# Patient Record
Sex: FEMALE | Race: WHITE | NOT HISPANIC OR LATINO | Employment: OTHER | URBAN - METROPOLITAN AREA
[De-identification: names, ages, dates, MRNs, and addresses within clinical notes are randomized per-mention and may not be internally consistent; named-entity substitution may affect disease eponyms.]

---

## 2017-01-16 ENCOUNTER — APPOINTMENT (EMERGENCY)
Dept: CT IMAGING | Facility: HOSPITAL | Age: 52
End: 2017-01-16
Payer: MEDICARE

## 2017-01-16 ENCOUNTER — HOSPITAL ENCOUNTER (EMERGENCY)
Facility: HOSPITAL | Age: 52
Discharge: HOME | End: 2017-01-16
Attending: EMERGENCY MEDICINE | Admitting: EMERGENCY MEDICINE
Payer: MEDICARE

## 2017-01-16 VITALS
WEIGHT: 269.18 LBS | DIASTOLIC BLOOD PRESSURE: 87 MMHG | BODY MASS INDEX: 40.8 KG/M2 | TEMPERATURE: 98.4 F | SYSTOLIC BLOOD PRESSURE: 145 MMHG | RESPIRATION RATE: 16 BRPM | HEART RATE: 104 BPM | HEIGHT: 68 IN | OXYGEN SATURATION: 96 %

## 2017-01-16 DIAGNOSIS — R11.2 NON-INTRACTABLE VOMITING WITH NAUSEA, UNSPECIFIED VOMITING TYPE: Primary | ICD-10-CM

## 2017-01-16 LAB
ALBUMIN SERPL BCP-MCNC: 3.7 G/DL (ref 3.5–5)
ALP SERPL-CCNC: 99 U/L (ref 46–116)
ALT SERPL W P-5'-P-CCNC: 27 U/L (ref 12–78)
ANION GAP SERPL CALCULATED.3IONS-SCNC: 11 MMOL/L (ref 4–13)
AST SERPL W P-5'-P-CCNC: 14 U/L (ref 5–45)
BACTERIA UR QL AUTO: ABNORMAL /HPF
BASOPHILS # BLD AUTO: 0.07 THOUSANDS/ΜL (ref 0–0.1)
BASOPHILS NFR BLD AUTO: 1 % (ref 0–1)
BILIRUB SERPL-MCNC: 1.3 MG/DL (ref 0.2–1)
BILIRUB UR QL STRIP: ABNORMAL
BUN SERPL-MCNC: 14 MG/DL (ref 5–25)
CALCIUM SERPL-MCNC: 9.5 MG/DL (ref 8.3–10.1)
CHLORIDE SERPL-SCNC: 99 MMOL/L (ref 100–108)
CLARITY UR: ABNORMAL
CLARITY, POC: ABNORMAL
CO2 SERPL-SCNC: 26 MMOL/L (ref 21–32)
COLOR UR: YELLOW
COLOR, POC: YELLOW
CREAT SERPL-MCNC: 0.76 MG/DL (ref 0.6–1.3)
EOSINOPHIL # BLD AUTO: 0.16 THOUSAND/ΜL (ref 0–0.61)
EOSINOPHIL NFR BLD AUTO: 2 % (ref 0–6)
ERYTHROCYTE [DISTWIDTH] IN BLOOD BY AUTOMATED COUNT: 12.8 % (ref 11.6–15.1)
EXT BILIRUBIN, UA: NEGATIVE
EXT BLOOD URINE: ABNORMAL
EXT GLUCOSE, UA: 1000
EXT KETONES: ABNORMAL
EXT NITRITE, UA: NEGATIVE
EXT PH, UA: 6.5
EXT PROTEIN, UA: 30
EXT SPECIFIC GRAVITY, UA: 1.02
EXT UROBILINOGEN: NEGATIVE
GFR SERPL CREATININE-BSD FRML MDRD: >60 ML/MIN/1.73SQ M
GLUCOSE SERPL-MCNC: 344 MG/DL (ref 65–140)
GLUCOSE UR STRIP-MCNC: ABNORMAL MG/DL
HCG UR QL: NEGATIVE
HCT VFR BLD AUTO: 49 % (ref 34.8–46.1)
HGB BLD-MCNC: 15.8 G/DL (ref 11.5–15.4)
HGB UR QL STRIP.AUTO: ABNORMAL
KETONES UR STRIP-MCNC: ABNORMAL MG/DL
LACTATE SERPL-SCNC: 1.3 MMOL/L (ref 0.5–2)
LEUKOCYTE ESTERASE UR QL STRIP: ABNORMAL
LIPASE SERPL-CCNC: 94 U/L (ref 73–393)
LYMPHOCYTES # BLD AUTO: 1.6 THOUSANDS/ΜL (ref 0.6–4.47)
LYMPHOCYTES NFR BLD AUTO: 17 % (ref 14–44)
MCH RBC QN AUTO: 26.9 PG (ref 26.8–34.3)
MCHC RBC AUTO-ENTMCNC: 32.2 G/DL (ref 31.4–37.4)
MCV RBC AUTO: 84 FL (ref 82–98)
MONOCYTES # BLD AUTO: 0.81 THOUSAND/ΜL (ref 0.17–1.22)
MONOCYTES NFR BLD AUTO: 9 % (ref 4–12)
NEUTROPHILS # BLD AUTO: 6.81 THOUSANDS/ΜL (ref 1.85–7.62)
NEUTS SEG NFR BLD AUTO: 72 % (ref 43–75)
NITRITE UR QL STRIP: NEGATIVE
NON-SQ EPI CELLS URNS QL MICRO: ABNORMAL /HPF
NRBC BLD AUTO-RTO: 0 /100 WBCS
OTHER STN SPEC: ABNORMAL
PH UR STRIP.AUTO: 6.5 [PH] (ref 4.5–8)
PLATELET # BLD AUTO: 283 THOUSANDS/UL (ref 149–390)
PMV BLD AUTO: 11.8 FL (ref 8.9–12.7)
POTASSIUM SERPL-SCNC: 4.3 MMOL/L (ref 3.5–5.3)
PROT SERPL-MCNC: 8 G/DL (ref 6.4–8.2)
PROT UR STRIP-MCNC: ABNORMAL MG/DL
RBC # BLD AUTO: 5.87 MILLION/UL (ref 3.81–5.12)
RBC #/AREA URNS AUTO: ABNORMAL /HPF
SODIUM SERPL-SCNC: 136 MMOL/L (ref 136–145)
SP GR UR STRIP.AUTO: 1.02 (ref 1–1.03)
UROBILINOGEN UR QL STRIP.AUTO: 0.2 E.U./DL
WBC # BLD AUTO: 9.49 THOUSAND/UL (ref 4.31–10.16)
WBC # BLD EST: ABNORMAL 10*3/UL
WBC #/AREA URNS AUTO: ABNORMAL /HPF

## 2017-01-16 PROCEDURE — 36415 COLL VENOUS BLD VENIPUNCTURE: CPT | Performed by: NURSE PRACTITIONER

## 2017-01-16 PROCEDURE — 80053 COMPREHEN METABOLIC PANEL: CPT | Performed by: NURSE PRACTITIONER

## 2017-01-16 PROCEDURE — 96374 THER/PROPH/DIAG INJ IV PUSH: CPT

## 2017-01-16 PROCEDURE — 81002 URINALYSIS NONAUTO W/O SCOPE: CPT | Performed by: NURSE PRACTITIONER

## 2017-01-16 PROCEDURE — 83605 ASSAY OF LACTIC ACID: CPT | Performed by: NURSE PRACTITIONER

## 2017-01-16 PROCEDURE — 74177 CT ABD & PELVIS W/CONTRAST: CPT

## 2017-01-16 PROCEDURE — 85025 COMPLETE CBC W/AUTO DIFF WBC: CPT | Performed by: NURSE PRACTITIONER

## 2017-01-16 PROCEDURE — 99284 EMERGENCY DEPT VISIT MOD MDM: CPT

## 2017-01-16 PROCEDURE — 96375 TX/PRO/DX INJ NEW DRUG ADDON: CPT

## 2017-01-16 PROCEDURE — 96361 HYDRATE IV INFUSION ADD-ON: CPT

## 2017-01-16 PROCEDURE — 81025 URINE PREGNANCY TEST: CPT | Performed by: NURSE PRACTITIONER

## 2017-01-16 PROCEDURE — 81001 URINALYSIS AUTO W/SCOPE: CPT | Performed by: NURSE PRACTITIONER

## 2017-01-16 PROCEDURE — 83690 ASSAY OF LIPASE: CPT | Performed by: NURSE PRACTITIONER

## 2017-01-16 PROCEDURE — 87086 URINE CULTURE/COLONY COUNT: CPT | Performed by: NURSE PRACTITIONER

## 2017-01-16 RX ORDER — INSULIN GLARGINE 100 [IU]/ML
50 INJECTION, SOLUTION SUBCUTANEOUS
Status: ON HOLD | COMMUNITY
End: 2022-05-16 | Stop reason: SDUPTHER

## 2017-01-16 RX ORDER — PREDNISONE 10 MG/1
10 TABLET ORAL DAILY
COMMUNITY
End: 2017-03-15

## 2017-01-16 RX ORDER — MORPHINE SULFATE 60 MG/1
75 TABLET, FILM COATED, EXTENDED RELEASE ORAL 2 TIMES DAILY
COMMUNITY
End: 2018-12-24 | Stop reason: HOSPADM

## 2017-01-16 RX ORDER — ALBUTEROL SULFATE 1.25 MG/3ML
1 SOLUTION RESPIRATORY (INHALATION) EVERY 6 HOURS PRN
COMMUNITY
End: 2019-11-27 | Stop reason: HOSPADM

## 2017-01-16 RX ORDER — CARISOPRODOL 350 MG/1
350 TABLET ORAL 2 TIMES DAILY
COMMUNITY

## 2017-01-16 RX ORDER — DIPHENHYDRAMINE HYDROCHLORIDE 50 MG/ML
12.5 INJECTION INTRAMUSCULAR; INTRAVENOUS ONCE
Status: COMPLETED | OUTPATIENT
Start: 2017-01-16 | End: 2017-01-16

## 2017-01-16 RX ORDER — ONDANSETRON 4 MG/1
4 TABLET, ORALLY DISINTEGRATING ORAL EVERY 8 HOURS PRN
Qty: 9 TABLET | Refills: 0 | Status: SHIPPED | OUTPATIENT
Start: 2017-01-16 | End: 2017-01-19

## 2017-01-16 RX ORDER — METOCLOPRAMIDE HYDROCHLORIDE 5 MG/ML
5 INJECTION INTRAMUSCULAR; INTRAVENOUS ONCE
Status: COMPLETED | OUTPATIENT
Start: 2017-01-16 | End: 2017-01-16

## 2017-01-16 RX ORDER — ALBUTEROL SULFATE 90 UG/1
2 AEROSOL, METERED RESPIRATORY (INHALATION) EVERY 6 HOURS PRN
COMMUNITY
End: 2019-11-27 | Stop reason: HOSPADM

## 2017-01-16 RX ORDER — OMEPRAZOLE 20 MG/1
20 CAPSULE, DELAYED RELEASE ORAL DAILY
COMMUNITY
End: 2022-05-17

## 2017-01-16 RX ORDER — GLIPIZIDE 5 MG/1
5 TABLET ORAL
COMMUNITY
End: 2017-03-15

## 2017-01-16 RX ORDER — MORPHINE SULFATE 15 MG/1
15 TABLET ORAL 3 TIMES DAILY
Status: ON HOLD | COMMUNITY
End: 2018-02-05

## 2017-01-16 RX ADMIN — IOHEXOL 100 ML: 350 INJECTION, SOLUTION INTRAVENOUS at 13:19

## 2017-01-16 RX ADMIN — DIPHENHYDRAMINE HYDROCHLORIDE 12.5 MG: 50 INJECTION, SOLUTION INTRAMUSCULAR; INTRAVENOUS at 10:45

## 2017-01-16 RX ADMIN — SODIUM CHLORIDE 1000 ML: 0.9 INJECTION, SOLUTION INTRAVENOUS at 10:42

## 2017-01-16 RX ADMIN — SODIUM CHLORIDE 1000 ML: 0.9 INJECTION, SOLUTION INTRAVENOUS at 13:33

## 2017-01-16 RX ADMIN — METOCLOPRAMIDE 5 MG: 5 INJECTION, SOLUTION INTRAMUSCULAR; INTRAVENOUS at 10:46

## 2017-01-18 LAB — BACTERIA UR CULT: NORMAL

## 2017-03-15 ENCOUNTER — HOSPITAL ENCOUNTER (EMERGENCY)
Facility: HOSPITAL | Age: 52
Discharge: HOME | End: 2017-03-15
Attending: EMERGENCY MEDICINE
Payer: MEDICARE

## 2017-03-15 ENCOUNTER — APPOINTMENT (EMERGENCY)
Dept: RADIOLOGY | Facility: HOSPITAL | Age: 52
End: 2017-03-15
Payer: MEDICARE

## 2017-03-15 VITALS
OXYGEN SATURATION: 98 % | BODY MASS INDEX: 43.24 KG/M2 | DIASTOLIC BLOOD PRESSURE: 73 MMHG | WEIGHT: 284.4 LBS | SYSTOLIC BLOOD PRESSURE: 125 MMHG | TEMPERATURE: 98.2 F | HEART RATE: 88 BPM | RESPIRATION RATE: 18 BRPM

## 2017-03-15 DIAGNOSIS — L97.509 DIABETIC ULCER OF TOE (HCC): Primary | ICD-10-CM

## 2017-03-15 DIAGNOSIS — E11.621 DIABETIC ULCER OF TOE (HCC): Primary | ICD-10-CM

## 2017-03-15 LAB
ALBUMIN SERPL BCP-MCNC: 3.6 G/DL (ref 3.5–5)
ALP SERPL-CCNC: 120 U/L (ref 46–116)
ALT SERPL W P-5'-P-CCNC: 21 U/L (ref 12–78)
ANION GAP SERPL CALCULATED.3IONS-SCNC: 9 MMOL/L (ref 4–13)
APTT PPP: 31 SECONDS (ref 24–36)
AST SERPL W P-5'-P-CCNC: 14 U/L (ref 5–45)
BASOPHILS # BLD AUTO: 0.11 THOUSANDS/ΜL (ref 0–0.1)
BASOPHILS NFR BLD AUTO: 1 % (ref 0–1)
BILIRUB SERPL-MCNC: 0.7 MG/DL (ref 0.2–1)
BUN SERPL-MCNC: 8 MG/DL (ref 5–25)
CALCIUM SERPL-MCNC: 9.2 MG/DL (ref 8.3–10.1)
CHLORIDE SERPL-SCNC: 100 MMOL/L (ref 100–108)
CO2 SERPL-SCNC: 29 MMOL/L (ref 21–32)
CREAT SERPL-MCNC: 0.72 MG/DL (ref 0.6–1.3)
EOSINOPHIL # BLD AUTO: 0.57 THOUSAND/ΜL (ref 0–0.61)
EOSINOPHIL NFR BLD AUTO: 6 % (ref 0–6)
ERYTHROCYTE [DISTWIDTH] IN BLOOD BY AUTOMATED COUNT: 12.8 % (ref 11.6–15.1)
GFR SERPL CREATININE-BSD FRML MDRD: >60 ML/MIN/1.73SQ M
GLUCOSE SERPL-MCNC: 306 MG/DL (ref 65–140)
HCT VFR BLD AUTO: 42.3 % (ref 34.8–46.1)
HGB BLD-MCNC: 14 G/DL (ref 11.5–15.4)
INR PPP: 1.04 (ref 0.86–1.16)
LACTATE SERPL-SCNC: 0.9 MMOL/L (ref 0.5–2)
LYMPHOCYTES # BLD AUTO: 2.93 THOUSANDS/ΜL (ref 0.6–4.47)
LYMPHOCYTES NFR BLD AUTO: 29 % (ref 14–44)
MCH RBC QN AUTO: 27.1 PG (ref 26.8–34.3)
MCHC RBC AUTO-ENTMCNC: 33.1 G/DL (ref 31.4–37.4)
MCV RBC AUTO: 82 FL (ref 82–98)
MONOCYTES # BLD AUTO: 0.68 THOUSAND/ΜL (ref 0.17–1.22)
MONOCYTES NFR BLD AUTO: 7 % (ref 4–12)
NEUTROPHILS # BLD AUTO: 5.67 THOUSANDS/ΜL (ref 1.85–7.62)
NEUTS SEG NFR BLD AUTO: 57 % (ref 43–75)
NRBC BLD AUTO-RTO: 0 /100 WBCS
PLATELET # BLD AUTO: 290 THOUSANDS/UL (ref 149–390)
PMV BLD AUTO: 11.7 FL (ref 8.9–12.7)
POTASSIUM SERPL-SCNC: 3.9 MMOL/L (ref 3.5–5.3)
PROT SERPL-MCNC: 8 G/DL (ref 6.4–8.2)
PROTHROMBIN TIME: 13.5 SECONDS (ref 12–14.3)
RBC # BLD AUTO: 5.17 MILLION/UL (ref 3.81–5.12)
SODIUM SERPL-SCNC: 138 MMOL/L (ref 136–145)
WBC # BLD AUTO: 9.98 THOUSAND/UL (ref 4.31–10.16)

## 2017-03-15 PROCEDURE — 85730 THROMBOPLASTIN TIME PARTIAL: CPT | Performed by: PHYSICIAN ASSISTANT

## 2017-03-15 PROCEDURE — 73660 X-RAY EXAM OF TOE(S): CPT

## 2017-03-15 PROCEDURE — 85025 COMPLETE CBC W/AUTO DIFF WBC: CPT | Performed by: PHYSICIAN ASSISTANT

## 2017-03-15 PROCEDURE — 96360 HYDRATION IV INFUSION INIT: CPT

## 2017-03-15 PROCEDURE — 85610 PROTHROMBIN TIME: CPT | Performed by: PHYSICIAN ASSISTANT

## 2017-03-15 PROCEDURE — 99283 EMERGENCY DEPT VISIT LOW MDM: CPT

## 2017-03-15 PROCEDURE — 83605 ASSAY OF LACTIC ACID: CPT | Performed by: PHYSICIAN ASSISTANT

## 2017-03-15 PROCEDURE — 80053 COMPREHEN METABOLIC PANEL: CPT | Performed by: PHYSICIAN ASSISTANT

## 2017-03-15 PROCEDURE — 36415 COLL VENOUS BLD VENIPUNCTURE: CPT | Performed by: PHYSICIAN ASSISTANT

## 2017-03-15 RX ORDER — CLINDAMYCIN HYDROCHLORIDE 300 MG/1
300 CAPSULE ORAL 3 TIMES DAILY
Qty: 30 CAPSULE | Refills: 0 | Status: SHIPPED | OUTPATIENT
Start: 2017-03-15 | End: 2017-03-25

## 2017-03-15 RX ORDER — GLIMEPIRIDE 4 MG/1
4 TABLET ORAL 2 TIMES DAILY
COMMUNITY
End: 2019-09-20 | Stop reason: HOSPADM

## 2017-03-15 RX ORDER — DIAZEPAM 5 MG/1
5 TABLET ORAL
COMMUNITY

## 2017-03-15 RX ORDER — CLINDAMYCIN HYDROCHLORIDE 150 MG/1
300 CAPSULE ORAL ONCE
Status: COMPLETED | OUTPATIENT
Start: 2017-03-15 | End: 2017-03-15

## 2017-03-15 RX ADMIN — SODIUM CHLORIDE 500 ML: 0.9 INJECTION, SOLUTION INTRAVENOUS at 19:56

## 2017-03-15 RX ADMIN — CLINDAMYCIN HYDROCHLORIDE 300 MG: 150 CAPSULE ORAL at 21:30

## 2017-09-11 ENCOUNTER — HOSPITAL ENCOUNTER (EMERGENCY)
Facility: HOSPITAL | Age: 52
Discharge: HOME | End: 2017-09-11
Attending: EMERGENCY MEDICINE | Admitting: EMERGENCY MEDICINE
Payer: MEDICARE

## 2017-09-11 VITALS
BODY MASS INDEX: 40.92 KG/M2 | OXYGEN SATURATION: 95 % | WEIGHT: 270 LBS | SYSTOLIC BLOOD PRESSURE: 131 MMHG | DIASTOLIC BLOOD PRESSURE: 68 MMHG | HEART RATE: 105 BPM | TEMPERATURE: 98.6 F | RESPIRATION RATE: 18 BRPM | HEIGHT: 68 IN

## 2017-09-11 DIAGNOSIS — M77.8 TENDINITIS OF EXTENSOR TENDON OF LEFT HAND: ICD-10-CM

## 2017-09-11 DIAGNOSIS — M77.8 TENDINITIS OF EXTENSOR TENDON OF RIGHT HAND: Primary | ICD-10-CM

## 2017-09-11 PROCEDURE — 99283 EMERGENCY DEPT VISIT LOW MDM: CPT

## 2017-10-03 ENCOUNTER — GENERIC CONVERSION - ENCOUNTER (OUTPATIENT)
Dept: OTHER | Facility: OTHER | Age: 52
End: 2017-10-03

## 2017-10-03 ENCOUNTER — APPOINTMENT (OUTPATIENT)
Dept: RADIOLOGY | Facility: CLINIC | Age: 52
End: 2017-10-03
Payer: MEDICARE

## 2017-10-03 DIAGNOSIS — M25.539 PAIN IN WRIST: ICD-10-CM

## 2017-10-03 PROCEDURE — 73110 X-RAY EXAM OF WRIST: CPT

## 2017-10-10 ENCOUNTER — GENERIC CONVERSION - ENCOUNTER (OUTPATIENT)
Dept: OTHER | Facility: OTHER | Age: 52
End: 2017-10-10

## 2017-11-21 ENCOUNTER — APPOINTMENT (OUTPATIENT)
Dept: RADIOLOGY | Facility: CLINIC | Age: 52
End: 2017-11-21
Payer: MEDICARE

## 2017-11-21 ENCOUNTER — GENERIC CONVERSION - ENCOUNTER (OUTPATIENT)
Dept: OTHER | Facility: OTHER | Age: 52
End: 2017-11-21

## 2017-11-21 DIAGNOSIS — M25.569 PAIN IN KNEE: ICD-10-CM

## 2017-11-21 DIAGNOSIS — Z96.652 PRESENCE OF LEFT ARTIFICIAL KNEE JOINT: ICD-10-CM

## 2017-11-21 PROCEDURE — 73562 X-RAY EXAM OF KNEE 3: CPT

## 2017-12-19 ENCOUNTER — TRANSCRIBE ORDERS (OUTPATIENT)
Dept: ADMINISTRATIVE | Facility: HOSPITAL | Age: 52
End: 2017-12-19

## 2017-12-19 ENCOUNTER — APPOINTMENT (OUTPATIENT)
Dept: LAB | Facility: HOSPITAL | Age: 52
End: 2017-12-19
Payer: MEDICARE

## 2017-12-19 ENCOUNTER — GENERIC CONVERSION - ENCOUNTER (OUTPATIENT)
Dept: OTHER | Facility: OTHER | Age: 52
End: 2017-12-19

## 2017-12-19 DIAGNOSIS — Z96.652 PRESENCE OF LEFT ARTIFICIAL KNEE JOINT: ICD-10-CM

## 2017-12-19 LAB
BASOPHILS # BLD AUTO: 0.09 THOUSANDS/ΜL (ref 0–0.1)
BASOPHILS NFR BLD AUTO: 1 % (ref 0–1)
CRP SERPL QL: 30.5 MG/L
EOSINOPHIL # BLD AUTO: 0.39 THOUSAND/ΜL (ref 0–0.61)
EOSINOPHIL NFR BLD AUTO: 4 % (ref 0–6)
ERYTHROCYTE [DISTWIDTH] IN BLOOD BY AUTOMATED COUNT: 13.7 % (ref 11.6–15.1)
ERYTHROCYTE [SEDIMENTATION RATE] IN BLOOD: 28 MM/HOUR (ref 0–20)
HCT VFR BLD AUTO: 40.7 % (ref 34.8–46.1)
HGB BLD-MCNC: 12.9 G/DL (ref 11.5–15.4)
LYMPHOCYTES # BLD AUTO: 2.82 THOUSANDS/ΜL (ref 0.6–4.47)
LYMPHOCYTES NFR BLD AUTO: 28 % (ref 14–44)
MCH RBC QN AUTO: 26.3 PG (ref 26.8–34.3)
MCHC RBC AUTO-ENTMCNC: 31.7 G/DL (ref 31.4–37.4)
MCV RBC AUTO: 83 FL (ref 82–98)
MONOCYTES # BLD AUTO: 0.73 THOUSAND/ΜL (ref 0.17–1.22)
MONOCYTES NFR BLD AUTO: 7 % (ref 4–12)
NEUTROPHILS # BLD AUTO: 6.14 THOUSANDS/ΜL (ref 1.85–7.62)
NEUTS SEG NFR BLD AUTO: 60 % (ref 43–75)
NRBC BLD AUTO-RTO: 0 /100 WBCS
PLATELET # BLD AUTO: 232 THOUSANDS/UL (ref 149–390)
PMV BLD AUTO: 12.2 FL (ref 8.9–12.7)
RBC # BLD AUTO: 4.91 MILLION/UL (ref 3.81–5.12)
WBC # BLD AUTO: 10.24 THOUSAND/UL (ref 4.31–10.16)

## 2017-12-19 PROCEDURE — 36415 COLL VENOUS BLD VENIPUNCTURE: CPT

## 2017-12-19 PROCEDURE — 85652 RBC SED RATE AUTOMATED: CPT

## 2017-12-19 PROCEDURE — 86140 C-REACTIVE PROTEIN: CPT

## 2017-12-19 PROCEDURE — 85025 COMPLETE CBC W/AUTO DIFF WBC: CPT

## 2017-12-26 ENCOUNTER — HOSPITAL ENCOUNTER (OUTPATIENT)
Dept: NUCLEAR MEDICINE | Facility: HOSPITAL | Age: 52
Discharge: HOME | End: 2017-12-29
Payer: MEDICARE

## 2017-12-26 DIAGNOSIS — Z96.652 PRESENCE OF LEFT ARTIFICIAL KNEE JOINT: ICD-10-CM

## 2017-12-26 PROCEDURE — A9503 TC99M MEDRONATE: HCPCS

## 2017-12-26 PROCEDURE — 78315 BONE IMAGING 3 PHASE: CPT

## 2018-01-22 VITALS
SYSTOLIC BLOOD PRESSURE: 146 MMHG | HEART RATE: 108 BPM | BODY MASS INDEX: 44.41 KG/M2 | HEIGHT: 68 IN | DIASTOLIC BLOOD PRESSURE: 90 MMHG | WEIGHT: 293 LBS

## 2018-01-22 VITALS
WEIGHT: 293 LBS | HEART RATE: 98 BPM | DIASTOLIC BLOOD PRESSURE: 85 MMHG | BODY MASS INDEX: 44.85 KG/M2 | SYSTOLIC BLOOD PRESSURE: 133 MMHG

## 2018-01-22 VITALS
WEIGHT: 293 LBS | DIASTOLIC BLOOD PRESSURE: 77 MMHG | BODY MASS INDEX: 44.41 KG/M2 | SYSTOLIC BLOOD PRESSURE: 120 MMHG | HEART RATE: 97 BPM | HEIGHT: 68 IN

## 2018-01-24 VITALS
SYSTOLIC BLOOD PRESSURE: 129 MMHG | BODY MASS INDEX: 44.41 KG/M2 | DIASTOLIC BLOOD PRESSURE: 83 MMHG | HEART RATE: 103 BPM | WEIGHT: 293 LBS | HEIGHT: 68 IN

## 2018-02-02 ENCOUNTER — HOSPITAL ENCOUNTER (INPATIENT)
Facility: HOSPITAL | Age: 53
LOS: 3 days | Discharge: HOME | DRG: 617 | End: 2018-02-05
Attending: EMERGENCY MEDICINE | Admitting: INTERNAL MEDICINE
Payer: MEDICARE

## 2018-02-02 ENCOUNTER — APPOINTMENT (EMERGENCY)
Dept: RADIOLOGY | Facility: HOSPITAL | Age: 53
DRG: 617 | End: 2018-02-02
Payer: MEDICARE

## 2018-02-02 ENCOUNTER — ANESTHESIA (INPATIENT)
Dept: PERIOP | Facility: HOSPITAL | Age: 53
DRG: 617 | End: 2018-02-02
Payer: MEDICARE

## 2018-02-02 ENCOUNTER — ANESTHESIA EVENT (INPATIENT)
Dept: PERIOP | Facility: HOSPITAL | Age: 53
DRG: 617 | End: 2018-02-02
Payer: MEDICARE

## 2018-02-02 ENCOUNTER — APPOINTMENT (INPATIENT)
Dept: RADIOLOGY | Facility: HOSPITAL | Age: 53
DRG: 617 | End: 2018-02-02
Payer: MEDICARE

## 2018-02-02 DIAGNOSIS — M86.171 ACUTE OSTEOMYELITIS OF TOE, RIGHT (HCC): Primary | ICD-10-CM

## 2018-02-02 DIAGNOSIS — E16.2 HYPOGLYCEMIA: ICD-10-CM

## 2018-02-02 DIAGNOSIS — L97.519 DIABETIC ULCER OF RIGHT GREAT TOE (HCC): ICD-10-CM

## 2018-02-02 DIAGNOSIS — E11.621 DIABETIC ULCER OF RIGHT GREAT TOE (HCC): ICD-10-CM

## 2018-02-02 DIAGNOSIS — R11.0 NAUSEA: ICD-10-CM

## 2018-02-02 PROBLEM — E87.1 HYPONATREMIA: Status: ACTIVE | Noted: 2018-02-02

## 2018-02-02 PROBLEM — K31.84 GASTROPARESIS: Status: ACTIVE | Noted: 2018-02-02

## 2018-02-02 PROBLEM — E11.9 DIABETES MELLITUS (HCC): Status: ACTIVE | Noted: 2018-02-02

## 2018-02-02 PROBLEM — G89.29 CHRONIC PAIN: Status: ACTIVE | Noted: 2018-02-02

## 2018-02-02 PROBLEM — J45.909 ASTHMA: Status: ACTIVE | Noted: 2018-02-02

## 2018-02-02 PROBLEM — E87.6 HYPOKALEMIA: Status: ACTIVE | Noted: 2018-02-02

## 2018-02-02 LAB
ABO GROUP BLD: NORMAL
ALBUMIN SERPL BCP-MCNC: 2.9 G/DL (ref 3.5–5)
ALP SERPL-CCNC: 110 U/L (ref 46–116)
ALT SERPL W P-5'-P-CCNC: 21 U/L (ref 12–78)
ANION GAP SERPL CALCULATED.3IONS-SCNC: 8 MMOL/L (ref 4–13)
APTT PPP: 40 SECONDS (ref 23–35)
AST SERPL W P-5'-P-CCNC: 25 U/L (ref 5–45)
BACTERIA UR QL AUTO: ABNORMAL /HPF
BASOPHILS # BLD AUTO: 0.06 THOUSANDS/ΜL (ref 0–0.1)
BASOPHILS NFR BLD AUTO: 1 % (ref 0–1)
BILIRUB SERPL-MCNC: 1.2 MG/DL (ref 0.2–1)
BILIRUB UR QL STRIP: NEGATIVE
BLD GP AB SCN SERPL QL: NEGATIVE
BUN SERPL-MCNC: 21 MG/DL (ref 5–25)
CALCIUM SERPL-MCNC: 9.3 MG/DL (ref 8.3–10.1)
CHLORIDE SERPL-SCNC: 93 MMOL/L (ref 100–108)
CLARITY UR: CLEAR
CO2 SERPL-SCNC: 33 MMOL/L (ref 21–32)
COLOR UR: YELLOW
CREAT SERPL-MCNC: 1.05 MG/DL (ref 0.6–1.3)
EOSINOPHIL # BLD AUTO: 0.11 THOUSAND/ΜL (ref 0–0.61)
EOSINOPHIL NFR BLD AUTO: 1 % (ref 0–6)
ERYTHROCYTE [DISTWIDTH] IN BLOOD BY AUTOMATED COUNT: 13.7 % (ref 11.6–15.1)
EST. AVERAGE GLUCOSE BLD GHB EST-MCNC: 197 MG/DL
GFR SERPL CREATININE-BSD FRML MDRD: 61 ML/MIN/1.73SQ M
GLUCOSE SERPL-MCNC: 100 MG/DL (ref 65–140)
GLUCOSE SERPL-MCNC: 113 MG/DL (ref 65–140)
GLUCOSE SERPL-MCNC: 51 MG/DL (ref 65–140)
GLUCOSE SERPL-MCNC: 64 MG/DL (ref 65–140)
GLUCOSE SERPL-MCNC: 64 MG/DL (ref 65–140)
GLUCOSE SERPL-MCNC: 67 MG/DL (ref 65–140)
GLUCOSE SERPL-MCNC: 93 MG/DL (ref 65–140)
GLUCOSE UR STRIP-MCNC: NEGATIVE MG/DL
HBA1C MFR BLD: 8.5 % (ref 4.2–6.3)
HCT VFR BLD AUTO: 38.1 % (ref 34.8–46.1)
HGB BLD-MCNC: 11.9 G/DL (ref 11.5–15.4)
HGB UR QL STRIP.AUTO: ABNORMAL
INR PPP: 1.09 (ref 0.86–1.16)
KETONES UR STRIP-MCNC: NEGATIVE MG/DL
LACTATE SERPL-SCNC: 1.3 MMOL/L (ref 0.5–2)
LEUKOCYTE ESTERASE UR QL STRIP: NEGATIVE
LYMPHOCYTES # BLD AUTO: 1.55 THOUSANDS/ΜL (ref 0.6–4.47)
LYMPHOCYTES NFR BLD AUTO: 15 % (ref 14–44)
MAGNESIUM SERPL-MCNC: 1.9 MG/DL (ref 1.6–2.6)
MCH RBC QN AUTO: 25.9 PG (ref 26.8–34.3)
MCHC RBC AUTO-ENTMCNC: 31.2 G/DL (ref 31.4–37.4)
MCV RBC AUTO: 83 FL (ref 82–98)
MONOCYTES # BLD AUTO: 1.02 THOUSAND/ΜL (ref 0.17–1.22)
MONOCYTES NFR BLD AUTO: 10 % (ref 4–12)
NEUTROPHILS # BLD AUTO: 7.36 THOUSANDS/ΜL (ref 1.85–7.62)
NEUTS SEG NFR BLD AUTO: 72 % (ref 43–75)
NITRITE UR QL STRIP: NEGATIVE
NON-SQ EPI CELLS URNS QL MICRO: ABNORMAL /HPF
NRBC BLD AUTO-RTO: 0 /100 WBCS
PH UR STRIP.AUTO: 6 [PH] (ref 4.5–8)
PLATELET # BLD AUTO: 286 THOUSANDS/UL (ref 149–390)
PMV BLD AUTO: 11.3 FL (ref 8.9–12.7)
POTASSIUM SERPL-SCNC: 3.2 MMOL/L (ref 3.5–5.3)
PROT SERPL-MCNC: 8.2 G/DL (ref 6.4–8.2)
PROT UR STRIP-MCNC: NEGATIVE MG/DL
PROTHROMBIN TIME: 14.4 SECONDS (ref 12.1–14.4)
RBC # BLD AUTO: 4.59 MILLION/UL (ref 3.81–5.12)
RBC #/AREA URNS AUTO: ABNORMAL /HPF
RH BLD: POSITIVE
SODIUM SERPL-SCNC: 134 MMOL/L (ref 136–145)
SP GR UR STRIP.AUTO: 1.02 (ref 1–1.03)
SPECIMEN EXPIRATION DATE: NORMAL
UROBILINOGEN UR QL STRIP.AUTO: 0.2 E.U./DL
WBC # BLD AUTO: 10.17 THOUSAND/UL (ref 4.31–10.16)
WBC #/AREA URNS AUTO: ABNORMAL /HPF

## 2018-02-02 PROCEDURE — 93005 ELECTROCARDIOGRAM TRACING: CPT

## 2018-02-02 PROCEDURE — 86901 BLOOD TYPING SEROLOGIC RH(D): CPT | Performed by: EMERGENCY MEDICINE

## 2018-02-02 PROCEDURE — 87205 SMEAR GRAM STAIN: CPT | Performed by: PODIATRIST

## 2018-02-02 PROCEDURE — 82948 REAGENT STRIP/BLOOD GLUCOSE: CPT

## 2018-02-02 PROCEDURE — 85730 THROMBOPLASTIN TIME PARTIAL: CPT | Performed by: EMERGENCY MEDICINE

## 2018-02-02 PROCEDURE — 87176 TISSUE HOMOGENIZATION CULTR: CPT | Performed by: PODIATRIST

## 2018-02-02 PROCEDURE — 86900 BLOOD TYPING SEROLOGIC ABO: CPT | Performed by: EMERGENCY MEDICINE

## 2018-02-02 PROCEDURE — 87070 CULTURE OTHR SPECIMN AEROBIC: CPT | Performed by: PODIATRIST

## 2018-02-02 PROCEDURE — 71046 X-RAY EXAM CHEST 2 VIEWS: CPT

## 2018-02-02 PROCEDURE — 96367 TX/PROPH/DG ADDL SEQ IV INF: CPT

## 2018-02-02 PROCEDURE — 96375 TX/PRO/DX INJ NEW DRUG ADDON: CPT

## 2018-02-02 PROCEDURE — 86850 RBC ANTIBODY SCREEN: CPT | Performed by: EMERGENCY MEDICINE

## 2018-02-02 PROCEDURE — 36415 COLL VENOUS BLD VENIPUNCTURE: CPT | Performed by: EMERGENCY MEDICINE

## 2018-02-02 PROCEDURE — 87040 BLOOD CULTURE FOR BACTERIA: CPT | Performed by: PHYSICIAN ASSISTANT

## 2018-02-02 PROCEDURE — 85025 COMPLETE CBC W/AUTO DIFF WBC: CPT | Performed by: EMERGENCY MEDICINE

## 2018-02-02 PROCEDURE — 87186 SC STD MICRODIL/AGAR DIL: CPT | Performed by: PODIATRIST

## 2018-02-02 PROCEDURE — 83605 ASSAY OF LACTIC ACID: CPT | Performed by: EMERGENCY MEDICINE

## 2018-02-02 PROCEDURE — 88305 TISSUE EXAM BY PATHOLOGIST: CPT | Performed by: PATHOLOGY

## 2018-02-02 PROCEDURE — 93010 ELECTROCARDIOGRAM REPORT: CPT | Performed by: INTERNAL MEDICINE

## 2018-02-02 PROCEDURE — 88311 DECALCIFY TISSUE: CPT | Performed by: PATHOLOGY

## 2018-02-02 PROCEDURE — 99284 EMERGENCY DEPT VISIT MOD MDM: CPT

## 2018-02-02 PROCEDURE — 73660 X-RAY EXAM OF TOE(S): CPT

## 2018-02-02 PROCEDURE — 81001 URINALYSIS AUTO W/SCOPE: CPT | Performed by: EMERGENCY MEDICINE

## 2018-02-02 PROCEDURE — 96365 THER/PROPH/DIAG IV INF INIT: CPT

## 2018-02-02 PROCEDURE — 80053 COMPREHEN METABOLIC PANEL: CPT | Performed by: EMERGENCY MEDICINE

## 2018-02-02 PROCEDURE — 99223 1ST HOSP IP/OBS HIGH 75: CPT | Performed by: INTERNAL MEDICINE

## 2018-02-02 PROCEDURE — 0Y6P0Z0 DETACHMENT AT RIGHT 1ST TOE, COMPLETE, OPEN APPROACH: ICD-10-PCS | Performed by: PODIATRIST

## 2018-02-02 PROCEDURE — 73630 X-RAY EXAM OF FOOT: CPT

## 2018-02-02 PROCEDURE — 96376 TX/PRO/DX INJ SAME DRUG ADON: CPT

## 2018-02-02 PROCEDURE — 85610 PROTHROMBIN TIME: CPT | Performed by: EMERGENCY MEDICINE

## 2018-02-02 PROCEDURE — 83036 HEMOGLOBIN GLYCOSYLATED A1C: CPT | Performed by: PHYSICIAN ASSISTANT

## 2018-02-02 PROCEDURE — 87075 CULTR BACTERIA EXCEPT BLOOD: CPT | Performed by: PODIATRIST

## 2018-02-02 PROCEDURE — 88305 TISSUE EXAM BY PATHOLOGIST: CPT | Performed by: PODIATRIST

## 2018-02-02 PROCEDURE — 83735 ASSAY OF MAGNESIUM: CPT | Performed by: EMERGENCY MEDICINE

## 2018-02-02 PROCEDURE — 87147 CULTURE TYPE IMMUNOLOGIC: CPT | Performed by: PODIATRIST

## 2018-02-02 PROCEDURE — 88311 DECALCIFY TISSUE: CPT | Performed by: PODIATRIST

## 2018-02-02 RX ORDER — METOCLOPRAMIDE HYDROCHLORIDE 5 MG/ML
INJECTION INTRAMUSCULAR; INTRAVENOUS AS NEEDED
Status: DISCONTINUED | OUTPATIENT
Start: 2018-02-02 | End: 2018-02-02 | Stop reason: SURG

## 2018-02-02 RX ORDER — DIAZEPAM 5 MG/1
5 TABLET ORAL
Status: DISCONTINUED | OUTPATIENT
Start: 2018-02-02 | End: 2018-02-05 | Stop reason: HOSPADM

## 2018-02-02 RX ORDER — MIDAZOLAM HYDROCHLORIDE 1 MG/ML
INJECTION INTRAMUSCULAR; INTRAVENOUS AS NEEDED
Status: DISCONTINUED | OUTPATIENT
Start: 2018-02-02 | End: 2018-02-02 | Stop reason: SURG

## 2018-02-02 RX ORDER — POTASSIUM CHLORIDE 20 MEQ/1
40 TABLET, EXTENDED RELEASE ORAL ONCE
Status: COMPLETED | OUTPATIENT
Start: 2018-02-02 | End: 2018-02-02

## 2018-02-02 RX ORDER — DEXTROSE MONOHYDRATE 25 G/50ML
25 INJECTION, SOLUTION INTRAVENOUS ONCE
Status: COMPLETED | OUTPATIENT
Start: 2018-02-02 | End: 2018-02-02

## 2018-02-02 RX ORDER — ALBUTEROL SULFATE 90 UG/1
2 AEROSOL, METERED RESPIRATORY (INHALATION) EVERY 6 HOURS PRN
Status: DISCONTINUED | OUTPATIENT
Start: 2018-02-02 | End: 2018-02-03

## 2018-02-02 RX ORDER — DEXTROSE AND SODIUM CHLORIDE 5; .45 G/100ML; G/100ML
INJECTION, SOLUTION INTRAVENOUS CONTINUOUS PRN
Status: DISCONTINUED | OUTPATIENT
Start: 2018-02-02 | End: 2018-02-02 | Stop reason: SURG

## 2018-02-02 RX ORDER — MORPHINE SULFATE 30 MG/1
60 TABLET, FILM COATED, EXTENDED RELEASE ORAL EVERY 12 HOURS SCHEDULED
Status: DISCONTINUED | OUTPATIENT
Start: 2018-02-02 | End: 2018-02-03

## 2018-02-02 RX ORDER — CARISOPRODOL 350 MG/1
350 TABLET ORAL 2 TIMES DAILY
Status: DISCONTINUED | OUTPATIENT
Start: 2018-02-02 | End: 2018-02-05 | Stop reason: HOSPADM

## 2018-02-02 RX ORDER — MORPHINE SULFATE 15 MG/1
15 TABLET ORAL 3 TIMES DAILY PRN
Status: DISCONTINUED | OUTPATIENT
Start: 2018-02-02 | End: 2018-02-03

## 2018-02-02 RX ORDER — ONDANSETRON 2 MG/ML
4 INJECTION INTRAMUSCULAR; INTRAVENOUS ONCE
Status: COMPLETED | OUTPATIENT
Start: 2018-02-02 | End: 2018-02-02

## 2018-02-02 RX ORDER — DEXTROSE MONOHYDRATE 25 G/50ML
INJECTION, SOLUTION INTRAVENOUS
Status: COMPLETED
Start: 2018-02-02 | End: 2018-02-02

## 2018-02-02 RX ORDER — KETAMINE HYDROCHLORIDE 50 MG/ML
INJECTION, SOLUTION, CONCENTRATE INTRAMUSCULAR; INTRAVENOUS AS NEEDED
Status: DISCONTINUED | OUTPATIENT
Start: 2018-02-02 | End: 2018-02-02 | Stop reason: SURG

## 2018-02-02 RX ORDER — INSULIN GLARGINE 100 [IU]/ML
30 INJECTION, SOLUTION SUBCUTANEOUS
Status: DISCONTINUED | OUTPATIENT
Start: 2018-02-03 | End: 2018-02-05 | Stop reason: HOSPADM

## 2018-02-02 RX ORDER — PROPOFOL 10 MG/ML
INJECTION, EMULSION INTRAVENOUS AS NEEDED
Status: DISCONTINUED | OUTPATIENT
Start: 2018-02-02 | End: 2018-02-02 | Stop reason: SURG

## 2018-02-02 RX ORDER — ONDANSETRON 2 MG/ML
INJECTION INTRAMUSCULAR; INTRAVENOUS AS NEEDED
Status: DISCONTINUED | OUTPATIENT
Start: 2018-02-02 | End: 2018-02-02 | Stop reason: SURG

## 2018-02-02 RX ORDER — FENTANYL CITRATE/PF 50 MCG/ML
50 SYRINGE (ML) INJECTION
Status: COMPLETED | OUTPATIENT
Start: 2018-02-02 | End: 2018-02-02

## 2018-02-02 RX ORDER — LIDOCAINE HYDROCHLORIDE 10 MG/ML
INJECTION, SOLUTION INFILTRATION; PERINEURAL AS NEEDED
Status: DISCONTINUED | OUTPATIENT
Start: 2018-02-02 | End: 2018-02-02 | Stop reason: SURG

## 2018-02-02 RX ORDER — SODIUM CHLORIDE 9 MG/ML
100 INJECTION, SOLUTION INTRAVENOUS CONTINUOUS
Status: DISCONTINUED | OUTPATIENT
Start: 2018-02-02 | End: 2018-02-02

## 2018-02-02 RX ORDER — PROPOFOL 10 MG/ML
INJECTION, EMULSION INTRAVENOUS CONTINUOUS PRN
Status: DISCONTINUED | OUTPATIENT
Start: 2018-02-02 | End: 2018-02-02 | Stop reason: SURG

## 2018-02-02 RX ORDER — ONDANSETRON 2 MG/ML
4 INJECTION INTRAMUSCULAR; INTRAVENOUS EVERY 6 HOURS PRN
Status: DISCONTINUED | OUTPATIENT
Start: 2018-02-02 | End: 2018-02-05 | Stop reason: HOSPADM

## 2018-02-02 RX ORDER — PANTOPRAZOLE SODIUM 40 MG/1
40 TABLET, DELAYED RELEASE ORAL
Status: DISCONTINUED | OUTPATIENT
Start: 2018-02-03 | End: 2018-02-05 | Stop reason: HOSPADM

## 2018-02-02 RX ORDER — SODIUM CHLORIDE 9 MG/ML
100 INJECTION, SOLUTION INTRAVENOUS CONTINUOUS
Status: DISCONTINUED | OUTPATIENT
Start: 2018-02-02 | End: 2018-02-05

## 2018-02-02 RX ADMIN — FENTANYL CITRATE 50 MCG: 50 INJECTION INTRAMUSCULAR; INTRAVENOUS at 17:12

## 2018-02-02 RX ADMIN — CEFEPIME HYDROCHLORIDE 2000 MG: 2 INJECTION, POWDER, FOR SOLUTION INTRAVENOUS at 23:07

## 2018-02-02 RX ADMIN — CEFEPIME HYDROCHLORIDE 2000 MG: 2 INJECTION, POWDER, FOR SOLUTION INTRAVENOUS at 10:17

## 2018-02-02 RX ADMIN — POTASSIUM CHLORIDE 40 MEQ: 1500 TABLET, EXTENDED RELEASE ORAL at 15:37

## 2018-02-02 RX ADMIN — DEXTROSE MONOHYDRATE 25 ML: 25 INJECTION, SOLUTION INTRAVENOUS at 10:59

## 2018-02-02 RX ADMIN — DEXTROSE MONOHYDRATE 25 ML: 500 INJECTION PARENTERAL at 12:06

## 2018-02-02 RX ADMIN — DEXTROSE MONOHYDRATE 25 ML: 25 INJECTION, SOLUTION INTRAVENOUS at 12:06

## 2018-02-02 RX ADMIN — HYDROMORPHONE HYDROCHLORIDE 1 MG: 1 INJECTION, SOLUTION INTRAMUSCULAR; INTRAVENOUS; SUBCUTANEOUS at 10:14

## 2018-02-02 RX ADMIN — ONDANSETRON 4 MG: 2 INJECTION INTRAMUSCULAR; INTRAVENOUS at 10:14

## 2018-02-02 RX ADMIN — MIDAZOLAM 2 MG: 1 INJECTION INTRAMUSCULAR; INTRAVENOUS at 16:05

## 2018-02-02 RX ADMIN — LIDOCAINE HYDROCHLORIDE 50 MG: 10 INJECTION, SOLUTION INFILTRATION; PERINEURAL at 16:11

## 2018-02-02 RX ADMIN — SODIUM CHLORIDE 100 ML/HR: 0.9 INJECTION, SOLUTION INTRAVENOUS at 23:05

## 2018-02-02 RX ADMIN — DEXTROSE AND SODIUM CHLORIDE: 5; .45 INJECTION, SOLUTION INTRAVENOUS at 16:03

## 2018-02-02 RX ADMIN — KETAMINE HYDROCHLORIDE 30 MG: 50 INJECTION INTRAMUSCULAR; INTRAVENOUS at 16:11

## 2018-02-02 RX ADMIN — PROPOFOL 80 MCG/KG/MIN: 10 INJECTION, EMULSION INTRAVENOUS at 16:11

## 2018-02-02 RX ADMIN — POTASSIUM CHLORIDE 40 MEQ: 1500 TABLET, EXTENDED RELEASE ORAL at 10:58

## 2018-02-02 RX ADMIN — FENTANYL CITRATE 50 MCG: 50 INJECTION INTRAMUSCULAR; INTRAVENOUS at 17:17

## 2018-02-02 RX ADMIN — MORPHINE SULFATE 60 MG: 30 TABLET, EXTENDED RELEASE ORAL at 22:53

## 2018-02-02 RX ADMIN — SODIUM CHLORIDE 1000 ML: 0.9 INJECTION, SOLUTION INTRAVENOUS at 10:09

## 2018-02-02 RX ADMIN — SODIUM CHLORIDE 100 ML/HR: 0.9 INJECTION, SOLUTION INTRAVENOUS at 17:04

## 2018-02-02 RX ADMIN — PROPOFOL 80 MG: 10 INJECTION, EMULSION INTRAVENOUS at 16:11

## 2018-02-02 RX ADMIN — VANCOMYCIN HYDROCHLORIDE 2000 MG: 1 INJECTION, POWDER, LYOPHILIZED, FOR SOLUTION INTRAVENOUS at 10:42

## 2018-02-02 RX ADMIN — SODIUM CHLORIDE 100 ML/HR: 0.9 INJECTION, SOLUTION INTRAVENOUS at 23:07

## 2018-02-02 RX ADMIN — ONDANSETRON 4 MG: 2 INJECTION INTRAMUSCULAR; INTRAVENOUS at 16:33

## 2018-02-02 RX ADMIN — METOCLOPRAMIDE HYDROCHLORIDE 10 MG: 5 INJECTION INTRAMUSCULAR; INTRAVENOUS at 16:05

## 2018-02-02 RX ADMIN — SODIUM CHLORIDE 100 ML/HR: 0.9 INJECTION, SOLUTION INTRAVENOUS at 15:36

## 2018-02-02 NOTE — ED NOTES
Pt is aware a urine sample is needed, pt states, "I don't have to go right now but I will let you know "     Ashley Kat, DELIA  02/02/18 1061

## 2018-02-02 NOTE — H&P
I have reviewed Dr Michael Askew history and physical and I agree with all findings and examinations  Pt is cleared to proceed to the OR for amputation of right first toe

## 2018-02-02 NOTE — ANESTHESIA POSTPROCEDURE EVALUATION
Post-Op Assessment Note      CV Status:  Stable    Mental Status:  Alert and awake    Hydration Status:  Euvolemic    PONV Controlled:  Controlled    Airway Patency:  Patent    Post Op Vitals Reviewed: Yes          Staff: CRNA           BP (P) 107/55 (02/02/18 1650)    Temp (P) 98 5 °F (36 9 °C) (02/02/18 1650)    Pulse  92   Resp 11   SpO2 97

## 2018-02-02 NOTE — OP NOTE
OPERATIVE REPORT  PATIENT NAME: Ladarius Frederick    :  1965  MRN: [id number]  Pt Location: [facility] OR ROOM 04    SURGERY DATE: 2018    Surgeon(s) and Role:     * Jonh Goodrich DPM - Primary    Preop Diagnosis:  * No pre-op diagnosis entered *    Post-Op Diagnosis Codes:     * Acute osteomyelitis of toe, right ([address] ) [M86 171]    Procedure(s) (LRB):  AMPUTATION TOE, RIGHT GREAT TOE (Right)    Specimen(s):  ID Type Source Tests Collected by Time Destination   1 : right great toe Tissue Toe, Right TISSUE EXAM Jonh Goodrich DPM 2018 1633    A : right great toe cultures Tissue Toe, Right ANAEROBIC CULTURE AND GRAM STAIN, CULTURE, TISSUE AND GRAM STAIN Jonh Goodrich DPM 2018 1632        Estimated Blood Loss:   Minimal    Drains:       Anesthesia Type:   * No anesthesia type entered *    Operative Indications:  * No pre-op diagnosis entered *  Osteomyelitis right great toe    Operative Findings:  Extensive infection distal phalanx right great toe    Complications:   None    Procedure and Technique:  Patient was identified in the preop holding area  All consents were verified and in chart  Appropriate limb was identified and marked  Patient was brought back to the OR via gurney  A preoperative time-out was completed  After adequate amount of IV sedation was achieved  10 cc of 1% lidocaine plain and 0 5% Marcaine plain was infiltrated the patient is right foot  The foot was then prepped and draped normal sterile fashion and attention then was directed to the distal aspect of the right great toe  Two semielliptical incisions were made at the level of the IPJ joint  There was foul-smelling pus coming from the plantar aspect of the wound  This area was cultured aerobic and anaerobically  The toe was disarticulated from the IPJ joint  A sagittal saw was then used to resect approximately 2/3 of the proximal phalanx of the remaining bone was noted to be bleeding and very healthy looking    The wound was resolved with a rongeur from all necrotic tissue  There was no deep tracking or pus noted distally into the foot  The wound was then thoroughly flushed via pulse lavage with 5000 mL of sterile normal saline  The medial and lateral aspect of the wound was reapproximated using 4 nylon  The central portion of the wound was packed with quarter-inch iodoform packing  The wound was covered Xeroform 4x4s bulky likely an Ace wrap  I was present for the entire procedure  The patient will be admitted to the medicine service I will continue to closely follow the patient      Patient Disposition:  PACU     SIGNATURE: Philip Knight DPM  DATE: February 2, 2018  TIME: 5:08 PM

## 2018-02-02 NOTE — H&P
Consult - Podiatry   Red Oliveira 47 y o  female MRN: [id number]  Unit/Bed#: ED 25 Encounter: [id number]    Assessment/Plan     Assessment:  Wet gangrene right great toe with x-ray changes suggestive of osteo  Plan:  Discussed all possibilities and treatment options with patient given the severity and nature of the infection in the toe I recommended amputation of the toe patient was in agreement and understood consents were signed and in chart patient is currently in the emergency room and will be admitted to the medicine service the plan is to go the OR for amputation later today  Will continue IV antibiotics    History of Present Illness     HPI:  Red Oliveira is a 47 y o  female who presents with infection of her right great toe  This patient is known to my office she was seen Wednesday for worsening ulcer of the right foot at that time cultures were taken x-ray was done which was negative on patient was placed on Cipro p o  b i d  and told to follow up today patient came in this morning early with foul-smelling and necrotic distal aspect of the right great toe patient was instructed to go to the emergency room  And told she would likely have to be admitted for surgical debridement of the toe the patient was feeling nausea nauseated yesterday but feels better today  She is not complaining of any fevers chills at the moment       Consults  Review of Systems   Constitutional: Negative  HENT: Negative  Eyes: Negative  Respiratory: Negative  Cardiovascular: Negative  Gastrointestinal: Negative  Musculoskeletal:  Pain right toe    Skin:  Ulcers were right great toe   Neurological: Negative  Psych: negative         Historical Information   Past Medical History:   Diagnosis Date    Asthma     Diabetes mellitus ([address] )     Gastroparesis      Past Surgical History:   Procedure Laterality Date    CERVICAL LAMINECTOMY      HYSTERECTOMY      JOINT REPLACEMENT Bilateral     knee     Social History History   Alcohol Use No     History   Drug Use No     History   Smoking Status    Never Smoker   Smokeless Tobacco    Never Used     Family History: History reviewed  No pertinent family history  Meds/Allergies     (Not in a hospital admission)  Allergies   Allergen Reactions    Nsaids GI Intolerance       Objective   First Vitals:   Blood Pressure: 144/73 (02/02/18 0930)  Pulse: (!) 111 (02/02/18 0930)  Temperature: 98 8 °F (37 1 °C) (02/02/18 0930)  Temp Source: Oral (02/02/18 0930)  Respirations: 20 (02/02/18 0930)  Height: 5' 8" (172 7 cm) (02/02/18 0930)  Weight - Scale: 135 kg (297 lb) (02/02/18 0930)  SpO2: 96 % (02/02/18 0930)    Current Vitals:   Blood Pressure: 109/61 (02/02/18 1414)  Pulse: 98 (02/02/18 1414)  Temperature: 98 8 °F (37 1 °C) (02/02/18 0930)  Temp Source: Oral (02/02/18 0930)  Respirations: 18 (02/02/18 1414)  Height: 5' 8" (172 7 cm) (02/02/18 0930)  Weight - Scale: 135 kg (297 lb) (02/02/18 0930)  SpO2: 97 % (02/02/18 1414)        /61 (BP Location: Left arm)   Pulse 98   Temp 98 8 °F (37 1 °C) (Oral)   Resp 18   Ht 5' 8" (1 727 m)   Wt 135 kg (297 lb)   SpO2 97%   BMI 45 16 kg/m²      General Appearance:    Alert, cooperative, no distress   Head:    Normocephalic, without obvious abnormality, atraumatic   Eyes:    PERRL, conjunctiva/corneas clear, EOM's intact        Nose:   Moist mucous membranes   Neck:   Supple, symmetrical, trachea midline   Back:     Symmetric   Lungs:     Respirations unlabored   Heart:    Regular rate and rhythm, S1 and S2 normal, no murmur, rub   or gallop   Abdomen:     Soft, non-tender   Extremities:   Right foot there is +1 pedal edema noted there is a foul-smelling ulcer of the right great toe there is gangrenous changes to the distal lateral aspect of the toe   Pulses:   Pedal pulses are palpable bilaterally   Skin:   Ulcer right great toe   Neurologic:   Gross sensation is present  Protective sensation is diminished distal foot  Lab Results:   Admission on 02/02/2018   Component Date Value    Sodium 02/02/2018 134*    Potassium 02/02/2018 3 2*    Chloride 02/02/2018 93*    CO2 02/02/2018 33*    Anion Gap 02/02/2018 8     BUN 02/02/2018 21     Creatinine 02/02/2018 1 05     Glucose 02/02/2018 64*    Calcium 02/02/2018 9 3     AST 02/02/2018 25     ALT 02/02/2018 21     Alkaline Phosphatase 02/02/2018 110     Total Protein 02/02/2018 8 2     Albumin 02/02/2018 2 9*    Total Bilirubin 02/02/2018 1 20*    eGFR 02/02/2018 61     WBC 02/02/2018 10 17*    RBC 02/02/2018 4 59     Hemoglobin 02/02/2018 11 9     Hematocrit 02/02/2018 38 1     MCV 02/02/2018 83     MCH 02/02/2018 25 9*    MCHC 02/02/2018 31 2*    RDW 02/02/2018 13 7     MPV 02/02/2018 11 3     Platelets 56/15/4717 286     nRBC 02/02/2018 0     Neutrophils Relative 02/02/2018 72     Lymphocytes Relative 02/02/2018 15     Monocytes Relative 02/02/2018 10     Eosinophils Relative 02/02/2018 1     Basophils Relative 02/02/2018 1     Neutrophils Absolute 02/02/2018 7 36     Lymphocytes Absolute 02/02/2018 1 55     Monocytes Absolute 02/02/2018 1 02     Eosinophils Absolute 02/02/2018 0 11     Basophils Absolute 02/02/2018 0 06     Protime 02/02/2018 14 4     INR 02/02/2018 1 09     PTT 02/02/2018 40*    ABO Grouping 02/02/2018 A     Rh Factor 02/02/2018 Positive     Antibody Screen 02/02/2018 Negative     Specimen Expiration Date 02/02/2018 [id number]     Magnesium 02/02/2018 1 9     LACTIC ACID 02/02/2018 1 3     POC Glucose 02/02/2018 64*    POC Glucose 02/02/2018 93                    Invalid input(s): LABAEARO            Imaging: I have personally reviewed pertinent films in PACS  EKG, Pathology, and Other Studies: I have personally reviewed pertinent reports        Code Status: No Order  Advance Directive and Living Will:      Power of :    POLST:

## 2018-02-02 NOTE — ANESTHESIA PREPROCEDURE EVALUATION
Review of Systems/Medical History  Patient summary reviewed  Chart reviewed  No history of anesthetic complications     Cardiovascular  Exercise tolerance: good,     Pulmonary  Asthma: well controlled/ stable Last rescue: < 1 week ago Asthma type of rescue: PRN inhaler,        GI/Hepatic    GERD well controlled, No esophageal disease ,             Endo/Other  Diabetes type 2 Insulin,      GYN       Hematology   Musculoskeletal       Neurology   Psychology           Physical Exam    Airway    Mallampati score: III  TM Distance: >3 FB  Neck ROM: full     Dental       Cardiovascular      Pulmonary      Other Findings        Anesthesia Plan  ASA Score- 3 Emergent    Anesthesia Type- IV sedation with anesthesia with ASA Monitors  Additional Monitors:   Airway Plan:         Plan Factors-    Induction- intravenous  Postoperative Plan-     Informed Consent- Anesthetic plan and risks discussed with patient  I personally reviewed this patient with the CRNA  Discussed and agreed on the Anesthesia Plan with the CRNA  Lorne Miller

## 2018-02-02 NOTE — PLAN OF CARE
DISCHARGE PLANNING     Discharge to home or other facility with appropriate resources Progressing        INFECTION - ADULT     Absence or prevention of progression during hospitalization Progressing        Knowledge Deficit     Patient/family/caregiver demonstrates understanding of disease process, treatment plan, medications, and discharge instructions Progressing        PAIN - ADULT     Verbalizes/displays adequate comfort level or baseline comfort level Progressing        SAFETY ADULT     Patient will remain free of falls Progressing     Maintain or return to baseline ADL function Progressing     Maintain or return mobility status to optimal level Progressing        SKIN/TISSUE INTEGRITY - ADULT     Skin integrity remains intact Progressing     Incision(s), wounds(s) or drain site(s) healing without S/S of infection Progressing

## 2018-02-02 NOTE — ASSESSMENT & PLAN NOTE
· Hemoglobin A1c  · Patient with hypoglycemia on admission, likely due to patient stating that she has not eaten anything in the last 3 days  · Patient states that she is on Lantus 50 units at bedtime and Amaryl, hold Amaryl and start Lantus 30 units at bedtime tomorrow due to patient being NPO today for the OR  Sliding scale coverage    · Monitor glucose checks q 6 hours due to being NPO

## 2018-02-02 NOTE — ASSESSMENT & PLAN NOTE
· Patient follows with Dr Ny Jones as an outpatient  · States she was being treated for a right hallux ulceration and was in the office about 2 days ago, noticed an odor and black tissue which prompted patient to come to the ED  · As evidenced by x-ray of the great toe on the right soft tissue emphysema in the great toe with apparent mild erosive change along the tuft of the distal phalanx most suggestive of osteomyelitis  Nondisplaced 3rd proximal phalanx fracture which is probably a subacute injury with evidence of some healing    · Patient not meeting sepsis criteria on admission  · Podiatry consult, patient to go to the OR today for right hallux amputation  · IV antibiotics with cefepime and vancomycin  · IV fluids  · Blood cultures   · Keep patient NPO  · P T /OT consult  · Lactic negative

## 2018-02-02 NOTE — H&P
H&P- Blaire Sherman 1965, 47 y o  female MRN: [id number]    Unit/Bed#: OR [place] Encounter: [id number]    Primary Care Provider: Lilia Padilla   Date and time admitted to hospital: 2/2/2018  9:25 AM       DOS: 2/2/2018      * Acute osteomyelitis of toe, right ([address] )   Assessment & Plan    · Patient follows with Dr Wm Guardado as an outpatient  · States she was being treated for a right hallux ulceration and was in the office about 2 days ago, noticed an odor and black tissue which prompted patient to come to the ED  · As evidenced by x-ray of the great toe on the right soft tissue emphysema in the great toe with apparent mild erosive change along the tuft of the distal phalanx most suggestive of osteomyelitis  Nondisplaced 3rd proximal phalanx fracture which is probably a subacute injury with evidence of some healing  · Patient not meeting sepsis criteria on admission  · Podiatry consult, patient to go to the OR today for right hallux amputation  · IV antibiotics with cefepime and vancomycin  · IV fluids  · Blood cultures   · Keep patient NPO  · P T /OT consult  · Lactic negative        Hypokalemia   Assessment & Plan    · Patient's potassium 3 2  · Will replete with 40 mEq of K-Dur  · Repeat BMP in the a m  Hyponatremia   Assessment & Plan    · 134 on admission  · Likely from volume depletion, will start IV fluids  · Monitor BMP in the a m          Gastroparesis   Assessment & Plan    · Continue Protonix daily  · Anti emetics as needed  · Monitor symptoms        Chronic pain   Assessment & Plan    · Continue patient's home regimen of morphine extended and immediate release  · Monitor symptoms        Diabetes mellitus (HCC)   Assessment & Plan    · Hemoglobin A1c  · Patient with hypoglycemia on admission, likely due to patient stating that she has not eaten anything in the last 3 days  · Patient states that she is on Lantus 50 units at bedtime and Amaryl, hold Amaryl and start Lantus 30 units at bedtime tomorrow due to patient being NPO today for the OR  Sliding scale coverage  · Monitor glucose checks q 6 hours due to being NPO        Diabetic ulcer of right great toe (HCC)   Assessment & Plan    · With evidence of osteomyelitis  · Plan as above          VTE Prophylaxis: Pharmacologic VTE Prophylaxis contraindicated due to Patient to go to the OR today  / sequential compression device   Code Status:  Level 1-full code, discussed with patient at bedside  POLST: There is no POLST form on file for this patient (pre-hospital)  Discussion with family:  Discussed with patient and patient's  at bedside the patient will be admitted and planned for right hallux amputation  And to continue IV antibiotics  Patient and patient's  agreeable and verbalized understanding  Anticipated Length of Stay:  Patient will be admitted on an Inpatient basis with an anticipated length of stay of  > 2 midnights  Justification for Hospital Stay:  Patient scheduled for right hallux amputation, IV antibiotics    Total Time for Visit, including Counseling / Coordination of Care: 30 minutes  Greater than 50% of this total time spent on direct patient counseling and coordination of care  Chief Complaint:   "I had an ulcer on my right foot "    History of Present Illness:    Juan Wilson is a 47 y o  female with significant past medical history of chronic pain, diabetes with history of foot ulcerations,, gastroparesis, asthma who presents with right great toe ulceration  The patient states that she follows with Dr Fernando Mo with Podiatry as an outpatient  States that she has seen him multiple times in the past for lower extremity ulcerations due to diabetes  States that she has never had an amputation before  Reports that she was being treated by Dr Fernando Mo for this right hallux ulceration and over the last day she began to notice an odor, the toe began to turn a black color and she began to have nausea and vomiting    Last episode of vomiting was 4 a m  Today  States that she has not had any appetite and has not been able to keep anything down  Reports that she has not eaten anything in the last 3 days  Reports that she has had this ulceration for some time now  States that for diabetes management she is on Amaryl 4 mg twice a day and her Lantus was increased to 50 units at bedtime  She reports that she has been taking these medications at home  Patient states that she was at the podiatrist today and that is who sent her to the hospital   Patient denies history of MI or stroke or any heart arrhythmias  Review of Systems:    Review of Systems   Constitutional: Negative for chills, diaphoresis and fever  HENT: Negative for congestion, ear discharge, ear pain, sinus pressure, sore throat and tinnitus  Eyes: Negative for pain, redness and visual disturbance  Respiratory: Negative for cough, chest tightness and shortness of breath  Cardiovascular: Positive for leg swelling  Negative for chest pain  Gastrointestinal: Positive for nausea and vomiting  Negative for abdominal pain, constipation and diarrhea  Genitourinary: Negative for difficulty urinating, dysuria and hematuria  Musculoskeletal: Positive for back pain (Chronic)  Negative for myalgias  Skin: Positive for wound (Right hallux)  Negative for rash  Neurological: Negative for dizziness, light-headedness and headaches  Past Medical and Surgical History:     Past Medical History:   Diagnosis Date    Asthma     Chronic pain     Diabetes mellitus [facility] [address] )     Gastroparesis        Past Surgical History:   Procedure Laterality Date    CERVICAL LAMINECTOMY      HYSTERECTOMY      JOINT REPLACEMENT Bilateral     knee       Meds/Allergies:    Prior to Admission medications    Medication Sig Start Date End Date Taking?  Authorizing Provider   albuterol (ACCUNEB) 1 25 MG/3ML nebulizer solution Take 1 ampule by nebulization every 6 (six) hours as needed for wheezing    Historical Provider, MD   albuterol (PROVENTIL HFA,VENTOLIN HFA) 90 mcg/act inhaler Inhale 2 puffs every 6 (six) hours as needed for wheezing    Historical Provider, MD   carisoprodol (SOMA) 350 mg tablet Take 350 mg by mouth 2 (two) times a day    Historical Provider, MD   diazepam (VALIUM) 5 mg tablet Take 5 mg by mouth daily at bedtime as needed for anxiety    Historical Provider, MD   glimepiride (AMARYL) 4 mg tablet Take 4 mg by mouth 2 (two) times a day    Historical Provider, MD   insulin glargine (LANTUS) 100 units/mL subcutaneous injection Inject 50 Units under the skin daily at bedtime      Historical Provider, MD   morphine (MS CONTIN) 60 mg 12 hr tablet Take 75 mg by mouth 2 (two) times a day      Historical Provider, MD   morphine (MSIR) 15 mg tablet Take 15 mg by mouth 3 (three) times a day    Historical Provider, MD   omeprazole (PriLOSEC) 20 mg delayed release capsule Take 20 mg by mouth daily    Historical Provider, MD   ondansetron (ZOFRAN-ODT) 4 mg disintegrating tablet Take 1 tablet by mouth every 8 (eight) hours as needed for nausea or vomiting for up to 3 days 1/16/17 1/19/17  VIVI Rutherford     I have reviewed home medications with patient personally  Allergies:    Allergies   Allergen Reactions    Nsaids GI Intolerance       Social History:     Marital Status: /Civil Union   Occupation:   Patient Pre-hospital Living Situation:  Patient lives at home with  and son  Patient Pre-hospital Level of Mobility:  States that she has a walker and cane at home and uses them on her "bad" days  Patient Pre-hospital Diet Restrictions:  None  Substance Use History:   History   Alcohol Use No     History   Smoking Status    Never Smoker   Smokeless Tobacco    Never Used     History   Drug Use No       Family History:    Patient's mother with history of diabetes as well as grandmother and uncles    Physical Exam:     Vitals:   Blood Pressure: 124/70 (02/02/18 1558)  Pulse: 101 (02/02/18 1558)  Temperature: 98 1 °F (36 7 °C) (02/02/18 1558)  Temp Source: Temporal (02/02/18 1558)  Respirations: 20 (02/02/18 1558)  Height: 5' 8" (172 7 cm) (02/02/18 0930)  Weight - Scale: 135 kg (297 lb) (02/02/18 0930)  SpO2: 94 % (02/02/18 1558)    Physical Exam   Constitutional: No distress  Patient is in no acute distress sitting in her hospital bed accompanied by her   She is missing some teeth  Patient is visibly anxious due to going to the OR today   HENT:   Head: Normocephalic and atraumatic  Cardiovascular: Normal rate, regular rhythm and intact distal pulses  Pulmonary/Chest: Effort normal and breath sounds normal  No respiratory distress  She has no wheezes  She has no rales  Abdominal: Soft  Bowel sounds are normal  She exhibits no distension  There is no tenderness  There is no rebound  Musculoskeletal: She exhibits edema (Edema of the right ankle)  Neurological: She is alert  Skin: Skin is warm and dry  She is not diaphoretic  Patient's right foot dressed and wrapping is clean and intact  Some blood beginning to form on the dressing  Psychiatric: She has a normal mood and affect  Vitals reviewed  Additional Data:     Lab Results: I have personally reviewed pertinent reports  Results from last 7 days  Lab Units 02/02/18  1006   WBC Thousand/uL 10 17*   HEMOGLOBIN g/dL 11 9   HEMATOCRIT % 38 1   PLATELETS Thousands/uL 286   NEUTROS PCT % 72   LYMPHS PCT % 15   MONOS PCT % 10   EOS PCT % 1       Results from last 7 days  Lab Units 02/02/18  1006   SODIUM mmol/L 134*   POTASSIUM mmol/L 3 2*   CHLORIDE mmol/L 93*   CO2 mmol/L 33*   BUN mg/dL 21   CREATININE mg/dL 1 05   CALCIUM mg/dL 9 3   TOTAL PROTEIN g/dL 8 2   BILIRUBIN TOTAL mg/dL 1 20*   ALK PHOS U/L 110   ALT U/L 21   AST U/L 25   GLUCOSE RANDOM mg/dL 64*       Results from last 7 days  Lab Units 02/02/18  1006   INR  1 09       Imaging: I have personally reviewed pertinent reports        XR chest 2 views   Final Result by Trinity Parker MD (02/02 1045)      No active pulmonary disease  Workstation performed: [id number]         XR toe great min 2 views RIGHT   Final Result by Trinity Parker MD (02/02 1043)      Soft tissue emphysema in the great toe with apparent mild erosive change along the tuft of the distal phalanx most suggestive of osteomyelitis  Nondisplaced 3rd proximal phalanx fracture which is probably a subacute injury with evidence of some healing  Workstation performed: [id number]             EKG, Pathology, and Other Studies Reviewed on Admission:   · Chest x-ray and x-ray of the great toe reviewed    Allscripts / Epic Records Reviewed: Yes     ** Please Note: This note has been constructed using a voice recognition system   **

## 2018-02-02 NOTE — ED PROVIDER NOTES
History  Chief Complaint   Patient presents with    Foot Ulcer     Pt stated that her foot doctor told her to come here because the right great toe is infected  Patient is a 48-year-old female with past medical and surgical history of insulin-dependent diabetes, diabetic neuropathy, diabetic gastroparesis, asthma, chronic neck and back pain status post cervical laminectomy, hysterectomy, right knee replacement, presents to the emergency department at the instruction of her podiatrist, Dr Deisi Perez, for worsening and nonhealing diabetic right great toe ulcer  According to patient and according to the podiatrist who called in sending patient over, he plans to likely amputate the right great toe later today or tomorrow and recommended she be started on IV antibiotics  According to patient she has been dealing with a plantar ulcer on the right great toe that started several months ago  She has been treating this with her podiatrist as an outpatient however it has been getting progressively worse and today, the podiatrist noted some black discoloration of the toe and recommended she come to the ED for possible amputation and IV antibiotics  She states she had an x-ray of the right great toe several days ago in the office and it was unremarkable  On review of systems, she states she has had nausea and yesterday she had a few episodes of nonbilious and nonbloody vomiting  No further vomiting today but continues to feel nauseous  She reports having associated pain over the ulcer area  She also reports mild redness over the top of the foot  She denies any known fever, chills, headache, dizziness or near syncope, URI symptoms or cough, chest pain, palpitations, dyspnea, abdominal pain, diarrhea, constipation, blood per rectum, melena, dysuria, frequency, hematuria, new extremity weakness or paresthesia worse than baseline given her history of neuropathy, or other focal neurologic deficits          History provided by:  Patient   used: No        Prior to Admission Medications   Prescriptions Last Dose Informant Patient Reported? Taking? albuterol (ACCUNEB) 1 25 MG/3ML nebulizer solution   Yes No   Sig: Take 1 ampule by nebulization every 6 (six) hours as needed for wheezing   albuterol (PROVENTIL HFA,VENTOLIN HFA) 90 mcg/act inhaler   Yes No   Sig: Inhale 2 puffs every 6 (six) hours as needed for wheezing   carisoprodol (SOMA) 350 mg tablet   Yes No   Sig: Take 350 mg by mouth 2 (two) times a day   diazepam (VALIUM) 5 mg tablet   Yes No   Sig: Take 5 mg by mouth daily at bedtime as needed for anxiety   glimepiride (AMARYL) 4 mg tablet   Yes No   Sig: Take 4 mg by mouth 2 (two) times a day   insulin glargine (LANTUS) 100 units/mL subcutaneous injection   Yes No   Sig: Inject 20 Units under the skin daily at bedtime   morphine (MS CONTIN) 60 mg 12 hr tablet   Yes No   Sig: Take 75 mg by mouth 2 (two) times a day     morphine (MSIR) 15 mg tablet   Yes No   Sig: Take 15 mg by mouth 3 (three) times a day   omeprazole (PriLOSEC) 20 mg delayed release capsule   Yes No   Sig: Take 20 mg by mouth daily   ondansetron (ZOFRAN-ODT) 4 mg disintegrating tablet   No No   Sig: Take 1 tablet by mouth every 8 (eight) hours as needed for nausea or vomiting for up to 3 days      Facility-Administered Medications: None       Past Medical History:   Diagnosis Date    Asthma     Diabetes mellitus ([facility] [address] )     Gastroparesis        Past Surgical History:   Procedure Laterality Date    CERVICAL LAMINECTOMY      HYSTERECTOMY      JOINT REPLACEMENT Bilateral     knee       History reviewed  No pertinent family history  I have reviewed and agree with the history as documented  Social History   Substance Use Topics    Smoking status: Never Smoker    Smokeless tobacco: Never Used    Alcohol use No        Review of Systems   Constitutional: Negative for chills and fever     HENT: Negative for congestion, ear pain, rhinorrhea and sore throat  Eyes: Negative for pain and visual disturbance  Respiratory: Negative for cough, chest tightness, shortness of breath and wheezing  Cardiovascular: Negative for chest pain, palpitations and leg swelling  Gastrointestinal: Positive for nausea and vomiting  Negative for abdominal pain, blood in stool, constipation and diarrhea  Genitourinary: Negative for dysuria, flank pain, frequency and hematuria  Musculoskeletal: Positive for joint swelling  Negative for back pain and neck pain         + Right great toe wound, swelling and pain  Skin: Positive for wound  Negative for color change and rash  Allergic/Immunologic: Negative for immunocompromised state  Neurological: Negative for dizziness, syncope, weakness, light-headedness, numbness and headaches  Hematological: Negative for adenopathy  Psychiatric/Behavioral: Negative for confusion and decreased concentration  All other systems reviewed and are negative  Physical Exam  ED Triage Vitals [02/02/18 0930]   Temperature Pulse Respirations Blood Pressure SpO2   98 8 °F (37 1 °C) (!) 111 20 144/73 96 %      Temp Source Heart Rate Source Patient Position - Orthostatic VS BP Location FiO2 (%)   Oral Monitor Sitting Right arm --      Pain Score       7         Vitals:    02/02/18 0930 02/02/18 1045 02/02/18 1200   BP: 144/73 103/59 102/58   BP Location: Right arm Left arm Left arm   Pulse: (!) 111 100 100   Resp: 20 16 14   Temp: 98 8 °F (37 1 °C)     TempSrc: Oral     SpO2: 96% 95% 90%   Weight: 135 kg (297 lb)     Height: 5' 8" (1 727 m)       Physical Exam   Constitutional: She is oriented to person, place, and time  She appears well-developed and well-nourished  No distress  HENT:   Head: Normocephalic and atraumatic  Mouth/Throat: Oropharynx is clear and moist  No oropharyngeal exudate  Eyes: Conjunctivae and EOM are normal  Pupils are equal, round, and reactive to light  Neck: Normal range of motion  Neck supple  No JVD present  Cardiovascular: Regular rhythm, normal heart sounds and intact distal pulses  Exam reveals no gallop and no friction rub  No murmur heard  Mild tachycardia  Pulmonary/Chest: Effort normal and breath sounds normal  No respiratory distress  She has no wheezes  She has no rales  Abdominal: Soft  Bowel sounds are normal  She exhibits no distension  There is no tenderness  There is no rebound and no guarding  Musculoskeletal: Normal range of motion  She exhibits tenderness  She exhibits no edema  Tenderness around the right great toe  2+ DP and PT pulses  Neurological: She is alert and oriented to person, place, and time  No cranial nerve deficit  No gross motor or sensory deficits  Skin: Skin is warm and dry  No rash noted  She is not diaphoretic  No pallor  Clean based ulceration over the plantar and dorsal aspect of the right great toe  There is black gangrenous area over the medial aspect of right great toe  Entire toe is swollen and erythematous  Mild erythema and warmth over the dorsum of the foot  Psychiatric: She has a normal mood and affect  Her behavior is normal    Nursing note and vitals reviewed        ED Medications  Medications   dextrose 50 % IV solution 25 mL (not administered)   sodium chloride 0 9 % bolus 1,000 mL (1,000 mL Intravenous New Bag 2/2/18 1009)   ondansetron (ZOFRAN) injection 4 mg (4 mg Intravenous Given 2/2/18 1014)   HYDROmorphone (DILAUDID) injection 1 mg (1 mg Intravenous Given 2/2/18 1014)   vancomycin (VANCOCIN) 2,000 mg in sodium chloride 0 9 % 500 mL IVPB (0 mg/kg × 135 kg Intravenous Stopped 2/2/18 1213)   cefepime (MAXIPIME) 2,000 mg in dextrose 5 % 50 mL IVPB (0 mg Intravenous Stopped 2/2/18 1042)   potassium chloride (K-DUR,KLOR-CON) CR tablet 40 mEq (40 mEq Oral Given 2/2/18 1058)   dextrose 50 % IV solution 25 mL (25 mL Intravenous Given 2/2/18 1059)   dextrose 50 % IV solution **AcuDose Override Pull** (25 mL  Given 2/2/18 1206) Diagnostic Studies  Results Reviewed     Procedure Component Value Units Date/Time    Fingerstick Glucose (POCT) [[id number]]  (Abnormal) Collected:  02/02/18 1201    Lab Status:  Final result Updated:  02/02/18 1202     POC Glucose 64 (L) mg/dl     Lactic acid, plasma [[id number]]  (Normal) Collected:  02/02/18 1006    Lab Status:  Final result Specimen:  Blood from Arm, Right Updated:  02/02/18 1044     LACTIC ACID 1 3 mmol/L     Narrative:         Result may be elevated if tourniquet was used during collection  Comprehensive metabolic panel [[id number]]  (Abnormal) Collected:  02/02/18 1006    Lab Status:  Final result Specimen:  Blood from Arm, Right Updated:  02/02/18 1031     Sodium 134 (L) mmol/L      Potassium 3 2 (L) mmol/L      Chloride 93 (L) mmol/L      CO2 33 (H) mmol/L      Anion Gap 8 mmol/L      BUN 21 mg/dL      Creatinine 1 05 mg/dL      Glucose 64 (L) mg/dL      Calcium 9 3 mg/dL      AST 25 U/L      ALT 21 U/L      Alkaline Phosphatase 110 U/L      Total Protein 8 2 g/dL      Albumin 2 9 (L) g/dL      Total Bilirubin 1 20 (H) mg/dL      eGFR 61 ml/min/1 73sq m     Narrative:         National Kidney Disease Education Program recommendations are as follows:  GFR calculation is accurate only with a steady state creatinine  Chronic Kidney disease less than 60 ml/min/1 73 sq  meters  Kidney failure less than 15 ml/min/1 73 sq  meters  Magnesium [[id number]]  (Normal) Collected:  02/02/18 1006    Lab Status:  Final result Specimen:  Blood from Arm, Right Updated:  02/02/18 1031     Magnesium 1 9 mg/dL     Protime-INR [[id number]]  (Normal) Collected:  02/02/18 1006    Lab Status:  Final result Specimen:  Blood from Arm, Right Updated:  02/02/18 1030     Protime 14 4 seconds      INR 1 09    APTT [[id number]]  (Abnormal) Collected:  02/02/18 1006    Lab Status:  Final result Specimen:  Blood from Arm, Right Updated:  02/02/18 1030     PTT 40 (H) seconds     Narrative:          Therapeutic Heparin Range = 60-90 seconds    CBC and differential [[id number]]  (Abnormal) Collected:  02/02/18 1006    Lab Status:  Final result Specimen:  Blood from Arm, Right Updated:  02/02/18 1017     WBC 10 17 (H) Thousand/uL      RBC 4 59 Million/uL      Hemoglobin 11 9 g/dL      Hematocrit 38 1 %      MCV 83 fL      MCH 25 9 (L) pg      MCHC 31 2 (L) g/dL      RDW 13 7 %      MPV 11 3 fL      Platelets 465 Thousands/uL      nRBC 0 /100 WBCs      Neutrophils Relative 72 %      Lymphocytes Relative 15 %      Monocytes Relative 10 %      Eosinophils Relative 1 %      Basophils Relative 1 %      Neutrophils Absolute 7 36 Thousands/µL      Lymphocytes Absolute 1 55 Thousands/µL      Monocytes Absolute 1 02 Thousand/µL      Eosinophils Absolute 0 11 Thousand/µL      Basophils Absolute 0 06 Thousands/µL     UA w Reflex to Microscopic w Reflex to Culture [[id number]]     Lab Status:  No result Specimen:  Urine                  XR chest 2 views   Final Result by Beulah Daniels MD (02/02 1045)      No active pulmonary disease  Workstation performed: [id number]         XR toe great min 2 views RIGHT   Final Result by Beulah Daniels MD (02/02 1043)      Soft tissue emphysema in the great toe with apparent mild erosive change along the tuft of the distal phalanx most suggestive of osteomyelitis  Nondisplaced 3rd proximal phalanx fracture which is probably a subacute injury with evidence of some healing           Workstation performed: [id number]                    Procedures  ECG 12 Lead Documentation  Date/Time: 2/2/2018 12:13 PM  Performed by: Errol Perkins by: Hailey Baig     ECG reviewed by me, the ED Provider: yes    Patient location:  ED  Previous ECG:     Previous ECG:  Unavailable  Rate:     ECG rate:  102    ECG rate assessment: tachycardic    Rhythm:     Rhythm: sinus tachycardia    Ectopy:     Ectopy: none    QRS:     QRS axis:  Normal    QRS intervals:  Normal  Conduction:     Conduction: normal ST segments:     ST segments:  Normal  T waves:     T waves: normal             Phone Contacts  ED Phone Contact    ED Course  ED Course as of Feb 02 1213   Fri Feb 02, 2018   1210 Patient's repeat glucose 64 despite half an amp of D50  Will give another half an amp reassessed  Spoke with patient's podiatrist who will likely take patient to the OR today  Patient admitted to [facility]  MDM  Number of Diagnoses or Management Options  Diagnosis management comments: Right great toe diabetic foot ulcer with evidence of gangrene  Osteomyelitis also on the differential   Will repeat right great toe x-ray, check labs including lactic acid and blood cultures  Will start IV antibiotics and admit to Medicine with Podiatry consult  Will reach out to podiatrist via phone  Will give IV fluid bolus, Zofran, Dilaudid, cefepime and vancomycin         Amount and/or Complexity of Data Reviewed  Clinical lab tests: ordered and reviewed  Tests in the radiology section of CPT®: ordered and reviewed  Tests in the medicine section of CPT®: ordered and reviewed  Discuss the patient with other providers: yes  Independent visualization of images, tracings, or specimens: yes      CritCare Time    Disposition  Final diagnoses:   Acute osteomyelitis of toe, right ([address] )   Diabetic ulcer of right great toe ([address] )   Nausea   Hypoglycemia     Time reflects when diagnosis was documented in both MDM as applicable and the Disposition within this note     Time User Action Codes Description Comment    2/2/2018 12:11 PM Giovani Gutting E Add [M86 171] Acute osteomyelitis of toe, right ([address] )     2/2/2018 12:11 PM Olayinka Au Add [B46 437,  L97 519] Diabetic ulcer of right great toe ([address] )     2/2/2018 12:12 PM Giovani Gutting E Add [R11 0] Nausea     2/2/2018 12:12 PM Giovani Gutting E Add [E16 2] Hypoglycemia       ED Disposition     ED Disposition Condition Comment    Admit  Case was discussed with SLRAYNE and the patient's admission status was agreed to be Admission Status: inpatient status to the service of Dr Adelfo Mathis   Follow-up Information    None       Patient's Medications   Discharge Prescriptions    No medications on file     No discharge procedures on file      ED Provider  Electronically Signed by           Yordy Sandoval DO  02/02/18 8541

## 2018-02-03 LAB
ALBUMIN SERPL BCP-MCNC: 2.3 G/DL (ref 3.5–5)
ALP SERPL-CCNC: 132 U/L (ref 46–116)
ALT SERPL W P-5'-P-CCNC: 20 U/L (ref 12–78)
ANION GAP SERPL CALCULATED.3IONS-SCNC: 7 MMOL/L (ref 4–13)
AST SERPL W P-5'-P-CCNC: 26 U/L (ref 5–45)
BILIRUB SERPL-MCNC: 0.7 MG/DL (ref 0.2–1)
BUN SERPL-MCNC: 10 MG/DL (ref 5–25)
CALCIUM SERPL-MCNC: 8.4 MG/DL (ref 8.3–10.1)
CHLORIDE SERPL-SCNC: 101 MMOL/L (ref 100–108)
CO2 SERPL-SCNC: 30 MMOL/L (ref 21–32)
CREAT SERPL-MCNC: 0.82 MG/DL (ref 0.6–1.3)
ERYTHROCYTE [DISTWIDTH] IN BLOOD BY AUTOMATED COUNT: 13.9 % (ref 11.6–15.1)
GFR SERPL CREATININE-BSD FRML MDRD: 82 ML/MIN/1.73SQ M
GLUCOSE SERPL-MCNC: 124 MG/DL (ref 65–140)
GLUCOSE SERPL-MCNC: 142 MG/DL (ref 65–140)
GLUCOSE SERPL-MCNC: 67 MG/DL (ref 65–140)
GLUCOSE SERPL-MCNC: 73 MG/DL (ref 65–140)
GLUCOSE SERPL-MCNC: 89 MG/DL (ref 65–140)
HCT VFR BLD AUTO: 28 % (ref 34.8–46.1)
HGB BLD-MCNC: 8.5 G/DL (ref 11.5–15.4)
MCH RBC QN AUTO: 26.3 PG (ref 26.8–34.3)
MCHC RBC AUTO-ENTMCNC: 30.4 G/DL (ref 31.4–37.4)
MCV RBC AUTO: 87 FL (ref 82–98)
PLATELET # BLD AUTO: 196 THOUSANDS/UL (ref 149–390)
PMV BLD AUTO: 10.8 FL (ref 8.9–12.7)
POTASSIUM SERPL-SCNC: 3.5 MMOL/L (ref 3.5–5.3)
PROT SERPL-MCNC: 6.7 G/DL (ref 6.4–8.2)
RBC # BLD AUTO: 3.23 MILLION/UL (ref 3.81–5.12)
SODIUM SERPL-SCNC: 138 MMOL/L (ref 136–145)
WBC # BLD AUTO: 6.54 THOUSAND/UL (ref 4.31–10.16)

## 2018-02-03 PROCEDURE — 99233 SBSQ HOSP IP/OBS HIGH 50: CPT | Performed by: NURSE PRACTITIONER

## 2018-02-03 PROCEDURE — 80053 COMPREHEN METABOLIC PANEL: CPT | Performed by: PHYSICIAN ASSISTANT

## 2018-02-03 PROCEDURE — 85027 COMPLETE CBC AUTOMATED: CPT | Performed by: PHYSICIAN ASSISTANT

## 2018-02-03 PROCEDURE — 82948 REAGENT STRIP/BLOOD GLUCOSE: CPT

## 2018-02-03 RX ORDER — MORPHINE SULFATE 15 MG/1
30 TABLET ORAL EVERY 6 HOURS PRN
Status: DISCONTINUED | OUTPATIENT
Start: 2018-02-03 | End: 2018-02-05 | Stop reason: HOSPADM

## 2018-02-03 RX ORDER — ALBUTEROL SULFATE 90 UG/1
2 AEROSOL, METERED RESPIRATORY (INHALATION) EVERY 4 HOURS PRN
Status: DISCONTINUED | OUTPATIENT
Start: 2018-02-03 | End: 2018-02-05 | Stop reason: HOSPADM

## 2018-02-03 RX ORDER — MORPHINE SULFATE 15 MG/1
15 TABLET ORAL EVERY 6 HOURS PRN
Status: DISCONTINUED | OUTPATIENT
Start: 2018-02-03 | End: 2018-02-05 | Stop reason: HOSPADM

## 2018-02-03 RX ADMIN — SODIUM CHLORIDE 100 ML/HR: 0.9 INJECTION, SOLUTION INTRAVENOUS at 17:14

## 2018-02-03 RX ADMIN — MORPHINE SULFATE 75 MG: 30 TABLET, EXTENDED RELEASE ORAL at 21:43

## 2018-02-03 RX ADMIN — ENOXAPARIN SODIUM 40 MG: 40 INJECTION SUBCUTANEOUS at 12:50

## 2018-02-03 RX ADMIN — VANCOMYCIN HYDROCHLORIDE 1750 MG: 1 INJECTION, POWDER, LYOPHILIZED, FOR SOLUTION INTRAVENOUS at 00:26

## 2018-02-03 RX ADMIN — CARISOPRODOL 350 MG: 350 TABLET ORAL at 18:00

## 2018-02-03 RX ADMIN — VANCOMYCIN HYDROCHLORIDE 1750 MG: 1 INJECTION, POWDER, LYOPHILIZED, FOR SOLUTION INTRAVENOUS at 12:47

## 2018-02-03 RX ADMIN — MORPHINE SULFATE 30 MG: 15 TABLET ORAL at 15:51

## 2018-02-03 RX ADMIN — INSULIN GLARGINE 30 UNITS: 100 INJECTION, SOLUTION SUBCUTANEOUS at 22:00

## 2018-02-03 RX ADMIN — MORPHINE SULFATE 15 MG: 15 TABLET ORAL at 06:31

## 2018-02-03 RX ADMIN — MORPHINE SULFATE 60 MG: 30 TABLET, EXTENDED RELEASE ORAL at 08:56

## 2018-02-03 RX ADMIN — CEFEPIME HYDROCHLORIDE 2000 MG: 2 INJECTION, POWDER, FOR SOLUTION INTRAVENOUS at 21:47

## 2018-02-03 RX ADMIN — CEFEPIME HYDROCHLORIDE 2000 MG: 2 INJECTION, POWDER, FOR SOLUTION INTRAVENOUS at 10:04

## 2018-02-03 RX ADMIN — MORPHINE SULFATE 15 MG: 15 TABLET ORAL at 01:59

## 2018-02-03 RX ADMIN — MORPHINE SULFATE 30 MG: 15 TABLET ORAL at 23:36

## 2018-02-03 RX ADMIN — HYDROMORPHONE HYDROCHLORIDE 1 MG: 1 INJECTION, SOLUTION INTRAMUSCULAR; INTRAVENOUS; SUBCUTANEOUS at 17:12

## 2018-02-03 RX ADMIN — MORPHINE SULFATE 15 MG: 15 TABLET ORAL at 12:44

## 2018-02-03 RX ADMIN — CARISOPRODOL 350 MG: 350 TABLET ORAL at 08:55

## 2018-02-03 RX ADMIN — VANCOMYCIN HYDROCHLORIDE 1750 MG: 1 INJECTION, POWDER, LYOPHILIZED, FOR SOLUTION INTRAVENOUS at 22:47

## 2018-02-03 RX ADMIN — PANTOPRAZOLE SODIUM 40 MG: 40 TABLET, DELAYED RELEASE ORAL at 06:33

## 2018-02-03 RX ADMIN — MORPHINE SULFATE 15 MG: 15 TABLET ORAL at 20:00

## 2018-02-03 NOTE — PLAN OF CARE
Problem: PAIN - ADULT  Goal: Verbalizes/displays adequate comfort level or baseline comfort level  Interventions:  - Encourage patient to monitor pain and request assistance  - Assess pain using appropriate pain scale  - Administer analgesics based on type and severity of pain and evaluate response  - Implement non-pharmacological measures as appropriate and evaluate response  - Consider cultural and social influences on pain and pain management  - Notify physician/advanced practitioner if interventions unsuccessful or patient reports new pain   Outcome: Progressing      Problem: INFECTION - ADULT  Goal: Absence or prevention of progression during hospitalization  INTERVENTIONS:  - Assess and monitor for signs and symptoms of infection  - Monitor lab/diagnostic results  - Monitor all insertion sites, i e  indwelling lines, tubes, and drains  - Monitor endotracheal (as able) and nasal secretions for changes in amount and color  - [place] appropriate cooling/warming therapies per order  - Administer medications as ordered  - Instruct and encourage patient and family to use good hand hygiene technique  - Identify and instruct in appropriate isolation precautions for identified infection/condition   Outcome: Progressing      Problem: SAFETY ADULT  Goal: Patient will remain free of falls  INTERVENTIONS:  - Assess patient frequently for physical needs  -  Identify cognitive and physical deficits and behaviors that affect risk of falls    -  [place] fall precautions as indicated by assessment   - Educate patient/family on patient safety including physical limitations  - Instruct patient to call for assistance with activity based on assessment  - Modify environment to reduce risk of injury  - Consider OT/PT consult to assist with strengthening/mobility    Outcome: Progressing    Goal: Maintain or return to baseline ADL function  INTERVENTIONS:  -  Assess patient's ability to carry out ADLs; assess patient's baseline for ADL function and identify physical deficits which impact ability to perform ADLs (bathing, care of mouth/teeth, toileting, grooming, dressing, etc )  - Assess/evaluate cause of self-care deficits   - Assess range of motion  - Assess patient's mobility; develop plan if impaired  - Assess patient's need for assistive devices and provide as appropriate  - Encourage maximum independence but intervene and supervise when necessary  ¯ Involve family in performance of ADLs  ¯ Assess for home care needs following discharge   ¯ Request OT consult to assist with ADL evaluation and planning for discharge  ¯ Provide patient education as appropriate   Outcome: Progressing    Goal: Maintain or return mobility status to optimal level  INTERVENTIONS:  - Assess patient's baseline mobility status (ambulation, transfers, stairs, etc )    - Identify cognitive and physical deficits and behaviors that affect mobility  - Identify mobility aids required to assist with transfers and/or ambulation (gait belt, sit-to-stand, lift, walker, cane, etc )  - [place] fall precautions as indicated by assessment  - Record patient progress and toleration of activity level on Mobility SBAR; progress patient to next Phase/Stage  - Instruct patient to call for assistance with activity based on assessment  - Request Rehabilitation consult to assist with strengthening/weightbearing, etc    Outcome: Progressing      Problem: DISCHARGE PLANNING  Goal: Discharge to home or other facility with appropriate resources  INTERVENTIONS:  - Identify barriers to discharge w/patient and caregiver  - Arrange for needed discharge resources and transportation as appropriate  - Identify discharge learning needs (meds, wound care, etc )  - Arrange for interpretive services to assist at discharge as needed  - Refer to Case Management Department for coordinating discharge planning if the patient needs post-hospital services based on physician/advanced practitioner order or complex needs related to functional status, cognitive ability, or social support system   Outcome: Progressing      Problem: Knowledge Deficit  Goal: Patient/family/caregiver demonstrates understanding of disease process, treatment plan, medications, and discharge instructions  Complete learning assessment and assess knowledge base  Interventions:  - Provide teaching at level of understanding  - Provide teaching via preferred learning methods   Outcome: Progressing      Problem: SKIN/TISSUE INTEGRITY - ADULT  Goal: Skin integrity remains intact  INTERVENTIONS  - Identify patients at risk for skin breakdown  - Assess and monitor skin integrity  - Assess and monitor nutrition and hydration status  - Monitor labs (i e  albumin)  - Assess for incontinence   - Turn and reposition patient  - Assist with mobility/ambulation  - Relieve pressure over bony prominences  - Avoid friction and shearing  - Provide appropriate hygiene as needed including keeping skin clean and dry  - Evaluate need for skin moisturizer/barrier cream  - Collaborate with interdisciplinary team (i e  Nutrition, Rehabilitation, etc )   - Patient/family teaching   Outcome: Progressing    Goal: Incision(s), wounds(s) or drain site(s) healing without S/S of infection  INTERVENTIONS  - Assess and document risk factors for skin impairment   - Assess and document dressing, incision, wound bed, drain sites and surrounding tissue  - Initiate Nutrition services consult and/or wound management as needed   Outcome: Progressing      Problem: Potential for Falls  Goal: Patient will remain free of falls  INTERVENTIONS:  - Assess patient frequently for physical needs  -  Identify cognitive and physical deficits and behaviors that affect risk of falls    -  [place] fall precautions as indicated by assessment   - Educate patient/family on patient safety including physical limitations  - Instruct patient to call for assistance with activity based on assessment  - Modify environment to reduce risk of injury  - Consider OT/PT consult to assist with strengthening/mobility    Outcome: Progressing      Problem: Prexisting or High Potential for Compromised Skin Integrity  Goal: Skin integrity is maintained or improved  INTERVENTIONS:  - Identify patients at risk for skin breakdown  - Assess and monitor skin integrity  - Assess and monitor nutrition and hydration status  - Monitor labs (i e  albumin)  - Assess for incontinence   - Turn and reposition patient  - Assist with mobility/ambulation  - Relieve pressure over bony prominences  - Avoid friction and shearing  - Provide appropriate hygiene as needed including keeping skin clean and dry  - Evaluate need for skin moisturizer/barrier cream  - Collaborate with interdisciplinary team (i e  Nutrition, Rehabilitation, etc )   - Patient/family teaching   Outcome: Progressing

## 2018-02-03 NOTE — ASSESSMENT & PLAN NOTE
· Hemoglobin A1c 8 2  · Lantus decreased this admission continue 30 units q h s  and sliding scale insulin  · Hypoglycemia likely secondary NPO status continue insulin with parameters  · Continue to monitor

## 2018-02-03 NOTE — PROGRESS NOTES
Progress Note - Luiz Meek 1965, 47 y o  female MRN: [id number]    Unit/Bed#: -01 Encounter: [id number]    Primary Care Provider: Wild Stein   Date and time admitted to hospital: 2/2/2018  9:25 AM        * Acute osteomyelitis of toe, right ([address] )   Assessment & Plan    · Patient not meeting sepsis criteria on admission  · Podiatry follow-up, postop day 1 for right hallux amputation  · Continue IV antibiotics with cefepime and vancomycin can convert to oral once culture and sensitivity return  · Continue IV fluids  · Will increase MS Contin Q 12 hours to home dose of 75 mg  · Working with pharmacy on p r n  pain management given patient is on MSIR at home  Will add for now 1 mg of Dilaudid for breakthrough pain acute 3 hours  · Blood cultures pending  · P T /OT consult          Hypokalemia   Assessment & Plan    · Improved after repletion  · Continue to monitor  · Repeat BMP in the a m  Hyponatremia   Assessment & Plan    · Present on admission  · Improved  · Likely from volume depletion, continue IV fluids  · Monitor BMP in the a m  Diabetic ulcer of right great toe ([address] )   Assessment & Plan    · With evidence of osteomyelitis  · Plan as above        Gastroparesis   Assessment & Plan    · Continue Protonix daily  · Anti emetics as needed  · Monitor symptoms        Chronic pain   Assessment & Plan    · Continue patient's home regimen of morphine extended and immediate release  · Monitor symptoms        Diabetes mellitus (HCC)   Assessment & Plan    · Hemoglobin A1c 8 2  · Lantus decreased this admission continue 30 units q h s  and sliding scale insulin  · Hypoglycemia likely secondary NPO status continue insulin with parameters  · Continue to monitor            VTE Pharmacologic Prophylaxis:   Pharmacologic: Enoxaparin (Lovenox)  Mechanical VTE Prophylaxis in Place: Yes    Patient Centered Rounds: I have performed bedside rounds with nursing staff today    Sandi Wang RN    Discussions with Specialists or Other Care Team Provider:  Podiatry    Education and Discussions with Family / Patient:  Patient and  at bedside    Time Spent for Care: 30 minutes  More than 50% of total time spent on counseling and coordination of care as described above  Current Length of Stay: 1 day(s)    Current Patient Status: Inpatient   Certification Statement: The patient will continue to require additional inpatient hospital stay due to Ongoing acute intervention with IV therapy status post right hallux amputation    Discharge Plan:  Not medically cleared likely in next 48 hours    Code Status: Level 1 - Full Code      Subjective:   Patient sitting up in bed using incentive spirometry  Patient denies headache dizziness fevers chills chest pain shortness of breath  Patient states she does have pain and is not currently on her current home regimen  Patient instructed will work with pharmacy on getting her adequate pain control given she is status post amputation  Objective:     Vitals:   Temp (24hrs), Av 3 °F (36 8 °C), Min:97 9 °F (36 6 °C), Max:98 5 °F (36 9 °C)    HR:  [] 94  Resp:  [10-21] 18  BP: (102-124)/(55-70) 115/67  SpO2:  [90 %-97 %] 96 %  Body mass index is 45 16 kg/m²  Input and Output Summary (last 24 hours): Intake/Output Summary (Last 24 hours) at 18 1004  Last data filed at 18 0401   Gross per 24 hour   Intake          2618 33 ml   Output              575 ml   Net          2043 33 ml       Physical Exam:     Physical Exam   Constitutional: She is oriented to person, place, and time  She appears well-developed and well-nourished  HENT:   Head: Normocephalic and atraumatic  Mouth/Throat: Abnormal dentition  Eyes: Conjunctivae and EOM are normal    Neck: Normal range of motion  Cardiovascular: Normal rate, regular rhythm and normal heart sounds  Pulmonary/Chest: Effort normal and breath sounds normal    Abdominal: Soft   Bowel sounds are normal  Musculoskeletal: She exhibits edema and tenderness  Neurological: She is alert and oriented to person, place, and time  Skin: Skin is warm and dry  Dressing intact minimal dried bloody drainage near the amputation site good cap refill   Psychiatric: She has a normal mood and affect  Her behavior is normal          Additional Data:     Labs:      Results from last 7 days  Lab Units 02/03/18  0452 02/02/18  1006   WBC Thousand/uL 6 54 10 17*   HEMOGLOBIN g/dL 8 5* 11 9   HEMATOCRIT % 28 0* 38 1   PLATELETS Thousands/uL 196 286   NEUTROS PCT %  --  72   LYMPHS PCT %  --  15   MONOS PCT %  --  10   EOS PCT %  --  1       Results from last 7 days  Lab Units 02/03/18  0631   SODIUM mmol/L 138   POTASSIUM mmol/L 3 5   CHLORIDE mmol/L 101   CO2 mmol/L 30   BUN mg/dL 10   CREATININE mg/dL 0 82   CALCIUM mg/dL 8 4   TOTAL PROTEIN g/dL 6 7   BILIRUBIN TOTAL mg/dL 0 70   ALK PHOS U/L 132*   ALT U/L 20   AST U/L 26   GLUCOSE RANDOM mg/dL 67       Results from last 7 days  Lab Units 02/02/18  1006   INR  1 09       * I Have Reviewed All Lab Data Listed Above  * Additional Pertinent Lab Tests Reviewed:  [address] Admission Reviewed        Recent Cultures (last 7 days):       Results from last 7 days  Lab Units 02/02/18  1632   GRAM STAIN RESULT  No Polys or Bacteria seen       Last 24 Hours Medication List:     Current Facility-Administered Medications:  albuterol 2 puff Inhalation Q6H PRN Rambo Hung PA-C    carisoprodol 350 mg Oral BID Linda Hunt PA-C    cefepime 2,000 mg Intravenous Q12H Rambo Hung PA-C Last Rate: Stopped (02/03/18 0026)   diazepam 5 mg Oral HS PRN Rambo Hung PA-C    enoxaparin 40 mg Subcutaneous Q24H [address] VIVI Handley    HYDROmorphone 1 mg Intravenous Q3H PRN VIVI Handley    influenza vaccine 0 5 mL Intramuscular Prior to discharge Juan Daniel Abdi MD    insulin glargine 30 Units Subcutaneous HS Linda Hunt PA-C    insulin lispro 2-12 Units Subcutaneous TID AC Linda Hunt PA-C    insulin lispro 2-12 Units Subcutaneous HS Wilhemenia ESTHER Merino    morphine 75 mg Oral Q12H [address] VIVI Nova    morphine 15 mg Oral TID PRN Wilhemenia FltamratESTHER    ondansetron 4 mg Intravenous Q6H PRN Wilhemenia FlESTHER duncan    pantoprazole 40 mg Oral Early Morning Linda Hunt PA-C    pneumococcal 13-valent conjugate vaccine 0 5 mL Intramuscular Prior to discharge Hazel Collazo MD    sodium chloride 100 mL/hr Intravenous Continuous Michaelhemconner Merino PA-C Last Rate: 100 mL/hr (02/02/18 6647)   vancomycin 20 mg/kg (Adjusted) Intravenous Q12H CASTILLO Horton-LAURA Last Rate: Stopped (02/03/18 0401)        Today, Patient Was Seen By: VIVI Nova    ** Please Note: Dictation voice to text software may have been used in the creation of this document   **

## 2018-02-03 NOTE — ASSESSMENT & PLAN NOTE
· Present on admission  · Improved  · Likely from volume depletion, continue IV fluids  · Monitor BMP in the a m

## 2018-02-03 NOTE — MALNUTRITION/BMI
This medical record reflects one or more clinical indicators suggestive of morbid obesity  Please indicate the one diagnosis below which you feel best reflects the clinical picture  BMI Findings:  BMI Classifications: Morbid Obesity 45-49 9 (related to energy intake exceeding energy output over time as evidenced by BMI )     Body mass index is 45 16 kg/m²  See Nutrition note dated 2/3/18 for additional details  Completed nutrition assessment is viewable in the nutrition documentation

## 2018-02-03 NOTE — PROGRESS NOTES
Progress Note - Podiatry   Luis Eubanks 47 y o  female MRN: [id number]  Unit/Bed#: -01 Encounter: [id number]      Assessment:   1  Osteomyelitis right great toe status post amputation right great toe  2  Diabetes with neuropathy    Plan:   Change dressing today  Wound was repacked with quarter-inch iodoform packing  Covered with dry sterile dressing and Ace wrap  Patient can ambulate in postop shoe  Awaiting culture results  Surgical margins appeared could most likely can DC with oral antibiotics based upon cultures  Subjective:  Patient seen in bed today  Complaining of medium pain in the foot  Patient did just took pain medication does feel better  Dressing is intact  Mild bloody drainage  No other foot complaints  Objective:    Vitals: Blood pressure 115/67, pulse 94, temperature 98 2 °F (36 8 °C), temperature source Oral, resp  rate 18, height 5' 8" (1 727 m), weight 135 kg (297 lb), SpO2 96 %  ,Body mass index is 45 16 kg/m²  Physical Exam:  Bilateral foot pedal pulses are palpable there is absence of protective sensation bilaterally right foot amputation site incision site is well coapted  There is mild serous drainage from wound wound size is approximately 0 3 cm x 1 2 cm x 0 3 cm granular no crepitus no surrounding erythema    X-ray three views taken yesterday postop there is amputation through the proximal phalanx at the base remaining bone is intact soft tissue is normal    Lab, Imaging and other studies: I have personally reviewed pertinent reports  Incision 02/02/18 Foot Right (Active)   Incision Description GENESIS 2/3/2018 12:26 AM   Sheila-wound Assessment GENESIS 2/3/2018 12:26 AM   Closure GENESIS 2/3/2018 12:26 AM   Dressing Dry dressing;Gauze; Other (Comment) 2/3/2018 12:26 AM   Wound packed? Yes 2/3/2018 12:26 AM   Packing- # removed 1 2/2/2018  5:20 PM   Packing- # inserted 1 2/2/2018  4:50 PM   Dressing Status Clean;Dry; Intact 2/3/2018 12:26 AM

## 2018-02-03 NOTE — ASSESSMENT & PLAN NOTE
· Patient not meeting sepsis criteria on admission  · Podiatry follow-up, postop day 1 for right hallux amputation  · Continue IV antibiotics with cefepime and vancomycin can convert to oral once culture and sensitivity return  · Continue IV fluids  · Will increase MS Contin Q 12 hours to home dose of 75 mg  · Working with pharmacy on p r n  pain management given patient is on MSIR at home    Will add for now 1 mg of Dilaudid for breakthrough pain acute 3 hours  · Blood cultures pending  · P T /OT consult

## 2018-02-04 LAB
ATRIAL RATE: 102 BPM
BACTERIA SPEC ANAEROBE CULT: NORMAL
GLUCOSE SERPL-MCNC: 106 MG/DL (ref 65–140)
GLUCOSE SERPL-MCNC: 123 MG/DL (ref 65–140)
GLUCOSE SERPL-MCNC: 156 MG/DL (ref 65–140)
GLUCOSE SERPL-MCNC: 179 MG/DL (ref 65–140)
P AXIS: 60 DEGREES
PR INTERVAL: 146 MS
QRS AXIS: 27 DEGREES
QRSD INTERVAL: 80 MS
QT INTERVAL: 352 MS
QTC INTERVAL: 458 MS
T WAVE AXIS: 34 DEGREES
VANCOMYCIN TROUGH SERPL-MCNC: 17.9 UG/ML (ref 10–20)
VENTRICULAR RATE: 102 BPM

## 2018-02-04 PROCEDURE — 82948 REAGENT STRIP/BLOOD GLUCOSE: CPT

## 2018-02-04 PROCEDURE — 99232 SBSQ HOSP IP/OBS MODERATE 35: CPT | Performed by: NURSE PRACTITIONER

## 2018-02-04 PROCEDURE — 80202 ASSAY OF VANCOMYCIN: CPT | Performed by: PHYSICIAN ASSISTANT

## 2018-02-04 RX ADMIN — HYDROMORPHONE HYDROCHLORIDE 1 MG: 1 INJECTION, SOLUTION INTRAMUSCULAR; INTRAVENOUS; SUBCUTANEOUS at 15:59

## 2018-02-04 RX ADMIN — MORPHINE SULFATE 30 MG: 15 TABLET ORAL at 12:47

## 2018-02-04 RX ADMIN — INSULIN LISPRO 2 UNITS: 100 INJECTION, SOLUTION INTRAVENOUS; SUBCUTANEOUS at 17:00

## 2018-02-04 RX ADMIN — SODIUM CHLORIDE 100 ML/HR: 0.9 INJECTION, SOLUTION INTRAVENOUS at 03:51

## 2018-02-04 RX ADMIN — INSULIN LISPRO 2 UNITS: 100 INJECTION, SOLUTION INTRAVENOUS; SUBCUTANEOUS at 12:51

## 2018-02-04 RX ADMIN — MORPHINE SULFATE 75 MG: 30 TABLET, EXTENDED RELEASE ORAL at 08:46

## 2018-02-04 RX ADMIN — CARISOPRODOL 350 MG: 350 TABLET ORAL at 08:47

## 2018-02-04 RX ADMIN — HYDROMORPHONE HYDROCHLORIDE 1 MG: 1 INJECTION, SOLUTION INTRAMUSCULAR; INTRAVENOUS; SUBCUTANEOUS at 23:23

## 2018-02-04 RX ADMIN — VANCOMYCIN HYDROCHLORIDE 1750 MG: 1 INJECTION, POWDER, LYOPHILIZED, FOR SOLUTION INTRAVENOUS at 11:58

## 2018-02-04 RX ADMIN — MORPHINE SULFATE 75 MG: 30 TABLET, EXTENDED RELEASE ORAL at 20:51

## 2018-02-04 RX ADMIN — INSULIN GLARGINE 30 UNITS: 100 INJECTION, SOLUTION SUBCUTANEOUS at 21:00

## 2018-02-04 RX ADMIN — HYDROMORPHONE HYDROCHLORIDE 1 MG: 1 INJECTION, SOLUTION INTRAMUSCULAR; INTRAVENOUS; SUBCUTANEOUS at 03:51

## 2018-02-04 RX ADMIN — CEFEPIME HYDROCHLORIDE 2000 MG: 2 INJECTION, POWDER, FOR SOLUTION INTRAVENOUS at 11:00

## 2018-02-04 RX ADMIN — INSULIN LISPRO 2 UNITS: 100 INJECTION, SOLUTION INTRAVENOUS; SUBCUTANEOUS at 21:00

## 2018-02-04 RX ADMIN — VANCOMYCIN HYDROCHLORIDE 1750 MG: 1 INJECTION, POWDER, LYOPHILIZED, FOR SOLUTION INTRAVENOUS at 23:26

## 2018-02-04 RX ADMIN — PANTOPRAZOLE SODIUM 40 MG: 40 TABLET, DELAYED RELEASE ORAL at 06:29

## 2018-02-04 RX ADMIN — DIAZEPAM 5 MG: 5 TABLET ORAL at 12:46

## 2018-02-04 RX ADMIN — CEFEPIME HYDROCHLORIDE 2000 MG: 2 INJECTION, POWDER, FOR SOLUTION INTRAVENOUS at 21:01

## 2018-02-04 RX ADMIN — HYDROMORPHONE HYDROCHLORIDE 1 MG: 1 INJECTION, SOLUTION INTRAMUSCULAR; INTRAVENOUS; SUBCUTANEOUS at 19:39

## 2018-02-04 RX ADMIN — CARISOPRODOL 350 MG: 350 TABLET ORAL at 17:02

## 2018-02-04 RX ADMIN — ENOXAPARIN SODIUM 40 MG: 40 INJECTION SUBCUTANEOUS at 08:49

## 2018-02-04 NOTE — PROGRESS NOTES
Progress Note - Podiatry   Shayla Muller 47 y o  female MRN: [id number]  Unit/Bed#: -01 Encounter: [id number]      Assessment:   Acute osteomyelitis right foot  Status post partial amputation right great toe    Plan:   Change dressing today apply DSD with Maxorb  Patient can weight bear in postop shoe right foot awaiting final culture results  Patient can follow up in my office on Wednesday after discharge    Subjective:  Patient seen in bed today status post partial amputation right great toe  Complaining of mild pain  Improved from yesterday  No other complaints  Dressing is intact with minimal drainage  Objective:    Vitals: Blood pressure 134/81, pulse 89, temperature 97 8 °F (36 6 °C), temperature source Oral, resp  rate 18, height 5' 8" (1 727 m), weight 135 kg (297 lb), SpO2 92 %  ,Body mass index is 45 16 kg/m²  Physical Exam:  Right foot amputation site is intact  Mild sheila wound erythema  Minimal serous drainage  Small ulcer  Approximately 3 mm x 2 mm x 0 8 mm no purulence no crepitus noted  Lab, Imaging and other studies: I have personally reviewed pertinent reports  Incision 02/02/18 Foot Right (Active)   Incision Description GENESIS 2/3/2018 10:30 PM   Sheila-wound Assessment GENESIS 2/3/2018 10:30 PM   Closure GENESIS 2/3/2018 10:30 PM   Dressing Dry dressing;Gauze; Other (Comment) 2/3/2018 10:30 PM   Wound packed? Yes 2/3/2018 10:30 PM   Packing- # removed 1 2/3/2018 10:30 PM   Packing- # inserted 1 2/3/2018 10:30 PM   Dressing Status Clean;Dry; Intact 2/3/2018 10:30 PM

## 2018-02-04 NOTE — PLAN OF CARE
Problem: DISCHARGE PLANNING - CARE MANAGEMENT  Goal: Discharge to post-acute care or home with appropriate resources  INTERVENTIONS:  - Conduct assessment to determine patient/family and health care team treatment goals, and need for post-acute services based on payer coverage, community resources, and patient preferences, and barriers to discharge  - Address psychosocial, clinical, and financial barriers to discharge as identified in assessment in conjunction with the patient/family and health care team  - Arrange appropriate level of post-acute services according to patient's   needs and preference and payer coverage in collaboration with the physician and health care team  - Communicate with and update the patient/family, physician, and health care team regarding progress on the discharge plan  - Arrange appropriate transportation to post-acute venues  Outcome: Progressing   02/04/18 1319   Discharge Planning   Living Arrangements Spouse/significant other;Children  (duplex with [address] with rail in [employer] [place])   [facility] Spouse/significant other;Children   Type of Current Residence Private residence   [address] No   Discharge Communications   Discharge planning discussed with: patient   Freedom of Choice Yes   Does the patient need discharge transport arranged? No   CM Handoff Comments d/c home with no needs   Contacts   Patient Contacts Nawaf Ledezma   Realtionship to Patient: Family  ()   Contact Method Phone   Phone Number [phone]       CM intern met with patient at bedside, patient alert and oriented  Patient resides in [place]  Patient utilizes either a cane or walker to ambulate, depending on how she feels that day  Patient does require some assistance with ADL's from her family  Patient denies history of rehab  Patient does report history of [address] following a knee replacement in 2013 but cannot recall the agency nor is patient current with services   Patient utilizes [facility] on [address] for most of her prescriptions and [facility] for her Morphine  Patient's PCP is Dr Kassie Aldrich, her Podiatrist is Dr Lesly Jenkins and her pain management specialist is Dr Monique Dumont  She meets with all as recommended  Patient denies history of MH / substance abuse  Patient admitted that she is having a hard time coping with her toe amputation and asked CM intern for any resources or recommendations for OP [address] services  CM intern provided patient with OP [address] provider list and recommended that patient review to find the best fit for her needs  Patient does not have POA/AD but requested and received information  Patient does not work, but she does drive, and her son will provide transportation home at discharge  Patient does not appear to have any other needs at this time  CM Department will continue to assess for needs and will follow through discharge  CM reviewed d/c planning process including the following: identifying help at home, patient preference for d/c planning needs, pharmacy preference, and availability of treatment team to discuss questions or concerns patient and/or family may have regarding understanding medications and recognizing signs and symptoms once discharged  CM also encouraged patient to follow up with all recommended appointments after discharge  Patient advised of importance for patient and family to participate in managing patients medical well being

## 2018-02-04 NOTE — PROGRESS NOTES
Progress Note - Luis Eubanks 1965, 47 y o  female MRN: [id number]    Unit/Bed#: -01 Encounter: [id number]    Primary Care Provider: Frida Knox   Date and time admitted to hospital: 2/2/2018  9:25 AM        * Acute osteomyelitis of toe, right ([facility] [address] )   Assessment & Plan    · Patient not meeting sepsis criteria on admission  · Podiatry follow-up, postop day 2 for right hallux amputation  · Continue IV antibiotics with cefepime and vancomycin can convert to oral once culture and sensitivity return  · Continue IV fluids  · Continue MS Contin Q 12 hours to home dose of 75 mg  · Continue MSIR 15 mg q 6 hours p r n  moderate pain and MS IR 30 mg q 6 hours p r n  severe pain  · Blood cultures pending continue trend preliminary with no growth at 24 hours  · P T /OT consult          Hypokalemia   Assessment & Plan    · Improved after repletion  · Continue to monitor  · Repeat BMP in the a m  Hyponatremia   Assessment & Plan    · Present on admission improved  · Continue to monitor  · BMP in the a m  Diabetic ulcer of right great toe ([address] )   Assessment & Plan    · With evidence of osteomyelitis  · Plan as above        Gastroparesis   Assessment & Plan    · Continue Protonix daily  · Anti emetics as needed  · Monitor symptoms        Chronic pain   Assessment & Plan    · Continue patient's home regimen of morphine extended and immediate release  · Monitor symptoms        Diabetes mellitus (HCC)   Assessment & Plan    · Hemoglobin A1c 8 5  · Lantus decreased this admission continue 30 units q h s  and sliding scale insulin  · Continue reduced dose of Lantus his blood sugars have been appropriate            VTE Pharmacologic Prophylaxis:   Pharmacologic: Enoxaparin (Lovenox)  Mechanical VTE Prophylaxis in Place: Yes    Patient Centered Rounds: I have performed bedside rounds with nursing staff today      Discussions with Specialists or Other Care Team Provider:  Podiatry    Education and Discussions with Family / Patient:  Patient    Time Spent for Care: 20 minutes  More than 50% of total time spent on counseling and coordination of care as described above  Current Length of Stay: 2 day(s)    Current Patient Status: Inpatient   Certification Statement: The patient will continue to require additional inpatient hospital stay due to Status post amputation due to osteomyelitis pending wound cultures for appropriate discharge treatment    Discharge Plan:  Not medically cleared likely in next 24-48 hours    Code Status: Level 1 - Full Code      Subjective:   Patient reports pain is tolerable on current pain regimen  Patient is concerned about going home however given she is on chronic pain and would like to discuss with provider along with her pain management physician on a discharge plan for this patient  Patient denies fevers chills chest pain shortness of breath  Patient understands ongoing need to stay in patient to get appropriate outpatient treatment  Objective:     Vitals:   Temp (24hrs), Av 8 °F (37 1 °C), Min:97 8 °F (36 6 °C), Max:100 °F (37 8 °C)    HR:  [89-99] 89  Resp:  [18] 18  BP: (116-134)/(69-81) 134/81  SpO2:  [92 %-94 %] 92 %  Body mass index is 45 16 kg/m²  Input and Output Summary (last 24 hours): Intake/Output Summary (Last 24 hours) at 18 0920  Last data filed at 18 7149   Gross per 24 hour   Intake              120 ml   Output             3500 ml   Net            -3380 ml       Physical Exam:     Physical Exam   Constitutional: She is oriented to person, place, and time  She appears well-developed and well-nourished  HENT:   Head: Normocephalic and atraumatic  Eyes: Conjunctivae and EOM are normal    Neck: Normal range of motion  Cardiovascular: Normal rate, regular rhythm and normal heart sounds  Pulmonary/Chest: Effort normal and breath sounds normal    Abdominal: Soft  Bowel sounds are normal    Musculoskeletal: Normal range of motion  Neurological: She is alert and oriented to person, place, and time  Skin: Skin is warm and dry  Dressing clean dry and intact to right lower extremity cap refill less than 3 seconds   Psychiatric: She has a normal mood and affect  Her behavior is normal          Additional Data:     Labs:      Results from last 7 days  Lab Units 02/03/18  0452 02/02/18  1006   WBC Thousand/uL 6 54 10 17*   HEMOGLOBIN g/dL 8 5* 11 9   HEMATOCRIT % 28 0* 38 1   PLATELETS Thousands/uL 196 286   NEUTROS PCT %  --  72   LYMPHS PCT %  --  15   MONOS PCT %  --  10   EOS PCT %  --  1       Results from last 7 days  Lab Units 02/03/18  0631   SODIUM mmol/L 138   POTASSIUM mmol/L 3 5   CHLORIDE mmol/L 101   CO2 mmol/L 30   BUN mg/dL 10   CREATININE mg/dL 0 82   CALCIUM mg/dL 8 4   TOTAL PROTEIN g/dL 6 7   BILIRUBIN TOTAL mg/dL 0 70   ALK PHOS U/L 132*   ALT U/L 20   AST U/L 26   GLUCOSE RANDOM mg/dL 67       Results from last 7 days  Lab Units 02/02/18  1006   INR  1 09       * I Have Reviewed All Lab Data Listed Above  * Additional Pertinent Lab Tests Reviewed: All Labs Within Last 24 Hours Reviewed      Recent Cultures (last 7 days):       Results from last 7 days  Lab Units 02/02/18  1825 02/02/18  1632   BLOOD CULTURE  No Growth at 24 hrs    No Growth at 24 hrs   --    GRAM STAIN RESULT   --  No Polys or Bacteria seen       Last 24 Hours Medication List:     Current Facility-Administered Medications:  albuterol 2 puff Inhalation Q4H PRN Ezequiel Nicholson MD    carisoprodol 350 mg Oral BID Bing Phelps PA-C    cefepime 2,000 mg Intravenous Q12H Bing Phelps PA-C Last Rate: 2,000 mg (02/03/18 2147)   diazepam 5 mg Oral HS PRN Bing Phelps PA-C    enoxaparin 40 mg Subcutaneous Q24H [facility] VIVI Harden    HYDROmorphone 1 mg Intravenous Q3H PRN VIVI Harden    influenza vaccine 0 5 mL Intramuscular Prior to discharge Ezequiel Nicholson MD    insulin glargine 30 Units Subcutaneous HS Linda Hunt PA-C    insulin lispro 2-12 Units Subcutaneous TID AC Linda Hunt PA-C    insulin lispro 2-12 Units Subcutaneous HS Kyung Vines PA-C    morphine 75 mg Oral Q12H [address] VIVI Ware    morphine 15 mg Oral Q6H PRN VIVI Ware    morphine 30 mg Oral Q6H PRN VIVI Ware    ondansetron 4 mg Intravenous Q6H PRN Kyung Vines PA-C    pantoprazole 40 mg Oral Early Morning Linda Hunt PA-C    pneumococcal 13-valent conjugate vaccine 0 5 mL Intramuscular Prior to discharge Shmuel Pineda MD    sodium chloride 100 mL/hr Intravenous Continuous Kyung Vines PA-C Last Rate: 100 mL/hr (02/04/18 0351)   vancomycin 20 mg/kg (Adjusted) Intravenous Q12H Kyung Vines PA-C Last Rate: 1,750 mg (02/03/18 0971)        Today, Patient Was Seen By: VIVI Ware    ** Please Note: Dictation voice to text software may have been used in the creation of this document   **

## 2018-02-04 NOTE — ASSESSMENT & PLAN NOTE
· Patient not meeting sepsis criteria on admission  · Podiatry follow-up, postop day 2 for right hallux amputation  · Continue IV antibiotics with cefepime and vancomycin can convert to oral once culture and sensitivity return  · Continue IV fluids  · Continue MS Contin Q 12 hours to home dose of 75 mg  · Continue MSIR 15 mg q 6 hours p r n  moderate pain and MS IR 30 mg q 6 hours p r n  severe pain  · Blood cultures pending continue trend preliminary with no growth at 24 hours  · P T /OT consult

## 2018-02-04 NOTE — PROGRESS NOTES
02/04/18 1319   Discharge Planning   Living Arrangements Spouse/significant other;Children  (duplex with [address] with rail in [facility])   [place] Spouse/significant other;Children   Type of Current Residence Private residence   [address] No   Discharge Communications   Discharge planning discussed with: patient   Freedom of Choice Yes   Does the patient need discharge transport arranged? No   CM Handoff Comments d/c home with no needs   Contacts   Patient Contacts Efrain Kasper   Realtionship to Patient: Family  ()   Contact Method Phone   Phone Number [phone]       CM intern met with patient at bedside, patient alert and oriented  Patient resides in [facility]  Patient utilizes either a cane or walker to ambulate, depending on how she feels that day  Patient does require some assistance with ADL's from her family  Patient denies history of rehab  Patient does report history of [address] following a knee replacement in 2013 but cannot recall the agency nor is patient current with services  Patient utilizes [facility] on [address] [place] for most of her prescriptions and [facility] for her Morphine  Patient's PCP is Dr Nataliya Cope, her Podiatrist is Dr Denise Graham and her pain management specialist is Dr Martha Oliver  She meets with all as recommended  Patient denies history of MH / substance abuse  Patient admitted that she is having a hard time coping with her toe amputation and asked CM intern for any resources or recommendations for [address] services  CM intern provided patient with [address] provider list and recommended that patient review to find the best fit for her needs  Patient does not have POA/AD but requested and received information  Patient does not work, but she does drive, and her son will provide transportation home at discharge  Patient does not appear to have any other needs at this time   CM Department will continue to assess for needs and will follow through discharge  CM reviewed d/c planning process including the following: identifying help at home, patient preference for d/c planning needs, pharmacy preference, and availability of treatment team to discuss questions or concerns patient and/or family may have regarding understanding medications and recognizing signs and symptoms once discharged  CM also encouraged patient to follow up with all recommended appointments after discharge  Patient advised of importance for patient and family to participate in managing patients medical well being

## 2018-02-04 NOTE — CASE MANAGEMENT
Initial Clinical Review    Admission: Date/Time/Statement: 2/2/18 @ 1212     Orders Placed This Encounter   Procedures    Inpatient Admission (expected length of stay for this patient is greater than two midnights)     Standing Status:   Standing     Number of Occurrences:   1     Order Specific Question:   Admitting Physician     Answer:   Wade William, Aries Jensen     Order Specific Question:   Level of Care     Answer:   Med Surg [16]     Order Specific Question:   Estimated length of stay     Answer:   More than 2 Midnights     Order Specific Question:   Certification     Answer:   I certify that inpatient services are medically necessary for this patient for a duration of greater than two midnights  See H&P and MD Progress Notes for additional information about the patient's course of treatment  ED: Date/Time/Mode of Arrival:   ED Arrival Information     Expected Arrival Acuity Means of Arrival Escorted By Service Admission Type    - 2/2/2018 09:22 Urgent Walk-In Self General Medicine Urgent    Arrival Complaint    TOE INJURY(DIABETIC)          Chief Complaint:   Chief Complaint   Patient presents with    Foot Ulcer     Pt stated that her foot doctor told her to come here because the right great toe is infected  History of Illness:    Kati Soto is a 47 y o  female with significant past medical history of chronic pain, diabetes with history of foot ulcerations,, gastroparesis, asthma who presents with right great toe ulceration  The patient states that she follows with Dr Kevin Wilson with Podiatry as an outpatient  States that she has seen him multiple times in the past for lower extremity ulcerations due to diabetes  States that she has never had an amputation before  Reports that she was being treated by Dr Kevin Wilson for this right hallux ulceration and over the last day she began to notice an odor, the toe began to turn a black color and she began to have nausea and vomiting    Last episode of vomiting was 4 a m  Today  States that she has not had any appetite and has not been able to keep anything down  Reports that she has not eaten anything in the last 3 days  Reports that she has had this ulceration for some time now  States that for diabetes management she is on Amaryl 4 mg twice a day and her Lantus was increased to 50 units at bedtime  She reports that she has been taking these medications at home  Patient states that she was at the podiatrist today and that is who sent her to the hospital   Patient denies history of MI or stroke or any heart arrhythmias        ED Vital Signs:   ED Triage Vitals [02/02/18 0930]   Temperature Pulse Respirations Blood Pressure SpO2   98 8 °F (37 1 °C) (!) 111 20 144/73 96 %      Temp Source Heart Rate Source Patient Position - Orthostatic VS BP Location FiO2 (%)   Oral Monitor Sitting Right arm --      Pain Score       7        Wt Readings from Last 1 Encounters:   02/02/18 135 kg (297 lb)       Vital Signs (abnormal):    above    Abnormal Labs/Diagnostic Test Results:    NA  134  K  3 2  Chloride   93  Co2  33  Albumin   2 9  Total  Bili   1 20  WBC   10 17  X ray  R  Great  Toe:   Soft tissue emphysema in the great toe with apparent mild erosive change along the tuft of the distal phalanx most suggestive of osteomyelitis    CXR:  NAD    ED Treatment:   Medication Administration from 02/02/2018 0922 to 02/02/2018 1448       Date/Time Order Dose Route Action Action by Comments     02/02/2018 1414 sodium chloride 0 9 % bolus 1,000 mL 0 mL Intravenous Stopped Suni Sears RN      02/02/2018 1009 sodium chloride 0 9 % bolus 1,000 mL 1,000 mL Intravenous [address] Suni Sears RN      02/02/2018 1014 ondansetron (ZOFRAN) injection 4 mg 4 mg Intravenous Given Suni Sears RN      02/02/2018 1014 HYDROmorphone (DILAUDID) injection 1 mg 1 mg Intravenous Given Suni Sears RN      02/02/2018 1213 vancomycin (VANCOCIN) 2,000 mg in sodium chloride 0 9 % 500 mL IVPB 0 mg/kg Intravenous Stopped Sabine Villatoro RN      02/02/2018 1042 vancomycin (VANCOCIN) 2,000 mg in sodium chloride 0 9 % 500 mL IVPB 2,000 mg Intravenous New Bag Sabine Villatoro RN      02/02/2018 1042 cefepime (MAXIPIME) 2,000 mg in dextrose 5 % 50 mL IVPB 0 mg Intravenous Stopped Sabine Villatoro RN      02/02/2018 1017 cefepime (MAXIPIME) 2,000 mg in dextrose 5 % 50 mL IVPB 2,000 mg Intravenous New ONEOK, RN      02/02/2018 1058 potassium chloride (K-DUR,KLOR-CON) CR tablet 40 mEq 40 mEq Oral Given Sabine Villatoro RN      02/02/2018 1206 dextrose 50 % IV solution 25 mL 25 mL Intravenous Given Sabine Villatoro RN      02/02/2018 1059 dextrose 50 % IV solution 25 mL 25 mL Intravenous Given Sabine Villatoro RN      02/02/2018 1215 dextrose 50 % IV solution 25 mL 0 mL Intravenous Override Pull Sabine Villatoro RN           Past Medical/Surgical History: Active Ambulatory Problems     Diagnosis Date Noted    Asthma 02/02/2018     Resolved Ambulatory Problems     Diagnosis Date Noted    No Resolved Ambulatory Problems     Past Medical History:   Diagnosis Date    Asthma     Chronic pain     Diabetes mellitus (HCC)     Gastroparesis        Admitting Diagnosis: Nausea [R11 0]  Hypoglycemia [E16 2]  Toe injury [S99 929A]  Diabetic ulcer of right great toe ([address] ) [F81 359, L97 519]  Acute osteomyelitis of toe, right ([address] ) [M86 171]    Age/Sex: 47 y o  female    Assessment/Plan:      Acute osteomyelitis of toe, right ([address] )   Assessment & Plan     · Patient follows with Dr Yaquelin Fay as an outpatient  · States she was being treated for a right hallux ulceration and was in the office about 2 days ago, noticed an odor and black tissue which prompted patient to come to the ED  · As evidenced by x-ray of the great toe on the right soft tissue emphysema in the great toe with apparent mild erosive change along the tuft of the distal phalanx most suggestive of osteomyelitis   Nondisplaced 3rd proximal phalanx fracture which is probably a subacute injury with evidence of some healing  · Patient not meeting sepsis criteria on admission  · Podiatry consult, patient to go to the OR today for right hallux amputation  · IV antibiotics with cefepime and vancomycin  · IV fluids  · Blood cultures   · Keep patient NPO  · P T /OT consult  · Lactic negative        Hypokalemia   Assessment & Plan     · Patient's potassium 3 2  · Will replete with 40 mEq of K-Dur  · Repeat BMP in the a m           Hyponatremia   Assessment & Plan     · 134 on admission  · Likely from volume depletion, will start IV fluids  · Monitor BMP in the a m           Gastroparesis   Assessment & Plan     · Continue Protonix daily  · Anti emetics as needed  · Monitor symptoms     Chronic pain   Assessment & Plan     · Continue patient's home regimen of morphine extended and immediate release  · Monitor symptoms          Diabetes mellitus (HCC)   Assessment & Plan     · Hemoglobin A1c  · Patient with hypoglycemia on admission, likely due to patient stating that she has not eaten anything in the last 3 days  · Patient states that she is on Lantus 50 units at bedtime and Amaryl, hold Amaryl and start Lantus 30 units at bedtime tomorrow due to patient being NPO today for the OR  Sliding scale coverage  · Monitor glucose checks q 6 hours due to being NPO          Diabetic ulcer of right great toe [facility])   Assessment & Plan     · With evidence of osteomyelitis  · Plan as above    SURGERY DATE: 2/2/2018     Surgeon(s) and Role:     * iTno Amaral DPM - Primary     Preop Diagnosis:  * No pre-op diagnosis entered *     Post-Op Diagnosis Codes:     * Acute osteomyelitis of toe, right ([facility] ) [M86 171]     Procedure(s) (LRB):  AMPUTATION TOE, RIGHT GREAT TOE (Right)      Anticipated Length of Stay:  Patient will be admitted on an Inpatient basis with an anticipated length of stay of  > 2 midnights     Justification for Hospital Stay:  Patient scheduled for right hallux amputation, IV antibiotics      Admission Orders:   IP    2/2  @   1792  Scheduled Meds:   Current Facility-Administered Medications:  albuterol 2 puff Inhalation Q4H PRN Lionel Rutherford MD    carisoprodol 350 mg Oral BID Linda Hunt PA-C    cefepime 2,000 mg Intravenous Q12H Koki Jolly PA-C Last Rate: 2,000 mg (02/04/18 1100)   diazepam 5 mg Oral HS PRN Koki Jolly PA-C    enoxaparin 40 mg Subcutaneous Q24H [address] Launie Ely, CRNP    HYDROmorphone 1 mg Intravenous Q3H PRN Launie Ely, CRNP    influenza vaccine 0 5 mL Intramuscular Prior to discharge Lionel Rutherford MD    insulin glargine 30 Units Subcutaneous HS Linda Hunt PA-C    insulin lispro 2-12 Units Subcutaneous TID AC Linda Hunt PA-C    insulin lispro 2-12 Units Subcutaneous HS Koki Jolly PA-C    morphine 75 mg Oral Q12H [address] Launie Ely, CRNP    morphine 15 mg Oral Q6H PRN Launie Ely, CRNP    morphine 30 mg Oral Q6H PRN Launie Ely, CRNP    ondansetron 4 mg Intravenous Q6H PRN Koki Jolly PA-C    pantoprazole 40 mg Oral Early Morning Linda Hunt PA-C    pneumococcal 13-valent conjugate vaccine 0 5 mL Intramuscular Prior to discharge Lionel Rutherford MD    sodium chloride 100 mL/hr Intravenous Continuous Koki Jolly PA-C Last Rate: 100 mL/hr (02/04/18 0351)   vancomycin 20 mg/kg (Adjusted) Intravenous Q12H Koki Jolly PA-C Last Rate: 1,750 mg (02/04/18 1158)     Continuous Infusions:   sodium chloride 100 mL/hr Last Rate: 100 mL/hr (02/04/18 0351)     PRN Meds:   albuterol    diazepam    HYDROmorphone    influenza vaccine    morphine    morphine    ondansetron    pneumococcal 13-valent conjugate vaccine      [facility]  PT/OT  Reg diet

## 2018-02-04 NOTE — ASSESSMENT & PLAN NOTE
· Hemoglobin A1c 8 5  · Lantus decreased this admission continue 30 units q h s  and sliding scale insulin  · Continue reduced dose of Lantus his blood sugars have been appropriate

## 2018-02-05 VITALS
HEIGHT: 68 IN | OXYGEN SATURATION: 98 % | WEIGHT: 293 LBS | RESPIRATION RATE: 18 BRPM | HEART RATE: 98 BPM | SYSTOLIC BLOOD PRESSURE: 149 MMHG | TEMPERATURE: 97.8 F | DIASTOLIC BLOOD PRESSURE: 74 MMHG | BODY MASS INDEX: 44.41 KG/M2

## 2018-02-05 PROBLEM — F11.20 OPIOID DEPENDENCE (HCC): Status: ACTIVE | Noted: 2018-02-05

## 2018-02-05 PROBLEM — E87.6 HYPOKALEMIA: Status: RESOLVED | Noted: 2018-02-02 | Resolved: 2018-02-05

## 2018-02-05 LAB
ANION GAP SERPL CALCULATED.3IONS-SCNC: 7 MMOL/L (ref 4–13)
BACTERIA TISS AEROBE CULT: ABNORMAL
BACTERIA TISS AEROBE CULT: ABNORMAL
BUN SERPL-MCNC: 5 MG/DL (ref 5–25)
CALCIUM SERPL-MCNC: 8.4 MG/DL (ref 8.3–10.1)
CHLORIDE SERPL-SCNC: 103 MMOL/L (ref 100–108)
CO2 SERPL-SCNC: 28 MMOL/L (ref 21–32)
CREAT SERPL-MCNC: 0.67 MG/DL (ref 0.6–1.3)
ERYTHROCYTE [DISTWIDTH] IN BLOOD BY AUTOMATED COUNT: 13.9 % (ref 11.6–15.1)
GFR SERPL CREATININE-BSD FRML MDRD: 101 ML/MIN/1.73SQ M
GLUCOSE SERPL-MCNC: 127 MG/DL (ref 65–140)
GLUCOSE SERPL-MCNC: 146 MG/DL (ref 65–140)
GLUCOSE SERPL-MCNC: 161 MG/DL (ref 65–140)
GLUCOSE SERPL-MCNC: 164 MG/DL (ref 65–140)
GRAM STN SPEC: ABNORMAL
HCT VFR BLD AUTO: 30.9 % (ref 34.8–46.1)
HGB BLD-MCNC: 9.8 G/DL (ref 11.5–15.4)
MCH RBC QN AUTO: 26.1 PG (ref 26.8–34.3)
MCHC RBC AUTO-ENTMCNC: 31.7 G/DL (ref 31.4–37.4)
MCV RBC AUTO: 82 FL (ref 82–98)
PLATELET # BLD AUTO: 239 THOUSANDS/UL (ref 149–390)
PMV BLD AUTO: 11.5 FL (ref 8.9–12.7)
POTASSIUM SERPL-SCNC: 3.5 MMOL/L (ref 3.5–5.3)
RBC # BLD AUTO: 3.75 MILLION/UL (ref 3.81–5.12)
SODIUM SERPL-SCNC: 138 MMOL/L (ref 136–145)
WBC # BLD AUTO: 8.72 THOUSAND/UL (ref 4.31–10.16)

## 2018-02-05 PROCEDURE — 99239 HOSP IP/OBS DSCHRG MGMT >30: CPT | Performed by: NURSE PRACTITIONER

## 2018-02-05 PROCEDURE — G0008 ADMIN INFLUENZA VIRUS VAC: HCPCS | Performed by: INTERNAL MEDICINE

## 2018-02-05 PROCEDURE — 85027 COMPLETE CBC AUTOMATED: CPT | Performed by: NURSE PRACTITIONER

## 2018-02-05 PROCEDURE — 90686 IIV4 VACC NO PRSV 0.5 ML IM: CPT | Performed by: INTERNAL MEDICINE

## 2018-02-05 PROCEDURE — 80048 BASIC METABOLIC PNL TOTAL CA: CPT | Performed by: NURSE PRACTITIONER

## 2018-02-05 PROCEDURE — 82948 REAGENT STRIP/BLOOD GLUCOSE: CPT

## 2018-02-05 RX ORDER — MORPHINE SULFATE 15 MG/1
30 TABLET ORAL EVERY 6 HOURS PRN
Qty: 30 TABLET | Refills: 0 | Status: SHIPPED | OUTPATIENT
Start: 2018-02-05 | End: 2018-02-15

## 2018-02-05 RX ORDER — PENICILLIN V POTASSIUM 250 MG/1
500 TABLET ORAL EVERY 6 HOURS SCHEDULED
Status: DISCONTINUED | OUTPATIENT
Start: 2018-02-05 | End: 2018-02-05

## 2018-02-05 RX ORDER — AMOXICILLIN AND CLAVULANATE POTASSIUM 875; 125 MG/1; MG/1
1 TABLET, FILM COATED ORAL EVERY 12 HOURS SCHEDULED
Status: DISCONTINUED | OUTPATIENT
Start: 2018-02-05 | End: 2018-02-05 | Stop reason: HOSPADM

## 2018-02-05 RX ORDER — AMOXICILLIN AND CLAVULANATE POTASSIUM 875; 125 MG/1; MG/1
1 TABLET, FILM COATED ORAL EVERY 12 HOURS SCHEDULED
Qty: 20 TABLET | Refills: 0 | Status: SHIPPED | OUTPATIENT
Start: 2018-02-05 | End: 2018-02-15

## 2018-02-05 RX ORDER — MORPHINE SULFATE 15 MG/1
15 TABLET ORAL ONCE
Status: COMPLETED | OUTPATIENT
Start: 2018-02-05 | End: 2018-02-05

## 2018-02-05 RX ADMIN — INFLUENZA VIRUS VACCINE 0.5 ML: 15; 15; 15; 15 SUSPENSION INTRAMUSCULAR at 18:32

## 2018-02-05 RX ADMIN — PANTOPRAZOLE SODIUM 40 MG: 40 TABLET, DELAYED RELEASE ORAL at 05:55

## 2018-02-05 RX ADMIN — MORPHINE SULFATE 15 MG: 15 TABLET ORAL at 13:31

## 2018-02-05 RX ADMIN — CARISOPRODOL 350 MG: 350 TABLET ORAL at 10:00

## 2018-02-05 RX ADMIN — INSULIN LISPRO 2 UNITS: 100 INJECTION, SOLUTION INTRAVENOUS; SUBCUTANEOUS at 12:00

## 2018-02-05 RX ADMIN — DIAZEPAM 5 MG: 5 TABLET ORAL at 02:47

## 2018-02-05 RX ADMIN — CARISOPRODOL 350 MG: 350 TABLET ORAL at 18:05

## 2018-02-05 RX ADMIN — INSULIN LISPRO 2 UNITS: 100 INJECTION, SOLUTION INTRAVENOUS; SUBCUTANEOUS at 17:00

## 2018-02-05 RX ADMIN — HYDROMORPHONE HYDROCHLORIDE 1 MG: 1 INJECTION, SOLUTION INTRAMUSCULAR; INTRAVENOUS; SUBCUTANEOUS at 02:47

## 2018-02-05 RX ADMIN — ENOXAPARIN SODIUM 40 MG: 40 INJECTION SUBCUTANEOUS at 08:26

## 2018-02-05 RX ADMIN — MORPHINE SULFATE 75 MG: 30 TABLET, EXTENDED RELEASE ORAL at 08:24

## 2018-02-05 RX ADMIN — PENICILLIN V POTASSIUM 500 MG: 250 TABLET, FILM COATED ORAL at 13:00

## 2018-02-05 RX ADMIN — PENICILLIN V POTASSIUM 500 MG: 250 TABLET, FILM COATED ORAL at 17:17

## 2018-02-05 RX ADMIN — SODIUM CHLORIDE 100 ML/HR: 0.9 INJECTION, SOLUTION INTRAVENOUS at 02:49

## 2018-02-05 RX ADMIN — MORPHINE SULFATE 15 MG: 15 TABLET ORAL at 18:31

## 2018-02-05 NOTE — DISCHARGE INSTRUCTIONS
Diabetic Foot Ulcers   WHAT YOU NEED TO KNOW:   A diabetic foot ulcer is an open sore that can develop anywhere on your foot or toes  The ulcers usually develop on the bottom of the foot  You may first notice drainage on your sock  Drainage is fluid that may be yellow, brown, or red  The fluid may also contain pus or blood  DISCHARGE INSTRUCTIONS:   Call 911 if:   · You have a fever with chills  · You begin vomiting  · You feel faint or become confused  Contact your healthcare provider if:   · You get another diabetic foot ulcer  · Your foot becomes red, warm, and swollen  · Your foot ulcer has a bad smell or is draining pus  · You feel pain in a foot that used to have little or no feeling  · You see black or dead tissue in or around your ulcer  · Your ulcer becomes bigger, deeper, or does not heal      · You have questions or concerns about your condition or care  Medicines: You may  need any of the following:  · Antibiotics  help treat or prevent a bacterial infection  · Take your medicine as directed  Contact your healthcare provider if you think your medicine is not helping or if you have side effects  Tell him or her if you are allergic to any medicine  Keep a list of the medicines, vitamins, and herbs you take  Include the amounts, and when and why you take them  Bring the list or the pill bottles to follow-up visits  Carry your medicine list with you in case of an emergency  Care for your wound as directed:  A bandages will be put on your ulcer  Your healthcare provider will give you instructions on changing your bandage  You may need to clean the wound and change the bandage daily  The bandage may contain medicines to help your ulcer heal  You may be asked to put medicine on your foot ulcer before putting on the bandage  The medicine may also prevent growth of tissue that is not healthy  You may need to cover your wound with a plastic bag while you bathe   Ask your healthcare provider for instructions on bathing until your foot heals  Prevent diabetic foot ulcers:  Good foot care may help prevent ulcers, or keep them from getting worse  Ask someone to help you if you are not able to check your feet by yourself  You or another person may need to do any of the following:  · Keep your blood sugar levels under control  Continue the plan for your diabetes that you and your healthcare provider have discussed  Healthy food choices and taking your medicines as directed may help control blood sugars  Contact your healthcare provider if your blood sugar levels are higher than directed  · Wash your feet each day with soap and warm water  Do not use hot water, because this can injure your foot  Dry your feet gently with a towel after you wash them  Dry between and under your toes  Put lotion or moisturizer on your feet  Do not  put lotion or moisturizer between your toes  · Check your feet each day  Look at your whole foot, including the bottom, and between and under your toes  Check for wounds, corns, and calluses  Feel your feet by running your hands along the tops, bottoms, sides, and between your toes  Use a nonbreakable mirror to check your feet if you have trouble seeing the bottoms  Do not try to remove corns or calluses yourself  File or cut your toenails straight across  · Protect your feet  Do not walk barefoot or wear your shoes without socks  Check your shoes for rocks or other objects that can hurt your feet  Wear cotton socks to help keep your feet dry  Wear socks without toe seams, or wear them with the seams inside out  Change your socks each day  Do not wear socks that are dirty or damp  · Wear shoes that fit well  Wear shoes that do not rub against any area of your feet  Your shoes should be ½ to ¾ inch (1 to 2 centimeters) longer than your feet  Your shoes should also have extra space around the widest part of your feet   Walking or athletic shoes with laces or straps that adjust are best  Ask your healthcare provider for help to choose shoes that fit you best  Ask him if you need to wear an insert, orthotic, or bandage on your feet  · Do not smoke  Nicotine can cause damage to your blood vessels and increases your risk for foot ulcers  Do not use e-cigarettes or smokeless tobacco in place of cigarettes or to help you quit  They still contain nicotine  Ask your healthcare provider for information if you currently smoke and need help quitting  · Do not drink alcohol  Alcohol increases your blood sugar levels and make your diabetes more difficult to manage  Ask your healthcare provider for information if you need help to quit drinking alcohol  · Maintain a healthy weight  Ask your healthcare provider how much you should weigh  A healthy weight can help you control your diabetes  Ask him to help you create a weight loss plan if you are overweight  Even a 10 to 15 pound weight loss can help you manage your blood sugar level  Follow up with your healthcare provider or foot specialist as directed: You may need to return often to have your wound checked  Your wound may be measured to see if it is getting smaller  Bring any offloading devices or footwear to your follow-up visits so your healthcare provider or specialist can check them  Write down your questions so you remember to ask them during your visits  © 2017 [address] Information is for End User's use only and may not be sold, redistributed or otherwise used for commercial purposes  All illustrations and images included in CareNotes® are the copyrighted property of [employer]  or [address]  The above information is an  only  It is not intended as medical advice for individual conditions or treatments  Talk to your doctor, nurse or pharmacist before following any medical regimen to see if it is safe and effective for you      Foot Care for People with Diabetes   WHAT YOU NEED TO KNOW:   · Foot care helps protect your feet and prevent foot ulcers or sores  Long-term high blood sugar levels can damage the blood vessels and nerves in your legs and feet  This damage makes it hard to feel pressure, pain, temperature, and touch  You may not be able to feel a cut or sore, or shoes that are too tight  Foot care is needed to prevent serious problems, such as an infection or amputation  · Diabetes may cause your toes to become crooked or curved under  These changes may affect the way you walk and can lead to increased pressure on your foot  The pressure can decrease blood flow to your feet  Lack of blood flow increases your risk for a foot ulcer  Do not ignore small problems, such as dry skin or small wounds  These can become life-threatening over time without proper care  DISCHARGE INSTRUCTIONS:   Contact your healthcare provider if:   · Your feet become numb, weak, or hard to move  · You have pus draining from a sore on your foot  · You have a wound on your foot that gets bigger, deeper, or does not heal      · You see blisters, cuts, scratches, calluses, or sores on your foot  · You have a fever, and your feet become red, warm, and swollen  · Your toenails become thick, curled, or yellow  · You find it hard to check your feet because your vision is poor  · You have questions or concerns about your condition or care  Foot care:   · Check your feet each day  Look at your whole foot, including the bottom, and between and under your toes  Check for wounds, corns, and calluses  Use a mirror to see the bottom of your feet  The skin on your feet may be shiny, tight, or darker than normal  Your feet may also be cold and pale  Feel your feet by running your hands along the tops, bottoms, sides, and between your toes  Redness, swelling, and warmth are signs of blood flow problems that can lead to a foot ulcer   Do not try to remove corns or calluses yourself  · Wash your feet each day with soap and warm water  Do not use hot water, because this can injure your foot  Dry your feet gently with a towel after you wash them  Dry between and under your toes  · Apply lotion or a moisturizer on your dry feet  Ask your healthcare provider what lotions are best to use  Do not put lotion or moisturizer between your toes  · Cut your toenails correctly  File or cut your toenails straight across  Use a soft brush to clean around your toenails  If your toenails are very thick, you may need to have a healthcare provider or specialist cut them  · Protect your feet  Do not walk barefoot or wear your shoes without socks  Check your shoes for rocks or other objects that can hurt your feet  Wear cotton socks to help keep your feet dry  Wear socks without toe seams, or wear them with the seams inside out  Change your socks each day  Do not wear socks that are dirty or damp  · Wear shoes that fit well  Wear shoes that do not rub against any area of your feet  Your shoes should be ½ to ¾ inch (1 to 2 centimeters) longer than your feet  Your shoes should also have extra space around the widest part of your feet  Walking or athletic shoes with laces or straps that adjust are best  Ask your healthcare provider for help to choose shoes that fit you best  Ask him if you need to wear an insert, orthotic, or bandage on your feet  Follow up with your healthcare provider or foot specialist as directed: You will need to have your feet checked at least once a year  You may need a foot exam more often if you have nerve damage, foot deformities, or ulcers  Write down your questions so you remember to ask them during your visits  © 2017 [facility] Information is for End User's use only and may not be sold, redistributed or otherwise used for commercial purposes   All illustrations and images included in CareNotes® are the copyrighted property of [employer]  or Thi[employer] Barnes  The above information is an  only  It is not intended as medical advice for individual conditions or treatments  Talk to your doctor, nurse or pharmacist before following any medical regimen to see if it is safe and effective for you  Osteomyelitis   WHAT YOU NEED TO KNOW:   Osteomyelitis is a severe bone infection  It can develop in any bone, but often involves the long bones, such as your arm and leg bones, or the bones of the spine  Osteomyelitis is caused by different types of germs, such as bacteria or a fungus  DISCHARGE INSTRUCTIONS:   Call 911 for the following:   · You have sudden trouble breathing  Seek care immediately if:   · You have severe pain  · Your bone breaks  Contact your healthcare provider if:   · Your symptoms return  · You have increased swelling, pain, or redness of your infected area  · You have new drainage or an odor from your wound  · You have questions or concerns about your condition or care  Medicines:   · Prescription pain medicine  may be given  Ask how to take this medicine safely  · Antibiotics  help treat or prevent a bacterial infection  · Antifungals  help treat or prevent a fungal infection  · Acetaminophen  decreases pain and fever  It is available without a doctor's order  Ask how much to take and how often to take it  Follow directions  Read the labels of all other medicines you are using to see if they also contain acetaminophen, or ask your doctor or pharmacist  Acetaminophen can cause liver damage if not taken correctly  Do not use more than 4 grams (4,000 milligrams) total of acetaminophen in one day  · Take your medicine as directed  Contact your healthcare provider if you think your medicine is not helping or if you have side effects  Tell him or her if you are allergic to any medicine  Keep a list of the medicines, vitamins, and herbs you take   Include the amounts, and when and why you take them  Bring the list or the pill bottles to follow-up visits  Carry your medicine list with you in case of an emergency  Rest and immobilize: You may need to rest and wear a splint to help your bone heal  A splint will prevent your bone from moving  Keep weight off of your leg by using crutches, a cane, or walker as directed  Ask your healthcare provider for more information about splints and when you can return to your normal activities  Eat a variety of healthy foods:  Healthy foods include fruits, vegetables, whole-grain breads, low-fat dairy products, beans, lean meats, and fish  Healthy foods will help you heal  Ask if you need to be on a special diet  Do not smoke:  Nicotine and other chemicals in cigarettes and cigars can cause lung damage and prevent healing  Ask your healthcare provider for information if you currently smoke and need help to quit  E-cigarettes or smokeless tobacco still contain nicotine  Talk to your healthcare provider before you use these products  If you smoke, it is never too late to quit  Ask your healthcare provider for information if you need help quitting  Control other medical conditions:  Control other medical conditions, such as diabetes, to prevent more bone damage  It is hard to get rid of an infection if your blood sugars are high  Wound care:  Care for your wound as directed  Carefully wash the wound with soap and water  Dry the area and put on new, clean bandages as directed  Change your bandages when they get wet or dirty  Follow up with your healthcare provider as directed: You may need to return for more blood tests or x-rays  Write down your questions so you remember to ask them during your visits  © 2017 [address] Information is for End User's use only and may not be sold, redistributed or otherwise used for commercial purposes   All illustrations and images included in CareNotes® are the copyrighted property of [facility]  or Thiago Barnes  The above information is an  only  It is not intended as medical advice for individual conditions or treatments  Talk to your doctor, nurse or pharmacist before following any medical regimen to see if it is safe and effective for you

## 2018-02-05 NOTE — ASSESSMENT & PLAN NOTE
· Patient not meeting sepsis criteria on admission  · Podiatry follow-up, postop day 3 for right hallux amputation  · Wound culture positive for group beta strep B discontinue Maxipime and vancomycin    Will transition patient to oral penicillin 500 mg q 6 hours would recommend patient to complete 14 total days of therapy  · Continue MS Contin Q 12 hours to home dose of 75 mg  · Continue MSIR 15 mg q 6 hours p r n  moderate pain and MS IR 30 mg q 6 hours p r n  severe pain  · Given patient's chronic pain in seeing a pain specialist call into offices to for addressed discharge pain medication needs  · Once clear pain regiment is decided upon patient can discharge home later today   · Blood cultures pending continue trend preliminary with no growth at 48 hours  · P T /OT consult

## 2018-02-05 NOTE — ASSESSMENT & PLAN NOTE
· With evidence of osteomyelitis  · Dressing is to remain intact  · Per previous discussion with Podiatry patient is to follow up as an outpatient on Wednesday

## 2018-02-05 NOTE — ASSESSMENT & PLAN NOTE
· Opioid dependence in; chronic pain regarding MS Contin 75 mg q 12 hours home regimen continued this admission  · Patient with acute on chronic pain patient was given her home dose of MSIR of 15 mg with an increased frequency of q 6 hours p r n  moderate pain and 30 mg p r n  q 6 hours severe pain  · Call to patient's pain management doctor patient will be discharged home 1 time script in agree it is with Dr Eve Hart for MS IR 30 mg q 6 hours p r n  for pain 10 days

## 2018-02-05 NOTE — DISCHARGE SUMMARY
Discharge- Suly Meant 1965, 47 y o  female MRN: [id number]    Unit/Bed#: -01 Encounter: [id number]    Primary Care Provider: Jai Garcia   Date and time admitted to hospital: 2/2/2018  9:25 AM        * Acute osteomyelitis of toe, right ([facility] [address] )   Assessment & Plan    · Patient not meeting sepsis criteria on admission  · Podiatry follow-up, postop day 3 for right hallux amputation  · Wound culture positive for group beta strep B discontinue Maxipime and vancomycin  Will transition patient to oral penicillin 500 mg q 6 hours would recommend patient to complete 14 total days of therapy  · Continue MS Contin Q 12 hours to home dose of 75 mg  · Continue MSIR 15 mg q 6 hours p r n  moderate pain and MS IR 30 mg q 6 hours p r n  severe pain  · Given patient's chronic pain in seeing a pain specialist call into offices to for addressed discharge pain medication needs  · Once clear pain regiment is decided upon patient can discharge home later today   · Blood cultures pending continue trend preliminary with no growth at 48 hours  · P T /OT consult          Hyponatremia   Assessment & Plan    · Present on admission improved  · Continue to monitor  · BMP in the a m          Diabetic ulcer of right great toe [facility])   Assessment & Plan    · With evidence of osteomyelitis  · Dressing is to remain intact  · Per previous discussion with Podiatry patient is to follow up as an outpatient on Wednesday        Opioid dependence ([facility] [address] )   Assessment & Plan    · Opioid dependence in; chronic pain regarding MS Contin 75 mg q 12 hours home regimen continued this admission  · Patient with acute on chronic pain patient was given her home dose of MSIR of 15 mg with an increased frequency of q 6 hours p r n  moderate pain and 30 mg p r n  q 6 hours severe pain  · Call to patient's pain management doctor patient will be discharged home 1 time script in agree it is with Dr Adamaris Rodriguez for MS IR 30 mg q 6 hours p r n  for pain 10 days        Gastroparesis   Assessment & Plan    · Continue Protonix daily  · Anti emetics as needed  · Monitor symptoms        Chronic pain   Assessment & Plan    · Continue patient's home regimen of morphine extended and immediate release  · Monitor symptoms        Diabetes mellitus (HCC)   Assessment & Plan    · Hemoglobin A1c 8 5  · Lantus decreased this admission continue 30 units q h s  and sliding scale insulin  · Patient can return to home dose as she was had decreased oral intake given she is returning to home environment would continue home dosage upon discharge          Discussion with patient's pain management doctor from [place] Dr Stanley Rene given the patient inability to travel is agreeable to allowing patient a 1 time script of MS IR 30 mg q 6 hours p r n  pain for 10 days  Patient is to follow up within that 10 pain with her pain management specialist   PD MP was reviewed      Consultations During Hospital Stay:  · Podiatry    Procedures Performed:     · X-ray right great toe 2/2:  Soft tissue emphysema in the great toe with apparent mild erosive change along the tuft of the distal phalange most suggestive of osteomyelitis  Nondisplaced 3rd proximal phalanges fracture with subacute with evidence of some healing  · Chest x-ray 2/2:  No active pulmonary disease  · Foot x-ray right three views 2/2:  Amputation of the right 1st toe with otherwise anatomic alignment  Subacute nondisplaced fracture right 3rd proximal phalanges  · Hemoglobin A1c 8 5  · Blood culture x2 48 hours no growth  · Culture, tissue and Gram stain 2/2:  2 colonies Staph aureus 1+ gross of beta hemolytic Streptococcus group B    Significant Findings / Test Results:     · Osteomyelitis right great toe status post amputation  · Acute on chronic pain    Incidental Findings:   · None     Test Results Pending at Discharge (will require follow up):    · Blood cultures for growth times 48 hours     Outpatient Tests Requested:  · Per Podiatry    Complications:  None    Reason for Admission:  Osteomyelitis right great toe need of amputation    Hospital Course:     Rachele Pate is a 47 y o  female patient who originally presented to the hospital on 2/2/2018 due to worsening foot ulcer sent to the ED by her podiatrist where x-rays reviewed patient had osteomyelitis requiring amputation of the right hallux which was performed by Podiatry same day  Patient was put on IV antibiotics sent to the floor to await cultures for proper treatment upon discharge  Podiatry felt the margins of the amputation were clean once sensitivities were receiving patient could be discharged home on oral antibiotics  Patient is a known chronic pain med patient who sees a pain specialist in [place] Dr Grace Savage and would require higher doses of pain medication upon discharge given her amputation  A personal call was placed to this provider and given patient's limited ability to reach his site in [place] given the amputation Priscilla Mariano felt it was appropriate to right a 1 time prescription for the patient for MS IR 30 mg q 6 hours p r n  for pain for 10 days and patient would need to follow up in his office  Podiatry further felt patient was stable for discharge and patient was discharged home  Please see above list of diagnoses and related plan for additional information  Condition at Discharge: stable     Discharge Day Visit / Exam:     Subjective:  Patient reports pain is tolerable patient is very concerned about discharge home given her increased pain status post amputation  Patient has been very helpful in getting in touch with her chronic pain physician and making sure patient has adequate pain regimen and placed upon discharge   Patient has been tearful more so full and having some grieving/sadness with the loss of her toe  Vitals: Blood Pressure: 149/74 (02/05/18 1529)  Pulse: 98 (02/05/18 1529)  Temperature: 97 8 °F (36 6 °C) (02/05/18 1529)  Temp Source: Oral (02/05/18 1529)  Respirations: 18 (02/05/18 1529)  Height: 5' 8" (172 7 cm) (02/02/18 0930)  Weight - Scale: 135 kg (297 lb) (02/02/18 0930)  SpO2: 98 % (02/05/18 1529)  Exam:   Physical Exam   Constitutional: She is oriented to person, place, and time  She appears well-developed and well-nourished  HENT:   Head: Normocephalic and atraumatic  Eyes: Conjunctivae and EOM are normal    Neck: Normal range of motion  Cardiovascular: Normal rate, regular rhythm and normal heart sounds  Pulmonary/Chest: Effort normal and breath sounds normal    Abdominal: Soft  Bowel sounds are normal    Musculoskeletal: Normal range of motion  Neurological: She is alert and oriented to person, place, and time  Skin: Skin is warm and dry  Postoperative shoe intact   Psychiatric: She has a normal mood and affect  Her behavior is normal          Discharge instructions/Information to patient and family:   See after visit summary for information provided to patient and family  Provisions for Follow-Up Care:  See after visit summary for information related to follow-up care and any pertinent home health orders  Disposition:     Home    For Discharges to [facility]:   · Not Applicable to this Patient - Not Applicable to this Patient    Planned Readmission:  None     Discharge Statement:  I spent 60 minutes discharging the patient  This time was spent on the day of discharge  I had direct contact with the patient on the day of discharge  Greater than 50% of the total time was spent examining patient, answering all patient questions, arranging and discussing plan of care with patient as well as directly providing post-discharge instructions  Additional time then spent on discharge activities  Discharge Medications:  See after visit summary for reconciled discharge medications provided to patient and family        ** Please Note: This note has been constructed using a voice recognition system **

## 2018-02-07 LAB
BACTERIA BLD CULT: NORMAL
BACTERIA BLD CULT: NORMAL

## 2018-03-14 ENCOUNTER — OFFICE VISIT (OUTPATIENT)
Dept: OBGYN CLINIC | Facility: CLINIC | Age: 53
End: 2018-03-14
Payer: MEDICARE

## 2018-03-14 VITALS — DIASTOLIC BLOOD PRESSURE: 80 MMHG | HEART RATE: 106 BPM | SYSTOLIC BLOOD PRESSURE: 126 MMHG

## 2018-03-14 DIAGNOSIS — M25.562 PAIN IN BOTH KNEES, UNSPECIFIED CHRONICITY: Primary | ICD-10-CM

## 2018-03-14 DIAGNOSIS — M25.561 PAIN IN BOTH KNEES, UNSPECIFIED CHRONICITY: Primary | ICD-10-CM

## 2018-03-14 DIAGNOSIS — M22.2X2 PATELLOFEMORAL PAIN SYNDROME OF BOTH KNEES: ICD-10-CM

## 2018-03-14 DIAGNOSIS — M65.831 EXTENSOR TENOSYNOVITIS OF RIGHT WRIST: ICD-10-CM

## 2018-03-14 DIAGNOSIS — M65.832 EXTENSOR TENOSYNOVITIS OF LEFT WRIST: ICD-10-CM

## 2018-03-14 DIAGNOSIS — M22.2X1 PATELLOFEMORAL PAIN SYNDROME OF BOTH KNEES: ICD-10-CM

## 2018-03-14 PROCEDURE — 99214 OFFICE O/P EST MOD 30 MIN: CPT | Performed by: ORTHOPAEDIC SURGERY

## 2018-03-14 PROCEDURE — 20550 NJX 1 TENDON SHEATH/LIGAMENT: CPT | Performed by: ORTHOPAEDIC SURGERY

## 2018-03-14 RX ADMIN — LIDOCAINE HYDROCHLORIDE 1 ML: 10 INJECTION, SOLUTION INFILTRATION; PERINEURAL at 18:04

## 2018-03-14 RX ADMIN — METHYLPREDNISOLONE ACETATE 1 ML: 40 INJECTION, SUSPENSION INTRA-ARTICULAR; INTRALESIONAL; INTRAMUSCULAR; SOFT TISSUE at 18:04

## 2018-03-14 NOTE — PROGRESS NOTES
Chief Complaint   Patient presents with    Right Wrist - Follow-up    Left Wrist - Follow-up, Pain    Left Knee - Follow-up         Subjective   Patient follows up regarding her bilateral DeQuervain's of the wrists and her left knee pain  Again, she has a history of left TKA  Patient last seen on 12/19/2017  She received a right wrist first dorsal compartment injection on that date  She also complained of left knee pain at that time and orders were placed for laboratory evaluation and bone scan of the knee  She continues to have some left knee pain  She is also complaining of left wrist DeQuervain's symptoms  No right wrist problems since her last injection on 12/19/2017  Laboratory evaluations reviewed today  12/19 ESR was 28 and CP 30  WBC normal  Hgb 8 5  However I don't believe ESR and CRP values reflect on left knee status as patient had an active ulceration and infection of her right great toe  She had an amputation on 2/2/2018  Bone Scan evaluation revealed some very nonspecific mild increase in uptake on delayed  Imaging in area of femoral component  Again this does not rule out a problem but is not very worrisome  Left Knee x-rays from 11/21/201 also reviewed and reveal tibia and femoral components well fixed, in good alignment and no evidence of loosening or infection  It does appear that no patella resurfacing was performed      ROS:   General: no fever, no chills  HEENT:  No loss of hearing or eyesight problems  Eyes:  No red eyes  Respiratory:  No coughing, shortness of breath or wheezing  Cardiovascular:  No chest pain, no palpitations  GI:  Abdomen soft nontender, denies nausea  Endocrine:  No muscle weakness, no frequent urination, no excessive thirst  Urinary:  No dysuria, no incontinence  Musculoskeletal: see HPI and PE  SKIN:  No skin rash, no dry skin  Neurological:  No headaches, no confusion  Psychiatric:  No suicide thoughts, no anxiety, no depression  Review of all other systems is negative    Past Medical History:   Diagnosis Date    Asthma     Chronic pain     Diabetes mellitus ([facility] [address] )     Gastroparesis        Current Outpatient Prescriptions on File Prior to Visit   Medication Sig Dispense Refill    albuterol (ACCUNEB) 1 25 MG/3ML nebulizer solution Take 1 ampule by nebulization every 6 (six) hours as needed for wheezing      albuterol (PROVENTIL HFA,VENTOLIN HFA) 90 mcg/act inhaler Inhale 2 puffs every 6 (six) hours as needed for wheezing      carisoprodol (SOMA) 350 mg tablet Take 350 mg by mouth 2 (two) times a day      diazepam (VALIUM) 5 mg tablet Take 5 mg by mouth daily at bedtime as needed for anxiety      glimepiride (AMARYL) 4 mg tablet Take 4 mg by mouth 2 (two) times a day      insulin glargine (LANTUS) 100 units/mL subcutaneous injection Inject 50 Units under the skin daily at bedtime        morphine (MS CONTIN) 60 mg 12 hr tablet Take 75 mg by mouth 2 (two) times a day        omeprazole (PriLOSEC) 20 mg delayed release capsule Take 20 mg by mouth daily      ondansetron (ZOFRAN-ODT) 4 mg disintegrating tablet Take 1 tablet by mouth every 8 (eight) hours as needed for nausea or vomiting for up to 3 days 9 tablet 0     No current facility-administered medications on file prior to visit  Allergies   Allergen Reactions    Nsaids GI Intolerance       Social History       General Appearance:  Alert, cooperative, no distress, appears stated age   Head:  Normocephalic, without obvious abnormality, atraumatic   Eyes:  PERRL, conjunctiva/corneas clear, EOM's intact,   Ears:   both ears normal appearance, no hearing deficits      Lungs:   respirations unlabored   Heart:  Regular rate and rhythm   Abdomen:   Soft, non-tender,  no masses   Extremities: Extremities normal, atraumatic, no cyanosis or edema   Pulses: 2+ and symmetric   Skin: Skin color, texture, turgor normal, no rashes or lesions   Neurologic: Normal     Left Knee Exam   Swelling: None  Effusion: No    Tenderness   The patient is experiencing tenderness in the patella, anterior knee  Range of Motion   Normal left knee ROM    Muscle Strength   Normal left knee strength    Tests   Varus:  Negative  Valgus: Negative  Patellar Apprehension: No      Bilateral Wrist exam:    Minimal tenderness right first dorsal compartment and neg Finkelstein's test   Moderate tenderness left first dorsal compartment and positive Finkelstein's test     Anna Marie was seen today for follow-up, follow-up, pain and follow-up  Diagnoses and all orders for this visit:    Pain in both knees, unspecified chronicity  -     Ambulatory referral to Physical Therapy; Future    Patellofemoral pain syndrome of both knees  -     Ambulatory referral to Physical Therapy; Future    Extensor tenosynovitis of right wrist    Extensor tenosynovitis of left wrist  -     Hand/upper extremity injection        Hand/upper extremity injection  Date/Time: 3/14/2018 6:04 PM  Timeout: Immediately prior to procedure a time out was called to verify the correct patient, procedure, equipment, support staff and site/side marked as required   Supporting Documentation  Indications: pain   Procedure Details  Condition:de Quervain's tenosynovitis Site: L extensor compartment 1   Preparation: Patient was prepped and draped in the usual sterile fashion  Needle size: 27 G  Ultrasound guidance: no  Approach: dorsal  Medications administered: 1 mL lidocaine 1 %; 1 mL methylPREDNISolone acetate 40 mg/mL  Patient tolerance: patient tolerated the procedure well with no immediate complications  Dressing:  Sterile dressing applied           DISCUSSION AND SUMMARY:   Patient was requesting left wrist injection as the right wrist injection had helped so much  She was counseled that we need to be careful about administering depo medrol injections in light of her diabetes (last A1C was 8 5)   We did give 0 5 cc depo medrol injection today at patient's request  With regard to letf knee, clinically there is no suggestion of infection  I might repeat the ESR and CRP tests in the future after healing of the toe amputation, but suspicion of problem in the knee is low  No warmth, erythema or effusion  She roman seem to have anterior knee pain and we are going to send her to PT to see if that hepls with her discomfort  It could b patella femoral pain (patella not resurfaced)  She will follow up in 6 weeks

## 2018-04-25 ENCOUNTER — HOSPITAL ENCOUNTER (EMERGENCY)
Facility: HOSPITAL | Age: 53
Discharge: HOME | End: 2018-04-25
Attending: EMERGENCY MEDICINE | Admitting: EMERGENCY MEDICINE
Payer: MEDICARE

## 2018-04-25 VITALS
RESPIRATION RATE: 17 BRPM | DIASTOLIC BLOOD PRESSURE: 67 MMHG | TEMPERATURE: 98.4 F | HEIGHT: 68 IN | OXYGEN SATURATION: 96 % | HEART RATE: 115 BPM | SYSTOLIC BLOOD PRESSURE: 130 MMHG | BODY MASS INDEX: 44.41 KG/M2 | WEIGHT: 293 LBS

## 2018-04-25 DIAGNOSIS — R10.9 ABDOMINAL PAIN: ICD-10-CM

## 2018-04-25 DIAGNOSIS — R11.10 VOMITING: Primary | ICD-10-CM

## 2018-04-25 PROBLEM — M65.4 DE QUERVAIN'S TENOSYNOVITIS, BILATERAL: Status: ACTIVE | Noted: 2017-10-03

## 2018-04-25 PROBLEM — M25.569 KNEE PAIN: Status: ACTIVE | Noted: 2017-11-21

## 2018-04-25 LAB
ALBUMIN SERPL BCP-MCNC: 3.5 G/DL (ref 3.5–5)
ALP SERPL-CCNC: 122 U/L (ref 46–116)
ALT SERPL W P-5'-P-CCNC: 29 U/L (ref 12–78)
ANION GAP SERPL CALCULATED.3IONS-SCNC: 9 MMOL/L (ref 4–13)
AST SERPL W P-5'-P-CCNC: 26 U/L (ref 5–45)
BASOPHILS # BLD AUTO: 0.07 THOUSANDS/ΜL (ref 0–0.1)
BASOPHILS NFR BLD AUTO: 1 % (ref 0–1)
BILIRUB SERPL-MCNC: 1.3 MG/DL (ref 0.2–1)
BUN SERPL-MCNC: 10 MG/DL (ref 5–25)
CALCIUM SERPL-MCNC: 9.3 MG/DL (ref 8.3–10.1)
CHLORIDE SERPL-SCNC: 99 MMOL/L (ref 100–108)
CO2 SERPL-SCNC: 26 MMOL/L (ref 21–32)
CREAT SERPL-MCNC: 0.64 MG/DL (ref 0.6–1.3)
EOSINOPHIL # BLD AUTO: 0.1 THOUSAND/ΜL (ref 0–0.61)
EOSINOPHIL NFR BLD AUTO: 1 % (ref 0–6)
ERYTHROCYTE [DISTWIDTH] IN BLOOD BY AUTOMATED COUNT: 13.9 % (ref 11.6–15.1)
GFR SERPL CREATININE-BSD FRML MDRD: 102 ML/MIN/1.73SQ M
GLUCOSE SERPL-MCNC: 301 MG/DL (ref 65–140)
HCT VFR BLD AUTO: 40.3 % (ref 34.8–46.1)
HGB BLD-MCNC: 13 G/DL (ref 11.5–15.4)
LIPASE SERPL-CCNC: 58 U/L (ref 73–393)
LYMPHOCYTES # BLD AUTO: 0.94 THOUSANDS/ΜL (ref 0.6–4.47)
LYMPHOCYTES NFR BLD AUTO: 9 % (ref 14–44)
MCH RBC QN AUTO: 26.5 PG (ref 26.8–34.3)
MCHC RBC AUTO-ENTMCNC: 32.3 G/DL (ref 31.4–37.4)
MCV RBC AUTO: 82 FL (ref 82–98)
MONOCYTES # BLD AUTO: 0.25 THOUSAND/ΜL (ref 0.17–1.22)
MONOCYTES NFR BLD AUTO: 2 % (ref 4–12)
NEUTROPHILS # BLD AUTO: 9.56 THOUSANDS/ΜL (ref 1.85–7.62)
NEUTS SEG NFR BLD AUTO: 87 % (ref 43–75)
NRBC BLD AUTO-RTO: 0 /100 WBCS
PLATELET # BLD AUTO: 221 THOUSANDS/UL (ref 149–390)
PMV BLD AUTO: 13 FL (ref 8.9–12.7)
POTASSIUM SERPL-SCNC: 4.3 MMOL/L (ref 3.5–5.3)
PROT SERPL-MCNC: 8.2 G/DL (ref 6.4–8.2)
RBC # BLD AUTO: 4.9 MILLION/UL (ref 3.81–5.12)
SODIUM SERPL-SCNC: 134 MMOL/L (ref 136–145)
WBC # BLD AUTO: 10.95 THOUSAND/UL (ref 4.31–10.16)

## 2018-04-25 PROCEDURE — 85025 COMPLETE CBC W/AUTO DIFF WBC: CPT | Performed by: EMERGENCY MEDICINE

## 2018-04-25 PROCEDURE — 99283 EMERGENCY DEPT VISIT LOW MDM: CPT

## 2018-04-25 PROCEDURE — 36415 COLL VENOUS BLD VENIPUNCTURE: CPT | Performed by: EMERGENCY MEDICINE

## 2018-04-25 PROCEDURE — 80053 COMPREHEN METABOLIC PANEL: CPT | Performed by: EMERGENCY MEDICINE

## 2018-04-25 PROCEDURE — 96372 THER/PROPH/DIAG INJ SC/IM: CPT

## 2018-04-25 PROCEDURE — 96361 HYDRATE IV INFUSION ADD-ON: CPT

## 2018-04-25 PROCEDURE — 83690 ASSAY OF LIPASE: CPT | Performed by: EMERGENCY MEDICINE

## 2018-04-25 PROCEDURE — 96374 THER/PROPH/DIAG INJ IV PUSH: CPT

## 2018-04-25 RX ORDER — ONDANSETRON 4 MG/1
4 TABLET, ORALLY DISINTEGRATING ORAL EVERY 6 HOURS PRN
Qty: 20 TABLET | Refills: 0 | Status: SHIPPED | OUTPATIENT
Start: 2018-04-25 | End: 2018-06-01

## 2018-04-25 RX ORDER — PROMETHAZINE HYDROCHLORIDE 25 MG/ML
12.5 INJECTION, SOLUTION INTRAMUSCULAR; INTRAVENOUS ONCE
Status: COMPLETED | OUTPATIENT
Start: 2018-04-25 | End: 2018-04-25

## 2018-04-25 RX ORDER — METHYLPREDNISOLONE ACETATE 40 MG/ML
1 INJECTION, SUSPENSION INTRA-ARTICULAR; INTRALESIONAL; INTRAMUSCULAR; SOFT TISSUE
Status: COMPLETED | OUTPATIENT
Start: 2018-03-14 | End: 2018-03-14

## 2018-04-25 RX ORDER — LIDOCAINE HYDROCHLORIDE 10 MG/ML
1 INJECTION, SOLUTION INFILTRATION; PERINEURAL
Status: COMPLETED | OUTPATIENT
Start: 2018-03-14 | End: 2018-03-14

## 2018-04-25 RX ORDER — ONDANSETRON 2 MG/ML
4 INJECTION INTRAMUSCULAR; INTRAVENOUS ONCE
Status: COMPLETED | OUTPATIENT
Start: 2018-04-25 | End: 2018-04-25

## 2018-04-25 RX ADMIN — SODIUM CHLORIDE 1000 ML: 0.9 INJECTION, SOLUTION INTRAVENOUS at 17:30

## 2018-04-25 RX ADMIN — SODIUM CHLORIDE 1000 ML: 0.9 INJECTION, SOLUTION INTRAVENOUS at 16:20

## 2018-04-25 RX ADMIN — ONDANSETRON 4 MG: 2 INJECTION INTRAMUSCULAR; INTRAVENOUS at 16:20

## 2018-04-25 RX ADMIN — PROMETHAZINE HYDROCHLORIDE 12.5 MG: 25 INJECTION INTRAMUSCULAR; INTRAVENOUS at 17:29

## 2018-04-25 NOTE — ED PROVIDER NOTES
History  Chief Complaint   Patient presents with    Vomiting     Pt c/o vomiting x 2 days  Pt denies abdominal pain or diarrhea  Pt has hx of gastroparesis     Patient presents to the emergency department for multiple episodes of vomiting that began yesterday afternoon  Patient states this is consistent with a flare of her gastro paresis  She denies diarrhea or abdominal pain  Denies fever chills cough  Denies chest pain sob  Denies urinary symptoms or vaginal discharge or bleeding  Denies head injury  Prior to Admission Medications   Prescriptions Last Dose Informant Patient Reported? Taking?    albuterol (ACCUNEB) 1 25 MG/3ML nebulizer solution  Self Yes No   Sig: Take 1 ampule by nebulization every 6 (six) hours as needed for wheezing   albuterol (PROVENTIL HFA,VENTOLIN HFA) 90 mcg/act inhaler  Self Yes No   Sig: Inhale 2 puffs every 6 (six) hours as needed for wheezing   carisoprodol (SOMA) 350 mg tablet  Self Yes No   Sig: Take 350 mg by mouth 2 (two) times a day   diazepam (VALIUM) 5 mg tablet  Self Yes No   Sig: Take 5 mg by mouth daily at bedtime as needed for anxiety   glimepiride (AMARYL) 4 mg tablet  Self Yes No   Sig: Take 4 mg by mouth 2 (two) times a day   insulin glargine (LANTUS) 100 units/mL subcutaneous injection  Self Yes No   Sig: Inject 50 Units under the skin daily at bedtime     morphine (MS CONTIN) 60 mg 12 hr tablet  Self Yes No   Sig: Take 75 mg by mouth 2 (two) times a day     omeprazole (PriLOSEC) 20 mg delayed release capsule  Self Yes No   Sig: Take 20 mg by mouth daily   ondansetron (ZOFRAN-ODT) 4 mg disintegrating tablet   No No   Sig: Take 1 tablet by mouth every 8 (eight) hours as needed for nausea or vomiting for up to 3 days      Facility-Administered Medications: None       Past Medical History:   Diagnosis Date    Asthma     Chronic pain     Diabetes mellitus (HCC)     Gastroparesis        Past Surgical History:   Procedure Laterality Date    CERVICAL LAMINECTOMY      HYSTERECTOMY      JOINT REPLACEMENT Bilateral     knee    TOE AMPUTATION Right 2/2/2018    Procedure: AMPUTATION TOE, RIGHT GREAT TOE;  Surgeon: Codie Zapata DPM;  Location: [facility];  Service: Podiatry       Family History   Problem Relation Age of Onset    Diabetes Mother     Diabetes Maternal Grandmother      I have reviewed and agree with the history as documented  Social History   Substance Use Topics    Smoking status: Never Smoker    Smokeless tobacco: Never Used    Alcohol use No        Review of Systems   Constitutional: Negative  Negative for activity change, appetite change, chills, diaphoresis, fatigue and fever  HENT: Negative  Negative for congestion  Eyes: Negative  Negative for photophobia and visual disturbance  Respiratory: Negative  Negative for cough, chest tightness, shortness of breath, wheezing and stridor  Cardiovascular: Negative  Negative for chest pain, palpitations and leg swelling  Gastrointestinal: Positive for nausea and vomiting  Negative for abdominal pain, blood in stool and diarrhea  Endocrine: Negative  Genitourinary: Negative  Negative for difficulty urinating, dysuria, flank pain, genital sores, hematuria, urgency, vaginal bleeding, vaginal discharge and vaginal pain  Musculoskeletal: Negative  Negative for arthralgias, back pain, neck pain and neck stiffness  Skin: Negative  Negative for rash and wound  Allergic/Immunologic: Negative  Neurological: Negative  Negative for dizziness, tremors, seizures, syncope, facial asymmetry, speech difficulty, weakness, light-headedness, numbness and headaches  Hematological: Negative  Does not bruise/bleed easily  Psychiatric/Behavioral: Negative          Physical Exam  ED Triage Vitals [04/25/18 1537]   Temperature Pulse Respirations Blood Pressure SpO2   98 4 °F (36 9 °C) (!) 116 20 (!) 171/81 95 %      Temp Source Heart Rate Source Patient Position - Orthostatic VS BP Location FiO2 (%)   Oral Monitor Sitting Right arm --      Pain Score       --           Orthostatic Vital Signs  Vitals:    04/25/18 1600 04/25/18 1700 04/25/18 1800 04/25/18 1900   BP: 157/77 143/70 127/64 138/63   Pulse: (!) 114 (!) 119 (!) 118 (!) 118   Patient Position - Orthostatic VS: Lying Sitting Lying Sitting       Physical Exam   Constitutional: She is oriented to person, place, and time  She appears well-developed and well-nourished  Nontoxic appearance without respiratory distress  Patient looks mildly washed out  HENT:   Head: Normocephalic and atraumatic  Right Ear: External ear normal    Left Ear: External ear normal    Mouth/Throat: Oropharynx is clear and moist    Eyes: Conjunctivae and EOM are normal  Pupils are equal, round, and reactive to light  Neck: Normal range of motion  Neck supple  Cardiovascular: Normal rate, regular rhythm, normal heart sounds and intact distal pulses  Pulmonary/Chest: Effort normal and breath sounds normal  No respiratory distress  She has no wheezes  She has no rales  She exhibits no tenderness  Abdominal: Soft  Bowel sounds are normal  She exhibits no distension and no mass  There is no tenderness  There is no rebound and no guarding  No hernia  Musculoskeletal: Normal range of motion  She exhibits no edema, tenderness or deformity  Neurological: She is alert and oriented to person, place, and time  She has normal reflexes  She displays normal reflexes  No cranial nerve deficit or sensory deficit  She exhibits normal muscle tone  Coordination normal    Skin: Skin is warm and dry  No rash noted  No erythema  No pallor  Psychiatric: She has a normal mood and affect  Her behavior is normal  Judgment and thought content normal    Nursing note and vitals reviewed        ED Medications  Medications   ondansetron (ZOFRAN) injection 4 mg (4 mg Intravenous Given 4/25/18 1620)   sodium chloride 0 9 % bolus 1,000 mL (0 mL Intravenous Stopped 4/25/18 1816) sodium chloride 0 9 % bolus 1,000 mL (0 mL Intravenous Stopped 4/25/18 1830)   promethazine (PHENERGAN) injection 12 5 mg (12 5 mg Intramuscular Given 4/25/18 1729)       Diagnostic Studies  Results Reviewed     Procedure Component Value Units Date/Time    Comprehensive metabolic panel [[id number]]  (Abnormal) Collected:  04/25/18 1616    Lab Status:  Final result Specimen:  Blood from Arm, Right Updated:  04/25/18 1640     Sodium 134 (L) mmol/L      Potassium 4 3 mmol/L      Chloride 99 (L) mmol/L      CO2 26 mmol/L      Anion Gap 9 mmol/L      BUN 10 mg/dL      Creatinine 0 64 mg/dL      Glucose 301 (H) mg/dL      Calcium 9 3 mg/dL      AST 26 U/L      ALT 29 U/L      Alkaline Phosphatase 122 (H) U/L      Total Protein 8 2 g/dL      Albumin 3 5 g/dL      Total Bilirubin 1 30 (H) mg/dL      eGFR 102 ml/min/1 73sq m     Narrative:         National Kidney Disease Education Program recommendations are as follows:  GFR calculation is accurate only with a steady state creatinine  Chronic Kidney disease less than 60 ml/min/1 73 sq  meters  Kidney failure less than 15 ml/min/1 73 sq  meters      Lipase [[id number]]  (Abnormal) Collected:  04/25/18 1616    Lab Status:  Final result Specimen:  Blood from Arm, Right Updated:  04/25/18 1640     Lipase 58 (L) u/L     CBC and differential [[id number]]  (Abnormal) Collected:  04/25/18 1616    Lab Status:  Final result Specimen:  Blood from Arm, Right Updated:  04/25/18 1622     WBC 10 95 (H) Thousand/uL      RBC 4 90 Million/uL      Hemoglobin 13 0 g/dL      Hematocrit 40 3 %      MCV 82 fL      MCH 26 5 (L) pg      MCHC 32 3 g/dL      RDW 13 9 %      MPV 13 0 (H) fL      Platelets 185 Thousands/uL      nRBC 0 /100 WBCs      Neutrophils Relative 87 (H) %      Lymphocytes Relative 9 (L) %      Monocytes Relative 2 (L) %      Eosinophils Relative 1 %      Basophils Relative 1 %      Neutrophils Absolute 9 56 (H) Thousands/µL      Lymphocytes Absolute 0 94 Thousands/µL      Monocytes Absolute 0 25 Thousand/µL      Eosinophils Absolute 0 10 Thousand/µL      Basophils Absolute 0 07 Thousands/µL                  No orders to display              Procedures  Procedures       Phone Contacts  ED Phone Contact    ED Course  ED Course as of Apr 25 1934 Wed Apr 25, 2018   1929 Pt stable for d/c  Improved with Fluids/zofran and phenergan  Will d/c and refer to GI  Zofran given  MDM  CritCare Time    Disposition  Final diagnoses:   Abdominal pain   Vomiting     Time reflects when diagnosis was documented in both MDM as applicable and the Disposition within this note     Time User Action Codes Description Comment    4/25/2018  7:33 PM Marvina Fuelling Add [R10 9] Abdominal pain     4/25/2018  7:33 PM Marvina Fuelling Add [R11 10] Vomiting     4/25/2018  7:34 PM Flagler Anon, Lianne [place] Modify [R10 9] Abdominal pain     4/25/2018  7:34 PM Flagler Anon, Lianne Su[place] Modify [R11 10] Vomiting       ED Disposition     ED Disposition Condition Comment    Discharge  [address] discharge to home/self care  Condition at discharge: Stable        Follow-up Information     Follow up With Specialties Details Why Contact Info    Stanley Rouse MD Gastroenterology   [address]  [facility] [address]  [phone]          Patient's Medications   Discharge Prescriptions    ONDANSETRON (ZOFRAN-ODT) 4 MG DISINTEGRATING TABLET    Take 1 tablet (4 mg total) by mouth every 6 (six) hours as needed for nausea or vomiting       Start Date: 4/25/2018 End Date: --       Order Dose: 4 mg       Quantity: 20 tablet    Refills: 0     No discharge procedures on file      ED Provider  Electronically Signed by           James Talley MD  04/25/18 1758

## 2018-04-25 NOTE — ED NOTES
Pt states she feels better and was able to tolerate PO fluids and would like to go home with a nausea prescription     Lauren Urias RN  04/25/18 1927

## 2018-04-25 NOTE — DISCHARGE INSTRUCTIONS
Acute Nausea and Vomiting, Ambulatory Care   GENERAL INFORMATION:   Acute nausea and vomiting  starts suddenly, gets worse quickly, and lasts a short time  Nausea and vomiting may be caused by pregnancy, alcohol, infection, or medicines  Common related symptoms include the following:   · Fever    · Abdominal swelling    · Pain, tenderness, or a lump in the abdomen    · Splashing sounds heard in your stomach when you move  Seek immediate care for the following symptoms:   · Blood in your vomit or bowel movements    · Sudden, severe pain in your chest and upper abdomen after hard vomiting    · Dizziness, dry mouth, and thirst    · Urinating very little or not at all    · Muscle weakness, leg cramps, and trouble breathing    · A heart beat that is faster than normal    · Vomiting for more than 48 hours  Treatment for acute nausea and vomiting  may include medicines to calm your stomach and stop the vomiting  You may need IV fluids if you are dehydrated  Manage your nausea and vomiting:   · Drink liquids as directed to prevent dehydration  Ask how much liquid to drink each day and which liquids are best for you  You may need to drink an oral rehydration solution (ORS)  ORS contains water, salts, and sugar that are needed to replace the lost body fluids  Ask what kind of ORS to use, how much to drink, and where to get it  · Eat smaller meals, more often  Eat small amounts of food every 2 to 3 hours, even if you are not hungry  Food in your stomach may help decrease your nausea  · Avoid stress  Find ways to relax and manage your stress  Headaches due to stress may cause nausea and vomiting  Get more rest and sleep  Follow up with your healthcare provider as directed:  Write down your questions so you remember to ask them during your visits  CARE AGREEMENT:   You have the right to help plan your care  Learn about your health condition and how it may be treated   Discuss treatment options with your caregivers to decide what care you want to receive  You always have the right to refuse treatment  The above information is an  only  It is not intended as medical advice for individual conditions or treatments  Talk to your doctor, nurse or pharmacist before following any medical regimen to see if it is safe and effective for you  © 2014 [address] is for End User's use only and may not be sold, redistributed or otherwise used for commercial purposes  All illustrations and images included in CareNotes® are the copyrighted property of [employer]  or [employer]  Abdominal Pain   AMBULATORY CARE:   Abdominal pain  can be dull, achy, or sharp  You may have pain in one area of your abdomen, or in your entire abdomen  Your pain may be caused by a condition such as constipation, food sensitivity or poisoning, infection, or a blockage  Abdominal pain can also be from a hernia, appendicitis, or an ulcer  Liver, gallbladder, or kidney conditions can also cause abdominal pain  The cause of your abdominal pain may be unknown  Seek care immediately if:   · You have new chest pain or shortness of breath  · You have pulsing pain in your upper abdomen or lower back that suddenly becomes constant  · Your pain is in the right lower abdominal area and worsens with movement  · You have a fever over 100 4°F (38°C) or shaking chills  · You are vomiting and cannot keep food or liquids down  · Your pain does not improve or gets worse over the next 8 to 12 hours  · You see blood in your vomit or bowel movements, or they look black and tarry  · Your skin or the whites of your eyes turn yellow  · You are a woman and have a large amount of vaginal bleeding that is not your monthly period  Contact your healthcare provider if:   · You have pain in your lower back  · You are a man and have pain in your testicles  · You have pain when you urinate  · You have questions or concerns about your condition or care  Treatment for abdominal pain  may include medicine to calm your stomach, prevent vomiting, or decrease pain  Follow up with your healthcare provider as directed:  Write down your questions so you remember to ask them during your visits  © 2017 [address] Information is for End User's use only and may not be sold, redistributed or otherwise used for commercial purposes  All illustrations and images included in CareNotes® are the copyrighted property of [employer]  or [employer] Barnes  The above information is an  only  It is not intended as medical advice for individual conditions or treatments  Talk to your doctor, nurse or pharmacist before following any medical regimen to see if it is safe and effective for you

## 2018-05-22 ENCOUNTER — HOSPITAL ENCOUNTER (INPATIENT)
Facility: HOSPITAL | Age: 53
LOS: 1 days | Discharge: HOME | DRG: 074 | End: 2018-05-24
Attending: EMERGENCY MEDICINE | Admitting: INTERNAL MEDICINE
Payer: MEDICARE

## 2018-05-22 ENCOUNTER — APPOINTMENT (EMERGENCY)
Dept: ULTRASOUND IMAGING | Facility: HOSPITAL | Age: 53
DRG: 074 | End: 2018-05-22
Payer: MEDICARE

## 2018-05-22 DIAGNOSIS — K31.84 GASTROPARESIS: ICD-10-CM

## 2018-05-22 DIAGNOSIS — N39.0 UTI (URINARY TRACT INFECTION): ICD-10-CM

## 2018-05-22 DIAGNOSIS — R11.2 NAUSEA AND VOMITING: Primary | ICD-10-CM

## 2018-05-22 DIAGNOSIS — R73.9 HYPERGLYCEMIA: ICD-10-CM

## 2018-05-22 DIAGNOSIS — N39.0 URINARY TRACT INFECTION: ICD-10-CM

## 2018-05-22 DIAGNOSIS — K80.20 GALLSTONES: ICD-10-CM

## 2018-05-22 PROBLEM — R11.0 NAUSEA: Status: ACTIVE | Noted: 2018-05-22

## 2018-05-22 LAB
ACETONE SERPL-MCNC: NEGATIVE MG/DL
ALBUMIN SERPL BCP-MCNC: 3.7 G/DL (ref 3.5–5)
ALP SERPL-CCNC: 122 U/L (ref 46–116)
ALT SERPL W P-5'-P-CCNC: 22 U/L (ref 12–78)
ANION GAP SERPL CALCULATED.3IONS-SCNC: 10 MMOL/L (ref 4–13)
AST SERPL W P-5'-P-CCNC: 12 U/L (ref 5–45)
BACTERIA UR QL AUTO: ABNORMAL /HPF
BASOPHILS # BLD AUTO: 0.11 THOUSANDS/ΜL (ref 0–0.1)
BASOPHILS NFR BLD AUTO: 1 % (ref 0–1)
BILIRUB SERPL-MCNC: 1.6 MG/DL (ref 0.2–1)
BILIRUB UR QL STRIP: ABNORMAL
BUN SERPL-MCNC: 18 MG/DL (ref 5–25)
CALCIUM SERPL-MCNC: 9.5 MG/DL (ref 8.3–10.1)
CHLORIDE SERPL-SCNC: 96 MMOL/L (ref 100–108)
CLARITY UR: ABNORMAL
CO2 SERPL-SCNC: 27 MMOL/L (ref 21–32)
COLOR UR: YELLOW
CREAT SERPL-MCNC: 0.88 MG/DL (ref 0.6–1.3)
EOSINOPHIL # BLD AUTO: 0.15 THOUSAND/ΜL (ref 0–0.61)
EOSINOPHIL NFR BLD AUTO: 1 % (ref 0–6)
ERYTHROCYTE [DISTWIDTH] IN BLOOD BY AUTOMATED COUNT: 13.2 % (ref 11.6–15.1)
GFR SERPL CREATININE-BSD FRML MDRD: 75 ML/MIN/1.73SQ M
GLUCOSE SERPL-MCNC: 282 MG/DL (ref 65–140)
GLUCOSE SERPL-MCNC: 375 MG/DL (ref 65–140)
GLUCOSE UR STRIP-MCNC: ABNORMAL MG/DL
HCT VFR BLD AUTO: 42.1 % (ref 34.8–46.1)
HGB BLD-MCNC: 13.8 G/DL (ref 11.5–15.4)
HGB UR QL STRIP.AUTO: ABNORMAL
HYALINE CASTS #/AREA URNS LPF: ABNORMAL /LPF
IMM GRANULOCYTES # BLD AUTO: 0.06 THOUSAND/UL (ref 0–0.2)
IMM GRANULOCYTES NFR BLD AUTO: 0 % (ref 0–2)
KETONES UR STRIP-MCNC: ABNORMAL MG/DL
LEUKOCYTE ESTERASE UR QL STRIP: ABNORMAL
LIPASE SERPL-CCNC: 102 U/L (ref 73–393)
LYMPHOCYTES # BLD AUTO: 1.61 THOUSANDS/ΜL (ref 0.6–4.47)
LYMPHOCYTES NFR BLD AUTO: 11 % (ref 14–44)
MCH RBC QN AUTO: 26.8 PG (ref 26.8–34.3)
MCHC RBC AUTO-ENTMCNC: 32.8 G/DL (ref 31.4–37.4)
MCV RBC AUTO: 82 FL (ref 82–98)
MONOCYTES # BLD AUTO: 0.79 THOUSAND/ΜL (ref 0.17–1.22)
MONOCYTES NFR BLD AUTO: 5 % (ref 4–12)
MUCOUS THREADS UR QL AUTO: ABNORMAL
NEUTROPHILS # BLD AUTO: 11.81 THOUSANDS/ΜL (ref 1.85–7.62)
NEUTS SEG NFR BLD AUTO: 82 % (ref 43–75)
NITRITE UR QL STRIP: NEGATIVE
NON-SQ EPI CELLS URNS QL MICRO: ABNORMAL /HPF
NRBC BLD AUTO-RTO: 0 /100 WBCS
PH UR STRIP.AUTO: 5.5 [PH] (ref 4.5–8)
PLATELET # BLD AUTO: 258 THOUSANDS/UL (ref 149–390)
PMV BLD AUTO: 12.9 FL (ref 8.9–12.7)
POTASSIUM SERPL-SCNC: 3.7 MMOL/L (ref 3.5–5.3)
PROT SERPL-MCNC: 8.2 G/DL (ref 6.4–8.2)
PROT UR STRIP-MCNC: ABNORMAL MG/DL
RBC # BLD AUTO: 5.14 MILLION/UL (ref 3.81–5.12)
RBC #/AREA URNS AUTO: ABNORMAL /HPF
SODIUM SERPL-SCNC: 133 MMOL/L (ref 136–145)
SP GR UR STRIP.AUTO: >=1.03 (ref 1–1.03)
TROPONIN I SERPL-MCNC: <0.02 NG/ML
UROBILINOGEN UR QL STRIP.AUTO: 0.2 E.U./DL
WBC # BLD AUTO: 14.53 THOUSAND/UL (ref 4.31–10.16)
WBC #/AREA URNS AUTO: ABNORMAL /HPF

## 2018-05-22 PROCEDURE — 85025 COMPLETE CBC W/AUTO DIFF WBC: CPT | Performed by: EMERGENCY MEDICINE

## 2018-05-22 PROCEDURE — 93005 ELECTROCARDIOGRAM TRACING: CPT

## 2018-05-22 PROCEDURE — 96374 THER/PROPH/DIAG INJ IV PUSH: CPT

## 2018-05-22 PROCEDURE — 76705 ECHO EXAM OF ABDOMEN: CPT

## 2018-05-22 PROCEDURE — 82948 REAGENT STRIP/BLOOD GLUCOSE: CPT

## 2018-05-22 PROCEDURE — 84484 ASSAY OF TROPONIN QUANT: CPT | Performed by: EMERGENCY MEDICINE

## 2018-05-22 PROCEDURE — 83690 ASSAY OF LIPASE: CPT | Performed by: EMERGENCY MEDICINE

## 2018-05-22 PROCEDURE — 99285 EMERGENCY DEPT VISIT HI MDM: CPT

## 2018-05-22 PROCEDURE — 87086 URINE CULTURE/COLONY COUNT: CPT | Performed by: INTERNAL MEDICINE

## 2018-05-22 PROCEDURE — 96372 THER/PROPH/DIAG INJ SC/IM: CPT

## 2018-05-22 PROCEDURE — 80053 COMPREHEN METABOLIC PANEL: CPT | Performed by: EMERGENCY MEDICINE

## 2018-05-22 PROCEDURE — 36415 COLL VENOUS BLD VENIPUNCTURE: CPT | Performed by: EMERGENCY MEDICINE

## 2018-05-22 PROCEDURE — 81001 URINALYSIS AUTO W/SCOPE: CPT | Performed by: EMERGENCY MEDICINE

## 2018-05-22 PROCEDURE — 96361 HYDRATE IV INFUSION ADD-ON: CPT

## 2018-05-22 PROCEDURE — 82009 KETONE BODYS QUAL: CPT | Performed by: EMERGENCY MEDICINE

## 2018-05-22 RX ORDER — INSULIN GLARGINE 100 [IU]/ML
50 INJECTION, SOLUTION SUBCUTANEOUS
Status: DISCONTINUED | OUTPATIENT
Start: 2018-05-22 | End: 2018-05-24 | Stop reason: HOSPADM

## 2018-05-22 RX ORDER — KETOROLAC TROMETHAMINE 30 MG/ML
30 INJECTION, SOLUTION INTRAMUSCULAR; INTRAVENOUS ONCE
Status: COMPLETED | OUTPATIENT
Start: 2018-05-22 | End: 2018-05-22

## 2018-05-22 RX ORDER — ALBUTEROL SULFATE 90 UG/1
2 AEROSOL, METERED RESPIRATORY (INHALATION) EVERY 6 HOURS PRN
Status: DISCONTINUED | OUTPATIENT
Start: 2018-05-22 | End: 2018-05-24 | Stop reason: HOSPADM

## 2018-05-22 RX ORDER — DIAZEPAM 5 MG/1
5 TABLET ORAL
Status: DISCONTINUED | OUTPATIENT
Start: 2018-05-22 | End: 2018-05-24 | Stop reason: HOSPADM

## 2018-05-22 RX ORDER — PROMETHAZINE HYDROCHLORIDE 25 MG/ML
25 INJECTION, SOLUTION INTRAMUSCULAR; INTRAVENOUS EVERY 6 HOURS PRN
Status: DISCONTINUED | OUTPATIENT
Start: 2018-05-22 | End: 2018-05-24 | Stop reason: HOSPADM

## 2018-05-22 RX ORDER — PANTOPRAZOLE SODIUM 20 MG/1
20 TABLET, DELAYED RELEASE ORAL
Status: DISCONTINUED | OUTPATIENT
Start: 2018-05-23 | End: 2018-05-23

## 2018-05-22 RX ORDER — PROMETHAZINE HYDROCHLORIDE 25 MG/ML
25 INJECTION, SOLUTION INTRAMUSCULAR; INTRAVENOUS ONCE
Status: COMPLETED | OUTPATIENT
Start: 2018-05-22 | End: 2018-05-22

## 2018-05-22 RX ORDER — MORPHINE SULFATE 15 MG/1
15 TABLET ORAL EVERY 8 HOURS PRN
Status: DISCONTINUED | OUTPATIENT
Start: 2018-05-22 | End: 2018-05-24 | Stop reason: HOSPADM

## 2018-05-22 RX ORDER — ACETAMINOPHEN 325 MG/1
650 TABLET ORAL EVERY 6 HOURS PRN
Status: DISCONTINUED | OUTPATIENT
Start: 2018-05-22 | End: 2018-05-24 | Stop reason: HOSPADM

## 2018-05-22 RX ORDER — CARISOPRODOL 350 MG/1
350 TABLET ORAL 2 TIMES DAILY
Status: DISCONTINUED | OUTPATIENT
Start: 2018-05-23 | End: 2018-05-24 | Stop reason: HOSPADM

## 2018-05-22 RX ORDER — SODIUM CHLORIDE 9 MG/ML
100 INJECTION, SOLUTION INTRAVENOUS CONTINUOUS
Status: DISCONTINUED | OUTPATIENT
Start: 2018-05-22 | End: 2018-05-24 | Stop reason: HOSPADM

## 2018-05-22 RX ADMIN — CEFTRIAXONE 1000 MG: 1 INJECTION, SOLUTION INTRAVENOUS at 22:05

## 2018-05-22 RX ADMIN — KETOROLAC TROMETHAMINE 30 MG: 30 INJECTION, SOLUTION INTRAMUSCULAR at 19:00

## 2018-05-22 RX ADMIN — PROMETHAZINE HYDROCHLORIDE 25 MG: 25 INJECTION INTRAMUSCULAR; INTRAVENOUS at 19:00

## 2018-05-22 RX ADMIN — SODIUM CHLORIDE 100 ML/HR: 0.9 INJECTION, SOLUTION INTRAVENOUS at 23:23

## 2018-05-22 RX ADMIN — PROMETHAZINE HYDROCHLORIDE 25 MG: 25 INJECTION INTRAMUSCULAR; INTRAVENOUS at 22:36

## 2018-05-22 RX ADMIN — SODIUM CHLORIDE 1000 ML: 0.9 INJECTION, SOLUTION INTRAVENOUS at 18:56

## 2018-05-22 RX ADMIN — INSULIN HUMAN 8 UNITS: 100 INJECTION, SOLUTION PARENTERAL at 21:21

## 2018-05-22 NOTE — ED PROVIDER NOTES
History  Chief Complaint   Patient presents with    Nausea     pt states for the past month she has been having on and off abd pain, was evaluated last wk and told she has gastroparesis and possible gallstones, has appt with GI next wk, N/V has been constant      Patient is a 56-year-old female  She has diabetes  She has been diagnosed with gastroparesis and gallstones  She is opiate dependent for chronic pain  She presents to the emergency room today complaining of nausea and vomiting  She has been seen in emergency rooms for the same in the past   She has had good success with Phenergan  She is on Zofran and Reglan at home  It has not helped  No diarrhea or constipation  No hematemesis, hematochezia or melena  The vomiting is not bilious  She has no urinary or vaginal complaints  No fever or chills  Currently symptoms are moderately severe  No relieving factors  Prior to Admission Medications   Prescriptions Last Dose Informant Patient Reported? Taking?    albuterol (ACCUNEB) 1 25 MG/3ML nebulizer solution  Self Yes No   Sig: Take 1 ampule by nebulization every 6 (six) hours as needed for wheezing   albuterol (PROVENTIL HFA,VENTOLIN HFA) 90 mcg/act inhaler  Self Yes No   Sig: Inhale 2 puffs every 6 (six) hours as needed for wheezing   carisoprodol (SOMA) 350 mg tablet  Self Yes No   Sig: Take 350 mg by mouth 2 (two) times a day   diazepam (VALIUM) 5 mg tablet  Self Yes No   Sig: Take 5 mg by mouth daily at bedtime as needed for anxiety   glimepiride (AMARYL) 4 mg tablet  Self Yes No   Sig: Take 4 mg by mouth 2 (two) times a day   insulin glargine (LANTUS) 100 units/mL subcutaneous injection  Self Yes No   Sig: Inject 50 Units under the skin daily at bedtime     morphine (MS CONTIN) 60 mg 12 hr tablet  Self Yes No   Sig: Take 75 mg by mouth 2 (two) times a day     omeprazole (PriLOSEC) 20 mg delayed release capsule  Self Yes No   Sig: Take 20 mg by mouth daily   ondansetron (ZOFRAN-ODT) 4 mg disintegrating tablet   No No   Sig: Take 1 tablet (4 mg total) by mouth every 6 (six) hours as needed for nausea or vomiting      Facility-Administered Medications: None       Past Medical History:   Diagnosis Date    Asthma     Chronic pain     Diabetes mellitus ([address] )     Gastroparesis        Past Surgical History:   Procedure Laterality Date    CERVICAL LAMINECTOMY      HYSTERECTOMY      JOINT REPLACEMENT Bilateral     knee    TOE AMPUTATION Right 2/2/2018    Procedure: AMPUTATION TOE, RIGHT GREAT TOE;  Surgeon: Jhon Donald DPM;  Location: [facility];  Service: Podiatry       Family History   Problem Relation Age of Onset    Diabetes Mother     Diabetes Maternal Grandmother      I have reviewed and agree with the history as documented  Social History   Substance Use Topics    Smoking status: Never Smoker    Smokeless tobacco: Never Used    Alcohol use No        Review of Systems   Constitutional: Negative for chills and fever  HENT: Negative for rhinorrhea and sore throat  Eyes: Negative for pain, redness and visual disturbance  Respiratory: Negative for cough and shortness of breath  Cardiovascular: Negative for chest pain and leg swelling  Gastrointestinal: Positive for abdominal pain, nausea and vomiting  Negative for diarrhea  Endocrine: Negative for polydipsia and polyuria  Genitourinary: Negative for dysuria, frequency, hematuria, vaginal bleeding and vaginal discharge  Musculoskeletal: Negative for back pain and neck pain  Skin: Negative for rash and wound  Allergic/Immunologic: Negative for immunocompromised state  Neurological: Negative for weakness, numbness and headaches  Hematological: Does not bruise/bleed easily  Psychiatric/Behavioral: Negative for hallucinations and suicidal ideas  All other systems reviewed and are negative  Physical Exam  Physical Exam   Constitutional: She is oriented to person, place, and time   She appears well-developed and well-nourished  No distress  Obese female  HENT:   Head: Normocephalic and atraumatic  Mouth/Throat: Oropharynx is clear and moist    Eyes: Conjunctivae are normal  Right eye exhibits no discharge  Left eye exhibits no discharge  No scleral icterus  Neck: Normal range of motion  Neck supple  Cardiovascular: Regular rhythm, normal heart sounds and intact distal pulses  Exam reveals no gallop and no friction rub  No murmur heard  Tachycardic rate  Pulmonary/Chest: Effort normal and breath sounds normal  No stridor  No respiratory distress  She has no wheezes  She has no rales  Abdominal: Soft  Bowel sounds are normal  She exhibits no distension  There is no tenderness  There is no rebound and no guarding  Musculoskeletal: Normal range of motion  She exhibits no edema, tenderness or deformity  No CVA tenderness  No calf tenderness  Neurological: She is alert and oriented to person, place, and time  She has normal strength  No sensory deficit  GCS eye subscore is 4  GCS verbal subscore is 5  GCS motor subscore is 6  Skin: Skin is warm and dry  No rash noted  She is not diaphoretic  Psychiatric: She has a normal mood and affect  Her behavior is normal    Vitals reviewed        Vital Signs  ED Triage Vitals [05/22/18 1831]   Temperature Pulse Respirations Blood Pressure SpO2   98 2 °F (36 8 °C) (!) 115 18 (!) 195/87 92 %      Temp Source Heart Rate Source Patient Position - Orthostatic VS BP Location FiO2 (%)   Oral Monitor Sitting Right arm --      Pain Score       9           Vitals:    05/23/18 0713 05/23/18 1536 05/23/18 2300 05/24/18 0700   BP: 128/75 144/62 124/68 128/74   Pulse: 104 83 104 85   Patient Position - Orthostatic VS: Lying Lying Sitting Lying       Visual Acuity      ED Medications  Medications   sodium chloride 0 9 % bolus 2,000 mL (1,000 mL Intravenous New Bag 5/22/18 1856)   promethazine (PHENERGAN) injection 25 mg (25 mg Intramuscular Given 5/22/18 1900)   ketorolac (TORADOL) injection 30 mg (30 mg Intravenous Given 5/22/18 1900)   insulin regular (HumuLIN R,NovoLIN R) injection 8 Units (8 Units Subcutaneous Given 5/22/18 2121)   cefTRIAXone (ROCEPHIN) IVPB (premix) 1,000 mg (0 mg Intravenous Stopped 5/22/18 2230)   metoclopramide (REGLAN) injection 10 mg (10 mg Intravenous Given 5/23/18 1237)       Diagnostic Studies  Results Reviewed     Procedure Component Value Units Date/Time    Urine culture [[id number]] Collected:  05/22/18 1856    Lab Status:  Final result Specimen:  Urine from Urine, Clean Catch Updated:  05/25/18 1243     Urine Culture >100,000 cfu/ml     Troponin I [[id number]]  (Normal) Collected:  05/22/18 1856    Lab Status:  Final result Specimen:  Blood from Arm, Right Updated:  05/22/18 1944     Troponin I <0 02 ng/mL     Narrative:         [employer] Chemistry analyzer 99% cutoff is > 0 04 ng/mL in network labs    o cTnI 99% cutoff is useful only when applied to patients in the clinical setting of myocardial ischemia  o cTnI 99% cutoff should be interpreted in the context of clinical history, ECG findings and possibly cardiac imaging to establish correct diagnosis  o cTnI 99% cutoff may be suggestive but clearly not indicative of a coronary event without the clinical setting of myocardial ischemia      Comprehensive metabolic panel [[id number]]  (Abnormal) Collected:  05/22/18 1856    Lab Status:  Final result Specimen:  Blood from Arm, Right Updated:  05/22/18 1923     Sodium 133 (L) mmol/L      Potassium 3 7 mmol/L      Chloride 96 (L) mmol/L      CO2 27 mmol/L      Anion Gap 10 mmol/L      BUN 18 mg/dL      Creatinine 0 88 mg/dL      Glucose 375 (H) mg/dL      Calcium 9 5 mg/dL      AST 12 U/L      ALT 22 U/L      Alkaline Phosphatase 122 (H) U/L      Total Protein 8 2 g/dL      Albumin 3 7 g/dL      Total Bilirubin 1 60 (H) mg/dL      eGFR 75 ml/min/1 73sq m     Narrative:         National Kidney Disease Education Program recommendations are as follows:  GFR calculation is accurate only with a steady state creatinine  Chronic Kidney disease less than 60 ml/min/1 73 sq  meters  Kidney failure less than 15 ml/min/1 73 sq  meters      Lipase [[id number]]  (Normal) Collected:  05/22/18 1856    Lab Status:  Final result Specimen:  Blood from Arm, Right Updated:  05/22/18 1923     Lipase 102 u/L     Urine Microscopic [[id number]]  (Abnormal) Collected:  05/22/18 1856    Lab Status:  Final result Specimen:  Urine from Urine, Clean Catch Updated:  05/22/18 1922     RBC, UA 2-4 (A) /hpf      WBC, UA 4-10 (A) /hpf      Epithelial Cells Innumerable (A) /hpf      Bacteria, UA Innumerable (A) /hpf      Hyaline Casts, UA 2-4 (A) /lpf      MUCOUS THREADS Occasional (A)    Acetone [[id number]]  (Normal) Collected:  05/22/18 1856    Lab Status:  Final result Specimen:  Blood from Arm, Right Updated:  05/22/18 1917     Acetone, Bld Negative    UA w Reflex to Microscopic [[id number]]  (Abnormal) Collected:  05/22/18 1856    Lab Status:  Final result Specimen:  Urine from Urine, Clean Catch Updated:  05/22/18 1907     Color, UA Yellow     Clarity, UA Slightly Cloudy     Specific Gravity, UA >=1 030     pH, UA 5 5     Leukocytes, UA Small (A)     Nitrite, UA Negative     Protein,  (2+) (A) mg/dl      Glucose,  (1/2%) (A) mg/dl      Ketones, UA 15 (1+) (A) mg/dl      Urobilinogen, UA 0 2 E U /dl      Bilirubin, UA Small (A)     Blood, UA Large (A)    CBC and differential [[id number]]  (Abnormal) Collected:  05/22/18 1856    Lab Status:  Final result Specimen:  Blood from Arm, Right Updated:  05/22/18 1905     WBC 14 53 (H) Thousand/uL      RBC 5 14 (H) Million/uL      Hemoglobin 13 8 g/dL      Hematocrit 42 1 %      MCV 82 fL      MCH 26 8 pg      MCHC 32 8 g/dL      RDW 13 2 %      MPV 12 9 (H) fL      Platelets 464 Thousands/uL      nRBC 0 /100 WBCs      Neutrophils Relative 82 (H) %      Immat GRANS % 0 %      Lymphocytes Relative 11 (L) %      Monocytes Relative 5 %      Eosinophils Relative 1 %      Basophils Relative 1 %      Neutrophils Absolute 11 81 (H) Thousands/µL      Immature Grans Absolute 0 06 Thousand/uL      Lymphocytes Absolute 1 61 Thousands/µL      Monocytes Absolute 0 79 Thousand/µL      Eosinophils Absolute 0 15 Thousand/µL      Basophils Absolute 0 11 (H) Thousands/µL                  US gallbladder   Final Result by Hans Renee MD (05/22 2002)         1  Punctate gallstones in the gallbladder fundus  No evidence of acute cholecystitis or biliary obstruction  2   Hepatomegaly  Workstation performed: [id number]                    Procedures  Procedures       Phone Contacts  ED Phone Contact    ED Course                               MDM  Number of Diagnoses or Management Options  Diagnosis management comments: After ED treatment patient continues to be very nauseated  She really isn't keeping down p  o   She is hyperglycemic but not in DKA  IV fluids and insulin ordered  White blood cell count is elevated  It does appear patient has a UTI  This could explain or leukocytosis  We did ultrasound her gallbladder  There was no evidence of cholecystitis  The common bile duct was not dilated  There is no right lower quadrant pain on exam to suggest appendicitis  Consulted with hospitalist for admission         Amount and/or Complexity of Data Reviewed  Clinical lab tests: ordered and reviewed  Tests in the radiology section of CPT®: ordered and reviewed  Discuss the patient with other providers: yes  Independent visualization of images, tracings, or specimens: yes      CritCare Time    Disposition  Final diagnoses:   Nausea and vomiting   Hyperglycemia   Urinary tract infection   Gallstones     Time reflects when diagnosis was documented in both MDM as applicable and the Disposition within this note     Time User Action Codes Description Comment    5/22/2018  9:26 PM oYvani Suarez Add [R11 2] Nausea and vomiting     5/22/2018  9:26 PM Yovani Suarez Add [R73 9] Hyperglycemia     5/22/2018  9:26 PM Darling Adas Add [N39 0] Urinary tract infection     5/22/2018  9:26 PM Darling Adas Add [K80 20] Gallstones     5/24/2018 12:52 PM Tami Cristina Add [K31 84] Gastroparesis     5/24/2018 12:52 PM Tami Cristina Add [N39 0] UTI (urinary tract infection)       ED Disposition     ED Disposition Condition Comment    Admit          Follow-up Information     Follow up With Specialties Details Why Contact Info    Lior Underwood MD General Surgery Schedule an appointment as soon as possible for a visit in 2 day([phone]  [phone]      Deveron Jackson  Call in 1 week(s)  [address]  [phone]            Discharge Medication List as of 5/24/2018  3:46 PM      START taking these medications    Details   ciprofloxacin (CIPRO) 500 mg tablet Take 1 tablet (500 mg total) by mouth 2 (two) times a day for 8 days, Starting Thu 5/24/2018, Until Fri 6/1/2018, Print      metoclopramide (REGLAN) 10 mg tablet Take 1 tablet (10 mg total) by mouth 4 (four) times a day (before meals and at bedtime), Starting u 5/24/2018, Print         CONTINUE these medications which have NOT CHANGED    Details   albuterol (ACCUNEB) 1 25 MG/3ML nebulizer solution Take 1 ampule by nebulization every 6 (six) hours as needed for wheezing, Historical Med      albuterol (PROVENTIL HFA,VENTOLIN HFA) 90 mcg/act inhaler Inhale 2 puffs every 6 (six) hours as needed for wheezing, Historical Med      carisoprodol (SOMA) 350 mg tablet Take 350 mg by mouth 2 (two) times a day, Historical Med      diazepam (VALIUM) 5 mg tablet Take 5 mg by mouth daily at bedtime as needed for anxiety, Historical Med      glimepiride (AMARYL) 4 mg tablet Take 4 mg by mouth 2 (two) times a day, Historical Med      insulin glargine (LANTUS) 100 units/mL subcutaneous injection Inject 50 Units under the skin daily at bedtime  , Historical Med      morphine (MS CONTIN) 60 mg 12 hr tablet Take 75 mg by mouth 2 (two) times a day  , Historical Med      omeprazole (PriLOSEC) 20 mg delayed release capsule Take 20 mg by mouth daily, Historical Med      ondansetron (ZOFRAN-ODT) 4 mg disintegrating tablet Take 1 tablet (4 mg total) by mouth every 6 (six) hours as needed for nausea or vomiting, Starting Wed 4/25/2018, Normal           No discharge procedures on file      ED Provider  Electronically Signed by           Tami Morales MD  05/25/18 7134

## 2018-05-23 LAB
ALBUMIN SERPL BCP-MCNC: 3 G/DL (ref 3.5–5)
ALP SERPL-CCNC: 104 U/L (ref 46–116)
ALT SERPL W P-5'-P-CCNC: 19 U/L (ref 12–78)
ANION GAP SERPL CALCULATED.3IONS-SCNC: 8 MMOL/L (ref 4–13)
AST SERPL W P-5'-P-CCNC: 11 U/L (ref 5–45)
ATRIAL RATE: 101 BPM
ATRIAL RATE: 98 BPM
BASOPHILS # BLD AUTO: 0.09 THOUSANDS/ΜL (ref 0–0.1)
BASOPHILS NFR BLD AUTO: 1 % (ref 0–1)
BILIRUB DIRECT SERPL-MCNC: 0.21 MG/DL (ref 0–0.2)
BILIRUB SERPL-MCNC: 1.3 MG/DL (ref 0.2–1)
BUN SERPL-MCNC: 17 MG/DL (ref 5–25)
CALCIUM SERPL-MCNC: 8.9 MG/DL (ref 8.3–10.1)
CHLORIDE SERPL-SCNC: 102 MMOL/L (ref 100–108)
CO2 SERPL-SCNC: 25 MMOL/L (ref 21–32)
CREAT SERPL-MCNC: 0.74 MG/DL (ref 0.6–1.3)
EOSINOPHIL # BLD AUTO: 0.22 THOUSAND/ΜL (ref 0–0.61)
EOSINOPHIL NFR BLD AUTO: 2 % (ref 0–6)
ERYTHROCYTE [DISTWIDTH] IN BLOOD BY AUTOMATED COUNT: 13 % (ref 11.6–15.1)
EST. AVERAGE GLUCOSE BLD GHB EST-MCNC: 258 MG/DL
GFR SERPL CREATININE-BSD FRML MDRD: 93 ML/MIN/1.73SQ M
GLUCOSE P FAST SERPL-MCNC: 215 MG/DL (ref 65–99)
GLUCOSE SERPL-MCNC: 174 MG/DL (ref 65–140)
GLUCOSE SERPL-MCNC: 176 MG/DL (ref 65–140)
GLUCOSE SERPL-MCNC: 215 MG/DL (ref 65–140)
GLUCOSE SERPL-MCNC: 215 MG/DL (ref 65–140)
GLUCOSE SERPL-MCNC: 96 MG/DL (ref 65–140)
HBA1C MFR BLD: 10.6 % (ref 4.2–6.3)
HCT VFR BLD AUTO: 38.7 % (ref 34.8–46.1)
HGB BLD-MCNC: 12.5 G/DL (ref 11.5–15.4)
IMM GRANULOCYTES # BLD AUTO: 0.03 THOUSAND/UL (ref 0–0.2)
IMM GRANULOCYTES NFR BLD AUTO: 0 % (ref 0–2)
LYMPHOCYTES # BLD AUTO: 2.52 THOUSANDS/ΜL (ref 0.6–4.47)
LYMPHOCYTES NFR BLD AUTO: 24 % (ref 14–44)
MCH RBC QN AUTO: 26.6 PG (ref 26.8–34.3)
MCHC RBC AUTO-ENTMCNC: 32.3 G/DL (ref 31.4–37.4)
MCV RBC AUTO: 82 FL (ref 82–98)
MONOCYTES # BLD AUTO: 0.93 THOUSAND/ΜL (ref 0.17–1.22)
MONOCYTES NFR BLD AUTO: 9 % (ref 4–12)
NEUTROPHILS # BLD AUTO: 6.9 THOUSANDS/ΜL (ref 1.85–7.62)
NEUTS SEG NFR BLD AUTO: 64 % (ref 43–75)
NRBC BLD AUTO-RTO: 0 /100 WBCS
P AXIS: 69 DEGREES
P AXIS: 70 DEGREES
PLATELET # BLD AUTO: 229 THOUSANDS/UL (ref 149–390)
PMV BLD AUTO: 13 FL (ref 8.9–12.7)
POTASSIUM SERPL-SCNC: 3.6 MMOL/L (ref 3.5–5.3)
PR INTERVAL: 136 MS
PR INTERVAL: 154 MS
PROT SERPL-MCNC: 7.1 G/DL (ref 6.4–8.2)
QRS AXIS: 44 DEGREES
QRS AXIS: 50 DEGREES
QRSD INTERVAL: 62 MS
QRSD INTERVAL: 80 MS
QT INTERVAL: 344 MS
QT INTERVAL: 362 MS
QTC INTERVAL: 439 MS
QTC INTERVAL: 469 MS
RBC # BLD AUTO: 4.7 MILLION/UL (ref 3.81–5.12)
SODIUM SERPL-SCNC: 135 MMOL/L (ref 136–145)
T WAVE AXIS: 59 DEGREES
T WAVE AXIS: 69 DEGREES
VENTRICULAR RATE: 101 BPM
VENTRICULAR RATE: 98 BPM
WBC # BLD AUTO: 10.69 THOUSAND/UL (ref 4.31–10.16)

## 2018-05-23 PROCEDURE — 83036 HEMOGLOBIN GLYCOSYLATED A1C: CPT | Performed by: PHYSICIAN ASSISTANT

## 2018-05-23 PROCEDURE — 80076 HEPATIC FUNCTION PANEL: CPT | Performed by: PHYSICIAN ASSISTANT

## 2018-05-23 PROCEDURE — 93010 ELECTROCARDIOGRAM REPORT: CPT | Performed by: INTERNAL MEDICINE

## 2018-05-23 PROCEDURE — 99223 1ST HOSP IP/OBS HIGH 75: CPT | Performed by: INTERNAL MEDICINE

## 2018-05-23 PROCEDURE — 99222 1ST HOSP IP/OBS MODERATE 55: CPT | Performed by: INTERNAL MEDICINE

## 2018-05-23 PROCEDURE — 85025 COMPLETE CBC W/AUTO DIFF WBC: CPT | Performed by: PHYSICIAN ASSISTANT

## 2018-05-23 PROCEDURE — 80048 BASIC METABOLIC PNL TOTAL CA: CPT | Performed by: PHYSICIAN ASSISTANT

## 2018-05-23 PROCEDURE — 82948 REAGENT STRIP/BLOOD GLUCOSE: CPT

## 2018-05-23 PROCEDURE — 99222 1ST HOSP IP/OBS MODERATE 55: CPT | Performed by: PHYSICIAN ASSISTANT

## 2018-05-23 RX ORDER — PANTOPRAZOLE SODIUM 40 MG/1
40 TABLET, DELAYED RELEASE ORAL
Status: DISCONTINUED | OUTPATIENT
Start: 2018-05-24 | End: 2018-05-24 | Stop reason: HOSPADM

## 2018-05-23 RX ORDER — METOCLOPRAMIDE HYDROCHLORIDE 5 MG/ML
10 INJECTION INTRAMUSCULAR; INTRAVENOUS ONCE
Status: COMPLETED | OUTPATIENT
Start: 2018-05-23 | End: 2018-05-23

## 2018-05-23 RX ORDER — METOCLOPRAMIDE HYDROCHLORIDE 5 MG/ML
5 INJECTION INTRAMUSCULAR; INTRAVENOUS EVERY 6 HOURS SCHEDULED
Status: DISCONTINUED | OUTPATIENT
Start: 2018-05-23 | End: 2018-05-24

## 2018-05-23 RX ADMIN — SODIUM CHLORIDE 100 ML/HR: 0.9 INJECTION, SOLUTION INTRAVENOUS at 09:21

## 2018-05-23 RX ADMIN — CARISOPRODOL 350 MG: 350 TABLET ORAL at 18:44

## 2018-05-23 RX ADMIN — CARISOPRODOL 350 MG: 350 TABLET ORAL at 09:21

## 2018-05-23 RX ADMIN — METOCLOPRAMIDE 10 MG: 5 INJECTION, SOLUTION INTRAMUSCULAR; INTRAVENOUS at 12:37

## 2018-05-23 RX ADMIN — INSULIN LISPRO 2 UNITS: 100 INJECTION, SOLUTION INTRAVENOUS; SUBCUTANEOUS at 18:45

## 2018-05-23 RX ADMIN — DIAZEPAM 5 MG: 5 TABLET ORAL at 03:53

## 2018-05-23 RX ADMIN — INSULIN GLARGINE 50 UNITS: 100 INJECTION, SOLUTION SUBCUTANEOUS at 22:17

## 2018-05-23 RX ADMIN — FLUTICASONE PROPIONATE AND SALMETEROL 1 PUFF: 50; 250 POWDER RESPIRATORY (INHALATION) at 08:56

## 2018-05-23 RX ADMIN — MORPHINE SULFATE 15 MG: 15 TABLET ORAL at 12:47

## 2018-05-23 RX ADMIN — PROMETHAZINE HYDROCHLORIDE 25 MG: 25 INJECTION INTRAMUSCULAR; INTRAVENOUS at 09:22

## 2018-05-23 RX ADMIN — MORPHINE SULFATE 75 MG: 30 TABLET, EXTENDED RELEASE ORAL at 08:55

## 2018-05-23 RX ADMIN — INSULIN LISPRO 6 UNITS: 100 INJECTION, SOLUTION INTRAVENOUS; SUBCUTANEOUS at 01:14

## 2018-05-23 RX ADMIN — INSULIN LISPRO 4 UNITS: 100 INJECTION, SOLUTION INTRAVENOUS; SUBCUTANEOUS at 12:38

## 2018-05-23 RX ADMIN — METOCLOPRAMIDE HYDROCHLORIDE 5 MG: 5 INJECTION INTRAMUSCULAR; INTRAVENOUS at 18:42

## 2018-05-23 RX ADMIN — FLUTICASONE PROPIONATE AND SALMETEROL 1 PUFF: 50; 250 POWDER RESPIRATORY (INHALATION) at 22:17

## 2018-05-23 RX ADMIN — MORPHINE SULFATE 75 MG: 30 TABLET, EXTENDED RELEASE ORAL at 18:43

## 2018-05-23 RX ADMIN — METOCLOPRAMIDE HYDROCHLORIDE 5 MG: 5 INJECTION INTRAMUSCULAR; INTRAVENOUS at 13:47

## 2018-05-23 RX ADMIN — PANTOPRAZOLE SODIUM 20 MG: 20 TABLET, DELAYED RELEASE ORAL at 05:37

## 2018-05-23 RX ADMIN — CEFTRIAXONE 1000 MG: 1 INJECTION, SOLUTION INTRAVENOUS at 22:17

## 2018-05-23 RX ADMIN — INSULIN GLARGINE 50 UNITS: 100 INJECTION, SOLUTION SUBCUTANEOUS at 01:14

## 2018-05-23 RX ADMIN — ENOXAPARIN SODIUM 40 MG: 40 INJECTION SUBCUTANEOUS at 08:55

## 2018-05-23 NOTE — PLAN OF CARE
DISCHARGE PLANNING     Discharge to home or other facility with appropriate resources Progressing        GASTROINTESTINAL - ADULT     Minimal or absence of nausea and/or vomiting Progressing     Maintains adequate nutritional intake Progressing        INFECTION - ADULT     Absence or prevention of progression during hospitalization Progressing        Knowledge Deficit     Patient/family/caregiver demonstrates understanding of disease process, treatment plan, medications, and discharge instructions Progressing        PAIN - ADULT     Verbalizes/displays adequate comfort level or baseline comfort level Progressing        SAFETY ADULT     Patient will remain free of falls Progressing     Maintain or return to baseline ADL function Progressing     Maintain or return mobility status to optimal level Progressing

## 2018-05-23 NOTE — SOCIAL WORK
Problem: Goal Outcome Summary  Goal: Goal Outcome Summary  OT 6B Pt non verbal, followed no commands, turned head away from oral care and did take toothbrush into hand but set it down using R hand. L UE extensor tone movement noted only, no intentional movement.  NWB on LLE 2/2 ankle fx. D lift transfers  Rec TCU to maximize IND with ADLs and functional transfers       Spoke with patient in her room and she states that she lives with her family in an aspartment with a full flight of stairs which she uses without difficulty  She is independent with ADL's and ambulation  She does have a walker and a cane which she uses on occasion  She has used [facility] [place] in the past  She purchases her meds at [facility] in [place] and is able to afford all of her medications  There is no history of mental illness nor substance abuse  Pt does drive and  does drive and will transport her home at [place]  CM reviewed discharge planning process including the following: identifying help at home, patient preference for discharge planning needs, pharmacy preference, and availability of treatment team to discuss questions or concerns patient and/or family may have regarding understanding medications and recognizing signs and symptoms once discharged  CM also encouraged patient to follow up with all recommended appointments after discharge  Patient advised of importance for patient and family to participate in managing patients medical well being  CM name and role reviewed and Discharge Checklist provided  Encouraged patient and caregiver to review prior to discharge

## 2018-05-23 NOTE — H&P
H&P- Gaston Felipe 1965, 47 y o  female MRN: [id number]    Unit/Bed#: -01 Encounter: [id number]    Primary Care Provider: Negrita Deluna   Date and time admitted to hospital: 5/22/2018  6:28 PM        Nausea   Assessment & Plan    IV fluids  GI consult  Clear liquid diet  Anti emetics        * UTI (urinary tract infection)   Assessment & Plan    Admit   Rocephin IV  IV fluids        Opioid dependence ([facility] [address] )   Assessment & Plan    Continue home medication regimen        Diabetes mellitus (HCC)   Assessment & Plan    Hemoglobin A1c pending  Insulin sliding scale  Continue Lantus        Asthma   Assessment & Plan    Continue Advair and albuterol          VTE Prophylaxis: Enoxaparin (Lovenox)  / sequential compression device   Code Status:  Full  POLST: There is no POLST form on file for this patient (pre-hospital)  Discussion with family:  There is no family present for evaluation  Anticipated Length of Stay:  Patient will be admitted on an Observation basis with an anticipated length of stay of less than   2 midnights  Justification for Hospital Stay:  IV fluids, IV antibiotics    Total Time for Visit, including Counseling / Coordination of Care: 30 minutes  Greater than 50% of this total time spent on direct patient counseling and coordination of care  Chief Complaint:   Nausea    History of Present Illness:    Gaston Felipe is a 47 y o  female who presents with complaints of nausea and vomiting that has been present since Sunday  Patient denies diarrhea, fever, chills, chest pain, shortness of breath  Patient denies difficulty urinating  Patient has history of gastroparesis  Review of Systems:    Review of Systems   Gastrointestinal: Positive for nausea and vomiting  All other systems reviewed and are negative        Past Medical and Surgical History:     Past Medical History:   Diagnosis Date    Asthma     Chronic pain     Diabetes mellitus ([facility] [address] )     Gastroparesis        Past Surgical History:   Procedure Laterality Date    CERVICAL LAMINECTOMY      HYSTERECTOMY      JOINT REPLACEMENT Bilateral     knee    TOE AMPUTATION Right 2/2/2018    Procedure: AMPUTATION TOE, RIGHT GREAT TOE;  Surgeon: Fiorella Enriquez DPM;  Location: [facility] OR;  Service: Podiatry       Meds/Allergies:    Prior to Admission medications    Medication Sig Start Date End Date Taking? Authorizing Provider   albuterol (ACCUNEB) 1 25 MG/3ML nebulizer solution Take 1 ampule by nebulization every 6 (six) hours as needed for wheezing    Historical Provider, MD   albuterol (PROVENTIL HFA,VENTOLIN HFA) 90 mcg/act inhaler Inhale 2 puffs every 6 (six) hours as needed for wheezing    Historical Provider, MD   carisoprodol (SOMA) 350 mg tablet Take 350 mg by mouth 2 (two) times a day    Historical Provider, MD   diazepam (VALIUM) 5 mg tablet Take 5 mg by mouth daily at bedtime as needed for anxiety    Historical Provider, MD   glimepiride (AMARYL) 4 mg tablet Take 4 mg by mouth 2 (two) times a day    Historical Provider, MD   insulin glargine (LANTUS) 100 units/mL subcutaneous injection Inject 50 Units under the skin daily at bedtime      Historical Provider, MD   morphine (MS CONTIN) 60 mg 12 hr tablet Take 75 mg by mouth 2 (two) times a day      Historical Provider, MD   omeprazole (PriLOSEC) 20 mg delayed release capsule Take 20 mg by mouth daily    Historical Provider, MD   ondansetron (ZOFRAN-ODT) 4 mg disintegrating tablet Take 1 tablet (4 mg total) by mouth every 6 (six) hours as needed for nausea or vomiting 4/25/18   Rosena Hatchet, MD     I have reviewed home medications with patient personally  Allergies:    Allergies   Allergen Reactions    Nsaids GI Intolerance       Social History:     Marital Status: /Civil Union   Occupation:  Unemployed  Patient Pre-hospital Living Situation:  Lives at home  Patient Pre-hospital Level of Mobility:  Ambulatory  Patient Pre-hospital Diet Restrictions:  Diabetic  Substance Use History:   History   Alcohol Use No     History   Smoking Status    Never Smoker   Smokeless Tobacco    Never Used     History   Drug Use No       Family History:    Family History   Problem Relation Age of Onset    Diabetes Mother     Diabetes Maternal Grandmother        Physical Exam:     Vitals:   Blood Pressure: 159/75 (05/22/18 2315)  Pulse: 96 (05/22/18 2315)  Temperature: 98 4 °F (36 9 °C) (05/22/18 2315)  Temp Source: Oral (05/22/18 2315)  Respirations: 18 (05/22/18 2315)  Height: 5' 8" (172 7 cm) (05/22/18 2315)  Weight - Scale: 132 kg (291 lb 0 1 oz) (05/22/18 2315)  SpO2: 94 % (05/22/18 2315)    Physical Exam   Constitutional: She is oriented to person, place, and time  She appears well-developed and well-nourished  HENT:   Head: Normocephalic and atraumatic  Right Ear: External ear normal    Left Ear: External ear normal    Nose: Nose normal    Mouth/Throat: Oropharynx is clear and moist    Eyes: Conjunctivae and EOM are normal  Pupils are equal, round, and reactive to light  Neck: Normal range of motion  Cardiovascular: Normal rate, regular rhythm and normal heart sounds  Pulmonary/Chest: Effort normal and breath sounds normal    Abdominal: Soft  Bowel sounds are normal    Musculoskeletal: Normal range of motion  Neurological: She is alert and oriented to person, place, and time  Skin: Skin is warm and dry  Psychiatric: She has a normal mood and affect  Her behavior is normal  Judgment and thought content normal    Vitals reviewed  Additional Data:     Lab Results: I have personally reviewed pertinent reports          Results from last 7 days  Lab Units 05/22/18  1856   WBC Thousand/uL 14 53*   HEMOGLOBIN g/dL 13 8   HEMATOCRIT % 42 1   PLATELETS Thousands/uL 258   NEUTROS PCT % 82*   LYMPHS PCT % 11*   MONOS PCT % 5   EOS PCT % 1       Results from last 7 days  Lab Units 05/22/18  1856   SODIUM mmol/L 133*   POTASSIUM mmol/L 3 7   CHLORIDE mmol/L 96*   CO2 mmol/L 27   BUN mg/dL 18   CREATININE mg/dL 0 88   CALCIUM mg/dL 9 5   TOTAL PROTEIN g/dL 8 2   BILIRUBIN TOTAL mg/dL 1 60*   ALK PHOS U/L 122*   ALT U/L 22   AST U/L 12   GLUCOSE RANDOM mg/dL 375*           Results from last 7 days  Lab Units 05/22/18  2325   POC GLUCOSE mg/dl 282*           Imaging: I have personally reviewed pertinent reports  US gallbladder   Final Result by Sánchez Gilbert MD (05/22 2002)         1  Punctate gallstones in the gallbladder fundus  No evidence of acute cholecystitis or biliary obstruction  2   Hepatomegaly  Workstation performed: [id number]             EKG, Pathology, and Other Studies Reviewed on Admission:   · EKG:  NSR    Allscripts / Epic Records Reviewed: Yes     ** Please Note: This note has been constructed using a voice recognition system   **

## 2018-05-23 NOTE — CASE MANAGEMENT
Initial Clinical Review    Admission: Date/Time/Statement: OBS    5/22 2130 converted to IP on 5/23 @ 1318    Admitting Physician Pamela Henley    Level of Care Med Surg    Estimated length of stay More than 2 Midnights    Certification I certify that inpatient services are medically necessary for this patient for a duration of greater than two midnights  See H&P and MD Progress Notes for additional information about the patient's course of treatment  ED: Date/Time/Mode of Arrival:   ED Arrival Information     Expected Arrival Acuity Means of Arrival Escorted By Service Admission Type    - 5/22/2018 18:10 Urgent Walk-In Family Member General Medicine Urgent    Arrival Complaint    nausea          Chief Complaint:   Chief Complaint   Patient presents with    Nausea     pt states for the past month she has been having on and off abd pain, was evaluated last wk and told she has gastroparesis and possible gallstones, has appt with GI next wk, N/V has been constant        History of Illness: Bishop Eastman is a 47 y o  female who presents with complaints of nausea and vomiting that has been present since Sunday  Patient denies diarrhea, fever, chills, chest pain, shortness of breath  Patient denies difficulty urinating  Patient has history of gastroparesis    ED Vital Signs:   ED Triage Vitals [05/22/18 1831]   Temperature Pulse Respirations Blood Pressure SpO2   98 2 °F (36 8 °C) (!) 115 18 (!) 195/87 92 %      Temp Source Heart Rate Source Patient Position - Orthostatic VS BP Location FiO2 (%)   Oral Monitor Sitting Right arm --      Pain Score       9        Wt Readings from Last 1 Encounters:   05/22/18 132 kg (291 lb 0 1 oz)       Vital Signs (abnormal): wnl    Abnormal Labs/Diagnostic Test Results: Na   133, cl 96, gluc 375, alk phos  122, total bili 1 60, wbc 14 53  US GB -       Punctate gallstones in the gallbladder fundus   No evidence of acute cholecystitis or biliary obstruction    2   Hepatomegaly          ED Treatment:   Medication Administration from 05/22/2018 1810 to 05/22/2018 2305       Date/Time Order Dose Route Action Action by Comments     05/22/2018 1856 sodium chloride 0 9 % bolus 2,000 mL 1,000 mL Intravenous New Bag Guerda Kay, [facility]      05/22/2018 1900 promethazine (PHENERGAN) injection 25 mg 25 mg Intramuscular Given Guerda Kay RN      05/22/2018 1900 ketorolac (TORADOL) injection 30 mg 30 mg Intravenous Given Guerda Kay RN      05/22/2018 2121 insulin regular (HumuLIN R,NovoLIN R) injection 8 Units 8 Units Subcutaneous Given Roque Siemens, RN      05/22/2018 2230 cefTRIAXone (ROCEPHIN) IVPB (premix) 1,000 mg 0 mg Intravenous Stopped Roque Siemens, RN      05/22/2018 2205 cefTRIAXone (ROCEPHIN) IVPB (premix) 1,000 mg 1,000 mg Intravenous [address] Roque Siemens, RN      05/22/2018 2236 promethazine (PHENERGAN) injection 25 mg 25 mg Intramuscular Given Roque Siemens, RN           Past Medical/Surgical History:    Active Ambulatory Problems     Diagnosis Date Noted    Diabetic ulcer of right great toe ([facility] ) 02/02/2018    Acute osteomyelitis of toe, right ([facility] ) 02/02/2018    Diabetes mellitus ([facility] ) 02/02/2018    Asthma 02/02/2018    Chronic pain 02/02/2018    Gastroparesis 02/02/2018    Hyponatremia 02/02/2018    Opioid dependence ([facility] ) 02/05/2018    De Quervain's tenosynovitis, bilateral 10/03/2017    Knee pain 11/21/2017     Resolved Ambulatory Problems     Diagnosis Date Noted    Hypokalemia 02/02/2018     Past Medical History:   Diagnosis Date    Asthma     Chronic pain     Diabetes mellitus ([facility] )     Gastroparesis        Admitting Diagnosis: Nausea [R11 0]  Urinary tract infection [N39 0]  Nausea and vomiting [R11 2]  Gallstones [K80 20]  Hyperglycemia [R73 9]    Age/Sex: 47 y o  female    Assessment/Plan:   Nausea   Assessment & Plan     IV fluids  GI consult  Clear liquid diet  Anti emetics        * UTI (urinary tract infection) Assessment & Plan     Admit   Rocephin IV  IV fluids        Opioid dependence (HCC)   Assessment & Plan     Continue home medication regimen        Diabetes mellitus ([facility] )   Assessment & Plan     Hemoglobin A1c pending  Insulin sliding scale  Continue Lantus        Asthma   Assessment & Plan     Continue Advair and albuterol         VTE Prophylaxis: Enoxaparin (Lovenox)  / sequential compression device   Code Status:  Full  POLST: There is no POLST form on file for this patient (pre-hospital)  Discussion with family:  There is no family present for evaluation    Anticipated Length of Stay:  Patient will be admitted on an Observation basis with an anticipated length of stay of less than   2 midnights     Justification for Hospital Stay:  IV fluids, IV antibiotics      Admission Orders:  Scheduled Meds:   Current Facility-Administered Medications:  acetaminophen 650 mg Oral Q6H PRN Verlin Carin, PA-C    albuterol 2 puff Inhalation Q6H PRN Verlin Carin, PA-C    carisoprodol 350 mg Oral BID Verlin Carin, PA-C    cefTRIAXone 1,000 mg Intravenous Q24H Verlin Carin, PA-C    diazepam 5 mg Oral HS PRN Verlin Acrin, PA-C    enoxaparin 40 mg Subcutaneous Daily Verlin Carin, PA-C    fluticasone-salmeterol 1 puff Inhalation Q12H [address] Verlin Carin, PA-C    insulin glargine 50 Units Subcutaneous HS Verlin Carin, PA-C    insulin lispro 2-12 Units Subcutaneous TID AC Verlin Carin, PA-C    insulin lispro 2-12 Units Subcutaneous HS Verlin Carin, PA-C    morphine 75 mg Oral BID Verlin Carin, PA-C    morphine 15 mg Oral Q8H PRN Verlin Carin, PA-C    pantoprazole 20 mg Oral Early Morning Verlin Carin, PA-C    promethazine 25 mg Intramuscular Q6H PRN Verlin Carin, PA-C    sodium chloride 100 mL/hr Intravenous Continuous Verlin Carin, PA-C Last Rate: 100 mL/hr (05/22/18 2323)     Continuous Infusions:   sodium chloride 100 mL/hr Last Rate: 100 mL/hr (05/22/18 2323)     PRN Meds: Loretta Mckeon acetaminophen    albuterol    diazepam    morphine    Promethazine  q6h prn x1    Fingerstick ac and hs   SCD  Clear liq diet   5/23  Cbc , bmp, hgb a1c   Na   135, gluc   215, wbc  10 69, gluc  176  GI and acute surgical consult     General surgical consult 5/23  Cholelithiasis - ultrasound showing punctate gallstones without evidence of acute cholecystitis; CT scan done in [place], per patient report also showed gallstones  Nausea and vomiting  Diabetes mellitus type 2  Gastroparesis   Plan  The patient will likely need a cholecystectomy at some point  Will await GI evaluation to proceed  If not done at this stay patient may follow up as an outpatient with Dr Aaron Fonseca      GI consult  5/23  Nausea  - This could be 2/2 gastroparesis as she has poor glucose control evidenced by an A1C of 10 6 currently, previously 8 5 in February  - [facility] on admission shows gallstones in the fundus without cholecystitis or biliary obstruction  - UA borderline with urine culture pending; on CTX for presumed UTI  - LFTs are relatively normal except for a mildly elevated Tbili which is mostly indirect  - Agree with surgical consultation though I suspect her symptoms are related more so to gastroparesis v UTI  - Agree with starting reglan, can use zofran PRN as well  - Supportive care  - Small meals low in fat   History of Gastroparesis  - Not previously on treatment  - Dx 5 years ago on GES at [address] as above  - Glucose control  - Small meals low in fat

## 2018-05-23 NOTE — PLAN OF CARE
Problem: DISCHARGE PLANNING  Goal: Discharge to home or other facility with appropriate resources  INTERVENTIONS:  - Identify barriers to discharge w/patient and caregiver  - Arrange for needed discharge resources and transportation as appropriate  - Identify discharge learning needs (meds, wound care, etc )  - Arrange for interpretive services to assist at discharge as needed  - Refer to Case Management Department for coordinating discharge planning if the patient needs post-hospital services based on physician/advanced practitioner order or complex needs related to functional status, cognitive ability, or social support system   Outcome: Progressing  Spoke with patient in her room and she states that she lives with her family in an aspart[place] with a full flight of stairs which she uses without difficulty  She is independent with ADL's and ambulation  She does have a walker and a cane which she uses on occasion  She has used [facility] in the past  She purchases her meds at [facility] in [place] and is able to afford all of her medications  There is no history of mental illness nor substance abuse   Pt does drive and  does drive and will transport her home at [place]

## 2018-05-23 NOTE — CONSULTS
Consultation - General Surgery  Bishop Eastman 47 y o  female MRN: [id number]  Unit/Bed#: -01 Encounter: [id number]                                                  Inpatient consult to Acute Care Surgery  Consult performed by: Yenifer Dunlap  Consult ordered by: Pamela Henley        Assessment/Plan   Cholelithiasis - ultrasound showing punctate gallstones without evidence of acute cholecystitis; CT scan done in [place], per patient report also showed gallstones  Nausea and vomiting  Diabetes mellitus type 2  Gastroparesis    Plan  The patient will likely need a cholecystectomy at some point  Will await GI evaluation to proceed  If not done at this stay patient may follow up as an outpatient with Dr Nellie Ricci     ______________________________________________________________________    CHIEF COMPLAINT:  I was diagnosed with gastroparesis 5 years ago and had nausea and vomiting with abdominal pain that brought me to the emergency department    HPI: Bishop Eastman is a 47y o  year old female with PMHx of gastroparesis, diabetes mellitus type 2, chronic pain, and asthma  The patient presented to the emergency department with abdominal pain, nausea and vomiting  A right upper quadrant ultrasound showed punctate gallstones  Patient states that the pain was more periumbilical   She also had similar symptoms about a week ago when was evaluated at a hospital in [place]  A CT scan was done there which indicated she did have gallstones and she had made an appointment to follow up with GI this Friday  Her symptoms have improved since admission but she did field nauseous this morning  Patient states she was diagnosed with gastroparesis about 5 years ago  She is on medications for pain including Valium and morphine  She denies any fever or chills  Surgical history includes cervical spine surgery, lumbar spine surgery (1 with an   Anterior approach), knee arthroplasty, hysterectomy, and toe amputation  Review of SystemsNegative except as noted in HPI    Meds/Allergies   Allergies   Allergen Reactions    Nsaids GI Intolerance      PTA meds:   Prior to Admission Medications   Prescriptions Last Dose Informant Patient Reported? Taking?    albuterol (ACCUNEB) 1 25 MG/3ML nebulizer solution  Self Yes No   Sig: Take 1 ampule by nebulization every 6 (six) hours as needed for wheezing   albuterol (PROVENTIL HFA,VENTOLIN HFA) 90 mcg/act inhaler  Self Yes No   Sig: Inhale 2 puffs every 6 (six) hours as needed for wheezing   carisoprodol (SOMA) 350 mg tablet  Self Yes No   Sig: Take 350 mg by mouth 2 (two) times a day   diazepam (VALIUM) 5 mg tablet  Self Yes No   Sig: Take 5 mg by mouth daily at bedtime as needed for anxiety   glimepiride (AMARYL) 4 mg tablet  Self Yes No   Sig: Take 4 mg by mouth 2 (two) times a day   insulin glargine (LANTUS) 100 units/mL subcutaneous injection  Self Yes No   Sig: Inject 50 Units under the skin daily at bedtime     morphine (MS CONTIN) 60 mg 12 hr tablet  Self Yes No   Sig: Take 75 mg by mouth 2 (two) times a day     omeprazole (PriLOSEC) 20 mg delayed release capsule  Self Yes No   Sig: Take 20 mg by mouth daily   ondansetron (ZOFRAN-ODT) 4 mg disintegrating tablet   No No   Sig: Take 1 tablet (4 mg total) by mouth every 6 (six) hours as needed for nausea or vomiting      Facility-Administered Medications: None          Historical Information   Past Medical History:   Diagnosis Date    Asthma     Chronic pain     Diabetes mellitus (HCC)     Gastroparesis      Past Surgical History:   Procedure Laterality Date    CERVICAL LAMINECTOMY      HYSTERECTOMY      JOINT REPLACEMENT Bilateral     knee    TOE AMPUTATION Right 2/2/2018    Procedure: AMPUTATION TOE, RIGHT GREAT TOE;  Surgeon: Katelyn Palm DPM;  Location: [facility];  Service: Podiatry     Social History   History   Alcohol Use No     History   Drug Use No     History   Smoking Status    Never Smoker   Smokeless Tobacco    Never Used       Family History:   Family History   Problem Relation Age of Onset    Diabetes Mother     Diabetes Maternal Grandmother          Objective   Lab Results:   Lab Results   Component Value Date    WBC 10 69 (H) 05/23/2018    HGB 12 5 05/23/2018    HCT 38 7 05/23/2018     05/23/2018     Lab Results   Component Value Date     (L) 05/23/2018    K 3 6 05/23/2018     05/23/2018    CO2 25 05/23/2018    BUN 17 05/23/2018    CREATININE 0 74 05/23/2018    GLUCOSE 215 (H) 05/23/2018     Lab Results   Component Value Date    AST 11 05/23/2018    ALT 19 05/23/2018    ALKPHOS 104 05/23/2018    ALB 3 0 (L) 05/23/2018     Lab Results   Component Value Date    INR 1 09 02/02/2018     Lab Results   Component Value Date    COLORU Yellow 05/22/2018    CLARITYU Slightly Cloudy 05/22/2018    SPECGRAV >=1 030 05/22/2018    PHUR 5 5 05/22/2018    GLUCOSEU 500 (1/2%) (A) 05/22/2018    KETONESU 15 (1+) (A) 05/22/2018    BLOODU Large (A) 05/22/2018    NITRITE Negative 05/22/2018    LEUKOCYTESUR Small (A) 05/22/2018    BILIRUBINUR Small (A) 05/22/2018    UROBILINOGEN 0 2 05/22/2018    RBCUA 2-4 (A) 05/22/2018    WBCUA 4-10 (A) 05/22/2018    BACTERIA Innumerable (A) 05/22/2018     Intake/Output Summary (Last 24 hours) at 05/23/18 1407  Last data filed at 05/22/18 2230   Gross per 24 hour   Intake               50 ml   Output                0 ml   Net               50 ml     Invasive Devices     Peripheral Intravenous Line            Peripheral IV 05/22/18 Left Arm less than 1 day                Physical Exam  Vitals: /75 (BP Location: Right arm)   Pulse 104   Temp 97 7 °F (36 5 °C) (Oral)   Resp 18   Ht 5' 8" (1 727 m)   Wt 132 kg (291 lb 0 1 oz)   SpO2 95%   BMI 44 25 kg/m²   GEN: A & O x 3, cooperative   HEENT: PERRLA EOMI, sclera anicterus, oral mucosa with dry membranes   NECK: supple   LUNGS: clear throughout  COR: RRR no murmur  ABD:  Obese, Normoactive bowel sounds throughout, soft, nontender, no guarding, no masses palpated   EXTREM: FROM no joint deformities  No tenderness   SKIN:  Warm, dry, no rash  NEURO: CN II -XII intact, no tremor, affect appropriate    Imaging Studies:   Us Gallbladder    Result Date: 5/22/2018  Impression: 1  Punctate gallstones in the gallbladder fundus  No evidence of acute cholecystitis or biliary obstruction  2   Hepatomegaly   Workstation performed: [id number]         Jose A Greene PA-C  5/23/2018

## 2018-05-24 VITALS
OXYGEN SATURATION: 93 % | SYSTOLIC BLOOD PRESSURE: 128 MMHG | RESPIRATION RATE: 18 BRPM | BODY MASS INDEX: 44.1 KG/M2 | DIASTOLIC BLOOD PRESSURE: 74 MMHG | HEIGHT: 68 IN | WEIGHT: 291.01 LBS | TEMPERATURE: 97.6 F | HEART RATE: 85 BPM

## 2018-05-24 PROBLEM — K80.20 GALL STONES: Status: ACTIVE | Noted: 2018-05-24

## 2018-05-24 PROBLEM — R11.2 INTRACTABLE NAUSEA AND VOMITING: Status: ACTIVE | Noted: 2018-05-22

## 2018-05-24 LAB
GLUCOSE SERPL-MCNC: 135 MG/DL (ref 65–140)
GLUCOSE SERPL-MCNC: 146 MG/DL (ref 65–140)
GLUCOSE SERPL-MCNC: 163 MG/DL (ref 65–140)

## 2018-05-24 PROCEDURE — 82948 REAGENT STRIP/BLOOD GLUCOSE: CPT

## 2018-05-24 PROCEDURE — 99239 HOSP IP/OBS DSCHRG MGMT >30: CPT | Performed by: INTERNAL MEDICINE

## 2018-05-24 RX ORDER — METOCLOPRAMIDE 10 MG/1
10 TABLET ORAL
Status: DISCONTINUED | OUTPATIENT
Start: 2018-05-24 | End: 2018-05-24 | Stop reason: HOSPADM

## 2018-05-24 RX ORDER — CIPROFLOXACIN 500 MG/1
500 TABLET, FILM COATED ORAL 2 TIMES DAILY
Qty: 16 TABLET | Refills: 0 | Status: SHIPPED | OUTPATIENT
Start: 2018-05-24 | End: 2018-06-01

## 2018-05-24 RX ORDER — METOCLOPRAMIDE 10 MG/1
10 TABLET ORAL
Qty: 120 TABLET | Refills: 2 | Status: SHIPPED | OUTPATIENT
Start: 2018-05-24 | End: 2018-09-18 | Stop reason: SDUPTHER

## 2018-05-24 RX ORDER — CIPROFLOXACIN 500 MG/1
500 TABLET, FILM COATED ORAL 2 TIMES DAILY
Status: DISCONTINUED | OUTPATIENT
Start: 2018-05-24 | End: 2018-05-24 | Stop reason: HOSPADM

## 2018-05-24 RX ADMIN — CIPROFLOXACIN HYDROCHLORIDE 500 MG: 500 TABLET, FILM COATED ORAL at 11:09

## 2018-05-24 RX ADMIN — CARISOPRODOL 350 MG: 350 TABLET ORAL at 08:53

## 2018-05-24 RX ADMIN — SODIUM CHLORIDE 100 ML/HR: 0.9 INJECTION, SOLUTION INTRAVENOUS at 05:53

## 2018-05-24 RX ADMIN — ENOXAPARIN SODIUM 40 MG: 40 INJECTION SUBCUTANEOUS at 08:53

## 2018-05-24 RX ADMIN — INSULIN LISPRO 2 UNITS: 100 INJECTION, SOLUTION INTRAVENOUS; SUBCUTANEOUS at 11:58

## 2018-05-24 RX ADMIN — METOCLOPRAMIDE HYDROCHLORIDE 10 MG: 10 TABLET ORAL at 11:09

## 2018-05-24 RX ADMIN — MORPHINE SULFATE 75 MG: 30 TABLET, EXTENDED RELEASE ORAL at 08:54

## 2018-05-24 RX ADMIN — METOCLOPRAMIDE HYDROCHLORIDE 5 MG: 5 INJECTION INTRAMUSCULAR; INTRAVENOUS at 05:52

## 2018-05-24 RX ADMIN — METOCLOPRAMIDE HYDROCHLORIDE 5 MG: 5 INJECTION INTRAMUSCULAR; INTRAVENOUS at 00:00

## 2018-05-24 RX ADMIN — DIAZEPAM 5 MG: 5 TABLET ORAL at 00:00

## 2018-05-24 RX ADMIN — MORPHINE SULFATE 15 MG: 15 TABLET ORAL at 15:42

## 2018-05-24 RX ADMIN — METOCLOPRAMIDE HYDROCHLORIDE 10 MG: 10 TABLET ORAL at 15:42

## 2018-05-24 RX ADMIN — MORPHINE SULFATE 15 MG: 15 TABLET ORAL at 00:00

## 2018-05-24 RX ADMIN — PANTOPRAZOLE SODIUM 40 MG: 40 TABLET, DELAYED RELEASE ORAL at 05:52

## 2018-05-24 RX ADMIN — FLUTICASONE PROPIONATE AND SALMETEROL 1 PUFF: 50; 250 POWDER RESPIRATORY (INHALATION) at 08:53

## 2018-05-24 NOTE — DISCHARGE SUMMARY
Discharge Summary - [facility] Internal Medicine    Patient Information: Rachel Luevano 47 y o  female MRN: [id number]  Unit/Bed#: -01 Encounter: [id number]    Discharging Physician / Practitioner: Dmitry Conklin MD  PCP: Kate Gilbert  Admission Date: 5/22/2018  Discharge Date: 05/24/18    Reason for Admission:  Nausea    Discharge Diagnoses:     Principal Problem:    Intractable nausea and vomiting  Active Problems:    Diabetes mellitus ([address] )    Asthma    Chronic pain    Gastroparesis    Opioid dependence ([address] )    UTI (urinary tract infection)    Gall stones  Resolved Problems:    * No resolved hospital problems  *    Present on Admission:   Diabetes mellitus ([address] )   Opioid dependence ([address] )   Intractable nausea and vomiting   Asthma   UTI (urinary tract infection)   Gall stones   Gastroparesis   Chronic pain    Consultations During Hospital Stay:  · GI  · Surgery    Procedures Performed:     · Ultrasound gallbladder showing gallstones    Significant Findings:     · As above    Incidental Findings:   · None     Test Results Pending at Discharge (will require follow up): · None     Outpatient Tests Requested:  · None    Complications:  None    Hospital Course:     Rachel Luevano is a 47 y o  female patient who originally presented to the hospital on 5/22/2018 due to intractable nausea and vomiting  She also had some abdominal pain around her umbilicus  She was recently admitted to another hospital in [place]  She is on lots of narcotics for chronic pain  She also has history of gastroparesis and her major issue is that her diabetes mellitus is uncontrolled as she is not compliant with her diet or diabetic care  She claims that she has insulin pen at home, however, she does not have the needles  She was hydrated clinically and received IV fluids  Also seen by GI and surgical services  She would need her gallbladder out sometime in the future    She would be on Reglan scheduled and was advised to follow up with her gastroenterologist in addition to surgery and primary care physician closely  Again and again I have stressed the need for controlling her sugars  Condition at Discharge: good     Discharge Day Visit / Exam:     Subjective:  Feels good today  Eating and tolerating current diet well  No more nausea or vomiting  No abdominal pain  No fever or chills  Vitals: Blood Pressure: 128/74 (05/24/18 0700)  Pulse: 85 (05/24/18 0700)  Temperature: 97 6 °F (36 4 °C) (05/24/18 0700)  Temp Source: Oral (05/24/18 0700)  Respirations: 18 (05/24/18 0700)  Height: 5' 8" (172 7 cm) (05/22/18 2315)  Weight - Scale: 132 kg (291 lb 0 1 oz) (05/22/18 2315)  SpO2: 93 % (05/24/18 0700)  Exam:        Vital signs are reviewed as above  Obese female who is sitting in the  Does not appear to be in any distress  Abdomen is soft  It is nontender  Bowel sounds are audible  Awake and alert  Oriented x3  Oropharynx much better hydrated  Decreased breath sounds intensity because of body habitus  Discharge instructions/Information to patient and family:   See after visit summary for information provided to patient and family  Provisions for Follow-Up Care:  See after visit summary for information related to follow-up care and any pertinent home health orders  Disposition:     Home    For Discharges to [facility] SNF:   · Not Applicable to this Patient - Not Applicable to this Patient    Planned Readmission:  None     Discharge Statement:  I spent 32+ minutes discharging the patient  This time was spent on the day of discharge  I had direct contact with the patient on the day of discharge  Greater than 50% of the total time was spent examining patient, answering all patient questions, arranging and discussing plan of care with patient as well as directly providing post-discharge instructions  Additional time then spent on discharge activities      Discharge Medications:  See after visit summary for reconciled discharge medications provided to patient and family  ** Please Note: Dragon 360 Dictation voice to text software may have been used in the creation of this document   **

## 2018-05-24 NOTE — PHYSICIAN ADVISOR
Current patient class: Inpatient  The patient is currently on Hospital Day: 2      The patient was admitted to the hospital at [id number] on 5/23/18 for the following diagnosis:  Nausea [R11 0]  Urinary tract infection [N39 0]  Nausea and vomiting [R11 2]  Gallstones [K80 20]  Hyperglycemia [R73 9]       There is documentation in the medical record of an expected length of stay of at least 2 midnights  The patient is therefore expected to satisfy the 2 midnight benchmark and given the 2 midnight presumption is appropriate for INPATIENT ADMISSION  Given this expectation of a satisfying stay, CMS instructs us that the patient is most often appropriate for inpatient admission under part A provided medical necessity is documented in the chart  After review of the relevant documentation, labs, vital signs and test results, the patient is appropriate for INPATIENT ADMISSION  Admission to the hospital as an inpatient is a complex decision making process which requires the practitioner to consider the patients presenting complaint, history and physical examination and all relevant testing  With this in mind, in this case, the patient was deemed appropriate for INPATIENT ADMISSION  After review of the documentation and testing available at the time of the admission I concur with this clinical determination of medical necessity  Rationale is as follows: The patient is a 47 yrs old Female who presented to the ED at 5/22/2018  6:28 PM with a chief complaint of Nausea (pt states for the past month she has been having on and off abd pain, was evaluated last wk and told she has gastroparesis and possible gallstones, has appt with GI next wk, N/V has been constant )     Patient continues to remain hospitalized with acute nausea, history of diabetes, gastroparesis, and possible cholelithiasis  The patient is concurrently evaluated by General surgery and GI      Given the need for further hospitalization, and along with the documentation of medical necessity present in the chart, the patient is appropriate for inpatient admission  The patient is expected to satisfy the 2 midnight benchmark, and will require further acute medical care  The patient does have comorbid conditions which increases the risk for significant adverse outcome  Given this the patient is appropriate for inpatient admission        The patients vitals on arrival were ED Triage Vitals [05/22/18 1831]   Temperature Pulse Respirations Blood Pressure SpO2   98 2 °F (36 8 °C) (!) 115 18 (!) 195/87 92 %      Temp Source Heart Rate Source Patient Position - Orthostatic VS BP Location FiO2 (%)   Oral Monitor Sitting Right arm --      Pain Score       9           Past Medical History:   Diagnosis Date    Asthma     Chronic pain     Diabetes mellitus ([address] )     Gastroparesis      Past Surgical History:   Procedure Laterality Date    CERVICAL LAMINECTOMY      HYSTERECTOMY      JOINT REPLACEMENT Bilateral     knee    TOE AMPUTATION Right 2/2/2018    Procedure: AMPUTATION TOE, RIGHT GREAT TOE;  Surgeon: Sharmila Joel DPM;  Location: [facility];  Service: Podiatry           Consults have been placed to:   IP CONSULT TO ACUTE CARE SURGERY  IP CONSULT TO GASTROENTEROLOGY    Vitals:    05/22/18 2309 05/22/18 2315 05/23/18 0713 05/23/18 1536   BP:  159/75 128/75 144/62   BP Location:  Right arm Right arm Right arm   Pulse:  96 104 83   Resp:  18 18 18   Temp:  98 4 °F (36 9 °C) 97 7 °F (36 5 °C) 99 3 °F (37 4 °C)   TempSrc:  Oral Oral Oral   SpO2: 94% 94% 95% 95%   Weight:  132 kg (291 lb 0 1 oz)     Height:  5' 8" (1 727 m)         Most recent labs:    Recent Labs      05/22/18   1856  05/23/18   0525   WBC  14 53*  10 69*   HGB  13 8  12 5   HCT  42 1  38 7   PLT  258  229   K  3 7  3 6   NA  133*  135*   CALCIUM  9 5  8 9   BUN  18  17   CREATININE  0 88  0 74   LIPASE  102   --    TROPONINI  <0 02   --    AST  12  11   ALT  22  19   ALKPHOS  122*  104   BILITOT 1 60*  1 30*       Scheduled Meds:  Current Facility-Administered Medications:  acetaminophen 650 mg Oral Q6H PRN Elpidio Anders PA-C    albuterol 2 puff Inhalation Q6H PRN Elpidio Andres PA-C    carisoprodol 350 mg Oral BID Elpidio Andres PA-C    cefTRIAXone 1,000 mg Intravenous Q24H Elpidio Andres PA-C    diazepam 5 mg Oral HS PRN Elpidio Andres PA-C    enoxaparin 40 mg Subcutaneous Daily Elpidio Andres PA-C    fluticasone-salmeterol 1 puff Inhalation Q12H [address] Elpidio Andres PA-C    insulin glargine 50 Units Subcutaneous HS Elpidio Andres PA-C    insulin lispro 2-12 Units Subcutaneous TID AC Elpidio Andres PA-C    insulin lispro 2-12 Units Subcutaneous HS Elpidio Andres PA-C    metoclopramide 5 mg Intravenous Q6H Molly Richardson MD    morphine 75 mg Oral BID Elpidio Andres PA-C    morphine 15 mg Oral Q8H PRN Elpidio Andres PA-C    [START ON 5/24/2018] pantoprazole 40 mg Oral Early Morning Gay Méndez III, MD    promethazine 25 mg Intramuscular Q6H PRN Elpidio Andres PA-C    sodium chloride 100 mL/hr Intravenous Continuous Elpidio Andres PA-C Last Rate: 100 mL/hr (05/23/18 0921)     Continuous Infusions:  sodium chloride 100 mL/hr Last Rate: 100 mL/hr (05/23/18 0921)     PRN Meds:   acetaminophen    albuterol    diazepam    morphine    promethazine    Surgical procedures (if appropriate):

## 2018-05-24 NOTE — PLAN OF CARE
DISCHARGE PLANNING     Discharge to home or other facility with appropriate resources Progressing        GASTROINTESTINAL - ADULT     Minimal or absence of nausea and/or vomiting Progressing     Maintains adequate nutritional intake Progressing        INFECTION - ADULT     Absence or prevention of progression during hospitalization Progressing        Knowledge Deficit     Patient/family/caregiver demonstrates understanding of disease process, treatment plan, medications, and discharge instructions Progressing        Nutrition/Hydration-ADULT     Nutrient/Hydration intake appropriate for improving, restoring or maintaining nutritional needs Progressing        PAIN - ADULT     Verbalizes/displays adequate comfort level or baseline comfort level Progressing        SAFETY ADULT     Patient will remain free of falls Progressing     Maintain or return to baseline ADL function Progressing     Maintain or return mobility status to optimal level Progressing

## 2018-05-24 NOTE — DISCHARGE INSTRUCTIONS
Call the surgery office for an appointment in 2 weeks for follow up with gall stone without acute gall bladder disease

## 2018-05-25 LAB — BACTERIA UR CULT: NORMAL

## 2018-06-01 ENCOUNTER — TELEPHONE (OUTPATIENT)
Dept: OBGYN CLINIC | Facility: HOSPITAL | Age: 53
End: 2018-06-01

## 2018-06-01 ENCOUNTER — OFFICE VISIT (OUTPATIENT)
Dept: OBGYN CLINIC | Facility: CLINIC | Age: 53
End: 2018-06-01
Payer: MEDICARE

## 2018-06-01 VITALS
SYSTOLIC BLOOD PRESSURE: 133 MMHG | HEIGHT: 68 IN | HEART RATE: 111 BPM | DIASTOLIC BLOOD PRESSURE: 81 MMHG | BODY MASS INDEX: 44.41 KG/M2 | WEIGHT: 293 LBS

## 2018-06-01 DIAGNOSIS — M79.641 BILATERAL HAND PAIN: ICD-10-CM

## 2018-06-01 DIAGNOSIS — M79.642 BILATERAL HAND PAIN: ICD-10-CM

## 2018-06-01 DIAGNOSIS — M65.832 EXTENSOR TENOSYNOVITIS OF LEFT WRIST: ICD-10-CM

## 2018-06-01 DIAGNOSIS — M65.831 EXTENSOR TENOSYNOVITIS OF RIGHT WRIST: Primary | ICD-10-CM

## 2018-06-01 PROCEDURE — 20550 NJX 1 TENDON SHEATH/LIGAMENT: CPT | Performed by: PHYSICIAN ASSISTANT

## 2018-06-01 PROCEDURE — 99213 OFFICE O/P EST LOW 20 MIN: CPT | Performed by: PHYSICIAN ASSISTANT

## 2018-06-01 RX ORDER — BUPIVACAINE HYDROCHLORIDE 2.5 MG/ML
1 INJECTION, SOLUTION INFILTRATION; PERINEURAL
Status: COMPLETED | OUTPATIENT
Start: 2018-06-01 | End: 2018-06-01

## 2018-06-01 RX ORDER — METHYLPREDNISOLONE ACETATE 40 MG/ML
1 INJECTION, SUSPENSION INTRA-ARTICULAR; INTRALESIONAL; INTRAMUSCULAR; SOFT TISSUE
Status: COMPLETED | OUTPATIENT
Start: 2018-06-01 | End: 2018-06-01

## 2018-06-01 RX ORDER — LIDOCAINE HYDROCHLORIDE 10 MG/ML
1 INJECTION, SOLUTION INFILTRATION; PERINEURAL
Status: COMPLETED | OUTPATIENT
Start: 2018-06-01 | End: 2018-06-01

## 2018-06-01 RX ADMIN — BUPIVACAINE HYDROCHLORIDE 1 ML: 2.5 INJECTION, SOLUTION INFILTRATION; PERINEURAL at 10:37

## 2018-06-01 RX ADMIN — METHYLPREDNISOLONE ACETATE 1 ML: 40 INJECTION, SUSPENSION INTRA-ARTICULAR; INTRALESIONAL; INTRAMUSCULAR; SOFT TISSUE at 10:37

## 2018-06-01 RX ADMIN — LIDOCAINE HYDROCHLORIDE 1 ML: 10 INJECTION, SOLUTION INFILTRATION; PERINEURAL at 10:37

## 2018-06-01 NOTE — TELEPHONE ENCOUNTER
Caller: patient  Call back number: [phone]  Patient's doctor: Dr Elida Medrano    Patient called stating she is having a lot of discomfort in her hand  It is worse than it was before  She is asking if this is from the injections and what to do for this   Please advise

## 2018-06-01 NOTE — TELEPHONE ENCOUNTER
Called patient today, was having some pain after the injection  Told her this is a normal side effect at times  She will use ice and injection site for 20 min of time  She will use a wrist brace  She is unable to take NSAIDs so she will take Tylenol as needed for pain  If pain is severe over the weekend she will go to the ER otherwise she will call back next week to let me know vasquez      [facility]

## 2018-06-01 NOTE — PROGRESS NOTES
Chief Complaint   Patient presents with    Left Hand - Follow-up    Right Hand - Follow-up         Subjective   Patient here for follow-up bilateral wrist de Quervain tenosynovitis  Patient had right wrist injection back in December 2017 and her left wrist was injected March 2018  Left wrist has been doing better since the injection  Right wrist pain started to return about a month ago  She now has severe right wrist pain on the radial aspect with any kind of activity  Patient asking for repeat right wrist injection  Patient unable to discuss any kind of surgical intervention at this time patient having personal issues that she wants to resolve before she can think about any surgery        ROS:   General: no fever, no chills  Respiratory:  No coughing, shortness of breath or wheezing  Cardiovascular:  No chest pain, no palpitations  Musculoskeletal: see HPI and PE  SKIN:  No skin rash, no dry skin  Neurological:  Positive for headaches and numbness  Psychiatric:  No suicide thoughts, positive for anxiety, no depression  Review of all other systems is negative    Past Medical History:   Diagnosis Date    Asthma     Chronic pain     Diabetes mellitus ([facility] )     Gastroparesis        Current Outpatient Prescriptions on File Prior to Visit   Medication Sig Dispense Refill    albuterol (ACCUNEB) 1 25 MG/3ML nebulizer solution Take 1 ampule by nebulization every 6 (six) hours as needed for wheezing      albuterol (PROVENTIL HFA,VENTOLIN HFA) 90 mcg/act inhaler Inhale 2 puffs every 6 (six) hours as needed for wheezing      carisoprodol (SOMA) 350 mg tablet Take 350 mg by mouth 2 (two) times a day      ciprofloxacin (CIPRO) 500 mg tablet Take 1 tablet (500 mg total) by mouth 2 (two) times a day for 8 days 16 tablet 0    diazepam (VALIUM) 5 mg tablet Take 5 mg by mouth daily at bedtime as needed for anxiety      glimepiride (AMARYL) 4 mg tablet Take 4 mg by mouth 2 (two) times a day      insulin glargine (LANTUS) 100 units/mL subcutaneous injection Inject 50 Units under the skin daily at bedtime        metoclopramide (REGLAN) 10 mg tablet Take 1 tablet (10 mg total) by mouth 4 (four) times a day (before meals and at bedtime) 120 tablet 2    morphine (MS CONTIN) 60 mg 12 hr tablet Take 75 mg by mouth 2 (two) times a day        omeprazole (PriLOSEC) 20 mg delayed release capsule Take 20 mg by mouth daily      [DISCONTINUED] ondansetron (ZOFRAN-ODT) 4 mg disintegrating tablet Take 1 tablet (4 mg total) by mouth every 6 (six) hours as needed for nausea or vomiting 20 tablet 0     No current facility-administered medications on file prior to visit  Allergies   Allergen Reactions    Nsaids GI Intolerance       Social History       Physical Exam:    Vitals:    06/01/18 0936   BP: 133/81   Pulse: (!) 111   Weight: (!) 137 kg (302 lb 3 2 oz)   Height: 5' 8" (1 727 m)       General Appearance:  Alert, cooperative, no distress, appears stated age   Lungs:   respirations unlabored   Heart:  Normal heart rate noted   Abdomen:   Soft, non-tender,  no masses   Extremities: Extremities normal, atraumatic, no cyanosis or edema   Pulses: 2+ and symmetric   Skin: Skin color, texture, turgor normal, no rashes or lesions   Neurologic: Normal         Ortho Exam  Right wrist examination shows skin intact no erythema noted  Range of motion is full  There is tenderness noted palpation over 1st dorsal compartment  Positive Finkelstein's  Sensation is intact to light touch  2+ radial pulse    Left wrist shows skin intact with no erythema  Patient has full range of motion noted  No tenderness noted palpation over the radial or ulnar aspects of the wrist  Negative Finkelstein's  Sensation intact light touch    ASSESSMENT:    Anna Marie was seen today for follow-up and follow-up      Diagnoses and all orders for this visit:    Extensor tenosynovitis of right wrist    Extensor tenosynovitis of left wrist    Bilateral hand pain          PLAN:  Patient with bilateral de Quervain tenosynovitis with right being worse than the left today  Patient received cortisone injection into the right wrist 1st dorsal compartment with no complications noted  Patient may use ice and injection site for 20 min at a time  Patient will continue using her thumb spica brace as needed  She will follow up with us in six weeks or sooner if needed      Hand/upper extremity injection  Date/Time: 6/1/2018 10:37 AM  Consent given by: patient  Site marked: site marked  Timeout: Immediately prior to procedure a time out was called to verify the correct patient, procedure, equipment, support staff and site/side marked as required   Supporting Documentation  Indications: pain and tendon swelling   Procedure Details  Condition:de Quervain's tenosynovitis Site: R extensor compartment 1   Preparation: Patient was prepped and draped in the usual sterile fashion  Needle gauge: 21   Ultrasound guidance: no  Approach: radial  Medications administered: 1 mL bupivacaine 0 25 %; 1 mL lidocaine 1 %; 1 mL methylPREDNISolone acetate 40 mg/mL  Patient tolerance: patient tolerated the procedure well with no immediate complications  Dressing:  Sterile dressing applied

## 2018-06-15 ENCOUNTER — OFFICE VISIT (OUTPATIENT)
Dept: GASTROENTEROLOGY | Facility: CLINIC | Age: 53
End: 2018-06-15
Payer: MEDICARE

## 2018-06-15 VITALS
BODY MASS INDEX: 43.04 KG/M2 | HEIGHT: 68 IN | SYSTOLIC BLOOD PRESSURE: 130 MMHG | HEART RATE: 100 BPM | DIASTOLIC BLOOD PRESSURE: 70 MMHG | WEIGHT: 284 LBS

## 2018-06-15 DIAGNOSIS — K31.84 GASTROPARESIS: Primary | ICD-10-CM

## 2018-06-15 DIAGNOSIS — Z79.4 DIABETES MELLITUS DUE TO UNDERLYING CONDITION, UNCONTROLLED, WITH HYPEROSMOLARITY WITHOUT COMA, WITH LONG-TERM CURRENT USE OF INSULIN (HCC): ICD-10-CM

## 2018-06-15 DIAGNOSIS — K80.20 GALL STONES: ICD-10-CM

## 2018-06-15 DIAGNOSIS — E08.00 DIABETES MELLITUS DUE TO UNDERLYING CONDITION, UNCONTROLLED, WITH HYPEROSMOLARITY WITHOUT COMA, WITH LONG-TERM CURRENT USE OF INSULIN (HCC): ICD-10-CM

## 2018-06-15 DIAGNOSIS — R11.2 INTRACTABLE VOMITING WITH NAUSEA, UNSPECIFIED VOMITING TYPE: ICD-10-CM

## 2018-06-15 PROBLEM — IMO0002 UNCONTROLLED DIABETES MELLITUS: Status: ACTIVE | Noted: 2018-06-15

## 2018-06-15 PROCEDURE — 99214 OFFICE O/P EST MOD 30 MIN: CPT | Performed by: INTERNAL MEDICINE

## 2018-06-15 RX ORDER — ONDANSETRON 4 MG/1
4 TABLET, FILM COATED ORAL EVERY 8 HOURS PRN
Qty: 20 TABLET | Refills: 0 | Status: SHIPPED | OUTPATIENT
Start: 2018-06-15 | End: 2018-08-07 | Stop reason: SDUPTHER

## 2018-06-15 RX ORDER — PREDNISONE 10 MG/1
TABLET ORAL DAILY
Status: ON HOLD | COMMUNITY
End: 2018-12-21 | Stop reason: ALTCHOICE

## 2018-06-15 RX ORDER — FLUTICASONE PROPIONATE 50 MCG
1 SPRAY, SUSPENSION (ML) NASAL DAILY
COMMUNITY

## 2018-06-15 RX ORDER — ERYTHROMYCIN 250 MG/1
250 TABLET, DELAYED RELEASE ORAL 4 TIMES DAILY
Qty: 120 TABLET | Refills: 0 | Status: SHIPPED | OUTPATIENT
Start: 2018-06-15 | End: 2018-07-15

## 2018-06-15 RX ORDER — MONTELUKAST SODIUM 10 MG/1
10 TABLET ORAL
COMMUNITY
End: 2022-05-17

## 2018-06-15 RX ORDER — DICYCLOMINE HYDROCHLORIDE 10 MG/1
10 CAPSULE ORAL
Qty: 120 CAPSULE | Refills: 0 | Status: SHIPPED | OUTPATIENT
Start: 2018-06-15 | End: 2018-07-12 | Stop reason: SDUPTHER

## 2018-06-15 NOTE — PROGRESS NOTES
Assessment/Plan:    Gastroparesis education given and discussed  Medications added- erythromycin as prescribed  Zofran for nausea and dicyclomine for lower abdominal pain    Will need initial screening colonoscopy and will discuss and schedule at next visit  Endocrinology referral given  Blood sugar control is key             Problem List Items Addressed This Visit     Gastroparesis - Primary    Relevant Medications    erythromycin base (NELDA-TAB) 250 mg EC tablet    dicyclomine (BENTYL) 10 mg capsule    ondansetron (ZOFRAN) 4 mg tablet    Other Relevant Orders    Ambulatory referral to Endocrinology    Intractable nausea and vomiting    Relevant Medications    erythromycin base (NELDA-TAB) 250 mg EC tablet    dicyclomine (BENTYL) 10 mg capsule    ondansetron (ZOFRAN) 4 mg tablet    Other Relevant Orders    Ambulatory referral to Endocrinology    Gall stones    Relevant Medications    erythromycin base (NELDA-TAB) 250 mg EC tablet    dicyclomine (BENTYL) 10 mg capsule    ondansetron (ZOFRAN) 4 mg tablet    Other Relevant Orders    Ambulatory referral to Endocrinology    Uncontrolled diabetes mellitus ([address] )            Subjective:      Patient ID: Sarah King is a 46 y o  female  40-year-old female with recent hospitalization for gastroparesis  Past medical history includes uncontrolled diabetes and A1c in the hospital was  She also has a history of asthma and is currently on prednisone 10 mg a day along with levofloxacin prescribed by her pulmonologist  She has not seen her PCP in 6 months and she has no endocrinologist  She has morbid obesity  Since discharge from the hospital her blood sugars are running in the high twos to low 300 range, she is having nausea, vomiting, epigastric pain, and mild weight loss of 4 pounds  She was also found to have gallstones in the hospital and is following with a surgeon for evaluation    She has never had a colonoscopy and although we discussed that today she would prefer if we waited a months and scheduled it because her  is getting out of the hospital today and she is unsure if she would be able to keep the appointment for now  Overall today we discussed gastroparesis and treatment and I added Erytabs to her Reglan schedule and gave her Zofran  I also gave her a referral to an endocrinologist to help with blood sugar control  We discussed how gastroparesis and diabetes is related and she understands although she has had this discussion with healthcare providers multiple times  She will follow up in a month        The following portions of the patient's history were reviewed and updated as appropriate:   She  has a past medical history of Asthma; Chronic pain; Diabetes mellitus ([facility] ); and Gastroparesis  She   Patient Active Problem List    Diagnosis Date Noted    Uncontrolled diabetes mellitus ([facility] ) 06/15/2018    Gall stones 05/24/2018    Intractable nausea and vomiting 05/22/2018    UTI (urinary tract infection) 05/22/2018    Opioid dependence ([facility] ) 02/05/2018    Diabetic ulcer of right great toe ([facility] ) 02/02/2018    Acute osteomyelitis of toe, right ([facility] ) 02/02/2018    Diabetes mellitus ([facility] ) 02/02/2018    Asthma 02/02/2018    Chronic pain 02/02/2018    Gastroparesis 02/02/2018    Hyponatremia 02/02/2018    Knee pain 11/21/2017    De Quervain's tenosynovitis, bilateral 10/03/2017     She  has a past surgical history that includes Cervical laminectomy; Hysterectomy; Joint replacement (Bilateral); and Toe amputation (Right, 2/2/2018)  Her family history includes Diabetes in her maternal grandmother and mother  She  reports that she has never smoked  She has never used smokeless tobacco  She reports that she does not drink alcohol or use drugs    Current Outpatient Prescriptions   Medication Sig Dispense Refill    albuterol (ACCUNEB) 1 25 MG/3ML nebulizer solution Take 1 ampule by nebulization every 6 (six) hours as needed for wheezing      albuterol (PROVENTIL HFA,VENTOLIN HFA) 90 mcg/act inhaler Inhale 2 puffs every 6 (six) hours as needed for wheezing      carisoprodol (SOMA) 350 mg tablet Take 350 mg by mouth 2 (two) times a day      diazepam (VALIUM) 5 mg tablet Take 5 mg by mouth daily at bedtime as needed for anxiety      fluticasone (FLONASE) 50 mcg/act nasal spray 1 spray into each nostril daily      glimepiride (AMARYL) 4 mg tablet Take 4 mg by mouth 2 (two) times a day      insulin glargine (LANTUS) 100 units/mL subcutaneous injection Inject 50 Units under the skin daily at bedtime        metoclopramide (REGLAN) 10 mg tablet Take 1 tablet (10 mg total) by mouth 4 (four) times a day (before meals and at bedtime) 120 tablet 2    montelukast (SINGULAIR) 10 mg tablet Take 10 mg by mouth daily at bedtime      morphine (MS CONTIN) 60 mg 12 hr tablet Take 75 mg by mouth 2 (two) times a day        omeprazole (PriLOSEC) 20 mg delayed release capsule Take 20 mg by mouth daily      predniSONE 10 mg tablet Take by mouth daily      dicyclomine (BENTYL) 10 mg capsule Take 1 capsule (10 mg total) by mouth 4 (four) times a day (before meals and at bedtime) for 30 days 120 capsule 0    erythromycin base (NELDA-TAB) 250 mg EC tablet Take 1 tablet (250 mg total) by mouth 4 (four) times a day for 30 days 120 tablet 0    ondansetron (ZOFRAN) 4 mg tablet Take 1 tablet (4 mg total) by mouth every 8 (eight) hours as needed for nausea or vomiting 20 tablet 0     No current facility-administered medications for this visit        Current Outpatient Prescriptions on File Prior to Visit   Medication Sig    albuterol (ACCUNEB) 1 25 MG/3ML nebulizer solution Take 1 ampule by nebulization every 6 (six) hours as needed for wheezing    albuterol (PROVENTIL HFA,VENTOLIN HFA) 90 mcg/act inhaler Inhale 2 puffs every 6 (six) hours as needed for wheezing    carisoprodol (SOMA) 350 mg tablet Take 350 mg by mouth 2 (two) times a day    diazepam (VALIUM) 5 mg tablet Take 5 mg by mouth daily at bedtime as needed for anxiety    glimepiride (AMARYL) 4 mg tablet Take 4 mg by mouth 2 (two) times a day    insulin glargine (LANTUS) 100 units/mL subcutaneous injection Inject 50 Units under the skin daily at bedtime      metoclopramide (REGLAN) 10 mg tablet Take 1 tablet (10 mg total) by mouth 4 (four) times a day (before meals and at bedtime)    morphine (MS CONTIN) 60 mg 12 hr tablet Take 75 mg by mouth 2 (two) times a day      omeprazole (PriLOSEC) 20 mg delayed release capsule Take 20 mg by mouth daily     No current facility-administered medications on file prior to visit  She is allergic to nsaids       Review of Systems   Constitutional: Negative for fatigue  HENT: Negative  Respiratory: Negative  Cardiovascular: Negative  Gastrointestinal: Positive for abdominal pain, nausea and vomiting  Musculoskeletal: Positive for arthralgias and back pain  Skin: Negative  Psychiatric/Behavioral: Negative  Objective:      /70   Pulse 100   Ht 5' 8" (1 727 m)   Wt 129 kg (284 lb)   BMI 43 18 kg/m²          Physical Exam   Constitutional: She is oriented to person, place, and time  She appears well-developed and well-nourished  Morbid obesity   Eyes: No scleral icterus  Cardiovascular: Normal rate and regular rhythm  Pulmonary/Chest: Effort normal  She has wheezes  Abdominal: Soft  Bowel sounds are normal  There is tenderness (generalized and epigastric)  Lymphadenopathy:     She has no cervical adenopathy  Neurological: She is alert and oriented to person, place, and time  Skin: Skin is warm and dry  Psychiatric: She has a normal mood and affect

## 2018-06-15 NOTE — PATIENT INSTRUCTIONS
Gastroparesis education given and discussed  Medications added- erythromycin as prescribed  Zofran for nausea and dicyclomine for lower abdominal pain    Will need initial screening colonoscopy and will discuss and schedule at next visit  Endocrinology referral given  Blood sugar control is key

## 2018-06-27 ENCOUNTER — TELEPHONE (OUTPATIENT)
Dept: GASTROENTEROLOGY | Facility: CLINIC | Age: 53
End: 2018-06-27

## 2018-06-27 NOTE — TELEPHONE ENCOUNTER
[facility] called to let us know they dont have this medication for the ptn and havent been able to receive it on order: erythromycin base (NELDA-TAB) 250 mg EC tablet  [facility] a [facility]   They wanted to let us know the ptn is more then likely not taking her medication

## 2018-06-27 NOTE — TELEPHONE ENCOUNTER
Floydene Alu, please advise    Is there something different you would like to prescribe since pharmacy does not carry it?

## 2018-06-28 ENCOUNTER — TELEPHONE (OUTPATIENT)
Dept: INTERNAL MEDICINE CLINIC | Facility: CLINIC | Age: 53
End: 2018-06-28

## 2018-06-28 NOTE — TELEPHONE ENCOUNTER
LMOM advising pt to cb to let us know if we can send this to a different pharmacy since [facility] does not carry it

## 2018-06-29 ENCOUNTER — TELEPHONE (OUTPATIENT)
Dept: INTERNAL MEDICINE CLINIC | Facility: CLINIC | Age: 53
End: 2018-06-29

## 2018-07-12 DIAGNOSIS — K80.20 GALL STONES: ICD-10-CM

## 2018-07-12 DIAGNOSIS — R11.2 INTRACTABLE VOMITING WITH NAUSEA, UNSPECIFIED VOMITING TYPE: ICD-10-CM

## 2018-07-12 DIAGNOSIS — K31.84 GASTROPARESIS: ICD-10-CM

## 2018-07-12 RX ORDER — DICYCLOMINE HYDROCHLORIDE 10 MG/1
CAPSULE ORAL
Qty: 120 CAPSULE | Refills: 0 | Status: SHIPPED | OUTPATIENT
Start: 2018-07-12 | End: 2018-08-07 | Stop reason: SDUPTHER

## 2018-08-07 DIAGNOSIS — R11.2 INTRACTABLE VOMITING WITH NAUSEA, UNSPECIFIED VOMITING TYPE: ICD-10-CM

## 2018-08-07 DIAGNOSIS — K31.84 GASTROPARESIS: ICD-10-CM

## 2018-08-07 DIAGNOSIS — K80.20 GALL STONES: ICD-10-CM

## 2018-08-07 RX ORDER — ONDANSETRON 4 MG/1
TABLET, FILM COATED ORAL
Qty: 20 TABLET | Refills: 0 | Status: SHIPPED | OUTPATIENT
Start: 2018-08-07 | End: 2018-09-18 | Stop reason: SDUPTHER

## 2018-08-07 RX ORDER — DICYCLOMINE HYDROCHLORIDE 10 MG/1
CAPSULE ORAL
Qty: 120 CAPSULE | Refills: 0 | Status: SHIPPED | OUTPATIENT
Start: 2018-08-07 | End: 2018-09-05 | Stop reason: SDUPTHER

## 2018-09-05 DIAGNOSIS — K80.20 GALL STONES: ICD-10-CM

## 2018-09-05 DIAGNOSIS — K31.84 GASTROPARESIS: ICD-10-CM

## 2018-09-05 DIAGNOSIS — R11.2 INTRACTABLE VOMITING WITH NAUSEA, UNSPECIFIED VOMITING TYPE: ICD-10-CM

## 2018-09-06 RX ORDER — DICYCLOMINE HYDROCHLORIDE 10 MG/1
CAPSULE ORAL
Qty: 120 CAPSULE | Refills: 0 | Status: SHIPPED | OUTPATIENT
Start: 2018-09-06 | End: 2018-10-01 | Stop reason: SDUPTHER

## 2018-09-18 ENCOUNTER — OFFICE VISIT (OUTPATIENT)
Dept: GASTROENTEROLOGY | Facility: CLINIC | Age: 53
End: 2018-09-18
Payer: MEDICARE

## 2018-09-18 VITALS
WEIGHT: 277 LBS | DIASTOLIC BLOOD PRESSURE: 70 MMHG | RESPIRATION RATE: 16 BRPM | SYSTOLIC BLOOD PRESSURE: 142 MMHG | HEIGHT: 68 IN | BODY MASS INDEX: 41.98 KG/M2 | HEART RATE: 106 BPM

## 2018-09-18 DIAGNOSIS — K31.84 GASTROPARESIS: Primary | ICD-10-CM

## 2018-09-18 DIAGNOSIS — R11.2 INTRACTABLE VOMITING WITH NAUSEA, UNSPECIFIED VOMITING TYPE: ICD-10-CM

## 2018-09-18 DIAGNOSIS — K31.84 GASTROPARESIS: ICD-10-CM

## 2018-09-18 DIAGNOSIS — E13.649: ICD-10-CM

## 2018-09-18 DIAGNOSIS — K80.20 GALL STONES: ICD-10-CM

## 2018-09-18 PROCEDURE — 99214 OFFICE O/P EST MOD 30 MIN: CPT | Performed by: NURSE PRACTITIONER

## 2018-09-18 RX ORDER — METOCLOPRAMIDE 10 MG/1
TABLET ORAL
Qty: 120 TABLET | Refills: 2 | Status: SHIPPED | OUTPATIENT
Start: 2018-09-18 | End: 2018-12-31 | Stop reason: SDUPTHER

## 2018-09-18 RX ORDER — ONDANSETRON 4 MG/1
4 TABLET, FILM COATED ORAL EVERY 8 HOURS PRN
Qty: 30 TABLET | Refills: 2 | Status: SHIPPED | OUTPATIENT
Start: 2018-09-18 | End: 2018-12-29 | Stop reason: SDUPTHER

## 2018-09-18 RX ORDER — METOCLOPRAMIDE 10 MG/1
10 TABLET ORAL
Qty: 120 TABLET | Refills: 3 | Status: SHIPPED | OUTPATIENT
Start: 2018-09-18 | End: 2018-09-18 | Stop reason: SDUPTHER

## 2018-09-18 NOTE — PROGRESS NOTES
Assessment/Plan:    Uncontrolled diabetes mellitus, gastroparesis, nausea  Emergency room follow-up for symptoms related to gastroparesis: 1  Medications renewed  2  Six-month follow-up  3  Strongly encouraged to get control of her diabetes  Patient is due for screening colonoscopy and refuses     Diagnoses and all orders for this visit:    Gastroparesis  -     ondansetron (ZOFRAN) 4 mg tablet; Take 1 tablet (4 mg total) by mouth every 8 (eight) hours as needed for nausea or vomiting  -     metoclopramide (REGLAN) 10 mg tablet; Take 1 tablet (10 mg total) by mouth 4 (four) times a day (before meals and at bedtime)    Other specified diabetes mellitus with hypoglycemia and without coma, unspecified whether long term insulin use (HCC)    Intractable vomiting with nausea, unspecified vomiting type  -     ondansetron (ZOFRAN) 4 mg tablet; Take 1 tablet (4 mg total) by mouth every 8 (eight) hours as needed for nausea or vomiting    Gall stones  -     ondansetron (ZOFRAN) 4 mg tablet; Take 1 tablet (4 mg total) by mouth every 8 (eight) hours as needed for nausea or vomiting    @ASSESSMENTEN  D@     Subjective:      Patient ID: William Bryant is a 46 y o  female  78-year-old morbidly obese female with uncontrolled diabetes mellitus is here for refill of medications for gastroparesis  Recently was seen in the emergency room after she ran out of her Reglan due to nausea and vomiting  Patient states that she is able to eat comfortably if she takes her Reglan about 20 minutes before meals and is requesting a refill  She has had no unintentional weight loss fever fatigue  She was prescribed Reglan by the emergency room and is tolerating her diet without any nausea, vomiting, heartburn, epigastric pain, unintentional weight loss  She has no lower GI concerns  I strongly encouraged the patient today to obtain a colonoscopy me and she adamantly of refused    Her  is in the hospital and she would like to wait until he is better  Her last hemoglobin A1c in August was 10 4%  She states she overall feels well with the medication regimen she is on        The following portions of the patient's history were reviewed and updated as appropriate: She  has a past medical history of Asthma; Chronic pain; Diabetes mellitus ([place] [id number] ); and Gastroparesis  She   Patient Active Problem List    Diagnosis Date Noted    Uncontrolled diabetes mellitus ([place] [address] ) 06/15/2018    Gall stones 05/24/2018    Intractable nausea and vomiting 05/22/2018    UTI (urinary tract infection) 05/22/2018    Opioid dependence ([place] [id number] ) 02/05/2018    Diabetic ulcer of right great toe ([place] [address] ) 02/02/2018    Acute osteomyelitis of toe, right ([place] [id number] ) 02/02/2018    Diabetes mellitus ([place] [address] ) 02/02/2018    Asthma 02/02/2018    Chronic pain 02/02/2018    Gastroparesis 02/02/2018    Hyponatremia 02/02/2018    Knee pain 11/21/2017    De Quervain's tenosynovitis, bilateral 10/03/2017     She  has a past surgical history that includes Cervical laminectomy; Hysterectomy; Joint replacement (Bilateral); and Toe amputation (Right, 2/2/2018)  Her family history includes Diabetes in her maternal grandmother and mother  She  reports that she has never smoked  She has never used smokeless tobacco  She reports that she does not drink alcohol or use drugs    Current Outpatient Prescriptions   Medication Sig Dispense Refill    albuterol (ACCUNEB) 1 25 MG/3ML nebulizer solution Take 1 ampule by nebulization every 6 (six) hours as needed for wheezing      albuterol (PROVENTIL HFA,VENTOLIN HFA) 90 mcg/act inhaler Inhale 2 puffs every 6 (six) hours as needed for wheezing      carisoprodol (SOMA) 350 mg tablet Take 350 mg by mouth 2 (two) times a day      diazepam (VALIUM) 5 mg tablet Take 5 mg by mouth daily at bedtime as needed for anxiety      dicyclomine (BENTYL) 10 mg capsule TAKE 1 CAPSULE BY MOUTH FOUR TIMES A DAY BEFORE MEALS AND AT BEDTIME AS DIRECTED 120 capsule 0    fluticasone (FLONASE) 50 mcg/act nasal spray 1 spray into each nostril daily      glimepiride (AMARYL) 4 mg tablet Take 4 mg by mouth 2 (two) times a day      insulin glargine (LANTUS) 100 units/mL subcutaneous injection Inject 50 Units under the skin daily at bedtime        metoclopramide (REGLAN) 10 mg tablet Take 1 tablet (10 mg total) by mouth 4 (four) times a day (before meals and at bedtime) 120 tablet 2    montelukast (SINGULAIR) 10 mg tablet Take 10 mg by mouth daily at bedtime      morphine (MS CONTIN) 60 mg 12 hr tablet Take 75 mg by mouth 2 (two) times a day        omeprazole (PriLOSEC) 20 mg delayed release capsule Take 20 mg by mouth daily      ondansetron (ZOFRAN) 4 mg tablet TAKE 1 TABLET EVERY 8 HOURS AS NEEDED FOR NAUSEA AND/OR VOMITING 20 tablet 0    predniSONE 10 mg tablet Take by mouth daily       No current facility-administered medications for this visit        Current Outpatient Prescriptions on File Prior to Visit   Medication Sig    albuterol (ACCUNEB) 1 25 MG/3ML nebulizer solution Take 1 ampule by nebulization every 6 (six) hours as needed for wheezing    albuterol (PROVENTIL HFA,VENTOLIN HFA) 90 mcg/act inhaler Inhale 2 puffs every 6 (six) hours as needed for wheezing    carisoprodol (SOMA) 350 mg tablet Take 350 mg by mouth 2 (two) times a day    diazepam (VALIUM) 5 mg tablet Take 5 mg by mouth daily at bedtime as needed for anxiety    dicyclomine (BENTYL) 10 mg capsule TAKE 1 CAPSULE BY MOUTH FOUR TIMES A DAY BEFORE MEALS AND AT BEDTIME AS DIRECTED    fluticasone (FLONASE) 50 mcg/act nasal spray 1 spray into each nostril daily    glimepiride (AMARYL) 4 mg tablet Take 4 mg by mouth 2 (two) times a day    insulin glargine (LANTUS) 100 units/mL subcutaneous injection Inject 50 Units under the skin daily at bedtime      metoclopramide (REGLAN) 10 mg tablet Take 1 tablet (10 mg total) by mouth 4 (four) times a day (before meals and at bedtime)    montelukast (SINGULAIR) 10 mg tablet Take 10 mg by mouth daily at bedtime    morphine (MS CONTIN) 60 mg 12 hr tablet Take 75 mg by mouth 2 (two) times a day      omeprazole (PriLOSEC) 20 mg delayed release capsule Take 20 mg by mouth daily    ondansetron (ZOFRAN) 4 mg tablet TAKE 1 TABLET EVERY 8 HOURS AS NEEDED FOR NAUSEA AND/OR VOMITING    predniSONE 10 mg tablet Take by mouth daily     No current facility-administered medications on file prior to visit  She is allergic to nsaids       Review of Systems   Constitutional: Negative  HENT: Negative  Eyes: Negative  Respiratory: Negative  Cardiovascular: Negative  Gastrointestinal: Negative  Endocrine: Negative  Musculoskeletal: Negative  Objective:      /70   Pulse (!) 106   Resp 16   Ht 5' 8" (1 727 m)   Wt 126 kg (277 lb)   BMI 42 12 kg/m²          Physical Exam   Constitutional: She is oriented to person, place, and time  She appears well-developed and well-nourished  Morbid obesity   Eyes: No scleral icterus  Cardiovascular: Normal rate and regular rhythm  Pulmonary/Chest: Effort normal and breath sounds normal    Abdominal: Soft  Bowel sounds are normal    Lymphadenopathy:     She has no cervical adenopathy  Neurological: She is alert and oriented to person, place, and time  Skin: Skin is warm and dry  Psychiatric: She has a normal mood and affect

## 2018-10-01 DIAGNOSIS — R11.2 INTRACTABLE VOMITING WITH NAUSEA, UNSPECIFIED VOMITING TYPE: ICD-10-CM

## 2018-10-01 DIAGNOSIS — K80.20 GALL STONES: ICD-10-CM

## 2018-10-01 DIAGNOSIS — K31.84 GASTROPARESIS: ICD-10-CM

## 2018-10-01 RX ORDER — DICYCLOMINE HYDROCHLORIDE 10 MG/1
10 CAPSULE ORAL
Qty: 120 CAPSULE | Refills: 3 | Status: SHIPPED | OUTPATIENT
Start: 2018-10-01 | End: 2019-01-28 | Stop reason: SDUPTHER

## 2018-10-02 ENCOUNTER — OFFICE VISIT (OUTPATIENT)
Dept: OBGYN CLINIC | Facility: CLINIC | Age: 53
End: 2018-10-02
Payer: MEDICARE

## 2018-10-02 VITALS
SYSTOLIC BLOOD PRESSURE: 144 MMHG | HEART RATE: 102 BPM | WEIGHT: 293 LBS | HEIGHT: 68 IN | DIASTOLIC BLOOD PRESSURE: 82 MMHG | BODY MASS INDEX: 44.41 KG/M2

## 2018-10-02 DIAGNOSIS — M65.832 EXTENSOR TENOSYNOVITIS OF LEFT WRIST: ICD-10-CM

## 2018-10-02 DIAGNOSIS — M65.4 DE QUERVAIN'S TENOSYNOVITIS, BILATERAL: ICD-10-CM

## 2018-10-02 DIAGNOSIS — M65.831 EXTENSOR TENOSYNOVITIS OF RIGHT WRIST: Primary | ICD-10-CM

## 2018-10-02 PROCEDURE — 20600 DRAIN/INJ JOINT/BURSA W/O US: CPT | Performed by: PHYSICIAN ASSISTANT

## 2018-10-02 PROCEDURE — 99213 OFFICE O/P EST LOW 20 MIN: CPT | Performed by: ORTHOPAEDIC SURGERY

## 2018-10-02 RX ORDER — TRIAMCINOLONE ACETONIDE 40 MG/ML
40 INJECTION, SUSPENSION INTRA-ARTICULAR; INTRAMUSCULAR
Status: COMPLETED | OUTPATIENT
Start: 2018-10-02 | End: 2018-10-02

## 2018-10-02 RX ORDER — BUPIVACAINE HYDROCHLORIDE 2.5 MG/ML
1 INJECTION, SOLUTION INFILTRATION; PERINEURAL
Status: COMPLETED | OUTPATIENT
Start: 2018-10-02 | End: 2018-10-02

## 2018-10-02 RX ORDER — LIDOCAINE HYDROCHLORIDE 10 MG/ML
1 INJECTION, SOLUTION INFILTRATION; PERINEURAL
Status: COMPLETED | OUTPATIENT
Start: 2018-10-02 | End: 2018-10-02

## 2018-10-02 RX ADMIN — TRIAMCINOLONE ACETONIDE 40 MG: 40 INJECTION, SUSPENSION INTRA-ARTICULAR; INTRAMUSCULAR at 11:04

## 2018-10-02 RX ADMIN — LIDOCAINE HYDROCHLORIDE 1 ML: 10 INJECTION, SOLUTION INFILTRATION; PERINEURAL at 11:04

## 2018-10-02 RX ADMIN — BUPIVACAINE HYDROCHLORIDE 1 ML: 2.5 INJECTION, SOLUTION INFILTRATION; PERINEURAL at 11:04

## 2018-10-02 NOTE — PROGRESS NOTES
Chief Complaint   Patient presents with    Left Hand - Follow-up         Subjective    patient here to follow-up left wrist   Patient does have a history of bilateral de Quervain tenosynovitis which she has responded well to cortisone injections in the past   She had her right wrist injected not too long ago with improvement  Today she states left wrist splinting bothering her  She is unable to do surgery at this time to to health issues regarding her  at home  She would like to repeat left wrist injection today      ROS:   General: no fever, no chills  Respiratory:  No coughing, shortness of breath or wheezing  Cardiovascular:  No chest pain, no palpitations  Musculoskeletal: see HPI and PE  Neurological:  Positive for headaches and numbness  Psychiatric:  No suicide thoughts, positive for anxiety, no depression  Review of all other systems is negative    Past Medical History:   Diagnosis Date    Asthma     Chronic pain     Diabetes mellitus ([facility] [address] )     Gastroparesis        Current Outpatient Prescriptions on File Prior to Visit   Medication Sig Dispense Refill    albuterol (ACCUNEB) 1 25 MG/3ML nebulizer solution Take 1 ampule by nebulization every 6 (six) hours as needed for wheezing      albuterol (PROVENTIL HFA,VENTOLIN HFA) 90 mcg/act inhaler Inhale 2 puffs every 6 (six) hours as needed for wheezing      carisoprodol (SOMA) 350 mg tablet Take 350 mg by mouth 2 (two) times a day      diazepam (VALIUM) 5 mg tablet Take 5 mg by mouth daily at bedtime as needed for anxiety      dicyclomine (BENTYL) 10 mg capsule Take 1 capsule (10 mg total) by mouth 4 (four) times a day (before meals and at bedtime) 120 capsule 3    fluticasone (FLONASE) 50 mcg/act nasal spray 1 spray into each nostril daily      glimepiride (AMARYL) 4 mg tablet Take 4 mg by mouth 2 (two) times a day      insulin glargine (LANTUS) 100 units/mL subcutaneous injection Inject 50 Units under the skin daily at bedtime        metoclopramide (REGLAN) 10 mg tablet TAKE 1 TABLET BY MOUTH 4 TIMES A DAY (BEFORE MEALS AND AT BEDTIME) 120 tablet 2    montelukast (SINGULAIR) 10 mg tablet Take 10 mg by mouth daily at bedtime      morphine (MS CONTIN) 60 mg 12 hr tablet Take 75 mg by mouth 2 (two) times a day        omeprazole (PriLOSEC) 20 mg delayed release capsule Take 20 mg by mouth daily      ondansetron (ZOFRAN) 4 mg tablet Take 1 tablet (4 mg total) by mouth every 8 (eight) hours as needed for nausea or vomiting 30 tablet 2    predniSONE 10 mg tablet Take by mouth daily       No current facility-administered medications on file prior to visit  Allergies   Allergen Reactions    Nsaids GI Intolerance       Social History       Physical Exam:    Vitals:    10/02/18 1012   BP: 144/82   Pulse: 102   Weight: 134 kg (294 lb 12 8 oz)   Height: 5' 8" (1 727 m)       General Appearance:  Alert, cooperative, no distress, appears stated age   Lungs:   respirations unlabored   Heart:  Normal heart rate noted   Abdomen:   Soft, non-tender,  no masses   Extremities: Extremities normal, atraumatic, no cyanosis or edema   Pulses: 2+ and symmetric   Skin: Skin color, texture, turgor normal, no rashes or lesions   Neurologic: Normal         Ortho Exam   examination left wrist shows skin intact with no erythema noted  No visible deformities noted  Patient has full pain-free active range of motion of the wrist and hand  Positive Finkelstein's noted  Sensation intact light touch in median, ulnar and radial nerve distributions  2+ radial pulse    ASSESSMENT:    Anna Marie was seen today for follow-up  Diagnoses and all orders for this visit:    Extensor tenosynovitis of right wrist    De Quervain's tenosynovitis, bilateral    Extensor tenosynovitis of left wrist          PLAN:  Patient with history of bilateral de Quervain tenosynovitis with the left wrist being worse today   Patient received injection into the 1st dorsal compartment of the left wrist with no complications noted  Will use ice and injection site for 20 minutes at a time  No strenuous activity for the next 24-48 hours    Will follow up with us as needed      Hand/upper extremity injection  Date/Time: 10/2/2018 11:04 AM  Consent given by: patient  Site marked: site marked  Timeout: Immediately prior to procedure a time out was called to verify the correct patient, procedure, equipment, support staff and site/side marked as required   Supporting Documentation  Indications: pain and tendon swelling   Procedure Details  Condition:de Quervain's tenosynovitis Site: L extensor compartment 1   Preparation: Patient was prepped and draped in the usual sterile fashion  Needle gauge: 21   Ultrasound guidance: no  Approach: radial  Medications administered: 1 mL bupivacaine 0 25 %; 1 mL lidocaine 1 %; 40 mg triamcinolone acetonide 40 mg/mL  Patient tolerance: patient tolerated the procedure well with no immediate complications  Dressing:  Sterile dressing applied

## 2018-10-19 ENCOUNTER — APPOINTMENT (EMERGENCY)
Dept: RADIOLOGY | Facility: HOSPITAL | Age: 53
End: 2018-10-19
Payer: MEDICARE

## 2018-10-19 ENCOUNTER — HOSPITAL ENCOUNTER (EMERGENCY)
Facility: HOSPITAL | Age: 53
Discharge: HOME | End: 2018-10-19
Attending: EMERGENCY MEDICINE | Admitting: EMERGENCY MEDICINE
Payer: MEDICARE

## 2018-10-19 VITALS
SYSTOLIC BLOOD PRESSURE: 117 MMHG | BODY MASS INDEX: 41.07 KG/M2 | DIASTOLIC BLOOD PRESSURE: 83 MMHG | RESPIRATION RATE: 18 BRPM | OXYGEN SATURATION: 96 % | TEMPERATURE: 99.7 F | HEART RATE: 100 BPM | WEIGHT: 271 LBS | HEIGHT: 68 IN

## 2018-10-19 DIAGNOSIS — M25.569 KNEE PAIN: Primary | ICD-10-CM

## 2018-10-19 PROCEDURE — 99283 EMERGENCY DEPT VISIT LOW MDM: CPT

## 2018-10-19 PROCEDURE — 73564 X-RAY EXAM KNEE 4 OR MORE: CPT

## 2018-10-19 PROCEDURE — 73502 X-RAY EXAM HIP UNI 2-3 VIEWS: CPT

## 2018-10-19 NOTE — ED NOTES
Placed knee immobilizer, gave patient walker and demonstrated use   Walker adj to patient height and return demonstration provided     Princess Rodrigez RN  10/19/18 9706

## 2018-10-19 NOTE — ED PROVIDER NOTES
History  Chief Complaint   Patient presents with    Knee Pain     Patient stated that her right knee "gave out" and went down  Hx of chronic pain  Takes muscle relaxers and morphine     58-year-old female patient with chronic pain presents emergency department for evaluation of knee and hip injury sustained in a fall on the steps today  The patient states she did not fall down the steps but she was losing her balance and sat herself backwards  The patient states that she hurt her hip at that point in time  The patient states she tried to get up was unable to do so  Patient is very obviously under the influence of her pain medications currently  The patient did not hit her head, she states she did not lose consciousness, the patient has no other noted injuries  She is tender above the hip and knee and will have x-rays of both done  History provided by:  Patient   used: No    Fall   Mechanism of injury: fall    Injury location:  Pelvis  Pelvic injury location:  R hip  Incident location:  Around machinery  Arrived directly from scene: yes    Fall:     Fall occurred:  Down stairs    Point of impact:  Head    Entrapped after fall: no    Protective equipment: none    Associated symptoms: no blindness, no chest pain, no headaches and no loss of consciousness        Prior to Admission Medications   Prescriptions Last Dose Informant Patient Reported? Taking?    albuterol (ACCUNEB) 1 25 MG/3ML nebulizer solution  Self Yes No   Sig: Take 1 ampule by nebulization every 6 (six) hours as needed for wheezing   albuterol (PROVENTIL HFA,VENTOLIN HFA) 90 mcg/act inhaler  Self Yes No   Sig: Inhale 2 puffs every 6 (six) hours as needed for wheezing   carisoprodol (SOMA) 350 mg tablet  Self Yes No   Sig: Take 350 mg by mouth 2 (two) times a day   diazepam (VALIUM) 5 mg tablet  Self Yes No   Sig: Take 5 mg by mouth daily at bedtime as needed for anxiety   dicyclomine (BENTYL) 10 mg capsule  Self No No Sig: Take 1 capsule (10 mg total) by mouth 4 (four) times a day (before meals and at bedtime)   fluticasone (FLONASE) 50 mcg/act nasal spray  Self Yes No   Si spray into each nostril daily   glimepiride (AMARYL) 4 mg tablet  Self Yes No   Sig: Take 4 mg by mouth 2 (two) times a day   insulin glargine (LANTUS) 100 units/mL subcutaneous injection  Self Yes No   Sig: Inject 50 Units under the skin daily at bedtime     metoclopramide (REGLAN) 10 mg tablet  Self No No   Sig: TAKE 1 TABLET BY MOUTH 4 TIMES A DAY (BEFORE MEALS AND AT BEDTIME)   montelukast (SINGULAIR) 10 mg tablet  Self Yes No   Sig: Take 10 mg by mouth daily at bedtime   morphine (MS CONTIN) 60 mg 12 hr tablet  Self Yes No   Sig: Take 75 mg by mouth 2 (two) times a day     omeprazole (PriLOSEC) 20 mg delayed release capsule  Self Yes No   Sig: Take 20 mg by mouth daily   ondansetron (ZOFRAN) 4 mg tablet  Self No No   Sig: Take 1 tablet (4 mg total) by mouth every 8 (eight) hours as needed for nausea or vomiting   predniSONE 10 mg tablet  Self Yes No   Sig: Take by mouth daily      Facility-Administered Medications: None       Past Medical History:   Diagnosis Date    Asthma     Chronic pain     Diabetes mellitus (HCC)     Gastroparesis        Past Surgical History:   Procedure Laterality Date    CERVICAL LAMINECTOMY      HYSTERECTOMY      JOINT REPLACEMENT Bilateral     knee    TOE AMPUTATION Right 2018    Procedure: AMPUTATION TOE, RIGHT GREAT TOE;  Surgeon: Jessica Liz DPM;  Location: [facility];  Service: Podiatry       Family History   Problem Relation Age of Onset    Diabetes Mother     Diabetes Maternal Grandmother      I have reviewed and agree with the history as documented  Social History   Substance Use Topics    Smoking status: Never Smoker    Smokeless tobacco: Never Used    Alcohol use No        Review of Systems   Eyes: Negative for blindness  Cardiovascular: Negative for chest pain     Neurological: Negative for loss of consciousness and headaches  All other systems reviewed and are negative  Physical Exam  Physical Exam   Constitutional: She is oriented to person, place, and time  She appears well-developed and well-nourished  HENT:   Head: Normocephalic and atraumatic  Right Ear: External ear normal    Left Ear: External ear normal    Eyes: Conjunctivae and EOM are normal    Neck: No JVD present  No tracheal deviation present  No thyromegaly present  Cardiovascular: Normal rate  Pulmonary/Chest: Effort normal and breath sounds normal  No stridor  Abdominal: Soft  She exhibits no distension and no mass  There is no tenderness  There is no guarding  No hernia  Musculoskeletal: Normal range of motion  She exhibits no edema, tenderness or deformity  Legs:  Lymphadenopathy:     She has no cervical adenopathy  Neurological: She is alert and oriented to person, place, and time  Skin: Skin is warm  No rash noted  No erythema  No pallor  Psychiatric: She has a normal mood and affect  Her behavior is normal    Nursing note and vitals reviewed  Vital Signs  ED Triage Vitals   Temperature Pulse Respirations Blood Pressure SpO2   10/19/18 1747 10/19/18 1745 10/19/18 1745 10/19/18 1745 10/19/18 1745   99 7 °F (37 6 °C) (!) 130 22 117/83 (!) 89 %      Temp Source Heart Rate Source Patient Position - Orthostatic VS BP Location FiO2 (%)   10/19/18 1747 10/19/18 1745 10/19/18 1745 10/19/18 1745 --   Oral Monitor Lying Right arm       Pain Score       10/19/18 1745       6           Vitals:    10/19/18 1745 10/19/18 2032   BP: 117/83    Pulse: (!) 130 100   Patient Position - Orthostatic VS: Lying        Visual Acuity      ED Medications  Medications - No data to display    Diagnostic Studies  Results Reviewed     None                 XR knee 4+ vw right injury   Final Result by Quentin Cooper MD (10/19 1954)      No acute osseous abnormality              Workstation performed: [id number]         XR hip/pelv 2-3 vws right if performed   Final Result by Marianela Bond MD (10/19 1953)      No acute osseous abnormality  Workstation performed: [id number]                    Procedures  Procedures       Phone Contacts  ED Phone Contact    ED Course                               MDM  Number of Diagnoses or Management Options  Knee pain: new and requires workup     Amount and/or Complexity of Data Reviewed  Tests in the radiology section of CPT®: reviewed and ordered  Decide to obtain previous medical records or to obtain history from someone other than the patient: yes  Review and summarize past medical records: yes    Patient Progress  Patient progress: stable    CritCare Time    Disposition  Final diagnoses:   Knee pain     Time reflects when diagnosis was documented in both MDM as applicable and the Disposition within this note     Time User Action Codes Description Comment    10/19/2018  7:30 PM Tyracynthia Chirinos Add [M26 989] Knee pain       ED Disposition     ED Disposition Condition Comment    Discharge  [address] discharge to home/self care      Condition at discharge: Stable        Follow-up Information     Follow up With Specialties Details Why [address] [place] [id number]  Fabi Dumont MD Orthopedic Surgery   [facility]  [address] [place] [address]            Discharge Medication List as of 10/19/2018  7:41 PM      CONTINUE these medications which have NOT CHANGED    Details   albuterol (ACCUNEB) 1 25 MG/3ML nebulizer solution Take 1 ampule by nebulization every 6 (six) hours as needed for wheezing, Historical Med      albuterol (PROVENTIL HFA,VENTOLIN HFA) 90 mcg/act inhaler Inhale 2 puffs every 6 (six) hours as needed for wheezing, Historical Med      carisoprodol (SOMA) 350 mg tablet Take 350 mg by mouth 2 (two) times a day, Historical Med      diazepam (VALIUM) 5 mg tablet Take 5 mg by mouth daily at bedtime as needed for anxiety, Historical Med      dicyclomine (BENTYL) 10 mg capsule Take 1 capsule (10 mg total) by mouth 4 (four) times a day (before meals and at bedtime), Starting Mon 10/1/2018, Normal      fluticasone (FLONASE) 50 mcg/act nasal spray 1 spray into each nostril daily, Historical Med      glimepiride (AMARYL) 4 mg tablet Take 4 mg by mouth 2 (two) times a day, Historical Med      insulin glargine (LANTUS) 100 units/mL subcutaneous injection Inject 50 Units under the skin daily at bedtime  , Historical Med      metoclopramide (REGLAN) 10 mg tablet TAKE 1 TABLET BY MOUTH 4 TIMES A DAY (BEFORE MEALS AND AT BEDTIME), Normal      montelukast (SINGULAIR) 10 mg tablet Take 10 mg by mouth daily at bedtime, Historical Med      morphine (MS CONTIN) 60 mg 12 hr tablet Take 75 mg by mouth 2 (two) times a day  , Historical Med      omeprazole (PriLOSEC) 20 mg delayed release capsule Take 20 mg by mouth daily, Historical Med      ondansetron (ZOFRAN) 4 mg tablet Take 1 tablet (4 mg total) by mouth every 8 (eight) hours as needed for nausea or vomiting, Starting Tue 9/18/2018, Normal      predniSONE 10 mg tablet Take by mouth daily, Historical Med           No discharge procedures on file      ED Provider  Electronically Signed by           Josefa Higgins DO  10/20/18 0398

## 2018-11-06 ENCOUNTER — OFFICE VISIT (OUTPATIENT)
Dept: OBGYN CLINIC | Facility: CLINIC | Age: 53
End: 2018-11-06
Payer: MEDICARE

## 2018-11-06 VITALS
HEART RATE: 120 BPM | WEIGHT: 286.8 LBS | RESPIRATION RATE: 20 BRPM | BODY MASS INDEX: 43.47 KG/M2 | SYSTOLIC BLOOD PRESSURE: 153 MMHG | HEIGHT: 68 IN | DIASTOLIC BLOOD PRESSURE: 79 MMHG

## 2018-11-06 DIAGNOSIS — M54.16 LUMBAR RADICULOPATHY: ICD-10-CM

## 2018-11-06 DIAGNOSIS — M17.11 PRIMARY OSTEOARTHRITIS OF RIGHT KNEE: Primary | ICD-10-CM

## 2018-11-06 PROCEDURE — 99213 OFFICE O/P EST LOW 20 MIN: CPT | Performed by: ORTHOPAEDIC SURGERY

## 2018-11-06 NOTE — PROGRESS NOTES
HPI:  Patient is a 46y o  year old LHD female who presents with chief complaint of a 4 day episode of pain that began on October 19th 2018  On that date her right knee gave out and she sat down on some steps  After that she had trouble walking  She described pain in the lateral aspect of the hip and in the right knee  She was seen in the emergency room on that date  I reviewed x-rays from that emergency room visit today  Her hip x-ray show minimal degenerative change and her knee x-rays show severe osteoarthritis  After discharge from the emergency room patient's symptoms improved over the next 4 days  She is now able to ambulate well  She does still have some right knee pain  Patient does report a history of back problems and has had lumbar spine surgery        ROS:   General: No fever, no chills, no weight loss, no weight gain  HEENT:  No loss of hearing, no nose bleeds, no sore throat  Eyes:  No eye pain, no red eyes, no visual disturbance  Respiratory:  No cough, no shortness of breath, no wheezing  Cardiovascular:  No chest pain, no palpitations, no edema  GI: No abdominal pain, no nausea, no vomiting  Endocrine: No frequent urination, no excessive thirst  Urinary:  No dysuria, no hematuria, no incontinence  Musculoskeletal: see HPI and PE  Skin:  No rash, no wounds  Neurological:  No dizziness, no headache, no numbness  Psychiatric:  No difficulty concentrating, no depression, no suicide thoughts, no anxiety  Review of all other systems is negative    PMH:  Past Medical History:   Diagnosis Date    Asthma     Chronic pain     Diabetes mellitus (HCC)     Gastroparesis        PSH:  Past Surgical History:   Procedure Laterality Date    CERVICAL LAMINECTOMY      HYSTERECTOMY      JOINT REPLACEMENT Bilateral     knee    TOE AMPUTATION Right 2/2/2018    Procedure: AMPUTATION TOE, RIGHT GREAT TOE;  Surgeon: Krishna Beasley DPM;  Location: [facility] OR;  Service: Podiatry       Medications:  Current Outpatient Prescriptions   Medication Sig Dispense Refill    albuterol (ACCUNEB) 1 25 MG/3ML nebulizer solution Take 1 ampule by nebulization every 6 (six) hours as needed for wheezing      albuterol (PROVENTIL HFA,VENTOLIN HFA) 90 mcg/act inhaler Inhale 2 puffs every 6 (six) hours as needed for wheezing      carisoprodol (SOMA) 350 mg tablet Take 350 mg by mouth 2 (two) times a day      diazepam (VALIUM) 5 mg tablet Take 5 mg by mouth daily at bedtime as needed for anxiety      dicyclomine (BENTYL) 10 mg capsule Take 1 capsule (10 mg total) by mouth 4 (four) times a day (before meals and at bedtime) 120 capsule 3    fluticasone (FLONASE) 50 mcg/act nasal spray 1 spray into each nostril daily      glimepiride (AMARYL) 4 mg tablet Take 4 mg by mouth 2 (two) times a day      insulin glargine (LANTUS) 100 units/mL subcutaneous injection Inject 50 Units under the skin daily at bedtime        metoclopramide (REGLAN) 10 mg tablet TAKE 1 TABLET BY MOUTH 4 TIMES A DAY (BEFORE MEALS AND AT BEDTIME) 120 tablet 2    montelukast (SINGULAIR) 10 mg tablet Take 10 mg by mouth daily at bedtime      morphine (MS CONTIN) 60 mg 12 hr tablet Take 75 mg by mouth 2 (two) times a day        omeprazole (PriLOSEC) 20 mg delayed release capsule Take 20 mg by mouth daily      ondansetron (ZOFRAN) 4 mg tablet Take 1 tablet (4 mg total) by mouth every 8 (eight) hours as needed for nausea or vomiting 30 tablet 2    predniSONE 10 mg tablet Take by mouth daily       No current facility-administered medications for this visit  Allergies: Allergies   Allergen Reactions    Nsaids GI Intolerance       Family History:  Family History   Problem Relation Age of Onset    Diabetes Mother     Diabetes Maternal Grandmother        Social History:  Social History     Occupational History    Not on file       Social History Main Topics    Smoking status: Never Smoker    Smokeless tobacco: Never Used    Alcohol use Yes      Comment: rarely    Drug use: No    Sexual activity: Not on file       Physical Exam:  General :  Alert, cooperative, no distress, appears stated age  Blood pressure 153/79, pulse (!) 120, resp  rate 20, height 5' 8" (1 727 m), weight 130 kg (286 lb 12 8 oz)  Head:  Normocephalic, without obvious abnormality, atraumatic   Eyes:  Conjunctiva/corneas clear, EOM's intact,   Ears: Both ears normal appearance, no hearing deficits  Nose: Nares normal, septum midline, no drainage    Neck: Supple,  trachea midline, no adenopathy, no tenderness, no mass   Back:   Symmetric, no curvature, ROM normal, no tenderness   Lungs:   Respirations unlabored   Chest Wall:  No tenderness or deformity   Extremities: Extremities normal, atraumatic, no cyanosis or edema      Pulses: 2+ and symmetric   Skin: Skin color, texture, turgor normal, no rashes or lesions      Neurologic: Bilateral decrease in sensation stocking distribution           Right Knee Exam   Swelling: Mild  Effusion: Yes    Tenderness   The patient is experiencing tenderness in the medial joint line, patella  Range of Motion   Extension: 0  Flexion:     110    Tests   McMurrays:  Medial - Negative        Lachman:  Anterior - Negative      Drawer:       Anterior - Negative    Posterior - Negative  Varus:  Negative  Valgus: Negative  Patellar Apprehension: No    Right Hip Exam     Range of Motion   Normal right hip ROM    Muscle Strength   Normal right hip strength    Tests   Iam: Negative  Mike:  Negative        Lumbar Spine Exam    No vertebral point tenderness  Decreased ROM    Imaging Studies: The following imaging studies were reviewed in office today  My findings are noted  AP pelvis and right hip 10/19/2018: No fracture  No degenerative change  Right knee x-ray 10/19/2018:  Severe tricompartmental osteoarthritis    Assessment  Encounter Diagnoses   Name Primary?  Primary osteoarthritis of right knee Yes    Lumbar radiculopathy          Plan:   We are going to start patient on some physical therapy for her knee and back  We will see her back in 4 weeks  Now and consider Depo-Medrol injection into the right knee at that  time

## 2018-11-08 ENCOUNTER — HOSPITAL ENCOUNTER (INPATIENT)
Facility: HOSPITAL | Age: 53
LOS: 5 days | Discharge: HOME | DRG: 418 | End: 2018-11-14
Attending: EMERGENCY MEDICINE | Admitting: INTERNAL MEDICINE
Payer: MEDICARE

## 2018-11-08 ENCOUNTER — APPOINTMENT (EMERGENCY)
Dept: CT IMAGING | Facility: HOSPITAL | Age: 53
DRG: 418 | End: 2018-11-08
Payer: MEDICARE

## 2018-11-08 DIAGNOSIS — R11.2 INTRACTABLE NAUSEA AND VOMITING: Primary | ICD-10-CM

## 2018-11-08 DIAGNOSIS — E66.9 OBESE: ICD-10-CM

## 2018-11-08 DIAGNOSIS — K80.20 GALL STONES: ICD-10-CM

## 2018-11-08 LAB
ALBUMIN SERPL BCP-MCNC: 3.6 G/DL (ref 3.5–5)
ALP SERPL-CCNC: 121 U/L (ref 46–116)
ALT SERPL W P-5'-P-CCNC: 21 U/L (ref 12–78)
AMORPH PHOS CRY URNS QL MICRO: ABNORMAL /HPF
ANION GAP SERPL CALCULATED.3IONS-SCNC: 9 MMOL/L (ref 4–13)
AST SERPL W P-5'-P-CCNC: 19 U/L (ref 5–45)
BACTERIA UR QL AUTO: ABNORMAL /HPF
BASOPHILS # BLD AUTO: 0.08 THOUSANDS/ΜL (ref 0–0.1)
BASOPHILS NFR BLD AUTO: 1 % (ref 0–1)
BILIRUB SERPL-MCNC: 1.8 MG/DL (ref 0.2–1)
BILIRUB UR QL STRIP: NEGATIVE
BUN SERPL-MCNC: 13 MG/DL (ref 5–25)
CALCIUM SERPL-MCNC: 9.7 MG/DL (ref 8.3–10.1)
CHLORIDE SERPL-SCNC: 97 MMOL/L (ref 100–108)
CLARITY UR: CLEAR
CO2 SERPL-SCNC: 30 MMOL/L (ref 21–32)
COLOR UR: YELLOW
CREAT SERPL-MCNC: 0.88 MG/DL (ref 0.6–1.3)
EOSINOPHIL # BLD AUTO: 0.08 THOUSAND/ΜL (ref 0–0.61)
EOSINOPHIL NFR BLD AUTO: 1 % (ref 0–6)
ERYTHROCYTE [DISTWIDTH] IN BLOOD BY AUTOMATED COUNT: 13.3 % (ref 11.6–15.1)
EST. AVERAGE GLUCOSE BLD GHB EST-MCNC: 237 MG/DL
GFR SERPL CREATININE-BSD FRML MDRD: 75 ML/MIN/1.73SQ M
GLUCOSE SERPL-MCNC: 237 MG/DL (ref 65–140)
GLUCOSE SERPL-MCNC: 294 MG/DL (ref 65–140)
GLUCOSE UR STRIP-MCNC: ABNORMAL MG/DL
HBA1C MFR BLD: 9.9 % (ref 4.2–6.3)
HCT VFR BLD AUTO: 43.2 % (ref 34.8–46.1)
HGB BLD-MCNC: 13.9 G/DL (ref 11.5–15.4)
HGB UR QL STRIP.AUTO: NEGATIVE
IMM GRANULOCYTES # BLD AUTO: 0.04 THOUSAND/UL (ref 0–0.2)
IMM GRANULOCYTES NFR BLD AUTO: 0 % (ref 0–2)
KETONES UR STRIP-MCNC: ABNORMAL MG/DL
LEUKOCYTE ESTERASE UR QL STRIP: NEGATIVE
LYMPHOCYTES # BLD AUTO: 2.35 THOUSANDS/ΜL (ref 0.6–4.47)
LYMPHOCYTES NFR BLD AUTO: 19 % (ref 14–44)
MAGNESIUM SERPL-MCNC: 1.5 MG/DL (ref 1.6–2.6)
MCH RBC QN AUTO: 26.9 PG (ref 26.8–34.3)
MCHC RBC AUTO-ENTMCNC: 32.2 G/DL (ref 31.4–37.4)
MCV RBC AUTO: 84 FL (ref 82–98)
MONOCYTES # BLD AUTO: 1.13 THOUSAND/ΜL (ref 0.17–1.22)
MONOCYTES NFR BLD AUTO: 9 % (ref 4–12)
MUCOUS THREADS UR QL AUTO: ABNORMAL
NEUTROPHILS # BLD AUTO: 8.86 THOUSANDS/ΜL (ref 1.85–7.62)
NEUTS SEG NFR BLD AUTO: 70 % (ref 43–75)
NITRITE UR QL STRIP: NEGATIVE
NON-SQ EPI CELLS URNS QL MICRO: ABNORMAL /HPF
NRBC BLD AUTO-RTO: 0 /100 WBCS
PH UR STRIP.AUTO: 7.5 [PH] (ref 4.5–8)
PLATELET # BLD AUTO: 298 THOUSANDS/UL (ref 149–390)
PLATELET # BLD AUTO: 344 THOUSANDS/UL (ref 149–390)
PMV BLD AUTO: 12.3 FL (ref 8.9–12.7)
PMV BLD AUTO: 12.5 FL (ref 8.9–12.7)
POTASSIUM SERPL-SCNC: 3.4 MMOL/L (ref 3.5–5.3)
PROT SERPL-MCNC: 8.4 G/DL (ref 6.4–8.2)
PROT UR STRIP-MCNC: ABNORMAL MG/DL
RBC # BLD AUTO: 5.17 MILLION/UL (ref 3.81–5.12)
RBC #/AREA URNS AUTO: ABNORMAL /HPF
SODIUM SERPL-SCNC: 136 MMOL/L (ref 136–145)
SP GR UR STRIP.AUTO: 1.02 (ref 1–1.03)
TSH SERPL DL<=0.05 MIU/L-ACNC: 0.59 UIU/ML (ref 0.36–3.74)
UROBILINOGEN UR QL STRIP.AUTO: 0.2 E.U./DL
WBC # BLD AUTO: 12.54 THOUSAND/UL (ref 4.31–10.16)
WBC #/AREA URNS AUTO: ABNORMAL /HPF

## 2018-11-08 PROCEDURE — 82948 REAGENT STRIP/BLOOD GLUCOSE: CPT

## 2018-11-08 PROCEDURE — 80053 COMPREHEN METABOLIC PANEL: CPT | Performed by: EMERGENCY MEDICINE

## 2018-11-08 PROCEDURE — 36415 COLL VENOUS BLD VENIPUNCTURE: CPT | Performed by: EMERGENCY MEDICINE

## 2018-11-08 PROCEDURE — 85049 AUTOMATED PLATELET COUNT: CPT | Performed by: INTERNAL MEDICINE

## 2018-11-08 PROCEDURE — 83036 HEMOGLOBIN GLYCOSYLATED A1C: CPT | Performed by: INTERNAL MEDICINE

## 2018-11-08 PROCEDURE — 84443 ASSAY THYROID STIM HORMONE: CPT | Performed by: INTERNAL MEDICINE

## 2018-11-08 PROCEDURE — 96374 THER/PROPH/DIAG INJ IV PUSH: CPT

## 2018-11-08 PROCEDURE — 74177 CT ABD & PELVIS W/CONTRAST: CPT

## 2018-11-08 PROCEDURE — C9113 INJ PANTOPRAZOLE SODIUM, VIA: HCPCS | Performed by: INTERNAL MEDICINE

## 2018-11-08 PROCEDURE — 90682 RIV4 VACC RECOMBINANT DNA IM: CPT | Performed by: INTERNAL MEDICINE

## 2018-11-08 PROCEDURE — 83735 ASSAY OF MAGNESIUM: CPT | Performed by: INTERNAL MEDICINE

## 2018-11-08 PROCEDURE — 96361 HYDRATE IV INFUSION ADD-ON: CPT

## 2018-11-08 PROCEDURE — 85025 COMPLETE CBC W/AUTO DIFF WBC: CPT | Performed by: EMERGENCY MEDICINE

## 2018-11-08 PROCEDURE — 81001 URINALYSIS AUTO W/SCOPE: CPT | Performed by: EMERGENCY MEDICINE

## 2018-11-08 PROCEDURE — 96375 TX/PRO/DX INJ NEW DRUG ADDON: CPT

## 2018-11-08 PROCEDURE — 99285 EMERGENCY DEPT VISIT HI MDM: CPT

## 2018-11-08 RX ORDER — PANTOPRAZOLE SODIUM 40 MG/1
40 INJECTION, POWDER, FOR SOLUTION INTRAVENOUS EVERY 12 HOURS SCHEDULED
Status: DISCONTINUED | OUTPATIENT
Start: 2018-11-08 | End: 2018-11-14 | Stop reason: HOSPADM

## 2018-11-08 RX ORDER — ALBUTEROL SULFATE 2.5 MG/3ML
1.25 SOLUTION RESPIRATORY (INHALATION) EVERY 6 HOURS PRN
Status: DISCONTINUED | OUTPATIENT
Start: 2018-11-08 | End: 2018-11-08

## 2018-11-08 RX ORDER — DIPHENHYDRAMINE HYDROCHLORIDE 50 MG/ML
25 INJECTION INTRAMUSCULAR; INTRAVENOUS EVERY 6 HOURS PRN
Status: DISCONTINUED | OUTPATIENT
Start: 2018-11-08 | End: 2018-11-14 | Stop reason: HOSPADM

## 2018-11-08 RX ORDER — INSULIN GLARGINE 100 [IU]/ML
50 INJECTION, SOLUTION SUBCUTANEOUS
Status: DISCONTINUED | OUTPATIENT
Start: 2018-11-08 | End: 2018-11-14 | Stop reason: HOSPADM

## 2018-11-08 RX ORDER — FLUTICASONE PROPIONATE 50 MCG
1 SPRAY, SUSPENSION (ML) NASAL DAILY
Status: DISCONTINUED | OUTPATIENT
Start: 2018-11-09 | End: 2018-11-14 | Stop reason: HOSPADM

## 2018-11-08 RX ORDER — ALBUTEROL SULFATE 2.5 MG/3ML
2.5 SOLUTION RESPIRATORY (INHALATION) EVERY 6 HOURS PRN
Status: DISCONTINUED | OUTPATIENT
Start: 2018-11-08 | End: 2018-11-14 | Stop reason: HOSPADM

## 2018-11-08 RX ORDER — PROCHLORPERAZINE 25 MG
25 SUPPOSITORY, RECTAL RECTAL ONCE
Status: COMPLETED | OUTPATIENT
Start: 2018-11-08 | End: 2018-11-08

## 2018-11-08 RX ORDER — ONDANSETRON 2 MG/ML
4 INJECTION INTRAMUSCULAR; INTRAVENOUS EVERY 4 HOURS
Status: DISCONTINUED | OUTPATIENT
Start: 2018-11-08 | End: 2018-11-09

## 2018-11-08 RX ORDER — ACETAMINOPHEN 325 MG/1
650 TABLET ORAL EVERY 6 HOURS PRN
Status: DISCONTINUED | OUTPATIENT
Start: 2018-11-08 | End: 2018-11-14 | Stop reason: HOSPADM

## 2018-11-08 RX ORDER — METOCLOPRAMIDE HYDROCHLORIDE 5 MG/ML
10 INJECTION INTRAMUSCULAR; INTRAVENOUS EVERY 6 HOURS SCHEDULED
Status: DISCONTINUED | OUTPATIENT
Start: 2018-11-08 | End: 2018-11-09

## 2018-11-08 RX ORDER — DIPHENHYDRAMINE HYDROCHLORIDE 50 MG/ML
50 INJECTION INTRAMUSCULAR; INTRAVENOUS ONCE
Status: COMPLETED | OUTPATIENT
Start: 2018-11-08 | End: 2018-11-08

## 2018-11-08 RX ORDER — HEPARIN SODIUM 5000 [USP'U]/ML
5000 INJECTION, SOLUTION INTRAVENOUS; SUBCUTANEOUS EVERY 8 HOURS SCHEDULED
Status: DISCONTINUED | OUTPATIENT
Start: 2018-11-08 | End: 2018-11-14 | Stop reason: HOSPADM

## 2018-11-08 RX ORDER — METOCLOPRAMIDE HYDROCHLORIDE 5 MG/ML
10 INJECTION INTRAMUSCULAR; INTRAVENOUS ONCE
Status: COMPLETED | OUTPATIENT
Start: 2018-11-08 | End: 2018-11-08

## 2018-11-08 RX ORDER — SODIUM CHLORIDE 9 MG/ML
75 INJECTION, SOLUTION INTRAVENOUS CONTINUOUS
Status: DISCONTINUED | OUTPATIENT
Start: 2018-11-08 | End: 2018-11-11

## 2018-11-08 RX ORDER — MONTELUKAST SODIUM 10 MG/1
10 TABLET ORAL
Status: DISCONTINUED | OUTPATIENT
Start: 2018-11-08 | End: 2018-11-14 | Stop reason: HOSPADM

## 2018-11-08 RX ADMIN — INSULIN LISPRO 2 UNITS: 100 INJECTION, SOLUTION INTRAVENOUS; SUBCUTANEOUS at 22:05

## 2018-11-08 RX ADMIN — ONDANSETRON HYDROCHLORIDE 4 MG: 2 SOLUTION INTRAMUSCULAR; INTRAVENOUS at 20:49

## 2018-11-08 RX ADMIN — PANTOPRAZOLE SODIUM 40 MG: 40 INJECTION, POWDER, FOR SOLUTION INTRAVENOUS at 20:49

## 2018-11-08 RX ADMIN — ONDANSETRON HYDROCHLORIDE 4 MG: 2 SOLUTION INTRAMUSCULAR; INTRAVENOUS at 23:47

## 2018-11-08 RX ADMIN — INSULIN GLARGINE 50 UNITS: 100 INJECTION, SOLUTION SUBCUTANEOUS at 21:32

## 2018-11-08 RX ADMIN — DIPHENHYDRAMINE HYDROCHLORIDE 25 MG: 50 INJECTION, SOLUTION INTRAMUSCULAR; INTRAVENOUS at 18:39

## 2018-11-08 RX ADMIN — SODIUM CHLORIDE 1000 ML: 0.9 INJECTION, SOLUTION INTRAVENOUS at 12:41

## 2018-11-08 RX ADMIN — METOCLOPRAMIDE 10 MG: 5 INJECTION, SOLUTION INTRAMUSCULAR; INTRAVENOUS at 23:47

## 2018-11-08 RX ADMIN — IOHEXOL 100 ML: 350 INJECTION, SOLUTION INTRAVENOUS at 16:27

## 2018-11-08 RX ADMIN — PROCHLORPERAZINE 25 MG: 25 SUPPOSITORY RECTAL at 16:49

## 2018-11-08 RX ADMIN — DIPHENHYDRAMINE HYDROCHLORIDE 50 MG: 50 INJECTION, SOLUTION INTRAMUSCULAR; INTRAVENOUS at 12:40

## 2018-11-08 RX ADMIN — METOCLOPRAMIDE 10 MG: 5 INJECTION, SOLUTION INTRAMUSCULAR; INTRAVENOUS at 18:38

## 2018-11-08 RX ADMIN — MORPHINE SULFATE 2 MG: 2 INJECTION, SOLUTION INTRAMUSCULAR; INTRAVENOUS at 21:32

## 2018-11-08 RX ADMIN — MORPHINE SULFATE 2 MG: 2 INJECTION, SOLUTION INTRAMUSCULAR; INTRAVENOUS at 18:38

## 2018-11-08 RX ADMIN — INFLUENZA A VIRUS A/MICHIGAN/45/2015 (H1N1) RECOMBINANT HEMAGGLUTININ ANTIGEN, INFLUENZA A VIRUS A/SINGAPORE/INFIMH-16-0019/2016 (H3N2) RECOMBINANT HEMAGGLUTININ ANTIGEN, INFLUENZA B VIRUS B/MARYLAND/15/2016 RECOMBINANT HEMAGGLUTININ ANTIGEN, AND INFLUENZA B VIRUS B/PHUKET/3073/2013 RECOMBINANT HEMAGGLUTININ ANTIGEN 0.5 ML: 45; 45; 45; 45 INJECTION INTRAMUSCULAR at 22:05

## 2018-11-08 RX ADMIN — MONTELUKAST SODIUM 10 MG: 10 TABLET, FILM COATED ORAL at 21:32

## 2018-11-08 RX ADMIN — METOCLOPRAMIDE 10 MG: 5 INJECTION, SOLUTION INTRAMUSCULAR; INTRAVENOUS at 12:40

## 2018-11-08 RX ADMIN — SODIUM CHLORIDE 75 ML/HR: 0.9 INJECTION, SOLUTION INTRAVENOUS at 20:49

## 2018-11-08 NOTE — ASSESSMENT & PLAN NOTE
Resume long-acting and short-acting oral medications as soon as patient is able to tolerate    Patient follows with pain management as an outpatient

## 2018-11-08 NOTE — ASSESSMENT & PLAN NOTE
Patient reports starting with nausea vomiting 3 days ago  She states her last meal was on Tuesday  She has no inciting factor  She reports no fever chills or diarrhea  She has chronic pain is on copious amounts of morphine but states that she has not run out  Patient reports relief with Benadryl and Zofran as well as Reglan  · Continue with as needed Zofran, Reglan and Benadryl    · Consult GI  · Provide pain medication to prevent withdrawal

## 2018-11-08 NOTE — ED PROVIDER NOTES
History  Chief Complaint   Patient presents with    Vomiting     Pt presents to ER via EMS from home with c/o's nausea & vomitting for 2 days  Healthy appearing obese female presents in no distress  50-year-old female patient presents emergency department for evaluation of intractable nausea and vomiting at home  The patient has a history of gastroparesis, states this is pressure consistent with her previous episodes of the same  The patient stated she took her Zofran at home, try Reglan as well, had no resolution of symptoms and came in for evaluation  Currently patient is lying in bed, no apparent distress, stating that she is still nauseated  Patient had an IV established, labs drawn, be given antiemetics b i d     The patient does not respond to IV antiemetics patient will attempt Compazine suppository  History provided by:  Patient   used: No    Vomiting   Severity:  Mild  Timing:  Constant  Progression:  Unchanged  Chronicity:  Recurrent  Recent urination:  Decreased  Relieved by:  Nothing  Worsened by:  Nothing  Ineffective treatments:  Antiemetics, liquids and ice chips  Associated symptoms: no abdominal pain, no diarrhea, no fever and no myalgias    Risk factors: no alcohol use and no sick contacts        Prior to Admission Medications   Prescriptions Last Dose Informant Patient Reported? Taking?    albuterol (ACCUNEB) 1 25 MG/3ML nebulizer solution  Self Yes No   Sig: Take 1 ampule by nebulization every 6 (six) hours as needed for wheezing   albuterol (PROVENTIL HFA,VENTOLIN HFA) 90 mcg/act inhaler  Self Yes No   Sig: Inhale 2 puffs every 6 (six) hours as needed for wheezing   carisoprodol (SOMA) 350 mg tablet  Self Yes No   Sig: Take 350 mg by mouth 2 (two) times a day   diazepam (VALIUM) 5 mg tablet  Self Yes No   Sig: Take 5 mg by mouth daily at bedtime as needed for anxiety   dicyclomine (BENTYL) 10 mg capsule  Self No No   Sig: Take 1 capsule (10 mg total) by mouth 4 (four) times a day (before meals and at bedtime)   fluticasone (FLONASE) 50 mcg/act nasal spray  Self Yes No   Si spray into each nostril daily   glimepiride (AMARYL) 4 mg tablet  Self Yes No   Sig: Take 4 mg by mouth 2 (two) times a day   insulin glargine (LANTUS) 100 units/mL subcutaneous injection  Self Yes No   Sig: Inject 50 Units under the skin daily at bedtime     metoclopramide (REGLAN) 10 mg tablet  Self No No   Sig: TAKE 1 TABLET BY MOUTH 4 TIMES A DAY (BEFORE MEALS AND AT BEDTIME)   montelukast (SINGULAIR) 10 mg tablet  Self Yes No   Sig: Take 10 mg by mouth daily at bedtime   morphine (MS CONTIN) 60 mg 12 hr tablet  Self Yes No   Sig: Take 75 mg by mouth 2 (two) times a day     omeprazole (PriLOSEC) 20 mg delayed release capsule  Self Yes No   Sig: Take 20 mg by mouth daily   ondansetron (ZOFRAN) 4 mg tablet  Self No No   Sig: Take 1 tablet (4 mg total) by mouth every 8 (eight) hours as needed for nausea or vomiting   predniSONE 10 mg tablet  Self Yes No   Sig: Take by mouth daily      Facility-Administered Medications: None       Past Medical History:   Diagnosis Date    Asthma     Chronic pain     Diabetes mellitus (HCC)     Gastroparesis        Past Surgical History:   Procedure Laterality Date    CERVICAL LAMINECTOMY      HYSTERECTOMY      JOINT REPLACEMENT Bilateral     knee    TOE AMPUTATION Right 2018    Procedure: AMPUTATION TOE, RIGHT GREAT TOE;  Surgeon: Georgiana Favre, DPM;  Location: [facility];  Service: Podiatry       Family History   Problem Relation Age of Onset    Diabetes Mother     Diabetes Maternal Grandmother      I have reviewed and agree with the history as documented  Social History   Substance Use Topics    Smoking status: Never Smoker    Smokeless tobacco: Never Used    Alcohol use Yes      Comment: rarely        Review of Systems   Constitutional: Negative for fever  Gastrointestinal: Positive for vomiting  Negative for abdominal pain and diarrhea  Musculoskeletal: Negative for myalgias  All other systems reviewed and are negative  Physical Exam  Physical Exam   Constitutional: She is oriented to person, place, and time  She appears well-developed and well-nourished  HENT:   Head: Normocephalic and atraumatic  Right Ear: External ear normal    Left Ear: External ear normal    Eyes: Conjunctivae and EOM are normal    Neck: No JVD present  No tracheal deviation present  No thyromegaly present  Cardiovascular: Normal rate  Pulmonary/Chest: Effort normal and breath sounds normal  No stridor  Abdominal: Soft  She exhibits no distension and no mass  There is no tenderness  There is no guarding  No hernia  Musculoskeletal: Normal range of motion  She exhibits no edema, tenderness or deformity  Lymphadenopathy:     She has no cervical adenopathy  Neurological: She is alert and oriented to person, place, and time  Skin: Skin is warm  No rash noted  No erythema  No pallor  Psychiatric: She has a normal mood and affect  Her behavior is normal    Nursing note and vitals reviewed        Vital Signs  ED Triage Vitals   Temperature Pulse Respirations Blood Pressure SpO2   11/08/18 1224 11/08/18 1224 11/08/18 1224 11/08/18 1224 11/08/18 1224   98 5 °F (36 9 °C) 91 16 (!) 176/83 97 %      Temp Source Heart Rate Source Patient Position - Orthostatic VS BP Location FiO2 (%)   11/08/18 1224 11/08/18 1224 11/08/18 1224 11/08/18 1224 --   Oral Monitor Lying Right arm       Pain Score       11/08/18 2100       6           Vitals:    11/08/18 1900 11/08/18 2300 11/09/18 0700 11/09/18 1517   BP: 162/84 100/59 (!) 174/89 (!) 179/104   Pulse: 96 98 96 (!) 110   Patient Position - Orthostatic VS: Lying Lying Sitting Sitting       Visual Acuity      ED Medications  Medications   diphenhydrAMINE (BENADRYL) injection 25 mg (25 mg Intravenous Given 11/9/18 0109)   morphine injection 2 mg (2 mg Intravenous Given 11/9/18 1641)   fluticasone (FLONASE) 50 mcg/act nasal spray 1 spray (1 spray Nasal Given 11/9/18 0833)   insulin glargine (LANTUS) subcutaneous injection 50 Units 0 5 mL (50 Units Subcutaneous Given 11/8/18 2132)   montelukast (SINGULAIR) tablet 10 mg (10 mg Oral Given 11/8/18 2132)   pantoprazole (PROTONIX) injection 40 mg (40 mg Intravenous Given 11/9/18 0832)   heparin (porcine) subcutaneous injection 5,000 Units (5,000 Units Subcutaneous Not Given 11/9/18 1640)   acetaminophen (TYLENOL) tablet 650 mg (not administered)   sodium chloride 0 9 % infusion (75 mL/hr Intravenous New Bag 11/9/18 0843)   insulin lispro (HumaLOG) 100 units/mL subcutaneous injection 1-5 Units (2 Units Subcutaneous Given 11/9/18 1651)   insulin lispro (HumaLOG) 100 units/mL subcutaneous injection 1-5 Units (2 Units Subcutaneous Given 11/8/18 2205)   morphine injection 1 mg (not administered)   albuterol inhalation solution 2 5 mg (not administered)   magnesium sulfate 2 g/50 mL IVPB (premix) 2 g (2 g Intravenous New Bag 11/9/18 1304)   ondansetron (ZOFRAN) injection 4 mg (4 mg Intravenous Given 11/9/18 1640)   metoclopramide (REGLAN) tablet 10 mg (10 mg Oral Given 11/9/18 1641)   metoclopramide (REGLAN) injection 10 mg (10 mg Intravenous Given 11/8/18 1240)   diphenhydrAMINE (BENADRYL) injection 50 mg (50 mg Intravenous Given 11/8/18 1240)   sodium chloride 0 9 % bolus 1,000 mL (0 mL Intravenous Stopped 11/8/18 1612)   iohexol (OMNIPAQUE) 350 MG/ML injection (MULTI-DOSE) 100 mL (100 mL Intravenous Given 11/8/18 1627)   prochlorperazine (COMPAZINE) rectal suppository 25 mg (25 mg Rectal Given 11/8/18 1649)   influenza vaccine, recombinant, quadrivalent (FLUBLOK) IM injection 0 5 mL (0 5 mL Intramuscular Given 11/8/18 2205)       Diagnostic Studies  Results Reviewed     Procedure Component Value Units Date/Time    Magnesium [[id number]]  (Abnormal) Collected:  11/08/18 1240    Lab Status:  Final result Specimen:  Blood from Arm, Left Updated:  11/08/18 2010     Magnesium 1 5 (L) mg/dL Urine Microscopic [[id number]]  (Abnormal) Collected:  11/08/18 1616    Lab Status:  Final result Specimen:  Urine from Urine, Clean Catch Updated:  11/08/18 1632     RBC, UA None Seen /hpf      WBC, UA None Seen /hpf      Epithelial Cells Occasional /hpf      Bacteria, UA Moderate (A) /hpf      AMORPH PHOSPATES Occasional /hpf      MUCUS THREADS Occasional (A)    UA w Reflex to Microscopic w Reflex to Culture [[id number]]  (Abnormal) Collected:  11/08/18 1616    Lab Status:  Final result Specimen:  Urine from Urine, Clean Catch Updated:  11/08/18 1623     Color, UA Yellow     Clarity, UA Clear     Specific [place], UA 1 020     pH, UA 7 5     Leukocytes, UA Negative     Nitrite, UA Negative     Protein, UA Trace (A) mg/dl      Glucose,  (1/4%) (A) mg/dl      Ketones, UA Trace (A) mg/dl      Urobilinogen, UA 0 2 E U /dl      Bilirubin, UA Negative     Blood, UA Negative    Comprehensive metabolic panel [[id number]]  (Abnormal) Collected:  11/08/18 1240    Lab Status:  Final result Specimen:  Blood from Arm, Left Updated:  11/08/18 1322     Sodium 136 mmol/L      Potassium 3 4 (L) mmol/L      Chloride 97 (L) mmol/L      CO2 30 mmol/L      ANION GAP 9 mmol/L      BUN 13 mg/dL      Creatinine 0 88 mg/dL      Glucose 294 (H) mg/dL      Calcium 9 7 mg/dL      AST 19 U/L      ALT 21 U/L      Alkaline Phosphatase 121 (H) U/L      Total Protein 8 4 (H) g/dL      Albumin 3 6 g/dL      Total Bilirubin 1 80 (H) mg/dL      eGFR 75 ml/min/1 73sq m     Narrative:         National Kidney Disease Education Program recommendations are as follows:  GFR calculation is accurate only with a steady state creatinine  Chronic Kidney disease less than 60 ml/min/1 73 sq  meters  Kidney failure less than 15 ml/min/1 73 sq  meters      CBC and differential [[id number]]  (Abnormal) Collected:  11/08/18 1240    Lab Status:  Final result Specimen:  Blood from Arm, Left Updated:  11/08/18 1253     WBC 12 54 (H) Thousand/uL      RBC 5 17 (H) Million/uL      Hemoglobin 13 9 g/dL      Hematocrit 43 2 %      MCV 84 fL      MCH 26 9 pg      MCHC 32 2 g/dL      RDW 13 3 %      MPV 12 5 fL      Platelets 428 Thousands/uL      nRBC 0 /100 WBCs      Neutrophils Relative 70 %      Immat GRANS % 0 %      Lymphocytes Relative 19 %      Monocytes Relative 9 %      Eosinophils Relative 1 %      Basophils Relative 1 %      Neutrophils Absolute 8 86 (H) Thousands/µL      Immature Grans Absolute 0 04 Thousand/uL      Lymphocytes Absolute 2 35 Thousands/µL      Monocytes Absolute 1 13 Thousand/µL      Eosinophils Absolute 0 08 Thousand/µL      Basophils Absolute 0 08 Thousands/µL                  CT abdomen pelvis with contrast   Final Result by Jeri Pedraza MD (11/08 1648)   1  Stable exam   No acute intra-abdominal pathology                    Workstation performed: [id number]                    Procedures  Procedures       Phone Contacts  ED Phone Contact    ED Course                               MDM  Number of Diagnoses or Management Options  Intractable nausea and vomiting: new and requires workup     Amount and/or Complexity of Data Reviewed  Clinical lab tests: ordered and reviewed  Tests in the radiology section of CPT®: ordered and reviewed  Decide to obtain previous medical records or to obtain history from someone other than the patient: yes  Review and summarize past medical records: yes    Patient Progress  Patient progress: stable    CritCare Time    Disposition  Final diagnoses:   Intractable nausea and vomiting     Time reflects when diagnosis was documented in both MDM as applicable and the Disposition within this note     Time User Action Codes Description Comment    11/8/2018  5:01 PM Ivania Kim Add [R11 2] Intractable nausea and vomiting     11/8/2018  6:44 PM Amaris Course Add [E66 9] Obese     11/9/2018  3:27 PM Antonio Ness Add [K80 20] Gall stones       ED Disposition     ED Disposition Condition Comment    Admit  Case was discussed with Dr Serjio Adam and the patient's admission status was agreed to be Admission Status: observation status to the service of Dr Serjio Adam   Follow-up Information    None         Current Discharge Medication List      CONTINUE these medications which have NOT CHANGED    Details   albuterol (ACCUNEB) 1 25 MG/3ML nebulizer solution Take 1 ampule by nebulization every 6 (six) hours as needed for wheezing      albuterol (PROVENTIL HFA,VENTOLIN HFA) 90 mcg/act inhaler Inhale 2 puffs every 6 (six) hours as needed for wheezing      carisoprodol (SOMA) 350 mg tablet Take 350 mg by mouth 2 (two) times a day      diazepam (VALIUM) 5 mg tablet Take 5 mg by mouth daily at bedtime as needed for anxiety      dicyclomine (BENTYL) 10 mg capsule Take 1 capsule (10 mg total) by mouth 4 (four) times a day (before meals and at bedtime)  Qty: 120 capsule, Refills: 3    Associated Diagnoses: Gastroparesis; Intractable vomiting with nausea, unspecified vomiting type; Gall stones      fluticasone (FLONASE) 50 mcg/act nasal spray 1 spray into each nostril daily      glimepiride (AMARYL) 4 mg tablet Take 4 mg by mouth 2 (two) times a day      insulin glargine (LANTUS) 100 units/mL subcutaneous injection Inject 50 Units under the skin daily at bedtime        metoclopramide (REGLAN) 10 mg tablet TAKE 1 TABLET BY MOUTH 4 TIMES A DAY (BEFORE MEALS AND AT BEDTIME)  Qty: 120 tablet, Refills: 2    Associated Diagnoses: Gastroparesis      montelukast (SINGULAIR) 10 mg tablet Take 10 mg by mouth daily at bedtime      morphine (MS CONTIN) 60 mg 12 hr tablet Take 75 mg by mouth 2 (two) times a day        omeprazole (PriLOSEC) 20 mg delayed release capsule Take 20 mg by mouth daily      ondansetron (ZOFRAN) 4 mg tablet Take 1 tablet (4 mg total) by mouth every 8 (eight) hours as needed for nausea or vomiting  Qty: 30 tablet, Refills: 2    Associated Diagnoses: Gastroparesis;  Intractable vomiting with nausea, unspecified vomiting type; Gall stones predniSONE 10 mg tablet Take by mouth daily           No discharge procedures on file      ED Provider  Electronically Signed by           Josefa Higgins DO  11/09/18 5250

## 2018-11-08 NOTE — ASSESSMENT & PLAN NOTE
Lab Results   Component Value Date    HGBA1C 10 6 (H) 05/23/2018       No results for input(s): POCGLU in the last 72 hours  Blood Sugar Average: Last 72 hrs:   type 2 diabetes uncontrolled last hemoglobin A1c elevated at 10 6  Continue with Lantus and sliding scale depending on how high her blood sugars are  Patient will be NPO but likely will tolerate low-dose Lantus with sliding scale

## 2018-11-08 NOTE — ASSESSMENT & PLAN NOTE
Until patient able to resume MS Contin utilize IV morphine q 3 hours p r n  May need to scheduled present withdrawal but will evaluate how she does with the p r n   Meds

## 2018-11-08 NOTE — ASSESSMENT & PLAN NOTE
Patient is on Reglan and Zofran at home  Resume ID dosing  · Consult GI  · Schedule Reglan IV  · As needed Zofran  · I do not see an infectious process involved in that her CT scan is negative, her UA is negative    No pulmonary symptoms for or diarrhea

## 2018-11-08 NOTE — H&P
H&P- Luiz Meek 1965, 46 y o  female MRN: [id number]    Unit/Bed#: ED 21 Encounter: [id number]    Primary Care Provider: Wild Stein   Date and time admitted to hospital: 11/8/2018 12:21 PM        * Intractable nausea and vomiting   Assessment & Plan    Patient reports starting with nausea vomiting 3 days ago  She states her last meal was on Tuesday  She has no inciting factor  She reports no fever chills or diarrhea  She has chronic pain is on copious amounts of morphine but states that she has not run out  Patient reports relief with Benadryl and Zofran as well as Reglan  · Continue with as needed Zofran, Reglan and Benadryl  · Consult GI  · Provide pain medication to prevent withdrawal       Gastroparesis   Assessment & Plan    Patient is on Reglan and Zofran at home  Resume ID dosing  · Consult GI  · Schedule Reglan IV  · As needed Zofran  · I do not see an infectious process involved in that her CT scan is negative, her UA is negative  No pulmonary symptoms for or diarrhea     Diabetes mellitus ([facility] )   Assessment & Plan    Lab Results   Component Value Date    HGBA1C 10 6 (H) 05/23/2018       No results for input(s): POCGLU in the last 72 hours  Blood Sugar Average: Last 72 hrs:   type 2 diabetes uncontrolled last hemoglobin A1c elevated at 10 6  Continue with Lantus and sliding scale depending on how high her blood sugars are  Patient will be NPO but likely will tolerate low-dose Lantus with sliding scale  Opioid dependence ([facility] [address] )   Assessment & Plan    Until patient able to resume MS Contin utilize IV morphine q 3 hours p r n  May need to scheduled present withdrawal but will evaluate how she does with the p r n  Meds     Chronic pain   Assessment & Plan    Resume long-acting and short-acting oral medications as soon as patient is able to tolerate    Patient follows with pain management as an outpatient           VTE Prophylaxis: Heparin  / sequential compression device   Code Status: Full  POLST: POLST form is not discussed and not completed at this time  Discussion with family: patient    Anticipated Length of Stay:  Patient will be admitted on an Observation basis with an anticipated length of stay of  Less than2 midnights  Justification for Hospital Stay: symptom stabilization    Total Time for Visit, including Counseling / Coordination of Care: 1 hour  Greater than 50% of this total time spent on direct patient counseling and coordination of care  Chief Complaint:   Intractable nausea vomiting    History of Present Illness:    Yanely Arellano is a 46 y o  female who presents with 3 days of intractable nausea vomiting  Patient has known history of gastroparesis  She reports no fever chills or diarrhea  She states that she states her last meal was Tuesday which was 4 days prior to this admission  She reports no exacerbating or relieving factors  Patient is seen by Dr Rambo Le  Review of Systems:    Review of Systems   Constitutional: Negative for appetite change, chills, diaphoresis, fatigue, fever and unexpected weight change  HENT: Negative for dental problem, ear discharge, ear pain, facial swelling, hearing loss, mouth sores, nosebleeds and rhinorrhea  Eyes: Negative for pain, discharge, redness and visual disturbance  Respiratory: Negative for cough, chest tightness, shortness of breath and wheezing  Cardiovascular: Negative for chest pain, palpitations and leg swelling  Gastrointestinal: Positive for abdominal pain, nausea and rectal pain  Negative for blood in stool, constipation, diarrhea and vomiting  Endocrine: Negative for cold intolerance, heat intolerance, polydipsia, polyphagia and polyuria  Genitourinary: Negative for dysuria, frequency, hematuria and urgency  Musculoskeletal: Negative for arthralgias and back pain  Skin: Negative for rash and wound     Neurological: Negative for dizziness, weakness, light-headedness, numbness and headaches  Hematological: Negative for adenopathy  Does not bruise/bleed easily  Psychiatric/Behavioral: Negative for confusion and dysphoric mood  Past Medical and Surgical History:     Past Medical History:   Diagnosis Date    Asthma     Chronic pain     Diabetes mellitus ([address] )     Gastroparesis        Past Surgical History:   Procedure Laterality Date    CERVICAL LAMINECTOMY      HYSTERECTOMY      JOINT REPLACEMENT Bilateral     knee    TOE AMPUTATION Right 2/2/2018    Procedure: AMPUTATION TOE, RIGHT GREAT TOE;  Surgeon: Genaro Del Rosario DPM;  Location: [facility];  Service: Podiatry       Meds/Allergies:    Prior to Admission medications    Medication Sig Start Date End Date Taking?  Authorizing Provider   albuterol (ACCUNEB) 1 25 MG/3ML nebulizer solution Take 1 ampule by nebulization every 6 (six) hours as needed for wheezing    Historical Provider, MD   albuterol (PROVENTIL HFA,VENTOLIN HFA) 90 mcg/act inhaler Inhale 2 puffs every 6 (six) hours as needed for wheezing    Historical Provider, MD   carisoprodol (SOMA) 350 mg tablet Take 350 mg by mouth 2 (two) times a day    Historical Provider, MD   diazepam (VALIUM) 5 mg tablet Take 5 mg by mouth daily at bedtime as needed for anxiety    Historical Provider, MD   dicyclomine (BENTYL) 10 mg capsule Take 1 capsule (10 mg total) by mouth 4 (four) times a day (before meals and at bedtime) 10/1/18   Kassandra Keller MD   fluticasone (FLONASE) 50 mcg/act nasal spray 1 spray into each nostril daily    Historical Provider, MD   glimepiride (AMARYL) 4 mg tablet Take 4 mg by mouth 2 (two) times a day    Historical Provider, MD   insulin glargine (LANTUS) 100 units/mL subcutaneous injection Inject 50 Units under the skin daily at bedtime      Historical Provider, MD   metoclopramide (REGLAN) 10 mg tablet TAKE 1 TABLET BY MOUTH 4 TIMES A DAY (BEFORE MEALS AND AT BEDTIME) 9/18/18   VIVI Cho   montelukast (SINGULAIR) 10 mg tablet Take 10 mg by mouth daily at bedtime    Historical Provider, MD   morphine (MS CONTIN) 60 mg 12 hr tablet Take 75 mg by mouth 2 (two) times a day      Historical Provider, MD   omeprazole (PriLOSEC) 20 mg delayed release capsule Take 20 mg by mouth daily    Historical Provider, MD   ondansetron (ZOFRAN) 4 mg tablet Take 1 tablet (4 mg total) by mouth every 8 (eight) hours as needed for nausea or vomiting 9/18/18   VIVI Stovall   predniSONE 10 mg tablet Take by mouth daily    Historical Provider, MD     I have reviewed home medications with patient personally  Allergies:    Allergies   Allergen Reactions    Nsaids GI Intolerance       Social History:     Marital Status: :  [facility]  Patient Pre-hospital Living Situation:  Home with spouse and son  Patient Pre-hospital Level of Mobility:  No restriction  Patient Pre-hospital Diet Restrictions:  No restriction  Substance Use History:   History   Alcohol Use    Yes     Comment: rarely     History   Smoking Status    Never Smoker   Smokeless Tobacco    Never Used     History   Drug Use No       Family History:    Family History   Problem Relation Age of Onset    Diabetes Mother     Diabetes Maternal Grandmother        Physical Exam:     Vitals:   Blood Pressure: 158/83 (11/08/18 1500)  Pulse: 98 (11/08/18 1500)  Temperature: 98 5 °F (36 9 °C) (11/08/18 1224)  Temp Source: Oral (11/08/18 1224)  Respirations: 20 (11/08/18 1500)  Weight - Scale: 127 kg (280 lb 13 9 oz) (11/08/18 1224)  SpO2: 94 % (11/08/18 1500)    Physical Exam    Generally morbidly obese female mild distress secondary to intractable nausea vomiting and retching otherwise normocephalic atraumatic pupils equal round and reactive to light extraocular muscles intact mucous membranes moist neck is supple there is no JVD no lymph nodes no carotid bruits chest is decreased but clear to auscultation no rhonchi rales or wheezes  Cardiovascular regular rate rhythm positive S1 and S2 no S3-S4 murmur or gallop  Abdomen obese soft nontender nondistended with positive bowel sounds no hepatosplenomegaly no guarding or rebound  Neurologically patient awake alert oriented cranial nerves 2-12 appear to be intact    Additional Data:     Lab Results: I have personally reviewed pertinent reports  Results from last 7 days  Lab Units 11/08/18  1240   WBC Thousand/uL 12 54*   HEMOGLOBIN g/dL 13 9   HEMATOCRIT % 43 2   PLATELETS Thousands/uL 344   NEUTROS ABS Thousands/µL 8 86*   NEUTROS PCT % 70   LYMPHS PCT % 19   MONOS PCT % 9   EOS PCT % 1       Results from last 7 days  Lab Units 11/08/18  1240   POTASSIUM mmol/L 3 4*   CHLORIDE mmol/L 97*   CO2 mmol/L 30   BUN mg/dL 13   CREATININE mg/dL 0 88   ANION GAP mmol/L 9   CALCIUM mg/dL 9 7   ALBUMIN g/dL 3 6   TOTAL BILIRUBIN mg/dL 1 80*   ALK PHOS U/L 121*   ALT U/L 21   AST U/L 19                       Imaging: I have personally reviewed pertinent reports  CT abdomen pelvis with contrast   Final Result by Yolanda Cary MD (11/08 1648)   1  Stable exam   No acute intra-abdominal pathology  Workstation performed: [id number]             EKG, Pathology, and Other Studies Reviewed on Admission:   · EKG:  None    Allscripts / Epic Records Reviewed: Yes     ** Please Note: This note has been constructed using a voice recognition system   **

## 2018-11-09 ENCOUNTER — ANESTHESIA EVENT (INPATIENT)
Dept: PERIOP | Facility: HOSPITAL | Age: 53
DRG: 418 | End: 2018-11-09
Payer: MEDICARE

## 2018-11-09 PROBLEM — E83.42 HYPOMAGNESEMIA: Status: ACTIVE | Noted: 2018-11-09

## 2018-11-09 LAB
ALBUMIN SERPL BCP-MCNC: 3.5 G/DL (ref 3.5–5)
ALP SERPL-CCNC: 120 U/L (ref 46–116)
ALT SERPL W P-5'-P-CCNC: 22 U/L (ref 12–78)
ANION GAP SERPL CALCULATED.3IONS-SCNC: 9 MMOL/L (ref 4–13)
AST SERPL W P-5'-P-CCNC: 11 U/L (ref 5–45)
BASOPHILS # BLD AUTO: 0.08 THOUSANDS/ΜL (ref 0–0.1)
BASOPHILS NFR BLD AUTO: 1 % (ref 0–1)
BILIRUB DIRECT SERPL-MCNC: 0.31 MG/DL (ref 0–0.2)
BILIRUB SERPL-MCNC: 2 MG/DL (ref 0.2–1)
BUN SERPL-MCNC: 11 MG/DL (ref 5–25)
CALCIUM SERPL-MCNC: 9.2 MG/DL (ref 8.3–10.1)
CHLORIDE SERPL-SCNC: 98 MMOL/L (ref 100–108)
CO2 SERPL-SCNC: 27 MMOL/L (ref 21–32)
CREAT SERPL-MCNC: 0.79 MG/DL (ref 0.6–1.3)
EOSINOPHIL # BLD AUTO: 0.08 THOUSAND/ΜL (ref 0–0.61)
EOSINOPHIL NFR BLD AUTO: 1 % (ref 0–6)
ERYTHROCYTE [DISTWIDTH] IN BLOOD BY AUTOMATED COUNT: 13.3 % (ref 11.6–15.1)
GFR SERPL CREATININE-BSD FRML MDRD: 85 ML/MIN/1.73SQ M
GLUCOSE P FAST SERPL-MCNC: 218 MG/DL (ref 65–99)
GLUCOSE SERPL-MCNC: 192 MG/DL (ref 65–140)
GLUCOSE SERPL-MCNC: 206 MG/DL (ref 65–140)
GLUCOSE SERPL-MCNC: 208 MG/DL (ref 65–140)
GLUCOSE SERPL-MCNC: 216 MG/DL (ref 65–140)
GLUCOSE SERPL-MCNC: 218 MG/DL (ref 65–140)
GLUCOSE SERPL-MCNC: 246 MG/DL (ref 65–140)
HCT VFR BLD AUTO: 40.1 % (ref 34.8–46.1)
HGB BLD-MCNC: 12.9 G/DL (ref 11.5–15.4)
IMM GRANULOCYTES # BLD AUTO: 0.05 THOUSAND/UL (ref 0–0.2)
IMM GRANULOCYTES NFR BLD AUTO: 0 % (ref 0–2)
LYMPHOCYTES # BLD AUTO: 2.11 THOUSANDS/ΜL (ref 0.6–4.47)
LYMPHOCYTES NFR BLD AUTO: 18 % (ref 14–44)
MAGNESIUM SERPL-MCNC: 1.3 MG/DL (ref 1.6–2.6)
MCH RBC QN AUTO: 26.7 PG (ref 26.8–34.3)
MCHC RBC AUTO-ENTMCNC: 32.2 G/DL (ref 31.4–37.4)
MCV RBC AUTO: 83 FL (ref 82–98)
MONOCYTES # BLD AUTO: 0.91 THOUSAND/ΜL (ref 0.17–1.22)
MONOCYTES NFR BLD AUTO: 8 % (ref 4–12)
NEUTROPHILS # BLD AUTO: 8.4 THOUSANDS/ΜL (ref 1.85–7.62)
NEUTS SEG NFR BLD AUTO: 72 % (ref 43–75)
NRBC BLD AUTO-RTO: 0 /100 WBCS
PLATELET # BLD AUTO: 349 THOUSANDS/UL (ref 149–390)
PMV BLD AUTO: 12.3 FL (ref 8.9–12.7)
POTASSIUM SERPL-SCNC: 3.5 MMOL/L (ref 3.5–5.3)
PROT SERPL-MCNC: 8.3 G/DL (ref 6.4–8.2)
RBC # BLD AUTO: 4.83 MILLION/UL (ref 3.81–5.12)
SODIUM SERPL-SCNC: 134 MMOL/L (ref 136–145)
WBC # BLD AUTO: 11.63 THOUSAND/UL (ref 4.31–10.16)

## 2018-11-09 PROCEDURE — 99233 SBSQ HOSP IP/OBS HIGH 50: CPT | Performed by: INTERNAL MEDICINE

## 2018-11-09 PROCEDURE — 99223 1ST HOSP IP/OBS HIGH 75: CPT | Performed by: SURGERY

## 2018-11-09 PROCEDURE — 85025 COMPLETE CBC W/AUTO DIFF WBC: CPT | Performed by: INTERNAL MEDICINE

## 2018-11-09 PROCEDURE — 80076 HEPATIC FUNCTION PANEL: CPT | Performed by: PHYSICIAN ASSISTANT

## 2018-11-09 PROCEDURE — 83735 ASSAY OF MAGNESIUM: CPT | Performed by: INTERNAL MEDICINE

## 2018-11-09 PROCEDURE — 80048 BASIC METABOLIC PNL TOTAL CA: CPT | Performed by: INTERNAL MEDICINE

## 2018-11-09 PROCEDURE — 99220 PR INITIAL OBSERVATION CARE/DAY 70 MINUTES: CPT | Performed by: INTERNAL MEDICINE

## 2018-11-09 PROCEDURE — 99222 1ST HOSP IP/OBS MODERATE 55: CPT | Performed by: INTERNAL MEDICINE

## 2018-11-09 PROCEDURE — C9113 INJ PANTOPRAZOLE SODIUM, VIA: HCPCS | Performed by: INTERNAL MEDICINE

## 2018-11-09 PROCEDURE — 82948 REAGENT STRIP/BLOOD GLUCOSE: CPT

## 2018-11-09 RX ORDER — METOCLOPRAMIDE 10 MG/1
10 TABLET ORAL
Status: DISCONTINUED | OUTPATIENT
Start: 2018-11-09 | End: 2018-11-12

## 2018-11-09 RX ORDER — MAGNESIUM SULFATE HEPTAHYDRATE 40 MG/ML
2 INJECTION, SOLUTION INTRAVENOUS DAILY PRN
Status: DISCONTINUED | OUTPATIENT
Start: 2018-11-09 | End: 2018-11-14 | Stop reason: HOSPADM

## 2018-11-09 RX ORDER — ONDANSETRON 2 MG/ML
4 INJECTION INTRAMUSCULAR; INTRAVENOUS EVERY 6 HOURS PRN
Status: DISCONTINUED | OUTPATIENT
Start: 2018-11-09 | End: 2018-11-14 | Stop reason: HOSPADM

## 2018-11-09 RX ADMIN — INSULIN LISPRO 2 UNITS: 100 INJECTION, SOLUTION INTRAVENOUS; SUBCUTANEOUS at 16:51

## 2018-11-09 RX ADMIN — PANTOPRAZOLE SODIUM 40 MG: 40 INJECTION, POWDER, FOR SOLUTION INTRAVENOUS at 22:20

## 2018-11-09 RX ADMIN — MORPHINE SULFATE 2 MG: 2 INJECTION, SOLUTION INTRAMUSCULAR; INTRAVENOUS at 12:57

## 2018-11-09 RX ADMIN — MORPHINE SULFATE 2 MG: 2 INJECTION, SOLUTION INTRAMUSCULAR; INTRAVENOUS at 16:41

## 2018-11-09 RX ADMIN — INSULIN LISPRO 1 UNITS: 100 INJECTION, SOLUTION INTRAVENOUS; SUBCUTANEOUS at 22:21

## 2018-11-09 RX ADMIN — METOCLOPRAMIDE 10 MG: 5 INJECTION, SOLUTION INTRAMUSCULAR; INTRAVENOUS at 05:50

## 2018-11-09 RX ADMIN — METOCLOPRAMIDE HYDROCHLORIDE 10 MG: 10 TABLET ORAL at 16:41

## 2018-11-09 RX ADMIN — ONDANSETRON HYDROCHLORIDE 4 MG: 2 SOLUTION INTRAMUSCULAR; INTRAVENOUS at 16:40

## 2018-11-09 RX ADMIN — INSULIN LISPRO 2 UNITS: 100 INJECTION, SOLUTION INTRAVENOUS; SUBCUTANEOUS at 12:59

## 2018-11-09 RX ADMIN — INSULIN LISPRO 2 UNITS: 100 INJECTION, SOLUTION INTRAVENOUS; SUBCUTANEOUS at 08:39

## 2018-11-09 RX ADMIN — FLUTICASONE PROPIONATE 1 SPRAY: 50 SPRAY, METERED NASAL at 08:33

## 2018-11-09 RX ADMIN — ONDANSETRON HYDROCHLORIDE 4 MG: 2 SOLUTION INTRAMUSCULAR; INTRAVENOUS at 03:25

## 2018-11-09 RX ADMIN — PANTOPRAZOLE SODIUM 40 MG: 40 INJECTION, POWDER, FOR SOLUTION INTRAVENOUS at 08:32

## 2018-11-09 RX ADMIN — ONDANSETRON HYDROCHLORIDE 4 MG: 2 SOLUTION INTRAMUSCULAR; INTRAVENOUS at 22:30

## 2018-11-09 RX ADMIN — MAGNESIUM SULFATE HEPTAHYDRATE 2 G: 40 INJECTION, SOLUTION INTRAVENOUS at 13:04

## 2018-11-09 RX ADMIN — MORPHINE SULFATE 2 MG: 2 INJECTION, SOLUTION INTRAMUSCULAR; INTRAVENOUS at 20:45

## 2018-11-09 RX ADMIN — MORPHINE SULFATE 2 MG: 2 INJECTION, SOLUTION INTRAMUSCULAR; INTRAVENOUS at 05:50

## 2018-11-09 RX ADMIN — ONDANSETRON HYDROCHLORIDE 4 MG: 2 SOLUTION INTRAMUSCULAR; INTRAVENOUS at 08:32

## 2018-11-09 RX ADMIN — DIPHENHYDRAMINE HYDROCHLORIDE 25 MG: 50 INJECTION, SOLUTION INTRAMUSCULAR; INTRAVENOUS at 01:09

## 2018-11-09 RX ADMIN — HEPARIN SODIUM 5000 UNITS: 5000 INJECTION, SOLUTION INTRAVENOUS; SUBCUTANEOUS at 22:20

## 2018-11-09 RX ADMIN — MONTELUKAST SODIUM 10 MG: 10 TABLET, FILM COATED ORAL at 22:39

## 2018-11-09 RX ADMIN — INSULIN GLARGINE 50 UNITS: 100 INJECTION, SOLUTION SUBCUTANEOUS at 22:20

## 2018-11-09 RX ADMIN — METOCLOPRAMIDE HYDROCHLORIDE 10 MG: 10 TABLET ORAL at 12:56

## 2018-11-09 RX ADMIN — MORPHINE SULFATE 2 MG: 2 INJECTION, SOLUTION INTRAMUSCULAR; INTRAVENOUS at 08:36

## 2018-11-09 RX ADMIN — MORPHINE SULFATE 2 MG: 2 INJECTION, SOLUTION INTRAMUSCULAR; INTRAVENOUS at 01:02

## 2018-11-09 RX ADMIN — SODIUM CHLORIDE 75 ML/HR: 0.9 INJECTION, SOLUTION INTRAVENOUS at 08:43

## 2018-11-09 NOTE — CONSULTS
Consultation -  [facility]  Palmer Coleman 46 y o  female MRN: [id number]  Unit/Bed#: -01 Encounter: [id number]        Consults    Reason for Consult / Principal Problem: Epigastric Pain, N/V    HPI: Ms Myrtle Madrid is a 45 yo F with a PMH of poorly controlled DM2, gastroparesis on reglan, known cholelithiasis, asthma, chronic pain on MS contin, GERD, presenting with epigastric pain radiating to the RUQ associated with nausea and vomiting  She denies any associated diarrhea, constipation, fevers, or sick contacts  She has been taking her reglan as prescribed, TID AC, with supplemental zofran which she requires once every 1-2 weeks at most  She is eating small portions instead of large meals  She has been modifying her diet to improve her DM2 and her A1C has improved from 10 6 to 9 9  She denies any improvement in her symptoms since admission  She is tolerating some ice chips and tolerated 1 oral medication  She had an episode similar to this in May and was seen by the surgical team with plan for outpatient lap cholecystectomy but this has not been done yet      REVIEW OF SYSTEMS: Negative except for as stated above    Historical Information   Past Medical History:   Diagnosis Date    Asthma     Chronic pain     Diabetes mellitus ([address] )     Gastroparesis      Past Surgical History:   Procedure Laterality Date    CERVICAL LAMINECTOMY      HYSTERECTOMY      JOINT REPLACEMENT Bilateral     knee    TOE AMPUTATION Right 2/2/2018    Procedure: AMPUTATION TOE, RIGHT GREAT TOE;  Surgeon: Pat Way DPM;  Location: [facility];  Service: Podiatry     Social History   History   Alcohol Use    Yes     Comment: rarely     History   Drug Use No     History   Smoking Status    Never Smoker   Smokeless Tobacco    Never Used     Family History   Problem Relation Age of Onset    Diabetes Mother     Diabetes Maternal Grandmother        Meds/Allergies     Prescriptions Prior to Admission Medication    albuterol (ACCUNEB) 1 25 MG/3ML nebulizer solution    albuterol (PROVENTIL HFA,VENTOLIN HFA) 90 mcg/act inhaler    carisoprodol (SOMA) 350 mg tablet    diazepam (VALIUM) 5 mg tablet    dicyclomine (BENTYL) 10 mg capsule    fluticasone (FLONASE) 50 mcg/act nasal spray    glimepiride (AMARYL) 4 mg tablet    insulin glargine (LANTUS) 100 units/mL subcutaneous injection    metoclopramide (REGLAN) 10 mg tablet    montelukast (SINGULAIR) 10 mg tablet    morphine (MS CONTIN) 60 mg 12 hr tablet    omeprazole (PriLOSEC) 20 mg delayed release capsule    ondansetron (ZOFRAN) 4 mg tablet    predniSONE 10 mg tablet     Current Facility-Administered Medications   Medication Dose Route Frequency    acetaminophen (TYLENOL) tablet 650 mg  650 mg Oral Q6H PRN    albuterol inhalation solution 2 5 mg  2 5 mg Nebulization Q6H PRN    diphenhydrAMINE (BENADRYL) injection 25 mg  25 mg Intravenous Q6H PRN    fluticasone (FLONASE) 50 mcg/act nasal spray 1 spray  1 spray Nasal Daily    heparin (porcine) subcutaneous injection 5,000 Units  5,000 Units Subcutaneous Q8H [address]    insulin glargine (LANTUS) subcutaneous injection 50 Units 0 5 mL  50 Units Subcutaneous HS    insulin lispro (HumaLOG) 100 units/mL subcutaneous injection 1-5 Units  1-5 Units Subcutaneous TID AC    insulin lispro (HumaLOG) 100 units/mL subcutaneous injection 1-5 Units  1-5 Units Subcutaneous HS    magnesium sulfate 2 g/50 mL IVPB (premix) 2 g  2 g Intravenous Daily PRN    metoclopramide (REGLAN) injection 10 mg  10 mg Intravenous Q6H [address]    metoclopramide (REGLAN) tablet 10 mg  10 mg Oral TID AC    montelukast (SINGULAIR) tablet 10 mg  10 mg Oral HS    morphine injection 1 mg  1 mg Intravenous Q3H PRN    morphine injection 2 mg  2 mg Intravenous Q3H PRN    ondansetron (ZOFRAN) injection 4 mg  4 mg Intravenous Q6H PRN    pantoprazole (PROTONIX) injection 40 mg  40 mg Intravenous Q12H [address]    sodium chloride 0 9 % infusion  75 mL/hr Intravenous Continuous       Allergies   Allergen Reactions    Nsaids GI Intolerance           Objective     Blood pressure (!) 174/89, pulse 96, temperature 98 8 °F (37 1 °C), temperature source Oral, resp  rate 18, height 5' 8" (1 727 m), weight 128 kg (281 lb 12 oz), SpO2 94 %  Intake/Output Summary (Last 24 hours) at 11/09/18 1114  Last data filed at 11/09/18 0839   Gross per 24 hour   Intake            887 5 ml   Output              800 ml   Net             87 5 ml         PHYSICAL EXAM:      General Appearance:   Alert, oriented x3, no acute distress, appears chronically ill and older than stated age   [de-identified]:   Normocephalic, atraumatic, anicteric      Neck:  Supple, symmetrical, trachea midline   Lungs:   Clear to auscultation bilaterally, no respiratory distress   Heart[de-identified]   RRR, no murmur   Abdomen:   Obese, non-distended, soft, BS active, (+) TTP in the epigastrum   Rectal:   Deferred    Extremities:  No cyanosis or LE edema    Pulses:  2+ and symmetric all extremities    Skin:  No jaundice or pallor     Lab Results:     Results from last 7 days  Lab Units 11/09/18  0517   WBC Thousand/uL 11 63*   HEMOGLOBIN g/dL 12 9   HEMATOCRIT % 40 1   PLATELETS Thousands/uL 349   NEUTROS PCT % 72   LYMPHS PCT % 18   MONOS PCT % 8   EOS PCT % 1       Results from last 7 days  Lab Units 11/09/18  0517 11/08/18  1240   POTASSIUM mmol/L 3 5 3 4*   CHLORIDE mmol/L 98* 97*   CO2 mmol/L 27 30   BUN mg/dL 11 13   CREATININE mg/dL 0 79 0 88   CALCIUM mg/dL 9 2 9 7   ALK PHOS U/L  --  121*   ALT U/L  --  21   AST U/L  --  19               Imaging Studies: I have personally reviewed pertinent imaging studies  Ct Abdomen Pelvis With Contrast  Result Date: 11/8/2018  Impression: 1  Stable exam   No acute intra-abdominal pathology         ASSESSMENT and PLAN:      Epigastric Abdominal Pain  Nausea/Vomiting  Elevated Bilirubin  - CT A/P on admission shows layering sludge and small stones without pericholecystic inflammatory changes; no other acute intra-abdominal etiology  - Her symptoms may be 2/2 known gastroparesis but she reports compliance with medications, diet, and has improvement of her A1C  - Suspect her acute symptoms may be 2/2 cholelithiasis and sludge especially in the setting of elevated Tbili  - Check direct bilirubin  - Trial clear liquids; if tolerating can advance to low fat diet and patient is aware she should have small frequent meals instead of large portions  - Consider surgical consultation especially if symptoms are persistent while inpatient  - Reglan 10 mg PO TID AC, zofran PRN      Patient will be seen and examined by John Reeves  All zhang medical decisions were made by Dr John Gan  Thank you for allowing us to participate in the care of this patient  We will follow with you closely

## 2018-11-09 NOTE — PROGRESS NOTES
[facility] Progress Note  Patient: Rachele Pate 46 y o  female   MRN: [id number]  PCP: Lizzette Reyes  Unit/Bed#: -01 Encounter: [id number]  Date Of Visit: 11/09/18          * Intractable nausea and vomiting   Assessment & Plan    Likely multifactorial including gastroparesis  Also on significant amount of narcotics at home  Doubt that she is having any withdrawal symptoms at this moment  In addition, she has gallstones  I am also not sure if that is again becoming symptomatic  Patient is supposed to have her gallbladder taken out  Would wait for GI input  Continue with current treatment including fluids and antiemetics  Diabetes mellitus [facility])   Assessment & Plan    Lab Results   Component Value Date    HGBA1C 9 9 (H) 11/08/2018       Recent Labs      11/08/18   2113  11/09/18   0003  11/09/18   0636  11/09/18   1124   POCGLU  237*  206*  208*  192*       Blood Sugar Average: Last 72 hrs:  (P) 210 75 hemoglobin A1c slightly better than before, however, still uncontrolled  Needs to work on her diet and exercise to lose some weight and also take her medications on regular basis  Hypomagnesemia   Assessment & Plan    Recheck and replete and fu as needed     Opioid dependence ([address] )   Assessment & Plan    Continues opiate dependence  Continue with IV pain medication     Gastroparesis   Assessment & Plan    To continue with current treatment  Chronic pain   Assessment & Plan    Chronic continuous opiate use/dependence  Continue with current treatment to avoid withdrawal symptoms         Present on Admission:   Opioid dependence ([address] )   Intractable nausea and vomiting   Gastroparesis   Diabetes mellitus (HCC)   Chronic pain   Hypomagnesemia            VTE Pharmacologic Prophylaxis:   Pharmacologic: Heparin  Mechanical VTE Prophylaxis in Place: Yes    Patient Centered Rounds: I have performed bedside rounds with nursing staff today      Discussions with Specialists or Other Care Team Provider:  Yes    Education and Discussions with Family / Patient:  Yes    Time Spent for Care: 35+ minutes  More than 50% of total time spent on counseling and coordination of care as described above  Current Length of Stay: 0 day(s)    Current Patient Status: Inpatient   Certification Statement: The patient will continue to require additional inpatient hospital stay due to IV fluids/antiemetics    Discharge Plan:  Home when stable    Code Status: Level 1 - Full Code      Subjective:   Feels little better  She has history of gastroparesis  Also has chronic pain  Complaining of some pain just under her umbilicus and also when palpating her right upper quadrant-off and on, however, not all the time  No diarrhea  No fever or chills  No hematemesis, melena, or hematochezia  Objective:     Vitals:   Temp (24hrs), Av 5 °F (36 9 °C), Min:98 4 °F (36 9 °C), Max:98 8 °F (37 1 °C)    Temp:  [98 4 °F (36 9 °C)-98 8 °F (37 1 °C)] 98 8 °F (37 1 °C)  HR:  [96-98] 96  Resp:  [17-20] 18  BP: (100-174)/(59-89) 174/89  SpO2:  [92 %-96 %] 94 %  Body mass index is 42 84 kg/m²  Input and Output Summary (last 24 hours): Intake/Output Summary (Last 24 hours) at 18 1427  Last data filed at 18 1418   Gross per 24 hour   Intake           1127 5 ml   Output              800 ml   Net            327 5 ml           Physical Exam:     Vital signs are reviewed as above  Constitutional:  Patient sitting in bed  Not in any respiratory distress  Eyes: EOM grossly intact  Conjunctivae slightly pale  Patient has anicteric sclera  HENT: Oropharynx are dry  Did not notice any significant lesions on the tongue  Head normocephalic  Neck: Neck is obese and supple  I was not able to visualize any JVD at 90°  There is no significant lymphadenopathy  I also did not notice any significant thyromegaly  Cardiac: I did not hear any rubs or gallop  Patient appears to be in sinus rhythm    Respiratory: Patient not in significant respiratory distress  Air entry in general is fair  GI: Abdomen is obese and generally soft  She has audible bowel sounds  She has some soreness just below her umbilicus  Off and on, she would have some reproducible tenderness right upper quadrant  Neurologic:  Patient is awake and alert  Neurological examination is grossly intact  No obvious focal neurological deficit noticed  Skin: Skin is warm and dry  Psychiatric:  Depressed  Musculoskeletal   Has chronic pain issues        Additional Data:     Labs:      Results from last 7 days  Lab Units 11/09/18  0517   WBC Thousand/uL 11 63*   HEMOGLOBIN g/dL 12 9   HEMATOCRIT % 40 1   PLATELETS Thousands/uL 349   NEUTROS PCT % 72   LYMPHS PCT % 18   MONOS PCT % 8   EOS PCT % 1       Results from last 7 days  Lab Units 11/09/18  1033 11/09/18  0517   POTASSIUM mmol/L  --  3 5   CHLORIDE mmol/L  --  98*   CO2 mmol/L  --  27   BUN mg/dL  --  11   CREATININE mg/dL  --  0 79   CALCIUM mg/dL  --  9 2   ALK PHOS U/L 120*  --    ALT U/L 22  --    AST U/L 11  --            * I Have Reviewed All Lab Data Listed Above  * Additional Pertinent Lab Tests Reviewed:  All Labs Within Last 24 Hours Reviewed      Recent Cultures (last 7 days):           Last 24 Hours Medication List:     Current Facility-Administered Medications:  acetaminophen 650 mg Oral Q6H PRN Brenton Rios MD    albuterol 2 5 mg Nebulization Q6H PRN Corey Browne    diphenhydrAMINE 25 mg Intravenous Q6H PRN Brenton Rios MD    fluticasone 1 spray Nasal Daily Brenton Rios MD    heparin (porcine) 5,000 Units Subcutaneous Q8H [address] Brenton Rios MD    insulin glargine 50 Units Subcutaneous HS Brenton Rios MD    insulin lispro 1-5 Units Subcutaneous TID AC Brenton Rios MD    insulin lispro 1-5 Units Subcutaneous HS Brenton Rios MD    magnesium sulfate 2 g Intravenous Daily PRN Lisa Hightower MD    metoclopramide 10 mg Oral TID AC Rajeev Michael PA-C montelukast 10 mg Oral HS Tiffanie Alfonso MD    morphine injection 1 mg Intravenous Q3H PRN Tiffanie Alfonso MD    morphine injection 2 mg Intravenous Q3H PRN Tiffanie Alfonso MD    ondansetron 4 mg Intravenous Q6H PRN Scot Correa PA-C    pantoprazole 40 mg Intravenous Q12H [address] Tiffanie Alfonso MD    sodium chloride 75 mL/hr Intravenous Continuous Tiffanie Alfonso MD Last Rate: 75 mL/hr (11/09/18 0843)        Today, Patient Was Seen By: Lora Claros MD    ** Please Note: Dragon 360 Dictation voice to text software may have been used in the creation of this document   **

## 2018-11-09 NOTE — MALNUTRITION/BMI
This medical record reflects one or more clinical indicators suggestive of  morbid obesity  BMI Findings:  BMI Classifications: Morbid Obesity 40-44 9     Body mass index is 42 84 kg/m²  See Nutrition note dated 11/09/2018 for additional details  Completed nutrition assessment is viewable in the nutrition documentation

## 2018-11-09 NOTE — PROGRESS NOTES
Patient's POC blood sugar 206 at this time  Order for q6h blood sugars, however sliding scale coverage is for 3x/day before meals  Spoke with Dr Chalo Hebert re: same, no new orders, she does not want to change anything as patient is NPO and had lantus

## 2018-11-09 NOTE — NUTRITION
11/09/18 1600   Assessment   Timepoint Initial  (consult: obesity)   Labs   List Completed Labs (, 208, 192, A1c 9 9 (May- 10 6), Mg 1 3; meds reviewe)   Feeding Route   PO Independent   Adequacy of Intake   Nutrition Modality PO  (clear liquids)   Intake Meals 25-50%   Estimated Calorie Intake 0-25%   Estimated Protein Intake  0-25%   Estimated Fluid Intake 25-50%   Estimated calorie intake compared to estimated need pt intake continues to be suboptimal on clear liquid diet, witnessed lunch tray in bedroom which was ~25% completed  pt c/o nausea at present time; requested medication from RN   Nutrition Prognosis   Potential Good   Nutrition Concerns Other (Comment)  (decreased intake with nausea)   Comorbid Concerns Other (Comment)  (gallstones- to have gallbladder removed, obesity, DM, gastroparesis, chronic pain)   Nutrition Precautions None   Nutrition Considerations Motivation  (pt states she has had DM diet education in the past; provided pt with DM handouts for at home use)   PES Statement   Problem Clinical   Weight (3) Overweight/obesity NC-3 3   Overweight/ obesity specific to Obese, Class II (4)   Related to Energy intake > Energy output over time   As evidenced by: BMI   Additional PES needed? Yes   PES Statement 2   Problem Intake   Related to Nausea;GI distress/pain   As evidenced by Intake < estimated needs; Other (Comment)  (clear liquid diet order)   Oral or Nutritional Support Intake (2) Inadequate oral intake NI-2 1   Patient Nutrition Goals   Goal meet PO needs;tolerate PO diet   Goal Status initiated   Timeframe to complete goal by next f/u   Recommendations/Interventions   Summary Pt is currently on a clear liquid diet, tolerating fairly well with nausea  She has been following a DM diet at home with some success as she has had a 1% drop in her A1c since May 2018  Provided pt with handouts on DM diet for at home use  As medically able, advance diet to CCD 2, low fat   Will continue to monitor   Malnutrition/BMI Present Yes   BMI Classifications Morbid Obesity 40-44 9   Interventions Diet: to Advance   Nutrition Recommendations Initiate diet   Nutrition Complexity Risk   Nutrition complexity level Moderate risk   Follow up date 11/14/18

## 2018-11-09 NOTE — UTILIZATION REVIEW
Initial Clinical Review    Admission: Date/Time/Statement: OBS  11/8  1701 converted to IP on 11/9 @1023 for continued treatment on N/V     Admitting Physician Carly Lopes    Level of Care Med Surg    Estimated length of stay More than 2 Midnights    Certification I certify that inpatient services are medically necessary for this patient for a duration of greater than two midnights  See H&P and MD Progress Notes for additional information about the patient's course of treatment  ED: Date/Time/Mode of Arrival:   ED Arrival Information     Expected Arrival Acuity Means of Arrival Escorted By Service Admission Type    - 11/8/2018 12:20 Urgent Ambulance [facility] General Medicine Urgent    Arrival Complaint    Vomiting          Chief Complaint:   Chief Complaint   Patient presents with    Vomiting     Pt presents to ER via EMS from home with c/o's nausea & vomitting for 2 days  Healthy appearing obese female presents in no distress  History of Illness: 47 yo female w/ intractable N/V for 3 days   Has not eaten for 4 days       ED Vital Signs:   ED Triage Vitals   Temperature Pulse Respirations Blood Pressure SpO2   11/08/18 1224 11/08/18 1224 11/08/18 1224 11/08/18 1224 11/08/18 1224   98 5 °F (36 9 °C) 91 16 (!) 176/83 97 %      Temp Source Heart Rate Source Patient Position - Orthostatic VS BP Location FiO2 (%)   11/08/18 1224 11/08/18 1224 11/08/18 1224 11/08/18 1224 --   Oral Monitor Lying Right arm       Pain Score       11/08/18 2100       6        Wt Readings from Last 1 Encounters:   11/08/18 128 kg (281 lb 12 oz)       Vital Signs (abnormal): wnl     Abnormal Labs/Diagnostic Test Results:K 3 4, cl  97, gluc 294, alkphos 121, total prot  8 4, total bili  1 80, wbc 12 54  CT abd - wnl     ED Treatment:   Medication Administration from 11/08/2018 1220 to 11/08/2018 1919       Date/Time Order Dose Route Action Action by Comments     11/08/2018 1240 metoclopramide (REGLAN) injection 10 mg 10 mg Intravenous Given Chelle Spicer RN      11/08/2018 1240 diphenhydrAMINE (BENADRYL) injection 50 mg 50 mg Intravenous Given Chelle Spicer RN      11/08/2018 1612 sodium chloride 0 9 % bolus 1,000 mL 0 mL Intravenous Stopped Huan Field RN      11/08/2018 1241 sodium chloride 0 9 % bolus 1,000 mL 1,000 mL Intravenous [address] Chelle Spicer RN      11/08/2018 1627 iohexol (OMNIPAQUE) 350 MG/ML injection (MULTI-DOSE) 100 mL 100 mL Intravenous Given Jerica Hawk      11/08/2018 1649 prochlorperazine (COMPAZINE) rectal suppository 25 mg 25 mg Rectal Given Huan Field RN      11/08/2018 1838 metoclopramide (REGLAN) injection 10 mg 10 mg Intravenous Given Huan Field RN      11/08/2018 1839 diphenhydrAMINE (BENADRYL) injection 25 mg 25 mg Intravenous Given Huan Field RN      11/08/2018 1838 morphine injection 2 mg 2 mg Intravenous Given Huan Field RN           Past Medical/Surgical History:    Active Ambulatory Problems     Diagnosis Date Noted    Diabetic ulcer of right great toe ([facility] ) 02/02/2018    Acute osteomyelitis of toe, right ([facility] ) 02/02/2018    Diabetes mellitus ([facility] ) 02/02/2018    Asthma 02/02/2018    Chronic pain 02/02/2018    Gastroparesis 02/02/2018    Hyponatremia 02/02/2018    Hypokalemia 02/02/2018    Opioid dependence ([facility] ) 02/05/2018    De Quervain's tenosynovitis, bilateral 10/03/2017    Knee pain 11/21/2017    Intractable nausea and vomiting 05/22/2018    UTI (urinary tract infection) 05/22/2018    Gall stones 05/24/2018    Uncontrolled diabetes mellitus ([facility] ) 06/15/2018     Past Medical History:   Diagnosis Date    Asthma     Chronic pain     Diabetes mellitus ([facility] )     Gastroparesis        Admitting Diagnosis: Vomiting [R11 10]  Obese [E66 9]  Intractable nausea and vomiting [R11 2]    Age/Sex: 46 y o  female    Assessment/Plan: 47 yo female admitted for intractable N/V for 3 days will cont Zofran , reglan and benadryl prn , GI consult  And pain meds to prevent withdraw  Gastroparesis - GI consult , reglan and zofran IV   Opoid dependence - will give IV morphine until able to resume MS contin   Chronic pain - f/u OP pain mngt        Anticipated Length of Stay:  Patient will be admitted on an Observation basis with an anticipated length of stay of  Less than2 midnights     Justification for Hospital Stay: symptom stabilization    Admission Orders:  Scheduled Meds:   Current Facility-Administered Medications:  acetaminophen 650 mg Oral Q6H PRN     albuterol 2 5 mg Nebulization Q6H PRN     diphenhydrAMINE 25 mg Intravenous Q6H PRN x2    fluticasone 1 spray Nasal Daily     heparin (porcine) 5,000 Units Subcutaneous Q8H [address]     insulin glargine 50 Units Subcutaneous HS     insulin lispro 1-5 Units Subcutaneous TID AC     insulin lispro 1-5 Units Subcutaneous HS     metoclopramide 10 mg Intravenous Q6H [address]     montelukast 10 mg Oral HS     morphine injection 1 mg Intravenous Q3H PRN     morphine injection 2 mg Intravenous Q3H PRN x4    ondansetron 4 mg Intravenous Q4H x3    pantoprazole 40 mg Intravenous Q12H [address]     sodium chloride 75 mL/hr Intravenous Continuous  Last Rate: 75 mL/hr (11/08/18 2049)     Fingerstick q6hr   NPO   SCD  GI consult   Up w/ assist  11/9 cbc , bmp   Wbc  11 63, na   134, cl  98, gluc 218    GI consult  11/9  Epigastric Abdominal Pain  Nausea/Vomiting  Elevated Bilirubin  - CT A/P on admission shows layering sludge and small stones without pericholecystic inflammatory changes; no other acute intra-abdominal etiology  - Her symptoms may be 2/2 known gastroparesis but she reports compliance with medications, diet, and has improvement of her A1C  - Suspect her acute symptoms may be 2/2 cholelithiasis and sludge especially in the setting of elevated Tbili  - Check direct bilirubin  - Trial clear liquids; if tolerating can advance to low fat diet and patient is aware she should have small frequent meals instead of large portions  - Consider surgical consultation especially if symptoms are persistent while inpatient  - Reglan 10 mg PO TID AC, zofran PRN

## 2018-11-09 NOTE — ASSESSMENT & PLAN NOTE
Lab Results   Component Value Date    HGBA1C 9 9 (H) 11/08/2018       Recent Labs      11/10/18   1214  11/10/18   1558  11/10/18   2120  11/11/18   0606   POCGLU  183*  217*  240*  165*       Blood Sugar Average: Last 72 hrs:  (P) 207     Accu-Cheks slightly better this morning    Continue with current regimen

## 2018-11-09 NOTE — ASSESSMENT & PLAN NOTE
It appears that she may be going into withdrawals from her narcotics  Resumed her  scheduled and p r n  Oxy IR

## 2018-11-09 NOTE — ASSESSMENT & PLAN NOTE
Improving  Likely multifactorial including symptomatic gallstones and gastroparesis    Also on lots of narcotics at home  CBC

## 2018-11-09 NOTE — PLAN OF CARE
DISCHARGE PLANNING     Discharge to home or other facility with appropriate resources Progressing        GASTROINTESTINAL - ADULT     Minimal or absence of nausea and/or vomiting Progressing     Maintains or returns to baseline bowel function Progressing     Maintains adequate nutritional intake Progressing        Knowledge Deficit     Patient/family/caregiver demonstrates understanding of disease process, treatment plan, medications, and discharge instructions Progressing        PAIN - ADULT     Verbalizes/displays adequate comfort level or baseline comfort level Progressing        Potential for Falls     Patient will remain free of falls Progressing        SAFETY ADULT     Patient will remain free of falls Progressing

## 2018-11-09 NOTE — CONSULTS
Consult - General Surgery   Janice Meza 46 y o  female MRN: [id number]  Unit/Bed#: -01 Encounter: [id number]    Assessment/Plan     Assessment:  1  Janice Meza is a 46 y o  female with known history gastroparesis and uncontrolled diabetes mellitus presenting with intractable nausea/vomiting and abdominal pain  CT of abdomen/pelvis showed layering gallbladder sludge/small stones without pericholecystic inflammatory change      Plan:  - Plan for OR tomorrow laparoscopic cholecystectomy with IOC  Will make NPO after midnight and continue IV fluids while NPO for hydration   - Pain control PRN  - Continue with GI recommendations   - IV ancef on call to the OR  - PPI  - SLIM primary     History of Present Illness     HPI:  Janice Meza is a 46 y o  female who presents with intractable nausea/vomiting and abdominal pain  She states the nausea started approximately three days ago that was not alleviated by her regular Reglan  She also noted associated abdominal pain located on her right side  She was unable to tolerate any PO intake which prompted her to go to the hospital  CT of her abdomen/pelvis showed no acute intra-abdominal pathology  Small gallstones and sludge were noted which was unchanged from prior examination  She has had multiple similar episodes in the past, and was admitted to [facility] back in May 2018 with a similar episode  At the time, she was evaluated by general surgery and was recommended to follow up as an outpatient to schedule a laparoscopic cholecystectomy  Unfortunately, the patient was lost to follow up and was never seen  Since prior admission at [facility], she was hospitalized at [facility] for similar complaints  Due to reocurrance of episodes and poor outpatient follow up, general surgery was consulted to assess for possibility of laparoscopic cholecystectomy during patient's current inpatient admission   She has a medical history of gastroparesis for which she follows Dr Silver July and uncontrolled diabetes mellitus  She does have a surgical history of a laparoscopic hysterectomy many years ago  No other pertinent surgical history  Currently she states that her nausea and abdominal pain have been unchanged since admission  She was started on a clear liquid diet but was unable to tolerate more than a few bites of jello due to nausea  Her abdominal pain is more focal on her right side that is unchanged by position  She denies any fevers, chills, chest pain, shortness of breath, difficulty with urination, changes to bowel habits, or weakness  She has no other complaints  Review of Systems   Constitutional: Positive for appetite change  Negative for activity change, fatigue, fever and unexpected weight change  HENT: Negative for congestion, tinnitus and voice change  Eyes: Negative for photophobia, discharge and visual disturbance  Respiratory: Negative for apnea, chest tightness, shortness of breath, wheezing and stridor  Cardiovascular: Negative for chest pain and leg swelling  Gastrointestinal: Positive for abdominal pain, nausea and vomiting  Negative for abdominal distention, constipation, diarrhea and rectal pain  Endocrine: Negative for cold intolerance, polydipsia, polyphagia and polyuria  Genitourinary: Negative for decreased urine volume, difficulty urinating, dysuria, flank pain, hematuria, pelvic pain and urgency  Musculoskeletal: Positive for back pain  Negative for neck pain and neck stiffness  Skin: Negative for color change, pallor, rash and wound  Neurological: Negative for dizziness, tremors, syncope and weakness         Historical Information   Past Medical History:   Diagnosis Date    Asthma     Chronic pain     Diabetes mellitus ([facility] [address] )     Gastroparesis      Past Surgical History:   Procedure Laterality Date    CERVICAL LAMINECTOMY      HYSTERECTOMY      JOINT REPLACEMENT Bilateral     knee    TOE AMPUTATION Right 2/2/2018 Procedure: AMPUTATION TOE, RIGHT GREAT TOE;  Surgeon: Mitchell Espinal DPM;  Location: [facility];  Service: Podiatry     Social History   History   Alcohol Use    Yes     Comment: rarely     History   Drug Use No     History   Smoking Status    Never Smoker   Smokeless Tobacco    Never Used     Family History:   Family History   Problem Relation Age of Onset    Diabetes Mother     Diabetes Maternal Grandmother        Meds/Allergies   PTA meds:   Prior to Admission Medications   Prescriptions Last Dose Informant Patient Reported? Taking?    albuterol (ACCUNEB) 1 25 MG/3ML nebulizer solution  Self Yes No   Sig: Take 1 ampule by nebulization every 6 (six) hours as needed for wheezing   albuterol (PROVENTIL HFA,VENTOLIN HFA) 90 mcg/act inhaler  Self Yes No   Sig: Inhale 2 puffs every 6 (six) hours as needed for wheezing   carisoprodol (SOMA) 350 mg tablet  Self Yes No   Sig: Take 350 mg by mouth 2 (two) times a day   diazepam (VALIUM) 5 mg tablet  Self Yes No   Sig: Take 5 mg by mouth daily at bedtime as needed for anxiety   dicyclomine (BENTYL) 10 mg capsule  Self No No   Sig: Take 1 capsule (10 mg total) by mouth 4 (four) times a day (before meals and at bedtime)   fluticasone (FLONASE) 50 mcg/act nasal spray  Self Yes No   Si spray into each nostril daily   glimepiride (AMARYL) 4 mg tablet  Self Yes No   Sig: Take 4 mg by mouth 2 (two) times a day   insulin glargine (LANTUS) 100 units/mL subcutaneous injection  Self Yes No   Sig: Inject 50 Units under the skin daily at bedtime     metoclopramide (REGLAN) 10 mg tablet  Self No No   Sig: TAKE 1 TABLET BY MOUTH 4 TIMES A DAY (BEFORE MEALS AND AT BEDTIME)   montelukast (SINGULAIR) 10 mg tablet  Self Yes No   Sig: Take 10 mg by mouth daily at bedtime   morphine (MS CONTIN) 60 mg 12 hr tablet  Self Yes No   Sig: Take 75 mg by mouth 2 (two) times a day     omeprazole (PriLOSEC) 20 mg delayed release capsule  Self Yes No   Sig: Take 20 mg by mouth daily ondansetron (ZOFRAN) 4 mg tablet  Self No No   Sig: Take 1 tablet (4 mg total) by mouth every 8 (eight) hours as needed for nausea or vomiting   predniSONE 10 mg tablet  Self Yes No   Sig: Take by mouth daily      Facility-Administered Medications: None     Allergies   Allergen Reactions    Nsaids GI Intolerance       Objective   First Vitals:   Blood Pressure: (!) 176/83 (11/08/18 1224)  Pulse: 91 (11/08/18 1224)  Temperature: 98 5 °F (36 9 °C) (11/08/18 1224)  Temp Source: Oral (11/08/18 1224)  Respirations: 16 (11/08/18 1224)  Height: 5' 8" (172 7 cm) (11/08/18 1900)  Weight - Scale: 127 kg (280 lb 13 9 oz) (11/08/18 1224)  SpO2: 97 % (11/08/18 1224)    Current Vitals:   Blood Pressure: (!) 179/104 (11/09/18 1517)  Pulse: (!) 110 (11/09/18 1517)  Temperature: 98 6 °F (37 °C) (11/09/18 1517)  Temp Source: Oral (11/09/18 1517)  Respirations: 18 (11/09/18 1517)  Height: 5' 8" (172 7 cm) (11/08/18 1900)  Weight - Scale: 128 kg (281 lb 12 oz) (11/08/18 1900)  SpO2: 96 % (11/09/18 1517)      Intake/Output Summary (Last 24 hours) at 11/09/18 1541  Last data filed at 11/09/18 1418   Gross per 24 hour   Intake           1127 5 ml   Output              800 ml   Net            327 5 ml       Invasive Devices     Peripheral Intravenous Line            Peripheral IV 11/08/18 Left Antecubital 1 day                Physical Exam   Constitutional: She is oriented to person, place, and time  She appears well-developed and well-nourished  No distress  HENT:   Head: Normocephalic and atraumatic  Right Ear: External ear normal    Left Ear: External ear normal    Mouth/Throat: Oropharynx is clear and moist  No oropharyngeal exudate  Eyes: Pupils are equal, round, and reactive to light  Conjunctivae and EOM are normal    Neck: Normal range of motion  Neck supple  No tracheal deviation present  No thyromegaly present  Cardiovascular: Normal rate, regular rhythm, normal heart sounds and intact distal pulses    Exam reveals no gallop and no friction rub  No murmur heard  Pulmonary/Chest: Effort normal and breath sounds normal  No respiratory distress  She has no wheezes  She has no rales  She exhibits no tenderness  Abdominal: Soft  Bowel sounds are normal  She exhibits no distension  There is tenderness  There is no rebound and no guarding  Soft, obese, non-distended, active bowel sounds, focally tender to palpation in the right lumbar region, no tenderness to palpation in the RUQ, negative Mary's sign, no rebound, no guarding, no diffuse peritoneal signs   Musculoskeletal: Normal range of motion  She exhibits no edema, tenderness or deformity  Neurological: She is oriented to person, place, and time  She has normal reflexes  No cranial nerve deficit  Coordination normal    Skin: Skin is warm and dry  No rash noted  She is not diaphoretic  No erythema  No pallor  Lab Results: I have personally reviewed pertinent lab results      Recent Results (from the past 36 hour(s))   CBC and differential    Collection Time: 11/08/18 12:40 PM   Result Value Ref Range    WBC 12 54 (H) 4 31 - 10 16 Thousand/uL    RBC 5 17 (H) 3 81 - 5 12 Million/uL    Hemoglobin 13 9 11 5 - 15 4 g/dL    Hematocrit 43 2 34 8 - 46 1 %    MCV 84 82 - 98 fL    MCH 26 9 26 8 - 34 3 pg    MCHC 32 2 31 4 - 37 4 g/dL    RDW 13 3 11 6 - 15 1 %    MPV 12 5 8 9 - 12 7 fL    Platelets 263 500 - 672 Thousands/uL    nRBC 0 /100 WBCs    Neutrophils Relative 70 43 - 75 %    Immat GRANS % 0 0 - 2 %    Lymphocytes Relative 19 14 - 44 %    Monocytes Relative 9 4 - 12 %    Eosinophils Relative 1 0 - 6 %    Basophils Relative 1 0 - 1 %    Neutrophils Absolute 8 86 (H) 1 85 - 7 62 Thousands/µL    Immature Grans Absolute 0 04 0 00 - 0 20 Thousand/uL    Lymphocytes Absolute 2 35 0 60 - 4 47 Thousands/µL    Monocytes Absolute 1 13 0 17 - 1 22 Thousand/µL    Eosinophils Absolute 0 08 0 00 - 0 61 Thousand/µL    Basophils Absolute 0 08 0 00 - 0 10 Thousands/µL   Comprehensive metabolic panel    Collection Time: 11/08/18 12:40 PM   Result Value Ref Range    Sodium 136 136 - 145 mmol/L    Potassium 3 4 (L) 3 5 - 5 3 mmol/L    Chloride 97 (L) 100 - 108 mmol/L    CO2 30 21 - 32 mmol/L    ANION GAP 9 4 - 13 mmol/L    BUN 13 5 - 25 mg/dL    Creatinine 0 88 0 60 - 1 30 mg/dL    Glucose 294 (H) 65 - 140 mg/dL    Calcium 9 7 8 3 - 10 1 mg/dL    AST 19 5 - 45 U/L    ALT 21 12 - 78 U/L    Alkaline Phosphatase 121 (H) 46 - 116 U/L    Total Protein 8 4 (H) 6 4 - 8 2 g/dL    Albumin 3 6 3 5 - 5 0 g/dL    Total Bilirubin 1 80 (H) 0 20 - 1 00 mg/dL    eGFR 75 ml/min/1 73sq m   Magnesium    Collection Time: 11/08/18 12:40 PM   Result Value Ref Range    Magnesium 1 5 (L) 1 6 - 2 6 mg/dL   UA w Reflex to Microscopic w Reflex to Culture    Collection Time: 11/08/18  4:16 PM   Result Value Ref Range    Color, UA Yellow     Clarity, UA Clear     Specific Gravity, UA 1 020 1 003 - 1 030    pH, UA 7 5 4 5 - 8 0    Leukocytes, UA Negative Negative    Nitrite, UA Negative Negative    Protein, UA Trace (A) Negative mg/dl    Glucose,  (1/4%) (A) Negative mg/dl    Ketones, UA Trace (A) Negative mg/dl    Urobilinogen, UA 0 2 0 2, 1 0 E U /dl E U /dl    Bilirubin, UA Negative Negative    Blood, UA Negative Negative   Urine Microscopic    Collection Time: 11/08/18  4:16 PM   Result Value Ref Range    RBC, UA None Seen None Seen, 0-5 /hpf    WBC, UA None Seen None Seen, 0-5, 5-55, 5-65 /hpf    Epithelial Cells Occasional None Seen, Occasional /hpf    Bacteria, UA Moderate (A) None Seen, Occasional /hpf    AMORPH PHOSPATES Occasional /hpf    MUCUS THREADS Occasional (A) None Seen   TSH, 3rd generation    Collection Time: 11/08/18  8:53 PM   Result Value Ref Range    TSH 3RD GENERATON 0 595 0 358 - 3 740 uIU/mL   Platelet count    Collection Time: 11/08/18  8:53 PM   Result Value Ref Range    Platelets 964 212 - 905 Thousands/uL    MPV 12 3 8 9 - 12 7 fL   Hemoglobin A1c w/EAG Estimation (Orders if not completed within the last 90 days)    Collection Time: 11/08/18  8:53 PM   Result Value Ref Range    Hemoglobin A1C 9 9 (H) 4 2 - 6 3 %     mg/dl   Fingerstick Glucose (POCT)    Collection Time: 11/08/18  9:13 PM   Result Value Ref Range    POC Glucose 237 (H) 65 - 140 mg/dl   Fingerstick Glucose (POCT)    Collection Time: 11/09/18 12:03 AM   Result Value Ref Range    POC Glucose 206 (H) 65 - 140 mg/dl   Basic metabolic panel    Collection Time: 11/09/18  5:17 AM   Result Value Ref Range    Sodium 134 (L) 136 - 145 mmol/L    Potassium 3 5 3 5 - 5 3 mmol/L    Chloride 98 (L) 100 - 108 mmol/L    CO2 27 21 - 32 mmol/L    ANION GAP 9 4 - 13 mmol/L    BUN 11 5 - 25 mg/dL    Creatinine 0 79 0 60 - 1 30 mg/dL    Glucose 218 (H) 65 - 140 mg/dL    Glucose, Fasting 218 (H) 65 - 99 mg/dL    Calcium 9 2 8 3 - 10 1 mg/dL    eGFR 85 ml/min/1 73sq m   CBC and differential    Collection Time: 11/09/18  5:17 AM   Result Value Ref Range    WBC 11 63 (H) 4 31 - 10 16 Thousand/uL    RBC 4 83 3 81 - 5 12 Million/uL    Hemoglobin 12 9 11 5 - 15 4 g/dL    Hematocrit 40 1 34 8 - 46 1 %    MCV 83 82 - 98 fL    MCH 26 7 (L) 26 8 - 34 3 pg    MCHC 32 2 31 4 - 37 4 g/dL    RDW 13 3 11 6 - 15 1 %    MPV 12 3 8 9 - 12 7 fL    Platelets 183 185 - 352 Thousands/uL    nRBC 0 /100 WBCs    Neutrophils Relative 72 43 - 75 %    Immat GRANS % 0 0 - 2 %    Lymphocytes Relative 18 14 - 44 %    Monocytes Relative 8 4 - 12 %    Eosinophils Relative 1 0 - 6 %    Basophils Relative 1 0 - 1 %    Neutrophils Absolute 8 40 (H) 1 85 - 7 62 Thousands/µL    Immature Grans Absolute 0 05 0 00 - 0 20 Thousand/uL    Lymphocytes Absolute 2 11 0 60 - 4 47 Thousands/µL    Monocytes Absolute 0 91 0 17 - 1 22 Thousand/µL    Eosinophils Absolute 0 08 0 00 - 0 61 Thousand/µL    Basophils Absolute 0 08 0 00 - 0 10 Thousands/µL   Fingerstick Glucose (POCT)    Collection Time: 11/09/18  6:36 AM   Result Value Ref Range    POC Glucose 208 (H) 65 - 140 mg/dl   Magnesium    Collection Time: 11/09/18 10:33 AM   Result Value Ref Range    Magnesium 1 3 (L) 1 6 - 2 6 mg/dL   Hepatic function panel    Collection Time: 11/09/18 10:33 AM   Result Value Ref Range    Total Bilirubin 2 00 (H) 0 20 - 1 00 mg/dL    Bilirubin, Direct 0 31 (H) 0 00 - 0 20 mg/dL    Alkaline Phosphatase 120 (H) 46 - 116 U/L    AST 11 5 - 45 U/L    ALT 22 12 - 78 U/L    Total Protein 8 3 (H) 6 4 - 8 2 g/dL    Albumin 3 5 3 5 - 5 0 g/dL   Fingerstick Glucose (POCT)    Collection Time: 11/09/18 11:24 AM   Result Value Ref Range    POC Glucose 192 (H) 65 - 140 mg/dl     Imaging: I have personally reviewed pertinent reports  Ct Abdomen Pelvis With Contrast    Result Date: 11/8/2018  Impression: 1  Stable exam   No acute intra-abdominal pathology  Workstation performed: [id number]     EKG, Pathology, and Other Studies: I have personally reviewed pertinent reports

## 2018-11-10 ENCOUNTER — ANESTHESIA (INPATIENT)
Dept: PERIOP | Facility: HOSPITAL | Age: 53
DRG: 418 | End: 2018-11-10
Payer: MEDICARE

## 2018-11-10 ENCOUNTER — APPOINTMENT (INPATIENT)
Dept: RADIOLOGY | Facility: HOSPITAL | Age: 53
DRG: 418 | End: 2018-11-10
Payer: MEDICARE

## 2018-11-10 PROBLEM — I10 ESSENTIAL HYPERTENSION: Status: ACTIVE | Noted: 2018-11-10

## 2018-11-10 LAB
ALBUMIN SERPL BCP-MCNC: 3.3 G/DL (ref 3.5–5)
ALP SERPL-CCNC: 108 U/L (ref 46–116)
ALT SERPL W P-5'-P-CCNC: 16 U/L (ref 12–78)
ANION GAP SERPL CALCULATED.3IONS-SCNC: 9 MMOL/L (ref 4–13)
AST SERPL W P-5'-P-CCNC: 9 U/L (ref 5–45)
BILIRUB SERPL-MCNC: 2.4 MG/DL (ref 0.2–1)
BUN SERPL-MCNC: 10 MG/DL (ref 5–25)
CALCIUM SERPL-MCNC: 9.4 MG/DL (ref 8.3–10.1)
CHLORIDE SERPL-SCNC: 99 MMOL/L (ref 100–108)
CO2 SERPL-SCNC: 28 MMOL/L (ref 21–32)
CREAT SERPL-MCNC: 0.74 MG/DL (ref 0.6–1.3)
ERYTHROCYTE [DISTWIDTH] IN BLOOD BY AUTOMATED COUNT: 13.2 % (ref 11.6–15.1)
GFR SERPL CREATININE-BSD FRML MDRD: 92 ML/MIN/1.73SQ M
GLUCOSE SERPL-MCNC: 165 MG/DL (ref 65–140)
GLUCOSE SERPL-MCNC: 167 MG/DL (ref 65–140)
GLUCOSE SERPL-MCNC: 183 MG/DL (ref 65–140)
GLUCOSE SERPL-MCNC: 217 MG/DL (ref 65–140)
GLUCOSE SERPL-MCNC: 240 MG/DL (ref 65–140)
HCT VFR BLD AUTO: 44.8 % (ref 34.8–46.1)
HGB BLD-MCNC: 14.7 G/DL (ref 11.5–15.4)
MAGNESIUM SERPL-MCNC: 1.8 MG/DL (ref 1.6–2.6)
MCH RBC QN AUTO: 26.7 PG (ref 26.8–34.3)
MCHC RBC AUTO-ENTMCNC: 32.8 G/DL (ref 31.4–37.4)
MCV RBC AUTO: 82 FL (ref 82–98)
PLATELET # BLD AUTO: 343 THOUSANDS/UL (ref 149–390)
PMV BLD AUTO: 11.9 FL (ref 8.9–12.7)
POTASSIUM SERPL-SCNC: 3.3 MMOL/L (ref 3.5–5.3)
PROT SERPL-MCNC: 7.8 G/DL (ref 6.4–8.2)
RBC # BLD AUTO: 5.5 MILLION/UL (ref 3.81–5.12)
SODIUM SERPL-SCNC: 136 MMOL/L (ref 136–145)
WBC # BLD AUTO: 11.98 THOUSAND/UL (ref 4.31–10.16)

## 2018-11-10 PROCEDURE — 83735 ASSAY OF MAGNESIUM: CPT | Performed by: INTERNAL MEDICINE

## 2018-11-10 PROCEDURE — 88304 TISSUE EXAM BY PATHOLOGIST: CPT | Performed by: PATHOLOGY

## 2018-11-10 PROCEDURE — 74300 X-RAY BILE DUCTS/PANCREAS: CPT

## 2018-11-10 PROCEDURE — 0FT44ZZ RESECTION OF GALLBLADDER, PERCUTANEOUS ENDOSCOPIC APPROACH: ICD-10-PCS | Performed by: SURGERY

## 2018-11-10 PROCEDURE — C9113 INJ PANTOPRAZOLE SODIUM, VIA: HCPCS | Performed by: INTERNAL MEDICINE

## 2018-11-10 PROCEDURE — 47563 LAPARO CHOLECYSTECTOMY/GRAPH: CPT | Performed by: SURGERY

## 2018-11-10 PROCEDURE — 80053 COMPREHEN METABOLIC PANEL: CPT | Performed by: INTERNAL MEDICINE

## 2018-11-10 PROCEDURE — 85027 COMPLETE CBC AUTOMATED: CPT | Performed by: INTERNAL MEDICINE

## 2018-11-10 PROCEDURE — 99233 SBSQ HOSP IP/OBS HIGH 50: CPT | Performed by: INTERNAL MEDICINE

## 2018-11-10 PROCEDURE — C1729 CATH, DRAINAGE: HCPCS | Performed by: SURGERY

## 2018-11-10 PROCEDURE — 47563 LAPARO CHOLECYSTECTOMY/GRAPH: CPT | Performed by: PHYSICIAN ASSISTANT

## 2018-11-10 PROCEDURE — 82948 REAGENT STRIP/BLOOD GLUCOSE: CPT

## 2018-11-10 RX ORDER — SODIUM CHLORIDE, SODIUM LACTATE, POTASSIUM CHLORIDE, CALCIUM CHLORIDE 600; 310; 30; 20 MG/100ML; MG/100ML; MG/100ML; MG/100ML
50 INJECTION, SOLUTION INTRAVENOUS CONTINUOUS
Status: DISCONTINUED | OUTPATIENT
Start: 2018-11-10 | End: 2018-11-10

## 2018-11-10 RX ORDER — FENTANYL CITRATE/PF 50 MCG/ML
50 SYRINGE (ML) INJECTION
Status: DISCONTINUED | OUTPATIENT
Start: 2018-11-10 | End: 2018-11-10 | Stop reason: HOSPADM

## 2018-11-10 RX ORDER — METOCLOPRAMIDE HYDROCHLORIDE 5 MG/ML
10 INJECTION INTRAMUSCULAR; INTRAVENOUS ONCE AS NEEDED
Status: DISCONTINUED | OUTPATIENT
Start: 2018-11-10 | End: 2018-11-10 | Stop reason: HOSPADM

## 2018-11-10 RX ORDER — GLYCOPYRROLATE 0.2 MG/ML
INJECTION INTRAMUSCULAR; INTRAVENOUS AS NEEDED
Status: DISCONTINUED | OUTPATIENT
Start: 2018-11-10 | End: 2018-11-10 | Stop reason: SURG

## 2018-11-10 RX ORDER — ACETAMINOPHEN 325 MG/1
650 TABLET ORAL EVERY 6 HOURS PRN
Status: DISCONTINUED | OUTPATIENT
Start: 2018-11-10 | End: 2018-11-14 | Stop reason: HOSPADM

## 2018-11-10 RX ORDER — MIDAZOLAM HYDROCHLORIDE 1 MG/ML
INJECTION INTRAMUSCULAR; INTRAVENOUS AS NEEDED
Status: DISCONTINUED | OUTPATIENT
Start: 2018-11-10 | End: 2018-11-10 | Stop reason: SURG

## 2018-11-10 RX ORDER — FENTANYL CITRATE 50 UG/ML
INJECTION, SOLUTION INTRAMUSCULAR; INTRAVENOUS AS NEEDED
Status: DISCONTINUED | OUTPATIENT
Start: 2018-11-10 | End: 2018-11-10 | Stop reason: SURG

## 2018-11-10 RX ORDER — CEFAZOLIN SODIUM 1 G/3ML
INJECTION, POWDER, FOR SOLUTION INTRAMUSCULAR; INTRAVENOUS AS NEEDED
Status: DISCONTINUED | OUTPATIENT
Start: 2018-11-10 | End: 2018-11-10 | Stop reason: SURG

## 2018-11-10 RX ORDER — HYDROMORPHONE HCL/PF 1 MG/ML
0.4 SYRINGE (ML) INJECTION
Status: DISCONTINUED | OUTPATIENT
Start: 2018-11-10 | End: 2018-11-10 | Stop reason: HOSPADM

## 2018-11-10 RX ORDER — BUPIVACAINE HYDROCHLORIDE 2.5 MG/ML
INJECTION, SOLUTION EPIDURAL; INFILTRATION; INTRACAUDAL AS NEEDED
Status: DISCONTINUED | OUTPATIENT
Start: 2018-11-10 | End: 2018-11-10 | Stop reason: HOSPADM

## 2018-11-10 RX ORDER — ONDANSETRON 2 MG/ML
INJECTION INTRAMUSCULAR; INTRAVENOUS AS NEEDED
Status: DISCONTINUED | OUTPATIENT
Start: 2018-11-10 | End: 2018-11-10 | Stop reason: SURG

## 2018-11-10 RX ORDER — HYDROMORPHONE HCL/PF 1 MG/ML
SYRINGE (ML) INJECTION AS NEEDED
Status: DISCONTINUED | OUTPATIENT
Start: 2018-11-10 | End: 2018-11-10 | Stop reason: SURG

## 2018-11-10 RX ORDER — ALBUTEROL SULFATE 2.5 MG/3ML
2.5 SOLUTION RESPIRATORY (INHALATION) ONCE AS NEEDED
Status: DISCONTINUED | OUTPATIENT
Start: 2018-11-10 | End: 2018-11-10 | Stop reason: HOSPADM

## 2018-11-10 RX ORDER — PROMETHAZINE HYDROCHLORIDE 25 MG/ML
12.5 INJECTION, SOLUTION INTRAMUSCULAR; INTRAVENOUS ONCE AS NEEDED
Status: DISCONTINUED | OUTPATIENT
Start: 2018-11-10 | End: 2018-11-10 | Stop reason: HOSPADM

## 2018-11-10 RX ORDER — OXYCODONE HYDROCHLORIDE AND ACETAMINOPHEN 5; 325 MG/1; MG/1
2 TABLET ORAL EVERY 4 HOURS PRN
Status: DISCONTINUED | OUTPATIENT
Start: 2018-11-10 | End: 2018-11-12

## 2018-11-10 RX ORDER — NYSTATIN 100000 [USP'U]/G
POWDER TOPICAL 2 TIMES DAILY
Status: DISCONTINUED | OUTPATIENT
Start: 2018-11-10 | End: 2018-11-14 | Stop reason: HOSPADM

## 2018-11-10 RX ORDER — ONDANSETRON 2 MG/ML
4 INJECTION INTRAMUSCULAR; INTRAVENOUS ONCE AS NEEDED
Status: DISCONTINUED | OUTPATIENT
Start: 2018-11-10 | End: 2018-11-10 | Stop reason: HOSPADM

## 2018-11-10 RX ORDER — HYDRALAZINE HYDROCHLORIDE 20 MG/ML
10 INJECTION INTRAMUSCULAR; INTRAVENOUS EVERY 4 HOURS PRN
Status: DISCONTINUED | OUTPATIENT
Start: 2018-11-10 | End: 2018-11-14 | Stop reason: HOSPADM

## 2018-11-10 RX ORDER — ROCURONIUM BROMIDE 10 MG/ML
INJECTION, SOLUTION INTRAVENOUS AS NEEDED
Status: DISCONTINUED | OUTPATIENT
Start: 2018-11-10 | End: 2018-11-10 | Stop reason: SURG

## 2018-11-10 RX ORDER — LIDOCAINE HYDROCHLORIDE 10 MG/ML
INJECTION, SOLUTION INFILTRATION; PERINEURAL AS NEEDED
Status: DISCONTINUED | OUTPATIENT
Start: 2018-11-10 | End: 2018-11-10 | Stop reason: SURG

## 2018-11-10 RX ORDER — OXYCODONE HYDROCHLORIDE AND ACETAMINOPHEN 5; 325 MG/1; MG/1
1 TABLET ORAL EVERY 4 HOURS PRN
Status: DISCONTINUED | OUTPATIENT
Start: 2018-11-10 | End: 2018-11-12

## 2018-11-10 RX ORDER — PROPOFOL 10 MG/ML
INJECTION, EMULSION INTRAVENOUS AS NEEDED
Status: DISCONTINUED | OUTPATIENT
Start: 2018-11-10 | End: 2018-11-10 | Stop reason: SURG

## 2018-11-10 RX ORDER — SUCCINYLCHOLINE CHLORIDE 20 MG/ML
INJECTION INTRAMUSCULAR; INTRAVENOUS AS NEEDED
Status: DISCONTINUED | OUTPATIENT
Start: 2018-11-10 | End: 2018-11-10 | Stop reason: SURG

## 2018-11-10 RX ORDER — ONDANSETRON 2 MG/ML
4 INJECTION INTRAMUSCULAR; INTRAVENOUS ONCE
Status: COMPLETED | OUTPATIENT
Start: 2018-11-10 | End: 2018-11-10

## 2018-11-10 RX ORDER — INSULIN GLARGINE 100 [IU]/ML
15 INJECTION, SOLUTION SUBCUTANEOUS
Status: DISCONTINUED | OUTPATIENT
Start: 2018-11-10 | End: 2018-11-11 | Stop reason: DRUGHIGH

## 2018-11-10 RX ADMIN — ONDANSETRON HYDROCHLORIDE 4 MG: 2 SOLUTION INTRAMUSCULAR; INTRAVENOUS at 05:57

## 2018-11-10 RX ADMIN — HYDROMORPHONE HYDROCHLORIDE 1 MG: 1 INJECTION, SOLUTION INTRAMUSCULAR; INTRAVENOUS; SUBCUTANEOUS at 11:04

## 2018-11-10 RX ADMIN — MORPHINE SULFATE 1 MG: 2 INJECTION, SOLUTION INTRAMUSCULAR; INTRAVENOUS at 18:58

## 2018-11-10 RX ADMIN — FENTANYL CITRATE 100 MCG: 50 INJECTION, SOLUTION INTRAMUSCULAR; INTRAVENOUS at 10:45

## 2018-11-10 RX ADMIN — PANTOPRAZOLE SODIUM 40 MG: 40 INJECTION, POWDER, FOR SOLUTION INTRAVENOUS at 08:24

## 2018-11-10 RX ADMIN — MIDAZOLAM HYDROCHLORIDE 2 MG: 1 INJECTION, SOLUTION INTRAMUSCULAR; INTRAVENOUS at 10:36

## 2018-11-10 RX ADMIN — HYDROMORPHONE HYDROCHLORIDE 0.4 MG: 1 INJECTION, SOLUTION INTRAMUSCULAR; INTRAVENOUS; SUBCUTANEOUS at 12:41

## 2018-11-10 RX ADMIN — NEOSTIGMINE METHYLSULFATE 3 MG: 1 INJECTION, SOLUTION INTRAMUSCULAR; INTRAVENOUS; SUBCUTANEOUS at 11:59

## 2018-11-10 RX ADMIN — ONDANSETRON 4 MG: 2 INJECTION INTRAMUSCULAR; INTRAVENOUS at 07:28

## 2018-11-10 RX ADMIN — PROPOFOL 150 MG: 10 INJECTION, EMULSION INTRAVENOUS at 10:45

## 2018-11-10 RX ADMIN — HEPARIN SODIUM 5000 UNITS: 5000 INJECTION, SOLUTION INTRAVENOUS; SUBCUTANEOUS at 14:29

## 2018-11-10 RX ADMIN — HEPARIN SODIUM 5000 UNITS: 5000 INJECTION, SOLUTION INTRAVENOUS; SUBCUTANEOUS at 23:18

## 2018-11-10 RX ADMIN — PANTOPRAZOLE SODIUM 40 MG: 40 INJECTION, POWDER, FOR SOLUTION INTRAVENOUS at 23:19

## 2018-11-10 RX ADMIN — LIDOCAINE HYDROCHLORIDE 50 MG: 10 INJECTION, SOLUTION INFILTRATION; PERINEURAL at 10:45

## 2018-11-10 RX ADMIN — MORPHINE SULFATE 1 MG: 2 INJECTION, SOLUTION INTRAMUSCULAR; INTRAVENOUS at 13:42

## 2018-11-10 RX ADMIN — HYDROMORPHONE HYDROCHLORIDE 1 MG: 1 INJECTION, SOLUTION INTRAMUSCULAR; INTRAVENOUS; SUBCUTANEOUS at 11:23

## 2018-11-10 RX ADMIN — MORPHINE SULFATE 2 MG: 2 INJECTION, SOLUTION INTRAMUSCULAR; INTRAVENOUS at 05:57

## 2018-11-10 RX ADMIN — NYSTATIN: 100000 POWDER TOPICAL at 14:36

## 2018-11-10 RX ADMIN — SODIUM CHLORIDE: 0.9 INJECTION, SOLUTION INTRAVENOUS at 11:06

## 2018-11-10 RX ADMIN — HYDROMORPHONE HYDROCHLORIDE 0.4 MG: 1 INJECTION, SOLUTION INTRAMUSCULAR; INTRAVENOUS; SUBCUTANEOUS at 12:51

## 2018-11-10 RX ADMIN — ROCURONIUM BROMIDE 40 MG: 10 INJECTION INTRAVENOUS at 10:50

## 2018-11-10 RX ADMIN — FLUTICASONE PROPIONATE 1 SPRAY: 50 SPRAY, METERED NASAL at 08:20

## 2018-11-10 RX ADMIN — CEFAZOLIN 3000 MG: 1 INJECTION, POWDER, FOR SOLUTION INTRAVENOUS at 10:39

## 2018-11-10 RX ADMIN — OXYCODONE HYDROCHLORIDE AND ACETAMINOPHEN 2 TABLET: 5; 325 TABLET ORAL at 23:33

## 2018-11-10 RX ADMIN — GLYCOPYRROLATE 0.4 MG: 0.2 INJECTION, SOLUTION INTRAMUSCULAR; INTRAVENOUS at 11:59

## 2018-11-10 RX ADMIN — ONDANSETRON HYDROCHLORIDE 4 MG: 2 SOLUTION INTRAMUSCULAR; INTRAVENOUS at 23:49

## 2018-11-10 RX ADMIN — NYSTATIN: 100000 POWDER TOPICAL at 23:20

## 2018-11-10 RX ADMIN — FENTANYL CITRATE 50 MCG: 50 INJECTION INTRAMUSCULAR; INTRAVENOUS at 12:21

## 2018-11-10 RX ADMIN — ONDANSETRON 4 MG: 2 INJECTION INTRAMUSCULAR; INTRAVENOUS at 10:50

## 2018-11-10 RX ADMIN — SUCCINYLCHOLINE CHLORIDE 100 MG: 20 INJECTION, SOLUTION INTRAMUSCULAR; INTRAVENOUS at 10:45

## 2018-11-10 RX ADMIN — HEPARIN SODIUM 5000 UNITS: 5000 INJECTION, SOLUTION INTRAVENOUS; SUBCUTANEOUS at 05:57

## 2018-11-10 RX ADMIN — INSULIN LISPRO 1 UNITS: 100 INJECTION, SOLUTION INTRAVENOUS; SUBCUTANEOUS at 17:39

## 2018-11-10 RX ADMIN — MONTELUKAST SODIUM 10 MG: 10 TABLET, FILM COATED ORAL at 23:19

## 2018-11-10 RX ADMIN — METOCLOPRAMIDE HYDROCHLORIDE 10 MG: 10 TABLET ORAL at 17:37

## 2018-11-10 RX ADMIN — OXYCODONE HYDROCHLORIDE AND ACETAMINOPHEN 2 TABLET: 5; 325 TABLET ORAL at 17:37

## 2018-11-10 RX ADMIN — HYDRALAZINE HYDROCHLORIDE 10 MG: 20 INJECTION INTRAMUSCULAR; INTRAVENOUS at 14:29

## 2018-11-10 RX ADMIN — INSULIN GLARGINE 50 UNITS: 100 INJECTION, SOLUTION SUBCUTANEOUS at 23:18

## 2018-11-10 RX ADMIN — FENTANYL CITRATE 50 MCG: 50 INJECTION, SOLUTION INTRAMUSCULAR; INTRAVENOUS at 12:09

## 2018-11-10 RX ADMIN — INSULIN LISPRO 1 UNITS: 100 INJECTION, SOLUTION INTRAVENOUS; SUBCUTANEOUS at 08:20

## 2018-11-10 RX ADMIN — FENTANYL CITRATE 50 MCG: 50 INJECTION INTRAMUSCULAR; INTRAVENOUS at 12:32

## 2018-11-10 RX ADMIN — INSULIN LISPRO 2 UNITS: 100 INJECTION, SOLUTION INTRAVENOUS; SUBCUTANEOUS at 23:20

## 2018-11-10 RX ADMIN — FENTANYL CITRATE 50 MCG: 50 INJECTION, SOLUTION INTRAMUSCULAR; INTRAVENOUS at 11:58

## 2018-11-10 RX ADMIN — METOCLOPRAMIDE HYDROCHLORIDE 10 MG: 10 TABLET ORAL at 06:01

## 2018-11-10 NOTE — PHYSICIAN ADVISOR
Current patient class: Inpatient  The patient is currently on Hospital Day: 2 at [address]      The patient was admitted to the hospital at [phone] on 11/9/18 for the following diagnosis:  Vomiting [R11 10]  Obese [E66 9]  Intractable nausea and vomiting [R11 2]       There is documentation in the medical record of an expected length of stay of at least 2 midnights  The patient is therefore expected to satisfy the 2 midnight benchmark and given the 2 midnight presumption is appropriate for INPATIENT ADMISSION  Given this expectation of a satisfying stay, CMS instructs us that the patient is most often appropriate for inpatient admission under part A provided medical necessity is documented in the chart  After review of the relevant documentation, labs, vital signs and test results, the patient is appropriate for INPATIENT ADMISSION  Admission to the hospital as an inpatient is a complex decision making process which requires the practitioner to consider the patients presenting complaint, history and physical examination and all relevant testing  With this in mind, in this case, the patient was deemed appropriate for INPATIENT ADMISSION  After review of the documentation and testing available at the time of the admission I concur with this clinical determination of medical necessity  Rationale is as follows: The patient is a 46 yrs old Female who presented to the ED at 11/8/2018 12:21 PM with a chief complaint of Vomiting (Pt presents to ER via EMS from home with c/o's nausea & vomitting for 2 days  Healthy appearing obese female presents in no distress )     Given the need for further hospitalization, and along with the documentation of medical necessity present in the chart, the patient is appropriate for inpatient admission  The patient is expected to satisfy the 2 midnight benchmark, and will require further acute medical care   The patient does have comorbid conditions which increases the risk for significant adverse outcome  Given this the patient is appropriate for inpatient admission        The patients vitals on arrival were ED Triage Vitals   Temperature Pulse Respirations Blood Pressure SpO2   11/08/18 1224 11/08/18 1224 11/08/18 1224 11/08/18 1224 11/08/18 1224   98 5 °F (36 9 °C) 91 16 (!) 176/83 97 %      Temp Source Heart Rate Source Patient Position - Orthostatic VS BP Location FiO2 (%)   11/08/18 1224 11/08/18 1224 11/08/18 1224 11/08/18 1224 --   Oral Monitor Lying Right arm       Pain Score       11/08/18 2100       6           Past Medical History:   Diagnosis Date    Asthma     Chronic pain     Diabetes mellitus ([address] )     Gastroparesis      Past Surgical History:   Procedure Laterality Date    CERVICAL LAMINECTOMY      HYSTERECTOMY      JOINT REPLACEMENT Bilateral     knee    TOE AMPUTATION Right 2/2/2018    Procedure: AMPUTATION TOE, RIGHT GREAT TOE;  Surgeon: Reyes Willingham DPM;  Location: [facility];  Service: Podiatry           Consults have been placed to:   IP CONSULT TO NUTRITION SERVICES  IP CONSULT TO GASTROENTEROLOGY  IP CONSULT TO ACUTE CARE SURGERY    Vitals:    11/08/18 2300 11/09/18 0700 11/09/18 1517 11/09/18 1600   BP: 100/59 (!) 174/89 (!) 179/104    BP Location: Right arm Right arm Right arm    Pulse: 98 96 (!) 110    Resp: 17 18 18    Temp: 98 4 °F (36 9 °C) 98 8 °F (37 1 °C) 98 6 °F (37 °C)    TempSrc: Oral Oral Oral    SpO2: 92% 94% 96%    Weight:    128 kg (281 lb 12 oz)   Height:    5' 8" (1 727 m)       Most recent labs:    Recent Labs      11/09/18   0517  11/09/18   1033   WBC  11 63*   --    HGB  12 9   --    HCT  40 1   --    PLT  349   --    K  3 5   --    CALCIUM  9 2   --    BUN  11   --    CREATININE  0 79   --    AST   --   11   ALT   --   22   ALKPHOS   --   120*       Scheduled Meds:  Current Facility-Administered Medications:  acetaminophen 650 mg Oral Q6H PRN Jackelyn Espinoza MD    albuterol 2 5 mg Nebulization Q6H PRN Corey Iglesias    diphenhydrAMINE 25 mg Intravenous Q6H PRN Salty Read MD    fluticasone 1 spray Nasal Daily Salty Read MD    heparin (porcine) 5,000 Units Subcutaneous Q8H [facility] Salty Read MD    insulin glargine 50 Units Subcutaneous HS Salty Read MD    insulin lispro 1-5 Units Subcutaneous TID AC Salty Read MD    insulin lispro 1-5 Units Subcutaneous HS Salty Read MD    magnesium sulfate 2 g Intravenous Daily PRN Kristy Monet MD    metoclopramide 10 mg Oral TID AC Zayda Troy PA-C    montelukast 10 mg Oral HS Salty Read MD    morphine injection 1 mg Intravenous Q3H PRN Salty Read MD    morphine injection 2 mg Intravenous Q3H PRN Salty Read MD    ondansetron 4 mg Intravenous Q6H PRN Fidel Cole PA-C    pantoprazole 40 mg Intravenous Q12H [facility] Salty Read MD    sodium chloride 75 mL/hr Intravenous Continuous Salty Read MD Last Rate: 75 mL/hr (11/09/18 0843)     Continuous Infusions:  sodium chloride 75 mL/hr Last Rate: 75 mL/hr (11/09/18 0843)     PRN Meds:   acetaminophen    albuterol    diphenhydrAMINE    magnesium sulfate    morphine injection    morphine injection    ondansetron    Surgical procedures (if appropriate):  Procedure(s):  CHOLECYSTECTOMY LAPAROSCOPIC w/ ioc

## 2018-11-10 NOTE — OP NOTE
OPERATIVE REPORT  PATIENT NAME: Heri Liz    :  1965  MRN: [id number]  Pt Location: [facility] OR ROOM 03    SURGERY DATE: 11/10/2018    Surgeon(s) and Role:     * Stephanie Orantes MD - Primary     * Yonas Daniels PA-C - Assisting    Preop Diagnosis:  Intractable nausea and vomiting [R11 2]  Gall stones [K80 20]    Post-Op Diagnosis Codes:     * Intractable nausea and vomiting [R11 2]     * Gall stones [K80 20]    Procedure(s) (LRB):  CHOLECYSTECTOMY LAPAROSCOPIC w/ ioc (N/A)    Specimen(s):  ID Type Source Tests Collected by Time Destination   1 : gallbladder Tissue Gallbladder TISSUE EXAM Stephanie Orantes MD 11/10/2018 1102        Estimated Blood Loss:   Minimal    Drains:       Anesthesia Type:   General    Operative Indications:  Intractable nausea and vomiting [R11 2]  Gall stones [K80 20]      Operative Findings: The gallbladder was markedly distended, very large, with small gallstones without acute inflammatory process  Liver surface appeared normal   Intraoperative cholangiograms with the help of the C-arm failed to reveal any proximal or distal filling defects and the contrast went freely into the small bowel  The rest of the abdominal cavity failed to reveal any inflammatory or neoplastic process  Complications:   None    Procedure and Technique:    The patient was identified and she was placed in the operating table in a supine position  After adequate anesthesia induction and satisfactory endotracheal intubation the abdomen was prepped and draped under sterile usual fashion with ChloraPrep  The abdominal wall was elevated with towel clips, Veress needle was introduced through the umbilicus, but the abdomen was not insufflated adequately and the pressures were high  At this point proceeded to make an incision on the right upper quadrant for a 5 mm trocar introduced under direct vision  The abdomen was insufflated with CO2    After obtaining adequate pneumoperitoneum the scope was advanced in exploration was performed with above findings  5 mm trocar was placed in the supraumbilical area and another 5 mm trocar on the right upper quadrant and 12 mm trocar in the epigastric area  The fundus of the gallbladder was grasped and pulled towards the right shoulder  The infundibulum of the gallbladder was grasped and pulled towards the right side  The cystic duct was identified, dissected and  stapled distally  An incision was made over the cystic duct with the scissors  Cholangiogram catheter was inserted through a separate stab wound and inserted into the cystic duct and secured with the Endo Clip  Intraoperative cholangiogram was performed with the help of the C-arm with the above findings  The cholangiogram catheter was removed and the cystic duct was stapled proximally ×2 and then divided with scissors  The cystic artery was also identified, dissected, stapled proximally and distally and then divided with scissors  The gallbladder was removed from the gallbladder fossa using electrocautery and the hook  The gallbladder was retrieved through the epigastric port site with the help of the Endo Catch  The abdominal cavity was copiously irrigated with saline solution  The gallbladder fossa was dry  The cystic duct and cystic artery were inspected with no evidence of bile leak or bleeding respectively  The ports were removed under direct vision without evidence of bleeding from the abdominal wall  The epigastric port site fascia was closed with 0 Vicryl in an interrupted figure-of-eight fashion  The subcutaneous tissue was infiltrated with 0 25% Marcaine and the skin was closed with a 4-0 Vicryl in an interrupted significant fashion  Sterile dressings were applied  At the end of the case instrument, needles, sponges counts were correct  The patient tolerated the procedure well and then he was transferred to recovery room in a stable conditions       I was present for the entire procedure, A qualified resident physician was not available and A physician assistant was required during the procedure for retraction tissue handling,dissection and suturing    Patient Disposition:  PACU , hemodynamically stable and extubated and stable    SIGNATURE: Valeri Tanner MD  DATE: November 10, 2018  TIME: 11:50 AM

## 2018-11-10 NOTE — ASSESSMENT & PLAN NOTE
Blood pressure on the higher side and she was also tachycardic  Partly be because of her withdrawing from her narcotics    Plan as above continue with antihypertensive medication-p r n   IV antihypertensive medication as well

## 2018-11-10 NOTE — DISCHARGE INSTRUCTIONS
Call the Surgical office to make appointment for 2 weeks   Meds:  If you do not need strong pain medicine you may take Tylenol  Do not take acetaminophen (Tylenol) WITH  pain meds that contain acetaminophen  Take one or the other  Do not exceed more than 4000 mg of acetaminophen in 24 hours  or 3000 mg if you have liver disease  No driving until seen in the office  No driving while taking pain meds  No heavy lifting or strenuous exercise until you are cleared in the surgery office   You may shower and get incisions wet,rinse, and pat dry  If you have steri-strips you may take them off in 5 days   If you have adhesive wound closure, do not rub or pick off  Call the office if you have increased pain not relieved with pain medicine  Call the office if you have a fever,redness, the wound opens up, you have pus draining from your incision  Colace 100 mg daily to avoid constipation

## 2018-11-10 NOTE — PROGRESS NOTES
[facility] Progress Note  Patient: Nolan Reynoso 46 y o  female   MRN: [id number]  PCP: Adela Mary  Unit/Bed#: -01 Encounter: [id number]  Date Of Visit: 11/10/18          * Intractable nausea and vomiting   Assessment & Plan    Patient with gallstones/sludge in addition to having underlying gastroparesis  GI evaluation appreciated  Patient going for cholecystectomy this morning  Diabetes mellitus [facility])   Assessment & Plan    Lab Results   Component Value Date    HGBA1C 9 9 (H) 11/08/2018       Recent Labs      11/08/18   2113  11/09/18   0003  11/09/18   0636  11/09/18   1124   POCGLU  237*  206*  208*  192*       Blood Sugar Average: Last 72 hrs:  (P) 210 75     Added morning long-acting insulin in addition to evening long-acting insulin as well as continuing the sliding scale  She needs to work on her diet and exercise to lose some weight  Essential hypertension   Assessment & Plan    Blood pressure on the higher side  Would give IV p r n  Antihypertensive medications Sheila/postoperatively as needed     Hypomagnesemia   Assessment & Plan    Recheck and replete and fu as needed     Gall stones   Assessment & Plan    For cholecystectomy today as above     Opioid dependence ([address] )   Assessment & Plan    Continues opiate dependence  Continue with IV pain medication     Gastroparesis   Assessment & Plan    To continue with current treatment  Chronic pain   Assessment & Plan    Chronic continuous opiate use/dependence  Continue with current treatment to avoid withdrawal symptoms    Currently does not appear to be withdrawing         Present on Admission:   Opioid dependence ([address] )   Intractable nausea and vomiting   Gastroparesis   Diabetes mellitus ([address] )   Chronic pain   Hypomagnesemia   Gall stones   Essential hypertension            VTE Pharmacologic Prophylaxis:   Pharmacologic: Heparin  Mechanical VTE Prophylaxis in Place: Yes    Patient Centered Rounds: I have performed bedside rounds with nursing staff today  Discussions with Specialists or Other Care Team Provider:  Yes    Education and Discussions with Family / Patient:  Yes    Time Spent for Care: 35+ minutes  More than 50% of total time spent on counseling and coordination of care as described above  Current Length of Stay: 1 day(s)    Current Patient Status: Inpatient   Certification Statement: The patient will continue to require additional inpatient hospital stay due to IV fluids/surgery    Discharge Plan:  Home when stable    Code Status: Level 1 - Full Code      Subjective:   Still feels the same  Not much abdominal pain at this moment  Awaiting to go for surgery  No fever or chills  Objective:     Vitals:   Temp (24hrs), Av 4 °F (36 9 °C), Min:97 4 °F (36 3 °C), Max:99 °F (37 2 °C)    Temp:  [97 4 °F (36 3 °C)-99 °F (37 2 °C)] 99 °F (37 2 °C)  HR:  [] 100  Resp:  [14-21] 18  BP: (154-196)/() 181/92  SpO2:  [93 %-98 %] 96 %  Body mass index is 42 84 kg/m²  Input and Output Summary (last 24 hours): Intake/Output Summary (Last 24 hours) at 11/10/18 1503  Last data filed at 11/10/18 1209   Gross per 24 hour   Intake             2790 ml   Output             1620 ml   Net             1170 ml           Physical Exam:     Vital signs reviewed as above  Obese female who is sitting at the edge of the bed  Abdomen is obese  Bowel sounds are audible  Grossly nontender today at least at this moment  S1 and S2 audible  Chest clear to auscultation  Lips dry  Depressed  No cyanosis or clubbing  Awake and alert    Oriented x3    Additional Data:     Labs:      Results from last 7 days  Lab Units 11/10/18  0552 18  0517   WBC Thousand/uL 11 98* 11 63*   HEMOGLOBIN g/dL 14 7 12 9   HEMATOCRIT % 44 8 40 1   PLATELETS Thousands/uL 343 349   NEUTROS PCT %  --  72   LYMPHS PCT %  --  18   MONOS PCT %  --  8   EOS PCT %  --  1       Results from last 7 days  Lab Units 11/10/18  0552 POTASSIUM mmol/L 3 3*   CHLORIDE mmol/L 99*   CO2 mmol/L 28   BUN mg/dL 10   CREATININE mg/dL 0 74   CALCIUM mg/dL 9 4   ALK PHOS U/L 108   ALT U/L 16   AST U/L 9           * I Have Reviewed All Lab Data Listed Above  * Additional Pertinent Lab Tests Reviewed: All Labs Within Last 24 Hours Reviewed      Recent Cultures (last 7 days):           Last 24 Hours Medication List:     Current Facility-Administered Medications:  acetaminophen 650 mg Oral Q6H PRN Devorah Mario MD    acetaminophen 650 mg Oral Q6H PRN Jonny Brandon PA-C    albuterol 2 5 mg Nebulization Q6H PRN Corey Browne    diphenhydrAMINE 25 mg Intravenous Q6H PRN Devorah Mario MD    fluticasone 1 spray Nasal Daily Devorah Mario MD    heparin (porcine) 5,000 Units Subcutaneous Q8H [address] Devorah Mario MD    hydrALAZINE 10 mg Intravenous Q4H PRN Rekha Bowen MD    insulin glargine 15 Units Subcutaneous HS Rekha Bowen MD    insulin glargine 50 Units Subcutaneous HS Devorah Mario MD    insulin lispro 1-5 Units Subcutaneous TID AC Devorah Mario MD    insulin lispro 1-5 Units Subcutaneous HS Devorah Mario MD    magnesium sulfate 2 g Intravenous Daily PRN Rekha Bowen MD    metoclopramide 10 mg Oral TID AC Zayda Troy PA-C    montelukast 10 mg Oral HS Devorah Mario MD    morphine injection 1 mg Intravenous Q3H PRN Jonny Brandon PA-C    nystatin  Topical BID Rekha Bowen MD    ondansetron 4 mg Intravenous Q6H PRN Areli Russell PA-C    oxyCODONE-acetaminophen 1 tablet Oral Q4H PRN Jonny Brandon PA-C    oxyCODONE-acetaminophen 2 tablet Oral Q4H PRN Jonny Brandon PA-C    pantoprazole 40 mg Intravenous Q12H [address] Devorah Mario MD    sodium chloride 75 mL/hr Intravenous Continuous Devorah Mario MD Last Rate: 75 mL/hr (11/10/18 0155)        Today, Patient Was Seen By: Rekha Bowen MD    ** Please Note: Dragon 360 Dictation voice to text software may have been used in the creation of this document   **

## 2018-11-10 NOTE — ANESTHESIA POSTPROCEDURE EVALUATION
Post-Op Assessment Note      CV Status:  Stable    Mental Status:  Alert and awake    Hydration Status:  Stable    PONV Controlled:  None    Airway Patency:  Patent and adequate    Post Op Vitals Reviewed: Yes          Staff: Anesthesiologist, HENRRY           BP  175/94   Temp   98 2   Pulse 102   Resp   20   SpO2   98%

## 2018-11-10 NOTE — ANESTHESIA PREPROCEDURE EVALUATION
Review of Systems/Medical History  Patient summary reviewed  Chart reviewed  No history of anesthetic complications     Cardiovascular  EKG reviewed,    Pulmonary  Asthma ,        GI/Hepatic      Comment: Gastroparesis          Endo/Other  Diabetes poorly controlled type 2 ,      GYN       Hematology   Musculoskeletal       Neurology   Psychology     Chronic opioid dependence Chronic pain,                 Anesthesia Plan  ASA Score- 3     Anesthesia Type- general with ASA Monitors  Additional Monitors:   Airway Plan:         Plan Factors-  Patient did not smoke on day of surgery  Induction- intravenous  Postoperative Plan- Plan for postoperative opioid use  Planned trial extubation    Informed Consent- Anesthetic plan and risks discussed with patient  I personally reviewed this patient with the CRNA  Discussed and agreed on the Anesthesia Plan with the CRNA             Lab Results   Component Value Date    WBC 11 63 (H) 11/09/2018    HGB 12 9 11/09/2018    HCT 40 1 11/09/2018    MCV 83 11/09/2018     11/09/2018     Lab Results   Component Value Date    CALCIUM 9 2 11/09/2018    K 3 5 11/09/2018    CO2 27 11/09/2018    CL 98 (L) 11/09/2018    BUN 11 11/09/2018    CREATININE 0 79 11/09/2018     Lab Results   Component Value Date    INR 1 09 02/02/2018    INR 1 04 03/15/2017    PROTIME 14 4 02/02/2018    PROTIME 13 5 03/15/2017     Lab Results   Component Value Date    PTT 40 (H) 02/02/2018     Type and Screen:  A        I, Dr Kenia Buck, the attending physician, have personally seen and evaluated the patient prior to anesthetic care  I have reviewed the pre-anesthetic record, and other medical records if appropriate to the anesthetic care  If a CRNA is involved in the case, I have reviewed the CRNA assessment, if present, and agree  The patient is in a suitable condition to proceed with my formulated anesthetic plan

## 2018-11-11 LAB
ALBUMIN SERPL BCP-MCNC: 3 G/DL (ref 3.5–5)
ALP SERPL-CCNC: 99 U/L (ref 46–116)
ALT SERPL W P-5'-P-CCNC: 20 U/L (ref 12–78)
ANION GAP SERPL CALCULATED.3IONS-SCNC: 9 MMOL/L (ref 4–13)
AST SERPL W P-5'-P-CCNC: 16 U/L (ref 5–45)
BILIRUB SERPL-MCNC: 2.1 MG/DL (ref 0.2–1)
BUN SERPL-MCNC: 9 MG/DL (ref 5–25)
CALCIUM SERPL-MCNC: 8.7 MG/DL (ref 8.3–10.1)
CHLORIDE SERPL-SCNC: 99 MMOL/L (ref 100–108)
CO2 SERPL-SCNC: 26 MMOL/L (ref 21–32)
CREAT SERPL-MCNC: 0.64 MG/DL (ref 0.6–1.3)
ERYTHROCYTE [DISTWIDTH] IN BLOOD BY AUTOMATED COUNT: 13.3 % (ref 11.6–15.1)
GFR SERPL CREATININE-BSD FRML MDRD: 101 ML/MIN/1.73SQ M
GLUCOSE SERPL-MCNC: 165 MG/DL (ref 65–140)
GLUCOSE SERPL-MCNC: 166 MG/DL (ref 65–140)
GLUCOSE SERPL-MCNC: 179 MG/DL (ref 65–140)
GLUCOSE SERPL-MCNC: 185 MG/DL (ref 65–140)
GLUCOSE SERPL-MCNC: 213 MG/DL (ref 65–140)
HCT VFR BLD AUTO: 41.1 % (ref 34.8–46.1)
HGB BLD-MCNC: 13.5 G/DL (ref 11.5–15.4)
MCH RBC QN AUTO: 26.7 PG (ref 26.8–34.3)
MCHC RBC AUTO-ENTMCNC: 32.8 G/DL (ref 31.4–37.4)
MCV RBC AUTO: 81 FL (ref 82–98)
PLATELET # BLD AUTO: 319 THOUSANDS/UL (ref 149–390)
PMV BLD AUTO: 12 FL (ref 8.9–12.7)
POTASSIUM SERPL-SCNC: 3.2 MMOL/L (ref 3.5–5.3)
PROT SERPL-MCNC: 7 G/DL (ref 6.4–8.2)
RBC # BLD AUTO: 5.05 MILLION/UL (ref 3.81–5.12)
SODIUM SERPL-SCNC: 134 MMOL/L (ref 136–145)
WBC # BLD AUTO: 13.83 THOUSAND/UL (ref 4.31–10.16)

## 2018-11-11 PROCEDURE — 99024 POSTOP FOLLOW-UP VISIT: CPT | Performed by: SURGERY

## 2018-11-11 PROCEDURE — 85027 COMPLETE CBC AUTOMATED: CPT | Performed by: INTERNAL MEDICINE

## 2018-11-11 PROCEDURE — 99233 SBSQ HOSP IP/OBS HIGH 50: CPT | Performed by: INTERNAL MEDICINE

## 2018-11-11 PROCEDURE — 82948 REAGENT STRIP/BLOOD GLUCOSE: CPT

## 2018-11-11 PROCEDURE — C9113 INJ PANTOPRAZOLE SODIUM, VIA: HCPCS | Performed by: INTERNAL MEDICINE

## 2018-11-11 PROCEDURE — 80053 COMPREHEN METABOLIC PANEL: CPT | Performed by: INTERNAL MEDICINE

## 2018-11-11 RX ORDER — INSULIN GLARGINE 100 [IU]/ML
15 INJECTION, SOLUTION SUBCUTANEOUS EVERY MORNING
Status: DISCONTINUED | OUTPATIENT
Start: 2018-11-11 | End: 2018-11-14 | Stop reason: HOSPADM

## 2018-11-11 RX ORDER — GUAIFENESIN 600 MG
600 TABLET, EXTENDED RELEASE 12 HR ORAL EVERY 12 HOURS SCHEDULED
Status: DISCONTINUED | OUTPATIENT
Start: 2018-11-11 | End: 2018-11-14 | Stop reason: HOSPADM

## 2018-11-11 RX ORDER — POTASSIUM CHLORIDE AND SODIUM CHLORIDE 900; 300 MG/100ML; MG/100ML
75 INJECTION, SOLUTION INTRAVENOUS CONTINUOUS
Status: DISCONTINUED | OUTPATIENT
Start: 2018-11-11 | End: 2018-11-14 | Stop reason: HOSPADM

## 2018-11-11 RX ADMIN — MORPHINE SULFATE 1 MG: 2 INJECTION, SOLUTION INTRAMUSCULAR; INTRAVENOUS at 08:13

## 2018-11-11 RX ADMIN — ALBUTEROL SULFATE 2.5 MG: 2.5 SOLUTION RESPIRATORY (INHALATION) at 18:43

## 2018-11-11 RX ADMIN — HEPARIN SODIUM 5000 UNITS: 5000 INJECTION, SOLUTION INTRAVENOUS; SUBCUTANEOUS at 14:06

## 2018-11-11 RX ADMIN — HEPARIN SODIUM 5000 UNITS: 5000 INJECTION, SOLUTION INTRAVENOUS; SUBCUTANEOUS at 22:09

## 2018-11-11 RX ADMIN — PANTOPRAZOLE SODIUM 40 MG: 40 INJECTION, POWDER, FOR SOLUTION INTRAVENOUS at 22:07

## 2018-11-11 RX ADMIN — METOCLOPRAMIDE HYDROCHLORIDE 10 MG: 10 TABLET ORAL at 18:43

## 2018-11-11 RX ADMIN — SODIUM CHLORIDE 75 ML/HR: 0.9 INJECTION, SOLUTION INTRAVENOUS at 06:37

## 2018-11-11 RX ADMIN — MORPHINE SULFATE 1 MG: 2 INJECTION, SOLUTION INTRAMUSCULAR; INTRAVENOUS at 20:35

## 2018-11-11 RX ADMIN — FLUTICASONE PROPIONATE 1 SPRAY: 50 SPRAY, METERED NASAL at 08:13

## 2018-11-11 RX ADMIN — INSULIN LISPRO 1 UNITS: 100 INJECTION, SOLUTION INTRAVENOUS; SUBCUTANEOUS at 18:44

## 2018-11-11 RX ADMIN — METOCLOPRAMIDE HYDROCHLORIDE 10 MG: 10 TABLET ORAL at 14:01

## 2018-11-11 RX ADMIN — METOCLOPRAMIDE HYDROCHLORIDE 10 MG: 10 TABLET ORAL at 06:28

## 2018-11-11 RX ADMIN — INSULIN LISPRO 1 UNITS: 100 INJECTION, SOLUTION INTRAVENOUS; SUBCUTANEOUS at 22:12

## 2018-11-11 RX ADMIN — ONDANSETRON HYDROCHLORIDE 4 MG: 2 SOLUTION INTRAMUSCULAR; INTRAVENOUS at 18:43

## 2018-11-11 RX ADMIN — MONTELUKAST SODIUM 10 MG: 10 TABLET, FILM COATED ORAL at 22:09

## 2018-11-11 RX ADMIN — ONDANSETRON HYDROCHLORIDE 4 MG: 2 SOLUTION INTRAMUSCULAR; INTRAVENOUS at 04:52

## 2018-11-11 RX ADMIN — NYSTATIN: 100000 POWDER TOPICAL at 08:14

## 2018-11-11 RX ADMIN — INSULIN GLARGINE 15 UNITS: 100 INJECTION, SOLUTION SUBCUTANEOUS at 08:29

## 2018-11-11 RX ADMIN — HEPARIN SODIUM 5000 UNITS: 5000 INJECTION, SOLUTION INTRAVENOUS; SUBCUTANEOUS at 06:28

## 2018-11-11 RX ADMIN — OXYCODONE HYDROCHLORIDE AND ACETAMINOPHEN 2 TABLET: 5; 325 TABLET ORAL at 14:01

## 2018-11-11 RX ADMIN — OXYCODONE HYDROCHLORIDE AND ACETAMINOPHEN 1 TABLET: 5; 325 TABLET ORAL at 06:31

## 2018-11-11 RX ADMIN — INSULIN GLARGINE 50 UNITS: 100 INJECTION, SOLUTION SUBCUTANEOUS at 22:11

## 2018-11-11 RX ADMIN — NYSTATIN: 100000 POWDER TOPICAL at 18:44

## 2018-11-11 RX ADMIN — INSULIN LISPRO 1 UNITS: 100 INJECTION, SOLUTION INTRAVENOUS; SUBCUTANEOUS at 08:14

## 2018-11-11 RX ADMIN — OXYCODONE HYDROCHLORIDE AND ACETAMINOPHEN 2 TABLET: 5; 325 TABLET ORAL at 23:14

## 2018-11-11 RX ADMIN — PANTOPRAZOLE SODIUM 40 MG: 40 INJECTION, POWDER, FOR SOLUTION INTRAVENOUS at 08:14

## 2018-11-11 RX ADMIN — INSULIN LISPRO 1 UNITS: 100 INJECTION, SOLUTION INTRAVENOUS; SUBCUTANEOUS at 14:07

## 2018-11-11 RX ADMIN — POTASSIUM CHLORIDE AND SODIUM CHLORIDE 75 ML/HR: 900; 300 INJECTION, SOLUTION INTRAVENOUS at 14:02

## 2018-11-11 RX ADMIN — GUAIFENESIN 600 MG: 600 TABLET, EXTENDED RELEASE ORAL at 22:10

## 2018-11-11 RX ADMIN — SODIUM CHLORIDE 75 ML/HR: 0.9 INJECTION, SOLUTION INTRAVENOUS at 06:35

## 2018-11-11 NOTE — PROGRESS NOTES
Progress Note - General Surgery   Heri Liz 46 y o  female MRN: [id number]  Unit/Bed#: -01 Encounter: [id number]    Assessment:  Patient is status post laparoscopic cholecystectomy for symptomatic gallstones, stable    Plan:  Stable from the surgical standpoint, home as per Medicine team    Subjective/Objective   Chief Complaint:  Feeling nauseous but improved since surgery    Subjective:  Denies any chest pain or shortness of breath    Objective:     Blood pressure 156/79, pulse (!) 106, temperature 98 5 °F (36 9 °C), temperature source Oral, resp  rate 18, height 5' 8" (1 727 m), weight 128 kg (281 lb 12 oz), SpO2 95 %  ,Body mass index is 42 84 kg/m²  Intake/Output Summary (Last 24 hours) at 11/11/18 1015  Last data filed at 11/11/18 0933   Gross per 24 hour   Intake          4206 25 ml   Output             1150 ml   Net          3056 25 ml       Invasive Devices     Peripheral Intravenous Line            Peripheral IV 11/08/18 Left Antecubital 2 days                Physical Exam:  Abdomen is obese, soft, nondistended mildly tender over the incisions  Dressings are dry and intact      Lab, Imaging and other studies:  CBC:   Lab Results   Component Value Date    WBC 13 83 (H) 11/11/2018    HGB 13 5 11/11/2018    HCT 41 1 11/11/2018    MCV 81 (L) 11/11/2018     11/11/2018    MCH 26 7 (L) 11/11/2018    MCHC 32 8 11/11/2018    RDW 13 3 11/11/2018    MPV 12 0 11/11/2018   , CMP:   Lab Results   Component Value Date    SODIUM 134 (L) 11/11/2018    K 3 2 (L) 11/11/2018    CL 99 (L) 11/11/2018    CO2 26 11/11/2018    BUN 9 11/11/2018    CREATININE 0 64 11/11/2018    CALCIUM 8 7 11/11/2018    AST 16 11/11/2018    ALT 20 11/11/2018    ALKPHOS 99 11/11/2018    EGFR 101 11/11/2018     VTE Pharmacologic Prophylaxis: Enoxaparin (Lovenox)  VTE Mechanical Prophylaxis: sequential compression device

## 2018-11-11 NOTE — PROGRESS NOTES
[facility] Progress Note  Patient: Trista Anderson 46 y o  female   MRN: [id number]  PCP: Izaiah Mccray  Unit/Bed#: -01 Encounter: [id number]  Date Of Visit: 11/11/18          * Intractable nausea and vomiting   Assessment & Plan    Status post cholecystectomy  Postop day 1  Diet is being advanced as per surgery  Also has gastroparesis  Continue with current treatment       Diabetes mellitus [facility])   Assessment & Plan    Lab Results   Component Value Date    HGBA1C 9 9 (H) 11/08/2018       Recent Labs      11/10/18   1214  11/10/18   1558  11/10/18   2120  11/11/18   0606   POCGLU  183*  217*  240*  165*       Blood Sugar Average: Last 72 hrs:  (P) 207     Accu-Cheks slightly better this morning  Continue with current regimen     Essential hypertension   Assessment & Plan    Blood pressure has been labile at times  Continue with current treatment including p r n  Intravenous antihypertensive medication     Hypomagnesemia   Assessment & Plan    Replete as needed     Gall stones   Assessment & Plan    Status post cholecystectomy today as above     Opioid dependence ([address] )   Assessment & Plan    Continues opiate dependence  Continue with IV pain medication     Hypokalemia   Assessment & Plan    Add potassium in IV fluids  Gastroparesis   Assessment & Plan    To continue with current treatment  Chronic pain   Assessment & Plan    Continue with current treatment  She would go home on her home regimen         Present on Admission:   Opioid dependence ([address] )   Intractable nausea and vomiting   Gastroparesis   Diabetes mellitus (HCC)   Chronic pain   Hypomagnesemia   Gall stones   Essential hypertension   Hypokalemia            VTE Pharmacologic Prophylaxis:   Pharmacologic: Heparin  Mechanical VTE Prophylaxis in Place: Yes    Patient Centered Rounds: I have performed bedside rounds with nursing staff today      Discussions with Specialists or Other Care Team Provider: Yes    Education and Discussions with Family / Patient:  Yes        Current Length of Stay: 2 day(s)    Current Patient Status: Inpatient   Certification Statement: The patient will continue to require additional inpatient hospital stay due to [facility]    Discharge Plan:  Home when stable    Code Status: Level 1 - Full Code      Subjective:   Feels better  She is burping, however, not passing any flatus  Has no significant abdominal pain other than postop pain  No fever or chills  Not really nauseous at this point  Trying to have/eat her breakfast  Objective:     Vitals:   Temp (24hrs), Av 4 °F (36 9 °C), Min:97 4 °F (36 3 °C), Max:99 °F (37 2 °C)    Temp:  [97 4 °F (36 3 °C)-99 °F (37 2 °C)] 98 5 °F (36 9 °C)  HR:  [] 106  Resp:  [14-21] 18  BP: (139-195)/() 156/79  SpO2:  [93 %-98 %] 95 %  Body mass index is 42 84 kg/m²  Input and Output Summary (last 24 hours): Intake/Output Summary (Last 24 hours) at 18 1008  Last data filed at 18 0933   Gross per 24 hour   Intake          4206 25 ml   Output             1150 ml   Net          3056 25 ml           Physical Exam:     Vital signs are reviewed as above  Constitutional:  Morbidly obese female who is up in the chair  Eyes: EOM grossly intact  Conjunctivae are pale  Patient has anicteric sclera  HENT: Oropharynx are crowded and lips are dry  I Did not notice any significant lesions on the tongue  Head normocephalic  Neck: Neck is obese and supple  I was not able to visualize any JVD at 90°  There is no significant lymphadenopathy  I also did not notice any significant thyromegaly  Cardiac: I did not hear any rubs or gallop  Patient appears to be in sinus rhythm  Respiratory: Patient not in significant respiratory distress  Air entry in general is fair with decreased breath sounds intensity likely because of body habitus  GI: Abdomen is obese and soft  She is status post surgery    I did not hear any significant  bowel sounds  Neurologic:  Patient is awake and alert  Neurological examination is grossly intact  No obvious focal neurological deficit noticed  Skin: Skin is warm and dry  Psychiatric:  Depressed  Musculoskeletal   Has chronic back pain  Moving her arms without any significant issues        Additional Data:     Labs:      Results from last 7 days  Lab Units 11/11/18 0458  11/09/18  0517   WBC Thousand/uL 13 83*  < > 11 63*   HEMOGLOBIN g/dL 13 5  < > 12 9   HEMATOCRIT % 41 1  < > 40 1   PLATELETS Thousands/uL 319  < > 349   NEUTROS PCT %  --   --  72   LYMPHS PCT %  --   --  18   MONOS PCT %  --   --  8   EOS PCT %  --   --  1   < > = values in this interval not displayed  Results from last 7 days  Lab Units 11/11/18  0458   POTASSIUM mmol/L 3 2*   CHLORIDE mmol/L 99*   CO2 mmol/L 26   BUN mg/dL 9   CREATININE mg/dL 0 64   CALCIUM mg/dL 8 7   ALK PHOS U/L 99   ALT U/L 20   AST U/L 16           * I Have Reviewed All Lab Data Listed Above  * Additional Pertinent Lab Tests Reviewed:  All Labs Within Last 24 Hours Reviewed          Last 24 Hours Medication List:     Current Facility-Administered Medications:  acetaminophen 650 mg Oral Q6H PRN Palmer Martines MD    acetaminophen 650 mg Oral Q6H PRN Alvin Angeles PA-C    albuterol 2 5 mg Nebulization Q6H PRN Corey Browne    diphenhydrAMINE 25 mg Intravenous Q6H PRN Palmer Martines MD    fluticasone 1 spray Nasal Daily Palmer Martines MD    heparin (porcine) 5,000 Units Subcutaneous [facility] Palmer Martines MD    hydrALAZINE 10 mg Intravenous Q4H PRN Sandra Bryan MD    insulin glargine 15 Units Subcutaneous QAM Sandra Bryan MD    insulin glargine 50 Units Subcutaneous HS Palmer Martines MD    insulin lispro 1-5 Units Subcutaneous TID Delia Abbott MD    insulin lispro 1-5 Units Subcutaneous HS Palmer Martines MD    magnesium sulfate 2 g Intravenous Daily PRN Sandra Bryan MD    metoclopramide 10 mg Oral TID MAYO Tirado PA-C montelukast 10 mg Oral HS Karen Peralta MD    morphine injection 1 mg Intravenous Q3H PRN Micheline Barrientos PA-C    nystatin  Topical BID Inés Hills MD    ondansetron 4 mg Intravenous Q6H PRN Shukri Tabares PA-C    oxyCODONE-acetaminophen 1 tablet Oral Q4H PRN Micheline Barrientos PA-C    oxyCODONE-acetaminophen 2 tablet Oral Q4H PRN Micheline Barrientos PA-C    pantoprazole 40 mg Intravenous Q12H [address] Karen Peralta MD    sodium chloride 75 mL/hr Intravenous Continuous Karen Peralta MD Last Rate: 75 mL/hr (11/11/18 0102)        Today, Patient Was Seen By: Inés Hills MD    ** Please Note: Dragon 360 Dictation voice to text software may have been used in the creation of this document   **

## 2018-11-12 PROBLEM — E66.01 MORBID OBESITY (HCC): Status: ACTIVE | Noted: 2018-11-12

## 2018-11-12 LAB
ALBUMIN SERPL BCP-MCNC: 2.8 G/DL (ref 3.5–5)
ALP SERPL-CCNC: 94 U/L (ref 46–116)
ALT SERPL W P-5'-P-CCNC: 17 U/L (ref 12–78)
ANION GAP SERPL CALCULATED.3IONS-SCNC: 8 MMOL/L (ref 4–13)
AST SERPL W P-5'-P-CCNC: 12 U/L (ref 5–45)
BILIRUB SERPL-MCNC: 1.6 MG/DL (ref 0.2–1)
BUN SERPL-MCNC: 7 MG/DL (ref 5–25)
CALCIUM SERPL-MCNC: 8.8 MG/DL (ref 8.3–10.1)
CHLORIDE SERPL-SCNC: 103 MMOL/L (ref 100–108)
CO2 SERPL-SCNC: 25 MMOL/L (ref 21–32)
CREAT SERPL-MCNC: 0.61 MG/DL (ref 0.6–1.3)
ERYTHROCYTE [DISTWIDTH] IN BLOOD BY AUTOMATED COUNT: 13.4 % (ref 11.6–15.1)
GFR SERPL CREATININE-BSD FRML MDRD: 103 ML/MIN/1.73SQ M
GLUCOSE SERPL-MCNC: 109 MG/DL (ref 65–140)
GLUCOSE SERPL-MCNC: 120 MG/DL (ref 65–140)
GLUCOSE SERPL-MCNC: 124 MG/DL (ref 65–140)
GLUCOSE SERPL-MCNC: 135 MG/DL (ref 65–140)
GLUCOSE SERPL-MCNC: 138 MG/DL (ref 65–140)
GLUCOSE SERPL-MCNC: 170 MG/DL (ref 65–140)
HCT VFR BLD AUTO: 40.9 % (ref 34.8–46.1)
HGB BLD-MCNC: 13.4 G/DL (ref 11.5–15.4)
MCH RBC QN AUTO: 26.9 PG (ref 26.8–34.3)
MCHC RBC AUTO-ENTMCNC: 32.8 G/DL (ref 31.4–37.4)
MCV RBC AUTO: 82 FL (ref 82–98)
PLATELET # BLD AUTO: 277 THOUSANDS/UL (ref 149–390)
PMV BLD AUTO: 11.8 FL (ref 8.9–12.7)
POTASSIUM SERPL-SCNC: 3.7 MMOL/L (ref 3.5–5.3)
PROT SERPL-MCNC: 6.8 G/DL (ref 6.4–8.2)
RBC # BLD AUTO: 4.99 MILLION/UL (ref 3.81–5.12)
SODIUM SERPL-SCNC: 136 MMOL/L (ref 136–145)
WBC # BLD AUTO: 12.01 THOUSAND/UL (ref 4.31–10.16)

## 2018-11-12 PROCEDURE — 80053 COMPREHEN METABOLIC PANEL: CPT | Performed by: INTERNAL MEDICINE

## 2018-11-12 PROCEDURE — 85027 COMPLETE CBC AUTOMATED: CPT | Performed by: INTERNAL MEDICINE

## 2018-11-12 PROCEDURE — 99233 SBSQ HOSP IP/OBS HIGH 50: CPT | Performed by: INTERNAL MEDICINE

## 2018-11-12 PROCEDURE — C9113 INJ PANTOPRAZOLE SODIUM, VIA: HCPCS | Performed by: INTERNAL MEDICINE

## 2018-11-12 PROCEDURE — 82948 REAGENT STRIP/BLOOD GLUCOSE: CPT

## 2018-11-12 RX ORDER — METOCLOPRAMIDE 10 MG/1
10 TABLET ORAL
Status: DISCONTINUED | OUTPATIENT
Start: 2018-11-12 | End: 2018-11-14 | Stop reason: HOSPADM

## 2018-11-12 RX ORDER — DIAZEPAM 5 MG/1
5 TABLET ORAL
Status: DISCONTINUED | OUTPATIENT
Start: 2018-11-12 | End: 2018-11-14 | Stop reason: HOSPADM

## 2018-11-12 RX ORDER — DICYCLOMINE HYDROCHLORIDE 10 MG/1
10 CAPSULE ORAL
Status: DISCONTINUED | OUTPATIENT
Start: 2018-11-12 | End: 2018-11-14 | Stop reason: HOSPADM

## 2018-11-12 RX ORDER — MORPHINE SULFATE 15 MG/1
15 TABLET ORAL 3 TIMES DAILY PRN
Status: DISCONTINUED | OUTPATIENT
Start: 2018-11-12 | End: 2018-11-14 | Stop reason: HOSPADM

## 2018-11-12 RX ORDER — MORPHINE SULFATE 15 MG/1
15 TABLET ORAL 3 TIMES DAILY PRN
Status: ON HOLD | COMMUNITY
End: 2021-08-15

## 2018-11-12 RX ORDER — CARISOPRODOL 350 MG/1
350 TABLET ORAL 2 TIMES DAILY
Status: DISCONTINUED | OUTPATIENT
Start: 2018-11-12 | End: 2018-11-14 | Stop reason: HOSPADM

## 2018-11-12 RX ADMIN — HEPARIN SODIUM 5000 UNITS: 5000 INJECTION, SOLUTION INTRAVENOUS; SUBCUTANEOUS at 21:47

## 2018-11-12 RX ADMIN — ONDANSETRON HYDROCHLORIDE 4 MG: 2 SOLUTION INTRAMUSCULAR; INTRAVENOUS at 08:26

## 2018-11-12 RX ADMIN — METOCLOPRAMIDE HYDROCHLORIDE 10 MG: 10 TABLET ORAL at 21:48

## 2018-11-12 RX ADMIN — DIPHENHYDRAMINE HYDROCHLORIDE 25 MG: 50 INJECTION, SOLUTION INTRAMUSCULAR; INTRAVENOUS at 01:03

## 2018-11-12 RX ADMIN — ONDANSETRON HYDROCHLORIDE 4 MG: 2 SOLUTION INTRAMUSCULAR; INTRAVENOUS at 00:42

## 2018-11-12 RX ADMIN — NYSTATIN: 100000 POWDER TOPICAL at 08:26

## 2018-11-12 RX ADMIN — DICYCLOMINE HYDROCHLORIDE 10 MG: 10 CAPSULE ORAL at 11:26

## 2018-11-12 RX ADMIN — PANTOPRAZOLE SODIUM 40 MG: 40 INJECTION, POWDER, FOR SOLUTION INTRAVENOUS at 08:26

## 2018-11-12 RX ADMIN — DICYCLOMINE HYDROCHLORIDE 10 MG: 10 CAPSULE ORAL at 17:40

## 2018-11-12 RX ADMIN — OXYCODONE HYDROCHLORIDE AND ACETAMINOPHEN 2 TABLET: 5; 325 TABLET ORAL at 13:44

## 2018-11-12 RX ADMIN — GUAIFENESIN 600 MG: 600 TABLET, EXTENDED RELEASE ORAL at 21:48

## 2018-11-12 RX ADMIN — GUAIFENESIN 600 MG: 600 TABLET, EXTENDED RELEASE ORAL at 08:25

## 2018-11-12 RX ADMIN — CARISOPRODOL 350 MG: 350 TABLET ORAL at 11:26

## 2018-11-12 RX ADMIN — FLUTICASONE PROPIONATE 1 SPRAY: 50 SPRAY, METERED NASAL at 08:25

## 2018-11-12 RX ADMIN — PANTOPRAZOLE SODIUM 40 MG: 40 INJECTION, POWDER, FOR SOLUTION INTRAVENOUS at 21:47

## 2018-11-12 RX ADMIN — INSULIN LISPRO 1 UNITS: 100 INJECTION, SOLUTION INTRAVENOUS; SUBCUTANEOUS at 13:08

## 2018-11-12 RX ADMIN — MORPHINE SULFATE 15 MG: 15 TABLET ORAL at 17:40

## 2018-11-12 RX ADMIN — MONTELUKAST SODIUM 10 MG: 10 TABLET, FILM COATED ORAL at 21:48

## 2018-11-12 RX ADMIN — HEPARIN SODIUM 5000 UNITS: 5000 INJECTION, SOLUTION INTRAVENOUS; SUBCUTANEOUS at 06:56

## 2018-11-12 RX ADMIN — MORPHINE SULFATE 75 MG: 30 TABLET, FILM COATED, EXTENDED RELEASE ORAL at 21:47

## 2018-11-12 RX ADMIN — METOCLOPRAMIDE HYDROCHLORIDE 10 MG: 10 TABLET ORAL at 11:26

## 2018-11-12 RX ADMIN — POTASSIUM CHLORIDE AND SODIUM CHLORIDE 75 ML/HR: 900; 300 INJECTION, SOLUTION INTRAVENOUS at 19:26

## 2018-11-12 RX ADMIN — MORPHINE SULFATE 75 MG: 30 TABLET, FILM COATED, EXTENDED RELEASE ORAL at 11:26

## 2018-11-12 RX ADMIN — HEPARIN SODIUM 5000 UNITS: 5000 INJECTION, SOLUTION INTRAVENOUS; SUBCUTANEOUS at 13:44

## 2018-11-12 RX ADMIN — DICYCLOMINE HYDROCHLORIDE 10 MG: 10 CAPSULE ORAL at 21:47

## 2018-11-12 RX ADMIN — OXYCODONE HYDROCHLORIDE AND ACETAMINOPHEN 2 TABLET: 5; 325 TABLET ORAL at 08:39

## 2018-11-12 RX ADMIN — METOCLOPRAMIDE HYDROCHLORIDE 10 MG: 10 TABLET ORAL at 17:40

## 2018-11-12 RX ADMIN — POTASSIUM CHLORIDE AND SODIUM CHLORIDE 75 ML/HR: 900; 300 INJECTION, SOLUTION INTRAVENOUS at 04:04

## 2018-11-12 RX ADMIN — METOCLOPRAMIDE HYDROCHLORIDE 10 MG: 10 TABLET ORAL at 06:57

## 2018-11-12 RX ADMIN — CARISOPRODOL 350 MG: 350 TABLET ORAL at 17:40

## 2018-11-12 NOTE — SOCIAL WORK
LOS: 3 CM met with pt at bedside  Pt lives in [facility] with her  and son  Pt lives in [facility] that has 3 saeed with railings  Pt uses walker and completes ADL's independently  Pt denies hx of rehab and [address]  Pt uses [facility] for her prescriptions  Pt reports she sees pain management in [place] and fills medication there too  Pt denies hx of MH and SA  Pt does not have POA/AD and declined information  Pt does not work and drives  CM reviewed discharge planning process including the following: identifying help at home, patient preference for discharge planning needs, pharmacy preference, and availability of treatment team to discuss questions or concerns patient and/or family may have regarding understanding medications and recognizing signs and symptoms once discharged  CM also encouraged patient to follow up with all recommended appointments after discharge  Patient advised of importance for patient and family to participate in managing patients medical well being  CM name and role reviewed  Discharge Checklist reviewed and CM will continue to monitor for progress toward discharge goals in nursing and provider rounds  Pt has no needs at this time  CM provided warm hand off to Kirill, Pancho2 Eunice Bullock

## 2018-11-12 NOTE — PLAN OF CARE
DISCHARGE PLANNING     Discharge to home or other facility with appropriate resources Progressing        GASTROINTESTINAL - ADULT     Minimal or absence of nausea and/or vomiting Progressing     Maintains or returns to baseline bowel function Progressing     Maintains adequate nutritional intake Progressing        Knowledge Deficit     Patient/family/caregiver demonstrates understanding of disease process, treatment plan, medications, and discharge instructions Progressing        Nutrition/Hydration-ADULT     Nutrient/Hydration intake appropriate for improving, restoring or maintaining nutritional needs Progressing        PAIN - ADULT     Verbalizes/displays adequate comfort level or baseline comfort level Progressing        Potential for Falls     Patient will remain free of falls Progressing        Prexisting or High Potential for Compromised Skin Integrity     Skin integrity is maintained or improved Progressing        SAFETY ADULT     Patient will remain free of falls Progressing

## 2018-11-12 NOTE — PROGRESS NOTES
[facility] Progress Note  Patient: Steve Marcelino 46 y o  female   MRN: [id number]  PCP: Delia Peck  Unit/Bed#: -01 Encounter: [id number]  Date Of Visit: 11/12/18          Gall stones   Assessment & Plan    Symptomatic gallstones  Patient status post cholecystectomy  Making slow progress     * Intractable nausea and vomiting   Assessment & Plan    Likely multifactorial including symptomatic gallstones and gastroparesis  Also on lots of narcotics at home     Diabetes mellitus [facility])   Assessment & Plan    Lab Results   Component Value Date    HGBA1C 9 9 (H) 11/08/2018       Recent Labs      11/10/18   1214  11/10/18   1558  11/10/18   2120  11/11/18   0606   POCGLU  183*  217*  240*  165*       Blood Sugar Average: Last 72 hrs:  (P) 207     Accu-Cheks slightly better this morning  Continue with current regimen     Morbid obesity ([address] )   Assessment & Plan    She really needs to work on her diet and exercise  Major comorbid issue     Essential hypertension   Assessment & Plan    Blood pressure on the higher side and she was also tachycardic  Partly be because of her withdrawing from her narcotics  Plan as above continue with antihypertensive medication-p r n   IV antihypertensive medication as well     Hypomagnesemia   Assessment & Plan    Replete as needed     Opioid dependence ([address] )   Assessment & Plan    Resume home medications as above     Hypokalemia   Assessment & Plan    Continue to monitor  Gastroparesis   Assessment & Plan    Continue with Reglan  Chronic pain   Assessment & Plan    It appears that she may be going into withdrawals from her narcotics  Resumed her  scheduled and p r n  Oxy IR           Present on Admission:   Opioid dependence ([address] )   Intractable nausea and vomiting   Gastroparesis   Diabetes mellitus ([address] )   Chronic pain   Hypomagnesemia   Gall stones   Essential hypertension   Hypokalemia   Morbid obesity (HCC)            VTE Pharmacologic Prophylaxis:   Pharmacologic: Heparin  Mechanical VTE Prophylaxis in Place: Yes    Patient Centered Rounds: I have performed bedside rounds with nursing staff today  Discussions with Specialists or Other Care Team Provider:  Yes    Education and Discussions with Family / Patient:  Yes    Time Spent for Care: 35+ minutes  More than 50% of total time spent on counseling and coordination of care as described above  Current Length of Stay: 3 day(s)    Current Patient Status: Inpatient   Certification Statement: The patient will continue to require additional inpatient hospital stay due to [facility]    Discharge Plan:  Home when stable    Code Status: Level 1 - Full Code      Subjective: Had very rough night last night  Passing flatus this morning  Still nauseous  Complaining of more pain  Feels anxious and also feels like she is going through narcotics withdrawal   No chest pain  No fever or chills  Did not eat much in her breakfast    Objective:     Vitals:   Temp (24hrs), Av 5 °F (36 9 °C), Min:98 3 °F (36 8 °C), Max:98 8 °F (37 1 °C)    Temp:  [98 3 °F (36 8 °C)-98 8 °F (37 1 °C)] 98 3 °F (36 8 °C)  HR:  [] 98  Resp:  [16-19] 19  BP: (132-176)/() 132/72  SpO2:  [95 %-96 %] 96 %  Body mass index is 42 84 kg/m²  Input and Output Summary (last 24 hours): Intake/Output Summary (Last 24 hours) at 18 1620  Last data filed at 18 1436   Gross per 24 hour   Intake             1640 ml   Output             1000 ml   Net              640 ml           Physical Exam:     Vital signs reviewed as above  Patient up in the chair  Not in any respiratory distress  S1 and S2 audible  Decreased breath sounds with poor air entry in general on auscultation  Abdomen is obese  She has few audible bowel sounds  Complaining of some soreness in epigastric area  Awake and alert    Oriented x3  Lips still dry  Depressed  Skin is warm and dry  EOM grossly intact      Additional Data: Labs:      Results from last 7 days  Lab Units 11/12/18  0645  11/09/18  0517   WBC Thousand/uL 12 01*  < > 11 63*   HEMOGLOBIN g/dL 13 4  < > 12 9   HEMATOCRIT % 40 9  < > 40 1   PLATELETS Thousands/uL 277  < > 349   NEUTROS PCT %  --   --  72   LYMPHS PCT %  --   --  18   MONOS PCT %  --   --  8   EOS PCT %  --   --  1   < > = values in this interval not displayed  Results from last 7 days  Lab Units 11/12/18  0645   POTASSIUM mmol/L 3 7   CHLORIDE mmol/L 103   CO2 mmol/L 25   BUN mg/dL 7   CREATININE mg/dL 0 61   CALCIUM mg/dL 8 8   ALK PHOS U/L 94   ALT U/L 17   AST U/L 12           * I Have Reviewed All Lab Data Listed Above  * Additional Pertinent Lab Tests Reviewed:  All Labs Within Last 24 Hours Reviewed    Recent Cultures (last 7 days):           Last 24 Hours Medication List:     Current Facility-Administered Medications:  acetaminophen 650 mg Oral Q6H PRN Jackelyn Espinoza MD    acetaminophen 650 mg Oral Q6H PRN Geo Ontiveros PA-C    albuterol 2 5 mg Nebulization Q6H PRN Amy Browne    carisoprodol 350 mg Oral BID Krzysztof Du MD    diazepam 5 mg Oral HS PRN Krzysztof Du MD    dicyclomine 10 mg Oral 4x Daily (AC & HS) Krzysztof Du MD    diphenhydrAMINE 25 mg Intravenous Q6H PRN Jakcelyn Espinoza MD    fluticasone 1 spray Nasal Daily Jackelyn Espinoza MD    guaiFENesin 600 mg Oral Q12H Elodia Spurling, MD    heparin (porcine) 5,000 Units Subcutaneous [facility] Jackelyn Espinoza MD    hydrALAZINE 10 mg Intravenous Q4H PRN Krzysztof Du MD    insulin glargine 15 Units Subcutaneous QAM Krzysztof Du MD    insulin glargine 50 Units Subcutaneous HS Jackelyn Espinoza MD    insulin lispro 1-5 Units Subcutaneous TID AC Jackelyn Espinoza MD    insulin lispro 1-5 Units Subcutaneous HS Jackelyn Espinoza MD    magnesium sulfate 2 g Intravenous Daily PRN Krzysztof uD MD    metoclopramide 10 mg Oral 4x Daily (AC & HS) Krzysztof Du MD    montelukast 10 mg Oral HS Jackelyn Espinoza MD    morphine 75 mg Oral BID Evgeny De Santiago MD    morphine 15 mg Oral TID PRN Evgeny De Santiago MD    morphine injection 1 mg Intravenous Q3H PRN Jeny Estrada PA-C    nystatin  Topical BID Evgeny De Santiago MD    ondansetron 4 mg Intravenous Q6H PRN Kathey Saint, PA-C    pantoprazole 40 mg Intravenous Q12H [address] Rebecca Nuñez MD    sodium chloride 0 9 % with KCl 40 mEq/L 75 mL/hr Intravenous Continuous Evgeny De Santiago MD Last Rate: 75 mL/hr (11/12/18 0404)        Today, Patient Was Seen By: Evgeny De Santiago MD    ** Please Note: Dragon 360 Dictation voice to text software may have been used in the creation of this document   **

## 2018-11-12 NOTE — PLAN OF CARE
Problem: DISCHARGE PLANNING - CARE MANAGEMENT  Goal: Discharge to post-acute care or home with appropriate resources  INTERVENTIONS:  - Conduct assessment to determine patient/family and health care team treatment goals, and need for post-acute services based on payer coverage, community resources, and patient preferences, and barriers to discharge  - Address psychosocial, clinical, and financial barriers to discharge as identified in assessment in conjunction with the patient/family and health care team  - Arrange appropriate level of post-acute services according to patients   needs and preference and payer coverage in collaboration with the physician and health care team  - Communicate with and update the patient/family, physician, and health care team regarding progress on the discharge plan  - Arrange appropriate transportation to post-acute venues  Outcome: Progressing  LOS: 3 CM met with pt at bedside  Pt lives in [facility] with her  and son  Pt lives in [facility] that has 3 saeed with railings  Pt uses walker and completes ADL's independently  Pt denies hx of rehab and [address]  Pt uses [facility] for her prescriptions  Pt reports she sees pain management in [place] and fills medication there too  Pt denies hx of MH and SA  Pt does not have POA/AD and declined information  Pt does not work and drives  CM reviewed discharge planning process including the following: identifying help at home, patient preference for discharge planning needs, pharmacy preference, and availability of treatment team to discuss questions or concerns patient and/or family may have regarding understanding medications and recognizing signs and symptoms once discharged  CM also encouraged patient to follow up with all recommended appointments after discharge  Patient advised of importance for patient and family to participate in managing patients medical well being  CM name and role reviewed   Discharge Checklist reviewed and CM will continue to monitor for progress toward discharge goals in nursing and provider rounds  Pt has no needs at this time  CM provided warm hand off to Loren Fritz, [facility]

## 2018-11-13 LAB
BASOPHILS # BLD AUTO: 0.07 THOUSANDS/ΜL (ref 0–0.1)
BASOPHILS NFR BLD AUTO: 1 % (ref 0–1)
EOSINOPHIL # BLD AUTO: 0.18 THOUSAND/ΜL (ref 0–0.61)
EOSINOPHIL NFR BLD AUTO: 2 % (ref 0–6)
ERYTHROCYTE [DISTWIDTH] IN BLOOD BY AUTOMATED COUNT: 13.5 % (ref 11.6–15.1)
GLUCOSE SERPL-MCNC: 113 MG/DL (ref 65–140)
GLUCOSE SERPL-MCNC: 152 MG/DL (ref 65–140)
GLUCOSE SERPL-MCNC: 158 MG/DL (ref 65–140)
GLUCOSE SERPL-MCNC: 185 MG/DL (ref 65–140)
GLUCOSE SERPL-MCNC: 92 MG/DL (ref 65–140)
HCT VFR BLD AUTO: 38.7 % (ref 34.8–46.1)
HGB BLD-MCNC: 12.4 G/DL (ref 11.5–15.4)
IMM GRANULOCYTES # BLD AUTO: 0.04 THOUSAND/UL (ref 0–0.2)
IMM GRANULOCYTES NFR BLD AUTO: 0 % (ref 0–2)
LYMPHOCYTES # BLD AUTO: 2.09 THOUSANDS/ΜL (ref 0.6–4.47)
LYMPHOCYTES NFR BLD AUTO: 20 % (ref 14–44)
MCH RBC QN AUTO: 26.8 PG (ref 26.8–34.3)
MCHC RBC AUTO-ENTMCNC: 32 G/DL (ref 31.4–37.4)
MCV RBC AUTO: 84 FL (ref 82–98)
MONOCYTES # BLD AUTO: 1.23 THOUSAND/ΜL (ref 0.17–1.22)
MONOCYTES NFR BLD AUTO: 12 % (ref 4–12)
NEUTROPHILS # BLD AUTO: 6.63 THOUSANDS/ΜL (ref 1.85–7.62)
NEUTS SEG NFR BLD AUTO: 65 % (ref 43–75)
NRBC BLD AUTO-RTO: 0 /100 WBCS
PLATELET # BLD AUTO: 230 THOUSANDS/UL (ref 149–390)
PMV BLD AUTO: 12.5 FL (ref 8.9–12.7)
RBC # BLD AUTO: 4.62 MILLION/UL (ref 3.81–5.12)
WBC # BLD AUTO: 10.24 THOUSAND/UL (ref 4.31–10.16)

## 2018-11-13 PROCEDURE — 82948 REAGENT STRIP/BLOOD GLUCOSE: CPT

## 2018-11-13 PROCEDURE — 85025 COMPLETE CBC W/AUTO DIFF WBC: CPT | Performed by: FAMILY MEDICINE

## 2018-11-13 PROCEDURE — 99232 SBSQ HOSP IP/OBS MODERATE 35: CPT | Performed by: FAMILY MEDICINE

## 2018-11-13 PROCEDURE — C9113 INJ PANTOPRAZOLE SODIUM, VIA: HCPCS | Performed by: INTERNAL MEDICINE

## 2018-11-13 RX ADMIN — ONDANSETRON HYDROCHLORIDE 4 MG: 2 SOLUTION INTRAMUSCULAR; INTRAVENOUS at 20:57

## 2018-11-13 RX ADMIN — DICYCLOMINE HYDROCHLORIDE 10 MG: 10 CAPSULE ORAL at 22:18

## 2018-11-13 RX ADMIN — HEPARIN SODIUM 5000 UNITS: 5000 INJECTION, SOLUTION INTRAVENOUS; SUBCUTANEOUS at 21:00

## 2018-11-13 RX ADMIN — GUAIFENESIN 600 MG: 600 TABLET, EXTENDED RELEASE ORAL at 09:34

## 2018-11-13 RX ADMIN — POTASSIUM CHLORIDE AND SODIUM CHLORIDE 75 ML/HR: 900; 300 INJECTION, SOLUTION INTRAVENOUS at 07:46

## 2018-11-13 RX ADMIN — NYSTATIN: 100000 POWDER TOPICAL at 17:43

## 2018-11-13 RX ADMIN — METOCLOPRAMIDE HYDROCHLORIDE 10 MG: 10 TABLET ORAL at 13:01

## 2018-11-13 RX ADMIN — NYSTATIN 1 APPLICATION: 100000 POWDER TOPICAL at 09:36

## 2018-11-13 RX ADMIN — DICYCLOMINE HYDROCHLORIDE 10 MG: 10 CAPSULE ORAL at 17:07

## 2018-11-13 RX ADMIN — GUAIFENESIN 600 MG: 600 TABLET, EXTENDED RELEASE ORAL at 20:56

## 2018-11-13 RX ADMIN — FLUTICASONE PROPIONATE 1 SPRAY: 50 SPRAY, METERED NASAL at 09:35

## 2018-11-13 RX ADMIN — HEPARIN SODIUM 5000 UNITS: 5000 INJECTION, SOLUTION INTRAVENOUS; SUBCUTANEOUS at 14:08

## 2018-11-13 RX ADMIN — MONTELUKAST SODIUM 10 MG: 10 TABLET, FILM COATED ORAL at 21:00

## 2018-11-13 RX ADMIN — MORPHINE SULFATE 15 MG: 15 TABLET ORAL at 04:00

## 2018-11-13 RX ADMIN — CARISOPRODOL 350 MG: 350 TABLET ORAL at 09:35

## 2018-11-13 RX ADMIN — METOCLOPRAMIDE HYDROCHLORIDE 10 MG: 10 TABLET ORAL at 21:00

## 2018-11-13 RX ADMIN — PANTOPRAZOLE SODIUM 40 MG: 40 INJECTION, POWDER, FOR SOLUTION INTRAVENOUS at 20:56

## 2018-11-13 RX ADMIN — PANTOPRAZOLE SODIUM 40 MG: 40 INJECTION, POWDER, FOR SOLUTION INTRAVENOUS at 09:34

## 2018-11-13 RX ADMIN — INSULIN GLARGINE 50 UNITS: 100 INJECTION, SOLUTION SUBCUTANEOUS at 22:20

## 2018-11-13 RX ADMIN — MORPHINE SULFATE 75 MG: 30 TABLET, FILM COATED, EXTENDED RELEASE ORAL at 09:34

## 2018-11-13 RX ADMIN — INSULIN LISPRO 1 UNITS: 100 INJECTION, SOLUTION INTRAVENOUS; SUBCUTANEOUS at 13:05

## 2018-11-13 RX ADMIN — HEPARIN SODIUM 5000 UNITS: 5000 INJECTION, SOLUTION INTRAVENOUS; SUBCUTANEOUS at 06:00

## 2018-11-13 RX ADMIN — DICYCLOMINE HYDROCHLORIDE 10 MG: 10 CAPSULE ORAL at 07:50

## 2018-11-13 RX ADMIN — CARISOPRODOL 350 MG: 350 TABLET ORAL at 17:07

## 2018-11-13 RX ADMIN — METOCLOPRAMIDE HYDROCHLORIDE 10 MG: 10 TABLET ORAL at 07:50

## 2018-11-13 RX ADMIN — METOCLOPRAMIDE HYDROCHLORIDE 10 MG: 10 TABLET ORAL at 17:08

## 2018-11-13 RX ADMIN — DICYCLOMINE HYDROCHLORIDE 10 MG: 10 CAPSULE ORAL at 13:01

## 2018-11-13 RX ADMIN — MORPHINE SULFATE 75 MG: 30 TABLET, FILM COATED, EXTENDED RELEASE ORAL at 20:56

## 2018-11-13 NOTE — PROGRESS NOTES
Progress Note - Shyannal Fore 1965, 46 y o  female MRN: [id number]    Unit/Bed#: -01 Encounter: [id number]    Primary Care Provider: Rios Vail   Date and time admitted to hospital: 11/8/2018 12:21 PM        * Intractable nausea and vomiting   Assessment & Plan    Improving  Likely multifactorial including symptomatic gallstones and gastroparesis  Also on lots of narcotics at home  CBC     Morbid obesity [facility])   Assessment & Plan    She really needs to work on her diet and exercise  Major comorbid issue     Essential hypertension   Assessment & Plan    Blood pressure on the higher side and she was also tachycardic  Partly be because of her withdrawing from her narcotics  Plan as above continue with antihypertensive medication-p r n   IV antihypertensive medication as well     Hypomagnesemia   Assessment & Plan    Replete as needed     Gall stones   Assessment & Plan    Symptomatic gallstones  Patient status post cholecystectomy  Making slow progress     Opioid dependence ([address] )   Assessment & Plan    Resume home medications as above     Hypokalemia   Assessment & Plan    Continue to monitor  Gastroparesis   Assessment & Plan    Continue with Reglan  Chronic pain   Assessment & Plan    It appears that she may be going into withdrawals from her narcotics  Resumed her  scheduled and p r n  Oxy IR  Diabetes mellitus [facility])   Assessment & Plan    Lab Results   Component Value Date    HGBA1C 9 9 (H) 11/08/2018       Recent Labs      11/10/18   1214  11/10/18   1558  11/10/18   2120  11/11/18   0606   POCGLU  183*  217*  240*  165*       Blood Sugar Average: Last 72 hrs:  (P) 207     Accu-Cheks slightly better this morning  Continue with current regimen       VTE Pharmacologic Prophylaxis:   Pharmacologic: Heparin  Mechanical VTE Prophylaxis in Place: Yes    Patient Centered Rounds: I have performed bedside rounds with nursing staff today      Discussions with Specialists or Other Care Team Provider:     Education and Discussions with Family / Patient:  Patient    Time Spent for Care: 20 minutes  More than 50% of total time spent on counseling and coordination of care as described above  Current Length of Stay: 4 day(s)    Current Patient Status: Inpatient   Certification Statement: The patient will continue to require additional inpatient hospital stay due to Acute above condition    Discharge Plan:  Tomorrow    Code Status: Level 1 - Full Code      Subjective:    Patient stated she is feeling better today  Denied worsening of nausea  I explained the patient if she keeps progressing she may be discharged tomorrow  Objective:     Vitals:   Temp (24hrs), Av 4 °F (36 9 °C), Min:98 3 °F (36 8 °C), Max:98 5 °F (36 9 °C)    Temp:  [98 3 °F (36 8 °C)-98 5 °F (36 9 °C)] 98 5 °F (36 9 °C)  HR:  [93-98] 93  Resp:  [18-20] 20  BP: (117-132)/(55-72) 117/55  SpO2:  [95 %-96 %] 95 %  Body mass index is 42 84 kg/m²  Input and Output Summary (last 24 hours): Intake/Output Summary (Last 24 hours) at 18 1250  Last data filed at 18 0942   Gross per 24 hour   Intake             1691 ml   Output             1300 ml   Net              391 ml       Physical Exam:     Physical Exam   Constitutional: She is oriented to person, place, and time  No distress  Cardiovascular: Normal rate, normal heart sounds and intact distal pulses  Exam reveals no gallop  No murmur heard  Pulmonary/Chest: No respiratory distress  She has no wheezes  She has no rales  She exhibits no tenderness  Abdominal: She exhibits no distension and no mass  There is no tenderness  There is no rebound and no guarding  OBESE   Musculoskeletal: She exhibits no edema or tenderness  Neurological: She is alert and oriented to person, place, and time  She has normal reflexes  No cranial nerve deficit  Coordination abnormal    Skin: She is not diaphoretic         Additional Data:     Labs:      Results from last 7 days  Lab Units 11/13/18  0955   WBC Thousand/uL 10 24*   HEMOGLOBIN g/dL 12 4   HEMATOCRIT % 38 7   PLATELETS Thousands/uL 230   NEUTROS PCT % 65   LYMPHS PCT % 20   MONOS PCT % 12   EOS PCT % 2       Results from last 7 days  Lab Units 11/12/18  0645   POTASSIUM mmol/L 3 7   CHLORIDE mmol/L 103   CO2 mmol/L 25   BUN mg/dL 7   CREATININE mg/dL 0 61   CALCIUM mg/dL 8 8   ALK PHOS U/L 94   ALT U/L 17   AST U/L 12           * I Have Reviewed All Lab Data Listed Above  * Additional Pertinent Lab Tests Reviewed:  All Labs Within Last 24 Hours Reviewed    Imaging:    Imaging Reports Reviewed Today Include:   Imaging Personally Reviewed by Myself Includes:      Recent Cultures (last 7 days):           Last 24 Hours Medication List:     Current Facility-Administered Medications:  acetaminophen 650 mg Oral Q6H PRN Dominik Isabel MD    acetaminophen 650 mg Oral Q6H PRN Treva Patches, PA-LAURA    albuterol 2 5 mg Nebulization Q6H PRN Corey Browne    carisoprodol 350 mg Oral BID Dean Hinojosa MD    diazepam 5 mg Oral HS PRN Dean Hinojosa MD    dicyclomine 10 mg Oral 4x Daily (AC & HS) Dean Hinojosa MD    diphenhydrAMINE 25 mg Intravenous Q6H PRN Dominik Isabel MD    fluticasone 1 spray Nasal Daily Dominik Isabel MD    guaiFENesin 600 mg Oral Q12H Jude Victoria MD    heparin (porcine) 5,000 Units Subcutaneous [facility] Dominik Isabel MD    hydrALAZINE 10 mg Intravenous Q4H PRN Dean Hinojosa MD    insulin glargine 15 Units Subcutaneous QAM Dean Hinojosa MD    insulin glargine 50 Units Subcutaneous HS Dominik Isabel MD    insulin lispro 1-5 Units Subcutaneous TID AC Dominik Isabel MD    insulin lispro 1-5 Units Subcutaneous HS Dominik Isabel MD    magnesium sulfate 2 g Intravenous Daily PRN Dean Hinojosa MD    metoclopramide 10 mg Oral 4x Daily (AC & HS) Dean Hinojosa MD    montelukast 10 mg Oral HS Dominik Isabel MD    morphine 75 mg Oral BID Dean Hinojosa MD    morphine 15 mg Oral TID PRN Christy Hudson MD    morphine injection 1 mg Intravenous Q3H PRN Karen Martinez PA-C    nystatin  Topical BID Christy Hudson MD    ondansetron 4 mg Intravenous Q6H PRN Maria Isabel Diego PA-C    pantoprazole 40 mg Intravenous Q12H [facility] Sara Uriostegui MD    sodium chloride 0 9 % with KCl 40 mEq/L 75 mL/hr Intravenous Continuous Christy Hudson MD Last Rate: 75 mL/hr (11/13/18 0746)        Today, Patient Was Seen By: Ted Shah MD    ** Please Note: Dragon 360 Dictation voice to text software may have been used in the creation of this document   **

## 2018-11-13 NOTE — UTILIZATION REVIEW
Continued Stay Review    Date: 11/13    Vital Signs: /55 (BP Location: Right arm)   Pulse 93   Temp 98 5 °F (36 9 °C) (Oral)   Resp 20   Ht 5' 8" (1 727 m)   Wt 128 kg (281 lb 12 oz)   SpO2 95%   BMI 42 84 kg/m²     Medications:   Scheduled Meds:   Current Facility-Administered Medications:  acetaminophen 650 mg Oral Q6H PRN     acetaminophen 650 mg Oral Q6H PRN     albuterol 2 5 mg Nebulization Q6H PRN     carisoprodol 350 mg Oral BID     diazepam 5 mg Oral HS PRN     dicyclomine 10 mg Oral 4x Daily (AC & HS)     diphenhydrAMINE 25 mg Intravenous Q6H PRN     fluticasone 1 spray Nasal Daily     guaiFENesin 600 mg Oral Q12H BOLIVAR     heparin (porcine) 5,000 Units Subcutaneous Q8H [facility]     hydrALAZINE 10 mg Intravenous Q4H PRN     insulin glargine 15 Units Subcutaneous QAM     insulin glargine 50 Units Subcutaneous HS     insulin lispro 1-5 Units Subcutaneous TID AC     insulin lispro 1-5 Units Subcutaneous HS     magnesium sulfate 2 g Intravenous Daily PRN     metoclopramide 10 mg Oral 4x Daily (AC & HS)     montelukast 10 mg Oral HS     morphine 75 mg Oral BID     morphine 15 mg Oral TID PRN     morphine injection 1 mg Intravenous Q3H PRN     nystatin  Topical BID     ondansetron 4 mg Intravenous Q6H PRN     pantoprazole 40 mg Intravenous Q12H [facility]     sodium chloride 0 9 % with KCl 40 mEq/L 75 mL/hr Intravenous Continuous  Last Rate: 75 mL/hr (11/13/18 0746)       Abnormal Labs/Diagnostic Results: wbc 10 24, gluc 158    Age/Sex: 46 y o  female     Assessment/Plan:   Gall stones   Assessment & Plan     Symptomatic gallstones  Patient status post cholecystectomy  Making slow progress      * Intractable nausea and vomiting   Assessment & Plan     Likely multifactorial including symptomatic gallstones and gastroparesis    Also on lots of narcotics at home      Diabetes mellitus [facility])   Assessment & Plan             Lab Results   Component Value Date     HGBA1C 9 9 (H) 11/08/2018                Recent Labs 11/10/18   1214  11/10/18   1558  11/10/18   2120  11/11/18   0606   POCGLU  183*  217*  240*  165*         Blood Sugar Average: Last 72 hrs:  (P) 207      Accu-Cheks slightly better this morning  Continue with current regimen      Morbid obesity ([facility] )   Assessment & Plan     She really needs to work on her diet and exercise  Major comorbid issue      Essential hypertension   Assessment & Plan     Blood pressure on the higher side and she was also tachycardic  Partly be because of her withdrawing from her narcotics  Plan as above continue with antihypertensive medication-p r n   IV antihypertensive medication as well      Hypomagnesemia   Assessment & Plan     Replete as needed      Opioid dependence ([facility] )   Assessment & Plan     Resume home medications as above      Hypokalemia   Assessment & Plan     Continue to monitor       Gastroparesis   Assessment & Plan     Continue with Reglan       Chronic pain   Assessment & Plan     It appears that she may be going into withdrawals from her narcotics  Resumed her  scheduled and p r n  Oxy IR              Discharge Plan: [place]

## 2018-11-14 VITALS
HEIGHT: 68 IN | DIASTOLIC BLOOD PRESSURE: 55 MMHG | TEMPERATURE: 98.5 F | WEIGHT: 281.75 LBS | OXYGEN SATURATION: 94 % | SYSTOLIC BLOOD PRESSURE: 102 MMHG | BODY MASS INDEX: 42.7 KG/M2 | RESPIRATION RATE: 18 BRPM | HEART RATE: 93 BPM

## 2018-11-14 LAB
GLUCOSE SERPL-MCNC: 121 MG/DL (ref 65–140)
GLUCOSE SERPL-MCNC: 123 MG/DL (ref 65–140)
GLUCOSE SERPL-MCNC: 95 MG/DL (ref 65–140)

## 2018-11-14 PROCEDURE — C9113 INJ PANTOPRAZOLE SODIUM, VIA: HCPCS | Performed by: INTERNAL MEDICINE

## 2018-11-14 PROCEDURE — 82948 REAGENT STRIP/BLOOD GLUCOSE: CPT

## 2018-11-14 PROCEDURE — 99238 HOSP IP/OBS DSCHRG MGMT 30/<: CPT | Performed by: FAMILY MEDICINE

## 2018-11-14 RX ORDER — POLYETHYLENE GLYCOL 3350 17 G/17G
17 POWDER, FOR SOLUTION ORAL DAILY
Qty: 14 EACH | Refills: 0 | Status: ON HOLD | OUTPATIENT
Start: 2018-11-14 | End: 2018-12-21 | Stop reason: ALTCHOICE

## 2018-11-14 RX ORDER — NYSTATIN 100000 [USP'U]/G
POWDER TOPICAL 2 TIMES DAILY
Qty: 15 G | Refills: 0 | Status: SHIPPED | OUTPATIENT
Start: 2018-11-14 | End: 2019-07-21

## 2018-11-14 RX ORDER — ACETAMINOPHEN 325 MG/1
650 TABLET ORAL EVERY 6 HOURS PRN
Qty: 30 TABLET | Refills: 0 | Status: SHIPPED | OUTPATIENT
Start: 2018-11-14 | End: 2019-07-21

## 2018-11-14 RX ADMIN — FLUTICASONE PROPIONATE 1 SPRAY: 50 SPRAY, METERED NASAL at 09:17

## 2018-11-14 RX ADMIN — MORPHINE SULFATE 15 MG: 15 TABLET ORAL at 12:54

## 2018-11-14 RX ADMIN — DICYCLOMINE HYDROCHLORIDE 10 MG: 10 CAPSULE ORAL at 06:04

## 2018-11-14 RX ADMIN — MORPHINE SULFATE 75 MG: 30 TABLET, FILM COATED, EXTENDED RELEASE ORAL at 09:12

## 2018-11-14 RX ADMIN — HEPARIN SODIUM 5000 UNITS: 5000 INJECTION, SOLUTION INTRAVENOUS; SUBCUTANEOUS at 14:54

## 2018-11-14 RX ADMIN — MORPHINE SULFATE 15 MG: 15 TABLET ORAL at 03:17

## 2018-11-14 RX ADMIN — NYSTATIN: 100000 POWDER TOPICAL at 09:16

## 2018-11-14 RX ADMIN — GUAIFENESIN 600 MG: 600 TABLET, EXTENDED RELEASE ORAL at 09:12

## 2018-11-14 RX ADMIN — METOCLOPRAMIDE HYDROCHLORIDE 10 MG: 10 TABLET ORAL at 06:04

## 2018-11-14 RX ADMIN — METOCLOPRAMIDE HYDROCHLORIDE 10 MG: 10 TABLET ORAL at 12:54

## 2018-11-14 RX ADMIN — INSULIN GLARGINE 15 UNITS: 100 INJECTION, SOLUTION SUBCUTANEOUS at 09:26

## 2018-11-14 RX ADMIN — CARISOPRODOL 350 MG: 350 TABLET ORAL at 09:12

## 2018-11-14 RX ADMIN — DICYCLOMINE HYDROCHLORIDE 10 MG: 10 CAPSULE ORAL at 12:54

## 2018-11-14 RX ADMIN — PANTOPRAZOLE SODIUM 40 MG: 40 INJECTION, POWDER, FOR SOLUTION INTRAVENOUS at 09:13

## 2018-11-14 RX ADMIN — HEPARIN SODIUM 5000 UNITS: 5000 INJECTION, SOLUTION INTRAVENOUS; SUBCUTANEOUS at 06:04

## 2018-11-14 NOTE — DISCHARGE SUMMARY
Discharge Summary - [facility] Internal Medicine    Patient Information: Kati Soto 46 y o  female MRN: [id number]  Unit/Bed#: -01 Encounter: [id number]    Discharging Physician / Practitioner: Ted Shah MD  PCP: Kaaren Kanner  Admission Date: 11/8/2018  Discharge Date: 11/14/18    Disposition:     Home    Discharge Diagnoses:     Principal Problem:    Intractable nausea and vomiting  Active Problems:    Diabetes mellitus ([facility] )    Chronic pain    Gastroparesis    Hypokalemia    Opioid dependence ([facility] [address] )    Gall stones    Hypomagnesemia    Essential hypertension    Morbid obesity ([facility] )  Resolved Problems:    * No resolved hospital problems  *      Consultations During Hospital Stay:  · General surgery    Procedures Performed:     · Laparoscopic cholecystectomy    Significant Findings / Test Results:     ·     Incidental Findings:   ·     Test Results Pending at Discharge (will require follow up):   ·      Outpatient Tests Requested:  ·     Complications:      Hospital Course:     Kati Soto is a 46 y o  female patient who originally presented to the hospital on 11/8/2018 due to intractable nausea and vomiting  Patient has significant past medical history gastroparesis  Type 2 diabetes  Obesity,  Chronic pain opiate dependent  During the hospital patient was found to have significant symptomatic gallstones reason why she was evaluated by General surgery, status post laparoscopic cholecystectomy  During the hospital patient was evaluated by Gastroenterology Department due abdominal pain and nausea or vomiting, patient required IV medication      Condition at Discharge: stable     Discharge Day Visit / Exam:     Subjective:  I am better, no nausea or vomiting   Vitals: Blood Pressure: 102/55 (11/14/18 0700)  Pulse: 93 (11/14/18 0700)  Temperature: 98 5 °F (36 9 °C) (11/14/18 0700)  Temp Source: Oral (11/14/18 0700)  Respirations: 18 (11/14/18 0700)  Height: 5' 8" (172 7 cm) (11/09/18 1600)  Weight - Scale: 128 kg (281 lb 12 oz) (11/09/18 1600)  SpO2: 94 % (11/14/18 0700)  Exam:   Physical Exam   Constitutional: She is oriented to person, place, and time  No distress  Cardiovascular: Normal rate, regular rhythm, normal heart sounds and intact distal pulses  Exam reveals no gallop  No murmur heard  Pulmonary/Chest: No respiratory distress  She has no wheezes  She has no rales  She exhibits no tenderness  Abdominal: She exhibits no distension and no mass  There is no tenderness  There is no rebound and no guarding  obese   Musculoskeletal: She exhibits no edema, tenderness or deformity  Neurological: She is alert and oriented to person, place, and time  She has normal reflexes  She displays normal reflexes  No cranial nerve deficit  Coordination normal    Skin: Skin is warm  She is not diaphoretic  Psychiatric: She has a normal mood and affect  Discussion with Family:    Discharge instructions/Information to patient and family:   See after visit summary for information provided to patient and family  Provisions for Follow-Up Care:  See after visit summary for information related to follow-up care and any pertinent home health orders  Planned Readmission:      Discharge Statement:  I spent 30 minutes discharging the patient  This time was spent on the day of discharge  I had direct contact with the patient on the day of discharge  Greater than 50% of the total time was spent examining patient, answering all patient questions, arranging and discussing plan of care with patient as well as directly providing post-discharge instructions  Additional time then spent on discharge activities  Discharge Medications:  See after visit summary for reconciled discharge medications provided to patient and family        ** Please Note: This note has been constructed using a voice recognition system **

## 2018-11-14 NOTE — PLAN OF CARE
Problem: Potential for Falls  Goal: Patient will remain free of falls  INTERVENTIONS:  - Assess patient frequently for physical needs  -  Identify cognitive and physical deficits and behaviors that affect risk of falls  -  [place] fall precautions as indicated by assessment   - Educate patient/family on patient safety including physical limitations  - Instruct patient to call for assistance with activity based on assessment  - Modify environment to reduce risk of injury  - Consider OT/PT consult to assist with strengthening/mobility    Outcome: Progressing      Problem: PAIN - ADULT  Goal: Verbalizes/displays adequate comfort level or baseline comfort level  Interventions:  - Encourage patient to monitor pain and request assistance  - Assess pain using appropriate pain scale  - Administer analgesics based on type and severity of pain and evaluate response  - Implement non-pharmacological measures as appropriate and evaluate response  - Consider cultural and social influences on pain and pain management  - Notify physician/advanced practitioner if interventions unsuccessful or patient reports new pain   Outcome: Progressing      Problem: SAFETY ADULT  Goal: Patient will remain free of falls  INTERVENTIONS:  - Assess patient frequently for physical needs  -  Identify cognitive and physical deficits and behaviors that affect risk of falls    -  [place] fall precautions as indicated by assessment   - Educate patient/family on patient safety including physical limitations  - Instruct patient to call for assistance with activity based on assessment  - Modify environment to reduce risk of injury  - Consider OT/PT consult to assist with strengthening/mobility    Outcome: Progressing      Problem: DISCHARGE PLANNING  Goal: Discharge to home or other facility with appropriate resources  INTERVENTIONS:  - Identify barriers to discharge w/patient and caregiver  - Arrange for needed discharge resources and transportation as appropriate  - Identify discharge learning needs (meds, wound care, etc )  - Arrange for interpretive services to assist at discharge as needed  - Refer to Case Management Department for coordinating discharge planning if the patient needs post-hospital services based on physician/advanced practitioner order or complex needs related to functional status, cognitive ability, or social support system   Outcome: Progressing      Problem: Knowledge Deficit  Goal: Patient/family/caregiver demonstrates understanding of disease process, treatment plan, medications, and discharge instructions  Complete learning assessment and assess knowledge base    Interventions:  - Provide teaching at level of understanding  - Provide teaching via preferred learning methods   Outcome: Progressing      Problem: GASTROINTESTINAL - ADULT  Goal: Minimal or absence of nausea and/or vomiting  INTERVENTIONS:  - Administer IV fluids as ordered to ensure adequate hydration  - Maintain NPO status until nausea and vomiting are resolved  - Nasogastric tube as ordered  - Administer ordered antiemetic medications as needed  - Provide nonpharmacologic comfort measures as appropriate  - Advance diet as tolerated, if ordered  - Nutrition services referral to assist patient with adequate nutrition and appropriate food choices   Outcome: Progressing    Goal: Maintains or returns to baseline bowel function  INTERVENTIONS:  - Assess bowel function  - Encourage oral fluids to ensure adequate hydration  - Administer IV fluids as ordered to ensure adequate hydration  - Administer ordered medications as needed  - Encourage mobilization and activity  - Nutrition services referral to assist patient with appropriate food choices   Outcome: Progressing    Goal: Maintains adequate nutritional intake  INTERVENTIONS:  - Monitor percentage of each meal consumed  - Identify factors contributing to decreased intake, treat as appropriate  - Assist with meals as needed  - Monitor I&O, WT and lab values  - Obtain nutrition services referral as needed   Outcome: Progressing      Problem: Nutrition/Hydration-ADULT  Goal: Nutrient/Hydration intake appropriate for improving, restoring or maintaining nutritional needs  Monitor and assess patient's nutrition/hydration status for malnutrition (ex- brittle hair, bruises, dry skin, pale skin and conjunctiva, muscle wasting, smooth red tongue, and disorientation)  Collaborate with interdisciplinary team and initiate plan and interventions as ordered  Monitor patient's weight and dietary intake as ordered or per policy  Utilize nutrition screening tool and intervene per policy  Determine patient's food preferences and provide high-protein, high-caloric foods as appropriate       INTERVENTIONS:  - Monitor oral intake, urinary output, labs, and treatment plans  - Assess nutrition and hydration status and recommend course of action  - Evaluate amount of meals eaten  - Assist patient with eating if necessary   - Allow adequate time for meals  - Recommend/ encourage appropriate diets, oral nutritional supplements, and vitamin/mineral supplements  - Order, calculate, and assess calorie counts as needed  - Recommend, monitor, and adjust tube feedings and TPN/PPN based on assessed needs  - Assess need for intravenous fluids  - Provide specific nutrition/hydration education as appropriate  - Include patient/family/caregiver in decisions related to nutrition   Outcome: Progressing      Problem: Prexisting or High Potential for Compromised Skin Integrity  Goal: Skin integrity is maintained or improved  INTERVENTIONS:  - Identify patients at risk for skin breakdown  - Assess and monitor skin integrity  - Assess and monitor nutrition and hydration status  - Monitor labs (i e  albumin)  - Assess for incontinence   - Turn and reposition patient  - Assist with mobility/ambulation  - Relieve pressure over bony prominences  - Avoid friction and shearing  - Provide appropriate hygiene as needed including keeping skin clean and dry  - Evaluate need for skin moisturizer/barrier cream  - Collaborate with interdisciplinary team (i e  Nutrition, Rehabilitation, etc )   - Patient/family teaching   Outcome: Progressing      Problem: DISCHARGE PLANNING - CARE MANAGEMENT  Goal: Discharge to post-acute care or home with appropriate resources  INTERVENTIONS:  - Conduct assessment to determine patient/family and health care team treatment goals, and need for post-acute services based on payer coverage, community resources, and patient preferences, and barriers to discharge  - Address psychosocial, clinical, and financial barriers to discharge as identified in assessment in conjunction with the patient/family and health care team  - Arrange appropriate level of post-acute services according to patients   needs and preference and payer coverage in collaboration with the physician and health care team  - Communicate with and update the patient/family, physician, and health care team regarding progress on the discharge plan  - Arrange appropriate transportation to post-acute venues   Outcome: Progressing

## 2018-11-27 ENCOUNTER — OFFICE VISIT (OUTPATIENT)
Dept: SURGERY | Facility: CLINIC | Age: 53
End: 2018-11-27

## 2018-11-27 VITALS
RESPIRATION RATE: 16 BRPM | SYSTOLIC BLOOD PRESSURE: 140 MMHG | WEIGHT: 293 LBS | BODY MASS INDEX: 44.41 KG/M2 | DIASTOLIC BLOOD PRESSURE: 82 MMHG | HEIGHT: 68 IN | TEMPERATURE: 99.1 F | HEART RATE: 103 BPM

## 2018-11-27 DIAGNOSIS — Z48.89 POSTOPERATIVE VISIT: Primary | ICD-10-CM

## 2018-11-27 PROCEDURE — 99024 POSTOP FOLLOW-UP VISIT: CPT | Performed by: SURGERY

## 2018-11-27 NOTE — PROGRESS NOTES
Post-Op Follow Up- General Surgery   Yanely Arellano 46 y o  female MRN: [id number]  Unit/Bed#:  Encounter: [id number]    Assessment/Plan     Assessment:  Status post laparoscopic cholecystectomy for acute cholecystitis, improved  Plan:  Patient is discharged from my care and I will be glad to see her if any problem arises in the future  History of Present Illness     HPI:  Yanely Arellano is a 46 y o  female who presents my office for 1st postop follow-up after laparoscopic cholecystectomy for acute cholecystitis  She offers no complaints at this time  The patient stated doing well, tolerating diet having regular bowel movement  She denies having any fever, chills, nausea, vomiting, diarrhea, constipation, change in the color urine or abdominal pain  Pathology report and cholangiogram report discussed with the patient        Historical Information   Past Medical History:   Diagnosis Date    Asthma     Chronic pain     Diabetes mellitus ([facility] [address] )     Gastroparesis      Past Surgical History:   Procedure Laterality Date    CERVICAL LAMINECTOMY      CHOLECYSTECTOMY LAPAROSCOPIC N/A 11/10/2018    Procedure: CHOLECYSTECTOMY LAPAROSCOPIC w/ ioc;  Surgeon: Erma Merino MD;  Location: [facility];  Service: General    HYSTERECTOMY      JOINT REPLACEMENT Bilateral     knee    TOE AMPUTATION Right 2/2/2018    Procedure: AMPUTATION TOE, RIGHT GREAT TOE;  Surgeon: Genaro Del Rosario DPM;  Location: [facility];  Service: Podiatry     Social History   History   Alcohol Use    Yes     Comment: rarely     History   Drug Use No     History   Smoking Status    Never Smoker   Smokeless Tobacco    Never Used     Family History: non-contributory    Meds/Allergies   all medications and allergies reviewed     Current Outpatient Prescriptions:     acetaminophen (TYLENOL) 325 mg tablet, Take 2 tablets (650 mg total) by mouth every 6 (six) hours as needed for fever, Disp: 30 tablet, Rfl: 0    albuterol (ACCUNEB) 1 25 MG/3ML nebulizer solution, Take 1 ampule by nebulization every 6 (six) hours as needed for wheezing, Disp: , Rfl:     albuterol (PROVENTIL HFA,VENTOLIN HFA) 90 mcg/act inhaler, Inhale 2 puffs every 6 (six) hours as needed for wheezing, Disp: , Rfl:     carisoprodol (SOMA) 350 mg tablet, Take 350 mg by mouth 2 (two) times a day, Disp: , Rfl:     diazepam (VALIUM) 5 mg tablet, Take 5 mg by mouth daily at bedtime as needed for anxiety, Disp: , Rfl:     dicyclomine (BENTYL) 10 mg capsule, Take 1 capsule (10 mg total) by mouth 4 (four) times a day (before meals and at bedtime), Disp: 120 capsule, Rfl: 3    fluticasone (FLONASE) 50 mcg/act nasal spray, 1 spray into each nostril daily, Disp: , Rfl:     glimepiride (AMARYL) 4 mg tablet, Take 4 mg by mouth 2 (two) times a day, Disp: , Rfl:     insulin glargine (LANTUS) 100 units/mL subcutaneous injection, Inject 50 Units under the skin daily at bedtime  , Disp: , Rfl:     metoclopramide (REGLAN) 10 mg tablet, TAKE 1 TABLET BY MOUTH 4 TIMES A DAY (BEFORE MEALS AND AT BEDTIME), Disp: 120 tablet, Rfl: 2    montelukast (SINGULAIR) 10 mg tablet, Take 10 mg by mouth daily at bedtime, Disp: , Rfl:     morphine (MS CONTIN) 60 mg 12 hr tablet, Take 75 mg by mouth 2 (two) times a day  , Disp: , Rfl:     morphine (MSIR) 15 mg tablet, Take 15 mg by mouth 3 (three) times a day as needed for moderate pain or severe pain  , Disp: , Rfl:     nystatin (MYCOSTATIN) powder, Apply topically 2 (two) times a day, Disp: 15 g, Rfl: 0    omeprazole (PriLOSEC) 20 mg delayed release capsule, Take 20 mg by mouth daily, Disp: , Rfl:     ondansetron (ZOFRAN) 4 mg tablet, Take 1 tablet (4 mg total) by mouth every 8 (eight) hours as needed for nausea or vomiting, Disp: 30 tablet, Rfl: 2    polyethylene glycol (MIRALAX) 17 g packet, Take 17 g by mouth daily, Disp: 14 each, Rfl: 0    predniSONE 10 mg tablet, Take by mouth daily, Disp: , Rfl:   Allergies   Allergen Reactions    Nsaids GI Intolerance Objective     Current Vitals:   Blood Pressure: 140/82 (11/27/18 1322)  Pulse: 103 (11/27/18 1322)  Temperature: 99 1 °F (37 3 °C) (11/27/18 1322)  Temp Source: Temporal (11/27/18 1322)  Respirations: 16 (11/27/18 1322)  Height: 5' 8" (172 7 cm) (11/27/18 1322)  Weight - Scale: 136 kg (300 lb) (11/27/18 1322)      Invasive Devices          No matching active lines, drains, or airways          Physical Exam:  The abdomen is soft, nondistended and nontender  Incisions are healing well without evidence of infection

## 2018-12-20 ENCOUNTER — APPOINTMENT (EMERGENCY)
Dept: CT IMAGING | Facility: HOSPITAL | Age: 53
DRG: 074 | End: 2018-12-20
Payer: MEDICARE

## 2018-12-20 ENCOUNTER — HOSPITAL ENCOUNTER (INPATIENT)
Facility: HOSPITAL | Age: 53
LOS: 3 days | Discharge: HOME | DRG: 074 | End: 2018-12-24
Attending: EMERGENCY MEDICINE | Admitting: INTERNAL MEDICINE
Payer: MEDICARE

## 2018-12-20 DIAGNOSIS — R11.2 INTRACTABLE NAUSEA AND VOMITING: Primary | ICD-10-CM

## 2018-12-20 DIAGNOSIS — G89.29 OTHER CHRONIC PAIN: ICD-10-CM

## 2018-12-20 DIAGNOSIS — E86.0 DEHYDRATION: ICD-10-CM

## 2018-12-20 DIAGNOSIS — K31.84 GASTROPARESIS: ICD-10-CM

## 2018-12-20 DIAGNOSIS — I10 ESSENTIAL HYPERTENSION: ICD-10-CM

## 2018-12-20 LAB
ALBUMIN SERPL BCP-MCNC: 3.6 G/DL (ref 3.5–5)
ALP SERPL-CCNC: 141 U/L (ref 46–116)
ALT SERPL W P-5'-P-CCNC: 35 U/L (ref 12–78)
ANION GAP SERPL CALCULATED.3IONS-SCNC: 11 MMOL/L (ref 4–13)
AST SERPL W P-5'-P-CCNC: 23 U/L (ref 5–45)
BASOPHILS # BLD AUTO: 0.06 THOUSANDS/ΜL (ref 0–0.1)
BASOPHILS NFR BLD AUTO: 1 % (ref 0–1)
BILIRUB DIRECT SERPL-MCNC: 0.22 MG/DL (ref 0–0.2)
BILIRUB SERPL-MCNC: 1.4 MG/DL (ref 0.2–1)
BUN SERPL-MCNC: 12 MG/DL (ref 5–25)
CALCIUM SERPL-MCNC: 9.3 MG/DL (ref 8.3–10.1)
CHLORIDE SERPL-SCNC: 96 MMOL/L (ref 100–108)
CO2 SERPL-SCNC: 26 MMOL/L (ref 21–32)
CREAT SERPL-MCNC: 0.79 MG/DL (ref 0.6–1.3)
EOSINOPHIL # BLD AUTO: 0.06 THOUSAND/ΜL (ref 0–0.61)
EOSINOPHIL NFR BLD AUTO: 1 % (ref 0–6)
ERYTHROCYTE [DISTWIDTH] IN BLOOD BY AUTOMATED COUNT: 13.3 % (ref 11.6–15.1)
GFR SERPL CREATININE-BSD FRML MDRD: 85 ML/MIN/1.73SQ M
GLUCOSE SERPL-MCNC: 313 MG/DL (ref 65–140)
HCT VFR BLD AUTO: 41.7 % (ref 34.8–46.1)
HGB BLD-MCNC: 13.6 G/DL (ref 11.5–15.4)
IMM GRANULOCYTES # BLD AUTO: 0.05 THOUSAND/UL (ref 0–0.2)
IMM GRANULOCYTES NFR BLD AUTO: 0 % (ref 0–2)
LACTATE SERPL-SCNC: 1 MMOL/L (ref 0.5–2)
LIPASE SERPL-CCNC: 58 U/L (ref 73–393)
LYMPHOCYTES # BLD AUTO: 1.24 THOUSANDS/ΜL (ref 0.6–4.47)
LYMPHOCYTES NFR BLD AUTO: 11 % (ref 14–44)
MCH RBC QN AUTO: 26.4 PG (ref 26.8–34.3)
MCHC RBC AUTO-ENTMCNC: 32.6 G/DL (ref 31.4–37.4)
MCV RBC AUTO: 81 FL (ref 82–98)
MONOCYTES # BLD AUTO: 0.5 THOUSAND/ΜL (ref 0.17–1.22)
MONOCYTES NFR BLD AUTO: 4 % (ref 4–12)
NEUTROPHILS # BLD AUTO: 9.76 THOUSANDS/ΜL (ref 1.85–7.62)
NEUTS SEG NFR BLD AUTO: 83 % (ref 43–75)
NRBC BLD AUTO-RTO: 0 /100 WBCS
PLATELET # BLD AUTO: 254 THOUSANDS/UL (ref 149–390)
PMV BLD AUTO: 12.8 FL (ref 8.9–12.7)
POTASSIUM SERPL-SCNC: 4.1 MMOL/L (ref 3.5–5.3)
PROT SERPL-MCNC: 8.2 G/DL (ref 6.4–8.2)
RBC # BLD AUTO: 5.15 MILLION/UL (ref 3.81–5.12)
SODIUM SERPL-SCNC: 133 MMOL/L (ref 136–145)
WBC # BLD AUTO: 11.67 THOUSAND/UL (ref 4.31–10.16)

## 2018-12-20 PROCEDURE — 83690 ASSAY OF LIPASE: CPT | Performed by: PHYSICIAN ASSISTANT

## 2018-12-20 PROCEDURE — 80076 HEPATIC FUNCTION PANEL: CPT | Performed by: PHYSICIAN ASSISTANT

## 2018-12-20 PROCEDURE — 80048 BASIC METABOLIC PNL TOTAL CA: CPT | Performed by: PHYSICIAN ASSISTANT

## 2018-12-20 PROCEDURE — 85025 COMPLETE CBC W/AUTO DIFF WBC: CPT | Performed by: PHYSICIAN ASSISTANT

## 2018-12-20 PROCEDURE — 96374 THER/PROPH/DIAG INJ IV PUSH: CPT

## 2018-12-20 PROCEDURE — 36415 COLL VENOUS BLD VENIPUNCTURE: CPT | Performed by: PHYSICIAN ASSISTANT

## 2018-12-20 PROCEDURE — 96375 TX/PRO/DX INJ NEW DRUG ADDON: CPT

## 2018-12-20 PROCEDURE — 96372 THER/PROPH/DIAG INJ SC/IM: CPT

## 2018-12-20 PROCEDURE — 99285 EMERGENCY DEPT VISIT HI MDM: CPT

## 2018-12-20 PROCEDURE — 83605 ASSAY OF LACTIC ACID: CPT | Performed by: PHYSICIAN ASSISTANT

## 2018-12-20 PROCEDURE — 96361 HYDRATE IV INFUSION ADD-ON: CPT

## 2018-12-20 PROCEDURE — 74177 CT ABD & PELVIS W/CONTRAST: CPT

## 2018-12-20 RX ORDER — METOCLOPRAMIDE HYDROCHLORIDE 5 MG/ML
10 INJECTION INTRAMUSCULAR; INTRAVENOUS ONCE
Status: COMPLETED | OUTPATIENT
Start: 2018-12-20 | End: 2018-12-20

## 2018-12-20 RX ORDER — ONDANSETRON 2 MG/ML
4 INJECTION INTRAMUSCULAR; INTRAVENOUS ONCE
Status: COMPLETED | OUTPATIENT
Start: 2018-12-20 | End: 2018-12-20

## 2018-12-20 RX ORDER — HALOPERIDOL 5 MG/ML
5 INJECTION INTRAMUSCULAR ONCE
Status: COMPLETED | OUTPATIENT
Start: 2018-12-20 | End: 2018-12-20

## 2018-12-20 RX ADMIN — HALOPERIDOL LACTATE 5 MG: 5 INJECTION, SOLUTION INTRAMUSCULAR at 23:12

## 2018-12-20 RX ADMIN — METOCLOPRAMIDE 10 MG: 5 INJECTION, SOLUTION INTRAMUSCULAR; INTRAVENOUS at 19:51

## 2018-12-20 RX ADMIN — ONDANSETRON 4 MG: 2 INJECTION INTRAMUSCULAR; INTRAVENOUS at 22:22

## 2018-12-20 RX ADMIN — SODIUM CHLORIDE 1000 ML: 0.9 INJECTION, SOLUTION INTRAVENOUS at 19:51

## 2018-12-20 RX ADMIN — IOHEXOL 100 ML: 350 INJECTION, SOLUTION INTRAVENOUS at 21:11

## 2018-12-21 ENCOUNTER — APPOINTMENT (INPATIENT)
Dept: ULTRASOUND IMAGING | Facility: HOSPITAL | Age: 53
DRG: 074 | End: 2018-12-21
Payer: MEDICARE

## 2018-12-21 PROBLEM — R11.2 NAUSEA AND VOMITING: Status: ACTIVE | Noted: 2018-02-02

## 2018-12-21 PROBLEM — R00.0 TACHYCARDIA: Status: ACTIVE | Noted: 2018-12-21

## 2018-12-21 PROBLEM — D72.829 LEUKOCYTOSIS: Status: ACTIVE | Noted: 2018-12-21

## 2018-12-21 LAB
ALBUMIN SERPL BCP-MCNC: 3.7 G/DL (ref 3.5–5)
ALP SERPL-CCNC: 140 U/L (ref 46–116)
ALT SERPL W P-5'-P-CCNC: 32 U/L (ref 12–78)
ANION GAP SERPL CALCULATED.3IONS-SCNC: 13 MMOL/L (ref 4–13)
AST SERPL W P-5'-P-CCNC: 19 U/L (ref 5–45)
ATRIAL RATE: 111 BPM
BACTERIA UR QL AUTO: ABNORMAL /HPF
BASOPHILS # BLD AUTO: 0.08 THOUSANDS/ΜL (ref 0–0.1)
BASOPHILS NFR BLD AUTO: 1 % (ref 0–1)
BILIRUB DIRECT SERPL-MCNC: 0.26 MG/DL (ref 0–0.2)
BILIRUB SERPL-MCNC: 1.8 MG/DL (ref 0.2–1)
BILIRUB UR QL STRIP: NEGATIVE
BUN SERPL-MCNC: 9 MG/DL (ref 5–25)
CALCIUM SERPL-MCNC: 9.2 MG/DL (ref 8.3–10.1)
CHLORIDE SERPL-SCNC: 97 MMOL/L (ref 100–108)
CLARITY UR: ABNORMAL
CO2 SERPL-SCNC: 23 MMOL/L (ref 21–32)
COLOR UR: YELLOW
CREAT SERPL-MCNC: 0.66 MG/DL (ref 0.6–1.3)
EOSINOPHIL # BLD AUTO: 0.03 THOUSAND/ΜL (ref 0–0.61)
EOSINOPHIL NFR BLD AUTO: 0 % (ref 0–6)
ERYTHROCYTE [DISTWIDTH] IN BLOOD BY AUTOMATED COUNT: 13.2 % (ref 11.6–15.1)
EST. AVERAGE GLUCOSE BLD GHB EST-MCNC: 232 MG/DL
GFR SERPL CREATININE-BSD FRML MDRD: 100 ML/MIN/1.73SQ M
GLUCOSE SERPL-MCNC: 241 MG/DL (ref 65–140)
GLUCOSE SERPL-MCNC: 246 MG/DL (ref 65–140)
GLUCOSE SERPL-MCNC: 272 MG/DL (ref 65–140)
GLUCOSE SERPL-MCNC: 276 MG/DL (ref 65–140)
GLUCOSE SERPL-MCNC: 276 MG/DL (ref 65–140)
GLUCOSE UR STRIP-MCNC: ABNORMAL MG/DL
HBA1C MFR BLD: 9.7 % (ref 4.2–6.3)
HCT VFR BLD AUTO: 42.1 % (ref 34.8–46.1)
HGB BLD-MCNC: 13.9 G/DL (ref 11.5–15.4)
HGB UR QL STRIP.AUTO: ABNORMAL
IMM GRANULOCYTES # BLD AUTO: 0.06 THOUSAND/UL (ref 0–0.2)
IMM GRANULOCYTES NFR BLD AUTO: 0 % (ref 0–2)
INR PPP: 1.06 (ref 0.86–1.17)
KETONES UR STRIP-MCNC: ABNORMAL MG/DL
LEUKOCYTE ESTERASE UR QL STRIP: NEGATIVE
LYMPHOCYTES # BLD AUTO: 2.15 THOUSANDS/ΜL (ref 0.6–4.47)
LYMPHOCYTES NFR BLD AUTO: 15 % (ref 14–44)
MAGNESIUM SERPL-MCNC: 1.6 MG/DL (ref 1.6–2.6)
MCH RBC QN AUTO: 26.7 PG (ref 26.8–34.3)
MCHC RBC AUTO-ENTMCNC: 33 G/DL (ref 31.4–37.4)
MCV RBC AUTO: 81 FL (ref 82–98)
MONOCYTES # BLD AUTO: 0.68 THOUSAND/ΜL (ref 0.17–1.22)
MONOCYTES NFR BLD AUTO: 5 % (ref 4–12)
NEUTROPHILS # BLD AUTO: 11.03 THOUSANDS/ΜL (ref 1.85–7.62)
NEUTS SEG NFR BLD AUTO: 79 % (ref 43–75)
NITRITE UR QL STRIP: NEGATIVE
NON-SQ EPI CELLS URNS QL MICRO: ABNORMAL /HPF
NRBC BLD AUTO-RTO: 0 /100 WBCS
P AXIS: 59 DEGREES
PH UR STRIP.AUTO: 6 [PH] (ref 4.5–8)
PHOSPHATE SERPL-MCNC: 2.8 MG/DL (ref 2.7–4.5)
PLATELET # BLD AUTO: 251 THOUSANDS/UL (ref 149–390)
PMV BLD AUTO: 12.6 FL (ref 8.9–12.7)
POTASSIUM SERPL-SCNC: 3.9 MMOL/L (ref 3.5–5.3)
PR INTERVAL: 140 MS
PROT SERPL-MCNC: 8.3 G/DL (ref 6.4–8.2)
PROT UR STRIP-MCNC: >=300 MG/DL
PROTHROMBIN TIME: 13.7 SECONDS (ref 11.8–14.2)
QRS AXIS: -13 DEGREES
QRSD INTERVAL: 78 MS
QT INTERVAL: 358 MS
QTC INTERVAL: 486 MS
RBC # BLD AUTO: 5.2 MILLION/UL (ref 3.81–5.12)
RBC #/AREA URNS AUTO: ABNORMAL /HPF
SODIUM SERPL-SCNC: 133 MMOL/L (ref 136–145)
SP GR UR STRIP.AUTO: 1.02 (ref 1–1.03)
T WAVE AXIS: 29 DEGREES
TSH SERPL DL<=0.05 MIU/L-ACNC: 0.72 UIU/ML (ref 0.36–3.74)
UROBILINOGEN UR QL STRIP.AUTO: 0.2 E.U./DL
VENTRICULAR RATE: 111 BPM
WBC # BLD AUTO: 14.03 THOUSAND/UL (ref 4.31–10.16)
WBC #/AREA URNS AUTO: ABNORMAL /HPF

## 2018-12-21 PROCEDURE — 83735 ASSAY OF MAGNESIUM: CPT | Performed by: INTERNAL MEDICINE

## 2018-12-21 PROCEDURE — C9113 INJ PANTOPRAZOLE SODIUM, VIA: HCPCS | Performed by: INTERNAL MEDICINE

## 2018-12-21 PROCEDURE — 93005 ELECTROCARDIOGRAM TRACING: CPT

## 2018-12-21 PROCEDURE — 85610 PROTHROMBIN TIME: CPT | Performed by: INTERNAL MEDICINE

## 2018-12-21 PROCEDURE — 83036 HEMOGLOBIN GLYCOSYLATED A1C: CPT | Performed by: INTERNAL MEDICINE

## 2018-12-21 PROCEDURE — 36415 COLL VENOUS BLD VENIPUNCTURE: CPT | Performed by: INTERNAL MEDICINE

## 2018-12-21 PROCEDURE — 80053 COMPREHEN METABOLIC PANEL: CPT | Performed by: INTERNAL MEDICINE

## 2018-12-21 PROCEDURE — 94760 N-INVAS EAR/PLS OXIMETRY 1: CPT

## 2018-12-21 PROCEDURE — 93010 ELECTROCARDIOGRAM REPORT: CPT | Performed by: INTERNAL MEDICINE

## 2018-12-21 PROCEDURE — 87040 BLOOD CULTURE FOR BACTERIA: CPT | Performed by: PHYSICIAN ASSISTANT

## 2018-12-21 PROCEDURE — 81001 URINALYSIS AUTO W/SCOPE: CPT | Performed by: PHYSICIAN ASSISTANT

## 2018-12-21 PROCEDURE — 76705 ECHO EXAM OF ABDOMEN: CPT

## 2018-12-21 PROCEDURE — 84100 ASSAY OF PHOSPHORUS: CPT | Performed by: INTERNAL MEDICINE

## 2018-12-21 PROCEDURE — 84443 ASSAY THYROID STIM HORMONE: CPT | Performed by: INTERNAL MEDICINE

## 2018-12-21 PROCEDURE — 94664 DEMO&/EVAL PT USE INHALER: CPT

## 2018-12-21 PROCEDURE — 99222 1ST HOSP IP/OBS MODERATE 55: CPT | Performed by: INTERNAL MEDICINE

## 2018-12-21 PROCEDURE — 99223 1ST HOSP IP/OBS HIGH 75: CPT | Performed by: INTERNAL MEDICINE

## 2018-12-21 PROCEDURE — 82948 REAGENT STRIP/BLOOD GLUCOSE: CPT

## 2018-12-21 PROCEDURE — 85025 COMPLETE CBC W/AUTO DIFF WBC: CPT | Performed by: INTERNAL MEDICINE

## 2018-12-21 PROCEDURE — 82248 BILIRUBIN DIRECT: CPT | Performed by: PHYSICIAN ASSISTANT

## 2018-12-21 RX ORDER — MONTELUKAST SODIUM 10 MG/1
10 TABLET ORAL
Status: DISCONTINUED | OUTPATIENT
Start: 2018-12-21 | End: 2018-12-24 | Stop reason: HOSPADM

## 2018-12-21 RX ORDER — MORPHINE SULFATE 15 MG/1
15 TABLET ORAL 3 TIMES DAILY PRN
Status: DISCONTINUED | OUTPATIENT
Start: 2018-12-21 | End: 2018-12-24 | Stop reason: HOSPADM

## 2018-12-21 RX ORDER — LISINOPRIL 5 MG/1
5 TABLET ORAL DAILY
Status: DISCONTINUED | OUTPATIENT
Start: 2018-12-21 | End: 2018-12-22

## 2018-12-21 RX ORDER — METOPROLOL TARTRATE 5 MG/5ML
5 INJECTION INTRAVENOUS EVERY 6 HOURS PRN
Status: DISCONTINUED | OUTPATIENT
Start: 2018-12-21 | End: 2018-12-24 | Stop reason: HOSPADM

## 2018-12-21 RX ORDER — METOPROLOL TARTRATE 5 MG/5ML
5 INJECTION INTRAVENOUS EVERY 6 HOURS PRN
Status: DISCONTINUED | OUTPATIENT
Start: 2018-12-21 | End: 2018-12-21

## 2018-12-21 RX ORDER — SODIUM CHLORIDE 9 MG/ML
75 INJECTION, SOLUTION INTRAVENOUS CONTINUOUS
Status: DISCONTINUED | OUTPATIENT
Start: 2018-12-21 | End: 2018-12-24 | Stop reason: HOSPADM

## 2018-12-21 RX ORDER — DICYCLOMINE HYDROCHLORIDE 10 MG/1
10 CAPSULE ORAL
Status: DISCONTINUED | OUTPATIENT
Start: 2018-12-21 | End: 2018-12-21

## 2018-12-21 RX ORDER — INSULIN GLARGINE 100 [IU]/ML
30 INJECTION, SOLUTION SUBCUTANEOUS
Status: DISCONTINUED | OUTPATIENT
Start: 2018-12-21 | End: 2018-12-22

## 2018-12-21 RX ORDER — METOCLOPRAMIDE HYDROCHLORIDE 5 MG/ML
10 INJECTION INTRAMUSCULAR; INTRAVENOUS EVERY 6 HOURS SCHEDULED
Status: DISCONTINUED | OUTPATIENT
Start: 2018-12-21 | End: 2018-12-24 | Stop reason: HOSPADM

## 2018-12-21 RX ORDER — PANTOPRAZOLE SODIUM 40 MG/1
40 INJECTION, POWDER, FOR SOLUTION INTRAVENOUS
Status: DISCONTINUED | OUTPATIENT
Start: 2018-12-21 | End: 2018-12-24 | Stop reason: HOSPADM

## 2018-12-21 RX ORDER — METOCLOPRAMIDE 10 MG/1
10 TABLET ORAL
Status: DISCONTINUED | OUTPATIENT
Start: 2018-12-21 | End: 2018-12-21

## 2018-12-21 RX ORDER — MORPHINE SULFATE 30 MG/1
30 TABLET, FILM COATED, EXTENDED RELEASE ORAL EVERY 12 HOURS SCHEDULED
Status: DISCONTINUED | OUTPATIENT
Start: 2018-12-21 | End: 2018-12-22

## 2018-12-21 RX ADMIN — PANTOPRAZOLE SODIUM 40 MG: 40 INJECTION, POWDER, FOR SOLUTION INTRAVENOUS at 08:27

## 2018-12-21 RX ADMIN — MORPHINE SULFATE 15 MG: 15 TABLET ORAL at 20:26

## 2018-12-21 RX ADMIN — METOCLOPRAMIDE 10 MG: 5 INJECTION, SOLUTION INTRAMUSCULAR; INTRAVENOUS at 11:58

## 2018-12-21 RX ADMIN — DICYCLOMINE HYDROCHLORIDE 10 MG: 10 CAPSULE ORAL at 11:58

## 2018-12-21 RX ADMIN — INSULIN GLARGINE 30 UNITS: 100 INJECTION, SOLUTION SUBCUTANEOUS at 21:29

## 2018-12-21 RX ADMIN — INSULIN LISPRO 4 UNITS: 100 INJECTION, SOLUTION INTRAVENOUS; SUBCUTANEOUS at 21:31

## 2018-12-21 RX ADMIN — MORPHINE SULFATE 30 MG: 30 TABLET, FILM COATED, EXTENDED RELEASE ORAL at 08:27

## 2018-12-21 RX ADMIN — ONDANSETRON 8 MG: 2 INJECTION INTRAMUSCULAR; INTRAVENOUS at 07:56

## 2018-12-21 RX ADMIN — MORPHINE SULFATE 30 MG: 30 TABLET, FILM COATED, EXTENDED RELEASE ORAL at 21:29

## 2018-12-21 RX ADMIN — INSULIN LISPRO 2 UNITS: 100 INJECTION, SOLUTION INTRAVENOUS; SUBCUTANEOUS at 07:56

## 2018-12-21 RX ADMIN — MONTELUKAST SODIUM 10 MG: 10 TABLET, FILM COATED ORAL at 21:29

## 2018-12-21 RX ADMIN — METOCLOPRAMIDE 10 MG: 5 INJECTION, SOLUTION INTRAMUSCULAR; INTRAVENOUS at 05:10

## 2018-12-21 RX ADMIN — METOCLOPRAMIDE 10 MG: 5 INJECTION, SOLUTION INTRAMUSCULAR; INTRAVENOUS at 16:32

## 2018-12-21 RX ADMIN — SODIUM CHLORIDE 125 ML/HR: 0.9 INJECTION, SOLUTION INTRAVENOUS at 16:40

## 2018-12-21 RX ADMIN — ENOXAPARIN SODIUM 40 MG: 40 INJECTION SUBCUTANEOUS at 08:28

## 2018-12-21 RX ADMIN — LISINOPRIL 5 MG: 5 TABLET ORAL at 16:32

## 2018-12-21 RX ADMIN — INSULIN LISPRO 2 UNITS: 100 INJECTION, SOLUTION INTRAVENOUS; SUBCUTANEOUS at 12:04

## 2018-12-21 RX ADMIN — SODIUM CHLORIDE 125 ML/HR: 0.9 INJECTION, SOLUTION INTRAVENOUS at 05:08

## 2018-12-21 RX ADMIN — INSULIN LISPRO 3 UNITS: 100 INJECTION, SOLUTION INTRAVENOUS; SUBCUTANEOUS at 16:33

## 2018-12-21 RX ADMIN — ONDANSETRON 8 MG: 2 INJECTION INTRAMUSCULAR; INTRAVENOUS at 20:25

## 2018-12-21 RX ADMIN — DICYCLOMINE HYDROCHLORIDE 10 MG: 10 CAPSULE ORAL at 08:27

## 2018-12-21 NOTE — ASSESSMENT & PLAN NOTE
· Improving, likely secondary to gastoparesis  · GI following, appreciate additional recommendations  · Advanced to clear liquids today per GI, monitor  · Last episode of vomiting was yesterday

## 2018-12-21 NOTE — ASSESSMENT & PLAN NOTE
Lab Results   Component Value Date    HGBA1C 9 9 (H) 11/08/2018       No results for input(s): POCGLU in the last 72 hours  Blood Sugar Average: Last 72 hrs:   start Lantus at a reduced dose of 30 units, patient is currently NPO, hold mealtime insulin other than sliding scale

## 2018-12-21 NOTE — CONSULTS
[facility]ultation -  [facility]  Dl Boyd 46 y o  female MRN: [id number]  Unit/Bed#: -01 Encounter: [id number]        Inpatient consult to gastroenterology  Consult performed by: Dipti Omer ordered by: George Pineda          Reason for Consult / Principal Problem: Nausea, Vomiting, Hx gastroparesis    HPI: Ms Ernie Sheets is a 47 yo F with a PMH of IDDM, gastroparesis diagnosed 5 years ago, chronic pain dependent on opioid, GERD, presenting due to nausea, vomiting, and mild periumbilical abdominal pain over the past 2-3 days  She denies a fever, associated diarrhea, constipation, or any medication changes recently  She reports compliance with small, low fat meals and has been taking her reglan QID  She denies any recent sick contacts  She denies any episodes of vomiting today but has been nauseous  She is s/p lap cholecystectomy in November with negative IOC      REVIEW OF SYSTEMS: Negative except for as stated above    Historical Information   Past Medical History:   Diagnosis Date    Asthma     Chronic pain     Diabetes mellitus ([address] )     Gastroparesis      Past Surgical History:   Procedure Laterality Date    CERVICAL LAMINECTOMY      CHOLECYSTECTOMY      CHOLECYSTECTOMY LAPAROSCOPIC N/A 11/10/2018    Procedure: CHOLECYSTECTOMY LAPAROSCOPIC w/ ioc;  Surgeon: Randy Clark MD;  Location: [facility];  Service: General    HYSTERECTOMY      JOINT REPLACEMENT Bilateral     knee    TOE AMPUTATION Right 2/2/2018    Procedure: AMPUTATION TOE, RIGHT GREAT TOE;  Surgeon: Emily Waldrop DPM;  Location[facility];  Service: Podiatry     Social History   History   Alcohol Use    Yes     Comment: rarely     History   Drug Use No     History   Smoking Status    Never Smoker   Smokeless Tobacco    Never Used     Family History   Problem Relation Age of Onset    Diabetes Mother     Diabetes Maternal Grandmother        Meds/Allergies     Prescriptions Prior to Admission Medication    acetaminophen (TYLENOL) 325 mg tablet    albuterol (ACCUNEB) 1 25 MG/3ML nebulizer solution    albuterol (PROVENTIL HFA,VENTOLIN HFA) 90 mcg/act inhaler    carisoprodol (SOMA) 350 mg tablet    diazepam (VALIUM) 5 mg tablet    dicyclomine (BENTYL) 10 mg capsule    fluticasone (FLONASE) 50 mcg/act nasal spray    glimepiride (AMARYL) 4 mg tablet    insulin glargine (LANTUS) 100 units/mL subcutaneous injection    metoclopramide (REGLAN) 10 mg tablet    montelukast (SINGULAIR) 10 mg tablet    morphine (MS CONTIN) 60 mg 12 hr tablet    morphine (MSIR) 15 mg tablet    nystatin (MYCOSTATIN) powder    omeprazole (PriLOSEC) 20 mg delayed release capsule    ondansetron (ZOFRAN) 4 mg tablet     Current Facility-Administered Medications   Medication Dose Route Frequency    dicyclomine (BENTYL) capsule 10 mg  10 mg Oral 4x Daily (AC & HS)    enoxaparin (LOVENOX) subcutaneous injection 40 mg  40 mg Subcutaneous Daily    insulin glargine (LANTUS) subcutaneous injection 30 Units 0 3 mL  30 Units Subcutaneous HS    insulin lispro (HumaLOG) 100 units/mL subcutaneous injection 1-5 Units  1-5 Units Subcutaneous TID AC    insulin lispro (HumaLOG) 100 units/mL subcutaneous injection 1-5 Units  1-5 Units Subcutaneous HS    metoclopramide (REGLAN) injection 10 mg  10 mg Intravenous Q6H [address]    metoprolol (LOPRESSOR) injection 5 mg  5 mg Intravenous Q6H PRN    montelukast (SINGULAIR) tablet 10 mg  10 mg Oral HS    morphine (MS CONTIN) ER tablet 30 mg  30 mg Oral Q12H BOLIVAR    morphine (MSIR) IR tablet 15 mg  15 mg Oral TID PRN    ondansetron (ZOFRAN) 8 mg in sodium chloride 0 9 % 50 mL IVPB  8 mg Intravenous Q4H PRN    pantoprazole (PROTONIX) injection 40 mg  40 mg Intravenous Q24H BOLIVAR    sodium chloride 0 9 % infusion  125 mL/hr Intravenous Continuous       Allergies   Allergen Reactions    Nsaids GI Intolerance           Objective     Blood pressure (!) 178/96, pulse (!) 120, temperature 98 4 °F (36 9 °C), temperature source Oral, resp  rate 18, height 5' 8" (1 727 m), weight 132 kg (291 lb 0 1 oz), SpO2 95 %, not currently breastfeeding  Intake/Output Summary (Last 24 hours) at 12/21/18 1402  Last data filed at 12/21/18 0844   Gross per 24 hour   Intake                0 ml   Output              200 ml   Net             -200 ml         PHYSICAL EXAM:      General Appearance:   Alert, oriented x3, (+) flat affect, no acute distress    HEENT:   Normocephalic, atraumatic, anicteric      Neck:  Supple, symmetrical, trachea midline   Lungs:   Clear to auscultation bilaterally, no respiratory distress   Heart[de-identified]   RRR, no murmur   Abdomen:   Non-distended, soft, BS active, (+) mild TTP in the RLQ without any guarding   Rectal:   Deferred    Extremities:  No cyanosis, clubbing or edema    Pulses:  2+ and symmetric all extremities    Skin:  No jaundice or pallor     Lab Results:     Results from last 7 days  Lab Units 12/21/18  0508   WBC Thousand/uL 14 03*   HEMOGLOBIN g/dL 13 9   HEMATOCRIT % 42 1   PLATELETS Thousands/uL 251   NEUTROS PCT % 79*   LYMPHS PCT % 15   MONOS PCT % 5   EOS PCT % 0       Results from last 7 days  Lab Units 12/21/18  0508   POTASSIUM mmol/L 3 9   CHLORIDE mmol/L 97*   CO2 mmol/L 23   BUN mg/dL 9   CREATININE mg/dL 0 66   CALCIUM mg/dL 9 2   ALK PHOS U/L 140*   ALT U/L 32   AST U/L 19       Results from last 7 days  Lab Units 12/21/18  0508   INR  1 06       Results from last 7 days  Lab Units 12/20/18  1951   LIPASE u/L 58*       Imaging Studies: I have personally reviewed pertinent imaging studies  Us Right Upper Quadrant  Result Date: 12/21/2018  Impression: Normal study status post cholecystectomy  Ct Abdomen Pelvis With Contrast  Result Date: 12/20/2018  Impression: No acute inflammatory process identified within the abdomen or pelvis         ASSESSMENT and PLAN:      Nausea/Vomiting  - She does have a history of gastroparesis diagnosed 5 years ago but given her increased leukocytosis and persistent sinus tachycardia, concerned for another etiology than just gastroparesis alone  - Continue reglan 10 ACHS for known gastoparesis  - Trial clear liquid diet; advance as tolerated to low fat diet, small portions  - RUQ US neg for biliary dilatation or choledocholithiasis, s/p cholecystectomy in November  - Serial abdominal exams    Sinus Tachycardia  - Unclear etiology  - Get blood cultures  - UA without evidence of UTI  - CT A/P without any acute infectious/inflammatory etiology  - Monitor closely for fever, hold off on antibiotics for now    Leukocytosis  - She has a mild chronic leukocytosis since April 2018  - Will check ESR/CRP as these were elevated 1 year ago      Patient will be seen and examined by Dr Valerie Gray  All zhang medical decisions were made by Dr Valerie Gray  Thank you for allowing us to participate in the care of this patient  We will follow with you closely

## 2018-12-21 NOTE — ASSESSMENT & PLAN NOTE
Likely hypovolemic hyponatremia, patient with very poor p o  Intake for approximately 2 days prior to presentation 12/20/2018  Patient received 2 L of IV fluid in the emergency room, despite medical therapy in the ER patient was on able to tolerate p o  Intake and was later directed to [place] [id number] [facility] for admission

## 2018-12-21 NOTE — ASSESSMENT & PLAN NOTE
· Appears chronic per review of records  · UA negative, CT abdomen/pelvis without acute findings, pt denies any respiratory symptoms  · Blood cultures taken  · Hold off on any antibiotic treatment for now pending additional results  · Pt does not have any fevers currently  · Low threshold to start antibiotic treatment however   · Monitor CBC daily

## 2018-12-21 NOTE — UTILIZATION REVIEW
Initial Clinical Review    Admission: Date/Time/Statement: 12/21/18 @ 0112     Orders Placed This Encounter   Procedures    Inpatient Admission (expected length of stay for this patient is greater than two midnights)     Standing Status:   Standing     Number of Occurrences:   1     Order Specific Question:   Admitting Physician     Answer:   Emeka Murillo     Order Specific Question:   Level of Care     Answer:   Med Surg [16]     Order Specific Question:   Estimated length of stay     Answer:   More than 2 Midnights     Order Specific Question:   Certification     Answer:   I certify that inpatient services are medically necessary for this patient for a duration of greater than two midnights  See H&P and MD Progress Notes for additional information about the patient's course of treatment  ED: Date/Time/Mode of Arrival:   ED Arrival Information     Expected Arrival Acuity Means of Arrival Escorted By Service Admission Type    - 12/20/2018 18:29 Urgent Walk-In Family Member General Medicine Urgent    Arrival Complaint    nausea           Chief Complaint:   Chief Complaint   Patient presents with    Nausea     Patient presents with nausea and vomiting for the last few days  Denies any fevers or diarrhea  States she has mild abdominal pain as well   Abdominal Pain       History of Illness: 47 yo female to ED w/ c/o several days worsening N/V , mild abd pain   C/o worsening GI sx , choley performed last month        PE : dry mm , inc rate , flat affect , pallor     ED Vital Signs:   ED Triage Vitals [12/20/18 1913]   Temperature Pulse Respirations Blood Pressure SpO2   98 2 °F (36 8 °C) (!) 123 22 150/77 94 %      Temp Source Heart Rate Source Patient Position - Orthostatic VS BP Location FiO2 (%)   Oral Monitor Sitting Right arm --      Pain Score       8        Wt Readings from Last 1 Encounters:   12/21/18 132 kg (291 lb 0 1 oz)       Vital Signs (abnormal):   12/21/18 0636  --   129  18  --  95 %  None (Room air)  --   12/21/18 0100  --   120  18  170/87  97 %  None (Room air)  --   12/21/18 0015  --   120  18   184/104  95 %  --  --   12/20/18 2345  --   116  --  --  93 %  --  --   12/20/18 2315  --   117  18   176/79  94 %  --           Abnormal Labs/Diagnostic Test Results: na   133, cl  97, gluc   272, alk phos 140, total prot  8 3, total bili  1 80, wbc 14 03  hgba   9 7, gluc  276    ED Treatment:   Medication Administration from 12/20/2018 1829 to 12/21/2018 9335       Date/Time Order Dose Route Action Action by Comments     12/20/2018 1951 metoclopramide (REGLAN) injection 10 mg 10 mg Intravenous Given Alfredo Clark RN      12/20/2018 2151 sodium chloride 0 9 % bolus 2,000 mL 0 mL Intravenous Stopped Alfredo Clark RN      12/20/2018 1951 sodium chloride 0 9 % bolus 2,000 mL 1,000 mL Intravenous [address] Alfredo Clark RN      12/20/2018 2111 iohexol (OMNIPAQUE) 350 MG/ML injection (MULTI-DOSE) 100 mL 100 mL Intravenous Given Milton Gum      12/20/2018 2222 ondansetron (ZOFRAN) injection 4 mg 4 mg Intravenous Given Alfredo Clark RN      12/20/2018 2312 haloperidol lactate (HALDOL) injection 5 mg 5 mg Intramuscular Given Alfredo Clark RN      12/21/2018 0508 sodium chloride 0 9 % infusion 125 mL/hr Intravenous [facility] [address] [place] [address]      12/21/2018 0510 metoclopramide (REGLAN) injection 10 mg 10 mg Intravenous Given Alfredo Clark RN           Past Medical/Surgical History:    Active Ambulatory Problems     Diagnosis Date Noted    Diabetic ulcer of right great toe ([facility] ) 02/02/2018    Acute osteomyelitis of toe, right ([facility] ) 02/02/2018    Diabetes mellitus ([place] [id number] ) 02/02/2018    Asthma 02/02/2018    Chronic pain 02/02/2018    Gastroparesis 02/02/2018    Hyponatremia 02/02/2018    Hypokalemia 02/02/2018    Opioid dependence ([facility] ) 02/05/2018    De Quervain's tenosynovitis, bilateral 10/03/2017    Knee pain 11/21/2017    Intractable nausea and vomiting 05/22/2018    UTI (urinary tract infection) 05/22/2018    Gall stones 05/24/2018    Uncontrolled diabetes mellitus ([place] [address] ) 06/15/2018    Hypomagnesemia 11/09/2018    Essential hypertension 11/10/2018    Morbid obesity ([place] [address] ) 11/12/2018       Past Medical History:   Diagnosis Date    Asthma     Chronic pain     Diabetes mellitus ([place] [address] )     Gastroparesis        Admitting Diagnosis: Gastroparesis [K31 84]  Dehydration [E86 0]  Nausea [R11 0]  Abdominal pain [R10 9]  Intractable nausea and vomiting [R11 2]    Age/Sex: 46 y o  female    Assessment/Plan:        Gastroparesis   Assessment & Plan     Likely current cause of nausea vomiting, start Reglan 10 mg IV q 6 hours    Protonix 40 mg daily IV    Consult GI    Patient had recent cholecystectomy, had done well postop for several weeks      Patient's narcotic dosing needs to be addressed, she has been on long-standing opiates, this is exacerbating her gastroparesis       Intractable nausea and vomiting   Assessment & Plan     Likely due to gastroparesis as noted above      Supportive care, NPO today, possible gradual advancement of diet later      Diabetes mellitus ([place] [address] )   Assessment & Plan             Lab Results   Component Value Date     HGBA1C 9 9 (H) 11/08/2018    No results for input(s): POCGLU in the last 72 hours      Blood Sugar Average: Last 72 hrs:   start Lantus at a reduced dose of 30 units, patient is currently NPO, hold mealtime insulin other than sliding scale     Opioid dependence (HCC)   Assessment & Plan     Chronic pain on chronic opiate therapy    Decreased long-acting morphine to 60 mg twice daily, recommend outpatient follow-up with pain management if possible  Hyponatremia   Assessment & Plan     Likely hypovolemic hyponatremia, patient with very poor p o  Intake for approximately 2 days prior to presentation 12/20/2018      Patient received 2 L of IV fluid in the emergency room, despite medical therapy in the ER patient was on able to tolerate p o  Intake and was later directed to [place] [address] [facility] for admission       Morbid obesity ([facility] )   Assessment & Plan     BMI 44 15    Essential hypertension   Assessment & Plan     Blood pressure is elevated today, patient is not on any outpatient blood pressure medications, furthermore at time of last discharge patient's blood pressure was normal and again she was not on any medications at discharge      VTE Prophylaxis: Enoxaparin (Lovenox)  / sequential compression device   Code Status:  Full code  POLST: There is no POLST form on file for this patient (pre-hospital)   Anticipated Length of Stay:  Patient will be admitted on an Inpatient basis with an anticipated length of stay of  greater than 2 midnights  Justification for Hospital Stay:  Severe volume depletion, on able to maintain adequate oral intake despite prolonged ER stay and multiple medications        Admission Orders:  Scheduled Meds:   Current Facility-Administered Medications:  dicyclomine 10 mg Oral 4x Daily (AC & HS)     enoxaparin 40 mg Subcutaneous Daily     insulin glargine 30 Units Subcutaneous HS     insulin lispro 1-5 Units Subcutaneous TID AC     insulin lispro 1-5 Units Subcutaneous HS     metoclopramide 10 mg Intravenous Q6H [address]     montelukast 10 mg Oral HS     morphine 30 mg Oral Q12H [address]     morphine 15 mg Oral TID PRN     ondansetron 8 mg Intravenous Q4H PRN  Last Rate: 8 mg (12/21/18 0756)   pantoprazole 40 mg Intravenous Q24H [address]     sodium chloride 125 mL/hr Intravenous Continuous  Last Rate: 125 mL/hr (12/21/18 0508)     fingerstick q6hr  NPO   SCD  I& O   Up and OOB as caitlyn   12/21 cmp, cbc , mg ,phos , hgb a1c   hgba 1c   9 7  Wbc  14 03, na  133, cl   97, gluc  272

## 2018-12-21 NOTE — H&P
H&P- Heri Liz 1965, 46 y o  female MRN: [id number]    Unit/Bed#: ED 12 Encounter: [id number]    Primary Care Provider: Melany Mcleod   Date and time admitted to hospital: 12/20/2018  6:36 PM        Gastroparesis   Assessment & Plan    Likely current cause of nausea vomiting, start Reglan 10 mg IV q 6 hours  Protonix 40 mg daily IV  Consult GI  Patient had recent cholecystectomy, had done well postop for several weeks  Patient's narcotic dosing needs to be addressed, she has been on long-standing opiates, this is exacerbating her gastroparesis  Intractable nausea and vomiting   Assessment & Plan    Likely due to gastroparesis as noted above  Supportive care, NPO today, possible gradual advancement of diet later     Diabetes mellitus [facility])   Assessment & Plan    Lab Results   Component Value Date    HGBA1C 9 9 (H) 11/08/2018       No results for input(s): POCGLU in the last 72 hours  Blood Sugar Average: Last 72 hrs:   start Lantus at a reduced dose of 30 units, patient is currently NPO, hold mealtime insulin other than sliding scale  Opioid dependence (HCC)   Assessment & Plan    Chronic pain on chronic opiate therapy  Decreased long-acting morphine to 60 mg twice daily, recommend outpatient follow-up with pain management if possible  Hyponatremia   Assessment & Plan    Likely hypovolemic hyponatremia, patient with very poor p o  Intake for approximately 2 days prior to presentation 12/20/2018  Patient received 2 L of IV fluid in the emergency room, despite medical therapy in the ER patient was on able to tolerate p o  Intake and was later directed to [place] [address] [facility] for admission       Morbid obesity ([facility] [address] )   Assessment & Plan    BMI 44 15     Essential hypertension   Assessment & Plan    Blood pressure is elevated today, patient is not on any outpatient blood pressure medications, furthermore at time of last discharge patient's blood pressure was normal and again she was not on any medications at discharge  VTE Prophylaxis: Enoxaparin (Lovenox)  / sequential compression device   Code Status:  Full code  POLST: There is no POLST form on file for this patient (pre-hospital)    Anticipated Length of Stay:  Patient will be admitted on an Inpatient basis with an anticipated length of stay of  greater than 2 midnights  Justification for Hospital Stay:  Severe volume depletion, on able to maintain adequate oral intake despite prolonged ER stay and multiple medications  Chief Complaint:   Intractable nausea and vomiting    History of Present Illness:    Matthew Chambers is a 46 y o  female who presents with several days of worsening nausea and vomiting  Patient has previous history of the same, also reported some mild abdominal pain to the emergency room staff, she specifically denies any abdominal pain to me at time of my exam     Patient with flat affect, still states that she feels nauseous, had upper respiratory tract infection several days ago was seen by Pulmonary Medicine, since that time has developed worsening GI symptoms, patient had cholecystectomy performed last month  Patient was hospitalized in November for similar presentation as this 1  Patient seems to be a poor historian, most of my history is obtained by chart review  Review of Systems:    Review of Systems   All other systems reviewed and are negative        Past Medical and Surgical History:     Past Medical History:   Diagnosis Date    Asthma     Chronic pain     Diabetes mellitus ([address] )     Gastroparesis        Past Surgical History:   Procedure Laterality Date    CERVICAL LAMINECTOMY      CHOLECYSTECTOMY      CHOLECYSTECTOMY LAPAROSCOPIC N/A 11/10/2018    Procedure: CHOLECYSTECTOMY LAPAROSCOPIC w/ ioc;  Surgeon: Maximiliano Amador MD;  Location: [facility];  Service: General    HYSTERECTOMY      JOINT REPLACEMENT Bilateral     knee    TOE AMPUTATION Right 2/2/2018 Procedure: AMPUTATION TOE, RIGHT GREAT TOE;  Surgeon: Shaye Matt DPM;  Location: [facility];  Service: Podiatry       Meds/Allergies:    Prior to Admission medications    Medication Sig Start Date End Date Taking?  Authorizing Provider   acetaminophen (TYLENOL) 325 mg tablet Take 2 tablets (650 mg total) by mouth every 6 (six) hours as needed for fever 11/14/18   Ashlee Rolon MD   albuterol (ACCUNEB) 1 25 MG/3ML nebulizer solution Take 1 ampule by nebulization every 6 (six) hours as needed for wheezing    Historical Provider, MD   albuterol (PROVENTIL HFA,VENTOLIN HFA) 90 mcg/act inhaler Inhale 2 puffs every 6 (six) hours as needed for wheezing    Historical Provider, MD   carisoprodol (SOMA) 350 mg tablet Take 350 mg by mouth 2 (two) times a day    Historical Provider, MD   diazepam (VALIUM) 5 mg tablet Take 5 mg by mouth daily at bedtime as needed for anxiety    Historical Provider, MD   dicyclomine (BENTYL) 10 mg capsule Take 1 capsule (10 mg total) by mouth 4 (four) times a day (before meals and at bedtime) 10/1/18   Malina Andrade MD   fluticasone (FLONASE) 50 mcg/act nasal spray 1 spray into each nostril daily    Historical Provider, MD   glimepiride (AMARYL) 4 mg tablet Take 4 mg by mouth 2 (two) times a day    Historical Provider, MD   insulin glargine (LANTUS) 100 units/mL subcutaneous injection Inject 50 Units under the skin daily at bedtime      Historical Provider, MD   metoclopramide (REGLAN) 10 mg tablet TAKE 1 TABLET BY MOUTH 4 TIMES A DAY (BEFORE MEALS AND AT BEDTIME) 9/18/18   VIVI Cho   montelukast (SINGULAIR) 10 mg tablet Take 10 mg by mouth daily at bedtime    Historical Provider, MD   morphine (MS CONTIN) 60 mg 12 hr tablet Take 75 mg by mouth 2 (two) times a day      Historical Provider, MD   morphine (MSIR) 15 mg tablet Take 15 mg by mouth 3 (three) times a day as needed for moderate pain or severe pain      Historical Provider, MD   nystatin (MYCOSTATIN) powder Apply topically 2 (two) times a day 11/14/18   Ted Shah MD   omeprazole (PriLOSEC) 20 mg delayed release capsule Take 20 mg by mouth daily    Historical Provider, MD   ondansetron (ZOFRAN) 4 mg tablet Take 1 tablet (4 mg total) by mouth every 8 (eight) hours as needed for nausea or vomiting 9/18/18   VIVI Meek   polyethylene glycol (MIRALAX) 17 g packet Take 17 g by mouth daily 11/14/18   Ted Shah MD   predniSONE 10 mg tablet Take by mouth daily    Historical Provider, MD     I have reviewed home medications with patient personally  Allergies: Allergies   Allergen Reactions    Nsaids GI Intolerance       Social History:     Marital Status: /Civil Union   Substance Use History:   History   Alcohol Use    Yes     Comment: rarely     History   Smoking Status    Never Smoker   Smokeless Tobacco    Never Used     History   Drug Use No       Family History:    non-contributory    Physical Exam:     Vitals:   Blood Pressure: 170/87 (12/21/18 0100)  Pulse: (!) 120 (12/21/18 0100)  Temperature: 98 2 °F (36 8 °C) (12/20/18 1913)  Temp Source: Oral (12/20/18 1913)  Respirations: 18 (12/21/18 0100)  Height: 5' 8" (172 7 cm) (12/20/18 1913)  Weight - Scale: 132 kg (290 lb 5 5 oz) (12/20/18 1913)  SpO2: 97 % (12/21/18 0100)    Physical Exam   Constitutional: She is oriented to person, place, and time  Chronically ill-appearing female, she appears older than stated age   HENT:   Head: Normocephalic and atraumatic  Dry mucous membranes   Cardiovascular: Normal heart sounds and intact distal pulses  Increased rate, no murmurs rubs or gallops   Pulmonary/Chest: Effort normal and breath sounds normal  No respiratory distress  She has no wheezes  Abdominal: Soft  Bowel sounds are normal  She exhibits no distension  There is no tenderness  Musculoskeletal: Normal range of motion  She exhibits no edema, tenderness or deformity     Neurological: She is alert and oriented to person, place, and time  No cranial nerve deficit  Coordination normal    Skin: Skin is warm and dry  No rash noted  No erythema  There is pallor  Psychiatric:   Flat affect   Nursing note and vitals reviewed  Additional Data:     Lab Results: I have personally reviewed pertinent reports  Results from last 7 days  Lab Units 12/20/18 1951   WBC Thousand/uL 11 67*   HEMOGLOBIN g/dL 13 6   HEMATOCRIT % 41 7   PLATELETS Thousands/uL 254   NEUTROS PCT % 83*   LYMPHS PCT % 11*   MONOS PCT % 4   EOS PCT % 1       Results from last 7 days  Lab Units 12/20/18 1951   POTASSIUM mmol/L 4 1   CHLORIDE mmol/L 96*   CO2 mmol/L 26   BUN mg/dL 12   CREATININE mg/dL 0 79   CALCIUM mg/dL 9 3   ALK PHOS U/L 141*   ALT U/L 35   AST U/L 23           Imaging: I have personally reviewed pertinent reports  Ct Abdomen Pelvis With Contrast    Result Date: 12/20/2018  Narrative: CT ABDOMEN AND PELVIS WITH IV CONTRAST INDICATION:   Right lower quadrant abdominal pain, nausea and vomiting  COMPARISON:  CT of the abdomen and pelvis on November 8, 2018  TECHNIQUE:  CT examination of the abdomen and pelvis was performed  Axial, sagittal, and coronal 2D reformatted images were created from the source data and submitted for interpretation  Radiation dose length product (DLP) for this visit:  1501 mGy-cm   This examination, like all CT scans performed in the [place], was performed utilizing techniques to minimize radiation dose exposure, including the use of iterative reconstruction and automated exposure control  IV Contrast:  100 mL of iohexol (OMNIPAQUE)  was administered intravenously without immediate adverse reaction  Enteric Contrast:  Enteric contrast was not administered  FINDINGS: ABDOMEN LOWER CHEST:  Mitral annular calcification  LIVER/BILIARY TREE:  Unremarkable  GALLBLADDER:  Gallbladder is surgically absent  SPLEEN:  Unremarkable  PANCREAS:  Unremarkable  ADRENAL GLANDS:  Unremarkable  KIDNEYS/URETERS:  Unremarkable  No hydronephrosis  STOMACH AND BOWEL:  No bowel obstruction  APPENDIX:  A normal appendix was visualized  ABDOMINOPELVIC CAVITY:  No ascites or free intraperitoneal air  No lymphadenopathy  VESSELS:  Unremarkable for patient's age  PELVIS REPRODUCTIVE ORGANS:  Patient is status post hysterectomy  URINARY BLADDER:  Collapsed and poorly evaluated  ABDOMINAL WALL/INGUINAL REGIONS:  Unremarkable  OSSEOUS STRUCTURES:  No acute fracture or destructive osseous lesion  Left anterolateral plate and screw fusion of L4-L5  Impression: No acute inflammatory process identified within the abdomen or pelvis  Workstation performed: [id number]         [employer] / [employer] Records Reviewed: Yes     ** Please Note: This note has been constructed using a voice recognition system   **

## 2018-12-21 NOTE — ASSESSMENT & PLAN NOTE
· Chronic pain on chronic opiate therapy  · Pt should follow up with pain management as an outpatient  · PDMP evaluated, pt receives medications from neurologist in [place], pt is on morphine ER 60 mg/15 mg (75 mg total) BID and morphine IR 15 mg TID PRN  · Would continue ER morphine 30 mg BID and PRN morphine IR 15 mg TID as patient's pain appears to be well controlled currently, monitor for any signs of withdrawal  If pain worsens can titrate up

## 2018-12-21 NOTE — ASSESSMENT & PLAN NOTE
· Likely hypovolemic hyponatremia, patient with very poor p o   Intake for approximately 2 days prior to presentation   · Could be component of chronic hyponatremia as well per review of records  · Continue IVF hydration and monitor BMP closely  · Sodium 133 today

## 2018-12-21 NOTE — ED PROVIDER NOTES
History  Chief Complaint   Patient presents with    Nausea     Patient presents with nausea and vomiting for the last few days  Denies any fevers or diarrhea  States she has mild abdominal pain as well   Abdominal Pain     This is a 78-year-old female patient presents to the ER for evaluation nausea and vomiting  Started about 2 days ago  Pain is right lower quadrant  Acute rates epigastric region  Seems to worsen with food  No lightheadedness or dizziness  She feels dehydrated  She was unable to eat today, only had a few sips of water  Normal urination normal bowel movements  Vomitus is described as stomach contents only  Nonbloody non bilious  She was admitted in November for similar symptoms and it was thought to be due to gallbladder and so she cholecystectomy  She also has gastroparesis and states it feels similar to previous acute flares  No chest pain or shortness of breath  No fevers or chills  No new or unusual foods  No recent antibiotics          History provided by:  Patient   used: No    Abdominal Pain   Pain location:  RLQ  Pain quality: aching    Pain radiates to:  Does not radiate  Pain severity:  Severe  Onset quality:  Gradual  Duration:  2 days  Timing:  Intermittent  Progression:  Waxing and waning  Chronicity:  New  Context: previous surgery    Context: not alcohol use, not awakening from sleep, not diet changes, not eating, not laxative use, not medication withdrawal, not recent illness, not recent sexual activity, not recent travel, not retching, not sick contacts, not suspicious food intake and not trauma    Relieved by:  Nothing  Worsened by:  Nothing  Ineffective treatments:  None tried  Associated symptoms: nausea and vomiting    Associated symptoms: no anorexia, no belching, no chest pain, no chills, no constipation, no cough, no diarrhea, no dysuria, no fatigue, no fever, no flatus, no hematemesis, no hematochezia, no hematuria, no melena, no shortness of breath, no sore throat, no vaginal bleeding and no vaginal discharge    Risk factors: no alcohol abuse, no aspirin use, not elderly, has not had multiple surgeries, no NSAID use, not obese, not pregnant and no recent hospitalization        Prior to Admission Medications   Prescriptions Last Dose Informant Patient Reported?  Taking?   acetaminophen (TYLENOL) 325 mg tablet   No No   Sig: Take 2 tablets (650 mg total) by mouth every 6 (six) hours as needed for fever   albuterol (ACCUNEB) 1 25 MG/3ML nebulizer solution  Self Yes No   Sig: Take 1 ampule by nebulization every 6 (six) hours as needed for wheezing   albuterol (PROVENTIL HFA,VENTOLIN HFA) 90 mcg/act inhaler  Self Yes No   Sig: Inhale 2 puffs every 6 (six) hours as needed for wheezing   carisoprodol (SOMA) 350 mg tablet  Self Yes No   Sig: Take 350 mg by mouth 2 (two) times a day   diazepam (VALIUM) 5 mg tablet  Self Yes No   Sig: Take 5 mg by mouth daily at bedtime as needed for anxiety   dicyclomine (BENTYL) 10 mg capsule  Self No No   Sig: Take 1 capsule (10 mg total) by mouth 4 (four) times a day (before meals and at bedtime)   fluticasone (FLONASE) 50 mcg/act nasal spray  Self Yes No   Si spray into each nostril daily   glimepiride (AMARYL) 4 mg tablet  Self Yes No   Sig: Take 4 mg by mouth 2 (two) times a day   insulin glargine (LANTUS) 100 units/mL subcutaneous injection  Self Yes No   Sig: Inject 50 Units under the skin daily at bedtime     metoclopramide (REGLAN) 10 mg tablet  Self No No   Sig: TAKE 1 TABLET BY MOUTH 4 TIMES A DAY (BEFORE MEALS AND AT BEDTIME)   montelukast (SINGULAIR) 10 mg tablet  Self Yes No   Sig: Take 10 mg by mouth daily at bedtime   morphine (MS CONTIN) 60 mg 12 hr tablet  Self Yes No   Sig: Take 75 mg by mouth 2 (two) times a day     morphine (MSIR) 15 mg tablet  Outside Facility (Specify) Yes No   Sig: Take 15 mg by mouth 3 (three) times a day as needed for moderate pain or severe pain     nystatin (MYCOSTATIN) powder   No No   Sig: Apply topically 2 (two) times a day   omeprazole (PriLOSEC) 20 mg delayed release capsule  Self Yes No   Sig: Take 20 mg by mouth daily   ondansetron (ZOFRAN) 4 mg tablet  Self No No   Sig: Take 1 tablet (4 mg total) by mouth every 8 (eight) hours as needed for nausea or vomiting      Facility-Administered Medications: None       Past Medical History:   Diagnosis Date    Asthma     Chronic pain     Diabetes mellitus ([facility] [address] )     Gastroparesis        Past Surgical History:   Procedure Laterality Date    CERVICAL LAMINECTOMY      CHOLECYSTECTOMY      CHOLECYSTECTOMY LAPAROSCOPIC N/A 11/10/2018    Procedure: CHOLECYSTECTOMY LAPAROSCOPIC w/ ioc;  Surgeon: Katlyn Costa MD;  Location: [facility];  Service: General    HYSTERECTOMY      JOINT REPLACEMENT Bilateral     knee    TOE AMPUTATION Right 2/2/2018    Procedure: AMPUTATION TOE, RIGHT GREAT TOE;  Surgeon: Reba Thomas DPM;  Location: [facility];  Service: Podiatry       Family History   Problem Relation Age of Onset    Diabetes Mother     Diabetes Maternal Grandmother      I have reviewed and agree with the history as documented  Social History   Substance Use Topics    Smoking status: Never Smoker    Smokeless tobacco: Never Used    Alcohol use Yes      Comment: rarely        Review of Systems   Constitutional: Negative for activity change, appetite change, chills, diaphoresis, fatigue, fever and unexpected weight change  HENT: Negative for congestion, rhinorrhea, sinus pressure, sore throat and trouble swallowing  Eyes: Negative for photophobia and visual disturbance  Respiratory: Negative for apnea, cough, choking, chest tightness, shortness of breath, wheezing and stridor  Cardiovascular: Negative for chest pain, palpitations and leg swelling  Gastrointestinal: Positive for abdominal pain, nausea and vomiting   Negative for abdominal distention, anorexia, blood in stool, constipation, diarrhea, flatus, hematemesis, hematochezia and melena  Genitourinary: Negative for decreased urine volume, difficulty urinating, dysuria, enuresis, flank pain, frequency, hematuria, urgency, vaginal bleeding and vaginal discharge  Musculoskeletal: Negative for arthralgias, myalgias, neck pain and neck stiffness  Skin: Negative for color change, pallor, rash and wound  Allergic/Immunologic: Negative  Neurological: Negative for dizziness, tremors, syncope, weakness, light-headedness, numbness and headaches  Hematological: Negative  Psychiatric/Behavioral: Negative  All other systems reviewed and are negative  Physical Exam  Physical Exam   Constitutional: She is oriented to person, place, and time  She appears well-developed and well-nourished  Non-toxic appearance  She does not have a sickly appearance  She does not appear ill  No distress  HENT:   Head: Normocephalic and atraumatic  Dry mucus membranes   Eyes: Pupils are equal, round, and reactive to light  EOM and lids are normal    Neck: Normal range of motion  Neck supple  Cardiovascular: Normal rate, regular rhythm, S1 normal, S2 normal, normal heart sounds, intact distal pulses and normal pulses  Exam reveals no gallop, no distant heart sounds, no friction rub and no decreased pulses  No murmur heard  Pulses:       Radial pulses are 2+ on the right side, and 2+ on the left side  Pulmonary/Chest: Effort normal and breath sounds normal  No accessory muscle usage  No apnea, no tachypnea and no bradypnea  No respiratory distress  She has no decreased breath sounds  She has no wheezes  She has no rhonchi  She has no rales  Abdominal: Soft  Normal appearance  She exhibits no distension  There is tenderness in the right lower quadrant  There is no rigidity, no rebound and no guarding  Musculoskeletal: Normal range of motion  She exhibits no edema, tenderness or deformity     Neurological: She is alert and oriented to person, place, and time  No cranial nerve deficit  GCS eye subscore is 4  GCS verbal subscore is 5  GCS motor subscore is 6  GCS 15  AAOx3  Ambulating in department without difficulty  CN II-XII grossly intact  No focal neuro deficits  Skin: Skin is warm, dry and intact  No rash noted  She is not diaphoretic  No erythema  No pallor  Psychiatric: Her speech is normal    Nursing note and vitals reviewed        Vital Signs  ED Triage Vitals [12/20/18 1913]   Temperature Pulse Respirations Blood Pressure SpO2   98 2 °F (36 8 °C) (!) 123 22 150/77 94 %      Temp Source Heart Rate Source Patient Position - Orthostatic VS BP Location FiO2 (%)   Oral Monitor Sitting Right arm --      Pain Score       8           Vitals:    12/23/18 0705 12/23/18 1536 12/23/18 2300 12/24/18 0713   BP: 163/92 160/95 122/63 105/57   Pulse: (!) 111 (!) 107 92 96   Patient Position - Orthostatic VS: Lying Lying  Lying       Visual Acuity      ED Medications  Medications   montelukast (SINGULAIR) tablet 10 mg (10 mg Oral Given 12/23/18 2105)   morphine (MSIR) IR tablet 15 mg (15 mg Oral Given 12/21/18 2026)   sodium chloride 0 9 % infusion (75 mL/hr Intravenous Rate/Dose Change 12/23/18 1713)   enoxaparin (LOVENOX) subcutaneous injection 40 mg (40 mg Subcutaneous Given 12/24/18 0925)   metoclopramide (REGLAN) injection 10 mg (10 mg Intravenous Given 12/24/18 1215)   pantoprazole (PROTONIX) injection 40 mg (40 mg Intravenous Given 12/24/18 0926)   ondansetron (ZOFRAN) 8 mg in sodium chloride 0 9 % 50 mL IVPB (8 mg Intravenous New Bag 12/23/18 0427)   metoprolol (LOPRESSOR) injection 5 mg (not administered)   insulin lispro (HumaLOG) 100 units/mL subcutaneous injection 1-6 Units (1 Units Subcutaneous Given 12/23/18 2108)   insulin lispro (HumaLOG) 100 units/mL subcutaneous injection 1-6 Units (2 Units Subcutaneous Given 12/24/18 1220)   insulin glargine (LANTUS) subcutaneous injection 50 Units 0 5 mL (50 Units Subcutaneous Given 12/23/18 2104)   lisinopril (ZESTRIL) tablet 10 mg (0 mg Oral Hold 12/24/18 0925)   morphine (MS CONTIN) ER tablet 60 mg (60 mg Oral Given 12/24/18 0924)   melatonin tablet 9 mg (9 mg Oral Given 12/23/18 2105)   ondansetron (ZOFRAN-ODT) dispersible tablet 4 mg (4 mg Oral Given 12/24/18 1215)   polyethylene glycol (MIRALAX) packet 17 g (17 g Oral Given 12/24/18 0925)   potassium chloride (K-DUR,KLOR-CON) CR tablet 20 mEq (not administered)   metoclopramide (REGLAN) injection 10 mg (10 mg Intravenous Given 12/20/18 1951)   sodium chloride 0 9 % bolus 2,000 mL (0 mL Intravenous Stopped 12/20/18 2151)   iohexol (OMNIPAQUE) 350 MG/ML injection (MULTI-DOSE) 100 mL (100 mL Intravenous Given 12/20/18 2111)   ondansetron (ZOFRAN) injection 4 mg (4 mg Intravenous Given 12/20/18 2222)   haloperidol lactate (HALDOL) injection 5 mg (5 mg Intramuscular Given 12/20/18 2312)   potassium chloride 20 mEq IVPB (premix) (20 mEq Intravenous New Bag 12/23/18 1313)       Diagnostic Studies  Results Reviewed     Procedure Component Value Units Date/Time    Bilirubin, direct [[id number]]  (Abnormal) Collected:  12/21/18 0508    Lab Status:  Final result Specimen:  Blood from Arm, Right Updated:  12/21/18 1444     Bilirubin, Direct 0 26 (H) mg/dL     Hemoglobin A1c w/EAG Estimation (Orders if not completed within the last 90 days) [[id number]]  (Abnormal) Collected:  12/21/18 0508    Lab Status:  Final result Specimen:  Blood from Arm, Right Updated:  12/21/18 1017     Hemoglobin A1C 9 7 (H) %       mg/dl     TSH, 3rd generation [[id number]]  (Normal) Collected:  12/21/18 0508    Lab Status:  Final result Specimen:  Blood from Arm, Right Updated:  12/21/18 0543     TSH 3RD GENERATON 0 717 uIU/mL     Narrative:         Patients undergoing fluorescein dye angiography may retain small amounts of fluorescein in the body for 48-72 hours post procedure  Samples containing fluorescein can produce falsely depressed TSH values   If the patient had this procedure,a specimen should be resubmitted post fluorescein clearance  The recommended reference ranges for TSH during pregnancy are as follows:  First trimester 0 1 to 2 5 uIU/mL  Second trimester  0 2 to 3 0 uIU/mL  Third trimester 0 3 to 3 0 uIU/m      Comprehensive metabolic panel [[id number]]  (Abnormal) Collected:  12/21/18 0508    Lab Status:  Final result Specimen:  Blood from Arm, Right Updated:  12/21/18 0543     Sodium 133 (L) mmol/L      Potassium 3 9 mmol/L      Chloride 97 (L) mmol/L      CO2 23 mmol/L      ANION GAP 13 mmol/L      BUN 9 mg/dL      Creatinine 0 66 mg/dL      Glucose 272 (H) mg/dL      Calcium 9 2 mg/dL      AST 19 U/L      ALT 32 U/L      Alkaline Phosphatase 140 (H) U/L      Total Protein 8 3 (H) g/dL      Albumin 3 7 g/dL      Total Bilirubin 1 80 (H) mg/dL      eGFR 100 ml/min/1 73sq m     Narrative:         National Kidney Disease Education Program recommendations are as follows:  GFR calculation is accurate only with a steady state creatinine  Chronic Kidney disease less than 60 ml/min/1 73 sq  meters  Kidney failure less than 15 ml/min/1 73 sq  meters      Magnesium [[id number]]  (Normal) Collected:  12/21/18 0508    Lab Status:  Final result Specimen:  Blood from Arm, Right Updated:  12/21/18 0542     Magnesium 1 6 mg/dL     Phosphorus [[id number]]  (Normal) Collected:  12/21/18 0508    Lab Status:  Final result Specimen:  Blood from Arm, Right Updated:  12/21/18 0542     Phosphorus 2 8 mg/dL     Protime-INR [[id number]]  (Normal) Collected:  12/21/18 0508    Lab Status:  Final result Specimen:  Blood from Arm, Right Updated:  12/21/18 0524     Protime 13 7 seconds      INR 1 06    CBC and differential [[id number]]  (Abnormal) Collected:  12/21/18 0508    Lab Status:  Final result Specimen:  Blood from Arm, Right Updated:  12/21/18 0520     WBC 14 03 (H) Thousand/uL      RBC 5 20 (H) Million/uL      Hemoglobin 13 9 g/dL      Hematocrit 42 1 %      MCV 81 (L) fL      MCH 26 7 (L) pg      MCHC 33 0 g/dL      RDW 13 2 %      MPV 12 6 fL      Platelets 800 Thousands/uL      nRBC 0 /100 WBCs      Neutrophils Relative 79 (H) %      Immat GRANS % 0 %      Lymphocytes Relative 15 %      Monocytes Relative 5 %      Eosinophils Relative 0 %      Basophils Relative 1 %      Neutrophils Absolute 11 03 (H) Thousands/µL      Immature Grans Absolute 0 06 Thousand/uL      Lymphocytes Absolute 2 15 Thousands/µL      Monocytes Absolute 0 68 Thousand/µL      Eosinophils Absolute 0 03 Thousand/µL      Basophils Absolute 0 08 Thousands/µL     Urine Microscopic [[id number]]  (Abnormal) Collected:  12/21/18 0005    Lab Status:  Final result Specimen:  Urine Updated:  12/21/18 0010     RBC, UA 4-10 (A) /hpf      WBC, UA None Seen /hpf      Epithelial Cells Occasional /hpf      Bacteria, UA Occasional /hpf     UA w Reflex to Microscopic w Reflex to Culture [[id number]]  (Abnormal) Collected:  12/21/18 0005    Lab Status:  Final result Specimen:  Urine Updated:  12/21/18 0009     Color, UA Yellow     Clarity, UA Slightly Cloudy     Specific [place], UA 1 020     pH, UA 6 0     Leukocytes, UA Negative     Nitrite, UA Negative     Protein, UA >=300 (A) mg/dl      Glucose,  (1/2%) (A) mg/dl      Ketones, UA >=80 (3+) (A) mg/dl      Urobilinogen, UA 0 2 E U /dl      Bilirubin, UA Negative     Blood, UA Moderate (A)    Lactic acid, plasma [[id number]]  (Normal) Collected:  12/20/18 1951    Lab Status:  Final result Specimen:  Blood from Arm, Right Updated:  12/20/18 2019     LACTIC ACID 1 0 mmol/L     Narrative:         Result may be elevated if tourniquet was used during collection      Basic metabolic panel [[id number]]  (Abnormal) Collected:  12/20/18 1951    Lab Status:  Final result Specimen:  Blood from Arm, Right Updated:  12/20/18 2016     Sodium 133 (L) mmol/L      Potassium 4 1 mmol/L      Chloride 96 (L) mmol/L      CO2 26 mmol/L      ANION GAP 11 mmol/L      BUN 12 mg/dL      Creatinine 0 79 mg/dL      Glucose 313 (H) mg/dL Calcium 9 3 mg/dL      eGFR 85 ml/min/1 73sq m     Narrative:         National Kidney Disease Education Program recommendations are as follows:  GFR calculation is accurate only with a steady state creatinine  Chronic Kidney disease less than 60 ml/min/1 73 sq  meters  Kidney failure less than 15 ml/min/1 73 sq  meters  Hepatic function panel [[id number]]  (Abnormal) Collected:  12/20/18 1951    Lab Status:  Final result Specimen:  Blood from Arm, Right Updated:  12/20/18 2016     Total Bilirubin 1 40 (H) mg/dL      Bilirubin, Direct 0 22 (H) mg/dL      Alkaline Phosphatase 141 (H) U/L      AST 23 U/L      ALT 35 U/L      Total Protein 8 2 g/dL      Albumin 3 6 g/dL     Lipase [[id number]]  (Abnormal) Collected:  12/20/18 1951    Lab Status:  Final result Specimen:  Blood from Arm, Right Updated:  12/20/18 2016     Lipase 58 (L) u/L     CBC and differential [[id number]]  (Abnormal) Collected:  12/20/18 1951    Lab Status:  Final result Specimen:  Blood from Arm, Right Updated:  12/20/18 2001     WBC 11 67 (H) Thousand/uL      RBC 5 15 (H) Million/uL      Hemoglobin 13 6 g/dL      Hematocrit 41 7 %      MCV 81 (L) fL      MCH 26 4 (L) pg      MCHC 32 6 g/dL      RDW 13 3 %      MPV 12 8 (H) fL      Platelets 727 Thousands/uL      nRBC 0 /100 WBCs      Neutrophils Relative 83 (H) %      Immat GRANS % 0 %      Lymphocytes Relative 11 (L) %      Monocytes Relative 4 %      Eosinophils Relative 1 %      Basophils Relative 1 %      Neutrophils Absolute 9 76 (H) Thousands/µL      Immature Grans Absolute 0 05 Thousand/uL      Lymphocytes Absolute 1 24 Thousands/µL      Monocytes Absolute 0 50 Thousand/µL      Eosinophils Absolute 0 06 Thousand/µL      Basophils Absolute 0 06 Thousands/µL                  US right upper quadrant   Final Result by Caty Bueno MD (12/21 1347)      Normal study status post cholecystectomy                  Workstation performed: [id number]         CT abdomen pelvis with contrast   Final Result by Corona Washington MD (12/20 2131)      No acute inflammatory process identified within the abdomen or pelvis  Workstation performed: [id number]                    Procedures  Procedures       Phone Contacts  ED Phone Contact    ED Course  ED Course as of Dec 24 1352   Thu Dec 20, 2018   2157 No improvement of nausea, will add zofran which she states has worked for her  No vomiting since arrival, only nausea    2303 Continues to feel nauseous, no vomiting      2352 Pt feeling better  Nausea improved, still tachycardic however she is feeling less nauseous  She however has kept her arm bent and therefore not gotten any IV fluids  Will hydrate and reassess  Fri Dec 21, 2018   0036 Pt deciding if she would like to be admitted  MDM  Number of Diagnoses or Management Options  Dehydration: new and requires workup  Intractable nausea and vomiting: new and requires workup  Diagnosis management comments: DDx including but not limited to: appendicitis, gastroenteritis, gastritis, PUD, GERD, gastroparesis, hepatitis, pancreatitis, colitis, enteritis, diverticulitis, food poisoning, mesenteric adenitis, mesenteric ischemia, IBD, IBS, ileus, bowel obstruction, volvulus, internal hernia, splenic etiology, constipation, pelvic pathology, renal colic, pyelonephritis, UTI; doubt cardiac etiology  Amount and/or Complexity of Data Reviewed  Clinical lab tests: ordered and reviewed  Tests in the radiology section of CPT®: ordered and reviewed  Independent visualization of images, tracings, or specimens: yes    Risk of Complications, Morbidity, and/or Mortality  Presenting problems: moderate  Management options: low  General comments: 45 y/o female with n/v  Intractable  Multiple meds given and she seemed to have good relief of symptoms with haldol, however became symptomatic again after only a short period of time  Her labs are all consistent with dehydration   She has been unable to tolerate PO  Unclear etiology of symptoms  Could be due to gastroparesis  Could be viral syndrome  CT without acute pathology  Discussed with patient and she agrees for admission at this time  Discussed with internal medicine who agrees with plan for admit  Patient Progress  Patient progress: stable    CritCare Time    Disposition  Final diagnoses:   Intractable nausea and vomiting   Dehydration     Time reflects when diagnosis was documented in both MDM as applicable and the Disposition within this note     Time User Action Codes Description Comment    12/21/2018  1:11 AM [place] Heredia L Add [R11 2] Intractable nausea and vomiting     12/21/2018  1:11 AM [place] Heredia L Add [E86 0] Dehydration     12/21/2018  4:22 AM [place] Lovelace Add [K31 84] Gastroparesis     12/21/2018  4:22 AM [place] Lovelace Modify [K31 84] Gastroparesis       ED Disposition     ED Disposition Condition Comment    Admit  Case was discussed with Dr Sienna Velásquez and the patient's admission status was agreed to be Admission Status: inpatient status to the service of Dr Sienna Velásquez          Follow-up Information     Follow up With Specialties Details Why Contact Info    Mario Alberto Hinton  Schedule an appointment as soon as possible for a visit in 1 week(s) Follow up with [facility] [address] [address]      Sepideh Mariano MD Gastroenterology Schedule an appointment as soon as possible for a visit As needed [address]  [facility] [address] [facility]  [phone]            Current Discharge Medication List      CONTINUE these medications which have NOT CHANGED    Details   acetaminophen (TYLENOL) 325 mg tablet Take 2 tablets (650 mg total) by mouth every 6 (six) hours as needed for fever  Qty: 30 tablet, Refills: 0    Associated Diagnoses: Intractable nausea and vomiting      albuterol (ACCUNEB) 1 25 MG/3ML nebulizer solution Take 1 ampule by nebulization every 6 (six) hours as needed for wheezing      albuterol (PROVENTIL HFA,VENTOLIN HFA) 90 mcg/act inhaler Inhale 2 puffs every 6 (six) hours as needed for wheezing      carisoprodol (SOMA) 350 mg tablet Take 350 mg by mouth 2 (two) times a day      diazepam (VALIUM) 5 mg tablet Take 5 mg by mouth daily at bedtime as needed for anxiety      dicyclomine (BENTYL) 10 mg capsule Take 1 capsule (10 mg total) by mouth 4 (four) times a day (before meals and at bedtime)  Qty: 120 capsule, Refills: 3    Associated Diagnoses: Gastroparesis; Intractable vomiting with nausea, unspecified vomiting type; Gall stones      fluticasone (FLONASE) 50 mcg/act nasal spray 1 spray into each nostril daily      glimepiride (AMARYL) 4 mg tablet Take 4 mg by mouth 2 (two) times a day      insulin glargine (LANTUS) 100 units/mL subcutaneous injection Inject 50 Units under the skin daily at bedtime        metoclopramide (REGLAN) 10 mg tablet TAKE 1 TABLET BY MOUTH 4 TIMES A DAY (BEFORE MEALS AND AT BEDTIME)  Qty: 120 tablet, Refills: 2    Associated Diagnoses: Gastroparesis      montelukast (SINGULAIR) 10 mg tablet Take 10 mg by mouth daily at bedtime      morphine (MS CONTIN) 60 mg 12 hr tablet Take 75 mg by mouth 2 (two) times a day        morphine (MSIR) 15 mg tablet Take 15 mg by mouth 3 (three) times a day as needed for moderate pain or severe pain        nystatin (MYCOSTATIN) powder Apply topically 2 (two) times a day  Qty: 15 g, Refills: 0    Associated Diagnoses: Intractable nausea and vomiting      omeprazole (PriLOSEC) 20 mg delayed release capsule Take 20 mg by mouth daily      ondansetron (ZOFRAN) 4 mg tablet Take 1 tablet (4 mg total) by mouth every 8 (eight) hours as needed for nausea or vomiting  Qty: 30 tablet, Refills: 2    Associated Diagnoses: Gastroparesis; Intractable vomiting with nausea, unspecified vomiting type; Gall stones           No discharge procedures on file      ED Provider  Electronically Signed by           Tasia Jovel PA-C  12/24/18 2328

## 2018-12-21 NOTE — ASSESSMENT & PLAN NOTE
Lab Results   Component Value Date    HGBA1C 9 7 (H) 12/21/2018       Recent Labs      12/21/18   0755  12/21/18   1203   POCGLU  276*  246*       Blood Sugar Average: Last 72 hrs:  (P) 261   · Uncontrolled at baseline  · Continue Lantus at 30 units for now due to clear liquid diet (takes 50 units at home)  · Continue SSI coverage with meals  · QID glucose checks

## 2018-12-21 NOTE — PROGRESS NOTES
POST ADMISSION CHECK     Mendy Gupta 1965, 46 y o  female MRN: [id number]    Unit/Bed#: -01 Encounter: [id number]    Primary Care Provider: Elvis Ag   Date and time admitted to hospital: 12/20/2018  6:36 PM       DOS: 12/21/2018      * Gastroparesis   Assessment & Plan    · Likely cause of N/V  · Continue Reglan 10 mg Q6H, Protonix 40 mg daily  · Pt on chronic opioid medications, could be exacerbating symptoms   · GI following,  · RUQ US ordered - Normal post-cholecystectomy imaging  · Blood cultures obtained, concerned for possible underlying infection due to patient's tachycardia and leukocytosis  · Consider treatment with empiric antibiotics if no improvement with IVF hydration, discuss with GI     Leukocytosis   Assessment & Plan    · Appears chronic per review of records  · UA negative, CT abdomen/pelvis without acute findings, pt denies any respiratory symptoms  · Blood cultures taken  · Hold off on any antibiotic treatment for now pending additional results  · Pt does not have any fevers currently  · Low threshold to start antibiotic treatment however   · Monitor CBC daily     Tachycardia   Assessment & Plan    · POA, HR in the 120s on recent view of vital signs  · Recheck vitals  · Continue IVF hydration  · Pt asymptomatic   · Telemetry monitoring   · R/o infectious etiology as above     Morbid obesity ([facility] [address] )   Assessment & Plan    · BMI 44 15  · Encourage lifestyle modification and weight loss     Essential hypertension   Assessment & Plan    · BP continues to be elevated, denies any previous history of hypertension  · Would place on low dose lisinopril   · PRN lopressor for SBP >170   · Continue to monitor closely     Intractable nausea and vomiting   Assessment & Plan    · Improving, likely secondary to gastoparesis  · GI following, appreciate additional recommendations  · Advanced to clear liquids today per GI, monitor  · Last episode of vomiting was yesterday      Opioid dependence ([address] ) Assessment & Plan    · Chronic pain on chronic opiate therapy  · Pt should follow up with pain management as an outpatient  · PDMP evaluated, pt receives medications from neurologist in [place], pt is on morphine ER 60 mg/15 mg (75 mg total) BID and morphine IR 15 mg TID PRN  · Would continue ER morphine 30 mg BID and PRN morphine IR 15 mg TID as patient's pain appears to be well controlled currently, monitor for any signs of withdrawal  If pain worsens can titrate up     Hyponatremia   Assessment & Plan    · Likely hypovolemic hyponatremia, patient with very poor p o  Intake for approximately 2 days prior to presentation   · Could be component of chronic hyponatremia as well and pseudohyponatremia related to hyperglycemia   · Continue IVF hydration and monitor BMP closely  · Sodium 133 today     Diabetes mellitus ([facility] [address] )   Assessment & Plan    Lab Results   Component Value Date    HGBA1C 9 7 (H) 12/21/2018       Recent Labs      12/21/18   0755  12/21/18   1203   POCGLU  276*  246*       Blood Sugar Average: Last 72 hrs:  (P) 261   · Uncontrolled at baseline  · Continue Lantus at 30 units for now due to clear liquid diet (takes 50 units at home)  · Continue SSI coverage with meals  · QID glucose checks       Subjective:  Pt reports that she does not currently have abdominal pain  Reports that her last episode of vomiting was yesterday  Currently denies lightheadedness, dizziness, chest pain, shortness of breath, vomiting, diarrhea, constipation or urinary difficulties  Does report some nausea  Objective:  Pt is in no acute distress lying in her hospital bed resting comfortably  Appears chronically ill  Flat affect, monotoned voice  HEENT: Head is normocephalic, atraumatic  Conjunctiva WNL, no scleral icterus  Lungs: CTA bilaterally, no wheezes, rales or rhonchi  Cardio: Tachycardic, regular rhythm  No murmurs heard  Abdomen: Benign, normoactive bowel sounds in all 4 quadrants   No distension or tenderness  Extremities: Distal pulses intact bilaterally  Skin: warm and dry, no erythema  Psych: Flat mood and affect

## 2018-12-21 NOTE — ASSESSMENT & PLAN NOTE
· Likely cause of N/V  · Continue Reglan 10 mg Q6H, Protonix 40 mg daily  · Pt on chronic opioid medications, could be exacerbating symptoms   · GI following,  · RUQ US ordered - Normal post-cholecystectomy imaging  · Blood cultures obtained, concerned for possible underlying infection due to patient's tachycardia and leukocytosis  · Consider treatment with empiric antibiotics if no improvement with IVF hydration, discuss with GI

## 2018-12-21 NOTE — ASSESSMENT & PLAN NOTE
Blood pressure is elevated today, patient is not on any outpatient blood pressure medications, furthermore at time of last discharge patient's blood pressure was normal and again she was not on any medications at discharge

## 2018-12-21 NOTE — RESPIRATORY THERAPY NOTE
RT Protocol Note  Charley Brar 46 y o  female MRN: [id number]  Unit/Bed#: ED 12 Encounter: [id number]    Assessment    Active Problems:    Diabetes mellitus ([facility] [address] )    Gastroparesis    Hyponatremia    Opioid dependence (HCC)    Intractable nausea and vomiting    Essential hypertension    Morbid obesity (HCC)      Home Pulmonary Medications:  Albuterol inhaler       Past Medical History:   Diagnosis Date    Asthma     Chronic pain     Diabetes mellitus ([facility] [address] )     Gastroparesis      Social History     Social History    Marital status: /Civil Union     Spouse name: N/A    Number of children: N/A    Years of education: N/A     Social History Main Topics    Smoking status: Never Smoker    Smokeless tobacco: Never Used    Alcohol use Yes      Comment: rarely    Drug use: No    Sexual activity: Not Currently     Other Topics Concern    None     Social History Narrative    None       Subjective         Objective    Physical Exam:   Assessment Type: Assess only  General Appearance: Alert, Awake  Respiratory Pattern: Normal, Symmetrical, Spontaneous  Chest Assessment: Chest expansion symmetrical, Trachea midline  Bilateral Breath Sounds: Diminished, Clear  Cough: None  O2 Device: Room Air    Vitals:  Blood pressure 170/87, pulse (!) 129, temperature 98 2 °F (36 8 °C), temperature source Oral, resp  rate 18, height 5' 8" (1 727 m), weight 132 kg (290 lb 5 5 oz), SpO2 95 %, not currently breastfeeding  Imaging and other studies: I have personally reviewed pertinent reports  O2 Device: Room Air     Plan    Respiratory Plan: No distress/Pulmonary history        Resp Comments: Saw pt for resp protocol Eval  Came in for nausea with HX of asthma  Yakes as needed albuterol at home  No complaints about breathing

## 2018-12-21 NOTE — ASSESSMENT & PLAN NOTE
Likely current cause of nausea vomiting, start Reglan 10 mg IV q 6 hours  Protonix 40 mg daily IV  Consult GI  Patient had recent cholecystectomy, had done well postop for several weeks  Patient's narcotic dosing needs to be addressed, she has been on long-standing opiates, this is exacerbating her gastroparesis

## 2018-12-21 NOTE — ASSESSMENT & PLAN NOTE
· BP continues to be elevated, denies any previous history of hypertension  · Would place on low dose lisinopril   · PRN lopressor for SBP >170   · Continue to monitor closely

## 2018-12-21 NOTE — ASSESSMENT & PLAN NOTE
Chronic pain on chronic opiate therapy  Decreased long-acting morphine to 60 mg twice daily, recommend outpatient follow-up with pain management if possible

## 2018-12-21 NOTE — ASSESSMENT & PLAN NOTE
Likely due to gastroparesis as noted above      Supportive care, NPO today, possible gradual advancement of diet later

## 2018-12-22 PROBLEM — E80.6 HYPERBILIRUBINEMIA: Status: ACTIVE | Noted: 2018-12-22

## 2018-12-22 PROBLEM — E87.1 HYPONATREMIA: Status: RESOLVED | Noted: 2018-02-02 | Resolved: 2018-12-22

## 2018-12-22 LAB
ALBUMIN SERPL BCP-MCNC: 3.3 G/DL (ref 3.5–5)
ALP SERPL-CCNC: 125 U/L (ref 46–116)
ALT SERPL W P-5'-P-CCNC: 24 U/L (ref 12–78)
ANION GAP SERPL CALCULATED.3IONS-SCNC: 11 MMOL/L (ref 4–13)
AST SERPL W P-5'-P-CCNC: 16 U/L (ref 5–45)
BASOPHILS # BLD AUTO: 0.07 THOUSANDS/ΜL (ref 0–0.1)
BASOPHILS NFR BLD AUTO: 1 % (ref 0–1)
BILIRUB SERPL-MCNC: 2.4 MG/DL (ref 0.2–1)
BUN SERPL-MCNC: 10 MG/DL (ref 5–25)
CALCIUM SERPL-MCNC: 9.1 MG/DL (ref 8.3–10.1)
CHLORIDE SERPL-SCNC: 98 MMOL/L (ref 100–108)
CO2 SERPL-SCNC: 26 MMOL/L (ref 21–32)
CREAT SERPL-MCNC: 0.7 MG/DL (ref 0.6–1.3)
CRP SERPL QL: 5.2 MG/L
EOSINOPHIL # BLD AUTO: 0.13 THOUSAND/ΜL (ref 0–0.61)
EOSINOPHIL NFR BLD AUTO: 1 % (ref 0–6)
ERYTHROCYTE [DISTWIDTH] IN BLOOD BY AUTOMATED COUNT: 13.2 % (ref 11.6–15.1)
ERYTHROCYTE [SEDIMENTATION RATE] IN BLOOD: 16 MM/HOUR (ref 0–20)
GFR SERPL CREATININE-BSD FRML MDRD: 99 ML/MIN/1.73SQ M
GLUCOSE SERPL-MCNC: 235 MG/DL (ref 65–140)
GLUCOSE SERPL-MCNC: 245 MG/DL (ref 65–140)
GLUCOSE SERPL-MCNC: 246 MG/DL (ref 65–140)
GLUCOSE SERPL-MCNC: 271 MG/DL (ref 65–140)
GLUCOSE SERPL-MCNC: 380 MG/DL (ref 65–140)
HCT VFR BLD AUTO: 41.9 % (ref 34.8–46.1)
HGB BLD-MCNC: 13.9 G/DL (ref 11.5–15.4)
IMM GRANULOCYTES # BLD AUTO: 0.06 THOUSAND/UL (ref 0–0.2)
IMM GRANULOCYTES NFR BLD AUTO: 1 % (ref 0–2)
LYMPHOCYTES # BLD AUTO: 1.91 THOUSANDS/ΜL (ref 0.6–4.47)
LYMPHOCYTES NFR BLD AUTO: 16 % (ref 14–44)
MCH RBC QN AUTO: 26.7 PG (ref 26.8–34.3)
MCHC RBC AUTO-ENTMCNC: 33.2 G/DL (ref 31.4–37.4)
MCV RBC AUTO: 80 FL (ref 82–98)
MONOCYTES # BLD AUTO: 0.99 THOUSAND/ΜL (ref 0.17–1.22)
MONOCYTES NFR BLD AUTO: 8 % (ref 4–12)
NEUTROPHILS # BLD AUTO: 8.97 THOUSANDS/ΜL (ref 1.85–7.62)
NEUTS SEG NFR BLD AUTO: 73 % (ref 43–75)
NRBC BLD AUTO-RTO: 0 /100 WBCS
PLATELET # BLD AUTO: 244 THOUSANDS/UL (ref 149–390)
PMV BLD AUTO: 12.8 FL (ref 8.9–12.7)
POTASSIUM SERPL-SCNC: 3.5 MMOL/L (ref 3.5–5.3)
PROT SERPL-MCNC: 7.4 G/DL (ref 6.4–8.2)
RBC # BLD AUTO: 5.21 MILLION/UL (ref 3.81–5.12)
SODIUM SERPL-SCNC: 135 MMOL/L (ref 136–145)
WBC # BLD AUTO: 12.13 THOUSAND/UL (ref 4.31–10.16)

## 2018-12-22 PROCEDURE — 86140 C-REACTIVE PROTEIN: CPT | Performed by: PHYSICIAN ASSISTANT

## 2018-12-22 PROCEDURE — 85025 COMPLETE CBC W/AUTO DIFF WBC: CPT | Performed by: PHYSICIAN ASSISTANT

## 2018-12-22 PROCEDURE — 82948 REAGENT STRIP/BLOOD GLUCOSE: CPT

## 2018-12-22 PROCEDURE — 99232 SBSQ HOSP IP/OBS MODERATE 35: CPT | Performed by: PHYSICIAN ASSISTANT

## 2018-12-22 PROCEDURE — 80053 COMPREHEN METABOLIC PANEL: CPT | Performed by: PHYSICIAN ASSISTANT

## 2018-12-22 PROCEDURE — C9113 INJ PANTOPRAZOLE SODIUM, VIA: HCPCS | Performed by: INTERNAL MEDICINE

## 2018-12-22 PROCEDURE — 99233 SBSQ HOSP IP/OBS HIGH 50: CPT | Performed by: INTERNAL MEDICINE

## 2018-12-22 PROCEDURE — 85652 RBC SED RATE AUTOMATED: CPT | Performed by: PHYSICIAN ASSISTANT

## 2018-12-22 PROCEDURE — 84145 PROCALCITONIN (PCT): CPT | Performed by: PHYSICIAN ASSISTANT

## 2018-12-22 RX ORDER — LANOLIN ALCOHOL/MO/W.PET/CERES
9 CREAM (GRAM) TOPICAL
Status: DISCONTINUED | OUTPATIENT
Start: 2018-12-22 | End: 2018-12-24 | Stop reason: HOSPADM

## 2018-12-22 RX ORDER — LISINOPRIL 10 MG/1
10 TABLET ORAL DAILY
Status: DISCONTINUED | OUTPATIENT
Start: 2018-12-23 | End: 2018-12-24 | Stop reason: HOSPADM

## 2018-12-22 RX ORDER — MORPHINE SULFATE 30 MG/1
60 TABLET, FILM COATED, EXTENDED RELEASE ORAL EVERY 12 HOURS SCHEDULED
Status: DISCONTINUED | OUTPATIENT
Start: 2018-12-22 | End: 2018-12-24 | Stop reason: HOSPADM

## 2018-12-22 RX ORDER — INSULIN GLARGINE 100 [IU]/ML
50 INJECTION, SOLUTION SUBCUTANEOUS
Status: DISCONTINUED | OUTPATIENT
Start: 2018-12-22 | End: 2018-12-24 | Stop reason: HOSPADM

## 2018-12-22 RX ADMIN — LISINOPRIL 5 MG: 5 TABLET ORAL at 08:10

## 2018-12-22 RX ADMIN — ENOXAPARIN SODIUM 40 MG: 40 INJECTION SUBCUTANEOUS at 08:10

## 2018-12-22 RX ADMIN — METOCLOPRAMIDE 10 MG: 5 INJECTION, SOLUTION INTRAMUSCULAR; INTRAVENOUS at 17:09

## 2018-12-22 RX ADMIN — INSULIN GLARGINE 50 UNITS: 100 INJECTION, SOLUTION SUBCUTANEOUS at 22:10

## 2018-12-22 RX ADMIN — METOCLOPRAMIDE 10 MG: 5 INJECTION, SOLUTION INTRAMUSCULAR; INTRAVENOUS at 11:57

## 2018-12-22 RX ADMIN — MELATONIN 9 MG: 3 TAB ORAL at 22:09

## 2018-12-22 RX ADMIN — PANTOPRAZOLE SODIUM 40 MG: 40 INJECTION, POWDER, FOR SOLUTION INTRAVENOUS at 08:10

## 2018-12-22 RX ADMIN — SODIUM CHLORIDE 125 ML/HR: 0.9 INJECTION, SOLUTION INTRAVENOUS at 02:48

## 2018-12-22 RX ADMIN — INSULIN LISPRO 4 UNITS: 100 INJECTION, SOLUTION INTRAVENOUS; SUBCUTANEOUS at 22:11

## 2018-12-22 RX ADMIN — MORPHINE SULFATE 30 MG: 30 TABLET, FILM COATED, EXTENDED RELEASE ORAL at 08:10

## 2018-12-22 RX ADMIN — METOCLOPRAMIDE 10 MG: 5 INJECTION, SOLUTION INTRAMUSCULAR; INTRAVENOUS at 05:45

## 2018-12-22 RX ADMIN — ONDANSETRON 8 MG: 2 INJECTION INTRAMUSCULAR; INTRAVENOUS at 19:57

## 2018-12-22 RX ADMIN — SODIUM CHLORIDE 125 ML/HR: 0.9 INJECTION, SOLUTION INTRAVENOUS at 10:49

## 2018-12-22 RX ADMIN — MONTELUKAST SODIUM 10 MG: 10 TABLET, FILM COATED ORAL at 22:10

## 2018-12-22 RX ADMIN — MORPHINE SULFATE 60 MG: 30 TABLET, FILM COATED, EXTENDED RELEASE ORAL at 22:09

## 2018-12-22 RX ADMIN — INSULIN LISPRO 3 UNITS: 100 INJECTION, SOLUTION INTRAVENOUS; SUBCUTANEOUS at 17:09

## 2018-12-22 RX ADMIN — METOCLOPRAMIDE 10 MG: 5 INJECTION, SOLUTION INTRAMUSCULAR; INTRAVENOUS at 23:21

## 2018-12-22 RX ADMIN — SODIUM CHLORIDE 125 ML/HR: 0.9 INJECTION, SOLUTION INTRAVENOUS at 19:58

## 2018-12-22 RX ADMIN — INSULIN LISPRO 3 UNITS: 100 INJECTION, SOLUTION INTRAVENOUS; SUBCUTANEOUS at 08:09

## 2018-12-22 RX ADMIN — METOCLOPRAMIDE 10 MG: 5 INJECTION, SOLUTION INTRAMUSCULAR; INTRAVENOUS at 00:00

## 2018-12-22 RX ADMIN — ONDANSETRON 8 MG: 2 INJECTION INTRAMUSCULAR; INTRAVENOUS at 04:07

## 2018-12-22 RX ADMIN — INSULIN LISPRO 6 UNITS: 100 INJECTION, SOLUTION INTRAVENOUS; SUBCUTANEOUS at 11:57

## 2018-12-22 RX ADMIN — ONDANSETRON 8 MG: 2 INJECTION INTRAMUSCULAR; INTRAVENOUS at 08:09

## 2018-12-22 NOTE — ASSESSMENT & PLAN NOTE
· Chronic pain on chronic opiate therapy  · Pt should follow up with pain management as an outpatient  · PDMP evaluated, pt receives medications from neurologist in [place], pt is on morphine ER 60 mg/15 mg (75 mg total) BID and morphine IR 15 mg TID PRN  · Pt was placed on decreased levels of morphine ER due to gastroparesis, would increase to 60 mg BID as this could be contributing to patient's tachycardia  Continue to monitor

## 2018-12-22 NOTE — ASSESSMENT & PLAN NOTE
· Improving, likely secondary to gastoparesis vs  Cyclical vomiting syndrome  · GI following, appreciate additional recommendations  · Currently on clear liquid diet, advance as tolerated  · No additional episodes of vomiting, nausea continues to improve  · Awaiting blood culture results

## 2018-12-22 NOTE — ASSESSMENT & PLAN NOTE
· T  Bili increased to 2 4 today  · Abdominal exam benign  · RUQ US normal  · Pt is s/p cholecystectomy a few weeks ago  · Continue to trend  · GI following

## 2018-12-22 NOTE — ASSESSMENT & PLAN NOTE
· POA, HR mildly improved to 100s-110s today, continue telemetry monitoring  · Continue IVF hydration  · Pt asymptomatic   · R/o infectious etiology as above, blood cultures pending  · Place patient back on home dose of morphine

## 2018-12-22 NOTE — PROGRESS NOTES
Progress Note - Wilmer Ritter 1965, 46 y o  female MRN: [id number]    Unit/Bed#: -01 Encounter: [id number]    Primary Care Provider: Gino Johnson   Date and time admitted to hospital: 12/20/2018  6:36 PM       DOS: 12/22/2018      * Gastroparesis   Assessment & Plan    · Likely cause of N/V vs  Cyclical vomiting syndrome  · Continue Reglan 10 mg Q6H, Protonix 40 mg daily  · Pt on chronic opioid medications, could be exacerbating symptoms   · GI following,  · RUQ US - Normal post-cholecystectomy imaging  · Blood cultures obtained, concerned for possible underlying infection due to patient's tachycardia and leukocytosis  · Awaiting blood culture results  · Continue to hold off on abx treatment for now as patient has remained afebrile  · Continue clear liquid diet, advance as tolerated per GI     Hyperbilirubinemia   Assessment & Plan    · T   Bili increased to 2 4 today  · Abdominal exam benign  · RUQ US normal  · Pt is s/p cholecystectomy a few weeks ago  · Continue to trend  · GI following     Leukocytosis   Assessment & Plan    · Appears chronic per review of records, etiology unknown, improvement noted today  · UA negative, CT abdomen/pelvis without acute findings, pt denies any respiratory symptoms  · Blood culture results pending  · Hold off on any antibiotic treatment for now pending additional results  · Pt does not have any fevers currently  · Monitor CBC daily  · Obtain procalcitonin     Tachycardia   Assessment & Plan    · POA, HR mildly improved to 100s-110s today, continue telemetry monitoring  · Continue IVF hydration  · Pt asymptomatic   · R/o infectious etiology as above, blood cultures pending     Morbid obesity ([facility] [address] )   Assessment & Plan    · BMI 44 15  · Encourage lifestyle modification and weight loss     Essential hypertension   Assessment & Plan    · BP continues to be elevated, denies any previous history of hypertension  · Increase Lisinopril to 10 mg daily   · PRN lopressor for SBP >170   · Continue to monitor closely     Intractable nausea and vomiting   Assessment & Plan    · Improving, likely secondary to gastoparesis vs  Cyclical vomiting syndrome  · GI following, appreciate additional recommendations  · Currently on clear liquid diet, advance as tolerated  · No additional episodes of vomiting, nausea continues to improve  · Awaiting blood culture results     Opioid dependence (HCC)   Assessment & Plan    · Chronic pain on chronic opiate therapy  · Pt should follow up with pain management as an outpatient  · PDMP evaluated, pt receives medications from neurologist in [place], pt is on morphine ER 60 mg/15 mg (75 mg total) BID and morphine IR 15 mg TID PRN  · Pt was placed on decreased levels of morphine ER due to gastroparesis, would increase to 60 mg BID as this could be contributing to patient's tachycardia  Continue to monitor       Diabetes mellitus [facility])   Assessment & Plan    Lab Results   Component Value Date    HGBA1C 9 7 (H) 12/21/2018       Recent Labs      12/21/18   1541  12/21/18   2053  12/22/18   0630  12/22/18   1043   POCGLU  241*  276*  245*  380*       Blood Sugar Average: Last 72 hrs:  (P) 868 [id number]   · Uncontrolled at baseline  · Increase Lantus to 50 units (home dose) QHS   · Continue SSI coverage with meals  · QID glucose checks     Hyponatremia-resolved as of 12/22/2018   Assessment & Plan    · Improved, correction for hyperglycemia puts sodium within normal range  · Continue IVF hydration        VTE Pharmacologic Prophylaxis:   Pharmacologic: Enoxaparin (Lovenox)  Mechanical VTE Prophylaxis in Place: No    Patient Centered Rounds: I have evaluated patient without nursing staff present due to speaking to nurse outside patient's room    Discussions with Specialists or Other Care Team Provider: Discussed with GI, RN, CM and reviewed previous notes    Education and Discussions with Family / Patient: Discussed with patient at bedside, politely declined call to family members    Time Spent for Care: 30 minutes  More than 50% of total time spent on counseling and coordination of care as described above  Current Length of Stay: 1 day(s)    Current Patient Status: Inpatient   Certification Statement: The patient will continue to require additional inpatient hospital stay due to monitoring tachycardia/leukocytosis, awaiting blood cultures, advancement of diet    Discharge Plan: Pending GI clearance, not medically stable    Code Status: Level 1 - Full Code      Subjective:   Pt reports that she is still having some nausea but it is much improved  Denies any additional episodes of vomiting  Denies any lightheadedness, dizziness, chest pain, shortness of breath, abdominal pain  Reports last bowel movement was a few days ago  Denies any urinary difficulties  Objective:     Vitals:   Temp (24hrs), Av 9 °F (36 6 °C), Min:97 4 °F (36 3 °C), Max:98 3 °F (36 8 °C)    Temp:  [97 4 °F (36 3 °C)-98 3 °F (36 8 °C)] 98 3 °F (36 8 °C)  HR:  [106-113] 113  Resp:  [18-20] 20  BP: (162-170)/(95-97) 170/97  SpO2:  [94 %-95 %] 95 %  Body mass index is 44 25 kg/m²  Input and Output Summary (last 24 hours): Intake/Output Summary (Last 24 hours) at 18 1510  Last data filed at 18 1047   Gross per 24 hour   Intake             2950 ml   Output             3050 ml   Net             -100 ml       Physical Exam:     Physical Exam   Constitutional: No distress  Pt is an obese female in no acute distress lying in her hospital bed  Appears chronically ill, flat affect  HENT:   Head: Normocephalic and atraumatic  Eyes: Conjunctivae are normal  No scleral icterus  Cardiovascular: Regular rhythm and intact distal pulses  Tachycardia   Pulmonary/Chest: Effort normal and breath sounds normal  No respiratory distress  She has no wheezes  She has no rales  Abdominal: Soft  Bowel sounds are normal  She exhibits no distension  There is no tenderness  There is no rebound  Musculoskeletal: She exhibits no edema  Neurological: She is alert  Skin: Skin is warm and dry  She is not diaphoretic  No erythema  Vitals reviewed  Additional Data:     Labs:      Results from last 7 days  Lab Units 12/22/18  0440   WBC Thousand/uL 12 13*   HEMOGLOBIN g/dL 13 9   HEMATOCRIT % 41 9   PLATELETS Thousands/uL 244   NEUTROS PCT % 73   LYMPHS PCT % 16   MONOS PCT % 8   EOS PCT % 1       Results from last 7 days  Lab Units 12/22/18  0440   POTASSIUM mmol/L 3 5   CHLORIDE mmol/L 98*   CO2 mmol/L 26   BUN mg/dL 10   CREATININE mg/dL 0 70   CALCIUM mg/dL 9 1   ALK PHOS U/L 125*   ALT U/L 24   AST U/L 16       Results from last 7 days  Lab Units 12/21/18  0508   INR  1 06       * I Have Reviewed All Lab Data Listed Above  * Additional Pertinent Lab Tests Reviewed:  All Labs Within Last 24 Hours Reviewed    Imaging:    Imaging Reports Reviewed Today Include: CT abdomen/pelvis, RUQ US  Imaging Personally Reviewed by Myself Includes:  None    Recent Cultures (last 7 days):           Last 24 Hours Medication List:     Current Facility-Administered Medications:  enoxaparin 40 mg Subcutaneous Daily Jayant Bradshaw DO    insulin glargine 50 Units Subcutaneous HS Linda Hunt PA-C    insulin lispro 1-6 Units Subcutaneous HS Linda Hunt PA-C    insulin lispro 1-6 Units Subcutaneous TID AC Linda Hunt PA-C    [START ON 12/23/2018] lisinopril 10 mg Oral Daily Linda Hunt PA-C    metoclopramide 10 mg Intravenous Q6H [facility] Marden Ac, DO    metoprolol 5 mg Intravenous Q6H PRN Gae MeterESTHER    montelukast 10 mg Oral HS Marden Ac, DO    morphine 60 mg Oral Q12H [facility] Gae MeterESTHER    morphine 15 mg Oral TID PRN Marden Ac, DO    ondansetron 8 mg Intravenous Q4H PRN Marden Ac, DO Last Rate: 8 mg (12/22/18 0809)   pantoprazole 40 mg Intravenous Q24H [facility] Marden Ac, DO    sodium chloride 125 mL/hr Intravenous Continuous Marden Ac, DO Last Rate: 125 mL/hr (12/22/18 1945)        Today, Patient Was Seen By: Bluford Hashimoto, PA-C    ** Please Note: Dictation voice to text software may have been used in the creation of this document   **

## 2018-12-22 NOTE — ASSESSMENT & PLAN NOTE
· Likely cause of N/V vs  Cyclical vomiting syndrome  · Continue Reglan 10 mg Q6H, Protonix 40 mg daily  · Pt on chronic opioid medications, could be exacerbating symptoms   · GI following,  · RUQ US - Normal post-cholecystectomy imaging  · Blood cultures obtained, concerned for possible underlying infection due to patient's tachycardia and leukocytosis  · Awaiting blood culture results  · Continue to hold off on abx treatment for now as patient has remained afebrile  · Continue clear liquid diet, advance as tolerated per GI Repair Performed By Another Provider Text (Leave Blank If You Do Not Want): After the tissue was excised the defect was repaired by another provider.

## 2018-12-22 NOTE — ASSESSMENT & PLAN NOTE
Lab Results   Component Value Date    HGBA1C 9 7 (H) 12/21/2018       Recent Labs      12/21/18   1541  12/21/18   2053  12/22/18   0630  12/22/18   1043   POCGLU  241*  276*  245*  380*       Blood Sugar Average: Last 72 hrs:  (P) 832 [id number]   · Uncontrolled at baseline  · Increase Lantus to 50 units (home dose) QHS   · Continue SSI coverage with meals  · QID glucose checks

## 2018-12-22 NOTE — ASSESSMENT & PLAN NOTE
· BP continues to be elevated, denies any previous history of hypertension  · Increase Lisinopril to 10 mg daily   · PRN lopressor for SBP >170   · Continue to monitor closely

## 2018-12-22 NOTE — ASSESSMENT & PLAN NOTE
· Appears chronic per review of records, etiology unknown, improvement noted today  · UA negative, CT abdomen/pelvis without acute findings, pt denies any respiratory symptoms  · Blood culture results pending  · Hold off on any antibiotic treatment for now pending additional results  · Pt does not have any fevers currently  · Monitor CBC daily  · Obtain procalcitonin

## 2018-12-22 NOTE — H&P
[facility]  Progress Note - Radha Meonn 46 y o  female MRN: [id number]    Unit/Bed#: -01 Encounter: [id number]    Assessment/Plan:  Patient is a 78-year-old female with past medical history of insulin-dependent diabetes, history of gastroparesis, opiate dependence, history of GERD presented with nausea/vomiting/periumbilical pain  Symptoms have been ongoing for 2-3 days but nausea continues to improve  Despite getting good fluid resuscitation sinus tachycardia has been persistent    Today she continues to feel well overall states nausea has resolved       Nausea/vomiting she says the symptoms are consistent with her previous flare of gastroparesis  Continue Reglan and supportive care for now  She will need close monitoring  I would also repeat gastric emptying study as an outpatient as her symptoms are more consistent with cyclic vomiting syndrome        Awaiting blood cultures for evaluation of sinus tachycardia and leukocytosis    Subjective:   She is comfortably sleeping  Denies any acute distress  Sinus tachycardia has been persistent  Objective:     Vitals: Blood pressure 170/97, pulse (!) 113, temperature 98 3 °F (36 8 °C), temperature source Oral, resp  rate 20, height 5' 8" (1 727 m), weight 132 kg (291 lb 0 1 oz), SpO2 95 %, not currently breastfeeding  ,Body mass index is 44 25 kg/m²        Intake/Output Summary (Last 24 hours) at 12/22/18 1413  Last data filed at 12/22/18 1047   Gross per 24 hour   Intake             2950 ml   Output             3050 ml   Net             -100 ml       Physical Exam:    GEN: wn/wd, NAD  HEENT: MMM, no cervical or supraclavicular LAD, anciteric  CV: RRR, no m/r/g  CHEST: CTA b/l, no w/r/r  ABD: +BS, soft, NT/ND, no hepatosplenomegaly  EXT: no c/c/e  SKIN: no rashes  NEURO: aaox3      Invasive Devices     Peripheral Intravenous Line            Peripheral IV 12/21/18 Right Forearm less than 1 day                        Lab, Imaging and other studies:     Admission on 12/20/2018   Component Date Value    WBC 12/20/2018 11 67*    RBC 12/20/2018 5 15*    Hemoglobin 12/20/2018 13 6     Hematocrit 12/20/2018 41 7     MCV 12/20/2018 81*    MCH 12/20/2018 26 4*    MCHC 12/20/2018 32 6     RDW 12/20/2018 13 3     MPV 12/20/2018 12 8*    Platelets 98/74/2414 254     nRBC 12/20/2018 0     Neutrophils Relative 12/20/2018 83*    Immat GRANS % 12/20/2018 0     Lymphocytes Relative 12/20/2018 11*    Monocytes Relative 12/20/2018 4     Eosinophils Relative 12/20/2018 1     Basophils Relative 12/20/2018 1     Neutrophils Absolute 12/20/2018 9 76*    Immature Grans Absolute 12/20/2018 0 05     Lymphocytes Absolute 12/20/2018 1 24     Monocytes Absolute 12/20/2018 0 50     Eosinophils Absolute 12/20/2018 0 06     Basophils Absolute 12/20/2018 0 06     Sodium 12/20/2018 133*    Potassium 12/20/2018 4 1     Chloride 12/20/2018 96*    CO2 12/20/2018 26     ANION GAP 12/20/2018 11     BUN 12/20/2018 12     Creatinine 12/20/2018 0 79     Glucose 12/20/2018 313*    Calcium 12/20/2018 9 3     eGFR 12/20/2018 85     Total Bilirubin 12/20/2018 1 40*    Bilirubin, Direct 12/20/2018 0 22*    Alkaline Phosphatase 12/20/2018 141*    AST 12/20/2018 23     ALT 12/20/2018 35     Total Protein 12/20/2018 8 2     Albumin 12/20/2018 3 6     Lipase 12/20/2018 58*    LACTIC ACID 12/20/2018 1 0     Color, UA 12/21/2018 Yellow     Clarity, UA 12/21/2018 Slightly Cloudy     Specific Gravity, UA 12/21/2018 1 020     pH, UA 12/21/2018 6 0     Leukocytes, UA 12/21/2018 Negative     Nitrite, UA 12/21/2018 Negative     Protein, UA 12/21/2018 >=300*    Glucose, UA 12/21/2018 500 (1/2%)*    Ketones, UA 12/21/2018 >=80 (3+)*    Urobilinogen, UA 12/21/2018 0 2     Bilirubin, UA 12/21/2018 Negative     Blood, UA 12/21/2018 Moderate*    RBC, UA 12/21/2018 4-10*    WBC, UA 12/21/2018 None Seen     Epithelial Cells 12/21/2018 Occasional     Bacteria, UA 12/21/2018 Occasional     TSH 3RD GENERATON 12/21/2018 0 717     Sodium 12/21/2018 133*    Potassium 12/21/2018 3 9     Chloride 12/21/2018 97*    CO2 12/21/2018 23     ANION GAP 12/21/2018 13     BUN 12/21/2018 9     Creatinine 12/21/2018 0 66     Glucose 12/21/2018 272*    Calcium 12/21/2018 9 2     AST 12/21/2018 19     ALT 12/21/2018 32     Alkaline Phosphatase 12/21/2018 140*    Total Protein 12/21/2018 8 3*    Albumin 12/21/2018 3 7     Total Bilirubin 12/21/2018 1 80*    eGFR 12/21/2018 100     Magnesium 12/21/2018 1 6     Phosphorus 12/21/2018 2 8     WBC 12/21/2018 14 03*    RBC 12/21/2018 5 20*    Hemoglobin 12/21/2018 13 9     Hematocrit 12/21/2018 42 1     MCV 12/21/2018 81*    MCH 12/21/2018 26 7*    MCHC 12/21/2018 33 0     RDW 12/21/2018 13 2     MPV 12/21/2018 12 6     Platelets 98/58/0741 251     nRBC 12/21/2018 0     Neutrophils Relative 12/21/2018 79*    Immat GRANS % 12/21/2018 0     Lymphocytes Relative 12/21/2018 15     Monocytes Relative 12/21/2018 5     Eosinophils Relative 12/21/2018 0     Basophils Relative 12/21/2018 1     Neutrophils Absolute 12/21/2018 11 03*    Immature Grans Absolute 12/21/2018 0 06     Lymphocytes Absolute 12/21/2018 2 15     Monocytes Absolute 12/21/2018 0 68     Eosinophils Absolute 12/21/2018 0 03     Basophils Absolute 12/21/2018 0 08     Protime 12/21/2018 13 7     INR 12/21/2018 1 06     Hemoglobin A1C 12/21/2018 9 7*    EAG 12/21/2018 232     POC Glucose 12/21/2018 276*    Bilirubin, Direct 12/21/2018 0 26*    POC Glucose 12/21/2018 246*    POC Glucose 12/21/2018 241*    Ventricular Rate 12/21/2018 111     Atrial Rate 12/21/2018 111     NM Interval 12/21/2018 140     QRSD Interval 12/21/2018 78     QT Interval 12/21/2018 358     QTC Interval 12/21/2018 486     P Axis 12/21/2018 59     QRS Axis 12/21/2018 -13     T Wave Axis 12/21/2018 29     POC Glucose 12/21/2018 276*    Sed Rate 12/22/2018 16  CRP 12/22/2018 5 2*    Sodium 12/22/2018 135*    Potassium 12/22/2018 3 5     Chloride 12/22/2018 98*    CO2 12/22/2018 26     ANION GAP 12/22/2018 11     BUN 12/22/2018 10     Creatinine 12/22/2018 0 70     Glucose 12/22/2018 235*    Calcium 12/22/2018 9 1     AST 12/22/2018 16     ALT 12/22/2018 24     Alkaline Phosphatase 12/22/2018 125*    Total Protein 12/22/2018 7 4     Albumin 12/22/2018 3 3*    Total Bilirubin 12/22/2018 2 40*    eGFR 12/22/2018 99     WBC 12/22/2018 12 13*    RBC 12/22/2018 5 21*    Hemoglobin 12/22/2018 13 9     Hematocrit 12/22/2018 41 9     MCV 12/22/2018 80*    MCH 12/22/2018 26 7*    MCHC 12/22/2018 33 2     RDW 12/22/2018 13 2     MPV 12/22/2018 12 8*    Platelets 36/56/5218 244     nRBC 12/22/2018 0     Neutrophils Relative 12/22/2018 73     Immat GRANS % 12/22/2018 1     Lymphocytes Relative 12/22/2018 16     Monocytes Relative 12/22/2018 8     Eosinophils Relative 12/22/2018 1     Basophils Relative 12/22/2018 1     Neutrophils Absolute 12/22/2018 8 97*    Immature Grans Absolute 12/22/2018 0 06     Lymphocytes Absolute 12/22/2018 1 91     Monocytes Absolute 12/22/2018 0 99     Eosinophils Absolute 12/22/2018 0 13     Basophils Absolute 12/22/2018 0 07     POC Glucose 12/22/2018 245*    POC Glucose 12/22/2018 380*         I have personally reviewed pertinent reports        Current Facility-Administered Medications   Medication Dose Route Frequency    enoxaparin (LOVENOX) subcutaneous injection 40 mg  40 mg Subcutaneous Daily    insulin glargine (LANTUS) subcutaneous injection 30 Units 0 3 mL  30 Units Subcutaneous HS    insulin lispro (HumaLOG) 100 units/mL subcutaneous injection 1-6 Units  1-6 Units Subcutaneous HS    insulin lispro (HumaLOG) 100 units/mL subcutaneous injection 1-6 Units  1-6 Units Subcutaneous TID AC    lisinopril (ZESTRIL) tablet 5 mg  5 mg Oral Daily    metoclopramide (REGLAN) injection 10 mg  10 mg Intravenous Q6H [facility]    metoprolol (LOPRESSOR) injection 5 mg  5 mg Intravenous Q6H PRN    montelukast (SINGULAIR) tablet 10 mg  10 mg Oral HS    morphine (MS CONTIN) ER tablet 30 mg  30 mg Oral Q12H [facility]    morphine (MSIR) IR tablet 15 mg  15 mg Oral TID PRN    ondansetron (ZOFRAN) 8 mg in sodium chloride 0 9 % 50 mL IVPB  8 mg Intravenous Q4H PRN    pantoprazole (PROTONIX) injection 40 mg  40 mg Intravenous Q24H BOLIVAR    sodium chloride 0 9 % infusion  125 mL/hr Intravenous Continuous

## 2018-12-22 NOTE — PHYSICIAN ADVISOR
Current patient class: Inpatient  The patient is currently on Hospital Day: 3 at [address]      The patient was admitted to the hospital at [id number] [place] [address] Drive on 12/21/18 for the following diagnosis:  Gastroparesis [K31 84]  Dehydration [E86 0]  Nausea [R11 0]  Abdominal pain [R10 9]  Intractable nausea and vomiting [R11 2]       There is documentation in the medical record of an expected length of stay of at least 2 midnights  The patient is therefore expected to satisfy the 2 midnight benchmark and given the 2 midnight presumption is appropriate for INPATIENT ADMISSION  Given this expectation of a satisfying stay, CMS instructs us that the patient is most often appropriate for inpatient admission under part A provided medical necessity is documented in the chart  After review of the relevant documentation, labs, vital signs and test results, the patient is appropriate for INPATIENT ADMISSION  Admission to the hospital as an inpatient is a complex decision making process which requires the practitioner to consider the patients presenting complaint, history and physical examination and all relevant testing  With this in mind, in this case, the patient was deemed appropriate for INPATIENT ADMISSION  After review of the documentation and testing available at the time of the admission I concur with this clinical determination of medical necessity  Rationale is as follows: The patient is a 46 yrs old Female who presented to the ED at 12/20/2018  6:36 PM with a chief complaint of Nausea (Patient presents with nausea and vomiting for the last few days  Denies any fevers or diarrhea  States she has mild abdominal pain as well  ) and Abdominal Pain     Given the need for further hospitalization, and along with the documentation of medical necessity present in the chart, the patient is appropriate for inpatient admission    The patient is expected to satisfy the 2 midnight benchmark, and will require further acute medical care  The patient does have comorbid conditions which increases the risk for significant adverse outcome  Given this the patient is appropriate for inpatient admission        The patients vitals on arrival were ED Triage Vitals [12/20/18 1913]   Temperature Pulse Respirations Blood Pressure SpO2   98 2 °F (36 8 °C) (!) 123 22 150/77 94 %      Temp Source Heart Rate Source Patient Position - Orthostatic VS BP Location FiO2 (%)   Oral Monitor Sitting Right arm --      Pain Score       8           Past Medical History:   Diagnosis Date    Asthma     Chronic pain     Diabetes mellitus ([address] )     Gastroparesis      Past Surgical History:   Procedure Laterality Date    CERVICAL LAMINECTOMY      CHOLECYSTECTOMY      CHOLECYSTECTOMY LAPAROSCOPIC N/A 11/10/2018    Procedure: CHOLECYSTECTOMY LAPAROSCOPIC w/ ioc;  Surgeon: Carolyn Muñoz MD;  Location: [facility];  Service: General    HYSTERECTOMY      JOINT REPLACEMENT Bilateral     knee    TOE AMPUTATION Right 2/2/2018    Procedure: AMPUTATION TOE, RIGHT GREAT TOE;  Surgeon: Elidia Martin DPM;  Location: [facility];  Service: Podiatry           Consults have been placed to:   IP CONSULT TO GASTROENTEROLOGY    Vitals:    12/21/18 0636 12/21/18 0736 12/21/18 1500 12/21/18 2313   BP:  (!) 178/96 155/83 162/95   BP Location:  Left arm Left arm Left arm   Pulse: (!) 129 (!) 120 (!) 120 (!) 106   Resp: 18 18 18 18   Temp:  98 4 °F (36 9 °C) 97 8 °F (36 6 °C) (!) 97 4 °F (36 3 °C)   TempSrc:  Oral Oral Oral   SpO2: 95% 95% 90% 94%   Weight:  132 kg (291 lb 0 1 oz)     Height:  5' 8" (1 727 m)         Most recent labs:    Recent Labs      12/20/18 1951 12/21/18   0508   WBC  11 67*  14 03*   HGB  13 6  13 9   HCT  41 7  42 1   PLT  254  251   K  4 1  3 9   CALCIUM  9 3  9 2   BUN  12  9   CREATININE  0 79  0 66   LIPASE  58*   --    INR   --   1 06   AST  23  19   ALT  35  32   ALKPHOS  141*  140*       Scheduled Meds:  Current Facility-Administered Medications:  enoxaparin 40 mg Subcutaneous Daily Piedad Gary,     insulin glargine 30 Units Subcutaneous HS Piedad Gary, DO    insulin lispro 1-6 Units Subcutaneous HS Linda Hunt PA-C    insulin lispro 1-6 Units Subcutaneous TID AC Linda Hunt PA-C    lisinopril 5 mg Oral Daily Linda Hunt PA-C    metoclopramide 10 mg Intravenous Q6H [address] Piedad Gary, DO    metoprolol 5 mg Intravenous Q6H PRN Slim Muñiz PA-C    montelukast 10 mg Oral HS Piedad Gary, DO    morphine 30 mg Oral Q12H [address] Piedad Gary, DO    morphine 15 mg Oral TID PRN Piedad Gary DO    ondansetron 8 mg Intravenous Q4H PRN Piedad Gary, DO Last Rate: 8 mg (12/21/18 2025)   pantoprazole 40 mg Intravenous Q24H [address] Piedad Gary, DO    sodium chloride 125 mL/hr Intravenous Continuous Piedad Gary, DO Last Rate: 125 mL/hr (12/21/18 1640)     Continuous Infusions:  sodium chloride 125 mL/hr Last Rate: 125 mL/hr (12/21/18 1640)     PRN Meds: metoprolol    morphine    ondansetron    Surgical procedures (if appropriate):

## 2018-12-23 LAB
ALBUMIN SERPL BCP-MCNC: 3.4 G/DL (ref 3.5–5)
ALP SERPL-CCNC: 132 U/L (ref 46–116)
ALT SERPL W P-5'-P-CCNC: 29 U/L (ref 12–78)
ANION GAP SERPL CALCULATED.3IONS-SCNC: 8 MMOL/L (ref 4–13)
AST SERPL W P-5'-P-CCNC: 20 U/L (ref 5–45)
BILIRUB SERPL-MCNC: 2.7 MG/DL (ref 0.2–1)
BUN SERPL-MCNC: 10 MG/DL (ref 5–25)
CALCIUM SERPL-MCNC: 9.4 MG/DL (ref 8.3–10.1)
CHLORIDE SERPL-SCNC: 98 MMOL/L (ref 100–108)
CO2 SERPL-SCNC: 29 MMOL/L (ref 21–32)
CREAT SERPL-MCNC: 0.78 MG/DL (ref 0.6–1.3)
ERYTHROCYTE [DISTWIDTH] IN BLOOD BY AUTOMATED COUNT: 13.3 % (ref 11.6–15.1)
GFR SERPL CREATININE-BSD FRML MDRD: 86 ML/MIN/1.73SQ M
GLUCOSE SERPL-MCNC: 148 MG/DL (ref 65–140)
GLUCOSE SERPL-MCNC: 167 MG/DL (ref 65–140)
GLUCOSE SERPL-MCNC: 186 MG/DL (ref 65–140)
GLUCOSE SERPL-MCNC: 191 MG/DL (ref 65–140)
GLUCOSE SERPL-MCNC: 192 MG/DL (ref 65–140)
GLUCOSE SERPL-MCNC: 253 MG/DL (ref 65–140)
GLUCOSE SERPL-MCNC: >500 MG/DL (ref 65–140)
HCT VFR BLD AUTO: 44.2 % (ref 34.8–46.1)
HGB BLD-MCNC: 14.4 G/DL (ref 11.5–15.4)
MCH RBC QN AUTO: 26.5 PG (ref 26.8–34.3)
MCHC RBC AUTO-ENTMCNC: 32.6 G/DL (ref 31.4–37.4)
MCV RBC AUTO: 81 FL (ref 82–98)
PLATELET # BLD AUTO: 274 THOUSANDS/UL (ref 149–390)
PMV BLD AUTO: 12.4 FL (ref 8.9–12.7)
POTASSIUM SERPL-SCNC: 3.4 MMOL/L (ref 3.5–5.3)
PROCALCITONIN SERPL-MCNC: 0.07 NG/ML
PROT SERPL-MCNC: 7.6 G/DL (ref 6.4–8.2)
RBC # BLD AUTO: 5.44 MILLION/UL (ref 3.81–5.12)
SODIUM SERPL-SCNC: 135 MMOL/L (ref 136–145)
WBC # BLD AUTO: 13.61 THOUSAND/UL (ref 4.31–10.16)

## 2018-12-23 PROCEDURE — 99232 SBSQ HOSP IP/OBS MODERATE 35: CPT | Performed by: INTERNAL MEDICINE

## 2018-12-23 PROCEDURE — 80053 COMPREHEN METABOLIC PANEL: CPT | Performed by: PHYSICIAN ASSISTANT

## 2018-12-23 PROCEDURE — 85027 COMPLETE CBC AUTOMATED: CPT | Performed by: PHYSICIAN ASSISTANT

## 2018-12-23 PROCEDURE — 99232 SBSQ HOSP IP/OBS MODERATE 35: CPT | Performed by: PHYSICIAN ASSISTANT

## 2018-12-23 PROCEDURE — 82948 REAGENT STRIP/BLOOD GLUCOSE: CPT

## 2018-12-23 PROCEDURE — C9113 INJ PANTOPRAZOLE SODIUM, VIA: HCPCS | Performed by: INTERNAL MEDICINE

## 2018-12-23 RX ORDER — POTASSIUM CHLORIDE 14.9 MG/ML
20 INJECTION INTRAVENOUS ONCE
Status: COMPLETED | OUTPATIENT
Start: 2018-12-23 | End: 2018-12-23

## 2018-12-23 RX ORDER — POTASSIUM CHLORIDE 20 MEQ/1
40 TABLET, EXTENDED RELEASE ORAL ONCE
Status: DISCONTINUED | OUTPATIENT
Start: 2018-12-23 | End: 2018-12-23

## 2018-12-23 RX ORDER — ONDANSETRON 4 MG/1
4 TABLET, ORALLY DISINTEGRATING ORAL EVERY 6 HOURS SCHEDULED
Status: DISCONTINUED | OUTPATIENT
Start: 2018-12-23 | End: 2018-12-24 | Stop reason: HOSPADM

## 2018-12-23 RX ORDER — POLYETHYLENE GLYCOL 3350 17 G/17G
17 POWDER, FOR SOLUTION ORAL 2 TIMES DAILY
Status: DISCONTINUED | OUTPATIENT
Start: 2018-12-23 | End: 2018-12-24 | Stop reason: HOSPADM

## 2018-12-23 RX ADMIN — MORPHINE SULFATE 60 MG: 30 TABLET, FILM COATED, EXTENDED RELEASE ORAL at 21:05

## 2018-12-23 RX ADMIN — METOCLOPRAMIDE 10 MG: 5 INJECTION, SOLUTION INTRAMUSCULAR; INTRAVENOUS at 23:41

## 2018-12-23 RX ADMIN — ONDANSETRON 4 MG: 4 TABLET, ORALLY DISINTEGRATING ORAL at 13:13

## 2018-12-23 RX ADMIN — INSULIN LISPRO 2 UNITS: 100 INJECTION, SOLUTION INTRAVENOUS; SUBCUTANEOUS at 11:15

## 2018-12-23 RX ADMIN — POTASSIUM CHLORIDE 20 MEQ: 200 INJECTION, SOLUTION INTRAVENOUS at 13:13

## 2018-12-23 RX ADMIN — METOCLOPRAMIDE 10 MG: 5 INJECTION, SOLUTION INTRAMUSCULAR; INTRAVENOUS at 11:13

## 2018-12-23 RX ADMIN — MONTELUKAST SODIUM 10 MG: 10 TABLET, FILM COATED ORAL at 21:05

## 2018-12-23 RX ADMIN — INSULIN LISPRO 1 UNITS: 100 INJECTION, SOLUTION INTRAVENOUS; SUBCUTANEOUS at 21:08

## 2018-12-23 RX ADMIN — INSULIN GLARGINE 50 UNITS: 100 INJECTION, SOLUTION SUBCUTANEOUS at 21:04

## 2018-12-23 RX ADMIN — SODIUM CHLORIDE 125 ML/HR: 0.9 INJECTION, SOLUTION INTRAVENOUS at 13:13

## 2018-12-23 RX ADMIN — LISINOPRIL 10 MG: 10 TABLET ORAL at 08:27

## 2018-12-23 RX ADMIN — ONDANSETRON 4 MG: 4 TABLET, ORALLY DISINTEGRATING ORAL at 23:41

## 2018-12-23 RX ADMIN — POLYETHYLENE GLYCOL 3350 17 G: 17 POWDER, FOR SOLUTION ORAL at 13:13

## 2018-12-23 RX ADMIN — METOCLOPRAMIDE 10 MG: 5 INJECTION, SOLUTION INTRAMUSCULAR; INTRAVENOUS at 05:01

## 2018-12-23 RX ADMIN — MELATONIN 9 MG: 3 TAB ORAL at 21:05

## 2018-12-23 RX ADMIN — PANTOPRAZOLE SODIUM 40 MG: 40 INJECTION, POWDER, FOR SOLUTION INTRAVENOUS at 08:27

## 2018-12-23 RX ADMIN — METOCLOPRAMIDE 10 MG: 5 INJECTION, SOLUTION INTRAMUSCULAR; INTRAVENOUS at 17:08

## 2018-12-23 RX ADMIN — ENOXAPARIN SODIUM 40 MG: 40 INJECTION SUBCUTANEOUS at 08:27

## 2018-12-23 RX ADMIN — ONDANSETRON 8 MG: 2 INJECTION INTRAMUSCULAR; INTRAVENOUS at 04:27

## 2018-12-23 RX ADMIN — INSULIN LISPRO 1 UNITS: 100 INJECTION, SOLUTION INTRAVENOUS; SUBCUTANEOUS at 17:08

## 2018-12-23 RX ADMIN — INSULIN LISPRO 3 UNITS: 100 INJECTION, SOLUTION INTRAVENOUS; SUBCUTANEOUS at 08:27

## 2018-12-23 RX ADMIN — POLYETHYLENE GLYCOL 3350 17 G: 17 POWDER, FOR SOLUTION ORAL at 17:08

## 2018-12-23 RX ADMIN — MORPHINE SULFATE 60 MG: 30 TABLET, FILM COATED, EXTENDED RELEASE ORAL at 08:27

## 2018-12-23 NOTE — ASSESSMENT & PLAN NOTE
· Chronic pain on chronic opiate therapy  · Pt should follow up with pain management as an outpatient  · PDMP evaluated, pt receives medications from neurologist in [place], pt is on morphine ER 60 mg/15 mg (75 mg total) BID and morphine IR 15 mg TID PRN, she is on this due to chronic neck, back and knee pain per patient  · Pt was placed on decreased levels of morphine ER due to gastroparesis, would increase to 60 mg BID as this could be contributing to patient's tachycardia  Continue to monitor   Would advise patient to continue on this dose as outpatient follow up with pain management as this is contributing to her gastroparesis symptoms, she should be weaned as an outpatient as tolerated

## 2018-12-23 NOTE — ASSESSMENT & PLAN NOTE
· BP continues to be elevated, denies any previous history of hypertension  · Lisinopril increased to 10 mg, continue  · Mild improvement noted over the last 24 hours  · PRN lopressor for SBP >170   · Continue to monitor closely

## 2018-12-23 NOTE — ASSESSMENT & PLAN NOTE
· POA, however HR continues to improve, 90s-low 100s on telemetry today  · Upon review of previous records, pt has baseline mild tachycardia in the low 100s  · Continue IVF hydration, decrease rate, pt does not appear dehydrated on exam  · Pt asymptomatic   · Infectious etiology has been effectively ruled out at this point  · EKG on admission with sinus tachycardia  · TSH WNL here, would recommend repeat thyroid testing as outpatient  · Pt should follow up with her PCP regarding this if sustained

## 2018-12-23 NOTE — PROGRESS NOTES
[facility]  Progress Note - Trista Anderson 46 y o  female MRN: [id number]    Unit/Bed#: -01 Encounter: [id number]    Assessment/Plan:  Patient is a 18-year-old female with past medical history of insulin-dependent diabetes, history of gastroparesis, opiate dependence, history of GERD presented with nausea/vomiting/periumbilical pain  she has history of gastroparesis states this is a typical flare for her  Of concern is persistent tachycardia of unclear etiology  She was also identified to have mild indirect hyperbilirubinemia     Nausea/vomiting she says the symptoms are consistent with her previous flare of gastroparesis  Unfortunately last night she had a setback with more nausea this may be related to constipation  she has had extensive evaluation including ultrasound, CT scan of abdomen pelvis without any clear etiology  She is status post cholecystectomy  Nausea is likely secondary to gastroparesis seems to be more improved today  Discuss staying on metoclopramide and Zofran standing  If symptoms persist she will likely need endoscopic evaluation  Slowly advance diet as tolerated      Sinus tachycardia- etiology is unclear  She does not appear to be dehydrated  Agree with ruling out infectious cause for etiology especially due to leukocytosis  Mild elevation T bili of 2 7 is likely secondary to indirect hyperbilirubinemia with Gilbert's as likely source for this  Will check chronic hepatitis panel and autoimmune markers  To complete workup     Constipation- likely secondary to opioid use  Would start her MiraLax b i d  Subjective:   She had mild nausea yesterday this may be secondary to constipation and worsening of her gastroparesis  Last bowel was on admission  She continues to have tachycardia of unclear etiology  Objective:     Vitals: Blood pressure 163/92, pulse (!) 111, temperature 98 2 °F (36 8 °C), temperature source Oral, resp   rate 18, height 5' 8" (1 727 m), weight 132 kg (291 lb 0 1 oz), SpO2 96 %, not currently breastfeeding  ,Body mass index is 44 25 kg/m²        Intake/Output Summary (Last 24 hours) at 12/23/18 1245  Last data filed at 12/23/18 1017   Gross per 24 hour   Intake             1000 ml   Output             3100 ml   Net            -2100 ml       Physical Exam:    GEN: wn/wd, NAD  HEENT: MMM, no cervical or supraclavicular LAD, anciteric  CV: RRR, no m/r/g  CHEST: CTA b/l, no w/r/r  ABD: +BS, soft, NT/ND, no hepatosplenomegaly  EXT: no c/c/e  SKIN: no rashes  NEURO: aaox3      Invasive Devices     Peripheral Intravenous Line            Peripheral IV 12/21/18 Right Forearm 1 day                        Lab, Imaging and other studies:     Admission on 12/20/2018   Component Date Value    WBC 12/20/2018 11 67*    RBC 12/20/2018 5 15*    Hemoglobin 12/20/2018 13 6     Hematocrit 12/20/2018 41 7     MCV 12/20/2018 81*    MCH 12/20/2018 26 4*    MCHC 12/20/2018 32 6     RDW 12/20/2018 13 3     MPV 12/20/2018 12 8*    Platelets 43/10/6160 254     nRBC 12/20/2018 0     Neutrophils Relative 12/20/2018 83*    Immat GRANS % 12/20/2018 0     Lymphocytes Relative 12/20/2018 11*    Monocytes Relative 12/20/2018 4     Eosinophils Relative 12/20/2018 1     Basophils Relative 12/20/2018 1     Neutrophils Absolute 12/20/2018 9 76*    Immature Grans Absolute 12/20/2018 0 05     Lymphocytes Absolute 12/20/2018 1 24     Monocytes Absolute 12/20/2018 0 50     Eosinophils Absolute 12/20/2018 0 06     Basophils Absolute 12/20/2018 0 06     Sodium 12/20/2018 133*    Potassium 12/20/2018 4 1     Chloride 12/20/2018 96*    CO2 12/20/2018 26     ANION GAP 12/20/2018 11     BUN 12/20/2018 12     Creatinine 12/20/2018 0 79     Glucose 12/20/2018 313*    Calcium 12/20/2018 9 3     eGFR 12/20/2018 85     Total Bilirubin 12/20/2018 1 40*    Bilirubin, Direct 12/20/2018 0 22*    Alkaline Phosphatase 12/20/2018 141*    AST 12/20/2018 23  ALT 12/20/2018 35     Total Protein 12/20/2018 8 2     Albumin 12/20/2018 3 6     Lipase 12/20/2018 58*    LACTIC ACID 12/20/2018 1 0     Color, UA 12/21/2018 Yellow     Clarity, UA 12/21/2018 Slightly Cloudy     Specific Gravity, UA 12/21/2018 1 020     pH, UA 12/21/2018 6 0     Leukocytes, UA 12/21/2018 Negative     Nitrite, UA 12/21/2018 Negative     Protein, UA 12/21/2018 >=300*    Glucose, UA 12/21/2018 500 (1/2%)*    Ketones, UA 12/21/2018 >=80 (3+)*    Urobilinogen, UA 12/21/2018 0 2     Bilirubin, UA 12/21/2018 Negative     Blood, UA 12/21/2018 Moderate*    RBC, UA 12/21/2018 4-10*    WBC, UA 12/21/2018 None Seen     Epithelial Cells 12/21/2018 Occasional     Bacteria, UA 12/21/2018 Occasional     TSH 3RD GENERATON 12/21/2018 0 717     Sodium 12/21/2018 133*    Potassium 12/21/2018 3 9     Chloride 12/21/2018 97*    CO2 12/21/2018 23     ANION GAP 12/21/2018 13     BUN 12/21/2018 9     Creatinine 12/21/2018 0 66     Glucose 12/21/2018 272*    Calcium 12/21/2018 9 2     AST 12/21/2018 19     ALT 12/21/2018 32     Alkaline Phosphatase 12/21/2018 140*    Total Protein 12/21/2018 8 3*    Albumin 12/21/2018 3 7     Total Bilirubin 12/21/2018 1 80*    eGFR 12/21/2018 100     Magnesium 12/21/2018 1 6     Phosphorus 12/21/2018 2 8     WBC 12/21/2018 14 03*    RBC 12/21/2018 5 20*    Hemoglobin 12/21/2018 13 9     Hematocrit 12/21/2018 42 1     MCV 12/21/2018 81*    MCH 12/21/2018 26 7*    MCHC 12/21/2018 33 0     RDW 12/21/2018 13 2     MPV 12/21/2018 12 6     Platelets 12/34/3187 251     nRBC 12/21/2018 0     Neutrophils Relative 12/21/2018 79*    Immat GRANS % 12/21/2018 0     Lymphocytes Relative 12/21/2018 15     Monocytes Relative 12/21/2018 5     Eosinophils Relative 12/21/2018 0     Basophils Relative 12/21/2018 1     Neutrophils Absolute 12/21/2018 11 03*    Immature Grans Absolute 12/21/2018 0 06     Lymphocytes Absolute 12/21/2018 2 15     Monocytes Absolute 12/21/2018 0 68     Eosinophils Absolute 12/21/2018 0 03     Basophils Absolute 12/21/2018 0 08     Protime 12/21/2018 13 7     INR 12/21/2018 1 06     Hemoglobin A1C 12/21/2018 9 7*    EAG 12/21/2018 232     POC Glucose 12/21/2018 276*    Bilirubin, Direct 12/21/2018 0 26*    POC Glucose 12/21/2018 246*    Blood Culture 12/21/2018 No Growth at 24 hrs   Blood Culture 12/21/2018 No Growth at 24 hrs       POC Glucose 12/21/2018 241*    Ventricular Rate 12/21/2018 111     Atrial Rate 12/21/2018 111     MA Interval 12/21/2018 140     QRSD Interval 12/21/2018 78     QT Interval 12/21/2018 358     QTC Interval 12/21/2018 486     P Axis 12/21/2018 59     QRS Axis 12/21/2018 -13     T Wave Axis 12/21/2018 29     POC Glucose 12/21/2018 276*    Sed Rate 12/22/2018 16     CRP 12/22/2018 5 2*    Sodium 12/22/2018 135*    Potassium 12/22/2018 3 5     Chloride 12/22/2018 98*    CO2 12/22/2018 26     ANION GAP 12/22/2018 11     BUN 12/22/2018 10     Creatinine 12/22/2018 0 70     Glucose 12/22/2018 235*    Calcium 12/22/2018 9 1     AST 12/22/2018 16     ALT 12/22/2018 24     Alkaline Phosphatase 12/22/2018 125*    Total Protein 12/22/2018 7 4     Albumin 12/22/2018 3 3*    Total Bilirubin 12/22/2018 2 40*    eGFR 12/22/2018 99     WBC 12/22/2018 12 13*    RBC 12/22/2018 5 21*    Hemoglobin 12/22/2018 13 9     Hematocrit 12/22/2018 41 9     MCV 12/22/2018 80*    MCH 12/22/2018 26 7*    MCHC 12/22/2018 33 2     RDW 12/22/2018 13 2     MPV 12/22/2018 12 8*    Platelets 01/29/2575 244     nRBC 12/22/2018 0     Neutrophils Relative 12/22/2018 73     Immat GRANS % 12/22/2018 1     Lymphocytes Relative 12/22/2018 16     Monocytes Relative 12/22/2018 8     Eosinophils Relative 12/22/2018 1     Basophils Relative 12/22/2018 1     Neutrophils Absolute 12/22/2018 8 97*    Immature Grans Absolute 12/22/2018 0 06     Lymphocytes Absolute 12/22/2018 1 91     Monocytes Absolute 12/22/2018 0 99     Eosinophils Absolute 12/22/2018 0 13     Basophils Absolute 12/22/2018 0 07     POC Glucose 12/22/2018 245*    POC Glucose 12/22/2018 380*    Procalcitonin 12/22/2018 0 07     POC Glucose 12/22/2018 246*    POC Glucose 12/22/2018 271*    Sodium 12/23/2018 135*    Potassium 12/23/2018 3 4*    Chloride 12/23/2018 98*    CO2 12/23/2018 29     ANION GAP 12/23/2018 8     BUN 12/23/2018 10     Creatinine 12/23/2018 0 78     Glucose 12/23/2018 191*    Calcium 12/23/2018 9 4     AST 12/23/2018 20     ALT 12/23/2018 29     Alkaline Phosphatase 12/23/2018 132*    Total Protein 12/23/2018 7 6     Albumin 12/23/2018 3 4*    Total Bilirubin 12/23/2018 2 70*    eGFR 12/23/2018 86     WBC 12/23/2018 13 61*    RBC 12/23/2018 5 44*    Hemoglobin 12/23/2018 14 4     Hematocrit 12/23/2018 44 2     MCV 12/23/2018 81*    MCH 12/23/2018 26 5*    MCHC 12/23/2018 32 6     RDW 12/23/2018 13 3     Platelets 33/61/2987 274     MPV 12/23/2018 12 4     POC Glucose 12/23/2018 253*    POC Glucose 12/23/2018 192*         I have personally reviewed pertinent reports        Current Facility-Administered Medications   Medication Dose Route Frequency    enoxaparin (LOVENOX) subcutaneous injection 40 mg  40 mg Subcutaneous Daily    insulin glargine (LANTUS) subcutaneous injection 50 Units 0 5 mL  50 Units Subcutaneous HS    insulin lispro (HumaLOG) 100 units/mL subcutaneous injection 1-6 Units  1-6 Units Subcutaneous HS    insulin lispro (HumaLOG) 100 units/mL subcutaneous injection 1-6 Units  1-6 Units Subcutaneous TID AC    lisinopril (ZESTRIL) tablet 10 mg  10 mg Oral Daily    melatonin tablet 9 mg  9 mg Oral HS    metoclopramide (REGLAN) injection 10 mg  10 mg Intravenous Q6H [facility] & longterm    metoprolol (LOPRESSOR) injection 5 mg  5 mg Intravenous Q6H PRN    montelukast (SINGULAIR) tablet 10 mg  10 mg Oral HS    morphine (MS CONTIN) ER tablet 60 mg  60 mg Oral Q12H [facility] & longterm    morphine (MSIR) IR tablet 15 mg  15 mg Oral TID PRN    ondansetron (ZOFRAN) 8 mg in sodium chloride 0 9 % 50 mL IVPB  8 mg Intravenous Q4H PRN    pantoprazole (PROTONIX) injection 40 mg  40 mg Intravenous Q24H BOLIVAR    potassium chloride 20 mEq IVPB (premix)  20 mEq Intravenous Once    sodium chloride 0 9 % infusion  125 mL/hr Intravenous Continuous

## 2018-12-23 NOTE — ASSESSMENT & PLAN NOTE
· Likely secondary to gastoparesis vs  Cyclical vomiting syndrome, pt reports she is unable to tolerate solid foods, she reports increased nausea today  · GI following, appreciate additional recommendations  · Currently on clear liquid diet, advance as tolerated to soft foods for dinner tonight, however patient is hesitant to this  · No additional episodes of vomiting  · Blood cultures negative x 24 hours

## 2018-12-23 NOTE — ASSESSMENT & PLAN NOTE
· Appears chronic per review of records, etiology unknown  · UA negative, CT abdomen/pelvis without acute findings, pt denies any respiratory symptoms  · Blood cultures negative x 24 hours  · Infectious etiology has been ruled out  · Pt does not have any fevers currently  · Procalcitonin is negative

## 2018-12-23 NOTE — ASSESSMENT & PLAN NOTE
· T  Bili increased to 2 7 today  · Abdominal exam benign  · RUQ US normal  · Pt is s/p cholecystectomy a few weeks ago  · GI following,  · Believe this could be related to underlying Gilbert's syndrome  · No acute intervention needed at this time

## 2018-12-23 NOTE — ASSESSMENT & PLAN NOTE
· Likely cause of N/V vs  Cyclical vomiting syndrome  · Continue Reglan 10 mg Q6H, Protonix 40 mg daily  · Pt on chronic opioid medications, likely exacerbating symptoms, discussed this with patient at bedside regarding pain management  She needs to follow up with her pain management physician in [place] regarding this, pt agreeable    · GI following,  · RUQ US - Normal post-cholecystectomy imaging  · Blood cultures obtained negative x 24 hours  · Tachycardia has been improving, could be patient's baseline, review of outpatient records reveals baseline HR in the low 100s  · Pt was placed on standing zofran with BID miralax per GI due to constipation (secondary to narcotic use), monitor for bowel movement   · Continue clear liquid diet, advance as tolerated per GI

## 2018-12-23 NOTE — PROGRESS NOTES
Progress Note - Wilmer Ritter 1965, 46 y o  female MRN: [id number]    Unit/Bed#: -01 Encounter: [id number]    Primary Care Provider: Gino Johnson   Date and time admitted to hospital: 12/20/2018  6:36 PM      DOS: 12/23/2018      * Gastroparesis   Assessment & Plan    · Likely cause of N/V vs  Cyclical vomiting syndrome  · Continue Reglan 10 mg Q6H, Protonix 40 mg daily  · Pt on chronic opioid medications, likely exacerbating symptoms, discussed this with patient at bedside regarding pain management  She needs to follow up with her pain management physician in [place] regarding this, pt agreeable  · GI following,  · RUQ US - Normal post-cholecystectomy imaging  · Blood cultures obtained negative x 24 hours  · Tachycardia has been improving, could be patient's baseline, review of outpatient records reveals baseline HR in the low 100s  · Pt was placed on standing zofran with BID miralax per GI due to constipation (secondary to narcotic use), monitor for bowel movement   · Continue clear liquid diet, advance as tolerated per GI     Tachycardia   Assessment & Plan    · POA, however HR continues to improve, 90s-low 100s on telemetry today  · Upon review of previous records, pt has baseline mild tachycardia in the low 100s  · Continue IVF hydration, decrease rate, pt does not appear dehydrated on exam  · Pt asymptomatic   · Infectious etiology has been effectively ruled out at this point  · EKG on admission with sinus tachycardia  · TSH WNL here, would recommend repeat thyroid testing as outpatient  · Pt should follow up with her PCP regarding this if sustained     Hyperbilirubinemia   Assessment & Plan    · T   Bili increased to 2 7 today  · Abdominal exam benign  · RUQ US normal  · Pt is s/p cholecystectomy a few weeks ago  · GI following,  · Believe this could be related to underlying Gilbert's syndrome  · No acute intervention needed at this time     Leukocytosis   Assessment & Plan    · Appears chronic per review of records, etiology unknown  · UA negative, CT abdomen/pelvis without acute findings, pt denies any respiratory symptoms  · Blood cultures negative x 24 hours  · Infectious etiology has been ruled out  · Pt does not have any fevers currently  · Procalcitonin is negative     Morbid obesity (HCC)   Assessment & Plan    · BMI 44 15  · Encourage lifestyle modification and weight loss     Essential hypertension   Assessment & Plan    · BP continues to be elevated, denies any previous history of hypertension  · Lisinopril increased to 10 mg, continue  · Mild improvement noted over the last 24 hours  · PRN lopressor for SBP >170   · Continue to monitor closely     Intractable nausea and vomiting   Assessment & Plan    · Likely secondary to gastoparesis vs  Cyclical vomiting syndrome, pt reports she is unable to tolerate solid foods, she reports increased nausea today  · GI following, appreciate additional recommendations  · Currently on clear liquid diet, advance as tolerated to soft foods for dinner tonight, however patient is hesitant to this  · No additional episodes of vomiting  · Blood cultures negative x 24 hours     Opioid dependence ([address] )   Assessment & Plan    · Chronic pain on chronic opiate therapy  · Pt should follow up with pain management as an outpatient  · PDMP evaluated, pt receives medications from neurologist in [place], pt is on morphine ER 60 mg/15 mg (75 mg total) BID and morphine IR 15 mg TID PRN, she is on this due to chronic neck, back and knee pain per patient  · Pt was placed on decreased levels of morphine ER due to gastroparesis, would increase to 60 mg BID as this could be contributing to patient's tachycardia  Continue to monitor   Would advise patient to continue on this dose as outpatient follow up with pain management as this is contributing to her gastroparesis symptoms, she should be weaned as an outpatient as tolerated     Diabetes mellitus [facility])   Assessment & Plan    Lab Results Component Value Date    HGBA1C 9 7 (H) 2018       Recent Labs      18   1608  18   2125  18   0630  18   1105   POCGLU  246*  271*  253*  192*       Blood Sugar Average: Last 72 hrs:  (P) 262 6   · Uncontrolled at baseline  · Continue home dose of lantus 50 units QHS   · Continue SSI coverage with meals  · QID glucose checks       VTE Pharmacologic Prophylaxis:   Pharmacologic: Enoxaparin (Lovenox)  Mechanical VTE Prophylaxis in Place: No    Patient Centered Rounds: I have evaluated patient without nursing staff present due to Speaking to nurse outside patient's room    Discussions with Specialists or Other Care Team Provider: Discussed with GI, RN and reviewed previous notes    Education and Discussions with Family / Patient: Discussed with patient at bedside, when asked to update friends or family members patient politely declined  Time Spent for Care: 30 minutes  More than 50% of total time spent on counseling and coordination of care as described above  Current Length of Stay: 2 day(s)    Current Patient Status: Inpatient   Certification Statement: The patient will continue to require additional inpatient hospital stay due to toleration of diet, improvement of nausea and constipation    Discharge Plan: Pending GI clearance, not medically stable, continues to report worsening nausea, not tolerating solid foods  Code Status: Level 1 - Full Code      Subjective:   Pt reports that she tried to eat solid food last night and had extreme nausea  Denies any vomiting  Currently denies any lightheadedness, dizziness, chest pain, shortness of breath, abdominal pain  Denies any recent bowel movement  No urinary symptoms  Concerned that she cannot eat solid food as the smell is making her nauseous  Explained to patient that GI would like her to trial soft foods tonight, she is hesitant        Objective:     Vitals:   Temp (24hrs), Av 1 °F (36 7 °C), Min:97 8 °F (36 6 °C), Max:98 3 °F (36 8 °C)    Temp:  [97 8 °F (36 6 °C)-98 3 °F (36 8 °C)] 98 3 °F (36 8 °C)  HR:  [] 107  Resp:  [18-20] 18  BP: (137-163)/(84-95) 160/95  SpO2:  [94 %-96 %] 96 %  Body mass index is 44 25 kg/m²  Input and Output Summary (last 24 hours): Intake/Output Summary (Last 24 hours) at 12/23/18 1654  Last data filed at 12/23/18 1325   Gross per 24 hour   Intake             2000 ml   Output             3150 ml   Net            -1150 ml       Physical Exam:     Physical Exam   Constitutional: No distress  Pt is in no acute distress lying in her hospital bed resting comfortably  Very flat affect  HENT:   Head: Normocephalic and atraumatic  Eyes: Conjunctivae are normal  No scleral icterus  Cardiovascular: Regular rhythm and intact distal pulses  Mild tachycardia, improved   Pulmonary/Chest: Effort normal and breath sounds normal  No respiratory distress  She has no wheezes  She has no rales  Abdominal: Soft  Bowel sounds are normal  She exhibits no distension  There is no tenderness  There is no rebound  Musculoskeletal: She exhibits no edema  Neurological: She is alert  Skin: Skin is warm and dry  She is not diaphoretic  No erythema  Psychiatric:   Flat affect, does not show much emotion   Vitals reviewed  Additional Data:     Labs:      Results from last 7 days  Lab Units 12/23/18  0431 12/22/18  0440   WBC Thousand/uL 13 61* 12 13*   HEMOGLOBIN g/dL 14 4 13 9   HEMATOCRIT % 44 2 41 9   PLATELETS Thousands/uL 274 244   NEUTROS PCT %  --  73   LYMPHS PCT %  --  16   MONOS PCT %  --  8   EOS PCT %  --  1       Results from last 7 days  Lab Units 12/23/18  0431   POTASSIUM mmol/L 3 4*   CHLORIDE mmol/L 98*   CO2 mmol/L 29   BUN mg/dL 10   CREATININE mg/dL 0 78   CALCIUM mg/dL 9 4   ALK PHOS U/L 132*   ALT U/L 29   AST U/L 20       Results from last 7 days  Lab Units 12/21/18  0508   INR  1 06       * I Have Reviewed All Lab Data Listed Above    * Additional Pertinent Lab Tests Reviewed: All Labs Within Last 24 Hours Reviewed    Imaging:    Imaging Reports Reviewed Today Include: None  Imaging Personally Reviewed by Myself Includes:  None    Recent Cultures (last 7 days):       Results from last 7 days  Lab Units 12/21/18  1439 12/21/18  1437   BLOOD CULTURE  No Growth at 24 hrs  No Growth at 24 hrs  Last 24 Hours Medication List:     Current Facility-Administered Medications:  enoxaparin 40 mg Subcutaneous Daily Jayant Bradshaw DO    insulin glargine 50 Units Subcutaneous HS Linda Hunt PA-C    insulin lispro 1-6 Units Subcutaneous HS Linda Hunt PA-C    insulin lispro 1-6 Units Subcutaneous TID AC Linda Hunt PA-C    lisinopril 10 mg Oral Daily Linda Hunt PA-C    melatonin 9 mg Oral HS Linda Hunt PA-C    metoclopramide 10 mg Intravenous Q6H [address] Jonatan Stephens,     metoprolol 5 mg Intravenous Q6H PRN Jonelle Westbrook PA-C    montelukast 10 mg Oral HS Jonatan Stephens,     morphine 60 mg Oral Q12H [address] Jonelle Westbrook PA-C    morphine 15 mg Oral TID PRN Jonatan Stephens DO    ondansetron 8 mg Intravenous Q4H PRN Jonatan Stephens, DO Last Rate: 8 mg (12/23/18 0427)   ondansetron 4 mg Oral Q6H [address] Augie Vazquez MD    pantoprazole 40 mg Intravenous Q24H [address] Jonatan Stephens DO    polyethylene glycol 17 g Oral BID Augie Vazquez MD    sodium chloride 125 mL/hr Intravenous Continuous Jonatan Stephens, DO Last Rate: 125 mL/hr (12/23/18 1313)        Today, Patient Was Seen By: Jonelle Westbrook PA-C    ** Please Note: Dictation voice to text software may have been used in the creation of this document   **

## 2018-12-23 NOTE — ASSESSMENT & PLAN NOTE
Lab Results   Component Value Date    HGBA1C 9 7 (H) 12/21/2018       Recent Labs      12/22/18   1608  12/22/18   2125  12/23/18   0630  12/23/18   1105   POCGLU  246*  271*  253*  192*       Blood Sugar Average: Last 72 hrs:  (P) 262 6   · Uncontrolled at baseline  · Continue home dose of lantus 50 units QHS   · Continue SSI coverage with meals  · QID glucose checks

## 2018-12-24 VITALS
BODY MASS INDEX: 44.1 KG/M2 | WEIGHT: 291.01 LBS | SYSTOLIC BLOOD PRESSURE: 105 MMHG | OXYGEN SATURATION: 92 % | RESPIRATION RATE: 18 BRPM | HEIGHT: 68 IN | DIASTOLIC BLOOD PRESSURE: 57 MMHG | HEART RATE: 96 BPM | TEMPERATURE: 97.8 F

## 2018-12-24 LAB
ALBUMIN SERPL BCP-MCNC: 2.8 G/DL (ref 3.5–5)
ALP SERPL-CCNC: 118 U/L (ref 46–116)
ALT SERPL W P-5'-P-CCNC: 23 U/L (ref 12–78)
ANION GAP SERPL CALCULATED.3IONS-SCNC: 9 MMOL/L (ref 4–13)
AST SERPL W P-5'-P-CCNC: 19 U/L (ref 5–45)
BILIRUB SERPL-MCNC: 2.5 MG/DL (ref 0.2–1)
BUN SERPL-MCNC: 11 MG/DL (ref 5–25)
CALCIUM SERPL-MCNC: 8.8 MG/DL (ref 8.3–10.1)
CHLORIDE SERPL-SCNC: 103 MMOL/L (ref 100–108)
CO2 SERPL-SCNC: 26 MMOL/L (ref 21–32)
CREAT SERPL-MCNC: 0.75 MG/DL (ref 0.6–1.3)
ERYTHROCYTE [DISTWIDTH] IN BLOOD BY AUTOMATED COUNT: 14.2 % (ref 11.6–15.1)
GFR SERPL CREATININE-BSD FRML MDRD: 91 ML/MIN/1.73SQ M
GLUCOSE SERPL-MCNC: 100 MG/DL (ref 65–140)
GLUCOSE SERPL-MCNC: 102 MG/DL (ref 65–140)
GLUCOSE SERPL-MCNC: 191 MG/DL (ref 65–140)
HCT VFR BLD AUTO: 38.4 % (ref 34.8–46.1)
HGB BLD-MCNC: 12.8 G/DL (ref 11.5–15.4)
MCH RBC QN AUTO: 27 PG (ref 26.8–34.3)
MCHC RBC AUTO-ENTMCNC: 33.3 G/DL (ref 31.4–37.4)
MCV RBC AUTO: 81 FL (ref 82–98)
PLATELET # BLD AUTO: 250 THOUSANDS/UL (ref 149–390)
PMV BLD AUTO: 13.2 FL (ref 8.9–12.7)
POTASSIUM SERPL-SCNC: 3.3 MMOL/L (ref 3.5–5.3)
PROT SERPL-MCNC: 6.2 G/DL (ref 6.4–8.2)
RBC # BLD AUTO: 4.74 MILLION/UL (ref 3.81–5.12)
SODIUM SERPL-SCNC: 138 MMOL/L (ref 136–145)
WBC # BLD AUTO: 11.1 THOUSAND/UL (ref 4.31–10.16)

## 2018-12-24 PROCEDURE — 99239 HOSP IP/OBS DSCHRG MGMT >30: CPT | Performed by: PHYSICIAN ASSISTANT

## 2018-12-24 PROCEDURE — C9113 INJ PANTOPRAZOLE SODIUM, VIA: HCPCS | Performed by: INTERNAL MEDICINE

## 2018-12-24 PROCEDURE — 82948 REAGENT STRIP/BLOOD GLUCOSE: CPT

## 2018-12-24 PROCEDURE — 85027 COMPLETE CBC AUTOMATED: CPT | Performed by: PHYSICIAN ASSISTANT

## 2018-12-24 PROCEDURE — 80053 COMPREHEN METABOLIC PANEL: CPT | Performed by: PHYSICIAN ASSISTANT

## 2018-12-24 RX ORDER — LISINOPRIL 10 MG/1
10 TABLET ORAL DAILY
Qty: 30 TABLET | Refills: 0 | Status: SHIPPED | OUTPATIENT
Start: 2018-12-25 | End: 2020-06-29 | Stop reason: HOSPADM

## 2018-12-24 RX ORDER — MORPHINE SULFATE 60 MG/1
60 TABLET, FILM COATED, EXTENDED RELEASE ORAL EVERY 12 HOURS SCHEDULED
Refills: 0
Start: 2018-12-24 | End: 2019-01-03

## 2018-12-24 RX ORDER — POTASSIUM CHLORIDE 20 MEQ/1
20 TABLET, EXTENDED RELEASE ORAL ONCE
Status: COMPLETED | OUTPATIENT
Start: 2018-12-24 | End: 2018-12-24

## 2018-12-24 RX ADMIN — ONDANSETRON 4 MG: 4 TABLET, ORALLY DISINTEGRATING ORAL at 05:19

## 2018-12-24 RX ADMIN — METOCLOPRAMIDE 10 MG: 5 INJECTION, SOLUTION INTRAMUSCULAR; INTRAVENOUS at 05:19

## 2018-12-24 RX ADMIN — POLYETHYLENE GLYCOL 3350 17 G: 17 POWDER, FOR SOLUTION ORAL at 09:25

## 2018-12-24 RX ADMIN — METOCLOPRAMIDE 10 MG: 5 INJECTION, SOLUTION INTRAMUSCULAR; INTRAVENOUS at 12:15

## 2018-12-24 RX ADMIN — PANTOPRAZOLE SODIUM 40 MG: 40 INJECTION, POWDER, FOR SOLUTION INTRAVENOUS at 09:26

## 2018-12-24 RX ADMIN — ONDANSETRON 4 MG: 4 TABLET, ORALLY DISINTEGRATING ORAL at 12:15

## 2018-12-24 RX ADMIN — INSULIN LISPRO 2 UNITS: 100 INJECTION, SOLUTION INTRAVENOUS; SUBCUTANEOUS at 12:20

## 2018-12-24 RX ADMIN — ENOXAPARIN SODIUM 40 MG: 40 INJECTION SUBCUTANEOUS at 09:25

## 2018-12-24 RX ADMIN — POTASSIUM CHLORIDE 20 MEQ: 1500 TABLET, EXTENDED RELEASE ORAL at 14:13

## 2018-12-24 RX ADMIN — MORPHINE SULFATE 60 MG: 30 TABLET, FILM COATED, EXTENDED RELEASE ORAL at 09:24

## 2018-12-24 NOTE — ASSESSMENT & PLAN NOTE
Lab Results   Component Value Date    HGBA1C 9 7 (H) 12/21/2018       Recent Labs      12/23/18   2103  12/23/18   2343  12/24/18   0618  12/24/18   1054   POCGLU  167*  148*  100  191*       Blood Sugar Average: Last 72 hrs:  (P) 213 625   · Uncontrolled at baseline  · Continue home dose of lantus 50 units QHS   · Continue SSI coverage with meals

## 2018-12-24 NOTE — UTILIZATION REVIEW
Continued Stay Review    Date: 12/24    Vital Signs: /57 (BP Location: Right arm)   Pulse 96   Temp 97 8 °F (36 6 °C) (Oral)   Resp 18   Ht 5' 8" (1 727 m)   Wt 132 kg (291 lb 0 1 oz)   SpO2 92%   BMI 44 25 kg/m²     Medications:   Scheduled Meds:   Current Facility-Administered Medications:  enoxaparin 40 mg Subcutaneous Daily     insulin glargine 50 Units Subcutaneous HS     insulin lispro 1-6 Units Subcutaneous HS     insulin lispro 1-6 Units Subcutaneous TID AC     lisinopril 10 mg Oral Daily     melatonin 9 mg Oral HS     metoclopramide 10 mg Intravenous Q6H [address]     metoprolol 5 mg Intravenous Q6H PRN     montelukast 10 mg Oral HS     morphine 60 mg Oral Q12H BOLIVAR     morphine 15 mg Oral TID PRN     ondansetron 8 mg Intravenous Q4H PRN  Last Rate: 8 mg (12/23/18 0427)   ondansetron 4 mg Oral Q6H BOLIVAR     pantoprazole 40 mg Intravenous Q24H [address]     polyethylene glycol 17 g Oral BID     sodium chloride 75 mL/hr Intravenous Continuous  Last Rate: 75 mL/hr (12/23/18 1713)       Abnormal Labs/Diagnostic Results:  K   3 3  Alkaline Phosphatase 46 - 116 U/L 118   H    Total Protein 6 4 - 8 2 g/dL 6 2   L    Albumin 3 5 - 5 0 g/dL 2 8   L    Total Bilirubin 0 20 - 1 00 mg/dL 2 50   H    Wbc  11 10  Gluc   191      Age/Sex: 46 y o  female     Assessment/Plan:  Note from  12/23  * Gastroparesis   Assessment & Plan     · Likely cause of N/V vs  Cyclical vomiting syndrome  · Continue Reglan 10 mg Q6H, Protonix 40 mg daily  · Pt on chronic opioid medications, likely exacerbating symptoms, discussed this with patient at bedside regarding pain management  She needs to follow up with her pain management physician in [place] regarding this, pt agreeable  · GI following,  ?  RUQ US - Normal post-cholecystectomy imaging  § Blood cultures obtained negative x 24 hours  § Tachycardia has been improving, could be patient's baseline, review of outpatient records reveals baseline HR in the low 100s  § Pt was placed on standing zofran with BID miralax per GI due to constipation (secondary to narcotic use), monitor for bowel movement   ? Continue clear liquid diet, advance as tolerated per GI      Tachycardia   Assessment & Plan     · POA, however HR continues to improve, 90s-low 100s on telemetry today  · Upon review of previous records, pt has baseline mild tachycardia in the low 100s  · Continue IVF hydration, decrease rate, pt does not appear dehydrated on exam  · Pt asymptomatic   · Infectious etiology has been effectively ruled out at this point  · EKG on admission with sinus tachycardia  · TSH WNL here, would recommend repeat thyroid testing as outpatient  · Pt should follow up with her PCP regarding this if sustained      Hyperbilirubinemia   Assessment & Plan     · T  Bili increased to 2 7 today  · Abdominal exam benign  · RUQ US normal  · Pt is s/p cholecystectomy a few weeks ago  · GI following,  ? Believe this could be related to underlying Gilbert's syndrome  ?  No acute intervention needed at this time      Leukocytosis   Assessment & Plan     · Appears chronic per review of records, etiology unknown  · UA negative, CT abdomen/pelvis without acute findings, pt denies any respiratory symptoms  · Blood cultures negative x 24 hours  · Infectious etiology has been ruled out  · Pt does not have any fevers currently  · Procalcitonin is negative      Morbid obesity (HCC)   Assessment & Plan     · BMI 44 15  · Encourage lifestyle modification and weight loss      Essential hypertension   Assessment & Plan     · BP continues to be elevated, denies any previous history of hypertension  · Lisinopril increased to 10 mg, continue  · Mild improvement noted over the last 24 hours  · PRN lopressor for SBP >170   · Continue to monitor closely      Intractable nausea and vomiting   Assessment & Plan     · Likely secondary to gastoparesis vs  Cyclical vomiting syndrome, pt reports she is unable to tolerate solid foods, she reports increased nausea today  · GI following, appreciate additional recommendations  ? Currently on clear liquid diet, advance as tolerated to soft foods for dinner tonight, however patient is hesitant to this  ? No additional episodes of vomiting  ? Blood cultures negative x 24 hours      Opioid dependence ([facility] [address] )   Assessment & Plan     · Chronic pain on chronic opiate therapy  · Pt should follow up with pain management as an outpatient  · PDMP evaluated, pt receives medications from neurologist in [place], pt is on morphine ER 60 mg/15 mg (75 mg total) BID and morphine IR 15 mg TID PRN, she is on this due to chronic neck, back and knee pain per patient  · Pt was placed on decreased levels of morphine ER due to gastroparesis, would increase to 60 mg BID as this could be contributing to patient's tachycardia  Continue to monitor   Would advise patient to continue on this dose as outpatient follow up with pain management as this is contributing to her gastroparesis symptoms, she should be weaned as an outpatient as tolerated      Diabetes mellitus [facility])   Assessment & Plan             Lab Results   Component Value Date     HGBA1C 9 7 (H) 12/21/2018                Recent Labs      12/22/18   1608  12/22/18   2125  12/23/18   0630  12/23/18   1105   POCGLU  246*  271*  253*  192*         Blood Sugar Average: Last 72 hrs:  (P) 262 6   · Uncontrolled at baseline  · Continue home dose of lantus 50 units QHS   · Continue SSI coverage with meals  · QID glucose checks         VTE Pharmacologic Prophylaxis:   Pharmacologic: Enoxaparin (Lovenox)  Mechanical VTE Prophylaxis in Place: No     Patient Centered Rounds: I have evaluated patient without nursing staff present due to Speaking to nurse outside patient's room     Discussions with Specialists or Other Care Team Provider: Discussed with GI, RN and reviewed previous notes     Education and Discussions with Family / Patient: Discussed with patient at bedside, when asked to update friends or family members patient politely declined       Time Spent for Care: 30 minutes    More than 50% of total time spent on counseling and coordination of care as described above      Current Length of Stay: 2 day(s)     Current Patient Status: Inpatient   Certification Statement: The patient will continue to require additional inpatient hospital stay due to toleration of diet, improvement of nausea and constipation      Discharge Plan:   TBd

## 2018-12-24 NOTE — DISCHARGE SUMMARY
Discharge- Ladarius Few 1965, 46 y o  female MRN: [id number]    Unit/Bed#: -01 Encounter: [id number]    Primary Care Provider: Ramya Cohen   Date and time admitted to hospital: 12/20/2018  6:36 PM       DOS: 12/24/2018      * Gastroparesis   Assessment & Plan    · Likely cause of N/V vs  Cyclical vomiting syndrome  · Continue Reglan 10 mg Q6H, Protonix 40 mg daily  · Pt on chronic opioid medications, likely exacerbating symptoms, discussed this with patient at bedside regarding pain management  She needs to follow up with her pain management physician in [place] regarding this, pt agreeable  · GI following,  · RUQ US - Normal post-cholecystectomy imaging  · Blood cultures obtained negative x 48 hours  · Tachycardia now resolved  · Pt was placed on standing zofran with BID miralax per GI due to constipation, bowel movement noted overnight with improvement of symptoms  · Discussed with GI, would continue scheduled reglan, PRN zofran and miralax 1-2x a day  · Continue clear liquid diet, advance as tolerated     Tachycardia   Assessment & Plan    · POA, resolved  · Upon review of previous records, pt has baseline mild tachycardia in the low 100s  · Pt asymptomatic   · Infectious etiology has been effectively ruled out at this point  · EKG on admission with sinus tachycardia  · TSH WNL here, would recommend repeat thyroid testing as outpatient  · Could have been secondary to sudden decrease in scheduled morphine doses, plan as above     Hyperbilirubinemia   Assessment & Plan    · T   Bili stable at 2 5 today  · Abdominal exam benign  · RUQ US normal  · Pt is s/p cholecystectomy a few weeks ago  · GI following,  · Believe this could be related to underlying Gilbert's syndrome  · No acute intervention needed at this time     Leukocytosis   Assessment & Plan    · Appears chronic per review of records, etiology unknown  · UA negative, CT abdomen/pelvis without acute findings, pt denies any respiratory symptoms  · Blood cultures negative x 48 hours  · Infectious etiology has been ruled out  · Pt does not have any fevers currently  · Procalcitonin is negative  · Improved today, follow up with PCP/hematology as needed     Morbid obesity ([address] )   Assessment & Plan    · BMI 44 15  · Encourage lifestyle modification and weight loss     Essential hypertension   Assessment & Plan    · BP with much improvement today  · Continue Lisinopril 10 mg daily  · Follow up with PCP regarding additional hypertensive management     Intractable nausea and vomiting   Assessment & Plan    · Likely secondary to gastoparesis vs  Cyclical vomiting syndrome, nausea improved today  · GI following,  · Currently on clear liquid diet, advance as tolerated, pt reports she will stick to liquids/soft foods as able at home and advance as she continues to feel better  · No additional episodes of vomiting  · Blood cultures negative x 48 hours     Opioid dependence ([address] )   Assessment & Plan    · Chronic pain on chronic opiate therapy  · Pt should follow up with pain management as an outpatient  · PDMP evaluated, pt receives medications from neurologist in [place], pt is on morphine ER 60 mg/15 mg (75 mg total) BID and morphine IR 15 mg TID PRN, she is on this due to chronic neck, back and knee pain per patient  · Pt was placed on decreased levels of morphine ER due to gastroparesis, increased to 60 mg BID  Continue to monitor  Would advise patient to continue on this dose as outpatient follow up with pain management as this is contributing to her gastroparesis symptoms, she should be weaned as an outpatient as tolerated  Pt agreeable to this plan       Hypokalemia   Assessment & Plan    · Mild, 3 3  · Replete with PO KDUR  · Likely secondary to decreased PO intake  · Pt advised to follow up with BMP as outpatient to monitor     Diabetes mellitus [facility])   Assessment & Plan    Lab Results   Component Value Date    HGBA1C 9 7 (H) 12/21/2018       Recent Labs 12/23/18   2103  12/23/18   2343  12/24/18   0618  12/24/18   1054   POCGLU  167*  148*  100  191*       Blood Sugar Average: Last 72 hrs:  (P) 213 625   · Uncontrolled at baseline  · Continue home dose of lantus 50 units QHS   · Continue SSI coverage with meals       Discharging Physician / Practitioner: Drake Wolff PA-C  PCP: Kristi Maya  Admission Date:   Admission Orders     Ordered        12/21/18 0112  Inpatient Admission (expected length of stay for this patient is greater than two midnights)  Once             Discharge Date: 12/24/18    Resolved Problems  Date Reviewed: 12/24/2018          Resolved    Hyponatremia 12/22/2018     Resolved by  Drake oWlff PA-C          Consultations During Hospital Stay:  · GI    Procedures Performed:     · CT abdomen pelvis 12/20-no acute inflammatory process identified within the abdomen or pelvis  · Ultrasound right upper quadrant 12/21-normal study status post cholecystectomy    Significant Findings / Test Results:     · Lactic negative  · Hemoglobin A1c 9 7%  · TSH WNL  · CRP 5 2  · WBC 11 1 on discharge  · Blood cultures negative x 48 hours    Incidental Findings:   · None     Test Results Pending at Discharge (will require follow up): · Final blood culture results     Outpatient Tests Requested:  · Patient to follow up with PCP and obtain CBC and BMP to monitor electrolytes and blood counts    Complications:  None    Reason for Admission:  Nausea and vomiting    Hospital Course:     Blaine Parmar is a 46 y o  female patient with significant past medical history of type 2 diabetes mellitus, essential hypertension, gastroparesis, morbid obesity, chronic pain on chronic opioid medication who originally presented to the hospital on 12/20/2018 due to worsening nausea and vomiting  Patient is a history of gastroparesis and reporting worsening nausea and vomiting better similar to previous episodes  Patient was placed on IV scheduled Reglan and Protonix  Patient was seen by Gastroenterology in maintain on clear liquid diet  Patient was noted to have tachycardia and leukocytosis of unknown etiology  Infectious etiology was effectively ruled out  It was noted that patient's leukocytosis appears to be chronic per review of records  Tachycardia improved once patient's morphine ER was increased almost to home dose  Patient continued to have significant nausea throughout hospitalization and constipation  She was increased on MiraLax b i d  And had a bowel movement on day of discharge with improvement of symptoms  Patient is to follow up with GI closely as an outpatient as well as pain management  Patient was discharged in stable condition  For additional information please refer to medical records  Medication changes include: Addition of lisinopril 10 mg daily for blood pressure    Please see above list of diagnoses and related plan for additional information  Condition at Discharge: stable     Discharge Day Visit / Exam:     Subjective:  Patient reports she feels much improved today  States she had a bowel movement last night that was normal for her  Reports that her nausea is much better today  Reports that she will continue doing the clear liquids for some time and advance as tolerated  She currently denies any lightheadedness, dizziness, chest pain, shortness of breath, abdominal pain, urinary difficulties  Vitals: Blood Pressure: 105/57 (12/24/18 0713)  Pulse: 96 (12/24/18 0713)  Temperature: 97 8 °F (36 6 °C) (12/24/18 0713)  Temp Source: Oral (12/24/18 0713)  Respirations: 18 (12/24/18 0713)  Height: 5' 8" (172 7 cm) (12/21/18 0736)  Weight - Scale: 132 kg (291 lb 0 1 oz) (12/21/18 0736)  SpO2: 92 % (12/24/18 0713)  Exam:   Physical Exam   Constitutional: No distress  Patient is in no acute distress sitting in her hospital chair resting comfortably  She is accompanied by her  and son    Continues to have flat affect, however in much better spirits today  More animated  HENT:   Head: Normocephalic and atraumatic  Eyes: Conjunctivae are normal  No scleral icterus  Cardiovascular: Normal rate, regular rhythm and intact distal pulses  Pulmonary/Chest: Effort normal and breath sounds normal  No respiratory distress  She has no wheezes  She has no rales  Abdominal: Soft  Bowel sounds are normal  She exhibits no distension  There is no tenderness  There is no rebound  Musculoskeletal: She exhibits no edema  Neurological: She is alert  Skin: Skin is warm and dry  She is not diaphoretic  No erythema  Psychiatric:   Continues to have flat affect, mood improved today   Vitals reviewed  Discussion with Family:  Discussed extensively with patient's family and patient at bedside  Advised patient to follow up with PCP in the next week and obtain CBC and BMP to monitor electrolytes and blood counts  Also advised patient to follow up with GI  She is to continue her scheduled Reglan, p r n  Zofran and MiraLax once to twice a day  Patient also advised to continue lisinopril 10 mg daily for blood pressure management  She is advised to check blood pressures regularly and she is having Will pressure she can stop medication follow up with her PCP  Patient advised to follow up with her pain management specialist   She is to continue her morphine ER at 60 mg b i d  And monitor pain  Patient family are agreement to plan and verbalized understanding  Discharge instructions/Information to patient and family:   See after visit summary for information provided to patient and family  Provisions for Follow-Up Care:  See after visit summary for information related to follow-up care and any pertinent home health orders        Disposition:     Home    For Discharges to [facility] SNF:   · Not Applicable to this Patient - Not Applicable to this Patient    Planned Readmission: None     Discharge Statement:  I spent 60 minutes discharging the patient  This time was spent on the day of discharge  I had direct contact with the patient on the day of discharge  Greater than 50% of the total time was spent examining patient, answering all patient questions, arranging and discussing plan of care with patient as well as directly providing post-discharge instructions  Additional time then spent on discharge activities  Discharge Medications:  See after visit summary for reconciled discharge medications provided to patient and family        ** Please Note: This note has been constructed using a voice recognition system **

## 2018-12-24 NOTE — ASSESSMENT & PLAN NOTE
· Appears chronic per review of records, etiology unknown  · UA negative, CT abdomen/pelvis without acute findings, pt denies any respiratory symptoms  · Blood cultures negative x 48 hours  · Infectious etiology has been ruled out  · Pt does not have any fevers currently  · Procalcitonin is negative  · Improved today, follow up with PCP/hematology as needed

## 2018-12-24 NOTE — ASSESSMENT & PLAN NOTE
· BP with much improvement today  · Continue Lisinopril 10 mg daily  · Follow up with PCP regarding additional hypertensive management

## 2018-12-24 NOTE — ASSESSMENT & PLAN NOTE
· Likely cause of N/V vs  Cyclical vomiting syndrome  · Continue Reglan 10 mg Q6H, Protonix 40 mg daily  · Pt on chronic opioid medications, likely exacerbating symptoms, discussed this with patient at bedside regarding pain management  She needs to follow up with her pain management physician in [place] regarding this, pt agreeable    · GI following,  · RUQ US - Normal post-cholecystectomy imaging  · Blood cultures obtained negative x 48 hours  · Tachycardia now resolved  · Pt was placed on standing zofran with BID miralax per GI due to constipation, bowel movement noted overnight with improvement of symptoms  · Discussed with GI, would continue scheduled reglan, PRN zofran and miralax 1-2x a day  · Continue clear liquid diet, advance as tolerated

## 2018-12-24 NOTE — ASSESSMENT & PLAN NOTE
BMI 44 15  · Encourage lifestyle modification and weight loss

## 2018-12-24 NOTE — PLAN OF CARE
Problem: DISCHARGE PLANNING - CARE MANAGEMENT  Goal: Discharge to post-acute care or home with appropriate resources  INTERVENTIONS:  - Conduct assessment to determine patient/family and health care team treatment goals, and need for post-acute services based on payer coverage, community resources, and patient preferences, and barriers to discharge  - Address psychosocial, clinical, and financial barriers to discharge as identified in assessment in conjunction with the patient/family and health care team  - Arrange appropriate level of post-acute services according to patients   needs and preference and payer coverage in collaboration with the physician and health care team  - Communicate with and update the patient/family, physician, and health care team regarding progress on the discharge plan  - Arrange appropriate transportation to post-acute venues  Outcome: Progressing  LOS: 3 LACE-80 CM met with pt and rn at bedside  Pt live in [facility] with her family  Pt lives in apartment that has flight of steps to enter  Pt uses walker and cane  Pt completes ADL's independently  Pt has no hx of rehab and [place] [facility] in the past  Pt uses [facility]  Pt denies hx of MH and SA  Pt does not have POA/AD and declined information  Pt drives  CM reviewed discharge planning process including the following: identifying help at home, patient preference for discharge planning needs, pharmacy preference, and availability of treatment team to discuss questions or concerns patient and/or family may have regarding understanding medications and recognizing signs and symptoms once discharged  CM also encouraged patient to follow up with all recommended appointments after discharge  Patient advised of importance for patient and family to participate in managing patients medical well being  CM name and role reviewed   Discharge Checklist reviewed and CM will continue to monitor for progress toward discharge goals in nursing and provider rounds  CM discussed HRR and offered [facility]  Pt declined both  Pt reports she will call PCP to schedule  Pt has no other needs at this time

## 2018-12-24 NOTE — ASSESSMENT & PLAN NOTE
· POA, resolved  · Upon review of previous records, pt has baseline mild tachycardia in the low 100s  · Pt asymptomatic   · Infectious etiology has been effectively ruled out at this point  · EKG on admission with sinus tachycardia  · TSH WNL here, would recommend repeat thyroid testing as outpatient  · Could have been secondary to sudden decrease in scheduled morphine doses, plan as above

## 2018-12-24 NOTE — ASSESSMENT & PLAN NOTE
· Chronic pain on chronic opiate therapy  · Pt should follow up with pain management as an outpatient  · PDMP evaluated, pt receives medications from neurologist in [place], pt is on morphine ER 60 mg/15 mg (75 mg total) BID and morphine IR 15 mg TID PRN, she is on this due to chronic neck, back and knee pain per patient  · Pt was placed on decreased levels of morphine ER due to gastroparesis, increased to 60 mg BID  Continue to monitor  Would advise patient to continue on this dose as outpatient follow up with pain management as this is contributing to her gastroparesis symptoms, she should be weaned as an outpatient as tolerated  Pt agreeable to this plan

## 2018-12-24 NOTE — ASSESSMENT & PLAN NOTE
· T  Bili stable at 2 5 today  · Abdominal exam benign  · RUQ US normal  · Pt is s/p cholecystectomy a few weeks ago  · GI following,  · Believe this could be related to underlying Gilbert's syndrome  · No acute intervention needed at this time

## 2018-12-24 NOTE — SOCIAL WORK
LOS: 3 LACE-80 CM met with pt and rn at bedside  Pt live in [facility] with her family  Pt lives in apartment that has flight of steps to enter  Pt uses walker and cane  Pt completes ADL's independently  Pt has no hx of rehab and [facility] in the past  Pt uses [facility]  Pt denies hx of MH and SA  Pt does not have POA/AD and declined information  Pt drives  CM reviewed discharge planning process including the following: identifying help at home, patient preference for discharge planning needs, pharmacy preference, and availability of treatment team to discuss questions or concerns patient and/or family may have regarding understanding medications and recognizing signs and symptoms once discharged  CM also encouraged patient to follow up with all recommended appointments after discharge  Patient advised of importance for patient and family to participate in managing patients medical well being  CM name and role reviewed  Discharge Checklist reviewed and CM will continue to monitor for progress toward discharge goals in nursing and provider rounds  CM discussed HRR and offered [facility]  Pt declined both  Pt reports she will call PCP to schedule  Pt has no other needs at this time

## 2018-12-24 NOTE — DISCHARGE INSTRUCTIONS
Please follow up with her primary care provider within 1 week and obtain CBC and BMP to monitor electrolytes and blood counts  Please follow up with GI  Continue as needed Zofran, scheduled Reglan and MiraLax once to twice a day as needed  Please monitor blood pressures at home and continue lisinopril 10 mg daily  Please follow up with your pain management specialist in [place] regarding morphine doses, would continue morphine ER 60 mg twice a day for now  If you have any worsening nausea, vomiting, abdominal pain please come back to the hospital for further evaluation  Gastroparesis   WHAT YOU NEED TO KNOW:   What is gastroparesis? Gastroparesis is a condition that causes food to move more slowly than normal from the stomach to the intestines  Gastroparesis is not caused by blockage  Often, the cause may not be known  It may be caused by damage to a nerve that controls muscles used to move food to your small intestines  What puts me at risk for gastroparesis? · Diabetes for 10 years or more    · Hypothyroidism     · Gastric or antireflux surgery    · GERD    · Past viral infection     · Medicines such as narcotics and some medicines used to treat diabetes    · Conditions that affect your nervous system, such as Parkinson disease    · Conditions that affect your connective tissue, such as scleroderma  What are the signs and symptoms of gastroparesis? · Nausea and vomiting    · Feeling full sooner than normal or after eating less than usual    · Abdominal bloating and pain    · Weight loss or poor nutrition     · Frequent changes in blood sugar  How is gastroparesis diagnosed? You may need any of the following tests:  · Blood tests  will show your blood counts and electrolyte (mineral) levels  · Gastric emptying scintigraphy (GES)  measures how quickly food moves through your stomach and into your intestine  A material is put into scrambled eggs and the eggs are eaten   Pictures of the material are taken as it travels through the stomach and into the small intestine  · A SmartPill test  may be done to measure changes in pH and pressure in the gastrointestinal (GI) tract  You will need to swallow a capsule that transmits radio waves and records the measurements  · A breath test  is done by eating eggs with carbon in them  Breath samples are taken over a period of hours  The amount of carbon is measured in exhaled air  This shows how fast the stomach is emptying  · An endoscopy  may be done to find problems in the esophagus or stomach  A small, thin tube with a camera and a light will be inserted into your esophagus and stomach  · An x-ray or CT scan  may show problems in your stomach  You may be given contrast liquid to help your stomach show up better in the pictures  Tell the healthcare provider if you have ever had an allergic reaction to contrast liquid  How is gastroparesis treated? · Medicines  may be given to control your nausea and vomiting  You may receive medicines that help food move through your stomach at a more normal rate  · Nutrition support  may be given as liquid supplements  You may need to see a dietitian for help with a nutrition plan  · Gastric electrical stimulation (GES)  sends electrical pulses to the nerves in your stomach to help food move through to your intestines  It may also help control nausea and vomiting  GES is done when symptoms cannot be controlled with other treatments  · Surgery  may be needed if other treatments do not work  You may need surgery to place a feeding tube through your skin and into your small intestine  What else can I do to manage gastroparesis? Your healthcare provider may suggest any of the following:  · Change your eating habits  Take small bites of food to make it easier for your body to digest  You may need to eat several small meals low in fiber and fat throughout the day   Ask your dietitian for help with planning meals      · Do not eat raw fruits, vegetables, or whole grains  These can cause you to have undigested food in your stomach  The undigested food can form a blockage that can become life-threatening  · Drink liquids as directed  Liquids will prevent dehydration caused by vomiting  Slowly drink small amounts of liquids at a time  Ask your healthcare provider how much liquid to drink each day, and which liquids are best for you  You may also need to drink an oral rehydration solution (ORS)  An ORS has the right amounts of sugar, salt, and minerals in water to replace body fluids  · Do not lie down for 2 hours after your meals  Walking and sitting after meals help with digestion  · Control your blood sugar levels if you have diabetes  High blood sugar levels may make your symptoms worse  Ask your healthcare provider how to control your blood sugar levels  You or someone else should call 911 for any of the following:   · Your heart is beating faster and you are breathing faster than usual     · You cannot be woken up  When should I seek immediate care? · You are confused or have trouble thinking clearly  · You are dizzy or very drowsy  When should I contact my healthcare provider? · You are urinating less than usual      · Your symptoms return or become worse  · The color of your urine is dark yellow  · You have questions or concerns about your condition or care  CARE AGREEMENT:   You have the right to help plan your care  Learn about your health condition and how it may be treated  Discuss treatment options with your caregivers to decide what care you want to receive  You always have the right to refuse treatment  The above information is an  only  It is not intended as medical advice for individual conditions or treatments  Talk to your doctor, nurse or pharmacist before following any medical regimen to see if it is safe and effective for you    © 2017 [employer] [address] [place] [address] is for End User's use only and may not be sold, redistributed or otherwise used for commercial purposes  All illustrations and images included in CareNotes® are the copyrighted property of A D A M , [employer]  or Thiago Barnes

## 2018-12-24 NOTE — ASSESSMENT & PLAN NOTE
· Likely secondary to gastoparesis vs  Cyclical vomiting syndrome, nausea improved today  · GI following,  · Currently on clear liquid diet, advance as tolerated, pt reports she will stick to liquids/soft foods as able at home and advance as she continues to feel better  · No additional episodes of vomiting  · Blood cultures negative x 48 hours

## 2018-12-24 NOTE — ASSESSMENT & PLAN NOTE
· Mild, 3 3  · Replete with PO KDUR  · Likely secondary to decreased PO intake  · Pt advised to follow up with BMP as outpatient to monitor

## 2018-12-26 ENCOUNTER — TELEPHONE (OUTPATIENT)
Dept: GASTROENTEROLOGY | Facility: CLINIC | Age: 53
End: 2018-12-26

## 2018-12-26 LAB
BACTERIA BLD CULT: NORMAL
BACTERIA BLD CULT: NORMAL

## 2018-12-26 NOTE — TELEPHONE ENCOUNTER
----- Message from Miguel Roberts PA-C sent at 12/24/2018  2:34 PM EST -----  Please set this patient up for an OV in the next 3-4 weeks as hospital follow up  Can be with anyone available  She will be discharged from [facility] today

## 2018-12-26 NOTE — TELEPHONE ENCOUNTER
Can you please make an apt for patient for an office visit in the next 3-4 weeks as a hospital follow up with anyone she's being discharged later today

## 2018-12-29 DIAGNOSIS — R11.2 INTRACTABLE VOMITING WITH NAUSEA, UNSPECIFIED VOMITING TYPE: ICD-10-CM

## 2018-12-29 DIAGNOSIS — K31.84 GASTROPARESIS: ICD-10-CM

## 2018-12-29 DIAGNOSIS — K80.20 GALL STONES: ICD-10-CM

## 2018-12-31 DIAGNOSIS — K31.84 GASTROPARESIS: ICD-10-CM

## 2019-01-02 RX ORDER — ONDANSETRON 4 MG/1
TABLET, FILM COATED ORAL
Qty: 30 TABLET | Refills: 2 | Status: SHIPPED | OUTPATIENT
Start: 2019-01-02 | End: 2019-04-25 | Stop reason: SDUPTHER

## 2019-01-02 RX ORDER — METOCLOPRAMIDE 10 MG/1
TABLET ORAL
Qty: 120 TABLET | Refills: 2 | Status: SHIPPED | OUTPATIENT
Start: 2019-01-02 | End: 2019-03-19 | Stop reason: SDUPTHER

## 2019-01-17 ENCOUNTER — OFFICE VISIT (OUTPATIENT)
Dept: GASTROENTEROLOGY | Facility: CLINIC | Age: 54
End: 2019-01-17
Payer: MEDICARE

## 2019-01-17 VITALS
SYSTOLIC BLOOD PRESSURE: 138 MMHG | HEART RATE: 128 BPM | BODY MASS INDEX: 43.8 KG/M2 | DIASTOLIC BLOOD PRESSURE: 78 MMHG | HEIGHT: 68 IN | WEIGHT: 289 LBS | RESPIRATION RATE: 20 BRPM

## 2019-01-17 DIAGNOSIS — R11.2 NAUSEA AND VOMITING, INTRACTABILITY OF VOMITING NOT SPECIFIED, UNSPECIFIED VOMITING TYPE: ICD-10-CM

## 2019-01-17 DIAGNOSIS — Z12.11 ENCOUNTER FOR SCREENING COLONOSCOPY: ICD-10-CM

## 2019-01-17 DIAGNOSIS — E08.65 DIABETES MELLITUS DUE TO UNDERLYING CONDITION, UNCONTROLLED, WITH HYPERGLYCEMIA (HCC): ICD-10-CM

## 2019-01-17 DIAGNOSIS — K31.84 GASTROPARESIS: Primary | ICD-10-CM

## 2019-01-17 PROCEDURE — 99214 OFFICE O/P EST MOD 30 MIN: CPT | Performed by: NURSE PRACTITIONER

## 2019-01-17 RX ORDER — PREDNISONE 10 MG/1
TABLET ORAL DAILY
COMMUNITY
End: 2019-07-21

## 2019-01-17 RX ORDER — FAMOTIDINE 40 MG/1
40 TABLET, FILM COATED ORAL 2 TIMES DAILY PRN
Qty: 90 TABLET | Refills: 0 | Status: SHIPPED | OUTPATIENT
Start: 2019-01-17 | End: 2019-02-26 | Stop reason: SDUPTHER

## 2019-01-17 NOTE — PATIENT INSTRUCTIONS
SMOOTHIES  1 CUP OF ACTIVIA YOGURT  HANDFUL OF BERRIES  1 STEVIA AND   HANDFUL OF GREENS  ADD 1/3 CUP OF WATER AND BLEND- IF TOO THIN ADD ICE AND BLEND AND IF TOO THICK ADD A SMALL AMOUNT OF WATER AND BLEND  TEMPLE CONTACT INFORMATION  [id number] ([phone]) between the hours of 8:00 a m  and 6:00 p m , Mondays - Fridays   ASK FOR THE GASTROPARESIS CLINIC  PLEASE OBTAIN GASTRIC EMPTYING STUDY

## 2019-01-17 NOTE — PROGRESS NOTES
Assessment/Plan:    Hospital F/U  PMH DM2- uncontrolled, HTN, gastroparesis, morbid obesity, chronic pain requiring chronic opoid therapy, morbid obesity  In hospital: CT of abdomen and pelvis- nothing acute  No infectious process identified  US RUQ normal post cholecystectomy    Diagnosis from hospital- exacerbation of Gastroparesis- questionable CVS  Does not smoke marijuana  Continue reglan, Pantoprazole and zofran- helping  Gastroparesis diet discussed and smoothies discussed  Probiotics in Activia twice daily  If patient develops nausea she should go on a clear liquid diet for a day - check sugars    A1c 9 7%- seeing endocrinology next month  Hyperbilirubinemia- chronic and asymptomatic  Morbid obesity- has not lost weight        Plan:  Reglan  Protonix  Zofran  Repeat GES  Consultative visit with [facility] gastroparesis [facility]  Initial visit with endocrinology   InitialScreening colonoscopy   EGD              Diagnoses and all orders for this visit:    Gastroparesis  -     famotidine (PEPCID) 40 MG tablet; Take 1 tablet (40 mg total) by mouth 2 (two) times a day as needed for heartburn  -     NM gastric emptying; Future    Nausea and vomiting, intractability of vomiting not specified, unspecified vomiting type  -     NM gastric emptying; Future    Diabetes mellitus due to underlying condition, uncontrolled, with hyperglycemia ([facility] [address] )    Encounter for screening colonoscopy    Other orders  -     predniSONE 10 mg tablet; Take by mouth daily            Subjective:      Patient ID: Red Oliveira is a 46 y o  female  65-year-old female with multiple comorbidities is here for hospital follow-up  Diagnosed with gastroparesis probably about seven years ago and since then has  Had diabetes, chronic back pain requiring opiate therapy hypertension and other chronicity    She reports she developed a lot of nausea on December 24th but does not remember what she eat and was in the hospital she had a mildly elevated white count, mildly elevated bilirubin, nausea vomiting and tachycardia so she was admitted  The tachycardia seem to be related to the dose itching of the morphine and so they put her back: What her pain management doctor has a for morphine  Clear liquids and resumed her Reglan and pantoprazole she was discharged with the recommendation to follow-up with Gastroenterology and perhaps have a repeat gastroparesis study  She seems to have flares of nausea and vomiting and in-between does well on her medications  She believes it is related to something that she eats or the quality  She has not had an EGD or colonoscopy in well so we will obtain an EGD   With Botox and colonoscopy  I did give her a referral to [facility] gastroparesis clinic as well as reviewed diet for gastroparesis including nutritional shakes  She is encouraged to go on clear liquids if she develops nausea  She has an appointment with Endocrinology because her A1 sees have been uncontrolled for quite a long time and this can contribute to gastroparesis  Patient also needs her initial screening colonoscopy  The following portions of the patient's history were reviewed and updated as appropriate: She  has a past medical history of Asthma; Chronic pain; Diabetes mellitus ([facility] [address] ); and Gastroparesis    She   Patient Active Problem List    Diagnosis Date Noted    Nausea and vomiting 01/17/2019    Encounter for screening colonoscopy 01/17/2019    Hyperbilirubinemia 12/22/2018    Tachycardia 12/21/2018    Leukocytosis 12/21/2018    Morbid obesity ([address] ) 11/12/2018    Essential hypertension 11/10/2018    Hypomagnesemia 11/09/2018    Uncontrolled diabetes mellitus ([facility] [address] ) 06/15/2018    Gall stones 05/24/2018    Intractable nausea and vomiting 05/22/2018    UTI (urinary tract infection) 05/22/2018    Opioid dependence ([facility] [address] ) 02/05/2018    Diabetic ulcer of right great toe ([facility] [address] ) 02/02/2018    Acute osteomyelitis of toe, right ([facility] [address] ) 02/02/2018    Diabetes mellitus ([facility] [address] ) 02/02/2018    Asthma 02/02/2018    Chronic pain 02/02/2018    Gastroparesis 02/02/2018    Hypokalemia 02/02/2018    Knee pain 11/21/2017    De Quervain's tenosynovitis, bilateral 10/03/2017     She  has a past surgical history that includes Cervical laminectomy; Hysterectomy; Joint replacement (Bilateral); Toe amputation (Right, 2/2/2018); CHOLECYSTECTOMY LAPAROSCOPIC (N/A, 11/10/2018); and Cholecystectomy  Her family history includes Diabetes in her maternal grandmother and mother  She  reports that she has never smoked  She has never used smokeless tobacco  She reports that she drinks alcohol  She reports that she does not use drugs    Current Outpatient Prescriptions   Medication Sig Dispense Refill    acetaminophen (TYLENOL) 325 mg tablet Take 2 tablets (650 mg total) by mouth every 6 (six) hours as needed for fever 30 tablet 0    albuterol (ACCUNEB) 1 25 MG/3ML nebulizer solution Take 1 ampule by nebulization every 6 (six) hours as needed for wheezing      albuterol (PROVENTIL HFA,VENTOLIN HFA) 90 mcg/act inhaler Inhale 2 puffs every 6 (six) hours as needed for wheezing      carisoprodol (SOMA) 350 mg tablet Take 350 mg by mouth 2 (two) times a day      diazepam (VALIUM) 5 mg tablet Take 5 mg by mouth daily at bedtime as needed for anxiety      dicyclomine (BENTYL) 10 mg capsule Take 1 capsule (10 mg total) by mouth 4 (four) times a day (before meals and at bedtime) 120 capsule 3    fluticasone (FLONASE) 50 mcg/act nasal spray 1 spray into each nostril daily      glimepiride (AMARYL) 4 mg tablet Take 4 mg by mouth 2 (two) times a day      insulin glargine (LANTUS) 100 units/mL subcutaneous injection Inject 50 Units under the skin daily at bedtime        lisinopril (ZESTRIL) 10 mg tablet Take 1 tablet (10 mg total) by mouth daily 30 tablet 0    metoclopramide (REGLAN) 10 mg tablet TAKE 1 TABLET BY MOUTH 4 TIMES A DAY (BEFORE MEALS AND AT BEDTIME) 120 tablet 2    montelukast (SINGULAIR) 10 mg tablet Take 10 mg by mouth daily at bedtime      morphine (MSIR) 15 mg tablet Take 15 mg by mouth 3 (three) times a day as needed for moderate pain or severe pain        nystatin (MYCOSTATIN) powder Apply topically 2 (two) times a day 15 g 0    omeprazole (PriLOSEC) 20 mg delayed release capsule Take 20 mg by mouth daily      ondansetron (ZOFRAN) 4 mg tablet TAKE 1 TABLET BY MOUTH EVERY 8 HOURS AS NEEDED FOR NAUSEA AND VOMITING 30 tablet 2    predniSONE 10 mg tablet Take by mouth daily      famotidine (PEPCID) 40 MG tablet Take 1 tablet (40 mg total) by mouth 2 (two) times a day as needed for heartburn 90 tablet 0     No current facility-administered medications for this visit        Current Outpatient Prescriptions on File Prior to Visit   Medication Sig    acetaminophen (TYLENOL) 325 mg tablet Take 2 tablets (650 mg total) by mouth every 6 (six) hours as needed for fever    albuterol (ACCUNEB) 1 25 MG/3ML nebulizer solution Take 1 ampule by nebulization every 6 (six) hours as needed for wheezing    albuterol (PROVENTIL HFA,VENTOLIN HFA) 90 mcg/act inhaler Inhale 2 puffs every 6 (six) hours as needed for wheezing    carisoprodol (SOMA) 350 mg tablet Take 350 mg by mouth 2 (two) times a day    diazepam (VALIUM) 5 mg tablet Take 5 mg by mouth daily at bedtime as needed for anxiety    dicyclomine (BENTYL) 10 mg capsule Take 1 capsule (10 mg total) by mouth 4 (four) times a day (before meals and at bedtime)    fluticasone (FLONASE) 50 mcg/act nasal spray 1 spray into each nostril daily    glimepiride (AMARYL) 4 mg tablet Take 4 mg by mouth 2 (two) times a day    insulin glargine (LANTUS) 100 units/mL subcutaneous injection Inject 50 Units under the skin daily at bedtime      lisinopril (ZESTRIL) 10 mg tablet Take 1 tablet (10 mg total) by mouth daily    metoclopramide (REGLAN) 10 mg tablet TAKE 1 TABLET BY MOUTH 4 TIMES A DAY (BEFORE MEALS AND AT BEDTIME)    montelukast (SINGULAIR) 10 mg tablet Take 10 mg by mouth daily at bedtime    morphine (MSIR) 15 mg tablet Take 15 mg by mouth 3 (three) times a day as needed for moderate pain or severe pain      nystatin (MYCOSTATIN) powder Apply topically 2 (two) times a day    omeprazole (PriLOSEC) 20 mg delayed release capsule Take 20 mg by mouth daily    ondansetron (ZOFRAN) 4 mg tablet TAKE 1 TABLET BY MOUTH EVERY 8 HOURS AS NEEDED FOR NAUSEA AND VOMITING     No current facility-administered medications on file prior to visit  She is allergic to nsaids       Review of Systems   Constitutional: Negative for activity change, appetite change, chills, diaphoresis, fatigue and unexpected weight change  HENT: Negative for mouth sores, sore throat, trouble swallowing and voice change  Eyes: Negative for pain  Respiratory: Negative  Cardiovascular: Negative  Gastrointestinal: Negative  Endocrine: Negative  Musculoskeletal: Negative for arthralgias  Skin: Negative  Hematological: Negative for adenopathy  Does not bruise/bleed easily  Psychiatric/Behavioral: Negative  Objective:      /78   Pulse (!) 128   Resp 20   Ht 5' 8" (1 727 m)   Wt 131 kg (289 lb)   BMI 43 94 kg/m²          Physical Exam   Constitutional: She is oriented to person, place, and time  She appears well-developed and well-nourished  Morbid obesity   Eyes: No scleral icterus  Cardiovascular: Normal rate and regular rhythm  Pulmonary/Chest: Effort normal and breath sounds normal    Abdominal: Soft  Bowel sounds are normal    Lymphadenopathy:     She has no cervical adenopathy  Neurological: She is alert and oriented to person, place, and time  Skin: Skin is warm and dry  Psychiatric: She has a normal mood and affect

## 2019-01-28 DIAGNOSIS — K31.84 GASTROPARESIS: ICD-10-CM

## 2019-01-28 DIAGNOSIS — K80.20 GALL STONES: ICD-10-CM

## 2019-01-28 DIAGNOSIS — R11.2 INTRACTABLE VOMITING WITH NAUSEA, UNSPECIFIED VOMITING TYPE: ICD-10-CM

## 2019-01-28 RX ORDER — DICYCLOMINE HYDROCHLORIDE 10 MG/1
CAPSULE ORAL
Qty: 120 CAPSULE | Refills: 3 | Status: SHIPPED | OUTPATIENT
Start: 2019-01-28 | End: 2019-05-21 | Stop reason: SDUPTHER

## 2019-02-26 DIAGNOSIS — K31.84 GASTROPARESIS: ICD-10-CM

## 2019-02-26 RX ORDER — FAMOTIDINE 40 MG/1
TABLET, FILM COATED ORAL
Qty: 90 TABLET | Refills: 0 | Status: SHIPPED | OUTPATIENT
Start: 2019-02-26 | End: 2019-09-20 | Stop reason: HOSPADM

## 2019-03-19 DIAGNOSIS — K31.84 GASTROPARESIS: ICD-10-CM

## 2019-03-20 RX ORDER — METOCLOPRAMIDE 10 MG/1
TABLET ORAL
Qty: 120 TABLET | Refills: 1 | Status: SHIPPED | OUTPATIENT
Start: 2019-03-20 | End: 2019-05-22 | Stop reason: SDUPTHER

## 2019-04-04 ENCOUNTER — APPOINTMENT (OUTPATIENT)
Dept: LAB | Facility: HOSPITAL | Age: 54
End: 2019-04-04
Payer: MEDICARE

## 2019-04-04 ENCOUNTER — TRANSCRIBE ORDERS (OUTPATIENT)
Dept: ADMINISTRATIVE | Facility: HOSPITAL | Age: 54
End: 2019-04-04

## 2019-04-04 DIAGNOSIS — Z79.891 ENCOUNTER FOR LONG-TERM METHADONE USE: ICD-10-CM

## 2019-04-04 DIAGNOSIS — F11.90 OPIATE USE: Primary | ICD-10-CM

## 2019-04-04 DIAGNOSIS — F11.90 OPIATE USE: ICD-10-CM

## 2019-04-04 PROCEDURE — 36415 COLL VENOUS BLD VENIPUNCTURE: CPT

## 2019-04-04 PROCEDURE — 80361 OPIATES 1 OR MORE: CPT

## 2019-04-19 LAB — MISCELLANEOUS LAB TEST RESULT: NORMAL

## 2019-04-25 DIAGNOSIS — R11.2 INTRACTABLE VOMITING WITH NAUSEA, UNSPECIFIED VOMITING TYPE: ICD-10-CM

## 2019-04-25 DIAGNOSIS — K80.20 GALL STONES: ICD-10-CM

## 2019-04-25 DIAGNOSIS — K31.84 GASTROPARESIS: ICD-10-CM

## 2019-04-30 RX ORDER — ONDANSETRON 4 MG/1
TABLET, FILM COATED ORAL
Qty: 30 TABLET | Refills: 2 | Status: SHIPPED | OUTPATIENT
Start: 2019-04-30 | End: 2019-12-28 | Stop reason: ALTCHOICE

## 2019-05-21 DIAGNOSIS — R11.2 INTRACTABLE VOMITING WITH NAUSEA, UNSPECIFIED VOMITING TYPE: ICD-10-CM

## 2019-05-21 DIAGNOSIS — K31.84 GASTROPARESIS: ICD-10-CM

## 2019-05-21 DIAGNOSIS — K80.20 GALL STONES: ICD-10-CM

## 2019-05-21 RX ORDER — DICYCLOMINE HYDROCHLORIDE 10 MG/1
CAPSULE ORAL
Qty: 120 CAPSULE | Refills: 3 | Status: SHIPPED | OUTPATIENT
Start: 2019-05-21 | End: 2019-09-15 | Stop reason: SDUPTHER

## 2019-05-22 DIAGNOSIS — K31.84 GASTROPARESIS: ICD-10-CM

## 2019-05-24 RX ORDER — METOCLOPRAMIDE 10 MG/1
TABLET ORAL
Qty: 120 TABLET | Refills: 1 | Status: SHIPPED | OUTPATIENT
Start: 2019-05-24 | End: 2019-08-28 | Stop reason: SDUPTHER

## 2019-06-05 ENCOUNTER — APPOINTMENT (OUTPATIENT)
Dept: RADIOLOGY | Facility: CLINIC | Age: 54
End: 2019-06-05
Payer: MEDICARE

## 2019-06-05 ENCOUNTER — OFFICE VISIT (OUTPATIENT)
Dept: OBGYN CLINIC | Facility: CLINIC | Age: 54
End: 2019-06-05
Payer: MEDICARE

## 2019-06-05 VITALS
DIASTOLIC BLOOD PRESSURE: 77 MMHG | RESPIRATION RATE: 20 BRPM | HEIGHT: 68 IN | SYSTOLIC BLOOD PRESSURE: 124 MMHG | HEART RATE: 94 BPM | WEIGHT: 285.8 LBS | BODY MASS INDEX: 43.32 KG/M2

## 2019-06-05 DIAGNOSIS — M25.532 LEFT WRIST PAIN: ICD-10-CM

## 2019-06-05 DIAGNOSIS — M65.4 DE QUERVAIN'S TENOSYNOVITIS, BILATERAL: ICD-10-CM

## 2019-06-05 DIAGNOSIS — M65.832 EXTENSOR TENOSYNOVITIS OF LEFT WRIST: ICD-10-CM

## 2019-06-05 DIAGNOSIS — M65.832 EXTENSOR TENOSYNOVITIS OF LEFT WRIST: Primary | ICD-10-CM

## 2019-06-05 PROCEDURE — 20550 NJX 1 TENDON SHEATH/LIGAMENT: CPT | Performed by: PHYSICIAN ASSISTANT

## 2019-06-05 PROCEDURE — 99213 OFFICE O/P EST LOW 20 MIN: CPT | Performed by: PHYSICIAN ASSISTANT

## 2019-06-05 PROCEDURE — 73110 X-RAY EXAM OF WRIST: CPT

## 2019-06-05 RX ORDER — BUPIVACAINE HYDROCHLORIDE 2.5 MG/ML
1 INJECTION, SOLUTION INFILTRATION; PERINEURAL
Status: COMPLETED | OUTPATIENT
Start: 2019-06-05 | End: 2019-06-05

## 2019-06-05 RX ORDER — METHYLPREDNISOLONE ACETATE 40 MG/ML
1 INJECTION, SUSPENSION INTRA-ARTICULAR; INTRALESIONAL; INTRAMUSCULAR; SOFT TISSUE
Status: COMPLETED | OUTPATIENT
Start: 2019-06-05 | End: 2019-06-05

## 2019-06-05 RX ORDER — LIDOCAINE HYDROCHLORIDE 10 MG/ML
1 INJECTION, SOLUTION INFILTRATION; PERINEURAL
Status: COMPLETED | OUTPATIENT
Start: 2019-06-05 | End: 2019-06-05

## 2019-06-05 RX ADMIN — LIDOCAINE HYDROCHLORIDE 1 ML: 10 INJECTION, SOLUTION INFILTRATION; PERINEURAL at 17:46

## 2019-06-05 RX ADMIN — METHYLPREDNISOLONE ACETATE 1 ML: 40 INJECTION, SUSPENSION INTRA-ARTICULAR; INTRALESIONAL; INTRAMUSCULAR; SOFT TISSUE at 17:46

## 2019-06-05 RX ADMIN — BUPIVACAINE HYDROCHLORIDE 1 ML: 2.5 INJECTION, SOLUTION INFILTRATION; PERINEURAL at 17:46

## 2019-06-27 ENCOUNTER — TELEPHONE (OUTPATIENT)
Dept: ENDOCRINOLOGY | Facility: CLINIC | Age: 54
End: 2019-06-27

## 2019-07-21 ENCOUNTER — APPOINTMENT (INPATIENT)
Dept: RADIOLOGY | Facility: HOSPITAL | Age: 54
DRG: 073 | End: 2019-07-21
Payer: MEDICARE

## 2019-07-21 ENCOUNTER — HOSPITAL ENCOUNTER (INPATIENT)
Facility: HOSPITAL | Age: 54
LOS: 2 days | Discharge: HOME | DRG: 073 | End: 2019-07-23
Attending: EMERGENCY MEDICINE | Admitting: STUDENT IN AN ORGANIZED HEALTH CARE EDUCATION/TRAINING PROGRAM
Payer: MEDICARE

## 2019-07-21 ENCOUNTER — APPOINTMENT (INPATIENT)
Dept: CT IMAGING | Facility: HOSPITAL | Age: 54
DRG: 073 | End: 2019-07-21
Payer: MEDICARE

## 2019-07-21 DIAGNOSIS — E86.0 DEHYDRATION: ICD-10-CM

## 2019-07-21 DIAGNOSIS — K31.84 GASTROPARESIS: ICD-10-CM

## 2019-07-21 DIAGNOSIS — R11.2 NAUSEA & VOMITING: Primary | ICD-10-CM

## 2019-07-21 DIAGNOSIS — B37.9 CANDIDIASIS: ICD-10-CM

## 2019-07-21 DIAGNOSIS — E11.40 TYPE 2 DIABETES MELLITUS WITH DIABETIC NEUROPATHY, WITH LONG-TERM CURRENT USE OF INSULIN (HCC): ICD-10-CM

## 2019-07-21 DIAGNOSIS — Z79.4 TYPE 2 DIABETES MELLITUS WITH DIABETIC NEUROPATHY, WITH LONG-TERM CURRENT USE OF INSULIN (HCC): ICD-10-CM

## 2019-07-21 DIAGNOSIS — R73.9 HYPERGLYCEMIA: ICD-10-CM

## 2019-07-21 PROBLEM — E66.811 OBESITY (BMI 30.0-34.9): Status: ACTIVE | Noted: 2019-07-21

## 2019-07-21 PROBLEM — N89.8 VAGINAL DISCHARGE: Status: ACTIVE | Noted: 2019-07-21

## 2019-07-21 PROBLEM — E66.9 OBESITY (BMI 30.0-34.9): Status: ACTIVE | Noted: 2019-07-21

## 2019-07-21 PROBLEM — G93.40 ENCEPHALOPATHY: Status: ACTIVE | Noted: 2019-07-21

## 2019-07-21 LAB
ALBUMIN SERPL BCP-MCNC: 3.4 G/DL (ref 3.5–5)
ALP SERPL-CCNC: 144 U/L (ref 46–116)
ALT SERPL W P-5'-P-CCNC: 25 U/L (ref 12–78)
AMMONIA PLAS-SCNC: <10 UMOL/L (ref 11–35)
AMPHETAMINES SERPL QL SCN: NEGATIVE
ANION GAP SERPL CALCULATED.3IONS-SCNC: 13 MMOL/L (ref 4–13)
ANION GAP SERPL CALCULATED.3IONS-SCNC: 15 MMOL/L (ref 4–13)
AST SERPL W P-5'-P-CCNC: 18 U/L (ref 5–45)
BACTERIA UR QL AUTO: ABNORMAL /HPF
BARBITURATES UR QL: POSITIVE
BASE EX.OXY STD BLDV CALC-SCNC: 69.4 % (ref 60–80)
BASE EXCESS BLDV CALC-SCNC: -4.4 MMOL/L
BASOPHILS # BLD AUTO: 0.05 THOUSANDS/ΜL (ref 0–0.1)
BASOPHILS NFR BLD AUTO: 0 % (ref 0–1)
BENZODIAZ UR QL: POSITIVE
BILIRUB SERPL-MCNC: 2.2 MG/DL (ref 0.2–1)
BILIRUB UR QL STRIP: NEGATIVE
BUN SERPL-MCNC: 11 MG/DL (ref 5–25)
BUN SERPL-MCNC: 9 MG/DL (ref 5–25)
CALCIUM SERPL-MCNC: 9.2 MG/DL (ref 8.3–10.1)
CALCIUM SERPL-MCNC: 9.3 MG/DL (ref 8.3–10.1)
CHLORIDE SERPL-SCNC: 96 MMOL/L (ref 100–108)
CHLORIDE SERPL-SCNC: 97 MMOL/L (ref 100–108)
CLARITY UR: CLEAR
CO2 SERPL-SCNC: 22 MMOL/L (ref 21–32)
CO2 SERPL-SCNC: 24 MMOL/L (ref 21–32)
COCAINE UR QL: NEGATIVE
COLOR UR: YELLOW
CREAT SERPL-MCNC: 0.77 MG/DL (ref 0.6–1.3)
CREAT SERPL-MCNC: 0.78 MG/DL (ref 0.6–1.3)
EOSINOPHIL # BLD AUTO: 0.08 THOUSAND/ΜL (ref 0–0.61)
EOSINOPHIL NFR BLD AUTO: 1 % (ref 0–6)
ERYTHROCYTE [DISTWIDTH] IN BLOOD BY AUTOMATED COUNT: 13.2 % (ref 11.6–15.1)
GFR SERPL CREATININE-BSD FRML MDRD: 86 ML/MIN/1.73SQ M
GFR SERPL CREATININE-BSD FRML MDRD: 87 ML/MIN/1.73SQ M
GLUCOSE SERPL-MCNC: 252 MG/DL (ref 65–140)
GLUCOSE SERPL-MCNC: 267 MG/DL (ref 65–140)
GLUCOSE SERPL-MCNC: 308 MG/DL (ref 65–140)
GLUCOSE SERPL-MCNC: 312 MG/DL (ref 65–140)
GLUCOSE SERPL-MCNC: 355 MG/DL (ref 65–140)
GLUCOSE UR STRIP-MCNC: ABNORMAL MG/DL
HCO3 BLDV-SCNC: 19.6 MMOL/L (ref 24–30)
HCT VFR BLD AUTO: 41.5 % (ref 34.8–46.1)
HGB BLD-MCNC: 13.3 G/DL (ref 11.5–15.4)
HGB UR QL STRIP.AUTO: ABNORMAL
IMM GRANULOCYTES # BLD AUTO: 0.07 THOUSAND/UL (ref 0–0.2)
IMM GRANULOCYTES NFR BLD AUTO: 1 % (ref 0–2)
KETONES UR STRIP-MCNC: ABNORMAL MG/DL
LACTATE SERPL-SCNC: 1.2 MMOL/L (ref 0.5–2)
LEUKOCYTE ESTERASE UR QL STRIP: ABNORMAL
LIPASE SERPL-CCNC: 33 U/L (ref 73–393)
LYMPHOCYTES # BLD AUTO: 1.1 THOUSANDS/ΜL (ref 0.6–4.47)
LYMPHOCYTES NFR BLD AUTO: 8 % (ref 14–44)
MCH RBC QN AUTO: 27 PG (ref 26.8–34.3)
MCHC RBC AUTO-ENTMCNC: 32 G/DL (ref 31.4–37.4)
MCV RBC AUTO: 84 FL (ref 82–98)
METHADONE UR QL: POSITIVE
MONOCYTES # BLD AUTO: 0.87 THOUSAND/ΜL (ref 0.17–1.22)
MONOCYTES NFR BLD AUTO: 6 % (ref 4–12)
NEUTROPHILS # BLD AUTO: 11.88 THOUSANDS/ΜL (ref 1.85–7.62)
NEUTS SEG NFR BLD AUTO: 84 % (ref 43–75)
NITRITE UR QL STRIP: NEGATIVE
NON-SQ EPI CELLS URNS QL MICRO: ABNORMAL /HPF
NRBC BLD AUTO-RTO: 0 /100 WBCS
O2 CT BLDV-SCNC: 13.6 ML/DL
OPIATES UR QL SCN: POSITIVE
PCO2 BLDV: 32.9 MM HG (ref 42–50)
PCP UR QL: NEGATIVE
PH BLDV: 7.39 [PH] (ref 7.3–7.4)
PH UR STRIP.AUTO: 5.5 [PH]
PLATELET # BLD AUTO: 225 THOUSANDS/UL (ref 149–390)
PMV BLD AUTO: 13 FL (ref 8.9–12.7)
PO2 BLDV: 38.5 MM HG (ref 35–45)
POTASSIUM SERPL-SCNC: 3.8 MMOL/L (ref 3.5–5.3)
POTASSIUM SERPL-SCNC: 4 MMOL/L (ref 3.5–5.3)
PROT SERPL-MCNC: 7.4 G/DL (ref 6.4–8.2)
PROT UR STRIP-MCNC: ABNORMAL MG/DL
RBC # BLD AUTO: 4.92 MILLION/UL (ref 3.81–5.12)
RBC #/AREA URNS AUTO: ABNORMAL /HPF
SODIUM SERPL-SCNC: 133 MMOL/L (ref 136–145)
SODIUM SERPL-SCNC: 134 MMOL/L (ref 136–145)
SP GR UR STRIP.AUTO: >=1.03 (ref 1–1.03)
THC UR QL: NEGATIVE
UROBILINOGEN UR QL STRIP.AUTO: 0.2 E.U./DL
WBC # BLD AUTO: 14.05 THOUSAND/UL (ref 4.31–10.16)
WBC #/AREA URNS AUTO: ABNORMAL /HPF

## 2019-07-21 PROCEDURE — 96361 HYDRATE IV INFUSION ADD-ON: CPT

## 2019-07-21 PROCEDURE — 96376 TX/PRO/DX INJ SAME DRUG ADON: CPT

## 2019-07-21 PROCEDURE — 36415 COLL VENOUS BLD VENIPUNCTURE: CPT | Performed by: EMERGENCY MEDICINE

## 2019-07-21 PROCEDURE — 83690 ASSAY OF LIPASE: CPT | Performed by: EMERGENCY MEDICINE

## 2019-07-21 PROCEDURE — 96374 THER/PROPH/DIAG INJ IV PUSH: CPT

## 2019-07-21 PROCEDURE — 81001 URINALYSIS AUTO W/SCOPE: CPT | Performed by: INTERNAL MEDICINE

## 2019-07-21 PROCEDURE — 99284 EMERGENCY DEPT VISIT MOD MDM: CPT

## 2019-07-21 PROCEDURE — 82805 BLOOD GASES W/O2 SATURATION: CPT | Performed by: INTERNAL MEDICINE

## 2019-07-21 PROCEDURE — 85025 COMPLETE CBC W/AUTO DIFF WBC: CPT | Performed by: EMERGENCY MEDICINE

## 2019-07-21 PROCEDURE — 82948 REAGENT STRIP/BLOOD GLUCOSE: CPT

## 2019-07-21 PROCEDURE — 71045 X-RAY EXAM CHEST 1 VIEW: CPT

## 2019-07-21 PROCEDURE — 83036 HEMOGLOBIN GLYCOSYLATED A1C: CPT | Performed by: INTERNAL MEDICINE

## 2019-07-21 PROCEDURE — C9113 INJ PANTOPRAZOLE SODIUM, VIA: HCPCS | Performed by: INTERNAL MEDICINE

## 2019-07-21 PROCEDURE — 80053 COMPREHEN METABOLIC PANEL: CPT | Performed by: EMERGENCY MEDICINE

## 2019-07-21 PROCEDURE — 96375 TX/PRO/DX INJ NEW DRUG ADDON: CPT

## 2019-07-21 PROCEDURE — 99223 1ST HOSP IP/OBS HIGH 75: CPT | Performed by: INTERNAL MEDICINE

## 2019-07-21 PROCEDURE — 80048 BASIC METABOLIC PNL TOTAL CA: CPT | Performed by: INTERNAL MEDICINE

## 2019-07-21 PROCEDURE — 83605 ASSAY OF LACTIC ACID: CPT | Performed by: INTERNAL MEDICINE

## 2019-07-21 PROCEDURE — 99285 EMERGENCY DEPT VISIT HI MDM: CPT | Performed by: EMERGENCY MEDICINE

## 2019-07-21 PROCEDURE — 74176 CT ABD & PELVIS W/O CONTRAST: CPT

## 2019-07-21 PROCEDURE — 80307 DRUG TEST PRSMV CHEM ANLYZR: CPT | Performed by: INTERNAL MEDICINE

## 2019-07-21 PROCEDURE — 82140 ASSAY OF AMMONIA: CPT | Performed by: INTERNAL MEDICINE

## 2019-07-21 RX ORDER — METOCLOPRAMIDE 10 MG/1
10 TABLET ORAL EVERY 6 HOURS PRN
Qty: 30 TABLET | Refills: 0 | Status: SHIPPED | OUTPATIENT
Start: 2019-07-21 | End: 2019-09-20 | Stop reason: HOSPADM

## 2019-07-21 RX ORDER — METOCLOPRAMIDE HYDROCHLORIDE 5 MG/ML
10 INJECTION INTRAMUSCULAR; INTRAVENOUS EVERY 6 HOURS PRN
Status: DISCONTINUED | OUTPATIENT
Start: 2019-07-21 | End: 2019-07-21

## 2019-07-21 RX ORDER — ONDANSETRON 2 MG/ML
4 INJECTION INTRAMUSCULAR; INTRAVENOUS ONCE
Status: COMPLETED | OUTPATIENT
Start: 2019-07-21 | End: 2019-07-21

## 2019-07-21 RX ORDER — ONDANSETRON 2 MG/ML
4 INJECTION INTRAMUSCULAR; INTRAVENOUS EVERY 4 HOURS
Status: DISCONTINUED | OUTPATIENT
Start: 2019-07-21 | End: 2019-07-22

## 2019-07-21 RX ORDER — GABAPENTIN 600 MG/1
TABLET ORAL
Refills: 3 | COMMUNITY
Start: 2019-06-21 | End: 2019-09-20 | Stop reason: HOSPADM

## 2019-07-21 RX ORDER — PANTOPRAZOLE SODIUM 40 MG/1
40 INJECTION, POWDER, FOR SOLUTION INTRAVENOUS EVERY 12 HOURS SCHEDULED
Status: DISCONTINUED | OUTPATIENT
Start: 2019-07-21 | End: 2019-07-22

## 2019-07-21 RX ORDER — INSULIN GLARGINE 100 [IU]/ML
50 INJECTION, SOLUTION SUBCUTANEOUS
Status: DISCONTINUED | OUTPATIENT
Start: 2019-07-21 | End: 2019-07-23 | Stop reason: HOSPADM

## 2019-07-21 RX ORDER — GABAPENTIN 300 MG/1
300 CAPSULE ORAL 2 TIMES DAILY
Status: DISCONTINUED | OUTPATIENT
Start: 2019-07-21 | End: 2019-07-23 | Stop reason: HOSPADM

## 2019-07-21 RX ORDER — METOCLOPRAMIDE HYDROCHLORIDE 5 MG/ML
10 INJECTION INTRAMUSCULAR; INTRAVENOUS ONCE
Status: COMPLETED | OUTPATIENT
Start: 2019-07-21 | End: 2019-07-21

## 2019-07-21 RX ORDER — FLUTICASONE PROPIONATE 50 MCG
1 SPRAY, SUSPENSION (ML) NASAL DAILY
Status: DISCONTINUED | OUTPATIENT
Start: 2019-07-21 | End: 2019-07-23 | Stop reason: HOSPADM

## 2019-07-21 RX ORDER — METOCLOPRAMIDE HYDROCHLORIDE 5 MG/ML
5 INJECTION INTRAMUSCULAR; INTRAVENOUS EVERY 6 HOURS PRN
Status: DISCONTINUED | OUTPATIENT
Start: 2019-07-21 | End: 2019-07-21

## 2019-07-21 RX ORDER — SODIUM CHLORIDE 9 MG/ML
75 INJECTION, SOLUTION INTRAVENOUS CONTINUOUS
Status: DISCONTINUED | OUTPATIENT
Start: 2019-07-21 | End: 2019-07-23

## 2019-07-21 RX ORDER — GABAPENTIN 300 MG/1
600 CAPSULE ORAL
Status: DISCONTINUED | OUTPATIENT
Start: 2019-07-21 | End: 2019-07-23 | Stop reason: HOSPADM

## 2019-07-21 RX ORDER — MONTELUKAST SODIUM 10 MG/1
10 TABLET ORAL
Status: DISCONTINUED | OUTPATIENT
Start: 2019-07-21 | End: 2019-07-23 | Stop reason: HOSPADM

## 2019-07-21 RX ORDER — DICYCLOMINE HYDROCHLORIDE 10 MG/1
10 CAPSULE ORAL 3 TIMES DAILY PRN
Status: DISCONTINUED | OUTPATIENT
Start: 2019-07-21 | End: 2019-07-23 | Stop reason: HOSPADM

## 2019-07-21 RX ORDER — METOCLOPRAMIDE HYDROCHLORIDE 5 MG/ML
5 INJECTION INTRAMUSCULAR; INTRAVENOUS EVERY 6 HOURS SCHEDULED
Status: DISCONTINUED | OUTPATIENT
Start: 2019-07-21 | End: 2019-07-23

## 2019-07-21 RX ORDER — ALBUTEROL SULFATE 2.5 MG/3ML
1.25 SOLUTION RESPIRATORY (INHALATION) EVERY 6 HOURS PRN
Status: DISCONTINUED | OUTPATIENT
Start: 2019-07-21 | End: 2019-07-23 | Stop reason: HOSPADM

## 2019-07-21 RX ORDER — DIAZEPAM 5 MG/1
5 TABLET ORAL
Status: DISCONTINUED | OUTPATIENT
Start: 2019-07-21 | End: 2019-07-23 | Stop reason: HOSPADM

## 2019-07-21 RX ORDER — PROMETHAZINE HYDROCHLORIDE 25 MG/ML
12.5 INJECTION, SOLUTION INTRAMUSCULAR; INTRAVENOUS EVERY 4 HOURS PRN
Status: DISCONTINUED | OUTPATIENT
Start: 2019-07-21 | End: 2019-07-21

## 2019-07-21 RX ADMIN — DIAZEPAM 5 MG: 5 TABLET ORAL at 23:14

## 2019-07-21 RX ADMIN — SODIUM CHLORIDE 125 ML/HR: 0.9 INJECTION, SOLUTION INTRAVENOUS at 12:46

## 2019-07-21 RX ADMIN — ONDANSETRON 4 MG: 2 INJECTION INTRAMUSCULAR; INTRAVENOUS at 03:48

## 2019-07-21 RX ADMIN — PANTOPRAZOLE SODIUM 40 MG: 40 INJECTION, POWDER, FOR SOLUTION INTRAVENOUS at 13:15

## 2019-07-21 RX ADMIN — ONDANSETRON 4 MG: 2 INJECTION INTRAMUSCULAR; INTRAVENOUS at 15:48

## 2019-07-21 RX ADMIN — METOCLOPRAMIDE 10 MG: 5 INJECTION, SOLUTION INTRAMUSCULAR; INTRAVENOUS at 03:57

## 2019-07-21 RX ADMIN — ONDANSETRON 4 MG: 2 INJECTION INTRAMUSCULAR; INTRAVENOUS at 13:15

## 2019-07-21 RX ADMIN — ONDANSETRON 4 MG: 2 INJECTION INTRAMUSCULAR; INTRAVENOUS at 20:37

## 2019-07-21 RX ADMIN — METOCLOPRAMIDE HYDROCHLORIDE 5 MG: 5 INJECTION INTRAMUSCULAR; INTRAVENOUS at 18:39

## 2019-07-21 RX ADMIN — INSULIN LISPRO 2 UNITS: 100 INJECTION, SOLUTION INTRAVENOUS; SUBCUTANEOUS at 23:00

## 2019-07-21 RX ADMIN — SODIUM CHLORIDE 1000 ML: 0.9 INJECTION, SOLUTION INTRAVENOUS at 07:23

## 2019-07-21 RX ADMIN — PANTOPRAZOLE SODIUM 40 MG: 40 INJECTION, POWDER, FOR SOLUTION INTRAVENOUS at 20:37

## 2019-07-21 RX ADMIN — SODIUM CHLORIDE 1000 ML: 0.9 INJECTION, SOLUTION INTRAVENOUS at 03:57

## 2019-07-21 RX ADMIN — INSULIN HUMAN 10 UNITS: 100 INJECTION, SOLUTION PARENTERAL at 07:27

## 2019-07-21 RX ADMIN — GABAPENTIN 600 MG: 300 CAPSULE ORAL at 23:14

## 2019-07-21 RX ADMIN — SODIUM CHLORIDE 1000 ML: 0.9 INJECTION, SOLUTION INTRAVENOUS at 03:47

## 2019-07-21 RX ADMIN — MONTELUKAST SODIUM 10 MG: 10 TABLET, FILM COATED ORAL at 23:13

## 2019-07-21 RX ADMIN — INSULIN GLARGINE 50 UNITS: 100 INJECTION, SOLUTION SUBCUTANEOUS at 23:21

## 2019-07-21 RX ADMIN — ONDANSETRON 4 MG: 2 INJECTION INTRAMUSCULAR; INTRAVENOUS at 23:22

## 2019-07-21 RX ADMIN — GABAPENTIN 300 MG: 300 CAPSULE ORAL at 13:15

## 2019-07-21 RX ADMIN — ONDANSETRON 4 MG: 2 INJECTION INTRAMUSCULAR; INTRAVENOUS at 07:22

## 2019-07-21 RX ADMIN — METOCLOPRAMIDE HYDROCHLORIDE 5 MG: 5 INJECTION INTRAMUSCULAR; INTRAVENOUS at 14:45

## 2019-07-21 RX ADMIN — GABAPENTIN 300 MG: 300 CAPSULE ORAL at 18:39

## 2019-07-21 RX ADMIN — METOCLOPRAMIDE HYDROCHLORIDE 5 MG: 5 INJECTION INTRAMUSCULAR; INTRAVENOUS at 23:21

## 2019-07-21 NOTE — ED NOTES
1  Chief complaint-  N/V, Gastroparesis  2  Orientation status- A/O x4  Last blood sugar at 0932- 312, WBC 14 05,   3  Abnormal labs, tests, vitals- elevated 's  Last   4  Important drips- pt was given 3 L NS bolus, 10 units insulin given at 0727  5  Time of last narcotics- N/A  6  IV lines, drains, tubes- 20 L AC  7  Isolation status- N/A  8  Skin check- unremarkable  9  Ambulation status- ambulates to self  Uses cane as needed  10   ED RN phone number #[phone]       Kashif Shelton RN  07/21/19 [id number]

## 2019-07-21 NOTE — ED PROVIDER NOTES
History  Chief Complaint   Patient presents with    Vomiting     pt c/o vomitting for the past 3 days, denies diarrhea and abdominal pain     54 yo female with known gastroparesis who presents with 3 days of vomiting  Denies diarrhea or abd pain, no urinary symptoms or fever  History provided by:  Patient   used: No    Vomiting   Severity:  Moderate  Duration:  3 days  Timing:  Constant  Quality:  Bilious material and stomach contents  Progression:  Unchanged  Chronicity:  Recurrent  Recent urination:  Normal  Relieved by:  Nothing  Worsened by:  Food smell, ice chips and liquids  Ineffective treatments:  None tried  Associated symptoms: no abdominal pain, no chills, no diarrhea, no fever, no headaches and no sore throat    Risk factors: diabetes and prior abdominal surgery (alessandra)        Prior to Admission Medications   Prescriptions Last Dose Informant Patient Reported? Taking?    LYRICA 150 MG capsule   Yes No   Sig: Take 150 mg by mouth 2 (two) times a day   WIXELA INHUB 500-50 MCG/DOSE inhaler   Yes Yes   Sig: Take 1 puff by mouth 2 (two) times a day   albuterol (ACCUNEB) 1 25 MG/3ML nebulizer solution  Self Yes Yes   Sig: Take 1 ampule by nebulization every 6 (six) hours as needed for wheezing   albuterol (PROVENTIL HFA,VENTOLIN HFA) 90 mcg/act inhaler  Self Yes Yes   Sig: Inhale 2 puffs every 6 (six) hours as needed for wheezing   carisoprodol (SOMA) 350 mg tablet  Self Yes No   Sig: Take 350 mg by mouth 2 (two) times a day   diazepam (VALIUM) 5 mg tablet  Self Yes Yes   Sig: Take 5 mg by mouth daily at bedtime as needed for anxiety   dicyclomine (BENTYL) 10 mg capsule  Self No Yes   Sig: TAKE 1 CAPSULE BY MOUTH 4 TIMES A DAY (BEFORE MEALS AND AT BEDTIME)   famotidine (PEPCID) 40 MG tablet  Self No Yes   Sig: TAKE 1 TABLET BY MOUTH 2 TIMES A DAY AS NEEDED FOR HEARTBURN   fluticasone (FLONASE) 50 mcg/act nasal spray  Self Yes Yes   Si spray into each nostril daily   gabapentin (NEURONTIN) 600 MG tablet   Yes No   Sig: TAKE 1/2 TABLET IN MORNING, TAKE 1/2 TAB IN THE EVENING AND 2 TABS AT BEDTIME FOR 30 DAYS   glimepiride (AMARYL) 4 mg tablet  Self Yes Yes   Sig: Take 4 mg by mouth 2 (two) times a day   insulin glargine (LANTUS) 100 units/mL subcutaneous injection  Self Yes Yes   Sig: Inject 50 Units under the skin daily at bedtime     lisinopril (ZESTRIL) 10 mg tablet  Self No Yes   Sig: Take 1 tablet (10 mg total) by mouth daily   metoclopramide (REGLAN) 10 mg tablet  Self No Yes   Sig: TAKE 1 TABLET BY MOUTH QID:(BEFORE MEALS AND AT BEDTIME)   metoclopramide (REGLAN) 10 mg tablet   No No   Sig: Take 1 tablet (10 mg total) by mouth every 6 (six) hours as needed (nausea, vomiting)   montelukast (SINGULAIR) 10 mg tablet  Self Yes Yes   Sig: Take 10 mg by mouth daily at bedtime   morphine (MSIR) 15 mg tablet  Self Yes Yes   Sig: Take 15 mg by mouth 3 (three) times a day as needed for moderate pain or severe pain     omeprazole (PriLOSEC) 20 mg delayed release capsule  Self Yes Yes   Sig: Take 20 mg by mouth daily   ondansetron (ZOFRAN) 4 mg tablet  Self No Yes   Sig: TAKE 1 TABLET BY MOUTH EVERY 8 HOURS AS NEEDED FOR NAUSEA AND VOMITING      Facility-Administered Medications: None       Past Medical History:   Diagnosis Date    Asthma     Chronic pain     Diabetes mellitus (HCC)     Gastroparesis        Past Surgical History:   Procedure Laterality Date    CERVICAL LAMINECTOMY      CHOLECYSTECTOMY      CHOLECYSTECTOMY LAPAROSCOPIC N/A 11/10/2018    Procedure: CHOLECYSTECTOMY LAPAROSCOPIC w/ ioc;  Surgeon: Nelli Martínez MD;  Location: [facility];  Service: General    HYSTERECTOMY      JOINT REPLACEMENT Bilateral     knee    TOE AMPUTATION Right 2/2/2018    Procedure: AMPUTATION TOE, RIGHT GREAT TOE;  Surgeon: Marianne Beasley DPM;  Location: [facility];  Service: Podiatry       Family History   Problem Relation Age of Onset    Diabetes Mother     Diabetes Maternal Grandmother  No Known Problems Father      I have reviewed and agree with the history as documented  Social History     Tobacco Use    Smoking status: Never Smoker    Smokeless tobacco: Never Used   Substance Use Topics    Alcohol use: Yes     Frequency: 2-4 times a month     Drinks per session: 1 or 2     Comment: rarely    Drug use: No        Review of Systems   Constitutional: Positive for fatigue  Negative for appetite change, chills and fever  HENT: Negative for sore throat  Eyes: Negative for visual disturbance  Respiratory: Negative for shortness of breath  Cardiovascular: Negative for chest pain  Gastrointestinal: Positive for nausea and vomiting  Negative for abdominal pain and diarrhea  Genitourinary: Negative for dysuria, frequency, vaginal bleeding and vaginal discharge  Musculoskeletal: Negative for back pain, neck pain and neck stiffness  Skin: Negative for pallor and rash  Allergic/Immunologic: Negative for immunocompromised state  Neurological: Negative for light-headedness and headaches  Psychiatric/Behavioral: Negative for confusion  All other systems reviewed and are negative  Physical Exam  Physical Exam   Constitutional: She is oriented to person, place, and time  She appears well-developed and well-nourished  No distress  HENT:   Head: Normocephalic and atraumatic  Right Ear: External ear normal    Left Ear: External ear normal    MM tachy   Eyes: Pupils are equal, round, and reactive to light  EOM are normal    Neck: Normal range of motion  Neck supple  Cardiovascular: Regular rhythm  Tachycardia present  No murmur heard  Pulmonary/Chest: Effort normal and breath sounds normal  No respiratory distress  Abdominal: Soft  Bowel sounds are normal    Musculoskeletal: Normal range of motion  Neurological: She is alert and oriented to person, place, and time  Skin: Skin is warm  No rash noted  No pallor  Psychiatric: She has a normal mood and affect   Her behavior is normal    Nursing note and vitals reviewed        Vital Signs  ED Triage Vitals [07/21/19 0339]   Temperature Pulse Respirations Blood Pressure SpO2   98 6 °F (37 °C) (!) 119 19 130/65 91 %      Temp Source Heart Rate Source Patient Position - Orthostatic VS BP Location FiO2 (%)   Oral Monitor Sitting Right arm --      Pain Score       No Pain           Vitals:    07/21/19 0616 07/21/19 0915 07/21/19 1000 07/21/19 1129   BP: 131/64  132/68 (!) 174/108   Pulse: (!) 120 (!) 120 (!) 122 (!) 116   Patient Position - Orthostatic VS: Sitting  Sitting          Visual Acuity      ED Medications  Medications   sodium chloride 0 9 % infusion (125 mL/hr Intravenous New Bag 7/21/19 1246)   albuterol inhalation solution 1 25 mg (has no administration in time range)   diazepam (VALIUM) tablet 5 mg (has no administration in time range)   dicyclomine (BENTYL) capsule 10 mg (has no administration in time range)   fluticasone (FLONASE) 50 mcg/act nasal spray 1 spray (1 spray Nasal Not Given 7/21/19 1441)   gabapentin (NEURONTIN) capsule 300 mg (300 mg Oral Given 7/21/19 1839)   gabapentin (NEURONTIN) capsule 600 mg (has no administration in time range)   insulin glargine (LANTUS) subcutaneous injection 50 Units 0 5 mL (has no administration in time range)   montelukast (SINGULAIR) tablet 10 mg (has no administration in time range)   pantoprazole (PROTONIX) injection 40 mg (40 mg Intravenous Given 7/21/19 2037)   morphine injection 1 mg (has no administration in time range)   ondansetron (ZOFRAN) injection 4 mg (4 mg Intravenous Given 7/21/19 2037)   insulin lispro (HumaLOG) 100 units/mL subcutaneous injection 1-5 Units (1 Units Subcutaneous Not Given 7/21/19 1758)   insulin lispro (HumaLOG) 100 units/mL subcutaneous injection 1-5 Units (has no administration in time range)   metoclopramide (REGLAN) injection 5 mg (5 mg Intravenous Given 7/21/19 1839)   sodium chloride 0 9 % bolus 1,000 mL (0 mL Intravenous Stopped 7/21/19 0720)   ondansetron (ZOFRAN) injection 4 mg (4 mg Intravenous Given 7/21/19 0348)   sodium chloride 0 9 % bolus 1,000 mL (0 mL Intravenous Stopped 7/21/19 0720)   metoclopramide (REGLAN) injection 10 mg (10 mg Intravenous Given 7/21/19 0357)   sodium chloride 0 9 % bolus 1,000 mL (1,000 mL Intravenous New Bag 7/21/19 0723)   ondansetron (ZOFRAN) injection 4 mg (4 mg Intravenous Given 7/21/19 0722)   insulin regular (HumuLIN R,NovoLIN R) injection 10 Units (10 Units Subcutaneous Given 7/21/19 0727)       Diagnostic Studies  Results Reviewed     Procedure Component Value Units Date/Time    UA w Reflex to Microscopic [[id number]]  (Abnormal) Collected:  07/21/19 1549    Lab Status:  Final result Specimen:  Urine, Clean Catch Updated:  07/21/19 1557     Color, UA Yellow     Clarity, UA Clear     Specific Gravity, UA >=1 030     pH, UA 5 5     Leukocytes, UA Elevated glucose may cause decreased leukocyte values   See urine microscopic for [facility] result/     Nitrite, UA Negative     Protein,  (2+) mg/dl      Glucose, UA >=1000 (1%) mg/dl      Ketones, UA >=80 (3+) mg/dl      Urobilinogen, UA 0 2 E U /dl      Bilirubin, UA Negative     Blood, UA Small    Rapid drug screen, urine [[id number]] Collected:  07/21/19 1549    Lab Status:  No result Specimen:  Urine, Clean Catch     Fingerstick Glucose (POCT) [[id number]]  (Abnormal) Collected:  07/21/19 0718    Lab Status:  Final result Updated:  07/21/19 0932     POC Glucose 312 mg/dl     Comprehensive metabolic panel [[id number]]  (Abnormal) Collected:  07/21/19 0346    Lab Status:  Final result Specimen:  Blood from Arm, Left Updated:  07/21/19 0576     Sodium 133 mmol/L      Potassium 4 0 mmol/L      Chloride 96 mmol/L      CO2 24 mmol/L      ANION GAP 13 mmol/L      BUN 11 mg/dL      Creatinine 0 78 mg/dL      Glucose 355 mg/dL      Calcium 9 3 mg/dL      AST 18 U/L      ALT 25 U/L      Alkaline Phosphatase 144 U/L      Total Protein 7 4 g/dL      Albumin 3 4 g/dL      Total Bilirubin 2 20 mg/dL      eGFR 86 ml/min/1 73sq m     Narrative:       National Kid[employer] guidelines for Chronic Kidney Disease (CKD):     Stage 1 with normal or high GFR (GFR > 90 mL/min/1 73 square meters)    Stage 2 Mild CKD (GFR = 60-89 mL/min/1 73 square meters)    Stage 3A Moderate CKD (GFR = 45-59 mL/min/1 73 square meters)    Stage 3B Moderate CKD (GFR = 30-44 mL/min/1 73 square meters)    Stage 4 Severe CKD (GFR = 15-29 mL/min/1 73 square meters)    Stage 5 End Stage CKD (GFR <15 mL/min/1 73 square meters)  Note: GFR calculation is accurate only with a steady state creatinine    Lipase [[id number]]  (Abnormal) Collected:  07/21/19 0346    Lab Status:  Final result Specimen:  Blood from Arm, Left Updated:  07/21/19 0409     Lipase 33 u/L     CBC and differential [[id number]]  (Abnormal) Collected:  07/21/19 0346    Lab Status:  Final result Specimen:  Blood from Arm, Left Updated:  07/21/19 0355     WBC 14 05 Thousand/uL      RBC 4 92 Million/uL      Hemoglobin 13 3 g/dL      Hematocrit 41 5 %      MCV 84 fL      MCH 27 0 pg      MCHC 32 0 g/dL      RDW 13 2 %      MPV 13 0 fL      Platelets 229 Thousands/uL      nRBC 0 /100 WBCs      Neutrophils Relative 84 %      Immat GRANS % 1 %      Lymphocytes Relative 8 %      Monocytes Relative 6 %      Eosinophils Relative 1 %      Basophils Relative 0 %      Neutrophils Absolute 11 88 Thousands/µL      Immature Grans Absolute 0 07 Thousand/uL      Lymphocytes Absolute 1 10 Thousands/µL      Monocytes Absolute 0 87 Thousand/µL      Eosinophils Absolute 0 08 Thousand/µL      Basophils Absolute 0 05 Thousands/µL                  XR chest portable   Final Result by Nikko Johnson MD (07/21 2299)      Hypoventilatory changes without acute cardiopulmonary findings        Workstation performed: [id number]         CT abdomen pelvis wo contrast    (Results Pending)              Procedures  Procedures       ED Course  ED Course as of Jul 21 2203   Randee Cockayne Jul 21, 2019   0335 Pt seen and examined  54 yo female with known gastroparesis who presents with 3 days of vomiting  Denies diarrhea or abd pain, no urinary symptoms or fever  Will give IVF, zofran and reglan and check labs, urine        0601 Pt feeling much better, no longer nauseas, IVF still infusing   on CMP - will recheck after IVF  AG 13, CO2 24       0629 IVF infusing nicely,   Pt wishes to go home after IVf x 2L finish  States HR always elevated  0717 , pt again dry heaving - IV zofran ordered with 3rd L IVF  Will check BS and admit  MDM    Disposition  Final diagnoses:   Nausea & vomiting   Dehydration   Gastroparesis   Hyperglycemia     Time reflects when diagnosis was documented in both MDM as applicable and the Disposition within this note     Time User Action Codes Description Comment    7/21/2019  6:30 AM Braxton Aber Add [R11 2] Nausea & vomiting     7/21/2019  6:30 AM Wes GUTIERREZ Add [E86 0] Dehydration     7/21/2019  6:31 AM Wes GUTIERREZ Add [K31 84] Gastroparesis     7/21/2019  6:32 AM Braxton Evener Add [R73 9] Hyperglycemia       ED Disposition     ED Disposition Condition Date/Time Comment    Admit Stable Sun Jul 21, 2019  7:24 AM Case was discussed with [employer] PA SLIM and the patient's admission status was agreed to be Admission Status: inpatient status to the service of Dr Tarik Augustine           Follow-up Information    None         Current Discharge Medication List      CONTINUE these medications which have NOT CHANGED    Details   albuterol (ACCUNEB) 1 25 MG/3ML nebulizer solution Take 1 ampule by nebulization every 6 (six) hours as needed for wheezing      albuterol (PROVENTIL HFA,VENTOLIN HFA) 90 mcg/act inhaler Inhale 2 puffs every 6 (six) hours as needed for wheezing      diazepam (VALIUM) 5 mg tablet Take 5 mg by mouth daily at bedtime as needed for anxiety      dicyclomine (BENTYL) 10 mg capsule TAKE 1 CAPSULE BY MOUTH 4 TIMES A DAY (BEFORE MEALS AND AT BEDTIME)  Qty: 120 capsule, Refills: 3    Associated Diagnoses: Gastroparesis; Intractable vomiting with nausea, unspecified vomiting type; Gall stones      famotidine (PEPCID) 40 MG tablet TAKE 1 TABLET BY MOUTH 2 TIMES A DAY AS NEEDED FOR HEARTBURN  Qty: 90 tablet, Refills: 0    Comments: DX Code Needed    Associated Diagnoses: Gastroparesis      fluticasone (FLONASE) 50 mcg/act nasal spray 1 spray into each nostril daily      glimepiride (AMARYL) 4 mg tablet Take 4 mg by mouth 2 (two) times a day      insulin glargine (LANTUS) 100 units/mL subcutaneous injection Inject 50 Units under the skin daily at bedtime        lisinopril (ZESTRIL) 10 mg tablet Take 1 tablet (10 mg total) by mouth daily  Qty: 30 tablet, Refills: 0    Associated Diagnoses: Essential hypertension      !! metoclopramide (REGLAN) 10 mg tablet TAKE 1 TABLET BY MOUTH QID:(BEFORE MEALS AND AT BEDTIME)  Qty: 120 tablet, Refills: 1    Associated Diagnoses: Gastroparesis      montelukast (SINGULAIR) 10 mg tablet Take 10 mg by mouth daily at bedtime      morphine (MSIR) 15 mg tablet Take 15 mg by mouth 3 (three) times a day as needed for moderate pain or severe pain        omeprazole (PriLOSEC) 20 mg delayed release capsule Take 20 mg by mouth daily      ondansetron (ZOFRAN) 4 mg tablet TAKE 1 TABLET BY MOUTH EVERY 8 HOURS AS NEEDED FOR NAUSEA AND VOMITING  Qty: 30 tablet, Refills: 2    Associated Diagnoses: Gastroparesis; Intractable vomiting with nausea, unspecified vomiting type; Gall stones      WIXELA INHUB 500-50 MCG/DOSE inhaler Take 1 puff by mouth 2 (two) times a day    Comments: Substitution to a formulary equivalent within the same pharmaceutical class is authorized        carisoprodol (SOMA) 350 mg tablet Take 350 mg by mouth 2 (two) times a day      gabapentin (NEURONTIN) 600 MG tablet TAKE 1/2 TABLET IN MORNING, TAKE 1/2 TAB IN THE EVENING AND 2 TABS AT BEDTIME FOR 30 DAYS  Refills: 3      LYRICA 150 MG capsule Take 150 mg by mouth 2 (two) times a day  Refills: 5      !! metoclopramide (REGLAN) 10 mg tablet Take 1 tablet (10 mg total) by mouth every 6 (six) hours as needed (nausea, vomiting)  Qty: 30 tablet, Refills: 0    Associated Diagnoses: Nausea & vomiting; Gastroparesis       ! ! - Potential duplicate medications found  Please discuss with provider  No discharge procedures on file      ED Provider  Electronically Signed by           Siobhan Gonzalez DO  07/21/19 3531

## 2019-07-21 NOTE — H&P
H&P- Sherry Bhatti 1965, 53 y o  female MRN: [id number]    Unit/Bed#: -01 Encounter: [id number]    Primary Care Provider: Oscar Godwin   Date and time admitted to hospital: 7/21/2019  3:33 AM        * Intractable nausea and vomiting  Assessment & Plan  Patient with a known history of gastroparesis  Currently patient is not interacting very well  Her son states that when she becomes ill that she shots down and will not talk  · Schedule Zofran  · Schedule Reglan  · CT abdomen and pelvis  ·       Encephalopathy  Assessment & Plan  Unclear etiology  Patient seems to regressed when she gets ill according to her son  · Check VBG  · Check ammonia  · Check lactic acid  ·     Gastroparesis  Assessment & Plan  Known history of  · GI consult in a m  · Continue Bentyl she can keep it down  ·  Reglan IV p r n  · Schedule Zofran      Type 2 diabetes mellitus with diabetic neuropathy, with long-term current use of insulin [facility])  Assessment & Plan  Lab Results   Component Value Date    HGBA1C 9 7 (H) 12/21/2018       Recent Labs     07/21/19  0718   POCGLU 312*       Blood Sugar Average: Last 72 hrs:  (P) 312     Uncontrolled  Schedule sliding scale insulin and continue Lantus    Opioid dependence ([address] )  Assessment & Plan  Present on admission  Leave morphine IV p r n  Monitor for withdrawal hold MS Contin for now    Chronic pain  Assessment & Plan  Patient unable to keep oral medications down for now  Hold long-acting agents use morphine p r n  Vaginal discharge  Assessment & Plan  Patient with significant perineal odor suspecting vaginal discharge although patient denies this  She is not a reliable source of information at this time  Noted creamy colored discharge on the sheets    · I have asked the staff to beta patient and examined perineal and rectal area will update diagnosis as we have more information  · Obtain UA    Morbid obesity ([address] )  Assessment & Plan  Will counseled during this hospital course regarding lifestyle modifications        VTE Prophylaxis: Heparin  / sequential compression device   Code Status:  Full code  POLST: POLST form is not discussed and not completed at this time  Discussion with family:  Son    Anticipated Length of Stay:  Patient will be admitted on an Inpatient basis with an anticipated length of stay of  greater than comes could greater than 2 midnights  Justification for Hospital Stay:  Altered mental status nausea vomiting    Total Time for Visit, including Counseling / Coordination of Care: 1 hour  Greater than 50% of this total time spent on direct patient counseling and coordination of care  Chief Complaint:   Intractable nausea vomiting    History of Present Illness:    Jose Monteiro is a 53 y o  female who presents with known history of gastroparesis now with 24 hours of intractable nausea vomiting  Patient is encephalopathic in the emergency room not answering questions rocking back and forth retching  Patient denies fever no dysuria no diarrhea  Son was a source for my history as she was unable to communicate  Review of Systems:    Review of Systems   Constitutional: Negative for appetite change, chills, diaphoresis, fatigue, fever and unexpected weight change  HENT: Negative for dental problem, ear discharge, ear pain, facial swelling, hearing loss, mouth sores, nosebleeds and rhinorrhea  Eyes: Negative for pain, discharge, redness and visual disturbance  Respiratory: Negative for cough, chest tightness, shortness of breath and wheezing  Cardiovascular: Negative for chest pain, palpitations and leg swelling  Gastrointestinal: Positive for abdominal pain, nausea and vomiting  Negative for abdominal distention, blood in stool, constipation and diarrhea  Endocrine: Negative for cold intolerance, heat intolerance, polydipsia, polyphagia and polyuria  Genitourinary: Negative for dysuria, frequency, hematuria and urgency     Musculoskeletal: Negative for arthralgias and back pain  Skin: Negative for rash and wound  Neurological: Negative for dizziness, weakness, light-headedness, numbness and headaches  Hematological: Negative for adenopathy  Does not bruise/bleed easily  Psychiatric/Behavioral: Negative for confusion and dysphoric mood  Past Medical and Surgical History:     Past Medical History:   Diagnosis Date    Asthma     Chronic pain     Diabetes mellitus ([facility] [address] )     Gastroparesis        Past Surgical History:   Procedure Laterality Date    CERVICAL LAMINECTOMY      CHOLECYSTECTOMY      CHOLECYSTECTOMY LAPAROSCOPIC N/A 11/10/2018    Procedure: CHOLECYSTECTOMY LAPAROSCOPIC w/ ioc;  Surgeon: Lior Underwood MD;  Location: [facility];  Service: General    HYSTERECTOMY      JOINT REPLACEMENT Bilateral     knee    TOE AMPUTATION Right 2/2/2018    Procedure: AMPUTATION TOE, RIGHT GREAT TOE;  Surgeon: Jhon Donald DPM;  Location: [facility];  Service: Podiatry       Meds/Allergies:    Prior to Admission medications    Medication Sig Start Date End Date Taking?  Authorizing Provider   albuterol (ACCUNEB) 1 25 MG/3ML nebulizer solution Take 1 ampule by nebulization every 6 (six) hours as needed for wheezing   Yes Historical Provider, MD   albuterol (PROVENTIL HFA,VENTOLIN HFA) 90 mcg/act inhaler Inhale 2 puffs every 6 (six) hours as needed for wheezing   Yes Historical Provider, MD   diazepam (VALIUM) 5 mg tablet Take 5 mg by mouth daily at bedtime as needed for anxiety   Yes Historical Provider, MD   dicyclomine (BENTYL) 10 mg capsule TAKE 1 CAPSULE BY MOUTH 4 TIMES A DAY (BEFORE MEALS AND AT BEDTIME) 5/21/19  Yes Zoe Riggs MD   famotidine (PEPCID) 40 MG tablet TAKE 1 TABLET BY MOUTH 2 TIMES A DAY AS NEEDED FOR HEARTBURN 2/26/19  Yes VIVI Cho   fluticasone (FLONASE) 50 mcg/act nasal spray 1 spray into each nostril daily   Yes Historical Provider, MD   glimepiride (AMARYL) 4 mg tablet Take 4 mg by mouth 2 (two) times a day   Yes Historical Provider, MD   insulin glargine (LANTUS) 100 units/mL subcutaneous injection Inject 50 Units under the skin daily at bedtime     Yes Historical Provider, MD   lisinopril (ZESTRIL) 10 mg tablet Take 1 tablet (10 mg total) by mouth daily 12/25/18  Yes Riky Dye PA-C   metoclopramide (REGLAN) 10 mg tablet TAKE 1 TABLET BY MOUTH QID:(BEFORE MEALS AND AT BEDTIME) 5/24/19  Yes Cristy Kothari PA-C   montelukast (SINGULAIR) 10 mg tablet Take 10 mg by mouth daily at bedtime   Yes Historical Provider, MD   morphine (MSIR) 15 mg tablet Take 15 mg by mouth 3 (three) times a day as needed for moderate pain or severe pain     Yes Historical Provider, MD   omeprazole (PriLOSEC) 20 mg delayed release capsule Take 20 mg by mouth daily   Yes Historical Provider, MD   ondansetron (ZOFRAN) 4 mg tablet TAKE 1 TABLET BY MOUTH EVERY 8 HOURS AS NEEDED FOR NAUSEA AND VOMITING 4/30/19  Yes VIVI Ritchie INHUB 500-50 MCG/DOSE inhaler Take 1 puff by mouth 2 (two) times a day 2/28/19  Yes Historical Provider, MD   predniSONE 10 mg tablet Take by mouth daily  7/21/19 Yes Historical Provider, MD   carisoprodol (SOMA) 350 mg tablet Take 350 mg by mouth 2 (two) times a day    Historical Provider, MD   gabapentin (NEURONTIN) 600 MG tablet TAKE 1/2 TABLET IN MORNING, TAKE 1/2 TAB IN THE EVENING AND 2 TABS AT BEDTIME FOR 30 DAYS 6/21/19   Historical Provider, MD   LYRICA 150 MG capsule Take 150 mg by mouth 2 (two) times a day 6/12/19   Historical Provider, MD   metoclopramide (REGLAN) 10 mg tablet Take 1 tablet (10 mg total) by mouth every 6 (six) hours as needed (nausea, vomiting) 7/21/19   Jonatan Meyers DO   acetaminophen (TYLENOL) 325 mg tablet Take 2 tablets (650 mg total) by mouth every 6 (six) hours as needed for fever 11/14/18 7/21/19  Alana Coleman MD   nystatin (MYCOSTATIN) powder Apply topically 2 (two) times a day 11/14/18 7/21/19  Alana Coleman MD     I have reviewed home medications with a medical source (PCP, Pharmacy, other)  Allergies: Allergies   Allergen Reactions    Nsaids GI Intolerance       Social History:     Marital Status:   Patient Pre-hospital Living Situation:  Lives at home with family  Patient Pre-hospital Level of Mobility:  No restriction  Patient Pre-hospital Diet Restrictions:  No restriction  Substance Use History:   Social History     Substance and Sexual Activity   Alcohol Use Yes    Frequency: 2-4 times a month    Drinks per session: 1 or 2    Comment: rarely     Social History     Tobacco Use   Smoking Status Never Smoker   Smokeless Tobacco Never Used     Social History     Substance and Sexual Activity   Drug Use No       Family History:    Family History   Problem Relation Age of Onset    Diabetes Mother     Diabetes Maternal Grandmother     No Known Problems Father        Physical Exam:     Vitals:   Blood Pressure: (!) 174/108 (07/21/19 1129)  Pulse: (!) 116 (07/21/19 1129)  Temperature: 98 6 °F (37 °C) (07/21/19 1129)  Temp Source: Oral (07/21/19 0339)  Respirations: 19 (07/21/19 1129)  Height: 5' 8" (172 7 cm) (07/21/19 0339)  Weight - Scale: 129 kg (283 lb 8 2 oz) (07/21/19 0339)  SpO2: 98 % (07/21/19 1129)    Physical Exam   Constitutional: She is oriented to person, place, and time  She appears well-developed  No distress  Morbidly obese   HENT:   Head: Normocephalic and atraumatic  Right Ear: External ear normal    Left Ear: External ear normal    Nose: Nose normal    Mouth/Throat: Oropharynx is clear and moist  No oropharyngeal exudate  Eyes: Pupils are equal, round, and reactive to light  Conjunctivae and EOM are normal  Right eye exhibits no discharge  Left eye exhibits no discharge  No scleral icterus  Neck: Normal range of motion  Neck supple  No JVD present  No thyromegaly present  Cardiovascular: Normal rate, regular rhythm, normal heart sounds and intact distal pulses   Exam reveals no gallop and no friction rub  No murmur heard  Pulmonary/Chest: Effort normal and breath sounds normal  No respiratory distress  She has no wheezes  She has no rales  Abdominal: Soft  Bowel sounds are normal  She exhibits no distension  There is no tenderness  There is no rebound and no guarding  Foul odor from perineal area, reported rash by nursing   Musculoskeletal: Normal range of motion  She exhibits no edema or deformity  Lymphadenopathy:     She has no cervical adenopathy  Neurological: She is alert and oriented to person, place, and time  She has normal reflexes  She displays normal reflexes  No cranial nerve deficit  She exhibits normal muscle tone  Skin: Skin is warm and dry  No rash noted  She is not diaphoretic  No erythema  Psychiatric: She has a normal mood and affect  Vitals reviewed  Additional Data:     Lab Results: I have personally reviewed pertinent reports  Results from last 7 days   Lab Units 07/21/19  0346   WBC Thousand/uL 14 05*   HEMOGLOBIN g/dL 13 3   HEMATOCRIT % 41 5   PLATELETS Thousands/uL 225   NEUTROS PCT % 84*   LYMPHS PCT % 8*   MONOS PCT % 6   EOS PCT % 1     Results from last 7 days   Lab Units 07/21/19  1211 07/21/19  0346   SODIUM mmol/L 134* 133*   POTASSIUM mmol/L 3 8 4 0   CHLORIDE mmol/L 97* 96*   CO2 mmol/L 22 24   BUN mg/dL 9 11   CREATININE mg/dL 0 77 0 78   ANION GAP mmol/L 15* 13   CALCIUM mg/dL 9 2 9 3   ALBUMIN g/dL  --  3 4*   TOTAL BILIRUBIN mg/dL  --  2 20*   ALK PHOS U/L  --  144*   ALT U/L  --  25   AST U/L  --  18   GLUCOSE RANDOM mg/dL 308* 355*         Results from last 7 days   Lab Units 07/21/19  0718   POC GLUCOSE mg/dl 312*         Results from last 7 days   Lab Units 07/21/19  1211   LACTIC ACID mmol/L 1 2       Imaging: I have personally reviewed pertinent reports        XR chest portable    (Results Pending)   CT abdomen pelvis wo contrast    (Results Pending)       EKG, Pathology, and Other Studies Reviewed on Admission:   · EKG: None recorded    Allscripts / Epic Records Reviewed: Yes     ** Please Note: This note has been constructed using a voice recognition system  **      Addendum:  Patient seen and examined after arriving to the floor  Several hours after hydration and control of nausea patient is more awake alert and communicating  She did communicate to me that she desires to be a full code    At this time she is resting comfortably

## 2019-07-21 NOTE — ASSESSMENT & PLAN NOTE
Patient with significant perineal odor suspecting vaginal discharge although patient denies this  She is not a reliable source of information at this time  Noted creamy colored discharge on the sheets    · I have asked the staff to beta patient and examined perineal and rectal area will update diagnosis as we have more information  · Obtain UA

## 2019-07-21 NOTE — ASSESSMENT & PLAN NOTE
Known history of  · GI consult in a m  · Continue Bentyl she can keep it down  ·  Reglan IV p r n    · Schedule Zofran

## 2019-07-21 NOTE — ASSESSMENT & PLAN NOTE
Lab Results   Component Value Date    HGBA1C 9 7 (H) 12/21/2018       Recent Labs     07/21/19  0718   POCGLU 312*       Blood Sugar Average: Last 72 hrs:  (P) 312     Uncontrolled    Schedule sliding scale insulin and continue Lantus

## 2019-07-21 NOTE — ASSESSMENT & PLAN NOTE
Patient with a known history of gastroparesis  Currently patient is not interacting very well  Her son states that when she becomes ill that she shots down and will not talk    · Schedule Zofran  · Schedule Reglan  · CT abdomen and pelvis  ·

## 2019-07-21 NOTE — ASSESSMENT & PLAN NOTE
Unclear etiology  Patient seems to regressed when she gets ill according to her son    · Check VBG  · Check ammonia  · Check lactic acid  ·

## 2019-07-22 VITALS
RESPIRATION RATE: 20 BRPM | WEIGHT: 283.51 LBS | DIASTOLIC BLOOD PRESSURE: 80 MMHG | OXYGEN SATURATION: 94 % | TEMPERATURE: 98.2 F | SYSTOLIC BLOOD PRESSURE: 138 MMHG | BODY MASS INDEX: 42.97 KG/M2 | HEIGHT: 68 IN | HEART RATE: 100 BPM

## 2019-07-22 PROBLEM — G93.40 ENCEPHALOPATHY: Status: RESOLVED | Noted: 2019-07-21 | Resolved: 2019-07-22

## 2019-07-22 LAB
EST. AVERAGE GLUCOSE BLD GHB EST-MCNC: 312 MG/DL
GLUCOSE SERPL-MCNC: 228 MG/DL (ref 65–140)
GLUCOSE SERPL-MCNC: 242 MG/DL (ref 65–140)
GLUCOSE SERPL-MCNC: 244 MG/DL (ref 65–140)
GLUCOSE SERPL-MCNC: 267 MG/DL (ref 65–140)
HBA1C MFR BLD: 12.5 % (ref 4.2–6.3)

## 2019-07-22 PROCEDURE — 82948 REAGENT STRIP/BLOOD GLUCOSE: CPT

## 2019-07-22 PROCEDURE — 99222 1ST HOSP IP/OBS MODERATE 55: CPT | Performed by: INTERNAL MEDICINE

## 2019-07-22 PROCEDURE — C9113 INJ PANTOPRAZOLE SODIUM, VIA: HCPCS | Performed by: INTERNAL MEDICINE

## 2019-07-22 PROCEDURE — 99233 SBSQ HOSP IP/OBS HIGH 50: CPT | Performed by: INTERNAL MEDICINE

## 2019-07-22 RX ORDER — LISINOPRIL 10 MG/1
10 TABLET ORAL DAILY
Status: DISCONTINUED | OUTPATIENT
Start: 2019-07-22 | End: 2019-07-23 | Stop reason: HOSPADM

## 2019-07-22 RX ORDER — PANTOPRAZOLE SODIUM 40 MG/1
40 TABLET, DELAYED RELEASE ORAL
Status: DISCONTINUED | OUTPATIENT
Start: 2019-07-22 | End: 2019-07-23 | Stop reason: HOSPADM

## 2019-07-22 RX ORDER — NYSTATIN 100000 [USP'U]/G
POWDER TOPICAL 3 TIMES DAILY
Status: DISCONTINUED | OUTPATIENT
Start: 2019-07-22 | End: 2019-07-23 | Stop reason: HOSPADM

## 2019-07-22 RX ORDER — ONDANSETRON 4 MG/1
4 TABLET, ORALLY DISINTEGRATING ORAL EVERY 6 HOURS PRN
Status: DISCONTINUED | OUTPATIENT
Start: 2019-07-22 | End: 2019-07-23 | Stop reason: HOSPADM

## 2019-07-22 RX ORDER — INSULIN GLARGINE 100 [IU]/ML
20 INJECTION, SOLUTION SUBCUTANEOUS EVERY MORNING
Status: DISCONTINUED | OUTPATIENT
Start: 2019-07-23 | End: 2019-07-23 | Stop reason: HOSPADM

## 2019-07-22 RX ORDER — CARISOPRODOL 350 MG/1
350 TABLET ORAL 2 TIMES DAILY
Status: DISCONTINUED | OUTPATIENT
Start: 2019-07-22 | End: 2019-07-23 | Stop reason: HOSPADM

## 2019-07-22 RX ORDER — MORPHINE SULFATE 15 MG/1
15 TABLET ORAL 3 TIMES DAILY PRN
Status: DISCONTINUED | OUTPATIENT
Start: 2019-07-22 | End: 2019-07-23 | Stop reason: HOSPADM

## 2019-07-22 RX ORDER — HEPARIN SODIUM 5000 [USP'U]/ML
5000 INJECTION, SOLUTION INTRAVENOUS; SUBCUTANEOUS EVERY 8 HOURS SCHEDULED
Status: DISCONTINUED | OUTPATIENT
Start: 2019-07-22 | End: 2019-07-23 | Stop reason: HOSPADM

## 2019-07-22 RX ORDER — PREGABALIN 75 MG/1
150 CAPSULE ORAL 2 TIMES DAILY
Status: DISCONTINUED | OUTPATIENT
Start: 2019-07-22 | End: 2019-07-23 | Stop reason: HOSPADM

## 2019-07-22 RX ORDER — PANTOPRAZOLE SODIUM 40 MG/1
40 TABLET, DELAYED RELEASE ORAL
Status: DISCONTINUED | OUTPATIENT
Start: 2019-07-23 | End: 2019-07-22

## 2019-07-22 RX ADMIN — CARISOPRODOL 350 MG: 350 TABLET ORAL at 17:45

## 2019-07-22 RX ADMIN — METOCLOPRAMIDE HYDROCHLORIDE 5 MG: 5 INJECTION INTRAMUSCULAR; INTRAVENOUS at 17:42

## 2019-07-22 RX ADMIN — PANTOPRAZOLE SODIUM 40 MG: 40 INJECTION, POWDER, FOR SOLUTION INTRAVENOUS at 09:24

## 2019-07-22 RX ADMIN — INSULIN LISPRO 2 UNITS: 100 INJECTION, SOLUTION INTRAVENOUS; SUBCUTANEOUS at 12:32

## 2019-07-22 RX ADMIN — METOCLOPRAMIDE HYDROCHLORIDE 5 MG: 5 INJECTION INTRAMUSCULAR; INTRAVENOUS at 05:00

## 2019-07-22 RX ADMIN — NYSTATIN: 100000 POWDER TOPICAL at 12:32

## 2019-07-22 RX ADMIN — INSULIN LISPRO 2 UNITS: 100 INJECTION, SOLUTION INTRAVENOUS; SUBCUTANEOUS at 21:51

## 2019-07-22 RX ADMIN — INSULIN GLARGINE 50 UNITS: 100 INJECTION, SOLUTION SUBCUTANEOUS at 21:51

## 2019-07-22 RX ADMIN — HEPARIN SODIUM 5000 UNITS: 5000 INJECTION INTRAVENOUS; SUBCUTANEOUS at 15:14

## 2019-07-22 RX ADMIN — LISINOPRIL 10 MG: 10 TABLET ORAL at 16:05

## 2019-07-22 RX ADMIN — SODIUM CHLORIDE 125 ML/HR: 0.9 INJECTION, SOLUTION INTRAVENOUS at 05:02

## 2019-07-22 RX ADMIN — ONDANSETRON 4 MG: 2 INJECTION INTRAMUSCULAR; INTRAVENOUS at 03:37

## 2019-07-22 RX ADMIN — MORPHINE SULFATE 15 MG: 15 TABLET ORAL at 16:05

## 2019-07-22 RX ADMIN — GABAPENTIN 600 MG: 300 CAPSULE ORAL at 21:51

## 2019-07-22 RX ADMIN — FLUTICASONE PROPIONATE 1 SPRAY: 50 SPRAY, METERED NASAL at 09:25

## 2019-07-22 RX ADMIN — NYSTATIN: 100000 POWDER TOPICAL at 16:05

## 2019-07-22 RX ADMIN — GABAPENTIN 300 MG: 300 CAPSULE ORAL at 17:42

## 2019-07-22 RX ADMIN — PANTOPRAZOLE SODIUM 40 MG: 40 TABLET, DELAYED RELEASE ORAL at 16:05

## 2019-07-22 RX ADMIN — MORPHINE SULFATE 1 MG: 2 INJECTION, SOLUTION INTRAMUSCULAR; INTRAVENOUS at 10:49

## 2019-07-22 RX ADMIN — INSULIN LISPRO 2 UNITS: 100 INJECTION, SOLUTION INTRAVENOUS; SUBCUTANEOUS at 09:25

## 2019-07-22 RX ADMIN — GABAPENTIN 300 MG: 300 CAPSULE ORAL at 09:25

## 2019-07-22 RX ADMIN — PREGABALIN 150 MG: 75 CAPSULE ORAL at 17:42

## 2019-07-22 RX ADMIN — MONTELUKAST SODIUM 10 MG: 10 TABLET, FILM COATED ORAL at 21:51

## 2019-07-22 RX ADMIN — ONDANSETRON 4 MG: 2 INJECTION INTRAMUSCULAR; INTRAVENOUS at 09:24

## 2019-07-22 RX ADMIN — METOCLOPRAMIDE HYDROCHLORIDE 5 MG: 5 INJECTION INTRAMUSCULAR; INTRAVENOUS at 12:32

## 2019-07-22 RX ADMIN — NYSTATIN: 100000 POWDER TOPICAL at 21:51

## 2019-07-22 RX ADMIN — INSULIN LISPRO 2 UNITS: 100 INJECTION, SOLUTION INTRAVENOUS; SUBCUTANEOUS at 17:46

## 2019-07-22 RX ADMIN — HEPARIN SODIUM 5000 UNITS: 5000 INJECTION INTRAVENOUS; SUBCUTANEOUS at 21:51

## 2019-07-22 NOTE — ASSESSMENT & PLAN NOTE
Most likely secondary to uncontrolled diabetes mellitus    Continue with current treatment and plan as above

## 2019-07-22 NOTE — ASSESSMENT & PLAN NOTE
Lab Results   Component Value Date    HGBA1C 12 5 (H) 07/21/2019       Recent Labs     07/21/19  1755 07/21/19  2315 07/22/19  0618 07/22/19  1220   POCGLU 267* 252* 242* 228*       Blood Sugar Average: Last 72 hrs:  (P) 260 2   Very uncontrolled  She is not really compliant with her diet or sugars  Accu-Cheks still on the higher side  Adjust medications as needed  Continue home medications    Patient does have an endocrinology follow-up appointment on the 30th

## 2019-07-22 NOTE — CONSULTS
Consultation - [address] [facility]  Bishop Eastman 53 y o  female MRN: [id number]  Unit/Bed#: -01 Encounter: [id number]        Consults    Reason for Consult / Principal Problem: Nausea, Vomiting, Gastroparesis    HPI: Ms Zoie Cerrato is a 54 yo F with a PMH of uncontrolled IDDM, gastroparesis, chronic pain on chronic opioids, chronic constipation, presenting with acute onset of nausea/vomiting for the past 3 days  She is on standing reglan QID and zofran PRN in addition he reports she has been compliant with this  She denies any abdominal pain that precipitated the nausea or the vomiting and just started out of nowhere  She denies any recent sick contacts  She denies any associated fevers or diarrhea  She has chronic constipation which included is been well controlled recently was taking Metamucil and she is having a bowel movement every day  There is no melena or hematochezia  She reports she had an endoscopy several years ago after she had a piece of meat stuck in the esophagus and this had to be pushed down  She reports no further episodes of this and she denies any heartburn or peptic symptoms  She has never had a colonoscopy before  She reports she is feeling much better today and she is going to try full liquid diet  She does not take NSAIDs  She is going to see an endocrinologist at the end of this month due to her diabetes and her A1c is now 12 5 compared to 9 7 in December      REVIEW OF SYSTEMS: Negative except for as stated above      Historical Information   Past Medical History:   Diagnosis Date    Asthma     Chronic pain     Diabetes mellitus ([address] )     Gastroparesis      Past Surgical History:   Procedure Laterality Date    CERVICAL LAMINECTOMY      CHOLECYSTECTOMY      CHOLECYSTECTOMY LAPAROSCOPIC N/A 11/10/2018    Procedure: CHOLECYSTECTOMY LAPAROSCOPIC w/ ioc;  Surgeon: Yanet Gann MD;  [facility] OR;  [facility] [address]  [facility] B[facility]     knee    TOE AMPUTATION Right 2/2/2018    Procedure: AMPUTATION TOE, RIGHT GREAT TOE;  Surgeon: Alyson Beasley DPM;  Location: [facility];  Service: Podiatry     Social History   Social History     Substance and Sexual Activity   Alcohol Use Yes    Frequency: 2-4 times a month    Drinks per session: 1 or 2    Comment: rarely     Social History     Substance and Sexual Activity   Drug Use No     Social History     Tobacco Use   Smoking Status Never Smoker   Smokeless Tobacco Never Used     Family History   Problem Relation Age of Onset    Diabetes Mother     Diabetes Maternal Grandmother     No Known Problems Father        Meds/Allergies     Medications Prior to Admission   Medication    albuterol (ACCUNEB) 1 25 MG/3ML nebulizer solution    albuterol (PROVENTIL HFA,VENTOLIN HFA) 90 mcg/act inhaler    diazepam (VALIUM) 5 mg tablet    dicyclomine (BENTYL) 10 mg capsule    famotidine (PEPCID) 40 MG tablet    fluticasone (FLONASE) 50 mcg/act nasal spray    glimepiride (AMARYL) 4 mg tablet    insulin glargine (LANTUS) 100 units/mL subcutaneous injection    lisinopril (ZESTRIL) 10 mg tablet    metoclopramide (REGLAN) 10 mg tablet    montelukast (SINGULAIR) 10 mg tablet    morphine (MSIR) 15 mg tablet    omeprazole (PriLOSEC) 20 mg delayed release capsule    ondansetron (ZOFRAN) 4 mg tablet    WIXELA INHUB 500-50 MCG/DOSE inhaler    carisoprodol (SOMA) 350 mg tablet    gabapentin (NEURONTIN) 600 MG tablet    LYRICA 150 MG capsule    metoclopramide (REGLAN) 10 mg tablet     Current Facility-Administered Medications   Medication Dose Route Frequency    albuterol inhalation solution 1 25 mg  1 25 mg Nebulization Q6H PRN    diazepam (VALIUM) tablet 5 mg  5 mg Oral HS PRN    dicyclomine (BENTYL) capsule 10 mg  10 mg Oral TID PRN    fluticasone (FLONASE) 50 mcg/act nasal spray 1 spray  1 spray Nasal Daily    gabapentin (NEURONTIN) capsule 300 mg  300 mg Oral BID    gabapentin (NEURONTIN) capsule 600 mg  600 mg Oral HS    insulin glargine (LANTUS) subcutaneous injection 50 Units 0 5 mL  50 Units Subcutaneous HS    insulin lispro (HumaLOG) 100 units/mL subcutaneous injection 1-5 Units  1-5 Units Subcutaneous TID AC    insulin lispro (HumaLOG) 100 units/mL subcutaneous injection 1-5 Units  1-5 Units Subcutaneous HS    metoclopramide (REGLAN) injection 5 mg  5 mg Intravenous Q6H BOLIVAR    montelukast (SINGULAIR) tablet 10 mg  10 mg Oral HS    morphine injection 1 mg  1 mg Intravenous Q4H PRN    nystatin (MYCOSTATIN) powder   Topical TID    ondansetron (ZOFRAN) injection 4 mg  4 mg Intravenous Q4H    pantoprazole (PROTONIX) injection 40 mg  40 mg Intravenous Q12H BOLIVAR    sodium chloride 0 9 % infusion  75 mL/hr Intravenous Continuous       Allergies   Allergen Reactions    Nsaids GI Intolerance           Objective     Blood pressure 159/92, pulse (!) 120, temperature 98 6 °F (37 °C), resp  rate 19, height 5' 8" (1 727 m), weight 129 kg (283 lb 8 2 oz), SpO2 95 %, not currently breastfeeding        Intake/Output Summary (Last 24 hours) at 7/22/2019 1052  Last data filed at 7/22/2019 1000  Gross per 24 hour   Intake 300 ml   Output 1200 ml   Net -900 ml         PHYSICAL EXAM:      General Appearance:   Alert, oriented x3, no acute distress    HEENT:   Normocephalic, atraumatic, anicteric      Neck:  Supple, symmetrical, trachea midline   Lungs:   Clear to auscultation bilaterally; no rales, rhonchi or wheezing; respirations unlabored    Heart[de-identified]   RRR, no murmur   Abdomen:   Obese, non-distended, soft, BS active, NTTP    Rectal:   Deferred    Extremities:  No cyanosis or LE edema    Pulses:  2+ and symmetric all extremities    Skin:  Skin color, texture, turgor normal, no rashes or lesions      Lab Results:   Results from last 7 days   Lab Units 07/21/19  0346   WBC Thousand/uL 14 05*   HEMOGLOBIN g/dL 13 3   HEMATOCRIT % 41 5   PLATELETS Thousands/uL 225   NEUTROS PCT % 84* LYMPHS PCT % 8*   MONOS PCT % 6   EOS PCT % 1     Results from last 7 days   Lab Units 07/21/19  1211 07/21/19  0346   POTASSIUM mmol/L 3 8 4 0   CHLORIDE mmol/L 97* 96*   CO2 mmol/L 22 24   BUN mg/dL 9 11   CREATININE mg/dL 0 77 0 78   CALCIUM mg/dL 9 2 9 3   ALK PHOS U/L  --  144*   ALT U/L  --  25   AST U/L  --  18         Results from last 7 days   Lab Units 07/21/19  0346   LIPASE u/L 33*       Imaging Studies: I have personally reviewed pertinent imaging studies  Ct Abdomen Pelvis Wo Contrast  Result Date: 7/22/2019  Impression: No acute abnormality in the abdomen or pelvis  Xr Chest Portable  Result Date: 7/21/2019  Impression: Hypoventilatory changes without acute cardiopulmonary findings  ASSESSMENT and PLAN:      Nausea, Vomiting  Gastroparesis  Uncontrolled IDDM  - Acute onset of nausea/vomiting 3 days ago without any change in medications, sick contacts, and she reports compliance with reglan/zofran regimen  - CT A/P on admission without acute abnormality  - Symptoms much improved this AM, agree with full liquid diet and advance as tolerated  - Suspect her symptoms are 2/2 gastroparesis and worsening A1C now 12 5 compared to 9 7 in December, keep appt with endocrinology as outpatient at the end of July for tighter glucose control  - Recommend EGD to rule out esophagitis v gastritis v PUD since she has not had a EGD in several years and had a food impaction previously, will plan to do this as outpatient paired with a colonoscopy for screening unless unable to tolerate PO  - Continue supportive care, reglan 5 mg q6h and change to PRN zofran  - Continue protonix 40 mg daily, continue this on discharge      Colon Cancer Screening  Chronic Constipation  - Hx mild chronic constipation well controlled with metamucil  - No prior colonoscopy  - Will plan for outpatient colonoscopy paired with EGD as above for screening      The patient will be seen and examined by Dr Dominik Stewart

## 2019-07-22 NOTE — PHYSICIAN ADVISOR
Current patient class: Inpatient  The patient is currently on Hospital Day: 2 at [address]      The patient was admitted to the hospital at [facility] on 7/21/19 for the following diagnosis:  Gastroparesis [K31 84]  Dehydration [E86 0]  Vomiting [R11 10]  Nausea & vomiting [R11 2]  Hyperglycemia [R73 9]       There is documentation in the medical record of an expected length of stay of at least 2 midnights  The patient is therefore expected to satisfy the 2 midnight benchmark and given the 2 midnight presumption is appropriate for INPATIENT ADMISSION  Given this expectation of a satisfying stay, CMS instructs us that the patient is most often appropriate for inpatient admission under part A provided medical necessity is documented in the chart  After review of the relevant documentation, labs, vital signs and test results, the patient is appropriate for INPATIENT ADMISSION  Admission to the hospital as an inpatient is a complex decision making process which requires the practitioner to consider the patients presenting complaint, history and physical examination and all relevant testing  With this in mind, in this case, the patient was deemed appropriate for INPATIENT ADMISSION  After review of the documentation and testing available at the time of the admission I concur with this clinical determination of medical necessity  Rationale is as follows: The patient is a 53 yrs old Female who presented to the ED at 7/21/2019  3:33 AM with a chief complaint of Vomiting (pt c/o vomitting for the past 3 days, denies diarrhea and abdominal pain)     Patient admitted with a report of intractable N/V  She also appeared ot be encephalopathic and was not offering reliable information  She has a history of gastroparesis and DM  She did have an elevated WBC  Imaging studies have not revealed any acute pathology    The patient is on a regimen for her N/V and continues to be unable to keep oral meds down  With the noted ongoing need for IV medications, a two night admission class to the hospital would be considered appropriate for her          The patients vitals on arrival were ED Triage Vitals [07/21/19 0339]   Temperature Pulse Respirations Blood Pressure SpO2   98 6 °F (37 °C) (!) 119 19 130/65 91 %      Temp Source Heart Rate Source Patient Position - Orthostatic VS BP Location FiO2 (%)   Oral Monitor Sitting Right arm --      Pain Score       No Pain           Past Medical History:   Diagnosis Date    Asthma     Chronic pain     Diabetes mellitus ([address] )     Gastroparesis      Past Surgical History:   Procedure Laterality Date    CERVICAL LAMINECTOMY      CHOLECYSTECTOMY      CHOLECYSTECTOMY LAPAROSCOPIC N/A 11/10/2018    Procedure: CHOLECYSTECTOMY LAPAROSCOPIC w/ ioc;  Surgeon: Malina Fitzpatrick MD;  Location: [facility];  Service: General    HYSTERECTOMY      JOINT REPLACEMENT Bilateral     knee    TOE AMPUTATION Right 2/2/2018    Procedure: AMPUTATION TOE, RIGHT GREAT TOE;  Surgeon: Mabel Hernandez DPM;  Location: [facility];  Service: Podiatry           Consults have been placed to:   IP CONSULT TO GASTROENTEROLOGY    Vitals:    07/21/19 1000 07/21/19 1129 07/21/19 2341 07/22/19 1338   BP: 132/68 (!) 174/108 159/92    BP Location: Right arm      Pulse: (!) 122 (!) 116 (!) 120    Resp: 17 19     Temp:  98 6 °F (37 °C)     TempSrc:       SpO2: 95% 98% 95%    Weight:    129 kg (283 lb 8 2 oz)   Height:    5' 8" (1 727 m)       Most recent labs:    Recent Labs     07/21/19  0346 07/21/19  1211   WBC 14 05*  --    HGB 13 3  --    HCT 41 5  --      --    K 4 0 3 8   CALCIUM 9 3 9 2   BUN 11 9   CREATININE 0 78 0 77   LIPASE 33*  --    AST 18  --    ALT 25  --    ALKPHOS 144*  --        Scheduled Meds:  Current Facility-Administered Medications:  albuterol 1 25 mg Nebulization Q6H PRN Doretha Finnegan MD    diazepam 5 mg Oral HS PRN Doretha Finnegan MD    dicyclomine 10 mg Oral TID PRN Tess Tovar MD    fluticasone 1 spray Nasal Daily Tess Tovar MD    gabapentin 300 mg Oral BID Tess Tovar MD    gabapentin 600 mg Oral HS Tess Tovar MD    heparin (porcine) 5,000 Units Subcutaneous Q8H [address] Tess Tovar MD    insulin glargine 50 Units Subcutaneous HS Tess Tovar MD    insulin lispro 1-5 Units Subcutaneous TID AC Tess Tovar MD    insulin lispro 1-5 Units Subcutaneous HS Tess Tovar MD    metoclopramide 5 mg Intravenous Q6H [address] Tess Tovar MD    montelukast 10 mg Oral HS Tess Tovar MD    morphine injection 1 mg Intravenous Q4H PRN Tess Tovar MD    nystatin  Topical TID Carmina Hampton MD    ondansetron 4 mg Oral Q6H PRN Nancy Cornejo PA-C    [START ON 7/23/2019] pantoprazole 40 mg Oral Early Morning Zayda Person PA-C    sodium chloride 75 mL/hr Intravenous Continuous Carmina Hampton MD Last Rate: 75 mL/hr (07/22/19 1232)     Continuous Infusions:  sodium chloride 75 mL/hr Last Rate: 75 mL/hr (07/22/19 1232)     PRN Meds:   albuterol    diazepam    dicyclomine    morphine injection    ondansetron    Surgical procedures (if appropriate):

## 2019-07-22 NOTE — UTILIZATION REVIEW
Initial Clinical Review    Admission: Date/Time/Statement: 7/21/19 @ 0725     Orders Placed This Encounter   Procedures    Inpatient Admission     Standing Status:   Standing     Number of Occurrences:   1     Order Specific Question:   Admitting Physician     Answer:   Logna Woodard [[id number]]     Order Specific Question:   Level of Care     Answer:   Med Surg [[id number]]     Order Specific Question:   Estimated length of stay     Answer:   More than 2 Midnights     Order Specific Question:   Certification     Answer:   I certify that inpatient services are medically necessary for this patient for a duration of greater than two midnights  See H&P and MD Progress Notes for additional information about the patient's course of treatment  ED Arrival Information     Expected Arrival Acuity Means of Arrival Escorted By Service Admission Type    - 7/21/2019 03:31 Urgent Wheelchair Family Member Hospitalist Urgent    Arrival Complaint    -        Chief Complaint   Patient presents with    Vomiting     pt c/o vomitting for the past 3 days, denies diarrhea and abdominal pain     Assessment/Plan:  53 y o  female who presents with known history of gastroparesis now with 24 hours of intractable nausea vomiting  Patient is encephalopathic in the emergency room not answering questions rocking back and forth retching  Patient denies fever no dysuria no diarrhea  Son was a source for my history as she was unable to communicate  * Intractable nausea and vomiting  Assessment & Plan  Patient with a known history of gastroparesis  Currently patient is not interacting very well  Her son states that when she becomes ill that she shots down and will not talk  · Schedule Zofran  · Schedule Reglan  · CT abdomen and pelvis   Encephalopathy  Assessment & Plan  Unclear etiology  Patient seems to regressed when she gets ill according to her son    · Check VBG  · Check ammonia  · Check lactic acid   Gastroparesis  Assessment & Plan  Known history of  · GI consult in a m  · Continue Bentyl she can keep it down  ·  Reglan IV p r n  · Schedule Zofran   Type 2 diabetes mellitus with diabetic neuropathy, with long-term current use of insulin [facility])  Assessment & Plan        Lab Results   Component Value Date     HGBA1C 9 7 (H) 12/21/2018          Recent Labs     07/21/19  0718   POCGLU 312*    Blood Sugar Average: Last 72 hrs:  (P) 312      Uncontrolled  Schedule sliding scale insulin and continue Lantus   Opioid dependence [facility])  Assessment & Plan  Present on admission  Leave morphine IV p r n  Monitor for withdrawal hold MS Contin for now   Chronic pain  Assessment & Plan  Patient unable to keep oral medications down for now  Hold long-acting agents use morphine p r n    Vaginal discharge  Assessment & Plan  Patient with significant perineal odor suspecting vaginal discharge although patient denies this  She is not a reliable source of information at this time  Noted creamy colored discharge on the sheets  · I have asked the staff to beta patient and examined perineal and rectal area will update diagnosis as we have more information  · Obtain UA   Morbid obesity ([facility] [address] )  Assessment & Plan  Will counseled during this hospital course regarding lifestyle modifications   VTE Prophylaxis: Heparin  / sequential compression device   Code Status:  Full code  POLST: POLST form is not discussed and not completed at this time  Discussion with family:  Son   Anticipated Length of Stay:  Patient will be admitted on an Inpatient basis with an anticipated length of stay of  greater than comes could greater than 2 midnights     Justification for Hospital Stay:  Altered mental status nausea vomiting     7/22 GI consult   Uncontrolled IDDM  - Acute onset of nausea/vomiting 3 days ago without any change in medications, sick contacts, and she reports compliance with reglan/zofran regimen  - CT A/P on admission without acute abnormality  - Symptoms much improved this AM, agree with full liquid diet and advance as tolerated  - Suspect her symptoms are 2/2 gastroparesis and worsening A1C now 12 5 compared to 9 7 in December, keep appt with endocrinology as outpatient at the end of July for tighter glucose control  - Recommend EGD to rule out esophagitis v gastritis v PUD since she has not had a EGD in several years and had a food impaction previously, will plan to do this as outpatient paired with a colonoscopy for screening unless unable to tolerate PO  - Continue supportive care, reglan 5 mg q6h and change to PRN zofran  - Continue protonix 40 mg daily, continue this on discharge   Colon Cancer Screening  Chronic Constipation  - Hx mild chronic constipation well controlled with metamucil  - No prior colonoscopy  - Will plan for outpatient colonoscopy paired with EGD as above for screening       ED Triage Vitals [07/21/19 0339]   Temperature Pulse Respirations Blood Pressure SpO2   98 6 °F (37 °C) (!) 119 19 130/65 91 %      Temp Source Heart Rate Source Patient Position - Orthostatic VS BP Location FiO2 (%)   Oral Monitor Sitting Right arm --      Pain Score       No Pain        Wt Readings from Last 1 Encounters:   07/21/19 129 kg (283 lb 8 2 oz)     Additional Vital Signs:   07/21/19 2341  --  120Abnormal   --  159/92  95 %  --  --   07/21/19 1129  98 6 °F (37 °C)  116Abnormal   19  174/108Abnormal   98 %  --  --   07/21/19 1000  --  122Abnormal   17  132/68  95 %  None (Room air)  Sitting   07/21/19 0915  --  120Abnormal   --  --  95 %  --  --   07/21/19 0616  --  120Abnormal   18  131/64  92 %  None (Room air)  Sitting       Pertinent Labs/Diagnostic Test Results:   Results from last 7 days   Lab Units 07/21/19  0346   WBC Thousand/uL 14 05*   HEMOGLOBIN g/dL 13 3   HEMATOCRIT % 41 5   PLATELETS Thousands/uL 225   NEUTROS ABS Thousands/µL 11 88*     Results from last 7 days   Lab Units 07/21/19  1211 07/21/19  0346   SODIUM mmol/L 134* 133*   POTASSIUM mmol/L 3 8 4 0 CHLORIDE mmol/L 97* 96*   CO2 mmol/L 22 24   ANION GAP mmol/L 15* 13   BUN mg/dL 9 11   CREATININE mg/dL 0 77 0 78   EGFR ml/min/1 73sq m 87 86   CALCIUM mg/dL 9 2 9 3     Results from last 7 days   Lab Units 07/21/19  1211 07/21/19  0346   AST U/L  --  18   ALT U/L  --  25   ALK PHOS U/L  --  144*   TOTAL PROTEIN g/dL  --  7 4   ALBUMIN g/dL  --  3 4*   TOTAL BILIRUBIN mg/dL  --  2 20*   AMMONIA umol/L <10*  --      Results from last 7 days   Lab Units 07/22/19  0618 07/21/19  2315 07/21/19  1755 07/21/19  0718   POC GLUCOSE mg/dl 242* 252* 267* 312*     Results from last 7 days   Lab Units 07/21/19  1211 07/21/19  0346   GLUCOSE RANDOM mg/dL 308* 355*     Results from last 7 days   Lab Units 07/21/19  1338   HEMOGLOBIN A1C % 12 5*   EAG mg/dl 312     Results from last 7 days   Lab Units 07/21/19  1648   PH GAUTAM  7 392   PCO2 GAUTAM mm Hg 32 9*   PO2 GAUTAM mm Hg 38 5   HCO3 GAUTAM mmol/L 19 6*   BASE EXC GAUTAM mmol/L -4 4   O2 CONTENT GAUTAM ml/dL 13 6   O2 HGB, VENOUS % 69 4     Results from last 7 days   Lab Units 07/21/19  1211   LACTIC ACID mmol/L 1 2     Results from last 7 days   Lab Units 07/21/19  0346   LIPASE u/L 33*       Results from last 7 days   Lab Units 07/21/19  1549   CLARITY UA  Clear   COLOR UA  Yellow   SPEC GRAV UA  >=1 030   PH UA  5 5   GLUCOSE UA mg/dl >=1000 (1%)*   KETONES UA mg/dl >=80 (3+)*   BLOOD UA  Small*   PROTEIN UA mg/dl 100 (2+)*   NITRITE UA  Negative   BILIRUBIN UA  Negative   UROBILINOGEN UA E U /dl 0 2   LEUKOCYTES UA  Elevated glucose may cause decreased leukocyte values   See urine microscopic for [facility] result/*   WBC UA /hpf None Seen   RBC UA /hpf 1-2*   BACTERIA UA /hpf None Seen   EPITHELIAL CELLS WET PREP /hpf Occasional     Results from last 7 days   Lab Units 07/21/19  1549   AMPH/METH  Negative   BARBITURATE UR  Positive*   BENZODIAZEPINE UR  Positive*   COCAINE UR  Negative   METHADONE URINE  Positive*   OPIATE UR  Positive*   PCP UR  Negative   THC UR  Negative     ED Treatment:   Medication Administration from 07/21/2019 0331 to 07/21/2019 1103       Date/Time Order Dose Route Action     07/21/2019 0347 sodium chloride 0 9 % bolus 1,000 mL 1,000 mL Intravenous New Bag     07/21/2019 0348 ondansetron (ZOFRAN) injection 4 mg 4 mg Intravenous Given     07/21/2019 0357 sodium chloride 0 9 % bolus 1,000 mL 1,000 mL Intravenous New Bag     07/21/2019 0357 metoclopramide (REGLAN) injection 10 mg 10 mg Intravenous Given     07/21/2019 0723 sodium chloride 0 9 % bolus 1,000 mL 1,000 mL Intravenous New Bag     07/21/2019 0722 ondansetron (ZOFRAN) injection 4 mg 4 mg Intravenous Given     07/21/2019 0727 insulin regular (HumuLIN R,NovoLIN R) injection 10 Units 10 Units Subcutaneous Given        Past Medical History:   Diagnosis Date    Asthma     Chronic pain     Diabetes mellitus (HCC)     Gastroparesis      Present on Admission:   Intractable nausea and vomiting   Gastroparesis   Opioid dependence (HCC)   Chronic pain   Morbid obesity (HCC)      Admitting Diagnosis: Gastroparesis [K31 84]  Dehydration [E86 0]  Vomiting [R11 10]  Nausea & vomiting [R11 2]  Hyperglycemia [R73 9]  Age/Sex: 53 y o  female  Admission Orders:    Current Facility-Administered Medications:  albuterol 1 25 mg Nebulization Q6H PRN     diazepam 5 mg Oral HS PRN     dicyclomine 10 mg Oral TID PRN     fluticasone 1 spray Nasal Daily     gabapentin 300 mg Oral BID     gabapentin 600 mg Oral HS     heparin (porcine) 5,000 Units Subcutaneous Q8H [address]     insulin glargine 50 Units Subcutaneous HS     insulin lispro 1-5 Units Subcutaneous TID AC     insulin lispro 1-5 Units Subcutaneous HS     metoclopramide 5 mg Intravenous Q6H BOLIVAR     montelukast 10 mg Oral HS     morphine injection 1 mg Intravenous Q4H PRN x1    nystatin  Topical TID     ondansetron 4 mg Oral Q6H PRN     [START ON 7/23/2019] pantoprazole 40 mg Oral Early Morning     sodium chloride 75 mL/hr Intravenous Continuous       Cons carb diet Fingerstick ac and hs   Act as caitlyn   IP CONSULT TO [address] [place] [address] Utilization Review Department  Phone: [phone]; Fax [phone]  [email or web address]  ATTENTION: Please call with any questions or concerns to [phone]  and carefully listen to the prompts so that you are directed to the right person  Send all requests for admission clinical reviews, approved or denied determinations and any other requests to fax [phone]   All voicemails are confidential

## 2019-07-22 NOTE — PROGRESS NOTES
[facility] Progress Note  Patient: Vivien Narvaez 53 y o  female   MRN: [id number]  PCP: Zahida Werner  Unit/Bed#: -01 Encounter: [id number]  Date Of Visit: 07/22/19          * Intractable nausea and vomiting secondary to gastroparesis likely secondary to diabetes mellitus  Assessment & Plan  · Advanced diet slowly  Discussed with GI  Type 2 diabetes mellitus with diabetic neuropathy, with long-term current use of insulin [facility])  Assessment & Plan  Lab Results   Component Value Date    HGBA1C 12 5 (H) 07/21/2019       Recent Labs     07/21/19  1755 07/21/19  2315 07/22/19  0618 07/22/19  1220   POCGLU 267* 252* 242* 228*       Blood Sugar Average: Last 72 hrs:  (P) 260 2   Very uncontrolled  She is not really compliant with her diet or sugars  Accu-Cheks still on the higher side  Adjust medications as needed    Tachycardia  Assessment & Plan  Heart rate on the higher side  She is not really symptomatic  Continue to monitor    Morbid obesity [facility])  Assessment & Plan  Major comorbid issue  Opioid dependence ([address] )  Assessment & Plan  Chronic and likely also contributing to her symptoms    Gastroparesis  Assessment & Plan  Most likely secondary to uncontrolled diabetes mellitus  Continue with current treatment and plan as above        Present on Admission:   Intractable nausea and vomiting secondary to gastroparesis likely secondary to diabetes mellitus   Gastroparesis   Opioid dependence (HCC)   Chronic pain   (Resolved) Encephalopathy   Morbid obesity (HCC)   Tachycardia            VTE Pharmacologic Prophylaxis:   Pharmacologic: Heparin  Mechanical VTE Prophylaxis in Place: Yes    Patient Centered Rounds: I have performed bedside rounds with nursing staff today  Discussions with Specialists or Other Care Team Provider:  Yes    Education and Discussions with Family / Patient:  Yes    Time Spent for Care: 35+ minutes    More than 50% of total time spent on counseling and coordination of care as described above  Current Length of Stay: 1 day(s)    Current Patient Status: Inpatient   Certification Statement: The patient will continue to require additional inpatient hospital stay due to IV fluids/intractable nausea and vomiting    Discharge Plan:  Hopefully home soon    Code Status: Level 1 - Full Code      Subjective:   She feels better today  No more nausea or vomiting  No abdominal pain  She does flatus  She does not feel that she is confused  She denies any other new symptoms  She has chronic pain issues  Objective:     Vitals:     HR:  [120] 120  BP: (159)/(92) 159/92  SpO2:  [95 %] 95 %  Body mass index is 43 11 kg/m²  Input and Output Summary (last 24 hours): Intake/Output Summary (Last 24 hours) at 7/22/2019 1511  Last data filed at 7/22/2019 1000  Gross per 24 hour   Intake 300 ml   Output 1200 ml   Net -900 ml           Physical Exam:       Morbidly obese female who is holding her fluid pump and is walking in the room  She not in any respiratory distress  Oropharynx are crowded and hydrated  EOM grossly intact  Vital signs reviewed as above  Awake and alert  Oriented x3  She not confused or disoriented whatsoever  Abdomen is obese  Grossly nontender  Bowel sounds are audible  No cyanosis or clubbing  Mood and affect currently stable        Additional Data:     Labs:    Results from last 7 days   Lab Units 07/21/19  0346   WBC Thousand/uL 14 05*   HEMOGLOBIN g/dL 13 3   HEMATOCRIT % 41 5   PLATELETS Thousands/uL 225   NEUTROS PCT % 84*   LYMPHS PCT % 8*   MONOS PCT % 6   EOS PCT % 1     Results from last 7 days   Lab Units 07/21/19  1211 07/21/19  0346   POTASSIUM mmol/L 3 8 4 0   CHLORIDE mmol/L 97* 96*   CO2 mmol/L 22 24   BUN mg/dL 9 11   CREATININE mg/dL 0 77 0 78   CALCIUM mg/dL 9 2 9 3   ALK PHOS U/L  --  144*   ALT U/L  --  25   AST U/L  --  18           * I Have Reviewed All Lab Data Listed Above    * Additional Pertinent Lab Tests Reviewed: All Labs Within Last 24 Hours Reviewed            Last 24 Hours Medication List:     Current Facility-Administered Medications:  albuterol 1 25 mg Nebulization Q6H PRN Kevin Fink MD    diazepam 5 mg Oral HS PRN Kevin Fink MD    dicyclomine 10 mg Oral TID PRN Kevin Fink MD    fluticasone 1 spray Nasal Daily Kevin Fink MD    gabapentin 300 mg Oral BID Kevin Fink MD    gabapentin 600 mg Oral HS Kevin Fink MD    heparin (porcine) 5,000 Units Subcutaneous Q8H [address] Kevin Fink MD    insulin glargine 50 Units Subcutaneous HS Kevin Fink MD    insulin lispro 1-5 Units Subcutaneous TID AC Kevin Fink MD    insulin lispro 1-5 Units Subcutaneous HS Kevin Fink MD    metoclopramide 5 mg Intravenous Q6H [address] Kevin Fink MD    montelukast 10 mg Oral HS Kevin Fink MD    morphine injection 1 mg Intravenous Q4H PRN Kevin Fink MD    nystatin  Topical TID Chapis Borrero MD    ondansetron 4 mg Oral Q6H PRN Tasia Cummings PA-C    [START ON 7/23/2019] pantoprazole 40 mg Oral Early Morning Zayda Person PA-C    sodium chloride 75 mL/hr Intravenous Continuous Chapis Borrero MD Last Rate: 75 mL/hr (07/22/19 1232)        Today, Patient Was Seen By: Chapis Borrero MD    ** Please Note: Dragon 360 Dictation voice to text software may have been used in the creation of this document   **

## 2019-07-23 PROBLEM — R11.2 INTRACTABLE NAUSEA AND VOMITING: Status: RESOLVED | Noted: 2018-05-22 | Resolved: 2019-07-23

## 2019-07-23 PROBLEM — R00.0 TACHYCARDIA: Status: RESOLVED | Noted: 2018-12-21 | Resolved: 2019-07-23

## 2019-07-23 LAB
ALBUMIN SERPL BCP-MCNC: 2.4 G/DL (ref 3.5–5)
ALP SERPL-CCNC: 100 U/L (ref 46–116)
ALT SERPL W P-5'-P-CCNC: 16 U/L (ref 12–78)
ANION GAP SERPL CALCULATED.3IONS-SCNC: 6 MMOL/L (ref 4–13)
AST SERPL W P-5'-P-CCNC: 11 U/L (ref 5–45)
BILIRUB SERPL-MCNC: 1.2 MG/DL (ref 0.2–1)
BUN SERPL-MCNC: 15 MG/DL (ref 5–25)
CALCIUM SERPL-MCNC: 8.5 MG/DL (ref 8.3–10.1)
CHLORIDE SERPL-SCNC: 102 MMOL/L (ref 100–108)
CO2 SERPL-SCNC: 24 MMOL/L (ref 21–32)
CREAT SERPL-MCNC: 0.7 MG/DL (ref 0.6–1.3)
ERYTHROCYTE [DISTWIDTH] IN BLOOD BY AUTOMATED COUNT: 13.7 % (ref 11.6–15.1)
GFR SERPL CREATININE-BSD FRML MDRD: 98 ML/MIN/1.73SQ M
GLUCOSE SERPL-MCNC: 219 MG/DL (ref 65–140)
GLUCOSE SERPL-MCNC: 227 MG/DL (ref 65–140)
GLUCOSE SERPL-MCNC: 292 MG/DL (ref 65–140)
HCT VFR BLD AUTO: 36.4 % (ref 34.8–46.1)
HGB BLD-MCNC: 11.5 G/DL (ref 11.5–15.4)
MCH RBC QN AUTO: 26.9 PG (ref 26.8–34.3)
MCHC RBC AUTO-ENTMCNC: 31.6 G/DL (ref 31.4–37.4)
MCV RBC AUTO: 85 FL (ref 82–98)
PLATELET # BLD AUTO: 215 THOUSANDS/UL (ref 149–390)
PMV BLD AUTO: 12.2 FL (ref 8.9–12.7)
POTASSIUM SERPL-SCNC: 3.5 MMOL/L (ref 3.5–5.3)
PROT SERPL-MCNC: 5.8 G/DL (ref 6.4–8.2)
RBC # BLD AUTO: 4.28 MILLION/UL (ref 3.81–5.12)
SODIUM SERPL-SCNC: 132 MMOL/L (ref 136–145)
WBC # BLD AUTO: 7.8 THOUSAND/UL (ref 4.31–10.16)

## 2019-07-23 PROCEDURE — 80053 COMPREHEN METABOLIC PANEL: CPT | Performed by: INTERNAL MEDICINE

## 2019-07-23 PROCEDURE — 85027 COMPLETE CBC AUTOMATED: CPT | Performed by: PHYSICIAN ASSISTANT

## 2019-07-23 PROCEDURE — 99238 HOSP IP/OBS DSCHRG MGMT 30/<: CPT | Performed by: FAMILY MEDICINE

## 2019-07-23 PROCEDURE — 99232 SBSQ HOSP IP/OBS MODERATE 35: CPT | Performed by: PHYSICIAN ASSISTANT

## 2019-07-23 PROCEDURE — 82948 REAGENT STRIP/BLOOD GLUCOSE: CPT

## 2019-07-23 RX ORDER — METOCLOPRAMIDE 10 MG/1
5 TABLET ORAL
Status: DISCONTINUED | OUTPATIENT
Start: 2019-07-23 | End: 2019-07-23 | Stop reason: HOSPADM

## 2019-07-23 RX ORDER — NYSTATIN 100000 [USP'U]/G
POWDER TOPICAL 2 TIMES DAILY
Qty: 45 G | Refills: 0 | Status: SHIPPED | OUTPATIENT
Start: 2019-07-23

## 2019-07-23 RX ADMIN — LISINOPRIL 10 MG: 10 TABLET ORAL at 10:36

## 2019-07-23 RX ADMIN — MORPHINE SULFATE 15 MG: 15 TABLET ORAL at 00:03

## 2019-07-23 RX ADMIN — METOCLOPRAMIDE HYDROCHLORIDE 5 MG: 5 INJECTION INTRAMUSCULAR; INTRAVENOUS at 06:39

## 2019-07-23 RX ADMIN — DIAZEPAM 5 MG: 5 TABLET ORAL at 00:04

## 2019-07-23 RX ADMIN — METOCLOPRAMIDE HYDROCHLORIDE 5 MG: 5 INJECTION INTRAMUSCULAR; INTRAVENOUS at 00:04

## 2019-07-23 RX ADMIN — PREGABALIN 150 MG: 75 CAPSULE ORAL at 10:36

## 2019-07-23 RX ADMIN — INSULIN GLARGINE 20 UNITS: 100 INJECTION, SOLUTION SUBCUTANEOUS at 10:35

## 2019-07-23 RX ADMIN — FLUTICASONE PROPIONATE 1 SPRAY: 50 SPRAY, METERED NASAL at 10:38

## 2019-07-23 RX ADMIN — HEPARIN SODIUM 5000 UNITS: 5000 INJECTION INTRAVENOUS; SUBCUTANEOUS at 06:39

## 2019-07-23 RX ADMIN — INSULIN LISPRO 1 UNITS: 100 INJECTION, SOLUTION INTRAVENOUS; SUBCUTANEOUS at 10:42

## 2019-07-23 RX ADMIN — MORPHINE SULFATE 15 MG: 15 TABLET ORAL at 06:41

## 2019-07-23 RX ADMIN — CARISOPRODOL 350 MG: 350 TABLET ORAL at 10:41

## 2019-07-23 RX ADMIN — PANTOPRAZOLE SODIUM 40 MG: 40 TABLET, DELAYED RELEASE ORAL at 06:37

## 2019-07-23 RX ADMIN — INSULIN LISPRO 2 UNITS: 100 INJECTION, SOLUTION INTRAVENOUS; SUBCUTANEOUS at 12:28

## 2019-07-23 RX ADMIN — GABAPENTIN 300 MG: 300 CAPSULE ORAL at 10:37

## 2019-07-23 RX ADMIN — NYSTATIN: 100000 POWDER TOPICAL at 10:38

## 2019-07-23 RX ADMIN — METOCLOPRAMIDE HYDROCHLORIDE 5 MG: 10 TABLET ORAL at 12:27

## 2019-07-23 NOTE — DISCHARGE SUMMARY
Discharge- Roya Rodriguez 1965, 53 y o  female MRN: [id number]    Unit/Bed#: -01 Encounter: [id number]    Primary Care Provider: Gracy Younger   Date and time admitted to hospital: 7/21/2019  3:33 AM        Tachycardia  Assessment & Plan  Heart rate on the higher side  She is not really symptomatic  Continue to monitor    Morbid obesity [facility])  Assessment & Plan  Major comorbid issue  Encourage lifestyle modification    Opioid dependence ([address] )  Assessment & Plan  Chronic and likely also contributing to her symptoms    Gastroparesis  Assessment & Plan  Most likely secondary to uncontrolled diabetes mellitus  Continue with current treatment and plan as above      Chronic pain  Assessment & Plan  Continue home medications upon discharge    Type 2 diabetes mellitus with diabetic neuropathy, with long-term current use of insulin [facility])  Assessment & Plan  Lab Results   Component Value Date    HGBA1C 12 5 (H) 07/21/2019       Recent Labs     07/21/19  1755 07/21/19  2315 07/22/19  0618 07/22/19  1220   POCGLU 267* 252* 242* 228*       Blood Sugar Average: Last 72 hrs:  (P) 260 2   Very uncontrolled  She is not really compliant with her diet or sugars  Accu-Cheks still on the higher side  Adjust medications as needed  Continue home medications  Patient does have an endocrinology follow-up appointment on the 30th    * Intractable nausea and vomiting secondary to gastroparesis likely secondary to diabetes mellitus  Assessment & Plan  · Advanced diet slowly  Discussed with GI  Patient doing okay    Okay to be discharged        Discharging Physician / Practitioner: Matheus Alvarado MD  PCP: Gracy Younger  Admission Date:   Admission Orders (From admission, onward)    Ordered        07/21/19 0724  Inpatient Admission  Once             Discharge Date: 07/23/19    Resolved Problems  Date Reviewed: 7/23/2019          Resolved    Encephalopathy 7/22/2019     Resolved by  Donald Aase, MD          Consultations During Hospital Stay:  · Gastroenterology    Procedures Performed:   · No acute abnormality in the abdomen or pelvis    Significant Findings / Test Results:   · None    Incidental Findings:   · None    Test Results Pending at Discharge (will require follow up): · None     Outpatient Tests Requested:  · None    Complications:  None    Reason for Admission:  Intractable nausea and vomiting    Hospital Course:     Nickolas Rebollar is a 53 y o  female patient who originally presented to the hospital on 7/21/2019 due to intractable nausea vomiting    History of presenting Freida Morales is a 53 y o  female who presents with known history of gastroparesis now with 24 hours of intractable nausea vomiting  Patient is encephalopathic in the emergency room not answering questions rocking back and forth retching  Patient denies fever no dysuria no diarrhea  Son was a source for my history as she was unable to communicate    Hospital course: The patient was admitted for the above reasons  The patient has a history of gastroparesis  She does have uncontrolled diabetes as well as continuous opiate dependence both contributing to the gastroparesis  She is tolerating a diet today and doing okay  The patient's was started on a low-dose ACE-inhibitor as well  The patient does have a follow-up appointment with Endocrinology in 1 week  Patient is medically stable to be discharged at this time  Patient was seen by Gastroenterology and from their standpoint nothing else to offer at this time  Please see above list of diagnoses and related plan for additional information  Condition at Discharge: fair     Discharge Day Visit / Exam:     Subjective:  Patient seen examined    Vitals: Blood Pressure: 138/80 (07/22/19 1500)  Pulse: 100 (07/22/19 1500)  Temperature: 98 2 °F (36 8 °C) (07/22/19 1500)  Temp Source: Oral (07/22/19 1500)  Respirations: 20 (07/22/19 1500)  Height: 5' 8" (172 7 cm) (07/22/19 1338)  Weight - Scale: 129 kg (283 lb 8 2 oz) (07/22/19 1338)  SpO2: 94 % (07/22/19 1500)  Exam:   Physical Exam  (   General Appearance:    Alert, cooperative, no distress, appears stated age                               Lungs:     Clear to auscultation bilaterally, respirations unlabored       Heart:    Regular rate and rhythm, S1 and S2 normal, no murmur, rub    or gallop   Abdomen:     Soft, non-tender, bowel sounds active all four quadrants,     no masses, no organomegaly           Extremities:   Extremities normal, atraumatic, no cyanosis or edema     Discussion with Family:  Discussed with patient    Discharge instructions/Information to patient and family:   See after visit summary for information provided to patient and family  Provisions for Follow-Up Care:  See after visit summary for information related to follow-up care and any pertinent home health orders  Disposition:     Home    For Discharges to [facility]F:   · Not Applicable to this Patient - Not Applicable to this Patient    Planned Readmission:  Not anticipated     Discharge Statement:  I spent 28 minutes discharging the patient  This time was spent on the day of discharge  I had direct contact with the patient on the day of discharge  Greater than 50% of the total time was spent examining patient, answering all patient questions, arranging and discussing plan of care with patient as well as directly providing post-discharge instructions  Additional time then spent on discharge activities  Discharge Medications:  See after visit summary for reconciled discharge medications provided to patient and family        ** Please Note: This note has been constructed using a voice recognition system **

## 2019-07-23 NOTE — PROGRESS NOTES
GI Progress Note - Mague Ospina 53 y o  female MRN: [id number]    Unit/Bed#: -[place] Encounter: [id number]    Subjective: She is feeling well today  She had 1 episode of loose stool yesterday but no further episodes  Tolerated a regular diet without nausea, dry heaves, or emesis  She denies abdominal pain  Objective:     Vitals: Blood pressure 138/80, pulse 100, temperature 98 2 °F (36 8 °C), temperature source Oral, resp  rate 20, height 5' 8" (1 727 m), weight 129 kg (283 lb 8 2 oz), SpO2 94 %, not currently breastfeeding  ,Body mass index is 43 11 kg/m²        Intake/Output Summary (Last 24 hours) at 7/23/2019 0852  Last data filed at 7/23/2019 0838  Gross per 24 hour   Intake 4547 08 ml   Output 1100 ml   Net 3447 08 ml       Physical Exam:     General Appearance: Alert, appears stated age and cooperative  Lungs: Clear to auscultation bilaterally, no rales or rhonchi, no labored breathing/accessory muscle use  Heart: Regular rate and rhythm, S1, S2 normal, no murmur, click, rub or gallop  Abdomen: Soft, non-tender, non-distended; bowel sounds normal; no masses or no organomegaly  Extremities: No cyanosis, edema    Invasive Devices     Peripheral Intravenous Line            Peripheral IV 07/21/19 Left Antecubital 2 days    Peripheral IV 07/21/19 Left;Ventral (anterior) Forearm 1 day                Lab Results:  Results from last 7 days   Lab Units 07/23/19  0553 07/21/19  0346   WBC Thousand/uL 7 80 14 05*   HEMOGLOBIN g/dL 11 5 13 3   HEMATOCRIT % 36 4 41 5   PLATELETS Thousands/uL 215 225   NEUTROS PCT %  --  84*   LYMPHS PCT %  --  8*   MONOS PCT %  --  6   EOS PCT %  --  1     Results from last 7 days   Lab Units 07/23/19  0553   POTASSIUM mmol/L 3 5   CHLORIDE mmol/L 102   CO2 mmol/L 24   BUN mg/dL 15   CREATININE mg/dL 0 70   CALCIUM mg/dL 8 5   ALK PHOS U/L 100   ALT U/L 16   AST U/L 11         Results from last 7 days   Lab Units 07/21/19  0346   LIPASE u/L 33*       Imaging Studies: I have personally reviewed pertinent imaging studies  Ct Abdomen Pelvis Wo Contrast  Result Date: 7/22/2019  Impression: No acute abnormality in the abdomen or pelvis  Workstation performed: [id number]     Xr Chest Portable  Result Date: 7/21/2019  Impression: Hypoventilatory changes without acute cardiopulmonary findings   Workstation performed: [id number]       Assessment and Plan:       Nausea, Vomiting  Gastroparesis  Uncontrolled IDDM  - Acute onset of nausea/vomiting 3 days prior to admission   - CT A/P on admission without acute abnormality  - Symptoms have resolved, has tolerated PO well  - Suspect symptoms are 2/2 worsening gastroparesis given A1C increase from 9 7 to 12 5 but will plan for outpatient EGD to rule out any other etiology given she did have a food impaction several years ago requiring EGD  - Continue reglan 5 mg PO ACHS  - Will schedule EGD as outpatient  - Continue protonix 40 mg daily  - The patient is stable for discharge from GI standpoint       Colon Cancer Screening  Chronic Constipation  - Hx mild chronic constipation well controlled with metamucil  - No prior colonoscopy  - Will plan for outpatient colonoscopy paired with EGD as above for screening      The patient is stable for discharge from GI standpoint

## 2019-07-23 NOTE — SOCIAL WORK
Pt requested to meet with CM  CM met with pt at bedside  Pt alert  Pt stated that she broke her glucometer and is in need of a new one  CM informed her that CM will discuss with MD to see of a script can be provided  MD notified  LIS provided her with [facility]'s card  LIS informed her that they have glucometers free  CM informed her that they may require a purchase of test strips

## 2019-07-26 ENCOUNTER — TELEPHONE (OUTPATIENT)
Dept: GASTROENTEROLOGY | Facility: CLINIC | Age: 54
End: 2019-07-26

## 2019-07-26 NOTE — TELEPHONE ENCOUNTER
----- Message from Tania Doyle PA-C sent at 7/23/2019  8:55 AM EDT -----  This patient needs a EGD/colonoscopy scheduled with Dr Alo Bullard in the next several weeks  The EGD is for nausea and vomiting  The colonoscopy is for colon cancer screening  She will be discharged from the hospital today  She can have the regular miralax/gatorade prep

## 2019-08-28 ENCOUNTER — TELEPHONE (OUTPATIENT)
Dept: GASTROENTEROLOGY | Facility: CLINIC | Age: 54
End: 2019-08-28

## 2019-08-28 DIAGNOSIS — K31.84 GASTROPARESIS: ICD-10-CM

## 2019-08-28 RX ORDER — METOCLOPRAMIDE 10 MG/1
TABLET ORAL
Qty: 120 TABLET | Refills: 1 | Status: SHIPPED | OUTPATIENT
Start: 2019-08-28 | End: 2019-09-03 | Stop reason: SDUPTHER

## 2019-08-28 NOTE — TELEPHONE ENCOUNTER
Patient called refill - metoclopramide 10 mg tablet 1 tablet QID, before meals and bedtime  Please call [facility] [phone]   Any questions please call patient at [phone] ty

## 2019-09-03 DIAGNOSIS — K31.84 GASTROPARESIS: ICD-10-CM

## 2019-09-03 RX ORDER — METOCLOPRAMIDE 10 MG/1
TABLET ORAL
Qty: 360 TABLET | Refills: 3 | Status: ON HOLD | OUTPATIENT
Start: 2019-09-03 | End: 2020-10-19 | Stop reason: SDUPTHER

## 2019-09-03 NOTE — TELEPHONE ENCOUNTER
[facility] refill metoclopramide 10 mg, take 1 tablet by mouth 4 times a day (before meals and at berdtime)  rx sent to provider

## 2019-09-15 DIAGNOSIS — R11.2 INTRACTABLE VOMITING WITH NAUSEA, UNSPECIFIED VOMITING TYPE: ICD-10-CM

## 2019-09-15 DIAGNOSIS — K31.84 GASTROPARESIS: ICD-10-CM

## 2019-09-15 DIAGNOSIS — K80.20 GALL STONES: ICD-10-CM

## 2019-09-16 ENCOUNTER — HOSPITAL ENCOUNTER (INPATIENT)
Facility: HOSPITAL | Age: 54
LOS: 4 days | Discharge: HOME | DRG: 074 | End: 2019-09-20
Attending: EMERGENCY MEDICINE | Admitting: INTERNAL MEDICINE
Payer: MEDICARE

## 2019-09-16 DIAGNOSIS — K31.84 DIABETIC GASTROPARESIS (HCC): Primary | ICD-10-CM

## 2019-09-16 DIAGNOSIS — R11.2 INTRACTABLE VOMITING WITH NAUSEA, UNSPECIFIED VOMITING TYPE: ICD-10-CM

## 2019-09-16 DIAGNOSIS — K80.20 GALL STONES: ICD-10-CM

## 2019-09-16 DIAGNOSIS — K31.84 GASTROPARESIS: ICD-10-CM

## 2019-09-16 DIAGNOSIS — R11.10 INTRACTABLE VOMITING: ICD-10-CM

## 2019-09-16 DIAGNOSIS — Z79.4 TYPE 2 DIABETES MELLITUS WITH DIABETIC NEUROPATHY, WITH LONG-TERM CURRENT USE OF INSULIN (HCC): ICD-10-CM

## 2019-09-16 DIAGNOSIS — L97.519 DIABETIC ULCER OF RIGHT GREAT TOE (HCC): ICD-10-CM

## 2019-09-16 DIAGNOSIS — E11.40 TYPE 2 DIABETES MELLITUS WITH DIABETIC NEUROPATHY, WITH LONG-TERM CURRENT USE OF INSULIN (HCC): ICD-10-CM

## 2019-09-16 DIAGNOSIS — E11.43 DIABETIC GASTROPARESIS (HCC): Primary | ICD-10-CM

## 2019-09-16 DIAGNOSIS — E11.621 DIABETIC ULCER OF RIGHT GREAT TOE (HCC): ICD-10-CM

## 2019-09-16 LAB
ALBUMIN SERPL BCP-MCNC: 3.6 G/DL (ref 3.5–5)
ALP SERPL-CCNC: 143 U/L (ref 46–116)
ALT SERPL W P-5'-P-CCNC: 25 U/L (ref 12–78)
ANION GAP SERPL CALCULATED.3IONS-SCNC: 14 MMOL/L (ref 4–13)
AST SERPL W P-5'-P-CCNC: 31 U/L (ref 5–45)
BASOPHILS # BLD AUTO: 0.1 THOUSANDS/ΜL (ref 0–0.1)
BASOPHILS NFR BLD AUTO: 1 % (ref 0–1)
BILIRUB SERPL-MCNC: 1.89 MG/DL (ref 0.2–1)
BUN SERPL-MCNC: 9 MG/DL (ref 5–25)
CALCIUM SERPL-MCNC: 9.8 MG/DL (ref 8.3–10.1)
CHLORIDE SERPL-SCNC: 97 MMOL/L (ref 100–108)
CO2 SERPL-SCNC: 22 MMOL/L (ref 21–32)
CREAT SERPL-MCNC: 0.75 MG/DL (ref 0.6–1.3)
EOSINOPHIL # BLD AUTO: 0.08 THOUSAND/ΜL (ref 0–0.61)
EOSINOPHIL NFR BLD AUTO: 1 % (ref 0–6)
ERYTHROCYTE [DISTWIDTH] IN BLOOD BY AUTOMATED COUNT: 13 % (ref 11.6–15.1)
GFR SERPL CREATININE-BSD FRML MDRD: 90 ML/MIN/1.73SQ M
GLUCOSE SERPL-MCNC: 359 MG/DL (ref 65–140)
HCT VFR BLD AUTO: 44.6 % (ref 34.8–46.1)
HGB BLD-MCNC: 14.3 G/DL (ref 11.5–15.4)
IMM GRANULOCYTES # BLD AUTO: 0.02 THOUSAND/UL (ref 0–0.2)
IMM GRANULOCYTES NFR BLD AUTO: 0 % (ref 0–2)
LIPASE SERPL-CCNC: 80 U/L (ref 73–393)
LYMPHOCYTES # BLD AUTO: 1.08 THOUSANDS/ΜL (ref 0.6–4.47)
LYMPHOCYTES NFR BLD AUTO: 10 % (ref 14–44)
MCH RBC QN AUTO: 26.8 PG (ref 26.8–34.3)
MCHC RBC AUTO-ENTMCNC: 32.1 G/DL (ref 31.4–37.4)
MCV RBC AUTO: 84 FL (ref 82–98)
MONOCYTES # BLD AUTO: 0.5 THOUSAND/ΜL (ref 0.17–1.22)
MONOCYTES NFR BLD AUTO: 5 % (ref 4–12)
NEUTROPHILS # BLD AUTO: 9.29 THOUSANDS/ΜL (ref 1.85–7.62)
NEUTS SEG NFR BLD AUTO: 83 % (ref 43–75)
NRBC BLD AUTO-RTO: 0 /100 WBCS
PLATELET # BLD AUTO: 268 THOUSANDS/UL (ref 149–390)
PMV BLD AUTO: 12.9 FL (ref 8.9–12.7)
POTASSIUM SERPL-SCNC: 4.9 MMOL/L (ref 3.5–5.3)
PROT SERPL-MCNC: 8.3 G/DL (ref 6.4–8.2)
RBC # BLD AUTO: 5.33 MILLION/UL (ref 3.81–5.12)
SODIUM SERPL-SCNC: 133 MMOL/L (ref 136–145)
WBC # BLD AUTO: 11.07 THOUSAND/UL (ref 4.31–10.16)

## 2019-09-16 PROCEDURE — 85025 COMPLETE CBC W/AUTO DIFF WBC: CPT | Performed by: EMERGENCY MEDICINE

## 2019-09-16 PROCEDURE — 96375 TX/PRO/DX INJ NEW DRUG ADDON: CPT

## 2019-09-16 PROCEDURE — 96376 TX/PRO/DX INJ SAME DRUG ADON: CPT

## 2019-09-16 PROCEDURE — 36415 COLL VENOUS BLD VENIPUNCTURE: CPT

## 2019-09-16 PROCEDURE — 96361 HYDRATE IV INFUSION ADD-ON: CPT

## 2019-09-16 PROCEDURE — 99284 EMERGENCY DEPT VISIT MOD MDM: CPT

## 2019-09-16 PROCEDURE — 99223 1ST HOSP IP/OBS HIGH 75: CPT | Performed by: PHYSICIAN ASSISTANT

## 2019-09-16 PROCEDURE — 96374 THER/PROPH/DIAG INJ IV PUSH: CPT

## 2019-09-16 PROCEDURE — 80053 COMPREHEN METABOLIC PANEL: CPT | Performed by: EMERGENCY MEDICINE

## 2019-09-16 PROCEDURE — 83690 ASSAY OF LIPASE: CPT | Performed by: EMERGENCY MEDICINE

## 2019-09-16 PROCEDURE — 99285 EMERGENCY DEPT VISIT HI MDM: CPT | Performed by: EMERGENCY MEDICINE

## 2019-09-16 RX ORDER — FAMOTIDINE 20 MG/1
20 TABLET, FILM COATED ORAL 2 TIMES DAILY
Status: DISCONTINUED | OUTPATIENT
Start: 2019-09-17 | End: 2019-09-20 | Stop reason: HOSPADM

## 2019-09-16 RX ORDER — METOPROLOL TARTRATE 5 MG/5ML
5 INJECTION INTRAVENOUS EVERY 6 HOURS PRN
Status: DISCONTINUED | OUTPATIENT
Start: 2019-09-16 | End: 2019-09-18

## 2019-09-16 RX ORDER — MAGNESIUM HYDROXIDE/ALUMINUM HYDROXICE/SIMETHICONE 120; 1200; 1200 MG/30ML; MG/30ML; MG/30ML
30 SUSPENSION ORAL EVERY 6 HOURS PRN
Status: DISCONTINUED | OUTPATIENT
Start: 2019-09-16 | End: 2019-09-20 | Stop reason: HOSPADM

## 2019-09-16 RX ORDER — METOCLOPRAMIDE HYDROCHLORIDE 5 MG/ML
10 INJECTION INTRAMUSCULAR; INTRAVENOUS EVERY 6 HOURS SCHEDULED
Status: DISCONTINUED | OUTPATIENT
Start: 2019-09-17 | End: 2019-09-19

## 2019-09-16 RX ORDER — DIAZEPAM 5 MG/1
5 TABLET ORAL
Status: DISCONTINUED | OUTPATIENT
Start: 2019-09-16 | End: 2019-09-20 | Stop reason: HOSPADM

## 2019-09-16 RX ORDER — ACETAMINOPHEN 325 MG/1
650 TABLET ORAL EVERY 6 HOURS PRN
Status: DISCONTINUED | OUTPATIENT
Start: 2019-09-16 | End: 2019-09-20 | Stop reason: HOSPADM

## 2019-09-16 RX ORDER — FLUTICASONE FUROATE AND VILANTEROL 200; 25 UG/1; UG/1
1 POWDER RESPIRATORY (INHALATION)
Status: DISCONTINUED | OUTPATIENT
Start: 2019-09-17 | End: 2019-09-20 | Stop reason: HOSPADM

## 2019-09-16 RX ORDER — ONDANSETRON 2 MG/ML
4 INJECTION INTRAMUSCULAR; INTRAVENOUS EVERY 6 HOURS
Status: DISCONTINUED | OUTPATIENT
Start: 2019-09-17 | End: 2019-09-19

## 2019-09-16 RX ORDER — PANTOPRAZOLE SODIUM 40 MG/1
40 INJECTION, POWDER, FOR SOLUTION INTRAVENOUS
Status: DISCONTINUED | OUTPATIENT
Start: 2019-09-17 | End: 2019-09-19

## 2019-09-16 RX ORDER — POLYETHYLENE GLYCOL 3350 17 G/17G
17 POWDER, FOR SOLUTION ORAL DAILY PRN
Status: DISCONTINUED | OUTPATIENT
Start: 2019-09-16 | End: 2019-09-20 | Stop reason: HOSPADM

## 2019-09-16 RX ORDER — METOCLOPRAMIDE HYDROCHLORIDE 5 MG/ML
10 INJECTION INTRAMUSCULAR; INTRAVENOUS ONCE
Status: COMPLETED | OUTPATIENT
Start: 2019-09-16 | End: 2019-09-16

## 2019-09-16 RX ORDER — PREGABALIN 75 MG/1
150 CAPSULE ORAL 2 TIMES DAILY
Status: DISCONTINUED | OUTPATIENT
Start: 2019-09-17 | End: 2019-09-20 | Stop reason: HOSPADM

## 2019-09-16 RX ORDER — GABAPENTIN 300 MG/1
600 CAPSULE ORAL
Status: DISCONTINUED | OUTPATIENT
Start: 2019-09-16 | End: 2019-09-20 | Stop reason: HOSPADM

## 2019-09-16 RX ORDER — DICYCLOMINE HYDROCHLORIDE 10 MG/1
CAPSULE ORAL
Qty: 360 CAPSULE | Refills: 1 | Status: ON HOLD | OUTPATIENT
Start: 2019-09-16 | End: 2019-09-20 | Stop reason: SDUPTHER

## 2019-09-16 RX ORDER — FLUTICASONE PROPIONATE 50 MCG
1 SPRAY, SUSPENSION (ML) NASAL DAILY
Status: DISCONTINUED | OUTPATIENT
Start: 2019-09-17 | End: 2019-09-20 | Stop reason: HOSPADM

## 2019-09-16 RX ORDER — DICYCLOMINE HYDROCHLORIDE 10 MG/1
10 CAPSULE ORAL
Status: DISCONTINUED | OUTPATIENT
Start: 2019-09-16 | End: 2019-09-19

## 2019-09-16 RX ORDER — SODIUM CHLORIDE 9 MG/ML
250 INJECTION, SOLUTION INTRAVENOUS CONTINUOUS
Status: DISCONTINUED | OUTPATIENT
Start: 2019-09-16 | End: 2019-09-16

## 2019-09-16 RX ORDER — MORPHINE SULFATE 4 MG/ML
4 INJECTION, SOLUTION INTRAMUSCULAR; INTRAVENOUS ONCE
Status: COMPLETED | OUTPATIENT
Start: 2019-09-16 | End: 2019-09-16

## 2019-09-16 RX ORDER — GABAPENTIN 300 MG/1
300 CAPSULE ORAL 2 TIMES DAILY
Status: DISCONTINUED | OUTPATIENT
Start: 2019-09-17 | End: 2019-09-20 | Stop reason: HOSPADM

## 2019-09-16 RX ORDER — ALBUTEROL SULFATE 2.5 MG/3ML
2.5 SOLUTION RESPIRATORY (INHALATION) EVERY 6 HOURS PRN
Status: DISCONTINUED | OUTPATIENT
Start: 2019-09-16 | End: 2019-09-20 | Stop reason: HOSPADM

## 2019-09-16 RX ORDER — INSULIN GLARGINE 100 [IU]/ML
50 INJECTION, SOLUTION SUBCUTANEOUS
Status: DISCONTINUED | OUTPATIENT
Start: 2019-09-16 | End: 2019-09-20 | Stop reason: HOSPADM

## 2019-09-16 RX ORDER — ONDANSETRON 2 MG/ML
4 INJECTION INTRAMUSCULAR; INTRAVENOUS ONCE
Status: COMPLETED | OUTPATIENT
Start: 2019-09-16 | End: 2019-09-16

## 2019-09-16 RX ORDER — SODIUM CHLORIDE 9 MG/ML
150 INJECTION, SOLUTION INTRAVENOUS CONTINUOUS
Status: DISCONTINUED | OUTPATIENT
Start: 2019-09-16 | End: 2019-09-17

## 2019-09-16 RX ORDER — MORPHINE SULFATE 15 MG/1
15 TABLET ORAL 3 TIMES DAILY PRN
Status: DISCONTINUED | OUTPATIENT
Start: 2019-09-16 | End: 2019-09-17

## 2019-09-16 RX ORDER — CARISOPRODOL 350 MG/1
350 TABLET ORAL 2 TIMES DAILY
Status: DISCONTINUED | OUTPATIENT
Start: 2019-09-17 | End: 2019-09-20 | Stop reason: HOSPADM

## 2019-09-16 RX ORDER — ALBUTEROL SULFATE 90 UG/1
2 AEROSOL, METERED RESPIRATORY (INHALATION) EVERY 6 HOURS PRN
Status: DISCONTINUED | OUTPATIENT
Start: 2019-09-16 | End: 2019-09-20 | Stop reason: HOSPADM

## 2019-09-16 RX ORDER — MONTELUKAST SODIUM 10 MG/1
10 TABLET ORAL
Status: DISCONTINUED | OUTPATIENT
Start: 2019-09-16 | End: 2019-09-20 | Stop reason: HOSPADM

## 2019-09-16 RX ORDER — LISINOPRIL 10 MG/1
10 TABLET ORAL DAILY
Status: DISCONTINUED | OUTPATIENT
Start: 2019-09-17 | End: 2019-09-18

## 2019-09-16 RX ORDER — NYSTATIN 100000 [USP'U]/G
POWDER TOPICAL 2 TIMES DAILY
Status: DISCONTINUED | OUTPATIENT
Start: 2019-09-17 | End: 2019-09-20 | Stop reason: HOSPADM

## 2019-09-16 RX ADMIN — MORPHINE SULFATE 4 MG: 4 INJECTION INTRAVENOUS at 21:03

## 2019-09-16 RX ADMIN — INSULIN HUMAN 10 UNITS: 100 INJECTION, SOLUTION PARENTERAL at 21:53

## 2019-09-16 RX ADMIN — SODIUM CHLORIDE 250 ML/HR: 0.9 INJECTION, SOLUTION INTRAVENOUS at 22:52

## 2019-09-16 RX ADMIN — SODIUM CHLORIDE 150 ML/HR: 0.9 INJECTION, SOLUTION INTRAVENOUS at 23:58

## 2019-09-16 RX ADMIN — METOCLOPRAMIDE 10 MG: 5 INJECTION, SOLUTION INTRAMUSCULAR; INTRAVENOUS at 20:55

## 2019-09-16 RX ADMIN — SODIUM CHLORIDE 1000 ML: 0.9 INJECTION, SOLUTION INTRAVENOUS at 20:55

## 2019-09-16 RX ADMIN — ONDANSETRON 4 MG: 2 INJECTION INTRAMUSCULAR; INTRAVENOUS at 22:24

## 2019-09-16 RX ADMIN — ONDANSETRON 4 MG: 2 INJECTION INTRAMUSCULAR; INTRAVENOUS at 20:30

## 2019-09-17 PROBLEM — F11.20 UNCOMPLICATED OPIOID DEPENDENCE (HCC): Status: ACTIVE | Noted: 2018-02-02

## 2019-09-17 PROBLEM — E11.43 DIABETIC GASTROPARESIS (HCC): Status: ACTIVE | Noted: 2018-05-22

## 2019-09-17 PROBLEM — K31.84 DIABETIC GASTROPARESIS (HCC): Status: ACTIVE | Noted: 2018-05-22

## 2019-09-17 LAB
ANION GAP SERPL CALCULATED.3IONS-SCNC: 12 MMOL/L (ref 4–13)
ATRIAL RATE: 126 BPM
BUN SERPL-MCNC: 10 MG/DL (ref 5–25)
CALCIUM SERPL-MCNC: 9 MG/DL (ref 8.3–10.1)
CHLORIDE SERPL-SCNC: 98 MMOL/L (ref 100–108)
CO2 SERPL-SCNC: 23 MMOL/L (ref 21–32)
CREAT SERPL-MCNC: 0.71 MG/DL (ref 0.6–1.3)
ERYTHROCYTE [DISTWIDTH] IN BLOOD BY AUTOMATED COUNT: 13.1 % (ref 11.6–15.1)
GFR SERPL CREATININE-BSD FRML MDRD: 96 ML/MIN/1.73SQ M
GLUCOSE SERPL-MCNC: 181 MG/DL (ref 65–140)
GLUCOSE SERPL-MCNC: 251 MG/DL (ref 65–140)
GLUCOSE SERPL-MCNC: 288 MG/DL (ref 65–140)
GLUCOSE SERPL-MCNC: 304 MG/DL (ref 65–140)
GLUCOSE SERPL-MCNC: 326 MG/DL (ref 65–140)
GLUCOSE SERPL-MCNC: 339 MG/DL (ref 65–140)
GLUCOSE SERPL-MCNC: 347 MG/DL (ref 65–140)
HCT VFR BLD AUTO: 41.1 % (ref 34.8–46.1)
HGB BLD-MCNC: 13.3 G/DL (ref 11.5–15.4)
MAGNESIUM SERPL-MCNC: 1.4 MG/DL (ref 1.6–2.6)
MCH RBC QN AUTO: 27.1 PG (ref 26.8–34.3)
MCHC RBC AUTO-ENTMCNC: 32.4 G/DL (ref 31.4–37.4)
MCV RBC AUTO: 84 FL (ref 82–98)
P AXIS: 72 DEGREES
PLATELET # BLD AUTO: 258 THOUSANDS/UL (ref 149–390)
PMV BLD AUTO: 13.1 FL (ref 8.9–12.7)
POTASSIUM SERPL-SCNC: 3.9 MMOL/L (ref 3.5–5.3)
PR INTERVAL: 136 MS
QRS AXIS: 22 DEGREES
QRSD INTERVAL: 82 MS
QT INTERVAL: 326 MS
QTC INTERVAL: 472 MS
RBC # BLD AUTO: 4.9 MILLION/UL (ref 3.81–5.12)
SODIUM SERPL-SCNC: 133 MMOL/L (ref 136–145)
T WAVE AXIS: 55 DEGREES
VENTRICULAR RATE: 126 BPM
WBC # BLD AUTO: 11.72 THOUSAND/UL (ref 4.31–10.16)

## 2019-09-17 PROCEDURE — 82948 REAGENT STRIP/BLOOD GLUCOSE: CPT

## 2019-09-17 PROCEDURE — 85027 COMPLETE CBC AUTOMATED: CPT | Performed by: PHYSICIAN ASSISTANT

## 2019-09-17 PROCEDURE — 93010 ELECTROCARDIOGRAM REPORT: CPT | Performed by: INTERNAL MEDICINE

## 2019-09-17 PROCEDURE — 99232 SBSQ HOSP IP/OBS MODERATE 35: CPT | Performed by: INTERNAL MEDICINE

## 2019-09-17 PROCEDURE — 93005 ELECTROCARDIOGRAM TRACING: CPT

## 2019-09-17 PROCEDURE — 80048 BASIC METABOLIC PNL TOTAL CA: CPT | Performed by: PHYSICIAN ASSISTANT

## 2019-09-17 PROCEDURE — C9113 INJ PANTOPRAZOLE SODIUM, VIA: HCPCS | Performed by: PHYSICIAN ASSISTANT

## 2019-09-17 PROCEDURE — 83735 ASSAY OF MAGNESIUM: CPT | Performed by: PHYSICIAN ASSISTANT

## 2019-09-17 RX ORDER — MORPHINE SULFATE 15 MG/1
15 TABLET ORAL 3 TIMES DAILY PRN
Status: DISCONTINUED | OUTPATIENT
Start: 2019-09-17 | End: 2019-09-20 | Stop reason: HOSPADM

## 2019-09-17 RX ADMIN — ONDANSETRON 4 MG: 2 INJECTION INTRAMUSCULAR; INTRAVENOUS at 21:33

## 2019-09-17 RX ADMIN — ENOXAPARIN SODIUM 40 MG: 40 INJECTION SUBCUTANEOUS at 08:21

## 2019-09-17 RX ADMIN — DICYCLOMINE HYDROCHLORIDE 10 MG: 10 CAPSULE ORAL at 00:41

## 2019-09-17 RX ADMIN — METOPROLOL TARTRATE 5 MG: 1 INJECTION, SOLUTION INTRAVENOUS at 07:40

## 2019-09-17 RX ADMIN — MONTELUKAST SODIUM 10 MG: 10 TABLET, COATED ORAL at 00:41

## 2019-09-17 RX ADMIN — FAMOTIDINE 20 MG: 20 TABLET ORAL at 08:21

## 2019-09-17 RX ADMIN — MORPHINE SULFATE 15 MG: 15 TABLET ORAL at 17:24

## 2019-09-17 RX ADMIN — ONDANSETRON 4 MG: 2 INJECTION INTRAMUSCULAR; INTRAVENOUS at 14:57

## 2019-09-17 RX ADMIN — PREGABALIN 150 MG: 75 CAPSULE ORAL at 08:20

## 2019-09-17 RX ADMIN — METOCLOPRAMIDE 10 MG: 5 INJECTION, SOLUTION INTRAMUSCULAR; INTRAVENOUS at 11:42

## 2019-09-17 RX ADMIN — ONDANSETRON 4 MG: 2 INJECTION INTRAMUSCULAR; INTRAVENOUS at 03:54

## 2019-09-17 RX ADMIN — GABAPENTIN 600 MG: 300 CAPSULE ORAL at 00:41

## 2019-09-17 RX ADMIN — MONTELUKAST SODIUM 10 MG: 10 TABLET, COATED ORAL at 21:30

## 2019-09-17 RX ADMIN — DICYCLOMINE HYDROCHLORIDE 10 MG: 10 CAPSULE ORAL at 17:24

## 2019-09-17 RX ADMIN — FLUTICASONE FUROATE AND VILANTEROL TRIFENATATE 1 PUFF: 200; 25 POWDER RESPIRATORY (INHALATION) at 08:20

## 2019-09-17 RX ADMIN — DICYCLOMINE HYDROCHLORIDE 10 MG: 10 CAPSULE ORAL at 21:36

## 2019-09-17 RX ADMIN — METOCLOPRAMIDE 10 MG: 5 INJECTION, SOLUTION INTRAMUSCULAR; INTRAVENOUS at 00:38

## 2019-09-17 RX ADMIN — INSULIN GLARGINE 50 UNITS: 100 INJECTION, SOLUTION SUBCUTANEOUS at 21:41

## 2019-09-17 RX ADMIN — INSULIN LISPRO 6 UNITS: 100 INJECTION, SOLUTION INTRAVENOUS; SUBCUTANEOUS at 08:21

## 2019-09-17 RX ADMIN — METOPROLOL TARTRATE 5 MG: 1 INJECTION, SOLUTION INTRAVENOUS at 00:48

## 2019-09-17 RX ADMIN — METOCLOPRAMIDE 10 MG: 5 INJECTION, SOLUTION INTRAMUSCULAR; INTRAVENOUS at 06:22

## 2019-09-17 RX ADMIN — CARISOPRODOL 350 MG: 350 TABLET ORAL at 08:20

## 2019-09-17 RX ADMIN — METOCLOPRAMIDE 10 MG: 5 INJECTION, SOLUTION INTRAMUSCULAR; INTRAVENOUS at 17:25

## 2019-09-17 RX ADMIN — NYSTATIN: 100000 POWDER TOPICAL at 17:25

## 2019-09-17 RX ADMIN — INSULIN LISPRO 6 UNITS: 100 INJECTION, SOLUTION INTRAVENOUS; SUBCUTANEOUS at 11:42

## 2019-09-17 RX ADMIN — PREGABALIN 150 MG: 75 CAPSULE ORAL at 17:24

## 2019-09-17 RX ADMIN — GABAPENTIN 600 MG: 300 CAPSULE ORAL at 21:30

## 2019-09-17 RX ADMIN — PANTOPRAZOLE SODIUM 40 MG: 40 INJECTION, POWDER, FOR SOLUTION INTRAVENOUS at 08:21

## 2019-09-17 RX ADMIN — INSULIN LISPRO 8 UNITS: 100 INJECTION, SOLUTION INTRAVENOUS; SUBCUTANEOUS at 03:08

## 2019-09-17 RX ADMIN — CARISOPRODOL 350 MG: 350 TABLET ORAL at 17:24

## 2019-09-17 RX ADMIN — MORPHINE SULFATE 75 MG: 30 TABLET, EXTENDED RELEASE ORAL at 21:29

## 2019-09-17 RX ADMIN — ONDANSETRON 4 MG: 2 INJECTION INTRAMUSCULAR; INTRAVENOUS at 08:21

## 2019-09-17 RX ADMIN — INSULIN LISPRO 2 UNITS: 100 INJECTION, SOLUTION INTRAVENOUS; SUBCUTANEOUS at 17:31

## 2019-09-17 RX ADMIN — NYSTATIN: 100000 POWDER TOPICAL at 08:21

## 2019-09-17 RX ADMIN — SODIUM CHLORIDE 150 ML/HR: 0.9 INJECTION, SOLUTION INTRAVENOUS at 11:45

## 2019-09-17 RX ADMIN — SODIUM CHLORIDE 150 ML/HR: 0.9 INJECTION, SOLUTION INTRAVENOUS at 05:22

## 2019-09-17 RX ADMIN — INSULIN GLARGINE 50 UNITS: 100 INJECTION, SOLUTION SUBCUTANEOUS at 00:39

## 2019-09-17 RX ADMIN — FAMOTIDINE 20 MG: 20 TABLET ORAL at 17:24

## 2019-09-17 RX ADMIN — GABAPENTIN 300 MG: 300 CAPSULE ORAL at 08:21

## 2019-09-17 RX ADMIN — FLUTICASONE PROPIONATE 1 SPRAY: 50 SPRAY, METERED NASAL at 08:20

## 2019-09-17 RX ADMIN — DICYCLOMINE HYDROCHLORIDE 10 MG: 10 CAPSULE ORAL at 06:25

## 2019-09-17 RX ADMIN — DICYCLOMINE HYDROCHLORIDE 10 MG: 10 CAPSULE ORAL at 11:42

## 2019-09-17 RX ADMIN — GABAPENTIN 300 MG: 300 CAPSULE ORAL at 17:24

## 2019-09-17 RX ADMIN — LISINOPRIL 10 MG: 10 TABLET ORAL at 08:20

## 2019-09-17 NOTE — H&P
[address] Internal Medicine  H&P- Mague Ospina 1965, 53 y o  female MRN: [id number]    [facility] [address] [id number] Encounter: [id number]    Primary Care Provider: Michel Ames MD   Date and time admitted to hospital: 9/16/2019  8:13 PM        * Intractable nausea and vomiting secondary to gastroparesis likely secondary to diabetes mellitus  Assessment & Plan  Patient presents with 1 day of intractable nausea and vomiting secondary to gastroparesis  Does report multiple hospitalizations for diabetic gastroparesis  Will keep NPO for tonight with IV fluid hydration, advance as tolerated  Scheduled Reglan and Zofran    Essential hypertension  Assessment & Plan  Slightly elevated at time of admission, likely secondary to pain  Continue home lisinopril and continue to monitor  P r n  Metoprolol for SBP greater than 180    Chronic pain  Assessment & Plan  History of chronic pain  Continue p o  Morphine with p r n  IV morphine for breakthrough    Asthma  Assessment & Plan  Does not appear to be in exacerbation at this time, no wheezing or shortness of breath  Continue singular, Breo and p r n  Albuterol    Type 2 diabetes mellitus with diabetic neuropathy, with long-term current use of insulin (HCC)  Assessment & Plan  Lab Results   Component Value Date    HGBA1C 12 5 (H) 07/21/2019       No results for input(s): POCGLU in the last 72 hours  Blood Sugar Average: Last 72 hrs:   glucose elevated to 359 at time of presentation  Will continue home 50 units Lantus hs with sliding scale insulin coverage and Accu-Cheks      Morbid obesity ([address] )  Assessment & Plan  Would benefit from diet and lifestyle modifications      VTE Prophylaxis: Enoxaparin (Lovenox)  / sequential compression device   Code Status:  Full code  POLST: POLST form is not discussed and not completed at this time    Discussion with family:  None    Anticipated Length of Stay:  Patient will be admitted on an Inpatient basis with an anticipated length of stay of  more than 2 midnights  Justification for Hospital Stay:  Intractable nausea and vomiting    Total Time for Visit, including Counseling / Coordination of Care: 30 minutes  Greater than 50% of this total time spent on direct patient counseling and coordination of care  Chief Complaint:   Vomiting    History of Present Illness:    Sarah King is a 53 y o  female with past medical history of diabetes, obesity, hypertension, chronic pain who presents with vomiting  Patient reports 1 day of intractable nausea with vomiting  Patient reports history of diabetic gastroparesis for which she has been hospitalized multiple times  Last hospitalization last month in [place] while visiting family  Patient reports she has been unable to take anything orally today  Patient reports mild associated upper abdominal pain  Patient denies any chest pain or shortness of breath  Review of Systems:    Review of Systems   Constitutional: Negative for chills and fever  HENT: Negative for hearing loss  Eyes: Negative for visual disturbance  Respiratory: Negative for cough and shortness of breath  Cardiovascular: Negative for chest pain and palpitations  Gastrointestinal: Positive for abdominal pain, nausea and vomiting  Endocrine: Negative  Genitourinary: Negative for difficulty urinating  Musculoskeletal: Negative for neck pain  Skin: Negative for rash  Allergic/Immunologic: Negative  Neurological: Negative for dizziness and syncope  Hematological: Negative  Psychiatric/Behavioral: Negative for behavioral problems         Past Medical and Surgical History:     Past Medical History:   Diagnosis Date    Asthma     Chronic pain     Diabetes mellitus ([address] )     Gastroparesis        Past Surgical History:   Procedure Laterality Date    CERVICAL LAMINECTOMY      CHOLECYSTECTOMY      CHOLECYSTECTOMY LAPAROSCOPIC N/A 11/10/2018    Procedure: CHOLECYSTECTOMY LAPAROSCOPIC w/ ioc; Surgeon: Wyline Schilder, MD;  Location: [facility];  Service: General    HYSTERECTOMY      JOINT REPLACEMENT Bilateral     knee    TOE AMPUTATION Right 2/2/2018    Procedure: AMPUTATION TOE, RIGHT GREAT TOE;  Surgeon: Stevo Bryan DPM;  Location: [facility];  Service: Podiatry       Meds/Allergies:    Prior to Admission medications    Medication Sig Start Date End Date Taking?  Authorizing Provider   albuterol (ACCUNEB) 1 25 MG/3ML nebulizer solution Take 1 ampule by nebulization every 6 (six) hours as needed for wheezing    Historical Provider, MD   albuterol (PROVENTIL HFA,VENTOLIN HFA) 90 mcg/act inhaler Inhale 2 puffs every 6 (six) hours as needed for wheezing    Historical Provider, MD   carisoprodol (SOMA) 350 mg tablet Take 350 mg by mouth 2 (two) times a day    Historical Provider, MD   diazepam (VALIUM) 5 mg tablet Take 5 mg by mouth daily at bedtime as needed for anxiety    Historical Provider, MD   dicyclomine (BENTYL) 10 mg capsule TAKE 1 CAPSULE BY MOUTH 4 TIMES A DAY (BEFORE MEALS AND AT BEDTIME) 9/16/19   Arron Heaton MD   famotidine (PEPCID) 40 MG tablet TAKE 1 TABLET BY MOUTH 2 TIMES A DAY AS NEEDED FOR HEARTBURN 2/26/19   VIVI Cho   fluticasone (FLONASE) 50 mcg/act nasal spray 1 spray into each nostril daily    Historical Provider, MD   gabapentin (NEURONTIN) 600 MG tablet TAKE 1/2 TABLET IN MORNING, TAKE 1/2 TAB IN THE EVENING AND 2 TABS AT BEDTIME FOR 30 DAYS 6/21/19   Historical Provider, MD   glimepiride (AMARYL) 4 mg tablet Take 4 mg by mouth 2 (two) times a day    Historical Provider, MD   insulin glargine (LANTUS) 100 units/mL subcutaneous injection Inject 50 Units under the skin daily at bedtime      Historical Provider, MD   lisinopril (ZESTRIL) 10 mg tablet Take 1 tablet (10 mg total) by mouth daily 12/25/18   Sean Nath PA-C   LYRICA 150 MG capsule Take 150 mg by mouth 2 (two) times a day 6/12/19   Historical Provider, MD   metoclopramide (REGLAN) 10 mg tablet Take 1 tablet (10 mg total) by mouth every 6 (six) hours as needed (nausea, vomiting) 7/21/19   Bobby Salter DO   metoclopramide (REGLAN) 10 mg tablet TAKE 1 TABLET PO QID BEFORE MEALS AND HS 9/3/19   Brianna Talavera PA-C   montelukast (SINGULAIR) 10 mg tablet Take 10 mg by mouth daily at bedtime    Historical Provider, MD   morphine (MSIR) 15 mg tablet Take 15 mg by mouth 3 (three) times a day as needed for moderate pain or severe pain      Historical Provider, MD   nystatin (MYCOSTATIN) powder Apply topically 2 (two) times a day 7/23/19   Sukh Delaney MD   omeprazole (PriLOSEC) 20 mg delayed release capsule Take 20 mg by mouth daily    Historical Provider, MD   ondansetron (ZOFRAN) 4 mg tablet TAKE 1 TABLET BY MOUTH EVERY 8 HOURS AS NEEDED FOR NAUSEA AND VOMITING 4/30/19   VIVI ValenzuelaELA INHUB 500-50 MCG/DOSE inhaler Take 1 puff by mouth 2 (two) times a day 2/28/19   Historical Provider, MD     I have reviewed home medications with patient personally  Allergies:    Allergies   Allergen Reactions    Nsaids GI Intolerance       Social History:     Marital Status: /Civil Union   Occupation:  Unknown  Patient Pre-hospital Living Situation:  Home with family  Patient Pre-hospital Level of Mobility:  Ambulatory  Patient Pre-hospital Diet Restrictions:  Diabetic  Substance Use History:   Social History     Substance and Sexual Activity   Alcohol Use Yes    Frequency: 2-4 times a month    Drinks per session: 1 or 2    Comment: rarely     Social History     Tobacco Use   Smoking Status Never Smoker   Smokeless Tobacco Never Used     Social History     Substance and Sexual Activity   Drug Use No       Family History:    Family History   Problem Relation Age of Onset    Diabetes Mother     Diabetes Maternal Grandmother     No Known Problems Father        Physical Exam:     Vitals:   Blood Pressure: (!) 183/86 (09/16/19 2258)  Pulse: 98 (09/16/19 2323)  Temperature: 99 2 °F (37 3 °C) (09/16/19 2005)  Temp Source: Oral (09/16/19 2005)  Respirations: 18 (09/16/19 2258)  SpO2: 94 % (09/16/19 2258)    Physical Exam   Constitutional: She is oriented to person, place, and time  She appears well-nourished  HENT:   Head: Normocephalic and atraumatic  Mouth/Throat: Oropharynx is clear and moist    Eyes: Conjunctivae and EOM are normal  No scleral icterus  Neck: Normal range of motion  Cardiovascular: Normal rate, regular rhythm and normal heart sounds  No murmur heard  Pulmonary/Chest: Effort normal and breath sounds normal  No respiratory distress  She has no wheezes  She has no rales  Abdominal: Soft  Bowel sounds are normal  She exhibits no distension  There is tenderness (Mild epigastric)  There is no guarding  Musculoskeletal: Normal range of motion  She exhibits no edema  Neurological: She is alert and oriented to person, place, and time  Skin: Skin is warm and dry  Psychiatric: She has a normal mood and affect  Her behavior is normal  Thought content normal    Vitals reviewed  Additional Data:     Lab Results: I have personally reviewed pertinent reports  Results from last 7 days   Lab Units 09/16/19 2030   WBC Thousand/uL 11 07*   HEMOGLOBIN g/dL 14 3   HEMATOCRIT % 44 6   PLATELETS Thousands/uL 268   NEUTROS PCT % 83*   LYMPHS PCT % 10*   MONOS PCT % 5   EOS PCT % 1     Results from last 7 days   Lab Units 09/16/19 2030   SODIUM mmol/L 133*   POTASSIUM mmol/L 4 9   CHLORIDE mmol/L 97*   CO2 mmol/L 22   BUN mg/dL 9   CREATININE mg/dL 0 75   ANION GAP mmol/L 14*   CALCIUM mg/dL 9 8   ALBUMIN g/dL 3 6   TOTAL BILIRUBIN mg/dL 1 89*   ALK PHOS U/L 143*   ALT U/L 25   AST U/L 31   GLUCOSE RANDOM mg/dL 359*                       Imaging: I have personally reviewed pertinent reports        No orders to display       EKG, Pathology, and Other Studies Reviewed on Admission:   · EKG:  Ordered to monitor for prolonged QT    [facility] Reviewed: Yes     ** Please Note: This note has been constructed using a voice recognition system   **

## 2019-09-17 NOTE — ASSESSMENT & PLAN NOTE
Slightly elevated at time of admission, likely secondary to pain  Continue home lisinopril and continue to monitor  P r n   Metoprolol for SBP greater than 180

## 2019-09-17 NOTE — PHYSICIAN ADVISOR
Current patient class: Inpatient  The patient is currently on Hospital Day: 2 at [facility]     The patient is  documented to require at least a 2nd midnight in the hospital  As such the patient is  expected to satisfy the 2 midnight benchmark and is therefore eligible for inpatient admission  After review of the relevant documentation, labs, vital signs and test results, the patient is appropriate for INPATIENT ADMISSION  Admission to the hospital as an inpatient is a complex decision making process which requires the practitioner to consider the patients presenting complaint, history and physical examination and all relevant testing  With this in mind, in this case, the patient was deemed appropriate for INPATIENT ADMISSION  After review of the documentation and testing available at the time of the admission I concur with this clinical determination of medical necessity  Rationale is as follows: The patient is a 53 yrs Female who presented to the ED at 9/16/2019  8:13 PM with a chief complaint of Vomiting (pt reports flare up of gastroparsis, reports vomiting for four  days and generalized abdominal pain)  Patient admitted for intractable nausea , vomiting sec to gastroparesis sec to Diabetes mellitus -she was made NPPO and started on IV fluids -  Labs showed leukocytosis, hyponatremia and hyperglycemia - on 9/17 diet advanced to clear liquid   She is appropriate for inpatient admission and diet is being advanced       The patients vitals on arrival were ED Triage Vitals   Temperature Pulse Respirations Blood Pressure SpO2   09/16/19 2005 09/16/19 2005 09/16/19 2005 09/16/19 2005 09/16/19 2005   99 2 °F (37 3 °C) (!) 122 20 (!) 210/105 100 %      Temp Source Heart Rate Source Patient Position - Orthostatic VS BP Location FiO2 (%)   09/16/19 2005 09/16/19 2005 09/16/19 2030 09/16/19 2030 --   Oral Monitor Lying Right arm       Pain Score       09/16/19 2005       9 Past Medical History:   Diagnosis Date    Asthma     Chronic pain     Diabetes mellitus ([facility] [address] )     Gastroparesis      Past Surgical History:   Procedure Laterality Date    CERVICAL LAMINECTOMY      CHOLECYSTECTOMY      CHOLECYSTECTOMY LAPAROSCOPIC N/A 11/10/2018    Procedure: CHOLECYSTECTOMY LAPAROSCOPIC w/ ioc;  Surgeon: Sergio Farnsworth MD;  Location: [facility];  Service: General    HYSTERECTOMY      JOINT REPLACEMENT Bilateral     knee    TOE AMPUTATION Right 2/2/2018    Procedure: AMPUTATION TOE, RIGHT GREAT TOE;  Surgeon: Nenita Jordan DPM;  Location: [facility];  Service: Podiatry       The patient was admitted to the hospital at 2308 on 9/16/19 for the following diagnosis:  Vomiting [R11 10]  Diabetic gastroparesis ([facility] [address] ) [E11 43, K31 84]  Intractable vomiting [R11 10]    Consults have been placed to:   IP CONSULT TO PODIATRY    Vitals:    09/17/19 0812 09/17/19 1204 09/17/19 1527 09/17/19 1731   BP: (!) 164/102  96/58 150/79   BP Location:       Pulse: (!) 114  97 95   Resp:   20    Temp:   98 6 °F (37 °C)    TempSrc:       SpO2: 96%  92% 96%   Height:  5' 8" (1 727 m)         Most recent labs:    Recent Labs     09/16/19  2030 09/17/19  0503   WBC 11 07* 11 72*   HGB 14 3 13 3   HCT 44 6 41 1    258   K 4 9 3 9   CALCIUM 9 8 9 0   BUN 9 10   CREATININE 0 75 0 71   LIPASE 80  --    AST 31  --    ALT 25  --    ALKPHOS 143*  --        Scheduled Meds:  Current Facility-Administered Medications:  acetaminophen 650 mg Oral Q6H PRN Kiara Smudde, PA-C   albuterol 2 puff Inhalation Q6H PRN Kiara Smudde, PA-C   albuterol 2 5 mg Nebulization Q6H PRN Kiara Smudde, PA-C   aluminum-magnesium hydroxide-simethicone 30 mL Oral Q6H PRN Kiara Smudde, PA-C   carisoprodol 350 mg Oral BID Kiara Smudde, PA-C   diazepam 5 mg Oral HS PRN Kiara Smudde, PA-C   dicyclomine 10 mg Oral 4x Daily (AC & HS) Kiara Smudde, PA-C   enoxaparin 40 mg Subcutaneous Daily Kiara Payan PA-C   famotidine 20 mg Oral BID Kiara Smudde, PA-C   fluticasone 1 spray Nasal Daily Kiara Smudde, PA-C   fluticasone-vilanterol 1 puff Inhalation Daily Kiara Smudde, PA-C   gabapentin 300 mg Oral BID Kiara Smudde, PA-C   gabapentin 600 mg Oral HS Kiara Smudde, PA-C   insulin glargine 50 Units Subcutaneous HS Kiara Smudde, PA-C   insulin lispro 2-12 Units Subcutaneous Q6H [address] Kiara Smudde, PA-C   lisinopril 10 mg Oral Daily Kiara Smudde, PA-C   metoclopramide 10 mg Intravenous Q6H [address] Kiara Smudde, PA-C   metoprolol 5 mg Intravenous Q6H PRN Kiara Smudde, PA-C   montelukast 10 mg Oral HS Kiara Smudde, PA-C   morphine 75 mg Oral Q12H [address] Lillie Baldwin MD   morphine 15 mg Oral TID PRN Lillie Baldwin MD   nystatin  Topical BID Vitaly Musty Smudde, PA-C   ondansetron 4 mg Intravenous Q6H Kiara Smudde, PA-C   pantoprazole 40 mg Intravenous Q24H [address] Kiara Smudde, PA-C   polyethylene glycol 17 g Oral Daily PRN Kiara Smudde, PA-C   pregabalin 150 mg Oral BID Kiara Smudde, PA-C     Continuous Infusions:   PRN Meds:   acetaminophen    albuterol    albuterol    aluminum-magnesium hydroxide-simethicone    diazepam    metoprolol    morphine    polyethylene glycol    Surgical procedures (if appropriate):

## 2019-09-17 NOTE — PLAN OF CARE
Problem: Potential for Falls  Goal: Patient will remain free of falls  Description  INTERVENTIONS:  - Assess patient frequently for physical needs  -  Identify cognitive and physical deficits and behaviors that affect risk of falls  -  [place] fall precautions as indicated by assessment   - Educate patient/family on patient safety including physical limitations  - Instruct patient to call for assistance with activity based on assessment  - Modify environment to reduce risk of injury  - Consider OT/PT consult to assist with strengthening/mobility  Outcome: Progressing     Problem: Nutrition/Hydration-ADULT  Goal: Nutrient/Hydration intake appropriate for improving, restoring or maintaining nutritional needs  Description  Monitor and assess patient's nutrition/hydration status for malnutrition  Collaborate with interdisciplinary team and initiate plan and interventions as ordered  Monitor patient's weight and dietary intake as ordered or per policy  Utilize nutrition screening tool and intervene as necessary  Determine patient's food preferences and provide high-protein, high-caloric foods as appropriate       INTERVENTIONS:  - Monitor oral intake, urinary output, labs, and treatment plans  - Assess nutrition and hydration status and recommend course of action  - Evaluate amount of meals eaten  - Assist patient with eating if necessary   - Allow adequate time for meals  - Recommend/ encourage appropriate diets, oral nutritional supplements, and vitamin/mineral supplements  - Order, calculate, and assess calorie counts as needed  - Recommend, monitor, and adjust tube feedings and TPN/PPN based on assessed needs  - Assess need for intravenous fluids  - Provide specific nutrition/hydration education as appropriate  - Include patient/family/caregiver in decisions related to nutrition  Outcome: Progressing     Problem: RESPIRATORY - ADULT  Goal: Achieves optimal ventilation and oxygenation  Description  INTERVENTIONS:  - Assess for changes in respiratory status  - Assess for changes in mentation and behavior  - Position to facilitate oxygenation and minimize respiratory effort  - Oxygen administered by appropriate delivery if ordered  - Initiate smoking cessation education as indicated  - Encourage broncho-pulmonary hygiene including cough, deep breathe, Incentive Spirometry  - Assess the need for suctioning and aspirate as needed  - Assess and instruct to report SOB or any respiratory difficulty  - Respiratory Therapy support as indicated  Outcome: Progressing     Problem: GASTROINTESTINAL - ADULT  Goal: Minimal or absence of nausea and/or vomiting  Description  INTERVENTIONS:  - Administer IV fluids if ordered to ensure adequate hydration  - Maintain NPO status until nausea and vomiting are resolved  - Nasogastric tube if ordered  - Administer ordered antiemetic medications as needed  - Provide nonpharmacologic comfort measures as appropriate  - Advance diet as tolerated, if ordered  - Consider nutrition services referral to assist patient with adequate nutrition and appropriate food choices  Outcome: Progressing     Problem: METABOLIC, FLUID AND ELECTROLYTES - ADULT  Goal: Electrolytes maintained within normal limits  Description  INTERVENTIONS:  - Monitor labs and assess patient for signs and symptoms of electrolyte imbalances  - Administer electrolyte replacement as ordered  - Monitor response to electrolyte replacements, including repeat lab results as appropriate  - Instruct patient on fluid and nutrition as appropriate  Outcome: Progressing  Goal: Fluid balance maintained  Description  INTERVENTIONS:  - Monitor labs   - Monitor I/O and WT  - Instruct patient on fluid and nutrition as appropriate  - Assess for signs & symptoms of volume excess or deficit  Outcome: Progressing  Goal: Glucose maintained within target range  Description  INTERVENTIONS:  - Monitor Blood Glucose as ordered  - Assess for signs and symptoms of hyperglycemia and hypoglycemia  - Administer ordered medications to maintain glucose within target range  - Assess nutritional intake and initiate nutrition service referral as needed  Outcome: Progressing     Problem: SKIN/TISSUE INTEGRITY - ADULT  Goal: Skin integrity remains intact  Description  INTERVENTIONS  - Identify patients at risk for skin breakdown  - Assess and monitor skin integrity  - Assess and monitor nutrition and hydration status  - Monitor labs (i e  albumin)  - Assess for incontinence   - Turn and reposition patient  - Assist with mobility/ambulation  - Relieve pressure over bony prominences  - Avoid friction and shearing  - Provide appropriate hygiene as needed including keeping skin clean and dry  - Evaluate need for skin moisturizer/barrier cream  - Collaborate with interdisciplinary team (i e  Nutrition, Rehabilitation, etc )   - Patient/family teaching  Outcome: Progressing  Goal: Incision(s), wounds(s) or drain site(s) healing without S/S of infection  Description  INTERVENTIONS  - Assess and document risk factors for skin impairment   - Assess and document dressing, incision, wound bed, drain sites and surrounding tissue  - Consider nutrition services referral as needed  - Oral mucous membranes remain intact  - Provide patient/ family education  Outcome: Progressing  Goal: Oral mucous membranes remain intact  Description  INTERVENTIONS  - Assess oral mucosa and hygiene practices  - Implement preventative oral hygiene regimen  - Implement oral medicated treatments as ordered  - Initiate Nutrition services referral as needed  Outcome: Progressing     Problem: MUSCULOSKELETAL - ADULT  Goal: Maintain or return mobility to safest level of function  Description  INTERVENTIONS:  - Assess patient's ability to carry out ADLs; assess patient's baseline for ADL function and identify physical deficits which impact ability to perform ADLs (bathing, care of mouth/teeth, toileting, grooming, dressing, etc )  - Assess/evaluate cause of self-care deficits   - Assess range of motion  - Assess patient's mobility  - Assess patient's need for assistive devices and provide as appropriate  - Encourage maximum independence but intervene and supervise when necessary  - Involve family in performance of ADLs  - Assess for home care needs following discharge   - Consider OT consult to assist with ADL evaluation and planning for discharge  - Provide patient education as appropriate  Outcome: Progressing  Goal: Maintain proper alignment of affected body part  Description  INTERVENTIONS:  - Support, maintain and protect limb and body alignment  - Provide patient/ family with appropriate education  Outcome: Progressing     Problem: PAIN - ADULT  Goal: Verbalizes/displays adequate comfort level or baseline comfort level  Description  Interventions:  - Encourage patient to monitor pain and request assistance  - Assess pain using appropriate pain scale  - Administer analgesics based on type and severity of pain and evaluate response  - Implement non-pharmacological measures as appropriate and evaluate response  - Consider cultural and social influences on pain and pain management  - Notify physician/advanced practitioner if interventions unsuccessful or patient reports new pain  Outcome: Progressing     Problem: SAFETY ADULT  Goal: Maintain or return to baseline ADL function  Description  INTERVENTIONS:  -  Assess patient's ability to carry out ADLs; assess patient's baseline for ADL function and identify physical deficits which impact ability to perform ADLs (bathing, care of mouth/teeth, toileting, grooming, dressing, etc )  - Assess/evaluate cause of self-care deficits   - Assess range of motion  - Assess patient's mobility; develop plan if impaired  - Assess patient's need for assistive devices and provide as appropriate  - Encourage maximum independence but intervene and supervise when necessary  - Involve family in performance of ADLs  - Assess for home care needs following discharge   - Consider OT consult to assist with ADL evaluation and planning for discharge  - Provide patient education as appropriate  Outcome: Progressing  Goal: Maintain or return mobility status to optimal level  Description  INTERVENTIONS:  - Assess patient's baseline mobility status (ambulation, transfers, stairs, etc )    - Identify cognitive and physical deficits and behaviors that affect mobility  - Identify mobility aids required to assist with transfers and/or ambulation (gait belt, sit-to-stand, lift, walker, cane, etc )  - [place] fall precautions as indicated by assessment  - Record patient progress and toleration of activity level on Mobility SBAR; progress patient to next Phase/Stage  - Instruct patient to call for assistance with activity based on assessment  - Consider rehabilitation consult to assist with strengthening/weightbearing, etc   Outcome: Progressing     Problem: DISCHARGE PLANNING  Goal: Discharge to home or other facility with appropriate resources  Description  INTERVENTIONS:  - Identify barriers to discharge w/patient and caregiver  - Arrange for needed discharge resources and transportation as appropriate  - Identify discharge learning needs (meds, wound care, etc )  - Arrange for interpretive services to assist at discharge as needed  - Refer to Case Management Department for coordinating discharge planning if the patient needs post-hospital services based on physician/advanced practitioner order or complex needs related to functional status, cognitive ability, or social support system  Outcome: Progressing     Problem: Knowledge Deficit  Goal: Patient/family/caregiver demonstrates understanding of disease process, treatment plan, medications, and discharge instructions  Description  Complete learning assessment and assess knowledge base    Interventions:  - Provide teaching at level of understanding  - Provide teaching via preferred learning methods  Outcome: Progressing

## 2019-09-17 NOTE — ASSESSMENT & PLAN NOTE
Does not appear to be in exacerbation at this time, no wheezing or shortness of breath  Continue singular, Breo and p r n   Albuterol

## 2019-09-17 NOTE — DISCHARGE INSTRUCTIONS
Podiatry instructions:  1  Weightbearing as tolerated bilateral, toe offloading shoe R foot  2  Please apply Dermagran, dry sterile dressing every other day to right foot wound  3  Please do daily foot inspections  4   Please call and make an appointment for within 1 week of discharge with your podiatrist Dr Wilton Maradiaga

## 2019-09-17 NOTE — SOCIAL WORK
CM obtained all of the following info about the pt  HOME: Lives in an apartment set-up with two floors; [address]  Patient claims difficulty with steps on some days  LIVES W/: , Shayne Houston, and son  :  Angelica Alex, [phone]  INDEPENDENT: Yes  DME: Patient uses a cane & RW some days, but not all days  VNA: Patient has had [facility] in the past    I/P REHAB: Never  HHC: No  MENTAL HEALTH ISSUES: No  D&A ISSUES: No  PCP: Dr Ludmila Torres: [facility]: [address] [place] [address]: [address]: Dtr, [email or web address], and   TRANSPORTATION AT D/C: One of patients children will transport at discharge  CM reviewed d/c planning process including the following: identifying help at home, patient preference for d/c planning needs, [facility] Meds to Bed program, availability of treatment team to discuss questions or concerns patient and/or family may have regarding understanding medications and recognizing signs and symptoms once discharged  CM also encouraged patient to follow up with all recommended appointments after discharge  Patient advised of importance for patient and family to participate in managing patients medical well being  Patient/caregiver received discharge checklist  Content reviewed  Patient/caregiver encouraged to participate in discharge plan of care prior to discharge home      GREGORY Raza  09/17/2019  [id number]

## 2019-09-17 NOTE — ASSESSMENT & PLAN NOTE
· With intractable nausea and vomiting on admission, improved today  · Advanced diet to clears  · Continue blood sugar control and Reglan  · Discussed ongoing opioid use and nausea and vomiting  I told her that if her nausea and vomiting recurs while we start her opioid, then this should be tapered off

## 2019-09-17 NOTE — ASSESSMENT & PLAN NOTE
Lab Results   Component Value Date    HGBA1C 12 5 (H) 07/21/2019       Recent Labs     09/17/19  0255 09/17/19  0712 09/17/19  1104 09/17/19  1726   POCGLU 339* 288* 251* 181*       Blood Sugar Average: Last 72 hrs:  (P) 281 2 glucose elevated to 359 at time of presentation    Uncontrolled with A1c of 12 5%  Continue Lantus 50 units at bedtime    Continue sliding scale for now  When tolerating solid food, start Humalog at meal time

## 2019-09-17 NOTE — PROGRESS NOTES
Progress Note - Yeni Naik 1965, 53 y o  female MRN: [id number]    Unit/Bed#: [address] 220-01 Encounter: [id number]    Primary Care Provider: Cristy Kothari MD   Date and time admitted to hospital: 9/16/2019  8:13 PM        * Diabetic gastroparesis ([facility] [address] )  Assessment & Plan  · With intractable nausea and vomiting on admission, improved today  · Advanced diet to clears  · Continue blood sugar control and Reglan  · Discussed ongoing opioid use and nausea and vomiting  I told her that if her nausea and vomiting recurs while we start her opioid, then this should be tapered off  Type 2 diabetes mellitus with diabetic neuropathy, with long-term current use of insulin [facility])  Assessment & Plan  Lab Results   Component Value Date    HGBA1C 12 5 (H) 07/21/2019       Recent Labs     09/17/19  0255 09/17/19  0712 09/17/19  1104 09/17/19  1726   POCGLU 339* 288* 251* 181*       Blood Sugar Average: Last 72 hrs:  (P) 281 2 glucose elevated to 359 at time of presentation    Uncontrolled with A1c of 12 5%  Continue Lantus 50 units at bedtime  Continue sliding scale for now  When tolerating solid food, start Humalog at meal time    Uncomplicated opioid dependence ([facility] [address] )  Assessment & Plan  PDMP website reviewed  She last filled her prescription for Oxy IR 15 mg on 09/13 and long-acting morphine 60 and 15 mg on 09/13  Discussed that we will restart this but if her symptoms recur than we should consider tapering this off    Essential hypertension  Assessment & Plan  Continue lisinopril with hold parameters    Asthma  Assessment & Plan  Without exacerbation  Continue bronchodilators and p r n  Albuterol      VTE Pharmacologic Prophylaxis:   Pharmacologic: Enoxaparin (Lovenox)  Mechanical VTE Prophylaxis in Place: No    Patient Centered Rounds: I have performed bedside rounds with nursing staff today  Discussions with Specialists or Other Care Team Provider:  Podiatry    Time Spent for Care: 20 minutes    More than 50% of total time spent on counseling and coordination of care as described above  Current Length of Stay: 1 day(s)    Current Patient Status: Inpatient   Certification Statement: The patient will continue to require additional inpatient hospital stay due to Diabetic gastroparesis    Discharge Plan:  In 24-48 hours    Code Status: Level 1 - Full Code      Subjective:   Improved nausea and vomiting  Patient is requesting her narcotics restarted  She has been on this medication chronically  She reports not having nausea and vomiting when using these medications  Objective:     Vitals:   Temp (24hrs), Av 2 °F (37 3 °C), Min:98 6 °F (37 °C), Max:100 1 °F (37 8 °C)    Temp:  [98 6 °F (37 °C)-100 1 °F (37 8 °C)] 98 6 °F (37 °C)  HR:  [] 95  Resp:  [18-20] 20  BP: ()/() 150/79  SpO2:  [92 %-100 %] 96 %  Body mass index is 43 11 kg/m²  Input and Output Summary (last 24 hours): Intake/Output Summary (Last 24 hours) at 2019 1802  Last data filed at 2019 0522  Gross per 24 hour   Intake 1810 ml   Output    Net 1810 ml       Physical Exam:     Physical Exam   Constitutional: She is oriented to person, place, and time  She appears well-developed  No distress  Obese   HENT:   Head: Normocephalic and atraumatic  Eyes: No scleral icterus  Neck: No JVD present  Cardiovascular: Regular rhythm  Exam reveals no gallop and no friction rub  No murmur heard  Pulmonary/Chest: Breath sounds normal  No stridor  No respiratory distress  She has no wheezes  Abdominal: Soft  She exhibits no distension  There is no tenderness  There is no guarding  Musculoskeletal: She exhibits deformity  Neurological: She is alert and oriented to person, place, and time  Skin: Skin is warm and dry  She is not diaphoretic  Psychiatric: She has a normal mood and affect   Her behavior is normal      Additional Data:     Labs:    Results from last 7 days   Lab Units 19  0501 19  2815 WBC Thousand/uL 11 72* 11 07*   HEMOGLOBIN g/dL 13 3 14 3   HEMATOCRIT % 41 1 44 6   PLATELETS Thousands/uL 258 268   NEUTROS PCT %  --  83*   LYMPHS PCT %  --  10*   MONOS PCT %  --  5   EOS PCT %  --  1     Results from last 7 days   Lab Units 09/17/19  0503 09/16/19  2030   SODIUM mmol/L 133* 133*   POTASSIUM mmol/L 3 9 4 9   CHLORIDE mmol/L 98* 97*   CO2 mmol/L 23 22   BUN mg/dL 10 9   CREATININE mg/dL 0 71 0 75   ANION GAP mmol/L 12 14*   CALCIUM mg/dL 9 0 9 8   ALBUMIN g/dL  --  3 6   TOTAL BILIRUBIN mg/dL  --  1 89*   ALK PHOS U/L  --  143*   ALT U/L  --  25   AST U/L  --  31   GLUCOSE RANDOM mg/dL 304* 359*         Results from last 7 days   Lab Units 09/17/19  1726 09/17/19  1104 09/17/19  0712 09/17/19  0255 09/17/19  0019   POC GLUCOSE mg/dl 181* 251* 288* 339* 347*                   * I Have Reviewed All Lab Data Listed Above  * Additional Pertinent Lab Tests Reviewed:  All Labs Within Last 24 Hours Reviewed    Imaging:    Imaging Reports Reviewed Today Include:  None    Recent Cultures (last 7 days):           Last 24 Hours Medication List:     Current Facility-Administered Medications:  acetaminophen 650 mg Oral Q6H PRN Kiara Smudde, PA-C   albuterol 2 puff Inhalation Q6H PRN Kiara Smudde, PA-C   albuterol 2 5 mg Nebulization Q6H PRN Kiara Smudde, PA-C   aluminum-magnesium hydroxide-simethicone 30 mL Oral Q6H PRN Kiara Smudde, PA-C   carisoprodol 350 mg Oral BID Kiara Smudde, PA-C   diazepam 5 mg Oral HS PRN Kiara Smudde, PA-C   dicyclomine 10 mg Oral 4x Daily (AC & HS) Kiara Smudde, PA-C   enoxaparin 40 mg Subcutaneous Daily Kiara Smudde, PA-C   famotidine 20 mg Oral BID Kiara Smudde, PA-C   fluticasone 1 spray Nasal Daily Kiara Smudde, PA-C   fluticasone-vilanterol 1 puff Inhalation Daily Kiara Smudde, ESTHER   gabapentin 300 mg Oral BID Kiara Smudde, ESTHER   gabapentin 600 mg Oral HS Kiara Smudde, ESTHER   insulin glargine 50 Units Subcutaneous HS Kiara Smudde, ESTHER   insulin lispro 2-12 Units Subcutaneous Q6H [facility] Kiara Schuyler, ESTHER   l[facility]opril 10 mg [facility] Daily [facility]   metoclopramide 10 mg Intravenous Q6H [facility], ESTHER   metoprolol 5 mg Intravenous Q6H PRN [facility], ESTHER   montelukast 10 mg Oral HS [facility], EST[facility]   morphine 75 mg Oral Q12H [facility] Bishop James MD   morphine 15 mg Oral TID PRN Bishop James MD   nystatin  Topical BID [facility]   ondansetron 4 mg Intravenous Q6H [facility], ESTHER   pantoprazole 40 mg Intravenous Q24H [facility], ESTHER   polyethylene glycol 17 g Oral Daily PRN [facility], ESTHER   pregabalin 150 mg Oral BID Leonardo Miller PA-C        Today, Patient Was Seen By: Bishop James MD    ** Please Note: Dictation voice to text software may have been used in the creation of this document   **

## 2019-09-17 NOTE — ED NOTES
Patient unable to stand from wheelchair for weight during triage     Antoine Krause RN  09/16/19 2021

## 2019-09-17 NOTE — CONSULTS
Consult - Podiatry   Duran Mata 53 y o  female MRN: [id number]  Unit/Bed#: [address] 220-01 Encounter: [id number]    Assessment/Plan     1  Right foot diabetic foot ulcer secondary to pressure Herrera 1  2  Dry stable eschar right distal 2nd toe  3  History right hallux amputation  4  Dm type 2 with neuropathy    -Darco toe offloading shoe for right foot  -weightbearing as tolerated  -wound is stable without acute signs infection  -apply Dermagran, dry sterile dressing, will continue local wound care while patient is in-house  -will discuss plan attending  -patient stable for discharge from podiatric standpoint and can follow up with her podiatrist Dr Wil Chandler upon discharge  -advised her of the importance of daily foot inspection and glycemic control  History of Present Illness     HPI:  Duran Mata is a 53 y o  female who presents with right plantar foot wound  She says it started about 2 weeks ago when she was walking in socks in the hospital and formed a blister there  She had her right 1st toe amputated about a year ago by her podiatrist Dr Wil Chandler  She says she saw him about a week ago and he performs local wound care for her  She has been walking in sneakers normally  Consults  Review of Systems   Constitutional: Negative  HENT: Negative  Eyes: Negative  Respiratory: Negative  Cardiovascular: Negative  Gastrointestinal: Negative  Musculoskeletal:  Negative   Skin:  Right foot wound   Neurological: Negative  Psych: negative         Historical Information   Past Medical History:   Diagnosis Date    Asthma     Chronic pain     Diabetes mellitus ([address] )     Gastroparesis      Past Surgical History:   Procedure Laterality Date    CERVICAL LAMINECTOMY      CHOLECYSTECTOMY      CHOLECYSTECTOMY LAPAROSCOPIC N/A 11/10/2018    Procedure: CHOLECYSTECTOMY LAPAROSCOPIC w/ ioc;  Surgeon: Sergio Farnsworth MD;  Location: [facility];  Service: General    HYSTERECTOMY      JOINT REPLACEMENT Bilateral     knee    TOE AMPUTATION Right 2/2/2018    Procedure: AMPUTATION TOE, RIGHT GREAT TOE;  Surgeon: Fer Sellers DPM;  Location: [facility];  Service: Podiatry     Social History   Social History     Substance and Sexual Activity   Alcohol Use Yes    Frequency: 2-4 times a month    Drinks per session: 1 or 2    Comment: rarely     Social History     Substance and Sexual Activity   Drug Use No     Social History     Tobacco Use   Smoking Status Never Smoker   Smokeless Tobacco Never Used     Family History:   Family History   Problem Relation Age of Onset    Diabetes Mother     Diabetes Maternal Grandmother     No Known Problems Father        Meds/Allergies   Medications Prior to Admission   Medication    albuterol (PROVENTIL HFA,VENTOLIN HFA) 90 mcg/act inhaler    albuterol (ACCUNEB) 1 25 MG/3ML nebulizer solution    carisoprodol (SOMA) 350 mg tablet    diazepam (VALIUM) 5 mg tablet    dicyclomine (BENTYL) 10 mg capsule    famotidine (PEPCID) 40 MG tablet    fluticasone (FLONASE) 50 mcg/act nasal spray    gabapentin (NEURONTIN) 600 MG tablet    glimepiride (AMARYL) 4 mg tablet    insulin glargine (LANTUS) 100 units/mL subcutaneous injection    lisinopril (ZESTRIL) 10 mg tablet    LYRICA 150 MG capsule    metoclopramide (REGLAN) 10 mg tablet    metoclopramide (REGLAN) 10 mg tablet    montelukast (SINGULAIR) 10 mg tablet    morphine (MSIR) 15 mg tablet    nystatin (MYCOSTATIN) powder    omeprazole (PriLOSEC) 20 mg delayed release capsule    ondansetron (ZOFRAN) 4 mg tablet    WIXELA INHUB 500-50 MCG/DOSE inhaler     Allergies   Allergen Reactions    Nsaids GI Intolerance       Objective   First Vitals:   Blood Pressure: (!) 210/105 (09/16/19 2005)  Pulse: (!) 122 (09/16/19 2005)  Temperature: 99 2 °F (37 3 °C) (09/16/19 2005)  Temp Source: Oral (09/16/19 2005)  Respirations: 20 (09/16/19 2005)  SpO2: 100 % (09/16/19 2005)    Current Vitals:   Blood Pressure: (!) 164/102 (09/17/19 2238)  Pulse: (!) 114 (09/17/19 0812)  Temperature: 98 9 °F (37 2 °C) (09/17/19 0714)  Temp Source: Oral (09/16/19 2005)  Respirations: 18 (09/16/19 2348)  SpO2: 96 % (09/17/19 0812)        BP (!) 164/102   Pulse (!) 114   Temp 98 9 °F (37 2 °C)   Resp 18   SpO2 96%      General Appearance:    Alert, cooperative, no distress   Head:    Normocephalic, without obvious abnormality, atraumatic   Eyes:    PERRL, conjunctiva/corneas clear, EOM's intact        Nose:   Moist mucous membranes   Neck:   Supple, symmetrical, trachea midline   Back:     Symmetric   Lungs:     Respirations unlabored   Heart:    Regular rate and rhythm, S1 and S2 normal, no murmur, rub   or gallop   Abdomen:     Soft, non-tender   Extremities:   No calf tenderness to palpation bilateral   Amputation right hallux  Hammertoe deformity right 2nd toe  Pulses:   Bilateral DP pulses palpable bilateral PT pulses dopplerable biphasic  Capillary refill time less than 3 seconds  Skin:   Stable wound to the plantar right 1st MPJ, measures approximately 1 x 1 x 0 1 cm  Wound base is 100% granular with mildly hyperkeratotic rim  This does not probe to bone, no nir pus, no active drainage, no malodor  Stable eschar to the distal right 2nd toe however no probe to bone and no acute signs of infection  Neurologic:   Gross sensation is intact  Protective sensation is absent                   Lab Results:   Admission on 09/16/2019   Component Date Value    WBC 09/16/2019 11 07*    RBC 09/16/2019 5 33*    Hemoglobin 09/16/2019 14 3     Hematocrit 09/16/2019 44 6     MCV 09/16/2019 84     MCH 09/16/2019 26 8     MCHC 09/16/2019 32 1     RDW 09/16/2019 13 0     MPV 09/16/2019 12 9*    Platelets 95/03/4890 268     nRBC 09/16/2019 0     Neutrophils Relative 09/16/2019 83*    Immat GRANS % 09/16/2019 0     Lymphocytes Relative 09/16/2019 10*    Monocytes Relative 09/16/2019 5     Eosinophils Relative 09/16/2019 1     Basophils Relative 09/16/2019 1     Neutrophils Absolute 09/16/2019 9 29*    Immature Grans Absolute 09/16/2019 0 02     Lymphocytes Absolute 09/16/2019 1 08     Monocytes Absolute 09/16/2019 0 50     Eosinophils Absolute 09/16/2019 0 08     Basophils Absolute 09/16/2019 0 10     Sodium 09/16/2019 133*    Potassium 09/16/2019 4 9     Chloride 09/16/2019 97*    CO2 09/16/2019 22     ANION GAP 09/16/2019 14*    BUN 09/16/2019 9     Creatinine 09/16/2019 0 75     Glucose 09/16/2019 359*    Calcium 09/16/2019 9 8     AST 09/16/2019 31     ALT 09/16/2019 25     Alkaline Phosphatase 09/16/2019 143*    Total Protein 09/16/2019 8 3*    Albumin 09/16/2019 3 6     Total Bilirubin 09/16/2019 1 89*    eGFR 09/16/2019 90     Lipase 09/16/2019 80     POC Glucose 09/17/2019 347*    POC Glucose 09/17/2019 339*    Sodium 09/17/2019 133*    Potassium 09/17/2019 3 9     Chloride 09/17/2019 98*    CO2 09/17/2019 23     ANION GAP 09/17/2019 12     BUN 09/17/2019 10     Creatinine 09/17/2019 0 71     Glucose 09/17/2019 304*    Calcium 09/17/2019 9 0     eGFR 09/17/2019 96     Magnesium 09/17/2019 1 4*    WBC 09/17/2019 11 72*    RBC 09/17/2019 4 90     Hemoglobin 09/17/2019 13 3     Hematocrit 09/17/2019 41 1     MCV 09/17/2019 84     MCH 09/17/2019 27 1     MCHC 09/17/2019 32 4     RDW 09/17/2019 13 1     Platelets 81/44/7289 258     MPV 09/17/2019 13 1*    POC Glucose 09/17/2019 288*    Ventricular Rate 09/17/2019 126     Atrial Rate 09/17/2019 126     IN Interval 09/17/2019 136     QRSD Interval 09/17/2019 82     QT Interval 09/17/2019 326     QTC Interval 09/17/2019 472     P Axis 09/17/2019 72     QRS Axis 09/17/2019 22     T Wave Axis 09/17/2019 55                    Invalid input(s): LABAEARO            Imaging: I have personally reviewed pertinent films in PACS  EKG, Pathology, and Other Studies: I have personally reviewed pertinent reports        Code Status: Level 1 - Full Code  Advance Directive and Living Will:      Power of :    POLST:

## 2019-09-17 NOTE — ASSESSMENT & PLAN NOTE
Lab Results   Component Value Date    HGBA1C 12 5 (H) 07/21/2019       No results for input(s): POCGLU in the last 72 hours      Blood Sugar Average: Last 72 hrs:   glucose elevated to 359 at time of presentation  Will continue home 50 units Lantus hs with sliding scale insulin coverage and Accu-Cheks

## 2019-09-17 NOTE — ED PROVIDER NOTES
History  Chief Complaint   Patient presents with    Vomiting     pt reports flare up of gastroparsis, reports vomiting for four  days and generalized abdominal pain     41-year-old female with a history of diabetes, gastroparesis presents with which she describes as a flare of her gastroparesis  She started feeling ill 2 days ago but then today started vomiting bile like material   She has been unable to keep anything down  She has not checked her blood sugars today  She also complains of right-sided abdominal pain which is common with her gastroparesis  She has had no fevers  No diarrhea or constipation  She states she was last admitted 2 weeks ago for gastroparesis when she was visiting family members in [place]  She normally takes Zofran  No medication changes  History provided by:  Patient   used: No    Vomiting   Severity:  Unable to specify  Duration:  1 day  Timing:  Intermittent  Quality:  Bilious material  Progression:  Unchanged  Chronicity:  Recurrent  Relieved by:  Nothing  Worsened by:  Nothing  Ineffective treatments:  Antiemetics  Associated symptoms: abdominal pain    Associated symptoms: no arthralgias, no chills, no cough, no diarrhea, no fever, no headaches, no myalgias, no sore throat and no URI    Abdominal pain:     Location:  RUQ, RLQ and epigastric    Quality: cramping      Severity:  Unable to specify    Onset quality:  Gradual    Duration:  1 day    Timing:  Constant    Progression:  Unchanged    Chronicity:  Recurrent  Risk factors: diabetes    Risk factors: no alcohol use        Prior to Admission Medications   Prescriptions Last Dose Informant Patient Reported? Taking?    LYRICA 150 MG capsule   Yes No   Sig: Take 150 mg by mouth 2 (two) times a day   WIXELA INHUB 500-50 MCG/DOSE inhaler   Yes No   Sig: Take 1 puff by mouth 2 (two) times a day   albuterol (ACCUNEB) 1 25 MG/3ML nebulizer solution  Self Yes No   Sig: Take 1 ampule by nebulization every 6 (six) hours as needed for wheezing   albuterol (PROVENTIL HFA,VENTOLIN HFA) 90 mcg/act inhaler 2019 at Unknown time Self Yes Yes   Sig: Inhale 2 puffs every 6 (six) hours as needed for wheezing   carisoprodol (SOMA) 350 mg tablet  Self Yes No   Sig: Take 350 mg by mouth 2 (two) times a day   diazepam (VALIUM) 5 mg tablet  Self Yes No   Sig: Take 5 mg by mouth daily at bedtime as needed for anxiety   dicyclomine (BENTYL) 10 mg capsule   No No   Sig: TAKE 1 CAPSULE BY MOUTH 4 TIMES A DAY (BEFORE MEALS AND AT BEDTIME)   famotidine (PEPCID) 40 MG tablet  Self No No   Sig: TAKE 1 TABLET BY MOUTH 2 TIMES A DAY AS NEEDED FOR HEARTBURN   fluticasone (FLONASE) 50 mcg/act nasal spray  Self Yes No   Si spray into each nostril daily   gabapentin (NEURONTIN) 600 MG tablet   Yes No   Sig: TAKE 1/2 TABLET IN MORNING, TAKE 1/2 TAB IN THE EVENING AND 2 TABS AT BEDTIME FOR 30 DAYS   glimepiride (AMARYL) 4 mg tablet  Self Yes No   Sig: Take 4 mg by mouth 2 (two) times a day   insulin glargine (LANTUS) 100 units/mL subcutaneous injection  Self Yes No   Sig: Inject 50 Units under the skin daily at bedtime     lisinopril (ZESTRIL) 10 mg tablet  Self No No   Sig: Take 1 tablet (10 mg total) by mouth daily   metoclopramide (REGLAN) 10 mg tablet   No No   Sig: Take 1 tablet (10 mg total) by mouth every 6 (six) hours as needed (nausea, vomiting)   metoclopramide (REGLAN) 10 mg tablet   No No   Sig: TAKE 1 TABLET PO QID BEFORE MEALS AND HS   montelukast (SINGULAIR) 10 mg tablet  Self Yes No   Sig: Take 10 mg by mouth daily at bedtime   morphine (MSIR) 15 mg tablet  Self Yes No   Sig: Take 15 mg by mouth 3 (three) times a day as needed for moderate pain or severe pain     nystatin (MYCOSTATIN) powder   No No   Sig: Apply topically 2 (two) times a day   omeprazole (PriLOSEC) 20 mg delayed release capsule  Self Yes No   Sig: Take 20 mg by mouth daily   ondansetron (ZOFRAN) 4 mg tablet  Self No No   Sig: TAKE 1 TABLET BY MOUTH EVERY 8 HOURS AS NEEDED FOR NAUSEA AND VOMITING      Facility-Administered Medications: None       Past Medical History:   Diagnosis Date    Asthma     Chronic pain     Diabetes mellitus (HCC)     Gastroparesis        Past Surgical History:   Procedure Laterality Date    CERVICAL LAMINECTOMY      CHOLECYSTECTOMY      CHOLECYSTECTOMY LAPAROSCOPIC N/A 11/10/2018    Procedure: CHOLECYSTECTOMY LAPAROSCOPIC w/ ioc;  Surgeon: Anushka Clark MD;  Location: [facility];  Service: General    HYSTERECTOMY      JOINT REPLACEMENT Bilateral     knee    TOE AMPUTATION Right 2/2/2018    Procedure: AMPUTATION TOE, RIGHT GREAT TOE;  Surgeon: Fer Sellers DPM;  Location: [facility];  Service: Podiatry       Family History   Problem Relation Age of Onset    Diabetes Mother     Diabetes Maternal Grandmother     No Known Problems Father      I have reviewed and agree with the history as documented  Social History     Tobacco Use    Smoking status: Never Smoker    Smokeless tobacco: Never Used   Substance Use Topics    Alcohol use: Yes     Frequency: 2-4 times a month     Drinks per session: 1 or 2     Comment: rarely    Drug use: No        Review of Systems   Constitutional: Negative  Negative for chills, diaphoresis, fatigue and fever  HENT: Negative  Negative for congestion, rhinorrhea and sore throat  Eyes: Negative  Negative for discharge, redness and itching  Respiratory: Negative  Negative for apnea, cough, chest tightness, shortness of breath and wheezing  Cardiovascular: Negative for chest pain, palpitations and leg swelling  Gastrointestinal: Positive for abdominal pain, nausea and vomiting  Negative for abdominal distention, blood in stool, constipation and diarrhea  Endocrine: Negative  Genitourinary: Negative  Negative for flank pain, frequency and urgency  Musculoskeletal: Negative  Negative for arthralgias, back pain and myalgias  Skin: Negative  Allergic/Immunologic: Negative  Neurological: Negative  Negative for dizziness, syncope, weakness, light-headedness, numbness and headaches  Hematological: Negative  All other systems reviewed and are negative  Physical Exam  Physical Exam   Constitutional: She is oriented to person, place, and time  She appears well-developed and well-nourished  Non-toxic appearance  She does not have a sickly appearance  She does not appear ill  No distress  HENT:   Head: Normocephalic and atraumatic  Right Ear: External ear normal    Left Ear: External ear normal    Mouth/Throat: Oropharynx is clear and moist    Eyes: Pupils are equal, round, and reactive to light  Conjunctivae are normal  Right eye exhibits no discharge  Left eye exhibits no discharge  No scleral icterus  Cardiovascular: Regular rhythm and normal heart sounds  Tachycardia present  Exam reveals no gallop and no friction rub  No murmur heard  Pulmonary/Chest: Effort normal and breath sounds normal  No stridor  No respiratory distress  She has no wheezes  She has no rales  She exhibits no tenderness  Abdominal: Soft  Bowel sounds are normal  She exhibits no distension and no mass  There is tenderness in the right upper quadrant and epigastric area  There is no rebound and no guarding  No hernia  Obese   Musculoskeletal: Normal range of motion  She exhibits no edema  Neurological: She is alert and oriented to person, place, and time  She has normal reflexes  She exhibits normal muscle tone  Skin: Skin is warm and dry  No rash noted  She is not diaphoretic  No erythema  No pallor  Psychiatric: She has a normal mood and affect  Nursing note and vitals reviewed        Vital Signs  ED Triage Vitals   Temperature Pulse Respirations Blood Pressure SpO2   09/16/19 2005 09/16/19 2005 09/16/19 2005 09/16/19 2005 09/16/19 2005   99 2 °F (37 3 °C) (!) 122 20 (!) 210/105 100 %      Temp Source Heart Rate Source Patient Position - Orthostatic VS BP Location FiO2 (%)   09/16/19 2005 09/16/19 2005 09/16/19 2030 09/16/19 2030 --   Oral Monitor Lying Right arm       Pain Score       09/16/19 2005       9           Vitals:    09/18/19 2036 09/18/19 2339 09/19/19 0645 09/19/19 0717   BP: (!) 87/52 (!) 89/55 100/69 96/68   Pulse: 95 85 90 89   Patient Position - Orthostatic VS:             Visual Acuity      ED Medications  Medications   albuterol inhalation solution 2 5 mg (has no administration in time range)   albuterol (PROVENTIL HFA,VENTOLIN HFA) inhaler 2 puff (has no administration in time range)   carisoprodol (SOMA) tablet 350 mg (350 mg Oral Given 9/18/19 1709)   diazepam (VALIUM) tablet 5 mg (5 mg Oral Given 9/18/19 0156)   dicyclomine (BENTYL) capsule 10 mg (10 mg Oral Given 9/19/19 0643)   famotidine (PEPCID) tablet 20 mg (20 mg Oral Given 9/18/19 1709)   fluticasone (FLONASE) 50 mcg/act nasal spray 1 spray (1 spray Nasal Given 9/19/19 0727)   gabapentin (NEURONTIN) capsule 300 mg (300 mg Oral Given 9/18/19 1709)   gabapentin (NEURONTIN) capsule 600 mg (600 mg Oral Given 9/18/19 2205)   insulin glargine (LANTUS) subcutaneous injection 50 Units 0 5 mL (50 Units Subcutaneous Given 9/18/19 2205)   pregabalin (LYRICA) capsule 150 mg (150 mg Oral Given 9/18/19 1709)   fluticasone-vilanterol (BREO ELLIPTA) 200-25 MCG/INH inhaler 1 puff (1 puff Inhalation Given 9/19/19 0726)   pantoprazole (PROTONIX) injection 40 mg (40 mg Intravenous Given 9/18/19 0823)   nystatin (MYCOSTATIN) powder ( Topical Given 9/18/19 1710)   montelukast (SINGULAIR) tablet 10 mg (10 mg Oral Given 9/18/19 2206)   metoclopramide (REGLAN) injection 10 mg (10 mg Intravenous Not Given 9/19/19 0610)   ondansetron (ZOFRAN) injection 4 mg (4 mg Intravenous Not Given 9/19/19 0314)   polyethylene glycol (MIRALAX) packet 17 g (has no administration in time range)   aluminum-magnesium hydroxide-simethicone (MYLANTA) 200-200-20 mg/5 mL oral suspension 30 mL (has no administration in time range)   enoxaparin (LOVENOX) subcutaneous injection 40 mg (40 mg Subcutaneous Given 9/18/19 0823)   acetaminophen (TYLENOL) tablet 650 mg (has no administration in time range)   morphine (MSIR) IR tablet 15 mg (15 mg Oral Given 9/19/19 0728)   morphine (MS CONTIN) ER tablet 75 mg (0 mg Oral Hold 9/18/19 2100)   insulin lispro (HumaLOG) 100 units/mL subcutaneous injection 2-12 Units (4 Units Subcutaneous Given 9/19/19 0728)   insulin lispro (HumaLOG) 100 units/mL subcutaneous injection 10 Units (10 Units Subcutaneous Given 9/19/19 0728)   ondansetron (ZOFRAN) injection 4 mg (4 mg Intravenous Given 9/16/19 2030)   sodium chloride 0 9 % bolus 1,000 mL (0 mL Intravenous Stopped 9/16/19 2234)   metoclopramide (REGLAN) injection 10 mg (10 mg Intravenous Given 9/16/19 2055)   morphine (PF) 4 mg/mL injection 4 mg (4 mg Intravenous Given 9/16/19 2103)   insulin regular (HumuLIN R,NovoLIN R) injection 10 Units (10 Units Intravenous Given 9/16/19 2153)   ondansetron (ZOFRAN) injection 4 mg (4 mg Intravenous Given 9/16/19 2224)   sodium chloride 0 9 % bolus 1,000 mL (1,000 mL Intravenous New Bag 9/18/19 1453)       Diagnostic Studies  Results Reviewed     Procedure Component Value Units Date/Time    Comprehensive metabolic panel [[id number]]  (Abnormal) Collected:  09/16/19 2030    Lab Status:  Final result Specimen:  Blood from Arm, Left Updated:  09/16/19 2106     Sodium 133 mmol/L      Potassium 4 9 mmol/L      Chloride 97 mmol/L      CO2 22 mmol/L      ANION GAP 14 mmol/L      BUN 9 mg/dL      Creatinine 0 75 mg/dL      Glucose 359 mg/dL      Calcium 9 8 mg/dL      AST 31 U/L      ALT 25 U/L      Alkaline Phosphatase 143 U/L      Total Protein 8 3 g/dL      Albumin 3 6 g/dL      Total Bilirubin 1 89 mg/dL      eGFR 90 ml/min/1 73sq m     Narrative:       Claudio guidelines for Chronic Kidney Disease (CKD):     Stage 1 with normal or high GFR (GFR > 90 mL/min/1 73 square meters)    Stage 2 Mild CKD (GFR = 60-89 mL/min/1 73 square meters)    Stage 3A Moderate CKD (GFR = 45-59 mL/min/1 73 square meters)    Stage 3B Moderate CKD (GFR = 30-44 mL/min/1 73 square meters)    Stage 4 Severe CKD (GFR = 15-29 mL/min/1 73 square meters)    Stage 5 End Stage CKD (GFR <15 mL/min/1 73 square meters)  Note: GFR calculation is accurate only with a steady state creatinine    Lipase [[id number]]  (Normal) Collected:  09/16/19 2030    Lab Status:  Final result Specimen:  Blood from Arm, Left Updated:  09/16/19 2106     Lipase 80 u/L     CBC and differential [[id number]]  (Abnormal) Collected:  09/16/19 2030    Lab Status:  Final result Specimen:  Blood from Arm, Left Updated:  09/16/19 2042     WBC 11 07 Thousand/uL      RBC 5 33 Million/uL      Hemoglobin 14 3 g/dL      Hematocrit 44 6 %      MCV 84 fL      MCH 26 8 pg      MCHC 32 1 g/dL      RDW 13 0 %      MPV 12 9 fL      Platelets 314 Thousands/uL      nRBC 0 /100 WBCs      Neutrophils Relative 83 %      Immat GRANS % 0 %      Lymphocytes Relative 10 %      Monocytes Relative 5 %      Eosinophils Relative 1 %      Basophils Relative 1 %      Neutrophils Absolute 9 29 Thousands/µL      Immature Grans Absolute 0 02 Thousand/uL      Lymphocytes Absolute 1 08 Thousands/µL      Monocytes Absolute 0 50 Thousand/µL      Eosinophils Absolute 0 08 Thousand/µL      Basophils Absolute 0 10 Thousands/µL                  No orders to display              Procedures  Procedures       ED Course  ED Course as of Sep 19 0808   Mon Sep 16, 2019   2209 Patient still dry heaving after Reglan      2303   Patient failed p o  Challenge  Will admit                                  MDM  Number of Diagnoses or Management Options  Diagnosis management comments: 16-year-old female presents with nausea and vomiting throughout the day and epigastric to right upper quadrant abdominal pain  She states this is typical of her gastroparesis flare    She was last admitted 2 weeks ago and then [facility]   She has been admitted through [facility] for this issue in July  On exam she is in no acute distress but occasionally will dry heave  She does have tenderness to the epigastric and right upper quadrant without peritoneal signs  Will check basic labs, give IV fluids, Reglan, morphine  Will reassess  Amount and/or Complexity of Data Reviewed  Clinical lab tests: ordered and reviewed  Review and summarize past medical records: yes  Independent visualization of images, tracings, or specimens: yes        Disposition  Final diagnoses:   Diabetic gastroparesis ([address] )   Intractable vomiting     Time reflects when diagnosis was documented in both MDM as applicable and the Disposition within this note     Time User Action Codes Description Comment    9/16/2019 11:07 PM Renee Lara [I73 [id number],  [id number]] Diabetic gastroparesis ([facility] [address] )     9/16/2019 11:08 PM Renee Lara [R11 [id number]] Intractable vomiting     9/17/2019 12:45 AM Yocasta Melendez Add [Z09 [id number],  L97 [id number]] Diabetic ulcer of right great toe [facility])       ED Disposition     ED Disposition Condition Date/Time Comment    Admit Good Mon Sep 16, 2019 11:07 PM Case was discussed with nancy and the patient's admission status was agreed to be Admission Status: inpatient status to the service of Dr Kevin Martini           Follow-up Information    None         Current Discharge Medication List      CONTINUE these medications which have NOT CHANGED    Details   albuterol (PROVENTIL HFA,VENTOLIN HFA) 90 mcg/act inhaler Inhale 2 puffs every 6 (six) hours as needed for wheezing      albuterol (ACCUNEB) 1 25 MG/3ML nebulizer solution Take 1 ampule by nebulization every 6 (six) hours as needed for wheezing      carisoprodol (SOMA) 350 mg tablet Take 350 mg by mouth 2 (two) times a day      diazepam (VALIUM) 5 mg tablet Take 5 mg by mouth daily at bedtime as needed for anxiety      dicyclomine (BENTYL) 10 mg capsule TAKE 1 CAPSULE BY MOUTH 4 TIMES A DAY (BEFORE MEALS AND AT BEDTIME)  Qty: 360 capsule, Refills: 1    Associated Diagnoses: Gastroparesis; Intractable vomiting with nausea, unspecified vomiting type; Gall stones      famotidine (PEPCID) 40 MG tablet TAKE 1 TABLET BY MOUTH 2 TIMES A DAY AS NEEDED FOR HEARTBURN  Qty: 90 tablet, Refills: 0    Comments: DX Code Needed      Associated Diagnoses: Gastroparesis      fluticasone (FLONASE) 50 mcg/act nasal spray 1 spray into each nostril daily      gabapentin (NEURONTIN) 600 MG tablet TAKE 1/2 TABLET IN MORNING, TAKE 1/2 TAB IN THE EVENING AND 2 TABS AT BEDTIME FOR 30 DAYS  Refills: 3      glimepiride (AMARYL) 4 mg tablet Take 4 mg by mouth 2 (two) times a day      insulin glargine (LANTUS) 100 units/mL subcutaneous injection Inject 50 Units under the skin daily at bedtime        lisinopril (ZESTRIL) 10 mg tablet Take 1 tablet (10 mg total) by mouth daily  Qty: 30 tablet, Refills: 0    Associated Diagnoses: Essential hypertension      LYRICA 150 MG capsule Take 150 mg by mouth 2 (two) times a day  Refills: 5      !! metoclopramide (REGLAN) 10 mg tablet Take 1 tablet (10 mg total) by mouth every 6 (six) hours as needed (nausea, vomiting)  Qty: 30 tablet, Refills: 0    Associated Diagnoses: Nausea & vomiting; Gastroparesis      !! metoclopramide (REGLAN) 10 mg tablet TAKE 1 TABLET PO QID BEFORE MEALS AND HS  Qty: 360 tablet, Refills: 3    Associated Diagnoses: Gastroparesis      montelukast (SINGULAIR) 10 mg tablet Take 10 mg by mouth daily at bedtime      morphine (MSIR) 15 mg tablet Take 15 mg by mouth 3 (three) times a day as needed for moderate pain or severe pain        nystatin (MYCOSTATIN) powder Apply topically 2 (two) times a day  Qty: 45 g, Refills: 0    Associated Diagnoses: Candidiasis      omeprazole (PriLOSEC) 20 mg delayed release capsule Take 20 mg by mouth daily      ondansetron (ZOFRAN) 4 mg tablet TAKE 1 TABLET BY MOUTH EVERY 8 HOURS AS NEEDED FOR NAUSEA AND VOMITING  Qty: 30 tablet, Refills: 2    Associated Diagnoses: Gastroparesis; Intractable vomiting with nausea, unspecified vomiting type; Gall stones      WIXELA INHUB 500-50 MCG/DOSE inhaler Take 1 puff by mouth 2 (two) times a day    Comments: Substitution to a formulary equivalent within the same pharmaceutical class is authorized  !! - Potential duplicate medications found  Please discuss with provider  No discharge procedures on file      ED Provider  Electronically Signed by           Bee Porter DO  09/19/19 0415

## 2019-09-17 NOTE — ED NOTES
Patient ambulated to the bathroom independently and with a steady gait        Maria Isabel Stanton RN  09/16/19 9572

## 2019-09-17 NOTE — ASSESSMENT & PLAN NOTE
Patient presents with 1 day of intractable nausea and vomiting secondary to gastroparesis  Does report multiple hospitalizations for diabetic gastroparesis  Will keep NPO for tonight with IV fluid hydration, advance as tolerated  Scheduled Reglan and Zofran

## 2019-09-17 NOTE — ASSESSMENT & PLAN NOTE
PDMP website reviewed  She last filled her prescription for Oxy IR 15 mg on 09/13 and long-acting morphine 60 and 15 mg on 09/13  Discussed that we will restart this but if her symptoms recur than we should consider tapering this off

## 2019-09-17 NOTE — PLAN OF CARE
Problem: Potential for Falls  Goal: Patient will remain free of falls  Description  INTERVENTIONS:  - Assess patient frequently for physical needs  -  Identify cognitive and physical deficits and behaviors that affect risk of falls  -  [place] fall precautions as indicated by assessment   - Educate patient/family on patient safety including physical limitations  - Instruct patient to call for assistance with activity based on assessment  - Modify environment to reduce risk of injury  - Consider OT/PT consult to assist with strengthening/mobility  Outcome: Progressing     Problem: Nutrition/Hydration-ADULT  Goal: Nutrient/Hydration intake appropriate for improving, restoring or maintaining nutritional needs  Description  Monitor and assess patient's nutrition/hydration status for malnutrition  Collaborate with interdisciplinary team and initiate plan and interventions as ordered  Monitor patient's weight and dietary intake as ordered or per policy  Utilize nutrition screening tool and intervene as necessary  Determine patient's food preferences and provide high-protein, high-caloric foods as appropriate       INTERVENTIONS:  - Monitor oral intake, urinary output, labs, and treatment plans  - Assess nutrition and hydration status and recommend course of action  - Evaluate amount of meals eaten  - Assist patient with eating if necessary   - Allow adequate time for meals  - Recommend/ encourage appropriate diets, oral nutritional supplements, and vitamin/mineral supplements  - Order, calculate, and assess calorie counts as needed  - Recommend, monitor, and adjust tube feedings and TPN/PPN based on assessed needs  - Assess need for intravenous fluids  - Provide specific nutrition/hydration education as appropriate  - Include patient/family/caregiver in decisions related to nutrition  Outcome: Progressing     Problem: RESPIRATORY - ADULT  Goal: Achieves optimal ventilation and oxygenation  Description  INTERVENTIONS:  - Assess for changes in respiratory status  - Assess for changes in mentation and behavior  - Position to facilitate oxygenation and minimize respiratory effort  - Oxygen administered by appropriate delivery if ordered  - Initiate smoking cessation education as indicated  - Encourage broncho-pulmonary hygiene including cough, deep breathe, Incentive Spirometry  - Assess the need for suctioning and aspirate as needed  - Assess and instruct to report SOB or any respiratory difficulty  - Respiratory Therapy support as indicated  Outcome: Progressing     Problem: GASTROINTESTINAL - ADULT  Goal: Minimal or absence of nausea and/or vomiting  Description  INTERVENTIONS:  - Administer IV fluids if ordered to ensure adequate hydration  - Maintain NPO status until nausea and vomiting are resolved  - Nasogastric tube if ordered  - Administer ordered antiemetic medications as needed  - Provide nonpharmacologic comfort measures as appropriate  - Advance diet as tolerated, if ordered  - Consider nutrition services referral to assist patient with adequate nutrition and appropriate food choices  Outcome: Progressing     Problem: METABOLIC, FLUID AND ELECTROLYTES - ADULT  Goal: Electrolytes maintained within normal limits  Description  INTERVENTIONS:  - Monitor labs and assess patient for signs and symptoms of electrolyte imbalances  - Administer electrolyte replacement as ordered  - Monitor response to electrolyte replacements, including repeat lab results as appropriate  - Instruct patient on fluid and nutrition as appropriate  Outcome: Progressing  Goal: Fluid balance maintained  Description  INTERVENTIONS:  - Monitor labs   - Monitor I/O and WT  - Instruct patient on fluid and nutrition as appropriate  - Assess for signs & symptoms of volume excess or deficit  Outcome: Progressing  Goal: Glucose maintained within target range  Description  INTERVENTIONS:  - Monitor Blood Glucose as ordered  - Assess for signs and symptoms of hyperglycemia and hypoglycemia  - Administer ordered medications to maintain glucose within target range  - Assess nutritional intake and initiate nutrition service referral as needed  Outcome: Progressing     Problem: SKIN/TISSUE INTEGRITY - ADULT  Goal: Skin integrity remains intact  Description  INTERVENTIONS  - Identify patients at risk for skin breakdown  - Assess and monitor skin integrity  - Assess and monitor nutrition and hydration status  - Monitor labs (i e  albumin)  - Assess for incontinence   - Turn and reposition patient  - Assist with mobility/ambulation  - Relieve pressure over bony prominences  - Avoid friction and shearing  - Provide appropriate hygiene as needed including keeping skin clean and dry  - Evaluate need for skin moisturizer/barrier cream  - Collaborate with interdisciplinary team (i e  Nutrition, Rehabilitation, etc )   - Patient/family teaching  Outcome: Progressing  Goal: Incision(s), wounds(s) or drain site(s) healing without S/S of infection  Description  INTERVENTIONS  - Assess and document risk factors for skin impairment   - Assess and document dressing, incision, wound bed, drain sites and surrounding tissue  - Consider nutrition services referral as needed  - Oral mucous membranes remain intact  - Provide patient/ family education  Outcome: Progressing  Goal: Oral mucous membranes remain intact  Description  INTERVENTIONS  - Assess oral mucosa and hygiene practices  - Implement preventative oral hygiene regimen  - Implement oral medicated treatments as ordered  - Initiate Nutrition services referral as needed  Outcome: Progressing     Problem: MUSCULOSKELETAL - ADULT  Goal: Maintain or return mobility to safest level of function  Description  INTERVENTIONS:  - Assess patient's ability to carry out ADLs; assess patient's baseline for ADL function and identify physical deficits which impact ability to perform ADLs (bathing, care of mouth/teeth, toileting, grooming, dressing, etc )  - Assess/evaluate cause of self-care deficits   - Assess range of motion  - Assess patient's mobility  - Assess patient's need for assistive devices and provide as appropriate  - Encourage maximum independence but intervene and supervise when necessary  - Involve family in performance of ADLs  - Assess for home care needs following discharge   - Consider OT consult to assist with ADL evaluation and planning for discharge  - Provide patient education as appropriate  Outcome: Progressing  Goal: Maintain proper alignment of affected body part  Description  INTERVENTIONS:  - Support, maintain and protect limb and body alignment  - Provide patient/ family with appropriate education  Outcome: Progressing     Problem: PAIN - ADULT  Goal: Verbalizes/displays adequate comfort level or baseline comfort level  Description  Interventions:  - Encourage patient to monitor pain and request assistance  - Assess pain using appropriate pain scale  - Administer analgesics based on type and severity of pain and evaluate response  - Implement non-pharmacological measures as appropriate and evaluate response  - Consider cultural and social influences on pain and pain management  - Notify physician/advanced practitioner if interventions unsuccessful or patient reports new pain  Outcome: Progressing     Problem: SAFETY ADULT  Goal: Maintain or return to baseline ADL function  Description  INTERVENTIONS:  -  Assess patient's ability to carry out ADLs; assess patient's baseline for ADL function and identify physical deficits which impact ability to perform ADLs (bathing, care of mouth/teeth, toileting, grooming, dressing, etc )  - Assess/evaluate cause of self-care deficits   - Assess range of motion  - Assess patient's mobility; develop plan if impaired  - Assess patient's need for assistive devices and provide as appropriate  - Encourage maximum independence but intervene and supervise when necessary  - Involve family in performance of ADLs  - Assess for home care needs following discharge   - Consider OT consult to assist with ADL evaluation and planning for discharge  - Provide patient education as appropriate  Outcome: Progressing  Goal: Maintain or return mobility status to optimal level  Description  INTERVENTIONS:  - Assess patient's baseline mobility status (ambulation, transfers, stairs, etc )    - Identify cognitive and physical deficits and behaviors that affect mobility  - Identify mobility aids required to assist with transfers and/or ambulation (gait belt, sit-to-stand, lift, walker, cane, etc )  - [place] fall precautions as indicated by assessment  - Record patient progress and toleration of activity level on Mobility SBAR; progress patient to next Phase/Stage  - Instruct patient to call for assistance with activity based on assessment  - Consider rehabilitation consult to assist with strengthening/weightbearing, etc   Outcome: Progressing     Problem: DISCHARGE PLANNING  Goal: Discharge to home or other facility with appropriate resources  Description  INTERVENTIONS:  - Identify barriers to discharge w/patient and caregiver  - Arrange for needed discharge resources and transportation as appropriate  - Identify discharge learning needs (meds, wound care, etc )  - Arrange for interpretive services to assist at discharge as needed  - Refer to Case Management Department for coordinating discharge planning if the patient needs post-hospital services based on physician/advanced practitioner order or complex needs related to functional status, cognitive ability, or social support system  Outcome: Progressing     Problem: Knowledge Deficit  Goal: Patient/family/caregiver demonstrates understanding of disease process, treatment plan, medications, and discharge instructions  Description  Complete learning assessment and assess knowledge base    Interventions:  - Provide teaching at level of understanding  - Provide teaching via preferred learning methods  Outcome: Progressing

## 2019-09-18 PROBLEM — I95.9 HYPOTENSION (ARTERIAL): Status: ACTIVE | Noted: 2019-09-18

## 2019-09-18 LAB
ANION GAP SERPL CALCULATED.3IONS-SCNC: 9 MMOL/L (ref 4–13)
BUN SERPL-MCNC: 17 MG/DL (ref 5–25)
CALCIUM SERPL-MCNC: 8.3 MG/DL (ref 8.3–10.1)
CHLORIDE SERPL-SCNC: 102 MMOL/L (ref 100–108)
CO2 SERPL-SCNC: 25 MMOL/L (ref 21–32)
CORTIS SERPL-MCNC: 4.3 UG/DL
CREAT SERPL-MCNC: 1.24 MG/DL (ref 0.6–1.3)
GFR SERPL CREATININE-BSD FRML MDRD: 49 ML/MIN/1.73SQ M
GLUCOSE SERPL-MCNC: 131 MG/DL (ref 65–140)
GLUCOSE SERPL-MCNC: 143 MG/DL (ref 65–140)
GLUCOSE SERPL-MCNC: 144 MG/DL (ref 65–140)
GLUCOSE SERPL-MCNC: 219 MG/DL (ref 65–140)
GLUCOSE SERPL-MCNC: 242 MG/DL (ref 65–140)
GLUCOSE SERPL-MCNC: 93 MG/DL (ref 65–140)
POTASSIUM SERPL-SCNC: 3.5 MMOL/L (ref 3.5–5.3)
SODIUM SERPL-SCNC: 136 MMOL/L (ref 136–145)
TSH SERPL DL<=0.05 MIU/L-ACNC: 1.86 UIU/ML (ref 0.36–3.74)

## 2019-09-18 PROCEDURE — 82533 TOTAL CORTISOL: CPT | Performed by: INTERNAL MEDICINE

## 2019-09-18 PROCEDURE — 82948 REAGENT STRIP/BLOOD GLUCOSE: CPT

## 2019-09-18 PROCEDURE — C9113 INJ PANTOPRAZOLE SODIUM, VIA: HCPCS | Performed by: PHYSICIAN ASSISTANT

## 2019-09-18 PROCEDURE — 99232 SBSQ HOSP IP/OBS MODERATE 35: CPT | Performed by: INTERNAL MEDICINE

## 2019-09-18 PROCEDURE — 80048 BASIC METABOLIC PNL TOTAL CA: CPT | Performed by: INTERNAL MEDICINE

## 2019-09-18 PROCEDURE — 84443 ASSAY THYROID STIM HORMONE: CPT | Performed by: INTERNAL MEDICINE

## 2019-09-18 RX ADMIN — INSULIN LISPRO 10 UNITS: 100 INJECTION, SOLUTION INTRAVENOUS; SUBCUTANEOUS at 17:12

## 2019-09-18 RX ADMIN — DIAZEPAM 5 MG: 5 TABLET ORAL at 01:56

## 2019-09-18 RX ADMIN — GABAPENTIN 300 MG: 300 CAPSULE ORAL at 17:09

## 2019-09-18 RX ADMIN — PREGABALIN 150 MG: 75 CAPSULE ORAL at 17:09

## 2019-09-18 RX ADMIN — CARISOPRODOL 350 MG: 350 TABLET ORAL at 17:09

## 2019-09-18 RX ADMIN — DICYCLOMINE HYDROCHLORIDE 10 MG: 10 CAPSULE ORAL at 11:42

## 2019-09-18 RX ADMIN — FAMOTIDINE 20 MG: 20 TABLET ORAL at 17:09

## 2019-09-18 RX ADMIN — GABAPENTIN 300 MG: 300 CAPSULE ORAL at 08:23

## 2019-09-18 RX ADMIN — MORPHINE SULFATE 15 MG: 15 TABLET ORAL at 17:35

## 2019-09-18 RX ADMIN — PREGABALIN 150 MG: 75 CAPSULE ORAL at 08:23

## 2019-09-18 RX ADMIN — DICYCLOMINE HYDROCHLORIDE 10 MG: 10 CAPSULE ORAL at 06:04

## 2019-09-18 RX ADMIN — FAMOTIDINE 20 MG: 20 TABLET ORAL at 08:23

## 2019-09-18 RX ADMIN — CARISOPRODOL 350 MG: 350 TABLET ORAL at 08:23

## 2019-09-18 RX ADMIN — ENOXAPARIN SODIUM 40 MG: 40 INJECTION SUBCUTANEOUS at 08:23

## 2019-09-18 RX ADMIN — DICYCLOMINE HYDROCHLORIDE 10 MG: 10 CAPSULE ORAL at 17:09

## 2019-09-18 RX ADMIN — PANTOPRAZOLE SODIUM 40 MG: 40 INJECTION, POWDER, FOR SOLUTION INTRAVENOUS at 08:23

## 2019-09-18 RX ADMIN — INSULIN GLARGINE 50 UNITS: 100 INJECTION, SOLUTION SUBCUTANEOUS at 22:05

## 2019-09-18 RX ADMIN — NYSTATIN: 100000 POWDER TOPICAL at 17:10

## 2019-09-18 RX ADMIN — METOCLOPRAMIDE 10 MG: 5 INJECTION, SOLUTION INTRAMUSCULAR; INTRAVENOUS at 01:53

## 2019-09-18 RX ADMIN — MONTELUKAST SODIUM 10 MG: 10 TABLET, COATED ORAL at 22:06

## 2019-09-18 RX ADMIN — SODIUM CHLORIDE 1000 ML: 0.9 INJECTION, SOLUTION INTRAVENOUS at 14:53

## 2019-09-18 RX ADMIN — GABAPENTIN 600 MG: 300 CAPSULE ORAL at 22:05

## 2019-09-18 RX ADMIN — FLUTICASONE FUROATE AND VILANTEROL TRIFENATATE 1 PUFF: 200; 25 POWDER RESPIRATORY (INHALATION) at 08:19

## 2019-09-18 RX ADMIN — NYSTATIN 1 APPLICATION: 100000 POWDER TOPICAL at 08:18

## 2019-09-18 RX ADMIN — DICYCLOMINE HYDROCHLORIDE 10 MG: 10 CAPSULE ORAL at 22:05

## 2019-09-18 RX ADMIN — FLUTICASONE PROPIONATE 1 SPRAY: 50 SPRAY, METERED NASAL at 08:19

## 2019-09-18 NOTE — PROGRESS NOTES
Pt BP fell to 65/46 automatic , confirmed manual  Pt is non-symptomatic  Sent text to LILIANA Brito called and ask that I continue to monitor pt as she had morphine, reglan, valium

## 2019-09-18 NOTE — ASSESSMENT & PLAN NOTE
· Improved   Tolerated clears  · Advanced diet to diabetic level 2  · Continue blood sugar control and Reglan

## 2019-09-18 NOTE — PLAN OF CARE
Problem: Potential for Falls  Goal: Patient will remain free of falls  Description  INTERVENTIONS:  - Assess patient frequently for physical needs  -  Identify cognitive and physical deficits and behaviors that affect risk of falls  -  [place] fall precautions as indicated by assessment   - Educate patient/family on patient safety including physical limitations  - Instruct patient to call for assistance with activity based on assessment  - Modify environment to reduce risk of injury  - Consider OT/PT consult to assist with strengthening/mobility  Outcome: Progressing     Problem: Nutrition/Hydration-ADULT  Goal: Nutrient/Hydration intake appropriate for improving, restoring or maintaining nutritional needs  Description  Monitor and assess patient's nutrition/hydration status for malnutrition  Collaborate with interdisciplinary team and initiate plan and interventions as ordered  Monitor patient's weight and dietary intake as ordered or per policy  Utilize nutrition screening tool and intervene as necessary  Determine patient's food preferences and provide high-protein, high-caloric foods as appropriate       INTERVENTIONS:  - Monitor oral intake, urinary output, labs, and treatment plans  - Assess nutrition and hydration status and recommend course of action  - Evaluate amount of meals eaten  - Assist patient with eating if necessary   - Allow adequate time for meals  - Recommend/ encourage appropriate diets, oral nutritional supplements, and vitamin/mineral supplements  - Order, calculate, and assess calorie counts as needed  - Recommend, monitor, and adjust tube feedings and TPN/PPN based on assessed needs  - Assess need for intravenous fluids  - Provide specific nutrition/hydration education as appropriate  - Include patient/family/caregiver in decisions related to nutrition  Outcome: Progressing     Problem: RESPIRATORY - ADULT  Goal: Achieves optimal ventilation and oxygenation  Description  INTERVENTIONS:  - Assess for changes in respiratory status  - Assess for changes in mentation and behavior  - Position to facilitate oxygenation and minimize respiratory effort  - Oxygen administered by appropriate delivery if ordered  - Initiate smoking cessation education as indicated  - Encourage broncho-pulmonary hygiene including cough, deep breathe, Incentive Spirometry  - Assess the need for suctioning and aspirate as needed  - Assess and instruct to report SOB or any respiratory difficulty  - Respiratory Therapy support as indicated  Outcome: Progressing     Problem: GASTROINTESTINAL - ADULT  Goal: Minimal or absence of nausea and/or vomiting  Description  INTERVENTIONS:  - Administer IV fluids if ordered to ensure adequate hydration  - Maintain NPO status until nausea and vomiting are resolved  - Nasogastric tube if ordered  - Administer ordered antiemetic medications as needed  - Provide nonpharmacologic comfort measures as appropriate  - Advance diet as tolerated, if ordered  - Consider nutrition services referral to assist patient with adequate nutrition and appropriate food choices  Outcome: Progressing     Problem: METABOLIC, FLUID AND ELECTROLYTES - ADULT  Goal: Electrolytes maintained within normal limits  Description  INTERVENTIONS:  - Monitor labs and assess patient for signs and symptoms of electrolyte imbalances  - Administer electrolyte replacement as ordered  - Monitor response to electrolyte replacements, including repeat lab results as appropriate  - Instruct patient on fluid and nutrition as appropriate  Outcome: Progressing  Goal: Fluid balance maintained  Description  INTERVENTIONS:  - Monitor labs   - Monitor I/O and WT  - Instruct patient on fluid and nutrition as appropriate  - Assess for signs & symptoms of volume excess or deficit  Outcome: Progressing  Goal: Glucose maintained within target range  Description  INTERVENTIONS:  - Monitor Blood Glucose as ordered  - Assess for signs and symptoms of hyperglycemia and hypoglycemia  - Administer ordered medications to maintain glucose within target range  - Assess nutritional intake and initiate nutrition service referral as needed  Outcome: Progressing     Problem: SKIN/TISSUE INTEGRITY - ADULT  Goal: Skin integrity remains intact  Description  INTERVENTIONS  - Identify patients at risk for skin breakdown  - Assess and monitor skin integrity  - Assess and monitor nutrition and hydration status  - Monitor labs (i e  albumin)  - Assess for incontinence   - Turn and reposition patient  - Assist with mobility/ambulation  - Relieve pressure over bony prominences  - Avoid friction and shearing  - Provide appropriate hygiene as needed including keeping skin clean and dry  - Evaluate need for skin moisturizer/barrier cream  - Collaborate with interdisciplinary team (i e  Nutrition, Rehabilitation, etc )   - Patient/family teaching  Outcome: Progressing  Goal: Incision(s), wounds(s) or drain site(s) healing without S/S of infection  Description  INTERVENTIONS  - Assess and document risk factors for skin impairment   - Assess and document dressing, incision, wound bed, drain sites and surrounding tissue  - Consider nutrition services referral as needed  - Oral mucous membranes remain intact  - Provide patient/ family education  Outcome: Progressing  Goal: Oral mucous membranes remain intact  Description  INTERVENTIONS  - Assess oral mucosa and hygiene practices  - Implement preventative oral hygiene regimen  - Implement oral medicated treatments as ordered  - Initiate Nutrition services referral as needed  Outcome: Progressing     Problem: MUSCULOSKELETAL - ADULT  Goal: Maintain or return mobility to safest level of function  Description  INTERVENTIONS:  - Assess patient's ability to carry out ADLs; assess patient's baseline for ADL function and identify physical deficits which impact ability to perform ADLs (bathing, care of mouth/teeth, toileting, grooming, dressing, etc )  - Assess/evaluate cause of self-care deficits   - Assess range of motion  - Assess patient's mobility  - Assess patient's need for assistive devices and provide as appropriate  - Encourage maximum independence but intervene and supervise when necessary  - Involve family in performance of ADLs  - Assess for home care needs following discharge   - Consider OT consult to assist with ADL evaluation and planning for discharge  - Provide patient education as appropriate  Outcome: Progressing  Goal: Maintain proper alignment of affected body part  Description  INTERVENTIONS:  - Support, maintain and protect limb and body alignment  - Provide patient/ family with appropriate education  Outcome: Progressing     Problem: PAIN - ADULT  Goal: Verbalizes/displays adequate comfort level or baseline comfort level  Description  Interventions:  - Encourage patient to monitor pain and request assistance  - Assess pain using appropriate pain scale  - Administer analgesics based on type and severity of pain and evaluate response  - Implement non-pharmacological measures as appropriate and evaluate response  - Consider cultural and social influences on pain and pain management  - Notify physician/advanced practitioner if interventions unsuccessful or patient reports new pain  Outcome: Progressing     Problem: SAFETY ADULT  Goal: Maintain or return to baseline ADL function  Description  INTERVENTIONS:  -  Assess patient's ability to carry out ADLs; assess patient's baseline for ADL function and identify physical deficits which impact ability to perform ADLs (bathing, care of mouth/teeth, toileting, grooming, dressing, etc )  - Assess/evaluate cause of self-care deficits   - Assess range of motion  - Assess patient's mobility; develop plan if impaired  - Assess patient's need for assistive devices and provide as appropriate  - Encourage maximum independence but intervene and supervise when necessary  - Involve family in performance of ADLs  - Assess for home care needs following discharge   - Consider OT consult to assist with ADL evaluation and planning for discharge  - Provide patient education as appropriate  Outcome: Progressing  Goal: Maintain or return mobility status to optimal level  Description  INTERVENTIONS:  - Assess patient's baseline mobility status (ambulation, transfers, stairs, etc )    - Identify cognitive and physical deficits and behaviors that affect mobility  - Identify mobility aids required to assist with transfers and/or ambulation (gait belt, sit-to-stand, lift, walker, cane, etc )  - [place] fall precautions as indicated by assessment  - Record patient progress and toleration of activity level on Mobility SBAR; progress patient to next Phase/Stage  - Instruct patient to call for assistance with activity based on assessment  - Consider rehabilitation consult to assist with strengthening/weightbearing, etc   Outcome: Progressing     Problem: DISCHARGE PLANNING  Goal: Discharge to home or other facility with appropriate resources  Description  INTERVENTIONS:  - Identify barriers to discharge w/patient and caregiver  - Arrange for needed discharge resources and transportation as appropriate  - Identify discharge learning needs (meds, wound care, etc )  - Arrange for interpretive services to assist at discharge as needed  - Refer to Case Management Department for coordinating discharge planning if the patient needs post-hospital services based on physician/advanced practitioner order or complex needs related to functional status, cognitive ability, or social support system  Outcome: Progressing     Problem: Knowledge Deficit  Goal: Patient/family/caregiver demonstrates understanding of disease process, treatment plan, medications, and discharge instructions  Description  Complete learning assessment and assess knowledge base    Interventions:  - Provide teaching at level of understanding  - Provide teaching via preferred learning methods  Outcome: Progressing

## 2019-09-18 NOTE — ASSESSMENT & PLAN NOTE
PDMP website reviewed  She last filled her prescription for Oxy IR 15 mg on 09/13 and long-acting morphine 60 and 15 mg on 09/13  Hold narcotics due to hypotension

## 2019-09-18 NOTE — PROGRESS NOTES
Progress Note - Vaibhav Diaz 1965, 53 y o  female MRN: [id number]    Unit/Bed#: [address] 220-01 Encounter: [id number]    Primary Care Provider: Shelbi Torres MD   Date and time admitted to hospital: 9/16/2019  8:13 PM        * Diabetic gastroparesis ([facility] [address] )  Assessment & Plan  · Improved  Tolerated clears  · Advanced diet to diabetic level 2  · Continue blood sugar control and Reglan    Hypotension (arterial)  Assessment & Plan  · Suspect medication induced from narcotics, soma  · Check TSh and random cortisol  · Bolus NSS 1000 ml x 1    Uncomplicated opioid dependence ([facility] [address] )  Assessment & Plan  PDMP website reviewed  She last filled her prescription for Oxy IR 15 mg on 09/13 and long-acting morphine 60 and 15 mg on 09/13  Hold narcotics due to hypotension    Type 2 diabetes mellitus with diabetic neuropathy, with long-term current use of insulin [facility])  Assessment & Plan  Lab Results   Component Value Date    HGBA1C 12 5 (H) 07/21/2019       Recent Labs     09/17/19  2125 09/18/19  0257 09/18/19  0756 09/18/19  1134   POCGLU 326* 143* 131 242*       Blood Sugar Average: Last 72 hrs:  (P) 249 [id number] glucose elevated to 359 at time of presentation    Uncontrolled with A1c of 12 5%  Continue Lantus 50 units at bedtime  Continue sliding scale  Start Humalog 10 units TID AC    Essential hypertension  Assessment & Plan  Hold lisinopril due to hypotension    Asthma  Assessment & Plan  Without exacerbation  Continue bronchodilators and p r n  Albuterol      VTE Pharmacologic Prophylaxis:   Pharmacologic: Enoxaparin (Lovenox)  Mechanical VTE Prophylaxis in Place: No    Patient Centered Rounds: I have performed bedside rounds with nursing staff today  Time Spent for Care: 20 minutes  More than 50% of total time spent on counseling and coordination of care as described above      Current Length of Stay: 2 day(s)    Current Patient Status: Inpatient   Certification Statement: The patient will continue to require additional inpatient hospital stay due to hypotension    Discharge Plan: to be determined    Code Status: Level 1 - Full Code      Subjective:   Noted to have low BP on routine measurements  She admits feeling sleepy due to lack of sleep last night  She denies lightheadedness  She was able to walk along the hallways    Objective:     Vitals:   Temp (24hrs), Av 9 °F (34 9 °C), Min:85 3 °F (29 6 °C), Max:98 6 °F (37 °C)    Temp:  [85 3 °F (29 6 °C)-98 6 °F (37 °C)] 97 2 °F (36 2 °C)  HR:  [84-97] 97  Resp:  [16-20] 16  BP: ()/(46-79) 64/54  SpO2:  [92 %-96 %] 93 %  Body mass index is 43 11 kg/m²  Input and Output Summary (last 24 hours):     No intake or output data in the 24 hours ending 19 2967    Physical Exam:     Physical Exam   Constitutional: She is oriented to person, place, and time  No distress  Obese   HENT:   Head: Normocephalic and atraumatic  Eyes: No scleral icterus  Neck: No JVD present  Cardiovascular: Regular rhythm  Exam reveals no gallop and no friction rub  No murmur heard  Pulmonary/Chest: Effort normal  No stridor  No respiratory distress  She has no wheezes  Abdominal: Soft  She exhibits no distension  There is no tenderness  Musculoskeletal: She exhibits no edema  Neurological: She is alert and oriented to person, place, and time  Skin: Skin is warm and dry  She is not diaphoretic  Psychiatric: She has a normal mood and affect   Her behavior is normal        Additional Data:     Labs:    Results from last 7 days   Lab Units 19  0503 19   WBC Thousand/uL 11 72* 11 07*   HEMOGLOBIN g/dL 13 3 14 3   HEMATOCRIT % 41 1 44 6   PLATELETS Thousands/uL 258 268   NEUTROS PCT %  --  83*   LYMPHS PCT %  --  10*   MONOS PCT %  --  5   EOS PCT %  --  1     Results from last 7 days   Lab Units 19  0514  19   SODIUM mmol/L 136   < > 133*   POTASSIUM mmol/L 3 5   < > 4 9   CHLORIDE mmol/L 102   < > 97*   CO2 mmol/L 25   < > 22 BUN mg/dL 17   < > 9   CREATININE mg/dL 1 24   < > 0 75   ANION GAP mmol/L 9   < > 14*   CALCIUM mg/dL 8 3   < > 9 8   ALBUMIN g/dL  --   --  3 6   TOTAL BILIRUBIN mg/dL  --   --  1 89*   ALK PHOS U/L  --   --  143*   ALT U/L  --   --  25   AST U/L  --   --  31   GLUCOSE RANDOM mg/dL 144*   < > 359*    < > = values in this interval not displayed  Results from last 7 days   Lab Units 09/18/19  1134 09/18/19  0756 09/18/19  0257 09/17/19  2125 09/17/19  1726 09/17/19  1104 09/17/19  0712 09/17/19  0255 09/17/19  0019   POC GLUCOSE mg/dl 242* 131 143* 326* 181* 251* 288* 339* 347*                   * I Have Reviewed All Lab Data Listed Above  * Additional Pertinent Lab Tests Reviewed:  All Labs Within Last 24 Hours Reviewed    Imaging:    Imaging Reports Reviewed Today Include:  None      Recent Cultures (last 7 days):           Last 24 Hours Medication List:     Current Facility-Administered Medications:  acetaminophen 650 mg Oral Q6H PRN Kiara Smudde, PA-C    albuterol 2 puff Inhalation Q6H PRN Kiara Smudde, PA-C    albuterol 2 5 mg Nebulization Q6H PRN Kiara Smudde, PA-C    aluminum-magnesium hydroxide-simethicone 30 mL Oral Q6H PRN Kiara Smudde, PA-C    carisoprodol 350 mg Oral BID Kiara Smudde, PA-C    diazepam 5 mg Oral HS PRN Kiara Smudde, PA-C    dicyclomine 10 mg Oral 4x Daily (AC & HS) Kiara Smudde, PA-C    enoxaparin 40 mg Subcutaneous Daily Kiara Smudde, PA-C    famotidine 20 mg Oral BID Kiara Smudde, PA-C    fluticasone 1 spray Nasal Daily Kiara Smudde, PA-C    fluticasone-vilanterol 1 puff Inhalation Daily Kiara Smudde, PA-C    gabapentin 300 mg Oral BID Kiara Smudde, PA-C    gabapentin 600 mg Oral HS Kiara Smudde, PA-C    insulin glargine 50 Units Subcutaneous HS Kiara Payan PA-C    insulin lispro 2-12 Units Subcutaneous 4x Daily (AC & HS) Kiara Payan PA-C    metoclopramide 10 mg Intravenous Q6H [address] Kiara Payan PA-C    montelukast 10 mg Oral HS Ross Payne PA-C morphine 75 mg Oral Q12H [address] Jairo Estrada MD    morphine 15 mg Oral TID PRN Jairo Estrada MD    nystatin  Topical BID Brett Payan PA-C    ondansetron 4 mg Intravenous Q6H Kiara Payan PA-C    pantoprazole 40 mg Intravenous Q24H [address] Kiara Payan PA-C    polyethylene glycol 17 g Oral Daily PRN Zoran Leyva PA-C    pregabalin 150 mg Oral BID Kiara Payan PA-C    sodium chloride 1,000 mL Intravenous Once Jairo Estrada MD Last Rate: 1,000 mL (09/18/19 7463)        Today, Patient Was Seen By: Jairo Estrada MD    ** Please Note: Dictation voice to text software may have been used in the creation of this document   **

## 2019-09-18 NOTE — ASSESSMENT & PLAN NOTE
Lab Results   Component Value Date    HGBA1C 12 5 (H) 07/21/2019       Recent Labs     09/17/19  2125 09/18/19  0257 09/18/19  0756 09/18/19  1134   POCGLU 326* 143* 131 242*       Blood Sugar Average: Last 72 hrs:  (P) 249 [id number] glucose elevated to 359 at time of presentation    Uncontrolled with A1c of 12 5%  Continue Lantus 50 units at bedtime    Continue sliding scale  Start Humalog 10 units TID AC

## 2019-09-18 NOTE — PLAN OF CARE
Problem: Potential for Falls  Goal: Patient will remain free of falls  Description  INTERVENTIONS:  - Assess patient frequently for physical needs  -  Identify cognitive and physical deficits and behaviors that affect risk of falls  -  [place] fall precautions as indicated by assessment   - Educate patient/family on patient safety including physical limitations  - Instruct patient to call for assistance with activity based on assessment  - Modify environment to reduce risk of injury  - Consider OT/PT consult to assist with strengthening/mobility  Outcome: Progressing     Problem: Nutrition/Hydration-ADULT  Goal: Nutrient/Hydration intake appropriate for improving, restoring or maintaining nutritional needs  Description  Monitor and assess patient's nutrition/hydration status for malnutrition  Collaborate with interdisciplinary team and initiate plan and interventions as ordered  Monitor patient's weight and dietary intake as ordered or per policy  Utilize nutrition screening tool and intervene as necessary  Determine patient's food preferences and provide high-protein, high-caloric foods as appropriate       INTERVENTIONS:  - Monitor oral intake, urinary output, labs, and treatment plans  - Assess nutrition and hydration status and recommend course of action  - Evaluate amount of meals eaten  - Assist patient with eating if necessary   - Allow adequate time for meals  - Recommend/ encourage appropriate diets, oral nutritional supplements, and vitamin/mineral supplements  - Order, calculate, and assess calorie counts as needed  - Recommend, monitor, and adjust tube feedings and TPN/PPN based on assessed needs  - Assess need for intravenous fluids  - Provide specific nutrition/hydration education as appropriate  - Include patient/family/caregiver in decisions related to nutrition  Outcome: Progressing     Problem: RESPIRATORY - ADULT  Goal: Achieves optimal ventilation and oxygenation  Description  INTERVENTIONS:  - Assess for changes in respiratory status  - Assess for changes in mentation and behavior  - Position to facilitate oxygenation and minimize respiratory effort  - Oxygen administered by appropriate delivery if ordered  - Initiate smoking cessation education as indicated  - Encourage broncho-pulmonary hygiene including cough, deep breathe, Incentive Spirometry  - Assess the need for suctioning and aspirate as needed  - Assess and instruct to report SOB or any respiratory difficulty  - Respiratory Therapy support as indicated  Outcome: Progressing     Problem: GASTROINTESTINAL - ADULT  Goal: Minimal or absence of nausea and/or vomiting  Description  INTERVENTIONS:  - Administer IV fluids if ordered to ensure adequate hydration  - Maintain NPO status until nausea and vomiting are resolved  - Nasogastric tube if ordered  - Administer ordered antiemetic medications as needed  - Provide nonpharmacologic comfort measures as appropriate  - Advance diet as tolerated, if ordered  - Consider nutrition services referral to assist patient with adequate nutrition and appropriate food choices  Outcome: Progressing     Problem: METABOLIC, FLUID AND ELECTROLYTES - ADULT  Goal: Electrolytes maintained within normal limits  Description  INTERVENTIONS:  - Monitor labs and assess patient for signs and symptoms of electrolyte imbalances  - Administer electrolyte replacement as ordered  - Monitor response to electrolyte replacements, including repeat lab results as appropriate  - Instruct patient on fluid and nutrition as appropriate  Outcome: Progressing  Goal: Fluid balance maintained  Description  INTERVENTIONS:  - Monitor labs   - Monitor I/O and WT  - Instruct patient on fluid and nutrition as appropriate  - Assess for signs & symptoms of volume excess or deficit  Outcome: Progressing  Goal: Glucose maintained within target range  Description  INTERVENTIONS:  - Monitor Blood Glucose as ordered  - Assess for signs and symptoms of hyperglycemia and hypoglycemia  - Administer ordered medications to maintain glucose within target range  - Assess nutritional intake and initiate nutrition service referral as needed  Outcome: Progressing     Problem: SKIN/TISSUE INTEGRITY - ADULT  Goal: Skin integrity remains intact  Description  INTERVENTIONS  - Identify patients at risk for skin breakdown  - Assess and monitor skin integrity  - Assess and monitor nutrition and hydration status  - Monitor labs (i e  albumin)  - Assess for incontinence   - Turn and reposition patient  - Assist with mobility/ambulation  - Relieve pressure over bony prominences  - Avoid friction and shearing  - Provide appropriate hygiene as needed including keeping skin clean and dry  - Evaluate need for skin moisturizer/barrier cream  - Collaborate with interdisciplinary team (i e  Nutrition, Rehabilitation, etc )   - Patient/family teaching  Outcome: Progressing  Goal: Incision(s), wounds(s) or drain site(s) healing without S/S of infection  Description  INTERVENTIONS  - Assess and document risk factors for skin impairment   - Assess and document dressing, incision, wound bed, drain sites and surrounding tissue  - Consider nutrition services referral as needed  - Oral mucous membranes remain intact  - Provide patient/ family education  Outcome: Progressing  Goal: Oral mucous membranes remain intact  Description  INTERVENTIONS  - Assess oral mucosa and hygiene practices  - Implement preventative oral hygiene regimen  - Implement oral medicated treatments as ordered  - Initiate Nutrition services referral as needed  Outcome: Progressing     Problem: MUSCULOSKELETAL - ADULT  Goal: Maintain or return mobility to safest level of function  Description  INTERVENTIONS:  - Assess patient's ability to carry out ADLs; assess patient's baseline for ADL function and identify physical deficits which impact ability to perform ADLs (bathing, care of mouth/teeth, toileting, grooming, dressing, etc )  - Assess/evaluate cause of self-care deficits   - Assess range of motion  - Assess patient's mobility  - Assess patient's need for assistive devices and provide as appropriate  - Encourage maximum independence but intervene and supervise when necessary  - Involve family in performance of ADLs  - Assess for home care needs following discharge   - Consider OT consult to assist with ADL evaluation and planning for discharge  - Provide patient education as appropriate  Outcome: Progressing  Goal: Maintain proper alignment of affected body part  Description  INTERVENTIONS:  - Support, maintain and protect limb and body alignment  - Provide patient/ family with appropriate education  Outcome: Progressing     Problem: PAIN - ADULT  Goal: Verbalizes/displays adequate comfort level or baseline comfort level  Description  Interventions:  - Encourage patient to monitor pain and request assistance  - Assess pain using appropriate pain scale  - Administer analgesics based on type and severity of pain and evaluate response  - Implement non-pharmacological measures as appropriate and evaluate response  - Consider cultural and social influences on pain and pain management  - Notify physician/advanced practitioner if interventions unsuccessful or patient reports new pain  Outcome: Progressing     Problem: SAFETY ADULT  Goal: Maintain or return to baseline ADL function  Description  INTERVENTIONS:  -  Assess patient's ability to carry out ADLs; assess patient's baseline for ADL function and identify physical deficits which impact ability to perform ADLs (bathing, care of mouth/teeth, toileting, grooming, dressing, etc )  - Assess/evaluate cause of self-care deficits   - Assess range of motion  - Assess patient's mobility; develop plan if impaired  - Assess patient's need for assistive devices and provide as appropriate  - Encourage maximum independence but intervene and supervise when necessary  - Involve family in performance of ADLs  - Assess for home care needs following discharge   - Consider OT consult to assist with ADL evaluation and planning for discharge  - Provide patient education as appropriate  Outcome: Progressing  Goal: Maintain or return mobility status to optimal level  Description  INTERVENTIONS:  - Assess patient's baseline mobility status (ambulation, transfers, stairs, etc )    - Identify cognitive and physical deficits and behaviors that affect mobility  - Identify mobility aids required to assist with transfers and/or ambulation (gait belt, sit-to-stand, lift, walker, cane, etc )  - [place] fall precautions as indicated by assessment  - Record patient progress and toleration of activity level on Mobility SBAR; progress patient to next Phase/Stage  - Instruct patient to call for assistance with activity based on assessment  - Consider rehabilitation consult to assist with strengthening/weightbearing, etc   Outcome: Progressing     Problem: DISCHARGE PLANNING  Goal: Discharge to home or other facility with appropriate resources  Description  INTERVENTIONS:  - Identify barriers to discharge w/patient and caregiver  - Arrange for needed discharge resources and transportation as appropriate  - Identify discharge learning needs (meds, wound care, etc )  - Arrange for interpretive services to assist at discharge as needed  - Refer to Case Management Department for coordinating discharge planning if the patient needs post-hospital services based on physician/advanced practitioner order or complex needs related to functional status, cognitive ability, or social support system  Outcome: Progressing     Problem: Knowledge Deficit  Goal: Patient/family/caregiver demonstrates understanding of disease process, treatment plan, medications, and discharge instructions  Description  Complete learning assessment and assess knowledge base    Interventions:  - Provide teaching at level of understanding  - Provide teaching via preferred learning methods  Outcome: Progressing

## 2019-09-18 NOTE — ASSESSMENT & PLAN NOTE
· Suspect medication induced from narcotics, soma    · Check TSh and random cortisol  · Bolus NSS 1000 ml x 1

## 2019-09-19 PROBLEM — N17.9 AKI (ACUTE KIDNEY INJURY) (HCC): Status: ACTIVE | Noted: 2019-09-19

## 2019-09-19 LAB
ANION GAP SERPL CALCULATED.3IONS-SCNC: 7 MMOL/L (ref 4–13)
BUN SERPL-MCNC: 25 MG/DL (ref 5–25)
CALCIUM SERPL-MCNC: 8.3 MG/DL (ref 8.3–10.1)
CHLORIDE SERPL-SCNC: 99 MMOL/L (ref 100–108)
CO2 SERPL-SCNC: 25 MMOL/L (ref 21–32)
CREAT SERPL-MCNC: 1.03 MG/DL (ref 0.6–1.3)
GFR SERPL CREATININE-BSD FRML MDRD: 61 ML/MIN/1.73SQ M
GLUCOSE SERPL-MCNC: 116 MG/DL (ref 65–140)
GLUCOSE SERPL-MCNC: 119 MG/DL (ref 65–140)
GLUCOSE SERPL-MCNC: 218 MG/DL (ref 65–140)
GLUCOSE SERPL-MCNC: 252 MG/DL (ref 65–140)
GLUCOSE SERPL-MCNC: 264 MG/DL (ref 65–140)
POTASSIUM SERPL-SCNC: 4.2 MMOL/L (ref 3.5–5.3)
SODIUM SERPL-SCNC: 131 MMOL/L (ref 136–145)

## 2019-09-19 PROCEDURE — 80048 BASIC METABOLIC PNL TOTAL CA: CPT | Performed by: INTERNAL MEDICINE

## 2019-09-19 PROCEDURE — 99232 SBSQ HOSP IP/OBS MODERATE 35: CPT | Performed by: INTERNAL MEDICINE

## 2019-09-19 PROCEDURE — 82948 REAGENT STRIP/BLOOD GLUCOSE: CPT

## 2019-09-19 PROCEDURE — C9113 INJ PANTOPRAZOLE SODIUM, VIA: HCPCS | Performed by: PHYSICIAN ASSISTANT

## 2019-09-19 RX ORDER — PANTOPRAZOLE SODIUM 40 MG/1
40 TABLET, DELAYED RELEASE ORAL
Status: DISCONTINUED | OUTPATIENT
Start: 2019-09-20 | End: 2019-09-20 | Stop reason: HOSPADM

## 2019-09-19 RX ORDER — METOCLOPRAMIDE 10 MG/1
10 TABLET ORAL
Status: DISCONTINUED | OUTPATIENT
Start: 2019-09-19 | End: 2019-09-20 | Stop reason: HOSPADM

## 2019-09-19 RX ORDER — SODIUM CHLORIDE 9 MG/ML
100 INJECTION, SOLUTION INTRAVENOUS CONTINUOUS
Status: DISCONTINUED | OUTPATIENT
Start: 2019-09-19 | End: 2019-09-20 | Stop reason: HOSPADM

## 2019-09-19 RX ORDER — DICYCLOMINE HYDROCHLORIDE 10 MG/1
10 CAPSULE ORAL 3 TIMES DAILY PRN
Status: DISCONTINUED | OUTPATIENT
Start: 2019-09-19 | End: 2019-09-20 | Stop reason: HOSPADM

## 2019-09-19 RX ADMIN — CARISOPRODOL 350 MG: 350 TABLET ORAL at 17:06

## 2019-09-19 RX ADMIN — FLUTICASONE PROPIONATE 1 SPRAY: 50 SPRAY, METERED NASAL at 07:27

## 2019-09-19 RX ADMIN — METOCLOPRAMIDE HYDROCHLORIDE 10 MG: 10 TABLET ORAL at 21:10

## 2019-09-19 RX ADMIN — ENOXAPARIN SODIUM 40 MG: 40 INJECTION SUBCUTANEOUS at 08:17

## 2019-09-19 RX ADMIN — CARISOPRODOL 350 MG: 350 TABLET ORAL at 08:17

## 2019-09-19 RX ADMIN — PANTOPRAZOLE SODIUM 40 MG: 40 INJECTION, POWDER, FOR SOLUTION INTRAVENOUS at 08:18

## 2019-09-19 RX ADMIN — MONTELUKAST SODIUM 10 MG: 10 TABLET, COATED ORAL at 21:10

## 2019-09-19 RX ADMIN — INSULIN LISPRO 10 UNITS: 100 INJECTION, SOLUTION INTRAVENOUS; SUBCUTANEOUS at 12:28

## 2019-09-19 RX ADMIN — PREGABALIN 150 MG: 75 CAPSULE ORAL at 08:17

## 2019-09-19 RX ADMIN — GABAPENTIN 300 MG: 300 CAPSULE ORAL at 17:06

## 2019-09-19 RX ADMIN — DICYCLOMINE HYDROCHLORIDE 10 MG: 10 CAPSULE ORAL at 06:43

## 2019-09-19 RX ADMIN — PREGABALIN 150 MG: 75 CAPSULE ORAL at 17:06

## 2019-09-19 RX ADMIN — GABAPENTIN 300 MG: 300 CAPSULE ORAL at 08:18

## 2019-09-19 RX ADMIN — NYSTATIN 1 APPLICATION: 100000 POWDER TOPICAL at 08:18

## 2019-09-19 RX ADMIN — INSULIN LISPRO 10 UNITS: 100 INJECTION, SOLUTION INTRAVENOUS; SUBCUTANEOUS at 07:28

## 2019-09-19 RX ADMIN — MORPHINE SULFATE 15 MG: 15 TABLET ORAL at 15:49

## 2019-09-19 RX ADMIN — DICYCLOMINE HYDROCHLORIDE 10 MG: 10 CAPSULE ORAL at 11:17

## 2019-09-19 RX ADMIN — MORPHINE SULFATE 15 MG: 15 TABLET ORAL at 07:28

## 2019-09-19 RX ADMIN — FLUTICASONE FUROATE AND VILANTEROL TRIFENATATE 1 PUFF: 200; 25 POWDER RESPIRATORY (INHALATION) at 07:26

## 2019-09-19 RX ADMIN — DICYCLOMINE HYDROCHLORIDE 10 MG: 10 CAPSULE ORAL at 15:48

## 2019-09-19 RX ADMIN — GABAPENTIN 600 MG: 300 CAPSULE ORAL at 21:10

## 2019-09-19 RX ADMIN — FAMOTIDINE 20 MG: 20 TABLET ORAL at 08:17

## 2019-09-19 RX ADMIN — SODIUM CHLORIDE 100 ML/HR: 0.9 INJECTION, SOLUTION INTRAVENOUS at 16:25

## 2019-09-19 RX ADMIN — INSULIN GLARGINE 50 UNITS: 100 INJECTION, SOLUTION SUBCUTANEOUS at 21:09

## 2019-09-19 RX ADMIN — FAMOTIDINE 20 MG: 20 TABLET ORAL at 17:06

## 2019-09-19 RX ADMIN — NYSTATIN 1 APPLICATION: 100000 POWDER TOPICAL at 17:06

## 2019-09-19 RX ADMIN — METOCLOPRAMIDE HYDROCHLORIDE 10 MG: 10 TABLET ORAL at 17:06

## 2019-09-19 NOTE — PROGRESS NOTES
Progress Note - Vanna Sanchez 1965, 53 y o  female MRN: [id number]    Unit/Bed#: [address] [id number] Encounter: [id number]    Primary Care Provider: Dorian Valderrama MD   Date and time admitted to hospital: 9/16/2019  8:13 PM        * Diabetic gastroparesis ([facility] )  Assessment & Plan  · Improved  Tolerated clears  · Advanced diet to diabetic level 2  · Continue blood sugar control and Reglan    ALEJANDRO (acute kidney injury) ([facility] )  Assessment & Plan  · Secondary to hypotension,  gastroparesis with poor oral intake, ACE-inhibitor use  · Creatinine improved but still 0 3 above baseline  · Start normal saline 100 mL  · Recheck labs in a m  · Avoid hypotension  · Hold lisinopril    Hypotension (arterial)  Assessment & Plan  · Suspect medication induced from narcotics, soma  · TSH normal  · Random cortisol within normal  · Blood pressure still borderline low  · Keep off long-acting morphine    Uncomplicated opioid dependence ([facility] )  Assessment & Plan  PDMP website reviewed  She last filled her prescription for Oxy IR 15 mg on 09/13 and long-acting morphine 60 and 15 mg on 09/13  Long-acting morphine due to hypotension  Monitor while she resume Oxy IR p r n  Type 2 diabetes mellitus with diabetic neuropathy, with long-term current use of insulin [facility])  Assessment & Plan  Lab Results   Component Value Date    HGBA1C 12 5 (H) 07/21/2019       Recent Labs     09/18/19  1654 09/18/19  2102 09/19/19  0718 09/19/19  1046   POCGLU 219* 93 252* 264*       Blood Sugar Average: Last 72 hrs:  (P) 236 [id number] glucose elevated to 359 at time of presentation    Uncontrolled with A1c of 12 5%  Continue Lantus 50 units at bedtime  Continue sliding scale  Increased Humalog to 12 units TID AC    Essential hypertension  Assessment & Plan  Hold lisinopril due to hypotension    Asthma  Assessment & Plan  Without exacerbation  Continue bronchodilators and p r n   Albuterol    Morbid obesity- will benefit from weight loss and lifestyle modifications      VTE Pharmacologic Prophylaxis:   Pharmacologic: Heparin  Mechanical VTE Prophylaxis in Place: No    Patient Centered Rounds: I have performed bedside rounds with nursing staff today  Time Spent for Care: 20 minutes  More than 50% of total time spent on counseling and coordination of care as described above  Current Length of Stay: 3 day(s)    Current Patient Status: Inpatient   Certification Statement: The patient will continue to require additional inpatient hospital stay due to A KI    Discharge Plan:  In 24 hours    Code Status: Level 1 - Full Code      Subjective: Tolerating p o  No nausea or vomiting  Feels much better today  No dizziness or lightheadedness    Objective:     Vitals:   Temp (24hrs), Av 8 °F (36 6 °C), Min:97 4 °F (36 3 °C), Max:98 4 °F (36 9 °C)    Temp:  [97 4 °F (36 3 °C)-98 4 °F (36 9 °C)] 98 4 °F (36 9 °C)  HR:  [85-96] 96  Resp:  [16-20] 20  BP: ()/(52-69) 101/67  SpO2:  [93 %-97 %] 93 %  Body mass index is 43 11 kg/m²  Input and Output Summary (last 24 hours): Intake/Output Summary (Last 24 hours) at 2019 1629  Last data filed at 2019 1625  Gross per 24 hour   Intake 1220 ml   Output    Net 1220 ml       Physical Exam:     Physical Exam   Constitutional: She is oriented to person, place, and time  She appears well-developed  No distress  Morbidly obese   HENT:   Head: Normocephalic and atraumatic  Eyes: No scleral icterus  Neck: No JVD present  Cardiovascular: Regular rhythm  Pulmonary/Chest: Effort normal  No stridor  No respiratory distress  She has no wheezes  Abdominal: Soft  She exhibits no distension  There is no tenderness  There is no guarding  Musculoskeletal: She exhibits no edema or deformity  Neurological: She is alert and oriented to person, place, and time  Skin: Skin is warm and dry  She is not diaphoretic  Psychiatric: She has a normal mood and affect   Her behavior is normal        Additional Data:     Labs:    Results from last 7 days   Lab Units 09/17/19  0503 09/16/19  2030   WBC Thousand/uL 11 72* 11 07*   HEMOGLOBIN g/dL 13 3 14 3   HEMATOCRIT % 41 1 44 6   PLATELETS Thousands/uL 258 268   NEUTROS PCT %  --  83*   LYMPHS PCT %  --  10*   MONOS PCT %  --  5   EOS PCT %  --  1     Results from last 7 days   Lab Units 09/19/19  1543  09/16/19  2030   SODIUM mmol/L 131*   < > 133*   POTASSIUM mmol/L 4 2   < > 4 9   CHLORIDE mmol/L 99*   < > 97*   CO2 mmol/L 25   < > 22   BUN mg/dL 25   < > 9   CREATININE mg/dL 1 03   < > 0 75   ANION GAP mmol/L 7   < > 14*   CALCIUM mg/dL 8 3   < > 9 8   ALBUMIN g/dL  --   --  3 6   TOTAL BILIRUBIN mg/dL  --   --  1 89*   ALK PHOS U/L  --   --  143*   ALT U/L  --   --  25   AST U/L  --   --  31   GLUCOSE RANDOM mg/dL 116   < > 359*    < > = values in this interval not displayed  Results from last 7 days   Lab Units 09/19/19  1046 09/19/19  0718 09/18/19  2102 09/18/19  1654 09/18/19  1134 09/18/19  0756 09/18/19  0257 09/17/19  2125 09/17/19  1726 09/17/19  1104 09/17/19  0712 09/17/19  0255   POC GLUCOSE mg/dl 264* 252* 93 219* 242* 131 143* 326* 181* 251* 288* 339*                   * I Have Reviewed All Lab Data Listed Above  * Additional Pertinent Lab Tests Reviewed:  All Labs Within Last 24 Hours Reviewed    Imaging:    Imaging Reports Reviewed Today Include:  None    Recent Cultures (last 7 days):           Last 24 Hours Medication List:     Current Facility-Administered Medications:  acetaminophen 650 mg Oral Q6H PRN Kiara Smudde, PA-C    albuterol 2 puff Inhalation Q6H PRN Kiara Smudde, PA-C    albuterol 2 5 mg Nebulization Q6H PRN Kiara Smudde, PA-C    aluminum-magnesium hydroxide-simethicone 30 mL Oral Q6H PRN Kiara Smudde, PA-C    carisoprodol 350 mg Oral BID Kiara Payan, PA-C    diazepam 5 mg Oral HS PRN Kiara Payan, PA-C    dicyclomine 10 mg Oral 4x Daily (AC & HS) Kiara Payan, PA-C    enoxaparin 40 mg Subcutaneous Daily Kiara Puga, PA-C    famotidine 20 mg Oral BID Kiara Smudde, PA-C    fluticasone 1 spray Nasal Daily Kiara Smudde, PA-C    fluticasone-vilanterol 1 puff Inhalation Daily Kiara Smudde, PA-C    gabapentin 300 mg Oral BID Kiara Smudde, PA-C    gabapentin 600 mg Oral HS Kiara Smudde, PA-C    insulin glargine 50 Units Subcutaneous HS Kiara Smudde, PA-C    insulin lispro 12 Units Subcutaneous TID With Meals Luis Limon MD    insulin lispro 2-12 Units Subcutaneous 4x Daily (AC & HS) Kiara Smudde, PA-C    metoclopramide 10 mg Intravenous Q6H [facility] Kiara Smudde, PA-C    montelukast 10 mg Oral HS Kiara Smudde, PA-C    morphine 75 mg Oral Q12H [facility] Luis Limon MD    morphine 15 mg Oral TID PRN Luis Limon MD    nystatin  Topical BID Beaulah Lower Smudde, PA-C    ondansetron 4 mg Intravenous Q6H Kiara Smudde, PA-C    [START ON 9/20/2019] pantoprazole 40 mg Oral Early Morning Kiara Smudde, PA-C    polyethylene glycol 17 g Oral Daily PRN Kiara Smudde, PA-C    pregabalin 150 mg Oral BID Kiara Smudde, PA-C    sodium chloride 100 mL/hr Intravenous Continuous Luis Limon MD Last Rate: 100 mL/hr (09/19/19 4579)        Today, Patient Was Seen By: Luis Limon MD    ** Please Note: Dictation voice to text software may have been used in the creation of this document   **

## 2019-09-19 NOTE — ASSESSMENT & PLAN NOTE
Lab Results   Component Value Date    HGBA1C 12 5 (H) 07/21/2019       Recent Labs     09/18/19  1654 09/18/19  2102 09/19/19  0718 09/19/19  1046   POCGLU 219* 93 252* 264*       Blood Sugar Average: Last 72 hrs:  (P) 236 [id number] glucose elevated to 359 at time of presentation    Uncontrolled with A1c of 12 5%  Continue Lantus 50 units at bedtime    Continue sliding scale  Increased Humalog to 12 units TID [facility]

## 2019-09-19 NOTE — ASSESSMENT & PLAN NOTE
· Suspect medication induced from narcotics, soma    · TSH normal  · Random cortisol within normal  · Blood pressure still borderline low  · Keep off long-acting morphine

## 2019-09-19 NOTE — ASSESSMENT & PLAN NOTE
· Secondary to hypotension,  gastroparesis with poor oral intake, ACE-inhibitor use  · Creatinine improved but still 0 3 above baseline  · Start normal saline 100 mL  · Recheck labs in a m    · Avoid hypotension  · Hold lisinopril

## 2019-09-19 NOTE — ASSESSMENT & PLAN NOTE
PDMP website reviewed  She last filled her prescription for Oxy IR 15 mg on 09/13 and long-acting morphine 60 and 15 mg on 09/13  Long-acting morphine due to hypotension  Monitor while she resume Oxy IR p r n

## 2019-09-20 VITALS
RESPIRATION RATE: 18 BRPM | BODY MASS INDEX: 43.11 KG/M2 | HEART RATE: 104 BPM | OXYGEN SATURATION: 93 % | TEMPERATURE: 99.7 F | HEIGHT: 68 IN | SYSTOLIC BLOOD PRESSURE: 150 MMHG | DIASTOLIC BLOOD PRESSURE: 87 MMHG

## 2019-09-20 PROBLEM — I95.9 HYPOTENSION (ARTERIAL): Status: RESOLVED | Noted: 2019-09-18 | Resolved: 2019-09-20

## 2019-09-20 PROBLEM — N17.9 AKI (ACUTE KIDNEY INJURY) (HCC): Status: RESOLVED | Noted: 2019-09-19 | Resolved: 2019-09-20

## 2019-09-20 LAB
ANION GAP SERPL CALCULATED.3IONS-SCNC: 7 MMOL/L (ref 4–13)
BUN SERPL-MCNC: 16 MG/DL (ref 5–25)
CALCIUM SERPL-MCNC: 8.4 MG/DL (ref 8.3–10.1)
CHLORIDE SERPL-SCNC: 104 MMOL/L (ref 100–108)
CO2 SERPL-SCNC: 28 MMOL/L (ref 21–32)
CREAT SERPL-MCNC: 0.78 MG/DL (ref 0.6–1.3)
GFR SERPL CREATININE-BSD FRML MDRD: 86 ML/MIN/1.73SQ M
GLUCOSE SERPL-MCNC: 135 MG/DL (ref 65–140)
GLUCOSE SERPL-MCNC: 161 MG/DL (ref 65–140)
GLUCOSE SERPL-MCNC: 167 MG/DL (ref 65–140)
GLUCOSE SERPL-MCNC: 173 MG/DL (ref 65–140)
POTASSIUM SERPL-SCNC: 3.9 MMOL/L (ref 3.5–5.3)
SODIUM SERPL-SCNC: 139 MMOL/L (ref 136–145)

## 2019-09-20 PROCEDURE — 80048 BASIC METABOLIC PNL TOTAL CA: CPT | Performed by: INTERNAL MEDICINE

## 2019-09-20 PROCEDURE — 99239 HOSP IP/OBS DSCHRG MGMT >30: CPT | Performed by: INTERNAL MEDICINE

## 2019-09-20 PROCEDURE — 0HBMXZZ EXCISION OF RIGHT FOOT SKIN, EXTERNAL APPROACH: ICD-10-PCS | Performed by: PODIATRIST

## 2019-09-20 PROCEDURE — 82948 REAGENT STRIP/BLOOD GLUCOSE: CPT

## 2019-09-20 RX ORDER — DICYCLOMINE HYDROCHLORIDE 10 MG/1
10 CAPSULE ORAL 4 TIMES DAILY PRN
Qty: 360 CAPSULE | Refills: 0 | Status: SHIPPED | OUTPATIENT
Start: 2019-09-20 | End: 2020-02-09

## 2019-09-20 RX ADMIN — MORPHINE SULFATE 15 MG: 15 TABLET ORAL at 12:33

## 2019-09-20 RX ADMIN — NYSTATIN: 100000 POWDER TOPICAL at 08:33

## 2019-09-20 RX ADMIN — ENOXAPARIN SODIUM 40 MG: 40 INJECTION SUBCUTANEOUS at 08:19

## 2019-09-20 RX ADMIN — METOCLOPRAMIDE HYDROCHLORIDE 10 MG: 10 TABLET ORAL at 08:22

## 2019-09-20 RX ADMIN — PREGABALIN 150 MG: 75 CAPSULE ORAL at 08:19

## 2019-09-20 RX ADMIN — PREGABALIN 150 MG: 75 CAPSULE ORAL at 17:28

## 2019-09-20 RX ADMIN — FAMOTIDINE 20 MG: 20 TABLET ORAL at 17:28

## 2019-09-20 RX ADMIN — SODIUM CHLORIDE 100 ML/HR: 0.9 INJECTION, SOLUTION INTRAVENOUS at 05:22

## 2019-09-20 RX ADMIN — CARISOPRODOL 350 MG: 350 TABLET ORAL at 17:28

## 2019-09-20 RX ADMIN — GABAPENTIN 300 MG: 300 CAPSULE ORAL at 08:20

## 2019-09-20 RX ADMIN — METOCLOPRAMIDE HYDROCHLORIDE 10 MG: 10 TABLET ORAL at 17:28

## 2019-09-20 RX ADMIN — GABAPENTIN 300 MG: 300 CAPSULE ORAL at 17:28

## 2019-09-20 RX ADMIN — METOCLOPRAMIDE HYDROCHLORIDE 10 MG: 10 TABLET ORAL at 11:19

## 2019-09-20 RX ADMIN — FAMOTIDINE 20 MG: 20 TABLET ORAL at 08:20

## 2019-09-20 RX ADMIN — PANTOPRAZOLE SODIUM 40 MG: 40 TABLET, DELAYED RELEASE ORAL at 08:23

## 2019-09-20 RX ADMIN — FLUTICASONE PROPIONATE 1 SPRAY: 50 SPRAY, METERED NASAL at 08:20

## 2019-09-20 RX ADMIN — MORPHINE SULFATE 15 MG: 15 TABLET ORAL at 05:22

## 2019-09-20 RX ADMIN — CARISOPRODOL 350 MG: 350 TABLET ORAL at 08:19

## 2019-09-20 RX ADMIN — FLUTICASONE FUROATE AND VILANTEROL TRIFENATATE 1 PUFF: 200; 25 POWDER RESPIRATORY (INHALATION) at 08:20

## 2019-09-20 NOTE — ASSESSMENT & PLAN NOTE
· Secondary to hypotension,  gastroparesis with poor oral intake, ACE-inhibitor use  · Status post IV fluid hydration  · Creatinine back to normal/baseline

## 2019-09-20 NOTE — ASSESSMENT & PLAN NOTE
Lab Results   Component Value Date    HGBA1C 12 5 (H) 07/21/2019       Recent Labs     09/19/19  1621 09/19/19  2043 09/20/19  0742 09/20/19  1123   POCGLU 119 218* 161* 135       Blood Sugar Average: Last 72 hrs:  (P) 218 [id number] glucose elevated to 359 at time of presentation    Uncontrolled with A1c of 12 5%  Continue Lantus 50 units at bedtime  Continue NovoLog 12 units t i d   With meals  Needs tighter blood sugar control due to gastroparesis, neuropathy, wound healing

## 2019-09-20 NOTE — ASSESSMENT & PLAN NOTE
· Resolved abdominal pain nausea and vomiting  · Advanced diet to diabetic level 2  · Continue blood sugar control and Reglan before meals and at bedtime

## 2019-09-20 NOTE — PROGRESS NOTES
Progress Note - Podiatry  David Almendarez 53 y o  female MRN: [id number]  Unit/Bed#: [place] [address] -[id number] Encounter: [id number]    Assessment:  1  Right diabetic foot ulcer ([facility] 1)  2  Dry eschar right distal 2nd digit  3  DM type 2 with neuropathy  4  History right hallux amputation    Plan:  · Right foot wound does not appear acutely infected  No surgical intervention necessary  Will continue with local wound care  Patient is stable for discharge from Podiatry standpoint  Patient will follow-up with Dr Ajith Lopez as an outpatient for follow-up  · Right wound was excisionally debrided with a 15 blade down to subcutaneous tissue to remove biofilm, hyperkeratotic skin  Underlying wound bed appeared granular  Wound measured 1cm x 1cm x 0 1cm  <2 sqcm was debrided  · WBAT in [address] [place] [address]  · Rest of care per primary service    Subjective/Objective   Chief Complaint:   Chief Complaint   Patient presents with    Vomiting     pt reports flare up of gastroparsis, reports vomiting for four  days and generalized abdominal pain       Subjective: 53 y o  y/o female was seen and evaluated at bedside in no acute distress, nontoxic in appearance  Patient denies any recent nausea, vomiting, fever, chills, shortness of breath, chest pains  Blood pressure 153/89, pulse 100, temperature 98 1 °F (36 7 °C), resp  rate 18, height 5' 8" (1 727 m), SpO2 94 %, not currently breastfeeding  ,Body mass index is 43 11 kg/m²  Invasive Devices     Peripheral Intravenous Line            Peripheral IV 09/16/19 Left Antecubital 3 days                Physical Exam:   General: Alert, cooperative and no distress  Lungs: Non labored breathing  Abdomen: Soft, non-tender  Extremity:     NVS at baseline B/l  MSK function at baseline B/l  No calf tenderness noted B/l     RLE:  Dressing was c/d/i with no strike through to the outer dressing noted  Sub met 1 wound noted  Wound measures approximately 1 0 x 1 0 x 0 1 cm   Wound bed is granular in appearance without any acute signs of infection  Periwound hyperkeratotic skin noted  No ascending cellulitis, no active drainage  Lab, Imaging and other studies:   I have personally reviewed pertinent lab results  , CBC: No results found for: WBC, HGB, HCT, MCV, PLT, ADJUSTEDWBC, MCH, MCHC, RDW, MPV, NRBC, CMP:   Lab Results   Component Value Date    SODIUM 139 09/20/2019    K 3 9 09/20/2019     09/20/2019    CO2 28 09/20/2019    BUN 16 09/20/2019    CREATININE 0 78 09/20/2019    CALCIUM 8 4 09/20/2019    EGFR 86 09/20/2019       Imaging: I have personally reviewed pertinent films in PACS  EKG, Pathology, and Other Studies: I have personally reviewed pertinent reports  Portions of the record may have been created with voice recognition software  Occasional wrong word or "sound a like" substitutions may have occurred due to the inherent limitations of voice recognition software  Read the chart carefully and recognize, using context, where substitutions have occurred

## 2019-09-20 NOTE — NURSING NOTE
Pt resting in bed, no complaints at this and denies pain  Call bell and personal items with in reach  Agree with previous shift assessment  Will continue to monitor

## 2019-09-20 NOTE — PLAN OF CARE
Problem: Potential for Falls  Goal: Patient will remain free of falls  Description  INTERVENTIONS:  - Assess patient frequently for physical needs  -  Identify cognitive and physical deficits and behaviors that affect risk of falls  -  [place] fall precautions as indicated by assessment   - Educate patient/family on patient safety including physical limitations  - Instruct patient to call for assistance with activity based on assessment  - Modify environment to reduce risk of injury  - Consider OT/PT consult to assist with strengthening/mobility  Outcome: Adequate for Discharge     Problem: Nutrition/Hydration-ADULT  Goal: Nutrient/Hydration intake appropriate for improving, restoring or maintaining nutritional needs  Description  Monitor and assess patient's nutrition/hydration status for malnutrition  Collaborate with interdisciplinary team and initiate plan and interventions as ordered  Monitor patient's weight and dietary intake as ordered or per policy  Utilize nutrition screening tool and intervene as necessary  Determine patient's food preferences and provide high-protein, high-caloric foods as appropriate       INTERVENTIONS:  - Monitor oral intake, urinary output, labs, and treatment plans  - Assess nutrition and hydration status and recommend course of action  - Evaluate amount of meals eaten  - Assist patient with eating if necessary   - Allow adequate time for meals  - Recommend/ encourage appropriate diets, oral nutritional supplements, and vitamin/mineral supplements  - Order, calculate, and assess calorie counts as needed  - Recommend, monitor, and adjust tube feedings and TPN/PPN based on assessed needs  - Assess need for intravenous fluids  - Provide specific nutrition/hydration education as appropriate  - Include patient/family/caregiver in decisions related to nutrition  Outcome: Adequate for Discharge     Problem: RESPIRATORY - ADULT  Goal: Achieves optimal ventilation and oxygenation  Description  INTERVENTIONS:  - Assess for changes in respiratory status  - Assess for changes in mentation and behavior  - Position to facilitate oxygenation and minimize respiratory effort  - Oxygen administered by appropriate delivery if ordered  - Initiate smoking cessation education as indicated  - Encourage broncho-pulmonary hygiene including cough, deep breathe, Incentive Spirometry  - Assess the need for suctioning and aspirate as needed  - Assess and instruct to report SOB or any respiratory difficulty  - Respiratory Therapy support as indicated  Outcome: Adequate for Discharge     Problem: GASTROINTESTINAL - ADULT  Goal: Minimal or absence of nausea and/or vomiting  Description  INTERVENTIONS:  - Administer IV fluids if ordered to ensure adequate hydration  - Maintain NPO status until nausea and vomiting are resolved  - Nasogastric tube if ordered  - Administer ordered antiemetic medications as needed  - Provide nonpharmacologic comfort measures as appropriate  - Advance diet as tolerated, if ordered  - Consider nutrition services referral to assist patient with adequate nutrition and appropriate food choices  Outcome: Adequate for Discharge     Problem: METABOLIC, FLUID AND ELECTROLYTES - ADULT  Goal: Electrolytes maintained within normal limits  Description  INTERVENTIONS:  - Monitor labs and assess patient for signs and symptoms of electrolyte imbalances  - Administer electrolyte replacement as ordered  - Monitor response to electrolyte replacements, including repeat lab results as appropriate  - Instruct patient on fluid and nutrition as appropriate  Outcome: Adequate for Discharge  Goal: Fluid balance maintained  Description  INTERVENTIONS:  - Monitor labs   - Monitor I/O and WT  - Instruct patient on fluid and nutrition as appropriate  - Assess for signs & symptoms of volume excess or deficit  Outcome: Adequate for Discharge  Goal: Glucose maintained within target range  Description  INTERVENTIONS:  - Monitor Blood Glucose as ordered  - Assess for signs and symptoms of hyperglycemia and hypoglycemia  - Administer ordered medications to maintain glucose within target range  - Assess nutritional intake and initiate nutrition service referral as needed  Outcome: Adequate for Discharge     Problem: SKIN/TISSUE INTEGRITY - ADULT  Goal: Skin integrity remains intact  Description  INTERVENTIONS  - Identify patients at risk for skin breakdown  - Assess and monitor skin integrity  - Assess and monitor nutrition and hydration status  - Monitor labs (i e  albumin)  - Assess for incontinence   - Turn and reposition patient  - Assist with mobility/ambulation  - Relieve pressure over bony prominences  - Avoid friction and shearing  - Provide appropriate hygiene as needed including keeping skin clean and dry  - Evaluate need for skin moisturizer/barrier cream  - Collaborate with interdisciplinary team (i e  Nutrition, Rehabilitation, etc )   - Patient/family teaching  Outcome: Adequate for Discharge  Goal: Incision(s), wounds(s) or drain site(s) healing without S/S of infection  Description  INTERVENTIONS  - Assess and document risk factors for skin impairment   - Assess and document dressing, incision, wound bed, drain sites and surrounding tissue  - Consider nutrition services referral as needed  - Oral mucous membranes remain intact  - Provide patient/ family education  Outcome: Adequate for Discharge  Goal: Oral mucous membranes remain intact  Description  INTERVENTIONS  - Assess oral mucosa and hygiene practices  - Implement preventative oral hygiene regimen  - Implement oral medicated treatments as ordered  - Initiate Nutrition services referral as needed  Outcome: Adequate for Discharge     Problem: MUSCULOSKELETAL - ADULT  Goal: Maintain or return mobility to safest level of function  Description  INTERVENTIONS:  - Assess patient's ability to carry out ADLs; assess patient's baseline for ADL function and identify physical deficits which impact ability to perform ADLs (bathing, care of mouth/teeth, toileting, grooming, dressing, etc )  - Assess/evaluate cause of self-care deficits   - Assess range of motion  - Assess patient's mobility  - Assess patient's need for assistive devices and provide as appropriate  - Encourage maximum independence but intervene and supervise when necessary  - Involve family in performance of ADLs  - Assess for home care needs following discharge   - Consider OT consult to assist with ADL evaluation and planning for discharge  - Provide patient education as appropriate  Outcome: Adequate for Discharge  Goal: Maintain proper alignment of affected body part  Description  INTERVENTIONS:  - Support, maintain and protect limb and body alignment  - Provide patient/ family with appropriate education  Outcome: Adequate for Discharge     Problem: PAIN - ADULT  Goal: Verbalizes/displays adequate comfort level or baseline comfort level  Description  Interventions:  - Encourage patient to monitor pain and request assistance  - Assess pain using appropriate pain scale  - Administer analgesics based on type and severity of pain and evaluate response  - Implement non-pharmacological measures as appropriate and evaluate response  - Consider cultural and social influences on pain and pain management  - Notify physician/advanced practitioner if interventions unsuccessful or patient reports new pain  Outcome: Adequate for Discharge     Problem: SAFETY ADULT  Goal: Maintain or return to baseline ADL function  Description  INTERVENTIONS:  -  Assess patient's ability to carry out ADLs; assess patient's baseline for ADL function and identify physical deficits which impact ability to perform ADLs (bathing, care of mouth/teeth, toileting, grooming, dressing, etc )  - Assess/evaluate cause of self-care deficits   - Assess range of motion  - Assess patient's mobility; develop plan if impaired  - Assess patient's need for assistive devices and provide as appropriate  - Encourage maximum independence but intervene and supervise when necessary  - Involve family in performance of ADLs  - Assess for home care needs following discharge   - Consider OT consult to assist with ADL evaluation and planning for discharge  - Provide patient education as appropriate  Outcome: Adequate for Discharge  Goal: Maintain or return mobility status to optimal level  Description  INTERVENTIONS:  - Assess patient's baseline mobility status (ambulation, transfers, stairs, etc )    - Identify cognitive and physical deficits and behaviors that affect mobility  - Identify mobility aids required to assist with transfers and/or ambulation (gait belt, sit-to-stand, lift, walker, cane, etc )  - [place] fall precautions as indicated by assessment  - Record patient progress and toleration of activity level on Mobility SBAR; progress patient to next Phase/Stage  - Instruct patient to call for assistance with activity based on assessment  - Consider rehabilitation consult to assist with strengthening/weightbearing, etc   Outcome: Adequate for Discharge     Problem: DISCHARGE PLANNING  Goal: Discharge to home or other facility with appropriate resources  Description  INTERVENTIONS:  - Identify barriers to discharge w/patient and caregiver  - Arrange for needed discharge resources and transportation as appropriate  - Identify discharge learning needs (meds, wound care, etc )  - Arrange for interpretive services to assist at discharge as needed  - Refer to Case Management Department for coordinating discharge planning if the patient needs post-hospital services based on physician/advanced practitioner order or complex needs related to functional status, cognitive ability, or social support system  Outcome: Adequate for Discharge     Problem: Knowledge Deficit  Goal: Patient/family/caregiver demonstrates understanding of disease process, treatment plan, medications, and discharge instructions  Description  Complete learning assessment and assess knowledge base    Interventions:  - Provide teaching at level of understanding  - Provide teaching via preferred learning methods  Outcome: Adequate for Discharge

## 2019-09-20 NOTE — PLAN OF CARE
Problem: Potential for Falls  Goal: Patient will remain free of falls  Description  INTERVENTIONS:  - Assess patient frequently for physical needs  -  Identify cognitive and physical deficits and behaviors that affect risk of falls  -  [place] fall precautions as indicated by assessment   - Educate patient/family on patient safety including physical limitations  - Instruct patient to call for assistance with activity based on assessment  - Modify environment to reduce risk of injury  - Consider OT/PT consult to assist with strengthening/mobility  Outcome: Progressing     Problem: Nutrition/Hydration-ADULT  Goal: Nutrient/Hydration intake appropriate for improving, restoring or maintaining nutritional needs  Description  Monitor and assess patient's nutrition/hydration status for malnutrition  Collaborate with interdisciplinary team and initiate plan and interventions as ordered  Monitor patient's weight and dietary intake as ordered or per policy  Utilize nutrition screening tool and intervene as necessary  Determine patient's food preferences and provide high-protein, high-caloric foods as appropriate       INTERVENTIONS:  - Monitor oral intake, urinary output, labs, and treatment plans  - Assess nutrition and hydration status and recommend course of action  - Evaluate amount of meals eaten  - Assist patient with eating if necessary   - Allow adequate time for meals  - Recommend/ encourage appropriate diets, oral nutritional supplements, and vitamin/mineral supplements  - Order, calculate, and assess calorie counts as needed  - Recommend, monitor, and adjust tube feedings and TPN/PPN based on assessed needs  - Assess need for intravenous fluids  - Provide specific nutrition/hydration education as appropriate  - Include patient/family/caregiver in decisions related to nutrition  Outcome: Progressing     Problem: RESPIRATORY - ADULT  Goal: Achieves optimal ventilation and oxygenation  Description  INTERVENTIONS:  - Assess for changes in respiratory status  - Assess for changes in mentation and behavior  - Position to facilitate oxygenation and minimize respiratory effort  - Oxygen administered by appropriate delivery if ordered  - Initiate smoking cessation education as indicated  - Encourage broncho-pulmonary hygiene including cough, deep breathe, Incentive Spirometry  - Assess the need for suctioning and aspirate as needed  - Assess and instruct to report SOB or any respiratory difficulty  - Respiratory Therapy support as indicated  Outcome: Progressing     Problem: GASTROINTESTINAL - ADULT  Goal: Minimal or absence of nausea and/or vomiting  Description  INTERVENTIONS:  - Administer IV fluids if ordered to ensure adequate hydration  - Maintain NPO status until nausea and vomiting are resolved  - Nasogastric tube if ordered  - Administer ordered antiemetic medications as needed  - Provide nonpharmacologic comfort measures as appropriate  - Advance diet as tolerated, if ordered  - Consider nutrition services referral to assist patient with adequate nutrition and appropriate food choices  Outcome: Progressing     Problem: METABOLIC, FLUID AND ELECTROLYTES - ADULT  Goal: Electrolytes maintained within normal limits  Description  INTERVENTIONS:  - Monitor labs and assess patient for signs and symptoms of electrolyte imbalances  - Administer electrolyte replacement as ordered  - Monitor response to electrolyte replacements, including repeat lab results as appropriate  - Instruct patient on fluid and nutrition as appropriate  Outcome: Progressing  Goal: Fluid balance maintained  Description  INTERVENTIONS:  - Monitor labs   - Monitor I/O and WT  - Instruct patient on fluid and nutrition as appropriate  - Assess for signs & symptoms of volume excess or deficit  Outcome: Progressing  Goal: Glucose maintained within target range  Description  INTERVENTIONS:  - Monitor Blood Glucose as ordered  - Assess for signs and symptoms of hyperglycemia and hypoglycemia  - Administer ordered medications to maintain glucose within target range  - Assess nutritional intake and initiate nutrition service referral as needed  Outcome: Progressing     Problem: SKIN/TISSUE INTEGRITY - ADULT  Goal: Skin integrity remains intact  Description  INTERVENTIONS  - Identify patients at risk for skin breakdown  - Assess and monitor skin integrity  - Assess and monitor nutrition and hydration status  - Monitor labs (i e  albumin)  - Assess for incontinence   - Turn and reposition patient  - Assist with mobility/ambulation  - Relieve pressure over bony prominences  - Avoid friction and shearing  - Provide appropriate hygiene as needed including keeping skin clean and dry  - Evaluate need for skin moisturizer/barrier cream  - Collaborate with interdisciplinary team (i e  Nutrition, Rehabilitation, etc )   - Patient/family teaching  Outcome: Progressing  Goal: Incision(s), wounds(s) or drain site(s) healing without S/S of infection  Description  INTERVENTIONS  - Assess and document risk factors for skin impairment   - Assess and document dressing, incision, wound bed, drain sites and surrounding tissue  - Consider nutrition services referral as needed  - Oral mucous membranes remain intact  - Provide patient/ family education  Outcome: Progressing  Goal: Oral mucous membranes remain intact  Description  INTERVENTIONS  - Assess oral mucosa and hygiene practices  - Implement preventative oral hygiene regimen  - Implement oral medicated treatments as ordered  - Initiate Nutrition services referral as needed  Outcome: Progressing     Problem: MUSCULOSKELETAL - ADULT  Goal: Maintain or return mobility to safest level of function  Description  INTERVENTIONS:  - Assess patient's ability to carry out ADLs; assess patient's baseline for ADL function and identify physical deficits which impact ability to perform ADLs (bathing, care of mouth/teeth, toileting, grooming, dressing, etc )  - Assess/evaluate cause of self-care deficits   - Assess range of motion  - Assess patient's mobility  - Assess patient's need for assistive devices and provide as appropriate  - Encourage maximum independence but intervene and supervise when necessary  - Involve family in performance of ADLs  - Assess for home care needs following discharge   - Consider OT consult to assist with ADL evaluation and planning for discharge  - Provide patient education as appropriate  Outcome: Progressing  Goal: Maintain proper alignment of affected body part  Description  INTERVENTIONS:  - Support, maintain and protect limb and body alignment  - Provide patient/ family with appropriate education  Outcome: Progressing     Problem: PAIN - ADULT  Goal: Verbalizes/displays adequate comfort level or baseline comfort level  Description  Interventions:  - Encourage patient to monitor pain and request assistance  - Assess pain using appropriate pain scale  - Administer analgesics based on type and severity of pain and evaluate response  - Implement non-pharmacological measures as appropriate and evaluate response  - Consider cultural and social influences on pain and pain management  - Notify physician/advanced practitioner if interventions unsuccessful or patient reports new pain  Outcome: Progressing     Problem: SAFETY ADULT  Goal: Maintain or return to baseline ADL function  Description  INTERVENTIONS:  -  Assess patient's ability to carry out ADLs; assess patient's baseline for ADL function and identify physical deficits which impact ability to perform ADLs (bathing, care of mouth/teeth, toileting, grooming, dressing, etc )  - Assess/evaluate cause of self-care deficits   - Assess range of motion  - Assess patient's mobility; develop plan if impaired  - Assess patient's need for assistive devices and provide as appropriate  - Encourage maximum independence but intervene and supervise when necessary  - Involve family in performance of ADLs  - Assess for home care needs following discharge   - Consider OT consult to assist with ADL evaluation and planning for discharge  - Provide patient education as appropriate  Outcome: Progressing  Goal: Maintain or return mobility status to optimal level  Description  INTERVENTIONS:  - Assess patient's baseline mobility status (ambulation, transfers, stairs, etc )    - Identify cognitive and physical deficits and behaviors that affect mobility  - Identify mobility aids required to assist with transfers and/or ambulation (gait belt, sit-to-stand, lift, walker, cane, etc )  - [place] fall precautions as indicated by assessment  - Record patient progress and toleration of activity level on Mobility SBAR; progress patient to next Phase/Stage  - Instruct patient to call for assistance with activity based on assessment  - Consider rehabilitation consult to assist with strengthening/weightbearing, etc   Outcome: Progressing     Problem: DISCHARGE PLANNING  Goal: Discharge to home or other facility with appropriate resources  Description  INTERVENTIONS:  - Identify barriers to discharge w/patient and caregiver  - Arrange for needed discharge resources and transportation as appropriate  - Identify discharge learning needs (meds, wound care, etc )  - Arrange for interpretive services to assist at discharge as needed  - Refer to Case Management Department for coordinating discharge planning if the patient needs post-hospital services based on physician/advanced practitioner order or complex needs related to functional status, cognitive ability, or social support system  Outcome: Progressing     Problem: Knowledge Deficit  Goal: Patient/family/caregiver demonstrates understanding of disease process, treatment plan, medications, and discharge instructions  Description  Complete learning assessment and assess knowledge base    Interventions:  - Provide teaching at level of understanding  - Provide teaching via preferred learning methods  Outcome: Progressing

## 2019-09-20 NOTE — ASSESSMENT & PLAN NOTE
· Suspect medication induced from narcotics, soma    · TSH normal  · Random cortisol within normal  · Hypotension resolved  · Keep off long-acting morphine for now

## 2019-09-20 NOTE — PLAN OF CARE
Problem: Potential for Falls  Goal: Patient will remain free of falls  Description  INTERVENTIONS:  - Assess patient frequently for physical needs  -  Identify cognitive and physical deficits and behaviors that affect risk of falls  -  [place] fall precautions as indicated by assessment   - Educate patient/family on patient safety including physical limitations  - Instruct patient to call for assistance with activity based on assessment  - Modify environment to reduce risk of injury  - Consider OT/PT consult to assist with strengthening/mobility  Outcome: Progressing     Problem: Nutrition/Hydration-ADULT  Goal: Nutrient/Hydration intake appropriate for improving, restoring or maintaining nutritional needs  Description  Monitor and assess patient's nutrition/hydration status for malnutrition  Collaborate with interdisciplinary team and initiate plan and interventions as ordered  Monitor patient's weight and dietary intake as ordered or per policy  Utilize nutrition screening tool and intervene as necessary  Determine patient's food preferences and provide high-protein, high-caloric foods as appropriate       INTERVENTIONS:  - Monitor oral intake, urinary output, labs, and treatment plans  - Assess nutrition and hydration status and recommend course of action  - Evaluate amount of meals eaten  - Assist patient with eating if necessary   - Allow adequate time for meals  - Recommend/ encourage appropriate diets, oral nutritional supplements, and vitamin/mineral supplements  - Order, calculate, and assess calorie counts as needed  - Recommend, monitor, and adjust tube feedings and TPN/PPN based on assessed needs  - Assess need for intravenous fluids  - Provide specific nutrition/hydration education as appropriate  - Include patient/family/caregiver in decisions related to nutrition  Outcome: Progressing     Problem: RESPIRATORY - ADULT  Goal: Achieves optimal ventilation and oxygenation  Description  INTERVENTIONS:  - Assess for changes in respiratory status  - Assess for changes in mentation and behavior  - Position to facilitate oxygenation and minimize respiratory effort  - Oxygen administered by appropriate delivery if ordered  - Initiate smoking cessation education as indicated  - Encourage broncho-pulmonary hygiene including cough, deep breathe, Incentive Spirometry  - Assess the need for suctioning and aspirate as needed  - Assess and instruct to report SOB or any respiratory difficulty  - Respiratory Therapy support as indicated  Outcome: Progressing     Problem: GASTROINTESTINAL - ADULT  Goal: Minimal or absence of nausea and/or vomiting  Description  INTERVENTIONS:  - Administer IV fluids if ordered to ensure adequate hydration  - Maintain NPO status until nausea and vomiting are resolved  - Nasogastric tube if ordered  - Administer ordered antiemetic medications as needed  - Provide nonpharmacologic comfort measures as appropriate  - Advance diet as tolerated, if ordered  - Consider nutrition services referral to assist patient with adequate nutrition and appropriate food choices  Outcome: Progressing     Problem: METABOLIC, FLUID AND ELECTROLYTES - ADULT  Goal: Electrolytes maintained within normal limits  Description  INTERVENTIONS:  - Monitor labs and assess patient for signs and symptoms of electrolyte imbalances  - Administer electrolyte replacement as ordered  - Monitor response to electrolyte replacements, including repeat lab results as appropriate  - Instruct patient on fluid and nutrition as appropriate  Outcome: Progressing  Goal: Fluid balance maintained  Description  INTERVENTIONS:  - Monitor labs   - Monitor I/O and WT  - Instruct patient on fluid and nutrition as appropriate  - Assess for signs & symptoms of volume excess or deficit  Outcome: Progressing  Goal: Glucose maintained within target range  Description  INTERVENTIONS:  - Monitor Blood Glucose as ordered  - Assess for signs and symptoms of hyperglycemia and hypoglycemia  - Administer ordered medications to maintain glucose within target range  - Assess nutritional intake and initiate nutrition service referral as needed  Outcome: Progressing     Problem: SKIN/TISSUE INTEGRITY - ADULT  Goal: Skin integrity remains intact  Description  INTERVENTIONS  - Identify patients at risk for skin breakdown  - Assess and monitor skin integrity  - Assess and monitor nutrition and hydration status  - Monitor labs (i e  albumin)  - Assess for incontinence   - Turn and reposition patient  - Assist with mobility/ambulation  - Relieve pressure over bony prominences  - Avoid friction and shearing  - Provide appropriate hygiene as needed including keeping skin clean and dry  - Evaluate need for skin moisturizer/barrier cream  - Collaborate with interdisciplinary team (i e  Nutrition, Rehabilitation, etc )   - Patient/family teaching  Outcome: Progressing  Goal: Incision(s), wounds(s) or drain site(s) healing without S/S of infection  Description  INTERVENTIONS  - Assess and document risk factors for skin impairment   - Assess and document dressing, incision, wound bed, drain sites and surrounding tissue  - Consider nutrition services referral as needed  - Oral mucous membranes remain intact  - Provide patient/ family education  Outcome: Progressing  Goal: Oral mucous membranes remain intact  Description  INTERVENTIONS  - Assess oral mucosa and hygiene practices  - Implement preventative oral hygiene regimen  - Implement oral medicated treatments as ordered  - Initiate Nutrition services referral as needed  Outcome: Progressing     Problem: MUSCULOSKELETAL - ADULT  Goal: Maintain or return mobility to safest level of function  Description  INTERVENTIONS:  - Assess patient's ability to carry out ADLs; assess patient's baseline for ADL function and identify physical deficits which impact ability to perform ADLs (bathing, care of mouth/teeth, toileting, grooming, dressing, etc )  - Assess/evaluate cause of self-care deficits   - Assess range of motion  - Assess patient's mobility  - Assess patient's need for assistive devices and provide as appropriate  - Encourage maximum independence but intervene and supervise when necessary  - Involve family in performance of ADLs  - Assess for home care needs following discharge   - Consider OT consult to assist with ADL evaluation and planning for discharge  - Provide patient education as appropriate  Outcome: Progressing  Goal: Maintain proper alignment of affected body part  Description  INTERVENTIONS:  - Support, maintain and protect limb and body alignment  - Provide patient/ family with appropriate education  Outcome: Progressing     Problem: PAIN - ADULT  Goal: Verbalizes/displays adequate comfort level or baseline comfort level  Description  Interventions:  - Encourage patient to monitor pain and request assistance  - Assess pain using appropriate pain scale  - Administer analgesics based on type and severity of pain and evaluate response  - Implement non-pharmacological measures as appropriate and evaluate response  - Consider cultural and social influences on pain and pain management  - Notify physician/advanced practitioner if interventions unsuccessful or patient reports new pain  Outcome: Progressing     Problem: SAFETY ADULT  Goal: Maintain or return to baseline ADL function  Description  INTERVENTIONS:  -  Assess patient's ability to carry out ADLs; assess patient's baseline for ADL function and identify physical deficits which impact ability to perform ADLs (bathing, care of mouth/teeth, toileting, grooming, dressing, etc )  - Assess/evaluate cause of self-care deficits   - Assess range of motion  - Assess patient's mobility; develop plan if impaired  - Assess patient's need for assistive devices and provide as appropriate  - Encourage maximum independence but intervene and supervise when necessary  - Involve family in performance of ADLs  - Assess for home care needs following discharge   - Consider OT consult to assist with ADL evaluation and planning for discharge  - Provide patient education as appropriate  Outcome: Progressing  Goal: Maintain or return mobility status to optimal level  Description  INTERVENTIONS:  - Assess patient's baseline mobility status (ambulation, transfers, stairs, etc )    - Identify cognitive and physical deficits and behaviors that affect mobility  - Identify mobility aids required to assist with transfers and/or ambulation (gait belt, sit-to-stand, lift, walker, cane, etc )  - [place] fall precautions as indicated by assessment  - Record patient progress and toleration of activity level on Mobility SBAR; progress patient to next Phase/Stage  - Instruct patient to call for assistance with activity based on assessment  - Consider rehabilitation consult to assist with strengthening/weightbearing, etc   Outcome: Progressing     Problem: DISCHARGE PLANNING  Goal: Discharge to home or other facility with appropriate resources  Description  INTERVENTIONS:  - Identify barriers to discharge w/patient and caregiver  - Arrange for needed discharge resources and transportation as appropriate  - Identify discharge learning needs (meds, wound care, etc )  - Arrange for interpretive services to assist at discharge as needed  - Refer to Case Management Department for coordinating discharge planning if the patient needs post-hospital services based on physician/advanced practitioner order or complex needs related to functional status, cognitive ability, or social support system  Outcome: Progressing     Problem: Knowledge Deficit  Goal: Patient/family/caregiver demonstrates understanding of disease process, treatment plan, medications, and discharge instructions  Description  Complete learning assessment and assess knowledge base    Interventions:  - Provide teaching at level of understanding  - Provide teaching via preferred learning methods  Outcome: Progressing

## 2019-09-20 NOTE — ASSESSMENT & PLAN NOTE
PDMP website reviewed  She last filled her prescription for Oxy IR 15 mg on 09/13 and long-acting morphine 60 and 15 mg on 09/13  Continue to hold long-acting morphine due to arterial hypotension

## 2019-11-22 ENCOUNTER — APPOINTMENT (EMERGENCY)
Dept: CT IMAGING | Facility: HOSPITAL | Age: 54
DRG: 871 | End: 2019-11-22
Payer: MEDICARE

## 2019-11-22 ENCOUNTER — HOSPITAL ENCOUNTER (INPATIENT)
Facility: HOSPITAL | Age: 54
LOS: 5 days | Discharge: HOME | DRG: 871 | End: 2019-11-27
Attending: EMERGENCY MEDICINE | Admitting: ANESTHESIOLOGY
Payer: MEDICARE

## 2019-11-22 ENCOUNTER — APPOINTMENT (EMERGENCY)
Dept: RADIOLOGY | Facility: HOSPITAL | Age: 54
DRG: 871 | End: 2019-11-22
Payer: MEDICARE

## 2019-11-22 DIAGNOSIS — E11.65 HYPERGLYCEMIA DUE TO TYPE 2 DIABETES MELLITUS (HCC): ICD-10-CM

## 2019-11-22 DIAGNOSIS — E11.621 DIABETIC ULCER OF RIGHT GREAT TOE (HCC): ICD-10-CM

## 2019-11-22 DIAGNOSIS — J45.20 MILD INTERMITTENT ASTHMA WITHOUT COMPLICATION: ICD-10-CM

## 2019-11-22 DIAGNOSIS — A41.9 SEVERE SEPSIS (HCC): ICD-10-CM

## 2019-11-22 DIAGNOSIS — R65.20 SEVERE SEPSIS (HCC): ICD-10-CM

## 2019-11-22 DIAGNOSIS — J96.01 ACUTE RESPIRATORY FAILURE WITH HYPOXIA (HCC): ICD-10-CM

## 2019-11-22 DIAGNOSIS — J18.9 MULTIFOCAL PNEUMONIA: Primary | ICD-10-CM

## 2019-11-22 DIAGNOSIS — N17.9 ACUTE RENAL FAILURE (ARF) (HCC): ICD-10-CM

## 2019-11-22 DIAGNOSIS — E87.5 HYPERKALEMIA: ICD-10-CM

## 2019-11-22 DIAGNOSIS — E04.1 THYROID NODULE: ICD-10-CM

## 2019-11-22 DIAGNOSIS — L97.519 DIABETIC ULCER OF RIGHT GREAT TOE (HCC): ICD-10-CM

## 2019-11-22 LAB
ALBUMIN SERPL BCP-MCNC: 3.1 G/DL (ref 3.5–5)
ALP SERPL-CCNC: 124 U/L (ref 46–116)
ALT SERPL W P-5'-P-CCNC: 23 U/L (ref 12–78)
ANION GAP SERPL CALCULATED.3IONS-SCNC: 10 MMOL/L (ref 4–13)
APTT PPP: 34 SECONDS (ref 23–37)
AST SERPL W P-5'-P-CCNC: 30 U/L (ref 5–45)
ATRIAL RATE: 135 BPM
BASE EX.OXY STD BLDV CALC-SCNC: 95.1 % (ref 60–80)
BASE EXCESS BLDV CALC-SCNC: -1.5 MMOL/L
BASOPHILS # BLD AUTO: 0.07 THOUSANDS/ΜL (ref 0–0.1)
BASOPHILS NFR BLD AUTO: 1 % (ref 0–1)
BILIRUB DIRECT SERPL-MCNC: 0.2 MG/DL (ref 0–0.2)
BILIRUB SERPL-MCNC: 1 MG/DL (ref 0.2–1)
BUN SERPL-MCNC: 26 MG/DL (ref 5–25)
CALCIUM SERPL-MCNC: 8.9 MG/DL (ref 8.3–10.1)
CHLORIDE SERPL-SCNC: 96 MMOL/L (ref 100–108)
CO2 SERPL-SCNC: 27 MMOL/L (ref 21–32)
CREAT SERPL-MCNC: 2.05 MG/DL (ref 0.6–1.3)
EOSINOPHIL # BLD AUTO: 0.27 THOUSAND/ΜL (ref 0–0.61)
EOSINOPHIL NFR BLD AUTO: 3 % (ref 0–6)
ERYTHROCYTE [DISTWIDTH] IN BLOOD BY AUTOMATED COUNT: 13.6 % (ref 11.6–15.1)
FLUAV RNA NPH QL NAA+PROBE: NORMAL
FLUBV RNA NPH QL NAA+PROBE: NORMAL
GFR SERPL CREATININE-BSD FRML MDRD: 27 ML/MIN/1.73SQ M
GLUCOSE SERPL-MCNC: 233 MG/DL (ref 65–140)
GLUCOSE SERPL-MCNC: 311 MG/DL (ref 65–140)
GLUCOSE SERPL-MCNC: 320 MG/DL (ref 65–140)
GLUCOSE SERPL-MCNC: 467 MG/DL (ref 65–140)
GLUCOSE SERPL-MCNC: 473 MG/DL (ref 65–140)
GLUCOSE SERPL-MCNC: >500 MG/DL (ref 65–140)
HCO3 BLDV-SCNC: 23.6 MMOL/L (ref 24–30)
HCT VFR BLD AUTO: 38.2 % (ref 34.8–46.1)
HGB BLD-MCNC: 11.8 G/DL (ref 11.5–15.4)
IMM GRANULOCYTES # BLD AUTO: 0.05 THOUSAND/UL (ref 0–0.2)
IMM GRANULOCYTES NFR BLD AUTO: 1 % (ref 0–2)
INR PPP: 1.16 (ref 0.84–1.19)
LACTATE SERPL-SCNC: 1.5 MMOL/L (ref 0.5–2)
LYMPHOCYTES # BLD AUTO: 1.26 THOUSANDS/ΜL (ref 0.6–4.47)
LYMPHOCYTES NFR BLD AUTO: 12 % (ref 14–44)
MAGNESIUM SERPL-MCNC: 1.6 MG/DL (ref 1.6–2.6)
MCH RBC QN AUTO: 25.8 PG (ref 26.8–34.3)
MCHC RBC AUTO-ENTMCNC: 30.9 G/DL (ref 31.4–37.4)
MCV RBC AUTO: 83 FL (ref 82–98)
MONOCYTES # BLD AUTO: 0.98 THOUSAND/ΜL (ref 0.17–1.22)
MONOCYTES NFR BLD AUTO: 9 % (ref 4–12)
NEUTROPHILS # BLD AUTO: 8.03 THOUSANDS/ΜL (ref 1.85–7.62)
NEUTS SEG NFR BLD AUTO: 74 % (ref 43–75)
NRBC BLD AUTO-RTO: 0 /100 WBCS
NT-PROBNP SERPL-MCNC: 856 PG/ML
O2 CT BLDV-SCNC: 16.8 ML/DL
P AXIS: 57 DEGREES
PCO2 BLDV: 41.2 MM HG (ref 42–50)
PH BLDV: 7.38 [PH] (ref 7.3–7.4)
PLATELET # BLD AUTO: 226 THOUSANDS/UL (ref 149–390)
PLATELET # BLD AUTO: 247 THOUSANDS/UL (ref 149–390)
PMV BLD AUTO: 12.8 FL (ref 8.9–12.7)
PMV BLD AUTO: 12.8 FL (ref 8.9–12.7)
PO2 BLDV: 134.7 MM HG (ref 35–45)
POTASSIUM SERPL-SCNC: 5.7 MMOL/L (ref 3.5–5.3)
PR INTERVAL: 136 MS
PROCALCITONIN SERPL-MCNC: 0.22 NG/ML
PROT SERPL-MCNC: 7.2 G/DL (ref 6.4–8.2)
PROTHROMBIN TIME: 14.8 SECONDS (ref 11.6–14.5)
QRS AXIS: -8 DEGREES
QRSD INTERVAL: 78 MS
QT INTERVAL: 292 MS
QTC INTERVAL: 438 MS
RBC # BLD AUTO: 4.58 MILLION/UL (ref 3.81–5.12)
RSV RNA NPH QL NAA+PROBE: NORMAL
SODIUM SERPL-SCNC: 133 MMOL/L (ref 136–145)
T WAVE AXIS: 39 DEGREES
TROPONIN I SERPL-MCNC: <0.02 NG/ML
VENTRICULAR RATE: 135 BPM
WBC # BLD AUTO: 10.66 THOUSAND/UL (ref 4.31–10.16)

## 2019-11-22 PROCEDURE — 96365 THER/PROPH/DIAG IV INF INIT: CPT

## 2019-11-22 PROCEDURE — 84484 ASSAY OF TROPONIN QUANT: CPT | Performed by: NURSE PRACTITIONER

## 2019-11-22 PROCEDURE — 99285 EMERGENCY DEPT VISIT HI MDM: CPT

## 2019-11-22 PROCEDURE — 85610 PROTHROMBIN TIME: CPT | Performed by: EMERGENCY MEDICINE

## 2019-11-22 PROCEDURE — 83605 ASSAY OF LACTIC ACID: CPT | Performed by: EMERGENCY MEDICINE

## 2019-11-22 PROCEDURE — 82948 REAGENT STRIP/BLOOD GLUCOSE: CPT

## 2019-11-22 PROCEDURE — 85049 AUTOMATED PLATELET COUNT: CPT | Performed by: NURSE PRACTITIONER

## 2019-11-22 PROCEDURE — 96367 TX/PROPH/DG ADDL SEQ IV INF: CPT

## 2019-11-22 PROCEDURE — 99285 EMERGENCY DEPT VISIT HI MDM: CPT | Performed by: EMERGENCY MEDICINE

## 2019-11-22 PROCEDURE — 83735 ASSAY OF MAGNESIUM: CPT | Performed by: EMERGENCY MEDICINE

## 2019-11-22 PROCEDURE — 96375 TX/PRO/DX INJ NEW DRUG ADDON: CPT

## 2019-11-22 PROCEDURE — 93010 ELECTROCARDIOGRAM REPORT: CPT | Performed by: INTERNAL MEDICINE

## 2019-11-22 PROCEDURE — 83880 ASSAY OF NATRIURETIC PEPTIDE: CPT | Performed by: EMERGENCY MEDICINE

## 2019-11-22 PROCEDURE — 87040 BLOOD CULTURE FOR BACTERIA: CPT | Performed by: EMERGENCY MEDICINE

## 2019-11-22 PROCEDURE — 85730 THROMBOPLASTIN TIME PARTIAL: CPT | Performed by: EMERGENCY MEDICINE

## 2019-11-22 PROCEDURE — 71045 X-RAY EXAM CHEST 1 VIEW: CPT

## 2019-11-22 PROCEDURE — 82805 BLOOD GASES W/O2 SATURATION: CPT | Performed by: EMERGENCY MEDICINE

## 2019-11-22 PROCEDURE — 93005 ELECTROCARDIOGRAM TRACING: CPT

## 2019-11-22 PROCEDURE — 36415 COLL VENOUS BLD VENIPUNCTURE: CPT | Performed by: EMERGENCY MEDICINE

## 2019-11-22 PROCEDURE — 87631 RESP VIRUS 3-5 TARGETS: CPT | Performed by: EMERGENCY MEDICINE

## 2019-11-22 PROCEDURE — 71250 CT THORAX DX C-: CPT

## 2019-11-22 PROCEDURE — 84145 PROCALCITONIN (PCT): CPT | Performed by: EMERGENCY MEDICINE

## 2019-11-22 PROCEDURE — 99223 1ST HOSP IP/OBS HIGH 75: CPT | Performed by: ANESTHESIOLOGY

## 2019-11-22 PROCEDURE — 94640 AIRWAY INHALATION TREATMENT: CPT

## 2019-11-22 PROCEDURE — 83036 HEMOGLOBIN GLYCOSYLATED A1C: CPT | Performed by: NURSE PRACTITIONER

## 2019-11-22 PROCEDURE — 80048 BASIC METABOLIC PNL TOTAL CA: CPT | Performed by: EMERGENCY MEDICINE

## 2019-11-22 PROCEDURE — 85025 COMPLETE CBC W/AUTO DIFF WBC: CPT | Performed by: EMERGENCY MEDICINE

## 2019-11-22 PROCEDURE — 96366 THER/PROPH/DIAG IV INF ADDON: CPT

## 2019-11-22 PROCEDURE — 96361 HYDRATE IV INFUSION ADD-ON: CPT

## 2019-11-22 PROCEDURE — 80076 HEPATIC FUNCTION PANEL: CPT | Performed by: EMERGENCY MEDICINE

## 2019-11-22 PROCEDURE — 87081 CULTURE SCREEN ONLY: CPT | Performed by: NURSE PRACTITIONER

## 2019-11-22 PROCEDURE — 84484 ASSAY OF TROPONIN QUANT: CPT | Performed by: EMERGENCY MEDICINE

## 2019-11-22 RX ORDER — NYSTATIN 100000 [USP'U]/G
POWDER TOPICAL 2 TIMES DAILY
Status: DISCONTINUED | OUTPATIENT
Start: 2019-11-22 | End: 2019-11-27 | Stop reason: HOSPADM

## 2019-11-22 RX ORDER — PANTOPRAZOLE SODIUM 40 MG/1
40 TABLET, DELAYED RELEASE ORAL
Status: DISCONTINUED | OUTPATIENT
Start: 2019-11-23 | End: 2019-11-27 | Stop reason: HOSPADM

## 2019-11-22 RX ORDER — METHYLPREDNISOLONE SODIUM SUCCINATE 125 MG/2ML
80 INJECTION, POWDER, LYOPHILIZED, FOR SOLUTION INTRAMUSCULAR; INTRAVENOUS ONCE
Status: COMPLETED | OUTPATIENT
Start: 2019-11-22 | End: 2019-11-22

## 2019-11-22 RX ORDER — AZITHROMYCIN 250 MG/1
500 TABLET, FILM COATED ORAL EVERY 24 HOURS
Status: DISCONTINUED | OUTPATIENT
Start: 2019-11-23 | End: 2019-11-27 | Stop reason: HOSPADM

## 2019-11-22 RX ORDER — VANCOMYCIN HYDROCHLORIDE 1 G/200ML
10 INJECTION, SOLUTION INTRAVENOUS EVERY 12 HOURS
Status: DISCONTINUED | OUTPATIENT
Start: 2019-11-23 | End: 2019-11-23

## 2019-11-22 RX ORDER — CHLORHEXIDINE GLUCONATE 0.12 MG/ML
15 RINSE ORAL EVERY 12 HOURS SCHEDULED
Status: DISCONTINUED | OUTPATIENT
Start: 2019-11-22 | End: 2019-11-24

## 2019-11-22 RX ORDER — SODIUM CHLORIDE, SODIUM GLUCONATE, SODIUM ACETATE, POTASSIUM CHLORIDE, MAGNESIUM CHLORIDE, SODIUM PHOSPHATE, DIBASIC, AND POTASSIUM PHOSPHATE .53; .5; .37; .037; .03; .012; .00082 G/100ML; G/100ML; G/100ML; G/100ML; G/100ML; G/100ML; G/100ML
125 INJECTION, SOLUTION INTRAVENOUS CONTINUOUS
Status: DISCONTINUED | OUTPATIENT
Start: 2019-11-22 | End: 2019-11-23

## 2019-11-22 RX ORDER — HEPARIN SODIUM 5000 [USP'U]/ML
5000 INJECTION, SOLUTION INTRAVENOUS; SUBCUTANEOUS EVERY 8 HOURS SCHEDULED
Status: DISCONTINUED | OUTPATIENT
Start: 2019-11-22 | End: 2019-11-27 | Stop reason: HOSPADM

## 2019-11-22 RX ORDER — ACETAMINOPHEN 325 MG/1
650 TABLET ORAL ONCE
Status: COMPLETED | OUTPATIENT
Start: 2019-11-22 | End: 2019-11-22

## 2019-11-22 RX ADMIN — INSULIN LISPRO 2 UNITS: 100 INJECTION, SOLUTION INTRAVENOUS; SUBCUTANEOUS at 19:02

## 2019-11-22 RX ADMIN — SODIUM CHLORIDE 1000 ML: 0.9 INJECTION, SOLUTION INTRAVENOUS at 13:45

## 2019-11-22 RX ADMIN — CHLORHEXIDINE GLUCONATE 0.12% ORAL RINSE 15 ML: 1.2 LIQUID ORAL at 21:13

## 2019-11-22 RX ADMIN — ACETAMINOPHEN 650 MG: 325 TABLET, FILM COATED ORAL at 13:36

## 2019-11-22 RX ADMIN — METHYLPREDNISOLONE SODIUM SUCCINATE 80 MG: 125 INJECTION, POWDER, FOR SOLUTION INTRAMUSCULAR; INTRAVENOUS at 13:36

## 2019-11-22 RX ADMIN — VANCOMYCIN HYDROCHLORIDE 2000 MG: 1 INJECTION, POWDER, LYOPHILIZED, FOR SOLUTION INTRAVENOUS at 14:37

## 2019-11-22 RX ADMIN — HEPARIN SODIUM 5000 UNITS: 5000 INJECTION INTRAVENOUS; SUBCUTANEOUS at 21:13

## 2019-11-22 RX ADMIN — CEFEPIME HYDROCHLORIDE 2000 MG: 2 INJECTION, POWDER, FOR SOLUTION INTRAVENOUS at 13:37

## 2019-11-22 RX ADMIN — HEPARIN SODIUM 5000 UNITS: 5000 INJECTION INTRAVENOUS; SUBCUTANEOUS at 16:39

## 2019-11-22 RX ADMIN — IPRATROPIUM BROMIDE 0.5 MG: 0.5 SOLUTION RESPIRATORY (INHALATION) at 13:36

## 2019-11-22 RX ADMIN — ALBUTEROL SULFATE 5 MG: 2.5 SOLUTION RESPIRATORY (INHALATION) at 13:36

## 2019-11-22 RX ADMIN — SODIUM CHLORIDE 15 UNITS/HR: 9 INJECTION, SOLUTION INTRAVENOUS at 21:48

## 2019-11-22 RX ADMIN — INSULIN HUMAN 10 UNITS: 100 INJECTION, SOLUTION PARENTERAL at 14:42

## 2019-11-22 RX ADMIN — SODIUM CHLORIDE 1000 ML: 0.9 INJECTION, SOLUTION INTRAVENOUS at 15:19

## 2019-11-22 RX ADMIN — AZITHROMYCIN MONOHYDRATE 500 MG: 500 INJECTION, POWDER, LYOPHILIZED, FOR SOLUTION INTRAVENOUS at 13:37

## 2019-11-22 RX ADMIN — SODIUM CHLORIDE, SODIUM GLUCONATE, SODIUM ACETATE, POTASSIUM CHLORIDE AND MAGNESIUM CHLORIDE 125 ML/HR: 526; 502; 368; 37; 30 INJECTION, SOLUTION INTRAVENOUS at 16:38

## 2019-11-22 RX ADMIN — INSULIN LISPRO 5 UNITS: 100 INJECTION, SOLUTION INTRAVENOUS; SUBCUTANEOUS at 21:11

## 2019-11-22 NOTE — ED NOTES
Report given to ICU, per ICU room is not ready yet  They will call down when room is ready        Lela Phillips RN  11/22/19 4326

## 2019-11-22 NOTE — SEPSIS NOTE
Sepsis Note   Marina Yepez 53 y o  female MRN: [id number]  Unit/Bed#: TR 30 Encounter: [id number]      qSOFA     [address] Name 11/22/19 1309                Altered mental status GCS < 15  --        Respiratory Rate > / =44  1        Systolic BP < / =216  0        Q Sofa Score  1            Initial Sepsis Screening     Row Name 11/22/19 1405                Is the patient's history suggestive of a new or worsening infection? (!) Yes (Proceed)  -MA        Suspected source of infection  pneumonia  -MA        Are two or more of the following signs & symptoms of infection both present and new to the patient? (!) Yes (Proceed)  -MA        Indicate SIRS criteria  Hyperthemia > 38 3C (100 9F); Tachycardia > 90 bpm;Tachypnea > 20 resp per min  -MA        If the answer is yes to both questions, suspicion of sepsis is present  --        If severe sepsis is present AND tissue hypoperfusion perists in the hour after fluid resuscitation or lactate > 4, the patient meets criteria for SEPTIC SHOCK  --        Are any of the following organ dysfunction criteria present within 6 hours of suspected infection and SIRS criteria that are NOT considered to be chronic conditions? (!) Yes  -MA        Organ dysfunction  Creatinine > 2 0 mg/dL; Creatinine > 0 5 mg/dl ABOVE BASELINE; Acute respiratory failure (new need for invasive or non-invasive mechanical ventilation)  -MA        Date of presentation of severe sepsis  11/22/19  -MA        Time of presentation of severe sepsis  1405  -MA        Tissue hypoperfusion persists in the hour after crystalloid fluid administration, evidenced, by either:  --        Was hypotension present within one hour of the conclusion of crystalloid fluid administration?   No  -MA        Date of presentation of septic shock  --        Time of presentation of septic shock  --          User Key  (r) = Recorded By, (t) = Taken By, (c) = Cosigned By    [address] Name Provider Type    TIAN Stoll DO Physician

## 2019-11-22 NOTE — SEPSIS NOTE
Sepsis Note   Wing Haley 53 y o  female MRN: [id number]  Unit/Bed#: ED 28 Encounter: [id number]      qSOFA     Row Name 11/22/19 1545 11/22/19 1445 11/22/19 1411 11/22/19 1309       Altered mental status GCS < 15  --  --  --  --     Respiratory Rate > / =22  0  0  0  1     Systolic BP < / =145  1  1  0  0     Q Sofa Score  1  1  0  1         Initial Sepsis Screening     Row Name 11/22/19 1405                Is the patient's history suggestive of a new or worsening infection? (!) Yes (Proceed)  -MA        Suspected source of infection  pneumonia  -MA        Are two or more of the following signs & symptoms of infection both present and new to the patient? (!) Yes (Proceed)  -MA        Indicate SIRS criteria  Hyperthemia > 38 3C (100 9F); Tachycardia > 90 bpm;Tachypnea > 20 resp per min  -MA        If the answer is yes to both questions, suspicion of sepsis is present  --        If severe sepsis is present AND tissue hypoperfusion perists in the hour after fluid resuscitation or lactate > 4, the patient meets criteria for SEPTIC SHOCK  --        Are any of the following organ dysfunction criteria present within 6 hours of suspected infection and SIRS criteria that are NOT considered to be chronic conditions? (!) Yes  -MA        Organ dysfunction  Creatinine > 2 0 mg/dL; Creatinine > 0 5 mg/dl ABOVE BASELINE; Acute respiratory failure (new need for invasive or non-invasive mechanical ventilation)  -MA        Date of presentation of severe sepsis  11/22/19  -MA        Time of presentation of severe sepsis  1405  -MA        Tissue hypoperfusion persists in the hour after crystalloid fluid administration, evidenced, by either:  --        Was hypotension present within one hour of the conclusion of crystalloid fluid administration?   No  -MA        Date of presentation of septic shock  --        Time of presentation of septic shock  --          User Key  (r) = Recorded By, (t) = Taken By, (c) = Cosigned By    [address] Name Provider Type    TIAN Shaffer DO Physician               Default Flowsheet Data (last 720 hours)      Sepsis Reassess     Row Name 11/22/19 3416                   Repeat Volume Status and Tissue Perfusion Assessment Performed    Repeat Volume Status and Tissue Perfusion Assessment Performed  Yes  -MA           Volume Status and Tissue Perfusion Post Fluid Resuscitation * Must Document All *    Vital Signs Reviewed (HR, RR, BP, T)  Yes  -MA        Shock Index Reviewed  Yes  -MA        Arterial Oxygen Saturation Reviewed (POx, SaO2 or SpO2)  --        Cardio  (!) Normal S1/S2; Tachycardia  -MA        Pulmonary  Normal effort  -MA        Capillary Refill  Brisk  -MA        Peripheral Pulses  Radial;Dorsalis Pedis  -MA        Peripheral Pulse  +2  -MA        Dorsalis Pedis  +2  -MA        Skin  Warm  -MA        Urine output assessed  None  -MA           *OR*   Intensive Monitoring- Must Document One of the Following Four *:    Vital Signs Reviewed  --        * Central Venous Pressure (CVP or RAP)  --        * Central Venous Oxygen (SVO2, ScvO2 or Oxygen saturation via central catheter)  --        * Bedside Cardiovascular US in IVC diameter and % collapse  --        * Passive Leg Raise OR Crystalloid Challenge  --          User Key  (r) = Recorded By, (t) = Taken By, (c) = Cosigned By    Initials Name Provider Type    TIAN Shaffer DO Physician

## 2019-11-22 NOTE — ED PROVIDER NOTES
History  Chief Complaint   Patient presents with    Hyperglycemia - Symptomatic     Patient states her sugar was over 390 when she checked it home and is now feeling "shakey"  Blood glucose in triage= 311  Patient is a 48-year-old female with past medical and surgical history significant for asthma, insulin-dependent type 2 diabetes, gastroparesis, GERD, chronic pain, cholecystectomy, hysterectomy, cervical laminectomy, left knee replacement and right great toe amputation, presents to the emergency department for hyperglycemia and shakiness  Patient noted in triage to have significant abnormal vital signs with fever of 102 7° F, tachycardia with heart rate in the 140s, hypoxia with O2 sat on room air at 78%  Patient taken back immediately and evaluated patient  She does report that for the past 2 weeks she has had cough that sounds wet but she is unable to bring any sputum up  She has progressively felt short of breath over the past couple of days both at rest and with exertion, worsened today  Today she was having shaking that she could not explain so she took her glucose and it was in the 300s which is why she came to the ED  She was unaware of any fevers at home  On review of systems, she does report recent dysuria for the past 4 days and her doctor started her on Diflucan for presumed yeast infection  She denies any headache, dizziness or near syncope, change in vision or hearing, neck or back pain worse than baseline, chest pain, palpitations, hemoptysis, abdominal pain, nausea, vomiting, diarrhea, constipation, blood per rectum or melena, change in urinary frequency, hematuria, flank pain, skin rash or color change, extremity swelling worse than baseline, extremity pain, extremity weakness or paresthesia or other focal neurologic deficits  She reports having had pneumonia multiple times in the past   Denies home oxygen use    Patient's last hospitalization was about a month ago per the son       History provided by:  Patient and relative   used: No        Prior to Admission Medications   Prescriptions Last Dose Informant Patient Reported? Taking?    Insulin Pen Needle 29G X 5MM MISC   No No   Sig: by Does not apply route 4 (four) times a day   LYRICA 150 MG capsule   Yes No   Sig: Take 150 mg by mouth 2 (two) times a day   WIXELA INHUB 500-50 MCG/DOSE inhaler   Yes No   Sig: Take 1 puff by mouth 2 (two) times a day   albuterol (ACCUNEB) 1 25 MG/3ML nebulizer solution  Self Yes No   Sig: Take 1 ampule by nebulization every 6 (six) hours as needed for wheezing   albuterol (PROVENTIL HFA,VENTOLIN HFA) 90 mcg/act inhaler  Self Yes No   Sig: Inhale 2 puffs every 6 (six) hours as needed for wheezing   carisoprodol (SOMA) 350 mg tablet  Self Yes No   Sig: Take 350 mg by mouth 2 (two) times a day   diazepam (VALIUM) 5 mg tablet  Self Yes No   Sig: Take 5 mg by mouth daily at bedtime as needed for anxiety   dicyclomine (BENTYL) 10 mg capsule   No No   Sig: Take 1 capsule (10 mg total) by mouth 4 (four) times a day as needed (As needed for abdominal cramping)   fluticasone (FLONASE) 50 mcg/act nasal spray  Self Yes No   Si spray into each nostril daily   insulin aspart (NOVOLOG FLEXPEN) 100 Units/mL injection pen   No No   Sig: Inject 12 Units under the skin 3 (three) times a day with meals   insulin glargine (LANTUS) 100 units/mL subcutaneous injection  Self Yes No   Sig: Inject 50 Units under the skin daily at bedtime     lisinopril (ZESTRIL) 10 mg tablet  Self No No   Sig: Take 1 tablet (10 mg total) by mouth daily   metoclopramide (REGLAN) 10 mg tablet   No No   Sig: TAKE 1 TABLET PO QID BEFORE MEALS AND HS   montelukast (SINGULAIR) 10 mg tablet  Self Yes No   Sig: Take 10 mg by mouth daily at bedtime   morphine (MSIR) 15 mg tablet  Self Yes No   Sig: Take 15 mg by mouth 3 (three) times a day as needed for moderate pain or severe pain     nystatin (MYCOSTATIN) powder   No No Sig: Apply topically 2 (two) times a day   omeprazole (PriLOSEC) 20 mg delayed release capsule  Self Yes No   Sig: Take 20 mg by mouth daily   ondansetron (ZOFRAN) 4 mg tablet  Self No No   Sig: TAKE 1 TABLET BY MOUTH EVERY 8 HOURS AS NEEDED FOR NAUSEA AND VOMITING      Facility-Administered Medications: None       Past Medical History:   Diagnosis Date    Asthma     Chronic pain     Diabetes mellitus (HCC)     Gastroparesis        Past Surgical History:   Procedure Laterality Date    CERVICAL LAMINECTOMY      CHOLECYSTECTOMY      CHOLECYSTECTOMY LAPAROSCOPIC N/A 11/10/2018    Procedure: CHOLECYSTECTOMY LAPAROSCOPIC w/ ioc;  Surgeon: Melvin Bernal MD;  Location: [facility];  Service: General    HYSTERECTOMY      JOINT REPLACEMENT Bilateral     knee    TOE AMPUTATION Right 2/2/2018    Procedure: AMPUTATION TOE, RIGHT GREAT TOE;  Surgeon: Chele Odell DPM;  Location: [facility];  Service: Podiatry       Family History   Problem Relation Age of Onset    Diabetes Mother     Diabetes Maternal Grandmother     No Known Problems Father      I have reviewed and agree with the history as documented  Social History     Tobacco Use    Smoking status: Never Smoker    Smokeless tobacco: Never Used   Substance Use Topics    Alcohol use: Yes     Frequency: 2-4 times a month     Drinks per session: 1 or 2     Comment: rarely    Drug use: No        Review of Systems   Constitutional: Positive for chills  Negative for appetite change and fever  HENT: Negative for congestion, ear pain, hearing loss, rhinorrhea, sore throat and tinnitus  Eyes: Negative for pain and visual disturbance  Respiratory: Positive for cough and shortness of breath  Negative for chest tightness and wheezing  Cardiovascular: Negative for chest pain, palpitations and leg swelling  Gastrointestinal: Negative for abdominal distention, abdominal pain, blood in stool, constipation, diarrhea, nausea and vomiting     Genitourinary: Positive for dysuria  Negative for flank pain, frequency and hematuria  Musculoskeletal: Negative for back pain and neck pain  Skin: Negative for color change, pallor and rash  Allergic/Immunologic: Negative for immunocompromised state  Neurological: Positive for tremors  Negative for dizziness, syncope, weakness, light-headedness, numbness and headaches  Hematological: Negative for adenopathy  Psychiatric/Behavioral: Negative for confusion and decreased concentration  All other systems reviewed and are negative  Physical Exam  Physical Exam   Constitutional: She is oriented to person, place, and time  She appears well-developed and well-nourished  No distress  HENT:   Head: Normocephalic and atraumatic  Mouth/Throat: No oropharyngeal exudate  Dry mucous membranes  Dry cracked lips  Eyes: Pupils are equal, round, and reactive to light  Conjunctivae and EOM are normal    Neck: Normal range of motion  Neck supple  No JVD present  Cardiovascular: Regular rhythm, normal heart sounds and intact distal pulses  Exam reveals no gallop and no friction rub  No murmur heard  Tachycardia with heart rate 130-140 bpm range  Pulmonary/Chest: Effort normal and breath sounds normal  No respiratory distress  She has no wheezes  She has no rales  She exhibits no tenderness  Patient hypoxic on room air with O2 sat 78%  She was placed on a rebreather and O2 sat 96%  Patient mildly tachypneic  She has coarse and decreased breath sounds throughout  Abdominal: Soft  Bowel sounds are normal  She exhibits no distension  There is no tenderness  There is no rebound and no guarding  Musculoskeletal: Normal range of motion  She exhibits edema  She exhibits no tenderness  2+ pitting edema bilateral lower legs  Lymphadenopathy:     She has no cervical adenopathy  Neurological: She is alert and oriented to person, place, and time  No cranial nerve deficit  No gross motor or sensory deficits    4/5 strength throughout  Skin: Skin is warm and dry  No rash noted  She is not diaphoretic  No erythema  No pallor  Psychiatric: She has a normal mood and affect  Her behavior is normal    Nursing note and vitals reviewed        Vital Signs  ED Triage Vitals   Temperature Pulse Respirations Blood Pressure SpO2   11/22/19 1309 11/22/19 1309 11/22/19 1309 11/22/19 1309 11/22/19 1309   (!) 102 7 °F (39 3 °C) (!) 140 (!) 23 142/64 (!) 78 %      Temp Source Heart Rate Source Patient Position - Orthostatic VS BP Location FiO2 (%)   11/22/19 1309 11/22/19 1309 11/22/19 1309 11/22/19 1309 --   Oral Monitor Sitting Left arm       Pain Score       11/22/19 1336       No Pain         Vitals:    11/22/19 1411 11/22/19 1433 11/22/19 1445 11/22/19 1545   BP: 126/88  94/53 97/72   BP Location: Right arm  Right arm    Pulse: (!) 128  (!) 115 (!) 115   Resp: 20  20 20   Temp:   (!) 100 7 °F (38 2 °C)    TempSrc:   Oral    SpO2: 91%  92% 95%   Weight:  (!) 140 kg (308 lb 10 3 oz)         Visual Acuity      ED Medications  Medications   vancomycin (VANCOCIN) 2,000 mg in sodium chloride 0 9 % 500 mL IVPB (2,000 mg Intravenous New Bag 11/22/19 1437)   sodium chloride 0 9 % bolus 1,000 mL (1,000 mL Intravenous New Bag 11/22/19 1519)   nystatin (MYCOSTATIN) powder (has no administration in time range)   pantoprazole (PROTONIX) EC tablet 40 mg (has no administration in time range)   chlorhexidine (PERIDEX) 0 12 % oral rinse 15 mL (has no administration in time range)   multi-electrolyte (PLASMALYTE-A/ISOLYTE-S PH 7 4) IV solution (has no administration in time range)   heparin (porcine) subcutaneous injection 5,000 Units (has no administration in time range)   insulin lispro (HumaLOG) 100 units/mL subcutaneous injection 1-5 Units (has no administration in time range)   insulin lispro (HumaLOG) 100 units/mL subcutaneous injection 1-5 Units (has no administration in time range)   azithromycin (ZITHROMAX) tablet 500 mg (has no administration in time range)   cefepime (MAXIPIME) 1,000 mg in dextrose 5 % 50 mL IVPB (has no administration in time range)   vancomycin (VANCOCIN) 1,500 mg in sodium chloride 0 9 % 250 mL IVPB (has no administration in time range)   sodium chloride 0 9 % bolus 1,000 mL (0 mL Intravenous Stopped 11/22/19 1518)   cefepime (MAXIPIME) 2,000 mg in dextrose 5 % 50 mL IVPB (0 mg Intravenous Stopped 11/22/19 1432)   azithromycin (ZITHROMAX) 500 mg in sodium chloride 0 9% 250mL IVPB 500 mg (0 mg Intravenous Stopped 11/22/19 1518)   acetaminophen (TYLENOL) tablet 650 mg (650 mg Oral Given 11/22/19 1336)   ipratropium (ATROVENT) 0 02 % inhalation solution 0 5 mg (0 5 mg Nebulization Given 11/22/19 1336)   albuterol inhalation solution 5 mg (5 mg Nebulization Given 11/22/19 1336)   methylPREDNISolone sodium succinate (Solu-MEDROL) injection 80 mg (80 mg Intravenous Given 11/22/19 1336)   insulin regular (HumuLIN R,NovoLIN R) injection 10 Units (10 Units Intravenous Given 11/22/19 1442)       Diagnostic Studies  Results Reviewed     Procedure Component Value Units Date/Time    Troponin I [[id number]]     Lab Status:  No result Specimen:  Blood     Troponin I [[id number]]     Lab Status:  No result Specimen:  Blood     Platelet count [[id number]]     Lab Status:  No result Specimen:  Blood     Hemoglobin A1c w/EAG Estimation (Orders if not completed within the last 90 days) [[id number]]     Lab Status:  No result Specimen:  Blood     Sputum culture and Gram stain [[id number]]     Lab Status:  No result Specimen:  Induced Sputum     MRSA culture [[id number]]     Lab Status:  No result Specimen:  Nares from Nose     Strep Pneumoniae, Urine [[id number]]     Lab Status:  No result Specimen:  Urine, Other     Legionella antigen, urine [[id number]]     Lab Status:  No result Specimen:  Urine, Other     Influenza A/B and RSV PCR [[id number]]  (Normal) Collected:  11/22/19 1346    Lab Status:  Final result Specimen:  Nasopharyngeal Swab Updated:  11/22/19 1454     INFLUENZA A PCR None Detected     INFLUENZA B PCR None Detected     RSV PCR None Detected    Hepatic function panel [[id number]]  (Abnormal) Collected:  11/22/19 1327    Lab Status:  Final result Specimen:  Blood from Arm, Left Updated:  11/22/19 1408     Total Bilirubin 1 00 mg/dL      Bilirubin, Direct 0 20 mg/dL      Alkaline Phosphatase 124 U/L      AST 30 U/L      ALT 23 U/L      Total Protein 7 2 g/dL      Albumin 3 1 g/dL     Magnesium [[id number]]  (Normal) Collected:  11/22/19 1327    Lab Status:  Final result Specimen:  Blood from Arm, Left Updated:  11/22/19 1408     Magnesium 1 6 mg/dL     NT-BNP PRO [[id number]]  (Abnormal) Collected:  11/22/19 1327    Lab Status:  Final result Specimen:  Blood from Arm, Left Updated:  11/22/19 1408     NT-proBNP 856 pg/mL     Lactic acid, plasma [[id number]]  (Normal) Collected:  11/22/19 1327    Lab Status:  Final result Specimen:  Blood from Arm, Left Updated:  11/22/19 1403     LACTIC ACID 1 5 mmol/L     Narrative:       Result may be elevated if tourniquet was used during collection      Troponin I [[id number]]  (Normal) Collected:  11/22/19 1327    Lab Status:  Final result Specimen:  Blood from Arm, Left Updated:  11/22/19 1402     Troponin I <0 02 ng/mL     Basic metabolic panel [[id number]]  (Abnormal) Collected:  11/22/19 1327    Lab Status:  Final result Specimen:  Blood from Arm, Left Updated:  11/22/19 1401     Sodium 133 mmol/L      Potassium 5 7 mmol/L      Chloride 96 mmol/L      CO2 27 mmol/L      ANION GAP 10 mmol/L      BUN 26 mg/dL      Creatinine 2 05 mg/dL      Glucose 320 mg/dL      Calcium 8 9 mg/dL      eGFR 27 ml/min/1 73sq m     Narrative:       Claudio guidelines for Chronic Kidney Disease (CKD):     Stage 1 with normal or high GFR (GFR > 90 mL/min/1 73 square meters)    Stage 2 Mild CKD (GFR = 60-89 mL/min/1 73 square meters)    Stage 3A Moderate CKD (GFR = 45-59 mL/min/1 73 square meters)    Stage 3B Moderate CKD (GFR = 30-44 mL/min/1 73 square meters)    Stage 4 Severe CKD (GFR = 15-29 mL/min/1 73 square meters)    Stage 5 End Stage CKD (GFR <15 mL/min/1 73 square meters)  Note: GFR calculation is accurate only with a steady state creatinine    Protime-INR [[id number]]  (Abnormal) Collected:  11/22/19 1327    Lab Status:  Final result Specimen:  Blood from Arm, Left Updated:  11/22/19 1400     Protime 14 8 seconds      INR 1 16    APTT [[id number]]  (Normal) Collected:  11/22/19 1327    Lab Status:  Final result Specimen:  Blood from Arm, Left Updated:  11/22/19 1400     PTT 34 seconds     CBC and differential [[id number]]  (Abnormal) Collected:  11/22/19 1327    Lab Status:  Final result Specimen:  Blood from Arm, Left Updated:  11/22/19 1341     WBC 10 66 Thousand/uL      RBC 4 58 Million/uL      Hemoglobin 11 8 g/dL      Hematocrit 38 2 %      MCV 83 fL      MCH 25 8 pg      MCHC 30 9 g/dL      RDW 13 6 %      MPV 12 8 fL      Platelets 590 Thousands/uL      nRBC 0 /100 WBCs      Neutrophils Relative 74 %      Immat GRANS % 1 %      Lymphocytes Relative 12 %      Monocytes Relative 9 %      Eosinophils Relative 3 %      Basophils Relative 1 %      Neutrophils Absolute 8 03 Thousands/µL      Immature Grans Absolute 0 05 Thousand/uL      Lymphocytes Absolute 1 26 Thousands/µL      Monocytes Absolute 0 98 Thousand/µL      Eosinophils Absolute 0 27 Thousand/µL      Basophils Absolute 0 07 Thousands/µL     Blood gas, venous [[id number]]  (Abnormal) Collected:  11/22/19 1327    Lab Status:  Final result Specimen:  Blood from Arm, Left Updated:  11/22/19 1340     pH, [place] 7 376     pCO2, [place] 41 2 mm Hg      pO2, [place] 134 7 mm Hg      HCO3, [place] 23 6 mmol/L      Base Excess, [place] -1 5 mmol/L      O2 Content, [place] 16 8 ml/dL      O2 HGB, VENOUS 95 1 %     Procalcitonin [[id number]] Collected:  11/22/19 1327    Lab Status:   In process Specimen:  Blood from Arm, Left Updated:  11/22/19 1337    Blood culture #2 [[id number]] Collected:  11/22/19 1327    Lab Status: In process Specimen:  Blood from Arm, Left Updated:  11/22/19 1337    Blood culture #1 [[id number]] Collected:  11/22/19 1329    Lab Status: In process Specimen:  Blood from Arm, Left Updated:  11/22/19 1337    UA w Reflex to Microscopic w Reflex to Culture [[id number]]     Lab Status:  No result Specimen:  Urine     Fingerstick Glucose (POCT) [[id number]]  (Abnormal) Collected:  11/22/19 1308    Lab Status:  Final result Updated:  11/22/19 1311     POC Glucose 311 mg/dl                  CT chest without contrast   Final Result by Hannah Reyna MD (11/22 1513)      Lobular groundglass areas are seen in the upper lobes bilaterally, these  are indeterminate, May be on an inflammatory or infectious basis could represent atypical infection, eosinophilic pneumonia is also in consideration  in appropriate clinical    setting      2 cm right thyroid nodule, meet the size threshold criteria for further evaluation with ultrasound      The study was marked in EPIC for significant notification  A follow-up notification has been created in the [employer] performed: [id number]         XR chest 1 view portable   ED Interpretation by Henry Buenrostro DO (11/22 1346)   Increased interstitial markings and cardiomegaly  Questionable infiltrate right middle to lower lobe  Final Result by Supriya Marinelli MD (11/22 1401)      Patchy scattered peripheral infiltrates suspicious for multilobar pneumonia  The study was marked in [facility] for immediate notification              Workstation performed: [id number]                    Procedures  ECG 12 Lead Documentation Only  Date/Time: 11/22/2019 1:31 PM  Performed by: Henry Buenrostro DO  Authorized by: Henry Buenrostro DO     ECG reviewed by me, the ED Provider: yes    Patient location:  ED  Previous ECG:     Previous ECG:  Compared to current    Comparison ECG info:  9-17-19    Similarity:  No change  Rate:     ECG rate:  135    ECG rate assessment: tachycardic    Rhythm:     Rhythm: sinus tachycardia    Ectopy:     Ectopy: none    QRS:     QRS axis:  Normal    QRS intervals:  Normal  Conduction:     Conduction: normal    ST segments:     ST segments:  Normal  T waves:     T waves: normal             ED Course  ED Course as of Nov 22 1605 Fri Nov 22, 2019   1403 Creatinine(!): 2 05   1403 Patient in acute renal failure  eGFR: 27   1403 Patient getting albuterol which will temporarily lower potassium  Will also give insulin given hyperglycemia  Potassium(!): 5 7   1404 No DKA  Patient not acidotic and has normal AG  Anion Gap: 10   1404 LACTIC ACID: 1 5   1444 Spoke with critical care attending, Dr Eugene Ghosh, who will evaluate patient to see if she is suitable for step-down level 2 versus med surge tele  1546 Critical care PA at bedside now  Initial Sepsis Screening     Row Name 11/22/19 1405                Is the patient's history suggestive of a new or worsening infection? (!) Yes (Proceed)  -MA        Suspected source of infection  pneumonia  -MA        Are two or more of the following signs & symptoms of infection both present and new to the patient? (!) Yes (Proceed)  -MA        Indicate SIRS criteria  Hyperthemia > 38 3C (100 9F); Tachycardia > 90 bpm;Tachypnea > 20 resp per min  -MA        If the answer is yes to both questions, suspicion of sepsis is present  --        If severe sepsis is present AND tissue hypoperfusion perists in the hour after fluid resuscitation or lactate > 4, the patient meets criteria for SEPTIC SHOCK  --        Are any of the following organ dysfunction criteria present within 6 hours of suspected infection and SIRS criteria that are NOT considered to be chronic conditions? (!) Yes  -MA        Organ dysfunction  Creatinine > 2 0 mg/dL; Creatinine > 0 5 mg/dl ABOVE BASELINE; Acute respiratory failure (new need for invasive or non-invasive mechanical ventilation) -MA        Date of presentation of severe sepsis  11/22/19  -MA        Time of presentation of severe sepsis  1405  -MA        Tissue hypoperfusion persists in the hour after crystalloid fluid administration, evidenced, by either:  --        Was hypotension present within one hour of the conclusion of crystalloid fluid administration? No  -MA        Date of presentation of septic shock  --        Time of presentation of septic shock  --          User Key  (r) = Recorded By, (t) = Taken By, (c) = Cosigned By    [address] Name Provider Type    MA Tempie AppleDO Physician           Default Flowsheet Data (last 720 hours)      Sepsis Reassess     Row Name 11/22/19 1606                   Repeat Volume Status and Tissue Perfusion Assessment Performed    Repeat Volume Status and Tissue Perfusion Assessment Performed  Yes  -MA           Volume Status and Tissue Perfusion Post Fluid Resuscitation * Must Document All *    Vital Signs Reviewed (HR, RR, BP, T)  Yes  -MA        Shock Index Reviewed  Yes  -MA        Arterial Oxygen Saturation Reviewed (POx, SaO2 or SpO2)  --        Cardio  (!) Normal S1/S2; Tachycardia  -MA        Pulmonary  Normal effort  -MA        Capillary Refill  Brisk  -MA        Peripheral Pulses  Radial;Dorsalis Pedis  -MA        Peripheral Pulse  +2  -MA        Dorsalis Pedis  +2  -MA        Skin  Warm  -MA        Urine output assessed  None  -MA           *OR*   Intensive Monitoring- Must Document One of the Following Four *:    Vital Signs Reviewed  --        * Central Venous Pressure (CVP or RAP)  --        * Central Venous Oxygen (SVO2, ScvO2 or Oxygen saturation via central catheter)  --        * Bedside Cardiovascular US in IVC diameter and % collapse  --        * Passive Leg Raise OR Crystalloid Challenge  --          User Key  (r) = Recorded By, (t) = Taken By, (c) = Cosigned By    Initials Name Provider Type    TIAN HuangLED Opticsthanh AppleDO Physician                MDM  Number of Diagnoses or Management Options  Diagnosis management comments: 19-year-old female presents to the ED for hyperglycemia and shakiness  She is found to be febrile, tachycardic, hypoxic and tachypneic  She has had productive cough for the past 2 weeks and likely has pneumonia  Will do full cardiac and septic workup  Will start 1 L IV fluid bolus, provide Tylenol for fever and broad-spectrum antibiotics for presumed pneumonia  Will give breathing treatment and transition patient from non-rebreather to simple face mask or nasal cannula to keep O2 sat above 93%         Amount and/or Complexity of Data Reviewed  Clinical lab tests: ordered and reviewed  Tests in the radiology section of CPT®: ordered and reviewed  Tests in the medicine section of CPT®: ordered and reviewed  Independent visualization of images, tracings, or specimens: yes        Disposition  Final diagnoses:   Multifocal pneumonia   Severe sepsis ([facility] )   Hyperglycemia due to type 2 diabetes mellitus ([facility] )   Acute renal failure (ARF) ([facility])   Hyperkalemia   Acute respiratory failure with hypoxia ([facility] )   Thyroid nodule - incidental     Time reflects when diagnosis was documented in both MDM as applicable and the Disposition within this note     Time User Action Codes Description Comment    11/22/2019  2:58 PM Mayo Ores E Add [J18 9] Multifocal pneumonia     11/22/2019  2:59 PM Larry Chatman E Add [A41 9,  R65 20] Severe sepsis ([facility] )     11/22/2019  2:59 PM Mayo Ores E Add [E11 65] Hyperglycemia due to type 2 diabetes mellitus ([facility] )     11/22/2019  2:59 PM Mayo Ores E Add [N17 9] Acute renal failure (ARF) ([facility] )     11/22/2019  2:59 PM Mayo Ores E Add [E87 5] Hyperkalemia     11/22/2019  3:00 PM Mayo Ores E Add [J96 01] Acute respiratory failure with hypoxia ([facility] )     11/22/2019  3:16 PM Mayo Ores E Add [E04 1] Thyroid nodule     11/22/2019  3:16 PM Mayo Ores E Modify [E04 1] Thyroid nodule incidental      ED Disposition     ED Disposition Condition Date/Time Comment    Admit Stable Fri Nov 22, 2019  4:03 PM Case was discussed with critical care AP and the patient's admission status was agreed to be Admission Status: inpatient status to the service of Dr Valencia Barnhart   Follow-up Information    None         Patient's Medications   Discharge Prescriptions    No medications on file     No discharge procedures on file      ED Provider  Electronically Signed by           Glenny Bower DO  11/22/19 9751

## 2019-11-22 NOTE — PROGRESS NOTES
Vancomycin Assessment    Anna Marie Gonzalez is a 53 y o  female who is currently receiving vancomycin 1500 mg q12h for Pneumonia     Relevant clinical data and objective history reviewed:  Creatinine   Date Value Ref Range Status   11/22/2019 2 05 (H) 0 60 - 1 30 mg/dL Final     Comment:     Standardized to IDMS reference method   09/20/2019 0 78 0 60 - 1 30 mg/dL Final     Comment:     Standardized to IDMS reference method   09/19/2019 1 03 0 60 - 1 30 mg/dL Final     Comment:     Standardized to IDMS reference method     BP 97/72   Pulse (!) 115   Temp (!) 100 7 °F (38 2 °C) (Oral)   Resp 20   Wt (!) 140 kg (308 lb 10 3 oz)   SpO2 95%   BMI 46 93 kg/m²   No intake/output data recorded  Lab Results   Component Value Date/Time    BUN 26 (H) 11/22/2019 01:27 PM    WBC 10 66 (H) 11/22/2019 01:27 PM    HGB 11 8 11/22/2019 01:27 PM    HCT 38 2 11/22/2019 01:27 PM    MCV 83 11/22/2019 01:27 PM     11/22/2019 01:27 PM     Temp Readings from Last 3 Encounters:   11/22/19 (!) 100 7 °F (38 2 °C) (Oral)   09/20/19 99 7 °F (37 6 °C)   07/22/19 98 2 °F (36 8 °C) (Oral)     Vancomycin Days of Therapy: 1    Assessment/Plan  The patient is currently on vancomycin utilizing scheduled dosing based on adjusted body weight (due to obesity)  Baseline risks associated with therapy include: pre-existing renal impairment  The patient is currently receiving 1500 mg q12h and after clinical evaluation will be changed to 1000 mg q12h  Pharmacy will also follow closely for s/sx of nephrotoxicity, infusion reactions and appropriateness of therapy  BMP and CBC will be ordered per protocol  Plan for trough as patient approaches steady state, prior to the 5th  dose at approximately 11/  Due to infection severity, will target a trough of 15-20 (appropriate for most indications)   Pharmacy will continue to follow the patients culture results and clinical progress daily      Adonay Briscoe, Pharmacist

## 2019-11-23 PROBLEM — J18.9 PNEUMONIA: Status: ACTIVE | Noted: 2019-11-23

## 2019-11-23 PROBLEM — A41.9 SEPSIS (HCC): Status: ACTIVE | Noted: 2019-11-23

## 2019-11-23 PROBLEM — R06.89 ACUTE RESPIRATORY INSUFFICIENCY: Status: ACTIVE | Noted: 2019-11-23

## 2019-11-23 PROBLEM — E04.1 THYROID NODULE: Status: ACTIVE | Noted: 2019-11-23

## 2019-11-23 LAB
ALBUMIN SERPL BCP-MCNC: 2.7 G/DL (ref 3.5–5)
ALP SERPL-CCNC: 107 U/L (ref 46–116)
ALT SERPL W P-5'-P-CCNC: 27 U/L (ref 12–78)
ANION GAP SERPL CALCULATED.3IONS-SCNC: 9 MMOL/L (ref 4–13)
AST SERPL W P-5'-P-CCNC: 29 U/L (ref 5–45)
BACTERIA UR QL AUTO: ABNORMAL /HPF
BASOPHILS # BLD AUTO: 0.03 THOUSANDS/ΜL (ref 0–0.1)
BASOPHILS NFR BLD AUTO: 0 % (ref 0–1)
BILIRUB SERPL-MCNC: 1 MG/DL (ref 0.2–1)
BILIRUB UR QL STRIP: NEGATIVE
BUN SERPL-MCNC: 31 MG/DL (ref 5–25)
CA-I BLD-SCNC: 1.17 MMOL/L (ref 1.12–1.32)
CALCIUM SERPL-MCNC: 8.3 MG/DL (ref 8.3–10.1)
CHLORIDE SERPL-SCNC: 100 MMOL/L (ref 100–108)
CLARITY UR: ABNORMAL
CO2 SERPL-SCNC: 26 MMOL/L (ref 21–32)
COLOR UR: YELLOW
CREAT SERPL-MCNC: 1.03 MG/DL (ref 0.6–1.3)
EOSINOPHIL # BLD AUTO: 0.01 THOUSAND/ΜL (ref 0–0.61)
EOSINOPHIL NFR BLD AUTO: 0 % (ref 0–6)
ERYTHROCYTE [DISTWIDTH] IN BLOOD BY AUTOMATED COUNT: 13.4 % (ref 11.6–15.1)
EST. AVERAGE GLUCOSE BLD GHB EST-MCNC: 240 MG/DL
EST. AVERAGE GLUCOSE BLD GHB EST-MCNC: 243 MG/DL
GFR SERPL CREATININE-BSD FRML MDRD: 61 ML/MIN/1.73SQ M
GLUCOSE SERPL-MCNC: 111 MG/DL (ref 65–140)
GLUCOSE SERPL-MCNC: 140 MG/DL (ref 65–140)
GLUCOSE SERPL-MCNC: 144 MG/DL (ref 65–140)
GLUCOSE SERPL-MCNC: 169 MG/DL (ref 65–140)
GLUCOSE SERPL-MCNC: 186 MG/DL (ref 65–140)
GLUCOSE SERPL-MCNC: 189 MG/DL (ref 65–140)
GLUCOSE SERPL-MCNC: 207 MG/DL (ref 65–140)
GLUCOSE SERPL-MCNC: 256 MG/DL (ref 65–140)
GLUCOSE SERPL-MCNC: 261 MG/DL (ref 65–140)
GLUCOSE SERPL-MCNC: 306 MG/DL (ref 65–140)
GLUCOSE SERPL-MCNC: 368 MG/DL (ref 65–140)
GLUCOSE UR STRIP-MCNC: ABNORMAL MG/DL
HBA1C MFR BLD: 10 % (ref 4.2–6.3)
HBA1C MFR BLD: 10.1 % (ref 4.2–6.3)
HCT VFR BLD AUTO: 35 % (ref 34.8–46.1)
HGB BLD-MCNC: 10.9 G/DL (ref 11.5–15.4)
HGB UR QL STRIP.AUTO: ABNORMAL
IMM GRANULOCYTES # BLD AUTO: 0.06 THOUSAND/UL (ref 0–0.2)
IMM GRANULOCYTES NFR BLD AUTO: 1 % (ref 0–2)
KETONES UR STRIP-MCNC: NEGATIVE MG/DL
L PNEUMO1 AG UR QL IA.RAPID: NEGATIVE
LEUKOCYTE ESTERASE UR QL STRIP: ABNORMAL
LYMPHOCYTES # BLD AUTO: 1.17 THOUSANDS/ΜL (ref 0.6–4.47)
LYMPHOCYTES NFR BLD AUTO: 13 % (ref 14–44)
MAGNESIUM SERPL-MCNC: 2 MG/DL (ref 1.6–2.6)
MCH RBC QN AUTO: 26 PG (ref 26.8–34.3)
MCHC RBC AUTO-ENTMCNC: 31.1 G/DL (ref 31.4–37.4)
MCV RBC AUTO: 83 FL (ref 82–98)
MONOCYTES # BLD AUTO: 0.59 THOUSAND/ΜL (ref 0.17–1.22)
MONOCYTES NFR BLD AUTO: 7 % (ref 4–12)
NEUTROPHILS # BLD AUTO: 7.27 THOUSANDS/ΜL (ref 1.85–7.62)
NEUTS SEG NFR BLD AUTO: 79 % (ref 43–75)
NITRITE UR QL STRIP: NEGATIVE
NON-SQ EPI CELLS URNS QL MICRO: ABNORMAL /HPF
NRBC BLD AUTO-RTO: 0 /100 WBCS
OTHER STN SPEC: ABNORMAL
PH UR STRIP.AUTO: 5.5 [PH]
PHOSPHATE SERPL-MCNC: 3.7 MG/DL (ref 2.7–4.5)
PLATELET # BLD AUTO: 206 THOUSANDS/UL (ref 149–390)
PMV BLD AUTO: 12.4 FL (ref 8.9–12.7)
POTASSIUM SERPL-SCNC: 4.9 MMOL/L (ref 3.5–5.3)
PROT SERPL-MCNC: 6.7 G/DL (ref 6.4–8.2)
PROT UR STRIP-MCNC: ABNORMAL MG/DL
RBC # BLD AUTO: 4.2 MILLION/UL (ref 3.81–5.12)
RBC #/AREA URNS AUTO: ABNORMAL /HPF
S PNEUM AG UR QL: NEGATIVE
SODIUM SERPL-SCNC: 135 MMOL/L (ref 136–145)
SP GR UR STRIP.AUTO: 1.01 (ref 1–1.03)
UROBILINOGEN UR QL STRIP.AUTO: 0.2 E.U./DL
WBC # BLD AUTO: 9.13 THOUSAND/UL (ref 4.31–10.16)
WBC #/AREA URNS AUTO: ABNORMAL /HPF

## 2019-11-23 PROCEDURE — G8978 MOBILITY CURRENT STATUS: HCPCS

## 2019-11-23 PROCEDURE — 85025 COMPLETE CBC W/AUTO DIFF WBC: CPT | Performed by: NURSE PRACTITIONER

## 2019-11-23 PROCEDURE — G8979 MOBILITY GOAL STATUS: HCPCS

## 2019-11-23 PROCEDURE — 94760 N-INVAS EAR/PLS OXIMETRY 1: CPT

## 2019-11-23 PROCEDURE — 97163 PT EVAL HIGH COMPLEX 45 MIN: CPT

## 2019-11-23 PROCEDURE — 82948 REAGENT STRIP/BLOOD GLUCOSE: CPT

## 2019-11-23 PROCEDURE — 94640 AIRWAY INHALATION TREATMENT: CPT

## 2019-11-23 PROCEDURE — 80053 COMPREHEN METABOLIC PANEL: CPT | Performed by: NURSE PRACTITIONER

## 2019-11-23 PROCEDURE — 87449 NOS EACH ORGANISM AG IA: CPT | Performed by: NURSE PRACTITIONER

## 2019-11-23 PROCEDURE — 99233 SBSQ HOSP IP/OBS HIGH 50: CPT | Performed by: ANESTHESIOLOGY

## 2019-11-23 PROCEDURE — 83735 ASSAY OF MAGNESIUM: CPT | Performed by: NURSE PRACTITIONER

## 2019-11-23 PROCEDURE — 84100 ASSAY OF PHOSPHORUS: CPT | Performed by: NURSE PRACTITIONER

## 2019-11-23 PROCEDURE — 82330 ASSAY OF CALCIUM: CPT | Performed by: NURSE PRACTITIONER

## 2019-11-23 PROCEDURE — 94664 DEMO&/EVAL PT USE INHALER: CPT

## 2019-11-23 PROCEDURE — 81001 URINALYSIS AUTO W/SCOPE: CPT | Performed by: NURSE PRACTITIONER

## 2019-11-23 PROCEDURE — 83036 HEMOGLOBIN GLYCOSYLATED A1C: CPT | Performed by: NURSE PRACTITIONER

## 2019-11-23 PROCEDURE — 87086 URINE CULTURE/COLONY COUNT: CPT | Performed by: NURSE PRACTITIONER

## 2019-11-23 RX ORDER — MONTELUKAST SODIUM 10 MG/1
10 TABLET ORAL
Status: DISCONTINUED | OUTPATIENT
Start: 2019-11-23 | End: 2019-11-27 | Stop reason: HOSPADM

## 2019-11-23 RX ORDER — MORPHINE SULFATE 15 MG/1
15 TABLET ORAL 3 TIMES DAILY PRN
Status: DISCONTINUED | OUTPATIENT
Start: 2019-11-23 | End: 2019-11-27 | Stop reason: HOSPADM

## 2019-11-23 RX ORDER — INSULIN GLARGINE 100 [IU]/ML
20 INJECTION, SOLUTION SUBCUTANEOUS ONCE
Status: COMPLETED | OUTPATIENT
Start: 2019-11-23 | End: 2019-11-23

## 2019-11-23 RX ORDER — LEVALBUTEROL 1.25 MG/.5ML
1.25 SOLUTION, CONCENTRATE RESPIRATORY (INHALATION)
Status: DISCONTINUED | OUTPATIENT
Start: 2019-11-23 | End: 2019-11-23

## 2019-11-23 RX ORDER — FUROSEMIDE 20 MG/1
40 TABLET ORAL AS NEEDED
COMMUNITY
Start: 2019-08-16 | End: 2022-05-17

## 2019-11-23 RX ORDER — PEN NEEDLE, DIABETIC 32GX 5/32"
NEEDLE, DISPOSABLE MISCELLANEOUS
Refills: 0 | COMMUNITY
Start: 2019-08-23 | End: 2022-05-17

## 2019-11-23 RX ORDER — MORPHINE SULFATE 60 MG/1
60 TABLET, FILM COATED, EXTENDED RELEASE ORAL 2 TIMES DAILY
Refills: 0 | COMMUNITY
Start: 2019-09-13 | End: 2022-05-17

## 2019-11-23 RX ORDER — CARISOPRODOL 350 MG/1
350 TABLET ORAL 2 TIMES DAILY
Status: DISCONTINUED | OUTPATIENT
Start: 2019-11-23 | End: 2019-11-27 | Stop reason: HOSPADM

## 2019-11-23 RX ORDER — ALBUTEROL SULFATE 90 UG/1
2 AEROSOL, METERED RESPIRATORY (INHALATION) EVERY 4 HOURS PRN
Status: ON HOLD | COMMUNITY
Start: 2019-10-14 | End: 2022-05-16 | Stop reason: SDUPTHER

## 2019-11-23 RX ORDER — INSULIN GLARGINE 100 [IU]/ML
50 INJECTION, SOLUTION SUBCUTANEOUS
Status: DISCONTINUED | OUTPATIENT
Start: 2019-11-24 | End: 2019-11-24

## 2019-11-23 RX ORDER — INSULIN GLARGINE 100 [IU]/ML
20 INJECTION, SOLUTION SUBCUTANEOUS
Status: COMPLETED | OUTPATIENT
Start: 2019-11-23 | End: 2019-11-23

## 2019-11-23 RX ORDER — PREGABALIN 75 MG/1
150 CAPSULE ORAL 2 TIMES DAILY
Status: DISCONTINUED | OUTPATIENT
Start: 2019-11-23 | End: 2019-11-27 | Stop reason: HOSPADM

## 2019-11-23 RX ORDER — METOCLOPRAMIDE 10 MG/1
5 TABLET ORAL
Status: DISCONTINUED | OUTPATIENT
Start: 2019-11-23 | End: 2019-11-27 | Stop reason: HOSPADM

## 2019-11-23 RX ORDER — LEVALBUTEROL 1.25 MG/.5ML
1.25 SOLUTION, CONCENTRATE RESPIRATORY (INHALATION) EVERY 6 HOURS
Status: DISCONTINUED | OUTPATIENT
Start: 2019-11-23 | End: 2019-11-23

## 2019-11-23 RX ORDER — FLUTICASONE PROPIONATE 50 MCG
1 SPRAY, SUSPENSION (ML) NASAL DAILY
Status: DISCONTINUED | OUTPATIENT
Start: 2019-11-23 | End: 2019-11-27 | Stop reason: HOSPADM

## 2019-11-23 RX ORDER — LEVALBUTEROL 1.25 MG/.5ML
1.25 SOLUTION, CONCENTRATE RESPIRATORY (INHALATION)
Status: DISCONTINUED | OUTPATIENT
Start: 2019-11-24 | End: 2019-11-24

## 2019-11-23 RX ORDER — MORPHINE SULFATE 15 MG/1
15 TABLET, FILM COATED, EXTENDED RELEASE ORAL 2 TIMES DAILY
Refills: 0 | COMMUNITY
Start: 2019-09-13 | End: 2020-06-29 | Stop reason: HOSPADM

## 2019-11-23 RX ORDER — INSULIN GLARGINE 100 [IU]/ML
20 INJECTION, SOLUTION SUBCUTANEOUS
Status: DISCONTINUED | OUTPATIENT
Start: 2019-11-23 | End: 2019-11-23

## 2019-11-23 RX ORDER — PRAVASTATIN SODIUM 10 MG
10 TABLET ORAL
COMMUNITY
Start: 2019-08-16

## 2019-11-23 RX ORDER — LISINOPRIL 10 MG/1
10 TABLET ORAL DAILY
Status: DISCONTINUED | OUTPATIENT
Start: 2019-11-23 | End: 2019-11-27 | Stop reason: HOSPADM

## 2019-11-23 RX ORDER — DICYCLOMINE HYDROCHLORIDE 10 MG/1
10 CAPSULE ORAL 4 TIMES DAILY PRN
Status: DISCONTINUED | OUTPATIENT
Start: 2019-11-23 | End: 2019-11-27 | Stop reason: HOSPADM

## 2019-11-23 RX ADMIN — MONTELUKAST SODIUM 10 MG: 10 TABLET, FILM COATED ORAL at 21:47

## 2019-11-23 RX ADMIN — MORPHINE SULFATE 15 MG: 15 TABLET ORAL at 21:48

## 2019-11-23 RX ADMIN — INSULIN LISPRO 2 UNITS: 100 INJECTION, SOLUTION INTRAVENOUS; SUBCUTANEOUS at 18:16

## 2019-11-23 RX ADMIN — Medication 2000 MG: at 00:55

## 2019-11-23 RX ADMIN — HEPARIN SODIUM 5000 UNITS: 5000 INJECTION INTRAVENOUS; SUBCUTANEOUS at 05:37

## 2019-11-23 RX ADMIN — NYSTATIN 1 APPLICATION: 100000 POWDER TOPICAL at 08:14

## 2019-11-23 RX ADMIN — LEVALBUTEROL HYDROCHLORIDE 1.25 MG: 1.25 SOLUTION, CONCENTRATE RESPIRATORY (INHALATION) at 19:47

## 2019-11-23 RX ADMIN — DICYCLOMINE HYDROCHLORIDE 10 MG: 10 CAPSULE ORAL at 18:16

## 2019-11-23 RX ADMIN — CARISOPRODOL 350 MG: 350 TABLET ORAL at 18:15

## 2019-11-23 RX ADMIN — MORPHINE SULFATE 15 MG: 15 TABLET ORAL at 15:50

## 2019-11-23 RX ADMIN — IPRATROPIUM BROMIDE 0.5 MG: 0.5 SOLUTION RESPIRATORY (INHALATION) at 19:47

## 2019-11-23 RX ADMIN — PREGABALIN 150 MG: 75 CAPSULE ORAL at 18:15

## 2019-11-23 RX ADMIN — HEPARIN SODIUM 5000 UNITS: 5000 INJECTION INTRAVENOUS; SUBCUTANEOUS at 14:48

## 2019-11-23 RX ADMIN — IPRATROPIUM BROMIDE 0.5 MG: 0.5 SOLUTION RESPIRATORY (INHALATION) at 13:34

## 2019-11-23 RX ADMIN — NYSTATIN 1 APPLICATION: 100000 POWDER TOPICAL at 18:15

## 2019-11-23 RX ADMIN — SODIUM CHLORIDE 11 UNITS/HR: 9 INJECTION, SOLUTION INTRAVENOUS at 03:09

## 2019-11-23 RX ADMIN — INSULIN GLARGINE 20 UNITS: 100 INJECTION, SOLUTION SUBCUTANEOUS at 14:47

## 2019-11-23 RX ADMIN — LEVALBUTEROL HYDROCHLORIDE 1.25 MG: 1.25 SOLUTION, CONCENTRATE RESPIRATORY (INHALATION) at 13:34

## 2019-11-23 RX ADMIN — HEPARIN SODIUM 5000 UNITS: 5000 INJECTION INTRAVENOUS; SUBCUTANEOUS at 21:48

## 2019-11-23 RX ADMIN — FLUTICASONE PROPIONATE 1 SPRAY: 50 SPRAY, METERED NASAL at 18:15

## 2019-11-23 RX ADMIN — VANCOMYCIN HYDROCHLORIDE 1750 MG: 1 INJECTION, POWDER, LYOPHILIZED, FOR SOLUTION INTRAVENOUS at 14:51

## 2019-11-23 RX ADMIN — PANTOPRAZOLE SODIUM 40 MG: 40 TABLET, DELAYED RELEASE ORAL at 05:37

## 2019-11-23 RX ADMIN — METOCLOPRAMIDE HYDROCHLORIDE 5 MG: 10 TABLET ORAL at 15:49

## 2019-11-23 RX ADMIN — VANCOMYCIN HYDROCHLORIDE 1000 MG: 1 INJECTION, SOLUTION INTRAVENOUS at 02:08

## 2019-11-23 RX ADMIN — INSULIN LISPRO 2 UNITS: 100 INJECTION, SOLUTION INTRAVENOUS; SUBCUTANEOUS at 21:48

## 2019-11-23 RX ADMIN — INSULIN GLARGINE 20 UNITS: 100 INJECTION, SOLUTION SUBCUTANEOUS at 22:03

## 2019-11-23 RX ADMIN — CHLORHEXIDINE GLUCONATE 0.12% ORAL RINSE 15 ML: 1.2 LIQUID ORAL at 08:13

## 2019-11-23 RX ADMIN — SODIUM CHLORIDE, SODIUM GLUCONATE, SODIUM ACETATE, POTASSIUM CHLORIDE AND MAGNESIUM CHLORIDE 125 ML/HR: 526; 502; 368; 37; 30 INJECTION, SOLUTION INTRAVENOUS at 10:42

## 2019-11-23 RX ADMIN — LEVALBUTEROL HYDROCHLORIDE 1.25 MG: 1.25 SOLUTION, CONCENTRATE RESPIRATORY (INHALATION) at 08:26

## 2019-11-23 RX ADMIN — CHLORHEXIDINE GLUCONATE 0.12% ORAL RINSE 15 ML: 1.2 LIQUID ORAL at 21:47

## 2019-11-23 RX ADMIN — Medication 2000 MG: at 15:41

## 2019-11-23 RX ADMIN — SODIUM CHLORIDE, SODIUM GLUCONATE, SODIUM ACETATE, POTASSIUM CHLORIDE AND MAGNESIUM CHLORIDE 125 ML/HR: 526; 502; 368; 37; 30 INJECTION, SOLUTION INTRAVENOUS at 02:24

## 2019-11-23 RX ADMIN — LISINOPRIL 10 MG: 10 TABLET ORAL at 15:49

## 2019-11-23 RX ADMIN — IPRATROPIUM BROMIDE 0.5 MG: 0.5 SOLUTION RESPIRATORY (INHALATION) at 08:26

## 2019-11-23 RX ADMIN — AZITHROMYCIN 500 MG: 250 TABLET, FILM COATED ORAL at 14:47

## 2019-11-23 NOTE — ASSESSMENT & PLAN NOTE
Lab Results   Component Value Date    HGBA1C 10 0 (H) 11/22/2019       Recent Labs     11/23/19  0215 11/23/19  0402 11/23/19  0540 11/23/19  0756   POCGLU 306* 261* 189* 111       Blood Sugar Average: Last 72 hrs:  (P) 362 [id number]

## 2019-11-23 NOTE — ASSESSMENT & PLAN NOTE
Likely secondary to pneumonia  CT chest showing Lobular groundglass areas are seen in the upper lobes bilaterally, may be inflammatory or infectious and also could represent atypical infection  Patient has had a hospitalization within the last month  Will start on rea care acquired coverage with azithromycin, cefepime and vancomycin  Check blood cultures  Check urine antigens for strep and legionella  Check sputum culture  Check influenza

## 2019-11-23 NOTE — PROGRESS NOTES
Vancomycin Assessment    Anna Marie Castañeda is a 53 y o  female who is currently receiving vancomycin 1000mg q12h for Pneumonia     Relevant clinical data and objective history reviewed:  Creatinine   Date Value Ref Range Status   11/23/2019 1 03 0 60 - 1 30 mg/dL Final     Comment:     Standardized to IDMS reference method   11/22/2019 2 05 (H) 0 60 - 1 30 mg/dL Final     Comment:     Standardized to IDMS reference method   09/20/2019 0 78 0 60 - 1 30 mg/dL Final     Comment:     Standardized to IDMS reference method     /73 (BP Location: Left arm)   Pulse 76   Temp 97 6 °F (36 4 °C) (Oral)   Resp (!) 26   Ht 5' 8" (1 727 m)   Wt (!) 138 kg (304 lb 14 3 oz)   SpO2 96%   BMI 46 36 kg/m²   I/O last 3 completed shifts: In: 5060 [P O :240; I V :1770; IV Piggyback:3050]  Out: -   Lab Results   Component Value Date/Time    BUN 31 (H) 11/23/2019 05:25 AM    WBC 9 13 11/23/2019 05:25 AM    HGB 10 9 (L) 11/23/2019 05:25 AM    HCT 35 0 11/23/2019 05:25 AM    MCV 83 11/23/2019 05:25 AM     11/23/2019 05:25 AM     Temp Readings from Last 3 Encounters:   11/23/19 97 6 °F (36 4 °C) (Oral)   09/20/19 99 7 °F (37 6 °C)   07/22/19 98 2 °F (36 8 °C) (Oral)     Vancomycin Days of Therapy: 2    Assessment/Plan  The patient is currently on vancomycin utilizing scheduled dosing  Baseline risks associated with therapy include: pre-existing renal impairment  The patient is receiving 1000mg q12h and due to improved renal function and based on a goal of 15-20 (appropriate for most indications) ; therefore, after clinical evaluation will be changed to 1750mg q12h   Pharmacy will continue to follow closely for s/sx of nephrotoxicity, infusion reactions and appropriateness of therapy  BMP and CBC will be ordered per protocol  Plan for trough as patient approaches steady state, prior to the 5th  dose at approximately 1330 on 11/25   Pharmacy will continue to follow the patients culture results and clinical progress daily      Natalia Jalloh, Pharmacist

## 2019-11-23 NOTE — ASSESSMENT & PLAN NOTE
Likely pre-renal in nature in the setting of sepsis and pneumonia  Crystalloid resuscitation   Monitor Scrt

## 2019-11-23 NOTE — PHYSICAL THERAPY NOTE
PT Evaluation  10:32-10:51 (19 min)    Past Medical History:   Diagnosis Date    Asthma     Chronic pain     Diabetes mellitus ([facility] [address] )     Gastroparesis           11/23/19 1032   Note Type   Note type Eval only   Pain Assessment   Pain Assessment 0-10   Pain Score 6   Pain Type Chronic pain   Pain Location Neck;Back   Pain Orientation Posterior; Upper;Mid   Pain Descriptors Aching   Pain Frequency Constant/continuous   Pain Onset Ongoing   Home Living   Type of Home Apartment   Home Layout Bed/bath upstairs; Two level;Stairs to enter with rails  (duplex; 3 MATTHEW; 1 flight to bed/bath)   Bathroom Shower/Tub Tub/shower unit   Bathroom Toilet Standard   Bathroom Equipment   (none)   Bathroom Accessibility Accessible  (not via walker)   Home Equipment Walker;Cane  (hurrycane)   Prior Function   Level of [place] Independent with ADLs and functional mobility   Lives With [employer]  (spouse does not work)   Receives Help From Family   ADL Assistance Independent   IADLs Needs assistance  ( helps)   Falls in the last 6 months 0   Vocational On disability   Comments (+) drives   Restrictions/Precautions   Weight Bearing Precautions Per Order No   Other Precautions Chair Alarm; Bed Alarm;Multiple lines;Telemetry;O2;Fall Risk;Pain   General   Additional Pertinent History Pt is a 53 y o  female presenting to [facility] with c/o progressive worsening of SOB and a persisting cough > 2 weeks  Imaging suspicious for multilobar PNA  PT consulted for mobility + d/c planning   Orders up c assist  Pt agreeable to PT eval   Family/Caregiver Present No   Cognition   Orientation Level Oriented X4   RUE Assessment   RUE Assessment WFL   LUE Assessment   LUE Assessment WFL   RLE Assessment   RLE Assessment WFL  (gross MMT 4-/5)   LLE Assessment   LLE Assessment WFL  (gross MMT 4-/5)   Light Touch   RLE Light Touch Impaired   RLE Light Touch Comments 2* to diabetic peripheral neuropathy   LLE Light Touch Impaired   LLE Light Touch Comments 2* to diabetic peripheral neuropathy   Bed Mobility   Supine to Sit Unable to assess  (pt seated in bedside chair upon arrival)   Transfers   Sit to Stand 5  Supervision   Additional items Assist x 1; Armrests; Increased time required   Stand to Sit 5  Supervision   Additional items Assist x 1; Armrests; Increased time required   Toilet transfer 5  Supervision   Additional items Assist x 1;Standard toilet; Other; Increased time required  (grab bars)   Ambulation/Elevation   Gait pattern Antalgic; Forward Flexion; Wide SUMANTH; Decreased foot clearance; Short stride; Excessively slow   Gait Assistance 5  Supervision  (CGAx1)   Additional items Assist x 1;Verbal cues   Assistive Device Rolling walker   Distance 25'x2 + 10'    Balance   Static Sitting Good   Dynamic Sitting Fair +   Static Standing Fair   Dynamic Standing Fair -   Ambulatory Poor +   Endurance Deficit   Endurance Deficit Yes   Endurance Deficit Description 3L NC O2 throughout session  Vitals stable throughout  Activity Tolerance   Activity Tolerance Patient limited by fatigue;Patient limited by pain   Nurse Made Aware DELIA Clancy verbalized pt appropriate for PT eval  Made aware of session outcomes  Pt left seated on toilet at end of session with instruction to call for nurse with bell prior to getting up  Assessment   Prognosis Good   Problem List Decreased strength;Decreased endurance; Impaired balance;Decreased mobility; Impaired sensation;Obesity;Pain   Assessment Pt is a 53 y o  female presenting to [facility] with c/o progressive SOB and persisting cough > 2 weeks  Imaging suspicious for multilobar PNA  Pt has PMH significant for asthma, chronic pain, T2DM, gastroparesis, and presents with a diabetic ulcer on her R great toe  Pt resides in a duplex apartment with her spouse and son  Pt reports 3 MATTHEW with rails and has a flight of stairs to reach bed/bathroom   Pt reports PLOF independent with ADLs and requires some assistance with IADLs regarding laundry as she has a difficult time navigating stairs 2* weakness and SOB  Pt uses a cane at baseline for mobility and reports that she also has a RW at home, however primarily uses the cane  She reports feeling embarrased to use the RW out in the community given her age  Pt agreeable to PT eval  PT examination findings revealed impaired gross B/L LE light touch sensation that is worse in the feet 2* to reported diabetic peripheral neuropathy  Gross MMT 4-/5 B/L  Pt able to perform transfers sit<>stand (S) and ambulate with RW with CGAx1 for 25'x2 + 10' into the bathroom  Pt (S) with toilet transfer and used grab bars for UE support  Pt demonstrates limited mobility and activity tolerance 2* to weakness and fatigue during session  Pt on 3L NC O2 throughout session  Barthel Index 65/100 and Modified [place] 4  PT eval of high complexity due to significant PMH mentioned above, continued monitoring through telemetry and IV as well as required O2 due to fatigue  Pt is a good candidate for skilled PT while here to maximize functional mobility and improve overall quality of life  Upon d/c, recommend HHPT for mobility and safety in the home and to facilitate return to PLOF  Barriers to Discharge Inaccessible home environment   Goals   Patient Goals "to go back home"   STG Expiration Date 12/03/19   Short Term Goal #1 1  increase strength by 1/2 grade to improve functional mobility and transfers  2  perform all bed mobility and transfer tasks mod (I)/(I) with no or least restrictive device to decrease caregiver support  3  ambulate >50' mod (I) with least restrictive device to promote household distances  4  navigate a flight of stairs mod (I) with least restrictive device to safely enter and exit the home  PT Treatment Day 0   Plan   Treatment/Interventions Functional transfer training;LE strengthening/ROM; Elevations; Therapeutic exercise; Endurance training;Patient/family training;Equipment eval/education; Bed mobility;Gait training;Spoke to nursing   PT Frequency 5x/wk   Recommendation   Recommendation Home PT   PT - OK to Discharge Yes  (when medically stable)   Modified Warrick Scale   Modified Warrick Scale 4   Barthel Index   Feeding 10   Bathing 5   Grooming Score 5   Dressing Score 5   Bladder Score 10   Bowels Score 10   Toilet Use Score 10   Transfers (Bed/Chair) Score 10   Mobility (Level Surface) Score 0   Stairs Score 0   Barthel Index Score 65       Ellyn Mcclure,SPT

## 2019-11-23 NOTE — ASSESSMENT & PLAN NOTE
Lab Results   Component Value Date    HGBA1C 10 0 (H) 11/22/2019       Recent Labs     11/23/19  0007 11/23/19  0215 11/23/19  0402 11/23/19  0540   POCGLU 368* 306* 261* 189*       Blood Sugar Average: Last 72 hrs:  (P) 326

## 2019-11-23 NOTE — ASSESSMENT & PLAN NOTE
Lab Results   Component Value Date    HGBA1C 10 0 (H) 11/22/2019       Recent Labs     11/23/19  0007 11/23/19  0215 11/23/19  0402 11/23/19  0540   POCGLU 368* 306* 261* 189*       Blood Sugar Average: Last 72 hrs:  (P) 326     Will start on SSI coverage  Check HGBA1c

## 2019-11-23 NOTE — PROGRESS NOTES
Progress Note - Bina Fitting 1965, 53 y o  female MRN: [id number]    Unit/Bed#:  Encounter: [id number]    Primary Care Provider: Lupillo Barcenas MD   Date and time admitted to hospital: 2019  1:11 PM    No new Assessment & Plan notes have been filed under this hospital service since the last note was generated  Service: Critical Care/ICU    ----------------------------------------------------------------------------------------  HPI/24hr events: no acute events overnight    Disposition: Transfer to Med Surg with Telemetry   Code Status: Level 1 - Full Code  ---------------------------------------------------------------------------------------  SUBJECTIVE  "im not shaking like I was last night"      Review of Systems  Review of systems was reviewed and negative unless stated above in HPI/24-hour events   ---------------------------------------------------------------------------------------  OBJECTIVE    Physical Exam   Constitutional: She appears well-developed and well-nourished  She appears ill  HENT:   Head: Normocephalic and atraumatic  Eyes: Pupils are equal, round, and reactive to light  Conjunctivae, EOM and lids are normal    Neck: Trachea normal and full passive range of motion without pain  Cardiovascular: Regular rhythm, S1 normal and S2 normal    Pulmonary/Chest: Effort normal and breath sounds normal    Abdominal: Soft  Normal appearance and normal aorta  Musculoskeletal: Normal range of motion  Right lower leg: She exhibits edema  Left lower leg: She exhibits edema  Neurological: She is alert  GCS eye subscore is 4  GCS verbal subscore is 5  GCS motor subscore is 6     Skin:            Vitals   Vitals:    19 0800 19 0826 19 1107 19 1335   BP:   122/61    BP Location:   Left arm    Pulse: 91  81    Resp: 22  16    Temp:   97 9 °F (36 6 °C)    TempSrc:   Oral    SpO2: 96% 95% 96% 97%   Weight:       Height:         Temp (24hrs), Av 4 °F (36 9 °C), Min:97 5 °F (36 4 °C), Max:100 7 °F (38 2 °C)  Current: Temperature: 97 9 °F (36 6 °C)        Invasive/non-invasive ventilation settings   Respiratory    Lab Data (Last 4 hours)    None         O2/Vent Data (Last 4 hours)    None              Height and Weights   Height: 5' 8" (172 7 cm)  IBW: 63 9 kg  Body mass index is 46 36 kg/m²  Weight (last 2 days)     Date/Time   Weight    11/22/19 2000   (!) 138 (304 9)    11/22/19 1433   (!) 140 (308 64)            Intake and Output  I/O       11/21 0701 - 11/22 0700 11/22 0701 - 11/23 0700 11/23 0701 - 11/24 0700    P  O   240     I V  (mL/kg)  1770 (12 8) 32 (0 2)    IV Piggyback  3050     Total Intake(mL/kg)  5060 (36 7) 32 (0 2)    Urine (mL/kg/hr)   400 (1 5)    Total Output   400    Net  +5060 -368               Nutrition       Diet Orders   (From admission, onward)             Start     Ordered    11/22/19 2336  Diet Cardiovascular; Cardiac; Consistent Carbohydrate Diet Level 2 (5 carb servings/75 grams CHO/meal)  Diet effective now     Question Answer Comment   Diet Type Cardiovascular    Cardiac Cardiac    Other Restriction(s): Consistent Carbohydrate Diet Level 2 (5 carb servings/75 grams CHO/meal)    RD to adjust diet per protocol?  Yes        11/22/19 2336              Laboratory and Diagnostics:  Results from last 7 days   Lab Units 11/23/19  0525 11/22/19  1745 11/22/19  1327   WBC Thousand/uL 9 13  --  10 66*   HEMOGLOBIN g/dL 10 9*  --  11 8   HEMATOCRIT % 35 0  --  38 2   PLATELETS Thousands/uL 206 226 247   NEUTROS PCT % 79*  --  74   MONOS PCT % 7  --  9     Results from last 7 days   Lab Units 11/23/19  0525 11/22/19  1327   SODIUM mmol/L 135* 133*   POTASSIUM mmol/L 4 9 5 7*   CHLORIDE mmol/L 100 96*   CO2 mmol/L 26 27   ANION GAP mmol/L 9 10   BUN mg/dL 31* 26*   CREATININE mg/dL 1 03 2 05*   CALCIUM mg/dL 8 3 8 9   GLUCOSE RANDOM mg/dL 207* 320*   ALT U/L 27 23   AST U/L 29 30   ALK PHOS U/L 107 124*   ALBUMIN g/dL 2 7* 3 1*   TOTAL BILIRUBIN mg/dL 1 00 1 00     Results from last 7 days   Lab Units 11/23/19  0525 11/22/19  1327   MAGNESIUM mg/dL 2 0 1 6   PHOSPHORUS mg/dL 3 7  --       Results from last 7 days   Lab Units 11/22/19  1327   INR  1 16   PTT seconds 34      Results from last 7 days   Lab Units 11/22/19  2218 11/22/19  1921 11/22/19  1741 11/22/19  1327   TROPONIN I ng/mL <0 02 <0 02 <0 02 <0 02     Results from last 7 days   Lab Units 11/22/19  1327   LACTIC ACID mmol/L 1 5     ABG:    VBG:  Results from last 7 days   Lab Units 11/22/19  1327   PH GAUTAM  7 376   PCO2 GAUTAM mm Hg 41 2*   PO2 GAUTAM mm Hg 134 7*   HCO3 GAUTAM mmol/L 23 6*   BASE EXC GAUTAM mmol/L -1 5     Results from last 7 days   Lab Units 11/22/19  1327   PROCALCITONIN ng/ml 0 22       Micro  Results from last 7 days   Lab Units 11/22/19  1329 11/22/19  1327   BLOOD CULTURE  Received in Microbiology Lab  Culture in Progress  Received in Microbiology Lab  Culture in Progress  EKG: NSR   Imaging:   CT chest without contrast   Final Result      Lobular groundglass areas are seen in the upper lobes bilaterally, these  are indeterminate, May be on an inflammatory or infectious basis could represent atypical infection, eosinophilic pneumonia is also in consideration  in appropriate clinical    setting      2 cm right thyroid nodule, meet the size threshold criteria for further evaluation with ultrasound      The study was marked in EPIC for significant notification  A follow-up notification has been created in the [employer] performed: [id number]         XR chest 1 view portable   ED Interpretation   Increased interstitial markings and cardiomegaly  Questionable infiltrate right middle to lower lobe  Final Result      Patchy scattered peripheral infiltrates suspicious for multilobar pneumonia  The study was marked in [facility] for immediate notification              Workstation performed: [id number]           Active Medications  Scheduled Meds:    Current Facility-Administered Medications:  azithromycin 500 mg Oral Q24H Pawel Reamer, CRNP    cefepime 2,000 mg Intravenous Q12H Pawel Reamer, CRNP Last Rate: 2,000 mg (11/23/19 0055)   chlorhexidine 15 mL Swish & Spit Q12H [address] Pawel Reamer, CRNP    heparin (porcine) 5,000 Units Subcutaneous [facility] Pawel Reamer, CRNP    insulin regular (HumuLIN R,NovoLIN R) infusion 0 3-21 Units/hr Intravenous Titrated Magda Jackie, CRNP Last Rate: 6 Units/hr (11/23/19 1237)   ipratropium 0 5 mg Nebulization Q6H Alvaro Lischner, DO    levalbuterol 1 25 mg Nebulization Q6H Alvaro Lischner, DO    multi-electrolyte 125 mL/hr Intravenous Continuous Pawel Reamer, CRNP Last Rate: 125 mL/hr (11/23/19 1042)   nystatin  Topical BID Pawel Reamer, CRNP    pantoprazole 40 mg Oral Early Morning Pawel Reamer, CRNP    vancomycin 20 mg/kg (Adjusted) Intravenous Q12H Pawel Reamer, CRNP      Continuous Infusions:    insulin regular (HumuLIN R,NovoLIN R) infusion 0 3-21 Units/hr Last Rate: 6 Units/hr (11/23/19 1237)   multi-electrolyte 125 mL/hr Last Rate: 125 mL/hr (11/23/19 1042)     PRN Meds:        ---------------------------------------------------------------------------------------  Advance Directive and Living Will:      Power of :    POLST:    ---------------------------------------------------------------------------------------  Counseling / Coordination of Care  Total time spent today 34 minutes  Greater than 50% of total time was spent with the patient and / or family counseling and / or coordination of care  VIVI Velasco      Portions of the record may have been created with voice recognition software  Occasional wrong word or "sound a like" substitutions may have occurred due to the inherent limitations of voice recognition software    Read the chart carefully and recognize, using context, where substitutions have occurred

## 2019-11-23 NOTE — ASSESSMENT & PLAN NOTE
Lab Results   Component Value Date    HGBA1C 10 0 (H) 11/22/2019       Recent Labs     11/23/19  0007 11/23/19  0215 11/23/19  0402 11/23/19  0540   POCGLU 368* 306* 261* 189*       Blood Sugar Average: Last 72 hrs:  (P) 326     Does not appear to be infectious  Continue local wound care  Wound care consult   Follow up with wound established wound care specialist as outpatient

## 2019-11-23 NOTE — PLAN OF CARE
Problem: PAIN - ADULT  Goal: Verbalizes/displays adequate comfort level or baseline comfort level  Description  Interventions:  - Encourage patient to monitor pain and request assistance  - Assess pain using appropriate pain scale  - Administer analgesics based on type and severity of pain and evaluate response  - Implement non-pharmacological measures as appropriate and evaluate response  - Consider cultural and social influences on pain and pain management  - Notify physician/advanced practitioner if interventions unsuccessful or patient reports new pain  Outcome: Progressing     Problem: INFECTION - ADULT  Goal: Absence or prevention of progression during hospitalization  Description  INTERVENTIONS:  - Assess and monitor for signs and symptoms of infection  - Monitor lab/diagnostic results  - Monitor all insertion sites, i e  indwelling lines, tubes, and drains  - Monitor endotracheal if appropriate and nasal secretions for changes in amount and color  - [place] appropriate cooling/warming therapies per order  - Administer medications as ordered  - Instruct and encourage patient and family to use good hand hygiene technique  - Identify and instruct in appropriate isolation precautions for identified infection/condition  Outcome: Progressing  Goal: Absence of fever/infection during neutropenic period  Description  INTERVENTIONS:  - Monitor WBC    Outcome: Progressing     Problem: SAFETY ADULT  Goal: Patient will remain free of falls  Description  INTERVENTIONS:  - Assess patient frequently for physical needs  -  Identify cognitive and physical deficits and behaviors that affect risk of falls    -  [place] fall precautions as indicated by assessment   - Educate patient/family on patient safety including physical limitations  - Instruct patient to call for assistance with activity based on assessment  - Modify environment to reduce risk of injury  - Consider OT/PT consult to assist with strengthening/mobility  Outcome: Progressing  Goal: Maintain or return to baseline ADL function  Description  INTERVENTIONS:  -  Assess patient's ability to carry out ADLs; assess patient's baseline for ADL function and identify physical deficits which impact ability to perform ADLs (bathing, care of mouth/teeth, toileting, grooming, dressing, etc )  - Assess/evaluate cause of self-care deficits   - Assess range of motion  - Assess patient's mobility; develop plan if impaired  - Assess patient's need for assistive devices and provide as appropriate  - Encourage maximum independence but intervene and supervise when necessary  - Involve family in performance of ADLs  - Assess for home care needs following discharge   - Consider OT consult to assist with ADL evaluation and planning for discharge  - Provide patient education as appropriate  Outcome: Progressing  Goal: Maintain or return mobility status to optimal level  Description  INTERVENTIONS:  - Assess patient's baseline mobility status (ambulation, transfers, stairs, etc )    - Identify cognitive and physical deficits and behaviors that affect mobility  - Identify mobility aids required to assist with transfers and/or ambulation (gait belt, sit-to-stand, lift, walker, cane, etc )  - [place] fall precautions as indicated by assessment  - Record patient progress and toleration of activity level on Mobility SBAR; progress patient to next Phase/Stage  - Instruct patient to call for assistance with activity based on assessment  - Consider rehabilitation consult to assist with strengthening/weightbearing, etc   Outcome: Progressing     Problem: DISCHARGE PLANNING  Goal: Discharge to home or other facility with appropriate resources  Description  INTERVENTIONS:  - Identify barriers to discharge w/patient and caregiver  - Arrange for needed discharge resources and transportation as appropriate  - Identify discharge learning needs (meds, wound care, etc )  - Arrange for interpretive services to assist at discharge as needed  - Refer to Case Management Department for coordinating discharge planning if the patient needs post-hospital services based on physician/advanced practitioner order or complex needs related to functional status, cognitive ability, or social support system  Outcome: Progressing     Problem: Knowledge Deficit  Goal: Patient/family/caregiver demonstrates understanding of disease process, treatment plan, medications, and discharge instructions  Description  Complete learning assessment and assess knowledge base    Interventions:  - Provide teaching at level of understanding  - Provide teaching via preferred learning methods  Outcome: Progressing     Problem: CARDIOVASCULAR - ADULT  Goal: Maintains optimal cardiac output and hemodynamic stability  Description  INTERVENTIONS:  - Monitor I/O, vital signs and rhythm  - Monitor for S/S and trends of decreased cardiac output  - Administer and titrate ordered vasoactive medications to optimize hemodynamic stability  - Assess quality of pulses, skin color and temperature  - Assess for signs of decreased coronary artery perfusion  - Instruct patient to report change in severity of symptoms  Outcome: Progressing  Goal: Absence of cardiac dysrhythmias or at baseline rhythm  Description  INTERVENTIONS:  - Continuous cardiac monitoring, vital signs, obtain 12 lead EKG if ordered  - Administer antiarrhythmic and heart rate control medications as ordered  - Monitor electrolytes and administer replacement therapy as ordered  Outcome: Progressing     Problem: RESPIRATORY - ADULT  Goal: Achieves optimal ventilation and oxygenation  Description  INTERVENTIONS:  - Assess for changes in respiratory status  - Assess for changes in mentation and behavior  - Position to facilitate oxygenation and minimize respiratory effort  - Oxygen administered by appropriate delivery if ordered  - Initiate smoking cessation education as indicated  - Encourage broncho-pulmonary hygiene including cough, deep breathe, Incentive Spirometry  - Assess the need for suctioning and aspirate as needed  - Assess and instruct to report SOB or any respiratory difficulty  - Respiratory Therapy support as indicated  Outcome: Progressing     Problem: METABOLIC, FLUID AND ELECTROLYTES - ADULT  Goal: Electrolytes maintained within normal limits  Description  INTERVENTIONS:  - Monitor labs and assess patient for signs and symptoms of electrolyte imbalances  - Administer electrolyte replacement as ordered  - Monitor response to electrolyte replacements, including repeat lab results as appropriate  - Instruct patient on fluid and nutrition as appropriate  Outcome: Progressing  Goal: Fluid balance maintained  Description  INTERVENTIONS:  - Monitor labs   - Monitor I/O and WT  - Instruct patient on fluid and nutrition as appropriate  - Assess for signs & symptoms of volume excess or deficit  Outcome: Progressing  Goal: Glucose maintained within target range  Description  INTERVENTIONS:  - Monitor Blood Glucose as ordered  - Assess for signs and symptoms of hyperglycemia and hypoglycemia  - Administer ordered medications to maintain glucose within target range  - Assess nutritional intake and initiate nutrition service referral as needed  Outcome: Progressing     Problem: SKIN/TISSUE INTEGRITY - ADULT  Goal: Skin integrity remains intact  Description  INTERVENTIONS  - Identify patients at risk for skin breakdown  - Assess and monitor skin integrity  - Assess and monitor nutrition and hydration status  - Monitor labs (i e  albumin)  - Assess for incontinence   - Turn and reposition patient  - Assist with mobility/ambulation  - Relieve pressure over bony prominences  - Avoid friction and shearing  - Provide appropriate hygiene as needed including keeping skin clean and dry  - Evaluate need for skin moisturizer/barrier cream  - Collaborate with interdisciplinary team (i e  Nutrition, Rehabilitation, etc )   - Patient/family teaching  Outcome: Progressing  Goal: Incision(s), wounds(s) or drain site(s) healing without S/S of infection  Description  INTERVENTIONS  - Assess and document risk factors for skin impairment   - Assess and document dressing, incision, wound bed, drain sites and surrounding tissue  - Consider nutrition services referral as needed  - Oral mucous membranes remain intact  - Provide patient/ family education  Outcome: Progressing  Goal: Oral mucous membranes remain intact  Description  INTERVENTIONS  - Assess oral mucosa and hygiene practices  - Implement preventative oral hygiene regimen  - Implement oral medicated treatments as ordered  - Initiate Nutrition services referral as needed  Outcome: Progressing     Problem: HEMATOLOGIC - ADULT  Goal: Maintains hematologic stability  Description  INTERVENTIONS  - Assess for signs and symptoms of bleeding or hemorrhage  - Monitor labs  - Administer supportive blood products/factors as ordered and appropriate  Outcome: Progressing     Problem: MUSCULOSKELETAL - ADULT  Goal: Maintain or return mobility to safest level of function  Description  INTERVENTIONS:  - Assess patient's ability to carry out ADLs; assess patient's baseline for ADL function and identify physical deficits which impact ability to perform ADLs (bathing, care of mouth/teeth, toileting, grooming, dressing, etc )  - Assess/evaluate cause of self-care deficits   - Assess range of motion  - Assess patient's mobility  - Assess patient's need for assistive devices and provide as appropriate  - Encourage maximum independence but intervene and supervise when necessary  - Involve family in performance of ADLs  - Assess for home care needs following discharge   - Consider OT consult to assist with ADL evaluation and planning for discharge  - Provide patient education as appropriate  Outcome: Progressing  Goal: Maintain proper alignment of affected body part  Description  INTERVENTIONS:  - Support, maintain and protect limb and body alignment  - Provide patient/ family with appropriate education  Outcome: Progressing

## 2019-11-23 NOTE — PLAN OF CARE
Problem: PHYSICAL THERAPY ADULT  Goal: Performs mobility at highest level of function for planned discharge setting  See evaluation for individualized goals  Description  Treatment/Interventions: Functional transfer training, LE strengthening/ROM, Elevations, Therapeutic exercise, Endurance training, Patient/family training, Equipment eval/education, Bed mobility, Gait training, Spoke to nursing       See flowsheet documentation for full assessment, interventions and recommendations  Note:   Prognosis: Good  Problem List: Decreased strength, Decreased endurance, Impaired balance, Decreased mobility, Impaired sensation, Obesity, Pain  Assessment: Pt is a 53 y o  female presenting to [facility] with c/o progressive SOB and persisting cough > 2 weeks  Imaging suspicious for multilobar PNA  Pt has PMH significant for asthma, chronic pain, T2DM, gastroparesis, and presents with a diabetic ulcer on her R great toe  Pt resides in a duplex apartment with her spouse and son  Pt reports 3 MATTHEW with rails and has a flight of stairs to reach bed/bathroom  Pt reports PLOF independent with ADLs and requires some assistance with IADLs regarding laundry as she has a difficult time navigating stairs 2* weakness and SOB  Pt uses a cane at baseline for mobility and reports that she also has a RW at home, however primarily uses the cane  She reports feeling embarrased to use the RW out in the community given her age  Pt agreeable to PT eval  PT examination findings revealed impaired gross B/L LE light touch sensation that is worse in the feet 2* to reported diabetic peripheral neuropathy  Gross MMT 4-/5 B/L  Pt able to perform transfers sit<>stand (S) and ambulate with RW with CGAx1 for 25'x2 + 10' into the bathroom  Pt (S) with toilet transfer and used grab bars for UE support  Pt demonstrates limited mobility and activity tolerance 2* to weakness and fatigue during session  Pt on 3L NC O2 throughout session   Barthel Index 65/100 and Modified [place] 4  PT eval of high complexity due to significant PMH mentioned above, continued monitoring through telemetry and IV as well as required O2 due to fatigue  Pt is a good candidate for skilled PT while here to maximize functional mobility and improve overall quality of life  Upon d/c, recommend HHPT for mobility and safety in the home and to facilitate return to PLOF  Barriers to Discharge: Inaccessible home environment     Recommendation: Home PT     PT - OK to Discharge: Yes(when medically stable)    See flowsheet documentation for full assessment

## 2019-11-23 NOTE — ASSESSMENT & PLAN NOTE
Likely pre-renal in nature in the setting of sepsis and pneumonia  Crystalloid resuscitation   Monitor Scrt, it has improved

## 2019-11-23 NOTE — ASSESSMENT & PLAN NOTE
Lab Results   Component Value Date    HGBA1C 10 0 (H) 11/22/2019       Recent Labs     11/23/19  0215 11/23/19  0402 11/23/19  0540 11/23/19  0756   POCGLU 306* 261* 189* 111       Blood Sugar Average: Last 72 hrs:  (P) 302 [id number]     Initiated on insulin gtt overnight for hyperglycemia   Check HGBA1c

## 2019-11-23 NOTE — ASSESSMENT & PLAN NOTE
Lab Results   Component Value Date    HGBA1C 10 0 (H) 11/22/2019       Recent Labs     11/23/19  0215 11/23/19  0402 11/23/19  0540 11/23/19  0756   POCGLU 306* 261* 189* 111       Blood Sugar Average: Last 72 hrs:  (P) 302 [id number]     Does not appear to be infectious  Continue local wound care  Wound care consult   Follow up with wound established wound care specialist as outpatient

## 2019-11-23 NOTE — H&P
H&P- Umair Shoulder 1965, 53 y o  female MRN: [id number]    Unit/Bed#:  Encounter: [id number]    Primary Care Provider: Aubrey Ricks MD   Date and time admitted to hospital: 11/22/2019  1:11 PM        Acute respiratory insufficiency  Assessment & Plan  Monitor respiratory status   Pulmonary toileting  Antibiotic regimen as above   Early mobilization    Sepsis [facility])  Assessment & Plan  Likely secondary to pneumonia  CT chest showing Lobular groundglass areas are seen in the upper lobes bilaterally, may be inflammatory or infectious and also could represent atypical infection  Patient has had a hospitalization within the last month  Will start on rea care acquired coverage with azithromycin, cefepime and vancomycin  Check blood cultures  Check urine antigens for strep and legionella  Check sputum culture  Check influenza    ALEJANDRO (acute kidney injury) ([facility] [address] )  Assessment & Plan  Likely pre-renal in nature in the setting of sepsis and pneumonia  Crystalloid resuscitation   Monitor Scrt     Morbid obesity ([facility] [address] )  Assessment & Plan  Cardiac diet     Essential hypertension  Assessment & Plan  Continue home dose medications    Uncontrolled diabetes mellitus [facility])  Assessment & Plan  Lab Results   Component Value Date    HGBA1C 10 0 (H) 11/22/2019       Recent Labs     11/23/19  0007 11/23/19  0215 11/23/19  0402 11/23/19  0540   POCGLU 368* 306* 261* 189*       Blood Sugar Average: Last 72 hrs:  (P) 326    Uncomplicated opioid dependence ([facility] [address] )  Assessment & Plan  Continue home dose regimen when respiratory status improves    Asthma  Assessment & Plan  Without exacerbation   ATC nebulizers  O2 as needed to keep O2 sats greater than 92%       Type 2 diabetes mellitus with diabetic neuropathy, with long-term current use of insulin [facility])  Assessment & Plan  Lab Results   Component Value Date    HGBA1C 10 0 (H) 11/22/2019       Recent Labs     11/23/19  0007 11/23/19  0215 11/23/19  0402 11/23/19  0540   POCGLU 368* 306* 261* 189*       Blood Sugar Average: Last 72 hrs:  (P) 326     Will start on SSI coverage  Check HGBA1c    Diabetic ulcer of right great toe [facility])  Assessment & Plan  Lab Results   Component Value Date    HGBA1C 10 0 (H) 11/22/2019       Recent Labs     11/23/19  0007 11/23/19  0215 11/23/19  0402 11/23/19  0540   POCGLU 368* 306* 261* 189*       Blood Sugar Average: Last 72 hrs:  (P) 326     Does not appear to be infectious  Continue local wound care  Wound care consult   Follow up with wound established wound care specialist as outpatient    * Pneumonia  Assessment & Plan  Plan as above      -------------------------------------------------------------------------------------------------------------  Chief Complaint: shortness of breath     History of Present Illness   Anna Marie Posey is a 53 y o  female who presents with a past medical history of asthma, chronic back and neck pain with opioid dependence, DM, diabetic foot ulcer  She presents to the emergency department with hyperglycemia  She states that she has had a cough for approximately 2 weeks without a productive cough  She has been progressively short of breath and it acutely worsened today  She denies any recent contacts and denies fever at home  She did have dysuria at home and received a prescription for diflucan  She was noted to be febrile with temperature for 102 7 she is referred to the step down unit for further management and care  History obtained from chart review and the patient   -------------------------------------------------------------------------------------------------------------  Dispo: Continue Critical Care     Code Status: Level 1 - Full Code  --------------------------------------------------------------------------------------------------------------  Review of Systems   Constitutional: Positive for fever  Respiratory: Positive for cough and shortness of breath      Cardiovascular: Positive for leg swelling  Gastrointestinal: Negative  Genitourinary: Positive for dysuria  Musculoskeletal: Negative  Allergic/Immunologic: Negative  Neurological: Negative  Hematological: Negative  Psychiatric/Behavioral: Negative  A 12-point, complete review of systems was reviewed and negative except as stated above     Physical Exam   Constitutional: She appears well-developed  She appears ill  HENT:   Head: Normocephalic and atraumatic  Eyes: Pupils are equal, round, and reactive to light  Conjunctivae, EOM and lids are normal    Neck: Trachea normal and full passive range of motion without pain  Cardiovascular: Regular rhythm  Tachycardia present  Pulmonary/Chest: Effort normal and breath sounds normal    Abdominal: Soft  Normal appearance, normal aorta and bowel sounds are normal    Musculoskeletal: Normal range of motion  Neurological: She is alert     Skin:          --------------------------------------------------------------------------------------------------------------  Historical Information   Past Medical History:   Diagnosis Date    Asthma     Chronic pain     Diabetes mellitus ([address] )     Gastroparesis      Past Surgical History:   Procedure Laterality Date    CERVICAL LAMINECTOMY      CHOLECYSTECTOMY      CHOLECYSTECTOMY LAPAROSCOPIC N/A 11/10/2018    Procedure: CHOLECYSTECTOMY LAPAROSCOPIC w/ ioc;  Surgeon: Melvin Bernal MD;  Location: [facility];  Service: General    HYSTERECTOMY      JOINT REPLACEMENT Bilateral     knee    TOE AMPUTATION Right 2/2/2018    Procedure: AMPUTATION TOE, RIGHT GREAT TOE;  Surgeon: Chele Odell DPM;  Location: [facility];  Service: Podiatry     Social History   Social History     Substance and Sexual Activity   Alcohol Use Yes    Frequency: 2-4 times a month    Drinks per session: 1 or 2    Comment: rarely     Social History     Substance and Sexual Activity   Drug Use No     Social History     Tobacco Use   Smoking Status Never Smoker   Smokeless Tobacco Never Used     Family History:   Family History   Problem Relation Age of Onset    Diabetes Mother     Diabetes Maternal Grandmother     No Known Problems Father      I have reviewed this patient's family history and commented on sigificant items within the HPI    Vitals:   Vitals:    11/23/19 0542 11/23/19 0600 11/23/19 0647 11/23/19 0800   BP:  114/77 122/73    BP Location:   Left arm    Pulse:  78 76 91   Resp:  (!) 9 (!) 26 22   Temp: 97 5 °F (36 4 °C)  97 6 °F (36 4 °C)    TempSrc: Oral  Oral    SpO2:  95% 96% 96%   Weight:       Height:         Temp  Min: 97 5 °F (36 4 °C)  Max: 102 7 °F (39 3 °C)  IBW: 63 9 kg  Height: 5' 8" (172 7 cm)  Body mass index is 46 36 kg/m²  Medications:  Current Facility-Administered Medications   Medication Dose Route Frequency    azithromycin (ZITHROMAX) tablet 500 mg  500 mg Oral Q24H    cefepime (MAXIPIME) 2,000 mg in dextrose 5 % 50 mL IVPB  2,000 mg Intravenous Q12H    chlorhexidine (PERIDEX) 0 12 % oral rinse 15 mL  15 mL Swish & Spit Q12H [facility]    heparin (porcine) subcutaneous injection 5,000 Units  5,000 Units Subcutaneous Q8H [facility]    insulin regular (HumuLIN R,NovoLIN R) 1 Units/mL in sodium chloride 0 9 % 100 mL infusion  0 3-21 Units/hr Intravenous Titrated    ipratropium (ATROVENT) 0 02 % inhalation solution 0 5 mg  0 5 mg Nebulization Q6H    levalbuterol (XOPENEX) inhalation solution 1 25 mg  1 25 mg Nebulization Q6H    multi-electrolyte (PLASMALYTE-A/ISOLYTE-S PH 7 4) IV solution  125 mL/hr Intravenous Continuous    nystatin (MYCOSTATIN) powder   Topical BID    pantoprazole (PROTONIX) EC tablet 40 mg  40 mg Oral Early Morning    vancomycin (VANCOCIN) 1,750 mg in sodium chloride 0 9 % 500 mL IVPB  20 mg/kg (Adjusted) Intravenous Q12H     Home medications:  Prior to Admission Medications   Prescriptions Last Dose Informant Patient Reported? Taking?    Insulin Pen Needle 29G X 5MM MISC   No No   Sig: by Does not apply route 4 (four) times a day   LYRICA 150 MG capsule   Yes No   Sig: Take 150 mg by mouth 2 (two) times a day   WIXELA INHUB 500-50 MCG/DOSE inhaler   Yes No   Sig: Take 1 puff by mouth 2 (two) times a day   albuterol (ACCUNEB) 1 25 MG/3ML nebulizer solution  Self Yes No   Sig: Take 1 ampule by nebulization every 6 (six) hours as needed for wheezing   albuterol (PROVENTIL HFA,VENTOLIN HFA) 90 mcg/act inhaler  Self Yes No   Sig: Inhale 2 puffs every 6 (six) hours as needed for wheezing   carisoprodol (SOMA) 350 mg tablet  Self Yes No   Sig: Take 350 mg by mouth 2 (two) times a day   diazepam (VALIUM) 5 mg tablet  Self Yes No   Sig: Take 5 mg by mouth daily at bedtime as needed for anxiety   dicyclomine (BENTYL) 10 mg capsule   No No   Sig: Take 1 capsule (10 mg total) by mouth 4 (four) times a day as needed (As needed for abdominal cramping)   fluticasone (FLONASE) 50 mcg/act nasal spray  Self Yes No   Si spray into each nostril daily   insulin aspart (NOVOLOG FLEXPEN) 100 Units/mL injection pen   No No   Sig: Inject 12 Units under the skin 3 (three) times a day with meals   insulin glargine (LANTUS) 100 units/mL subcutaneous injection  Self Yes No   Sig: Inject 50 Units under the skin daily at bedtime     lisinopril (ZESTRIL) 10 mg tablet  Self No No   Sig: Take 1 tablet (10 mg total) by mouth daily   metoclopramide (REGLAN) 10 mg tablet   No No   Sig: TAKE 1 TABLET PO QID BEFORE MEALS AND HS   montelukast (SINGULAIR) 10 mg tablet  Self Yes No   Sig: Take 10 mg by mouth daily at bedtime   morphine (MSIR) 15 mg tablet  Self Yes No   Sig: Take 15 mg by mouth 3 (three) times a day as needed for moderate pain or severe pain     nystatin (MYCOSTATIN) powder   No No   Sig: Apply topically 2 (two) times a day   omeprazole (PriLOSEC) 20 mg delayed release capsule  Self Yes No   Sig: Take 20 mg by mouth daily   ondansetron (ZOFRAN) 4 mg tablet  Self No No   Sig: TAKE 1 TABLET BY MOUTH EVERY 8 HOURS AS NEEDED FOR NAUSEA AND VOMITING      Facility-Administered Medications: None     Allergies: Allergies   Allergen Reactions    Nsaids GI Intolerance         Laboratory and Diagnostics:  Results from last 7 days   Lab Units 11/23/19  0525 11/22/19  1745 11/22/19  1327   WBC Thousand/uL 9 13  --  10 66*   HEMOGLOBIN g/dL 10 9*  --  11 8   HEMATOCRIT % 35 0  --  38 2   PLATELETS Thousands/uL 206 226 247   NEUTROS PCT % 79*  --  74   MONOS PCT % 7  --  9     Results from last 7 days   Lab Units 11/23/19  0525 11/22/19  1327   SODIUM mmol/L 135* 133*   POTASSIUM mmol/L 4 9 5 7*   CHLORIDE mmol/L 100 96*   CO2 mmol/L 26 27   ANION GAP mmol/L 9 10   BUN mg/dL 31* 26*   CREATININE mg/dL 1 03 2 05*   CALCIUM mg/dL 8 3 8 9   GLUCOSE RANDOM mg/dL 207* 320*   ALT U/L 27 23   AST U/L 29 30   ALK PHOS U/L 107 124*   ALBUMIN g/dL 2 7* 3 1*   TOTAL BILIRUBIN mg/dL 1 00 1 00     Results from last 7 days   Lab Units 11/23/19  0525 11/22/19  1327   MAGNESIUM mg/dL 2 0 1 6   PHOSPHORUS mg/dL 3 7  --       Results from last 7 days   Lab Units 11/22/19  1327   INR  1 16   PTT seconds 34      Results from last 7 days   Lab Units 11/22/19  2218 11/22/19  1921 11/22/19  1741 11/22/19  1327   TROPONIN I ng/mL <0 02 <0 02 <0 02 <0 02     Results from last 7 days   Lab Units 11/22/19  1327   LACTIC ACID mmol/L 1 5     ABG:    VBG:  Results from last 7 days   Lab Units 11/22/19  1327   PH GAUTAM  7 376   PCO2 GAUTAM mm Hg 41 2*   PO2 GAUTAM mm Hg 134 7*   HCO3 GAUTAM mmol/L 23 6*   BASE EXC GAUTAM mmol/L -1 5     Results from last 7 days   Lab Units 11/22/19  1327   PROCALCITONIN ng/ml 0 22       Micro:  Results from last 7 days   Lab Units 11/22/19  1329 11/22/19  1327   BLOOD CULTURE  Received in Microbiology Lab  Culture in Progress  Received in Microbiology Lab  Culture in Progress       EKG: ST   Imaging:   CT chest without contrast   Final Result      Lobular groundglass areas are seen in the upper lobes bilaterally, these  are indeterminate, May be on an inflammatory or infectious basis could represent atypical infection, eosinophilic pneumonia is also in consideration  in appropriate clinical    setting      2 cm right thyroid nodule, meet the size threshold criteria for further evaluation with ultrasound      The study was marked in EPIC for significant notification  A follow-up notification has been created in the [email or web address] performed: [id number]         XR chest 1 view portable   ED Interpretation   Increased interstitial markings and cardiomegaly  Questionable infiltrate right middle to lower lobe  Final Result      Patchy scattered peripheral infiltrates suspicious for multilobar pneumonia  The study was marked in [facility] for immediate notification  Workstation performed: [id number]             ------------------------------------------------------------------------------------------------------------  Advance Directive and Living Will:      Power of :    POLST:    ------------------------------------------------------------------------------------------------------------  Anticipated Length of Stay is > 2 midnights    Counseling / Coordination of Care  Total Critical Care time spent 34 minutes excluding procedures, teaching and family updates  VIVI Cisneros        Portions of the record may have been created with voice recognition software  Occasional wrong word or "sound a like" substitutions may have occurred due to the inherent limitations of voice recognition software    Read the chart carefully and recognize, using context, where substitutions have occurred

## 2019-11-24 LAB
ALBUMIN SERPL BCP-MCNC: 2.3 G/DL (ref 3.5–5)
ALP SERPL-CCNC: 85 U/L (ref 46–116)
ALT SERPL W P-5'-P-CCNC: 24 U/L (ref 12–78)
ANION GAP SERPL CALCULATED.3IONS-SCNC: 3 MMOL/L (ref 4–13)
AST SERPL W P-5'-P-CCNC: 22 U/L (ref 5–45)
BASOPHILS # BLD AUTO: 0.06 THOUSANDS/ΜL (ref 0–0.1)
BASOPHILS NFR BLD AUTO: 1 % (ref 0–1)
BILIRUB SERPL-MCNC: 0.6 MG/DL (ref 0.2–1)
BUN SERPL-MCNC: 23 MG/DL (ref 5–25)
CALCIUM SERPL-MCNC: 8.2 MG/DL (ref 8.3–10.1)
CHLORIDE SERPL-SCNC: 102 MMOL/L (ref 100–108)
CO2 SERPL-SCNC: 30 MMOL/L (ref 21–32)
CREAT SERPL-MCNC: 0.76 MG/DL (ref 0.6–1.3)
EOSINOPHIL # BLD AUTO: 0.16 THOUSAND/ΜL (ref 0–0.61)
EOSINOPHIL NFR BLD AUTO: 2 % (ref 0–6)
ERYTHROCYTE [DISTWIDTH] IN BLOOD BY AUTOMATED COUNT: 13.5 % (ref 11.6–15.1)
GFR SERPL CREATININE-BSD FRML MDRD: 89 ML/MIN/1.73SQ M
GLUCOSE SERPL-MCNC: 212 MG/DL (ref 65–140)
GLUCOSE SERPL-MCNC: 226 MG/DL (ref 65–140)
GLUCOSE SERPL-MCNC: 241 MG/DL (ref 65–140)
GLUCOSE SERPL-MCNC: 262 MG/DL (ref 65–140)
GLUCOSE SERPL-MCNC: 282 MG/DL (ref 65–140)
HCT VFR BLD AUTO: 32.9 % (ref 34.8–46.1)
HGB BLD-MCNC: 10.2 G/DL (ref 11.5–15.4)
IMM GRANULOCYTES # BLD AUTO: 0.04 THOUSAND/UL (ref 0–0.2)
IMM GRANULOCYTES NFR BLD AUTO: 0 % (ref 0–2)
LYMPHOCYTES # BLD AUTO: 2.64 THOUSANDS/ΜL (ref 0.6–4.47)
LYMPHOCYTES NFR BLD AUTO: 25 % (ref 14–44)
MAGNESIUM SERPL-MCNC: 1.8 MG/DL (ref 1.6–2.6)
MCH RBC QN AUTO: 26 PG (ref 26.8–34.3)
MCHC RBC AUTO-ENTMCNC: 31 G/DL (ref 31.4–37.4)
MCV RBC AUTO: 84 FL (ref 82–98)
MONOCYTES # BLD AUTO: 0.74 THOUSAND/ΜL (ref 0.17–1.22)
MONOCYTES NFR BLD AUTO: 7 % (ref 4–12)
MRSA NOSE QL CULT: NORMAL
NEUTROPHILS # BLD AUTO: 6.98 THOUSANDS/ΜL (ref 1.85–7.62)
NEUTS SEG NFR BLD AUTO: 65 % (ref 43–75)
NRBC BLD AUTO-RTO: 0 /100 WBCS
PHOSPHATE SERPL-MCNC: 2.7 MG/DL (ref 2.7–4.5)
PLATELET # BLD AUTO: 197 THOUSANDS/UL (ref 149–390)
PMV BLD AUTO: 12.6 FL (ref 8.9–12.7)
POTASSIUM SERPL-SCNC: 4.8 MMOL/L (ref 3.5–5.3)
PROT SERPL-MCNC: 5.8 G/DL (ref 6.4–8.2)
RBC # BLD AUTO: 3.93 MILLION/UL (ref 3.81–5.12)
SODIUM SERPL-SCNC: 135 MMOL/L (ref 136–145)
WBC # BLD AUTO: 10.62 THOUSAND/UL (ref 4.31–10.16)

## 2019-11-24 PROCEDURE — 82948 REAGENT STRIP/BLOOD GLUCOSE: CPT

## 2019-11-24 PROCEDURE — 84100 ASSAY OF PHOSPHORUS: CPT | Performed by: NURSE PRACTITIONER

## 2019-11-24 PROCEDURE — 85025 COMPLETE CBC W/AUTO DIFF WBC: CPT | Performed by: NURSE PRACTITIONER

## 2019-11-24 PROCEDURE — 83735 ASSAY OF MAGNESIUM: CPT | Performed by: NURSE PRACTITIONER

## 2019-11-24 PROCEDURE — 80053 COMPREHEN METABOLIC PANEL: CPT | Performed by: NURSE PRACTITIONER

## 2019-11-24 PROCEDURE — 99232 SBSQ HOSP IP/OBS MODERATE 35: CPT | Performed by: FAMILY MEDICINE

## 2019-11-24 RX ORDER — ALBUTEROL SULFATE 2.5 MG/3ML
2.5 SOLUTION RESPIRATORY (INHALATION) EVERY 6 HOURS PRN
Status: DISCONTINUED | OUTPATIENT
Start: 2019-11-24 | End: 2019-11-27 | Stop reason: HOSPADM

## 2019-11-24 RX ORDER — MORPHINE SULFATE 30 MG/1
60 TABLET, FILM COATED, EXTENDED RELEASE ORAL 2 TIMES DAILY
Status: DISCONTINUED | OUTPATIENT
Start: 2019-11-24 | End: 2019-11-24

## 2019-11-24 RX ORDER — FUROSEMIDE 20 MG/1
20 TABLET ORAL DAILY
Status: DISCONTINUED | OUTPATIENT
Start: 2019-11-25 | End: 2019-11-27 | Stop reason: HOSPADM

## 2019-11-24 RX ORDER — FUROSEMIDE 10 MG/ML
20 SOLUTION ORAL DAILY
Status: DISCONTINUED | OUTPATIENT
Start: 2019-11-24 | End: 2019-11-24

## 2019-11-24 RX ORDER — INSULIN GLARGINE 100 [IU]/ML
55 INJECTION, SOLUTION SUBCUTANEOUS
Status: DISCONTINUED | OUTPATIENT
Start: 2019-11-24 | End: 2019-11-27 | Stop reason: HOSPADM

## 2019-11-24 RX ORDER — INSULIN GLARGINE 100 [IU]/ML
55 INJECTION, SOLUTION SUBCUTANEOUS
Status: DISCONTINUED | OUTPATIENT
Start: 2019-11-24 | End: 2019-11-24 | Stop reason: RX

## 2019-11-24 RX ADMIN — LISINOPRIL 10 MG: 10 TABLET ORAL at 09:20

## 2019-11-24 RX ADMIN — INSULIN LISPRO 4 UNITS: 100 INJECTION, SOLUTION INTRAVENOUS; SUBCUTANEOUS at 09:23

## 2019-11-24 RX ADMIN — FLUTICASONE PROPIONATE 1 SPRAY: 50 SPRAY, METERED NASAL at 09:23

## 2019-11-24 RX ADMIN — NYSTATIN 1 APPLICATION: 100000 POWDER TOPICAL at 09:23

## 2019-11-24 RX ADMIN — CHLORHEXIDINE GLUCONATE 0.12% ORAL RINSE 15 ML: 1.2 LIQUID ORAL at 09:20

## 2019-11-24 RX ADMIN — MORPHINE SULFATE 75 MG: 30 TABLET, FILM COATED, EXTENDED RELEASE ORAL at 23:28

## 2019-11-24 RX ADMIN — CARISOPRODOL 350 MG: 350 TABLET ORAL at 09:20

## 2019-11-24 RX ADMIN — MORPHINE SULFATE 75 MG: 30 TABLET, FILM COATED, EXTENDED RELEASE ORAL at 11:18

## 2019-11-24 RX ADMIN — MORPHINE SULFATE 15 MG: 15 TABLET ORAL at 16:36

## 2019-11-24 RX ADMIN — INSULIN LISPRO 4 UNITS: 100 INJECTION, SOLUTION INTRAVENOUS; SUBCUTANEOUS at 16:41

## 2019-11-24 RX ADMIN — Medication 2000 MG: at 16:47

## 2019-11-24 RX ADMIN — Medication 2000 MG: at 01:05

## 2019-11-24 RX ADMIN — METOCLOPRAMIDE HYDROCHLORIDE 5 MG: 10 TABLET ORAL at 16:37

## 2019-11-24 RX ADMIN — INSULIN LISPRO 6 UNITS: 100 INJECTION, SOLUTION INTRAVENOUS; SUBCUTANEOUS at 11:22

## 2019-11-24 RX ADMIN — METOCLOPRAMIDE HYDROCHLORIDE 5 MG: 10 TABLET ORAL at 06:02

## 2019-11-24 RX ADMIN — INSULIN LISPRO 2 UNITS: 100 INJECTION, SOLUTION INTRAVENOUS; SUBCUTANEOUS at 23:05

## 2019-11-24 RX ADMIN — AZITHROMYCIN 500 MG: 250 TABLET, FILM COATED ORAL at 14:38

## 2019-11-24 RX ADMIN — INSULIN GLARGINE 55 UNITS: 100 INJECTION, SOLUTION SUBCUTANEOUS at 23:21

## 2019-11-24 RX ADMIN — MORPHINE SULFATE 15 MG: 15 TABLET ORAL at 09:20

## 2019-11-24 RX ADMIN — VANCOMYCIN HYDROCHLORIDE 1750 MG: 1 INJECTION, POWDER, LYOPHILIZED, FOR SOLUTION INTRAVENOUS at 02:00

## 2019-11-24 RX ADMIN — HEPARIN SODIUM 5000 UNITS: 5000 INJECTION INTRAVENOUS; SUBCUTANEOUS at 14:39

## 2019-11-24 RX ADMIN — MONTELUKAST SODIUM 10 MG: 10 TABLET, FILM COATED ORAL at 23:03

## 2019-11-24 RX ADMIN — METOCLOPRAMIDE HYDROCHLORIDE 5 MG: 10 TABLET ORAL at 11:37

## 2019-11-24 RX ADMIN — HEPARIN SODIUM 5000 UNITS: 5000 INJECTION INTRAVENOUS; SUBCUTANEOUS at 05:16

## 2019-11-24 RX ADMIN — PREGABALIN 150 MG: 75 CAPSULE ORAL at 09:20

## 2019-11-24 RX ADMIN — HEPARIN SODIUM 5000 UNITS: 5000 INJECTION INTRAVENOUS; SUBCUTANEOUS at 23:04

## 2019-11-24 RX ADMIN — FUROSEMIDE 20 MG: 10 SOLUTION ORAL at 11:37

## 2019-11-24 RX ADMIN — PANTOPRAZOLE SODIUM 40 MG: 40 TABLET, DELAYED RELEASE ORAL at 05:16

## 2019-11-24 NOTE — ASSESSMENT & PLAN NOTE
Lab Results   Component Value Date    HGBA1C 10 1 (H) 11/23/2019       Recent Labs     11/23/19  1414 11/23/19  1621 11/23/19  2144 11/24/19  0716   POCGLU 186* 169* 256* 241*       Blood Sugar Average: Last 72 hrs:  (P) 257  Will increase the patient's Lantus to 25 units

## 2019-11-24 NOTE — PROGRESS NOTES
Vancomycin IV Pharmacy-to-Dose Consultation    Martita Morelos is a 53 y o  female who is currently receiving Vancomycin IV with management by the Pharmacy Consult service  Assessment/Plan:  The patient was reviewed  Renal function is stable and no signs or symptoms of nephrotoxicity and/or infusion reactions were documented in the chart  Based on todays assessment, continue current vancomycin (day # 3) dosing of 1750mg q12h, with a plan for trough to be drawn at 1330 on 11/25  We will continue to follow the patients culture results and clinical progress daily      Lonnie Quinones, Pharmacist

## 2019-11-24 NOTE — ASSESSMENT & PLAN NOTE
Improving  Patient apparently got a little hypoxic going to the bathroom  Continue with supplemental oxygen  Likely check the patient with ambulatory pulse oximetry screen tomorrow  Could be secondary to atypical pneumonia  There is a question of hospital-acquired pneumonia with coverage that she was getting  The patient was on azithromycin as well as cefepime and vancomycin  I will discontinue vancomycin  If the MRSA screen comes back positive I will reinstitute it but I do not feel strongly that the patient needs vancomycin at this time    Patient's procalcitonin level was not remarkable

## 2019-11-24 NOTE — PROGRESS NOTES
Progress Note - Joaquín Gonzalez 1965, 53 y o  female MRN: [id number]    Unit/Bed#:  Encounter: [id number]    Primary Care Provider: Michael Paredes MD   Date and time admitted to hospital: 11/22/2019  1:11 PM        Thyroid nodule  Assessment & Plan  Outpatient thyroid ultrasound    Acute respiratory insufficiency  Assessment & Plan  Improving  Patient apparently got a little hypoxic going to the bathroom  Continue with supplemental oxygen  Likely check the patient with ambulatory pulse oximetry screen tomorrow  Could be secondary to atypical pneumonia  There is a question of hospital-acquired pneumonia with coverage that she was getting  The patient was on azithromycin as well as cefepime and vancomycin  I will discontinue vancomycin  If the MRSA screen comes back positive I will reinstitute it but I do not feel strongly that the patient needs vancomycin at this time  Patient's procalcitonin level was not remarkable    Sepsis [facility])  Assessment & Plan  Secondary to pneumonia  ALEJANDRO (acute kidney injury) ([facility] [address] )  Assessment & Plan  Creatinine has improved  We can stop the IV fluids  Patient is normally on Lasix so I do not want to volume overload her    Morbid obesity ([facility] [address] )  Assessment & Plan  Encourage weight loss    Essential hypertension  Assessment & Plan  Acceptable at this time  Continue monitor    Uncomplicated opioid dependence [facility])  Assessment & Plan  Patient takes long-acting morphine at 75 mg twice a day as well as short-acting 15 mg 3 times a day  Asthma  Assessment & Plan  Continue with respiratory protocol  Type 2 diabetes mellitus with diabetic neuropathy, with long-term current use of insulin [facility])  Assessment & Plan  Lab Results   Component Value Date    HGBA1C 10 1 (H) 11/23/2019       Recent Labs     11/23/19  1414 11/23/19  1621 11/23/19  2144 11/24/19  0716   POCGLU 186* 169* 256* 241*       Blood Sugar Average: Last 72 hrs:  (P) 257  Poorly controlled    Will increase Lantus    Diabetic ulcer of right great toe [facility])  Assessment & Plan  Lab Results   Component Value Date    HGBA1C 10 1 (H) 2019       Recent Labs     19  1414 19  1621 19  2144 19  0716   POCGLU 186* 169* 256* 241*       Blood Sugar Average: Last 72 hrs:  (P) 257  Will increase the patient's Lantus to 25 units  * Pneumonia  Assessment & Plan  Likely atypical pneumonia  See above  Keep the patient on cefepime and azithromycin  VTE Pharmacologic Prophylaxis:   Pharmacologic: Heparin  Mechanical VTE Prophylaxis in Place: Yes    Patient Centered Rounds: I have performed bedside rounds with nursing staff today  Discussions with Specialists or Other Care Team Provider:  Discussed with ICU nurse    Education and Discussions with Family / Patient:  Patient    Time Spent for Care: 20 minutes  More than 50% of total time spent on counseling and coordination of care as described above  Current Length of Stay: 2 day(s)    Current Patient Status: Inpatient   Certification Statement: The patient will continue to require additional inpatient hospital stay due to Needing IV antibiotics and possibly rehab    Discharge Plan:  Discharge either tomorrow or day after  Currently physical therapy is recommending short-term rehab but the patient is little hesitant  Will see how she improves over the next 24 hours  The patient is not hypoxic tomorrow and doing better and cultures are back she could possibly be discharged  Code Status: Level 1 - Full Code      Subjective:   Patient seen examined  She started to feel better however when she was ambulating to the bathroom she felt short of breath        Objective:     Vitals:   Temp (24hrs), Av 7 °F (36 5 °C), Min:97 5 °F (36 4 °C), Max:97 9 °F (36 6 °C)    Temp:  [97 5 °F (36 4 °C)-97 9 °F (36 6 °C)] 97 8 °F (36 6 °C)  HR:  [72-92] 77  Resp:  [11-39] 12  BP: (113-135)/(59-68) 121/66  SpO2:  [95 %-97 %] 97 %  Body mass index is 47 33 kg/m²  Input and Output Summary (last 24 hours): Intake/Output Summary (Last 24 hours) at 11/24/2019 0947  Last data filed at 11/23/2019 1800  Gross per 24 hour   Intake 2115 77 ml   Output --   Net 2115 77 ml       Physical Exam:     Physical Exam  (   General Appearance:    Alert, cooperative, no distress, appears stated age                               Lungs:     Decreased breath sounds at the bases       Heart:    Regular rate and rhythm, S1 and S2 normal, no murmur, rub    or gallop   Abdomen:     Soft, non-tender, bowel sounds active all four quadrants,     no masses, no organomegaly           Extremities:   Extremities normal, atraumatic, no cyanosis or edema       Additional Data:     Labs:    Results from last 7 days   Lab Units 11/24/19  0516   WBC Thousand/uL 10 62*   HEMOGLOBIN g/dL 10 2*   HEMATOCRIT % 32 9*   PLATELETS Thousands/uL 197   NEUTROS PCT % 65   LYMPHS PCT % 25   MONOS PCT % 7   EOS PCT % 2     Results from last 7 days   Lab Units 11/24/19  0516   SODIUM mmol/L 135*   POTASSIUM mmol/L 4 8   CHLORIDE mmol/L 102   CO2 mmol/L 30   BUN mg/dL 23   CREATININE mg/dL 0 76   ANION GAP mmol/L 3*   CALCIUM mg/dL 8 2*   ALBUMIN g/dL 2 3*   TOTAL BILIRUBIN mg/dL 0 60   ALK PHOS U/L 85   ALT U/L 24   AST U/L 22   GLUCOSE RANDOM mg/dL 262*     Results from last 7 days   Lab Units 11/22/19  1327   INR  1 16     Results from last 7 days   Lab Units 11/24/19  0716 11/23/19  2144 11/23/19  1621 11/23/19  1414 11/23/19  1235 11/23/19  1106 11/23/19  0756 11/23/19  0540 11/23/19  0402 11/23/19  0215 11/23/19  0007 11/22/19  2210   POC GLUCOSE mg/dl 241* 256* 169* 186* 140 144* 111 189* 261* 306* 368* 467*     Results from last 7 days   Lab Units 11/23/19  0525 11/22/19  1745   HEMOGLOBIN A1C % 10 1* 10 0*     Results from last 7 days   Lab Units 11/22/19  1327   LACTIC ACID mmol/L 1 5   PROCALCITONIN ng/ml 0 22           * I Have Reviewed All Lab Data Listed Above    * Additional Pertinent Lab Tests Reviewed: [address] Admission Reviewed        Recent Cultures (last 7 days):     Results from last 7 days   Lab Units 11/23/19  1533 11/22/19  1329 11/22/19  1327   BLOOD CULTURE   --  No Growth at 24 hrs  No Growth at 24 hrs  LEGIONELLA URINARY ANTIGEN  Negative  --   --        Last 24 Hours Medication List:     Current Facility-Administered Medications:  albuterol 2 5 mg Nebulization Q6H PRN Lauri Olivo MD    azithromycin 500 mg Oral Q24H Pawel Reamer, CRNP    carisoprodol 350 mg Oral BID Pawel Reamer, CRNP    cefepime 2,000 mg Intravenous Q12H Pawel Reamer, CRNP Last Rate: 2,000 mg (11/24/19 0105)   chlorhexidine 15 mL Swish & Spit Q12H [address] Pawel Reamer, CRNP    dicyclomine 10 mg Oral 4x Daily PRN Pawel Reamer, CRNP    fluticasone 1 spray Nasal Daily Pawel Reamer, CRNP    heparin (porcine) 5,000 Units Subcutaneous [facility] Pawel Reamer, CRNP    insulin glargine 50 Units Subcutaneous HS Pawel Reamer, CRNP    insulin lispro 1-5 Units Subcutaneous HS Pawel Reamer, CRNP    insulin lispro 2-12 Units Subcutaneous TID AC Pawel Reamer, CRNP    lisinopril 10 mg Oral Daily Pawel Reamer, CRNP    metoclopramide 5 mg Oral TID AC Pawel Reamer, CRNP    montelukast 10 mg Oral HS Pawel Reamer, CRNP    morphine 75 mg Oral BID Marianne Villalpando MD    morphine 15 mg Oral TID PRN Pawel Reamer, CRNP    nystatin  Topical BID Pawel Reamer, CRNP    pantoprazole 40 mg Oral Early Morning Pawel Reamer, CRNP    pregabalin 150 mg Oral BID Pawel Reamer, CRNP         Today, Patient Was Seen By: Marianne Villalpando MD    ** Please Note: Dictation voice to text software may have been used in the creation of this document   **

## 2019-11-24 NOTE — ASSESSMENT & PLAN NOTE
Patient takes long-acting morphine at 75 mg twice a day as well as short-acting 15 mg 3 times a day

## 2019-11-24 NOTE — ASSESSMENT & PLAN NOTE
Lab Results   Component Value Date    HGBA1C 10 1 (H) 11/23/2019       Recent Labs     11/23/19  1414 11/23/19  1621 11/23/19  2144 11/24/19  0716   POCGLU 186* 169* 256* 241*       Blood Sugar Average: Last 72 hrs:  (P) 257  Poorly controlled    Will increase Lantus

## 2019-11-24 NOTE — ASSESSMENT & PLAN NOTE
Creatinine has improved  We can stop the IV fluids    Patient is normally on Lasix so I do not want to volume overload her

## 2019-11-25 LAB
ALBUMIN SERPL BCP-MCNC: 2.5 G/DL (ref 3.5–5)
ALP SERPL-CCNC: 86 U/L (ref 46–116)
ALT SERPL W P-5'-P-CCNC: 26 U/L (ref 12–78)
ANION GAP SERPL CALCULATED.3IONS-SCNC: 6 MMOL/L (ref 4–13)
ANION GAP SERPL CALCULATED.3IONS-SCNC: 6 MMOL/L (ref 4–13)
AST SERPL W P-5'-P-CCNC: 22 U/L (ref 5–45)
BACTERIA UR CULT: NORMAL
BASOPHILS # BLD AUTO: 0.06 THOUSANDS/ΜL (ref 0–0.1)
BASOPHILS NFR BLD AUTO: 1 % (ref 0–1)
BILIRUB SERPL-MCNC: 0.6 MG/DL (ref 0.2–1)
BUN SERPL-MCNC: 17 MG/DL (ref 5–25)
BUN SERPL-MCNC: 17 MG/DL (ref 5–25)
CALCIUM SERPL-MCNC: 8.7 MG/DL (ref 8.3–10.1)
CALCIUM SERPL-MCNC: 8.7 MG/DL (ref 8.3–10.1)
CHLORIDE SERPL-SCNC: 101 MMOL/L (ref 100–108)
CHLORIDE SERPL-SCNC: 101 MMOL/L (ref 100–108)
CO2 SERPL-SCNC: 30 MMOL/L (ref 21–32)
CO2 SERPL-SCNC: 30 MMOL/L (ref 21–32)
CREAT SERPL-MCNC: 0.71 MG/DL (ref 0.6–1.3)
CREAT SERPL-MCNC: 0.71 MG/DL (ref 0.6–1.3)
EOSINOPHIL # BLD AUTO: 0.24 THOUSAND/ΜL (ref 0–0.61)
EOSINOPHIL NFR BLD AUTO: 3 % (ref 0–6)
ERYTHROCYTE [DISTWIDTH] IN BLOOD BY AUTOMATED COUNT: 13.7 % (ref 11.6–15.1)
ERYTHROCYTE [DISTWIDTH] IN BLOOD BY AUTOMATED COUNT: 13.7 % (ref 11.6–15.1)
GFR SERPL CREATININE-BSD FRML MDRD: 96 ML/MIN/1.73SQ M
GFR SERPL CREATININE-BSD FRML MDRD: 96 ML/MIN/1.73SQ M
GLUCOSE SERPL-MCNC: 138 MG/DL (ref 65–140)
GLUCOSE SERPL-MCNC: 144 MG/DL (ref 65–140)
GLUCOSE SERPL-MCNC: 144 MG/DL (ref 65–140)
GLUCOSE SERPL-MCNC: 169 MG/DL (ref 65–140)
GLUCOSE SERPL-MCNC: 198 MG/DL (ref 65–140)
GLUCOSE SERPL-MCNC: 227 MG/DL (ref 65–140)
HCT VFR BLD AUTO: 35.3 % (ref 34.8–46.1)
HCT VFR BLD AUTO: 35.3 % (ref 34.8–46.1)
HGB BLD-MCNC: 10.9 G/DL (ref 11.5–15.4)
HGB BLD-MCNC: 10.9 G/DL (ref 11.5–15.4)
IMM GRANULOCYTES # BLD AUTO: 0.03 THOUSAND/UL (ref 0–0.2)
IMM GRANULOCYTES NFR BLD AUTO: 0 % (ref 0–2)
LYMPHOCYTES # BLD AUTO: 2.83 THOUSANDS/ΜL (ref 0.6–4.47)
LYMPHOCYTES NFR BLD AUTO: 36 % (ref 14–44)
MAGNESIUM SERPL-MCNC: 1.5 MG/DL (ref 1.6–2.6)
MCH RBC QN AUTO: 25.8 PG (ref 26.8–34.3)
MCH RBC QN AUTO: 25.8 PG (ref 26.8–34.3)
MCHC RBC AUTO-ENTMCNC: 30.9 G/DL (ref 31.4–37.4)
MCHC RBC AUTO-ENTMCNC: 30.9 G/DL (ref 31.4–37.4)
MCV RBC AUTO: 84 FL (ref 82–98)
MCV RBC AUTO: 84 FL (ref 82–98)
MONOCYTES # BLD AUTO: 0.79 THOUSAND/ΜL (ref 0.17–1.22)
MONOCYTES NFR BLD AUTO: 10 % (ref 4–12)
NEUTROPHILS # BLD AUTO: 3.97 THOUSANDS/ΜL (ref 1.85–7.62)
NEUTS SEG NFR BLD AUTO: 50 % (ref 43–75)
NRBC BLD AUTO-RTO: 0 /100 WBCS
PHOSPHATE SERPL-MCNC: 3.2 MG/DL (ref 2.7–4.5)
PLATELET # BLD AUTO: 213 THOUSANDS/UL (ref 149–390)
PLATELET # BLD AUTO: 213 THOUSANDS/UL (ref 149–390)
PMV BLD AUTO: 12.3 FL (ref 8.9–12.7)
PMV BLD AUTO: 12.3 FL (ref 8.9–12.7)
POTASSIUM SERPL-SCNC: 4.5 MMOL/L (ref 3.5–5.3)
POTASSIUM SERPL-SCNC: 4.5 MMOL/L (ref 3.5–5.3)
PROT SERPL-MCNC: 6.1 G/DL (ref 6.4–8.2)
RBC # BLD AUTO: 4.23 MILLION/UL (ref 3.81–5.12)
RBC # BLD AUTO: 4.23 MILLION/UL (ref 3.81–5.12)
SODIUM SERPL-SCNC: 137 MMOL/L (ref 136–145)
SODIUM SERPL-SCNC: 137 MMOL/L (ref 136–145)
WBC # BLD AUTO: 7.92 THOUSAND/UL (ref 4.31–10.16)
WBC # BLD AUTO: 7.92 THOUSAND/UL (ref 4.31–10.16)

## 2019-11-25 PROCEDURE — 82948 REAGENT STRIP/BLOOD GLUCOSE: CPT

## 2019-11-25 PROCEDURE — 85027 COMPLETE CBC AUTOMATED: CPT | Performed by: FAMILY MEDICINE

## 2019-11-25 PROCEDURE — 84100 ASSAY OF PHOSPHORUS: CPT | Performed by: NURSE PRACTITIONER

## 2019-11-25 PROCEDURE — G8988 SELF CARE GOAL STATUS: HCPCS

## 2019-11-25 PROCEDURE — 97116 GAIT TRAINING THERAPY: CPT

## 2019-11-25 PROCEDURE — G8987 SELF CARE CURRENT STATUS: HCPCS

## 2019-11-25 PROCEDURE — 80053 COMPREHEN METABOLIC PANEL: CPT | Performed by: NURSE PRACTITIONER

## 2019-11-25 PROCEDURE — 85025 COMPLETE CBC W/AUTO DIFF WBC: CPT | Performed by: NURSE PRACTITIONER

## 2019-11-25 PROCEDURE — 97166 OT EVAL MOD COMPLEX 45 MIN: CPT

## 2019-11-25 PROCEDURE — 80048 BASIC METABOLIC PNL TOTAL CA: CPT | Performed by: FAMILY MEDICINE

## 2019-11-25 PROCEDURE — 94760 N-INVAS EAR/PLS OXIMETRY 1: CPT

## 2019-11-25 PROCEDURE — 94640 AIRWAY INHALATION TREATMENT: CPT

## 2019-11-25 PROCEDURE — 83735 ASSAY OF MAGNESIUM: CPT | Performed by: NURSE PRACTITIONER

## 2019-11-25 RX ORDER — MAGNESIUM SULFATE HEPTAHYDRATE 40 MG/ML
2 INJECTION, SOLUTION INTRAVENOUS ONCE
Status: COMPLETED | OUTPATIENT
Start: 2019-11-25 | End: 2019-11-25

## 2019-11-25 RX ADMIN — INSULIN LISPRO 2 UNITS: 100 INJECTION, SOLUTION INTRAVENOUS; SUBCUTANEOUS at 17:52

## 2019-11-25 RX ADMIN — PREGABALIN 150 MG: 75 CAPSULE ORAL at 08:58

## 2019-11-25 RX ADMIN — HEPARIN SODIUM 5000 UNITS: 5000 INJECTION INTRAVENOUS; SUBCUTANEOUS at 13:05

## 2019-11-25 RX ADMIN — MAGNESIUM SULFATE HEPTAHYDRATE 2 G: 40 INJECTION, SOLUTION INTRAVENOUS at 11:57

## 2019-11-25 RX ADMIN — HEPARIN SODIUM 5000 UNITS: 5000 INJECTION INTRAVENOUS; SUBCUTANEOUS at 06:22

## 2019-11-25 RX ADMIN — FUROSEMIDE 20 MG: 20 TABLET ORAL at 08:59

## 2019-11-25 RX ADMIN — LISINOPRIL 10 MG: 10 TABLET ORAL at 08:59

## 2019-11-25 RX ADMIN — NYSTATIN: 100000 POWDER TOPICAL at 08:59

## 2019-11-25 RX ADMIN — CARISOPRODOL 350 MG: 350 TABLET ORAL at 09:06

## 2019-11-25 RX ADMIN — MORPHINE SULFATE 75 MG: 30 TABLET, FILM COATED, EXTENDED RELEASE ORAL at 22:49

## 2019-11-25 RX ADMIN — DICYCLOMINE HYDROCHLORIDE 10 MG: 10 CAPSULE ORAL at 09:00

## 2019-11-25 RX ADMIN — ALBUTEROL SULFATE 2.5 MG: 2.5 SOLUTION RESPIRATORY (INHALATION) at 19:42

## 2019-11-25 RX ADMIN — MORPHINE SULFATE 75 MG: 30 TABLET, FILM COATED, EXTENDED RELEASE ORAL at 08:58

## 2019-11-25 RX ADMIN — FLUTICASONE PROPIONATE 1 SPRAY: 50 SPRAY, METERED NASAL at 09:01

## 2019-11-25 RX ADMIN — NYSTATIN 1 APPLICATION: 100000 POWDER TOPICAL at 17:51

## 2019-11-25 RX ADMIN — PREGABALIN 150 MG: 75 CAPSULE ORAL at 17:50

## 2019-11-25 RX ADMIN — AZITHROMYCIN 500 MG: 250 TABLET, FILM COATED ORAL at 13:05

## 2019-11-25 RX ADMIN — Medication 2000 MG: at 15:44

## 2019-11-25 RX ADMIN — Medication 2000 MG: at 02:52

## 2019-11-25 RX ADMIN — METOCLOPRAMIDE HYDROCHLORIDE 5 MG: 10 TABLET ORAL at 08:59

## 2019-11-25 RX ADMIN — HEPARIN SODIUM 5000 UNITS: 5000 INJECTION INTRAVENOUS; SUBCUTANEOUS at 22:49

## 2019-11-25 RX ADMIN — METOCLOPRAMIDE HYDROCHLORIDE 5 MG: 10 TABLET ORAL at 17:50

## 2019-11-25 RX ADMIN — ALBUTEROL SULFATE 2.5 MG: 2.5 SOLUTION RESPIRATORY (INHALATION) at 09:14

## 2019-11-25 RX ADMIN — METOCLOPRAMIDE HYDROCHLORIDE 5 MG: 10 TABLET ORAL at 13:04

## 2019-11-25 RX ADMIN — INSULIN GLARGINE 55 UNITS: 100 INJECTION, SOLUTION SUBCUTANEOUS at 22:49

## 2019-11-25 RX ADMIN — INSULIN LISPRO 4 UNITS: 100 INJECTION, SOLUTION INTRAVENOUS; SUBCUTANEOUS at 13:06

## 2019-11-25 RX ADMIN — CARISOPRODOL 350 MG: 350 TABLET ORAL at 17:56

## 2019-11-25 RX ADMIN — INSULIN LISPRO 1 UNITS: 100 INJECTION, SOLUTION INTRAVENOUS; SUBCUTANEOUS at 22:50

## 2019-11-25 RX ADMIN — MONTELUKAST SODIUM 10 MG: 10 TABLET, FILM COATED ORAL at 22:49

## 2019-11-25 RX ADMIN — PANTOPRAZOLE SODIUM 40 MG: 40 TABLET, DELAYED RELEASE ORAL at 06:22

## 2019-11-25 NOTE — PLAN OF CARE
Problem: PAIN - ADULT  Goal: Verbalizes/displays adequate comfort level or baseline comfort level  Description  Interventions:  - Encourage patient to monitor pain and request assistance  - Assess pain using appropriate pain scale  - Administer analgesics based on type and severity of pain and evaluate response  - Implement non-pharmacological measures as appropriate and evaluate response  - Consider cultural and social influences on pain and pain management  - Notify physician/advanced practitioner if interventions unsuccessful or patient reports new pain  Outcome: Progressing     Problem: INFECTION - ADULT  Goal: Absence or prevention of progression during hospitalization  Description  INTERVENTIONS:  - Assess and monitor for signs and symptoms of infection  - Monitor lab/diagnostic results  - Monitor all insertion sites, i e  indwelling lines, tubes, and drains  - Monitor endotracheal if appropriate and nasal secretions for changes in amount and color  - [place] appropriate cooling/warming therapies per order  - Administer medications as ordered  - Instruct and encourage patient and family to use good hand hygiene technique  - Identify and instruct in appropriate isolation precautions for identified infection/condition  Outcome: Progressing  Goal: Absence of fever/infection during neutropenic period  Description  INTERVENTIONS:  - Monitor WBC    Outcome: Progressing     Problem: SAFETY ADULT  Goal: Patient will remain free of falls  Description  INTERVENTIONS:  - Assess patient frequently for physical needs  -  Identify cognitive and physical deficits and behaviors that affect risk of falls    -  [place] fall precautions as indicated by assessment   - Educate patient/family on patient safety including physical limitations  - Instruct patient to call for assistance with activity based on assessment  - Modify environment to reduce risk of injury  - Consider OT/PT consult to assist with strengthening/mobility  Outcome: Progressing  Goal: Maintain or return to baseline ADL function  Description  INTERVENTIONS:  -  Assess patient's ability to carry out ADLs; assess patient's baseline for ADL function and identify physical deficits which impact ability to perform ADLs (bathing, care of mouth/teeth, toileting, grooming, dressing, etc )  - Assess/evaluate cause of self-care deficits   - Assess range of motion  - Assess patient's mobility; develop plan if impaired  - Assess patient's need for assistive devices and provide as appropriate  - Encourage maximum independence but intervene and supervise when necessary  - Involve family in performance of ADLs  - Assess for home care needs following discharge   - Consider OT consult to assist with ADL evaluation and planning for discharge  - Provide patient education as appropriate  Outcome: Progressing  Goal: Maintain or return mobility status to optimal level  Description  INTERVENTIONS:  - Assess patient's baseline mobility status (ambulation, transfers, stairs, etc )    - Identify cognitive and physical deficits and behaviors that affect mobility  - Identify mobility aids required to assist with transfers and/or ambulation (gait belt, sit-to-stand, lift, walker, cane, etc )  - [place] fall precautions as indicated by assessment  - Record patient progress and toleration of activity level on Mobility SBAR; progress patient to next Phase/Stage  - Instruct patient to call for assistance with activity based on assessment  - Consider rehabilitation consult to assist with strengthening/weightbearing, etc   Outcome: Progressing     Problem: DISCHARGE PLANNING  Goal: Discharge to home or other facility with appropriate resources  Description  INTERVENTIONS:  - Identify barriers to discharge w/patient and caregiver  - Arrange for needed discharge resources and transportation as appropriate  - Identify discharge learning needs (meds, wound care, etc )  - Arrange for interpretive services to assist at discharge as needed  - Refer to Case Management Department for coordinating discharge planning if the patient needs post-hospital services based on physician/advanced practitioner order or complex needs related to functional status, cognitive ability, or social support system  Outcome: Progressing     Problem: Knowledge Deficit  Goal: Patient/family/caregiver demonstrates understanding of disease process, treatment plan, medications, and discharge instructions  Description  Complete learning assessment and assess knowledge base    Interventions:  - Provide teaching at level of understanding  - Provide teaching via preferred learning methods  Outcome: Progressing     Problem: CARDIOVASCULAR - ADULT  Goal: Maintains optimal cardiac output and hemodynamic stability  Description  INTERVENTIONS:  - Monitor I/O, vital signs and rhythm  - Monitor for S/S and trends of decreased cardiac output  - Administer and titrate ordered vasoactive medications to optimize hemodynamic stability  - Assess quality of pulses, skin color and temperature  - Assess for signs of decreased coronary artery perfusion  - Instruct patient to report change in severity of symptoms  Outcome: Progressing  Goal: Absence of cardiac dysrhythmias or at baseline rhythm  Description  INTERVENTIONS:  - Continuous cardiac monitoring, vital signs, obtain 12 lead EKG if ordered  - Administer antiarrhythmic and heart rate control medications as ordered  - Monitor electrolytes and administer replacement therapy as ordered  Outcome: Progressing     Problem: RESPIRATORY - ADULT  Goal: Achieves optimal ventilation and oxygenation  Description  INTERVENTIONS:  - Assess for changes in respiratory status  - Assess for changes in mentation and behavior  - Position to facilitate oxygenation and minimize respiratory effort  - Oxygen administered by appropriate delivery if ordered  - Initiate smoking cessation education as indicated  - Encourage broncho-pulmonary hygiene including cough, deep breathe, Incentive Spirometry  - Assess the need for suctioning and aspirate as needed  - Assess and instruct to report SOB or any respiratory difficulty  - Respiratory Therapy support as indicated  Outcome: Progressing     Problem: METABOLIC, FLUID AND ELECTROLYTES - ADULT  Goal: Electrolytes maintained within normal limits  Description  INTERVENTIONS:  - Monitor labs and assess patient for signs and symptoms of electrolyte imbalances  - Administer electrolyte replacement as ordered  - Monitor response to electrolyte replacements, including repeat lab results as appropriate  - Instruct patient on fluid and nutrition as appropriate  Outcome: Progressing  Goal: Fluid balance maintained  Description  INTERVENTIONS:  - Monitor labs   - Monitor I/O and WT  - Instruct patient on fluid and nutrition as appropriate  - Assess for signs & symptoms of volume excess or deficit  Outcome: Progressing  Goal: Glucose maintained within target range  Description  INTERVENTIONS:  - Monitor Blood Glucose as ordered  - Assess for signs and symptoms of hyperglycemia and hypoglycemia  - Administer ordered medications to maintain glucose within target range  - Assess nutritional intake and initiate nutrition service referral as needed  Outcome: Progressing     Problem: SKIN/TISSUE INTEGRITY - ADULT  Goal: Skin integrity remains intact  Description  INTERVENTIONS  - Identify patients at risk for skin breakdown  - Assess and monitor skin integrity  - Assess and monitor nutrition and hydration status  - Monitor labs (i e  albumin)  - Assess for incontinence   - Turn and reposition patient  - Assist with mobility/ambulation  - Relieve pressure over bony prominences  - Avoid friction and shearing  - Provide appropriate hygiene as needed including keeping skin clean and dry  - Evaluate need for skin moisturizer/barrier cream  - Collaborate with interdisciplinary team (i e  Nutrition, Rehabilitation, etc )   - Patient/family teaching  Outcome: Progressing  Goal: Incision(s), wounds(s) or drain site(s) healing without S/S of infection  Description  INTERVENTIONS  - Assess and document risk factors for skin impairment   - Assess and document dressing, incision, wound bed, drain sites and surrounding tissue  - Consider nutrition services referral as needed  - Oral mucous membranes remain intact  - Provide patient/ family education  Outcome: Progressing  Goal: Oral mucous membranes remain intact  Description  INTERVENTIONS  - Assess oral mucosa and hygiene practices  - Implement preventative oral hygiene regimen  - Implement oral medicated treatments as ordered  - Initiate Nutrition services referral as needed  Outcome: Progressing     Problem: HEMATOLOGIC - ADULT  Goal: Maintains hematologic stability  Description  INTERVENTIONS  - Assess for signs and symptoms of bleeding or hemorrhage  - Monitor labs  - Administer supportive blood products/factors as ordered and appropriate  Outcome: Progressing     Problem: MUSCULOSKELETAL - ADULT  Goal: Maintain or return mobility to safest level of function  Description  INTERVENTIONS:  - Assess patient's ability to carry out ADLs; assess patient's baseline for ADL function and identify physical deficits which impact ability to perform ADLs (bathing, care of mouth/teeth, toileting, grooming, dressing, etc )  - Assess/evaluate cause of self-care deficits   - Assess range of motion  - Assess patient's mobility  - Assess patient's need for assistive devices and provide as appropriate  - Encourage maximum independence but intervene and supervise when necessary  - Involve family in performance of ADLs  - Assess for home care needs following discharge   - Consider OT consult to assist with ADL evaluation and planning for discharge  - Provide patient education as appropriate  Outcome: Progressing  Goal: Maintain proper alignment of affected body part  Description  INTERVENTIONS:  - Support, maintain and protect limb and body alignment  - Provide patient/ family with appropriate education  Outcome: Progressing     Problem: Nutrition/Hydration-ADULT  Goal: Nutrient/Hydration intake appropriate for improving, restoring or maintaining nutritional needs  Description  Monitor and assess patient's nutrition/hydration status for malnutrition  Collaborate with interdisciplinary team and initiate plan and interventions as ordered  Monitor patient's weight and dietary intake as ordered or per policy  Determine patient's food preferences and provide high-protein, high-caloric foods as appropriate  INTERVENTIONS:  - Monitor oral intake, urinary output, labs, and treatment plans  - Assess nutrition and hydration status and recommend course of action  - Evaluate amount of meals eaten  - Assist patient with eating if necessary   - Allow adequate time for meals  - Recommend/ encourage appropriate diets, oral nutritional supplements, and vitamin/mineral supplements  - Order, calculate, and assess calorie counts as needed  - Assess need for intravenous fluids  - Provide nutrition/hydration education as appropriate  - Include patient/family/caregiver in decisions related to nutrition   Outcome: Progressing     Problem: Potential for Falls  Goal: Patient will remain free of falls  Description  INTERVENTIONS:  - Assess patient frequently for physical needs  -  Identify cognitive and physical deficits and behaviors that affect risk of falls    -  [place] fall precautions as indicated by assessment   - Educate patient/family on patient safety including physical limitations  - Instruct patient to call for assistance with activity based on assessment  - Modify environment to reduce risk of injury  - Consider OT/PT consult to assist with strengthening/mobility  Outcome: Progressing     Problem: Prexisting or High Potential for Compromised Skin Integrity  Goal: Skin integrity is maintained or improved  Description  INTERVENTIONS:  - Identify patients at risk for skin breakdown  - Assess and monitor skin integrity  - Assess and monitor nutrition and hydration status  - Monitor labs   - Assess for incontinence   - Turn and reposition patient  - Assist with mobility/ambulation  - Relieve pressure over bony prominences  - Avoid friction and shearing  - Provide appropriate hygiene as needed including keeping skin clean and dry  - Evaluate need for skin moisturizer/barrier cream  - Collaborate with interdisciplinary team   - Patient/family teaching  - Consider wound care consult   Outcome: Progressing

## 2019-11-25 NOTE — OCCUPATIONAL THERAPY NOTE
Occupational Therapy Evaluation        Patient Name: Umair PAREDES Date: 11/25/2019 11/25/19 1018   Note Type   Note type Eval only   Restrictions/Precautions   Weight Bearing Precautions Per Order No   Braces or Orthoses Other (Comment)  (none reported)   Other Precautions Fall Risk;Pain  (back safety precautions)   Pain Assessment   Pain Assessment 0-10   Pain Score 7   Pain Type Chronic pain   Pain Location Back   Pain Orientation Lower;Bilateral   Pain Radiating Towards moves up/ down   Pain Descriptors Discomfort   Pain Frequency Constant/continuous   Pain Onset Ongoing   Effect of Pain on Daily Activities limits mobility   Patient's Stated Pain Goal No pain   Hospital Pain Intervention(s) Ambulation/increased activity;Repositioned   Response to Interventions tolerated session well   Multiple Pain Sites Yes   Pain 2   Pain Score 2 5   Pain Type 2 Chronic pain   Pain Location 2 Neck   Pain Orientation 2 Posterior;Bilateral   Pain Frequency 2 Constant/continuous   Pain Onset 2 On-going   Clinical Progression 2 Not changed   Pain Intervention(s) 2 Repositioned; Ambulation/increased activity   Home Living   Type of Home Apartment   Home Layout Two level;Stairs to enter with rails  (duplex, 3 MATTHEW, 1 flight to bed room)   Bathroom Shower/Tub Tub/shower unit   Bathroom Toilet Standard   Bathroom Equipment Other (Comment)  (no DME at baseline)   [address] Walker;Cane  (hurrycane)   Additional Comments ambulatory with SPC, usually drives   Prior Function   Level of [place] Independent with ADLs and functional mobility   Lives With Spouse;Son  (spouse does not work)   Receives Help From Family   ADL Assistance Independent   IADLs Needs assistance  ( helps)   Falls in the last 6 months 0   Vocational On disability   Lifestyle   Autonomy Patient reported independent with ADLs/ IADLs   Patient lives with her  and family in a 2  story duplex apartment with 3 MATTHEW and 1 flight to bedroom  Patient drives/ Son lives with her and assists with IADLs  Reciprocal Relationships Supportive family   Service to Others On disability   Intrinsic Gratification Enjoys spending time with her grandchildren  Psychosocial   Psychosocial (WDL) WDL   ADL   Eating Assistance 7  Independent   Grooming Assistance 5  Supervision/Setup   UB Bathing Assistance 4  Minimal Assistance   LB Bathing Assistance 4  Minimal Assistance   UB Dressing Assistance 5  Supervision/Setup   LB Dressing Assistance 4  Minimal Assistance   Toileting Assistance  4  Minimal Assistance   Functional Assistance 5  Supervision/Setup   Bed Mobility   Additional Comments pt sitting OOB in recliner chair upon arrival; provided pt education on back safety precautions including demo log roll technique with verbal understanding    Transfers   Sit to Stand 6  Modified independent   Additional items Armrests; Increased time required   Stand to Sit 6  Modified independent   Additional items Increased time required;Armrests   Functional Mobility   Functional Mobility 5  Supervision   Additional items   (short distance due to fatigue)   Balance   Static Sitting Good   Dynamic Sitting Good   Static Standing Good   Dynamic Standing Fair +   Ambulatory Fair   Activity Tolerance   Activity Tolerance Patient limited by fatigue;Patient limited by pain   Nurse Made Aware DELIA Hernandez Later confirmed pt appropriate for therapy   RUE Assessment   RUE Assessment WFL   LUE Assessment   LUE Assessment WFL   Hand Function   Gross Motor Coordination Functional   Fine Motor Coordination Functional   Sensation   Light Touch No apparent deficits  (BUEs)   Proprioception   Proprioception No apparent deficits   Vision-Basic Assessment   Current Vision Wears glasses only for reading   Patient Visual Report Other (Comment)  (No significant changes reported)   Perception   Inattention/Neglect Appears intact   Cognition   Overall Cognitive Status [facility]   Arousal/Participation Alert; Responsive; Cooperative   Attention Within functional limits   Orientation Level Oriented X4   Memory Within functional limits   Following Commands Follows all commands and directions without difficulty   Assessment   Limitation Decreased ADL status; Decreased endurance;Decreased high-level ADLs   Prognosis Good   Assessment Patient is a 53 y o  female seen for OT evaluation s/p admit to [id number] [place] [facility] on 11/22/2019 w/Pneumonia  Commorbidities affecting patient's functional performance at time of assessment include: Acute respiratory insufficiency, Sepsis, ALEJANDRO, Morbid Obesity, essential hypertension, uncontrolled DM, opioid dependence, asthma  Patient presented to ED with SOB  Orders placed for OT evaluation and treatment  Performed at least two patient identifiers during session including name and wristband  Prior to admission, Patient reported independent with ADLs/ IADLs  Patient lives with her  and family in a 2  story duplex apartment with 3 MATTHEW and 1 flight to bedroom  Patient drives/ Son lives with her and assists with IADLs    Personal factors affecting patient at time of initial evaluation include: steps to enter, difficulty performing ADLs and difficulty performing IADLs  Upon evaluation, patient requires supervision and set up assist for UB ADLs, minimal  assist for LB ADLs, transfers and functional ambulation in room and bathroom with contact guard and close supervision assist, with the use of [address]  Occupational performance is affected by the following deficits: dynamic sit/ stand balance deficit with poor standing tolerance time for self care and functional mobility, decreased activity tolerance, increased pain, decreased coping skills and postural control and postural alignment deficit, requiring external assistance to complete transitional movements   Therapist completed expanded review of medical records and additional review of physical, cognitive or psychosocial history, clinical examination identifying 3-5 performance deficits, clinical decision making of a moderate complexity, consistent with moderate complexity level evaluation  Patient to benefit from continued Occupational Therapy treatment while in the hospital to address deficits as defined above and maximize level of functional independence with ADLs and functional mobility  Occupational Performance areas to address include: bathing/ shower, dressing, toilet hygiene, transfer to all surfaces, functional mobility, emergency response, health maintenance, IADLs: safety procedures, IADLs: meal prep/ clean up, Leisure Participation and Social participation  From OT standpoint, recommendation at time of d/c would be Home OT  Goals   Patient Goals "to walk without hassel"   Plan   Treatment Interventions ADL retraining;Functional transfer training; Endurance training;Patient/family training;Equipment evaluation/education; Compensatory technique education; Energy conservation; Activityengagement   Goal Expiration Date 12/02/19   OT Frequency 2-3x/wk   Recommendation   OT Discharge Recommendation Home OT   Barthel Index   Feeding 10   Bathing 5   Grooming Score 5   Dressing Score 5   Bladder Score 10   Bowels Score 10   Toilet Use Score 10   Transfers (Bed/Chair) Score 10   Mobility (Level Surface) Score 0   Stairs Score 0   Barthel Index Score 65   Modified [place] Scale   Modified [place] Scale 3     Occupational therapy Goals: In 2-4 days    1- Patient will verbalize and demonstrate use of energy conservation/ deep breathing technique and work simplification skills during functional activity with no verbal cues    2- Patient will verbalize and demonstrate good body mechanics and joint protection techniques during ADLs/ IADLs with no verbal cues   3- Patient will increase OOB/ sitting tolerance to 4-6 hours per day for increased participation in self care and leisure tasks with no s/s of exertion  4- Patient will identify s/s of exertion during ADL and functional mobility with no verbal cues    5-Patient will verbalize/ demonstrate compensatory strategies to recover from exertion with no verbal cues  6-Patient will increase standing tolerance time to 10 minutes with No UE support to complete sink level ADLs @ Mod I level   7- Patient will increase sitting tolerance at edge of bed to 30 minutes to complete UB ADLs @ Indep  level     8-- Patient/ Family will demonstrate competency with UE Home Exercise Program

## 2019-11-25 NOTE — ASSESSMENT & PLAN NOTE
Lab Results   Component Value Date    HGBA1C 10 1 (H) 11/23/2019       Recent Labs     11/24/19  1640 11/24/19  2143 11/25/19  0542 11/25/19  1053   POCGLU 212* 226* 138 227*       Blood Sugar Average: Last 72 hrs:  (P) 247    Continue insulin, titrate as tolerated

## 2019-11-25 NOTE — PLAN OF CARE
Problem: PHYSICAL THERAPY ADULT  Goal: Performs mobility at highest level of function for planned discharge setting  See evaluation for individualized goals  Description  Treatment/Interventions: Functional transfer training, LE strengthening/ROM, Elevations, Therapeutic exercise, Endurance training, Patient/family training, Equipment eval/education, Bed mobility, Gait training, Spoke to nursing, OT  Equipment Recommended: Walker(Bariatric Rolling walker)       See flowsheet documentation for full assessment, interventions and recommendations  Outcome: Progressing  Note:   Prognosis: Good  Problem List: Decreased strength, Decreased endurance, Impaired balance, Decreased mobility, Impaired sensation, Obesity, Pain  Assessment: Pt seen for PT treatment session this date with interventions consisting of gait training w/ emphasis on improving pt's ability to ambulate level surfaces x 10' + 60' with supervision provided by therapist with bariatric RW, therapeutic activity consisting of training: sit<>stand transfers and navigating 3, 6" stairs x 2 trials w/ B handrail with step to pattern with supervision  Pt agreeable to PT treatment session upon arrival, pt found seated OOB in recliner, in no apparent distress, A&O x 4 and responsive  In comparison to previous session, pt with improvements in endurance as evidenced by distance ambulated on level surfaces with improving independence + initiation of stair training at 6" practice steps  Post session: pt returned back to recliner and all needs in reach  Recommend Home PT with family support at time of d/c in order to maximize pt's functional independence and safety w/ mobility  Pt continues to be functioning below baseline level, and remains limited 2* factors listed above  PT will continue to see pt while here in order to address the deficits listed above and provide interventions consistent w/ POC in effort to achieve STGs    Barriers to Discharge: None Recommendation: Home PT, Home with family support     PT - OK to Discharge: Yes(when medically cleared)    See flowsheet documentation for full assessment

## 2019-11-25 NOTE — PLAN OF CARE
Problem: OCCUPATIONAL THERAPY ADULT  Goal: Performs self-care activities at highest level of function for planned discharge setting  See evaluation for individualized goals  Description  Treatment Interventions: ADL retraining, Functional transfer training, Endurance training, Patient/family training, Equipment evaluation/education, Compensatory technique education, Energy conservation, Activityengagement          See flowsheet documentation for full assessment, interventions and recommendations  Note:   Limitation: Decreased ADL status, Decreased endurance, Decreased high-level ADLs  Prognosis: Good  Assessment: Patient is a 53 y o  female seen for OT evaluation s/p admit to [address] [place] [address] on 11/22/2019 w/Pneumonia  Commorbidities affecting patient's functional performance at time of assessment include: Acute respiratory insufficiency, Sepsis, ALEJANDRO, Morbid Obesity, essential hypertension, uncontrolled DM, opioid dependence, asthma  Patient presented to ED with SOB  Orders placed for OT evaluation and treatment  Performed at least two patient identifiers during session including name and wristband  Prior to admission, Patient reported independent with ADLs/ IADLs  Patient lives with her  and family in a 2  story duplex apartment with 3 MATTHEW and 1 flight to bedroom  Patient drives/ Son lives with her and assists with IADLs    Personal factors affecting patient at time of initial evaluation include: steps to enter, difficulty performing ADLs and difficulty performing IADLs  Upon evaluation, patient requires supervision and set up assist for UB ADLs, minimal  assist for LB ADLs, transfers and functional ambulation in room and bathroom with contact guard and close supervision assist, with the use of [address] [employer]   Occupational performance is affected by the following deficits: dynamic sit/ stand balance deficit with poor standing tolerance time for self care and functional mobility, decreased activity tolerance, increased pain, decreased coping skills and postural control and postural alignment deficit, requiring external assistance to complete transitional movements  Therapist completed expanded review of medical records and additional review of physical, cognitive or psychosocial history, clinical examination identifying 3-5 performance deficits, clinical decision making of a moderate complexity, consistent with moderate complexity level evaluation  Patient to benefit from continued Occupational Therapy treatment while in the hospital to address deficits as defined above and maximize level of functional independence with ADLs and functional mobility  Occupational Performance areas to address include: bathing/ shower, dressing, toilet hygiene, transfer to all surfaces, functional mobility, emergency response, health maintenance, IADLs: safety procedures, IADLs: meal prep/ clean up, Leisure Participation and Social participation  From OT standpoint, recommendation at time of d/c would be Home OT         OT Discharge Recommendation: Home OT

## 2019-11-25 NOTE — PHYSICAL THERAPY NOTE
Physical Therapy Treatment Note       11/25/19 1041   Pain Assessment   Pain Assessment 0-10   Pain Score 7   Pain Type Chronic pain   Pain Location Back   Pain Orientation Lower;Bilateral   Pain Descriptors Discomfort   Pain Frequency Constant/continuous   Pain Onset Ongoing   Patient's Stated Pain Goal No pain   Hospital Pain Intervention(s) Repositioned; Ambulation/increased activity   Diversional Activities Television   Response to Interventions Tolerated   Multiple Pain Sites Yes   Pain 2   Pain Score 2 5   Pain Type 2 Chronic pain   Pain Location 2 Neck   Pain Orientation 2 Posterior;Bilateral   Pain Frequency 2 Constant/continuous   Pain Onset 2 On-going   Clinical Progression 2 Not changed   Pain Intervention(s) 2 Repositioned; Ambulation/increased activity   Response to Interventions 2 Tolerated   Restrictions/Precautions   Weight Bearing Precautions Per Order No   Other Precautions Fall Risk;Pain;Multiple lines  (back safety precautions)   General   Chart Reviewed Yes   Response to Previous Treatment Patient with no complaints from previous session  Family/Caregiver Present No   Cognition   Overall Cognitive Status WFL   Arousal/Participation Cooperative   Attention Within functional limits   Orientation Level Oriented X4   Memory Within functional limits   Following Commands Follows all commands and directions without difficulty   Comments pt agreeable to PT eval   Subjective   Subjective "My neck is a little less painful"   Bed Mobility   Additional Comments pt sitting OOB in recliner chair upon arrival; provided pt education on back safety precautions including demo log roll technique with verbal understanding    Transfers   Sit to Stand 6  Modified independent   Additional items Increased time required;Armrests   Stand to Sit 6  Modified independent   Additional items Increased time required;Armrests   Ambulation/Elevation   Gait pattern Excessively slow; Short stride;Decreased foot clearance; Wide SUMANTH;Foward flexed   Gait Assistance 5  Supervision   Additional items Assist x 1;Verbal cues   Assistive Device Bariatric Rolling walker   Distance 10' + 60'   Stair Management Assistance 5  Supervision   Additional items Assist x 1;Verbal cues; Increased time required   Stair Management Technique Two rails; Step to pattern; Foreward   Number of Stairs 6  (3, 6" stairs x 2 trials)   Balance   Static Sitting Good   Dynamic Sitting Good   Static Standing Good   Dynamic Standing Fair +   Ambulatory Fair   Endurance Deficit   Endurance Deficit Yes   Activity Tolerance   Activity Tolerance Patient limited by fatigue   Nurse Made Aware DELIA Diaz confirmed pt appropriate for therapy   Assessment   Prognosis Good   Problem List Decreased strength;Decreased endurance; Impaired balance;Decreased mobility; Impaired sensation;Obesity;Pain   Assessment Pt seen for PT treatment session this date with interventions consisting of gait training w/ emphasis on improving pt's ability to ambulate level surfaces x 10' + 60' with supervision provided by therapist with bariatric RW, therapeutic activity consisting of training: sit<>stand transfers and navigating 3, 6" stairs x 2 trials w/ B handrail with step to pattern with supervision  Pt agreeable to PT treatment session upon arrival, pt found seated OOB in recliner, in no apparent distress, A&O x 4 and responsive  In comparison to previous session, pt with improvements in endurance as evidenced by distance ambulated on level surfaces with improving independence + initiation of stair training at 6" practice steps  Post session: pt returned back to recliner and all needs in reach  Recommend Home PT with family support at time of d/c in order to maximize pt's functional independence and safety w/ mobility  Pt continues to be functioning below baseline level, and remains limited 2* factors listed above   PT will continue to see pt while here in order to address the deficits listed above and provide interventions consistent w/ POC in effort to achieve STGs  Barriers to Discharge None   Goals   Patient Goals "to walk without hassel"   STG Expiration Date 12/03/19   PT Treatment Day 1   Plan   Treatment/Interventions Functional transfer training;LE strengthening/ROM; Elevations; Therapeutic exercise; Endurance training;Patient/family training;Equipment eval/education; Bed mobility;Gait training;Spoke to nursing;OT   Progress Progressing toward goals   PT Frequency 5x/wk   Recommendation   Recommendation Home PT; Home with family support   Equipment Recommended Walker  (Bariatric Rolling walker)   PT - OK to Discharge Yes  (when medically cleared)       Judge Lombard, PT, DPT    Time of PT treatment session: 10:16-10:41  25 minutes

## 2019-11-25 NOTE — CONSULTS
Progress Note - Wound   Anna Marie Real 53 y o  female MRN: [id number]  Unit/Bed#: -01 Encounter: [id number]      Assessment:  Wound care consulted for assessment of R foot wound  Patient seen Damien Canales in recliner chair  Self reports she is continent and ambulatory  Declined a full skin assessment and reports she has no other wounds  Nutrition is following  Patient with history of diabetes    B/l heels are intact with no redness or wounds noted  Dry skin noted  Recommend offloading and hydraguard cream        1  R great toe amputated previously - this area is healed  2  R foot medial plantar surface distal to the healed amputation site - patient reports this was a wound that has scabbed over  Neuropathic wound  Presents as well adhered 100% dark red/brown colored scabbing that is surrounded by calloused skin and dry skin  No open wound noted on assessment  Patient denies pain, but has neuropathy to her feet  No redness or drainage  Patient follows with Dr iMsty Noonan DPM as an outpatient - she is aware of the need to follow up with him as an outpatient  Discussed assessment findings with Madeline Kraft  Patient tolerated well  Primary nurse aware of plan of care  See flow sheets for more detailed assessment findings  Plan:   1  Apply hydraguard to b/l heels, buttocks and sacrum BID and PRN for prevention and protection  2  Apply skin nourishing cream the entire skin daily for moisture  3  Turn and reposition patient every  2 hours   4  Elevate heels off of bed with pillows to offload pressure   5  Apply EHOB waffle cushion to chair when OOB, if able  6  Apply 3m no sting skin prep to R medial foot plantar callous/scabbed area daily  Okay to leave MART  7  Consider inpatient podiatry consult, if not, patient may follow-up routinely as an outpatient with Dr Misty Noonan DPM    Objective:    Vitals: Blood pressure 113/71, pulse 77, temperature 97 6 °F (36 4 °C), temperature source Oral, resp   rate 18, height 5' 8" (1 727 m), weight (!) 140 kg (308 lb 10 3 oz), SpO2 95 %, not currently breastfeeding  ,Body mass index is 46 93 kg/m²  Wound 11/22/19 Neuropathic/diabetic ulcer Foot Right (Active)   Wound Description Dry; Intact; Brown 11/25/2019  1:00 PM   Sheila-wound Assessment Dry; Intact 11/25/2019  1:00 PM   Wound Length (cm) 3 cm 11/25/2019  1:00 PM   Wound Width (cm) 2 cm 11/25/2019  1:00 PM   Wound Depth (cm) 0 11/25/2019  1:00 PM   Calculated Wound Area (cm^2) 6 cm^2 11/25/2019  1:00 PM   Calculated Wound Volume (cm^3) 0 cm^3 11/25/2019  1:00 PM   Tunneling 0 cm 11/25/2019  1:00 PM   Tunneling in depth located at 0 11/25/2019  1:00 PM   Undermining 0 11/25/2019  1:00 PM   Undermining is depth extending from 0 11/25/2019  1:00 PM   Closure None 11/25/2019  1:00 PM   Drainage Amount None 11/25/2019  1:00 PM   Non-staged Wound Description Not applicable 84/06/0341  0:79 PM   Treatments Cleansed;Elevated;Site care 11/25/2019  1:00 PM   Dressing Open to air 11/25/2019  1:00 PM   Wound packed? No 11/25/2019  1:00 PM   Packing- # removed 0 11/25/2019  1:00 PM   Packing- # inserted 0 11/25/2019  1:00 PM   Patient Tolerance Tolerated well 11/25/2019  1:00 PM         As patient has no active wounds, wound care will sign off  Please re-consult if needed    Anne Alfonso RN BSN CWON

## 2019-11-25 NOTE — ASSESSMENT & PLAN NOTE
Lab Results   Component Value Date    HGBA1C 10 1 (H) 11/23/2019       Recent Labs     11/24/19  1640 11/24/19  2143 11/25/19  0542 11/25/19  1053   POCGLU 212* 226* 138 227*       Blood Sugar Average: Last 72 hrs:  (P) 247  Poorly controlled   Continue to titrate insulin as tolerated

## 2019-11-25 NOTE — PROGRESS NOTES
Progress Note - Dominik López 1965, 53 y o  female MRN: [id number]    Unit/Bed#: -01 Encounter: [id number]    Primary Care Provider: Amie Nunez MD   Date and time admitted to hospital: 11/22/2019  1:11 PM        Thyroid nodule  Assessment & Plan  Outpatient thyroid ultrasound    Acute respiratory insufficiency  Assessment & Plan  Improving  Now off nasal cannula  Was hypoxic on 11/24 after walking to the bathroom  Sepsis ([facility] )  Assessment & Plan  Secondary to pneumonia  Continue cefepime and azithromycin- day 4  All cultures negative     ALEJANDRO (acute kidney injury) ([facility] )  Assessment & Plan  Creatinine at baseline, continue to monitor     Morbid obesity ([facility] )  Assessment & Plan  Encourage weight loss    Essential hypertension  Assessment & Plan  Continue lisinopril    Uncomplicated opioid dependence ([facility] )  Assessment & Plan  Patient takes long-acting morphine at 75 mg twice a day as well as short-acting 15 mg 3 times a day  Asthma  Assessment & Plan  Continue with respiratory protocol  Type 2 diabetes mellitus with diabetic neuropathy, with long-term current use of insulin [facility])  Assessment & Plan  Lab Results   Component Value Date    HGBA1C 10 1 (H) 11/23/2019       Recent Labs     11/24/19  1640 11/24/19  2143 11/25/19  0542 11/25/19  1053   POCGLU 212* 226* 138 227*       Blood Sugar Average: Last 72 hrs:  (P) 247  Poorly controlled  Continue to titrate insulin as tolerated    Diabetic ulcer of right great toe [facility])  Assessment & Plan  Lab Results   Component Value Date    HGBA1C 10 1 (H) 11/23/2019       Recent Labs     11/24/19  1640 11/24/19  2143 11/25/19  0542 11/25/19  1053   POCGLU 212* 226* 138 227*       Blood Sugar Average: Last 72 hrs:  (P) 247  Continue insulin, titrate as tolerated    * Pneumonia  Assessment & Plan  Likely atypical pneumonia  See above  Keep the patient on cefepime and azithromycin          VTE Pharmacologic Prophylaxis:   Pharmacologic: Heparin  Mechanical VTE Prophylaxis in Place: Yes    Patient Centered Rounds: I have performed bedside rounds with nursing staff today  Discussions with Specialists or Other Care Team Provider: MIA    Education and Discussions with Family / Patient: Pneumonia, Hypoxia, Diabetes    Time Spent for Care: 30 minutes  More than 50% of total time spent on counseling and coordination of care as described above  Current Length of Stay: 3 day(s)    Current Patient Status: Inpatient   Certification Statement: The patient will continue to require additional inpatient hospital stay due to Pneumonia, Hypoxia    Discharge Plan: 24-48 hours    Code Status: Level 1 - Full Code      Subjective:   Patient feels well today  Objective:     Vitals:   Temp (24hrs), Av 7 °F (36 5 °C), Min:97 5 °F (36 4 °C), Max:97 9 °F (36 6 °C)    Temp:  [97 5 °F (36 4 °C)-97 9 °F (36 6 °C)] 97 6 °F (36 4 °C)  HR:  [74-84] 77  Resp:  [18-21] 18  BP: (113-129)/(68-72) 113/71  SpO2:  [93 %-95 %] 95 %  Body mass index is 46 93 kg/m²  Input and Output Summary (last 24 hours): Intake/Output Summary (Last 24 hours) at 2019 1442  Last data filed at 2019 1413  Gross per 24 hour   Intake 650 ml   Output --   Net 650 ml       Physical Exam:     Physical Exam   Constitutional: She is oriented to person, place, and time  She appears well-developed and well-nourished  Cardiovascular: Normal rate and regular rhythm  Pulmonary/Chest: Effort normal and breath sounds normal    Abdominal: Soft  Bowel sounds are normal    Musculoskeletal:   Right great toe amputation, with ulcer   Neurological: She is alert and oriented to person, place, and time  Skin: Skin is warm and dry  Psychiatric: She has a normal mood and affect   Her behavior is normal          Additional Data:     Labs:    Results from last 7 days   Lab Units 19  0434   WBC Thousand/uL 7 92  7 92   HEMOGLOBIN g/dL 10 9*  10 9*   HEMATOCRIT % 35 3  35 3   PLATELETS Thousands/uL 213 213   NEUTROS PCT % 50   LYMPHS PCT % 36   MONOS PCT % 10   EOS PCT % 3     Results from last 7 days   Lab Units 11/25/19  0434   SODIUM mmol/L 137  137   POTASSIUM mmol/L 4 5  4 5   CHLORIDE mmol/L 101  101   CO2 mmol/L 30  30   BUN mg/dL 17  17   CREATININE mg/dL 0 71  0 71   ANION GAP mmol/L 6  6   CALCIUM mg/dL 8 7  8 7   ALBUMIN g/dL 2 5*   TOTAL BILIRUBIN mg/dL 0 60   ALK PHOS U/L 86   ALT U/L 26   AST U/L 22   GLUCOSE RANDOM mg/dL 144*  144*     Results from last 7 days   Lab Units 11/22/19  1327   INR  1 16     Results from last 7 days   Lab Units 11/25/19  1053 11/25/19  0542 11/24/19  2143 11/24/19  1640 11/24/19  1031 11/24/19  0716 11/23/19  2144 11/23/19  1621 11/23/19  1414 11/23/19  1235 11/23/19  1106 11/23/19  0756   POC GLUCOSE mg/dl 227* 138 226* 212* 282* 241* 256* 169* 186* 140 144* 111     Results from last 7 days   Lab Units 11/23/19  0525 11/22/19  1745   HEMOGLOBIN A1C % 10 1* 10 0*     Results from last 7 days   Lab Units 11/22/19  1327   LACTIC ACID mmol/L 1 5   PROCALCITONIN ng/ml 0 22           * I Have Reviewed All Lab Data Listed Above  * Additional Pertinent Lab Tests Reviewed: [address] [facility] Admission Reviewed    Imaging:    Imaging Reports Reviewed Today Include: NA  Imaging Personally Reviewed by Myself Includes:  NA    Recent Cultures (last 7 days):     Results from last 7 days   Lab Units 11/23/19  1533 11/22/19  1329 11/22/19  1327   BLOOD CULTURE   --  No Growth at 48 hrs  No Growth at 48 hrs     URINE CULTURE  <10,000 cfu/ml   --   --    LEGIONELLA URINARY ANTIGEN  Negative  --   --        Last 24 Hours Medication List:     Current Facility-Administered Medications:  albuterol 2 5 mg Nebulization Q6H PRN Karma Curt, CRNP    azithromycin 500 mg Oral Q24H Nova , CRNP    carisoprodol 350 mg Oral BID Nova , CRNP    cefepime 2,000 mg Intravenous Q12H Nova , CRNP Last Rate: 2,000 mg (11/25/19 0252)   dicyclomine 10 mg Oral 4x Daily PRN Haylee Risk, CRNP    fluticasone 1 spray Nasal Daily Haylee Risk, CRNP    furosemide 20 mg Oral Daily Renee Ramirez, CRNP    heparin (porcine) 5,000 Units Subcutaneous [facility] Haylee Risk, CRNP    insulin glargine 55 Units Subcutaneous HS Renee Ramirez, CRNP    insulin lispro 1-5 Units Subcutaneous HS Haylee Risk, CRNP    insulin lispro 2-12 Units Subcutaneous TID AC Haylee Risk, CRNP    lisinopril 10 mg Oral Daily Haylee Risk, CRNP    metoclopramide 5 mg Oral TID AC Haylee Risk, CRNP    montelukast 10 mg Oral HS Haylee Risk, CRNP    morphine 75 mg Oral Q12H [address] Renee Ramirez, CRNP    morphine 15 mg Oral TID PRN Haylee Risk, CRNP    nystatin  Topical BID Haylee Risk, CRNP    pantoprazole 40 mg Oral Early Morning Haylee Risk, CRNP    pregabalin 150 mg Oral BID Haylee Risk, CRNP         Today, Patient Was Seen By: MATT Fatima      ** Please Note: Dictation voice to text software may have been used in the creation of this document   **

## 2019-11-25 NOTE — SOCIAL WORK
Cm met with patient at bedside, patient alert and oriented during interview  Patient reports residing in a private 2 story home with her  and 32year old son  Patient reports using a cane and has a rw if needed  Patient reports seeing her MD 2 weeks ago and is able to drive independently  Patient reports filling her prescriptions at [facility] with no co-payment barriers and reports taking her medications as prescribed  Patient denies any MH/SA or SI/HI  Cm reviewed patient home pt recommendation  Patient declined, however, encouraged to follow up with cm team if she changed her mind  CM reviewed discharge planning process including the following: identifying help at home, patient preference for discharge planning needs, pharmacy preference, and availability of treatment team to discuss questions or concerns patient and/or family may have regarding understanding medications and recognizing signs and symptoms once discharged  CM also encouraged patient to follow up with all recommended appointments after discharge  Patient advised of importance for patient and family to participate in managing patients medical well being

## 2019-11-26 LAB
ALBUMIN SERPL BCP-MCNC: 2.5 G/DL (ref 3.5–5)
ANION GAP SERPL CALCULATED.3IONS-SCNC: 7 MMOL/L (ref 4–13)
BASOPHILS # BLD AUTO: 0.05 THOUSANDS/ΜL (ref 0–0.1)
BASOPHILS NFR BLD AUTO: 1 % (ref 0–1)
BUN SERPL-MCNC: 15 MG/DL (ref 5–25)
CALCIUM SERPL-MCNC: 8.7 MG/DL (ref 8.3–10.1)
CHLORIDE SERPL-SCNC: 101 MMOL/L (ref 100–108)
CO2 SERPL-SCNC: 30 MMOL/L (ref 21–32)
CREAT SERPL-MCNC: 0.68 MG/DL (ref 0.6–1.3)
EOSINOPHIL # BLD AUTO: 0.24 THOUSAND/ΜL (ref 0–0.61)
EOSINOPHIL NFR BLD AUTO: 3 % (ref 0–6)
ERYTHROCYTE [DISTWIDTH] IN BLOOD BY AUTOMATED COUNT: 13.6 % (ref 11.6–15.1)
GFR SERPL CREATININE-BSD FRML MDRD: 99 ML/MIN/1.73SQ M
GLUCOSE SERPL-MCNC: 129 MG/DL (ref 65–140)
GLUCOSE SERPL-MCNC: 138 MG/DL (ref 65–140)
GLUCOSE SERPL-MCNC: 142 MG/DL (ref 65–140)
GLUCOSE SERPL-MCNC: 152 MG/DL (ref 65–140)
GLUCOSE SERPL-MCNC: 194 MG/DL (ref 65–140)
HCT VFR BLD AUTO: 35.3 % (ref 34.8–46.1)
HGB BLD-MCNC: 11.1 G/DL (ref 11.5–15.4)
IMM GRANULOCYTES # BLD AUTO: 0.04 THOUSAND/UL (ref 0–0.2)
IMM GRANULOCYTES NFR BLD AUTO: 1 % (ref 0–2)
LYMPHOCYTES # BLD AUTO: 2.61 THOUSANDS/ΜL (ref 0.6–4.47)
LYMPHOCYTES NFR BLD AUTO: 36 % (ref 14–44)
MAGNESIUM SERPL-MCNC: 1.6 MG/DL (ref 1.6–2.6)
MCH RBC QN AUTO: 26.1 PG (ref 26.8–34.3)
MCHC RBC AUTO-ENTMCNC: 31.4 G/DL (ref 31.4–37.4)
MCV RBC AUTO: 83 FL (ref 82–98)
MONOCYTES # BLD AUTO: 0.65 THOUSAND/ΜL (ref 0.17–1.22)
MONOCYTES NFR BLD AUTO: 9 % (ref 4–12)
NEUTROPHILS # BLD AUTO: 3.65 THOUSANDS/ΜL (ref 1.85–7.62)
NEUTS SEG NFR BLD AUTO: 50 % (ref 43–75)
NRBC BLD AUTO-RTO: 0 /100 WBCS
PHOSPHATE SERPL-MCNC: 4.5 MG/DL (ref 2.7–4.5)
PLATELET # BLD AUTO: 226 THOUSANDS/UL (ref 149–390)
PMV BLD AUTO: 12.2 FL (ref 8.9–12.7)
POTASSIUM SERPL-SCNC: 4.1 MMOL/L (ref 3.5–5.3)
PROCALCITONIN SERPL-MCNC: 0.09 NG/ML
RBC # BLD AUTO: 4.25 MILLION/UL (ref 3.81–5.12)
SODIUM SERPL-SCNC: 138 MMOL/L (ref 136–145)
WBC # BLD AUTO: 7.24 THOUSAND/UL (ref 4.31–10.16)

## 2019-11-26 PROCEDURE — 94760 N-INVAS EAR/PLS OXIMETRY 1: CPT

## 2019-11-26 PROCEDURE — 83735 ASSAY OF MAGNESIUM: CPT | Performed by: STUDENT IN AN ORGANIZED HEALTH CARE EDUCATION/TRAINING PROGRAM

## 2019-11-26 PROCEDURE — 85025 COMPLETE CBC W/AUTO DIFF WBC: CPT | Performed by: STUDENT IN AN ORGANIZED HEALTH CARE EDUCATION/TRAINING PROGRAM

## 2019-11-26 PROCEDURE — 99223 1ST HOSP IP/OBS HIGH 75: CPT | Performed by: INTERNAL MEDICINE

## 2019-11-26 PROCEDURE — 80069 RENAL FUNCTION PANEL: CPT | Performed by: STUDENT IN AN ORGANIZED HEALTH CARE EDUCATION/TRAINING PROGRAM

## 2019-11-26 PROCEDURE — 84145 PROCALCITONIN (PCT): CPT | Performed by: GENERAL PRACTICE

## 2019-11-26 PROCEDURE — 82948 REAGENT STRIP/BLOOD GLUCOSE: CPT

## 2019-11-26 PROCEDURE — 94640 AIRWAY INHALATION TREATMENT: CPT

## 2019-11-26 PROCEDURE — 94664 DEMO&/EVAL PT USE INHALER: CPT

## 2019-11-26 PROCEDURE — 99232 SBSQ HOSP IP/OBS MODERATE 35: CPT | Performed by: GENERAL PRACTICE

## 2019-11-26 RX ORDER — FLUTICASONE FUROATE AND VILANTEROL 100; 25 UG/1; UG/1
1 POWDER RESPIRATORY (INHALATION) DAILY
Status: DISCONTINUED | OUTPATIENT
Start: 2019-11-27 | End: 2019-11-27 | Stop reason: HOSPADM

## 2019-11-26 RX ORDER — CEFDINIR 300 MG/1
300 CAPSULE ORAL EVERY 12 HOURS SCHEDULED
Status: DISCONTINUED | OUTPATIENT
Start: 2019-11-26 | End: 2019-11-27 | Stop reason: HOSPADM

## 2019-11-26 RX ORDER — LEVALBUTEROL 1.25 MG/.5ML
1.25 SOLUTION, CONCENTRATE RESPIRATORY (INHALATION)
Status: DISCONTINUED | OUTPATIENT
Start: 2019-11-26 | End: 2019-11-27 | Stop reason: HOSPADM

## 2019-11-26 RX ADMIN — MORPHINE SULFATE 75 MG: 30 TABLET, FILM COATED, EXTENDED RELEASE ORAL at 08:57

## 2019-11-26 RX ADMIN — LEVALBUTEROL HYDROCHLORIDE 1.25 MG: 1.25 SOLUTION, CONCENTRATE RESPIRATORY (INHALATION) at 19:26

## 2019-11-26 RX ADMIN — INSULIN LISPRO 1 UNITS: 100 INJECTION, SOLUTION INTRAVENOUS; SUBCUTANEOUS at 21:41

## 2019-11-26 RX ADMIN — HEPARIN SODIUM 5000 UNITS: 5000 INJECTION INTRAVENOUS; SUBCUTANEOUS at 21:40

## 2019-11-26 RX ADMIN — NYSTATIN: 100000 POWDER TOPICAL at 09:00

## 2019-11-26 RX ADMIN — MONTELUKAST SODIUM 10 MG: 10 TABLET, FILM COATED ORAL at 21:40

## 2019-11-26 RX ADMIN — INSULIN GLARGINE 55 UNITS: 100 INJECTION, SOLUTION SUBCUTANEOUS at 21:40

## 2019-11-26 RX ADMIN — METOCLOPRAMIDE HYDROCHLORIDE 5 MG: 10 TABLET ORAL at 08:58

## 2019-11-26 RX ADMIN — LISINOPRIL 10 MG: 10 TABLET ORAL at 08:59

## 2019-11-26 RX ADMIN — CEFDINIR 300 MG: 300 CAPSULE ORAL at 15:01

## 2019-11-26 RX ADMIN — METOCLOPRAMIDE HYDROCHLORIDE 5 MG: 10 TABLET ORAL at 17:49

## 2019-11-26 RX ADMIN — AZITHROMYCIN 500 MG: 250 TABLET, FILM COATED ORAL at 13:23

## 2019-11-26 RX ADMIN — IPRATROPIUM BROMIDE 0.5 MG: 0.5 SOLUTION RESPIRATORY (INHALATION) at 19:26

## 2019-11-26 RX ADMIN — ALBUTEROL SULFATE 2.5 MG: 2.5 SOLUTION RESPIRATORY (INHALATION) at 10:02

## 2019-11-26 RX ADMIN — Medication 2000 MG: at 03:11

## 2019-11-26 RX ADMIN — MORPHINE SULFATE 75 MG: 30 TABLET, FILM COATED, EXTENDED RELEASE ORAL at 21:40

## 2019-11-26 RX ADMIN — INSULIN LISPRO 2 UNITS: 100 INJECTION, SOLUTION INTRAVENOUS; SUBCUTANEOUS at 17:49

## 2019-11-26 RX ADMIN — PREGABALIN 150 MG: 75 CAPSULE ORAL at 17:49

## 2019-11-26 RX ADMIN — HEPARIN SODIUM 5000 UNITS: 5000 INJECTION INTRAVENOUS; SUBCUTANEOUS at 13:21

## 2019-11-26 RX ADMIN — PANTOPRAZOLE SODIUM 40 MG: 40 TABLET, DELAYED RELEASE ORAL at 05:59

## 2019-11-26 RX ADMIN — FUROSEMIDE 20 MG: 20 TABLET ORAL at 08:59

## 2019-11-26 RX ADMIN — FLUTICASONE PROPIONATE 1 SPRAY: 50 SPRAY, METERED NASAL at 08:59

## 2019-11-26 RX ADMIN — Medication 2000 MG: at 13:22

## 2019-11-26 RX ADMIN — NYSTATIN: 100000 POWDER TOPICAL at 17:50

## 2019-11-26 RX ADMIN — METOCLOPRAMIDE HYDROCHLORIDE 5 MG: 10 TABLET ORAL at 12:01

## 2019-11-26 RX ADMIN — MORPHINE SULFATE 15 MG: 15 TABLET ORAL at 12:01

## 2019-11-26 RX ADMIN — CEFDINIR 300 MG: 300 CAPSULE ORAL at 21:40

## 2019-11-26 RX ADMIN — HEPARIN SODIUM 5000 UNITS: 5000 INJECTION INTRAVENOUS; SUBCUTANEOUS at 05:59

## 2019-11-26 RX ADMIN — PREGABALIN 150 MG: 75 CAPSULE ORAL at 08:59

## 2019-11-26 RX ADMIN — CARISOPRODOL 350 MG: 350 TABLET ORAL at 17:53

## 2019-11-26 RX ADMIN — CARISOPRODOL 350 MG: 350 TABLET ORAL at 09:02

## 2019-11-26 NOTE — ASSESSMENT & PLAN NOTE
Lab Results   Component Value Date    HGBA1C 10 1 (H) 11/23/2019       Recent Labs     11/25/19  1602 11/25/19  2114 11/26/19  0642 11/26/19  1112   POCGLU 198* 169* 142* 138       Blood Sugar Average: Last 72 hrs:  (P) 205  15  Poorly controlled   Continue to titrate insulin as tolerated

## 2019-11-26 NOTE — CONSULTS
Consultation - Pulmonary Medicine   Martha Licona 53 y o  female MRN: [id number]  Unit/Bed#: -01 Encounter: [id number]      Assessment:  Acute respiratory insufficiency, improved  Atypical pneumonia  Abnormal chest CT  Mild persistent asthma with acute exacerbation  Morbid obesity  ROBYN    Plan:   Patient previously had some hypoxia, currently saturating well on room air  Would perform desaturation screen prior to discharge  Chest CT with lobular ground-glass areas consistent with inflammatory versus infectious etiology  Given clinical presentation and fever on arrival, patient was treated and improved with antibiotics  She has been transitioned to oral Omnicef  Airway clearance measures and supportive care, will add flutter valve  Patient's maintenance therapy is Showell Hoguet, will have her use Breo while admitted  Can resume the Syl Hoguet when discharged  Will also add ATC nebs  She has a nebulizer at home however will need nebulizer medication sent to the pharmacy when discharged  Received a dose of Solu-Medrol upon arrival   Otherwise, has been improving off steroids  Would benefit from weight loss  Diagnosed with ROBYN many years ago, never pursued treatment with CPAP  She will need a diagnostic sleep study as an outpatient  Counseled her regarding consequences of untreated sleep apnea  Patient follows with Dr Frederic Tan as an outpatient  She will need outpatient pulmonary follow-up including chest CT and sleep study    History of Present Illness   Physician Requesting Consult: Janine Alvares, *  Reason for Consult / Principal Problem:  Pneumonia  Hx and PE limited by:  None  HPI: Martha Licona is a 53y o  year old female nonsmoker with past medical history including mild persistent asthma, untreated ROBYN, chronic pain, morbid obesity who presents with chief complaint of shortness of breath  She was febrile on arrival and initially admitted to step-down unit    She improved with supportive care and treatment with antibiotics  Today, reports that her dyspnea has significantly improved  Still some shortness of breath with exertion however no longer with supplemental oxygen requirements  She does have a cough which is mainly nonproductive  She reports that she has difficulty with her mucus clearance  From a pulmonary standpoint, she follows with Dr Pedro Diana  She reports that her asthma is relatively well controlled with the generic Advair  She denies frequent asthma flare ups  She did have pneumonia in the past, many years ago  She does not smoke at all and this includes cigarettes, cigars, marijuana, or electronic vapes  Consults    Review of Systems   Constitutional: Positive for fatigue and fever  Respiratory: Positive for cough, chest tightness, shortness of breath and wheezing  Psychiatric/Behavioral: Positive for sleep disturbance  All other systems reviewed and are negative        Historical Information   Past Medical History:   Diagnosis Date    Asthma     Chronic pain     Diabetes mellitus ([facility] [address] )     Gastroparesis      Past Surgical History:   Procedure Laterality Date    CERVICAL LAMINECTOMY      CHOLECYSTECTOMY      CHOLECYSTECTOMY LAPAROSCOPIC N/A 11/10/2018    Procedure: CHOLECYSTECTOMY LAPAROSCOPIC w/ ioc;  Surgeon: Kellie Obrien MD;  Location: [facility];  Service: General    HYSTERECTOMY      JOINT REPLACEMENT Bilateral     knee    TOE AMPUTATION Right 2/2/2018    Procedure: AMPUTATION TOE, RIGHT GREAT TOE;  Surgeon: Lety Berger DPM;  Location: [facility];  Service: Podiatry     Social History   Social History     Substance and Sexual Activity   Alcohol Use Yes    Frequency: 2-4 times a month    Drinks per session: 1 or 2    Comment: rarely     Social History     Substance and Sexual Activity   Drug Use No     Social History     Tobacco Use   Smoking Status Never Smoker   Smokeless Tobacco Never Used     Occupational History:     Family History:   Family History   Problem Relation Age of Onset    Diabetes Mother     Diabetes Maternal Grandmother     No Known Problems Father        Meds/Allergies   all current active meds have been reviewed    Allergies   Allergen Reactions    Nsaids GI Intolerance       Objective   Vitals: Blood pressure 119/65, pulse 82, temperature 98 °F (36 7 °C), resp  rate 16, height 5' 8" (1 727 m), weight 136 kg (299 lb 9 7 oz), SpO2 90 %, not currently breastfeeding  ,Body mass index is 45 55 kg/m²  Intake/Output Summary (Last 24 hours) at 11/26/2019 1609  Last data filed at 11/26/2019 1300  Gross per 24 hour   Intake 1320 ml   Output --   Net 1320 ml     Invasive Devices     None                 Physical Exam   Constitutional: She is oriented to person, place, and time  She appears well-developed and well-nourished  No distress  obese   HENT:   Head: Normocephalic and atraumatic  Right Ear: External ear normal    Left Ear: External ear normal    Nose: Nose normal    Mouth/Throat: Oropharynx is clear and moist  No oropharyngeal exudate  Crowded airway   Eyes: Conjunctivae and EOM are normal  Right eye exhibits no discharge  Left eye exhibits no discharge  No scleral icterus  Neck: Normal range of motion  Neck supple  No tracheal deviation present  Short and wide neck   Cardiovascular: Normal rate, regular rhythm and normal heart sounds  Exam reveals no gallop and no friction rub  No murmur heard  Pulmonary/Chest: Effort normal  No stridor  No respiratory distress  She has wheezes  She has no rales  Abdominal: She exhibits no distension  There is no guarding  Musculoskeletal: Normal range of motion  She exhibits no edema, tenderness or deformity  Neurological: She is alert and oriented to person, place, and time  No cranial nerve deficit  Skin: Skin is warm and dry  No rash noted  She is not diaphoretic  No erythema  No pallor  Psychiatric: She has a normal mood and affect   Her behavior is normal  Judgment and thought content normal    Nursing note and vitals reviewed  Lab Results: I have personally reviewed pertinent lab results  Imaging Studies: I have personally reviewed pertinent reports  EKG, Pathology, and Other Studies: I have personally reviewed pertinent reports      VTE Prophylaxis: Heparin    Code Status: Level 1 - Full Code  Advance Directive and Living Will:      Power of :    POLST:

## 2019-11-26 NOTE — ASSESSMENT & PLAN NOTE
Lab Results   Component Value Date    HGBA1C 10 1 (H) 11/23/2019       Recent Labs     11/25/19  1602 11/25/19  2114 11/26/19  0642 11/26/19  1112   POCGLU 198* 169* 142* 138       Blood Sugar Average: Last 72 hrs:  (P) 205  15    Continue insulin, titrate as tolerated

## 2019-11-26 NOTE — PROGRESS NOTES
Progress Note - Drea Black 1965, 53 y o  female MRN: [id number]    Unit/Bed#: -01 Encounter: [id number]    Primary Care Provider: Alena Olivo MD   Date and time admitted to hospital: 11/22/2019  1:11 PM        Thyroid nodule  Assessment & Plan  Outpatient thyroid ultrasound    Acute respiratory insufficiency  Assessment & Plan  Improving  Now off nasal cannula  Was hypoxic on 11/24 after walking to the bathroom  Sepsis ([facility] [address] )  Assessment & Plan  Secondary to pneumonia  Continue cefepime and azithromycin- day 5  All cultures negative     ALEJANDRO (acute kidney injury) ([facility] [address] )  Assessment & Plan  Creatinine at baseline, continue to monitor     Morbid obesity ([facility] )  Assessment & Plan  Encourage weight loss    Essential hypertension  Assessment & Plan  Continue lisinopril    Uncomplicated opioid dependence ([facility] )  Assessment & Plan  Patient takes long-acting morphine at 75 mg twice a day as well as short-acting 15 mg 3 times a day  Asthma  Assessment & Plan  Continue with respiratory protocol  Type 2 diabetes mellitus with diabetic neuropathy, with long-term current use of insulin [facility])  Assessment & Plan  Lab Results   Component Value Date    HGBA1C 10 1 (H) 11/23/2019       Recent Labs     11/25/19  1602 11/25/19  2114 11/26/19  0642 11/26/19  1112   POCGLU 198* 169* 142* 138       Blood Sugar Average: Last 72 hrs:  (P) 205  15  Poorly controlled  Continue to titrate insulin as tolerated    Diabetic ulcer of right great toe [facility])  Assessment & Plan  Lab Results   Component Value Date    HGBA1C 10 1 (H) 11/23/2019       Recent Labs     11/25/19  1602 11/25/19  2114 11/26/19  0642 11/26/19  1112   POCGLU 198* 169* 142* 138       Blood Sugar Average: Last 72 hrs:  (P) 205  15  Continue insulin, titrate as tolerated    * Pneumonia  Assessment & Plan  Likely atypical pneumonia  See above  Keep the patient on cefepime and azithromycin          VTE Pharmacologic Prophylaxis:   Pharmacologic: Heparin  Mechanical VTE Prophylaxis in Place: Yes    Patient Centered Rounds: I have performed bedside rounds with nursing staff today  Discussions with Specialists or Other Care Team Provider:     Education and Discussions with Family / Patient:     Time Spent for Care: 30 minutes  More than 50% of total time spent on counseling and coordination of care as described above  Current Length of Stay: 4 day(s)    Current Patient Status: Inpatient   Certification Statement: The patient will continue to require additional inpatient hospital stay due to pneumonia    Discharge Plan: home    Code Status: Level 1 - Full Code      Subjective:   Breathing better, no c/o-says she was tested for sleep apnea in the past but never followed up for cpap    Objective:     Vitals:   Temp (24hrs), Av 7 °F (36 5 °C), Min:97 5 °F (36 4 °C), Max:97 8 °F (36 6 °C)    Temp:  [97 5 °F (36 4 °C)-97 8 °F (36 6 °C)] 97 7 °F (36 5 °C)  HR:  [80-91] 80  Resp:  [16-18] 16  BP: (103-121)/(62-67) 118/65  SpO2:  [92 %-97 %] 94 %  Body mass index is 45 55 kg/m²  Input and Output Summary (last 24 hours): Intake/Output Summary (Last 24 hours) at 2019 1342  Last data filed at 2019 0846  Gross per 24 hour   Intake 1240 ml   Output --   Net 1240 ml       Physical Exam:     Physical Exam   Constitutional: She is oriented to person, place, and time  She appears well-developed and well-nourished  HENT:   Head: Normocephalic and atraumatic  Eyes: Pupils are equal, round, and reactive to light  Neck: Neck supple  Cardiovascular: Normal rate and regular rhythm  Pulmonary/Chest: Effort normal  No respiratory distress  Abdominal: Soft  Bowel sounds are normal    Musculoskeletal: She exhibits no edema  Neurological: She is alert and oriented to person, place, and time  Skin: Skin is warm and dry  Psychiatric: She has a normal mood and affect   Her behavior is normal          Additional Data:     Labs:    Results from last 7 days   Lab Units 11/26/19  0642   WBC Thousand/uL 7 24   HEMOGLOBIN g/dL 11 1*   HEMATOCRIT % 35 3   PLATELETS Thousands/uL 226   NEUTROS PCT % 50   LYMPHS PCT % 36   MONOS PCT % 9   EOS PCT % 3     Results from last 7 days   Lab Units 11/26/19  0642 11/25/19  0434   SODIUM mmol/L 138 137  137   POTASSIUM mmol/L 4 1 4 5  4 5   CHLORIDE mmol/L 101 101  101   CO2 mmol/L 30 30  30   BUN mg/dL 15 17  17   CREATININE mg/dL 0 68 0 71  0 71   ANION GAP mmol/L 7 6  6   CALCIUM mg/dL 8 7 8 7  8 7   ALBUMIN g/dL 2 5* 2 5*   TOTAL BILIRUBIN mg/dL  --  0 60   ALK PHOS U/L  --  86   ALT U/L  --  26   AST U/L  --  22   GLUCOSE RANDOM mg/dL 129 144*  144*     Results from last 7 days   Lab Units 11/22/19  1327   INR  1 16     Results from last 7 days   Lab Units 11/26/19  1112 11/26/19  0642 11/25/19  2114 11/25/19  1602 11/25/19  1053 11/25/19  0542 11/24/19  2143 11/24/19  1640 11/24/19  1031 11/24/19  0716 11/23/19  2144 11/23/19  1621   POC GLUCOSE mg/dl 138 142* 169* 198* 227* 138 226* 212* 282* 241* 256* 169*     Results from last 7 days   Lab Units 11/23/19  0525 11/22/19  1745   HEMOGLOBIN A1C % 10 1* 10 0*     Results from last 7 days   Lab Units 11/22/19  1327   LACTIC ACID mmol/L 1 5   PROCALCITONIN ng/ml 0 22           * I Have Reviewed All Lab Data Listed Above  * Additional Pertinent Lab Tests Reviewed: All Labs Within Last 24 Hours Reviewed    Imaging:    Imaging Reports Reviewed Today Include:   Imaging Personally Reviewed by Myself Includes:      Recent Cultures (last 7 days):     Results from last 7 days   Lab Units 11/23/19  1533 11/22/19  1329 11/22/19  1327   BLOOD CULTURE   --  No Growth at 72 hrs  No Growth at 72 hrs     URINE CULTURE  <10,000 cfu/ml   --   --    LEGIONELLA URINARY ANTIGEN  Negative  --   --        Last 24 Hours Medication List:     Current Facility-Administered Medications:  albuterol 2 5 mg Nebulization Q6H PRN VIVI Clinton    azithromycin 500 mg Oral Q24H Christianne Moritz VIVI Cox    carisoprodol 350 mg Oral BID VIVI Starks    cefepime 2,000 mg Intravenous Q12H VIVI Starks Last Rate: 2,000 mg (11/26/19 1322)   dicyclomine 10 mg Oral 4x Daily PRN VIVI Starks    fluticasone 1 spray Nasal Daily VIVI Starks    furosemide 20 mg Oral Daily VIVI Rayo    heparin (porcine) 5,000 Units Subcutaneous [facility] Musa Bautista, VIVI    insulin glargine 55 Units Subcutaneous HS VIVI Rayo    insulin lispro 1-5 Units Subcutaneous HS Musa Bautista, VIVI    insulin lispro 2-12 Units Subcutaneous TID AC VIVI Starks    lisinopril 10 mg Oral Daily Msua Bautista, VIVI    metoclopramide 5 mg Oral TID AC VIVI Starks    montelukast 10 mg Oral HS VIVI Starks    morphine 75 mg Oral Q12H [address] VIVI Rayo    morphine 15 mg Oral TID PRN VIVI Starks    nystatin  Topical BID VIVI Starks    pantoprazole 40 mg Oral Early Morning VIVI Starks    pregabalin 150 mg Oral BID VIVI Starks         Today, Patient Was Seen By: Cong Herrera MD    ** Please Note: Dictation voice to text software may have been used in the creation of this document   **

## 2019-11-26 NOTE — NURSING NOTE
IV due to be changed today, pt refused to have  new iv put in, Dr Arnie Ricketts aware, ok to leave IV out, pt currently have no IV site

## 2019-11-27 VITALS
DIASTOLIC BLOOD PRESSURE: 63 MMHG | OXYGEN SATURATION: 96 % | RESPIRATION RATE: 16 BRPM | HEART RATE: 79 BPM | WEIGHT: 293 LBS | BODY MASS INDEX: 44.41 KG/M2 | HEIGHT: 68 IN | TEMPERATURE: 97.8 F | SYSTOLIC BLOOD PRESSURE: 114 MMHG

## 2019-11-27 PROBLEM — N17.9 AKI (ACUTE KIDNEY INJURY) (HCC): Status: RESOLVED | Noted: 2019-09-19 | Resolved: 2019-11-27

## 2019-11-27 PROBLEM — A41.9 SEPSIS (HCC): Status: RESOLVED | Noted: 2019-11-23 | Resolved: 2019-11-27

## 2019-11-27 PROBLEM — R06.89 ACUTE RESPIRATORY INSUFFICIENCY: Status: RESOLVED | Noted: 2019-11-23 | Resolved: 2019-11-27

## 2019-11-27 LAB
ANION GAP SERPL CALCULATED.3IONS-SCNC: 8 MMOL/L (ref 4–13)
BACTERIA BLD CULT: NORMAL
BACTERIA BLD CULT: NORMAL
BASOPHILS # BLD AUTO: 0.04 THOUSANDS/ΜL (ref 0–0.1)
BASOPHILS NFR BLD AUTO: 1 % (ref 0–1)
BUN SERPL-MCNC: 14 MG/DL (ref 5–25)
CALCIUM SERPL-MCNC: 8.6 MG/DL (ref 8.3–10.1)
CHLORIDE SERPL-SCNC: 101 MMOL/L (ref 100–108)
CO2 SERPL-SCNC: 29 MMOL/L (ref 21–32)
CREAT SERPL-MCNC: 0.68 MG/DL (ref 0.6–1.3)
EOSINOPHIL # BLD AUTO: 0.21 THOUSAND/ΜL (ref 0–0.61)
EOSINOPHIL NFR BLD AUTO: 3 % (ref 0–6)
ERYTHROCYTE [DISTWIDTH] IN BLOOD BY AUTOMATED COUNT: 13.6 % (ref 11.6–15.1)
GFR SERPL CREATININE-BSD FRML MDRD: 99 ML/MIN/1.73SQ M
GLUCOSE SERPL-MCNC: 125 MG/DL (ref 65–140)
GLUCOSE SERPL-MCNC: 132 MG/DL (ref 65–140)
GLUCOSE SERPL-MCNC: 165 MG/DL (ref 65–140)
HCT VFR BLD AUTO: 34.2 % (ref 34.8–46.1)
HGB BLD-MCNC: 10.7 G/DL (ref 11.5–15.4)
IMM GRANULOCYTES # BLD AUTO: 0.06 THOUSAND/UL (ref 0–0.2)
IMM GRANULOCYTES NFR BLD AUTO: 1 % (ref 0–2)
LYMPHOCYTES # BLD AUTO: 2.29 THOUSANDS/ΜL (ref 0.6–4.47)
LYMPHOCYTES NFR BLD AUTO: 32 % (ref 14–44)
MCH RBC QN AUTO: 26 PG (ref 26.8–34.3)
MCHC RBC AUTO-ENTMCNC: 31.3 G/DL (ref 31.4–37.4)
MCV RBC AUTO: 83 FL (ref 82–98)
MONOCYTES # BLD AUTO: 0.57 THOUSAND/ΜL (ref 0.17–1.22)
MONOCYTES NFR BLD AUTO: 8 % (ref 4–12)
NEUTROPHILS # BLD AUTO: 3.95 THOUSANDS/ΜL (ref 1.85–7.62)
NEUTS SEG NFR BLD AUTO: 55 % (ref 43–75)
NRBC BLD AUTO-RTO: 0 /100 WBCS
PLATELET # BLD AUTO: 201 THOUSANDS/UL (ref 149–390)
PMV BLD AUTO: 12.2 FL (ref 8.9–12.7)
POTASSIUM SERPL-SCNC: 4.2 MMOL/L (ref 3.5–5.3)
RBC # BLD AUTO: 4.12 MILLION/UL (ref 3.81–5.12)
SODIUM SERPL-SCNC: 138 MMOL/L (ref 136–145)
WBC # BLD AUTO: 7.12 THOUSAND/UL (ref 4.31–10.16)

## 2019-11-27 PROCEDURE — 82948 REAGENT STRIP/BLOOD GLUCOSE: CPT

## 2019-11-27 PROCEDURE — 80048 BASIC METABOLIC PNL TOTAL CA: CPT | Performed by: GENERAL PRACTICE

## 2019-11-27 PROCEDURE — 99232 SBSQ HOSP IP/OBS MODERATE 35: CPT | Performed by: PHYSICIAN ASSISTANT

## 2019-11-27 PROCEDURE — 94640 AIRWAY INHALATION TREATMENT: CPT

## 2019-11-27 PROCEDURE — 85025 COMPLETE CBC W/AUTO DIFF WBC: CPT | Performed by: GENERAL PRACTICE

## 2019-11-27 PROCEDURE — 94668 MNPJ CHEST WALL SBSQ: CPT

## 2019-11-27 PROCEDURE — 99239 HOSP IP/OBS DSCHRG MGMT >30: CPT | Performed by: INTERNAL MEDICINE

## 2019-11-27 PROCEDURE — 94760 N-INVAS EAR/PLS OXIMETRY 1: CPT

## 2019-11-27 RX ORDER — GUAIFENESIN/DEXTROMETHORPHAN 100-10MG/5
10 SYRUP ORAL EVERY 4 HOURS PRN
Status: DISCONTINUED | OUTPATIENT
Start: 2019-11-27 | End: 2019-11-27 | Stop reason: HOSPADM

## 2019-11-27 RX ORDER — GUAIFENESIN/DEXTROMETHORPHAN 100-10MG/5
10 SYRUP ORAL EVERY 4 HOURS PRN
Qty: 118 ML | Refills: 0 | Status: SHIPPED | OUTPATIENT
Start: 2019-11-27 | End: 2019-12-04

## 2019-11-27 RX ORDER — ALBUTEROL SULFATE 2.5 MG/3ML
2.5 SOLUTION RESPIRATORY (INHALATION) EVERY 6 HOURS PRN
Qty: 90 ML | Refills: 0 | Status: SHIPPED | OUTPATIENT
Start: 2019-11-27 | End: 2019-12-31 | Stop reason: HOSPADM

## 2019-11-27 RX ORDER — CEFDINIR 300 MG/1
300 CAPSULE ORAL EVERY 12 HOURS SCHEDULED
Qty: 6 CAPSULE | Refills: 0 | Status: SHIPPED | OUTPATIENT
Start: 2019-11-27 | End: 2019-11-30

## 2019-11-27 RX ADMIN — PANTOPRAZOLE SODIUM 40 MG: 40 TABLET, DELAYED RELEASE ORAL at 05:45

## 2019-11-27 RX ADMIN — FUROSEMIDE 20 MG: 20 TABLET ORAL at 09:06

## 2019-11-27 RX ADMIN — PREGABALIN 150 MG: 75 CAPSULE ORAL at 09:06

## 2019-11-27 RX ADMIN — NYSTATIN: 100000 POWDER TOPICAL at 09:10

## 2019-11-27 RX ADMIN — LEVALBUTEROL HYDROCHLORIDE 1.25 MG: 1.25 SOLUTION, CONCENTRATE RESPIRATORY (INHALATION) at 07:37

## 2019-11-27 RX ADMIN — CEFDINIR 300 MG: 300 CAPSULE ORAL at 09:05

## 2019-11-27 RX ADMIN — FLUTICASONE PROPIONATE 1 SPRAY: 50 SPRAY, METERED NASAL at 09:05

## 2019-11-27 RX ADMIN — HEPARIN SODIUM 5000 UNITS: 5000 INJECTION INTRAVENOUS; SUBCUTANEOUS at 05:46

## 2019-11-27 RX ADMIN — METOCLOPRAMIDE HYDROCHLORIDE 5 MG: 10 TABLET ORAL at 09:06

## 2019-11-27 RX ADMIN — FLUTICASONE FUROATE AND VILANTEROL TRIFENATATE 1 PUFF: 100; 25 POWDER RESPIRATORY (INHALATION) at 09:09

## 2019-11-27 RX ADMIN — METOCLOPRAMIDE HYDROCHLORIDE 5 MG: 10 TABLET ORAL at 11:53

## 2019-11-27 RX ADMIN — CARISOPRODOL 350 MG: 350 TABLET ORAL at 09:14

## 2019-11-27 RX ADMIN — IPRATROPIUM BROMIDE 0.5 MG: 0.5 SOLUTION RESPIRATORY (INHALATION) at 07:37

## 2019-11-27 RX ADMIN — LISINOPRIL 10 MG: 10 TABLET ORAL at 09:05

## 2019-11-27 RX ADMIN — MORPHINE SULFATE 75 MG: 30 TABLET, FILM COATED, EXTENDED RELEASE ORAL at 09:05

## 2019-11-27 NOTE — DISCHARGE SUMMARY
Discharge- Shira Deal 1965, 53 y o  female MRN: [id number]    Unit/Bed#: -01 Encounter: [id number]    Primary Care Provider: Nancy Larson MD   Date and time admitted to hospital: 11/22/2019  1:11 PM        * Pneumonia  Assessment & Plan  Likely atypical pneumonia  Received IV cefepime and azithromycin for 5 days  Will transition to cefdinir  Evaluated by pulmonary in hospital  Follow up pulmonary as outpatient  Sleep study as outpatient  Thyroid nodule  Assessment & Plan  Outpatient thyroid ultrasound    Morbid obesity ([address] )  Assessment & Plan  Encourage weight loss    Essential hypertension  Assessment & Plan  Continue lisinopril    Uncomplicated opioid dependence ([facility] [address] )  Assessment & Plan  Patient takes long-acting morphine at 75 mg twice a day as well as short-acting 15 mg 3 times a day  Asthma  Assessment & Plan  Continue with respiratory protocol  Type 2 diabetes mellitus with diabetic neuropathy, with long-term current use of insulin [facility])  Assessment & Plan  Lab Results   Component Value Date    HGBA1C 10 1 (H) 11/23/2019       Recent Labs     11/26/19  1112 11/26/19  1558 11/26/19  2109 11/27/19  0554   POCGLU 138 152* 194* 132       Blood Sugar Average: Last 72 hrs:  (P) 310 [id number]  Poorly controlled  Continue to titrate insulin as tolerated    Diabetic ulcer of right great toe [facility])  Assessment & Plan  Lab Results   Component Value Date    HGBA1C 10 1 (H) 11/23/2019       Recent Labs     11/26/19  1112 11/26/19  1558 11/26/19  2109 11/27/19  0554   POCGLU 138 152* 194* 132       Blood Sugar Average: Last 72 hrs:  (P) 605 [id number]    Continue insulin, titrate as tolerated      Discharging Physician / Practitioner: Javier Gaspar MD  PCP: Nancy Larson MD  Admission Date:   Admission Orders (From admission, onward)     Ordered        11/22/19 1600  Inpatient Admission  Once                   Discharge Date: 11/27/19    Resolved Problems  Date Reviewed: 11/27/2019          Resolved    [facility]) [facility] ) 11/27/2019     Resolved by  Aisha Augustine MD    Sepsis ([facility] ) 11/27/2019     Resolved by  Aisha Augustine MD    Acute respiratory insufficiency 11/27/2019     Resolved by  Aisha Augustine MD          Consultations During Hospital Stay:  · ICU, Pulmonary        Significant Findings / Test Results:   CT chest-    IMPRESSION:     Lobular groundglass areas are seen in the upper lobes bilaterally, these  are indeterminate, May be on an inflammatory or infectious basis could represent atypical infection, eosinophilic pneumonia is also in consideration  in appropriate clinical   setting     2 cm right thyroid nodule, meet the size threshold criteria for further evaluation with ultrasound        Test Results Pending at Discharge (will require follow up):   · none     Outpatient Tests Requested:  · Sleep study  · Thyroid ultrasound      Reason for Admission: SOB, Hypoxia    Hospital Course:   HPI on admission-  Elza Tyler is a 53 y o  female who presents with a past medical history of asthma, chronic back and neck pain with opioid dependence, DM, diabetic foot ulcer  She presents to the emergency department with hyperglycemia  She states that she has had a cough for approximately 2 weeks without a productive cough  She has been progressively short of breath and it acutely worsened today  She denies any recent contacts and denies fever at home  She did have dysuria at home and received a prescription for diflucan  She was noted to be febrile with temperature for 102 7 she is referred to the step down unit for further management and care                Hospital course-  Patient was initially evaluated by ICU team and was in step-down unit  She was started on IV antibiotics  Blood culture was negative  The patient was on oxygen supplement  But over the last 24 hours she was doing much better  Afebrile  Currently on room air    Discussed personally with pulmonary  Patient cleared for discharge home  She will need a sleep study and follow up with Pulmonary/ PCP as outpatient  Thyroid ultrasound for thyroid nodule as outpatient  Please see above list of diagnoses and related plan for additional information  Condition at Discharge: good     Discharge Day Visit / Exam:     Subjective:    Vitals: Blood Pressure: 114/63 (11/27/19 0735)  Pulse: 79 (11/27/19 0735)  Temperature: 97 8 °F (36 6 °C) (11/27/19 0735)  Temp Source: Oral (11/25/19 0735)  Respirations: 16 (11/27/19 0735)  Height: 5' 8" (172 7 cm) (11/22/19 2000)  Weight - Scale: (!) 139 kg (306 lb 14 1 oz) (11/27/19 0600)  SpO2: 96 % (11/27/19 0740)  Exam:   Physical Exam  General- Awake, alert and oriented x 3, looks comfortable  obese  CVS- Normal S1/ S2, Regular rate and rhythm  Respiratory system- B/L clear breath sounds, no wheezing  Abdomen- Soft, Non distended, no tenderness, Bowel sound- present  Genitourinary- No suprapubic tenderness, No CVA tenderness  Skin- No new bruise or rash  Musculoskeletal- No gross deformity  Psych- No acute psychosis  CNS- CN II- XII grossly intact, No acute focal neurologic deficit noted    Discussion with Family: patient    Discharge instructions/Information to patient and family:   See after visit summary for information provided to patient and family  Provisions for Follow-Up Care:  See after visit summary for information related to follow-up care and any pertinent home health orders  Disposition:     Home with Home health    For Discharges to [facility]:   · Not Applicable to this Patient - Not Applicable to this Patient         Discharge Statement:  I spent 35 minutes discharging the patient  This time was spent on the day of discharge  I had direct contact with the patient on the day of discharge   Greater than 50% of the total time was spent examining patient, answering all patient questions, arranging and discussing plan of care with patient as well as directly providing post-discharge instructions  Additional time then spent on discharge activities  Discharge Medications:  See after visit summary for reconciled discharge medications provided to patient and family        ** Please Note: This note has been constructed using a voice recognition system **

## 2019-11-27 NOTE — ASSESSMENT & PLAN NOTE
Likely atypical pneumonia  Received IV cefepime and azithromycin for 5 days  Will transition to cefdinir  Evaluated by pulmonary in hospital  Follow up pulmonary as outpatient  Sleep study as outpatient

## 2019-11-27 NOTE — PLAN OF CARE
Problem: PAIN - ADULT  Goal: Verbalizes/displays adequate comfort level or baseline comfort level  Description  Interventions:  - Encourage patient to monitor pain and request assistance  - Assess pain using appropriate pain scale  - Administer analgesics based on type and severity of pain and evaluate response  - Implement non-pharmacological measures as appropriate and evaluate response  - Consider cultural and social influences on pain and pain management  - Notify physician/advanced practitioner if interventions unsuccessful or patient reports new pain  Outcome: Progressing     Problem: INFECTION - ADULT  Goal: Absence or prevention of progression during hospitalization  Description  INTERVENTIONS:  - Assess and monitor for signs and symptoms of infection  - Monitor lab/diagnostic results  - Monitor all insertion sites, i e  indwelling lines, tubes, and drains  - Monitor endotracheal if appropriate and nasal secretions for changes in amount and color  - [place] appropriate cooling/warming therapies per order  - Administer medications as ordered  - Instruct and encourage patient and family to use good hand hygiene technique  - Identify and instruct in appropriate isolation precautions for identified infection/condition  Outcome: Progressing     Problem: SAFETY ADULT  Goal: Patient will remain free of falls  Description  INTERVENTIONS:  - Assess patient frequently for physical needs  -  Identify cognitive and physical deficits and behaviors that affect risk of falls    -  [place] fall precautions as indicated by assessment   - Educate patient/family on patient safety including physical limitations  - Instruct patient to call for assistance with activity based on assessment  - Modify environment to reduce risk of injury  - Consider OT/PT consult to assist with strengthening/mobility  Outcome: Progressing  Goal: Maintain or return to baseline ADL function  Description  INTERVENTIONS:  -  Assess patient's ability to carry out ADLs; assess patient's baseline for ADL function and identify physical deficits which impact ability to perform ADLs (bathing, care of mouth/teeth, toileting, grooming, dressing, etc )  - Assess/evaluate cause of self-care deficits   - Assess range of motion  - Assess patient's mobility; develop plan if impaired  - Assess patient's need for assistive devices and provide as appropriate  - Encourage maximum independence but intervene and supervise when necessary  - Involve family in performance of ADLs  - Assess for home care needs following discharge   - Consider OT consult to assist with ADL evaluation and planning for discharge  - Provide patient education as appropriate  Outcome: Progressing  Goal: Maintain or return mobility status to optimal level  Description  INTERVENTIONS:  - Assess patient's baseline mobility status (ambulation, transfers, stairs, etc )    - Identify cognitive and physical deficits and behaviors that affect mobility  - Identify mobility aids required to assist with transfers and/or ambulation (gait belt, sit-to-stand, lift, walker, cane, etc )  - [place] fall precautions as indicated by assessment  - Record patient progress and toleration of activity level on Mobility SBAR; progress patient to next Phase/Stage  - Instruct patient to call for assistance with activity based on assessment  - Consider rehabilitation consult to assist with strengthening/weightbearing, etc   Outcome: Progressing     Problem: DISCHARGE PLANNING  Goal: Discharge to home or other facility with appropriate resources  Description  INTERVENTIONS:  - Identify barriers to discharge w/patient and caregiver  - Arrange for needed discharge resources and transportation as appropriate  - Identify discharge learning needs (meds, wound care, etc )  - Arrange for interpretive services to assist at discharge as needed  - Refer to Case Management Department for coordinating discharge planning if the patient needs post-hospital services based on physician/advanced practitioner order or complex needs related to functional status, cognitive ability, or social support system  Outcome: Progressing     Problem: Knowledge Deficit  Goal: Patient/family/caregiver demonstrates understanding of disease process, treatment plan, medications, and discharge instructions  Description  Complete learning assessment and assess knowledge base    Interventions:  - Provide teaching at level of understanding  - Provide teaching via preferred learning methods  Outcome: Progressing     Problem: CARDIOVASCULAR - ADULT  Goal: Maintains optimal cardiac output and hemodynamic stability  Description  INTERVENTIONS:  - Monitor I/O, vital signs and rhythm  - Monitor for S/S and trends of decreased cardiac output  - Administer and titrate ordered vasoactive medications to optimize hemodynamic stability  - Assess quality of pulses, skin color and temperature  - Assess for signs of decreased coronary artery perfusion  - Instruct patient to report change in severity of symptoms  Outcome: Progressing  Goal: Absence of cardiac dysrhythmias or at baseline rhythm  Description  INTERVENTIONS:  - Continuous cardiac monitoring, vital signs, obtain 12 lead EKG if ordered  - Administer antiarrhythmic and heart rate control medications as ordered  - Monitor electrolytes and administer replacement therapy as ordered  Outcome: Progressing     Problem: RESPIRATORY - ADULT  Goal: Achieves optimal ventilation and oxygenation  Description  INTERVENTIONS:  - Assess for changes in respiratory status  - Assess for changes in mentation and behavior  - Position to facilitate oxygenation and minimize respiratory effort  - Oxygen administered by appropriate delivery if ordered  - Initiate smoking cessation education as indicated  - Encourage broncho-pulmonary hygiene including cough, deep breathe, Incentive Spirometry  - Assess the need for suctioning and aspirate as needed  - Assess and instruct to report SOB or any respiratory difficulty  - Respiratory Therapy support as indicated  Outcome: Progressing     Problem: METABOLIC, FLUID AND ELECTROLYTES - ADULT  Goal: Electrolytes maintained within normal limits  Description  INTERVENTIONS:  - Monitor labs and assess patient for signs and symptoms of electrolyte imbalances  - Administer electrolyte replacement as ordered  - Monitor response to electrolyte replacements, including repeat lab results as appropriate  - Instruct patient on fluid and nutrition as appropriate  Outcome: Progressing  Goal: Fluid balance maintained  Description  INTERVENTIONS:  - Monitor labs   - Monitor I/O and WT  - Instruct patient on fluid and nutrition as appropriate  - Assess for signs & symptoms of volume excess or deficit  Outcome: Progressing  Goal: Glucose maintained within target range  Description  INTERVENTIONS:  - Monitor Blood Glucose as ordered  - Assess for signs and symptoms of hyperglycemia and hypoglycemia  - Administer ordered medications to maintain glucose within target range  - Assess nutritional intake and initiate nutrition service referral as needed  Outcome: Progressing     Problem: SKIN/TISSUE INTEGRITY - ADULT  Goal: Skin integrity remains intact  Description  INTERVENTIONS  - Identify patients at risk for skin breakdown  - Assess and monitor skin integrity  - Assess and monitor nutrition and hydration status  - Monitor labs (i e  albumin)  - Assess for incontinence   - Turn and reposition patient  - Assist with mobility/ambulation  - Relieve pressure over bony prominences  - Avoid friction and shearing  - Provide appropriate hygiene as needed including keeping skin clean and dry  - Evaluate need for skin moisturizer/barrier cream  - Collaborate with interdisciplinary team (i e  Nutrition, Rehabilitation, etc )   - Patient/family teaching  Outcome: Progressing  Goal: Incision(s), wounds(s) or drain site(s) healing without S/S of infection  Description  INTERVENTIONS  - Assess and document risk factors for skin impairment   - Assess and document dressing, incision, wound bed, drain sites and surrounding tissue  - Consider nutrition services referral as needed  - Oral mucous membranes remain intact  - Provide patient/ family education  Outcome: Progressing  Goal: Oral mucous membranes remain intact  Description  INTERVENTIONS  - Assess oral mucosa and hygiene practices  - Implement preventative oral hygiene regimen  - Implement oral medicated treatments as ordered  - Initiate Nutrition services referral as needed  Outcome: Progressing     Problem: HEMATOLOGIC - ADULT  Goal: Maintains hematologic stability  Description  INTERVENTIONS  - Assess for signs and symptoms of bleeding or hemorrhage  - Monitor labs  - Administer supportive blood products/factors as ordered and appropriate  Outcome: Progressing     Problem: MUSCULOSKELETAL - ADULT  Goal: Maintain or return mobility to safest level of function  Description  INTERVENTIONS:  - Assess patient's ability to carry out ADLs; assess patient's baseline for ADL function and identify physical deficits which impact ability to perform ADLs (bathing, care of mouth/teeth, toileting, grooming, dressing, etc )  - Assess/evaluate cause of self-care deficits   - Assess range of motion  - Assess patient's mobility  - Assess patient's need for assistive devices and provide as appropriate  - Encourage maximum independence but intervene and supervise when necessary  - Involve family in performance of ADLs  - Assess for home care needs following discharge   - Consider OT consult to assist with ADL evaluation and planning for discharge  - Provide patient education as appropriate  Outcome: Progressing  Goal: Maintain proper alignment of affected body part  Description  INTERVENTIONS:  - Support, maintain and protect limb and body alignment  - Provide patient/ family with appropriate education  Outcome: Progressing     Problem: Nutrition/Hydration-ADULT  Goal: Nutrient/Hydration intake appropriate for improving, restoring or maintaining nutritional needs  Description  Monitor and assess patient's nutrition/hydration status for malnutrition  Collaborate with interdisciplinary team and initiate plan and interventions as ordered  Monitor patient's weight and dietary intake as ordered or per policy  Determine patient's food preferences and provide high-protein, high-caloric foods as appropriate  INTERVENTIONS:  - Monitor oral intake, urinary output, labs, and treatment plans  - Assess nutrition and hydration status and recommend course of action  - Evaluate amount of meals eaten  - Assist patient with eating if necessary   - Allow adequate time for meals  - Recommend/ encourage appropriate diets, oral nutritional supplements, and vitamin/mineral supplements  - Order, calculate, and assess calorie counts as needed  - Assess need for intravenous fluids  - Provide nutrition/hydration education as appropriate  - Include patient/family/caregiver in decisions related to nutrition   Outcome: Progressing     Problem: Potential for Falls  Goal: Patient will remain free of falls  Description  INTERVENTIONS:  - Assess patient frequently for physical needs  -  Identify cognitive and physical deficits and behaviors that affect risk of falls    -  [place] fall precautions as indicated by assessment   - Educate patient/family on patient safety including physical limitations  - Instruct patient to call for assistance with activity based on assessment  - Modify environment to reduce risk of injury  - Consider OT/PT consult to assist with strengthening/mobility  Outcome: Progressing     Problem: Prexisting or High Potential for Compromised Skin Integrity  Goal: Skin integrity is maintained or improved  Description  INTERVENTIONS:  - Identify patients at risk for skin breakdown  - Assess and monitor skin integrity  - Assess and monitor nutrition and hydration status  - Monitor labs   - Assess for incontinence   - Turn and reposition patient  - Assist with mobility/ambulation  - Relieve pressure over bony prominences  - Avoid friction and shearing  - Provide appropriate hygiene as needed including keeping skin clean and dry  - Evaluate need for skin moisturizer/barrier cream  - Collaborate with interdisciplinary team   - Patient/family teaching  - Consider wound care consult   Outcome: Progressing

## 2019-11-27 NOTE — PROGRESS NOTES
Progress Note - Pulmonary   Anna Marie Yates Spruce 53 y o  female MRN: [id number]  Unit/Bed#: -01 Encounter: [id number]    Assessment:  Acute respiratory insufficiency, improved  Atypical pneumonia  Abnormal chest CT  Mild persistent asthma with acute exacerbation  Morbid obesity  ROBYN    Plan:  Patient previously had some hypoxia, continues to saturate well on room air  Would perform desaturation screen prior to discharge  Chest CT with lobular ground-glass areas consistent with inflammatory versus infectious etiology  Given clinical presentation and fever on arrival, patient was treated and improved with antibiotics  She has been transitioned to oral Omnicef, would continue for a total course of 7 days  Airway clearance measures and supportive care, flutter valve  Patient's maintenance therapy is [place], will have her use Breo while admitted  Can resume the [facility] when discharged  Will also add ATC nebs  She has a nebulizer at home however will need nebulizer medication sent to the pharmacy when discharged  Received a dose of Solu-Medrol upon arrival   Otherwise, has been improving off steroids  Would benefit from weight loss  Diagnosed with ROBYN many years ago, never pursued treatment with CPAP  She will need a diagnostic sleep study as an outpatient  Counseled her regarding consequences of untreated sleep apnea      Patient follows with Dr Maryanne Lee as an outpatient  She will need outpatient pulmonary follow-up including chest CT and sleep study    Discussed patient's case today with primary team    Subjective:   Feeling better today, would like to go home    Objective:     Vitals: Blood pressure 114/63, pulse 79, temperature 97 8 °F (36 6 °C), resp  rate 16, height 5' 8" (1 727 m), weight (!) 139 kg (306 lb 14 1 oz), SpO2 96 %, not currently breastfeeding  ,Body mass index is 46 66 kg/m²        Intake/Output Summary (Last 24 hours) at 11/27/2019 1115  Last data filed at 11/27/2019 0740  Gross per 24 hour Intake 1020 ml   Output --   Net 1020 ml       Invasive Devices     None                 Physical Exam:   General appearance: alert and oriented, in no acute distress  Obese  Head: Normocephalic, without obvious abnormality, atraumatic  Eyes: EOMI  No discharge bilaterally  No scleral icterus  Neck: supple, symmetrical, trachea midline  Lungs:   Saturating well on room air without increased work of breathing  Cough with deep inspiration  Lungs are clear  Heart: regular rate and rhythm, S1, S2 normal, no murmur, click, rub or gallop  Abdomen: soft, non-tender; bowel sounds normal; no masses,  no organomegaly  Extremities: No edema or tenderness  Skin: No lesions or pallor noted  No rash  Neurologic: Grossly normal       Labs: I have personally reviewed pertinent lab results  Imaging and other studies: I have personally reviewed pertinent reports

## 2019-11-27 NOTE — ASSESSMENT & PLAN NOTE
Lab Results   Component Value Date    HGBA1C 10 1 (H) 11/23/2019       Recent Labs     11/26/19  1112 11/26/19  1558 11/26/19 2109 11/27/19  0554   POCGLU 138 152* 194* 132       Blood Sugar Average: Last 72 hrs:  (P) 947 [id number]  Poorly controlled   Continue to titrate insulin as tolerated

## 2019-11-27 NOTE — ASSESSMENT & PLAN NOTE
Lab Results   Component Value Date    HGBA1C 10 1 (H) 11/23/2019       Recent Labs     11/26/19  1112 11/26/19  1558 11/26/19  2109 11/27/19  0554   POCGLU 138 152* 194* 132       Blood Sugar Average: Last 72 hrs:  (P) 439 [id number]    Continue insulin, titrate as tolerated

## 2019-12-24 ENCOUNTER — HOSPITAL ENCOUNTER (EMERGENCY)
Facility: HOSPITAL | Age: 54
Discharge: HOME | End: 2019-12-24
Attending: EMERGENCY MEDICINE | Admitting: EMERGENCY MEDICINE
Payer: MEDICARE

## 2019-12-24 ENCOUNTER — APPOINTMENT (EMERGENCY)
Dept: CT IMAGING | Facility: HOSPITAL | Age: 54
End: 2019-12-24
Payer: MEDICARE

## 2019-12-24 VITALS
BODY MASS INDEX: 44.41 KG/M2 | RESPIRATION RATE: 20 BRPM | DIASTOLIC BLOOD PRESSURE: 80 MMHG | HEIGHT: 68 IN | TEMPERATURE: 98.2 F | OXYGEN SATURATION: 95 % | WEIGHT: 293 LBS | HEART RATE: 117 BPM | SYSTOLIC BLOOD PRESSURE: 150 MMHG

## 2019-12-24 DIAGNOSIS — R53.81 MALAISE: Primary | ICD-10-CM

## 2019-12-24 DIAGNOSIS — R91.1 LUNG NODULE: ICD-10-CM

## 2019-12-24 DIAGNOSIS — R11.2 NAUSEA AND VOMITING: ICD-10-CM

## 2019-12-24 DIAGNOSIS — E04.1 THYROID NODULE: ICD-10-CM

## 2019-12-24 LAB
ALBUMIN SERPL BCP-MCNC: 3.6 G/DL (ref 3.5–5)
ALP SERPL-CCNC: 120 U/L (ref 46–116)
ALT SERPL W P-5'-P-CCNC: 23 U/L (ref 12–78)
ANION GAP SERPL CALCULATED.3IONS-SCNC: 12 MMOL/L (ref 4–13)
APTT PPP: 36 SECONDS (ref 23–37)
AST SERPL W P-5'-P-CCNC: 21 U/L (ref 5–45)
ATRIAL RATE: 116 BPM
BACTERIA UR QL AUTO: NORMAL /HPF
BASOPHILS # BLD AUTO: 0.08 THOUSANDS/ΜL (ref 0–0.1)
BASOPHILS NFR BLD AUTO: 1 % (ref 0–1)
BILIRUB SERPL-MCNC: 1.1 MG/DL (ref 0.2–1)
BILIRUB UR QL STRIP: NEGATIVE
BUN SERPL-MCNC: 5 MG/DL (ref 5–25)
CALCIUM SERPL-MCNC: 9.6 MG/DL (ref 8.3–10.1)
CHLORIDE SERPL-SCNC: 98 MMOL/L (ref 100–108)
CLARITY UR: CLEAR
CO2 SERPL-SCNC: 28 MMOL/L (ref 21–32)
COLOR UR: YELLOW
CREAT SERPL-MCNC: 0.62 MG/DL (ref 0.6–1.3)
EOSINOPHIL # BLD AUTO: 0.21 THOUSAND/ΜL (ref 0–0.61)
EOSINOPHIL NFR BLD AUTO: 3 % (ref 0–6)
ERYTHROCYTE [DISTWIDTH] IN BLOOD BY AUTOMATED COUNT: 14 % (ref 11.6–15.1)
FLUAV RNA NPH QL NAA+PROBE: NORMAL
FLUBV RNA NPH QL NAA+PROBE: NORMAL
GFR SERPL CREATININE-BSD FRML MDRD: 102 ML/MIN/1.73SQ M
GLUCOSE SERPL-MCNC: 239 MG/DL (ref 65–140)
GLUCOSE UR STRIP-MCNC: ABNORMAL MG/DL
HCT VFR BLD AUTO: 40.2 % (ref 34.8–46.1)
HGB BLD-MCNC: 12.8 G/DL (ref 11.5–15.4)
HGB UR QL STRIP.AUTO: ABNORMAL
IMM GRANULOCYTES # BLD AUTO: 0.03 THOUSAND/UL (ref 0–0.2)
IMM GRANULOCYTES NFR BLD AUTO: 0 % (ref 0–2)
INR PPP: 1.04 (ref 0.84–1.19)
KETONES UR STRIP-MCNC: ABNORMAL MG/DL
LACTATE SERPL-SCNC: 1.2 MMOL/L (ref 0.5–2)
LEUKOCYTE ESTERASE UR QL STRIP: NEGATIVE
LIPASE SERPL-CCNC: 44 U/L (ref 73–393)
LYMPHOCYTES # BLD AUTO: 1.04 THOUSANDS/ΜL (ref 0.6–4.47)
LYMPHOCYTES NFR BLD AUTO: 13 % (ref 14–44)
MCH RBC QN AUTO: 25.9 PG (ref 26.8–34.3)
MCHC RBC AUTO-ENTMCNC: 31.8 G/DL (ref 31.4–37.4)
MCV RBC AUTO: 81 FL (ref 82–98)
MONOCYTES # BLD AUTO: 0.4 THOUSAND/ΜL (ref 0.17–1.22)
MONOCYTES NFR BLD AUTO: 5 % (ref 4–12)
NEUTROPHILS # BLD AUTO: 6.32 THOUSANDS/ΜL (ref 1.85–7.62)
NEUTS SEG NFR BLD AUTO: 78 % (ref 43–75)
NITRITE UR QL STRIP: NEGATIVE
NON-SQ EPI CELLS URNS QL MICRO: NORMAL /HPF
NRBC BLD AUTO-RTO: 0 /100 WBCS
P AXIS: 73 DEGREES
PH UR STRIP.AUTO: 8.5 [PH]
PLATELET # BLD AUTO: 254 THOUSANDS/UL (ref 149–390)
PMV BLD AUTO: 12.3 FL (ref 8.9–12.7)
POTASSIUM SERPL-SCNC: 3.7 MMOL/L (ref 3.5–5.3)
PR INTERVAL: 144 MS
PROT SERPL-MCNC: 8.1 G/DL (ref 6.4–8.2)
PROT UR STRIP-MCNC: ABNORMAL MG/DL
PROTHROMBIN TIME: 13.6 SECONDS (ref 11.6–14.5)
QRS AXIS: 43 DEGREES
QRSD INTERVAL: 80 MS
QT INTERVAL: 338 MS
QTC INTERVAL: 469 MS
RBC # BLD AUTO: 4.94 MILLION/UL (ref 3.81–5.12)
RBC #/AREA URNS AUTO: NORMAL /HPF
RSV RNA NPH QL NAA+PROBE: NORMAL
SODIUM SERPL-SCNC: 138 MMOL/L (ref 136–145)
SP GR UR STRIP.AUTO: 1.01 (ref 1–1.03)
T WAVE AXIS: 61 DEGREES
TROPONIN I SERPL-MCNC: <0.02 NG/ML
UROBILINOGEN UR QL STRIP.AUTO: 0.2 E.U./DL
VENTRICULAR RATE: 116 BPM
WBC # BLD AUTO: 8.08 THOUSAND/UL (ref 4.31–10.16)
WBC #/AREA URNS AUTO: NORMAL /HPF

## 2019-12-24 PROCEDURE — 96375 TX/PRO/DX INJ NEW DRUG ADDON: CPT

## 2019-12-24 PROCEDURE — 85025 COMPLETE CBC W/AUTO DIFF WBC: CPT | Performed by: PHYSICIAN ASSISTANT

## 2019-12-24 PROCEDURE — 93005 ELECTROCARDIOGRAM TRACING: CPT

## 2019-12-24 PROCEDURE — 85730 THROMBOPLASTIN TIME PARTIAL: CPT | Performed by: PHYSICIAN ASSISTANT

## 2019-12-24 PROCEDURE — 87631 RESP VIRUS 3-5 TARGETS: CPT | Performed by: PHYSICIAN ASSISTANT

## 2019-12-24 PROCEDURE — 85610 PROTHROMBIN TIME: CPT | Performed by: PHYSICIAN ASSISTANT

## 2019-12-24 PROCEDURE — 83690 ASSAY OF LIPASE: CPT | Performed by: PHYSICIAN ASSISTANT

## 2019-12-24 PROCEDURE — 74177 CT ABD & PELVIS W/CONTRAST: CPT

## 2019-12-24 PROCEDURE — 71275 CT ANGIOGRAPHY CHEST: CPT

## 2019-12-24 PROCEDURE — 36415 COLL VENOUS BLD VENIPUNCTURE: CPT | Performed by: PHYSICIAN ASSISTANT

## 2019-12-24 PROCEDURE — 84484 ASSAY OF TROPONIN QUANT: CPT | Performed by: PHYSICIAN ASSISTANT

## 2019-12-24 PROCEDURE — 99284 EMERGENCY DEPT VISIT MOD MDM: CPT

## 2019-12-24 PROCEDURE — 83605 ASSAY OF LACTIC ACID: CPT | Performed by: PHYSICIAN ASSISTANT

## 2019-12-24 PROCEDURE — 96374 THER/PROPH/DIAG INJ IV PUSH: CPT

## 2019-12-24 PROCEDURE — 93010 ELECTROCARDIOGRAM REPORT: CPT | Performed by: INTERNAL MEDICINE

## 2019-12-24 PROCEDURE — 81001 URINALYSIS AUTO W/SCOPE: CPT | Performed by: PHYSICIAN ASSISTANT

## 2019-12-24 PROCEDURE — 96361 HYDRATE IV INFUSION ADD-ON: CPT

## 2019-12-24 PROCEDURE — 80053 COMPREHEN METABOLIC PANEL: CPT | Performed by: PHYSICIAN ASSISTANT

## 2019-12-24 PROCEDURE — 99284 EMERGENCY DEPT VISIT MOD MDM: CPT | Performed by: PHYSICIAN ASSISTANT

## 2019-12-24 RX ORDER — KETOROLAC TROMETHAMINE 30 MG/ML
15 INJECTION, SOLUTION INTRAMUSCULAR; INTRAVENOUS ONCE
Status: COMPLETED | OUTPATIENT
Start: 2019-12-24 | End: 2019-12-24

## 2019-12-24 RX ORDER — ONDANSETRON 2 MG/ML
4 INJECTION INTRAMUSCULAR; INTRAVENOUS ONCE
Status: COMPLETED | OUTPATIENT
Start: 2019-12-24 | End: 2019-12-24

## 2019-12-24 RX ORDER — METOCLOPRAMIDE HYDROCHLORIDE 5 MG/ML
10 INJECTION INTRAMUSCULAR; INTRAVENOUS ONCE
Status: COMPLETED | OUTPATIENT
Start: 2019-12-24 | End: 2019-12-24

## 2019-12-24 RX ORDER — ONDANSETRON 4 MG/1
4 TABLET, FILM COATED ORAL EVERY 6 HOURS
Qty: 12 TABLET | Refills: 0 | Status: SHIPPED | OUTPATIENT
Start: 2019-12-24

## 2019-12-24 RX ORDER — DIPHENHYDRAMINE HYDROCHLORIDE 50 MG/ML
25 INJECTION INTRAMUSCULAR; INTRAVENOUS ONCE
Status: COMPLETED | OUTPATIENT
Start: 2019-12-24 | End: 2019-12-24

## 2019-12-24 RX ADMIN — DIPHENHYDRAMINE HYDROCHLORIDE 25 MG: 50 INJECTION, SOLUTION INTRAMUSCULAR; INTRAVENOUS at 19:34

## 2019-12-24 RX ADMIN — IOHEXOL 100 ML: 350 INJECTION, SOLUTION INTRAVENOUS at 18:49

## 2019-12-24 RX ADMIN — METOCLOPRAMIDE 10 MG: 5 INJECTION, SOLUTION INTRAMUSCULAR; INTRAVENOUS at 19:36

## 2019-12-24 RX ADMIN — KETOROLAC TROMETHAMINE 15 MG: 30 INJECTION, SOLUTION INTRAMUSCULAR at 19:36

## 2019-12-24 RX ADMIN — SODIUM CHLORIDE 1000 ML: 0.9 INJECTION, SOLUTION INTRAVENOUS at 17:43

## 2019-12-24 RX ADMIN — ONDANSETRON 4 MG: 2 INJECTION INTRAMUSCULAR; INTRAVENOUS at 17:29

## 2019-12-24 NOTE — ED PROVIDER NOTES
History  Chief Complaint   Patient presents with    Fever - 9 weeks to 74 years     "I think my pneumonia came back"; fever chills; evaluated at urgent care yesterday; gagging during triage     48yo female with a PMH of asthma, diabetes, and gastroparesis presenting for evaluation of flu-like symptoms x 4 days  Patient was seen at urgent care yesterday for evaluation  A CXR was done which was unremarkable  Patient was advised to go to the ER at that time due to concern for dehydration but she decided not to come until today  Patient was admitted last month for pneumonia and believes she has pneumonia again  She complains of nasal congestion, chills, body aches, abdominal pain, nausea, vomiting, and generalized weakness  She has had several episodes of vomiting today  Patient also reports subjective fevers  Patient denies diarrhea, constipation, dysuria, urinary frequency, chest pain  History provided by:  Patient and medical records   used: No    Fever - 9 weeks to 74 years   Temp source:  Subjective  Severity:  Mild  Onset quality:  Gradual  Duration:  4 days  Timing:  Constant  Progression:  Unchanged  Chronicity:  New  Relieved by:  Nothing  Worsened by:  Nothing  Ineffective treatments:  None tried  Associated symptoms: chills, congestion, cough, myalgias, nausea, rhinorrhea and vomiting    Associated symptoms: no chest pain, no confusion, no diarrhea, no dysuria, no ear pain, no headaches, no rash, no somnolence and no sore throat        Prior to Admission Medications   Prescriptions Last Dose Informant Patient Reported? Taking?    BD PEN NEEDLE HERNAN U/F 32G X 4 MM MISC   Yes No   Insulin Pen Needle 29G X 5MM MISC   No No   Sig: by Does not apply route 4 (four) times a day   LYRICA 150 MG capsule   Yes No   Sig: Take 150 mg by mouth 2 (two) times a day   WIXELA INHUB 500-50 MCG/DOSE inhaler   Yes No   Sig: Take 1 puff by mouth 2 (two) times a day   albuterol (2 5 mg/3 mL) 0 083 % nebulizer solution   No No   Sig: Take 1 vial (2 5 mg total) by nebulization every 6 (six) hours as needed for shortness of breath   albuterol (PROVENTIL HFA,VENTOLIN HFA) 90 mcg/act inhaler   Yes No   Sig: Inhale 2 puffs every 4 (four) hours as needed   carisoprodol (SOMA) 350 mg tablet  Self Yes No   Sig: Take 350 mg by mouth 2 (two) times a day   diazepam (VALIUM) 5 mg tablet  Self Yes No   Sig: Take 5 mg by mouth daily at bedtime as needed for anxiety   dicyclomine (BENTYL) 10 mg capsule   No No   Sig: Take 1 capsule (10 mg total) by mouth 4 (four) times a day as needed (As needed for abdominal cramping)   fluticasone (FLONASE) 50 mcg/act nasal spray  Self Yes No   Si spray into each nostril daily   furosemide (LASIX) 20 mg tablet   Yes No   Sig: Take 40 mg by mouth daily   insulin aspart (NOVOLOG FLEXPEN) 100 Units/mL injection pen   No No   Sig: Inject 12 Units under the skin 3 (three) times a day with meals   insulin glargine (LANTUS) 100 units/mL subcutaneous injection  Self Yes No   Sig: Inject 50 Units under the skin daily at bedtime     ipratropium (ATROVENT) 0 02 % nebulizer solution   No No   Sig: Take 1 vial (0 5 mg total) by nebulization 3 (three) times a day for 15 days   lisinopril (ZESTRIL) 10 mg tablet  Self No No   Sig: Take 1 tablet (10 mg total) by mouth daily   metoclopramide (REGLAN) 10 mg tablet   No No   Sig: TAKE 1 TABLET PO QID BEFORE MEALS AND HS   montelukast (SINGULAIR) 10 mg tablet  Self Yes No   Sig: Take 10 mg by mouth daily at bedtime   morphine (MS CONTIN) 15 mg 12 hr tablet   Yes No   Sig: Take 15 mg by mouth 2 (two) times a day   morphine (MS CONTIN) 60 mg 12 hr tablet   Yes No   Sig: Take 60 mg by mouth 2 (two) times a day   morphine (MSIR) 15 mg tablet  Self Yes No   Sig: Take 15 mg by mouth 3 (three) times a day as needed for moderate pain or severe pain     nystatin (MYCOSTATIN) powder   No No   Sig: Apply topically 2 (two) times a day   omeprazole (PriLOSEC) 20 mg delayed release capsule  Self Yes No   Sig: Take 20 mg by mouth daily   ondansetron (ZOFRAN) 4 mg tablet  Self No No   Sig: TAKE 1 TABLET BY MOUTH EVERY 8 HOURS AS NEEDED FOR NAUSEA AND VOMITING   pravastatin (PRAVACHOL) 10 mg tablet   Yes No   Sig: Take 10 mg by mouth daily at bedtime      Facility-Administered Medications: None       Past Medical History:   Diagnosis Date    Acute respiratory insufficiency 11/23/2019    Asthma     Chronic pain     Diabetes mellitus ([facility] [address] [facility]     Gastroparesis     [facility] 11/23/2019       Past Surgical History:   Procedure Laterality Date    CERVICAL LAMINECTOMY      CHOLECYSTECTOMY      CHOLECYSTECTOMY LAPAROSCOPIC N/A 11/10/2018    Procedure: CHOLECYSTECTOMY LAPAROSCOPIC w/ ioc;  Surgeon: Alo Mckee MD;  Location: [facility];  Service: General    HYSTERECTOMY      JOINT REPLACEMENT Bilateral     knee    TOE AMPUTATION Right 2/2/2018    Procedure: AMPUTATION TOE, RIGHT GREAT TOE;  Surgeon: Maddi Yusuf DPM;  Location: [facility];  Service: Podiatry       Family History   Problem Relation Age of Onset    Diabetes Mother     Diabetes Maternal Grandmother     No Known Problems Father      I have reviewed and agree with the history as documented  Social History     Tobacco Use    Smoking status: Never Smoker    Smokeless tobacco: Never Used   Substance Use Topics    Alcohol use: Yes     Frequency: 2-4 times a month     Drinks per session: 1 or 2     Comment: rarely    Drug use: No        Review of Systems   Constitutional: Positive for chills, fatigue and fever  HENT: Positive for congestion and rhinorrhea  Negative for drooling, ear pain and sore throat  Eyes: Negative for discharge and itching  Respiratory: Positive for cough and shortness of breath  Negative for wheezing  Cardiovascular: Negative for chest pain and leg swelling  Gastrointestinal: Positive for nausea and vomiting  Negative for abdominal pain and diarrhea     Genitourinary: Negative for difficulty urinating and dysuria  Musculoskeletal: Positive for myalgias  Negative for neck pain and neck stiffness  Skin: Negative for color change and rash  Allergic/Immunologic: Negative for immunocompromised state  Neurological: Positive for weakness  Negative for syncope and headaches  Hematological: Does not bruise/bleed easily  Psychiatric/Behavioral: Negative for confusion  All other systems reviewed and are negative  Physical Exam  Physical Exam   Constitutional: She appears well-developed and well-nourished  No distress  Appears uncomfortable   HENT:   Head: Normocephalic and atraumatic  Right Ear: External ear normal    Left Ear: External ear normal    Nose: Nose normal    Mouth/Throat: Oropharynx is clear and moist    Eyes: Conjunctivae are normal  Right eye exhibits no discharge  Left eye exhibits no discharge  No scleral icterus  Neck: Normal range of motion  Neck supple  Cardiovascular: Regular rhythm and normal heart sounds  Tachycardia present  No murmur heard  Pulmonary/Chest: Effort normal and breath sounds normal  No stridor  No respiratory distress  She has no wheezes  She has no rales  Lung sounds clear b/l   Abdominal: Soft  Bowel sounds are normal  She exhibits no distension  There is tenderness in the epigastric area and periumbilical area  There is no guarding  Musculoskeletal: Normal range of motion  She exhibits no edema or deformity  Lymphadenopathy:     She has no cervical adenopathy  Neurological: She is alert  She is not disoriented  GCS eye subscore is 4  GCS verbal subscore is 5  GCS motor subscore is 6  Skin: Skin is warm and dry  She is not diaphoretic  Psychiatric: She has a normal mood and affect  Her behavior is normal    Nursing note and vitals reviewed        Vital Signs  ED Triage Vitals [12/24/19 1634]   Temperature Pulse Respirations Blood Pressure SpO2   98 2 °F (36 8 °C) (!) 116 20 (!) 176/96 95 %      Temp Source Heart Rate Source Patient Position - Orthostatic VS BP Location FiO2 (%)   Oral Monitor Sitting Left arm --      Pain Score       9           Vitals:    12/24/19 1738 12/24/19 1842 12/24/19 2015 12/24/19 2045   BP: (!) 188/89 167/93  150/80   Pulse: (!) 110 (!) 113 (!) 119 (!) 117   Patient Position - Orthostatic VS: Lying Sitting           Visual Acuity      ED Medications  Medications   sodium chloride 0 9 % bolus 1,000 mL (0 mL Intravenous Stopped 12/24/19 2100)   ondansetron (ZOFRAN) injection 4 mg (4 mg Intravenous Given 12/24/19 1729)   iohexol (OMNIPAQUE) 350 MG/ML injection (MULTI-DOSE) 100 mL (100 mL Intravenous Given 12/24/19 1849)   ketorolac (TORADOL) injection 15 mg (15 mg Intravenous Given 12/24/19 1936)   metoclopramide (REGLAN) injection 10 mg (10 mg Intravenous Given 12/24/19 1936)   diphenhydrAMINE (BENADRYL) injection 25 mg (25 mg Intravenous Given 12/24/19 1934)       Diagnostic Studies  Results Reviewed     Procedure Component Value Units Date/Time    Urine Microscopic [[id number]]  (Normal) Collected:  12/24/19 1841    Lab Status:  Final result Specimen:  Urine, Clean Catch Updated:  12/24/19 1917     RBC, UA None Seen /hpf      WBC, UA None Seen /hpf      Epithelial Cells Occasional /hpf      Bacteria, UA None Seen /hpf     UA w Reflex to Microscopic w Reflex to Culture [[id number]]  (Abnormal) Collected:  12/24/19 1841    Lab Status:  Final result Specimen:  Urine, Clean Catch Updated:  12/24/19 1853     Color, UA Yellow     Clarity, UA Clear     Specific Gravity, UA 1 015     pH, UA 8 5     Leukocytes, UA Negative     Nitrite, UA Negative     Protein, UA Trace mg/dl      Glucose,  (1/4%) mg/dl      Ketones, UA 15 (1+) mg/dl      Urobilinogen, UA 0 2 E U /dl      Bilirubin, UA Negative     Blood, UA Trace-Intact    Influenza A/B and RSV PCR [[id number]]  (Normal) Collected:  12/24/19 1740    Lab Status:  Final result Specimen:  Nose Updated:  12/24/19 1835     INFLUENZA A PCR None Detected INFLUENZA B PCR None Detected     RSV PCR None Detected    Comprehensive metabolic panel [[id number]]  (Abnormal) Collected:  12/24/19 1728    Lab Status:  Final result Specimen:  Blood from Arm, Right Updated:  12/24/19 1816     Sodium 138 mmol/L      Potassium 3 7 mmol/L      Chloride 98 mmol/L      CO2 28 mmol/L      ANION GAP 12 mmol/L      BUN 5 mg/dL      Creatinine 0 62 mg/dL      Glucose 239 mg/dL      Calcium 9 6 mg/dL      AST 21 U/L      ALT 23 U/L      Alkaline Phosphatase 120 U/L      Total Protein 8 1 g/dL      Albumin 3 6 g/dL      Total Bilirubin 1 10 mg/dL      eGFR 102 ml/min/1 73sq m     Narrative:       [place] guidelines for Chronic Kidney Disease (CKD):     Stage 1 with normal or high GFR (GFR > 90 mL/min/1 73 square meters)    Stage 2 Mild CKD (GFR = 60-89 mL/min/1 73 square meters)    Stage 3A Moderate CKD (GFR = 45-59 mL/min/1 73 square meters)    Stage 3B Moderate CKD (GFR = 30-44 mL/min/1 73 square meters)    Stage 4 Severe CKD (GFR = 15-29 mL/min/1 73 square meters)    Stage 5 End Stage CKD (GFR <15 mL/min/1 73 square meters)  Note: GFR calculation is accurate only with a steady state creatinine    Lipase [[id number]]  (Abnormal) Collected:  12/24/19 1728    Lab Status:  Final result Specimen:  Blood from Arm, Right Updated:  12/24/19 1816     Lipase 44 u/L     Lactic acid, plasma x2 [[id number]]  (Normal) Collected:  12/24/19 1728    Lab Status:  Final result Specimen:  Blood from Arm, Right Updated:  12/24/19 1809     LACTIC ACID 1 2 mmol/L     Narrative:       Result may be elevated if tourniquet was used during collection      Troponin I [[id number]]  (Normal) Collected:  12/24/19 1728    Lab Status:  Final result Specimen:  Blood from Arm, Right Updated:  12/24/19 1809     Troponin I <0 02 ng/mL     Protime-INR [[id number]]  (Normal) Collected:  12/24/19 1728    Lab Status:  Final result Specimen:  Blood from Arm, Right Updated:  12/24/19 1802 Protime 13 6 seconds      INR 1 04    APTT [[id number]]  (Normal) Collected:  12/24/19 1728    Lab Status:  Final result Specimen:  Blood from Arm, Right Updated:  12/24/19 1802     PTT 36 seconds     CBC and differential [[id number]]  (Abnormal) Collected:  12/24/19 1728    Lab Status:  Final result Specimen:  Blood from Arm, Right Updated:  12/24/19 1740     WBC 8 08 Thousand/uL      RBC 4 94 Million/uL      Hemoglobin 12 8 g/dL      Hematocrit 40 2 %      MCV 81 fL      MCH 25 9 pg      MCHC 31 8 g/dL      RDW 14 0 %      MPV 12 3 fL      Platelets 124 Thousands/uL      nRBC 0 /100 WBCs      Neutrophils Relative 78 %      Immat GRANS % 0 %      Lymphocytes Relative 13 %      Monocytes Relative 5 %      Eosinophils Relative 3 %      Basophils Relative 1 %      Neutrophils Absolute 6 32 Thousands/µL      Immature Grans Absolute 0 03 Thousand/uL      Lymphocytes Absolute 1 04 Thousands/µL      Monocytes Absolute 0 40 Thousand/µL      Eosinophils Absolute 0 21 Thousand/µL      Basophils Absolute 0 08 Thousands/µL                  PE Study with CT Abdomen and Pelvis with contrast   Final Result by Yee Yusuf MD (12/24 1932)      1  No acute pulmonary embolism  2   Improved bilateral upper lobe groundglass opacity with mild residual density  3   5 mm left upper lobe lung nodule  Based on current Fleischner Society 2017 Guidelines on incidental pulmonary nodule, no routine follow-up is needed if the patient is considered low risk for lung cancer  If the patient is considered high risk for    lung cancer, 12 month follow-up non-contrast chest CT is recommended  4   No acute abnormality in the abdomen or pelvis  5   Soft tissue densities in the left adnexa which appear to be stable since January 2017  This is likely related to the left ovary though is of uncertain etiology  While likely benign, a follow-up nonemergent ultrasound is recommended for better    characterization        6   Calcified thyroid nodules again seen  Follow-up thyroid ultrasound is recommended  The study was marked in EPIC for significant notification  Workstation performed: [id number]                    Procedures  ECG 12 Lead Documentation Only  Date/Time: 12/26/2019 3:22 PM  Performed by: Demetrio Mon PA-C  Authorized by: Demetrio Mon PA-C     Indications / Diagnosis:  SOB  ECG reviewed by me, the ED Provider: yes    Patient location:  ED  Previous ECG:     Comparison to cardiac monitor: No    Interpretation:     Interpretation: abnormal    Rate:     ECG rate:  116    ECG rate assessment: tachycardic    Rhythm:     Rhythm: sinus rhythm    Ectopy:     Ectopy: none    QRS:     QRS axis:  Normal  Conduction:     Conduction: normal    ST segments:     ST segments:  Normal  T waves:     T waves: flattening      Flattening:  AVL             ED Course  ED Course as of Dec 26 1521   Tue Dec 24, 2019   2026 Ambulatory pulse ox performed  She maintained an oxygen saturation of 93% throughout         2041 Patient tolerated PO challenge                 Wells' Criteria for PE      Most Recent Value   Wells' Criteria for PE   Clinical signs and symptoms of DVT  0 Filed at: 12/24/2019 1708   PE is primary diagnosis or equally likely  0 Filed at: 12/24/2019 1708   HR >100  1 5 Filed at: 12/24/2019 1708   Immobilization at least 3 days or Surgery in the previous 4 weeks  0 Filed at: 12/24/2019 1708   Previous, objectively diagnosed PE or DVT  0 Filed at: 12/24/2019 1708   Hemoptysis  0 Filed at: 12/24/2019 1708   Malignancy with treatment within 6 months or palliative  0 Filed at: 12/24/2019 1708   Wells' Criteria Total  1 5 Filed at: 12/24/2019 1708            MDM  Number of Diagnoses or Management Options  Lung nodule: new and requires workup  Malaise: new and requires workup  Nausea and vomiting: new and requires workup  Thyroid nodule: new and requires workup  Diagnosis management comments: 70-year-old female with history of recent pneumonia presenting for evaluation of URI symptoms, malaise, and abdominal pain  Patient is tachycardic on arrival   Differential diagnosis includes but is not limited to:  Pneumonia, influenza, ACS, PE, appendicitis, pancreatitis, gastroenteritis, viral syndrome    Initial ED plan: Will check labs including CBC, CMP, lactate, troponin, and influenza swab  CTA chest and abdomen to evaluate for etiology of symptoms  IV fluid bolus and Zofran  Final assessment:  Labs overall unremarkable  Troponin negative  EKG reveals sinus tachycardia without ST changes  White count and lactate normal   CT negative for acute findings  There are several incidental findings including lung nodule, thyroid nodules, and adnexal lesion  Patient was notified of these findings and was provided a copy of her CT scan results  Presentation is consistent with a viral syndrome  Ambulatory pulse ox performed and she maintained adequate oxygenation throughout  Patient also able to tolerate PO challenge  No current indications for admission  Supportive care discussed with patient  Script given for Zofran  Advised close PCP follow-up  Strict ED return precautions discussed  Patient expressed understanding and is agreeable to plan  Patient discharged in stable condition           Amount and/or Complexity of Data Reviewed  Clinical lab tests: reviewed and ordered  Tests in the radiology section of CPT®: reviewed and ordered  Review and summarize past medical records: yes  Independent visualization of images, tracings, or specimens: yes    Risk of Complications, Morbidity, and/or Mortality  Presenting problems: high  Diagnostic procedures: high  Management options: high    Patient Progress  Patient progress: stable        Disposition  Final diagnoses:   Malaise   Nausea and vomiting   Lung nodule   Thyroid nodule     Time reflects when diagnosis was documented in both MDM as applicable and the Disposition within this note     Time User Action Codes Description Comment    12/24/2019  8:27  [facility] [address] [place] [R53 81] Malaise     12/24/2019  8:27  [facility] [address] [place] [R11 2] Nausea and vomiting     12/24/2019  8:27  [facility] [address] [place] [R91 1] Lung nodule     12/24/2019  8:27 PM [place] [E04 1] Thyroid nodule       ED Disposition     ED Disposition Condition Date/Time Comment    Discharge Stable Tue Dec 24, 2019  8:26 PM Yogi Armijol discharge to home/self care  Follow-up Information     Follow up With Specialties Details Why Contact Info Additional Information    Aileen Olson MD Rheumatology, Internal Medicine Schedule an appointment as soon as possible for a visit   [address] [facility] Emergency Department Emergency Medicine  If symptoms worsen [address] [facility] [address], [address], [place], [facility]          Discharge Medication List as of 12/24/2019  8:31 PM      START taking these medications    Details   !! ondansetron (ZOFRAN) 4 mg tablet Take 1 tablet (4 mg total) by mouth every 6 (six) hours, Starting Tue 12/24/2019, Print       !! - Potential duplicate medications found  Please discuss with provider        CONTINUE these medications which have NOT CHANGED    Details   albuterol (2 5 mg/3 mL) 0 083 % nebulizer solution Take 1 vial (2 5 mg total) by nebulization every 6 (six) hours as needed for shortness of breath, Starting Wed 11/27/2019, Until Fri 12/27/2019, Normal      albuterol (PROVENTIL HFA,VENTOLIN HFA) 90 mcg/act inhaler Inhale 2 puffs every 4 (four) hours as needed, Starting Mon 10/14/2019, Historical Med      !! BD PEN NEEDLE HERNAN U/F 32G X 4 MM MISC Starting Fri 8/23/2019, Historical Med      carisoprodol (SOMA) 350 mg tablet Take 350 mg by mouth 2 (two) times a day, Historical Med      diazepam (VALIUM) 5 mg tablet Take 5 mg by mouth daily at bedtime as needed for anxiety, Historical Med      dicyclomine (BENTYL) 10 mg capsule Take 1 capsule (10 mg total) by mouth 4 (four) times a day as needed (As needed for abdominal cramping), Starting Fri 9/20/2019, Print      fluticasone (FLONASE) 50 mcg/act nasal spray 1 spray into each nostril daily, Historical Med      furosemide (LASIX) 20 mg tablet Take 40 mg by mouth daily, Starting Fri 8/16/2019, Historical Med      insulin aspart (NOVOLOG FLEXPEN) 100 Units/mL injection pen Inject 12 Units under the skin 3 (three) times a day with meals, Starting Fri 9/20/2019, Until Fri 11/1/2019, Print      insulin glargine (LANTUS) 100 units/mL subcutaneous injection Inject 50 Units under the skin daily at bedtime  , Historical Med      !!  Insulin Pen Needle 29G X 5MM MISC by Does not apply route 4 (four) times a day, Starting Fri 9/20/2019, Print      ipratropium (ATROVENT) 0 02 % nebulizer solution Take 1 vial (0 5 mg total) by nebulization 3 (three) times a day for 15 days, Starting Wed 11/27/2019, Until Thu 12/12/2019, Normal      lisinopril (ZESTRIL) 10 mg tablet Take 1 tablet (10 mg total) by mouth daily, Starting Tue 12/25/2018, Print      LYRICA 150 MG capsule Take 150 mg by mouth 2 (two) times a day, Starting Wed 6/12/2019, Historical Med      metoclopramide (REGLAN) 10 mg tablet TAKE 1 TABLET PO QID BEFORE MEALS AND HS, Normal      montelukast (SINGULAIR) 10 mg tablet Take 10 mg by mouth daily at bedtime, Historical Med      !! morphine (MS CONTIN) 15 mg 12 hr tablet Take 15 mg by mouth 2 (two) times a day, Starting Fri 9/13/2019, Historical Med      !! morphine (MS CONTIN) 60 mg 12 hr tablet Take 60 mg by mouth 2 (two) times a day, Starting Fri 9/13/2019, Historical Med      morphine (MSIR) 15 mg tablet Take 15 mg by mouth 3 (three) times a day as needed for moderate pain or severe pain  , Historical Med      nystatin (MYCOSTATIN) powder Apply topically 2 (two) times a day, Starting Tue 7/23/2019, Normal omeprazole (PriLOSEC) 20 mg delayed release capsule Take 20 mg by mouth daily, Historical Med      !! ondansetron (ZOFRAN) 4 mg tablet TAKE 1 TABLET BY MOUTH EVERY 8 HOURS AS NEEDED FOR NAUSEA AND VOMITING, Normal      pravastatin (PRAVACHOL) 10 mg tablet Take 10 mg by mouth daily at bedtime, Starting Fri 8/16/2019, Historical Med      WIXELA INHUB 500-50 MCG/DOSE inhaler Take 1 puff by mouth 2 (two) times a day, Starting u 2/28/2019, Historical Med       !! - Potential duplicate medications found  Please discuss with provider  No discharge procedures on file      ED Provider  Electronically Signed by           Douglas Loco PA-C  12/26/19 [id number]

## 2019-12-25 NOTE — ED NOTES
Discharge instructions and medications reviewed  Pt with no questions or concerns at this time  Pt ambulatory off unit with steady gait        Yvonne Rodriguez RN  12/24/19 [id number]

## 2019-12-25 NOTE — ED NOTES
Performed ambulatory pulse ox with pt  Pt SpO2 stayed between 93-95 , pt HR stayed between 120-125  Pt had no complaints during ambulation       Jabier Greer  12/24/19 2025

## 2019-12-25 NOTE — DISCHARGE INSTRUCTIONS
Take Zofran as needed for nausea  Drink plenty of fluids and stay hydrated  Follow-up with your family doctor in 3-4 days  Return to ER with worsening symptoms  Your CT results:   Improved bilateral upper lobe groundglass opacity with mild residual density  5 mm left upper lobe lung nodule  Based on current Fleischner Society 2017 Guidelines on incidental pulmonary nodule, no routine follow-up is needed if the patient is considered low risk for lung cancer  If the patient is considered high risk for   lung cancer, 12 month follow-up non-contrast chest CT is recommended  Soft tissue densities in the left adnexa which appear to be stable since January 2017  This is likely related to the left ovary though is of uncertain etiology  While likely benign, a follow-up nonemergent pelvic ultrasound is recommended for better characterization  Calcified thyroid nodules again seen  Follow-up thyroid ultrasound is recommended

## 2019-12-28 ENCOUNTER — APPOINTMENT (EMERGENCY)
Dept: CT IMAGING | Facility: HOSPITAL | Age: 54
DRG: 074 | End: 2019-12-28
Payer: MEDICARE

## 2019-12-28 ENCOUNTER — HOSPITAL ENCOUNTER (INPATIENT)
Facility: HOSPITAL | Age: 54
LOS: 2 days | Discharge: HOME | DRG: 074 | End: 2019-12-31
Attending: EMERGENCY MEDICINE | Admitting: INTERNAL MEDICINE
Payer: MEDICARE

## 2019-12-28 DIAGNOSIS — R11.10 INTRACTABLE VOMITING: Primary | ICD-10-CM

## 2019-12-28 DIAGNOSIS — E87.6 HYPOKALEMIA: ICD-10-CM

## 2019-12-28 LAB
ALBUMIN SERPL BCP-MCNC: 3.6 G/DL (ref 3.5–5)
ALP SERPL-CCNC: 110 U/L (ref 46–116)
ALT SERPL W P-5'-P-CCNC: 21 U/L (ref 12–78)
ANION GAP SERPL CALCULATED.3IONS-SCNC: 11 MMOL/L (ref 4–13)
AST SERPL W P-5'-P-CCNC: 16 U/L (ref 5–45)
BACTERIA UR QL AUTO: ABNORMAL /HPF
BASOPHILS # BLD AUTO: 0.06 THOUSANDS/ΜL (ref 0–0.1)
BASOPHILS NFR BLD AUTO: 0 % (ref 0–1)
BILIRUB SERPL-MCNC: 1.95 MG/DL (ref 0.2–1)
BILIRUB UR QL STRIP: NEGATIVE
BUN SERPL-MCNC: 13 MG/DL (ref 5–25)
CALCIUM SERPL-MCNC: 9 MG/DL (ref 8.3–10.1)
CHLORIDE SERPL-SCNC: 93 MMOL/L (ref 100–108)
CLARITY UR: CLEAR
CO2 SERPL-SCNC: 25 MMOL/L (ref 21–32)
COLOR UR: YELLOW
CREAT SERPL-MCNC: 0.8 MG/DL (ref 0.6–1.3)
EOSINOPHIL # BLD AUTO: 0.02 THOUSAND/ΜL (ref 0–0.61)
EOSINOPHIL NFR BLD AUTO: 0 % (ref 0–6)
ERYTHROCYTE [DISTWIDTH] IN BLOOD BY AUTOMATED COUNT: 13.7 % (ref 11.6–15.1)
GFR SERPL CREATININE-BSD FRML MDRD: 83 ML/MIN/1.73SQ M
GLUCOSE SERPL-MCNC: 250 MG/DL (ref 65–140)
GLUCOSE UR STRIP-MCNC: NEGATIVE MG/DL
HCT VFR BLD AUTO: 41.7 % (ref 34.8–46.1)
HGB BLD-MCNC: 13.9 G/DL (ref 11.5–15.4)
HGB UR QL STRIP.AUTO: ABNORMAL
IMM GRANULOCYTES # BLD AUTO: 0.09 THOUSAND/UL (ref 0–0.2)
IMM GRANULOCYTES NFR BLD AUTO: 1 % (ref 0–2)
KETONES UR STRIP-MCNC: ABNORMAL MG/DL
LEUKOCYTE ESTERASE UR QL STRIP: ABNORMAL
LIPASE SERPL-CCNC: 86 U/L (ref 73–393)
LYMPHOCYTES # BLD AUTO: 1.35 THOUSANDS/ΜL (ref 0.6–4.47)
LYMPHOCYTES NFR BLD AUTO: 8 % (ref 14–44)
MAGNESIUM SERPL-MCNC: 1.5 MG/DL (ref 1.6–2.6)
MCH RBC QN AUTO: 26.3 PG (ref 26.8–34.3)
MCHC RBC AUTO-ENTMCNC: 33.3 G/DL (ref 31.4–37.4)
MCV RBC AUTO: 79 FL (ref 82–98)
MONOCYTES # BLD AUTO: 1.45 THOUSAND/ΜL (ref 0.17–1.22)
MONOCYTES NFR BLD AUTO: 9 % (ref 4–12)
NEUTROPHILS # BLD AUTO: 13.27 THOUSANDS/ΜL (ref 1.85–7.62)
NEUTS SEG NFR BLD AUTO: 82 % (ref 43–75)
NITRITE UR QL STRIP: NEGATIVE
NON-SQ EPI CELLS URNS QL MICRO: ABNORMAL /HPF
NRBC BLD AUTO-RTO: 0 /100 WBCS
PH UR STRIP.AUTO: 7.5 [PH] (ref 4.5–8)
PLATELET # BLD AUTO: 306 THOUSANDS/UL (ref 149–390)
PMV BLD AUTO: 12.1 FL (ref 8.9–12.7)
POTASSIUM SERPL-SCNC: 2.7 MMOL/L (ref 3.5–5.3)
PROT SERPL-MCNC: 7.8 G/DL (ref 6.4–8.2)
PROT UR STRIP-MCNC: ABNORMAL MG/DL
RBC # BLD AUTO: 5.29 MILLION/UL (ref 3.81–5.12)
RBC #/AREA URNS AUTO: ABNORMAL /HPF
SODIUM SERPL-SCNC: 129 MMOL/L (ref 136–145)
SP GR UR STRIP.AUTO: 1.01 (ref 1–1.03)
UROBILINOGEN UR QL STRIP.AUTO: 0.2 E.U./DL
WBC # BLD AUTO: 16.24 THOUSAND/UL (ref 4.31–10.16)
WBC #/AREA URNS AUTO: ABNORMAL /HPF

## 2019-12-28 PROCEDURE — 85025 COMPLETE CBC W/AUTO DIFF WBC: CPT | Performed by: EMERGENCY MEDICINE

## 2019-12-28 PROCEDURE — 96366 THER/PROPH/DIAG IV INF ADDON: CPT

## 2019-12-28 PROCEDURE — 99285 EMERGENCY DEPT VISIT HI MDM: CPT

## 2019-12-28 PROCEDURE — 99285 EMERGENCY DEPT VISIT HI MDM: CPT | Performed by: EMERGENCY MEDICINE

## 2019-12-28 PROCEDURE — 74177 CT ABD & PELVIS W/CONTRAST: CPT

## 2019-12-28 PROCEDURE — 99223 1ST HOSP IP/OBS HIGH 75: CPT | Performed by: STUDENT IN AN ORGANIZED HEALTH CARE EDUCATION/TRAINING PROGRAM

## 2019-12-28 PROCEDURE — 96375 TX/PRO/DX INJ NEW DRUG ADDON: CPT

## 2019-12-28 PROCEDURE — 96361 HYDRATE IV INFUSION ADD-ON: CPT

## 2019-12-28 PROCEDURE — 82948 REAGENT STRIP/BLOOD GLUCOSE: CPT

## 2019-12-28 PROCEDURE — 96365 THER/PROPH/DIAG IV INF INIT: CPT

## 2019-12-28 PROCEDURE — 36415 COLL VENOUS BLD VENIPUNCTURE: CPT | Performed by: EMERGENCY MEDICINE

## 2019-12-28 PROCEDURE — 83735 ASSAY OF MAGNESIUM: CPT | Performed by: NURSE PRACTITIONER

## 2019-12-28 PROCEDURE — 80053 COMPREHEN METABOLIC PANEL: CPT | Performed by: EMERGENCY MEDICINE

## 2019-12-28 PROCEDURE — 83690 ASSAY OF LIPASE: CPT | Performed by: EMERGENCY MEDICINE

## 2019-12-28 PROCEDURE — 81001 URINALYSIS AUTO W/SCOPE: CPT

## 2019-12-28 RX ORDER — FLUTICASONE FUROATE AND VILANTEROL 200; 25 UG/1; UG/1
1 POWDER RESPIRATORY (INHALATION)
Status: DISCONTINUED | OUTPATIENT
Start: 2019-12-29 | End: 2019-12-31 | Stop reason: HOSPADM

## 2019-12-28 RX ORDER — MORPHINE SULFATE 30 MG/1
60 TABLET, FILM COATED, EXTENDED RELEASE ORAL EVERY 12 HOURS SCHEDULED
Status: DISCONTINUED | OUTPATIENT
Start: 2019-12-28 | End: 2019-12-31 | Stop reason: HOSPADM

## 2019-12-28 RX ORDER — LISINOPRIL 10 MG/1
10 TABLET ORAL DAILY
Status: DISCONTINUED | OUTPATIENT
Start: 2019-12-29 | End: 2019-12-29

## 2019-12-28 RX ORDER — ACETAMINOPHEN 325 MG/1
650 TABLET ORAL EVERY 6 HOURS PRN
Status: DISCONTINUED | OUTPATIENT
Start: 2019-12-28 | End: 2019-12-31 | Stop reason: HOSPADM

## 2019-12-28 RX ORDER — INSULIN GLARGINE 100 [IU]/ML
25 INJECTION, SOLUTION SUBCUTANEOUS
Status: DISCONTINUED | OUTPATIENT
Start: 2019-12-28 | End: 2019-12-29

## 2019-12-28 RX ORDER — ONDANSETRON 2 MG/ML
4 INJECTION INTRAMUSCULAR; INTRAVENOUS EVERY 6 HOURS PRN
Status: DISCONTINUED | OUTPATIENT
Start: 2019-12-28 | End: 2019-12-31 | Stop reason: HOSPADM

## 2019-12-28 RX ORDER — MORPHINE SULFATE 15 MG/1
15 TABLET, FILM COATED, EXTENDED RELEASE ORAL EVERY 12 HOURS SCHEDULED
Status: DISCONTINUED | OUTPATIENT
Start: 2019-12-28 | End: 2019-12-31 | Stop reason: HOSPADM

## 2019-12-28 RX ORDER — PREGABALIN 75 MG/1
150 CAPSULE ORAL 2 TIMES DAILY
Status: DISCONTINUED | OUTPATIENT
Start: 2019-12-28 | End: 2019-12-31 | Stop reason: HOSPADM

## 2019-12-28 RX ORDER — MORPHINE SULFATE 15 MG/1
15 TABLET ORAL
Status: DISCONTINUED | OUTPATIENT
Start: 2019-12-29 | End: 2019-12-29

## 2019-12-28 RX ORDER — POTASSIUM CHLORIDE 20 MEQ/1
20 TABLET, EXTENDED RELEASE ORAL ONCE
Status: DISCONTINUED | OUTPATIENT
Start: 2019-12-28 | End: 2019-12-30

## 2019-12-28 RX ORDER — DIAZEPAM 5 MG/1
5 TABLET ORAL
Status: DISCONTINUED | OUTPATIENT
Start: 2019-12-28 | End: 2019-12-29

## 2019-12-28 RX ORDER — POTASSIUM CHLORIDE 14.9 MG/ML
20 INJECTION INTRAVENOUS ONCE
Status: COMPLETED | OUTPATIENT
Start: 2019-12-28 | End: 2019-12-28

## 2019-12-28 RX ORDER — SODIUM CHLORIDE AND POTASSIUM CHLORIDE .9; .15 G/100ML; G/100ML
100 SOLUTION INTRAVENOUS CONTINUOUS
Status: DISCONTINUED | OUTPATIENT
Start: 2019-12-28 | End: 2019-12-29

## 2019-12-28 RX ORDER — PRAVASTATIN SODIUM 10 MG
10 TABLET ORAL
Status: DISCONTINUED | OUTPATIENT
Start: 2019-12-28 | End: 2019-12-31 | Stop reason: HOSPADM

## 2019-12-28 RX ORDER — CARISOPRODOL 350 MG/1
350 TABLET ORAL 2 TIMES DAILY
Status: DISCONTINUED | OUTPATIENT
Start: 2019-12-28 | End: 2019-12-31 | Stop reason: HOSPADM

## 2019-12-28 RX ORDER — MONTELUKAST SODIUM 10 MG/1
10 TABLET ORAL
Status: DISCONTINUED | OUTPATIENT
Start: 2019-12-28 | End: 2019-12-31 | Stop reason: HOSPADM

## 2019-12-28 RX ORDER — ALBUTEROL SULFATE 2.5 MG/3ML
2.5 SOLUTION RESPIRATORY (INHALATION) EVERY 6 HOURS PRN
Status: DISCONTINUED | OUTPATIENT
Start: 2019-12-28 | End: 2019-12-31 | Stop reason: HOSPADM

## 2019-12-28 RX ORDER — ONDANSETRON 4 MG/1
4 TABLET, ORALLY DISINTEGRATING ORAL ONCE
Status: COMPLETED | OUTPATIENT
Start: 2019-12-28 | End: 2019-12-28

## 2019-12-28 RX ORDER — DICYCLOMINE HYDROCHLORIDE 10 MG/1
10 CAPSULE ORAL 4 TIMES DAILY PRN
Status: DISCONTINUED | OUTPATIENT
Start: 2019-12-28 | End: 2019-12-31 | Stop reason: HOSPADM

## 2019-12-28 RX ORDER — MAGNESIUM SULFATE HEPTAHYDRATE 40 MG/ML
2 INJECTION, SOLUTION INTRAVENOUS ONCE
Status: COMPLETED | OUTPATIENT
Start: 2019-12-28 | End: 2019-12-29

## 2019-12-28 RX ORDER — METOCLOPRAMIDE HYDROCHLORIDE 5 MG/ML
10 INJECTION INTRAMUSCULAR; INTRAVENOUS ONCE
Status: COMPLETED | OUTPATIENT
Start: 2019-12-28 | End: 2019-12-28

## 2019-12-28 RX ORDER — METOCLOPRAMIDE HYDROCHLORIDE 5 MG/ML
10 INJECTION INTRAMUSCULAR; INTRAVENOUS
Status: DISCONTINUED | OUTPATIENT
Start: 2019-12-28 | End: 2019-12-29

## 2019-12-28 RX ORDER — ONDANSETRON 2 MG/ML
4 INJECTION INTRAMUSCULAR; INTRAVENOUS ONCE
Status: COMPLETED | OUTPATIENT
Start: 2019-12-28 | End: 2019-12-28

## 2019-12-28 RX ORDER — FLUTICASONE PROPIONATE 50 MCG
1 SPRAY, SUSPENSION (ML) NASAL DAILY
Status: DISCONTINUED | OUTPATIENT
Start: 2019-12-29 | End: 2019-12-31 | Stop reason: HOSPADM

## 2019-12-28 RX ADMIN — PRAVASTATIN SODIUM 10 MG: 10 TABLET ORAL at 21:47

## 2019-12-28 RX ADMIN — DIAZEPAM 5 MG: 5 TABLET ORAL at 21:47

## 2019-12-28 RX ADMIN — POTASSIUM CHLORIDE AND SODIUM CHLORIDE 100 ML/HR: 900; 150 INJECTION, SOLUTION INTRAVENOUS at 21:25

## 2019-12-28 RX ADMIN — METOCLOPRAMIDE 10 MG: 5 INJECTION, SOLUTION INTRAMUSCULAR; INTRAVENOUS at 16:48

## 2019-12-28 RX ADMIN — ONDANSETRON 4 MG: 4 TABLET, ORALLY DISINTEGRATING ORAL at 18:23

## 2019-12-28 RX ADMIN — MORPHINE SULFATE 15 MG: 15 TABLET, EXTENDED RELEASE ORAL at 21:46

## 2019-12-28 RX ADMIN — PREGABALIN 150 MG: 75 CAPSULE ORAL at 21:47

## 2019-12-28 RX ADMIN — MONTELUKAST SODIUM 10 MG: 10 TABLET, COATED ORAL at 21:47

## 2019-12-28 RX ADMIN — MORPHINE SULFATE 60 MG: 30 TABLET, EXTENDED RELEASE ORAL at 21:46

## 2019-12-28 RX ADMIN — POTASSIUM CHLORIDE 20 MEQ: 14.9 INJECTION, SOLUTION INTRAVENOUS at 16:01

## 2019-12-28 RX ADMIN — ONDANSETRON 4 MG: 2 INJECTION INTRAMUSCULAR; INTRAVENOUS at 15:07

## 2019-12-28 RX ADMIN — INSULIN GLARGINE 25 UNITS: 100 INJECTION, SOLUTION SUBCUTANEOUS at 21:47

## 2019-12-28 RX ADMIN — MAGNESIUM SULFATE HEPTAHYDRATE 2 G: 40 INJECTION, SOLUTION INTRAVENOUS at 23:39

## 2019-12-28 RX ADMIN — POTASSIUM CHLORIDE 20 MEQ: 14.9 INJECTION, SOLUTION INTRAVENOUS at 21:25

## 2019-12-28 RX ADMIN — INSULIN LISPRO 2 UNITS: 100 INJECTION, SOLUTION INTRAVENOUS; SUBCUTANEOUS at 21:47

## 2019-12-28 RX ADMIN — IOHEXOL 100 ML: 350 INJECTION, SOLUTION INTRAVENOUS at 15:47

## 2019-12-28 RX ADMIN — METOCLOPRAMIDE 10 MG: 5 INJECTION, SOLUTION INTRAMUSCULAR; INTRAVENOUS at 21:47

## 2019-12-28 RX ADMIN — CARISOPRODOL 350 MG: 350 TABLET ORAL at 21:47

## 2019-12-28 RX ADMIN — SODIUM CHLORIDE 1000 ML: 0.9 INJECTION, SOLUTION INTRAVENOUS at 15:03

## 2019-12-28 NOTE — ED PROVIDER NOTES
History  Chief Complaint   Patient presents with    Abdominal Pain     vomiting since monday  A 50-year-old female with past history of asthma, diabetes and gastroparesis; presents with vomiting for the past five days  Patient reports a innumerable episodes of nonbloody nonbilious emesis since symptoms began  Patient states he is unable to tolerate PO since onset  Patient does also complain of generalized abdominal pain, which has also been present for the past 5 days  Patient has since developed generalized malaise and lightheadedness when attempting to ambulate  Patient reports a subjective fever 2 days ago, which has since resolved  Patient was seen at [facility] for the symptoms earlier this week, undergoing lab work and a CT chest/abdomen/pelvis which was within normal limits  Patient was sent home with Zofran  Patient has been taking Zofran without relief  Patient otherwise denies chills, chest pain, shortness of breath, cough, diarrhea, constipation, dysuria, peripheral edema and rashes  Patient denies sick contacts at home  A/P:  Vomiting, associated with abdominal pain  Patient reports an isolated subjected fever  Suspect symptoms are either viral versus secondary to her gastroparesis  Will recheck lab work and if abnormal from recent labs will repeat CT scan  Will obtain urine to evaluate for infection  Will give IV fluid and Zofran for symptomatic relief  History provided by:  Patient and medical records    Prior to Admission Medications   Prescriptions Last Dose Informant Patient Reported? Taking?    BD PEN NEEDLE HERNAN U/F 32G X 4 MM MISC   Yes Yes   Insulin Pen Needle 29G X 5MM MISC   No Yes   Sig: by Does not apply route 4 (four) times a day   LYRICA 150 MG capsule   Yes Yes   Sig: Take 150 mg by mouth 2 (two) times a day   WIXELA INHUB 500-50 MCG/DOSE inhaler   Yes Yes   Sig: Take 1 puff by mouth 2 (two) times a day   albuterol (2 5 mg/3 mL) 0 083 % nebulizer solution   No Yes Sig: Take 1 vial (2 5 mg total) by nebulization every 6 (six) hours as needed for shortness of breath   albuterol (PROVENTIL HFA,VENTOLIN HFA) 90 mcg/act inhaler   Yes Yes   Sig: Inhale 2 puffs every 4 (four) hours as needed   carisoprodol (SOMA) 350 mg tablet  Self Yes Yes   Sig: Take 350 mg by mouth 2 (two) times a day   diazepam (VALIUM) 5 mg tablet  Self Yes Yes   Sig: Take 5 mg by mouth daily at bedtime as needed for anxiety   dicyclomine (BENTYL) 10 mg capsule   No Yes   Sig: Take 1 capsule (10 mg total) by mouth 4 (four) times a day as needed (As needed for abdominal cramping)   fluticasone (FLONASE) 50 mcg/act nasal spray  Self Yes Yes   Si spray into each nostril daily   furosemide (LASIX) 20 mg tablet   Yes Yes   Sig: Take 40 mg by mouth daily   insulin glargine (LANTUS) 100 units/mL subcutaneous injection  Self Yes Yes   Sig: Inject 50 Units under the skin daily at bedtime     lisinopril (ZESTRIL) 10 mg tablet  Self No Yes   Sig: Take 1 tablet (10 mg total) by mouth daily   metoclopramide (REGLAN) 10 mg tablet   No Yes   Sig: TAKE 1 TABLET PO QID BEFORE MEALS AND HS   montelukast (SINGULAIR) 10 mg tablet  Self Yes Yes   Sig: Take 10 mg by mouth daily at bedtime   morphine (MS CONTIN) 15 mg 12 hr tablet   Yes Yes   Sig: Take 15 mg by mouth 2 (two) times a day   morphine (MS CONTIN) 60 mg 12 hr tablet   Yes Yes   Sig: Take 60 mg by mouth 2 (two) times a day   morphine (MSIR) 15 mg tablet  Self Yes Yes   Sig: Take 15 mg by mouth 3 (three) times a day as needed for moderate pain or severe pain     nystatin (MYCOSTATIN) powder   No Yes   Sig: Apply topically 2 (two) times a day   omeprazole (PriLOSEC) 20 mg delayed release capsule  Self Yes Yes   Sig: Take 20 mg by mouth daily   ondansetron (ZOFRAN) 4 mg tablet   No Yes   Sig: Take 1 tablet (4 mg total) by mouth every 6 (six) hours   pravastatin (PRAVACHOL) 10 mg tablet   Yes Yes   Sig: Take 10 mg by mouth daily at bedtime      Facility-Administered Medications: None       Past Medical History:   Diagnosis Date    Acute respiratory insufficiency 11/23/2019    Asthma     Chronic pain     Diabetes mellitus ([facility] [address] )     Gastroparesis     Sepsis ([facility] [address] ) 11/23/2019       Past Surgical History:   Procedure Laterality Date    CERVICAL LAMINECTOMY      CHOLECYSTECTOMY      CHOLECYSTECTOMY LAPAROSCOPIC N/A 11/10/2018    Procedure: CHOLECYSTECTOMY LAPAROSCOPIC w/ ioc;  Surgeon: Beny Benavides MD;  Location: [facility];  Service: General    HYSTERECTOMY      JOINT REPLACEMENT Bilateral     knee    TOE AMPUTATION Right 2/2/2018    Procedure: AMPUTATION TOE, RIGHT GREAT TOE;  Surgeon: Chan Rivera DPM;  Location: [facility];  Service: Podiatry       Family History   Problem Relation Age of Onset    Diabetes Mother     Diabetes Maternal Grandmother     No Known Problems Father      I have reviewed and agree with the history as documented  Social History     Tobacco Use    Smoking status: Never Smoker    Smokeless tobacco: Never Used   Substance Use Topics    Alcohol use: Yes     Frequency: 2-4 times a month     Drinks per session: 1 or 2     Comment: rarely    Drug use: No        Review of Systems   Constitutional: Positive for fever  Gastrointestinal: Positive for abdominal pain, nausea and vomiting  Neurological: Positive for light-headedness  All other systems reviewed and are negative  Physical Exam  Physical Exam  General Appearance: alert and oriented, nad, non toxic appearing  Skin:  Warm, dry, intact  HEENT: atraumatic, normocephalic  Dry mucous membranes    Neck: Supple, trachea midline  Cardiac: RRR; no murmurs, rub, gallops  Pulmonary: lungs CTAB; no wheezes, rales, rhonchi  Gastrointestinal: abdomen soft, upper abdominal tenderness, nondistended; no guarding or rebound tenderness; good bowel sounds, no mass or bruits  Extremities:  no pedal edema, 2+ pulses; no calf tenderness, no clubbing, no cyanosis  Neuro:  no focal motor or sensory deficits, CN 2-12 grossly intact  Psych:  Normal mood and affect, normal judgement and insight      Vital Signs  ED Triage Vitals [12/28/19 1416]   Temperature Pulse Respirations Blood Pressure SpO2   (!) 96 7 °F (35 9 °C) (!) 118 20 153/82 94 %      Temp Source Heart Rate Source Patient Position - Orthostatic VS BP Location FiO2 (%)   Temporal Monitor Sitting Right arm --      Pain Score       8           Vitals:    12/28/19 1731 12/28/19 1850 12/28/19 1927 12/28/19 2242   BP: 144/70 153/84 124/70 121/64   Pulse: (!) 108 104 (!) 108 (!) 110   Patient Position - Orthostatic VS: Lying Lying Lying Lying         Visual Acuity      ED Medications  Medications   potassium chloride (K-DUR,KLOR-CON) CR tablet 20 mEq (0 mEq Oral Hold 12/28/19 1604)   carisoprodol (SOMA) tablet 350 mg (350 mg Oral Given 12/28/19 2147)   fluticasone (FLONASE) 50 mcg/act nasal spray 1 spray (has no administration in time range)   insulin glargine (LANTUS) subcutaneous injection 25 Units 0 25 mL (25 Units Subcutaneous Given 12/28/19 2147)   lisinopril (ZESTRIL) tablet 10 mg (has no administration in time range)   pregabalin (LYRICA) capsule 150 mg (150 mg Oral Given 12/28/19 2147)   montelukast (SINGULAIR) tablet 10 mg (10 mg Oral Given 12/28/19 2147)   morphine (MS CONTIN) ER tablet 15 mg (15 mg Oral Given 12/28/19 2146)   morphine (MS CONTIN) ER tablet 60 mg (60 mg Oral Given 12/28/19 2146)   pravastatin (PRAVACHOL) tablet 10 mg (10 mg Oral Given 12/28/19 2147)   fluticasone-vilanterol (BREO ELLIPTA) 200-25 MCG/INH inhaler 1 puff (has no administration in time range)   albuterol inhalation solution 2 5 mg (has no administration in time range)   diazepam (VALIUM) tablet 5 mg (5 mg Oral Given 12/28/19 2147)   dicyclomine (BENTYL) capsule 10 mg (has no administration in time range)   morphine (MSIR) IR tablet 15 mg (15 mg Oral Given 12/29/19 0528)   sodium chloride 0 9 % with KCl 20 mEq/L infusion (premix) (100 mL/hr Intravenous New Bag 12/29/19 0631)   ondansetron (ZOFRAN) injection 4 mg (4 mg Intravenous Given 12/29/19 0531)   metoclopramide (REGLAN) injection 10 mg (10 mg Intravenous Given 12/28/19 2147)   enoxaparin (LOVENOX) subcutaneous injection 40 mg (has no administration in time range)   insulin lispro (HumaLOG) 100 units/mL subcutaneous injection 2-12 Units (has no administration in time range)   insulin lispro (HumaLOG) 100 units/mL subcutaneous injection 2-12 Units (2 Units Subcutaneous Given 12/28/19 2147)   acetaminophen (TYLENOL) tablet 650 mg (has no administration in time range)   sodium chloride 0 9 % bolus 1,000 mL (0 mL Intravenous Stopped 12/28/19 1821)   ondansetron (ZOFRAN) injection 4 mg (4 mg Intravenous Given 12/28/19 1507)   potassium chloride 20 mEq IVPB (premix) (0 mEq Intravenous Stopped 12/28/19 1821)   iohexol (OMNIPAQUE) 350 MG/ML injection (MULTI-DOSE) 100 mL (100 mL Intravenous Given 12/28/19 1547)   metoclopramide (REGLAN) injection 10 mg (10 mg Intravenous Given 12/28/19 1648)   ondansetron (ZOFRAN-ODT) dispersible tablet 4 mg (4 mg Oral Given 12/28/19 1823)   potassium chloride 20 mEq IVPB (premix) (20 mEq Intravenous New Bag 12/28/19 2125)   magnesium sulfate 2 g/50 mL IVPB (premix) 2 g (2 g Intravenous New Bag 12/28/19 2339)       Diagnostic Studies  Results Reviewed     Procedure Component Value Units Date/Time    Magnesium [[id number]]  (Abnormal) Collected:  12/28/19 1503    Lab Status:  Final result Specimen:  Blood from Arm, Left Updated:  12/28/19 1951     Magnesium 1 5 mg/dL     Urine Microscopic [[id number]]  (Abnormal) Collected:  12/28/19 1744    Lab Status:  Final result Specimen:  Urine, Clean Catch Updated:  12/28/19 1805     RBC, UA 2-4 /hpf      WBC, UA 4-10 /hpf      Epithelial Cells Occasional /hpf      Bacteria, UA Occasional /hpf     POCT urinalysis dipstick [[id number]]  (Abnormal) Resulted:  12/28/19 1746    Lab Status:  Final result Specimen:  Urine Updated:  12/28/19 1746    Urine Macroscopic, POC [[id number]]  (Abnormal) Collected:  12/28/19 1744    Lab Status:  Final result Specimen:  Urine Updated:  12/28/19 1745     Color, UA Yellow     Clarity, UA Clear     pH, UA 7 5     Leukocytes, UA Moderate     Nitrite, UA Negative     Protein,  (2+) mg/dl      Glucose, UA Negative mg/dl      Ketones, UA 15 (1+) mg/dl      Urobilinogen, UA 0 2 E U /dl      Bilirubin, UA Negative     Blood, UA Trace     Specific [place], UA 1 015    Narrative:       CLINITEK RESULT    Comprehensive metabolic panel [[id number]]  (Abnormal) Collected:  12/28/19 1503    Lab Status:  Final result Specimen:  Blood from Arm, Left Updated:  12/28/19 1528     Sodium 129 mmol/L      Potassium 2 7 mmol/L      Chloride 93 mmol/L      CO2 25 mmol/L      ANION GAP 11 mmol/L      BUN 13 mg/dL      Creatinine 0 80 mg/dL      Glucose 250 mg/dL      Calcium 9 0 mg/dL      AST 16 U/L      ALT 21 U/L      Alkaline Phosphatase 110 U/L      Total Protein 7 8 g/dL      Albumin 3 6 g/dL      Total Bilirubin 1 95 mg/dL      eGFR 83 ml/min/1 73sq m     Narrative:       [place] guidelines for Chronic Kidney Disease (CKD):     Stage 1 with normal or high GFR (GFR > 90 mL/min/1 73 square meters)    Stage 2 Mild CKD (GFR = 60-89 mL/min/1 73 square meters)    Stage 3A Moderate CKD (GFR = 45-59 mL/min/1 73 square meters)    Stage 3B Moderate CKD (GFR = 30-44 mL/min/1 73 square meters)    Stage 4 Severe CKD (GFR = 15-29 mL/min/1 73 square meters)    Stage 5 End Stage CKD (GFR <15 mL/min/1 73 square meters)  Note: GFR calculation is accurate only with a steady state creatinine    Lipase [[id number]]  (Normal) Collected:  12/28/19 1503    Lab Status:  Final result Specimen:  Blood from Arm, Left Updated:  12/28/19 1527     Lipase 86 u/L     CBC and differential [[id number]]  (Abnormal) Collected:  12/28/19 1503    Lab Status:  Final result Specimen:  Blood from Arm, Left Updated:  12/28/19 1511     WBC 16 24 Thousand/uL      RBC 5 29 Million/uL      Hemoglobin 13 9 g/dL      Hematocrit 41 7 %      MCV 79 fL      MCH 26 3 pg      MCHC 33 3 g/dL      RDW 13 7 %      MPV 12 1 fL      Platelets 968 Thousands/uL      nRBC 0 /100 WBCs      Neutrophils Relative 82 %      Immat GRANS % 1 %      Lymphocytes Relative 8 %      Monocytes Relative 9 %      Eosinophils Relative 0 %      Basophils Relative 0 %      Neutrophils Absolute 13 27 Thousands/µL      Immature Grans Absolute 0 09 Thousand/uL      Lymphocytes Absolute 1 35 Thousands/µL      Monocytes Absolute 1 45 Thousand/µL      Eosinophils Absolute 0 02 Thousand/µL      Basophils Absolute 0 06 Thousands/µL                  CT abdomen pelvis with contrast   Final Result by Lauren Phillip MD (12/28 1606)      No acute abdominopelvic pathology appreciated  No significant change from recent prior with small fat-containing umbilical hernia redemonstrated and stable small soft tissue density nodules of the left adnexal area which have been present on several    prior studies  Cholecystectomy  Workstation performed: [id number]                    Procedures  Procedures         ED Course  ED Course as of Dec 29 0657   Sat Dec 28, 2019   1522 Pt's WBC within normal range during recent ED visit  Due to fever two days ago, will obtain repeat imaging for further evaluation   WBC(!): 16 24   1540 New onset, pt receiving IVF   Sodium(!): 129   1540 Likely due to vomiting, will replete   Potassium(!!): 2 7   1617 Patient reports persistent nausea at this time  Patient also complains of lightheadedness  Will give Reglan and additional IV fluid  80 CT is negative for acute findings  Suspect leukocytosis is secondary to vomiting, which is likely secondary to underlying gastroparesis                                    MDM      Disposition  Final diagnoses:   Intractable vomiting   Hypokalemia     Time reflects when diagnosis was documented in both MDM as applicable and the Disposition within this note     Time User Action Codes Description Comment    12/28/2019  6:56 PM Palmer Day Add [R11 10] Intractable vomiting     12/28/2019  6:56 PM Palmer Day Add [E87 6] Hypokalemia       ED Disposition     ED Disposition Condition Date/Time Comment    Admit Good Sat Dec 28, 2019  6:56 PM Case was discussed with bertha and the patient's admission status was agreed to be Admission Status: observation status to the service of Dr Jeff Diez          Follow-up Information    None         Current Discharge Medication List      CONTINUE these medications which have NOT CHANGED    Details   albuterol (PROVENTIL HFA,VENTOLIN HFA) 90 mcg/act inhaler Inhale 2 puffs every 4 (four) hours as needed    Comments: Substitution to a formulary equivalent within the same pharmaceutical class is authorized  !! BD PEN NEEDLE HERNAN U/F 32G X 4 MM MISC Refills: 0      carisoprodol (SOMA) 350 mg tablet Take 350 mg by mouth 2 (two) times a day      diazepam (VALIUM) 5 mg tablet Take 5 mg by mouth daily at bedtime as needed for anxiety      dicyclomine (BENTYL) 10 mg capsule Take 1 capsule (10 mg total) by mouth 4 (four) times a day as needed (As needed for abdominal cramping)  Qty: 360 capsule, Refills: 0    Associated Diagnoses: Gastroparesis; Intractable vomiting with nausea, unspecified vomiting type; Gall stones      fluticasone (FLONASE) 50 mcg/act nasal spray 1 spray into each nostril daily      furosemide (LASIX) 20 mg tablet Take 40 mg by mouth daily      insulin glargine (LANTUS) 100 units/mL subcutaneous injection Inject 50 Units under the skin daily at bedtime        !!  Insulin Pen Needle 29G X 5MM MISC by Does not apply route 4 (four) times a day  Qty: 100 each, Refills: 0    Associated Diagnoses: Type 2 diabetes mellitus with diabetic neuropathy, with long-term current use of insulin (HCC)      lisinopril (ZESTRIL) 10 mg tablet Take 1 tablet (10 mg total) by mouth daily  Qty: 30 tablet, Refills: 0    Associated Diagnoses: Essential hypertension      LYRICA 150 MG capsule Take 150 mg by mouth 2 (two) times a day  Refills: 5      metoclopramide (REGLAN) 10 mg tablet TAKE 1 TABLET PO QID BEFORE MEALS AND HS  Qty: 360 tablet, Refills: 3    Associated Diagnoses: Gastroparesis      montelukast (SINGULAIR) 10 mg tablet Take 10 mg by mouth daily at bedtime      !! morphine (MS CONTIN) 15 mg 12 hr tablet Take 15 mg by mouth 2 (two) times a day  Refills: 0      !! morphine (MS CONTIN) 60 mg 12 hr tablet Take 60 mg by mouth 2 (two) times a day  Refills: 0      morphine (MSIR) 15 mg tablet Take 15 mg by mouth 3 (three) times a day as needed for moderate pain or severe pain        nystatin (MYCOSTATIN) powder Apply topically 2 (two) times a day  Qty: 45 g, Refills: 0    Associated Diagnoses: Candidiasis      omeprazole (PriLOSEC) 20 mg delayed release capsule Take 20 mg by mouth daily      ondansetron (ZOFRAN) 4 mg tablet Take 1 tablet (4 mg total) by mouth every 6 (six) hours  Qty: 12 tablet, Refills: 0    Associated Diagnoses: Nausea and vomiting      pravastatin (PRAVACHOL) 10 mg tablet Take 10 mg by mouth daily at bedtime      WIXELA INHUB 500-50 MCG/DOSE inhaler Take 1 puff by mouth 2 (two) times a day    Comments: Substitution to a formulary equivalent within the same pharmaceutical class is authorized  !! - Potential duplicate medications found  Please discuss with provider  STOP taking these medications       albuterol (2 5 mg/3 mL) 0 083 % nebulizer solution Comments:   Reason for Stopping:             No discharge procedures on file      ED Provider  Electronically Signed by           Jw Willis DO  12/29/19 8182

## 2019-12-29 LAB
ANION GAP SERPL CALCULATED.3IONS-SCNC: 7 MMOL/L (ref 4–13)
BASOPHILS # BLD AUTO: 0.09 THOUSANDS/ΜL (ref 0–0.1)
BASOPHILS NFR BLD AUTO: 1 % (ref 0–1)
BILIRUB DIRECT SERPL-MCNC: 0.35 MG/DL (ref 0–0.2)
BUN SERPL-MCNC: 15 MG/DL (ref 5–25)
CALCIUM SERPL-MCNC: 8.2 MG/DL (ref 8.3–10.1)
CHLORIDE SERPL-SCNC: 99 MMOL/L (ref 100–108)
CO2 SERPL-SCNC: 27 MMOL/L (ref 21–32)
CREAT SERPL-MCNC: 1.1 MG/DL (ref 0.6–1.3)
EOSINOPHIL # BLD AUTO: 0.24 THOUSAND/ΜL (ref 0–0.61)
EOSINOPHIL NFR BLD AUTO: 2 % (ref 0–6)
ERYTHROCYTE [DISTWIDTH] IN BLOOD BY AUTOMATED COUNT: 14 % (ref 11.6–15.1)
GFR SERPL CREATININE-BSD FRML MDRD: 57 ML/MIN/1.73SQ M
GLUCOSE P FAST SERPL-MCNC: 150 MG/DL (ref 65–99)
GLUCOSE SERPL-MCNC: 137 MG/DL (ref 65–140)
GLUCOSE SERPL-MCNC: 139 MG/DL (ref 65–140)
GLUCOSE SERPL-MCNC: 140 MG/DL (ref 65–140)
GLUCOSE SERPL-MCNC: 150 MG/DL (ref 65–140)
GLUCOSE SERPL-MCNC: 180 MG/DL (ref 65–140)
GLUCOSE SERPL-MCNC: 194 MG/DL (ref 65–140)
HCT VFR BLD AUTO: 37 % (ref 34.8–46.1)
HGB BLD-MCNC: 12.3 G/DL (ref 11.5–15.4)
IMM GRANULOCYTES # BLD AUTO: 0.09 THOUSAND/UL (ref 0–0.2)
IMM GRANULOCYTES NFR BLD AUTO: 1 % (ref 0–2)
LYMPHOCYTES # BLD AUTO: 3.29 THOUSANDS/ΜL (ref 0.6–4.47)
LYMPHOCYTES NFR BLD AUTO: 24 % (ref 14–44)
MAGNESIUM SERPL-MCNC: 1.8 MG/DL (ref 1.6–2.6)
MCH RBC QN AUTO: 26.9 PG (ref 26.8–34.3)
MCHC RBC AUTO-ENTMCNC: 33.2 G/DL (ref 31.4–37.4)
MCV RBC AUTO: 81 FL (ref 82–98)
MONOCYTES # BLD AUTO: 1.65 THOUSAND/ΜL (ref 0.17–1.22)
MONOCYTES NFR BLD AUTO: 12 % (ref 4–12)
NEUTROPHILS # BLD AUTO: 8.45 THOUSANDS/ΜL (ref 1.85–7.62)
NEUTS SEG NFR BLD AUTO: 60 % (ref 43–75)
NRBC BLD AUTO-RTO: 0 /100 WBCS
PLATELET # BLD AUTO: 274 THOUSANDS/UL (ref 149–390)
PMV BLD AUTO: 12.2 FL (ref 8.9–12.7)
POTASSIUM SERPL-SCNC: 3.1 MMOL/L (ref 3.5–5.3)
RBC # BLD AUTO: 4.57 MILLION/UL (ref 3.81–5.12)
SODIUM SERPL-SCNC: 133 MMOL/L (ref 136–145)
WBC # BLD AUTO: 13.81 THOUSAND/UL (ref 4.31–10.16)

## 2019-12-29 PROCEDURE — 99232 SBSQ HOSP IP/OBS MODERATE 35: CPT | Performed by: INTERNAL MEDICINE

## 2019-12-29 PROCEDURE — 82248 BILIRUBIN DIRECT: CPT | Performed by: STUDENT IN AN ORGANIZED HEALTH CARE EDUCATION/TRAINING PROGRAM

## 2019-12-29 PROCEDURE — 83735 ASSAY OF MAGNESIUM: CPT | Performed by: NURSE PRACTITIONER

## 2019-12-29 PROCEDURE — 80048 BASIC METABOLIC PNL TOTAL CA: CPT | Performed by: NURSE PRACTITIONER

## 2019-12-29 PROCEDURE — 82948 REAGENT STRIP/BLOOD GLUCOSE: CPT

## 2019-12-29 PROCEDURE — 85025 COMPLETE CBC W/AUTO DIFF WBC: CPT | Performed by: NURSE PRACTITIONER

## 2019-12-29 RX ORDER — INSULIN GLARGINE 100 [IU]/ML
30 INJECTION, SOLUTION SUBCUTANEOUS
Status: DISCONTINUED | OUTPATIENT
Start: 2019-12-29 | End: 2019-12-31 | Stop reason: HOSPADM

## 2019-12-29 RX ORDER — POTASSIUM CHLORIDE AND SODIUM CHLORIDE 900; 300 MG/100ML; MG/100ML
75 INJECTION, SOLUTION INTRAVENOUS CONTINUOUS
Status: DISCONTINUED | OUTPATIENT
Start: 2019-12-29 | End: 2019-12-30

## 2019-12-29 RX ORDER — DIAZEPAM 5 MG/1
5 TABLET ORAL
Status: DISCONTINUED | OUTPATIENT
Start: 2019-12-29 | End: 2019-12-31 | Stop reason: HOSPADM

## 2019-12-29 RX ORDER — POTASSIUM CHLORIDE 20MEQ/15ML
30 LIQUID (ML) ORAL ONCE
Status: COMPLETED | OUTPATIENT
Start: 2019-12-29 | End: 2019-12-29

## 2019-12-29 RX ORDER — METOCLOPRAMIDE 10 MG/1
10 TABLET ORAL
Status: DISCONTINUED | OUTPATIENT
Start: 2019-12-29 | End: 2019-12-31 | Stop reason: HOSPADM

## 2019-12-29 RX ADMIN — METOCLOPRAMIDE HYDROCHLORIDE 10 MG: 10 TABLET ORAL at 17:21

## 2019-12-29 RX ADMIN — FLUTICASONE FUROATE AND VILANTEROL TRIFENATATE 1 PUFF: 200; 25 POWDER RESPIRATORY (INHALATION) at 08:37

## 2019-12-29 RX ADMIN — METOCLOPRAMIDE HYDROCHLORIDE 10 MG: 10 TABLET ORAL at 12:55

## 2019-12-29 RX ADMIN — ENOXAPARIN SODIUM 40 MG: 40 INJECTION SUBCUTANEOUS at 08:37

## 2019-12-29 RX ADMIN — MORPHINE SULFATE 60 MG: 30 TABLET, EXTENDED RELEASE ORAL at 21:16

## 2019-12-29 RX ADMIN — ONDANSETRON 4 MG: 2 INJECTION INTRAMUSCULAR; INTRAVENOUS at 05:31

## 2019-12-29 RX ADMIN — INSULIN LISPRO 2 UNITS: 100 INJECTION, SOLUTION INTRAVENOUS; SUBCUTANEOUS at 08:57

## 2019-12-29 RX ADMIN — MONTELUKAST SODIUM 10 MG: 10 TABLET, COATED ORAL at 21:17

## 2019-12-29 RX ADMIN — MORPHINE SULFATE 15 MG: 15 TABLET, EXTENDED RELEASE ORAL at 21:17

## 2019-12-29 RX ADMIN — POTASSIUM CHLORIDE AND SODIUM CHLORIDE 100 ML/HR: 900; 150 INJECTION, SOLUTION INTRAVENOUS at 06:31

## 2019-12-29 RX ADMIN — POTASSIUM CHLORIDE AND SODIUM CHLORIDE 75 ML/HR: 900; 300 INJECTION, SOLUTION INTRAVENOUS at 21:09

## 2019-12-29 RX ADMIN — POTASSIUM CHLORIDE 30 MEQ: 20 SOLUTION ORAL at 09:53

## 2019-12-29 RX ADMIN — PREGABALIN 150 MG: 75 CAPSULE ORAL at 08:38

## 2019-12-29 RX ADMIN — MORPHINE SULFATE 15 MG: 15 TABLET ORAL at 05:28

## 2019-12-29 RX ADMIN — PRAVASTATIN SODIUM 10 MG: 10 TABLET ORAL at 21:17

## 2019-12-29 RX ADMIN — POTASSIUM CHLORIDE AND SODIUM CHLORIDE 75 ML/HR: 900; 300 INJECTION, SOLUTION INTRAVENOUS at 10:46

## 2019-12-29 RX ADMIN — PREGABALIN 150 MG: 75 CAPSULE ORAL at 17:21

## 2019-12-29 RX ADMIN — FLUTICASONE PROPIONATE 1 SPRAY: 50 SPRAY, METERED NASAL at 08:37

## 2019-12-29 RX ADMIN — MORPHINE SULFATE 15 MG: 15 TABLET, EXTENDED RELEASE ORAL at 09:00

## 2019-12-29 RX ADMIN — METOCLOPRAMIDE 10 MG: 5 INJECTION, SOLUTION INTRAMUSCULAR; INTRAVENOUS at 08:37

## 2019-12-29 RX ADMIN — INSULIN GLARGINE 30 UNITS: 100 INJECTION, SOLUTION SUBCUTANEOUS at 21:20

## 2019-12-29 RX ADMIN — MORPHINE SULFATE 60 MG: 30 TABLET, EXTENDED RELEASE ORAL at 09:00

## 2019-12-29 RX ADMIN — CARISOPRODOL 350 MG: 350 TABLET ORAL at 08:59

## 2019-12-29 RX ADMIN — CARISOPRODOL 350 MG: 350 TABLET ORAL at 17:20

## 2019-12-29 NOTE — ASSESSMENT & PLAN NOTE
Na 129, when corrected for hyperglycemia, Na 131  IV hydration with 0 9NS  Hold off fluid restriction for now due to intractable nausea and emesis   Monitor serum Na

## 2019-12-29 NOTE — H&P
H&P- Nick Tello 1965, 53 y o  female MRN: [id number]    Unit/Bed#: E5 -01 Encounter: [id number]    Primary Care Provider: Claudia Christianson MD   Date and time admitted to hospital: 12/28/2019  2:18 PM      Diabetic gastroparesis [facility])  Assessment & Plan  Lab Results   Component Value Date    HGBA1C 10 1 (H) 11/23/2019     · Reports intractable emesis; multiple hospitalizations for intractable N/V 2/2 diabetic gastroparesis  · CT: neg for acute abdominopelvic pathology   · Will start Reglan q i d and p r n  Zofran  · Clear liquid diet, advance as tolerated  · IV hydration  · Maintained on Aspart 12 units t i d and Lantus 50 units q h s  Decrease while on clear liquid diet  Add insulin sliding scale and ADA diet  · Monitor electrolytes  · Consider GI consult  No results for input(s): POCGLU in the last 72 hours  Blood Sugar Average: Last 72 hrs:      * Intractable vomiting  Assessment & Plan  Intractable emesis secondary to diabetic gastroparesis, see above plan    Acute hypokalemia  Assessment & Plan  K 2 7, 2/2 GI losses; replete  Will order IV hydration with supplemental potassium  Hold Lasix  Monitor serum K  Will order serum Mg  Monitor on telemetry    Hyponatremia  Assessment & Plan  Na 129, when corrected for hyperglycemia, Na 131  IV hydration with 0 9NS  Hold off fluid restriction for now due to intractable nausea and emesis   Monitor serum Na    Leukocytosis  Assessment & Plan  WBC 16, likely stress reactive; afebrile, monitor for s/s infection  Monitor CBC    Essential hypertension  Assessment & Plan  Maintained on lisinopril and furosemide; hold furosemide in light of electrolyte abnormalities    Uncomplicated opioid dependence (HCC)  Assessment & Plan  On pain regimen due to chronic back and neck pain from a fall  Maintained on:  · Morphine 15mg IR TID   · Morphine 75mg ER BID   · Diazepam 5 mg q h s  · Lyrica 150 mg b i d   · Soma 350 mg b i d    Verified on PDMP; all medications dispensed early December    Asthma  Assessment & Plan  No acute exacerbation, continue home inhaler, prn nebs and Singulair      VTE Prophylaxis: Enoxaparin (Lovenox)  / reason for no mechanical VTE prophylaxis ac   Code Status: FC  POLST: POLST is not applicable to this patient  Discussion with family:     Anticipated Length of Stay:  Patient will be admitted on an Observation basis with an anticipated length of stay of  > 2 midnights  Justification for Hospital Stay:  Intractable nausea and vomiting secondary to diabetic gastroparesis    Total Time for Visit, including Counseling / Coordination of Care: 1 hour  Greater than 50% of this total time spent on direct patient counseling and coordination of care  Chief Complaint:   Nausea and vomiting since Monday    History of Present Illness:    Martita Morelos is a 53 y o  female who presents with c/o intractable nausea and vomiting since Monday  Patient has history of diabetic gastroparesis and has been hospitalized multiple times for same  Patient is maintained on scheduled Reglan QID although it did not help alleviate her symptoms  Reports associated abdominal pain, weakness, fatigue and myalgias  Reports one episode of fever yesterday and chills this morning  Reports cough productive of yellow sputum, denies rhinorrhea, sneezing or sore throat  Had 1 episode of diarrhea this morning  Denies dysuria or urinary frequency    Review of Systems:    Review of Systems   Constitutional: Positive for chills, fatigue and fever  Respiratory: Positive for cough  Negative for choking, shortness of breath and wheezing  Cardiovascular: Negative  Gastrointestinal: Positive for abdominal pain, diarrhea, nausea and vomiting  Negative for abdominal distention  Genitourinary: Negative for dysuria, frequency, hematuria and urgency  Musculoskeletal: Positive for arthralgias, back pain and neck pain          Chronic back and neck pain   Neurological: Positive for weakness  Past Medical and Surgical History:     Past Medical History:   Diagnosis Date    Acute respiratory insufficiency 11/23/2019    Asthma     Chronic pain     Diabetes mellitus ([facility] [address] )     Gastroparesis     Sepsis ([facility] [address] ) 11/23/2019       Past Surgical History:   Procedure Laterality Date    CERVICAL LAMINECTOMY      CHOLECYSTECTOMY      CHOLECYSTECTOMY LAPAROSCOPIC N/A 11/10/2018    Procedure: CHOLECYSTECTOMY LAPAROSCOPIC w/ ioc;  Surgeon: Julito Summers MD;  Location: [facility];  Service: General    HYSTERECTOMY      JOINT REPLACEMENT Bilateral     knee    TOE AMPUTATION Right 2/2/2018    Procedure: AMPUTATION TOE, RIGHT GREAT TOE;  Surgeon: Kenisha Zurita DPM;  Location: [facility];  Service: Podiatry       Meds/Allergies:    Prior to Admission medications    Medication Sig Start Date End Date Taking?  Authorizing Provider   albuterol (2 5 mg/3 mL) 0 083 % nebulizer solution Take 1 vial (2 5 mg total) by nebulization every 6 (six) hours as needed for shortness of breath 11/27/19 12/28/19 Yes Sybil Gallegos MD   albuterol (PROVENTIL HFA,VENTOLIN HFA) 90 mcg/act inhaler Inhale 2 puffs every 4 (four) hours as needed 10/14/19  Yes Historical Provider, MD   BD PEN NEEDLE HERNAN U/F 32G X 4 MM MISC  8/23/19  Yes Historical Provider, MD   carisoprodol (SOMA) 350 mg tablet Take 350 mg by mouth 2 (two) times a day   Yes Historical Provider, MD   diazepam (VALIUM) 5 mg tablet Take 5 mg by mouth daily at bedtime as needed for anxiety   Yes Historical Provider, MD   dicyclomine (BENTYL) 10 mg capsule Take 1 capsule (10 mg total) by mouth 4 (four) times a day as needed (As needed for abdominal cramping) 9/20/19  Yes Deedee Weaver MD   fluticasone [facility]) 50 mcg/act nasal spray 1 spray into each nostril daily   Yes Historical Provider, MD   furosemide (LASIX) 20 mg tablet Take 40 mg by mouth daily 8/16/19  Yes Historical Provider, MD   insulin glargine (LANTUS) 100 units/mL subcutaneous injection Inject 50 Units under the skin daily at bedtime     Yes Historical Provider, MD   Insulin Pen Needle 29G X 5MM MISC by Does not apply route 4 (four) times a day 9/20/19  Yes Haley Polanco MD   lisinopril (ZESTRIL) 10 mg tablet Take 1 tablet (10 mg total) by mouth daily 12/25/18  Yes Patricia Hunt PA-C   LYRICA 150 MG capsule Take 150 mg by mouth 2 (two) times a day 6/12/19  Yes Historical Provider, MD   metoclopramide (REGLAN) 10 mg tablet TAKE 1 TABLET PO QID BEFORE MEALS AND HS 9/3/19  Yes Theresa Talavera PA-C   montelukast (SINGULAIR) 10 mg tablet Take 10 mg by mouth daily at bedtime   Yes Historical Provider, MD   morphine (MS CONTIN) 15 mg 12 hr tablet Take 15 mg by mouth 2 (two) times a day 9/13/19  Yes Historical Provider, MD   morphine (MS CONTIN) 60 mg 12 hr tablet Take 60 mg by mouth 2 (two) times a day 9/13/19  Yes Historical Provider, MD   morphine (MSIR) 15 mg tablet Take 15 mg by mouth 3 (three) times a day as needed for moderate pain or severe pain     Yes Historical Provider, MD   nystatin (MYCOSTATIN) powder Apply topically 2 (two) times a day 7/23/19  Yes Edwina Cardoza MD   omeprazole (PriLOSEC) 20 mg delayed release capsule Take 20 mg by mouth daily   Yes Historical Provider, MD   ondansetron (ZOFRAN) 4 mg tablet Take 1 tablet (4 mg total) by mouth every 6 (six) hours 12/24/19  Yes Maryanne Salazar PA-C   pravastatin (PRAVACHOL) 10 mg tablet Take 10 mg by mouth daily at bedtime 8/16/19  Yes Historical Provider, MD Ghassan Waller 500-50 MCG/DOSE inhaler Take 1 puff by mouth 2 (two) times a day 2/28/19  Yes Historical Provider, MD   insulin aspart (NOVOLOG FLEXPEN) 100 Units/mL injection pen Inject 12 Units under the skin 3 (three) times a day with meals 9/20/19 12/28/19  Haley Polanco MD   ipratropium (ATROVENT) 0 02 % nebulizer solution Take 1 vial (0 5 mg total) by nebulization 3 (three) times a day for 15 days 11/27/19 12/28/19  Tushar Vera MD   ondansetron (ZOFRAN) 4 mg tablet TAKE 1 TABLET BY MOUTH EVERY 8 HOURS AS NEEDED FOR NAUSEA AND VOMITING 4/30/19 12/28/19  VIVI Larson     I have reviewed home medications with patient personally  Allergies: Allergies   Allergen Reactions    Nsaids GI Intolerance       Social History:     Marital Status: /Civil Union   Occupation:  Disabled  Patient Pre-hospital Living Situation:  Resides at home with  and son  Patient Pre-hospital Level of Mobility:  Ambulatory, cane and walker p r n  Patient Pre-hospital Diet Restrictions:   Substance Use History:   Social History     Substance and Sexual Activity   Alcohol Use Yes    Frequency: 2-4 times a month    Drinks per session: 1 or 2    Comment: rarely     Social History     Tobacco Use   Smoking Status Never Smoker   Smokeless Tobacco Never Used     Social History     Substance and Sexual Activity   Drug Use No       Family History:    Family History   Problem Relation Age of Onset    Diabetes Mother     Diabetes Maternal Grandmother     No Known Problems Father        Physical Exam:     Vitals:   Blood Pressure: 124/70 (12/28/19 1927)  Pulse: (!) 108 (12/28/19 1927)  Temperature: 98 5 °F (36 9 °C) (12/28/19 1927)  Temp Source: Temporal (12/28/19 1927)  Respirations: 19 (12/28/19 1927)  Weight - Scale: 121 kg (266 lb 12 1 oz) (12/28/19 1416)  SpO2: 96 % (12/28/19 1927)    Physical Exam   Constitutional: She is oriented to person, place, and time  She appears well-developed  No distress  Obese   HENT:   Head: Normocephalic and atraumatic  Eyes: No scleral icterus  Neck: Neck supple  Cardiovascular: Normal rate, normal heart sounds and intact distal pulses  Pulmonary/Chest: No stridor  No respiratory distress  She has no wheezes  She has no rales  Poor inspiratory effort, decreased lung sounds   Abdominal: Soft  Bowel sounds are normal  She exhibits no distension and no mass  There is no tenderness  There is no guarding  Musculoskeletal: She exhibits no edema  Neurological: She is alert and oriented to person, place, and time  Skin: Skin is warm and dry  She is not diaphoretic  Psychiatric: Judgment and thought content normal    Blunted affect, apathetic     Additional Data:     Lab Results: I have personally reviewed pertinent reports  Results from last 7 days   Lab Units 12/28/19  1503   WBC Thousand/uL 16 24*   HEMOGLOBIN g/dL 13 9   HEMATOCRIT % 41 7   PLATELETS Thousands/uL 306   NEUTROS PCT % 82*   LYMPHS PCT % 8*   MONOS PCT % 9   EOS PCT % 0     Results from last 7 days   Lab Units 12/28/19  1503   SODIUM mmol/L 129*   POTASSIUM mmol/L 2 7*   CHLORIDE mmol/L 93*   CO2 mmol/L 25   BUN mg/dL 13   CREATININE mg/dL 0 80   ANION GAP mmol/L 11   CALCIUM mg/dL 9 0   ALBUMIN g/dL 3 6   TOTAL BILIRUBIN mg/dL 1 95*   ALK PHOS U/L 110   ALT U/L 21   AST U/L 16   GLUCOSE RANDOM mg/dL 250*     Results from last 7 days   Lab Units 12/24/19  1728   INR  1 04             Results from last 7 days   Lab Units 12/24/19  1728   LACTIC ACID mmol/L 1 2       Imaging: I have personally reviewed pertinent reports  CT abdomen pelvis with contrast   Final Result by Rosibel Osman MD (12/28 1606)      No acute abdominopelvic pathology appreciated  No significant change from recent prior with small fat-containing umbilical hernia redemonstrated and stable small soft tissue density nodules of the left adnexal area which have been present on several    prior studies  Cholecystectomy  Workstation performed: [id number]             EKG, Pathology, and Other Studies Reviewed on Admission:   · CT EKG    Allscripts / Epic Records Reviewed: Yes     ** Please Note: This note has been constructed using a voice recognition system   **

## 2019-12-29 NOTE — ASSESSMENT & PLAN NOTE
On pain regimen due to chronic back and neck pain from a fall  Maintained on:  · Morphine 15mg IR TID   · Morphine 75mg ER BID   · Diazepam 5 mg q h s  · Lyrica 150 mg b i d   · Soma 350 mg b i d    Verified on PDMP; all medications dispensed early December

## 2019-12-29 NOTE — ASSESSMENT & PLAN NOTE
K 2 7, 2/2 GI losses; replete  Will order IV hydration with supplemental potassium  Hold Lasix  Monitor serum K  Will order serum Mg  Monitor on telemetry

## 2019-12-29 NOTE — ASSESSMENT & PLAN NOTE
Lab Results   Component Value Date    HGBA1C 10 1 (H) 11/23/2019     · Reports intractable emesis; multiple hospitalizations for intractable N/V 2/2 diabetic gastroparesis  · CT: neg for acute abdominopelvic pathology   · Will start Reglan q i d and p r n  Zofran  · Clear liquid diet, advance as tolerated  · IV hydration  · Maintained on Aspart 12 units t i d and Lantus 50 units q h s  Decrease while on clear liquid diet  Add insulin sliding scale and ADA diet  · Monitor electrolytes  · Consider GI consult  No results for input(s): POCGLU in the last 72 hours      Blood Sugar Average: Last 72 hrs:

## 2019-12-29 NOTE — PLAN OF CARE
Problem: PAIN - ADULT  Goal: Verbalizes/displays adequate comfort level or baseline comfort level  Description  Interventions:  - Encourage patient to monitor pain and request assistance  - Assess pain using appropriate pain scale  - Administer analgesics based on type and severity of pain and evaluate response  - Implement non-pharmacological measures as appropriate and evaluate response  - Consider cultural and social influences on pain and pain management  - Notify physician/advanced practitioner if interventions unsuccessful or patient reports new pain  Outcome: Progressing     Problem: INFECTION - ADULT  Goal: Absence or prevention of progression during hospitalization  Description  INTERVENTIONS:  - Assess and monitor for signs and symptoms of infection  - Monitor lab/diagnostic results  - Monitor all insertion sites, i e  indwelling lines, tubes, and drains  - Monitor endotracheal if appropriate and nasal secretions for changes in amount and color  - [place] appropriate cooling/warming therapies per order  - Administer medications as ordered  - Instruct and encourage patient and family to use good hand hygiene technique  - Identify and instruct in appropriate isolation precautions for identified infection/condition  Outcome: Progressing  Goal: Absence of fever/infection during neutropenic period  Description  INTERVENTIONS:  - Monitor WBC    Outcome: Progressing     Problem: SAFETY ADULT  Goal: Patient will remain free of falls  Description  INTERVENTIONS:  - Assess patient frequently for physical needs  -  Identify cognitive and physical deficits and behaviors that affect risk of falls    -  [place] fall precautions as indicated by assessment   - Educate patient/family on patient safety including physical limitations  - Instruct patient to call for assistance with activity based on assessment  - Modify environment to reduce risk of injury  - Consider OT/PT consult to assist with strengthening/mobility  Outcome: Progressing  Goal: Maintain or return to baseline ADL function  Description  INTERVENTIONS:  -  Assess patient's ability to carry out ADLs; assess patient's baseline for ADL function and identify physical deficits which impact ability to perform ADLs (bathing, care of mouth/teeth, toileting, grooming, dressing, etc )  - Assess/evaluate cause of self-care deficits   - Assess range of motion  - Assess patient's mobility; develop plan if impaired  - Assess patient's need for assistive devices and provide as appropriate  - Encourage maximum independence but intervene and supervise when necessary  - Involve family in performance of ADLs  - Assess for home care needs following discharge   - Consider OT consult to assist with ADL evaluation and planning for discharge  - Provide patient education as appropriate  Outcome: Progressing  Goal: Maintain or return mobility status to optimal level  Description  INTERVENTIONS:  - Assess patient's baseline mobility status (ambulation, transfers, stairs, etc )    - Identify cognitive and physical deficits and behaviors that affect mobility  - Identify mobility aids required to assist with transfers and/or ambulation (gait belt, sit-to-stand, lift, walker, cane, etc )  - [place] fall precautions as indicated by assessment  - Record patient progress and toleration of activity level on Mobility SBAR; progress patient to next Phase/Stage  - Instruct patient to call for assistance with activity based on assessment  - Consider rehabilitation consult to assist with strengthening/weightbearing, etc   Outcome: Progressing     Problem: Knowledge Deficit  Goal: Patient/family/caregiver demonstrates understanding of disease process, treatment plan, medications, and discharge instructions  Description  Complete learning assessment and assess knowledge base    Interventions:  - Provide teaching at level of understanding  - Provide teaching via preferred learning methods  Outcome: Progressing     Problem: GASTROINTESTINAL - ADULT  Goal: Minimal or absence of nausea and/or vomiting  Description  INTERVENTIONS:  - Administer IV fluids if ordered to ensure adequate hydration  - Maintain NPO status until nausea and vomiting are resolved  - Nasogastric tube if ordered  - Administer ordered antiemetic medications as needed  - Provide nonpharmacologic comfort measures as appropriate  - Advance diet as tolerated, if ordered  - Consider nutrition services referral to assist patient with adequate nutrition and appropriate food choices  Outcome: Progressing  Goal: Maintains or returns to baseline bowel function  Description  INTERVENTIONS:  - Assess bowel function  - Encourage oral fluids to ensure adequate hydration  - Administer IV fluids if ordered to ensure adequate hydration  - Administer ordered medications as needed  - Encourage mobilization and activity  - Consider nutritional services referral to assist patient with adequate nutrition and appropriate food choices  Outcome: Progressing  Goal: Maintains adequate nutritional intake  Description  INTERVENTIONS:  - Monitor percentage of each meal consumed  - Identify factors contributing to decreased intake, treat as appropriate  - Assist with meals as needed  - Monitor I&O, weight, and lab values if indicated  - Obtain nutrition services referral as needed  Outcome: Progressing     Problem: METABOLIC, FLUID AND ELECTROLYTES - ADULT  Goal: Electrolytes maintained within normal limits  Description  INTERVENTIONS:  - Monitor labs and assess patient for signs and symptoms of electrolyte imbalances  - Administer electrolyte replacement as ordered  - Monitor response to electrolyte replacements, including repeat lab results as appropriate  - Instruct patient on fluid and nutrition as appropriate  Outcome: Progressing  Goal: Fluid balance maintained  Description  INTERVENTIONS:  - Monitor labs   - Monitor I/O and WT  - Instruct patient on fluid and nutrition as appropriate  - Assess for signs & symptoms of volume excess or deficit  Outcome: Progressing  Goal: Glucose maintained within target range  Description  INTERVENTIONS:  - Monitor Blood Glucose as ordered  - Assess for signs and symptoms of hyperglycemia and hypoglycemia  - Administer ordered medications to maintain glucose within target range  - Assess nutritional intake and initiate nutrition service referral as needed  Outcome: Progressing

## 2019-12-29 NOTE — PROGRESS NOTES
Progress Note - Randall Sams 53 y o  female MRN: [id number]    Unit/Bed#: E5 -01 Encounter: [id number]    Assessment/Plan:    Intractable nausea vomiting  Improved, etiology, gastroenteritis versus gastroparesis, continue Reglan and strict diabetic control with IV fluids    Hypokalemia    continue IV and p o  Supplement    Hyponatremia    hypovolemic hyponatremia, improving 133 from 129 continue saline    Diabetes    continue Lantus 25 units with sliding-scale and ADA diet, a m  Glucose 150  Morbid obesity   would benefit from weight loss    Chronic opioid dependence associated with back pain, continue morphine    Hypertension    holding Lasix and lisinopril with low pressure currently  Continue IV fluids    Subjective:   Feels better, no further vomiting, mild nausea, no abdominal pain denies chest pain shortness of breath no fevers chills appetite poor    Objective:     Vitals: Blood pressure 92/62, pulse 98, temperature (!) 97 3 °F (36 3 °C), temperature source Temporal, resp  rate 20, weight 121 kg (266 lb 12 1 oz), SpO2 96 %, not currently breastfeeding  ,Body mass index is 40 56 kg/m²  Results from last 7 days   Lab Units 12/29/19  0454  12/24/19  1728   WBC Thousand/uL 13 81*   < > 8 08   HEMOGLOBIN g/dL 12 3   < > 12 8   HEMATOCRIT % 37 0   < > 40 2   PLATELETS Thousands/uL 274   < > 254   INR   --   --  1 04    < > = values in this interval not displayed       Results from last 7 days   Lab Units 12/29/19  0454 12/28/19  1503   POTASSIUM mmol/L 3 1* 2 7*   CHLORIDE mmol/L 99* 93*   CO2 mmol/L 27 25   BUN mg/dL 15 13   CREATININE mg/dL 1 10 0 80   CALCIUM mg/dL 8 2* 9 0   ALK PHOS U/L  --  110   ALT U/L  --  21   AST U/L  --  16       Scheduled Meds:    Current Facility-Administered Medications:  acetaminophen 650 mg Oral Q6H PRN VIVI Mcgovern    albuterol 2 5 mg Nebulization Q6H PRN VIVI Mcgovern    carisoprodol 350 mg Oral BID VIVI Mcgovern    dicyclomine 10 mg Oral 4x Daily PRN Nick Boone, VIVI    enoxaparin 40 mg Subcutaneous Daily Nick Boone, VIVI    fluticasone 1 spray Nasal Daily Nick Boone, VIVI    fluticasone-vilanterol 1 puff Inhalation Daily Nick Boone, VIVI    insulin glargine 25 Units Subcutaneous HS Nick Boone, WANDANP    insulin lispro 2-12 Units Subcutaneous TID AC Nick Boone, CRNP    insulin lispro 2-12 Units Subcutaneous HS Nick Boone, IVVI    montelukast 10 mg Oral HS Nick Boone, CRNP    morphine 15 mg Oral Q12H [facility], CRNP    morphine 60 mg Oral Q12H [facility], CRNP    ondansetron 4 mg Intravenous Q6H PRN VIVI Barraza    potassium chloride 20 mEq Oral Once Nick Boone, CRNP    potassium chloride 30 mEq Oral Once Ardell Aw, DO    pravastatin 10 mg Oral HS Nick Boone, CRNP    pregabalin 150 mg Oral BID VIVI Barraza    sodium chloride 0 9 % with KCl 20 mEq/L 100 mL/hr Intravenous Continuous VIVI Barraza Last Rate: 100 mL/hr (12/29/19 0631)       Continuous Infusions:    sodium chloride 0 9 % with KCl 20 mEq/L 100 mL/hr Last Rate: 100 mL/hr (12/29/19 0631)     Physical exam:  General appearance:  Alert no distress interaction appropriate   Head/Eyes:  Nonicteric sluggish PERRL EOMI  Neck:  Supple  Lungs:  Decreased BS bilateral no wheezing rhonchi or rales  Heart: normal S1 S2 regular  Abdomen:  Obese nontender with bowel sounds  Extremities: no edema  Skin: no rash    Invasive Devices     Peripheral Intravenous Line            Peripheral IV 12/28/19 Left Antecubital less than 1 day                  VTE Pharmacologic Prophylaxis:  Lovenox      Counseling / Coordination of Care  Total floor / unit time spent today 30  minutes  Greater than 50% of total time was spent with the patient and / or family counseling and / or coordination of care    A description of the counseling / coordination of care:

## 2019-12-29 NOTE — UTILIZATION REVIEW
Initial Clinical Review    Admission: Date/Time/Statement:   12-28-19  1856  Orders Placed This Encounter   Procedures    Place in Observation     Standing Status:   Standing     Number of Occurrences:   1     Order Specific Question:   Admitting Physician     Answer:   Jeannie Oliveira [[id number]]     Order Specific Question:   Level of Care     Answer:   Med Surg [[id number]]     ED Arrival Information     Expected Arrival Acuity Means of Arrival Escorted By Service Admission Type    - 12/28/2019 14:09 Urgent Walk-In Family Member General Medicine Urgent    Arrival Complaint    Vomiting,Weakness        Chief Complaint   Patient presents with    Abdominal Pain     vomiting since monday  Assessment/Plan:    62-year-old female with past history of asthma, diabetes and gastroparesis; presents with vomiting for the past five days  Patient reports a innumerable episodes of nonbloody nonbilious emesis since symptoms began  Patient states he is unable to tolerate PO since onset  Patient does also complain of generalized abdominal pain, which has also been present for the past 5 days  Patient has since developed generalized malaise and lightheadedness when attempting to ambulate  Patient reports a subjective fever 2 days ago, which has since resolved  Patient was seen at [facility] the symptoms earlier this week, undergoing lab work and a CT chest/abdomen/pelvis which was within normal limits  Patient was sent home with Zofran  Patient has been taking Zofran without relief  Patient otherwise denies chills, chest pain, shortness of breath, cough, diarrhea, constipation, dysuria, peripheral edema and rashes  Patient denies sick contacts at home  A/P:  Vomiting, associated with abdominal pain  Patient reports an isolated subjected fever  Suspect symptoms are either viral versus secondary to her gastroparesis  Will recheck lab work and if abnormal from recent labs will repeat CT scan    Will obtain urine to evaluate for infection    Will give IV fluid and Zofran for symptomatic relief      ED Triage Vitals [12/28/19 1416]   Temperature Pulse Respirations Blood Pressure SpO2   (!) 96 7 °F (35 9 °C) (!) 118 20 153/82 94 %      Temp Source Heart Rate Source Patient Position - Orthostatic VS BP Location FiO2 (%)   Temporal Monitor Sitting Right arm --      Pain Score       8        Wt Readings from Last 1 Encounters:   12/28/19 121 kg (266 lb 12 1 oz)     Additional Vital Signs:   Date/Time  Temp  Pulse  Resp  BP  SpO2  O2 Device  Patient Position - Orthostatic VS   12/29/19 0905      20  92/62      Sitting   12/29/19 0834  97 3 °F (36 3 °C)Abnormal   98  18  86/54Abnormal   96 %  None (Room air)  Sitting   12/28/19 2242  98 7 °F (37 1 °C)  110Abnormal   19  121/64  93 %  None (Room air)  Lying   12/28/19 1927  98 5 °F (36 9 °C)  108Abnormal   19  124/70  96 %  None (Room air)  Lying   12/28/19 1850    104  18  153/84  94 %  None (Room air)  Lying   12/28/19 1731    108Abnormal   18  144/70  94 %  None (Room air)  Lying   12/28/19 1530    95  18  151/68  97 %           Pertinent Labs/Diagnostic Test Results:   Results from last 7 days   Lab Units 12/29/19  0454 12/28/19  1503 12/24/19  1728   WBC Thousand/uL 13 81* 16 24* 8 08   HEMOGLOBIN g/dL 12 3 13 9 12 8   HEMATOCRIT % 37 0 41 7 40 2   PLATELETS Thousands/uL 274 306 254   NEUTROS ABS Thousands/µL 8 45* 13 27* 6 32         Results from last 7 days   Lab Units 12/29/19  0454 12/28/19  1503 12/24/19  1728   SODIUM mmol/L 133* 129* 138   POTASSIUM mmol/L 3 1* 2 7* 3 7   CHLORIDE mmol/L 99* 93* 98*   CO2 mmol/L 27 25 28   ANION GAP mmol/L 7 11 12   BUN mg/dL 15 13 5   CREATININE mg/dL 1 10 0 80 0 62   EGFR ml/min/1 73sq m 57 83 102   CALCIUM mg/dL 8 2* 9 0 9 6   MAGNESIUM mg/dL 1 8 1 5*  --      Results from last 7 days   Lab Units 12/29/19  0621 12/28/19  1503 12/24/19  1728   AST U/L  --  16 21   ALT U/L  --  21 23   ALK PHOS U/L  --  110 120*   TOTAL PROTEIN g/dL  -- 7 8 8 1   ALBUMIN g/dL  --  3 6 3 6   TOTAL BILIRUBIN mg/dL  --  1 95* 1 10*   BILIRUBIN DIRECT mg/dL 0 35*  --   --      Results from last 7 days   Lab Units 12/29/19  0849 12/28/19  2119   POC GLUCOSE mg/dl 180* 194*     Results from last 7 days   Lab Units 12/29/19  0454 12/28/19  1503 12/24/19  1728   GLUCOSE RANDOM mg/dL 150* 250* 239*     Results from last 7 days   Lab Units 12/24/19  1728   TROPONIN I ng/mL <0 02     Results from last 7 days   Lab Units 12/24/19  1728   PROTIME seconds 13 6   INR  1 04   PTT seconds 36       Results from last 7 days   Lab Units 12/24/19  1728   LACTIC ACID mmol/L 1 2     Results from last 7 days   Lab Units 12/28/19  1503 12/24/19  1728   LIPASE u/L 86 44*             Results from last 7 days   Lab Units 12/28/19  1744 12/24/19  1841   CLARITY UA  Clear Clear   COLOR UA  Yellow Yellow   SPEC GRAV UA  1 015 1 015   PH UA  7 5 8 5*   GLUCOSE UA mg/dl Negative 250 (1/4%)*   KETONES UA mg/dl 15 (1+)* 15 (1+)*   BLOOD UA  Trace* Trace-Intact*   PROTEIN UA mg/dl 100 (2+)* Trace*   NITRITE UA  Negative Negative   BILIRUBIN UA  Negative Negative   UROBILINOGEN UA E U /dl 0 2 0 2   LEUKOCYTES UA  Moderate* Negative   WBC UA /hpf 4-10* None Seen   RBC UA /hpf 2-4* None Seen   BACTERIA UA /hpf Occasional None Seen   EPITHELIAL CELLS WET PREP /hpf Occasional Occasional     Results from last 7 days   Lab Units 12/24/19  1740   INFLUENZA A PCR  None Detected   RSV PCR  None Detected        CT A/P 12-28-19     No acute abdominopelvic pathology appreciated   No significant change from recent prior with small fat-containing umbilical hernia redemonstrated and stable small soft tissue density nodules of the left adnexal area which have been present on several   prior studies   Cholecystectomy          ED Treatment:   Medication Administration from 12/28/2019 1409 to 12/28/2019 1915       Order Dose Route Action Comments     sodium chloride 0 9 % bolus 1,000 mL 1,000 mL Intravenous New Bag ondansetron (ZOFRAN) injection 4 mg 4 mg Intravenous Given      potassium chloride 20 mEq IVPB (premix) 20 mEq Intravenous New Bag      iohexol (OMNIPAQUE) 350 MG/ML injection (MULTI-DOSE) 100 mL 100 mL Intravenous Given      metoclopramide (REGLAN) injection 10 mg 10 mg Intravenous Given      ondansetron (ZOFRAN-ODT) dispersible tablet 4 mg 4 mg Oral Given         Past Medical History:   Diagnosis Date    Acute respiratory insufficiency 11/23/2019    Asthma     Chronic pain     Diabetes mellitus ([facility] )     Gastroparesis     Sepsis ([facility] ) 11/23/2019     Present on Admission:   Intractable vomiting   Diabetic gastroparesis (HCC)   Acute hypokalemia   Essential hypertension   Uncomplicated opioid dependence ([facility] )   Asthma   Leukocytosis      Admitting Diagnosis: Hypokalemia [E87 6]  Vomiting [R11 10]  Intractable vomiting [R11 10]  Age/Sex: 53 y o  female  Admission Orders:  Scheduled Medications:    Medications:  carisoprodol 350 mg Oral BID   enoxaparin 40 mg Subcutaneous Daily   fluticasone 1 spray Nasal Daily   fluticasone-vilanterol 1 puff Inhalation Daily   insulin glargine 30 Units Subcutaneous HS   insulin lispro 2-12 Units Subcutaneous TID AC   insulin lispro 2-12 Units Subcutaneous HS   metoclopramide 10 mg Oral TID AC   montelukast 10 mg Oral HS   morphine 15 mg Oral Q12H BOLIVAR   morphine 60 mg Oral Q12H [address]   potassium chloride 20 mEq Oral Once   pravastatin 10 mg Oral HS   pregabalin 150 mg Oral BID     Continuous IV Infusions:    sodium chloride 0 9 % with KCl 40 mEq/L 75 mL/hr Intravenous Continuous     PRN Meds:    acetaminophen 650 mg Oral Q6H PRN   albuterol 2 5 mg Nebulization Q6H PRN   diazepam 5 mg Oral HS PRN   dicyclomine 10 mg Oral 4x Daily PRN   ondansetron 4 mg Intravenous Q6H PRN       blood sugars ac and hs    Network Utilization Review Department  [email or web address]  ATTENTION: Please call with any questions or concerns to [phone] and carefully listen to the prompts so that you are directed to the right person  All voicemails are confidential   Katia Wiggins all requests for admission clinical reviews, approved or denied determinations and any other requests to dedicated fax number below belonging to the campus where the patient is receiving treatment   List of dedicated fax numbers for the Facilities:  [address] DENIALS (Administrative/Medical Necessity) [phone]   [address] (Maternity/NICU/Pediatrics) [phone]   Anthony Moran [phone]   Umer Caller [phone]   Manisha Velasco [phone]   [place] [phone]   [address] [place] [address] [address] [place] [address] [phone]   [facility] [phone]   [address] [facility] [address]  [address] [facility] [address] [facility] [phone]

## 2019-12-30 PROBLEM — E87.6 ACUTE HYPOKALEMIA: Status: RESOLVED | Noted: 2018-02-02 | Resolved: 2019-12-30

## 2019-12-30 LAB
ANION GAP SERPL CALCULATED.3IONS-SCNC: 6 MMOL/L (ref 4–13)
BUN SERPL-MCNC: 23 MG/DL (ref 5–25)
CALCIUM SERPL-MCNC: 7.9 MG/DL (ref 8.3–10.1)
CHLORIDE SERPL-SCNC: 100 MMOL/L (ref 100–108)
CO2 SERPL-SCNC: 24 MMOL/L (ref 21–32)
CREAT SERPL-MCNC: 1.04 MG/DL (ref 0.6–1.3)
GFR SERPL CREATININE-BSD FRML MDRD: 61 ML/MIN/1.73SQ M
GLUCOSE SERPL-MCNC: 115 MG/DL (ref 65–140)
GLUCOSE SERPL-MCNC: 119 MG/DL (ref 65–140)
GLUCOSE SERPL-MCNC: 128 MG/DL (ref 65–140)
GLUCOSE SERPL-MCNC: 161 MG/DL (ref 65–140)
GLUCOSE SERPL-MCNC: 88 MG/DL (ref 65–140)
POTASSIUM SERPL-SCNC: 4.6 MMOL/L (ref 3.5–5.3)
SODIUM SERPL-SCNC: 130 MMOL/L (ref 136–145)

## 2019-12-30 PROCEDURE — 82948 REAGENT STRIP/BLOOD GLUCOSE: CPT

## 2019-12-30 PROCEDURE — 99232 SBSQ HOSP IP/OBS MODERATE 35: CPT | Performed by: STUDENT IN AN ORGANIZED HEALTH CARE EDUCATION/TRAINING PROGRAM

## 2019-12-30 PROCEDURE — 80048 BASIC METABOLIC PNL TOTAL CA: CPT | Performed by: INTERNAL MEDICINE

## 2019-12-30 RX ORDER — SODIUM CHLORIDE 9 MG/ML
75 INJECTION, SOLUTION INTRAVENOUS CONTINUOUS
Status: DISCONTINUED | OUTPATIENT
Start: 2019-12-30 | End: 2019-12-31

## 2019-12-30 RX ORDER — GINSENG 100 MG
1 CAPSULE ORAL 2 TIMES DAILY
Status: DISCONTINUED | OUTPATIENT
Start: 2019-12-30 | End: 2019-12-31 | Stop reason: HOSPADM

## 2019-12-30 RX ADMIN — METOCLOPRAMIDE HYDROCHLORIDE 10 MG: 10 TABLET ORAL at 11:57

## 2019-12-30 RX ADMIN — METOCLOPRAMIDE HYDROCHLORIDE 10 MG: 10 TABLET ORAL at 15:44

## 2019-12-30 RX ADMIN — POTASSIUM CHLORIDE AND SODIUM CHLORIDE 75 ML/HR: 900; 300 INJECTION, SOLUTION INTRAVENOUS at 06:25

## 2019-12-30 RX ADMIN — MORPHINE SULFATE 15 MG: 15 TABLET, EXTENDED RELEASE ORAL at 08:10

## 2019-12-30 RX ADMIN — INSULIN LISPRO 2 UNITS: 100 INJECTION, SOLUTION INTRAVENOUS; SUBCUTANEOUS at 08:00

## 2019-12-30 RX ADMIN — MORPHINE SULFATE 15 MG: 15 TABLET, EXTENDED RELEASE ORAL at 21:17

## 2019-12-30 RX ADMIN — SODIUM CHLORIDE 75 ML/HR: 0.9 INJECTION, SOLUTION INTRAVENOUS at 15:47

## 2019-12-30 RX ADMIN — MONTELUKAST SODIUM 10 MG: 10 TABLET, COATED ORAL at 21:17

## 2019-12-30 RX ADMIN — METOCLOPRAMIDE HYDROCHLORIDE 10 MG: 10 TABLET ORAL at 06:25

## 2019-12-30 RX ADMIN — PREGABALIN 150 MG: 75 CAPSULE ORAL at 17:04

## 2019-12-30 RX ADMIN — MORPHINE SULFATE 60 MG: 30 TABLET, EXTENDED RELEASE ORAL at 21:16

## 2019-12-30 RX ADMIN — CARISOPRODOL 350 MG: 350 TABLET ORAL at 08:10

## 2019-12-30 RX ADMIN — MORPHINE SULFATE 60 MG: 30 TABLET, EXTENDED RELEASE ORAL at 08:09

## 2019-12-30 RX ADMIN — INSULIN GLARGINE 30 UNITS: 100 INJECTION, SOLUTION SUBCUTANEOUS at 21:16

## 2019-12-30 RX ADMIN — BACITRACIN 1 SMALL APPLICATION: 500 OINTMENT TOPICAL at 17:04

## 2019-12-30 RX ADMIN — PREGABALIN 150 MG: 75 CAPSULE ORAL at 08:10

## 2019-12-30 RX ADMIN — ENOXAPARIN SODIUM 40 MG: 40 INJECTION SUBCUTANEOUS at 08:11

## 2019-12-30 RX ADMIN — FLUTICASONE PROPIONATE 1 SPRAY: 50 SPRAY, METERED NASAL at 08:09

## 2019-12-30 RX ADMIN — CARISOPRODOL 350 MG: 350 TABLET ORAL at 17:04

## 2019-12-30 RX ADMIN — FLUTICASONE FUROATE AND VILANTEROL TRIFENATATE 1 PUFF: 200; 25 POWDER RESPIRATORY (INHALATION) at 08:00

## 2019-12-30 RX ADMIN — PRAVASTATIN SODIUM 10 MG: 10 TABLET ORAL at 21:17

## 2019-12-30 NOTE — ASSESSMENT & PLAN NOTE
Lab Results   Component Value Date    HGBA1C 10 1 (H) 11/23/2019     Improving    Recent Labs     12/29/19  1555 12/29/19  2049 12/30/19  0757 12/30/19  1124   POCGLU 139 137 161* 88       Blood Sugar Average: Last 72 hrs:  (P) 592 [id number]

## 2019-12-30 NOTE — SOCIAL WORK
CM met w/ Pt at bedside to discuss any dcp needs  LIVES W/: Hus, and son  HOME: [place], however moving to 1 level trailer  ADLs: Ind, but does get fatigued  MEALS: Son can assist  DME: Has a walker and cane, uses as needed  VNA: LV after knee surgery  STR: None  MH: Denies  D&A: Denies  INCOME SOURCE: SSDI  POA: None  PHARMACY: [facility], interested in [id number]  PCP: Gabbi Tijerina MD  TRANSPORTATION AT D/C: Pt drives, but son will  at d/c    Pt's seth is currently inpt at [facility]  Pt does have good support of family  Pt reports she, hus and son will be moving to a 1flr trailer mid January, Pt is eager for this transition  No dcp needs identified at this time, anticipate d/c in next 1-2days

## 2019-12-30 NOTE — ASSESSMENT & PLAN NOTE
Secondary to GI losses  Resolved      Recent Labs     12/28/19  1503 12/29/19  0454 12/30/19  0442   K 2 7* 3 1* 4 6

## 2019-12-30 NOTE — ASSESSMENT & PLAN NOTE
71-year-old female admitted due to intractable emesis as a result of some diabetic gastroparesis and opioid dependence  Clinically improving still starting to tolerate p o  Feeds although was still with some issues  Will anticipate discharge tomorrow    - Continue current medications

## 2019-12-30 NOTE — PROGRESS NOTES
Progress Note - John Khan 1965, 53 y o  female MRN: [id number]    Unit/Bed#: E5 -01 Encounter: [id number]    Primary Care Provider: Julian Arevalo MD   Date and time admitted to hospital: 12/28/2019  2:18 PM        * Intractable vomiting  Assessment & Plan  62-year-old female admitted due to intractable emesis as a result of some diabetic gastroparesis and opioid dependence  Clinically improving still starting to tolerate p o  Feeds although was still with some issues  Will anticipate discharge tomorrow  - Continue current medications    Acute hypokalemiaresolved as of 12/30/2019  Assessment & Plan  Secondary to GI losses  Resolved  Recent Labs     12/28/19  1503 12/29/19  0454 12/30/19  0442   K 2 7* 3 1* 4 6         Hyponatremia  Assessment & Plan  Hypovolemic hyponatremia  Continue NS  Recent Labs     12/28/19  1503 12/29/19  0454 12/30/19  0442   SODIUM 129* 133* 130*         Essential hypertension  Assessment & Plan  Will likely resume lasix álvaro with continued improvement  Continue rest of the medications  Uncomplicated opioid dependence ([address] )  Assessment & Plan  Due to chronic pain  Continue current regimen  Diabetic gastroparesis [facility])  Assessment & Plan  Lab Results   Component Value Date    HGBA1C 10 1 (H) 11/23/2019     Improving    Recent Labs     12/29/19  1555 12/29/19  2049 12/30/19  0757 12/30/19  1124   POCGLU 139 137 161* 88       Blood Sugar Average: Last 72 hrs:  (P) 148 [id number]      VTE Pharmacologic Prophylaxis:   Pharmacologic: Enoxaparin (Lovenox)  Mechanical VTE Prophylaxis in Place: Yes    Patient Centered Rounds: I have performed bedside rounds with nursing staff today  Discussions with Specialists or Other Care Team Provider: Nursing    Education and Discussions with Family / Patient: Patient    Time Spent for Care: 30 minutes  More than 50% of total time spent on counseling and coordination of care as described above      Current Length of Stay: 1 day(s)    Current Patient Status: Inpatient   Certification Statement: The patient will continue to require additional inpatient hospital stay due to Home diet not tolerated yet    Discharge Plan: likely in 24 hours    Code Status: Level 1 - Full Code      Subjective:   Patient seen and examined at bedside  Still some issues tolerating p o  Today  Although significant improvement from admission  Anticipating discharge tomorrow inpatient is looking forward to it  Objective:     Vitals:   Temp (24hrs), Av 9 °F (37 2 °C), Min:98 5 °F (36 9 °C), Max:99 2 °F (37 3 °C)    Temp:  [98 5 °F (36 9 °C)-99 2 °F (37 3 °C)] 98 5 °F (36 9 °C)  HR:  [101-103] 103  Resp:  [18-19] 18  BP: ()/(58-61) 103/61  SpO2:  [90 %-93 %] 90 %  Body mass index is 40 56 kg/m²  Input and Output Summary (last 24 hours): Intake/Output Summary (Last 24 hours) at 2019 1534  Last data filed at 2019 1335  Gross per 24 hour   Intake 3682 ml   Output 2247 ml   Net 1435 ml       Physical Exam:     Physical Exam   Constitutional: No distress  Obese female   HENT:   Head: Normocephalic and atraumatic  Eyes: Pupils are equal, round, and reactive to light  EOM are normal    Neck: Neck supple  Cardiovascular: Normal rate and regular rhythm  Pulmonary/Chest: Effort normal and breath sounds normal  She has no wheezes  She has no rales  Abdominal: Soft  Bowel sounds are normal  There is no tenderness  There is no rebound and no guarding  Musculoskeletal: Normal range of motion  Neurological: She is alert  Skin: Skin is warm and dry     Psychiatric:   Flat affect     Additional Data:     Labs:    Results from last 7 days   Lab Units 19  0454   WBC Thousand/uL 13 81*   HEMOGLOBIN g/dL 12 3   HEMATOCRIT % 37 0   PLATELETS Thousands/uL 274   NEUTROS PCT % 60   LYMPHS PCT % 24   MONOS PCT % 12   EOS PCT % 2     Results from last 7 days   Lab Units 19  0442  19  1503   SODIUM mmol/L 130*   < > 129* POTASSIUM mmol/L 4 6   < > 2 7*   CHLORIDE mmol/L 100   < > 93*   CO2 mmol/L 24   < > 25   BUN mg/dL 23   < > 13   CREATININE mg/dL 1 04   < > 0 80   ANION GAP mmol/L 6   < > 11   CALCIUM mg/dL 7 9*   < > 9 0   ALBUMIN g/dL  --   --  3 6   TOTAL BILIRUBIN mg/dL  --   --  1 95*   ALK PHOS U/L  --   --  110   ALT U/L  --   --  21   AST U/L  --   --  16   GLUCOSE RANDOM mg/dL 119   < > 250*    < > = values in this interval not displayed  Results from last 7 days   Lab Units 12/24/19  1728   INR  1 04     Results from last 7 days   Lab Units 12/30/19  1124 12/30/19  0757 12/29/19  2049 12/29/19  1555 12/29/19  1240 12/29/19  0849 12/28/19  2119   POC GLUCOSE mg/dl 88 161* 137 139 140 180* 194*         Results from last 7 days   Lab Units 12/24/19  1728   LACTIC ACID mmol/L 1 2           * I Have Reviewed All Lab Data Listed Above  * Additional Pertinent Lab Tests Reviewed:  [address] Admission Reviewed    Imaging:    Imaging Reports Reviewed Today Include: No new imaging    Recent Cultures (last 7 days):           Last 24 Hours Medication List:     Current Facility-Administered Medications:  acetaminophen 650 mg Oral Q6H PRN Ginny Angulo, WANDANP   albuterol 2 5 mg Nebulization Q6H PRN Ginny Angulo, WANDANP   carisoprodol 350 mg Oral BID Ginny Angulo, WANDANP   diazepam 5 mg Oral HS PRN Connie Rodriguez, DO   dicyclomine 10 mg Oral 4x Daily PRN Ginny Angulo, CRNP   enoxaparin 40 mg Subcutaneous Daily Ginny Angulo, VIVI   fluticasone 1 spray Nasal Daily Ginny Angulo, CRCHON   fluticasone-vilanterol 1 puff Inhalation Daily VIVI Urena   insulin glargine 30 Units Subcutaneous HS Connie Rodriguez,    insulin lispro 2-12 Units Subcutaneous TID AC VIVI Urena   insulin lispro 2-12 Units Subcutaneous HS VIVI Urena   metoclopramide 10 mg Oral TID AC Reinaldo Fitzpatrick, DO   montelukast 10 mg Oral HS Ginny Angulo, VIVI   morphine 15 mg Oral Q12H [facility], CRNP   morphine 60 mg Oral Q12H [facility], CRNP   ondansetron 4 mg Intravenous Q6H PRN VIVI Alexander   pravastatin 10 mg Oral HS Elmochema Carreno, CRCHON   pregabalin 150 mg Oral BID VIVI Alexander   sodium chloride 75 mL/hr Intravenous Continuous Nika Cedeno MD        Today, Patient Was Seen By: Iglesia Patel MD    ** Please Note: Dictation voice to text software may have been used in the creation of this document   **

## 2019-12-30 NOTE — ASSESSMENT & PLAN NOTE
Hypovolemic hyponatremia  Continue NS      Recent Labs     12/28/19  1503 12/29/19  0454 12/30/19  0442   SODIUM 129* 133* 130*

## 2019-12-31 VITALS
HEART RATE: 95 BPM | BODY MASS INDEX: 40.56 KG/M2 | WEIGHT: 266.76 LBS | RESPIRATION RATE: 18 BRPM | SYSTOLIC BLOOD PRESSURE: 120 MMHG | TEMPERATURE: 98.8 F | OXYGEN SATURATION: 96 % | DIASTOLIC BLOOD PRESSURE: 68 MMHG

## 2019-12-31 LAB
ANION GAP SERPL CALCULATED.3IONS-SCNC: 6 MMOL/L (ref 4–13)
BUN SERPL-MCNC: 14 MG/DL (ref 5–25)
CALCIUM SERPL-MCNC: 8 MG/DL (ref 8.3–10.1)
CHLORIDE SERPL-SCNC: 103 MMOL/L (ref 100–108)
CO2 SERPL-SCNC: 26 MMOL/L (ref 21–32)
CREAT SERPL-MCNC: 0.7 MG/DL (ref 0.6–1.3)
GFR SERPL CREATININE-BSD FRML MDRD: 98 ML/MIN/1.73SQ M
GLUCOSE SERPL-MCNC: 129 MG/DL (ref 65–140)
GLUCOSE SERPL-MCNC: 140 MG/DL (ref 65–140)
GLUCOSE SERPL-MCNC: 147 MG/DL (ref 65–140)
GLUCOSE SERPL-MCNC: 95 MG/DL (ref 65–140)
POTASSIUM SERPL-SCNC: 4.1 MMOL/L (ref 3.5–5.3)
SODIUM SERPL-SCNC: 135 MMOL/L (ref 136–145)

## 2019-12-31 PROCEDURE — 99239 HOSP IP/OBS DSCHRG MGMT >30: CPT | Performed by: INTERNAL MEDICINE

## 2019-12-31 PROCEDURE — 80048 BASIC METABOLIC PNL TOTAL CA: CPT | Performed by: STUDENT IN AN ORGANIZED HEALTH CARE EDUCATION/TRAINING PROGRAM

## 2019-12-31 PROCEDURE — 82948 REAGENT STRIP/BLOOD GLUCOSE: CPT

## 2019-12-31 RX ADMIN — PREGABALIN 150 MG: 75 CAPSULE ORAL at 09:04

## 2019-12-31 RX ADMIN — MORPHINE SULFATE 15 MG: 15 TABLET, EXTENDED RELEASE ORAL at 09:04

## 2019-12-31 RX ADMIN — BACITRACIN 1 SMALL APPLICATION: 500 OINTMENT TOPICAL at 16:15

## 2019-12-31 RX ADMIN — METOCLOPRAMIDE HYDROCHLORIDE 10 MG: 10 TABLET ORAL at 06:05

## 2019-12-31 RX ADMIN — METOCLOPRAMIDE HYDROCHLORIDE 10 MG: 10 TABLET ORAL at 16:07

## 2019-12-31 RX ADMIN — ENOXAPARIN SODIUM 40 MG: 40 INJECTION SUBCUTANEOUS at 09:03

## 2019-12-31 RX ADMIN — BACITRACIN 1 SMALL APPLICATION: 500 OINTMENT TOPICAL at 09:05

## 2019-12-31 RX ADMIN — SODIUM CHLORIDE 75 ML/HR: 0.9 INJECTION, SOLUTION INTRAVENOUS at 02:37

## 2019-12-31 RX ADMIN — MORPHINE SULFATE 60 MG: 30 TABLET, EXTENDED RELEASE ORAL at 09:04

## 2019-12-31 RX ADMIN — DIAZEPAM 5 MG: 5 TABLET ORAL at 00:57

## 2019-12-31 RX ADMIN — FLUTICASONE FUROATE AND VILANTEROL TRIFENATATE 1 PUFF: 200; 25 POWDER RESPIRATORY (INHALATION) at 09:03

## 2019-12-31 RX ADMIN — CARISOPRODOL 350 MG: 350 TABLET ORAL at 09:10

## 2019-12-31 RX ADMIN — FLUTICASONE PROPIONATE 1 SPRAY: 50 SPRAY, METERED NASAL at 09:03

## 2019-12-31 RX ADMIN — METOCLOPRAMIDE HYDROCHLORIDE 10 MG: 10 TABLET ORAL at 11:04

## 2019-12-31 NOTE — DISCHARGE SUMMARY
Discharge Summary - Juliet Reno 53 y o  female MRN: [id number]    Unit/Bed#: E5 -01 Encounter: [id number]    Admission Date:   Admission Orders (From admission, onward)     Ordered        12/29/19 1647  Inpatient Admission  Once         12/28/19 1856  Place in Observation  Once                     Admitting Diagnosis: Hypokalemia [E87 6]  Vomiting [R11 10]  Intractable vomiting [R11 10]    HPI: Juliet Reno is a 53 y o  female who presents with c/o intractable nausea and vomiting since Monday  Patient has history of diabetic gastroparesis and has been hospitalized multiple times for same  Patient is maintained on scheduled Reglan QID although it did not help alleviate her symptoms  Reports associated abdominal pain, weakness, fatigue and myalgias  Reports one episode of fever yesterday and chills this morning  Reports cough productive of yellow sputum, denies rhinorrhea, sneezing or sore throat  Had 1 episode of diarrhea this morning  Denies dysuria or urinary frequency    Procedures Performed: No orders of the defined types were placed in this encounter  Summary of Hospital Course:  Patient was brought in made NPO placed on supportive care including p r n  Antiemetics gentle IV hydration electrolyte monitoring and supplementation as needed  The patient had some dehydration when she presented that resulted in tachycardia an a SIRS positive picture which was not in my medical opinion due to infection but rather dehydration and volume depletion from gastroparesis  Patient remained afebrile despite a mild leukocytosis this may be a stress reaction worsened by hemoconcentration initially from dehydration  But the patient's nausea and vomiting from gastroparesis resolved and her tolerating p o  She is considered to be stable for discharge home  She will follow up with family physician in 2 weeks for wellness check      Significant Findings, Care, Treatment and Services Provided: Complications:  None    Discharge Diagnosis:  Gastroparesis, dehydration    Resolved Problems  Date Reviewed: 12/30/2019          Resolved    Acute hypokalemia 12/30/2019     Resolved by  Grant Estevez MD          Condition at Discharge: good         Discharge instructions/Information to patient and family:   See after visit summary for information provided to patient and family  Provisions for Follow-Up Care:  See after visit summary for information related to follow-up care and any pertinent home health orders  PCP: Geri Warner MD    Disposition: Home    Planned Readmission: No      Discharge Statement   I spent 30 minutes discharging the patient  This time was spent on the day of discharge  I had direct contact with the patient on the day of discharge  Additional documentation is required if more than 30 minutes were spent on discharge  Discharge Medications:  See after visit summary for reconciled discharge medications provided to patient and family

## 2020-01-12 ENCOUNTER — APPOINTMENT (EMERGENCY)
Dept: RADIOLOGY | Facility: HOSPITAL | Age: 55
DRG: 208 | End: 2020-01-12
Payer: MEDICARE

## 2020-01-12 ENCOUNTER — HOSPITAL ENCOUNTER (INPATIENT)
Facility: HOSPITAL | Age: 55
LOS: 4 days | Discharge: HOME | DRG: 208 | End: 2020-01-16
Attending: EMERGENCY MEDICINE | Admitting: STUDENT IN AN ORGANIZED HEALTH CARE EDUCATION/TRAINING PROGRAM
Payer: MEDICARE

## 2020-01-12 ENCOUNTER — APPOINTMENT (INPATIENT)
Dept: RADIOLOGY | Facility: HOSPITAL | Age: 55
DRG: 208 | End: 2020-01-12
Payer: MEDICARE

## 2020-01-12 ENCOUNTER — APPOINTMENT (EMERGENCY)
Dept: CT IMAGING | Facility: HOSPITAL | Age: 55
DRG: 208 | End: 2020-01-12
Payer: MEDICARE

## 2020-01-12 DIAGNOSIS — J18.9 MULTIFOCAL PNEUMONIA: Primary | ICD-10-CM

## 2020-01-12 DIAGNOSIS — A41.9 SEVERE SEPSIS (HCC): ICD-10-CM

## 2020-01-12 DIAGNOSIS — R65.20 SEVERE SEPSIS (HCC): ICD-10-CM

## 2020-01-12 PROBLEM — M86.171 ACUTE OSTEOMYELITIS OF TOE, RIGHT (HCC): Status: RESOLVED | Noted: 2018-02-02 | Resolved: 2020-01-12

## 2020-01-12 PROBLEM — IMO0002 UNCONTROLLED DIABETES MELLITUS: Status: RESOLVED | Noted: 2018-06-15 | Resolved: 2020-01-12

## 2020-01-12 PROBLEM — K80.20 GALL STONES: Status: RESOLVED | Noted: 2018-05-24 | Resolved: 2020-01-12

## 2020-01-12 PROBLEM — E83.42 HYPOMAGNESEMIA: Status: RESOLVED | Noted: 2018-11-09 | Resolved: 2020-01-12

## 2020-01-12 PROBLEM — E11.621 DIABETIC ULCER OF RIGHT GREAT TOE (HCC): Status: RESOLVED | Noted: 2018-02-02 | Resolved: 2020-01-12

## 2020-01-12 PROBLEM — F11.288 OPIOID DEPENDENCE WITH OTHER OPIOID-INDUCED DISORDER (HCC): Status: ACTIVE | Noted: 2018-02-02

## 2020-01-12 PROBLEM — Z79.899 POLYPHARMACY: Status: ACTIVE | Noted: 2020-01-12

## 2020-01-12 PROBLEM — M65.4 DE QUERVAIN'S TENOSYNOVITIS, BILATERAL: Status: RESOLVED | Noted: 2017-10-03 | Resolved: 2020-01-12

## 2020-01-12 PROBLEM — R11.2 NAUSEA AND VOMITING: Status: RESOLVED | Noted: 2019-01-17 | Resolved: 2020-01-12

## 2020-01-12 PROBLEM — N39.0 UTI (URINARY TRACT INFECTION): Status: RESOLVED | Noted: 2018-05-22 | Resolved: 2020-01-12

## 2020-01-12 PROBLEM — Z12.11 ENCOUNTER FOR SCREENING COLONOSCOPY: Status: RESOLVED | Noted: 2019-01-17 | Resolved: 2020-01-12

## 2020-01-12 PROBLEM — M25.569 KNEE PAIN: Status: RESOLVED | Noted: 2017-11-21 | Resolved: 2020-01-12

## 2020-01-12 PROBLEM — L97.519 DIABETIC ULCER OF RIGHT GREAT TOE (HCC): Status: RESOLVED | Noted: 2018-02-02 | Resolved: 2020-01-12

## 2020-01-12 PROBLEM — N89.8 VAGINAL DISCHARGE: Status: RESOLVED | Noted: 2019-07-21 | Resolved: 2020-01-12

## 2020-01-12 PROBLEM — R11.10 INTRACTABLE VOMITING: Status: RESOLVED | Noted: 2019-12-28 | Resolved: 2020-01-12

## 2020-01-12 PROBLEM — J96.02 ACUTE RESPIRATORY FAILURE WITH HYPERCAPNIA (HCC): Status: ACTIVE | Noted: 2020-01-12

## 2020-01-12 LAB
ALBUMIN SERPL BCP-MCNC: 3.4 G/DL (ref 3.5–5)
ALP SERPL-CCNC: 132 U/L (ref 46–116)
ALT SERPL W P-5'-P-CCNC: 38 U/L (ref 12–78)
AMPHETAMINES SERPL QL SCN: NEGATIVE
ANION GAP SERPL CALCULATED.3IONS-SCNC: 9 MMOL/L (ref 4–13)
APTT PPP: 29 SECONDS (ref 23–37)
AST SERPL W P-5'-P-CCNC: 60 U/L (ref 5–45)
BACTERIA UR QL AUTO: ABNORMAL /HPF
BARBITURATES UR QL: POSITIVE
BASE EXCESS BLDA CALC-SCNC: -7.4 MMOL/L
BASOPHILS # BLD AUTO: 0.08 THOUSANDS/ΜL (ref 0–0.1)
BASOPHILS NFR BLD AUTO: 0 % (ref 0–1)
BENZODIAZ UR QL: POSITIVE
BILIRUB SERPL-MCNC: 0.8 MG/DL (ref 0.2–1)
BILIRUB UR QL STRIP: ABNORMAL
BUN SERPL-MCNC: 29 MG/DL (ref 5–25)
CALCIUM SERPL-MCNC: 8.7 MG/DL (ref 8.3–10.1)
CHLORIDE SERPL-SCNC: 96 MMOL/L (ref 100–108)
CLARITY UR: CLEAR
CO2 SERPL-SCNC: 25 MMOL/L (ref 21–32)
COCAINE UR QL: NEGATIVE
COLOR UR: YELLOW
CREAT SERPL-MCNC: 2.28 MG/DL (ref 0.6–1.3)
EOSINOPHIL # BLD AUTO: 0.15 THOUSAND/ΜL (ref 0–0.61)
EOSINOPHIL NFR BLD AUTO: 1 % (ref 0–6)
ERYTHROCYTE [DISTWIDTH] IN BLOOD BY AUTOMATED COUNT: 14.4 % (ref 11.6–15.1)
FLUAV RNA NPH QL NAA+PROBE: NORMAL
FLUAV RNA NPH QL NAA+PROBE: NORMAL
FLUBV RNA NPH QL NAA+PROBE: NORMAL
FLUBV RNA NPH QL NAA+PROBE: NORMAL
GFR SERPL CREATININE-BSD FRML MDRD: 23 ML/MIN/1.73SQ M
GLUCOSE SERPL-MCNC: 260 MG/DL (ref 65–140)
GLUCOSE SERPL-MCNC: 270 MG/DL (ref 65–140)
GLUCOSE UR STRIP-MCNC: NEGATIVE MG/DL
HCO3 BLDA-SCNC: 21.2 MMOL/L (ref 22–28)
HCT VFR BLD AUTO: 38.5 % (ref 34.8–46.1)
HFNC FLOW LPM: 25
HGB BLD-MCNC: 12.4 G/DL (ref 11.5–15.4)
HGB UR QL STRIP.AUTO: NEGATIVE
HIV 1+2 AB+HIV1 P24 AG SERPL QL IA: NORMAL
HIV1 P24 AG SER QL: NORMAL
HYALINE CASTS #/AREA URNS LPF: ABNORMAL /LPF
IMM GRANULOCYTES # BLD AUTO: 0.15 THOUSAND/UL (ref 0–0.2)
IMM GRANULOCYTES NFR BLD AUTO: 1 % (ref 0–2)
INR PPP: 1.06 (ref 0.84–1.19)
KETONES UR STRIP-MCNC: ABNORMAL MG/DL
LACTATE SERPL-SCNC: 1.6 MMOL/L (ref 0.5–2)
LEUKOCYTE ESTERASE UR QL STRIP: NEGATIVE
LYMPHOCYTES # BLD AUTO: 1 THOUSANDS/ΜL (ref 0.6–4.47)
LYMPHOCYTES NFR BLD AUTO: 4 % (ref 14–44)
MCH RBC QN AUTO: 27.4 PG (ref 26.8–34.3)
MCHC RBC AUTO-ENTMCNC: 32.2 G/DL (ref 31.4–37.4)
MCV RBC AUTO: 85 FL (ref 82–98)
METHADONE UR QL: NEGATIVE
MONOCYTES # BLD AUTO: 1.58 THOUSAND/ΜL (ref 0.17–1.22)
MONOCYTES NFR BLD AUTO: 6 % (ref 4–12)
MUCOUS THREADS UR QL AUTO: ABNORMAL
NEUTROPHILS # BLD AUTO: 23.77 THOUSANDS/ΜL (ref 1.85–7.62)
NEUTS SEG NFR BLD AUTO: 88 % (ref 43–75)
NITRITE UR QL STRIP: NEGATIVE
NON VENT HFNC FIO2: 55
NON VENT TYPE HFNC: ABNORMAL
NON-SQ EPI CELLS URNS QL MICRO: ABNORMAL /HPF
NRBC BLD AUTO-RTO: 0 /100 WBCS
O2 CT BLDA-SCNC: 16.4 ML/DL (ref 16–23)
OPIATES UR QL SCN: POSITIVE
OXYHGB MFR BLDA: 96.2 % (ref 94–97)
PCO2 BLDA: 57.5 MM HG (ref 36–44)
PCP UR QL: NEGATIVE
PH BLDA: 7.18 [PH] (ref 7.35–7.45)
PH UR STRIP.AUTO: 5.5 [PH]
PLATELET # BLD AUTO: 268 THOUSANDS/UL (ref 149–390)
PMV BLD AUTO: 11.8 FL (ref 8.9–12.7)
PO2 BLDA: 121.1 MM HG (ref 75–129)
POTASSIUM SERPL-SCNC: 4.8 MMOL/L (ref 3.5–5.3)
PROT SERPL-MCNC: 7.5 G/DL (ref 6.4–8.2)
PROT UR STRIP-MCNC: ABNORMAL MG/DL
PROTHROMBIN TIME: 13.8 SECONDS (ref 11.6–14.5)
RBC # BLD AUTO: 4.53 MILLION/UL (ref 3.81–5.12)
RBC #/AREA URNS AUTO: ABNORMAL /HPF
RSV RNA NPH QL NAA+PROBE: NORMAL
RSV RNA NPH QL NAA+PROBE: NORMAL
SODIUM SERPL-SCNC: 130 MMOL/L (ref 136–145)
SP GR UR STRIP.AUTO: >=1.03 (ref 1–1.03)
SPECIMEN SOURCE: ABNORMAL
THC UR QL: NEGATIVE
TROPONIN I SERPL-MCNC: <0.02 NG/ML
UROBILINOGEN UR QL STRIP.AUTO: 0.2 E.U./DL
WBC # BLD AUTO: 26.73 THOUSAND/UL (ref 4.31–10.16)
WBC #/AREA URNS AUTO: ABNORMAL /HPF

## 2020-01-12 PROCEDURE — 84484 ASSAY OF TROPONIN QUANT: CPT | Performed by: PHYSICIAN ASSISTANT

## 2020-01-12 PROCEDURE — 80307 DRUG TEST PRSMV CHEM ANLYZR: CPT | Performed by: NURSE PRACTITIONER

## 2020-01-12 PROCEDURE — 99291 CRITICAL CARE FIRST HOUR: CPT | Performed by: PHYSICIAN ASSISTANT

## 2020-01-12 PROCEDURE — 71045 X-RAY EXAM CHEST 1 VIEW: CPT

## 2020-01-12 PROCEDURE — 96365 THER/PROPH/DIAG IV INF INIT: CPT

## 2020-01-12 PROCEDURE — 74176 CT ABD & PELVIS W/O CONTRAST: CPT

## 2020-01-12 PROCEDURE — 99285 EMERGENCY DEPT VISIT HI MDM: CPT

## 2020-01-12 PROCEDURE — 81001 URINALYSIS AUTO W/SCOPE: CPT | Performed by: PHYSICIAN ASSISTANT

## 2020-01-12 PROCEDURE — 94660 CPAP INITIATION&MGMT: CPT

## 2020-01-12 PROCEDURE — 94640 AIRWAY INHALATION TREATMENT: CPT

## 2020-01-12 PROCEDURE — 5A1935Z RESPIRATORY VENTILATION, LESS THAN 24 CONSECUTIVE HOURS: ICD-10-PCS | Performed by: STUDENT IN AN ORGANIZED HEALTH CARE EDUCATION/TRAINING PROGRAM

## 2020-01-12 PROCEDURE — 96368 THER/DIAG CONCURRENT INF: CPT

## 2020-01-12 PROCEDURE — 80053 COMPREHEN METABOLIC PANEL: CPT | Performed by: PHYSICIAN ASSISTANT

## 2020-01-12 PROCEDURE — 96361 HYDRATE IV INFUSION ADD-ON: CPT

## 2020-01-12 PROCEDURE — 87631 RESP VIRUS 3-5 TARGETS: CPT | Performed by: PHYSICIAN ASSISTANT

## 2020-01-12 PROCEDURE — 82948 REAGENT STRIP/BLOOD GLUCOSE: CPT

## 2020-01-12 PROCEDURE — 83605 ASSAY OF LACTIC ACID: CPT | Performed by: PHYSICIAN ASSISTANT

## 2020-01-12 PROCEDURE — 94760 N-INVAS EAR/PLS OXIMETRY 1: CPT

## 2020-01-12 PROCEDURE — 94002 VENT MGMT INPAT INIT DAY: CPT

## 2020-01-12 PROCEDURE — 36600 WITHDRAWAL OF ARTERIAL BLOOD: CPT

## 2020-01-12 PROCEDURE — NC001 PR NO CHARGE: Performed by: NURSE PRACTITIONER

## 2020-01-12 PROCEDURE — 96366 THER/PROPH/DIAG IV INF ADDON: CPT

## 2020-01-12 PROCEDURE — 85610 PROTHROMBIN TIME: CPT | Performed by: PHYSICIAN ASSISTANT

## 2020-01-12 PROCEDURE — 36415 COLL VENOUS BLD VENIPUNCTURE: CPT | Performed by: PHYSICIAN ASSISTANT

## 2020-01-12 PROCEDURE — 87806 HIV AG W/HIV1&2 ANTB W/OPTIC: CPT | Performed by: NURSE PRACTITIONER

## 2020-01-12 PROCEDURE — 93005 ELECTROCARDIOGRAM TRACING: CPT

## 2020-01-12 PROCEDURE — 85025 COMPLETE CBC W/AUTO DIFF WBC: CPT | Performed by: PHYSICIAN ASSISTANT

## 2020-01-12 PROCEDURE — 94664 DEMO&/EVAL PT USE INHALER: CPT

## 2020-01-12 PROCEDURE — 87040 BLOOD CULTURE FOR BACTERIA: CPT | Performed by: PHYSICIAN ASSISTANT

## 2020-01-12 PROCEDURE — 87631 RESP VIRUS 3-5 TARGETS: CPT | Performed by: NURSE PRACTITIONER

## 2020-01-12 PROCEDURE — 87449 NOS EACH ORGANISM AG IA: CPT | Performed by: NURSE PRACTITIONER

## 2020-01-12 PROCEDURE — 71250 CT THORAX DX C-: CPT

## 2020-01-12 PROCEDURE — 31500 INSERT EMERGENCY AIRWAY: CPT | Performed by: NURSE PRACTITIONER

## 2020-01-12 PROCEDURE — 99291 CRITICAL CARE FIRST HOUR: CPT | Performed by: NURSE PRACTITIONER

## 2020-01-12 PROCEDURE — 82805 BLOOD GASES W/O2 SATURATION: CPT | Performed by: NURSE PRACTITIONER

## 2020-01-12 PROCEDURE — 0BH17EZ INSERTION OF ENDOTRACHEAL AIRWAY INTO TRACHEA, VIA NATURAL OR ARTIFICIAL OPENING: ICD-10-PCS | Performed by: STUDENT IN AN ORGANIZED HEALTH CARE EDUCATION/TRAINING PROGRAM

## 2020-01-12 PROCEDURE — 84145 PROCALCITONIN (PCT): CPT | Performed by: PHYSICIAN ASSISTANT

## 2020-01-12 PROCEDURE — 85730 THROMBOPLASTIN TIME PARTIAL: CPT | Performed by: PHYSICIAN ASSISTANT

## 2020-01-12 PROCEDURE — 87449 NOS EACH ORGANISM AG IA: CPT | Performed by: PHYSICIAN ASSISTANT

## 2020-01-12 PROCEDURE — 96367 TX/PROPH/DG ADDL SEQ IV INF: CPT

## 2020-01-12 RX ORDER — NALOXONE HYDROCHLORIDE 0.4 MG/ML
0.4 INJECTION, SOLUTION INTRAMUSCULAR; INTRAVENOUS; SUBCUTANEOUS ONCE
Status: COMPLETED | OUTPATIENT
Start: 2020-01-12 | End: 2020-01-12

## 2020-01-12 RX ORDER — METOPROLOL TARTRATE 5 MG/5ML
5 INJECTION INTRAVENOUS ONCE
Status: COMPLETED | OUTPATIENT
Start: 2020-01-12 | End: 2020-01-12

## 2020-01-12 RX ORDER — PANTOPRAZOLE SODIUM 20 MG/1
20 TABLET, DELAYED RELEASE ORAL
Status: DISCONTINUED | OUTPATIENT
Start: 2020-01-13 | End: 2020-01-16 | Stop reason: HOSPADM

## 2020-01-12 RX ORDER — ECHINACEA PURPUREA EXTRACT 125 MG
2 TABLET ORAL
Status: DISCONTINUED | OUTPATIENT
Start: 2020-01-12 | End: 2020-01-16 | Stop reason: HOSPADM

## 2020-01-12 RX ORDER — SODIUM CHLORIDE, SODIUM GLUCONATE, SODIUM ACETATE, POTASSIUM CHLORIDE, MAGNESIUM CHLORIDE, SODIUM PHOSPHATE, DIBASIC, AND POTASSIUM PHOSPHATE .53; .5; .37; .037; .03; .012; .00082 G/100ML; G/100ML; G/100ML; G/100ML; G/100ML; G/100ML; G/100ML
1000 INJECTION, SOLUTION INTRAVENOUS ONCE
Status: COMPLETED | OUTPATIENT
Start: 2020-01-12 | End: 2020-01-12

## 2020-01-12 RX ORDER — SODIUM CHLORIDE, SODIUM GLUCONATE, SODIUM ACETATE, POTASSIUM CHLORIDE, MAGNESIUM CHLORIDE, SODIUM PHOSPHATE, DIBASIC, AND POTASSIUM PHOSPHATE .53; .5; .37; .037; .03; .012; .00082 G/100ML; G/100ML; G/100ML; G/100ML; G/100ML; G/100ML; G/100ML
2000 INJECTION, SOLUTION INTRAVENOUS ONCE
Status: COMPLETED | OUTPATIENT
Start: 2020-01-12 | End: 2020-01-12

## 2020-01-12 RX ORDER — PRAVASTATIN SODIUM 20 MG
10 TABLET ORAL
Status: DISCONTINUED | OUTPATIENT
Start: 2020-01-13 | End: 2020-01-16 | Stop reason: HOSPADM

## 2020-01-12 RX ORDER — HEPARIN SODIUM 5000 [USP'U]/ML
5000 INJECTION, SOLUTION INTRAVENOUS; SUBCUTANEOUS EVERY 8 HOURS SCHEDULED
Status: DISCONTINUED | OUTPATIENT
Start: 2020-01-12 | End: 2020-01-16 | Stop reason: HOSPADM

## 2020-01-12 RX ORDER — ETOMIDATE 2 MG/ML
10 INJECTION INTRAVENOUS ONCE
Status: COMPLETED | OUTPATIENT
Start: 2020-01-12 | End: 2020-01-12

## 2020-01-12 RX ORDER — SODIUM CHLORIDE 9 MG/ML
1000 INJECTION, SOLUTION INTRAVENOUS CONTINUOUS
Status: DISCONTINUED | OUTPATIENT
Start: 2020-01-12 | End: 2020-01-12

## 2020-01-12 RX ORDER — FENTANYL CITRATE 50 UG/ML
100 INJECTION, SOLUTION INTRAMUSCULAR; INTRAVENOUS ONCE
Status: COMPLETED | OUTPATIENT
Start: 2020-01-12 | End: 2020-01-12

## 2020-01-12 RX ORDER — CHLORHEXIDINE GLUCONATE 0.12 MG/ML
15 RINSE ORAL EVERY 12 HOURS SCHEDULED
Status: DISCONTINUED | OUTPATIENT
Start: 2020-01-12 | End: 2020-01-16 | Stop reason: HOSPADM

## 2020-01-12 RX ORDER — SODIUM CHLORIDE, SODIUM GLUCONATE, SODIUM ACETATE, POTASSIUM CHLORIDE, MAGNESIUM CHLORIDE, SODIUM PHOSPHATE, DIBASIC, AND POTASSIUM PHOSPHATE .53; .5; .37; .037; .03; .012; .00082 G/100ML; G/100ML; G/100ML; G/100ML; G/100ML; G/100ML; G/100ML
125 INJECTION, SOLUTION INTRAVENOUS CONTINUOUS
Status: DISCONTINUED | OUTPATIENT
Start: 2020-01-12 | End: 2020-01-12

## 2020-01-12 RX ORDER — MIDAZOLAM HYDROCHLORIDE 2 MG/2ML
INJECTION, SOLUTION INTRAMUSCULAR; INTRAVENOUS
Status: DISPENSED
Start: 2020-01-12 | End: 2020-01-13

## 2020-01-12 RX ORDER — MONTELUKAST SODIUM 10 MG/1
10 TABLET ORAL
Status: DISCONTINUED | OUTPATIENT
Start: 2020-01-12 | End: 2020-01-16 | Stop reason: HOSPADM

## 2020-01-12 RX ORDER — SODIUM CHLORIDE, SODIUM GLUCONATE, SODIUM ACETATE, POTASSIUM CHLORIDE, MAGNESIUM CHLORIDE, SODIUM PHOSPHATE, DIBASIC, AND POTASSIUM PHOSPHATE .53; .5; .37; .037; .03; .012; .00082 G/100ML; G/100ML; G/100ML; G/100ML; G/100ML; G/100ML; G/100ML
75 INJECTION, SOLUTION INTRAVENOUS CONTINUOUS
Status: DISCONTINUED | OUTPATIENT
Start: 2020-01-12 | End: 2020-01-14

## 2020-01-12 RX ORDER — MIDAZOLAM HYDROCHLORIDE 2 MG/2ML
6 INJECTION, SOLUTION INTRAMUSCULAR; INTRAVENOUS ONCE
Status: COMPLETED | OUTPATIENT
Start: 2020-01-12 | End: 2020-01-12

## 2020-01-12 RX ORDER — FENTANYL CITRATE 50 UG/ML
INJECTION, SOLUTION INTRAMUSCULAR; INTRAVENOUS
Status: DISPENSED
Start: 2020-01-12 | End: 2020-01-13

## 2020-01-12 RX ORDER — PROPOFOL 10 MG/ML
INJECTION, EMULSION INTRAVENOUS
Status: COMPLETED
Start: 2020-01-12 | End: 2020-01-12

## 2020-01-12 RX ORDER — MIDAZOLAM HYDROCHLORIDE 2 MG/2ML
INJECTION, SOLUTION INTRAMUSCULAR; INTRAVENOUS
Status: COMPLETED
Start: 2020-01-12 | End: 2020-01-12

## 2020-01-12 RX ORDER — ACETAMINOPHEN 325 MG/1
975 TABLET ORAL ONCE
Status: COMPLETED | OUTPATIENT
Start: 2020-01-12 | End: 2020-01-12

## 2020-01-12 RX ORDER — PROPOFOL 10 MG/ML
5-50 INJECTION, EMULSION INTRAVENOUS
Status: DISCONTINUED | OUTPATIENT
Start: 2020-01-12 | End: 2020-01-13

## 2020-01-12 RX ORDER — ROCURONIUM BROMIDE 10 MG/ML
50 INJECTION, SOLUTION INTRAVENOUS ONCE
Status: COMPLETED | OUTPATIENT
Start: 2020-01-12 | End: 2020-01-12

## 2020-01-12 RX ORDER — LEVALBUTEROL 1.25 MG/.5ML
1.25 SOLUTION, CONCENTRATE RESPIRATORY (INHALATION) EVERY 6 HOURS
Status: DISCONTINUED | OUTPATIENT
Start: 2020-01-12 | End: 2020-01-12

## 2020-01-12 RX ORDER — LEVALBUTEROL 1.25 MG/.5ML
1.25 SOLUTION, CONCENTRATE RESPIRATORY (INHALATION)
Status: DISCONTINUED | OUTPATIENT
Start: 2020-01-13 | End: 2020-01-14

## 2020-01-12 RX ORDER — LEVALBUTEROL 1.25 MG/.5ML
SOLUTION, CONCENTRATE RESPIRATORY (INHALATION)
Status: COMPLETED
Start: 2020-01-12 | End: 2020-01-12

## 2020-01-12 RX ADMIN — LEVALBUTEROL 1.25 MG: 1.25 SOLUTION, CONCENTRATE RESPIRATORY (INHALATION) at 22:31

## 2020-01-12 RX ADMIN — ACETAMINOPHEN 975 MG: 325 TABLET, FILM COATED ORAL at 16:34

## 2020-01-12 RX ADMIN — SODIUM CHLORIDE 5 UNITS/HR: 9 INJECTION, SOLUTION INTRAVENOUS at 22:21

## 2020-01-12 RX ADMIN — SODIUM CHLORIDE, SODIUM GLUCONATE, SODIUM ACETATE, POTASSIUM CHLORIDE, MAGNESIUM CHLORIDE, SODIUM PHOSPHATE, DIBASIC, AND POTASSIUM PHOSPHATE 1000 ML: .53; .5; .37; .037; .03; .012; .00082 INJECTION, SOLUTION INTRAVENOUS at 20:36

## 2020-01-12 RX ADMIN — SODIUM CHLORIDE 1000 ML/HR: 0.9 INJECTION, SOLUTION INTRAVENOUS at 20:08

## 2020-01-12 RX ADMIN — AZITHROMYCIN MONOHYDRATE 500 MG: 500 INJECTION, POWDER, LYOPHILIZED, FOR SOLUTION INTRAVENOUS at 19:45

## 2020-01-12 RX ADMIN — SODIUM CHLORIDE 2000 ML: 0.9 INJECTION, SOLUTION INTRAVENOUS at 16:28

## 2020-01-12 RX ADMIN — MIDAZOLAM 6 MG: 1 INJECTION INTRAMUSCULAR; INTRAVENOUS at 23:29

## 2020-01-12 RX ADMIN — ROCURONIUM BROMIDE 50 MG: 10 INJECTION, SOLUTION INTRAVENOUS at 23:31

## 2020-01-12 RX ADMIN — PROPOFOL 10 MCG/KG/MIN: 10 INJECTION, EMULSION INTRAVENOUS at 23:29

## 2020-01-12 RX ADMIN — Medication 0.5 MG: at 22:31

## 2020-01-12 RX ADMIN — CEFEPIME HYDROCHLORIDE 2000 MG: 2 INJECTION, POWDER, FOR SOLUTION INTRAVENOUS at 20:56

## 2020-01-12 RX ADMIN — LEVALBUTEROL HYDROCHLORIDE 1.25 MG: 1.25 SOLUTION, CONCENTRATE RESPIRATORY (INHALATION) at 22:31

## 2020-01-12 RX ADMIN — FENTANYL CITRATE 100 MCG: 50 INJECTION, SOLUTION INTRAMUSCULAR; INTRAVENOUS at 23:30

## 2020-01-12 RX ADMIN — VANCOMYCIN HYDROCHLORIDE 1250 MG: 5 INJECTION, POWDER, LYOPHILIZED, FOR SOLUTION INTRAVENOUS at 16:39

## 2020-01-12 RX ADMIN — ETOMIDATE 10 MG: 2 INJECTION, SOLUTION INTRAVENOUS at 23:31

## 2020-01-12 RX ADMIN — METOPROLOL TARTRATE 5 MG: 5 INJECTION INTRAVENOUS at 22:41

## 2020-01-12 RX ADMIN — NALOXONE HYDROCHLORIDE 0.4 MG: 0.4 INJECTION, SOLUTION INTRAMUSCULAR; INTRAVENOUS; SUBCUTANEOUS at 22:27

## 2020-01-12 RX ADMIN — SODIUM CHLORIDE, SODIUM GLUCONATE, SODIUM ACETATE, POTASSIUM CHLORIDE, MAGNESIUM CHLORIDE, SODIUM PHOSPHATE, DIBASIC, AND POTASSIUM PHOSPHATE 2000 ML: .53; .5; .37; .037; .03; .012; .00082 INJECTION, SOLUTION INTRAVENOUS at 22:39

## 2020-01-12 RX ADMIN — SODIUM CHLORIDE, SODIUM GLUCONATE, SODIUM ACETATE, POTASSIUM CHLORIDE, MAGNESIUM CHLORIDE, SODIUM PHOSPHATE, DIBASIC, AND POTASSIUM PHOSPHATE 125 ML/HR: .53; .5; .37; .037; .03; .012; .00082 INJECTION, SOLUTION INTRAVENOUS at 20:55

## 2020-01-12 RX ADMIN — CEFTRIAXONE SODIUM 1000 MG: 10 INJECTION, POWDER, FOR SOLUTION INTRAVENOUS at 19:08

## 2020-01-12 RX ADMIN — SODIUM CHLORIDE, SODIUM GLUCONATE, SODIUM ACETATE, POTASSIUM CHLORIDE, MAGNESIUM CHLORIDE, SODIUM PHOSPHATE, DIBASIC, AND POTASSIUM PHOSPHATE 1000 ML: .53; .5; .37; .037; .03; .012; .00082 INJECTION, SOLUTION INTRAVENOUS at 18:08

## 2020-01-12 RX ADMIN — HEPARIN SODIUM 5000 UNITS: 5000 INJECTION INTRAVENOUS; SUBCUTANEOUS at 22:41

## 2020-01-12 RX ADMIN — MIDAZOLAM HYDROCHLORIDE 6 MG: 2 INJECTION, SOLUTION INTRAMUSCULAR; INTRAVENOUS at 23:29

## 2020-01-12 RX ADMIN — IPRATROPIUM BROMIDE 0.5 MG: 0.5 SOLUTION RESPIRATORY (INHALATION) at 22:31

## 2020-01-12 NOTE — ED PROVIDER NOTES
History  Chief Complaint   Patient presents with    Respiratory Distress     pt w/ co of cough weakness and fevers at home for the past 2 days, pt was brought in by EMS that reports O2 of 76% on RA  +AMS    Altered Mental Status     41-year-old female patient here for evaluation of altered mental status  EMS was called by patient's  after  found the patient on the floor confused and altered  Upon EMS arrival she was hypotensive, tachycardic and hypoxic  EMS started her on nasal cannula with little improvement and then transition to facemask and with that her O2 sats improved to the 90s  The bolused about 1 L of normal saline which has brought her pressure back up to normal   She was febrile on truck  Upon arrival to the ER patient is now alert and oriented and able to provide history  States she woke up today and felt very run down  She felt like she had chills all day  She has had productive cough  She denies smoking of eating  She is unsure of any sick contacts  She reports most her symptoms all began today but she adds that she has been using her inhaler the past few days for what she thought was asthma exacerbation and wheezing  She denies headache or head injury  History provided by:  Patient   used: No    Altered Mental Status   Presenting symptoms: confusion, disorientation and partial responsiveness    Severity:  Severe  Most recent episode:   Today  Episode history:  Continuous  Duration:  3 hours  Timing:  Constant  Progression:  Improving  Chronicity:  New  Context: recent infection    Context: not alcohol use, not dementia, not drug use, not head injury, not homeless, taking medications as prescribed, not nursing home resident, not recent change in medication and not recent illness    Associated symptoms: difficulty breathing and fever    Associated symptoms: no abdominal pain, no headaches, no light-headedness, no nausea, no palpitations, no rash, no vomiting and no weakness        Prior to Admission Medications   Prescriptions Last Dose Informant Patient Reported? Taking?    BD PEN NEEDLE HERNAN U/F 32G X 4 MM MISC   Yes No   Insulin Pen Needle 29G X 5MM MISC   No No   Sig: by Does not apply route 4 (four) times a day   LYRICA 150 MG capsule   Yes No   Sig: Take 150 mg by mouth 2 (two) times a day   WIXELA INHUB 500-50 MCG/DOSE inhaler   Yes No   Sig: Take 1 puff by mouth 2 (two) times a day   albuterol (PROVENTIL HFA,VENTOLIN HFA) 90 mcg/act inhaler   Yes No   Sig: Inhale 2 puffs every 4 (four) hours as needed   carisoprodol (SOMA) 350 mg tablet  Self Yes No   Sig: Take 350 mg by mouth 2 (two) times a day   diazepam (VALIUM) 5 mg tablet  Self Yes No   Sig: Take 5 mg by mouth daily at bedtime as needed for anxiety   dicyclomine (BENTYL) 10 mg capsule   No No   Sig: Take 1 capsule (10 mg total) by mouth 4 (four) times a day as needed (As needed for abdominal cramping)   fluticasone (FLONASE) 50 mcg/act nasal spray  Self Yes No   Si spray into each nostril daily   furosemide (LASIX) 20 mg tablet   Yes No   Sig: Take 40 mg by mouth daily   insulin glargine (LANTUS) 100 units/mL subcutaneous injection  Self Yes No   Sig: Inject 50 Units under the skin daily at bedtime     lisinopril (ZESTRIL) 10 mg tablet  Self No No   Sig: Take 1 tablet (10 mg total) by mouth daily   metoclopramide (REGLAN) 10 mg tablet   No No   Sig: TAKE 1 TABLET PO QID BEFORE MEALS AND HS   montelukast (SINGULAIR) 10 mg tablet  Self Yes No   Sig: Take 10 mg by mouth daily at bedtime   morphine (MS CONTIN) 15 mg 12 hr tablet   Yes No   Sig: Take 15 mg by mouth 2 (two) times a day   morphine (MS CONTIN) 60 mg 12 hr tablet   Yes No   Sig: Take 60 mg by mouth 2 (two) times a day   morphine (MSIR) 15 mg tablet  Self Yes No   Sig: Take 15 mg by mouth 3 (three) times a day as needed for moderate pain or severe pain     nystatin (MYCOSTATIN) powder   No No   Sig: Apply topically 2 (two) times a day omeprazole (PriLOSEC) 20 mg delayed release capsule  Self Yes No   Sig: Take 20 mg by mouth daily   ondansetron (ZOFRAN) 4 mg tablet   No No   Sig: Take 1 tablet (4 mg total) by mouth every 6 (six) hours   pravastatin (PRAVACHOL) 10 mg tablet   Yes No   Sig: Take 10 mg by mouth daily at bedtime      Facility-Administered Medications: None       Past Medical History:   Diagnosis Date    Acute respiratory insufficiency 11/23/2019    Asthma     Chronic pain     Diabetes mellitus ([facility] )     Gastroparesis     Sepsis ([facility] ) 11/23/2019       Past Surgical History:   Procedure Laterality Date    CERVICAL LAMINECTOMY      CHOLECYSTECTOMY      CHOLECYSTECTOMY LAPAROSCOPIC N/A 11/10/2018    Procedure: CHOLECYSTECTOMY LAPAROSCOPIC w/ ioc;  Surgeon: Neha Stearns MD;  Location: [facility];  Service: General    HYSTERECTOMY      JOINT REPLACEMENT Bilateral     knee    TOE AMPUTATION Right 2/2/2018    Procedure: AMPUTATION TOE, RIGHT GREAT TOE;  Surgeon: Armin Robles DPM;  Location: [facility];  Service: Podiatry       Family History   Problem Relation Age of Onset    Diabetes Mother     Diabetes Maternal Grandmother     No Known Problems Father      I have reviewed and agree with the history as documented  Social History     Tobacco Use    Smoking status: Never Smoker    Smokeless tobacco: Never Used   Substance Use Topics    Alcohol use: Yes     Frequency: 2-4 times a month     Drinks per session: 1 or 2     Comment: rarely    Drug use: No        Review of Systems   Constitutional: Positive for chills, fatigue and fever  Negative for activity change, appetite change, diaphoresis and unexpected weight change  HENT: Negative for congestion, rhinorrhea, sinus pressure, sore throat and trouble swallowing  Eyes: Negative for photophobia and visual disturbance  Respiratory: Positive for cough and shortness of breath  Negative for apnea, choking, chest tightness, wheezing and stridor  Cardiovascular: Negative for chest pain, palpitations and leg swelling  Gastrointestinal: Negative for abdominal distention, abdominal pain, blood in stool, constipation, diarrhea, nausea and vomiting  Genitourinary: Negative for decreased urine volume, difficulty urinating, dysuria, enuresis, flank pain, frequency, hematuria and urgency  Musculoskeletal: Negative for arthralgias, myalgias, neck pain and neck stiffness  Skin: Negative for color change, pallor, rash and wound  Allergic/Immunologic: Negative  Neurological: Negative for dizziness, tremors, syncope, weakness, light-headedness, numbness and headaches  Hematological: Negative  Psychiatric/Behavioral: Positive for confusion  All other systems reviewed and are negative  Physical Exam  Physical Exam   Constitutional: She is oriented to person, place, and time  Non-toxic appearance  She has a sickly appearance  She appears ill  She appears distressed  Face mask in place  HENT:   Head: Normocephalic and atraumatic  Mouth/Throat: Mucous membranes are dry  Dry mucous membranes   Eyes: Pupils are equal, round, and reactive to light  EOM and lids are normal    Neck: Normal range of motion  Neck supple  Cardiovascular: Regular rhythm, S1 normal, S2 normal, normal heart sounds, intact distal pulses and normal pulses  Tachycardia present  Exam reveals no gallop, no distant heart sounds, no friction rub and no decreased pulses  No murmur heard  Pulses:       Radial pulses are 2+ on the right side, and 2+ on the left side  Pulmonary/Chest: Accessory muscle usage present  Tachypnea noted  No apnea and no bradypnea  She is in respiratory distress  She has no decreased breath sounds  She has no wheezes  She has no rhonchi  She has rales (Bilaterally)  Abdominal: Soft  Normal appearance  She exhibits no distension  There is no tenderness  There is no rigidity, no rebound and no guarding  Musculoskeletal: Normal range of motion  She exhibits no edema, tenderness or deformity  Neurological: She is alert and oriented to person, place, and time  No cranial nerve deficit  GCS eye subscore is 4  GCS verbal subscore is 5  GCS motor subscore is 6  GCS 15  AAOx3  CN II-XII grossly intact  No focal neuro deficits  Skin: Skin is warm, dry and intact  No rash noted  She is not diaphoretic  No erythema  No pallor  Psychiatric: Her speech is normal    Nursing note and vitals reviewed        Vital Signs  ED Triage Vitals   Temperature Pulse Respirations Blood Pressure SpO2   01/12/20 1630 01/12/20 1621 01/12/20 1621 01/12/20 1621 01/12/20 1621   (!) 101 1 °F (38 4 °C) (!) 134 12 136/90 98 %      Temp Source Heart Rate Source Patient Position - Orthostatic VS BP Location FiO2 (%)   01/12/20 1630 01/12/20 1621 01/12/20 1621 01/12/20 1621 --   Oral Monitor Lying Right arm       Pain Score       --                  Vitals:    01/12/20 1900 01/12/20 1930 01/12/20 1945 01/12/20 2000   BP: 107/68 117/58 90/61 119/76   Pulse: (!) 127 (!) 124 (!) 126 (!) 123   Patient Position - Orthostatic VS:             Visual Acuity  Visual Acuity      Most Recent Value   L Pupil Size (mm)  3   R Pupil Size (mm)  3          ED Medications  Medications   azithromycin (ZITHROMAX) 500 mg in sodium chloride 0 9% 250mL IVPB 500 mg (0 mg Intravenous Stopped 1/12/20 2056)   cefepime (MAXIPIME) 2,000 mg in dextrose 5 % 50 mL IVPB (has no administration in time range)   multi-electrolyte (PLASMALYTE-A/ISOLYTE-S PH 7 4) IV solution (125 mL/hr Intravenous New Bag 1/12/20 2055)   multi-electrolyte (ISOLYTE-S PH 7 4) bolus 1,000 mL (1,000 mL Intravenous New Bag 1/12/20 2036)   sodium chloride 0 9 % bolus 2,000 mL (0 mL Intravenous Stopped 1/12/20 1728)   acetaminophen (TYLENOL) tablet 975 mg (975 mg Oral Given 1/12/20 1634)   vancomycin (VANCOCIN) 1,250 mg in sodium chloride 0 9 % 250 mL IVPB (0 mg/kg × 86 7 kg (Adjusted) Intravenous Stopped 1/12/20 1907)   ceftriaxone (ROCEPHIN) 1 g/50 mL in dextrose IVPB (0 mg Intravenous Stopped 1/12/20 1939)   multi-electrolyte (ISOLYTE-S PH 7 4) bolus 1,000 mL (0 mL Intravenous Stopped 1/12/20 1939)       Diagnostic Studies  Results Reviewed     Procedure Component Value Units Date/Time    Urine Microscopic [[id number]]  (Abnormal) Collected:  01/12/20 1820    Lab Status:  Final result Specimen:  Urine, Clean Catch Updated:  01/12/20 1841     RBC, UA None Seen /hpf      WBC, UA 2-4 /hpf      Epithelial Cells Occasional /hpf      Bacteria, UA Occasional /hpf      Hyaline Casts, UA 2-4 /lpf      MUCUS THREADS Occasional    UA w Reflex to Microscopic w Reflex to Culture [[id number]]  (Abnormal) Collected:  01/12/20 1820    Lab Status:  Final result Specimen:  Urine, Clean Catch Updated:  01/12/20 1831     Color, UA Yellow     Clarity, UA Clear     Specific Gravity, UA >=1 030     pH, UA 5 5     Leukocytes, UA Negative     Nitrite, UA Negative     Protein,  (2+) mg/dl      Glucose, UA Negative mg/dl      Ketones, UA Trace mg/dl      Urobilinogen, UA 0 2 E U /dl      Bilirubin, UA Small     Blood, UA Negative    Strep Pneumoniae, Urine [[id number]] Collected:  01/12/20 1821    Lab Status: In process Specimen:  Urine, Catheter Updated:  01/12/20 1825    Legionella antigen, urine [[id number]] Collected:  01/12/20 1821    Lab Status: In process Specimen:  Urine, Catheter Updated:  01/12/20 1825    Influenza A/B and RSV PCR [[id number]]  (Normal) Collected:  01/12/20 1625    Lab Status:  Final result Specimen:  Nasopharyngeal Swab Updated:  01/12/20 1722     INFLUENZA A PCR None Detected     INFLUENZA B PCR None Detected     RSV PCR None Detected    Lactic acid, plasma x2 [[id number]]  (Normal) Collected:  01/12/20 1625    Lab Status:  Final result Specimen:  Blood from Arm, Left Updated:  01/12/20 1711     LACTIC ACID 1 6 mmol/L     Narrative:       Result may be elevated if tourniquet was used during collection      Troponin I [[id number]]  (Normal) Collected:  01/12/20 1625    Lab Status:  Final result Specimen:  Blood from Arm, Left Updated:  01/12/20 1711     Troponin I <0 02 ng/mL     Comprehensive metabolic panel [[id number]]  (Abnormal) Collected:  01/12/20 1625    Lab Status:  Final result Specimen:  Blood from Arm, Left Updated:  01/12/20 1709     Sodium 130 mmol/L      Potassium 4 8 mmol/L      Chloride 96 mmol/L      CO2 25 mmol/L      ANION GAP 9 mmol/L      BUN 29 mg/dL      Creatinine 2 28 mg/dL      Glucose 270 mg/dL      Calcium 8 7 mg/dL      AST 60 U/L      ALT 38 U/L      Alkaline Phosphatase 132 U/L      Total Protein 7 5 g/dL      Albumin 3 4 g/dL      Total Bilirubin 0 80 mg/dL      eGFR 23 ml/min/1 73sq m     Narrative:       [place] guidelines for Chronic Kidney Disease (CKD):     Stage 1 with normal or high GFR (GFR > 90 mL/min/1 73 square meters)    Stage 2 Mild CKD (GFR = 60-89 mL/min/1 73 square meters)    Stage 3A Moderate CKD (GFR = 45-59 mL/min/1 73 square meters)    Stage 3B Moderate CKD (GFR = 30-44 mL/min/1 73 square meters)    Stage 4 Severe CKD (GFR = 15-29 mL/min/1 73 square meters)    Stage 5 End Stage CKD (GFR <15 mL/min/1 73 square meters)  Note: GFR calculation is accurate only with a steady state creatinine    Protime-INR [[id number]]  (Normal) Collected:  01/12/20 1625    Lab Status:  Final result Specimen:  Blood from Arm, Left Updated:  01/12/20 1703     Protime 13 8 seconds      INR 1 06    APTT [[id number]]  (Normal) Collected:  01/12/20 1625    Lab Status:  Final result Specimen:  Blood from Arm, Left Updated:  01/12/20 1703     PTT 29 seconds     CBC and differential [[id number]]  (Abnormal) Collected:  01/12/20 1625    Lab Status:  Final result Specimen:  Blood from Arm, Left Updated:  01/12/20 1635     WBC 26 73 Thousand/uL      RBC 4 53 Million/uL      Hemoglobin 12 4 g/dL      Hematocrit 38 5 %      MCV 85 fL      MCH 27 4 pg      MCHC 32 2 g/dL      RDW 14 4 %      MPV 11 8 fL Platelets 343 Thousands/uL      nRBC 0 /100 WBCs      Neutrophils Relative 88 %      Immat GRANS % 1 %      Lymphocytes Relative 4 %      Monocytes Relative 6 %      Eosinophils Relative 1 %      Basophils Relative 0 %      Neutrophils Absolute 23 77 Thousands/µL      Immature Grans Absolute 0 15 Thousand/uL      Lymphocytes Absolute 1 00 Thousands/µL      Monocytes Absolute 1 58 Thousand/µL      Eosinophils Absolute 0 15 Thousand/µL      Basophils Absolute 0 08 Thousands/µL     Procalcitonin [[id number]] Collected:  01/12/20 1625    Lab Status: In process Specimen:  Blood from Arm, Left Updated:  01/12/20 1633    Blood culture #2 [[id number]] Collected:  01/12/20 1626    Lab Status: In process Specimen:  Blood from Arm, Left Updated:  01/12/20 1633    Blood culture #1 [[id number]] Collected:  01/12/20 1627    Lab Status: In process Specimen:  Blood from Hand, Left Updated:  01/12/20 1633    Fingerstick Glucose (POCT) [[id number]]  (Abnormal) Collected:  01/12/20 1616    Lab Status:  Final result Updated:  01/12/20 1621     POC Glucose 260 mg/dl                  CT chest abdomen pelvis wo contrast   Final Result by Eusebio Ruffin DO (01/12 1917)      Multifocal airspace disease throughout the lung fields suggestive of multifocal pneumonia                 Workstation performed: [id number]         XR chest portable    (Results Pending)              Procedures  ECG 12 Lead Documentation Only  Date/Time: 1/12/2020 7:26 PM  Performed by: Arpit Moura PA-C  Authorized by: Arpit Moura PA-C     Indications / Diagnosis:  Sob  ECG reviewed by me, the ED Provider: yes    Patient location:  ED  Previous ECG:     Previous ECG:  Compared to current    Comparison ECG info:  Dec 2019    Similarity:  No change    Comparison to cardiac monitor: Yes    Interpretation:     Interpretation: abnormal    Quality:     Tracing quality:  Limited by artifact  Rate:     ECG rate:  135    ECG rate assessment: tachycardic    Rhythm: Rhythm: sinus tachycardia    Ectopy:     Ectopy: none    QRS:     QRS axis:  Normal    QRS intervals:  Normal  Conduction:     Conduction: normal    ST segments:     ST segments:  Normal  T waves:     T waves: non-specific      CriticalCare Time  Performed by: [facility] ESTHER KWON  Authorized by: [facility]     Critical care provider statement:     Critical care time (minutes):  50    Critical care was necessary to treat or prevent imminent or life-threatening deterioration of the following conditions:  Sepsis, respiratory failure and dehydration  Comments:      resp distress requiring HFNC and frequent re-evaluation  ED Course  ED Course as of Jan 12 2057   Dariel Dave Jan 12, 2020   1619 700ml prehospital                      Initial Sepsis Screening     Row Name 01/12/20 1625                Is the patient's history suggestive of a new or worsening infection? (!) Yes (Proceed)  -CO        Suspected source of infection  pneumonia  -CO        Are two or more of the following signs & symptoms of infection both present and new to the patient? (!) Yes (Proceed)  -CO        Indicate SIRS criteria  Hyperthemia > 38 3C (100 9F); Altered mental status; Tachycardia > 90 bpm  -CO        If the answer is yes to both questions, suspicion of sepsis is present          If severe sepsis is present AND tissue hypoperfusion perists in the hour after fluid resuscitation or lactate > 4, the patient meets criteria for SEPTIC SHOCK          Are any of the following organ dysfunction criteria present within 6 hours of suspected infection and SIRS criteria that are NOT considered to be chronic conditions?   (!) Yes  -CO        Organ dysfunction  Creatinine > 2 0 mg/dL  -CO        Date of presentation of severe sepsis          Time of presentation of severe sepsis          Tissue hypoperfusion persists in the hour after crystalloid fluid administration, evidenced, by either:          Was hypotension present within one hour of the conclusion of crystalloid fluid administration? No  -CO        Date of presentation of septic shock          Time of presentation of septic shock            User Key  (r) = Recorded By, (t) = Taken By, (c) = Cosigned By    Initials Name Provider Type    MALIHA Graf PA-C Physician Assistant           Default Flowsheet Data (last 720 hours)      Sepsis Reassess     Row Name 01/12/20 1820                   Repeat Volume Status and Tissue Perfusion Assessment Performed    Repeat Volume Status and Tissue Perfusion Assessment Performed  Yes  -CO           Volume Status and Tissue Perfusion Post Fluid Resuscitation * Must Document All *    Vital Signs Reviewed (HR, RR, BP, T)  Yes  -CO        Shock Index Reviewed  Yes  -CO        Arterial Oxygen Saturation Reviewed (POx, SaO2 or SpO2)  Yes (comment %)  -CO        Cardio  (!) Normal S1/S2; Tachycardia; No murmor; No rub or gallop  -CO        Pulmonary  Normal effort  -CO        Capillary Refill  Brisk  -CO        Peripheral Pulses  Radial  -CO        Peripheral Pulse          Skin          Urine output assessed             *OR*   Intensive Monitoring- Must Document One of the Following Four *:    Vital Signs Reviewed          * Central Venous Pressure (CVP or RAP)          * Central Venous Oxygen (SVO2, ScvO2 or Oxygen saturation via central catheter)          * Bedside Cardiovascular US in IVC diameter and % collapse          * Passive Leg Raise OR Crystalloid Challenge            User Key  (r) = Recorded By, (t) = Taken By, (c) = Cosigned By    Initials Name Provider Type    MALIHA Graf PA-C Physician Assistant                MDM  Number of Diagnoses or Management Options  Multifocal pneumonia: new and requires workup  Severe sepsis [facility]): new and requires workup  Diagnosis management comments: DDx including but not limited to:  Pneumonia, uti, intraabdominal infection, meningitis, sepsis  Plan: Cardiac workup          Amount and/or Complexity of Data Reviewed  Clinical lab tests: ordered and reviewed  Tests in the radiology section of CPT®: ordered and reviewed  Independent visualization of images, tracings, or specimens: yes    Risk of Complications, Morbidity, and/or Mortality  Presenting problems: moderate  Management options: moderate  General comments: 58-year-old female patient here with respiratory distress, sepsis from pneumonia  Leukocytosis  Persistent tachycardia despite fluid bolus  Given her significant a KI compared to prior creatinine CT scan was obtained to rule out potential obstruction including to rule septic stone  This revealed multifocal pneumonia  She has been maintained on high-flow nasal cannula was doing well on this  Suspect her a KI persistent tachycardia is from profound dehydration and hypovolemia  Discussed with ICU, will continue fluid boluses and admit from step-down unit for continued care  Patient agrees with plan  Patient Progress  Patient progress: stable        Disposition  Final diagnoses:   Multifocal pneumonia   Severe sepsis ([address] )     Time reflects when diagnosis was documented in both MDM as applicable and the Disposition within this note     Time User Action Codes Description Comment    1/12/2020  8:05 PM Felton Guido Add [J18 9] Multifocal pneumonia     1/12/2020  8:05 PM Felton Guido Add [A41 9,  R65 20] Severe sepsis [facility])       ED Disposition     ED Disposition Condition Date/Time Comment    Admit Stable Sun Jan 12, 2020  8:04 PM Case was discussed with Dr Mariia Patel and the patient's admission status was agreed to be Admission Status: inpatient status to the service of Dr Ana Laura Brooks   Follow-up Information    None         Patient's Medications   Discharge Prescriptions    No medications on file     No discharge procedures on file      ED Provider  Electronically Signed by           Ana Monterroso PA-C  01/12/20 2057

## 2020-01-12 NOTE — RESPIRATORY THERAPY NOTE
RT Ventilator Management Note  Shira Adams 53 y o  female MRN: [id number]  Unit/Bed#: ED 27 Encounter: [id number]      Daily Screen     No data found in the last 10 encounters  Physical Exam:   Assessment Type: Assess only  General Appearance: Drowsy, Eyes open/responds to voice  Respiratory Pattern: Irregular, Shallow, Spontaneous, Assisted  Chest Assessment: Chest expansion symmetrical  Bilateral Breath Sounds: Clear, Diminished  Cough: None  O2 Device: HFNC  Subjective Data: lethargic but responds to verbal commands      Resp Comments: patient found on NRB mask with shallow breathing very drowsy appearing with minimal respiratory distress pateint placed [facility] and oxygenation   tolerating settings well at this time

## 2020-01-13 PROBLEM — E87.1 HYPONATREMIA: Status: RESOLVED | Noted: 2018-02-02 | Resolved: 2020-01-13

## 2020-01-13 PROBLEM — E80.6 HYPERBILIRUBINEMIA: Status: RESOLVED | Noted: 2018-12-22 | Resolved: 2020-01-13

## 2020-01-13 LAB
ALBUMIN SERPL BCP-MCNC: 2.4 G/DL (ref 3.5–5)
ALP SERPL-CCNC: 112 U/L (ref 46–116)
ALT SERPL W P-5'-P-CCNC: 52 U/L (ref 12–78)
ANION GAP SERPL CALCULATED.3IONS-SCNC: 8 MMOL/L (ref 4–13)
AST SERPL W P-5'-P-CCNC: 79 U/L (ref 5–45)
ATRIAL RATE: 135 BPM
BASE EXCESS BLDA CALC-SCNC: -5.6 MMOL/L
BASOPHILS NFR BLD AUTO: 0 % (ref 0–1)
BILIRUB SERPL-MCNC: 0.6 MG/DL (ref 0.2–1)
BUN SERPL-MCNC: 24 MG/DL (ref 5–25)
CA-I BLD-SCNC: 1.06 MMOL/L (ref 1.12–1.32)
CALCIUM SERPL-MCNC: 7.9 MG/DL (ref 8.3–10.1)
CHLORIDE SERPL-SCNC: 103 MMOL/L (ref 100–108)
CO2 SERPL-SCNC: 26 MMOL/L (ref 21–32)
CREAT SERPL-MCNC: 1.34 MG/DL (ref 0.6–1.3)
EOSINOPHIL NFR BLD AUTO: 1 % (ref 0–6)
ERYTHROCYTE [DISTWIDTH] IN BLOOD BY AUTOMATED COUNT: 14.6 % (ref 11.6–15.1)
GFR SERPL CREATININE-BSD FRML MDRD: 45 ML/MIN/1.73SQ M
GLUCOSE SERPL-MCNC: 119 MG/DL (ref 65–140)
GLUCOSE SERPL-MCNC: 135 MG/DL (ref 65–140)
GLUCOSE SERPL-MCNC: 144 MG/DL (ref 65–140)
GLUCOSE SERPL-MCNC: 154 MG/DL (ref 65–140)
GLUCOSE SERPL-MCNC: 162 MG/DL (ref 65–140)
GLUCOSE SERPL-MCNC: 182 MG/DL (ref 65–140)
GLUCOSE SERPL-MCNC: 195 MG/DL (ref 65–140)
GLUCOSE SERPL-MCNC: 207 MG/DL (ref 65–140)
GLUCOSE SERPL-MCNC: 231 MG/DL (ref 65–140)
GLUCOSE SERPL-MCNC: 232 MG/DL (ref 65–140)
GLUCOSE SERPL-MCNC: 282 MG/DL (ref 65–140)
HCO3 BLDA-SCNC: 21.3 MMOL/L (ref 22–28)
HCT VFR BLD AUTO: 32.1 % (ref 34.8–46.1)
HGB BLD-MCNC: 10 G/DL (ref 11.5–15.4)
HOROWITZ INDEX BLDA+IHG-RTO: 40 MM[HG]
IMM GRANULOCYTES NFR BLD AUTO: 1 % (ref 0–2)
INR PPP: 1.27 (ref 0.84–1.19)
L PNEUMO1 AG UR QL IA.RAPID: NEGATIVE
L PNEUMO1 AG UR QL IA.RAPID: NEGATIVE
LYMPHOCYTES NFR BLD AUTO: 8 % (ref 14–44)
MAGNESIUM SERPL-MCNC: 1.9 MG/DL (ref 1.6–2.6)
MCH RBC QN AUTO: 26.7 PG (ref 26.8–34.3)
MCHC RBC AUTO-ENTMCNC: 31.2 G/DL (ref 31.4–37.4)
MCV RBC AUTO: 86 FL (ref 82–98)
MONOCYTES NFR BLD AUTO: 6 % (ref 4–12)
NEUTS SEG NFR BLD AUTO: 84 % (ref 43–75)
NRBC BLD AUTO-RTO: 0 /100 WBCS
O2 CT BLDA-SCNC: 14.2 ML/DL (ref 16–23)
OXYHGB MFR BLDA: 95.4 % (ref 94–97)
P AXIS: 67 DEGREES
PCO2 BLDA: 48.4 MM HG (ref 36–44)
PEEP RESPIRATORY: 8 CM[H2O]
PH BLDA: 7.26 [PH] (ref 7.35–7.45)
PHOSPHATE SERPL-MCNC: 4.1 MG/DL (ref 2.7–4.5)
PLATELET # BLD AUTO: 189 THOUSANDS/UL (ref 149–390)
PMV BLD AUTO: 12.6 FL (ref 8.9–12.7)
PO2 BLDA: 98.1 MM HG (ref 75–129)
POTASSIUM SERPL-SCNC: 4.5 MMOL/L (ref 3.5–5.3)
PR INTERVAL: 142 MS
PROCALCITONIN SERPL-MCNC: 30.02 NG/ML
PROCALCITONIN SERPL-MCNC: 6.13 NG/ML
PROT SERPL-MCNC: 5.9 G/DL (ref 6.4–8.2)
PROTHROMBIN TIME: 15.9 SECONDS (ref 11.6–14.5)
QRS AXIS: 8 DEGREES
QRSD INTERVAL: 68 MS
QT INTERVAL: 290 MS
QTC INTERVAL: 435 MS
RBC # BLD AUTO: 3.74 MILLION/UL (ref 3.81–5.12)
S PNEUM AG UR QL: NEGATIVE
S PNEUM AG UR QL: NEGATIVE
SODIUM SERPL-SCNC: 137 MMOL/L (ref 136–145)
SPECIMEN SOURCE: ABNORMAL
T WAVE AXIS: 50 DEGREES
VENT AC: 16
VENT- AC: AC
VENTRICULAR RATE: 135 BPM
VT SETTING VENT: 350 ML
WBC # BLD AUTO: 28.97 THOUSAND/UL (ref 4.31–10.16)

## 2020-01-13 PROCEDURE — 93010 ELECTROCARDIOGRAM REPORT: CPT | Performed by: INTERNAL MEDICINE

## 2020-01-13 PROCEDURE — 87147 CULTURE TYPE IMMUNOLOGIC: CPT | Performed by: PHYSICIAN ASSISTANT

## 2020-01-13 PROCEDURE — 87081 CULTURE SCREEN ONLY: CPT | Performed by: PHYSICIAN ASSISTANT

## 2020-01-13 PROCEDURE — 36600 WITHDRAWAL OF ARTERIAL BLOOD: CPT

## 2020-01-13 PROCEDURE — 84145 PROCALCITONIN (PCT): CPT | Performed by: NURSE PRACTITIONER

## 2020-01-13 PROCEDURE — 82805 BLOOD GASES W/O2 SATURATION: CPT | Performed by: NURSE PRACTITIONER

## 2020-01-13 PROCEDURE — 85610 PROTHROMBIN TIME: CPT | Performed by: NURSE PRACTITIONER

## 2020-01-13 PROCEDURE — 94760 N-INVAS EAR/PLS OXIMETRY 1: CPT

## 2020-01-13 PROCEDURE — 94640 AIRWAY INHALATION TREATMENT: CPT

## 2020-01-13 PROCEDURE — 99233 SBSQ HOSP IP/OBS HIGH 50: CPT | Performed by: ANESTHESIOLOGY

## 2020-01-13 PROCEDURE — 84100 ASSAY OF PHOSPHORUS: CPT | Performed by: NURSE PRACTITIONER

## 2020-01-13 PROCEDURE — 85025 COMPLETE CBC W/AUTO DIFF WBC: CPT | Performed by: NURSE PRACTITIONER

## 2020-01-13 PROCEDURE — 82948 REAGENT STRIP/BLOOD GLUCOSE: CPT

## 2020-01-13 PROCEDURE — 83735 ASSAY OF MAGNESIUM: CPT | Performed by: NURSE PRACTITIONER

## 2020-01-13 PROCEDURE — 80053 COMPREHEN METABOLIC PANEL: CPT | Performed by: NURSE PRACTITIONER

## 2020-01-13 PROCEDURE — 82330 ASSAY OF CALCIUM: CPT | Performed by: NURSE PRACTITIONER

## 2020-01-13 RX ORDER — MAGNESIUM SULFATE HEPTAHYDRATE 40 MG/ML
2 INJECTION, SOLUTION INTRAVENOUS ONCE
Status: COMPLETED | OUTPATIENT
Start: 2020-01-13 | End: 2020-01-13

## 2020-01-13 RX ORDER — METRONIDAZOLE 500 MG/1
500 TABLET ORAL EVERY 8 HOURS SCHEDULED
Status: DISCONTINUED | OUTPATIENT
Start: 2020-01-13 | End: 2020-01-16 | Stop reason: HOSPADM

## 2020-01-13 RX ORDER — AMOXICILLIN 250 MG
2 CAPSULE ORAL
Status: DISCONTINUED | OUTPATIENT
Start: 2020-01-13 | End: 2020-01-16 | Stop reason: HOSPADM

## 2020-01-13 RX ORDER — ACETAMINOPHEN 160 MG/5ML
650 SUSPENSION, ORAL (FINAL DOSE FORM) ORAL EVERY 6 HOURS PRN
Status: DISCONTINUED | OUTPATIENT
Start: 2020-01-13 | End: 2020-01-16 | Stop reason: HOSPADM

## 2020-01-13 RX ORDER — CALCIUM GLUCONATE 20 MG/ML
2 INJECTION, SOLUTION INTRAVENOUS ONCE
Status: COMPLETED | OUTPATIENT
Start: 2020-01-13 | End: 2020-01-13

## 2020-01-13 RX ORDER — CALCIUM GLUCONATE 20 MG/ML
2 INJECTION, SOLUTION INTRAVENOUS ONCE
Status: DISCONTINUED | OUTPATIENT
Start: 2020-01-13 | End: 2020-01-13

## 2020-01-13 RX ORDER — POLYETHYLENE GLYCOL 3350 17 G/17G
17 POWDER, FOR SOLUTION ORAL DAILY
Status: DISCONTINUED | OUTPATIENT
Start: 2020-01-13 | End: 2020-01-16 | Stop reason: HOSPADM

## 2020-01-13 RX ORDER — METOCLOPRAMIDE 10 MG/1
10 TABLET ORAL
Status: DISCONTINUED | OUTPATIENT
Start: 2020-01-13 | End: 2020-01-14

## 2020-01-13 RX ADMIN — SODIUM CHLORIDE, SODIUM GLUCONATE, SODIUM ACETATE, POTASSIUM CHLORIDE, MAGNESIUM CHLORIDE, SODIUM PHOSPHATE, DIBASIC, AND POTASSIUM PHOSPHATE 75 ML/HR: .53; .5; .37; .037; .03; .012; .00082 INJECTION, SOLUTION INTRAVENOUS at 20:09

## 2020-01-13 RX ADMIN — SENNOSIDES AND DOCUSATE SODIUM 2 TABLET: 8.6; 5 TABLET ORAL at 21:28

## 2020-01-13 RX ADMIN — CHLORHEXIDINE GLUCONATE 0.12% ORAL RINSE 15 ML: 1.2 LIQUID ORAL at 00:39

## 2020-01-13 RX ADMIN — ACETAMINOPHEN 650 MG: 650 SUSPENSION ORAL at 13:11

## 2020-01-13 RX ADMIN — METRONIDAZOLE 500 MG: 500 INJECTION, SOLUTION INTRAVENOUS at 15:33

## 2020-01-13 RX ADMIN — METRONIDAZOLE 500 MG: 500 INJECTION, SOLUTION INTRAVENOUS at 06:55

## 2020-01-13 RX ADMIN — METOCLOPRAMIDE HYDROCHLORIDE 10 MG: 10 TABLET ORAL at 21:28

## 2020-01-13 RX ADMIN — HEPARIN SODIUM 5000 UNITS: 5000 INJECTION INTRAVENOUS; SUBCUTANEOUS at 05:37

## 2020-01-13 RX ADMIN — PRAVASTATIN SODIUM 10 MG: 20 TABLET ORAL at 15:37

## 2020-01-13 RX ADMIN — CHLORHEXIDINE GLUCONATE 0.12% ORAL RINSE 15 ML: 1.2 LIQUID ORAL at 21:28

## 2020-01-13 RX ADMIN — LEVALBUTEROL HYDROCHLORIDE 1.25 MG: 1.25 SOLUTION, CONCENTRATE RESPIRATORY (INHALATION) at 19:56

## 2020-01-13 RX ADMIN — MAGNESIUM SULFATE HEPTAHYDRATE 2 G: 40 INJECTION, SOLUTION INTRAVENOUS at 06:49

## 2020-01-13 RX ADMIN — LEVALBUTEROL HYDROCHLORIDE 1.25 MG: 1.25 SOLUTION, CONCENTRATE RESPIRATORY (INHALATION) at 08:44

## 2020-01-13 RX ADMIN — HEPARIN SODIUM 5000 UNITS: 5000 INJECTION INTRAVENOUS; SUBCUTANEOUS at 13:14

## 2020-01-13 RX ADMIN — CEFEPIME HYDROCHLORIDE 2000 MG: 2 INJECTION, POWDER, FOR SOLUTION INTRAVENOUS at 20:09

## 2020-01-13 RX ADMIN — CHLORHEXIDINE GLUCONATE 0.12% ORAL RINSE 15 ML: 1.2 LIQUID ORAL at 08:36

## 2020-01-13 RX ADMIN — CEFEPIME HYDROCHLORIDE 2000 MG: 2 INJECTION, POWDER, FOR SOLUTION INTRAVENOUS at 08:42

## 2020-01-13 RX ADMIN — IPRATROPIUM BROMIDE 0.5 MG: 0.5 SOLUTION RESPIRATORY (INHALATION) at 19:56

## 2020-01-13 RX ADMIN — INSULIN LISPRO 4 UNITS: 100 INJECTION, SOLUTION INTRAVENOUS; SUBCUTANEOUS at 21:32

## 2020-01-13 RX ADMIN — SODIUM CHLORIDE, SODIUM GLUCONATE, SODIUM ACETATE, POTASSIUM CHLORIDE, MAGNESIUM CHLORIDE, SODIUM PHOSPHATE, DIBASIC, AND POTASSIUM PHOSPHATE 125 ML/HR: .53; .5; .37; .037; .03; .012; .00082 INJECTION, SOLUTION INTRAVENOUS at 05:37

## 2020-01-13 RX ADMIN — CALCIUM GLUCONATE 2 G: 20 INJECTION, SOLUTION INTRAVENOUS at 05:16

## 2020-01-13 RX ADMIN — IPRATROPIUM BROMIDE 0.5 MG: 0.5 SOLUTION RESPIRATORY (INHALATION) at 08:44

## 2020-01-13 RX ADMIN — HEPARIN SODIUM 5000 UNITS: 5000 INJECTION INTRAVENOUS; SUBCUTANEOUS at 21:28

## 2020-01-13 RX ADMIN — VANCOMYCIN HYDROCHLORIDE 1750 MG: 1 INJECTION, POWDER, LYOPHILIZED, FOR SOLUTION INTRAVENOUS at 12:07

## 2020-01-13 RX ADMIN — ACETAMINOPHEN 650 MG: 650 SUSPENSION ORAL at 05:36

## 2020-01-13 RX ADMIN — MONTELUKAST SODIUM 10 MG: 10 TABLET, FILM COATED ORAL at 21:28

## 2020-01-13 RX ADMIN — IPRATROPIUM BROMIDE 0.5 MG: 0.5 SOLUTION RESPIRATORY (INHALATION) at 15:05

## 2020-01-13 RX ADMIN — POLYETHYLENE GLYCOL 3350 17 G: 17 POWDER, FOR SOLUTION ORAL at 08:36

## 2020-01-13 RX ADMIN — METRONIDAZOLE 500 MG: 500 TABLET ORAL at 21:28

## 2020-01-13 RX ADMIN — LEVALBUTEROL HYDROCHLORIDE 1.25 MG: 1.25 SOLUTION, CONCENTRATE RESPIRATORY (INHALATION) at 15:05

## 2020-01-13 NOTE — RESPIRATORY THERAPY NOTE
RT Ventilator Management Note  Gilberto Quinonez 53 y o  female MRN: [id number]  Unit/Bed#:  Encounter: [id number]      Daily Screen       1/13/2020  0433 1/13/2020  0905          Patient safety screen outcome[de-identified]  Failed  Failed      Not Ready for Weaning due to[de-identified]  Failed SAT screen  Underline problem not resolved              Physical Exam:   Assessment Type: Assess only  General Appearance: Awake  Respiratory Pattern: Assisted  Chest Assessment: Chest expansion symmetrical  Bilateral Breath Sounds: Diminished, Clear  Suction: ET Tube  O2 Device: vent      Resp Comments: pt remains intubated on full support  tolerating settigns well  patient consistently breaths over the vent  Peep was weaned from [place] to [address]  Spo2 remains 97% post wean  Lung sounsd are decreased coarse bilaterally  a small amount of thin clear sputum was asspirated via the inline mtz  patient has appropriate gag reflex to this   some bile was also passed through og tube during suction  ET tube remains 24 at lips with two finger width between willams and head  will continue to monitor  no further changes at this time

## 2020-01-13 NOTE — ASSESSMENT & PLAN NOTE
Lab Results   Component Value Date    HGBA1C 10 1 (H) 11/23/2019       Recent Labs     01/12/20  1616 01/12/20  2220 01/12/20  2350 01/13/20  0212   POCGLU 260* 282* 231* 154*       Blood Sugar Average: Last 72 hrs:  (P) 231 75     Hold Reglan for now secondary to ALEJANDRO  NPO present    Avoid noninvasive ventilation secondary to gastroparesis and high risk of aspiration

## 2020-01-13 NOTE — ASSESSMENT & PLAN NOTE
Multifocal pneumonia identified on CT scan  Continue to monitor leukocyte fever curves  Continue broad-spectrum antibiotics with Hcap coverage due to multiple admissions over the last several months  Trend procalcitonin

## 2020-01-13 NOTE — H&P
H&P- Bina Fitting 1965, 53 y o  female MRN: [id number]    Unit/Bed#:  Encounter: [id number]    Primary Care Provider: Lupillo Barcenas MD   Date and time admitted to hospital: 1/12/2020  4:13 PM        Polypharmacy  Assessment & Plan  Concern for polypharmacy drug metabolites sequestration secondary to ALEJANDRO  Patient is currently prescribed high doses of opiates, benzodiazepines, and muscle relaxants  This is concerning in the setting of ALEJANDRO given her somnolence and respiratory failure  Narcan administered x1 with minimal effect  Multifocal pneumonia  Assessment & Plan  Unclear pathogen  Sputum culture if able to produce  Check Legionella and strep pneumoniae urine antigens  Due to toxic appearance, will recheck respiratory panel  Pulmonary toileting  Continue high-flow nasal cannula to maintain oxygen saturations greater than 92%  Currently on 25 L of 55%  ALEJANDRO (acute kidney injury) [facility])  Assessment & Plan  Likely pre renal secondary to poor intake  Patient clinically appears dehydrated  Continue fluid resuscitation  Continue to monitor renal indices and urinary output  Hyperbilirubinemia  Assessment & Plan  Appears chronic in nature  Continue to monitor  Leukocytosis  Assessment & Plan  Multifocal pneumonia identified on CT scan  Continue to monitor leukocyte fever curves  Continue broad-spectrum antibiotics with Hcap coverage due to multiple admissions over the last several months  Trend procalcitonin  Lactic acid within normal limits  Morbid obesity [facility])  Assessment & Plan  Nutrition consultation  Essential hypertension  Assessment & Plan  Hemodynamically stable at present  Hold ACE-inhibitor and diuretic secondary to ALEJANDRO      Diabetic gastroparesis [facility])  Assessment & Plan  Lab Results   Component Value Date    HGBA1C 10 1 (H) 11/23/2019       Recent Labs     01/12/20  1616   POCGLU 260*       Blood Sugar Average: Last 72 hrs:  (P) 260     Hold Reglan for now secondary to ALEJANDRO  NPO present  Avoid noninvasive ventilation secondary to gastroparesis and high risk of aspiration    Hyponatremia  Assessment & Plan  Hypovolemic hyponatremia  Continue fluid resuscitation  Monitor sodiums  Opioid dependence with other opioid-induced disorder [facility])  Assessment & Plan  Patient's opioid dependence concerning in the setting of acute hypercapnic respiratory failure and ALEJANDRO  Patient currently taking 75 mg of extended release morphine twice daily, in addition to 15 mg of instant release 3 times daily  This is being given in conjunction with Valium, Soma, and Lyrica  The additive effects and poor clearance of metabolites are likely contributing to her respiratory failure at this time  Asthma  Assessment & Plan  Continue Singulair and bronchodilators as needed  Respiratory protocol  Type 2 diabetes mellitus with diabetic neuropathy, with long-term current use of insulin [facility])  Assessment & Plan  Lab Results   Component Value Date    HGBA1C 10 1 (H) 11/23/2019       Recent Labs     01/12/20  1616   POCGLU 260*       Blood Sugar Average: Last 72 hrs:  (P) 260     Continue monitor glucoses and utilize insulin drip per protocol  Check hemoglobin A1c, previously poorly controlled  H&P Exam - Critical Care   Chintan Jeter 53 y o  female MRN: [id number]  Unit/Bed#:  Encounter: [id number]      -------------------------------------------------------------------------------------------------------------  Chief Complaint:  Patient states a one-day history of cough and increased work of breathing    History of Present Illness   HX and PE limited by:  Somnolence  Chintan Jeter is a 53 y o  female with past medical history significant for opiate dependence , diabetes , multifocal pneumonia, and gastroparesis who presents with 1 day history of nonproductive cough and increased work of breathing  Patient states she has had poor intake for the past 24-48 hours    She is currently somnolent and unable to answer most questions for me  Patient requiring high-flow nasal cannula 55% at 25 L to sustain oxygen saturations of 92%  CT chest performed in the emergency department reveals multifocal pneumonia, and clinically patient appears to be quite dehydrated  Fluid resuscitation and antibiotics were administered in the emergency department  Critical Care Medicine was consulted for admission to step-down level of care  History obtained from chart review and the patient   -------------------------------------------------------------------------------------------------------------  Disposition: Admit to step-down level 1  Code Status: Level 1 - Full Code  --------------------------------------------------------------------------------------------------------------  Review of Systems    Review of systems was unable to be performed secondary to Somnolence    Physical Exam  GEN:  Ill appearing, appears older than stated age, no acute distress  HEENT:  Sclera anicteric, mucous membranes pink and moist, conjunctiva pink, no mak/rhinorrhea  Neck:  No lymphadenopathy, normal ROM, supple, no bruits  CV :  S1S2, regular, tachycardic, no murmurs, rubs or gallops  Intact distal pulses  No JVD  Resp:  Lungs CTA =B/L, diminished throughout  No subq air or crepitus  Symmetrical expansion  No cough noted    GI :  Abd soft, nontender, no guarding/rebound, nondistended, hypoactive bowel sounds X4 quads, no organomegaly appreciated  Neuro:  CN II-XII grossly intact, nonfocal exam, speech clear, GCS 14-eyes open to voice, follows commands, oriented x3  Psych:  Blunted affect  --------------------------------------------------------------------------------------------------------------  Historical Information   Past Medical History:   Diagnosis Date    Acute respiratory insufficiency 11/23/2019    Asthma     Chronic pain     Diabetes mellitus ([facility] )     Gastroparesis     Sepsis ([facility] ) 11/23/2019     Past Surgical History:   Procedure Laterality Date    CERVICAL LAMINECTOMY      CHOLECYSTECTOMY      CHOLECYSTECTOMY LAPAROSCOPIC N/A 11/10/2018    Procedure: CHOLECYSTECTOMY LAPAROSCOPIC w/ ioc;  Surgeon: Jude Araujo MD;  Location: [facility];  Service: General    HYSTERECTOMY      JOINT REPLACEMENT Bilateral     knee    TOE AMPUTATION Right 2/2/2018    Procedure: AMPUTATION TOE, RIGHT GREAT TOE;  Surgeon: Daniel Stephens DPM;  Location: [facility];  Service: Podiatry     Social History   Social History     Substance and Sexual Activity   Alcohol Use Yes    Frequency: 2-4 times a month    Drinks per session: 1 or 2    Comment: rarely     Social History     Substance and Sexual Activity   Drug Use No     Social History     Tobacco Use   Smoking Status Never Smoker   Smokeless Tobacco Never Used       Family History:   Family History   Problem Relation Age of Onset    Diabetes Mother     Diabetes Maternal Grandmother     No Known Problems Father      Family history unknown    Vitals:   Vitals:    01/12/20 2130 01/12/20 2200 01/12/20 2207 01/12/20 2300   BP: 111/67 134/64  155/93   BP Location: Right arm      Pulse: (!) 117 (!) 125  (!) 112   Resp: (!) 11 (!) 10  13   Temp:    100 4 °F (38 °C)   TempSrc:       SpO2: 95% 96% 95% 99%   Weight:         Temp  Min: 100 °F (37 8 °C)  Max: 101 1 °F (38 4 °C)  IBW: -92 5 kg     Body mass index is 45 45 kg/m²    N/A    Medications:  Current Facility-Administered Medications   Medication Dose Route Frequency    [START ON 1/13/2020] azithromycin (ZITHROMAX) 500 mg in sodium chloride 0 9% 250mL IVPB 500 mg  500 mg Intravenous Q24H    cefepime (MAXIPIME) 2,000 mg in dextrose 5 % 50 mL IVPB  2,000 mg Intravenous Q12H    chlorhexidine (PERIDEX) 0 12 % oral rinse 15 mL  15 mL Swish & Spit Q12H [address]    fentanyl citrate (PF) 100 MCG/2ML **ADS Override Pull**        heparin (porcine) subcutaneous injection 5,000 Units  5,000 Units Subcutaneous [facility] [address]  insulin regular (HumuLIN R,NovoLIN R) 1 Units/mL in sodium chloride 0 9 % 100 mL infusion  0 3-21 Units/hr Intravenous Titrated    [START ON 1/13/2020] ipratropium (ATROVENT) 0 02 % inhalation solution 0 5 mg  0 5 mg Nebulization TID    [START ON 1/13/2020] levalbuterol (XOPENEX) inhalation solution 1 25 mg  1 25 mg Nebulization TID    midazolam (VERSED) 2 mg/2 mL injection **ADS Override Pull**        montelukast (SINGULAIR) tablet 10 mg  10 mg Oral HS    multi-electrolyte (PLASMALYTE-A/ISOLYTE-S PH 7 4) IV solution  125 mL/hr Intravenous Continuous    naloxone (NARCAN) 0 04 mg/mL syringe **ADS Override Pull**        [START ON 1/13/2020] pantoprazole (PROTONIX) EC tablet 20 mg  20 mg Oral Early Morning    [START ON 1/13/2020] pravastatin (PRAVACHOL) tablet 10 mg  10 mg Oral Daily With Dinner    propofol (DIPRIVAN) 1000 mg in 100 mL infusion (premix)  5-50 mcg/kg/min Intravenous Titrated    sodium chloride (OCEAN) 0 65 % nasal spray 2 spray  2 spray Each Nare Q1H PRN    [START ON 1/13/2020] vancomycin (VANCOCIN) 1,750 mg in sodium chloride 0 9 % 500 mL IVPB  20 mg/kg (Adjusted) Intravenous Q24H     Home medications:  Prior to Admission Medications   Prescriptions Last Dose Informant Patient Reported? Taking?    BD PEN NEEDLE HERNAN U/F 32G X 4 MM MISC   Yes No   Insulin Pen Needle 29G X 5MM MISC   No No   Sig: by Does not apply route 4 (four) times a day   LYRICA 150 MG capsule   Yes No   Sig: Take 150 mg by mouth 2 (two) times a day   WIXELA INHUB 500-50 MCG/DOSE inhaler   Yes No   Sig: Take 1 puff by mouth 2 (two) times a day   albuterol (PROVENTIL HFA,VENTOLIN HFA) 90 mcg/act inhaler   Yes No   Sig: Inhale 2 puffs every 4 (four) hours as needed   carisoprodol (SOMA) 350 mg tablet  Self Yes No   Sig: Take 350 mg by mouth 2 (two) times a day   diazepam (VALIUM) 5 mg tablet  Self Yes No   Sig: Take 5 mg by mouth daily at bedtime as needed for anxiety   dicyclomine (BENTYL) 10 mg capsule   No No   Sig: Take 1 capsule (10 mg total) by mouth 4 (four) times a day as needed (As needed for abdominal cramping)   fluticasone (FLONASE) 50 mcg/act nasal spray  Self Yes No   Si spray into each nostril daily   furosemide (LASIX) 20 mg tablet   Yes No   Sig: Take 40 mg by mouth daily   insulin glargine (LANTUS) 100 units/mL subcutaneous injection  Self Yes No   Sig: Inject 50 Units under the skin daily at bedtime     lisinopril (ZESTRIL) 10 mg tablet  Self No No   Sig: Take 1 tablet (10 mg total) by mouth daily   metoclopramide (REGLAN) 10 mg tablet   No No   Sig: TAKE 1 TABLET PO QID BEFORE MEALS AND HS   montelukast (SINGULAIR) 10 mg tablet  Self Yes No   Sig: Take 10 mg by mouth daily at bedtime   morphine (MS CONTIN) 15 mg 12 hr tablet   Yes No   Sig: Take 15 mg by mouth 2 (two) times a day   morphine (MS CONTIN) 60 mg 12 hr tablet   Yes No   Sig: Take 60 mg by mouth 2 (two) times a day   morphine (MSIR) 15 mg tablet  Self Yes No   Sig: Take 15 mg by mouth 3 (three) times a day as needed for moderate pain or severe pain     nystatin (MYCOSTATIN) powder   No No   Sig: Apply topically 2 (two) times a day   omeprazole (PriLOSEC) 20 mg delayed release capsule  Self Yes No   Sig: Take 20 mg by mouth daily   ondansetron (ZOFRAN) 4 mg tablet   No No   Sig: Take 1 tablet (4 mg total) by mouth every 6 (six) hours   pravastatin (PRAVACHOL) 10 mg tablet   Yes No   Sig: Take 10 mg by mouth daily at bedtime      Facility-Administered Medications: None     Allergies:   Allergies   Allergen Reactions    Nsaids GI Intolerance         Laboratory and Diagnostics:  Results from last 7 days   Lab Units 20  1625   WBC Thousand/uL 26 73*   HEMOGLOBIN g/dL 12 4   HEMATOCRIT % 38 5   PLATELETS Thousands/uL 268   NEUTROS PCT % 88*   MONOS PCT % 6     Results from last 7 days   Lab Units 20  1625   SODIUM mmol/L 130*   POTASSIUM mmol/L 4 8   CHLORIDE mmol/L 96*   CO2 mmol/L 25   ANION GAP mmol/L 9   BUN mg/dL 29*   CREATININE mg/dL 2 28*   CALCIUM mg/dL 8 7   GLUCOSE RANDOM mg/dL 270*   ALT U/L 38   AST U/L 60*   ALK PHOS U/L 132*   ALBUMIN g/dL 3 4*   TOTAL BILIRUBIN mg/dL 0 80          Results from last 7 days   Lab Units 01/12/20  1625   INR  1 06   PTT seconds 29      Results from last 7 days   Lab Units 01/12/20  1625   TROPONIN I ng/mL <0 02     Results from last 7 days   Lab Units 01/12/20  1625   LACTIC ACID mmol/L 1 6     ABG:  Results from last 7 days   Lab Units 01/12/20  2204   PH ART  7 185*   PCO2 ART mm Hg 57 5*   PO2 ART mm Hg 121 1   HCO3 ART mmol/L 21 2*   BASE EXC ART mmol/L -7 4   ABG SOURCE  Radial, Right     VBG:  Results from last 7 days   Lab Units 01/12/20  2204   ABG SOURCE  Radial, Right           Micro:          EKG:  Sinus tach  Imaging: I have personally reviewed pertinent reports  and I have personally reviewed pertinent films in PACS    ------------------------------------------------------------------------------------------------------------  Advance Directive and Living Will:      Power of :    POLST:    ------------------------------------------------------------------------------------------------------------  Anticipated Length of Stay is > 2 midnights    Counseling / Coordination of Care  Total Critical Care time spent 43 minutes excluding procedures, teaching and family updates  VIVI Way        Portions of the record may have been created with voice recognition software  Occasional wrong word or "sound a like" substitutions may have occurred due to the inherent limitations of voice recognition software    Read the chart carefully and recognize, using context, where substitutions have occurred

## 2020-01-13 NOTE — PROGRESS NOTES
Progress Note - Gilford Alice 1965, 53 y o  female MRN: [id number]    Unit/Bed#:  Encounter: [id number]    Primary Care Provider: Sophy Coles MD   Date and time admitted to hospital: 1/12/2020  4:13 PM        Polypharmacy  Assessment & Plan  Concern for polypharmacy drug metabolites sequestration secondary to ALEJANDRO  Patient is currently prescribed high doses of opiates, benzodiazepines, and muscle relaxants  This is concerning in the setting of ALEJANDRO given her somnolence and respiratory failure  Acute respiratory failure with hypercapnia [facility])  Assessment & Plan  Patient required intubation for worsening hypercapnic respiratory failure  Polypharmacy in the setting of ALEJANDRO is likely contributing, although multifocal pneumonia is present  Follow ABGs, CXR as needed  Wean ventilator as tolerated  Bronchodilators as needed  VAP prophylaxis  ALEJANDRO (acute kidney injury) [facility])  Assessment & Plan  Likely pre renal secondary to poor intake  Patient clinically appears dehydrated  Continue fluid resuscitation  Continue to monitor renal indices and urinary output  Hyperbilirubinemia  Assessment & Plan  Appears chronic in nature  Continue to monitor  Leukocytosis  Assessment & Plan  Multifocal pneumonia identified on CT scan  Continue to monitor leukocyte fever curves  Continue broad-spectrum antibiotics with Hcap coverage due to multiple admissions over the last several months  Trend procalcitonin  Morbid obesity [facility])  Assessment & Plan  Nutrition consultation  Essential hypertension  Assessment & Plan  Hemodynamically stable at present  Hold ACE-inhibitor and diuretic secondary to ALEJANDRO      Diabetic gastroparesis [facility])  Assessment & Plan  Lab Results   Component Value Date    HGBA1C 10 1 (H) 11/23/2019       Recent Labs     01/12/20  1616 01/12/20  2220 01/12/20  2350 01/13/20  0212   POCGLU 260* 282* 231* 154*       Blood Sugar Average: Last 72 hrs:  (P) 231 75     Hold Reglan for now secondary to ALEJANDRO  NPO present  Avoid noninvasive ventilation secondary to gastroparesis and high risk of aspiration    Hyponatremia  Assessment & Plan  Hypovolemic hyponatremia  Continue fluid resuscitation  Monitor sodiums  Opioid dependence with other opioid-induced disorder [facility])  Assessment & Plan  Patient's opioid dependence concerning in the setting of acute hypercapnic respiratory failure and ALEJANDRO  Patient currently taking 75 mg of extended release morphine twice daily, in addition to 15 mg of instant release 3 times daily  This is being given in conjunction with Valium, Soma, and Lyrica  The additive effects and poor clearance of metabolites are likely contributing to her respiratory failure at this time  Asthma  Assessment & Plan  Continue Singulair and bronchodilators as needed  Respiratory protocol  Type 2 diabetes mellitus with diabetic neuropathy, with long-term current use of insulin [facility])  Assessment & Plan  Lab Results   Component Value Date    HGBA1C 10 1 (H) 11/23/2019       Recent Labs     01/12/20  1616 01/12/20  2220 01/12/20  2350 01/13/20  0212   POCGLU 260* 282* 231* 154*       Blood Sugar Average: Last 72 hrs:  (P) 231 75     Continue monitor glucoses and utilize insulin drip per protocol  Check hemoglobin A1c, previously poorly controlled  * Multifocal pneumonia  Assessment & Plan  Unclear pathogen  Sputum culture if able to produce  Check Legionella and strep pneumoniae urine antigens  Due to toxic appearance, will recheck respiratory panel  Pulmonary toileting  Continue high-flow nasal cannula to maintain oxygen saturations greater than 92%  Currently on 25 L of 55%      Daily Progress Note - Critical Care   Nicole Matute 53 y o  female MRN: [id number]  Unit/Bed#:  Encounter: [id number]        ----------------------------------------------------------------------------------------  HPI/24hr events:  Patient required intubation overnight for hypercapnic respiratory, likely multifactorial to include multifocal pneumonia and polypharmacy  Continued fluid resuscitation for ALEJANDRO  ---------------------------------------------------------------------------------------  SUBJECTIVE  Nonverbal    Review of Systems  Review of systems was unable to be performed secondary to Nonverbal  ---------------------------------------------------------------------------------------  Disposition: Continue Critical Care   Code Status: Level 1 - Full Code  ---------------------------------------------------------------------------------------  ICU CORE MEASURES    PT/OT when appropriate  Encourage IS, early mobilization to prevent atelectasis    Prophylaxis   VTE Pharmacologic Prophylaxis: Heparin  VTE Mechanical Prophylaxis: sequential compression device  Stress Ulcer Prophylaxis: Pantoprazole IV     ABCDE Protocol (if indicated)  Plan to perform spontaneous awakening trial today? Yes  Plan to perform spontaneous breathing trial today? Yes  Obvious barriers to extubation? Not applicable  CAM-ICU: Negative    Invasive Devices Review  Invasive Devices     Peripheral Intravenous Line            Peripheral IV 01/12/20 Right Arm 1 day    Peripheral IV 01/12/20 Left Antecubital less than 1 day    Peripheral IV 01/13/20 Distal;Right;Ventral (anterior) Forearm less than 1 day          Drain            NG/OG/Enteral Tube 16 Fr Left mouth less than 1 day    Urethral Catheter less than 1 day          Airway            ETT  Cuffed 8 mm less than 1 day              Can any invasive devices be discontinued today?  Yes  ---------------------------------------------------------------------------------------  OBJECTIVE  GEN:  Ill appearing, appears older than stated age, no acute distress  HEENT:  Sclera anicteric, mucous membranes pink and moist, conjunctiva pink, no mak/rhinorrhea, ETT/OGT present and secured  Neck:  No lymphadenopathy, normal ROM, supple, no bruits  CV:  S1S2, regular, tachycardia no murmurs, rubs or gallops  Intact distal pulses  No JVD  Resp:  Lungs diminished throughout, upper airway rhonchi  No subq air or crepitus  Symmetrical expansion  No spontaneous productive cough noted  GI:  Abd soft, nontender, no guarding/rebound, nondistended, hypoactive bowel sounds X4 quads, no organomegaly appreciated  Neuro:  CN II-XII grossly intact, nonfocal exam, GCS 10 (E3, V1, M6), +cough, gag, corneal reflexes      Physical Exam    Vitals   Vitals:    20 0200 20 0300 20 0400 20 0433   BP: 101/57 95/50 107/54    Pulse: 104 104 (!) 106 (!) 107   Resp: 16 16 17 16   Temp: 100 4 °F (38 °C) (!) 100 8 °F (38 2 °C) (!) 100 8 °F (38 2 °C)    TempSrc:       SpO2: 99% 99% 99% 98%   Weight:         Temp (24hrs), Av 4 °F (38 °C), Min:100 °F (37 8 °C), Max:101 1 °F (38 4 °C)  Current: Temperature: (!) 100 8 °F (38 2 °C)  /54   Pulse (!) 107   Temp (!) 100 8 °F (38 2 °C)   Resp 16   Wt 136 kg (298 lb 15 1 oz)   SpO2 98%   BMI 45 45 kg/m²      Invasive/non-invasive ventilation settings   Respiratory    Lab Data (Last 4 hours)    None         O2/Vent Data (Last 4 hours)      433          Drager Vent Mode AC/VC+       Patient safety screen outcome: Failed       Resp Rate (BPM) (BPM) 16       VT (mL) (mL) 350       Insp Time (S) (S) 0 9       FIO2 (%) (%) 40       PEEP (cmH2O) (cmH2O) 8       Rise Time (%) (%) 0 2       MV (Obs) 5 02                   Height and Weights      IBW: -92 5 kg  Body mass index is 45 45 kg/m²    Weight (last 2 days)     Date/Time   Weight    20 2108   136 (298 94)    20 1621   131 (288 8)                Intake and Output  I/O        07 -  0700  0701 -  0700    I V  (mL/kg)  4922 1 (36 2)    Total Intake(mL/kg)  4922 1 (36 2)    Urine (mL/kg/hr)  900    Total Output  900    Net  +4022 1                  Nutrition       Diet Orders   (From admission, onward)             Start     Ordered    20  Diet NPO  Diet effective now     Question Answer Comment   Diet Type NPO    RD to adjust diet per protocol? Yes        01/12/20 2107                  Laboratory and Diagnostics:  Results from last 7 days   Lab Units 01/12/20  1625   WBC Thousand/uL 26 73*   HEMOGLOBIN g/dL 12 4   HEMATOCRIT % 38 5   PLATELETS Thousands/uL 268   NEUTROS PCT % 88*   MONOS PCT % 6     Results from last 7 days   Lab Units 01/12/20  1625   SODIUM mmol/L 130*   POTASSIUM mmol/L 4 8   CHLORIDE mmol/L 96*   CO2 mmol/L 25   ANION GAP mmol/L 9   BUN mg/dL 29*   CREATININE mg/dL 2 28*   CALCIUM mg/dL 8 7   GLUCOSE RANDOM mg/dL 270*   ALT U/L 38   AST U/L 60*   ALK PHOS U/L 132*   ALBUMIN g/dL 3 4*   TOTAL BILIRUBIN mg/dL 0 80          Results from last 7 days   Lab Units 01/12/20  1625   INR  1 06   PTT seconds 29      Results from last 7 days   Lab Units 01/12/20  1625   TROPONIN I ng/mL <0 02     Results from last 7 days   Lab Units 01/12/20  1625   LACTIC ACID mmol/L 1 6     ABG:  Results from last 7 days   Lab Units 01/13/20  0039   PH ART  7 262*   PCO2 ART mm Hg 48 4*   PO2 ART mm Hg 98 1   HCO3 ART mmol/L 21 3*   BASE EXC ART mmol/L -5 6   ABG SOURCE  Radial, Right     VBG:  Results from last 7 days   Lab Units 01/13/20  0039   ABG SOURCE  Radial, Right     Results from last 7 days   Lab Units 01/12/20  1625   PROCALCITONIN ng/ml 6 13*       Micro  Results from last 7 days   Lab Units 01/12/20  1821 01/12/20  1627 01/12/20  1626   BLOOD CULTURE   --  Received in Microbiology Lab  Culture in Progress  Received in Microbiology Lab  Culture in Progress  LEGIONELLA URINARY ANTIGEN  Negative  --   --    STREP PNEUMONIAE ANTIGEN, URINE  Negative  --   --        EKG:  Sinus tach  Imaging: I have personally reviewed pertinent reports        Active Medications  Scheduled Meds:  Current Facility-Administered Medications:  azithromycin 500 mg Intravenous Q24H VIVI Farah    calcium gluconate 2 g Intravenous Once [place] all VIVI Sanon    cefepime 2,000 mg Intravenous Q12H Melisa Stroud MD Last Rate: Stopped (01/12/20 2130)   chlorhexidine 15 mL Swish & Spit Q12H [address] VIVI Olvera    fentanyl citrate (PF)        heparin (porcine) 5,000 Units Subcutaneous [facility] [address] VIVI Olvera    insulin regular (HumuLIN R,NovoLIN R) infusion 0 3-21 Units/hr Intravenous Titrated VIVI Khan Last Rate: 1 Units/hr (01/13/20 9285)   ipratropium 0 5 mg Nebulization TID Melisa Stroud MD    levalbuterol 1 25 mg Nebulization TID Melisa Stroud MD    midazolam        montelukast 10 mg Oral HS VIVI Khan    multi-electrolyte 125 mL/hr Intravenous Continuous Melisa Stroud MD Last Rate: 125 mL/hr (01/12/20 2055)   naloxone        pantoprazole 20 mg Oral Early Morning VIVI Khan    pravastatin 10 mg Oral Daily With [facility] VIVI Sanon    propofol 5-50 mcg/kg/min Intravenous Titrated VIVI Khan Last Rate: 10 mcg/kg/min (01/12/20 2329)   sodium chloride 2 spray Each Nare Q1H PRN VIVI Khan    vancomycin 20 mg/kg (Adjusted) Intravenous Q24H VIVI Khan      Continuous Infusions:    insulin regular (HumuLIN R,NovoLIN R) infusion 0 3-21 Units/hr Last Rate: 1 Units/hr (01/13/20 3136)   multi-electrolyte 125 mL/hr Last Rate: 125 mL/hr (01/12/20 2055)   propofol 5-50 mcg/kg/min Last Rate: 10 mcg/kg/min (01/12/20 2329)     PRN Meds:     sodium chloride 2 spray Q1H PRN       Allergies   Allergies   Allergen Reactions    Nsaids GI Intolerance       Advance Directive and Living Will:      Power of :    POLST:        Counseling / Coordination of Care  Total Critical Care time spent 32 minutes excluding procedures, teaching and family updates          VIVI Khan        Portions of the record may have been created with voice recognition software  Occasional wrong word or "sound a like" substitutions may have occurred due to the inherent limitations of voice recognition software    Read the chart carefully and recognize, using context, where substitutions have occurred

## 2020-01-13 NOTE — RESPIRATORY THERAPY NOTE
Pt with resp distress on HFNC, pt's respirations were shallow and maykel  ABG was completed and pt had a pH of 7 18  Urinalysis came back with poly pharmacy, pt was given   4 of narcan but only became slightly more responsive  Pt was intubated to protect her airway and aid in ventilation  Pt has 8mm secured at 24@ the lip with a commercial tube willams  Placement was confirmed with bilateral chest rise, bilateral breath sounds, color change and chest xray  Pt to continue on the vent at this time  01/12/20 3691   Vent Information   Vent ID Aaron   Vent type Drager   Drager Vent Mode AC/VC+   $ Vent Charge-INITIAL Yes   Ventilator Start Yes   Is the patient reintubated? No   $ Pulse Oximetry Spot Check Charge Completed   AC/VC+ Settings   Resp Rate (BPM) 16 BPM   VT (mL) 350 mL   Insp Time (S) 0 9 S   FIO2 (%) 40 %   PEEP (cmH2O) 8 cmH2O   Rise Time (%) 0 2 %   Trigger Sensitivity Flow (LPM) 2 LPM   Humidification Heater   Heater Temp 98 6 °F (37 °C)   AC/VC+ Actuals   Resp Rate (BPM) 16 BPM   VT (mL) 368 mL   MV (Obs) 4 88   MAP (cmH2O) 11 cmH2O   Peak Pressure (cmH2O) 22 cmH2O   I:E Ratio (Obs) 1:3 2   Static Compliance (mL/cmH20) 34 9 mL/cmH2O   Plateau Pressure (cm H2O) 22 2 cm H2O   Heater Temperature (Obs) 87 8 °F (31 °C)   AC/VC+ ALARMS   High Peak Pressure (cmH2O) 45 cmH2O   High Resp Rate (BPM) 35 BPM   High MV (L/min) 13 L/min   Low MV (L/min) 3 L/min   High VT (mL) 900 mL   Maintenance   Alarm (pink) cable attached Yes   Resuscitation bag with peep valve at bedside Yes   Water bag changed No   Circuit changed No   Daily Screen   Patient safety screen outcome: Failed   Not Ready for Weaning due to:  Underline problem not resolved   IHI Ventilator Associated Pneumonia Bundle   Daily Assessment of Readiness to Extubate Not applicable (Comment)   Head of Bed Elevated HOB 30

## 2020-01-13 NOTE — ASSESSMENT & PLAN NOTE
Lab Results   Component Value Date    HGBA1C 10 1 (H) 11/23/2019       Recent Labs     01/13/20  1050 01/13/20  1158 01/13/20  1416 01/13/20  1610   POCGLU 154* 162* 195* 182*       Blood Sugar Average: Last 72 hrs:  (P) 185 [id number]     A1c 10  Begin sliding scale for now, if oral intake adequate, resume home Lantus 50 U

## 2020-01-13 NOTE — ASSESSMENT & PLAN NOTE
Lab Results   Component Value Date    HGBA1C 10 1 (H) 11/23/2019       Recent Labs     01/12/20  1616   POCGLU 260*       Blood Sugar Average: Last 72 hrs:  (P) 260     Continue monitor glucoses and utilize insulin drip per protocol  Check hemoglobin A1c, previously poorly controlled

## 2020-01-13 NOTE — SEPSIS NOTE
Sepsis Note   Gregorio Dies 53 y o  female MRN: [id number]  Unit/Bed#: ED 27 Encounter: [id number]      qSOFA     Row Name 01/12/20 1910 01/12/20 1900 01/12/20 1830 01/12/20 1745 01/12/20 1700    Altered mental status GCS < 15  0            Respiratory Rate > / =22    0  0  0  0    Systolic BP < / =906    0  0  0  0    Q Sofa Score  0  0  0  0  0    Row Name 01/12/20 1649 01/12/20 1645 01/12/20 1630 01/12/20 1621       Altered mental status GCS < 15  0           Respiratory Rate > / =22    0  0  0     Systolic BP < / =481    0  0  0     Q Sofa Score  0  0  0  0         Initial Sepsis Screening     Row Name 01/12/20 1625                Is the patient's history suggestive of a new or worsening infection? (!) Yes (Proceed)  -CO        Suspected source of infection  pneumonia  -CO        Are two or more of the following signs & symptoms of infection both present and new to the patient? (!) Yes (Proceed)  -CO        Indicate SIRS criteria  Hyperthemia > 38 3C (100 9F); Altered mental status; Tachycardia > 90 bpm  -CO        If the answer is yes to both questions, suspicion of sepsis is present          If severe sepsis is present AND tissue hypoperfusion perists in the hour after fluid resuscitation or lactate > 4, the patient meets criteria for SEPTIC SHOCK          Are any of the following organ dysfunction criteria present within 6 hours of suspected infection and SIRS criteria that are NOT considered to be chronic conditions? (!) Yes  -CO        Organ dysfunction  Creatinine > 2 0 mg/dL  -CO        Date of presentation of severe sepsis          Time of presentation of severe sepsis          Tissue hypoperfusion persists in the hour after crystalloid fluid administration, evidenced, by either:          Was hypotension present within one hour of the conclusion of crystalloid fluid administration?   No  -CO        Date of presentation of septic shock          Time of presentation of septic shock   User Key  (r) = Recorded By, (t) = Taken By, (c) = Cosigned By    [address] Name Provider Type    CO Jennifer Bee PA-C Physician Assistant               Default Flowsheet Data (last 720 hours)      Sepsis Reassess     Row Name 01/12/20 7671                   Repeat Volume Status and Tissue Perfusion Assessment Performed    Repeat Volume Status and Tissue Perfusion Assessment Performed  Yes  -CO           Volume Status and Tissue Perfusion Post Fluid Resuscitation * Must Document All *    Vital Signs Reviewed (HR, RR, BP, T)  Yes  -CO        Shock Index Reviewed  Yes  -CO        Arterial Oxygen Saturation Reviewed (POx, SaO2 or SpO2)  Yes (comment %)  -CO        Cardio  (!) Normal S1/S2; Tachycardia; No murmor; No rub or gallop  -CO        Pulmonary  Normal effort  -CO        Capillary Refill  Brisk  -CO        Peripheral Pulses  Radial  -CO        Peripheral Pulse          Skin          Urine output assessed             *OR*   Intensive Monitoring- Must Document One of the Following Four *:    Vital Signs Reviewed          * Central Venous Pressure (CVP or RAP)          * Central Venous Oxygen (SVO2, ScvO2 or Oxygen saturation via central catheter)          * Bedside Cardiovascular US in IVC diameter and % collapse          * Passive Leg Raise OR Crystalloid Challenge            User Key  (r) = Recorded By, (t) = Taken By, (c) = Cosigned By    Initials Name Provider Type    CO Jennifer Bee PA-C Physician Assistant

## 2020-01-13 NOTE — RESPIRATORY THERAPY NOTE
RT Ventilator Management Note  Severa Laming 53 y o  female MRN: [id number]  Unit/Bed#:  Encounter: [id number]      Daily Screen       1/13/2020  0433 1/13/2020  0905          Patient safety screen outcome[de-identified]  Failed  Failed      Not Ready for Weaning due to[de-identified]  Failed SAT screen  Underline problem not resolved              Physical Exam:   Assessment Type: Assess only  General Appearance: Awake  Respiratory Pattern: Assisted  Chest Assessment: Chest expansion symmetrical  Bilateral Breath Sounds: Diminished, Clear  Suction: ET Tube  O2 Device: vent      Resp Comments: Pt remains intubated  Cpap/PS was just initiated at 8/5 with ATC  Patient is tolerating well with appropriate vt/rr  Lung sounds are clear, decreased bilaterally  no suctino at this time  Will continue to monitor  no further changes at this tiem

## 2020-01-13 NOTE — ASSESSMENT & PLAN NOTE
Unclear pathogen  Sputum culture if able to produce  Check Legionella and strep pneumoniae urine antigens  Due to toxic appearance, will recheck respiratory panel  Pulmonary toileting  Continue high-flow nasal cannula to maintain oxygen saturations greater than 92%  Currently on 25 L of 55%

## 2020-01-13 NOTE — ASSESSMENT & PLAN NOTE
Concern for polypharmacy drug metabolites sequestration secondary to ALEJANDRO  Patient is currently prescribed high doses of opiates, benzodiazepines, and muscle relaxants  This is concerning in the setting of ALEJANDRO given her somnolence and respiratory failure  Narcan administered x1 with minimal effect

## 2020-01-13 NOTE — ASSESSMENT & PLAN NOTE
Likely pre renal secondary to poor intake  Patient clinically appears dehydrated  Continue fluid resuscitation  Continue to monitor renal indices and urinary output

## 2020-01-13 NOTE — RESPIRATORY THERAPY NOTE
No SBT attempted at this time, pt was poly pharmacy with hypercapnic resp acidosis  Continue with current vent settings and adjust as tolerated based off of continued ABG results for the patient  01/13/20 0433   Vent Information   Vent ID Aaron   Vent type Drager   Drager Vent Mode AC/VC+   $ Pulse Oximetry Spot Check Charge Completed   SpO2 98 %   AC/VC+ Settings   Resp Rate (BPM) 16 BPM   VT (mL) 350 mL   Insp Time (S) 0 9 S   FIO2 (%) 40 %   PEEP (cmH2O) 8 cmH2O   Rise Time (%) 0 2 %   Trigger Sensitivity Flow (LPM) 2 LPM   Humidification Heater   Heater Temp 98 6 °F (37 °C)   AC/VC+ Actuals   Resp Rate (BPM) 16 BPM   VT (mL) 381 mL   MV (Obs) 5 02   MAP (cmH2O) 11 cmH2O   Peak Pressure (cmH2O) 21 cmH2O   I:E Ratio (Obs) 1:3 2   Static Compliance (mL/cmH20) 35 5 mL/cmH2O   Plateau Pressure (cm H2O) 20 cm H2O   Heater Temperature (Obs) 98 4 °F (36 9 °C)   AC/VC+ ALARMS   High Peak Pressure (cmH2O) 45 cmH2O   High Resp Rate (BPM) 35 BPM   High MV (L/min) 13 L/min   Low MV (L/min) 3 L/min   High VT (mL) 900 mL   Maintenance   Alarm (pink) cable attached Yes   Resuscitation bag with peep valve at bedside Yes   Water bag changed No   Circuit changed No   Daily Screen   Patient safety screen outcome: Failed   Not Ready for Weaning due to:  Failed SAT screen   IHI Ventilator Associated Pneumonia Bundle   Daily Assessment of Readiness to Extubate Not applicable (Comment)   Head of Bed Elevated HOB 30   ETT  Cuffed 8 mm   Placement Date/Time: 01/12/20 c) 1352   Type: Cuffed  Tube Size: 8 mm  Location: Oral  Insertion attempts: 1  Placement Verification: Chest x-ray;End tidal CO2;Symmetrical chest wall movement  Secured at (cm): 27   Secured at (cm) 24   Measured from Lips   Secured Location Right   Site Condition Cool;Dry   Cuff Pressure (cm H2O) 26 cm H2O   HI-LO Suction  Continuous low suction   HI-LO Secretions Small;Blood tinged   HI-LO Intervention Patent

## 2020-01-13 NOTE — ASSESSMENT & PLAN NOTE
Concern for polypharmacy drug metabolites sequestration secondary to ALEJANDRO  Patient is currently prescribed high doses of opiates, benzodiazepines, and muscle relaxants  This is concerning in the setting of ALEJANDRO given her somnolence and respiratory failure

## 2020-01-13 NOTE — PROCEDURES
Intubation  Date/Time: 1/12/2020 10:46 PM  Performed by: VIVI Mix  Authorized by: VIVI Mix     Patient location:  Bedside  Other Assisting Provider: No    Consent:     Consent obtained:  Emergent situation  Universal protocol:     Site/side marked: yes      Immediately prior to procedure, a time out was called: yes      Patient identity confirmed: Anonymous protocol, patient vented/unresponsive  Pre-procedure details:     Patient status:  Altered mental status    Mallampati score:  3    Pretreatment medications:  Midazolam, fentanyl and etomidate    Paralytics:  Rocuronium  Indications:     Indications for intubation: respiratory failure and hypercapnia    Procedure details:     Preoxygenation:  Bag valve mask    CPR in progress: no      Intubation method:  Oral    Oral intubation technique:  Glidescope    Laryngoscope blade: Mac 3    Tube size (mm):  8 0    Tube type:  Cuffed    Number of attempts:  1    Cricoid pressure: yes      Tube visualized through cords: yes    Placement assessment:     ETT to lip:  27    Tube secured with: Adhesive tape    Breath sounds:  Equal and absent over the epigastrium    Placement verification: chest rise, CXR verification, direct visualization, equal breath sounds and ETCO2 detector      CXR findings:  ETT in right main stem    Tube repositioned: yes    Post-procedure details:     Patient tolerance of procedure: Tolerated well, no immediate complications  Comments:      Orig secured at 24cm, when first CXR done tube had migrated to 27cm  Pulled back after first CXR to 24  Tube in proper position after second CXR

## 2020-01-13 NOTE — ASSESSMENT & PLAN NOTE
Multifocal pneumonia identified on CT scan  Continue to monitor leukocyte fever curves  Continue broad-spectrum antibiotics with Hcap coverage due to multiple admissions over the last several months  Trend procalcitonin  Lactic acid within normal limits

## 2020-01-13 NOTE — ASSESSMENT & PLAN NOTE
Patient's opioid dependence concerning in the setting of acute hypercapnic respiratory failure and ALEJANDRO  Patient currently taking 75 mg of extended release morphine twice daily, in addition to 15 mg of instant release 3 times daily  This is being given in conjunction with Valium, Soma, and Lyrica  The additive effects and poor clearance of metabolites are likely contributing to her respiratory failure at this time

## 2020-01-13 NOTE — ASSESSMENT & PLAN NOTE
Lab Results   Component Value Date    HGBA1C 10 1 (H) 11/23/2019       Recent Labs     01/12/20  1616   POCGLU 260*       Blood Sugar Average: Last 72 hrs:  (P) 260     Hold Reglan for now secondary to ALEJANDRO  NPO present    Avoid noninvasive ventilation secondary to gastroparesis and high risk of aspiration

## 2020-01-13 NOTE — PROGRESS NOTES
Vancomycin IV Pharmacy-to-Dose Consultation    Breanne Ortiz is a 53 y o  female who is currently receiving Vancomycin IV with management by the Pharmacy Consult service  Assessment/Plan:  The patient was reviewed  Renal function is stable and no signs or symptoms of nephrotoxicity and/or infusion reactions were documented in the chart  Based on todays assessment, continue current vancomycin (day # 2) dosing of 1750 mg IV q24h, with a plan for trough to be drawn at 1200 on 1-  We will continue to follow the patients culture results and clinical progress daily      Corina Simpson, Pharmacist

## 2020-01-13 NOTE — RESPIRATORY THERAPY NOTE
RT Protocol Note  Yogi Cuevas 53 y o  female MRN: [id number]  Unit/Bed#:  Encounter: [id number]    Assessment    Active Problems:    Type 2 diabetes mellitus with diabetic neuropathy, with long-term current use of insulin (HCC)    Asthma    Uncomplicated opioid dependence (HCC)    Hyponatremia    Diabetic gastroparesis (HCC)    Essential hypertension    Morbid obesity (HCC)    Leukocytosis    Hyperbilirubinemia    ALEJANDRO (acute kidney injury) ([facility] )    Multifocal pneumonia      Home Pulmonary Medications:  Wixela inhub  Albuterol inhaler PRN       Past Medical History:   Diagnosis Date    Acute respiratory insufficiency 11/23/2019    Asthma     Chronic pain     Diabetes mellitus ([place] [address] )     Gastroparesis     Sepsis ([place] [address] ) 11/23/2019     Social History     Socioeconomic History    Marital status: /Civil Union     Spouse name: None    Number of children: None    Years of education: None    Highest education level: None   Occupational History    None   Social Needs    Financial resource strain: None    Food insecurity:     Worry: None     Inability: None    Transportation needs:     Medical: None     Non-medical: None   Tobacco Use    Smoking status: Never Smoker    Smokeless tobacco: Never Used   Substance and Sexual Activity    Alcohol use: Yes     Frequency: 2-4 times a month     Drinks per session: 1 or 2     Comment: rarely    Drug use: No    Sexual activity: Not Currently   Lifestyle    Physical activity:     Days per week: None     Minutes per session: None    Stress: None   Relationships    Social connections:     Talks on phone: None     Gets together: None     Attends [employer] service: None     Active member of club or organization: None     Attends meetings of clubs or organizations: None     Relationship status: None    Intimate partner violence:     Fear of current or ex partner: None     Emotionally abused: None     Physically abused: None     Forced sexual activity: None Other Topics Concern    None   Social History Narrative    None       Subjective    Subjective Data: lethargic but responds to verbal commands    Objective    Physical Exam:   Assessment Type: Assess only  General Appearance: Drowsy, Eyes do not open to any stimulus  Respiratory Pattern: Irregular, Shallow, Spontaneous  Chest Assessment: Chest expansion symmetrical  Bilateral Breath Sounds: Clear, Diminished  Cough: None  O2 Device: Vapotherm    Vitals:  Blood pressure 111/67, pulse (!) 117, temperature 100 °F (37 8 °C), temperature source Oral, resp  rate (!) 11, weight 136 kg (298 lb 15 1 oz), SpO2 95 %, not currently breastfeeding  Imaging and other studies: I have personally reviewed pertinent reports  O2 Device: Vapotherm     Plan    Respiratory Plan: Home Bronchodilator Patient pathway  Airway Clearance Plan: Discontinue Protocol     Resp Comments: Pt was transfered to ICU with vapotherm  Pt has history of asthma for which she takes albuterol inhaler PRN and Wixela inhub, pt currently has shallow respirations pt does not have any adverse breath sounds at this time, will continue with txs  Will continue on HFNC as ordered at this time  Pt is not able to participate in the airway clearance protocol due to her current altered status  No indications for CPT otherwise

## 2020-01-13 NOTE — RESPIRATORY THERAPY NOTE
Patient liberated from mechanical ventilation and placed on 6LNC then weaned to [facility]  Spo2 post wean is 96%  Patient tolerating well  No respiratory distress  No stridor

## 2020-01-14 ENCOUNTER — APPOINTMENT (INPATIENT)
Dept: CT IMAGING | Facility: HOSPITAL | Age: 55
DRG: 208 | End: 2020-01-14
Payer: MEDICARE

## 2020-01-14 PROBLEM — R41.82 AMS (ALTERED MENTAL STATUS): Status: ACTIVE | Noted: 2020-01-14

## 2020-01-14 PROBLEM — N17.9 AKI (ACUTE KIDNEY INJURY) (HCC): Status: RESOLVED | Noted: 2019-09-19 | Resolved: 2020-01-14

## 2020-01-14 LAB
ALBUMIN SERPL BCP-MCNC: 2.7 G/DL (ref 3.5–5)
ALP SERPL-CCNC: 126 U/L (ref 46–116)
ALT SERPL W P-5'-P-CCNC: 48 U/L (ref 12–78)
ANION GAP SERPL CALCULATED.3IONS-SCNC: 10 MMOL/L (ref 4–13)
ANION GAP SERPL CALCULATED.3IONS-SCNC: 12 MMOL/L (ref 4–13)
AST SERPL W P-5'-P-CCNC: 55 U/L (ref 5–45)
BASOPHILS # BLD AUTO: 0.07 THOUSANDS/ΜL (ref 0–0.1)
BASOPHILS NFR BLD AUTO: 0 % (ref 0–1)
BILIRUB SERPL-MCNC: 0.8 MG/DL (ref 0.2–1)
BUN SERPL-MCNC: 6 MG/DL (ref 5–25)
BUN SERPL-MCNC: 8 MG/DL (ref 5–25)
CALCIUM SERPL-MCNC: 8.6 MG/DL (ref 8.3–10.1)
CALCIUM SERPL-MCNC: 8.9 MG/DL (ref 8.3–10.1)
CHLORIDE SERPL-SCNC: 94 MMOL/L (ref 100–108)
CHLORIDE SERPL-SCNC: 97 MMOL/L (ref 100–108)
CHLORIDE UR-SCNC: 171 MMOL/L
CO2 SERPL-SCNC: 25 MMOL/L (ref 21–32)
CO2 SERPL-SCNC: 26 MMOL/L (ref 21–32)
CREAT SERPL-MCNC: 0.59 MG/DL (ref 0.6–1.3)
CREAT SERPL-MCNC: 0.61 MG/DL (ref 0.6–1.3)
CREAT UR-MCNC: <13 MG/DL
EOSINOPHIL # BLD AUTO: 0.1 THOUSAND/ΜL (ref 0–0.61)
EOSINOPHIL NFR BLD AUTO: 1 % (ref 0–6)
ERYTHROCYTE [DISTWIDTH] IN BLOOD BY AUTOMATED COUNT: 14.2 % (ref 11.6–15.1)
EST. AVERAGE GLUCOSE BLD GHB EST-MCNC: 203 MG/DL
GFR SERPL CREATININE-BSD FRML MDRD: 102 ML/MIN/1.73SQ M
GFR SERPL CREATININE-BSD FRML MDRD: 104 ML/MIN/1.73SQ M
GLUCOSE SERPL-MCNC: 234 MG/DL (ref 65–140)
GLUCOSE SERPL-MCNC: 239 MG/DL (ref 65–140)
GLUCOSE SERPL-MCNC: 261 MG/DL (ref 65–140)
GLUCOSE SERPL-MCNC: 275 MG/DL (ref 65–140)
GLUCOSE SERPL-MCNC: 278 MG/DL (ref 65–140)
GLUCOSE SERPL-MCNC: 279 MG/DL (ref 65–140)
GLUCOSE SERPL-MCNC: 279 MG/DL (ref 65–140)
GLUCOSE SERPL-MCNC: 302 MG/DL (ref 65–140)
HBA1C MFR BLD: 8.7 % (ref 4.2–6.3)
HCT VFR BLD AUTO: 34.9 % (ref 34.8–46.1)
HGB BLD-MCNC: 11.2 G/DL (ref 11.5–15.4)
IMM GRANULOCYTES # BLD AUTO: 0.07 THOUSAND/UL (ref 0–0.2)
IMM GRANULOCYTES NFR BLD AUTO: 0 % (ref 0–2)
LYMPHOCYTES # BLD AUTO: 0.7 THOUSANDS/ΜL (ref 0.6–4.47)
LYMPHOCYTES NFR BLD AUTO: 5 % (ref 14–44)
MAGNESIUM SERPL-MCNC: 1.7 MG/DL (ref 1.6–2.6)
MCH RBC QN AUTO: 26.7 PG (ref 26.8–34.3)
MCHC RBC AUTO-ENTMCNC: 32.1 G/DL (ref 31.4–37.4)
MCV RBC AUTO: 83 FL (ref 82–98)
MICROALBUMIN UR-MCNC: 85.4 MG/L (ref 0–20)
MICROALBUMIN/CREAT 24H UR: >657 MG/G CREATININE (ref 0–30)
MONOCYTES # BLD AUTO: 0.52 THOUSAND/ΜL (ref 0.17–1.22)
MONOCYTES NFR BLD AUTO: 3 % (ref 4–12)
NEUTROPHILS # BLD AUTO: 14.16 THOUSANDS/ΜL (ref 1.85–7.62)
NEUTS SEG NFR BLD AUTO: 91 % (ref 43–75)
NRBC BLD AUTO-RTO: 0 /100 WBCS
OSMOLALITY UR/SERPL-RTO: 284 MMOL/KG (ref 282–298)
OSMOLALITY UR: 433 MMOL/KG
PLATELET # BLD AUTO: 178 THOUSANDS/UL (ref 149–390)
PMV BLD AUTO: 12.2 FL (ref 8.9–12.7)
POTASSIUM SERPL-SCNC: 3.5 MMOL/L (ref 3.5–5.3)
POTASSIUM SERPL-SCNC: 3.7 MMOL/L (ref 3.5–5.3)
PROCALCITONIN SERPL-MCNC: 14.51 NG/ML
PROT SERPL-MCNC: 6.9 G/DL (ref 6.4–8.2)
RBC # BLD AUTO: 4.19 MILLION/UL (ref 3.81–5.12)
SODIUM 24H UR-SCNC: 159 MOL/L
SODIUM SERPL-SCNC: 132 MMOL/L (ref 136–145)
SODIUM SERPL-SCNC: 132 MMOL/L (ref 136–145)
WBC # BLD AUTO: 15.62 THOUSAND/UL (ref 4.31–10.16)

## 2020-01-14 PROCEDURE — 84145 PROCALCITONIN (PCT): CPT | Performed by: PHYSICIAN ASSISTANT

## 2020-01-14 PROCEDURE — 85025 COMPLETE CBC W/AUTO DIFF WBC: CPT | Performed by: PHYSICIAN ASSISTANT

## 2020-01-14 PROCEDURE — 70450 CT HEAD/BRAIN W/O DYE: CPT

## 2020-01-14 PROCEDURE — 83036 HEMOGLOBIN GLYCOSYLATED A1C: CPT | Performed by: PHYSICIAN ASSISTANT

## 2020-01-14 PROCEDURE — 82043 UR ALBUMIN QUANTITATIVE: CPT | Performed by: PHYSICIAN ASSISTANT

## 2020-01-14 PROCEDURE — 82436 ASSAY OF URINE CHLORIDE: CPT | Performed by: PHYSICIAN ASSISTANT

## 2020-01-14 PROCEDURE — 80053 COMPREHEN METABOLIC PANEL: CPT | Performed by: PHYSICIAN ASSISTANT

## 2020-01-14 PROCEDURE — 82570 ASSAY OF URINE CREATININE: CPT | Performed by: PHYSICIAN ASSISTANT

## 2020-01-14 PROCEDURE — 83935 ASSAY OF URINE OSMOLALITY: CPT | Performed by: PHYSICIAN ASSISTANT

## 2020-01-14 PROCEDURE — 97163 PT EVAL HIGH COMPLEX 45 MIN: CPT

## 2020-01-14 PROCEDURE — 83735 ASSAY OF MAGNESIUM: CPT | Performed by: PHYSICIAN ASSISTANT

## 2020-01-14 PROCEDURE — 80048 BASIC METABOLIC PNL TOTAL CA: CPT | Performed by: PHYSICIAN ASSISTANT

## 2020-01-14 PROCEDURE — 83930 ASSAY OF BLOOD OSMOLALITY: CPT | Performed by: PHYSICIAN ASSISTANT

## 2020-01-14 PROCEDURE — 82948 REAGENT STRIP/BLOOD GLUCOSE: CPT

## 2020-01-14 PROCEDURE — 99233 SBSQ HOSP IP/OBS HIGH 50: CPT | Performed by: ANESTHESIOLOGY

## 2020-01-14 PROCEDURE — 84300 ASSAY OF URINE SODIUM: CPT | Performed by: PHYSICIAN ASSISTANT

## 2020-01-14 RX ORDER — MAGNESIUM SULFATE HEPTAHYDRATE 40 MG/ML
2 INJECTION, SOLUTION INTRAVENOUS ONCE
Status: COMPLETED | OUTPATIENT
Start: 2020-01-14 | End: 2020-01-14

## 2020-01-14 RX ORDER — LABETALOL 20 MG/4 ML (5 MG/ML) INTRAVENOUS SYRINGE
10 EVERY 4 HOURS PRN
Status: DISCONTINUED | OUTPATIENT
Start: 2020-01-14 | End: 2020-01-16 | Stop reason: HOSPADM

## 2020-01-14 RX ORDER — METOCLOPRAMIDE HYDROCHLORIDE 5 MG/ML
10 INJECTION INTRAMUSCULAR; INTRAVENOUS EVERY 6 HOURS SCHEDULED
Status: DISCONTINUED | OUTPATIENT
Start: 2020-01-14 | End: 2020-01-16 | Stop reason: HOSPADM

## 2020-01-14 RX ORDER — ONDANSETRON 2 MG/ML
4 INJECTION INTRAMUSCULAR; INTRAVENOUS EVERY 6 HOURS
Status: DISCONTINUED | OUTPATIENT
Start: 2020-01-14 | End: 2020-01-16 | Stop reason: HOSPADM

## 2020-01-14 RX ORDER — ALBUTEROL SULFATE 90 UG/1
2 AEROSOL, METERED RESPIRATORY (INHALATION) EVERY 4 HOURS PRN
Status: DISCONTINUED | OUTPATIENT
Start: 2020-01-14 | End: 2020-01-16 | Stop reason: HOSPADM

## 2020-01-14 RX ORDER — ONDANSETRON 2 MG/ML
4 INJECTION INTRAMUSCULAR; INTRAVENOUS EVERY 8 HOURS PRN
Status: DISCONTINUED | OUTPATIENT
Start: 2020-01-14 | End: 2020-01-14

## 2020-01-14 RX ORDER — ONDANSETRON 2 MG/ML
4 INJECTION INTRAMUSCULAR; INTRAVENOUS EVERY 6 HOURS PRN
Status: DISCONTINUED | OUTPATIENT
Start: 2020-01-14 | End: 2020-01-16 | Stop reason: HOSPADM

## 2020-01-14 RX ORDER — DIAZEPAM 5 MG/1
2.5 TABLET ORAL EVERY 8 HOURS
Status: DISCONTINUED | OUTPATIENT
Start: 2020-01-14 | End: 2020-01-14

## 2020-01-14 RX ORDER — DIAZEPAM 5 MG/1
2.5 TABLET ORAL EVERY 8 HOURS
Status: DISCONTINUED | OUTPATIENT
Start: 2020-01-14 | End: 2020-01-16 | Stop reason: HOSPADM

## 2020-01-14 RX ORDER — MORPHINE SULFATE 30 MG/1
60 TABLET, FILM COATED, EXTENDED RELEASE ORAL EVERY 12 HOURS SCHEDULED
Status: DISCONTINUED | OUTPATIENT
Start: 2020-01-14 | End: 2020-01-16 | Stop reason: HOSPADM

## 2020-01-14 RX ORDER — DIAZEPAM 5 MG/ML
5 INJECTION, SOLUTION INTRAMUSCULAR; INTRAVENOUS ONCE
Status: COMPLETED | OUTPATIENT
Start: 2020-01-14 | End: 2020-01-14

## 2020-01-14 RX ADMIN — PRAVASTATIN SODIUM 10 MG: 20 TABLET ORAL at 15:30

## 2020-01-14 RX ADMIN — INSULIN LISPRO 8 UNITS: 100 INJECTION, SOLUTION INTRAVENOUS; SUBCUTANEOUS at 11:36

## 2020-01-14 RX ADMIN — CEFEPIME HYDROCHLORIDE 2000 MG: 2 INJECTION, POWDER, FOR SOLUTION INTRAVENOUS at 08:28

## 2020-01-14 RX ADMIN — INSULIN LISPRO 6 UNITS: 100 INJECTION, SOLUTION INTRAVENOUS; SUBCUTANEOUS at 21:04

## 2020-01-14 RX ADMIN — HEPARIN SODIUM 5000 UNITS: 5000 INJECTION INTRAVENOUS; SUBCUTANEOUS at 21:01

## 2020-01-14 RX ADMIN — DIAZEPAM 2.5 MG: 5 TABLET ORAL at 11:18

## 2020-01-14 RX ADMIN — MORPHINE SULFATE 60 MG: 30 TABLET, FILM COATED, EXTENDED RELEASE ORAL at 21:00

## 2020-01-14 RX ADMIN — MORPHINE SULFATE 60 MG: 30 TABLET, FILM COATED, EXTENDED RELEASE ORAL at 09:01

## 2020-01-14 RX ADMIN — MAGNESIUM SULFATE HEPTAHYDRATE 2 G: 40 INJECTION, SOLUTION INTRAVENOUS at 08:29

## 2020-01-14 RX ADMIN — ONDANSETRON 4 MG: 2 INJECTION INTRAMUSCULAR; INTRAVENOUS at 02:48

## 2020-01-14 RX ADMIN — ONDANSETRON 4 MG: 2 INJECTION INTRAMUSCULAR; INTRAVENOUS at 15:27

## 2020-01-14 RX ADMIN — VANCOMYCIN HYDROCHLORIDE 1750 MG: 1 INJECTION, POWDER, LYOPHILIZED, FOR SOLUTION INTRAVENOUS at 09:10

## 2020-01-14 RX ADMIN — DIAZEPAM 5 MG: 10 INJECTION, SOLUTION INTRAMUSCULAR; INTRAVENOUS at 08:58

## 2020-01-14 RX ADMIN — MONTELUKAST SODIUM 10 MG: 10 TABLET, FILM COATED ORAL at 21:00

## 2020-01-14 RX ADMIN — HEPARIN SODIUM 5000 UNITS: 5000 INJECTION INTRAVENOUS; SUBCUTANEOUS at 13:34

## 2020-01-14 RX ADMIN — LABETALOL 20 MG/4 ML (5 MG/ML) INTRAVENOUS SYRINGE 10 MG: at 05:23

## 2020-01-14 RX ADMIN — METOCLOPRAMIDE HYDROCHLORIDE 10 MG: 5 INJECTION INTRAMUSCULAR; INTRAVENOUS at 05:23

## 2020-01-14 RX ADMIN — METOCLOPRAMIDE HYDROCHLORIDE 10 MG: 5 INJECTION INTRAMUSCULAR; INTRAVENOUS at 23:00

## 2020-01-14 RX ADMIN — CHLORHEXIDINE GLUCONATE 0.12% ORAL RINSE 15 ML: 1.2 LIQUID ORAL at 21:00

## 2020-01-14 RX ADMIN — METOCLOPRAMIDE HYDROCHLORIDE 10 MG: 5 INJECTION INTRAMUSCULAR; INTRAVENOUS at 11:17

## 2020-01-14 RX ADMIN — LABETALOL 20 MG/4 ML (5 MG/ML) INTRAVENOUS SYRINGE 10 MG: at 16:23

## 2020-01-14 RX ADMIN — METRONIDAZOLE 500 MG: 500 TABLET ORAL at 21:00

## 2020-01-14 RX ADMIN — CEFEPIME HYDROCHLORIDE 2000 MG: 2 INJECTION, POWDER, FOR SOLUTION INTRAVENOUS at 20:11

## 2020-01-14 RX ADMIN — ONDANSETRON 4 MG: 2 INJECTION INTRAMUSCULAR; INTRAVENOUS at 21:01

## 2020-01-14 RX ADMIN — HEPARIN SODIUM 5000 UNITS: 5000 INJECTION INTRAVENOUS; SUBCUTANEOUS at 05:23

## 2020-01-14 RX ADMIN — METOCLOPRAMIDE HYDROCHLORIDE 10 MG: 5 INJECTION INTRAMUSCULAR; INTRAVENOUS at 17:27

## 2020-01-14 RX ADMIN — INSULIN LISPRO 6 UNITS: 100 INJECTION, SOLUTION INTRAVENOUS; SUBCUTANEOUS at 15:49

## 2020-01-14 RX ADMIN — METRONIDAZOLE 500 MG: 500 TABLET ORAL at 13:34

## 2020-01-14 RX ADMIN — INSULIN LISPRO 4 UNITS: 100 INJECTION, SOLUTION INTRAVENOUS; SUBCUTANEOUS at 08:29

## 2020-01-14 RX ADMIN — ONDANSETRON 4 MG: 2 INJECTION INTRAMUSCULAR; INTRAVENOUS at 08:28

## 2020-01-14 RX ADMIN — LABETALOL 20 MG/4 ML (5 MG/ML) INTRAVENOUS SYRINGE 10 MG: at 11:13

## 2020-01-14 RX ADMIN — DIAZEPAM 2.5 MG: 5 TABLET ORAL at 20:11

## 2020-01-14 NOTE — PROGRESS NOTES
Progress Note - Rosa Davis 1965, 53 y o  female MRN: [id number]    Unit/Bed#:  Encounter: [id number]    Primary Care Provider: Jenny Solomon MD   Date and time admitted to hospital: 1/12/2020  4:13 PM    Polypharmacy  Assessment & Plan  Would discuss concern for respiratory suppression with outpatient prescriber    Acute respiratory failure with hypercapnia [facility])  Assessment & Plan  Patient successfully liberated from mechanical ventilation on 1/13  Continue scheduled nebulizers  Wean O2 for SpO2>92%  Respiratory protocol      ALEJANDRO (acute kidney injury) ([facility] [address] )  Assessment & Plan  Close intake and output  Daily weights  Trend serum creatinine  Continue IVF pending AM labs    Morbid obesity ([facility] )  Assessment & Plan  Nutrition consultation  Encourage lifestyle changes    Essential hypertension  Assessment & Plan  Continue to hold ACEI and Lasix based on renal function    Diabetic gastroparesis [facility])  Assessment & Plan  Lab Results   Component Value Date    HGBA1C 10 1 (H) 11/23/2019       Recent Labs     01/13/20  1050 01/13/20  1158 01/13/20  1416 01/13/20  1610   POCGLU 154* 162* 195* 182*       Blood Sugar Average: Last 72 hrs:  (P) 185 [id number]     Will resume home Reglan as CrCl greater than 40  Continue home PPI    Opioid dependence with other opioid-induced disorder [facility])  Assessment & Plan  Presently holding home opioids/benzo's  Based on PMED, prescribed by Dr Alona Walker- palliative care/neurology  Would contact outpatient prescriber to discuss concerns with respiratory depression and regimen to begin in the hospital    Asthma  Assessment & Plan  Continue Singulair and scheduled nebulizers  Respiratory protocol    Type 2 diabetes mellitus with diabetic neuropathy, with long-term current use of insulin [facility])  Assessment & Plan  Lab Results   Component Value Date    HGBA1C 10 1 (H) 11/23/2019       Recent Labs     01/13/20  1050 01/13/20  1158 01/13/20  1416 01/13/20  1610   POCGLU 154* 162* 195* 182*       Blood Sugar Average: Last 72 hrs:  (P) 025 [id number]     A1c 10  Begin sliding scale for now, if oral intake adequate, resume home Lantus 50 U    * Multifocal pneumonia  Assessment & Plan  Day 3 of cefepime/Flagyl/vancomycin  Follow culture results, temperature, white count  Trend procalcitonin    Hyperbilirubinemiaresolved as of 1/13/2020  Assessment & Plan  Appears chronic in nature  Continue to monitor       ----------------------------------------------------------------------------------------  HPI/24hr events: Patient successfully liberated from mechanical ventilation yesterday, transitioned to sliding scale insulin    Disposition: Improving  Code Status: Level 1 - Full Code  ---------------------------------------------------------------------------------------  SUBJECTIVE  "I vomited again"    Review of Systems   Constitutional: Negative for activity change, chills, diaphoresis, fatigue and fever  HENT: Negative for trouble swallowing  Respiratory: Positive for cough and shortness of breath  Cardiovascular: Negative for chest pain  Gastrointestinal: Positive for vomiting  Negative for abdominal distention, abdominal pain and nausea  Genitourinary: Negative for difficulty urinating  Musculoskeletal: Positive for joint swelling  Neurological: Positive for weakness and headaches        ---------------------------------------------------------------------------------------  OBJECTIVE    Physical Exam   Constitutional: She is oriented to person, place, and time  She has a sickly appearance  No distress  Nasal cannula in place  HENT:   Head: Normocephalic and atraumatic  Eyes: Pupils are equal, round, and reactive to light  Neck: No JVD present  No tracheal deviation present  Cardiovascular: Regular rhythm and intact distal pulses  Tachycardia present  Pulmonary/Chest: Effort normal  She has decreased breath sounds  Abdominal: Soft   Bowel sounds are normal  She exhibits no distension  There is no tenderness  Genitourinary:   Genitourinary Comments: Singer in place with yellow urine   Musculoskeletal: She exhibits edema (1+ lower extremity edema)  Neurological: She is alert and oriented to person, place, and time  GCS eye subscore is 4  GCS verbal subscore is 5  GCS motor subscore is 6  Skin: Skin is warm and dry  She is not diaphoretic  Vitals   Vitals:    20 1800 20 1900 20 1956 20 2250   BP: 110/71 141/76  145/84   BP Location:    Right arm   Pulse: 97 (!) 108  (!) 109   Resp: 13 14  15   Temp: 99 °F (37 2 °C) 99 3 °F (37 4 °C)  99 9 °F (37 7 °C)   TempSrc:       SpO2: 96% 97% 96% 96%   Weight:       Height:         Temp (24hrs), Av 5 °F (38 1 °C), Min:99 °F (37 2 °C), Max:101 7 °F (38 7 °C)  Current: Temperature: 99 9 °F (37 7 °C)          SpO2: SpO2: 96 %, SpO2 Activity: SpO2 Activity: At Rest, SpO2 Device: O2 Device: Nasal cannula  O2 Flow Rate (L/min): 2 L/min    Invasive/non-invasive ventilation settings   Respiratory    Lab Data (Last 4 hours)    None         O2/Vent Data (Last 4 hours)    None                Height and Weights   Height: 5' 8" (172 7 cm)  IBW: 63 9 kg  Body mass index is 45 45 kg/m²  Weight (last 2 days)     Date/Time   Weight    20 2108   136 (298 94)    20 1621   131 (288 8)              Intake and Output  I/O        0701 -  0700  07 -  0700    I V  (mL/kg) 5172 4 (38) 1615 (11 9)    IV Piggyback  450    Total Intake(mL/kg) 5172 4 (38) 2065 (15 2)    Urine (mL/kg/hr) 1400 2325 (0 7)    Total Output 1400 2325    Net +3772 4 -260                Nutrition       Diet Orders   (From admission, onward)             Start     Ordered    20 1444  Diet Regular; Regular House  Diet effective now     Question Answer Comment   Diet Type Regular    Regular Regular House    RD to adjust diet per protocol?  Yes        20 5420                Laboratory and Diagnostics:  Results from last 7 days   Lab Units 01/13/20  0444 01/12/20  1625   WBC Thousand/uL 28 97* 26 73*   HEMOGLOBIN g/dL 10 0* 12 4   HEMATOCRIT % 32 1* 38 5   PLATELETS Thousands/uL 189 268   NEUTROS PCT % 84* 88*   MONOS PCT % 6 6     Results from last 7 days   Lab Units 01/13/20  0444 01/12/20  1625   SODIUM mmol/L 137 130*   POTASSIUM mmol/L 4 5 4 8   CHLORIDE mmol/L 103 96*   CO2 mmol/L 26 25   ANION GAP mmol/L 8 9   BUN mg/dL 24 29*   CREATININE mg/dL 1 34* 2 28*   CALCIUM mg/dL 7 9* 8 7   GLUCOSE RANDOM mg/dL 135 270*   ALT U/L 52 38   AST U/L 79* 60*   ALK PHOS U/L 112 132*   ALBUMIN g/dL 2 4* 3 4*   TOTAL BILIRUBIN mg/dL 0 60 0 80     Results from last 7 days   Lab Units 01/13/20  0444   MAGNESIUM mg/dL 1 9   PHOSPHORUS mg/dL 4 1      Results from last 7 days   Lab Units 01/13/20  0444 01/12/20  1625   INR  1 27* 1 06   PTT seconds  --  29      Results from last 7 days   Lab Units 01/12/20  1625   TROPONIN I ng/mL <0 02     Results from last 7 days   Lab Units 01/12/20  1625   LACTIC ACID mmol/L 1 6     ABG:  Results from last 7 days   Lab Units 01/13/20  0039   PH ART  7 262*   PCO2 ART mm Hg 48 4*   PO2 ART mm Hg 98 1   HCO3 ART mmol/L 21 3*   BASE EXC ART mmol/L -5 6   ABG SOURCE  Radial, Right     VBG:  Results from last 7 days   Lab Units 01/13/20  0039   ABG SOURCE  Radial, Right     Results from last 7 days   Lab Units 01/13/20  0444 01/12/20  1625   PROCALCITONIN ng/ml 30 02* 6 13*       Micro  Results from last 7 days   Lab Units 01/12/20  2204 01/12/20  1821 01/12/20  1820 01/12/20  1627 01/12/20  1626   BLOOD CULTURE   --   --   --  No Growth at 24 hrs  No Growth at 24 hrs  LEGIONELLA URINARY ANTIGEN  Negative Negative  --   --   --    STREP PNEUMONIAE ANTIGEN, URINE   --  Negative Negative  --   --        EKG: Review of telemetry demonstrates sinus tachycardia  Imaging: I have personally reviewed pertinent reports     and I have personally reviewed pertinent films in PACS    Active Medications  Scheduled Meds:  Current Facility-Administered Medications:  acetaminophen 650 mg Oral Q6H PRN [facility], VIVI    cefepime 2,000 mg Intravenous Q12H [address] MD Last Rate: Stopped (01/13/20 2039)   chlorhexidine 15 mL Swish & Spit Q12H [facility], VIVI    heparin (porcine) 5,000 Units Subcutaneous Q8H [address] Shelbi Sanon, VIVI    insulin lispro 2-12 Units Subcutaneous TID AC Cayetano Arthur, VIVI    insulin lispro 2-12 Units Subcutaneous HS VIVI Mullen    ipratropium 0 5 mg Nebulization TID [address] MD    levalbuterol 1 25 mg Nebulization TID [address] MD    metoclopramide 10 mg Oral 4x Daily (AC & HS) VIVI Mullen    metroNIDAZOLE 500 mg Oral [facility] Cayetano Kahn, VIVI    [facility]elukast 10 [facility] Oral HS [facility]    multi-electrolyte 75 mL/hr Intravenous Continuous Kirstin John Last Rate: 75 mL/hr (01/13/20 2009)   pantoprazole 20 mg Oral Early Morning [facility], VIVI    polyethylene glycol 17 g Oral Daily Luciano Delvalle PA-C    pravastatin 10 mg Oral Daily With [employer], VIVI    senna-docusate sodium 2 tablet Oral HS Luciano Delvalle PA-C    sodium chloride 2 spray Each Nare Q1H PRN [facility], VIVI    vancomycin 20 mg/kg (Adjusted) [facility]venous Q24H [facility] Last Rate: Stopped (01/13/20 1407)     Continuous Infusions:    multi-electrolyte 75 mL/hr Last Rate: 75 mL/hr (01/13/20 2009)     PRN Meds:     acetaminophen 650 mg Q6H PRN   sodium chloride 2 spray Q1H PRN       ---------------------------------------------------------------------------------------  Advance Directive and Living Will:      Power of :    POLST:    ---------------------------------------------------------------------------------------  Counseling / Coordination of Care  Total time spent today 24 minutes   Greater than 50% of total time was spent with the patient and / or family counseling and / or coordination of care  A description of the counseling / coordination of care: plan of care for the day    VIVI Severino      Portions of the record may have been created with voice recognition software  Occasional wrong word or "sound a like" substitutions may have occurred due to the inherent limitations of voice recognition software    Read the chart carefully and recognize, using context, where substitutions have occurred

## 2020-01-14 NOTE — ASSESSMENT & PLAN NOTE
Patient successfully liberated from mechanical ventilation on 1/13  Continue scheduled nebulizers  Wean O2 for SpO2>92%  Respiratory protocol

## 2020-01-14 NOTE — ASSESSMENT & PLAN NOTE
Day 3 of cefepime/Flagyl/vancomycin  Follow culture results, temperature, white count  Trend procalcitonin

## 2020-01-14 NOTE — PLAN OF CARE
Problem: Potential for Falls  Goal: Patient will remain free of falls  Description  INTERVENTIONS:  - Assess patient frequently for physical needs  -  Identify cognitive and physical deficits and behaviors that affect risk of falls  -  [place] fall precautions as indicated by assessment   - Educate patient/family on patient safety including physical limitations  - Instruct patient to call for assistance with activity based on assessment  - Modify environment to reduce risk of injury  - Consider OT/PT consult to assist with strengthening/mobility  Outcome: Progressing     Problem: Prexisting or High Potential for Compromised Skin Integrity  Goal: Skin integrity is maintained or improved  Description  INTERVENTIONS:  - Identify patients at risk for skin breakdown  - Assess and monitor skin integrity  - Assess and monitor nutrition and hydration status  - Monitor labs   - Assess for incontinence   - Turn and reposition patient  - Assist with mobility/ambulation  - Relieve pressure over bony prominences  - Avoid friction and shearing  - Provide appropriate hygiene as needed including keeping skin clean and dry  - Evaluate need for skin moisturizer/barrier cream  - Collaborate with interdisciplinary team   - Patient/family teaching  - Consider wound care consult   Outcome: Progressing     Problem: Nutrition/Hydration-ADULT  Goal: Nutrient/Hydration intake appropriate for improving, restoring or maintaining nutritional needs  Description  Monitor and assess patient's nutrition/hydration status for malnutrition  Collaborate with interdisciplinary team and initiate plan and interventions as ordered  Monitor patient's weight and dietary intake as ordered or per policy  Utilize nutrition screening tool and intervene as necessary  Determine patient's food preferences and provide high-protein, high-caloric foods as appropriate       INTERVENTIONS:  - Monitor oral intake, urinary output, labs, and treatment plans  - Assess nutrition and hydration status and recommend course of action  - Evaluate amount of meals eaten  - Assist patient with eating if necessary   - Allow adequate time for meals  - Recommend/ encourage appropriate diets, oral nutritional supplements, and vitamin/mineral supplements  - Order, calculate, and assess calorie counts as needed  - Recommend, monitor, and adjust tube feedings and TPN/PPN based on assessed needs  - Assess need for intravenous fluids  - Provide nutrition/hydration education as appropriate  - Include patient/family/caregiver in decisions related to nutrition   Outcome: Progressing     Problem: PAIN - ADULT  Goal: Verbalizes/displays adequate comfort level or baseline comfort level  Description  Interventions:  - Encourage patient to monitor pain and request assistance  - Assess pain using appropriate pain scale  - Administer analgesics based on type and severity of pain and evaluate response  - Implement non-pharmacological measures as appropriate and evaluate response  - Consider cultural and social influences on pain and pain management  - Notify physician/advanced practitioner if interventions unsuccessful or patient reports new pain  Outcome: Progressing     Problem: INFECTION - ADULT  Goal: Absence or prevention of progression during hospitalization  Description  INTERVENTIONS:  - Assess and monitor for signs and symptoms of infection  - Monitor lab/diagnostic results  - Monitor all insertion sites, i e  indwelling lines, tubes, and drains  - Monitor endotracheal if appropriate and nasal secretions for changes in amount and color  - [place] appropriate cooling/warming therapies per order  - Administer medications as ordered  - Instruct and encourage patient and family to use good hand hygiene technique  - Identify and instruct in appropriate isolation precautions for identified infection/condition  Outcome: Progressing     Problem: SAFETY ADULT  Goal: Patient will remain free of falls  Description  INTERVENTIONS:  - Assess patient frequently for physical needs  -  Identify cognitive and physical deficits and behaviors that affect risk of falls    -  [place] fall precautions as indicated by assessment   - Educate patient/family on patient safety including physical limitations  - Instruct patient to call for assistance with activity based on assessment  - Modify environment to reduce risk of injury  - Consider OT/PT consult to assist with strengthening/mobility  Outcome: Progressing  Goal: Maintain or return to baseline ADL function  Description  INTERVENTIONS:  -  Assess patient's ability to carry out ADLs; assess patient's baseline for ADL function and identify physical deficits which impact ability to perform ADLs (bathing, care of mouth/teeth, toileting, grooming, dressing, etc )  - Assess/evaluate cause of self-care deficits   - Assess range of motion  - Assess patient's mobility; develop plan if impaired  - Assess patient's need for assistive devices and provide as appropriate  - Encourage maximum independence but intervene and supervise when necessary  - Involve family in performance of ADLs  - Assess for home care needs following discharge   - Consider OT consult to assist with ADL evaluation and planning for discharge  - Provide patient education as appropriate  Outcome: Progressing  Goal: Maintain or return mobility status to optimal level  Description  INTERVENTIONS:  - Assess patient's baseline mobility status (ambulation, transfers, stairs, etc )    - Identify cognitive and physical deficits and behaviors that affect mobility  - Identify mobility aids required to assist with transfers and/or ambulation (gait belt, sit-to-stand, lift, walker, cane, etc )  - [place] fall precautions as indicated by assessment  - Record patient progress and toleration of activity level on Mobility SBAR; progress patient to next Phase/Stage  - Instruct patient to call for assistance with activity based on assessment  - Consider rehabilitation consult to assist with strengthening/weightbearing, etc   Outcome: Progressing     Problem: DISCHARGE PLANNING  Goal: Discharge to home or other facility with appropriate resources  Description  INTERVENTIONS:  - Identify barriers to discharge w/patient and caregiver  - Arrange for needed discharge resources and transportation as appropriate  - Identify discharge learning needs (meds, wound care, etc )  - Arrange for interpretive services to assist at discharge as needed  - Refer to Case Management Department for coordinating discharge planning if the patient needs post-hospital services based on physician/advanced practitioner order or complex needs related to functional status, cognitive ability, or social support system  Outcome: Progressing     Problem: Knowledge Deficit  Goal: Patient/family/caregiver demonstrates understanding of disease process, treatment plan, medications, and discharge instructions  Description  Complete learning assessment and assess knowledge base    Interventions:  - Provide teaching at level of understanding  - Provide teaching via preferred learning methods  Outcome: Progressing     Problem: NEUROSENSORY - ADULT  Goal: Achieves stable or improved neurological status  Description  INTERVENTIONS  - Monitor and report changes in neurological status  - Monitor vital signs such as temperature, blood pressure, glucose, and any other labs ordered   - Initiate measures to prevent increased intracranial pressure  - Monitor for seizure activity and implement precautions if appropriate      Outcome: Progressing     Problem: RESPIRATORY - ADULT  Goal: Achieves optimal ventilation and oxygenation  Description  INTERVENTIONS:  - Assess for changes in respiratory status  - Assess for changes in mentation and behavior  - Position to facilitate oxygenation and minimize respiratory effort  - Oxygen administered by appropriate delivery if ordered  - Initiate smoking cessation education as indicated  - Encourage broncho-pulmonary hygiene including cough, deep breathe, Incentive Spirometry  - Assess the need for suctioning and aspirate as needed  - Assess and instruct to report SOB or any respiratory difficulty  - Respiratory Therapy support as indicated  Outcome: Progressing

## 2020-01-14 NOTE — PLAN OF CARE
Problem: PHYSICAL THERAPY ADULT  Goal: Performs mobility at highest level of function for planned discharge setting  See evaluation for individualized goals  Description  Treatment/Interventions: LE strengthening/ROM, Therapeutic exercise, Endurance training, Patient/family training, Bed mobility, Continued evaluation, Spoke to nursing, Spoke to advanced practitioner, Spoke to MD          See flowsheet documentation for full assessment, interventions and recommendations  Note:   Prognosis: Good  Problem List: Decreased strength, Decreased endurance, Decreased mobility, Obesity  Assessment: pt is a 55y/o f who presents to [facility] c PNA  required intubation 1/12/20 followed by successful extubation 1/13/20  PMH significant for DM 2, asthma, morbid obesity, + chronic pain  at baseline, pt mod (I) c functional mobility c RW or SPC prn  resides c c spouse in 2 story home  currently requires min (A)x1 to complete bed mobility tasks 2* deficits in strength, activity tolerance, + obesity  Barthel Index 25/100  pt able to roll R/L in supine c use of bed rails  refusing OOB mobility assessment at this time + stated, "I don't want to answer any more questions right now"  would benefit from skilled PT to maximize functional mobility + improve quality of life  upon d/c, recommend STR  will further assess OOB mobility next session as appropriate  PT eval of high complexity 2* unstable med status c pt requiring ongoing medical management 2* PNA  pt c multiple co-morbidities + mobility deficits requiring (A)x1  refusing OOB mobility assessment at this time  remains in ICU c multiple lines  Barriers to Discharge: Inaccessible home environment     Recommendation: Short-term skilled PT     PT - OK to Discharge: No    See flowsheet documentation for full assessment

## 2020-01-14 NOTE — ASSESSMENT & PLAN NOTE
Multifactorial in setting of acute infection, and polypharmacy  Will check CT head as patient remains disinhibited  Frequent neuro checks  Delirium precautions  Regulate sleep-wake cycles    Daily CAM assessment

## 2020-01-14 NOTE — ASSESSMENT & PLAN NOTE
Lab Results   Component Value Date    HGBA1C 10 1 (H) 11/23/2019       Recent Labs     01/13/20  1050 01/13/20  1158 01/13/20  1416 01/13/20  1610   POCGLU 154* 162* 195* 182*       Blood Sugar Average: Last 72 hrs:  (P) 185 [id number]     Will resume home Reglan as CrCl greater than 40  Continue home PPI

## 2020-01-14 NOTE — SOCIAL WORK
JOSEE 71  Readmission  Cm met w pt who stated that she resides w her   No POA or AD, did not desire resources  Pt taking awhile to answer cm questions  Pt reported not feeling well and requested cm return at a different time  Cm asked if could contact her ---she reported that  is hospitalized at this time as well  Pt known to cm from recent prior admission  She resides in 2 [place] home w  and son  Uses a cane and rw prn  Drives  Fills medications at [facility]  No issues obtaining medication  Last admission cm refused VNA  Cm awaiting PT eval recs  Cm to follow    CM reviewed discharge planning process including the following: identifying help at home, patient preference for discharge planning needs, pharmacy preference, and availability of treatment team to discuss questions or concerns patient and/or family may have regarding understanding medications and recognizing signs and symptoms once discharged  CM also encouraged patient to follow up with all recommended appointments after discharge  Patient advised of importance for patient and family to participate in managing patients medical well being  CM name and role reviewed  Discharge Checklist reviewed and CM will continue to monitor for progress toward discharge goals in nursing and provider rounds

## 2020-01-14 NOTE — PROGRESS NOTES
Vancomycin Assessment    Anna Marie Gonzalez is a 53 y o  female who is currently receiving vancomycin 1750 mg IV q24h for       Relevant clinical data and objective history reviewed:  Creatinine   Date Value Ref Range Status   01/14/2020 0 61 0 60 - 1 30 mg/dL Final     Comment:     Standardized to IDMS reference method   01/13/2020 1 34 (H) 0 60 - 1 30 mg/dL Final     Comment:     Standardized to IDMS reference method   01/12/2020 2 28 (H) 0 60 - 1 30 mg/dL Final     Comment:     Standardized to IDMS reference method     /80 (BP Location: Right arm)   Pulse (!) 116   Temp 98 7 °F (37 1 °C) (Oral)   Resp 18   Ht 5' 8" (1 727 m)   Wt 133 kg (292 lb 15 9 oz)   SpO2 99%   BMI 44 55 kg/m²   I/O last 3 completed shifts: In: 7687 4 [I V :7237 4; IV Piggyback:450]  Out: 0152 [Urine:7075]  Lab Results   Component Value Date/Time    BUN 8 01/14/2020 04:40 AM    WBC 15 62 (H) 01/14/2020 04:40 AM    HGB 11 2 (L) 01/14/2020 04:40 AM    HCT 34 9 01/14/2020 04:40 AM    MCV 83 01/14/2020 04:40 AM     01/14/2020 04:40 AM     Temp Readings from Last 3 Encounters:   01/14/20 98 7 °F (37 1 °C) (Oral)   12/31/19 98 8 °F (37 1 °C) (Temporal)   12/24/19 98 2 °F (36 8 °C) (Oral)     Vancomycin Days of Therapy: 3    Assessment/Plan  The patient is currently on vancomycin utilizing scheduled dosing  Baseline risks associated with therapy include: concomitant nephrotoxic medications  The patient is receiving 1750 mg IV q24h and after clinical evaluation of renal function will be changed to 1750 mg IV q24h   Pharmacy will continue to follow closely for s/sx of nephrotoxicity, infusion reactions and appropriateness of therapy  BMP and CBC will be ordered per protocol  Plan for trough as patient approaches steady state, 30 min prior to the  dose at approximately 0700 tomorrow 1-  Pharmacy will continue to follow the patients culture results and clinical progress daily      Beverley Porras, Pharmacist

## 2020-01-14 NOTE — PHYSICAL THERAPY NOTE
PT Evaluation (13min)  (13:00-13:13)    Past Medical History:   Diagnosis Date    Acute respiratory insufficiency 11/23/2019    Asthma     Chronic pain     Diabetes mellitus ([facility] [address] )     Gastroparesis     Sepsis ([facility] [address] ) 11/23/2019 01/14/20 1306   Note Type   Note type Eval only   Pain Assessment   Pain Assessment No/denies pain   Home Living   Type of Home House   Home Layout Two level   Bathroom Shower/Tub Tub/shower unit   Bathroom Toilet Standard   Home Equipment Walker;Cane   Prior Function   Level of [place] Independent with ADLs and functional mobility  (ambulates c RW or SPC prn)   Lives With Spouse   Receives Help From Family   ADL Assistance Independent   IADLs Independent   Falls in the last 6 months 0   Vocational On disability   Restrictions/Precautions   Weight Bearing Precautions Per Order No   Other Precautions Bed Alarm; Chair Alarm;Telemetry; Fall Risk   General   Additional Pertinent History pt presents to [facility] c PNA  required intubation 1/12/20 followed by successful extubation 1/13/20  PT consulted for mobility + d/c planning  up c (A)  Family/Caregiver Present No   Cognition   Orientation Level Oriented to person  (pt unwilling to answer further questions)   RUE Assessment   RUE Assessment WFL  (grossly 3/5 functionally)   LUE Assessment   LUE Assessment WFL  (grossly 3/5 functionally)   RLE Assessment   RLE Assessment WFL  (4-/5)   LLE Assessment   LLE Assessment WFL  (4-/5)   Coordination   Sensation WFL   Bed Mobility   Rolling R 4  Minimal assistance   Additional items Assist x 1;Bedrails;Verbal cues; Increased time required;LE management   Rolling L 4  Minimal assistance   Additional items Assist x 1;Bedrails; Increased time required;Verbal cues   Supine to Sit Unable to assess  (pt refused)   Transfers   Sit to Stand Unable to assess   Ambulation/Elevation   Gait pattern Not tested   Activity Tolerance   Activity Tolerance Patient limited by fatigue   Nurse Made Aware RN Nisha present during PT session + aware pt refusing OOB mobility assessment  Assessment   Prognosis Good   Problem List Decreased strength;Decreased endurance;Decreased mobility;Obesity   Assessment pt is a 55y/o f who presents to [facility] c PNA  required intubation 1/12/20 followed by successful extubation 1/13/20  PMH significant for DM 2, asthma, morbid obesity, + chronic pain  at baseline, pt mod (I) c functional mobility c RW or SPC prn  resides c c spouse in 2 story home  currently requires min (A)x1 to complete bed mobility tasks 2* deficits in strength, activity tolerance, + obesity  Barthel Index 25/100  pt able to roll R/L in supine c use of bed rails  refusing OOB mobility assessment at this time + stated, "I don't want to answer any more questions right now"  would benefit from skilled PT to maximize functional mobility + improve quality of life  upon d/c, recommend STR  will further assess OOB mobility next session as appropriate  PT eval of high complexity 2* unstable med status c pt requiring ongoing medical management 2* PNA  pt c multiple co-morbidities + mobility deficits requiring (A)x1  refusing OOB mobility assessment at this time  remains in ICU c multiple lines  Barriers to Discharge Inaccessible home environment   Goals   Patient Goals none expressed   STG Expiration Date 01/24/20   Short Term Goal #1 1  increase strength 1/2 grade to improve overall functional mobility, 2  perform bed mobility mod (I) to decrease caregiver burden, 3  improve Barthel Index 10pts to improve quality of life; revise goals as appropriate once OOB mobility assessed   Plan   Treatment/Interventions LE strengthening/ROM; Therapeutic exercise; Endurance training;Patient/family training;Bed mobility;Continued evaluation;Spoke to nursing;Spoke to advanced practitioner;Spoke to MD   PT Frequency Other (Comment)  (3-5x/wk)   Recommendation   Recommendation Short-term skilled PT   PT - OK to Discharge No   Modified Karuna Scale   Modified Benge Scale 4   Barthel Index   Feeding 5   Bathing 0   Grooming Score 0   Dressing Score 5   Bladder Score 5   Bowels Score 5   Toilet Use Score 5   Transfers (Bed/Chair) Score 0   Mobility (Level Surface) Score 0   Stairs Score 0   Barthel Index Score 25     Alyce Lux, PT, DPT

## 2020-01-14 NOTE — ASSESSMENT & PLAN NOTE
Presently holding home opioids/benzo's  Based on PMED, prescribed by Dr Ortiz [place]- palliative care/neurology  Would contact outpatient prescriber to discuss concerns with respiratory depression and regimen to begin in the hospital

## 2020-01-15 LAB
ALBUMIN SERPL BCP-MCNC: 3.1 G/DL (ref 3.5–5)
ALP SERPL-CCNC: 131 U/L (ref 46–116)
ALT SERPL W P-5'-P-CCNC: 42 U/L (ref 12–78)
ANION GAP SERPL CALCULATED.3IONS-SCNC: 9 MMOL/L (ref 4–13)
AST SERPL W P-5'-P-CCNC: 36 U/L (ref 5–45)
BASOPHILS # BLD AUTO: 0.07 THOUSANDS/ΜL (ref 0–0.1)
BASOPHILS NFR BLD AUTO: 1 % (ref 0–1)
BILIRUB SERPL-MCNC: 1 MG/DL (ref 0.2–1)
BUN SERPL-MCNC: 5 MG/DL (ref 5–25)
CALCIUM SERPL-MCNC: 9 MG/DL (ref 8.3–10.1)
CHLORIDE SERPL-SCNC: 96 MMOL/L (ref 100–108)
CO2 SERPL-SCNC: 30 MMOL/L (ref 21–32)
CREAT SERPL-MCNC: 0.54 MG/DL (ref 0.6–1.3)
EOSINOPHIL # BLD AUTO: 0.09 THOUSAND/ΜL (ref 0–0.61)
EOSINOPHIL NFR BLD AUTO: 1 % (ref 0–6)
ERYTHROCYTE [DISTWIDTH] IN BLOOD BY AUTOMATED COUNT: 13.9 % (ref 11.6–15.1)
GFR SERPL CREATININE-BSD FRML MDRD: 107 ML/MIN/1.73SQ M
GLUCOSE SERPL-MCNC: 228 MG/DL (ref 65–140)
GLUCOSE SERPL-MCNC: 241 MG/DL (ref 65–140)
GLUCOSE SERPL-MCNC: 247 MG/DL (ref 65–140)
GLUCOSE SERPL-MCNC: 248 MG/DL (ref 65–140)
GLUCOSE SERPL-MCNC: 259 MG/DL (ref 65–140)
HCT VFR BLD AUTO: 36.9 % (ref 34.8–46.1)
HGB BLD-MCNC: 12.2 G/DL (ref 11.5–15.4)
IMM GRANULOCYTES # BLD AUTO: 0.07 THOUSAND/UL (ref 0–0.2)
IMM GRANULOCYTES NFR BLD AUTO: 1 % (ref 0–2)
LYMPHOCYTES # BLD AUTO: 1.23 THOUSANDS/ΜL (ref 0.6–4.47)
LYMPHOCYTES NFR BLD AUTO: 9 % (ref 14–44)
MAGNESIUM SERPL-MCNC: 1.5 MG/DL (ref 1.6–2.6)
MCH RBC QN AUTO: 26.6 PG (ref 26.8–34.3)
MCHC RBC AUTO-ENTMCNC: 33.1 G/DL (ref 31.4–37.4)
MCV RBC AUTO: 81 FL (ref 82–98)
MONOCYTES # BLD AUTO: 0.86 THOUSAND/ΜL (ref 0.17–1.22)
MONOCYTES NFR BLD AUTO: 6 % (ref 4–12)
NEUTROPHILS # BLD AUTO: 11.09 THOUSANDS/ΜL (ref 1.85–7.62)
NEUTS SEG NFR BLD AUTO: 82 % (ref 43–75)
NRBC BLD AUTO-RTO: 0 /100 WBCS
PHOSPHATE SERPL-MCNC: 2.1 MG/DL (ref 2.7–4.5)
PLATELET # BLD AUTO: 221 THOUSANDS/UL (ref 149–390)
PMV BLD AUTO: 12.4 FL (ref 8.9–12.7)
POTASSIUM SERPL-SCNC: 3.1 MMOL/L (ref 3.5–5.3)
PROT SERPL-MCNC: 7.5 G/DL (ref 6.4–8.2)
RBC # BLD AUTO: 4.58 MILLION/UL (ref 3.81–5.12)
SODIUM SERPL-SCNC: 135 MMOL/L (ref 136–145)
WBC # BLD AUTO: 13.41 THOUSAND/UL (ref 4.31–10.16)

## 2020-01-15 PROCEDURE — 80053 COMPREHEN METABOLIC PANEL: CPT | Performed by: PHYSICIAN ASSISTANT

## 2020-01-15 PROCEDURE — 84100 ASSAY OF PHOSPHORUS: CPT | Performed by: PHYSICIAN ASSISTANT

## 2020-01-15 PROCEDURE — 82948 REAGENT STRIP/BLOOD GLUCOSE: CPT

## 2020-01-15 PROCEDURE — 83735 ASSAY OF MAGNESIUM: CPT | Performed by: PHYSICIAN ASSISTANT

## 2020-01-15 PROCEDURE — 99233 SBSQ HOSP IP/OBS HIGH 50: CPT | Performed by: STUDENT IN AN ORGANIZED HEALTH CARE EDUCATION/TRAINING PROGRAM

## 2020-01-15 PROCEDURE — 85025 COMPLETE CBC W/AUTO DIFF WBC: CPT | Performed by: PHYSICIAN ASSISTANT

## 2020-01-15 RX ORDER — LISINOPRIL 10 MG/1
10 TABLET ORAL DAILY
Status: DISCONTINUED | OUTPATIENT
Start: 2020-01-15 | End: 2020-01-16 | Stop reason: HOSPADM

## 2020-01-15 RX ORDER — INSULIN GLARGINE 100 [IU]/ML
4 INJECTION, SOLUTION SUBCUTANEOUS
Status: DISCONTINUED | OUTPATIENT
Start: 2020-01-15 | End: 2020-01-16 | Stop reason: HOSPADM

## 2020-01-15 RX ADMIN — LABETALOL 20 MG/4 ML (5 MG/ML) INTRAVENOUS SYRINGE 10 MG: at 07:52

## 2020-01-15 RX ADMIN — ACETAMINOPHEN 650 MG: 650 SUSPENSION ORAL at 07:47

## 2020-01-15 RX ADMIN — METOCLOPRAMIDE HYDROCHLORIDE 10 MG: 5 INJECTION INTRAMUSCULAR; INTRAVENOUS at 17:31

## 2020-01-15 RX ADMIN — CEFEPIME HYDROCHLORIDE 2000 MG: 2 INJECTION, POWDER, FOR SOLUTION INTRAVENOUS at 21:14

## 2020-01-15 RX ADMIN — PRAVASTATIN SODIUM 10 MG: 20 TABLET ORAL at 17:30

## 2020-01-15 RX ADMIN — CHLORHEXIDINE GLUCONATE 0.12% ORAL RINSE 15 ML: 1.2 LIQUID ORAL at 09:21

## 2020-01-15 RX ADMIN — HEPARIN SODIUM 5000 UNITS: 5000 INJECTION INTRAVENOUS; SUBCUTANEOUS at 14:38

## 2020-01-15 RX ADMIN — DIAZEPAM 2.5 MG: 5 TABLET ORAL at 11:33

## 2020-01-15 RX ADMIN — HEPARIN SODIUM 5000 UNITS: 5000 INJECTION INTRAVENOUS; SUBCUTANEOUS at 05:51

## 2020-01-15 RX ADMIN — MORPHINE SULFATE 60 MG: 30 TABLET, FILM COATED, EXTENDED RELEASE ORAL at 21:10

## 2020-01-15 RX ADMIN — METRONIDAZOLE 500 MG: 500 TABLET ORAL at 05:51

## 2020-01-15 RX ADMIN — METOCLOPRAMIDE HYDROCHLORIDE 10 MG: 5 INJECTION INTRAMUSCULAR; INTRAVENOUS at 05:51

## 2020-01-15 RX ADMIN — ONDANSETRON 4 MG: 2 INJECTION INTRAMUSCULAR; INTRAVENOUS at 21:09

## 2020-01-15 RX ADMIN — ONDANSETRON 4 MG: 2 INJECTION INTRAMUSCULAR; INTRAVENOUS at 02:57

## 2020-01-15 RX ADMIN — ONDANSETRON 4 MG: 2 INJECTION INTRAMUSCULAR; INTRAVENOUS at 14:38

## 2020-01-15 RX ADMIN — LISINOPRIL 10 MG: 10 TABLET ORAL at 17:30

## 2020-01-15 RX ADMIN — METRONIDAZOLE 500 MG: 500 TABLET ORAL at 21:10

## 2020-01-15 RX ADMIN — PANTOPRAZOLE SODIUM 20 MG: 20 TABLET, DELAYED RELEASE ORAL at 05:51

## 2020-01-15 RX ADMIN — INSULIN GLARGINE 4 UNITS: 100 INJECTION, SOLUTION SUBCUTANEOUS at 21:10

## 2020-01-15 RX ADMIN — ONDANSETRON 4 MG: 2 INJECTION INTRAMUSCULAR; INTRAVENOUS at 09:22

## 2020-01-15 RX ADMIN — INSULIN LISPRO 6 UNITS: 100 INJECTION, SOLUTION INTRAVENOUS; SUBCUTANEOUS at 11:34

## 2020-01-15 RX ADMIN — DIAZEPAM 2.5 MG: 5 TABLET ORAL at 03:00

## 2020-01-15 RX ADMIN — DIAZEPAM 2.5 MG: 5 TABLET ORAL at 21:10

## 2020-01-15 RX ADMIN — INSULIN LISPRO 4 UNITS: 100 INJECTION, SOLUTION INTRAVENOUS; SUBCUTANEOUS at 17:34

## 2020-01-15 RX ADMIN — INSULIN LISPRO 4 UNITS: 100 INJECTION, SOLUTION INTRAVENOUS; SUBCUTANEOUS at 21:12

## 2020-01-15 RX ADMIN — INSULIN LISPRO 4 UNITS: 100 INJECTION, SOLUTION INTRAVENOUS; SUBCUTANEOUS at 07:48

## 2020-01-15 RX ADMIN — MORPHINE SULFATE 60 MG: 30 TABLET, FILM COATED, EXTENDED RELEASE ORAL at 09:21

## 2020-01-15 RX ADMIN — HEPARIN SODIUM 5000 UNITS: 5000 INJECTION INTRAVENOUS; SUBCUTANEOUS at 21:09

## 2020-01-15 RX ADMIN — CHLORHEXIDINE GLUCONATE 0.12% ORAL RINSE 15 ML: 1.2 LIQUID ORAL at 21:10

## 2020-01-15 RX ADMIN — METOCLOPRAMIDE HYDROCHLORIDE 10 MG: 5 INJECTION INTRAMUSCULAR; INTRAVENOUS at 11:34

## 2020-01-15 RX ADMIN — MONTELUKAST SODIUM 10 MG: 10 TABLET, FILM COATED ORAL at 21:10

## 2020-01-15 RX ADMIN — CEFEPIME HYDROCHLORIDE 2000 MG: 2 INJECTION, POWDER, FOR SOLUTION INTRAVENOUS at 07:50

## 2020-01-15 NOTE — ASSESSMENT & PLAN NOTE
Lab Results   Component Value Date    HGBA1C 8 7 (H) 01/14/2020       Recent Labs     01/14/20  1547 01/14/20  2046 01/14/20  2103 01/15/20  0732   POCGLU 275* 261* 278* 241*       Blood Sugar Average: Last 72 hrs:  (P) 217 55     A1c 10  Begin sliding scale for now, if oral intake adequate, resume home Lantus 50 U

## 2020-01-15 NOTE — PLAN OF CARE
Problem: Potential for Falls  Goal: Patient will remain free of falls  Description  INTERVENTIONS:  - Assess patient frequently for physical needs  -  Identify cognitive and physical deficits and behaviors that affect risk of falls  -  [place] fall precautions as indicated by assessment   - Educate patient/family on patient safety including physical limitations  - Instruct patient to call for assistance with activity based on assessment  - Modify environment to reduce risk of injury  - Consider OT/PT consult to assist with strengthening/mobility  Outcome: Progressing     Problem: Prexisting or High Potential for Compromised Skin Integrity  Goal: Skin integrity is maintained or improved  Description  INTERVENTIONS:  - Identify patients at risk for skin breakdown  - Assess and monitor skin integrity  - Assess and monitor nutrition and hydration status  - Monitor labs   - Assess for incontinence   - Turn and reposition patient  - Assist with mobility/ambulation  - Relieve pressure over bony prominences  - Avoid friction and shearing  - Provide appropriate hygiene as needed including keeping skin clean and dry  - Evaluate need for skin moisturizer/barrier cream  - Collaborate with interdisciplinary team   - Patient/family teaching  - Consider wound care consult   Outcome: Progressing     Problem: Nutrition/Hydration-ADULT  Goal: Nutrient/Hydration intake appropriate for improving, restoring or maintaining nutritional needs  Description  Monitor and assess patient's nutrition/hydration status for malnutrition  Collaborate with interdisciplinary team and initiate plan and interventions as ordered  Monitor patient's weight and dietary intake as ordered or per policy  Utilize nutrition screening tool and intervene as necessary  Determine patient's food preferences and provide high-protein, high-caloric foods as appropriate       INTERVENTIONS:  - Monitor oral intake, urinary output, labs, and treatment plans  - Assess nutrition and hydration status and recommend course of action  - Evaluate amount of meals eaten  - Assist patient with eating if necessary   - Allow adequate time for meals  - Recommend/ encourage appropriate diets, oral nutritional supplements, and vitamin/mineral supplements  - Order, calculate, and assess calorie counts as needed  - Recommend, monitor, and adjust tube feedings and TPN/PPN based on assessed needs  - Assess need for intravenous fluids  - Provide nutrition/hydration education as appropriate  - Include patient/family/caregiver in decisions related to nutrition   Outcome: Progressing     Problem: PAIN - ADULT  Goal: Verbalizes/displays adequate comfort level or baseline comfort level  Description  Interventions:  - Encourage patient to monitor pain and request assistance  - Assess pain using appropriate pain scale  - Administer analgesics based on type and severity of pain and evaluate response  - Implement non-pharmacological measures as appropriate and evaluate response  - Consider cultural and social influences on pain and pain management  - Notify physician/advanced practitioner if interventions unsuccessful or patient reports new pain  Outcome: Progressing     Problem: INFECTION - ADULT  Goal: Absence or prevention of progression during hospitalization  Description  INTERVENTIONS:  - Assess and monitor for signs and symptoms of infection  - Monitor lab/diagnostic results  - Monitor all insertion sites, i e  indwelling lines, tubes, and drains  - Monitor endotracheal if appropriate and nasal secretions for changes in amount and color  - [place] appropriate cooling/warming therapies per order  - Administer medications as ordered  - Instruct and encourage patient and family to use good hand hygiene technique  - Identify and instruct in appropriate isolation precautions for identified infection/condition  Outcome: Progressing     Problem: SAFETY ADULT  Goal: Patient will remain free of falls  Description  INTERVENTIONS:  - Assess patient frequently for physical needs  -  Identify cognitive and physical deficits and behaviors that affect risk of falls    -  [place] fall precautions as indicated by assessment   - Educate patient/family on patient safety including physical limitations  - Instruct patient to call for assistance with activity based on assessment  - Modify environment to reduce risk of injury  - Consider OT/PT consult to assist with strengthening/mobility  Outcome: Progressing  Goal: Maintain or return to baseline ADL function  Description  INTERVENTIONS:  -  Assess patient's ability to carry out ADLs; assess patient's baseline for ADL function and identify physical deficits which impact ability to perform ADLs (bathing, care of mouth/teeth, toileting, grooming, dressing, etc )  - Assess/evaluate cause of self-care deficits   - Assess range of motion  - Assess patient's mobility; develop plan if impaired  - Assess patient's need for assistive devices and provide as appropriate  - Encourage maximum independence but intervene and supervise when necessary  - Involve family in performance of ADLs  - Assess for home care needs following discharge   - Consider OT consult to assist with ADL evaluation and planning for discharge  - Provide patient education as appropriate  Outcome: Progressing  Goal: Maintain or return mobility status to optimal level  Description  INTERVENTIONS:  - Assess patient's baseline mobility status (ambulation, transfers, stairs, etc )    - Identify cognitive and physical deficits and behaviors that affect mobility  - Identify mobility aids required to assist with transfers and/or ambulation (gait belt, sit-to-stand, lift, walker, cane, etc )  - [place] fall precautions as indicated by assessment  - Record patient progress and toleration of activity level on Mobility SBAR; progress patient to next Phase/Stage  - Instruct patient to call for assistance with activity based on assessment  - Consider rehabilitation consult to assist with strengthening/weightbearing, etc   Outcome: Progressing     Problem: DISCHARGE PLANNING  Goal: Discharge to home or other facility with appropriate resources  Description  INTERVENTIONS:  - Identify barriers to discharge w/patient and caregiver  - Arrange for needed discharge resources and transportation as appropriate  - Identify discharge learning needs (meds, wound care, etc )  - Arrange for interpretive services to assist at discharge as needed  - Refer to Case Management Department for coordinating discharge planning if the patient needs post-hospital services based on physician/advanced practitioner order or complex needs related to functional status, cognitive ability, or social support system  Outcome: Progressing     Problem: Knowledge Deficit  Goal: Patient/family/caregiver demonstrates understanding of disease process, treatment plan, medications, and discharge instructions  Description  Complete learning assessment and assess knowledge base    Interventions:  - Provide teaching at level of understanding  - Provide teaching via preferred learning methods  Outcome: Progressing     Problem: NEUROSENSORY - ADULT  Goal: Achieves stable or improved neurological status  Description  INTERVENTIONS  - Monitor and report changes in neurological status  - Monitor vital signs such as temperature, blood pressure, glucose, and any other labs ordered   - Initiate measures to prevent increased intracranial pressure  - Monitor for seizure activity and implement precautions if appropriate      Outcome: Progressing     Problem: RESPIRATORY - ADULT  Goal: Achieves optimal ventilation and oxygenation  Description  INTERVENTIONS:  - Assess for changes in respiratory status  - Assess for changes in mentation and behavior  - Position to facilitate oxygenation and minimize respiratory effort  - Oxygen administered by appropriate delivery if ordered  - Initiate smoking cessation education as indicated  - Encourage broncho-pulmonary hygiene including cough, deep breathe, Incentive Spirometry  - Assess the need for suctioning and aspirate as needed  - Assess and instruct to report SOB or any respiratory difficulty  - Respiratory Therapy support as indicated  Outcome: Progressing

## 2020-01-15 NOTE — ASSESSMENT & PLAN NOTE
Lab Results   Component Value Date    HGBA1C 8 7 (H) 01/14/2020       Recent Labs     01/14/20  1547 01/14/20  2046 01/14/20  2103 01/15/20  0732   POCGLU 275* 261* 278* 241*       Blood Sugar Average: Last 72 hrs:  (P) 217 55     Will resume home Reglan as CrCl greater than 40  Continue home PPI

## 2020-01-15 NOTE — ASSESSMENT & PLAN NOTE
Day 4 of cefepime/Flagyl/vancomycin  Follow culture results, temperature, white count  Trend procalcitonin

## 2020-01-15 NOTE — PROGRESS NOTES
Progress Note - Dominik López 1965, 53 y o  female MRN: [id number]    Unit/Bed#: -01 Encounter: [id number]    Primary Care Provider: Amie Nunez MD   Date and time admitted to hospital: 1/12/2020  4:13 PM        AMS (altered mental status)  Assessment & Plan  Multifactorial in setting of acute infection, and polypharmacy  CT head negative for acute pathology  Frequent neuro checks  Delirium precautions  Regulate sleep-wake cycles    Daily CAM assessment     Polypharmacy  Assessment & Plan  Would discuss concern for respiratory suppression with outpatient prescriber    Acute respiratory failure with hypercapnia [facility])  Assessment & Plan  Patient successfully liberated from mechanical ventilation on 1/13  Continue scheduled nebulizers  Wean O2 for SpO2>92%  Respiratory protocol      Morbid obesity ([facility] [address] )  Assessment & Plan  Nutrition consultation  Encourage lifestyle changes    Essential hypertension  Assessment & Plan  Creatinine back to baseline, can restart lisinopril 10 mg daily     Diabetic gastroparesis [facility])  Assessment & Plan  Lab Results   Component Value Date    HGBA1C 8 7 (H) 01/14/2020       Recent Labs     01/14/20  1547 01/14/20  2046 01/14/20  2103 01/15/20  0732   POCGLU 275* 261* 278* 241*       Blood Sugar Average: Last 72 hrs:  (P) 217 55     Will resume home Reglan as CrCl greater than 40  Continue home PPI    Opioid dependence with other opioid-induced disorder [facility])  Assessment & Plan  Presently holding home opioids/benzo's  Based on PMED, prescribed by Dr Peg Mott- palliative care/neurology  Would contact outpatient prescriber to discuss concerns with respiratory depression and regimen to begin in the hospital    Asthma  Assessment & Plan  Continue Singulair and scheduled nebulizers  Respiratory protocol    Type 2 diabetes mellitus with diabetic neuropathy, with long-term current use of insulin ([facility] [address] )  Assessment & Plan  Lab Results   Component Value Date    HGBA1C 8 7 (H) 2020       Recent Labs     20  1547 20  2046 20  2103 01/15/20  0732   POCGLU 275* 261* 278* 241*       Blood Sugar Average: Last 72 hrs:  (P) 217 55     A1c 10  Begin sliding scale for now, if oral intake adequate, resume home Lantus 50 U    * Pneumonia due to suspected gram-negative bacteria  Assessment & Plan  Day 4 of cefepime/Flagyl, vanc discontinued  Cultures negative  Procalcitonin much improved    ALEJANDRO (acute kidney injury) (HCC)resolved as of 2020  Assessment & Plan  Close intake and output  Daily weights  Trend serum creatinine          VTE Pharmacologic Prophylaxis:   Pharmacologic: Heparin  Mechanical VTE Prophylaxis in Place: Yes    Patient Centered Rounds: I have performed bedside rounds with nursing staff today  Discussions with Specialists or Other Care Team Provider: None    Education and Discussions with Family / Patient: Pneumonia     Time Spent for Care: 45 minutes  More than 50% of total time spent on counseling and coordination of care as described above  Current Length of Stay: 3 day(s)    Current Patient Status: Inpatient   Certification Statement: The patient will continue to require additional inpatient hospital stay due to See above    Discharge Plan: 24-48 hours    Code Status: Level 1 - Full Code      Subjective:   Patient feels better today    Objective:     Vitals:   Temp (24hrs), Av 1 °F (36 7 °C), Min:97 5 °F (36 4 °C), Max:98 7 °F (37 1 °C)    Temp:  [97 5 °F (36 4 °C)-98 7 °F (37 1 °C)] 98 7 °F (37 1 °C)  HR:  [] 98  Resp:  [18-19] 18  BP: (136-177)/() 150/94  SpO2:  [95 %-96 %] 95 %  Body mass index is 44 55 kg/m²  Input and Output Summary (last 24 hours): Intake/Output Summary (Last 24 hours) at 1/15/2020 1518  Last data filed at 1/15/2020 0815  Gross per 24 hour   Intake 80 ml   Output 3050 ml   Net -2970 ml       Physical Exam:     Physical Exam   Constitutional: She is oriented to person, place, and time   She appears well-developed and well-nourished  Cardiovascular: Normal rate and regular rhythm  Pulmonary/Chest: Effort normal and breath sounds normal    Abdominal: Soft  Bowel sounds are normal    Neurological: She is alert and oriented to person, place, and time  Skin: Skin is warm and dry  Psychiatric: She has a normal mood and affect  Her behavior is normal          Additional Data:     Labs:    Results from last 7 days   Lab Units 01/15/20  0628   WBC Thousand/uL 13 41*   HEMOGLOBIN g/dL 12 2   HEMATOCRIT % 36 9   PLATELETS Thousands/uL 221   NEUTROS PCT % 82*   LYMPHS PCT % 9*   MONOS PCT % 6   EOS PCT % 1     Results from last 7 days   Lab Units 01/15/20  0628   SODIUM mmol/L 135*   POTASSIUM mmol/L 3 1*   CHLORIDE mmol/L 96*   CO2 mmol/L 30   BUN mg/dL 5   CREATININE mg/dL 0 54*   ANION GAP mmol/L 9   CALCIUM mg/dL 9 0   ALBUMIN g/dL 3 1*   TOTAL BILIRUBIN mg/dL 1 00   ALK PHOS U/L 131*   ALT U/L 42   AST U/L 36   GLUCOSE RANDOM mg/dL 228*     Results from last 7 days   Lab Units 01/13/20  0444   INR  1 27*     Results from last 7 days   Lab Units 01/15/20  0732 01/14/20  2103 01/14/20  2046 01/14/20  1547 01/14/20  1136 01/14/20  1102 01/14/20  0723 01/13/20  2131 01/13/20  1917 01/13/20  1610 01/13/20  1416 01/13/20  1158   POC GLUCOSE mg/dl 241* 278* 261* 275* 302* 279* 239* 232* 207* 182* 195* 162*     Results from last 7 days   Lab Units 01/14/20  0851   HEMOGLOBIN A1C % 8 7*     Results from last 7 days   Lab Units 01/14/20  0440 01/13/20  0444 01/12/20  1625   LACTIC ACID mmol/L  --   --  1 6   PROCALCITONIN ng/ml 14 51* 30 02* 6 13*           * I Have Reviewed All Lab Data Listed Above  * Additional Pertinent Lab Tests Reviewed:  [address] Admission Reviewed    Imaging:    Imaging Reports Reviewed Today Include: NA  Imaging Personally Reviewed by Myself Includes:  NA    Recent Cultures (last 7 days):     Results from last 7 days   Lab Units 01/12/20  2204 01/12/20  1826 01/12/20  1627 01/12/20  1626   BLOOD CULTURE   --   --  No Growth at 48 hrs  No Growth at 48 hrs  LEGIONELLA URINARY ANTIGEN  Negative Negative  --   --        Last 24 Hours Medication List:     Current Facility-Administered Medications:  acetaminophen 650 mg Oral Q6H PRN Luca Smart, PA-C    albuterol 2 puff Inhalation Q4H PRN Luca Smart, PA-C    cefepime 2,000 mg Intravenous Q12H Luca Smart, PA-C Last Rate: 2,000 mg (01/15/20 0750)   chlorhexidine 15 mL Swish & Spit Q12H [address] Luca Smart, PA-C    diazepam 2 5 mg Oral Q8H Luca Smart, PA-C    heparin (porcine) 5,000 Units Subcutaneous [facility] Luca Smart, PA-C    insulin glargine 4 Units Subcutaneous HS Luis Meyer MD    insulin lispro 2-12 Units Subcutaneous TID AC Luca Smart, PA-C    insulin lispro 2-12 Units Subcutaneous HS Luca Smart, PA-C    Labetalol HCl 10 mg Intravenous Q4H PRN Luca Smart, PA-C    lisinopril 10 mg Oral Daily Luis Meyer MD    metoclopramide 10 mg Intravenous Q6H [address] Luca Smart, PA-C    metroNIDAZOLE 500 mg Oral [facility] Luca Smart, PA-C    montelukast 10 mg Oral HS Luca Smart, PA-C    morphine 60 mg Oral Q12H [address] Luca Smart, PA-C    ondansetron 4 mg Intravenous Q6H Luca Smart, PA-C    ondansetron 4 mg Intravenous Q6H PRN Luca Smart, PA-C    pantoprazole 20 mg Oral Early Morning Luca Smart, PA-C    polyethylene glycol 17 g Oral Daily Luca Smart, PA-C    pravastatin 10 mg Oral Daily With CASTILLO Timmons-LAURA    senna-docusate sodium 2 tablet Oral HS Luca Smart, PA-C    sodium chloride 2 spray Each Nare Q1H PRN Luca Smart, PA-C         Today, Patient Was Seen By: MATT Kelly      ** Please Note: Dictation voice to text software may have been used in the creation of this document   **

## 2020-01-15 NOTE — ASSESSMENT & PLAN NOTE
Presently holding home opioids/benzo's  Based on PMED, prescribed by Dr Brendon Catalan- palliative care/neurology  Would contact outpatient prescriber to discuss concerns with respiratory depression and regimen to begin in the hospital

## 2020-01-16 PROBLEM — J18.9 PNEUMONIA DUE TO SUSPECTED GRAM-NEGATIVE BACTERIA: Status: RESOLVED | Noted: 2019-11-23 | Resolved: 2020-01-16

## 2020-01-16 PROBLEM — R41.82 AMS (ALTERED MENTAL STATUS): Status: RESOLVED | Noted: 2020-01-14 | Resolved: 2020-01-16

## 2020-01-16 PROBLEM — J96.02 ACUTE RESPIRATORY FAILURE WITH HYPERCAPNIA (HCC): Status: RESOLVED | Noted: 2020-01-12 | Resolved: 2020-01-16

## 2020-01-16 LAB
ALBUMIN SERPL BCP-MCNC: 2.8 G/DL (ref 3.5–5)
ANION GAP SERPL CALCULATED.3IONS-SCNC: 8 MMOL/L (ref 4–13)
BASOPHILS # BLD AUTO: 0.08 THOUSANDS/ΜL (ref 0–0.1)
BASOPHILS NFR BLD AUTO: 1 % (ref 0–1)
BUN SERPL-MCNC: 11 MG/DL (ref 5–25)
CALCIUM SERPL-MCNC: 8.8 MG/DL (ref 8.3–10.1)
CHLORIDE SERPL-SCNC: 96 MMOL/L (ref 100–108)
CO2 SERPL-SCNC: 29 MMOL/L (ref 21–32)
CREAT SERPL-MCNC: 0.6 MG/DL (ref 0.6–1.3)
EOSINOPHIL # BLD AUTO: 0.12 THOUSAND/ΜL (ref 0–0.61)
EOSINOPHIL NFR BLD AUTO: 1 % (ref 0–6)
ERYTHROCYTE [DISTWIDTH] IN BLOOD BY AUTOMATED COUNT: 13.9 % (ref 11.6–15.1)
GFR SERPL CREATININE-BSD FRML MDRD: 103 ML/MIN/1.73SQ M
GLUCOSE SERPL-MCNC: 210 MG/DL (ref 65–140)
GLUCOSE SERPL-MCNC: 215 MG/DL (ref 65–140)
GLUCOSE SERPL-MCNC: 259 MG/DL (ref 65–140)
HCT VFR BLD AUTO: 35.7 % (ref 34.8–46.1)
HGB BLD-MCNC: 11.7 G/DL (ref 11.5–15.4)
IMM GRANULOCYTES # BLD AUTO: 0.1 THOUSAND/UL (ref 0–0.2)
IMM GRANULOCYTES NFR BLD AUTO: 1 % (ref 0–2)
LYMPHOCYTES # BLD AUTO: 1.51 THOUSANDS/ΜL (ref 0.6–4.47)
LYMPHOCYTES NFR BLD AUTO: 16 % (ref 14–44)
MAGNESIUM SERPL-MCNC: 1.7 MG/DL (ref 1.6–2.6)
MCH RBC QN AUTO: 26.5 PG (ref 26.8–34.3)
MCHC RBC AUTO-ENTMCNC: 32.8 G/DL (ref 31.4–37.4)
MCV RBC AUTO: 81 FL (ref 82–98)
MONOCYTES # BLD AUTO: 0.83 THOUSAND/ΜL (ref 0.17–1.22)
MONOCYTES NFR BLD AUTO: 9 % (ref 4–12)
MRSA NOSE QL CULT: ABNORMAL
MRSA NOSE QL CULT: ABNORMAL
NEUTROPHILS # BLD AUTO: 6.87 THOUSANDS/ΜL (ref 1.85–7.62)
NEUTS SEG NFR BLD AUTO: 72 % (ref 43–75)
NRBC BLD AUTO-RTO: 0 /100 WBCS
PHOSPHATE SERPL-MCNC: 2.8 MG/DL (ref 2.7–4.5)
PLATELET # BLD AUTO: 232 THOUSANDS/UL (ref 149–390)
PMV BLD AUTO: 12.8 FL (ref 8.9–12.7)
POTASSIUM SERPL-SCNC: 3 MMOL/L (ref 3.5–5.3)
PROCALCITONIN SERPL-MCNC: 5.1 NG/ML
RBC # BLD AUTO: 4.42 MILLION/UL (ref 3.81–5.12)
SODIUM SERPL-SCNC: 133 MMOL/L (ref 136–145)
WBC # BLD AUTO: 9.51 THOUSAND/UL (ref 4.31–10.16)

## 2020-01-16 PROCEDURE — 83735 ASSAY OF MAGNESIUM: CPT | Performed by: STUDENT IN AN ORGANIZED HEALTH CARE EDUCATION/TRAINING PROGRAM

## 2020-01-16 PROCEDURE — 82948 REAGENT STRIP/BLOOD GLUCOSE: CPT

## 2020-01-16 PROCEDURE — 80069 RENAL FUNCTION PANEL: CPT | Performed by: STUDENT IN AN ORGANIZED HEALTH CARE EDUCATION/TRAINING PROGRAM

## 2020-01-16 PROCEDURE — 84145 PROCALCITONIN (PCT): CPT | Performed by: STUDENT IN AN ORGANIZED HEALTH CARE EDUCATION/TRAINING PROGRAM

## 2020-01-16 PROCEDURE — 99239 HOSP IP/OBS DSCHRG MGMT >30: CPT | Performed by: STUDENT IN AN ORGANIZED HEALTH CARE EDUCATION/TRAINING PROGRAM

## 2020-01-16 PROCEDURE — 85025 COMPLETE CBC W/AUTO DIFF WBC: CPT | Performed by: STUDENT IN AN ORGANIZED HEALTH CARE EDUCATION/TRAINING PROGRAM

## 2020-01-16 RX ORDER — AMOXICILLIN AND CLAVULANATE POTASSIUM 875; 125 MG/1; MG/1
1 TABLET, FILM COATED ORAL EVERY 12 HOURS SCHEDULED
Qty: 6 TABLET | Refills: 0 | Status: SHIPPED | OUTPATIENT
Start: 2020-01-16 | End: 2020-01-19

## 2020-01-16 RX ORDER — HYDROMORPHONE HCL/PF 1 MG/ML
1 SYRINGE (ML) INJECTION ONCE
Status: DISCONTINUED | OUTPATIENT
Start: 2020-01-16 | End: 2020-01-16

## 2020-01-16 RX ORDER — DOXYCYCLINE 100 MG/1
100 TABLET ORAL 2 TIMES DAILY
Qty: 6 TABLET | Refills: 0 | Status: SHIPPED | OUTPATIENT
Start: 2020-01-16 | End: 2020-01-19

## 2020-01-16 RX ORDER — POTASSIUM CHLORIDE 20 MEQ/1
20 TABLET, EXTENDED RELEASE ORAL ONCE
Status: COMPLETED | OUTPATIENT
Start: 2020-01-16 | End: 2020-01-16

## 2020-01-16 RX ADMIN — LISINOPRIL 10 MG: 10 TABLET ORAL at 09:38

## 2020-01-16 RX ADMIN — METOCLOPRAMIDE HYDROCHLORIDE 10 MG: 5 INJECTION INTRAMUSCULAR; INTRAVENOUS at 12:05

## 2020-01-16 RX ADMIN — MORPHINE SULFATE 60 MG: 30 TABLET, FILM COATED, EXTENDED RELEASE ORAL at 09:38

## 2020-01-16 RX ADMIN — CEFEPIME HYDROCHLORIDE 2000 MG: 2 INJECTION, POWDER, FOR SOLUTION INTRAVENOUS at 09:37

## 2020-01-16 RX ADMIN — INSULIN LISPRO 4 UNITS: 100 INJECTION, SOLUTION INTRAVENOUS; SUBCUTANEOUS at 09:39

## 2020-01-16 RX ADMIN — POTASSIUM CHLORIDE 20 MEQ: 1500 TABLET, EXTENDED RELEASE ORAL at 09:38

## 2020-01-16 RX ADMIN — METOCLOPRAMIDE HYDROCHLORIDE 10 MG: 5 INJECTION INTRAMUSCULAR; INTRAVENOUS at 05:33

## 2020-01-16 RX ADMIN — ONDANSETRON 4 MG: 2 INJECTION INTRAMUSCULAR; INTRAVENOUS at 03:58

## 2020-01-16 RX ADMIN — ONDANSETRON 4 MG: 2 INJECTION INTRAMUSCULAR; INTRAVENOUS at 14:39

## 2020-01-16 RX ADMIN — CHLORHEXIDINE GLUCONATE 0.12% ORAL RINSE 15 ML: 1.2 LIQUID ORAL at 09:38

## 2020-01-16 RX ADMIN — METRONIDAZOLE 500 MG: 500 TABLET ORAL at 14:39

## 2020-01-16 RX ADMIN — ONDANSETRON 4 MG: 2 INJECTION INTRAMUSCULAR; INTRAVENOUS at 09:38

## 2020-01-16 RX ADMIN — HEPARIN SODIUM 5000 UNITS: 5000 INJECTION INTRAVENOUS; SUBCUTANEOUS at 05:33

## 2020-01-16 RX ADMIN — DIAZEPAM 2.5 MG: 5 TABLET ORAL at 12:05

## 2020-01-16 RX ADMIN — METOCLOPRAMIDE HYDROCHLORIDE 10 MG: 5 INJECTION INTRAMUSCULAR; INTRAVENOUS at 00:04

## 2020-01-16 RX ADMIN — PANTOPRAZOLE SODIUM 20 MG: 20 TABLET, DELAYED RELEASE ORAL at 05:33

## 2020-01-16 RX ADMIN — DIAZEPAM 2.5 MG: 5 TABLET ORAL at 04:04

## 2020-01-16 RX ADMIN — INSULIN LISPRO 6 UNITS: 100 INJECTION, SOLUTION INTRAVENOUS; SUBCUTANEOUS at 12:05

## 2020-01-16 RX ADMIN — METRONIDAZOLE 500 MG: 500 TABLET ORAL at 05:32

## 2020-01-16 NOTE — DISCHARGE SUMMARY
Discharge- Gilberto Quinonez 1965, 53 y o  female MRN: [id number]    Unit/Bed#: -01 Encounter: [id number]    Primary Care Provider: Aleyda Arredondo MD   Date and time admitted to hospital: 1/12/2020  4:13 PM        * Pneumonia due to suspected gram-negative bacteria  Assessment & Plan  · Received 4 days of IV antibiotics, treated empirically with vancomycin cefepime and Flagyl  · Vancomycin eventually discontinued due to negative cultures  · Procalcitonin significant elevated on arrival, now down trending  · Afebrile, leukocytosis resolved, appears medically stable for discharge  · Will discharge with several more days of p o  Antibiotics to complete 7 day course for pneumonia    AMS (altered mental status)  Assessment & Plan  · As stated above, likely multifactorial in setting of acute infection from pneumonia with underlying chronic opioid use weight hypercapnia from respiratory depression  · CT head negative, patient currently alert and oriented x3 and at baseline mental status  · Now resolved    Polypharmacy  Assessment & Plan  · Noted, will not prescribe opiates upon discharge    Acute respiratory failure with hypercapnia (HCC)  Assessment & Plan  · Initially admitted to ICU for hypercapnic respiratory failure  · Patient was intubated now status post successful extubation on 01/13/2020  · Patient no longer having oxygen requirements, satting well on room air  · Suspected etiology likely secondary to polypharmacy of chronic opioids with underlying multifocal pneumonia  · Now status post 4 days IV antibiotics, with cultures negative, and procalcitonin down trending  · Medically stable for discharge with completion of course of antibiotics as an outpatient        Morbid obesity ([address] )  Assessment & Plan  · Counseled on lifestyle modification    Essential hypertension  Assessment & Plan  · Initially presented with acute kidney injury, likely secondary to prerenal etiology from poor p o   Intake care and underlying infection  · Now resolved with IV fluids  · Noted to be hypokalemic over the last several days, status post repletion  · Will recommend follow-up with primary medical doctor    Diabetic gastroparesis [facility])  Assessment & Plan  Lab Results   Component Value Date    HGBA1C 8 7 (H) 01/14/2020       Recent Labs     01/15/20  1109 01/15/20  1555 01/15/20  2056 01/16/20  0604   POCGLU 259* 248* 247* 215*       Blood Sugar Average: Last 72 hrs:  (P) 586 [id number]     Continue with home dose Reglan, recommend follow-up primary medical doctor    Opioid dependence with other opioid-induced disorder ([facility] [address] )  Assessment & Plan  · Opioids held during admission  · Based on PMED, prescribed by Dr Aliyah Carter- palliative care/neurology  · Patient should follow-up with prescriber for medication adjustments  · One 0 prescribed opioids, and counseled patient on holding off on use of opioids for now    Asthma  Assessment & Plan  · Has history of asthma, although not in acute exacerbation  · No expiratory wheeze on exam, satting well on room air  · Discharge with home meds, and recommend follow with primary medical doctor    Type 2 diabetes mellitus with diabetic neuropathy, with long-term current use of insulin [facility])  Assessment & Plan  Lab Results   Component Value Date    HGBA1C 8 7 (H) 01/14/2020       Recent Labs     01/15/20  1109 01/15/20  1555 01/15/20  2056 01/16/20  0604   POCGLU 259* 248* 247* 215*       Blood Sugar Average: Last 72 hrs:  (P) 410 [id number]   · A1c 8 8%  · On sliding scale, and home dose Lantus  · Will recommend follow-up with primary medical doctor for diabetes optimization        Discharging Physician / Practitioner: Beula Schaumann, MD  PCP: Kasandra Contreras MD  Admission Date:   Admission Orders (From admission, onward)     Ordered        01/12/20 2006  Inpatient Admission  Once                   Discharge Date: 01/16/20    Disposition:      Other: Home    For Discharges to [place] [address] Affiliated SNF:   · Not Applicable to this Patient - Not Applicable to this Patient    Reason for Admission:  Respiratory failure    Discharge Diagnoses:     Please see assessment and plan section above for further details regarding discharge diagnoses  Resolved Problems  Date Reviewed: 1/13/2020          Resolved    Hyponatremia 1/13/2020     Resolved by  VIVI Severino    Hyperbilirubinemia 1/13/2020     Resolved by  VIVI Severino    ALEJANDRO (acute kidney injury) ([facility] [address] ) 1/14/2020     Resolved by  Effie Loomis PA-C          Consultations During Hospital Stay:  · None    Procedures Performed:   · Intubation    Medication Adjustments and Discharge Medications:  · Summary of Medication Adjustments made as a result of this hospitalization:  See med rec  · Medication Dosing Tapers - Please refer to Discharge Medication List for details on any medication dosing tapers (if applicable to patient)  · Medications being temporarily held (include recommended restart time):  See med rec  · Discharge Medication List: See after visit summary for reconciled discharge medications  Wound Care Recommendations:  When applicable, please see wound care section of After Visit Summary  Diet Recommendations at Discharge:  Diet - diabetic diet    Instructions for any Catheters / Lines Present at Discharge (including removal date, if applicable):  None    Significant Findings / Test Results:   · Multifocal pneumonia, positive urine drug screen    Incidental Findings:   · None     Test Results Pending at Discharge (will require follow up): · Will need follow-up with patient's opioid prescriber     Outpatient Tests Requested:  · None    Complications:  None    Hospital Course:     Meron Wills is a 53 y o  female patient who originally presented to the hospital on 1/12/2020 due to worsening shortness of breath and nonproductive cough    In the ED, patient was on high-flow nasal cannula 25 L with oxygen saturations are 92%  CT imaging confirming multifocal pneumonia, ALEJANDRO  Patient is admitted to ICU and eventually intubated for respiratory failure  Now status post extubation, ALEJANDRO resolved, status post treatment of multifocal pneumonia with IV antibiotics for the last 4 days  Transition to regular diet and tolerating well  All cultures negative along with leukocytosis resolved  Afebrile over last 24 hours, patient medically cleared for discharge on 01/16/2020  Condition at Discharge: fair     Discharge Day Visit / Exam:     Subjective:  Patient has no complaints  Vitals: Blood Pressure: 131/73 (01/16/20 0738)  Pulse: 88 (01/15/20 2300)  Temperature: 98 5 °F (36 9 °C) (01/16/20 0738)  Temp Source: Axillary (01/15/20 2300)  Respirations: 18 (01/16/20 0738)  Height: 5' 8" (172 7 cm) (01/13/20 1236)  Weight - Scale: 123 kg (271 lb 2 7 oz) (01/16/20 0600)  SpO2: 94 % (01/15/20 2300)  Exam:   Constitutional: She is oriented to person, place, and time  She appears well-developed and well-nourished  Cardiovascular: Normal rate and regular rhythm  Pulmonary/Chest: Effort normal and breath sounds normal    Abdominal: Soft  Bowel sounds are normal    Neurological: She is alert and oriented to person, place, and time  Skin: Skin is warm and dry  Psychiatric: She has a normal mood and affect  Her behavior is normal      Discussion with Family:  Multifocal pneumonia, polypharmacy, chronic opioid use, acute kidney injury    Goals of Care Discussions:  · Code Status at Discharge: Level 1 - Full Code  · Were there any Goals of Care Discussions during Hospitalization?: No  · Results of any General Goals of Care Discussions:  Na   · POLST Completed: No   · If POLST Completed, Summary of POLST Agreement Provided Here:  Na   · OK to Rehospitalize if Needed? No    Discharge instructions/Information to patient and family:   See after visit summary section titled Discharge Instructions for information provided to patient and family  Planned Readmission:  None      Discharge Statement:  I spent 45 minutes discharging the patient  This time was spent on the day of discharge  I had direct contact with the patient on the day of discharge  Greater than 50% of the total time was spent examining patient, answering all patient questions, arranging and discussing plan of care with patient as well as directly providing post-discharge instructions  Additional time then spent on discharge activities      ** Please Note: This note has been constructed using a voice recognition system **

## 2020-01-16 NOTE — ASSESSMENT & PLAN NOTE
· Received 4 days of IV antibiotics, treated empirically with vancomycin cefepime and Flagyl  · Vancomycin eventually discontinued due to negative cultures  · Procalcitonin significant elevated on arrival, now down trending  · Afebrile, leukocytosis resolved, appears medically stable for discharge  · Will discharge with several more days of p o   Antibiotics to complete 7 day course for pneumonia

## 2020-01-16 NOTE — ASSESSMENT & PLAN NOTE
Lab Results   Component Value Date    HGBA1C 8 7 (H) 01/14/2020       Recent Labs     01/15/20  1109 01/15/20  1555 01/15/20  2056 01/16/20  0604   POCGLU 259* 248* 247* 215*       Blood Sugar Average: Last 72 hrs:  (P) 980 [id number]     Continue with home dose Reglan, recommend follow-up primary medical doctor

## 2020-01-16 NOTE — ASSESSMENT & PLAN NOTE
· Initially admitted to ICU for hypercapnic respiratory failure  · Patient was intubated now status post successful extubation on 01/13/2020  · Patient no longer having oxygen requirements, satting well on room air  · Suspected etiology likely secondary to polypharmacy of chronic opioids with underlying aspiration pneumonia  · Now status post 4 days IV antibiotics, with cultures negative, and procalcitonin down trending  · Medically stable for discharge with completion of course of antibiotics as an outpatient

## 2020-01-16 NOTE — ASSESSMENT & PLAN NOTE
Lab Results   Component Value Date    HGBA1C 8 7 (H) 01/14/2020       Recent Labs     01/15/20  1109 01/15/20  1555 01/15/20  2056 01/16/20  0604   POCGLU 259* 248* 247* 215*       Blood Sugar Average: Last 72 hrs:  (P) 118 [id number]   · A1c 8 8%  · On sliding scale, and home dose Lantus  · Will recommend follow-up with primary medical doctor for diabetes optimization

## 2020-01-16 NOTE — ASSESSMENT & PLAN NOTE
· Opioids held during admission  · Based on PMED, prescribed by Dr Aaron Oilveira- palliative care/neurology  · Patient should follow-up with prescriber for medication adjustments  · One 0 prescribed opioids, and counseled patient on holding off on use of opioids for now

## 2020-01-16 NOTE — PLAN OF CARE
Problem: Potential for Falls  Goal: Patient will remain free of falls  Description  INTERVENTIONS:  - Assess patient frequently for physical needs  -  Identify cognitive and physical deficits and behaviors that affect risk of falls  -  [place] fall precautions as indicated by assessment   - Educate patient/family on patient safety including physical limitations  - Instruct patient to call for assistance with activity based on assessment  - Modify environment to reduce risk of injury  - Consider OT/PT consult to assist with strengthening/mobility  Outcome: Progressing     Problem: Prexisting or High Potential for Compromised Skin Integrity  Goal: Skin integrity is maintained or improved  Description  INTERVENTIONS:  - Identify patients at risk for skin breakdown  - Assess and monitor skin integrity  - Assess and monitor nutrition and hydration status  - Monitor labs   - Assess for incontinence   - Turn and reposition patient  - Assist with mobility/ambulation  - Relieve pressure over bony prominences  - Avoid friction and shearing  - Provide appropriate hygiene as needed including keeping skin clean and dry  - Evaluate need for skin moisturizer/barrier cream  - Collaborate with interdisciplinary team   - Patient/family teaching  - Consider wound care consult   Outcome: Progressing     Problem: Nutrition/Hydration-ADULT  Goal: Nutrient/Hydration intake appropriate for improving, restoring or maintaining nutritional needs  Description  Monitor and assess patient's nutrition/hydration status for malnutrition  Collaborate with interdisciplinary team and initiate plan and interventions as ordered  Monitor patient's weight and dietary intake as ordered or per policy  Utilize nutrition screening tool and intervene as necessary  Determine patient's food preferences and provide high-protein, high-caloric foods as appropriate       INTERVENTIONS:  - Monitor oral intake, urinary output, labs, and treatment plans  - Assess nutrition and hydration status and recommend course of action  - Evaluate amount of meals eaten  - Assist patient with eating if necessary   - Allow adequate time for meals  - Recommend/ encourage appropriate diets, oral nutritional supplements, and vitamin/mineral supplements  - Order, calculate, and assess calorie counts as needed  - Recommend, monitor, and adjust tube feedings and TPN/PPN based on assessed needs  - Assess need for intravenous fluids  - Provide nutrition/hydration education as appropriate  - Include patient/family/caregiver in decisions related to nutrition   Outcome: Progressing     Problem: PAIN - ADULT  Goal: Verbalizes/displays adequate comfort level or baseline comfort level  Description  Interventions:  - Encourage patient to monitor pain and request assistance  - Assess pain using appropriate pain scale  - Administer analgesics based on type and severity of pain and evaluate response  - Implement non-pharmacological measures as appropriate and evaluate response  - Consider cultural and social influences on pain and pain management  - Notify physician/advanced practitioner if interventions unsuccessful or patient reports new pain  Outcome: Progressing     Problem: INFECTION - ADULT  Goal: Absence or prevention of progression during hospitalization  Description  INTERVENTIONS:  - Assess and monitor for signs and symptoms of infection  - Monitor lab/diagnostic results  - Monitor all insertion sites, i e  indwelling lines, tubes, and drains  - Monitor endotracheal if appropriate and nasal secretions for changes in amount and color  - [place] appropriate cooling/warming therapies per order  - Administer medications as ordered  - Instruct and encourage patient and family to use good hand hygiene technique  - Identify and instruct in appropriate isolation precautions for identified infection/condition  Outcome: Progressing     Problem: SAFETY ADULT  Goal: Patient will remain free of falls  Description  INTERVENTIONS:  - Assess patient frequently for physical needs  -  Identify cognitive and physical deficits and behaviors that affect risk of falls    -  [place] fall precautions as indicated by assessment   - Educate patient/family on patient safety including physical limitations  - Instruct patient to call for assistance with activity based on assessment  - Modify environment to reduce risk of injury  - Consider OT/PT consult to assist with strengthening/mobility  Outcome: Progressing  Goal: Maintain or return to baseline ADL function  Description  INTERVENTIONS:  -  Assess patient's ability to carry out ADLs; assess patient's baseline for ADL function and identify physical deficits which impact ability to perform ADLs (bathing, care of mouth/teeth, toileting, grooming, dressing, etc )  - Assess/evaluate cause of self-care deficits   - Assess range of motion  - Assess patient's mobility; develop plan if impaired  - Assess patient's need for assistive devices and provide as appropriate  - Encourage maximum independence but intervene and supervise when necessary  - Involve family in performance of ADLs  - Assess for home care needs following discharge   - Consider OT consult to assist with ADL evaluation and planning for discharge  - Provide patient education as appropriate  Outcome: Progressing  Goal: Maintain or return mobility status to optimal level  Description  INTERVENTIONS:  - Assess patient's baseline mobility status (ambulation, transfers, stairs, etc )    - Identify cognitive and physical deficits and behaviors that affect mobility  - Identify mobility aids required to assist with transfers and/or ambulation (gait belt, sit-to-stand, lift, walker, cane, etc )  - [place] fall precautions as indicated by assessment  - Record patient progress and toleration of activity level on Mobility SBAR; progress patient to next Phase/Stage  - Instruct patient to call for assistance with activity based on assessment  - Consider rehabilitation consult to assist with strengthening/weightbearing, etc   Outcome: Progressing     Problem: DISCHARGE PLANNING  Goal: Discharge to home or other facility with appropriate resources  Description  INTERVENTIONS:  - Identify barriers to discharge w/patient and caregiver  - Arrange for needed discharge resources and transportation as appropriate  - Identify discharge learning needs (meds, wound care, etc )  - Arrange for interpretive services to assist at discharge as needed  - Refer to Case Management Department for coordinating discharge planning if the patient needs post-hospital services based on physician/advanced practitioner order or complex needs related to functional status, cognitive ability, or social support system  Outcome: Progressing     Problem: Knowledge Deficit  Goal: Patient/family/caregiver demonstrates understanding of disease process, treatment plan, medications, and discharge instructions  Description  Complete learning assessment and assess knowledge base    Interventions:  - Provide teaching at level of understanding  - Provide teaching via preferred learning methods  Outcome: Progressing     Problem: NEUROSENSORY - ADULT  Goal: Achieves stable or improved neurological status  Description  INTERVENTIONS  - Monitor and report changes in neurological status  - Monitor vital signs such as temperature, blood pressure, glucose, and any other labs ordered   - Initiate measures to prevent increased intracranial pressure  - Monitor for seizure activity and implement precautions if appropriate      Outcome: Progressing     Problem: RESPIRATORY - ADULT  Goal: Achieves optimal ventilation and oxygenation  Description  INTERVENTIONS:  - Assess for changes in respiratory status  - Assess for changes in mentation and behavior  - Position to facilitate oxygenation and minimize respiratory effort  - Oxygen administered by appropriate delivery if ordered  - Initiate smoking cessation education as indicated  - Encourage broncho-pulmonary hygiene including cough, deep breathe, Incentive Spirometry  - Assess the need for suctioning and aspirate as needed  - Assess and instruct to report SOB or any respiratory difficulty  - Respiratory Therapy support as indicated  Outcome: Progressing

## 2020-01-16 NOTE — ASSESSMENT & PLAN NOTE
· As stated above, likely multifactorial in setting of acute infection from pneumonia with underlying chronic opioid use weight hypercapnia from respiratory depression  · CT head negative, patient currently alert and oriented x3 and at baseline mental status  · Now resolved

## 2020-01-16 NOTE — PLAN OF CARE
Problem: Potential for Falls  Goal: Patient will remain free of falls  Description  INTERVENTIONS:  - Assess patient frequently for physical needs  -  Identify cognitive and physical deficits and behaviors that affect risk of falls  -  [place] fall precautions as indicated by assessment   - Educate patient/family on patient safety including physical limitations  - Instruct patient to call for assistance with activity based on assessment  - Modify environment to reduce risk of injury  - Consider OT/PT consult to assist with strengthening/mobility  Outcome: Adequate for Discharge     Problem: Prexisting or High Potential for Compromised Skin Integrity  Goal: Skin integrity is maintained or improved  Description  INTERVENTIONS:  - Identify patients at risk for skin breakdown  - Assess and monitor skin integrity  - Assess and monitor nutrition and hydration status  - Monitor labs   - Assess for incontinence   - Turn and reposition patient  - Assist with mobility/ambulation  - Relieve pressure over bony prominences  - Avoid friction and shearing  - Provide appropriate hygiene as needed including keeping skin clean and dry  - Evaluate need for skin moisturizer/barrier cream  - Collaborate with interdisciplinary team   - Patient/family teaching  - Consider wound care consult   Outcome: Adequate for Discharge     Problem: Nutrition/Hydration-ADULT  Goal: Nutrient/Hydration intake appropriate for improving, restoring or maintaining nutritional needs  Description  Monitor and assess patient's nutrition/hydration status for malnutrition  Collaborate with interdisciplinary team and initiate plan and interventions as ordered  Monitor patient's weight and dietary intake as ordered or per policy  Utilize nutrition screening tool and intervene as necessary  Determine patient's food preferences and provide high-protein, high-caloric foods as appropriate       INTERVENTIONS:  - Monitor oral intake, urinary output, labs, and treatment plans  - Assess nutrition and hydration status and recommend course of action  - Evaluate amount of meals eaten  - Assist patient with eating if necessary   - Allow adequate time for meals  - Recommend/ encourage appropriate diets, oral nutritional supplements, and vitamin/mineral supplements  - Order, calculate, and assess calorie counts as needed  - Assess need for intravenous fluids  - Provide nutrition/hydration education as appropriate  - Include patient/family/caregiver in decisions related to nutrition    Outcome: Adequate for Discharge     Problem: PAIN - ADULT  Goal: Verbalizes/displays adequate comfort level or baseline comfort level  Description  Interventions:  - Encourage patient to monitor pain and request assistance  - Assess pain using appropriate pain scale  - Administer analgesics based on type and severity of pain and evaluate response  - Implement non-pharmacological measures as appropriate and evaluate response  - Consider cultural and social influences on pain and pain management  - Notify physician/advanced practitioner if interventions unsuccessful or patient reports new pain  Outcome: Adequate for Discharge     Problem: INFECTION - ADULT  Goal: Absence or prevention of progression during hospitalization  Description  INTERVENTIONS:  - Assess and monitor for signs and symptoms of infection  - Monitor lab/diagnostic results  - Monitor all insertion sites, i e  indwelling lines, tubes, and drains  - Monitor endotracheal if appropriate and nasal secretions for changes in amount and color  - [place] appropriate cooling/warming therapies per order  - Administer medications as ordered  - Instruct and encourage patient and family to use good hand hygiene technique  - Identify and instruct in appropriate isolation precautions for identified infection/condition  Outcome: Adequate for Discharge     Problem: SAFETY ADULT  Goal: Patient will remain free of falls  Description  INTERVENTIONS:  - Assess patient frequently for physical needs  -  Identify cognitive and physical deficits and behaviors that affect risk of falls    -  [place] fall precautions as indicated by assessment   - Educate patient/family on patient safety including physical limitations  - Instruct patient to call for assistance with activity based on assessment  - Modify environment to reduce risk of injury  - Consider OT/PT consult to assist with strengthening/mobility  Outcome: Adequate for Discharge  Goal: Maintain or return to baseline ADL function  Description  INTERVENTIONS:  -  Assess patient's ability to carry out ADLs; assess patient's baseline for ADL function and identify physical deficits which impact ability to perform ADLs (bathing, care of mouth/teeth, toileting, grooming, dressing, etc )  - Assess/evaluate cause of self-care deficits   - Assess range of motion  - Assess patient's mobility; develop plan if impaired  - Assess patient's need for assistive devices and provide as appropriate  - Encourage maximum independence but intervene and supervise when necessary  - Involve family in performance of ADLs  - Assess for home care needs following discharge   - Consider OT consult to assist with ADL evaluation and planning for discharge  - Provide patient education as appropriate  Outcome: Adequate for Discharge  Goal: Maintain or return mobility status to optimal level  Description  INTERVENTIONS:  - Assess patient's baseline mobility status (ambulation, transfers, stairs, etc )    - Identify cognitive and physical deficits and behaviors that affect mobility  - Identify mobility aids required to assist with transfers and/or ambulation (gait belt, sit-to-stand, lift, walker, cane, etc )  - [place] fall precautions as indicated by assessment  - Record patient progress and toleration of activity level on Mobility SBAR; progress patient to next Phase/Stage  - Instruct patient to call for assistance with activity based on assessment  - Consider rehabilitation consult to assist with strengthening/weightbearing, etc   Outcome: Adequate for Discharge     Problem: DISCHARGE PLANNING  Goal: Discharge to home or other facility with appropriate resources  Description  INTERVENTIONS:  - Identify barriers to discharge w/patient and caregiver  - Arrange for needed discharge resources and transportation as appropriate  - Identify discharge learning needs (meds, wound care, etc )  - Arrange for interpretive services to assist at discharge as needed  - Refer to Case Management Department for coordinating discharge planning if the patient needs post-hospital services based on physician/advanced practitioner order or complex needs related to functional status, cognitive ability, or social support system  Outcome: Adequate for Discharge     Problem: Knowledge Deficit  Goal: Patient/family/caregiver demonstrates understanding of disease process, treatment plan, medications, and discharge instructions  Description  Complete learning assessment and assess knowledge base    Interventions:  - Provide teaching at level of understanding  - Provide teaching via preferred learning methods  Outcome: Adequate for Discharge     Problem: NEUROSENSORY - ADULT  Goal: Achieves stable or improved neurological status  Description  INTERVENTIONS  - Monitor and report changes in neurological status  - Monitor vital signs such as temperature, blood pressure, glucose, and any other labs ordered   - Initiate measures to prevent increased intracranial pressure  - Monitor for seizure activity and implement precautions if appropriate      Outcome: Adequate for Discharge     Problem: RESPIRATORY - ADULT  Goal: Achieves optimal ventilation and oxygenation  Description  INTERVENTIONS:  - Assess for changes in respiratory status  - Assess for changes in mentation and behavior  - Position to facilitate oxygenation and minimize respiratory effort  - Oxygen administered by appropriate delivery if ordered  - Initiate smoking cessation education as indicated  - Encourage broncho-pulmonary hygiene including cough, deep breathe, Incentive Spirometry  - Assess the need for suctioning and aspirate as needed  - Assess and instruct to report SOB or any respiratory difficulty  - Respiratory Therapy support as indicated  Outcome: Adequate for Discharge

## 2020-01-16 NOTE — ASSESSMENT & PLAN NOTE
· Initially presented with acute kidney injury, likely secondary to prerenal etiology from poor p o   Intake care and underlying infection  · Now resolved with IV fluids  · Noted to be hypokalemic over the last several days, status post repletion  · Will recommend follow-up with primary medical doctor

## 2020-01-16 NOTE — PROGRESS NOTES
Patient continues to be lethargic but ambulates well with standby assist  Offered patient to get washed up in the chair, pt said yes but that she was doing it on her own in the bathroom, she refused to allow me to put a chair in the bathroom for her to sit, she refused any assistance  Stated that she does not need an alarm but since we keep putting it on she will tell us before she gets up but then wants us to leave her to do things on her own without us present  I informed and discussed the patients safety with her and that we would just like to be present due to the medications she is taking and how she is a bit lethargic at times  Pt continues to refuse  Vonnie Tanner, DELIA made aware       Burke Spurling, PCA   1300 hours  01/16/2020

## 2020-01-16 NOTE — ASSESSMENT & PLAN NOTE
· Has history of asthma, although not in acute exacerbation  · No expiratory wheeze on exam, satting well on room air  · Discharge with home meds, and recommend follow with primary medical doctor

## 2020-01-17 VITALS
HEIGHT: 68 IN | TEMPERATURE: 97.9 F | OXYGEN SATURATION: 94 % | SYSTOLIC BLOOD PRESSURE: 111 MMHG | BODY MASS INDEX: 41.1 KG/M2 | RESPIRATION RATE: 18 BRPM | WEIGHT: 271.17 LBS | DIASTOLIC BLOOD PRESSURE: 63 MMHG | HEART RATE: 88 BPM

## 2020-01-17 LAB
BACTERIA BLD CULT: NORMAL
BACTERIA BLD CULT: NORMAL

## 2020-02-08 DIAGNOSIS — K31.84 GASTROPARESIS: ICD-10-CM

## 2020-02-08 DIAGNOSIS — R11.2 INTRACTABLE VOMITING WITH NAUSEA, UNSPECIFIED VOMITING TYPE: ICD-10-CM

## 2020-02-08 DIAGNOSIS — K80.20 GALL STONES: ICD-10-CM

## 2020-02-09 RX ORDER — DICYCLOMINE HYDROCHLORIDE 10 MG/1
CAPSULE ORAL
Qty: 360 CAPSULE | Refills: 1 | Status: SHIPPED | OUTPATIENT
Start: 2020-02-09 | End: 2020-08-03 | Stop reason: SDUPTHER

## 2020-06-25 ENCOUNTER — HOSPITAL ENCOUNTER (INPATIENT)
Facility: HOSPITAL | Age: 55
LOS: 3 days | Discharge: HOME HEALTH | DRG: 073 | End: 2020-06-29
Attending: EMERGENCY MEDICINE | Admitting: INTERNAL MEDICINE
Payer: MEDICARE

## 2020-06-25 ENCOUNTER — APPOINTMENT (EMERGENCY)
Dept: CT IMAGING | Facility: HOSPITAL | Age: 55
DRG: 073 | End: 2020-06-25
Payer: MEDICARE

## 2020-06-25 DIAGNOSIS — R11.2 NAUSEA & VOMITING: Primary | ICD-10-CM

## 2020-06-25 DIAGNOSIS — M25.561 RIGHT KNEE PAIN: ICD-10-CM

## 2020-06-25 DIAGNOSIS — Z79.4 TYPE 2 DIABETES MELLITUS WITH DIABETIC NEUROPATHY, WITH LONG-TERM CURRENT USE OF INSULIN (HCC): ICD-10-CM

## 2020-06-25 DIAGNOSIS — E87.1 HYPONATREMIA: ICD-10-CM

## 2020-06-25 DIAGNOSIS — M25.561 ACUTE PAIN OF RIGHT KNEE: ICD-10-CM

## 2020-06-25 DIAGNOSIS — E86.0 DEHYDRATION: ICD-10-CM

## 2020-06-25 DIAGNOSIS — E83.42 HYPOMAGNESEMIA: ICD-10-CM

## 2020-06-25 DIAGNOSIS — E11.40 TYPE 2 DIABETES MELLITUS WITH DIABETIC NEUROPATHY, WITH LONG-TERM CURRENT USE OF INSULIN (HCC): ICD-10-CM

## 2020-06-25 DIAGNOSIS — R73.9 HYPERGLYCEMIA: ICD-10-CM

## 2020-06-25 DIAGNOSIS — R00.0 SINUS TACHYCARDIA: ICD-10-CM

## 2020-06-25 LAB
ALBUMIN SERPL BCP-MCNC: 3.8 G/DL (ref 3.5–5)
ALP SERPL-CCNC: 156 U/L (ref 46–116)
ALT SERPL W P-5'-P-CCNC: 25 U/L (ref 12–78)
ANION GAP SERPL CALCULATED.3IONS-SCNC: 14 MMOL/L (ref 4–13)
APTT PPP: 31 SECONDS (ref 23–37)
AST SERPL W P-5'-P-CCNC: 25 U/L (ref 5–45)
BASE EX.OXY STD BLDV CALC-SCNC: 75.6 % (ref 60–80)
BASE EXCESS BLDV CALC-SCNC: -2.3 MMOL/L
BASOPHILS # BLD AUTO: 0.08 THOUSANDS/ΜL (ref 0–0.1)
BASOPHILS NFR BLD AUTO: 1 % (ref 0–1)
BETA-HYDROXYBUTYRATE: 2.8 MMOL/L
BILIRUB SERPL-MCNC: 2.68 MG/DL (ref 0.2–1)
BUN SERPL-MCNC: 24 MG/DL (ref 5–25)
CALCIUM SERPL-MCNC: 9.5 MG/DL (ref 8.3–10.1)
CHLORIDE SERPL-SCNC: 93 MMOL/L (ref 100–108)
CO2 SERPL-SCNC: 23 MMOL/L (ref 21–32)
CREAT SERPL-MCNC: 1.01 MG/DL (ref 0.6–1.3)
EOSINOPHIL # BLD AUTO: 0.01 THOUSAND/ΜL (ref 0–0.61)
EOSINOPHIL NFR BLD AUTO: 0 % (ref 0–6)
ERYTHROCYTE [DISTWIDTH] IN BLOOD BY AUTOMATED COUNT: 13.5 % (ref 11.6–15.1)
GFR SERPL CREATININE-BSD FRML MDRD: 62 ML/MIN/1.73SQ M
GLUCOSE SERPL-MCNC: 341 MG/DL (ref 65–140)
GLUCOSE SERPL-MCNC: 364 MG/DL (ref 65–140)
HCO3 BLDV-SCNC: 21.2 MMOL/L (ref 24–30)
HCT VFR BLD AUTO: 46.6 % (ref 34.8–46.1)
HGB BLD-MCNC: 15.3 G/DL (ref 11.5–15.4)
IMM GRANULOCYTES # BLD AUTO: 0.12 THOUSAND/UL (ref 0–0.2)
IMM GRANULOCYTES NFR BLD AUTO: 1 % (ref 0–2)
INR PPP: 1.11 (ref 0.84–1.19)
LIPASE SERPL-CCNC: 92 U/L (ref 73–393)
LYMPHOCYTES # BLD AUTO: 1.67 THOUSANDS/ΜL (ref 0.6–4.47)
LYMPHOCYTES NFR BLD AUTO: 10 % (ref 14–44)
MAGNESIUM SERPL-MCNC: 1.4 MG/DL (ref 1.6–2.6)
MCH RBC QN AUTO: 26.9 PG (ref 26.8–34.3)
MCHC RBC AUTO-ENTMCNC: 32.8 G/DL (ref 31.4–37.4)
MCV RBC AUTO: 82 FL (ref 82–98)
MONOCYTES # BLD AUTO: 0.69 THOUSAND/ΜL (ref 0.17–1.22)
MONOCYTES NFR BLD AUTO: 4 % (ref 4–12)
NEUTROPHILS # BLD AUTO: 13.68 THOUSANDS/ΜL (ref 1.85–7.62)
NEUTS SEG NFR BLD AUTO: 84 % (ref 43–75)
NRBC BLD AUTO-RTO: 0 /100 WBCS
NT-PROBNP SERPL-MCNC: 3017 PG/ML
O2 CT BLDV-SCNC: 16.2 ML/DL
PCO2 BLDV: 33.2 MM HG (ref 42–50)
PH BLDV: 7.42 [PH] (ref 7.3–7.4)
PLATELET # BLD AUTO: 253 THOUSANDS/UL (ref 149–390)
PMV BLD AUTO: 12 FL (ref 8.9–12.7)
PO2 BLDV: 41.6 MM HG (ref 35–45)
POTASSIUM SERPL-SCNC: 4.1 MMOL/L (ref 3.5–5.3)
PROT SERPL-MCNC: 8.6 G/DL (ref 6.4–8.2)
PROTHROMBIN TIME: 14.4 SECONDS (ref 11.6–14.5)
RBC # BLD AUTO: 5.68 MILLION/UL (ref 3.81–5.12)
SODIUM SERPL-SCNC: 130 MMOL/L (ref 136–145)
TROPONIN I SERPL-MCNC: <0.02 NG/ML
WBC # BLD AUTO: 16.25 THOUSAND/UL (ref 4.31–10.16)

## 2020-06-25 PROCEDURE — 36415 COLL VENOUS BLD VENIPUNCTURE: CPT | Performed by: EMERGENCY MEDICINE

## 2020-06-25 PROCEDURE — 85025 COMPLETE CBC W/AUTO DIFF WBC: CPT | Performed by: EMERGENCY MEDICINE

## 2020-06-25 PROCEDURE — 80053 COMPREHEN METABOLIC PANEL: CPT | Performed by: EMERGENCY MEDICINE

## 2020-06-25 PROCEDURE — 84484 ASSAY OF TROPONIN QUANT: CPT | Performed by: EMERGENCY MEDICINE

## 2020-06-25 PROCEDURE — 96374 THER/PROPH/DIAG INJ IV PUSH: CPT

## 2020-06-25 PROCEDURE — 82805 BLOOD GASES W/O2 SATURATION: CPT | Performed by: EMERGENCY MEDICINE

## 2020-06-25 PROCEDURE — 83690 ASSAY OF LIPASE: CPT | Performed by: EMERGENCY MEDICINE

## 2020-06-25 PROCEDURE — 83880 ASSAY OF NATRIURETIC PEPTIDE: CPT | Performed by: EMERGENCY MEDICINE

## 2020-06-25 PROCEDURE — 74177 CT ABD & PELVIS W/CONTRAST: CPT

## 2020-06-25 PROCEDURE — 85610 PROTHROMBIN TIME: CPT | Performed by: EMERGENCY MEDICINE

## 2020-06-25 PROCEDURE — 96361 HYDRATE IV INFUSION ADD-ON: CPT

## 2020-06-25 PROCEDURE — 82010 KETONE BODYS QUAN: CPT | Performed by: EMERGENCY MEDICINE

## 2020-06-25 PROCEDURE — 93005 ELECTROCARDIOGRAM TRACING: CPT

## 2020-06-25 PROCEDURE — 85730 THROMBOPLASTIN TIME PARTIAL: CPT | Performed by: EMERGENCY MEDICINE

## 2020-06-25 PROCEDURE — 99285 EMERGENCY DEPT VISIT HI MDM: CPT

## 2020-06-25 PROCEDURE — 99220 PR INITIAL OBSERVATION CARE/DAY 70 MINUTES: CPT | Performed by: PHYSICIAN ASSISTANT

## 2020-06-25 PROCEDURE — 82948 REAGENT STRIP/BLOOD GLUCOSE: CPT

## 2020-06-25 PROCEDURE — 83735 ASSAY OF MAGNESIUM: CPT | Performed by: EMERGENCY MEDICINE

## 2020-06-25 PROCEDURE — 99285 EMERGENCY DEPT VISIT HI MDM: CPT | Performed by: EMERGENCY MEDICINE

## 2020-06-25 RX ORDER — MONTELUKAST SODIUM 10 MG/1
10 TABLET ORAL
Status: DISCONTINUED | OUTPATIENT
Start: 2020-06-25 | End: 2020-06-29 | Stop reason: HOSPADM

## 2020-06-25 RX ORDER — MORPHINE SULFATE 15 MG/1
15 TABLET, FILM COATED, EXTENDED RELEASE ORAL 2 TIMES DAILY
Status: DISCONTINUED | OUTPATIENT
Start: 2020-06-25 | End: 2020-06-28

## 2020-06-25 RX ORDER — CARISOPRODOL 350 MG/1
350 TABLET ORAL 2 TIMES DAILY
Status: DISCONTINUED | OUTPATIENT
Start: 2020-06-25 | End: 2020-06-27

## 2020-06-25 RX ORDER — PANTOPRAZOLE SODIUM 40 MG/1
40 TABLET, DELAYED RELEASE ORAL
Status: DISCONTINUED | OUTPATIENT
Start: 2020-06-26 | End: 2020-06-29 | Stop reason: HOSPADM

## 2020-06-25 RX ORDER — DICYCLOMINE HYDROCHLORIDE 10 MG/1
10 CAPSULE ORAL
Status: DISCONTINUED | OUTPATIENT
Start: 2020-06-25 | End: 2020-06-27

## 2020-06-25 RX ORDER — FLUTICASONE FUROATE AND VILANTEROL 200; 25 UG/1; UG/1
1 POWDER RESPIRATORY (INHALATION)
Status: DISCONTINUED | OUTPATIENT
Start: 2020-06-26 | End: 2020-06-29 | Stop reason: HOSPADM

## 2020-06-25 RX ORDER — MORPHINE SULFATE 30 MG/1
60 TABLET, FILM COATED, EXTENDED RELEASE ORAL 2 TIMES DAILY
Status: DISCONTINUED | OUTPATIENT
Start: 2020-06-25 | End: 2020-06-27

## 2020-06-25 RX ORDER — ACETAMINOPHEN 325 MG/1
650 TABLET ORAL EVERY 6 HOURS PRN
Status: DISCONTINUED | OUTPATIENT
Start: 2020-06-25 | End: 2020-06-29 | Stop reason: HOSPADM

## 2020-06-25 RX ORDER — LISINOPRIL 5 MG/1
10 TABLET ORAL DAILY
Status: DISCONTINUED | OUTPATIENT
Start: 2020-06-26 | End: 2020-06-27

## 2020-06-25 RX ORDER — SODIUM CHLORIDE 9 MG/ML
125 INJECTION, SOLUTION INTRAVENOUS CONTINUOUS
Status: CANCELLED | OUTPATIENT
Start: 2020-06-25

## 2020-06-25 RX ORDER — METOCLOPRAMIDE HYDROCHLORIDE 5 MG/ML
10 INJECTION INTRAMUSCULAR; INTRAVENOUS ONCE
Status: COMPLETED | OUTPATIENT
Start: 2020-06-25 | End: 2020-06-25

## 2020-06-25 RX ORDER — METOCLOPRAMIDE HYDROCHLORIDE 5 MG/ML
10 INJECTION INTRAMUSCULAR; INTRAVENOUS ONCE
Status: DISCONTINUED | OUTPATIENT
Start: 2020-06-25 | End: 2020-06-27 | Stop reason: HOSPADM

## 2020-06-25 RX ORDER — METOCLOPRAMIDE HYDROCHLORIDE 5 MG/ML
10 INJECTION INTRAMUSCULAR; INTRAVENOUS EVERY 6 HOURS SCHEDULED
Status: DISCONTINUED | OUTPATIENT
Start: 2020-06-26 | End: 2020-06-26

## 2020-06-25 RX ORDER — DIAZEPAM 5 MG/1
5 TABLET ORAL
Status: DISCONTINUED | OUTPATIENT
Start: 2020-06-25 | End: 2020-06-27

## 2020-06-25 RX ORDER — PREGABALIN 75 MG/1
150 CAPSULE ORAL 2 TIMES DAILY
Status: DISCONTINUED | OUTPATIENT
Start: 2020-06-25 | End: 2020-06-27

## 2020-06-25 RX ORDER — PRAVASTATIN SODIUM 10 MG
10 TABLET ORAL
Status: DISCONTINUED | OUTPATIENT
Start: 2020-06-25 | End: 2020-06-29 | Stop reason: HOSPADM

## 2020-06-25 RX ORDER — FLUTICASONE PROPIONATE 50 MCG
1 SPRAY, SUSPENSION (ML) NASAL DAILY
Status: DISCONTINUED | OUTPATIENT
Start: 2020-06-26 | End: 2020-06-29 | Stop reason: HOSPADM

## 2020-06-25 RX ORDER — SODIUM CHLORIDE 9 MG/ML
100 INJECTION, SOLUTION INTRAVENOUS CONTINUOUS
Status: DISCONTINUED | OUTPATIENT
Start: 2020-06-25 | End: 2020-06-28

## 2020-06-25 RX ORDER — ALBUTEROL SULFATE 90 UG/1
2 AEROSOL, METERED RESPIRATORY (INHALATION) EVERY 4 HOURS PRN
Status: DISCONTINUED | OUTPATIENT
Start: 2020-06-25 | End: 2020-06-29 | Stop reason: HOSPADM

## 2020-06-25 RX ORDER — MAGNESIUM SULFATE HEPTAHYDRATE 40 MG/ML
2 INJECTION, SOLUTION INTRAVENOUS ONCE
Status: COMPLETED | OUTPATIENT
Start: 2020-06-25 | End: 2020-06-25

## 2020-06-25 RX ORDER — NYSTATIN 100000 [USP'U]/G
POWDER TOPICAL 2 TIMES DAILY
Status: DISCONTINUED | OUTPATIENT
Start: 2020-06-25 | End: 2020-06-29 | Stop reason: HOSPADM

## 2020-06-25 RX ORDER — INSULIN GLARGINE 100 [IU]/ML
50 INJECTION, SOLUTION SUBCUTANEOUS
Status: DISCONTINUED | OUTPATIENT
Start: 2020-06-25 | End: 2020-06-28

## 2020-06-25 RX ORDER — MORPHINE SULFATE 15 MG/1
15 TABLET ORAL 3 TIMES DAILY PRN
Status: DISCONTINUED | OUTPATIENT
Start: 2020-06-25 | End: 2020-06-29 | Stop reason: HOSPADM

## 2020-06-25 RX ORDER — ONDANSETRON 2 MG/ML
4 INJECTION INTRAMUSCULAR; INTRAVENOUS ONCE AS NEEDED
Status: DISCONTINUED | OUTPATIENT
Start: 2020-06-25 | End: 2020-06-29 | Stop reason: HOSPADM

## 2020-06-25 RX ADMIN — CARISOPRODOL 350 MG: 350 TABLET ORAL at 22:43

## 2020-06-25 RX ADMIN — INSULIN LISPRO 3 UNITS: 100 INJECTION, SOLUTION INTRAVENOUS; SUBCUTANEOUS at 23:09

## 2020-06-25 RX ADMIN — IOHEXOL 100 ML: 350 INJECTION, SOLUTION INTRAVENOUS at 20:12

## 2020-06-25 RX ADMIN — MAGNESIUM SULFATE HEPTAHYDRATE 2 G: 40 INJECTION, SOLUTION INTRAVENOUS at 21:30

## 2020-06-25 RX ADMIN — MONTELUKAST SODIUM 10 MG: 10 TABLET, COATED ORAL at 22:43

## 2020-06-25 RX ADMIN — SODIUM CHLORIDE 1000 ML: 0.9 INJECTION, SOLUTION INTRAVENOUS at 19:18

## 2020-06-25 RX ADMIN — PREGABALIN 150 MG: 75 CAPSULE ORAL at 22:42

## 2020-06-25 RX ADMIN — MORPHINE SULFATE 60 MG: 30 TABLET, EXTENDED RELEASE ORAL at 22:42

## 2020-06-25 RX ADMIN — DICYCLOMINE HYDROCHLORIDE 10 MG: 10 CAPSULE ORAL at 22:43

## 2020-06-25 RX ADMIN — INSULIN GLARGINE 50 UNITS: 100 INJECTION, SOLUTION SUBCUTANEOUS at 22:43

## 2020-06-25 RX ADMIN — SODIUM CHLORIDE 125 ML/HR: 0.9 INJECTION, SOLUTION INTRAVENOUS at 22:43

## 2020-06-25 RX ADMIN — MORPHINE SULFATE 15 MG: 15 TABLET, EXTENDED RELEASE ORAL at 22:42

## 2020-06-25 RX ADMIN — PRAVASTATIN SODIUM 10 MG: 10 TABLET ORAL at 22:43

## 2020-06-25 RX ADMIN — SODIUM CHLORIDE 1000 ML: 0.9 INJECTION, SOLUTION INTRAVENOUS at 21:43

## 2020-06-25 RX ADMIN — METOCLOPRAMIDE 10 MG: 5 INJECTION, SOLUTION INTRAMUSCULAR; INTRAVENOUS at 19:18

## 2020-06-25 NOTE — ED PROVIDER NOTES
History  Chief Complaint   Patient presents with    Vomiting     per pts son, pt has had nausea and vomiting for 2 days  pt has hx of gastroperesis and believes this may be a flare up        History provided by:  Patient   used: No    Medical Problem   Location:  Intractable nausea vomiting with a history of gastroparesis for the last several days  Unable to keep down p o   Mid abdominal pain rather constant  History of prior surgery  Did not taking any of her medications that she uses for gastroparesis  Severity:  Severe  Onset quality:  Sudden  Duration:  3 days  Timing:  Intermittent  Progression:  Worsening  Chronicity:  Recurrent  Relieved by:  Nothing  Worsened by:  Eating or drinking  Ineffective treatments:  Did not take any of her medication for gastroparesis  Associated symptoms: abdominal pain and nausea    Associated symptoms: no chest pain, no congestion, no cough, no diarrhea, no fever, no headaches, no shortness of breath, no sore throat and no vomiting        Prior to Admission Medications   Prescriptions Last Dose Informant Patient Reported? Taking?    BD PEN NEEDLE HERNAN U/F 32G X 4 MM MISC   Yes Yes   Insulin Pen Needle 29G X 5MM MISC   No Yes   Sig: by Does not apply route 4 (four) times a day   LYRICA 150 MG capsule   Yes Yes   Sig: Take 150 mg by mouth 2 (two) times a day   WIXELA INHUB 500-50 MCG/DOSE inhaler   Yes Yes   Sig: Take 1 puff by mouth 2 (two) times a day   albuterol (PROVENTIL HFA,VENTOLIN HFA) 90 mcg/act inhaler   Yes Yes   Sig: Inhale 2 puffs every 4 (four) hours as needed   carisoprodol (SOMA) 350 mg tablet  Self Yes Yes   Sig: Take 350 mg by mouth 2 (two) times a day   diazepam (VALIUM) 5 mg tablet  Self Yes Yes   Sig: Take 5 mg by mouth daily at bedtime as needed for anxiety   dicyclomine (BENTYL) 10 mg capsule   No Yes   Sig: TAKE 1 CAPSULE BY MOUTH 4 TIMES A DAY (BEFORE MEALS AND AT BEDTIME)   fluticasone (FLONASE) 50 mcg/act nasal spray  Self Yes Yes   Si spray into each nostril daily   furosemide (LASIX) 20 mg tablet   Yes Yes   Sig: Take 40 mg by mouth daily   insulin glargine (LANTUS) 100 units/mL subcutaneous injection  Self Yes Yes   Sig: Inject 50 Units under the skin daily at bedtime     lisinopril (ZESTRIL) 10 mg tablet  Self No Yes   Sig: Take 1 tablet (10 mg total) by mouth daily   metoclopramide (REGLAN) 10 mg tablet   No Yes   Sig: TAKE 1 TABLET PO QID BEFORE MEALS AND HS   montelukast (SINGULAIR) 10 mg tablet  Self Yes Yes   Sig: Take 10 mg by mouth daily at bedtime   morphine (MS CONTIN) 15 mg 12 hr tablet   Yes Yes   Sig: Take 15 mg by mouth 2 (two) times a day   morphine (MS CONTIN) 60 mg 12 hr tablet   Yes Yes   Sig: Take 60 mg by mouth 2 (two) times a day   morphine (MSIR) 15 mg tablet  Self Yes Yes   Sig: Take 15 mg by mouth 3 (three) times a day as needed for moderate pain or severe pain     nystatin (MYCOSTATIN) powder   No Yes   Sig: Apply topically 2 (two) times a day   omeprazole (PriLOSEC) 20 mg delayed release capsule  Self Yes Yes   Sig: Take 20 mg by mouth daily   ondansetron (ZOFRAN) 4 mg tablet   No Yes   Sig: Take 1 tablet (4 mg total) by mouth every 6 (six) hours   pravastatin (PRAVACHOL) 10 mg tablet   Yes Yes   Sig: Take 10 mg by mouth daily at bedtime      Facility-Administered Medications: None       Past Medical History:   Diagnosis Date    Acute respiratory insufficiency 2019    Asthma     Chronic pain     [facility]     Gastroparesis     Sepsis ([facility] [facility] ) 2019       Past Surgical History:   Procedure Laterality Date    CERVICAL LAMINECTOMY      CHOLECYSTECTOMY      CHOLECYSTECTOMY LAPAROSCOPIC N/A 11/10/2018    Procedure: CHOLECYSTECTOMY LAPAROSCOPIC w/ ioc;  Surgeon: Maryan Tovar MD;  Location: [facility];  Service: General    HYSTERECTOMY      JOINT REPLACEMENT Bilateral     knee    TOE AMPUTATION Right 2018    Procedure: AMPUTATION TOE, RIGHT GREAT TOE;  Surgeon: Savannah Brito Narayan Ritter DPM;  Location: [facility] OR;  Service: Podiatry       Family History   Problem Relation Age of Onset    Diabetes Mother     Diabetes Maternal Grandmother     No Known Problems Father      I have reviewed and agree with the history as documented  E-Cigarette/Vaping     E-Cigarette/Vaping Substances     Social History     Tobacco Use    Smoking status: Never Smoker    Smokeless tobacco: Never Used   Substance Use Topics    Alcohol use: Yes     Frequency: 2-4 times a month     Drinks per session: 1 or 2     Comment: rarely    Drug use: No       Review of Systems   Constitutional: Negative for chills and fever  HENT: Negative for congestion and sore throat  Respiratory: Negative for cough, chest tightness and shortness of breath  Cardiovascular: Negative for chest pain  Gastrointestinal: Positive for abdominal pain and nausea  Negative for diarrhea and vomiting  Genitourinary: Negative for difficulty urinating and dysuria  Neurological: Positive for light-headedness  Negative for headaches  All other systems reviewed and are negative  Physical Exam  Physical Exam   Constitutional: She appears well-developed and well-nourished  She is cooperative  Non-toxic appearance  She does not have a sickly appearance  She does not appear ill  No distress  HENT:   Head: Normocephalic and atraumatic  Right Ear: Hearing normal    Left Ear: Hearing normal    Mouth/Throat: Mucous membranes are dry  Eyes: Conjunctivae and lids are normal  Right eye exhibits no discharge  Left eye exhibits no discharge  Neck: Trachea normal and normal range of motion  Cardiovascular: Regular rhythm, normal heart sounds, intact distal pulses and normal pulses  Tachycardia present  Exam reveals no gallop and no friction rub  No murmur heard  Pulmonary/Chest: Effort normal and breath sounds normal  No stridor  No respiratory distress  She has no wheezes  She has no rales  Abdominal: Soft   Normal appearance  There is tenderness in the periumbilical area  There is no rebound, no guarding and no CVA tenderness  Morbidly obese abdomen  Musculoskeletal: Normal range of motion  She exhibits no edema  Neurological: She is alert  She has normal strength  A cranial nerve deficit is present  She exhibits normal muscle tone  GCS eye subscore is 4  GCS verbal subscore is 5  GCS motor subscore is 6  Skin: Skin is warm, dry and intact  No rash noted  She is not diaphoretic  No pallor  Psychiatric: She has a normal mood and affect  Her speech is normal  Cognition and memory are normal    Patient is little confused  Nursing note and vitals reviewed        Vital Signs  ED Triage Vitals   Temperature Pulse Respirations Blood Pressure SpO2   06/25/20 1837 06/25/20 1837 06/25/20 1837 06/25/20 1837 06/25/20 1837   (!) 97 1 °F (36 2 °C) (!) 134 18 153/85 96 %      Temp src Heart Rate Source Patient Position - Orthostatic VS BP Location FiO2 (%)   -- 06/25/20 1957 06/25/20 1957 06/25/20 1957 --    Monitor Lying Left arm       Pain Score       --                  Vitals:    06/25/20 1837 06/25/20 1957   BP: 153/85 167/81   Pulse: (!) 134 (!) 126   Patient Position - Orthostatic VS:  Lying         Visual Acuity      ED Medications  Medications   sodium chloride 0 9 % bolus 1,000 mL (has no administration in time range)   sodium chloride 0 9 % infusion (has no administration in time range)   metoclopramide (REGLAN) injection 10 mg (has no administration in time range)   magnesium sulfate 2 g/50 mL IVPB (premix) 2 g (has no administration in time range)   sodium chloride 0 9 % bolus 1,000 mL (1,000 mL Intravenous New Bag 6/25/20 1918)   metoclopramide (REGLAN) injection 10 mg (10 mg Intravenous Given 6/25/20 1918)   iohexol (OMNIPAQUE) 350 MG/ML injection (MULTI-DOSE) 100 mL (100 mL Intravenous Given 6/25/20 2012)       Diagnostic Studies  Results Reviewed     Procedure Component Value Units Date/Time    Magnesium [[id number]]  (Abnormal) Collected:  06/25/20 1917    Lab Status:  Final result Specimen:  Blood from Arm, Right Updated:  06/25/20 1954     Magnesium 1 4 mg/dL     Lipase [[id number]]  (Normal) Collected:  06/25/20 1917    Lab Status:  Final result Specimen:  Blood from Arm, Right Updated:  06/25/20 1954     Lipase 92 u/L     NT-BNP PRO [[id number]]  (Abnormal) Collected:  06/25/20 1917    Lab Status:  Final result Specimen:  Blood from Arm, Right Updated:  06/25/20 1954     NT-proBNP 3,017 pg/mL     Comprehensive metabolic panel [[id number]]  (Abnormal) Collected:  06/25/20 1917    Lab Status:  Final result Specimen:  Blood from Arm, Right Updated:  06/25/20 1952     Sodium 130 mmol/L      Potassium 4 1 mmol/L      Chloride 93 mmol/L      CO2 23 mmol/L      ANION GAP 14 mmol/L      BUN 24 mg/dL      Creatinine 1 01 mg/dL      Glucose 364 mg/dL      Calcium 9 5 mg/dL      AST 25 U/L      ALT 25 U/L      Alkaline Phosphatase 156 U/L      Total Protein 8 6 g/dL      Albumin 3 8 g/dL      Total Bilirubin 2 68 mg/dL      eGFR 62 ml/min/1 73sq m     Narrative:       [place] guidelines for Chronic Kidney Disease (CKD):     Stage 1 with normal or high GFR (GFR > 90 mL/min/1 73 square meters)    Stage 2 Mild CKD (GFR = 60-89 mL/min/1 73 square meters)    Stage 3A Moderate CKD (GFR = 45-59 mL/min/1 73 square meters)    Stage 3B Moderate CKD (GFR = 30-44 mL/min/1 73 square meters)    Stage 4 Severe CKD (GFR = 15-29 mL/min/1 73 square meters)    Stage 5 End Stage CKD (GFR <15 mL/min/1 73 square meters)  Note: GFR calculation is accurate only with a steady state creatinine    Troponin I [[id number]]  (Normal) Collected:  06/25/20 1917    Lab Status:  Final result Specimen:  Blood from Arm, Right Updated:  06/25/20 1950     Troponin I <0 02 ng/mL     Protime-INR [[id number]]  (Normal) Collected:  06/25/20 1917    Lab Status:  Final result Specimen:  Blood from Arm, Right Updated:  06/25/20 1945 Protime 14 4 seconds      INR 1 11    APTT [[id number]]  (Normal) Collected:  06/25/20 1917    Lab Status:  Final result Specimen:  Blood from Arm, Right Updated:  06/25/20 1945     PTT 31 seconds     Beta Hydroxybutyrate [[id number]]  (Abnormal) Collected:  06/25/20 1917    Lab Status:  Final result Specimen:  Blood from Arm, Right Updated:  06/25/20 1934     BETA-HYDROXYBUTYRATE 2 8 mmol/L     CBC and differential [[id number]]  (Abnormal) Collected:  06/25/20 1917    Lab Status:  Final result Specimen:  Blood from Arm, Right Updated:  06/25/20 1930     WBC 16 25 Thousand/uL      RBC 5 68 Million/uL      Hemoglobin 15 3 g/dL      Hematocrit 46 6 %      MCV 82 fL      MCH 26 9 pg      MCHC 32 8 g/dL      RDW 13 5 %      MPV 12 0 fL      Platelets 385 Thousands/uL      nRBC 0 /100 WBCs      Neutrophils Relative 84 %      Immat GRANS % 1 %      Lymphocytes Relative 10 %      Monocytes Relative 4 %      Eosinophils Relative 0 %      Basophils Relative 1 %      Neutrophils Absolute 13 68 Thousands/µL      Immature Grans Absolute 0 12 Thousand/uL      Lymphocytes Absolute 1 67 Thousands/µL      Monocytes Absolute 0 69 Thousand/µL      Eosinophils Absolute 0 01 Thousand/µL      Basophils Absolute 0 08 Thousands/µL     Blood gas, venous [[id number]]  (Abnormal) Collected:  06/25/20 1917    Lab Status:  Final result Specimen:  Blood from Arm, Right Updated:  06/25/20 1929     pH, [place] 7 423     pCO2, [place] 33 2 mm Hg      pO2, [place] 41 6 mm Hg      HCO3, [place] 21 2 mmol/L      Base Excess, [place] -2 3 mmol/L      O2 Content, [place] 16 2 ml/dL      O2 HGB, VENOUS 75 6 %     POCT urinalysis dipstick [[id number]]     Lab Status:  No result Specimen:  Urine                  CT abdomen pelvis with contrast   Final Result by Nicky Portillo MD (06/25 2035)   1  Stomach and descending duodenum are mildly distended with fluid  Distal duodenum and remaining small bowel however are normal caliber without evidence of obstruction    No inflammatory changes  Otherwise no acute intra-abdominal pathology  Workstation performed: [id number]                    Procedures  ECG 12 Lead Documentation Only  Date/Time: 6/25/2020 7:24 PM  Performed by: Lennox Pratt MD  Authorized by: Lennox Pratt MD     Indications / Diagnosis:  Tachycardia  ECG reviewed by me, the ED Provider: yes    Patient location:  ED  Interpretation:     Interpretation: non-specific    Rate:     ECG rate assessment: tachycardic    Rhythm:     Rhythm: sinus tachycardia    Ectopy:     Ectopy: none    QRS:     QRS axis:  Normal    QRS intervals:  Normal  Conduction:     Conduction: normal    ST segments:     ST segments:  Normal  T waves:     T waves: normal               ED Course       US AUDIT      Most Recent Value   Initial Alcohol Screen: US AUDIT-C    1  How often do you have a drink containing alcohol?  0 Filed at: 06/25/2020 1918   2  How many drinks containing alcohol do you have on a typical day you are drinking? 0 Filed at: 06/25/2020 1918   3a  Male UNDER 65: How often do you have five or more drinks on one occasion? 0 Filed at: 06/25/2020 1918   3b  FEMALE Any Age, or MALE 65+: How often do you have 4 or more drinks on one occassion? 0 Filed at: 06/25/2020 1918   Audit-C Score  0 Filed at: 06/25/2020 1918                  CARIDAD/DAST-10      Most Recent Value   How many times in the past year have you    Used an illegal drug or used a prescription medication for non-medical reasons? Never Filed at: 06/25/2020 1918              Initial Sepsis Screening     Row Name 06/25/20 2042                Is the patient's history suggestive of a new or worsening infection? No  -CS        Suspected source of infection          Are two or more of the following signs & symptoms of infection both present and new to the patient?           Indicate SIRS criteria          If the answer is yes to both questions, suspicion of sepsis is present          If severe sepsis is present AND tissue hypoperfusion perists in the hour after fluid resuscitation or lactate > 4, the patient meets criteria for SEPTIC SHOCK          Are any of the following organ dysfunction criteria present within 6 hours of suspected infection and SIRS criteria that are NOT considered to be chronic conditions?         Organ dysfunction          Date of presentation of severe sepsis          Time of presentation of severe sepsis          Tissue hypoperfusion persists in the hour after crystalloid fluid administration, evidenced, by either:          Was hypotension present within one hour of the conclusion of crystalloid fluid administration?         Date of presentation of septic shock          Time of presentation of septic shock            User Key  (r) = Recorded By, (t) = Taken By, (c) = Cosigned By    [address] Name Provider Type    Karen Montoya MD Physician                        MDM  Number of Diagnoses or Management Options  Dehydration:   Hyperglycemia:   Hypomagnesemia:   Hyponatremia:   Nausea & vomiting:   Sinus tachycardia:   Diagnosis management comments: No obvious obstruction on CT  Patient is very dehydrated  Still tachycardic after the 1st L so another L was ordered  Most likely the hyponatremia is secondary to that as well  Patient does not complain of infectious type symptoms and this is more consistent with her gastroparesis  Patient did attempt to give us a urine sample but unfortunately defecated in it which is unusable  Will try to get another sample after another L  Case was discussed with Internal Medicine regarding admission         Amount and/or Complexity of Data Reviewed  Clinical lab tests: ordered and reviewed  Tests in the radiology section of CPT®: ordered and reviewed  Tests in the medicine section of CPT®: ordered and reviewed  Discuss the patient with other providers: yes    Patient Progress  Patient progress: stable        Disposition  Final diagnoses:   Nausea & vomiting   Dehydration   Hyponatremia   Hyperglycemia   Hypomagnesemia   Sinus tachycardia     Time reflects when diagnosis was documented in both MDM as applicable and the Disposition within this note     Time User Action Codes Description Comment    6/25/2020  9:05 PM Juli Salmons Add [R11 2] Nausea & vomiting     6/25/2020  9:06 PM Juli Salmons Add [E86 0] Dehydration     6/25/2020  9:06 PM Juli Salmons Add [E87 1] Hyponatremia     6/25/2020  9:06 PM Juli Salmons Add [R73 9] Hyperglycemia     6/25/2020  9:06 PM Juli Salmons Add [E83 42] Hypomagnesemia     6/25/2020  9:06 PM Juli Salmons Add [R00 0] Sinus tachycardia       ED Disposition     ED Disposition Condition Date/Time Comment    Admit Stable Thu Jun 25, 2020  9:05 PM Case was discussed with LILIANA and the patient's admission status was agreed to be Admission Status: observation status to the service of Dr Lord Hidalgo   Follow-up Information    None         Patient's Medications   Discharge Prescriptions    No medications on file     No discharge procedures on file      PDMP Review       Value Time User    PDMP Reviewed  Yes 12/29/2019  9:10 AM Lamin Maria DO          ED Provider  Electronically Signed by           Maximino Grace MD  06/25/20 5134

## 2020-06-26 ENCOUNTER — APPOINTMENT (OUTPATIENT)
Dept: RADIOLOGY | Facility: HOSPITAL | Age: 55
DRG: 073 | End: 2020-06-26
Payer: MEDICARE

## 2020-06-26 LAB
ANION GAP SERPL CALCULATED.3IONS-SCNC: 13 MMOL/L (ref 4–13)
ATRIAL RATE: 127 BPM
BUN SERPL-MCNC: 24 MG/DL (ref 5–25)
CALCIUM SERPL-MCNC: 8.8 MG/DL (ref 8.3–10.1)
CHLORIDE SERPL-SCNC: 100 MMOL/L (ref 100–108)
CO2 SERPL-SCNC: 22 MMOL/L (ref 21–32)
CREAT SERPL-MCNC: 0.85 MG/DL (ref 0.6–1.3)
ERYTHROCYTE [DISTWIDTH] IN BLOOD BY AUTOMATED COUNT: 13.6 % (ref 11.6–15.1)
EST. AVERAGE GLUCOSE BLD GHB EST-MCNC: 263 MG/DL
GFR SERPL CREATININE-BSD FRML MDRD: 77 ML/MIN/1.73SQ M
GLUCOSE P FAST SERPL-MCNC: 286 MG/DL (ref 65–99)
GLUCOSE SERPL-MCNC: 150 MG/DL (ref 65–140)
GLUCOSE SERPL-MCNC: 160 MG/DL (ref 65–140)
GLUCOSE SERPL-MCNC: 242 MG/DL (ref 65–140)
GLUCOSE SERPL-MCNC: 286 MG/DL (ref 65–140)
GLUCOSE SERPL-MCNC: 301 MG/DL (ref 65–140)
HBA1C MFR BLD: 10.8 %
HCT VFR BLD AUTO: 40.8 % (ref 34.8–46.1)
HGB BLD-MCNC: 13.2 G/DL (ref 11.5–15.4)
MAGNESIUM SERPL-MCNC: 2.1 MG/DL (ref 1.6–2.6)
MCH RBC QN AUTO: 26.9 PG (ref 26.8–34.3)
MCHC RBC AUTO-ENTMCNC: 32.4 G/DL (ref 31.4–37.4)
MCV RBC AUTO: 83 FL (ref 82–98)
P AXIS: 75 DEGREES
PLATELET # BLD AUTO: 218 THOUSANDS/UL (ref 149–390)
PMV BLD AUTO: 12.7 FL (ref 8.9–12.7)
POTASSIUM SERPL-SCNC: 3.2 MMOL/L (ref 3.5–5.3)
PR INTERVAL: 134 MS
QRS AXIS: 37 DEGREES
QRSD INTERVAL: 68 MS
QT INTERVAL: 316 MS
QTC INTERVAL: 459 MS
RBC # BLD AUTO: 4.91 MILLION/UL (ref 3.81–5.12)
SODIUM SERPL-SCNC: 135 MMOL/L (ref 136–145)
T WAVE AXIS: 71 DEGREES
VENTRICULAR RATE: 127 BPM
WBC # BLD AUTO: 15.62 THOUSAND/UL (ref 4.31–10.16)

## 2020-06-26 PROCEDURE — 85027 COMPLETE CBC AUTOMATED: CPT | Performed by: PHYSICIAN ASSISTANT

## 2020-06-26 PROCEDURE — 73564 X-RAY EXAM KNEE 4 OR MORE: CPT

## 2020-06-26 PROCEDURE — 93010 ELECTROCARDIOGRAM REPORT: CPT | Performed by: INTERNAL MEDICINE

## 2020-06-26 PROCEDURE — 87081 CULTURE SCREEN ONLY: CPT | Performed by: INTERNAL MEDICINE

## 2020-06-26 PROCEDURE — 99225 PR SBSQ OBSERVATION CARE/DAY 25 MINUTES: CPT | Performed by: INTERNAL MEDICINE

## 2020-06-26 PROCEDURE — 82948 REAGENT STRIP/BLOOD GLUCOSE: CPT

## 2020-06-26 PROCEDURE — 83036 HEMOGLOBIN GLYCOSYLATED A1C: CPT | Performed by: PHYSICIAN ASSISTANT

## 2020-06-26 PROCEDURE — 83735 ASSAY OF MAGNESIUM: CPT | Performed by: PHYSICIAN ASSISTANT

## 2020-06-26 PROCEDURE — 80048 BASIC METABOLIC PNL TOTAL CA: CPT | Performed by: PHYSICIAN ASSISTANT

## 2020-06-26 RX ORDER — METOCLOPRAMIDE HYDROCHLORIDE 5 MG/ML
10 INJECTION INTRAMUSCULAR; INTRAVENOUS
Status: DISCONTINUED | OUTPATIENT
Start: 2020-06-26 | End: 2020-06-27

## 2020-06-26 RX ORDER — POTASSIUM CHLORIDE 14.9 MG/ML
20 INJECTION INTRAVENOUS
Status: COMPLETED | OUTPATIENT
Start: 2020-06-26 | End: 2020-06-26

## 2020-06-26 RX ADMIN — METOCLOPRAMIDE 10 MG: 5 INJECTION, SOLUTION INTRAMUSCULAR; INTRAVENOUS at 21:44

## 2020-06-26 RX ADMIN — MORPHINE SULFATE 15 MG: 15 TABLET, EXTENDED RELEASE ORAL at 08:31

## 2020-06-26 RX ADMIN — FLUTICASONE PROPIONATE 1 SPRAY: 50 SPRAY, METERED NASAL at 08:30

## 2020-06-26 RX ADMIN — INSULIN LISPRO 5 UNITS: 100 INJECTION, SOLUTION INTRAVENOUS; SUBCUTANEOUS at 17:05

## 2020-06-26 RX ADMIN — POTASSIUM CHLORIDE 20 MEQ: 14.9 INJECTION, SOLUTION INTRAVENOUS at 11:17

## 2020-06-26 RX ADMIN — METOCLOPRAMIDE 10 MG: 5 INJECTION, SOLUTION INTRAMUSCULAR; INTRAVENOUS at 02:06

## 2020-06-26 RX ADMIN — NYSTATIN: 100000 POWDER TOPICAL at 08:32

## 2020-06-26 RX ADMIN — PREGABALIN 150 MG: 75 CAPSULE ORAL at 17:05

## 2020-06-26 RX ADMIN — INSULIN GLARGINE 50 UNITS: 100 INJECTION, SOLUTION SUBCUTANEOUS at 21:44

## 2020-06-26 RX ADMIN — FLUTICASONE FUROATE AND VILANTEROL TRIFENATATE 1 PUFF: 200; 25 POWDER RESPIRATORY (INHALATION) at 08:30

## 2020-06-26 RX ADMIN — METOCLOPRAMIDE 10 MG: 5 INJECTION, SOLUTION INTRAMUSCULAR; INTRAVENOUS at 17:07

## 2020-06-26 RX ADMIN — INSULIN LISPRO 2 UNITS: 100 INJECTION, SOLUTION INTRAVENOUS; SUBCUTANEOUS at 06:40

## 2020-06-26 RX ADMIN — PANTOPRAZOLE SODIUM 40 MG: 40 TABLET, DELAYED RELEASE ORAL at 06:36

## 2020-06-26 RX ADMIN — PRAVASTATIN SODIUM 10 MG: 10 TABLET ORAL at 21:44

## 2020-06-26 RX ADMIN — METOCLOPRAMIDE 10 MG: 5 INJECTION, SOLUTION INTRAMUSCULAR; INTRAVENOUS at 06:36

## 2020-06-26 RX ADMIN — LISINOPRIL 10 MG: 5 TABLET ORAL at 08:30

## 2020-06-26 RX ADMIN — CARISOPRODOL 350 MG: 350 TABLET ORAL at 17:05

## 2020-06-26 RX ADMIN — PREGABALIN 150 MG: 75 CAPSULE ORAL at 08:31

## 2020-06-26 RX ADMIN — METOCLOPRAMIDE 10 MG: 5 INJECTION, SOLUTION INTRAMUSCULAR; INTRAVENOUS at 11:24

## 2020-06-26 RX ADMIN — ENOXAPARIN SODIUM 40 MG: 40 INJECTION SUBCUTANEOUS at 08:31

## 2020-06-26 RX ADMIN — DICYCLOMINE HYDROCHLORIDE 10 MG: 10 CAPSULE ORAL at 11:20

## 2020-06-26 RX ADMIN — DICYCLOMINE HYDROCHLORIDE 10 MG: 10 CAPSULE ORAL at 21:44

## 2020-06-26 RX ADMIN — SODIUM CHLORIDE 75 ML/HR: 0.9 INJECTION, SOLUTION INTRAVENOUS at 21:51

## 2020-06-26 RX ADMIN — DICYCLOMINE HYDROCHLORIDE 10 MG: 10 CAPSULE ORAL at 06:49

## 2020-06-26 RX ADMIN — MORPHINE SULFATE 60 MG: 30 TABLET, EXTENDED RELEASE ORAL at 08:30

## 2020-06-26 RX ADMIN — MORPHINE SULFATE 60 MG: 30 TABLET, EXTENDED RELEASE ORAL at 17:06

## 2020-06-26 RX ADMIN — NYSTATIN: 100000 POWDER TOPICAL at 17:09

## 2020-06-26 RX ADMIN — CARISOPRODOL 350 MG: 350 TABLET ORAL at 08:31

## 2020-06-26 RX ADMIN — INSULIN LISPRO 5 UNITS: 100 INJECTION, SOLUTION INTRAVENOUS; SUBCUTANEOUS at 11:24

## 2020-06-26 RX ADMIN — MONTELUKAST SODIUM 10 MG: 10 TABLET, COATED ORAL at 21:44

## 2020-06-26 RX ADMIN — SODIUM CHLORIDE 500 ML: 0.9 INJECTION, SOLUTION INTRAVENOUS at 23:30

## 2020-06-26 RX ADMIN — POTASSIUM CHLORIDE 20 MEQ: 14.9 INJECTION, SOLUTION INTRAVENOUS at 12:31

## 2020-06-26 RX ADMIN — DICYCLOMINE HYDROCHLORIDE 10 MG: 10 CAPSULE ORAL at 17:07

## 2020-06-26 RX ADMIN — MORPHINE SULFATE 15 MG: 15 TABLET, EXTENDED RELEASE ORAL at 17:06

## 2020-06-26 NOTE — H&P
H&P- Wilmer Ritter 1965, 63 y o  female MRN: [id number]    Unit/Bed#: ED 31 Encounter: [id number]    Primary Care Provider: Gino Johnson MD   Date and time admitted to hospital: 6/25/2020  6:38 PM        * Diabetic gastroparesis ([address] )  Assessment & Plan  Admit to med/surg  Order IV reglan  CT scan shows no obstruction  Consider GI consult if no improvement  Continue IV fluids      Dehydration  Assessment & Plan  Hold lasix for now  Order IV fluids    Essential hypertension  Assessment & Plan  Continue home meds    Opioid dependence with other opioid-induced disorder [facility])  Assessment & Plan  Continue home regimen    Asthma  Assessment & Plan  Continue inhalers  No exacerbation    Type 2 diabetes mellitus with diabetic neuropathy, with long-term current use of insulin ([facility])  Assessment & Plan  Lab Results   Component Value Date    HGBA1C 8 7 (H) 01/14/2020       No results for input(s): POCGLU in the last 72 hours  Order accuchecks with sliding scale          VTE Prophylaxis: Enoxaparin (Lovenox)  / sequential compression device   Code Status: full code  POLST: There is no POLST form on file for this patient (pre-hospital)  Discussion with family: no family at bedside    Anticipated Length of Stay:  Patient will be admitted on an Observation basis with an anticipated length of stay of  Less than 2 midnights  Justification for Hospital Stay: patient requires IV fluids and reglan    Total Time for Visit, including Counseling / Coordination of Care: 45 minutes  Greater than 50% of this total time spent on direct patient counseling and coordination of care  Chief Complaint:   Abdominal pain and vomiting    History of Present Illness:    Wilmer Ritter is a 63 y o  female who presents with abdominal pain and vomiting for 2 days  Patient with hx of diabetes and gastroparesis  She was admitted in December for same  Patient does not remember if she has been taking her reglan at home   She is able to keep down her pain meds  She had a large BM here in the ED  She has discomfort in the epigastric area  Review of Systems:    Review of Systems   Constitutional: Positive for appetite change  Eyes: Negative  Respiratory: Negative  Cardiovascular: Negative  Gastrointestinal: Positive for abdominal pain, nausea and vomiting  Endocrine: Negative  Genitourinary: Negative  Musculoskeletal: Positive for back pain and neck pain  Skin: Negative  Allergic/Immunologic: Negative  Neurological: Negative  Hematological: Negative  Psychiatric/Behavioral: Negative  Past Medical and Surgical History:     Past Medical History:   Diagnosis Date    Acute respiratory insufficiency 11/23/2019    Asthma     Chronic pain     Diabetes mellitus ([facility] [address] )     Gastroparesis     Sepsis ([facility] [address] ) 11/23/2019       Past Surgical History:   Procedure Laterality Date    CERVICAL LAMINECTOMY      CHOLECYSTECTOMY      CHOLECYSTECTOMY LAPAROSCOPIC N/A 11/10/2018    Procedure: CHOLECYSTECTOMY LAPAROSCOPIC w/ ioc;  Surgeon: Teo Low MD;  Location: [facility] MAIN [facility];  Service: General    HYSTERECTOMY      JOINT REPLACEMENT Bilateral     knee    TOE AMPUTATION Right 2/2/2018    Procedure: AMPUTATION TOE, RIGHT GREAT TOE;  Surgeon: Gigi Turcios DPM;  Location: [facility];  Service: Podiatry       Meds/Allergies:    Prior to Admission medications    Medication Sig Start Date End Date Taking?  Authorizing Provider   albuterol (PROVENTIL HFA,VENTOLIN HFA) 90 mcg/act inhaler Inhale 2 puffs every 4 (four) hours as needed 10/14/19  Yes Historical Provider, MD   BD PEN NEEDLE HERNAN U/F 32G X 4 MM MISC  8/23/19  Yes Historical Provider, MD   carisoprodol (SOMA) 350 mg tablet Take 350 mg by mouth 2 (two) times a day   Yes Historical Provider, MD   diazepam (VALIUM) 5 mg tablet Take 5 mg by mouth daily at bedtime as needed for anxiety   Yes Historical Provider, MD   dicyclomine (BENTYL) 10 mg capsule TAKE 1 CAPSULE BY MOUTH 4 TIMES A DAY (BEFORE MEALS AND AT BEDTIME) 2/9/20  Yes Ez Fletcher MD   fluticasone [facility]) 50 mcg/act nasal spray 1 spray into each nostril daily   Yes Historical Provider, MD   furosemide (LASIX) 20 mg tablet Take 40 mg by mouth daily 8/16/19  Yes Historical Provider, MD   insulin glargine (LANTUS) 100 units/mL subcutaneous injection Inject 50 Units under the skin daily at bedtime     Yes Historical Provider, MD   Insulin Pen Needle 29G X 5MM MISC by Does not apply route 4 (four) times a day 9/20/19  Yes Tommy Thacker MD   lisinopril (ZESTRIL) 10 mg tablet Take 1 tablet (10 mg total) by mouth daily 12/25/18  Yes Helen Hunt PA-C   LYRICA 150 MG capsule Take 150 mg by mouth 2 (two) times a day 6/12/19  Yes Historical Provider, MD   metoclopramide (REGLAN) 10 mg tablet TAKE 1 TABLET PO QID BEFORE MEALS AND HS 9/3/19  Yes Lexx Heard PA-C   montelukast (SINGULAIR) 10 mg tablet Take 10 mg by mouth daily at bedtime   Yes Historical Provider, MD   morphine (MS CONTIN) 15 mg 12 hr tablet Take 15 mg by mouth 2 (two) times a day 9/13/19  Yes Historical Provider, MD   morphine (MS CONTIN) 60 mg 12 hr tablet Take 60 mg by mouth 2 (two) times a day 9/13/19  Yes Historical Provider, MD   morphine (MSIR) 15 mg tablet Take 15 mg by mouth 3 (three) times a day as needed for moderate pain or severe pain     Yes Historical Provider, MD   nystatin (MYCOSTATIN) powder Apply topically 2 (two) times a day 7/23/19  Yes Jayshree Bay MD   omeprazole (PriLOSEC) 20 mg delayed release capsule Take 20 mg by mouth daily   Yes Historical Provider, MD   ondansetron (ZOFRAN) 4 mg tablet Take 1 tablet (4 mg total) by mouth every 6 (six) hours 12/24/19  Yes Maryanne Salazar PA-C   pravastatin (PRAVACHOL) 10 mg tablet Take 10 mg by mouth daily at bedtime 8/16/19  Yes Historical Provider, MD Garland Edmonds INHUB 500-50 MCG/DOSE inhaler Take 1 puff by mouth 2 (two) times a day 2/28/19  Yes Historical Provider, MD     I have reviewed home medications with patient personally  Allergies: Allergies   Allergen Reactions    Nsaids GI Intolerance       Social History:     Marital Status: /Civil Union     Patient Pre-hospital Living Situation: lives with   Patient Pre-hospital Level of Mobility: ambulatory  Patient Pre-hospital Diet Restrictions: diabetic  Substance Use History:   Social History     Substance and Sexual Activity   Alcohol Use Yes    Frequency: 2-4 times a month    Drinks per session: 1 or 2    Comment: rarely     Social History     Tobacco Use   Smoking Status Never Smoker   Smokeless Tobacco Never Used     Social History     Substance and Sexual Activity   Drug Use No       Family History:    non-contributory    Physical Exam:     Vitals:   Blood Pressure: 167/81 (06/25/20 1957)  Pulse: (!) 126 (06/25/20 1957)  Temperature: (!) 97 1 °F (36 2 °C) (06/25/20 1837)  Respirations: 18 (06/25/20 1957)  SpO2: 97 % (06/25/20 1957)    Physical Exam   Constitutional: She is oriented to person, place, and time  She appears well-developed and well-nourished  No distress  HENT:   Head: Normocephalic and atraumatic  Eyes: Pupils are equal, round, and reactive to light  EOM are normal    Neck: Normal range of motion  Neck supple  Cardiovascular: Normal rate and regular rhythm  Pulmonary/Chest: Effort normal and breath sounds normal    Abdominal: Soft  Bowel sounds are normal  She exhibits no distension  Obese, epigastric tenderness   Musculoskeletal: Normal range of motion  She exhibits no deformity  Neurological: She is alert and oriented to person, place, and time  Skin: Skin is warm and dry  She is not diaphoretic  Psychiatric: She has a normal mood and affect  Her behavior is normal    Vitals reviewed  Additional Data:     Lab Results: I have personally reviewed pertinent reports        Results from last 7 days   Lab Units 06/25/20 1917   WBC Thousand/uL 16 25*   HEMOGLOBIN g/dL 15 3   HEMATOCRIT % 46 6*   PLATELETS Thousands/uL 253   NEUTROS PCT % 84*   LYMPHS PCT % 10*   MONOS PCT % 4   EOS PCT % 0     Results from last 7 days   Lab Units 06/25/20 1917   SODIUM mmol/L 130*   POTASSIUM mmol/L 4 1   CHLORIDE mmol/L 93*   CO2 mmol/L 23   BUN mg/dL 24   CREATININE mg/dL 1 01   ANION GAP mmol/L 14*   CALCIUM mg/dL 9 5   ALBUMIN g/dL 3 8   TOTAL BILIRUBIN mg/dL 2 68*   ALK PHOS U/L 156*   ALT U/L 25   AST U/L 25   GLUCOSE RANDOM mg/dL 364*     Results from last 7 days   Lab Units 06/25/20 1917   INR  1 11                   Imaging: I have personally reviewed pertinent reports  CT abdomen pelvis with contrast   Final Result by Quentin Cooper MD (06/25 2035)   1  Stomach and descending duodenum are mildly distended with fluid  Distal duodenum and remaining small bowel however are normal caliber without evidence of obstruction  No inflammatory changes  Otherwise no acute intra-abdominal pathology  Workstation performed: [id number]             EKG, Pathology, and Other Studies Reviewed on Admission:   · EKG: NSR    Allscripts / Epic Records Reviewed: Yes     ** Please Note: This note has been constructed using a voice recognition system   **

## 2020-06-26 NOTE — PLAN OF CARE
Problem: Potential for Falls  Goal: Patient will remain free of falls  Description  INTERVENTIONS:  - Assess patient frequently for physical needs  -  Identify cognitive and physical deficits and behaviors that affect risk of falls    -  [place] fall precautions as indicated by assessment   - Educate patient/family on patient safety including physical limitations  - Instruct patient to call for assistance with activity based on assessment  - Modify environment to reduce risk of injury  - Consider OT/PT consult to assist with strengthening/mobility  Outcome: Progressing     Problem: Prexisting or High Potential for Compromised Skin Integrity  Goal: Skin integrity is maintained or improved  Description  INTERVENTIONS:  - Identify patients at risk for skin breakdown  - Assess and monitor skin integrity  - Assess and monitor nutrition and hydration status  - Monitor labs   - Assess for incontinence   - Turn and reposition patient  - Assist with mobility/ambulation  - Relieve pressure over bony prominences  - Avoid friction and shearing  - Provide appropriate hygiene as needed including keeping skin clean and dry  - Evaluate need for skin moisturizer/barrier cream  - Collaborate with interdisciplinary team   - Patient/family teaching  - Consider wound care consult   Outcome: Progressing     Problem: PAIN - ADULT  Goal: Verbalizes/displays adequate comfort level or baseline comfort level  Description  Interventions:  - Encourage patient to monitor pain and request assistance  - Assess pain using appropriate pain scale  - Administer analgesics based on type and severity of pain and evaluate response  - Implement non-pharmacological measures as appropriate and evaluate response  - Consider cultural and social influences on pain and pain management  - Notify physician/advanced practitioner if interventions unsuccessful or patient reports new pain  Outcome: Progressing     Problem: INFECTION - ADULT  Goal: Absence or prevention of progression during hospitalization  Description  INTERVENTIONS:  - Assess and monitor for signs and symptoms of infection  - Monitor lab/diagnostic results  - Monitor all insertion sites, i e  indwelling lines, tubes, and drains  - Monitor endotracheal if appropriate and nasal secretions for changes in amount and color  - [place] appropriate cooling/warming therapies per order  - Administer medications as ordered  - Instruct and encourage patient and family to use good hand hygiene technique  - Identify and instruct in appropriate isolation precautions for identified infection/condition  Outcome: Progressing  Goal: Absence of fever/infection during neutropenic period  Description  INTERVENTIONS:  - Monitor WBC    Outcome: Progressing     Problem: SAFETY ADULT  Goal: Maintain or return to baseline ADL function  Description  INTERVENTIONS:  -  Assess patient's ability to carry out ADLs; assess patient's baseline for ADL function and identify physical deficits which impact ability to perform ADLs (bathing, care of mouth/teeth, toileting, grooming, dressing, etc )  - Assess/evaluate cause of self-care deficits   - Assess range of motion  - Assess patient's mobility; develop plan if impaired  - Assess patient's need for assistive devices and provide as appropriate  - Encourage maximum independence but intervene and supervise when necessary  - Involve family in performance of ADLs  - Assess for home care needs following discharge   - Consider OT consult to assist with ADL evaluation and planning for discharge  - Provide patient education as appropriate  Outcome: Progressing  Goal: Maintain or return mobility status to optimal level  Description  INTERVENTIONS:  - Assess patient's baseline mobility status (ambulation, transfers, stairs, etc )    - Identify cognitive and physical deficits and behaviors that affect mobility  - Identify mobility aids required to assist with transfers and/or ambulation (gait belt, sit-to-stand, lift, walker, cane, etc )  - [place] fall precautions as indicated by assessment  - Record patient progress and toleration of activity level on Mobility SBAR; progress patient to next Phase/Stage  - Instruct patient to call for assistance with activity based on assessment  - Consider rehabilitation consult to assist with strengthening/weightbearing, etc   Outcome: Progressing     Problem: DISCHARGE PLANNING  Goal: Discharge to home or other facility with appropriate resources  Description  INTERVENTIONS:  - Identify barriers to discharge w/patient and caregiver  - Arrange for needed discharge resources and transportation as appropriate  - Identify discharge learning needs (meds, wound care, etc )  - Arrange for interpretive services to assist at discharge as needed  - Refer to Case Management Department for coordinating discharge planning if the patient needs post-hospital services based on physician/advanced practitioner order or complex needs related to functional status, cognitive ability, or social support system  Outcome: Progressing     Problem: Knowledge Deficit  Goal: Patient/family/caregiver demonstrates understanding of disease process, treatment plan, medications, and discharge instructions  Description  Complete learning assessment and assess knowledge base    Interventions:  - Provide teaching at level of understanding  - Provide teaching via preferred learning methods  Outcome: Progressing

## 2020-06-26 NOTE — UTILIZATION REVIEW
Initial Clinical Review  OBSERVATION 6/25/20 @ 2109 CONVERTED TO INPATIENT 6/26/20 @1621 DUE TO GASTROPARESIS    06/26/20 1621  Inpatient Admission Once     Transfer Service: General Medicine       Question Answer Comment   Admitting Physician Delbert Peabody    Level of Care Med Surg    Estimated length of stay More than 2 Midnights    Certification I certify that inpatient services are medically necessary for this patient for a duration of greater than two midnights  See H&P and MD Progress Notes for additional information about the patient's course of treatment  06/26/20 1621       ED Arrival Information     Expected Arrival Acuity Means of Arrival Escorted By Service Admission Type    - 6/25/2020 18:33 Urgent Walk-In Family Member Hospitalist Urgent    Arrival Complaint    nausea        Chief Complaint   Patient presents with    Vomiting     per pts son, pt has had nausea and vomiting for 2 days  pt has hx of gastroperesis and believes this may be a flare up      Assessment/Plan: 63year old female to the ED from home with complaints of vomiting, nausea for 2 days prior to arrival  Admitted under observation  Then converted to inpatient for diabetic gastroparesis, dehydration, asthma  She arrives to the ED with tachycardia, abdominal tenderness with dry mucous membranes  She appears a little confused and is lightheaded  Given  A liter bolus of IVfluids  Continues with tachycardia after bolus  Hyponatremia likely due to dehydration  Given IV reglan  CT A/P shows no obstruction  Hold Lasix  WBC elevated  NPO    6/26 Update:  N/V has improved with Reglan  Advance diet to clears  She has had diarrhea as well  Possibility of gastroenteritis  Will check stool for culture and c-diff  Tachycardia continues  Continue IV fluids     ED Triage Vitals   Temperature Pulse Respirations Blood Pressure SpO2   06/25/20 1837 06/25/20 1837 06/25/20 1837 06/25/20 1837 06/25/20 1837   (!) 97 1 °F (36 2 °C) (!) 134 18 153/85 96 %      Temp Source Heart Rate Source Patient Position - Orthostatic VS BP Location FiO2 (%)   06/25/20 2240 06/25/20 1957 06/25/20 1957 06/25/20 1957 --   Temporal Monitor Lying Left arm       Pain Score       06/25/20 2155       9        Wt Readings from Last 1 Encounters:   06/25/20 129 kg (285 lb 7 9 oz)     Additional Vital Signs:   Date/Time  Temp  Pulse  Resp  BP  MAP (mmHg)  SpO2  O2 Device  Patient Position - Orthostatic VS   06/26/20 0705  97 2 °F (36 2 °C)Abnormal   113Abnormal   18  144/92  107  96 %  None (Room air)  Lying   06/25/20 2300              None (Room air)     06/25/20 2240  98 3 °F (36 8 °C)  127Abnormal   22  143/97    94 %  None (Room air)  Lying   06/25/20 2133    114Abnormal   20  178/95Abnormal     98 %  None (Room air)  Lying   06/25/20 1957    126Abnormal   18  167/81    97 %  None (Room air)  Lying   06/25/20 1837  97 1 °F (36 2 °C)Abnormal   134Abnormal   18  153/85             Pertinent Labs/Diagnostic Test Results:   6/25 CT A/P:  Stomach and descending duodenum are mildly distended with fluid   Distal duodenum and remaining small bowel however are normal caliber without evidence of obstruction   No inflammatory changes   Otherwise no acute intra-abdominal pathology      6/25 EKG: Rate:     ECG rate assessment: tachycardic    Rhythm:     Rhythm: sinus tachycardia    Ectopy:     Ectopy: none    QRS:     QRS axis:  Normal    QRS intervals:  Normal  Conduction:     Conduction: normal    ST segments:     ST segments:  Normal  T waves:     T waves: normal        Results from last 7 days   Lab Units 06/26/20 0447 06/25/20 1917   WBC Thousand/uL 15 62* 16 25*   HEMOGLOBIN g/dL 13 2 15 3   HEMATOCRIT % 40 8 46 6*   PLATELETS Thousands/uL 218 253   NEUTROS ABS Thousands/µL  --  13 68*         Results from last 7 days   Lab Units 06/26/20 0447 06/25/20 1917   SODIUM mmol/L 135* 130*   POTASSIUM mmol/L 3 2* 4 1   CHLORIDE mmol/L 100 93*   CO2 mmol/L 22 23   ANION GAP mmol/L 13 14*   BUN mg/dL 24 24   CREATININE mg/dL 0 85 1 01   EGFR ml/min/1 73sq m 77 62   CALCIUM mg/dL 8 8 9 5   MAGNESIUM mg/dL 2 1 1 4*     Results from last 7 days   Lab Units 06/25/20 1917   AST U/L 25   ALT U/L 25   ALK PHOS U/L 156*   TOTAL PROTEIN g/dL 8 6*   ALBUMIN g/dL 3 8   TOTAL BILIRUBIN mg/dL 2 68*     Results from last 7 days   Lab Units 06/26/20  0639 06/25/20  2237   POC GLUCOSE mg/dl 242* 341*     Results from last 7 days   Lab Units 06/26/20  0447 06/25/20  1917   GLUCOSE RANDOM mg/dL 286* 364*       BETA-HYDROXYBUTYRATE   Date Value Ref Range Status   06/25/2020 2 8 (H) <0 6 mmol/L Final          Results from last 7 days   Lab Units 06/25/20 1917   PH GAUTAM  7 423*   PCO2 GAUTAM mm Hg 33 2*   PO2 GAUTAM mm Hg 41 6   HCO3 GAUTAM mmol/L 21 2*   BASE EXC GAUTAM mmol/L -2 3   O2 CONTENT GAUTAM ml/dL 16 2   O2 HGB, VENOUS % 75 6       Results from last 7 days   Lab Units 06/25/20 1917   TROPONIN I ng/mL <0 02         Results from last 7 days   Lab Units 06/25/20 1917   PROTIME seconds 14 4   INR  1 11   PTT seconds 31       Results from last 7 days   Lab Units 06/25/20 1917   NT-PRO BNP pg/mL 3,017*     Results from last 7 days   Lab Units 06/25/20 1917   LIPASE u/L 92     ED Treatment:   Medication Administration from 06/25/2020 1833 to 06/25/2020 2150       Date/Time Order Dose Route Action     06/25/2020 1918 sodium chloride 0 9 % bolus 1,000 mL 1,000 mL Intravenous New Bag     06/25/2020 1918 metoclopramide (REGLAN) injection 10 mg 10 mg Intravenous Given     06/25/2020 2143 sodium chloride 0 9 % bolus 1,000 mL 1,000 mL Intravenous New Bag     06/25/2020 2130 magnesium sulfate 2 g/50 mL IVPB (premix) 2 g 2 g Intravenous New Bag        Past Medical History:   Diagnosis Date    Acute respiratory insufficiency 11/23/2019    Asthma     Chronic pain     [facility] [address] )     Gastroparesis     Sepsis ([address] ) 11/23/2019     Admitting Diagnosis: Dehydration [E86 0]  Sinus tachycardia [R00 0]  Hypomagnesemia [E83 42]  Hyponatremia [E87 1]  Nausea [R11 0]  Nausea & vomiting [R11 2]  Hyperglycemia [R73 9]  Age/Sex: 63 y o  female  Admission Orders:  UP and OOB  Activity as tolerated  Clear liquid diet  BMP, CBC  C-diff, stool enteric bact   HGB A1c  Scheduled Medications:    Medications:  carisoprodol 350 mg Oral BID   dicyclomine 10 mg Oral 4x Daily (AC & HS)   enoxaparin 40 mg Subcutaneous Daily   fluticasone 1 spray Nasal Daily   fluticasone-vilanterol 1 puff Inhalation Daily   insulin glargine 50 Units Subcutaneous HS   insulin lispro 1-5 Units Subcutaneous 4x Daily (AC & HS)   insulin lispro 5 Units Subcutaneous TID With Meals   lisinopril 10 mg Oral Daily   metoclopramide 10 mg Intravenous Once   metoclopramide 10 mg Intravenous 4x Daily (AC & HS)   montelukast 10 mg Oral HS   morphine 15 mg Oral BID   morphine 60 mg Oral BID   nystatin  Topical BID   pantoprazole 40 mg Oral Early Morning   potassium chloride 20 mEq Intravenous Q2H   pravastatin 10 mg Oral HS   pregabalin 150 mg Oral BID     Continuous IV Infusions:    sodium chloride 75 mL/hr Intravenous Continuous     PRN Meds:    acetaminophen 650 mg Oral Q6H PRN   albuterol 2 puff Inhalation Q4H PRN   diazepam 5 mg Oral HS PRN   morphine 15 mg Oral TID PRN   ondansetron 4 mg Intravenous Once PRN       Network Utilization Review Department  [email or web address]  ATTENTION: Please call with any questions or concerns to [phone] and carefully listen to the prompts so that you are directed to the right person  All voicemails are confidential   Joan Willis all requests for admission clinical reviews, approved or denied determinations and any other requests to dedicated fax number below belonging to the campus where the patient is receiving treatment   List of dedicated fax numbers for the Facilities:  [address] [place] [address] DENIALS (Administrative/Medical Necessity) [phone]   [address] [place] [address] (Maternity/NICU/Pediatrics) [phone]   ST  Lorin Pole [phone]   Ladchaneln Ros [phone]   [place] Jaramillo [phone]   [place] [facility] [address] [facility] [phone]   [facility]  [phone]   [address] [facility] [address]  [address] [phone]

## 2020-06-26 NOTE — ED NOTES
Attempted to obtain urine sample from pt  Pt had bowel movement so sample was contaminated        Jocelyne Rodríguez, RN  06/25/20 1703

## 2020-06-26 NOTE — PROGRESS NOTES
[facility] Progress Note  Patient: Trista Anderson 63 y o  female   MRN: [id number]  PCP: Izaiah Mccray MD  Unit/Bed#: E4 -01 Encounter: [id number]  Date Of Visit: 06/26/20      Assessment/plan  1  Intractable n/v- due to diabetic gastroparesis  Has improved  Continue with IV reglan  Will schedule it ac/hs  Will advance diet to clears  Will work on improvement on blood glucose control  2  Diarrhea- questionable due to infectious diarrhea vrs due to constipation and now reglan use  There is a possibility for gastroenteritis given n/v  Will check stool for c diff and stool culture  Ct negative for obstruction    3  Dehydration- pt treated with IV fluids  Lasix held  Will decrease iv fluids to 75ml/hr and d/c if she is tolerating diet  Will likely restart lasix tomorrow if pt is able to tolerate diet    4  htn- stable  Continue current treatment    5  Asthma with out exacerbation- continue current treatment    6  Type 2 diabetes- awaiting repeat a1c  Currently not controlled  Will add lispro with meals  Will monitor and adjust lantus as needed  Continue insulin sliding scale    7  Hyponatremia- likely pseudohyponatremia resulting from hyperglycemia    8  Right knee pain- s/p fall  Pt unable to bare weight  Will obtain xray  May need ct vrs mri depending on xray results  May require ortho consult  9  Hypokalemia- will replace  dispo- continue to monitor pt in hospital due to advancing diet and ambulatory dysfunction due to knee pain which work up is pending     Subjective:   Pt seen and examined  Pt states she has not been out in public but has a daughter and son who will do grocery shopping for her  She later did admit that she attended a neighborhood gathering of approx 5-10 people last weekend  She did fall prior to this gathering and had knee pain in her right knee  She has been limping every since and unable to put her full weight on that knee without increase pain   Pt also admits that she did not have a bm since Wednesday of last week  She did have diarrhea x3 this am  It was watery  No recent antibiotic use  No sick contacts that she is aware of  She has not vomited since admission  She states she is feeling better  She does not use her reglan as she should at home  No f/c no cp no sob no abd pain this am  She is still having 10/10 pain with her right knee when she tries to ambulate  Decrease rom of the right knee  Objective:     Vitals: Blood pressure 144/92, pulse (!) 113, temperature (!) 97 2 °F (36 2 °C), temperature source Temporal, resp  rate 18, height 5' 8" (1 727 m), weight 129 kg (285 lb 7 9 oz), SpO2 96 %, not currently breastfeeding  ,Body mass index is 43 41 kg/m²  Lab, Imaging and other studies:  Results from last 7 days   Lab Units 06/26/20  0447 06/25/20  1917   WBC Thousand/uL 15 62* 16 25*   HEMOGLOBIN g/dL 13 2 15 3   HEMATOCRIT % 40 8 46 6*   PLATELETS Thousands/uL 218 253   INR   --  1 11     Results from last 7 days   Lab Units 06/26/20  0447 06/25/20  1917   POTASSIUM mmol/L 3 2* 4 1   CHLORIDE mmol/L 100 93*   CO2 mmol/L 22 23   BUN mg/dL 24 24   CREATININE mg/dL 0 85 1 01   CALCIUM mg/dL 8 8 9 5   ALK PHOS U/L  --  156*   ALT U/L  --  25   AST U/L  --  25     Results from last 7 days   Lab Units 06/25/20  1917   TROPONIN I ng/mL <0 02     Lab Results   Component Value Date    BLOODCX No Growth After 5 Days  01/12/2020    BLOODCX No Growth After 5 Days  01/12/2020    BLOODCX No Growth After 5 Days  11/22/2019    URINECX <10,000 cfu/ml  11/23/2019    URINECX >100,000 cfu/ml  05/22/2018    URINECX >100,000 cfu/ml Mixed Contaminants X6 01/16/2017         Ct Abdomen Pelvis With Contrast    Result Date: 6/25/2020  Narrative: CT ABDOMEN AND PELVIS WITH IV CONTRAST INDICATION:   Bowel obstruction high-grade suspected  COMPARISON:  1/12/2020 TECHNIQUE:  CT examination of the abdomen and pelvis was performed   Axial, sagittal, and coronal 2D reformatted images were created from the source data and submitted for interpretation  Radiation dose length product (DLP) for this visit:  [phone] mGy-cm   This examination, like all CT scans performed in the [facility], was performed utilizing techniques to minimize radiation dose exposure, including the use of iterative reconstruction and automated exposure control  IV Contrast:  100 mL of iohexol (OMNIPAQUE) Enteric Contrast:  Enteric contrast was not administered  FINDINGS: ABDOMEN LOWER CHEST:  No clinically significant abnormality identified in the visualized lower chest  LIVER/BILIARY TREE:  Unremarkable  GALLBLADDER:  Gallbladder is surgically absent  SPLEEN:  Unremarkable  PANCREAS:  Unremarkable  ADRENAL GLANDS:  Unremarkable  KIDNEYS/URETERS:  Unremarkable  No hydronephrosis  STOMACH AND BOWEL:  Stomach and descending duodenum are mildly distended with fluid  Distal duodenum and remaining small bowel however are normal caliber without evidence of obstruction  Small hiatal hernia  APPENDIX:  No findings to suggest appendicitis  ABDOMINOPELVIC CAVITY:  No ascites  No pneumoperitoneum  No lymphadenopathy  VESSELS:  Unremarkable for patient's age  PELVIS REPRODUCTIVE ORGANS:  Stable  Hysterectomy  Lobulated left adnexal structure measuring 4 6 x 2 5 cm is unchanged from multiple prior exams  URINARY BLADDER:  Unremarkable  ABDOMINAL WALL/INGUINAL REGIONS:  Small fat-containing periumbilical hernia  OSSEOUS STRUCTURES:  No acute fracture or destructive osseous lesion  Spine degenerative change  Left lateral fusion hardware at L4-5  Impression: 1  Stomach and descending duodenum are mildly distended with fluid  Distal duodenum and remaining small bowel however are normal caliber without evidence of obstruction  No inflammatory changes  Otherwise no acute intra-abdominal pathology   Workstation performed: [id number]       Scheduled Meds:   Current Facility-Administered Medications:  acetaminophen 650 mg Oral Q6H PRN Cy Shackle, PA-C    albuterol 2 puff Inhalation Q4H PRN Cy Shadebbie, PA-C    carisoprodol 350 mg Oral BID Cy Shackmallorie, PA-C    diazepam 5 mg Oral HS PRN Cy Shadebbie, PA-C    dicyclomine 10 mg Oral 4x Daily (AC & HS) Cy Shadebbie, PA-C    enoxaparin 40 mg Subcutaneous Daily Cy Jaimedebbie, PA-C    fluticasone 1 spray Nasal Daily Cy Jaimedebbie, PA-C    fluticasone-vilanterol 1 puff Inhalation Daily Cy Dnanie, PA-C    insulin glargine 50 Units Subcutaneous HS Cy Shackmallorie, PA-C    insulin lispro 1-5 Units Subcutaneous Q6H [facility] Cy Asimle, PA-C    insulin lispro 5 Units Subcutaneous TID With Meals Chelsie Hamilton DO    lisinopril 10 mg Oral Daily Cy Dannie, PA-C    metoclopramide 10 mg Intravenous Once Brianda Cain MD    metoclopramide 10 mg Intravenous 4x Daily (AC & HS) Chelsie Hamilton DO    montelukast 10 mg Oral HS Cy Shackmallorie, PA-C    morphine 15 mg Oral BID Cy Shackle, PA-C    morphine 60 mg Oral BID Cy Shackle, PA-C    morphine 15 mg Oral TID PRN Cy Shadebbie, PA-C    nystatin  Topical BID Cy Shadebbie, PA-C    ondansetron 4 mg Intravenous Once PRN Brianda Cain MD    pantoprazole 40 mg Oral Early Morning Cy Dannie, PA-C    potassium chloride 20 mEq Intravenous Q2H Chelsie Hamilton DO    pravastatin 10 mg Oral HS Cy Shackmallorie, PA-C    pregabalin 150 mg Oral BID Cy Shadebbie, PA-C    sodium chloride 75 mL/hr Intravenous Continuous Chelsie Hamilton DO Last Rate: 125 mL/hr (06/25/20 2243)     Continuous Infusions:   sodium chloride 75 mL/hr Last Rate: 125 mL/hr (06/25/20 2243)     PRN Meds:   acetaminophen    albuterol    diazepam    morphine    ondansetron      Physical exam:  Physical Exam  General appearance: alert and oriented, in no acute distress  Head: Normocephalic, without obvious abnormality, atraumatic  Eyes: conjunctivae/corneas clear  PERRL, EOM's intact  Fundi benign    Neck: no adenopathy, no carotid bruit, no JVD, supple, symmetrical, trachea midline and thyroid not enlarged, symmetric, no tenderness/mass/nodules  Lungs: clear to auscultation bilaterally  Heart: regular rate and rhythm, S1, S2 normal, no murmur, click, rub or gallop  Abdomen: soft, non-tender; bowel sounds normal; no masses,  no organomegaly  Extremities: +ttp right knee medial portion  +ecchymosis  +effusion +decrease rom     Pulses: 2+ and symmetric  Skin: ecchymosis right knee  Neurologic: Mental status: Alert, oriented, thought content appropriate      VTE Pharmacologic Prophylaxis: Enoxaparin (Lovenox)  VTE Mechanical Prophylaxis: sequential compression device    Counseling / Coordination of Care  Total floor / unit time spent today 20 minutes      Current Length of Stay: 0 day(s)    Current Patient Status: Observation       Code Status: Level 1 - Full Code

## 2020-06-26 NOTE — SOCIAL WORK
LOS 17HRS  PATIENT IS NOT A BUNDLE  PATIENT IS NOT A READMISSION  CM met with patient at bedside to complete CM open and review discharge planning  Patient reported to CM that she lives with  and son in a ranch style home with about 4 MATTHEW  Patient reported to CM that she does not have hx of VNA or STR  Patient reported outpatient PT in the past post Knee surgery  Patient had Left knee replacement about 10 yrs ago  Patient recently fell on right knee which MD has ordered xray and further workup  Patient reported to CM that she has PCP in [place] and would be interested in getting PCP locally  Patient is also interested in endocrinology follow up with local provider  I will place info link information in follow up section of chart and CM department can assist with scheduling new PCP appointment pending discharge plan  Patient reported  would be POA  Patient drives, son will transport patient home at time of discharge, and he works until 5:30pm  He often has lunch around 11 am and maybe able to get her around that time  CM reviewed d/c planning process including the following: identifying help at home, patient preference for d/c planning needs, [facility] Meds to Bed program, availability of treatment team to discuss questions or concerns patient and/or family may have regarding understanding medications and recognizing signs and symptoms once discharged  CM also encouraged patient to follow up with all recommended appointments after discharge  Patient advised of importance for patient and family to participate in managing patients medical well being  No referrals placed at this time  Awaiting further workup regarding knee pain and gastroparesis  CM department will continue to follow through discharge

## 2020-06-26 NOTE — SEPSIS NOTE
Sepsis Note   Isidra Gerardo 63 y o  female MRN: [id number]  Unit/Bed#: ED 31 Encounter: [id number]      qSOFA     Row Name 06/25/20 1957 06/25/20 1915 06/25/20 1837          Altered mental status GCS < 15    0        Respiratory Rate > / =22  0    0      Systolic BP < / =283  0    0      Q Sofa Score  0  0  0          Initial Sepsis Screening     Row Name 06/25/20 2042                Is the patient's history suggestive of a new or worsening infection? No  -CS        Suspected source of infection          Are two or more of the following signs & symptoms of infection both present and new to the patient?         Indicate SIRS criteria          If the answer is yes to both questions, suspicion of sepsis is present          If severe sepsis is present AND tissue hypoperfusion perists in the hour after fluid resuscitation or lactate > 4, the patient meets criteria for SEPTIC SHOCK          Are any of the following organ dysfunction criteria present within 6 hours of suspected infection and SIRS criteria that are NOT considered to be chronic conditions?         Organ dysfunction          Date of presentation of severe sepsis          Time of presentation of severe sepsis          Tissue hypoperfusion persists in the hour after crystalloid fluid administration, evidenced, by either:          Was hypotension present within one hour of the conclusion of crystalloid fluid administration?           Date of presentation of septic shock          Time of presentation of septic shock            User Key  (r) = Recorded By, (t) = Taken By, (c) = Cosigned By    [address] Name Provider Type    CS Sami Pond MD Physician

## 2020-06-27 LAB
ANION GAP SERPL CALCULATED.3IONS-SCNC: 11 MMOL/L (ref 4–13)
BACTERIA UR QL AUTO: ABNORMAL /HPF
BILIRUB UR QL STRIP: ABNORMAL
BUN SERPL-MCNC: 39 MG/DL (ref 5–25)
CALCIUM SERPL-MCNC: 8.7 MG/DL (ref 8.3–10.1)
CHLORIDE SERPL-SCNC: 98 MMOL/L (ref 100–108)
CLARITY UR: CLEAR
CO2 SERPL-SCNC: 24 MMOL/L (ref 21–32)
COLOR UR: YELLOW
CREAT SERPL-MCNC: 2.5 MG/DL (ref 0.6–1.3)
ERYTHROCYTE [DISTWIDTH] IN BLOOD BY AUTOMATED COUNT: 14.3 % (ref 11.6–15.1)
GFR SERPL CREATININE-BSD FRML MDRD: 21 ML/MIN/1.73SQ M
GLUCOSE SERPL-MCNC: 102 MG/DL (ref 65–140)
GLUCOSE SERPL-MCNC: 107 MG/DL (ref 65–140)
GLUCOSE SERPL-MCNC: 160 MG/DL (ref 65–140)
GLUCOSE SERPL-MCNC: 203 MG/DL (ref 65–140)
GLUCOSE SERPL-MCNC: 236 MG/DL (ref 65–140)
GLUCOSE UR STRIP-MCNC: ABNORMAL MG/DL
HCT VFR BLD AUTO: 43.8 % (ref 34.8–46.1)
HGB BLD-MCNC: 13.4 G/DL (ref 11.5–15.4)
HGB UR QL STRIP.AUTO: ABNORMAL
HYALINE CASTS #/AREA URNS LPF: ABNORMAL /LPF
KETONES UR STRIP-MCNC: NEGATIVE MG/DL
LEUKOCYTE ESTERASE UR QL STRIP: NEGATIVE
MCH RBC QN AUTO: 26.7 PG (ref 26.8–34.3)
MCHC RBC AUTO-ENTMCNC: 30.6 G/DL (ref 31.4–37.4)
MCV RBC AUTO: 87 FL (ref 82–98)
NITRITE UR QL STRIP: NEGATIVE
NON-SQ EPI CELLS URNS QL MICRO: ABNORMAL /HPF
PH UR STRIP.AUTO: 6 [PH]
PLATELET # BLD AUTO: 219 THOUSANDS/UL (ref 149–390)
PMV BLD AUTO: 12.8 FL (ref 8.9–12.7)
POTASSIUM SERPL-SCNC: 3.7 MMOL/L (ref 3.5–5.3)
PROT UR STRIP-MCNC: ABNORMAL MG/DL
RBC # BLD AUTO: 5.02 MILLION/UL (ref 3.81–5.12)
RBC #/AREA URNS AUTO: ABNORMAL /HPF
SODIUM SERPL-SCNC: 133 MMOL/L (ref 136–145)
SP GR UR STRIP.AUTO: 1.02 (ref 1–1.03)
UROBILINOGEN UR QL STRIP.AUTO: 0.2 E.U./DL
WBC # BLD AUTO: 13.93 THOUSAND/UL (ref 4.31–10.16)
WBC #/AREA URNS AUTO: ABNORMAL /HPF

## 2020-06-27 PROCEDURE — 82948 REAGENT STRIP/BLOOD GLUCOSE: CPT

## 2020-06-27 PROCEDURE — 80048 BASIC METABOLIC PNL TOTAL CA: CPT | Performed by: INTERNAL MEDICINE

## 2020-06-27 PROCEDURE — 85027 COMPLETE CBC AUTOMATED: CPT | Performed by: INTERNAL MEDICINE

## 2020-06-27 PROCEDURE — 99233 SBSQ HOSP IP/OBS HIGH 50: CPT | Performed by: INTERNAL MEDICINE

## 2020-06-27 PROCEDURE — 81001 URINALYSIS AUTO W/SCOPE: CPT | Performed by: INTERNAL MEDICINE

## 2020-06-27 PROCEDURE — 0T9B70Z DRAINAGE OF BLADDER WITH DRAINAGE DEVICE, VIA NATURAL OR ARTIFICIAL OPENING: ICD-10-PCS | Performed by: INTERNAL MEDICINE

## 2020-06-27 RX ORDER — CARISOPRODOL 350 MG/1
350 TABLET ORAL
Status: DISCONTINUED | OUTPATIENT
Start: 2020-06-27 | End: 2020-06-28

## 2020-06-27 RX ORDER — HEPARIN SODIUM 5000 [USP'U]/ML
5000 INJECTION, SOLUTION INTRAVENOUS; SUBCUTANEOUS EVERY 8 HOURS SCHEDULED
Status: DISCONTINUED | OUTPATIENT
Start: 2020-06-27 | End: 2020-06-29 | Stop reason: HOSPADM

## 2020-06-27 RX ORDER — DICYCLOMINE HYDROCHLORIDE 10 MG/1
10 CAPSULE ORAL EVERY 6 HOURS PRN
Status: DISCONTINUED | OUTPATIENT
Start: 2020-06-27 | End: 2020-06-28

## 2020-06-27 RX ORDER — MUSCLE RUB CREAM 100; 150 MG/G; MG/G
CREAM TOPICAL 4 TIMES DAILY PRN
Status: DISCONTINUED | OUTPATIENT
Start: 2020-06-27 | End: 2020-06-29 | Stop reason: HOSPADM

## 2020-06-27 RX ORDER — LIDOCAINE 50 MG/G
1 PATCH TOPICAL DAILY
Status: DISCONTINUED | OUTPATIENT
Start: 2020-06-27 | End: 2020-06-29 | Stop reason: HOSPADM

## 2020-06-27 RX ORDER — METOCLOPRAMIDE HYDROCHLORIDE 5 MG/ML
5 INJECTION INTRAMUSCULAR; INTRAVENOUS
Status: DISCONTINUED | OUTPATIENT
Start: 2020-06-27 | End: 2020-06-28

## 2020-06-27 RX ADMIN — HEPARIN SODIUM 5000 UNITS: 5000 INJECTION INTRAVENOUS; SUBCUTANEOUS at 09:58

## 2020-06-27 RX ADMIN — INSULIN GLARGINE 50 UNITS: 100 INJECTION, SOLUTION SUBCUTANEOUS at 21:59

## 2020-06-27 RX ADMIN — FLUTICASONE FUROATE AND VILANTEROL TRIFENATATE 1 PUFF: 200; 25 POWDER RESPIRATORY (INHALATION) at 09:55

## 2020-06-27 RX ADMIN — METOCLOPRAMIDE 5 MG: 5 INJECTION, SOLUTION INTRAMUSCULAR; INTRAVENOUS at 16:37

## 2020-06-27 RX ADMIN — SODIUM CHLORIDE, PRESERVATIVE FREE 0.04 MG: 5 INJECTION INTRAVENOUS at 09:02

## 2020-06-27 RX ADMIN — SODIUM CHLORIDE 75 ML/HR: 0.9 INJECTION, SOLUTION INTRAVENOUS at 05:53

## 2020-06-27 RX ADMIN — METOCLOPRAMIDE 10 MG: 5 INJECTION, SOLUTION INTRAMUSCULAR; INTRAVENOUS at 06:00

## 2020-06-27 RX ADMIN — CARISOPRODOL 350 MG: 350 TABLET ORAL at 21:59

## 2020-06-27 RX ADMIN — NYSTATIN 1 APPLICATION: 100000 POWDER TOPICAL at 09:56

## 2020-06-27 RX ADMIN — PREGABALIN 75 MG: 25 CAPSULE ORAL at 21:59

## 2020-06-27 RX ADMIN — INSULIN LISPRO 5 UNITS: 100 INJECTION, SOLUTION INTRAVENOUS; SUBCUTANEOUS at 12:02

## 2020-06-27 RX ADMIN — FLUTICASONE PROPIONATE 1 SPRAY: 50 SPRAY, METERED NASAL at 09:55

## 2020-06-27 RX ADMIN — LIDOCAINE 1 PATCH: 50 PATCH CUTANEOUS at 14:21

## 2020-06-27 RX ADMIN — PRAVASTATIN SODIUM 10 MG: 10 TABLET ORAL at 21:59

## 2020-06-27 RX ADMIN — PANTOPRAZOLE SODIUM 40 MG: 40 TABLET, DELAYED RELEASE ORAL at 05:45

## 2020-06-27 RX ADMIN — HEPARIN SODIUM 5000 UNITS: 5000 INJECTION INTRAVENOUS; SUBCUTANEOUS at 22:00

## 2020-06-27 RX ADMIN — MONTELUKAST SODIUM 10 MG: 10 TABLET, COATED ORAL at 21:59

## 2020-06-27 RX ADMIN — METOCLOPRAMIDE 5 MG: 5 INJECTION, SOLUTION INTRAMUSCULAR; INTRAVENOUS at 12:00

## 2020-06-27 RX ADMIN — SODIUM CHLORIDE, PRESERVATIVE FREE 0.04 MG: 5 INJECTION INTRAVENOUS at 11:12

## 2020-06-27 RX ADMIN — DICYCLOMINE HYDROCHLORIDE 10 MG: 10 CAPSULE ORAL at 06:00

## 2020-06-27 RX ADMIN — METOCLOPRAMIDE 5 MG: 5 INJECTION, SOLUTION INTRAMUSCULAR; INTRAVENOUS at 21:58

## 2020-06-27 RX ADMIN — NYSTATIN 1 APPLICATION: 100000 POWDER TOPICAL at 16:49

## 2020-06-27 RX ADMIN — SODIUM CHLORIDE 100 ML/HR: 0.9 INJECTION, SOLUTION INTRAVENOUS at 16:44

## 2020-06-27 RX ADMIN — SODIUM CHLORIDE, PRESERVATIVE FREE 0.04 MG: 5 INJECTION INTRAVENOUS at 08:21

## 2020-06-27 RX ADMIN — INSULIN LISPRO 5 UNITS: 100 INJECTION, SOLUTION INTRAVENOUS; SUBCUTANEOUS at 16:38

## 2020-06-27 NOTE — NURSING NOTE
Second dose of Narcan given with improvement in blood pressure to 119/66, but pt still lethargic  Pt complains of being cold, extra blankets provided  Repeat BP 10 minutes post narcan now 84/62  Dr Winston Crawford aware

## 2020-06-27 NOTE — PLAN OF CARE
Problem: Potential for Falls  Goal: Patient will remain free of falls  Description  INTERVENTIONS:  - Assess patient frequently for physical needs  -  Identify cognitive and physical deficits and behaviors that affect risk of falls    -  [place] fall precautions as indicated by assessment   - Educate patient/family on patient safety including physical limitations  - Instruct patient to call for assistance with activity based on assessment  - Modify environment to reduce risk of injury  - Consider OT/PT consult to assist with strengthening/mobility  Outcome: Progressing     Problem: Prexisting or High Potential for Compromised Skin Integrity  Goal: Skin integrity is maintained or improved  Description  INTERVENTIONS:  - Identify patients at risk for skin breakdown  - Assess and monitor skin integrity  - Assess and monitor nutrition and hydration status  - Monitor labs   - Assess for incontinence   - Turn and reposition patient  - Assist with mobility/ambulation  - Relieve pressure over bony prominences  - Avoid friction and shearing  - Provide appropriate hygiene as needed including keeping skin clean and dry  - Evaluate need for skin moisturizer/barrier cream  - Collaborate with interdisciplinary team   - Patient/family teaching  - Consider wound care consult   Outcome: Progressing     Problem: PAIN - ADULT  Goal: Verbalizes/displays adequate comfort level or baseline comfort level  Description  Interventions:  - Encourage patient to monitor pain and request assistance  - Assess pain using appropriate pain scale  - Administer analgesics based on type and severity of pain and evaluate response  - Implement non-pharmacological measures as appropriate and evaluate response  - Consider cultural and social influences on pain and pain management  - Notify physician/advanced practitioner if interventions unsuccessful or patient reports new pain  Outcome: Progressing     Problem: INFECTION - ADULT  Goal: Absence or prevention of progression during hospitalization  Description  INTERVENTIONS:  - Assess and monitor for signs and symptoms of infection  - Monitor lab/diagnostic results  - Monitor all insertion sites, i e  indwelling lines, tubes, and drains  - Monitor endotracheal if appropriate and nasal secretions for changes in amount and color  - [place] appropriate cooling/warming therapies per order  - Administer medications as ordered  - Instruct and encourage patient and family to use good hand hygiene technique  - Identify and instruct in appropriate isolation precautions for identified infection/condition  Outcome: Progressing  Goal: Absence of fever/infection during neutropenic period  Description  INTERVENTIONS:  - Monitor WBC    Outcome: Progressing     Problem: SAFETY ADULT  Goal: Maintain or return to baseline ADL function  Description  INTERVENTIONS:  -  Assess patient's ability to carry out ADLs; assess patient's baseline for ADL function and identify physical deficits which impact ability to perform ADLs (bathing, care of mouth/teeth, toileting, grooming, dressing, etc )  - Assess/evaluate cause of self-care deficits   - Assess range of motion  - Assess patient's mobility; develop plan if impaired  - Assess patient's need for assistive devices and provide as appropriate  - Encourage maximum independence but intervene and supervise when necessary  - Involve family in performance of ADLs  - Assess for home care needs following discharge   - Consider OT consult to assist with ADL evaluation and planning for discharge  - Provide patient education as appropriate  Outcome: Progressing  Goal: Maintain or return mobility status to optimal level  Description  INTERVENTIONS:  - Assess patient's baseline mobility status (ambulation, transfers, stairs, etc )    - Identify cognitive and physical deficits and behaviors that affect mobility  - Identify mobility aids required to assist with transfers and/or ambulation (gait belt, sit-to-stand, lift, walker, cane, etc )  - [place] fall precautions as indicated by assessment  - Record patient progress and toleration of activity level on Mobility SBAR; progress patient to next Phase/Stage  - Instruct patient to call for assistance with activity based on assessment  - Consider rehabilitation consult to assist with strengthening/weightbearing, etc   Outcome: Progressing     Problem: DISCHARGE PLANNING  Goal: Discharge to home or other facility with appropriate resources  Description  INTERVENTIONS:  - Identify barriers to discharge w/patient and caregiver  - Arrange for needed discharge resources and transportation as appropriate  - Identify discharge learning needs (meds, wound care, etc )  - Arrange for interpretive services to assist at discharge as needed  - Refer to Case Management Department for coordinating discharge planning if the patient needs post-hospital services based on physician/advanced practitioner order or complex needs related to functional status, cognitive ability, or social support system  Outcome: Progressing     Problem: Knowledge Deficit  Goal: Patient/family/caregiver demonstrates understanding of disease process, treatment plan, medications, and discharge instructions  Description  Complete learning assessment and assess knowledge base  Interventions:  - Provide teaching at level of understanding  - Provide teaching via preferred learning methods  Outcome: Progressing     Problem: Nutrition/Hydration-ADULT  Goal: Nutrient/Hydration intake appropriate for improving, restoring or maintaining nutritional needs  Description  Monitor and assess patient's nutrition/hydration status for malnutrition  Collaborate with interdisciplinary team and initiate plan and interventions as ordered  Monitor patient's weight and dietary intake as ordered or per policy  Utilize nutrition screening tool and intervene as necessary   Determine patient's food preferences and provide high-protein, high-caloric foods as appropriate       INTERVENTIONS:  - Monitor oral intake, urinary output, labs, and treatment plans  - Assess nutrition and hydration status and recommend course of action  - Evaluate amount of meals eaten  - Assist patient with eating if necessary   - Allow adequate time for meals  - Recommend/ encourage appropriate diets, oral nutritional supplements, and vitamin/mineral supplements  - Order, calculate, and assess calorie counts as needed  - Recommend, monitor, and adjust tube feedings and TPN/PPN based on assessed needs  - Assess need for intravenous fluids  - Provide specific nutrition/hydration education as appropriate  - Include patient/family/caregiver in decisions related to nutrition  Outcome: Progressing

## 2020-06-27 NOTE — NURSING NOTE
Pt lethargic and feeling "heavy"  BP 64/46 HR 92  Dr Arben Damico at bedside for evaluation  1 x dose IV narcan 0 04 given  Pt reports post-narcan feeling "sick"  BP came up to 105/65   Placed on Level 2 stepdown with telemetry and continuous pulse ox  Dr Arben Damico instructed to hold narcotics and to delay administration of lyrica and soma until pt improves  UPDATE  Pt hypotensive at 88/58,   Oxygen 90% on room air  Placed on 4L NC, now 94%  Pt c/o "feeling cold"  Has difficulty keeping eyes open and speech slurred  Dr Arben Damico notified with another dose of narcan ordered

## 2020-06-27 NOTE — NURSING NOTE
Pt hypotensive with BP 70/48, HR 95  Pt offers no complaints and denies symptoms  Advised pt not to get out of bed at this time  Dr Nancy Ochoa notified

## 2020-06-27 NOTE — PROGRESS NOTES
[facility] Progress Note  Patient: Shayla Muller 63 y o  female   MRN: [id number]  PCP: Candida Dowling MD  Unit/Bed#: E4 -01 Encounter: [id number]  Date Of Visit: 06/27/20       CALLED PHARMACY: [address]: Pt gets 60mg ms contin bid with 15mg ms contin tid prn  She does not receive 75mg bid  She recently filled this June 11th  Assessment/plan  1  Intractable n/v- due to diabetic gastroparesis  Was resolving  Pt was tolerating a full liquid diet  Will continue reglan  Will decrease reglan to 5mg due to juan carlos  Will continue to monitor  Pt has lethargy this am if she is more awake and still tolerating diet will increase to soft surgical diet    2  Hypotension- likely related to narcotics  Given narcan and bp improved  Will place pt on step down  Will redose narcan if needed  Will increase iv fluids    3  Acute toxic encephalopathy due to narcotics- reviewed pdmp morphine is not on this  Pt states she gets morphine through doctor in [place] and uses [facility]  Will verify dose  Pt improved after 1x dose of narcan  Will redose if needed  Will hold narcotics this am  Decrease dose to 15mg of ms contin bid for next dose tonight  Will continue to monitor pt to see if need to d/c narcotics fully  Will continue to monitor pt to see if repeat dose of narcan will be needed  Will also reduce all renal dose medications that cause lethargy in light of recent juan carlos (decreased soma, reglan, bentyl, and lyrica)  Will monitor blood glucose as due to juan carlos to make sure this is not adding to her lethargy  4  Juan Carlos/ckd2- likely multifactorial due to pre renal from vomiting/diarrhea, lisinopril, contrast induced, and hypotension- will d/c all nephrotoxic medications  Will renal dose all medications  Change lovenox to heparin  Will continue with iv fluids and work on increasing bp with reversing narcotics  Will bladder scan and straight cath [facility] as needed       5  Diarrhea- resolved  D/c stool studies    6  Asthma with out exacerbation- continue current treatment     7  Type 2 diabetes- will monitor closely for hypoglycemia with pieter  Continue lantus and insulin sliding scale       8  Hyponatremia- originally due to pseudohyponatremia from hyperglycemia  Will continue to monitor     9  Right knee pain- s/p fall  Pt unable to bare weight  Xray pending  Pt may require ortho consult       dispo- spoke to her daughter and updated her     Subjective:   Called to see pt due to hypotension and lethargy  Reviewed pts pain medications and gave a dose of narcan  Pt improved with narcan in which bp improved and she was more awake  She stated to verify her narcotics with her pharmacy in [place]  She is not having any further diarrhea  No further nausea/vomiting  She still has knee pain  Objective:     Vitals: Blood pressure (!) 64/46, pulse 92, temperature (!) 97 1 °F (36 2 °C), temperature source Temporal, resp  rate 18, height 5' 8" (1 727 m), weight 129 kg (285 lb 7 9 oz), SpO2 90 %, not currently breastfeeding  ,Body mass index is 43 41 kg/m²  Lab, Imaging and other studies:  Results from last 7 days   Lab Units 06/27/20  0552  06/25/20 1917   WBC Thousand/uL 13 93*   < > 16 25*   HEMOGLOBIN g/dL 13 4   < > 15 3   HEMATOCRIT % 43 8   < > 46 6*   PLATELETS Thousands/uL 219   < > 253   INR   --   --  1 11    < > = values in this interval not displayed  Results from last 7 days   Lab Units 06/27/20  0552  06/25/20 1917   POTASSIUM mmol/L 3 7   < > 4 1   CHLORIDE mmol/L 98*   < > 93*   CO2 mmol/L 24   < > 23   BUN mg/dL 39*   < > 24   CREATININE mg/dL 2 50*   < > 1 01   CALCIUM mg/dL 8 7   < > 9 5   ALK PHOS U/L  --   --  156*   ALT U/L  --   --  25   AST U/L  --   --  25    < > = values in this interval not displayed  Results from last 7 days   Lab Units 06/25/20 1917   TROPONIN I ng/mL <0 02     Lab Results   Component Value Date    BLOODCX No Growth After 5 Days  01/12/2020    BLOODCX No Growth After 5 Days  01/12/2020    BLOODCX No Growth After 5 Days  11/22/2019    URINECX <10,000 cfu/ml  11/23/2019    URINECX >100,000 cfu/ml  05/22/2018    URINECX >100,000 cfu/ml Mixed Contaminants X6 01/16/2017         Ct Abdomen Pelvis With Contrast    Result Date: 6/25/2020  Narrative: CT ABDOMEN AND PELVIS WITH IV CONTRAST INDICATION:   Bowel obstruction high-grade suspected  COMPARISON:  1/12/2020 TECHNIQUE:  CT examination of the abdomen and pelvis was performed  Axial, sagittal, and coronal 2D reformatted images were created from the source data and submitted for interpretation  Radiation dose length product (DLP) for this visit:  [phone] mGy-cm   This examination, like all CT scans performed in the [facility], was performed utilizing techniques to minimize radiation dose exposure, including the use of iterative reconstruction and automated exposure control  IV Contrast:  100 mL of iohexol (OMNIPAQUE) Enteric Contrast:  Enteric contrast was not administered  FINDINGS: ABDOMEN LOWER CHEST:  No clinically significant abnormality identified in the visualized lower chest  LIVER/BILIARY TREE:  Unremarkable  GALLBLADDER:  Gallbladder is surgically absent  SPLEEN:  Unremarkable  PANCREAS:  Unremarkable  ADRENAL GLANDS:  Unremarkable  KIDNEYS/URETERS:  Unremarkable  No hydronephrosis  STOMACH AND BOWEL:  Stomach and descending duodenum are mildly distended with fluid  Distal duodenum and remaining small bowel however are normal caliber without evidence of obstruction  Small hiatal hernia  APPENDIX:  No findings to suggest appendicitis  ABDOMINOPELVIC CAVITY:  No ascites  No pneumoperitoneum  No lymphadenopathy  VESSELS:  Unremarkable for patient's age  PELVIS REPRODUCTIVE ORGANS:  Stable  Hysterectomy  Lobulated left adnexal structure measuring 4 6 x 2 5 cm is unchanged from multiple prior exams  URINARY BLADDER:  Unremarkable   ABDOMINAL WALL/INGUINAL REGIONS:  Small fat-containing periumbilical hernia  OSSEOUS STRUCTURES:  No acute fracture or destructive osseous lesion  Spine degenerative change  Left lateral fusion hardware at L4-5  Impression: 1  Stomach and descending duodenum are mildly distended with fluid  Distal duodenum and remaining small bowel however are normal caliber without evidence of obstruction  No inflammatory changes  Otherwise no acute intra-abdominal pathology   Workstation performed: [id number]       Scheduled Meds:   Current Facility-Administered Medications:  acetaminophen 650 mg Oral Q6H PRN Clemetine Crape, PA-C    albuterol 2 puff Inhalation Q4H PRN Clemetine Crape, PA-C    carisoprodol 350 mg Oral HS Chelsie Ambron, DO    dicyclomine 10 mg Oral Q6H PRN Chelsie Ambron, DO    enoxaparin 40 mg Subcutaneous Daily Clemetine Crape, PA-C    fluticasone 1 spray Nasal Daily Clemetine Crape, PA-C    fluticasone-vilanterol 1 puff Inhalation Daily Clemetine Crape, PA-C    insulin glargine 50 Units Subcutaneous HS Clemetine Crape, PA-C    insulin lispro 1-5 Units Subcutaneous 4x Daily (AC & HS) Chelsie Ambron, DO    insulin lispro 5 Units Subcutaneous TID With Meals Chelsie Ambtk, DO    metoclopramide 10 mg Intravenous Once Marlin Remy MD    metoclopramide 10 mg Intravenous 4x Daily (AC & HS) Chelsie Ambron, DO    montelukast 10 mg Oral HS Clemetine Crape, PA-C    morphine 15 mg Oral BID Clemetine Crape, PA-C    morphine 15 mg Oral TID PRN Clemetine Crape, PA-C    naloxone 0 04 mg Intravenous Q1H PRN Chelsie Ambron, DO    nystatin  Topical BID Clemetine Crape, PA-C    ondansetron 4 mg Intravenous Once PRN Marlin Remy MD    pantoprazole 40 mg Oral Early Morning Clemetine Crape, PA-C    pravastatin 10 mg Oral HS Clemetine Crape, PA-C    pregabalin 75 mg Oral BID Chelsie Ambron, DO    sodium chloride 100 mL/hr Intravenous Continuous Chelsie Ambron, DO Last Rate: 75 mL/hr (06/27/20 0553)     Continuous Infusions:   sodium chloride 100 mL/hr Last Rate: 75 mL/hr (06/27/20 0553)     PRN Meds:   acetaminophen    albuterol    dicyclomine    morphine    naloxone    ondansetron      Physical exam:  Physical Exam  General appearance: lethargic, pale  Head: Normocephalic, without obvious abnormality, atraumatic  Eyes: conjunctivae/corneas clear  PERRL, EOM's intact  Fundi benign  Neck: no adenopathy, no carotid bruit, no JVD, supple, symmetrical, trachea midline and thyroid not enlarged, symmetric, no tenderness/mass/nodules  Lungs: decrease inspiratory effort  Heart: regular rate and rhythm, S1, S2 normal, no murmur, click, rub or gallop  Abdomen: soft, non-tender; bowel sounds normal; no masses,  no organomegaly  Extremities: right knee effusion, ttp  Pulses: 2+ and symmetric  Skin: Skin color, texture, turgor normal  No rashes or lesions  Neurologic: Mental status: lethargic oriented x3 wtih confusion      VTE Pharmacologic Prophylaxis: Heparin  VTE Mechanical Prophylaxis: sequential compression device    Counseling / Coordination of Care  Total floor / unit time spent today 30 mins  Greater than 50% was talking with pt, nursing staff, pts pharmacy, and pts family       Current Length of Stay: 1 day(s)    Current Patient Status: Inpatient       Code Status: Level 1 - Full Code

## 2020-06-28 ENCOUNTER — APPOINTMENT (INPATIENT)
Dept: RADIOLOGY | Facility: HOSPITAL | Age: 55
DRG: 073 | End: 2020-06-28
Payer: MEDICARE

## 2020-06-28 LAB
ANION GAP SERPL CALCULATED.3IONS-SCNC: 9 MMOL/L (ref 4–13)
BUN SERPL-MCNC: 41 MG/DL (ref 5–25)
CALCIUM SERPL-MCNC: 7.7 MG/DL (ref 8.3–10.1)
CHLORIDE SERPL-SCNC: 100 MMOL/L (ref 100–108)
CO2 SERPL-SCNC: 22 MMOL/L (ref 21–32)
CORTIS SERPL-MCNC: 12.8 UG/DL
CREAT SERPL-MCNC: 1.16 MG/DL (ref 0.6–1.3)
ERYTHROCYTE [DISTWIDTH] IN BLOOD BY AUTOMATED COUNT: 13.6 % (ref 11.6–15.1)
GFR SERPL CREATININE-BSD FRML MDRD: 53 ML/MIN/1.73SQ M
GLUCOSE SERPL-MCNC: 186 MG/DL (ref 65–140)
GLUCOSE SERPL-MCNC: 186 MG/DL (ref 65–140)
GLUCOSE SERPL-MCNC: 209 MG/DL (ref 65–140)
GLUCOSE SERPL-MCNC: 210 MG/DL (ref 65–140)
GLUCOSE SERPL-MCNC: 282 MG/DL (ref 65–140)
HCT VFR BLD AUTO: 33.9 % (ref 34.8–46.1)
HGB BLD-MCNC: 10.8 G/DL (ref 11.5–15.4)
MCH RBC QN AUTO: 27.3 PG (ref 26.8–34.3)
MCHC RBC AUTO-ENTMCNC: 31.9 G/DL (ref 31.4–37.4)
MCV RBC AUTO: 86 FL (ref 82–98)
MRSA NOSE QL CULT: NORMAL
PLATELET # BLD AUTO: 150 THOUSANDS/UL (ref 149–390)
PMV BLD AUTO: 13.4 FL (ref 8.9–12.7)
POTASSIUM SERPL-SCNC: 3.6 MMOL/L (ref 3.5–5.3)
RBC # BLD AUTO: 3.96 MILLION/UL (ref 3.81–5.12)
SODIUM SERPL-SCNC: 131 MMOL/L (ref 136–145)
WBC # BLD AUTO: 8.91 THOUSAND/UL (ref 4.31–10.16)

## 2020-06-28 PROCEDURE — 85027 COMPLETE CBC AUTOMATED: CPT | Performed by: INTERNAL MEDICINE

## 2020-06-28 PROCEDURE — 99222 1ST HOSP IP/OBS MODERATE 55: CPT | Performed by: PHYSICIAN ASSISTANT

## 2020-06-28 PROCEDURE — 99233 SBSQ HOSP IP/OBS HIGH 50: CPT | Performed by: INTERNAL MEDICINE

## 2020-06-28 PROCEDURE — 82533 TOTAL CORTISOL: CPT | Performed by: INTERNAL MEDICINE

## 2020-06-28 PROCEDURE — 94762 N-INVAS EAR/PLS OXIMTRY CONT: CPT

## 2020-06-28 PROCEDURE — 82948 REAGENT STRIP/BLOOD GLUCOSE: CPT

## 2020-06-28 PROCEDURE — 80048 BASIC METABOLIC PNL TOTAL CA: CPT | Performed by: INTERNAL MEDICINE

## 2020-06-28 PROCEDURE — 71046 X-RAY EXAM CHEST 2 VIEWS: CPT

## 2020-06-28 RX ORDER — DICYCLOMINE HYDROCHLORIDE 10 MG/1
10 CAPSULE ORAL
Status: DISCONTINUED | OUTPATIENT
Start: 2020-06-28 | End: 2020-06-29 | Stop reason: HOSPADM

## 2020-06-28 RX ORDER — MORPHINE SULFATE 15 MG/1
15 TABLET, FILM COATED, EXTENDED RELEASE ORAL 2 TIMES DAILY
Status: DISCONTINUED | OUTPATIENT
Start: 2020-06-28 | End: 2020-06-29 | Stop reason: HOSPADM

## 2020-06-28 RX ORDER — INSULIN GLARGINE 100 [IU]/ML
55 INJECTION, SOLUTION SUBCUTANEOUS
Status: DISCONTINUED | OUTPATIENT
Start: 2020-06-28 | End: 2020-06-29 | Stop reason: HOSPADM

## 2020-06-28 RX ORDER — PREGABALIN 50 MG/1
150 CAPSULE ORAL 2 TIMES DAILY
Status: DISCONTINUED | OUTPATIENT
Start: 2020-06-28 | End: 2020-06-29 | Stop reason: HOSPADM

## 2020-06-28 RX ORDER — MORPHINE SULFATE 30 MG/1
30 TABLET, FILM COATED, EXTENDED RELEASE ORAL 2 TIMES DAILY
Status: DISCONTINUED | OUTPATIENT
Start: 2020-06-28 | End: 2020-06-28

## 2020-06-28 RX ORDER — METOCLOPRAMIDE 10 MG/1
5 TABLET ORAL
Status: DISCONTINUED | OUTPATIENT
Start: 2020-06-28 | End: 2020-06-29 | Stop reason: HOSPADM

## 2020-06-28 RX ORDER — FUROSEMIDE 40 MG/1
40 TABLET ORAL DAILY
Status: DISCONTINUED | OUTPATIENT
Start: 2020-06-28 | End: 2020-06-29 | Stop reason: HOSPADM

## 2020-06-28 RX ORDER — CARISOPRODOL 350 MG/1
350 TABLET ORAL 2 TIMES DAILY
Status: DISCONTINUED | OUTPATIENT
Start: 2020-06-28 | End: 2020-06-29 | Stop reason: HOSPADM

## 2020-06-28 RX ADMIN — PREGABALIN 150 MG: 50 CAPSULE ORAL at 17:01

## 2020-06-28 RX ADMIN — PANTOPRAZOLE SODIUM 40 MG: 40 TABLET, DELAYED RELEASE ORAL at 06:23

## 2020-06-28 RX ADMIN — FUROSEMIDE 40 MG: 40 TABLET ORAL at 13:10

## 2020-06-28 RX ADMIN — PREGABALIN 75 MG: 25 CAPSULE ORAL at 08:18

## 2020-06-28 RX ADMIN — MORPHINE SULFATE 15 MG: 15 TABLET, EXTENDED RELEASE ORAL at 17:02

## 2020-06-28 RX ADMIN — METOCLOPRAMIDE 5 MG: 10 TABLET ORAL at 21:12

## 2020-06-28 RX ADMIN — METOCLOPRAMIDE 5 MG: 10 TABLET ORAL at 17:02

## 2020-06-28 RX ADMIN — NYSTATIN: 100000 POWDER TOPICAL at 17:06

## 2020-06-28 RX ADMIN — MONTELUKAST SODIUM 10 MG: 10 TABLET, COATED ORAL at 21:12

## 2020-06-28 RX ADMIN — PRAVASTATIN SODIUM 10 MG: 10 TABLET ORAL at 21:12

## 2020-06-28 RX ADMIN — FLUTICASONE PROPIONATE 1 SPRAY: 50 SPRAY, METERED NASAL at 08:17

## 2020-06-28 RX ADMIN — DICYCLOMINE HYDROCHLORIDE 10 MG: 10 CAPSULE ORAL at 17:02

## 2020-06-28 RX ADMIN — HEPARIN SODIUM 5000 UNITS: 5000 INJECTION INTRAVENOUS; SUBCUTANEOUS at 14:45

## 2020-06-28 RX ADMIN — DICYCLOMINE HYDROCHLORIDE 10 MG: 10 CAPSULE ORAL at 06:23

## 2020-06-28 RX ADMIN — METOCLOPRAMIDE 5 MG: 5 INJECTION, SOLUTION INTRAMUSCULAR; INTRAVENOUS at 11:14

## 2020-06-28 RX ADMIN — HEPARIN SODIUM 5000 UNITS: 5000 INJECTION INTRAVENOUS; SUBCUTANEOUS at 21:13

## 2020-06-28 RX ADMIN — NYSTATIN: 100000 POWDER TOPICAL at 08:18

## 2020-06-28 RX ADMIN — SODIUM CHLORIDE 100 ML/HR: 0.9 INJECTION, SOLUTION INTRAVENOUS at 02:42

## 2020-06-28 RX ADMIN — INSULIN LISPRO 5 UNITS: 100 INJECTION, SOLUTION INTRAVENOUS; SUBCUTANEOUS at 07:58

## 2020-06-28 RX ADMIN — ACETAMINOPHEN 650 MG: 325 TABLET ORAL at 14:04

## 2020-06-28 RX ADMIN — DICYCLOMINE HYDROCHLORIDE 10 MG: 10 CAPSULE ORAL at 21:13

## 2020-06-28 RX ADMIN — INSULIN LISPRO 5 UNITS: 100 INJECTION, SOLUTION INTRAVENOUS; SUBCUTANEOUS at 11:07

## 2020-06-28 RX ADMIN — FLUTICASONE FUROATE AND VILANTEROL TRIFENATATE 1 PUFF: 200; 25 POWDER RESPIRATORY (INHALATION) at 08:17

## 2020-06-28 RX ADMIN — HEPARIN SODIUM 5000 UNITS: 5000 INJECTION INTRAVENOUS; SUBCUTANEOUS at 06:30

## 2020-06-28 RX ADMIN — LIDOCAINE 1 PATCH: 50 PATCH CUTANEOUS at 08:00

## 2020-06-28 RX ADMIN — MORPHINE SULFATE 15 MG: 15 TABLET, EXTENDED RELEASE ORAL at 08:17

## 2020-06-28 RX ADMIN — CARISOPRODOL 350 MG: 350 TABLET ORAL at 17:01

## 2020-06-28 RX ADMIN — METOCLOPRAMIDE 5 MG: 5 INJECTION, SOLUTION INTRAMUSCULAR; INTRAVENOUS at 06:26

## 2020-06-28 RX ADMIN — INSULIN GLARGINE 55 UNITS: 100 INJECTION, SOLUTION SUBCUTANEOUS at 21:13

## 2020-06-28 NOTE — PROGRESS NOTES
[facility] Internal Medicine Progress Note  Patient: Red Oliveira 63 y o  female   MRN: [id number]  PCP: Shruti Hodges MD  Unit/Bed#: E4 -01 Encounter: [id number]  Date Of Visit: 06/28/20      Assessment/plan  1  Intractable n/v- due to diabetic gastroparesis  pt is tolerating soft diet  Will advance to diabetic diet  Continue scheduled reglan but will change to po  Continue to obtain better glucose control       2  Hypotension- likely related to narcotics  Pt improved after narcan  Will d/c IV fluids  Am cortisol was normal       3  Acute toxic encephalopathy due to narcotics- reviewed pdmp morphine is not on this  Pt states she gets morphine through doctor in [place] and uses [facility]  Did call pharmacy and dose is 60mg bid with 15mg prn  pts encephalopathy has resolved  Restarted morphine at 15mg  Will slowly increase to home dose of 60mg bid       4  Juan Carlos/ckd2- likely multifactorial due to pre renal from vomiting/diarrhea, lisinopril, contrast induced, urinary retention, and hypotension-creatinine improved from 2 5 to 1  16  Baseline creatinine is 0 8-1  Singer was placed yesterday due to retention of 900ml of urine  Will attempt voiding trial in am  Continue to hold lisinopril  Will check bmp in am       5  Diarrhea- resolved       6  Asthma with out exacerbation- continue current treatment     7  Type 2 diabetes- pt with hyperglycemia  Will increase lantus to 55 units  Will increase lispro to 10 units with meals  Will continue insulin sliding scale and adjust as needed       8  Hyponatremia- originally due to pseudohyponatremia from hyperglycemia  Possibly from volume overload  Will restart lasix and monitor  If sodium does not improve will check urine osm, urine na       9  Right knee pain- s/p fall  Appreciate ortho recommendations  Likely due to acute arthritis flare as result of fall  Continue ice and nsaids  Will consult physical therapy   Okay for corticosteroid injection if needed  10  Acute respiratory failure- likely due to respiratory depression from narcotics, atelectasis, and possibly pulmonary edema  Will d/c fluids  Will check cxr  Will restart lasix  Subjective:   Pt seen and examined  Pt states she feels better but she is scared of narcan now  She never wants that medication again  She denies f/c no cp no worsening sob  No n/v/d no abd pain  She is tolerating her diet well  She states her back pain is manageable at this time  Objective:     Vitals: Blood pressure 130/58, pulse 105, temperature 97 5 °F (36 4 °C), temperature source Temporal, resp  rate 18, height 5' 8" (1 727 m), weight (!) 139 kg (305 lb 8 9 oz), SpO2 93 %, not currently breastfeeding  ,Body mass index is 46 46 kg/m²  Lab, Imaging and other studies:  Results from last 7 days   Lab Units 06/28/20 0513 06/25/20 1917   WBC Thousand/uL 8 91   < > 16 25*   HEMOGLOBIN g/dL 10 8*   < > 15 3   HEMATOCRIT % 33 9*   < > 46 6*   PLATELETS Thousands/uL 150   < > 253   INR   --   --  1 11    < > = values in this interval not displayed  Results from last 7 days   Lab Units 06/28/20 0513 06/25/20 1917   POTASSIUM mmol/L 3 6   < > 4 1   CHLORIDE mmol/L 100   < > 93*   CO2 mmol/L 22   < > 23   BUN mg/dL 41*   < > 24   CREATININE mg/dL 1 16   < > 1 01   CALCIUM mg/dL 7 7*   < > 9 5   ALK PHOS U/L  --   --  156*   ALT U/L  --   --  25   AST U/L  --   --  25    < > = values in this interval not displayed  Results from last 7 days   Lab Units 06/25/20 1917   TROPONIN I ng/mL <0 02     Lab Results   Component Value Date    BLOODCX No Growth After 5 Days  01/12/2020    BLOODCX No Growth After 5 Days  01/12/2020    BLOODCX No Growth After 5 Days   11/22/2019    URINECX <10,000 cfu/ml  11/23/2019    URINECX >100,000 cfu/ml  05/22/2018    URINECX >100,000 cfu/ml Mixed Contaminants X6 01/16/2017         Ct Abdomen Pelvis With Contrast    Result Date: 6/25/2020  Narrative: CT ABDOMEN AND PELVIS WITH IV CONTRAST INDICATION:   Bowel obstruction high-grade suspected  COMPARISON:  1/12/2020 TECHNIQUE:  CT examination of the abdomen and pelvis was performed  Axial, sagittal, and coronal 2D reformatted images were created from the source data and submitted for interpretation  Radiation dose length product (DLP) for this visit:  [phone] mGy-cm   This examination, like all CT scans performed in the [facility], was performed utilizing techniques to minimize radiation dose exposure, including the use of iterative reconstruction and automated exposure control  IV Contrast:  100 mL of iohexol (OMNIPAQUE) Enteric Contrast:  Enteric contrast was not administered  FINDINGS: ABDOMEN LOWER CHEST:  No clinically significant abnormality identified in the visualized lower chest  LIVER/BILIARY TREE:  Unremarkable  GALLBLADDER:  Gallbladder is surgically absent  SPLEEN:  Unremarkable  PANCREAS:  Unremarkable  ADRENAL GLANDS:  Unremarkable  KIDNEYS/URETERS:  Unremarkable  No hydronephrosis  STOMACH AND BOWEL:  Stomach and descending duodenum are mildly distended with fluid  Distal duodenum and remaining small bowel however are normal caliber without evidence of obstruction  Small hiatal hernia  APPENDIX:  No findings to suggest appendicitis  ABDOMINOPELVIC CAVITY:  No ascites  No pneumoperitoneum  No lymphadenopathy  VESSELS:  Unremarkable for patient's age  PELVIS REPRODUCTIVE ORGANS:  Stable  Hysterectomy  Lobulated left adnexal structure measuring 4 6 x 2 5 cm is unchanged from multiple prior exams  URINARY BLADDER:  Unremarkable  ABDOMINAL WALL/INGUINAL REGIONS:  Small fat-containing periumbilical hernia  OSSEOUS STRUCTURES:  No acute fracture or destructive osseous lesion  Spine degenerative change  Left lateral fusion hardware at L4-5  Impression: 1  Stomach and descending duodenum are mildly distended with fluid    Distal duodenum and remaining small bowel however are normal caliber without evidence of obstruction  No inflammatory changes  Otherwise no acute intra-abdominal pathology  Workstation performed: [id number]       Scheduled Meds:   Current Facility-Administered Medications:  acetaminophen 650 mg Oral Q6H PRN Arie Ramires PA-C   albuterol 2 puff Inhalation Q4H PRN Arie Ramires PA-C   carisoprodol 350 mg Oral BID Chelsie Ambron, DO   dicyclomine 10 mg Oral 4x Daily (AC & HS) Chelsie Ambron, DO   fluticasone 1 spray Nasal Daily Arie Ramires PA-C   fluticasone-vilanterol 1 puff Inhalation Daily Arie Ramires PA-C   furosemide 40 mg Oral Daily Chelsie Ambron, DO   heparin (porcine) 5,000 Units Subcutaneous Q8H [address] Chelsie Ambron, DO   insulin glargine 55 Units Subcutaneous HS Chelsie Ambron, DO   insulin lispro 1-5 Units Subcutaneous 4x Daily (AC & HS) Chelsie Ambron, DO   insulin lispro 10 Units Subcutaneous TID With Meals Chelsie Ambron, DO   lidocaine 1 patch Topical Daily Chelsie Ambron, DO   menthol-methyl salicylate  Apply externally 4x Daily PRN Chelsie Ambron, DO   metoclopramide 5 mg Intravenous 4x Daily (AC & HS) Chelsie Ambron, DO   montelukast 10 mg Oral HS Arie Ramires PA-C   morphine 15 mg Oral BID Chelsie Ambron, DO   morphine 15 mg Oral TID PRN Arie Ramires PA-C   naloxone 0 04 mg Intravenous Q1H PRN Chelsie Ambron, DO   nystatin  Topical BID Arie Ramires PA-C   ondansetron 4 mg Intravenous Once PRN Sami Pond MD   pantoprazole 40 mg Oral Early Morning Arie Ramires PA-C   pravastatin 10 mg Oral HS Arie Ramires PA-C   pregabalin 150 mg Oral BID Chelsie Alfonso, DO     Continuous Infusions:    PRN Meds:   acetaminophen    albuterol    menthol-methyl salicylate    morphine    naloxone    ondansetron      Physical exam:  Physical Exam  General appearance: alert and oriented, in no acute distress  Head: Normocephalic, without obvious abnormality, atraumatic  Eyes: conjunctivae/corneas clear  PERRL, EOM's intact  Fundi benign    Neck: no adenopathy, no carotid bruit, no JVD, supple, symmetrical, trachea midline and thyroid not enlarged, symmetric, no tenderness/mass/nodules  Lungs: slight decrease in breath sounds at bases bilateral  Heart: regular rate and rhythm, S1, S2 normal, no murmur, click, rub or gallop  Abdomen: soft, non-tender; bowel sounds normal; no masses,  no organomegaly  Extremities: right knee is edematous, decrease rom, and +ttp  Pulses: 2+ and symmetric  Skin: Skin color, texture, turgor normal  No rashes or lesions  Neurologic: Mental status: Alert, oriented, thought content appropriate      VTE Pharmacologic Prophylaxis: Heparin  VTE Mechanical Prophylaxis: sequential compression device    Counseling / Coordination of Care  Total floor / unit time spent today 20 minutes      Current Length of Stay: 2 day(s)    Current Patient Status: Inpatient       Code Status: Level 1 - Full Code

## 2020-06-28 NOTE — CONSULTS
Consultation - Orthopedics   Steve Marcelino 63 y o  female MRN: [id number]  Unit/Bed#: E4 -01 Encounter: [id number]      Assessment/Plan     Assessment:  67-year-old female acute arthritic flare secondary to fall of the right knee  Plan:  -weight-bearing as tolerated to the right lower extremity  -recommend PT and OT  -recommend ice and analgesics as needed for pain  -x-ray imaging of the right knee was reviewed and demonstrates moderate to severe tricompartmental osteoarthritis  -due to patient's history of uncontrolled DM and A1c 10 8 I recommend holding off on a corticosteroid injection into the right knee  Patient does have a history receiving corticosteroid injections into the right knee but she is unsure of when the last 1 was  I instructed the patient that if the pain does not resolve she is welcome to follow up as an outpatient in the office  History of Present Illness   Physician Requesting Consult: Libra Castillo DO  Reason for Consult / Principal Problem:  Acute right knee pain  HPI: Steve Marcelino is a 63y o  year old female who presented to the ED with nausea and vomiting  She was then admitted due to hypotension and lethargy  Orthopedics was consulted due to right knee pain  Patient was seen examined at bedside  Patient stated that she sustained a fall in her home on 06/19/2020  She states that she landed on the anterior aspect of bilateral knees  She stated that initially she was unable to tolerate weight-bearing but this gradually improved  She states that she is able to tolerate weight-bearing at this time as long she has a walker to help support the knee  She describes the pain as dull and achy globally about the knee but worse in the medial aspect  She does have full range of motion and denies limitations  She does experience swelling  She denies numbness or tingling    She states that she does have a history of receiving corticosteroid injections into the right knee but is unsure of when the last injection was  She has a history of total knee arthroplasty on the contralateral side  Inpatient consult to Orthopedic Surgery  Consult performed by: Srinivas Logan PA-C  Consult ordered by: Gabriela Singleton,           Review of Systems  Fourteen point review of system was reviewed with the patient all found to be negative except those stated above      Historical Information   Past Medical History:   Diagnosis Date    Acute respiratory insufficiency 11/23/2019    Asthma     Chronic pain     Diabetes mellitus ([facility] [address] [facility] )     Gastroparesis     Sepsis ([facility] [address] [facility] ) 11/23/2019     Past Surgical History:   Procedure Laterality Date    CERVICAL LAMINECTOMY      CHOLECYSTECTOMY      CHOLECYSTECTOMY LAPAROSCOPIC N/A 11/10/2018    Procedure: CHOLECYSTECTOMY LAPAROSCOPIC w/ ioc;  Surgeon: Connie Duvall MD;  Location: [facility];  Service: General    HYSTERECTOMY      JOINT REPLACEMENT Bilateral     knee    TOE AMPUTATION Right 2/2/2018    Procedure: AMPUTATION TOE, RIGHT GREAT TOE;  Surgeon: Eric Lopez DPM;  Location: [facility];  Service: Podiatry     Social History   Social History     Substance and Sexual Activity   Alcohol Use Not Currently    Frequency: Never    Drinks per session: 1 or 2    Binge frequency: Never    Comment: rarely     Social History     Substance and Sexual Activity   Drug Use No     Social History     Tobacco Use   Smoking Status Never Smoker   Smokeless Tobacco Never Used     Family History: non-contributory    Meds/Allergies   all current active meds have been reviewed  Allergies   Allergen Reactions    Nsaids GI Intolerance       Objective   Vitals: Blood pressure 102/65, pulse 86, temperature 97 5 °F (36 4 °C), temperature source Temporal, resp  rate 18, height 5' 8" (1 727 m), weight (!) 139 kg (305 lb 8 9 oz), SpO2 94 %, not currently breastfeeding  ,Body mass index is 46 46 kg/m²        Intake/Output Summary (Last 24 hours) at 6/28/2020 0848  Last data filed at 6/28/2020 0817  Gross per 24 hour   Intake 2554 58 ml   Output 2550 ml   Net 4 58 ml     I/O last 24 hours: In: 2554 6 [I V :2554 6]  Out: 2550 [Urine:2550]    Invasive Devices     Peripheral Intravenous Line            Peripheral IV 06/25/20 Right Antecubital 2 days          Drain            Urethral Catheter Non-latex;Straight-tip 18 Fr  less than 1 day                Physical Exam   Constitutional: She is oriented to person, place, and time  She appears well-developed and well-nourished  HENT:   Head: Normocephalic and atraumatic  Eyes: Pupils are equal, round, and reactive to light  Conjunctivae and EOM are normal    Neck: Normal range of motion  Neck supple  Cardiovascular:   No apparent distress   Pulmonary/Chest: Effort normal    Abdominal: Soft  Neurological: She is alert and oriented to person, place, and time  Skin: Skin is warm and dry  Capillary refill takes less than 2 seconds  Psychiatric: She has a normal mood and affect  Ortho Exam   Right Lower Extremity Exam  Inspection:  Ecchymosis about the medial aspect of the knee with mild swelling visualized  Palpation:  Moderate tenderness to palpation upon the medial joint line with effusion  ROM:  0-120 actively  Strength:  5/5 quadriceps and hamstrings  EHL 5/5  FHL 5/5  Stability:  No objective LE joint instablity  Neurovascular:  Sensation intact in DP/SP/Clarke/Sa/T nerve distributions  2+ DP & PT pulses  Gait:  Not tested  Lab Results:   I have personally reviewed pertinent lab results    CBC:   Lab Results   Component Value Date    WBC 8 91 06/28/2020    HGB 10 8 (L) 06/28/2020    HCT 33 9 (L) 06/28/2020    MCV 86 06/28/2020     06/28/2020    MCH 27 3 06/28/2020    MCHC 31 9 06/28/2020    RDW 13 6 06/28/2020    MPV 13 4 (H) 06/28/2020     CMP:   Lab Results   Component Value Date    SODIUM 131 (L) 06/28/2020     06/28/2020    CO2 22 06/28/2020    BUN 41 (H) 06/28/2020    CREATININE 1 16 06/28/2020    CALCIUM 7 7 (L) 06/28/2020    EGFR 53 06/28/2020     Imaging Studies: I have personally reviewed pertinent films in PACS my interpretation is as follows x-ray of the right knee demonstrates moderate to severe medial and mild to moderate lateral and patellofemoral compartment osteoarthritis      Arielle Prater PA-C

## 2020-06-28 NOTE — PLAN OF CARE
Problem: Potential for Falls  Goal: Patient will remain free of falls  Description  INTERVENTIONS:  - Assess patient frequently for physical needs  -  Identify cognitive and physical deficits and behaviors that affect risk of falls    -  [place] fall precautions as indicated by assessment   - Educate patient/family on patient safety including physical limitations  - Instruct patient to call for assistance with activity based on assessment  - Modify environment to reduce risk of injury  - Consider OT/PT consult to assist with strengthening/mobility  Outcome: Progressing     Problem: Prexisting or High Potential for Compromised Skin Integrity  Goal: Skin integrity is maintained or improved  Description  INTERVENTIONS:  - Identify patients at risk for skin breakdown  - Assess and monitor skin integrity  - Assess and monitor nutrition and hydration status  - Monitor labs   - Assess for incontinence   - Turn and reposition patient  - Assist with mobility/ambulation  - Relieve pressure over bony prominences  - Avoid friction and shearing  - Provide appropriate hygiene as needed including keeping skin clean and dry  - Evaluate need for skin moisturizer/barrier cream  - Collaborate with interdisciplinary team   - Patient/family teaching  - Consider wound care consult   Outcome: Progressing     Problem: PAIN - ADULT  Goal: Verbalizes/displays adequate comfort level or baseline comfort level  Description  Interventions:  - Encourage patient to monitor pain and request assistance  - Assess pain using appropriate pain scale  - Administer analgesics based on type and severity of pain and evaluate response  - Implement non-pharmacological measures as appropriate and evaluate response  - Consider cultural and social influences on pain and pain management  - Notify physician/advanced practitioner if interventions unsuccessful or patient reports new pain  Outcome: Progressing     Problem: INFECTION - ADULT  Goal: Absence or prevention of progression during hospitalization  Description  INTERVENTIONS:  - Assess and monitor for signs and symptoms of infection  - Monitor lab/diagnostic results  - Monitor all insertion sites, i e  indwelling lines, tubes, and drains  - Monitor endotracheal if appropriate and nasal secretions for changes in amount and color  - [place] appropriate cooling/warming therapies per order  - Administer medications as ordered  - Instruct and encourage patient and family to use good hand hygiene technique  - Identify and instruct in appropriate isolation precautions for identified infection/condition  Outcome: Progressing  Goal: Absence of fever/infection during neutropenic period  Description  INTERVENTIONS:  - Monitor WBC    Outcome: Progressing     Problem: SAFETY ADULT  Goal: Maintain or return to baseline ADL function  Description  INTERVENTIONS:  -  Assess patient's ability to carry out ADLs; assess patient's baseline for ADL function and identify physical deficits which impact ability to perform ADLs (bathing, care of mouth/teeth, toileting, grooming, dressing, etc )  - Assess/evaluate cause of self-care deficits   - Assess range of motion  - Assess patient's mobility; develop plan if impaired  - Assess patient's need for assistive devices and provide as appropriate  - Encourage maximum independence but intervene and supervise when necessary  - Involve family in performance of ADLs  - Assess for home care needs following discharge   - Consider OT consult to assist with ADL evaluation and planning for discharge  - Provide patient education as appropriate  Outcome: Progressing  Goal: Maintain or return mobility status to optimal level  Description  INTERVENTIONS:  - Assess patient's baseline mobility status (ambulation, transfers, stairs, etc )    - Identify cognitive and physical deficits and behaviors that affect mobility  - Identify mobility aids required to assist with transfers and/or ambulation (gait belt, sit-to-stand, lift, walker, cane, etc )  - [place] fall precautions as indicated by assessment  - Record patient progress and toleration of activity level on Mobility SBAR; progress patient to next Phase/Stage  - Instruct patient to call for assistance with activity based on assessment  - Consider rehabilitation consult to assist with strengthening/weightbearing, etc   Outcome: Progressing     Problem: DISCHARGE PLANNING  Goal: Discharge to home or other facility with appropriate resources  Description  INTERVENTIONS:  - Identify barriers to discharge w/patient and caregiver  - Arrange for needed discharge resources and transportation as appropriate  - Identify discharge learning needs (meds, wound care, etc )  - Arrange for interpretive services to assist at discharge as needed  - Refer to Case Management Department for coordinating discharge planning if the patient needs post-hospital services based on physician/advanced practitioner order or complex needs related to functional status, cognitive ability, or social support system  Outcome: Progressing     Problem: Knowledge Deficit  Goal: Patient/family/caregiver demonstrates understanding of disease process, treatment plan, medications, and discharge instructions  Description  Complete learning assessment and assess knowledge base  Interventions:  - Provide teaching at level of understanding  - Provide teaching via preferred learning methods  Outcome: Progressing     Problem: Nutrition/Hydration-ADULT  Goal: Nutrient/Hydration intake appropriate for improving, restoring or maintaining nutritional needs  Description  Monitor and assess patient's nutrition/hydration status for malnutrition  Collaborate with interdisciplinary team and initiate plan and interventions as ordered  Monitor patient's weight and dietary intake as ordered or per policy  Utilize nutrition screening tool and intervene as necessary   Determine patient's food preferences and provide high-protein, high-caloric foods as appropriate       INTERVENTIONS:  - Monitor oral intake, urinary output, labs, and treatment plans  - Assess nutrition and hydration status and recommend course of action  - Evaluate amount of meals eaten  - Assist patient with eating if necessary   - Allow adequate time for meals  - Recommend/ encourage appropriate diets, oral nutritional supplements, and vitamin/mineral supplements  - Order, calculate, and assess calorie counts as needed  - Recommend, monitor, and adjust tube feedings and TPN/PPN based on assessed needs  - Assess need for intravenous fluids  - Provide specific nutrition/hydration education as appropriate  - Include patient/family/caregiver in decisions related to nutrition  Outcome: Progressing

## 2020-06-29 VITALS
BODY MASS INDEX: 44.41 KG/M2 | TEMPERATURE: 98.3 F | HEIGHT: 68 IN | WEIGHT: 293 LBS | DIASTOLIC BLOOD PRESSURE: 65 MMHG | RESPIRATION RATE: 18 BRPM | HEART RATE: 90 BPM | OXYGEN SATURATION: 94 % | SYSTOLIC BLOOD PRESSURE: 122 MMHG

## 2020-06-29 DIAGNOSIS — E11.40 TYPE 2 DIABETES MELLITUS WITH DIABETIC NEUROPATHY, WITH LONG-TERM CURRENT USE OF INSULIN (HCC): ICD-10-CM

## 2020-06-29 DIAGNOSIS — Z79.4 TYPE 2 DIABETES MELLITUS WITH DIABETIC NEUROPATHY, WITH LONG-TERM CURRENT USE OF INSULIN (HCC): ICD-10-CM

## 2020-06-29 PROBLEM — N18.9 ACUTE KIDNEY INJURY SUPERIMPOSED ON CHRONIC KIDNEY DISEASE (HCC): Status: ACTIVE | Noted: 2020-06-29

## 2020-06-29 PROBLEM — M25.561 ACUTE PAIN OF RIGHT KNEE: Status: ACTIVE | Noted: 2020-06-29

## 2020-06-29 PROBLEM — I95.0 IDIOPATHIC HYPOTENSION: Status: ACTIVE | Noted: 2020-06-29

## 2020-06-29 PROBLEM — R19.7 DIARRHEA OF PRESUMED INFECTIOUS ORIGIN: Status: ACTIVE | Noted: 2020-06-29

## 2020-06-29 PROBLEM — E87.1 HYPONATREMIA: Status: ACTIVE | Noted: 2020-06-29

## 2020-06-29 PROBLEM — G93.40 ACUTE ENCEPHALOPATHY: Status: ACTIVE | Noted: 2020-06-29

## 2020-06-29 PROBLEM — N17.9 ACUTE KIDNEY INJURY SUPERIMPOSED ON CHRONIC KIDNEY DISEASE (HCC): Status: ACTIVE | Noted: 2020-06-29

## 2020-06-29 PROBLEM — J96.01 ACUTE RESPIRATORY FAILURE WITH HYPOXIA (HCC): Status: ACTIVE | Noted: 2020-06-29

## 2020-06-29 LAB
ANION GAP SERPL CALCULATED.3IONS-SCNC: 6 MMOL/L (ref 4–13)
BUN SERPL-MCNC: 22 MG/DL (ref 5–25)
CALCIUM SERPL-MCNC: 8.5 MG/DL (ref 8.3–10.1)
CHLORIDE SERPL-SCNC: 103 MMOL/L (ref 100–108)
CO2 SERPL-SCNC: 28 MMOL/L (ref 21–32)
CREAT SERPL-MCNC: 0.73 MG/DL (ref 0.6–1.3)
ERYTHROCYTE [DISTWIDTH] IN BLOOD BY AUTOMATED COUNT: 13.9 % (ref 11.6–15.1)
GFR SERPL CREATININE-BSD FRML MDRD: 92 ML/MIN/1.73SQ M
GLUCOSE SERPL-MCNC: 174 MG/DL (ref 65–140)
GLUCOSE SERPL-MCNC: 178 MG/DL (ref 65–140)
GLUCOSE SERPL-MCNC: 191 MG/DL (ref 65–140)
HCT VFR BLD AUTO: 34.5 % (ref 34.8–46.1)
HGB BLD-MCNC: 11.1 G/DL (ref 11.5–15.4)
MCH RBC QN AUTO: 27 PG (ref 26.8–34.3)
MCHC RBC AUTO-ENTMCNC: 32.2 G/DL (ref 31.4–37.4)
MCV RBC AUTO: 84 FL (ref 82–98)
PLATELET # BLD AUTO: 177 THOUSANDS/UL (ref 149–390)
PMV BLD AUTO: 13.3 FL (ref 8.9–12.7)
POTASSIUM SERPL-SCNC: 3.6 MMOL/L (ref 3.5–5.3)
RBC # BLD AUTO: 4.11 MILLION/UL (ref 3.81–5.12)
SODIUM SERPL-SCNC: 137 MMOL/L (ref 136–145)
WBC # BLD AUTO: 9.2 THOUSAND/UL (ref 4.31–10.16)

## 2020-06-29 PROCEDURE — 82948 REAGENT STRIP/BLOOD GLUCOSE: CPT

## 2020-06-29 PROCEDURE — 97166 OT EVAL MOD COMPLEX 45 MIN: CPT

## 2020-06-29 PROCEDURE — 97530 THERAPEUTIC ACTIVITIES: CPT

## 2020-06-29 PROCEDURE — 97163 PT EVAL HIGH COMPLEX 45 MIN: CPT

## 2020-06-29 PROCEDURE — 85027 COMPLETE CBC AUTOMATED: CPT | Performed by: INTERNAL MEDICINE

## 2020-06-29 PROCEDURE — 99238 HOSP IP/OBS DSCHRG MGMT 30/<: CPT | Performed by: FAMILY MEDICINE

## 2020-06-29 PROCEDURE — 97535 SELF CARE MNGMENT TRAINING: CPT

## 2020-06-29 PROCEDURE — 80048 BASIC METABOLIC PNL TOTAL CA: CPT | Performed by: INTERNAL MEDICINE

## 2020-06-29 RX ORDER — LANOLIN ALCOHOL/MO/W.PET/CERES
6 CREAM (GRAM) TOPICAL
Status: DISCONTINUED | OUTPATIENT
Start: 2020-06-29 | End: 2020-06-29 | Stop reason: HOSPADM

## 2020-06-29 RX ADMIN — DICYCLOMINE HYDROCHLORIDE 10 MG: 10 CAPSULE ORAL at 12:19

## 2020-06-29 RX ADMIN — MELATONIN 6 MG: 3 TAB ORAL at 00:21

## 2020-06-29 RX ADMIN — FLUTICASONE FUROATE AND VILANTEROL TRIFENATATE 1 PUFF: 200; 25 POWDER RESPIRATORY (INHALATION) at 08:11

## 2020-06-29 RX ADMIN — LIDOCAINE 1 PATCH: 50 PATCH CUTANEOUS at 08:10

## 2020-06-29 RX ADMIN — FUROSEMIDE 40 MG: 40 TABLET ORAL at 08:10

## 2020-06-29 RX ADMIN — METOCLOPRAMIDE 5 MG: 10 TABLET ORAL at 06:09

## 2020-06-29 RX ADMIN — DICYCLOMINE HYDROCHLORIDE 10 MG: 10 CAPSULE ORAL at 06:09

## 2020-06-29 RX ADMIN — HEPARIN SODIUM 5000 UNITS: 5000 INJECTION INTRAVENOUS; SUBCUTANEOUS at 06:09

## 2020-06-29 RX ADMIN — MORPHINE SULFATE 15 MG: 15 TABLET, EXTENDED RELEASE ORAL at 08:10

## 2020-06-29 RX ADMIN — MORPHINE SULFATE 15 MG: 15 TABLET ORAL at 04:32

## 2020-06-29 RX ADMIN — CARISOPRODOL 350 MG: 350 TABLET ORAL at 08:09

## 2020-06-29 RX ADMIN — FLUTICASONE PROPIONATE 1 SPRAY: 50 SPRAY, METERED NASAL at 08:10

## 2020-06-29 RX ADMIN — METOCLOPRAMIDE 5 MG: 10 TABLET ORAL at 12:18

## 2020-06-29 RX ADMIN — NYSTATIN 1 APPLICATION: 100000 POWDER TOPICAL at 09:00

## 2020-06-29 RX ADMIN — PANTOPRAZOLE SODIUM 40 MG: 40 TABLET, DELAYED RELEASE ORAL at 06:09

## 2020-06-29 RX ADMIN — PREGABALIN 150 MG: 50 CAPSULE ORAL at 08:10

## 2020-06-29 NOTE — DISCHARGE SUMMARY
Discharge- Arthur Ortega 1965, 63 y o  female MRN: [id number]    Unit/Bed#: E4 -01 Encounter: [id number]    Primary Care Provider: Keke Morrison MD   Date and time admitted to hospital: 6/25/2020  6:38 PM        * Diabetic gastroparesis ([facility] )  Assessment & Plan  Intractable nausea with vomiting secondary to diabetic gastroparesis currently resolved  Patient is tolerating a regular diabetic diet  Continue antiemetics with Reglan 5 mg q i d  And Zofran p r n  Patient is currently hemodynamically stable for discharge to home with home physical therapy today  Acute respiratory failure with hypoxia [facility])  Assessment & Plan  Which has resolved was most likely due to respiratory depression from narcotics, atelectasis, and possibly pulmonary edema  Chest x-ray PA and lateral shows no evidence of pulmonary edema  Continue Lasix 40 mg daily  Idiopathic hypotension  Assessment & Plan  Hypotension which improved and resolved after administration of Narcan was most likely due to narcotics  Patient is currently off IV fluids  A m  Cortisol was normal     Acute kidney injury superimposed on chronic kidney disease ([facility] )  Assessment & Plan  Patient's ALEJANDRO superimposed on CKD stage 2 is most likely secondary to prerenal causes including vomiting, diarrhea, lisinopril, contrast, urinary retention  ALEJANDRO is currently resolved  Patient passed a voiding trial this morning and Singer catheter has since been removed  Hyponatremia  Assessment & Plan  Was originally due to pseudohyponatremia from hyperglycemia and possibly from volume overload  Currently hyponatremia is resolved  Continue Lasix 40 mg daily  Diarrhea of presumed infectious origin  Assessment & Plan  Resolved  Acute encephalopathy  Assessment & Plan  Currently resolved was most likely due to narcotic use      Asthma  Assessment & Plan  Continue inhalers  No exacerbation    Type 2 diabetes mellitus with diabetic neuropathy, with long-term current use of insulin [facility])  Assessment & Plan  Lab Results   Component Value Date    HGBA1C 10 8 (H) 06/26/2020       Recent Labs     06/28/20  1609 06/28/20  2111 06/29/20  0701 06/29/20  1120   POCGLU 186* 209* 178* 191*         Order accuchecks with sliding scale  (P) [id number]   Continue Lantus insulin 55 units subcutaneously at bedtime daily as well as lispro insulin 10 units subcutaneously 3 times daily with meals  Acute pain of right knee  Assessment & Plan  Patient developed right knee pain after a fall  Was seen and evaluated by Orthopedic surgery and recommend against intra-articular corticosteroid injection due to patient's uncontrolled diabetes  Continue ice and NSAIDs  Physical therapy evaluated and recommended home health with physical therapy  Discharging Physician / Practitioner: Lydia Perera MD  PCP: Rios Vail MD  Admission Date:   Admission Orders (From admission, onward)     Ordered        06/26/20 1621  Inpatient Admission  Once         06/25/20 2109  Place in Observation  Once                   Discharge Date: 06/29/20    Resolved Problems  Date Reviewed: 6/29/2020    None          Consultations During Hospital Stay:  · Orthopedic surgery    Procedures Performed:   · CT abdomen and pelvis with contrast 06/25/2020, right knee x-ray 06/26/2020, chest x-ray PA and lateral 06/28/2020, 12 lead EKG 06/25/2020  Significant Findings / Test Results:   · CT abdomen and pelvis with contrast 6/25-stomach and descending duodenum mildly distended with fluid  Distal duodenum and remaining small bowel however are normal caliber without evidence of obstruction  No inflammatory changes  Otherwise no acute intra-abdominal pathology  · Twelve lead EKG 6/25-sinus tachycardia with biatrial enlargement  · Right knee x-ray 6/26-no acute osseous abnormality  Tricompartmental and intercondylar degenerative osteophytes  Mild progressive narrowing of the medial compartment    Stable 1 2 cm probable chronic loose body lateral to the lateral tibial plateau  · Chest x-ray PA and lateral 6/28-no acute cardiopulmonary disease  Incidental Findings:   · None     Test Results Pending at Discharge (will require follow up): · None     Outpatient Tests Requested:  · None    Complications:  None    Reason for Admission:  Intractable nausea vomiting due to diabetic gastroparesis    Hospital Course:     Barb Lopez is a 63 y o  female patient who originally presented to the hospital on 6/25/2020 due to abdominal pain with vomiting for 2 days prior to her presentation  The patient had recently been admitted in December 2019 for the same symptoms  She was admitted to the medical-surgical unit for intractable nausea and vomiting due to her history of diabetic gastroparesis  She was started on scheduled Reglan as well as as needed Zofran which was able to control her nausea  Her diet was slowly advanced during hospitalization  As result of her intractable nausea vomiting and poor oral intake, she developed acute kidney injury, hyponatremia, hypotension  Her acute kidney injury resolved with IV fluid hydration and improved oral intake  Hyponatremia which was deemed to be due to her hyperglycemia as well as volume overload, also resolved with correction of glucose and resumption of diuretic to control her volume  Her hypotension also resolved with IV fluid and oral intake  She did develop right knee pain which was evaluated with x-ray and was determined to be due to osteoarthritis  Orthopedic surgery was consulted in the hopes of getting an intra-articular corticosteroid injection, however they were unwilling to do so due to her uncontrolled diabetes  They did recommend outpatient follow with  She was seen and evaluated by physical therapy and they recommended home with home physical therapy    She is currently tolerating a regular diabetic diet and is hemodynamically stable for discharge to home today  Please see above list of diagnoses and related plan for additional information  Condition at Discharge: good     Discharge Day Visit / Exam:     Subjective:  Patient with no complaints at this time  Vitals: Blood Pressure: 122/65 (06/29/20 0700)  Pulse: 90 (06/29/20 0700)  Temperature: 98 3 °F (36 8 °C) (06/29/20 0700)  Temp Source: Temporal (06/29/20 0700)  Respirations: 18 (06/29/20 0700)  Height: 5' 8" (172 7 cm) (06/25/20 2240)  Weight - Scale: (!) 139 kg (305 lb 8 9 oz) (06/28/20 0600)  SpO2: 94 % (06/29/20 0700)  Exam:   Physical Exam   Constitutional: She is oriented to person, place, and time  She appears well-developed and well-nourished  No distress  HENT:   Head: Normocephalic and atraumatic  Eyes: Pupils are equal, round, and reactive to light  EOM are normal    Neck: Normal range of motion  Neck supple  Cardiovascular: Normal rate, regular rhythm and normal heart sounds  Pulmonary/Chest: Effort normal and breath sounds normal    Abdominal: Soft  Bowel sounds are normal  She exhibits no distension  There is no tenderness  Musculoskeletal: She exhibits no edema  Neurological: She is alert and oriented to person, place, and time  Skin: Skin is warm and dry  She is not diaphoretic  Psychiatric: She has a normal mood and affect  Her behavior is normal        Discussion with Family:  No    Discharge instructions/Information to patient and family:   See after visit summary for information provided to patient and family  Provisions for Follow-Up Care:  See after visit summary for information related to follow-up care and any pertinent home health orders  Disposition:     Home with VNA Services (Reminder: Complete face to face encounter)    For Discharges to [facility] SNF:   · Not Applicable to this Patient - Not Applicable to this Patient    Planned Readmission:  None     Discharge Statement:  I spent 25 minutes discharging the patient   This time was spent on the day of discharge  I had direct contact with the patient on the day of discharge  Greater than 50% of the total time was spent examining patient, answering all patient questions, arranging and discussing plan of care with patient as well as directly providing post-discharge instructions  Additional time then spent on discharge activities  Discharge Medications:  See after visit summary for reconciled discharge medications provided to patient and family        ** Please Note: This note has been constructed using a voice recognition system **

## 2020-06-29 NOTE — ASSESSMENT & PLAN NOTE
Hypotension which improved and resolved after administration of Narcan was most likely due to narcotics  Patient is currently off IV fluids  A m   Cortisol was normal

## 2020-06-29 NOTE — PLAN OF CARE
Problem: Potential for Falls  Goal: Patient will remain free of falls  Description  INTERVENTIONS:  - Assess patient frequently for physical needs  -  Identify cognitive and physical deficits and behaviors that affect risk of falls    -  [place] fall precautions as indicated by assessment   - Educate patient/family on patient safety including physical limitations  - Instruct patient to call for assistance with activity based on assessment  - Modify environment to reduce risk of injury  - Consider OT/PT consult to assist with strengthening/mobility  Outcome: Progressing     Problem: Prexisting or High Potential for Compromised Skin Integrity  Goal: Skin integrity is maintained or improved  Description  INTERVENTIONS:  - Identify patients at risk for skin breakdown  - Assess and monitor skin integrity  - Assess and monitor nutrition and hydration status  - Monitor labs   - Assess for incontinence   - Turn and reposition patient  - Assist with mobility/ambulation  - Relieve pressure over bony prominences  - Avoid friction and shearing  - Provide appropriate hygiene as needed including keeping skin clean and dry  - Evaluate need for skin moisturizer/barrier cream  - Collaborate with interdisciplinary team   - Patient/family teaching  - Consider wound care consult   Outcome: Progressing     Problem: PAIN - ADULT  Goal: Verbalizes/displays adequate comfort level or baseline comfort level  Description  Interventions:  - Encourage patient to monitor pain and request assistance  - Assess pain using appropriate pain scale  - Administer analgesics based on type and severity of pain and evaluate response  - Implement non-pharmacological measures as appropriate and evaluate response  - Consider cultural and social influences on pain and pain management  - Notify physician/advanced practitioner if interventions unsuccessful or patient reports new pain  Outcome: Progressing     Problem: INFECTION - ADULT  Goal: Absence or prevention of progression during hospitalization  Description  INTERVENTIONS:  - Assess and monitor for signs and symptoms of infection  - Monitor lab/diagnostic results  - Monitor all insertion sites, i e  indwelling lines, tubes, and drains  - Monitor endotracheal if appropriate and nasal secretions for changes in amount and color  - [place] appropriate cooling/warming therapies per order  - Administer medications as ordered  - Instruct and encourage patient and family to use good hand hygiene technique  - Identify and instruct in appropriate isolation precautions for identified infection/condition  Outcome: Progressing  Goal: Absence of fever/infection during neutropenic period  Description  INTERVENTIONS:  - Monitor WBC    Outcome: Progressing     Problem: SAFETY ADULT  Goal: Maintain or return to baseline ADL function  Description  INTERVENTIONS:  -  Assess patient's ability to carry out ADLs; assess patient's baseline for ADL function and identify physical deficits which impact ability to perform ADLs (bathing, care of mouth/teeth, toileting, grooming, dressing, etc )  - Assess/evaluate cause of self-care deficits   - Assess range of motion  - Assess patient's mobility; develop plan if impaired  - Assess patient's need for assistive devices and provide as appropriate  - Encourage maximum independence but intervene and supervise when necessary  - Involve family in performance of ADLs  - Assess for home care needs following discharge   - Consider OT consult to assist with ADL evaluation and planning for discharge  - Provide patient education as appropriate  Outcome: Progressing  Goal: Maintain or return mobility status to optimal level  Description  INTERVENTIONS:  - Assess patient's baseline mobility status (ambulation, transfers, stairs, etc )    - Identify cognitive and physical deficits and behaviors that affect mobility  - Identify mobility aids required to assist with transfers and/or ambulation (gait belt, sit-to-stand, lift, walker, cane, etc )  - [place] fall precautions as indicated by assessment  - Record patient progress and toleration of activity level on Mobility SBAR; progress patient to next Phase/Stage  - Instruct patient to call for assistance with activity based on assessment  - Consider rehabilitation consult to assist with strengthening/weightbearing, etc   Outcome: Progressing     Problem: DISCHARGE PLANNING  Goal: Discharge to home or other facility with appropriate resources  Description  INTERVENTIONS:  - Identify barriers to discharge w/patient and caregiver  - Arrange for needed discharge resources and transportation as appropriate  - Identify discharge learning needs (meds, wound care, etc )  - Arrange for interpretive services to assist at discharge as needed  - Refer to Case Management Department for coordinating discharge planning if the patient needs post-hospital services based on physician/advanced practitioner order or complex needs related to functional status, cognitive ability, or social support system  Outcome: Progressing     Problem: Knowledge Deficit  Goal: Patient/family/caregiver demonstrates understanding of disease process, treatment plan, medications, and discharge instructions  Description  Complete learning assessment and assess knowledge base  Interventions:  - Provide teaching at level of understanding  - Provide teaching via preferred learning methods  Outcome: Progressing     Problem: Nutrition/Hydration-ADULT  Goal: Nutrient/Hydration intake appropriate for improving, restoring or maintaining nutritional needs  Description  Monitor and assess patient's nutrition/hydration status for malnutrition  Collaborate with interdisciplinary team and initiate plan and interventions as ordered  Monitor patient's weight and dietary intake as ordered or per policy  Utilize nutrition screening tool and intervene as necessary   Determine patient's food preferences and provide high-protein, high-caloric foods as appropriate       INTERVENTIONS:  - Monitor oral intake, urinary output, labs, and treatment plans  - Assess nutrition and hydration status and recommend course of action  - Evaluate amount of meals eaten  - Assist patient with eating if necessary   - Allow adequate time for meals  - Recommend/ encourage appropriate diets, oral nutritional supplements, and vitamin/mineral supplements  - Order, calculate, and assess calorie counts as needed  - Recommend, monitor, and adjust tube feedings and TPN/PPN based on assessed needs  - Assess need for intravenous fluids  - Provide specific nutrition/hydration education as appropriate  - Include patient/family/caregiver in decisions related to nutrition  Outcome: Progressing     Problem: GENITOURINARY - ADULT  Goal: Maintains or returns to baseline urinary function  Description  INTERVENTIONS:  - Assess urinary function  - Encourage oral fluids to ensure adequate hydration if ordered  - Administer IV fluids as ordered to ensure adequate hydration  - Administer ordered medications as needed  - Offer frequent toileting  - Follow urinary retention protocol if ordered  Outcome: Progressing  Goal: Absence of urinary retention  Description  INTERVENTIONS:  - Assess patients ability to void and empty bladder  - Monitor I/O  - Bladder scan as needed  - Discuss with physician/AP medications to alleviate retention as needed  - Discuss catheterization for long term situations as appropriate  Outcome: Progressing  Goal: Urinary catheter remains patent  Description  INTERVENTIONS:  - Assess patency of urinary catheter  - If patient has a chronic [place], consider changing catheter if non-functioning  - Follow guidelines for intermittent irrigation of non-functioning urinary catheter  Outcome: Progressing     Problem: METABOLIC, FLUID AND ELECTROLYTES - ADULT  Goal: Electrolytes maintained within normal limits  Description  INTERVENTIONS:  - Monitor labs and assess patient for signs and symptoms of electrolyte imbalances  - Administer electrolyte replacement as ordered  - Monitor response to electrolyte replacements, including repeat lab results as appropriate  - Instruct patient on fluid and nutrition as appropriate  Outcome: Progressing  Goal: Fluid balance maintained  Description  INTERVENTIONS:  - Monitor labs   - Monitor I/O and WT  - Instruct patient on fluid and nutrition as appropriate  - Assess for signs & symptoms of volume excess or deficit  Outcome: Progressing  Goal: Glucose maintained within target range  Description  INTERVENTIONS:  - Monitor Blood Glucose as ordered  - Assess for signs and symptoms of hyperglycemia and hypoglycemia  - Administer ordered medications to maintain glucose within target range  - Assess nutritional intake and initiate nutrition service referral as needed  Outcome: Progressing

## 2020-06-29 NOTE — CASE MANAGEMENT
Cm reviewed pt care coordination rounds with Dr Gage Deras  Pt is medically cleared for d/c today  PT recommendation is home with OP PT/OT  Script was given to pt and number of facility provided to pt to follow up  Pt is medium risk readmission score, pt pcp is in [place] and pt states that she would like to be set up with  PCP in [place]  Pt was given infolink card  Cm offered pt to help set appt here in the hospital prior to d/c  Pt states that she can't schedule apt with cm today, pt states that she has to go home first and check her schedule first  Cm explained the importance of setting apt within 3-5 business days  Pt verbalized understanding  Pt states that she lives in [place] and will try to stay close to her location  An In-basket messaging was sent to  PCP pool in her area to f/u

## 2020-06-29 NOTE — OCCUPATIONAL THERAPY NOTE
Occupational Therapy Evaluation     Patient Name: Kati Soto  UAKDY'G Date: 6/29/2020  Problem List  Principal Problem:    Diabetic gastroparesis ([address] )  Active Problems:    Type 2 diabetes mellitus with diabetic neuropathy, with long-term current use of insulin (HCC)    Asthma    Acute respiratory failure with hypoxia (HCC)    Idiopathic hypotension    Acute kidney injury superimposed on chronic kidney disease (HCC)    Hyponatremia    Diarrhea of presumed infectious origin    Acute encephalopathy    Acute pain of right knee    Past Medical History  Past Medical History:   Diagnosis Date    Acute respiratory insufficiency 11/23/2019    Asthma     Chronic pain     Diabetes mellitus ([address] )     Gastroparesis     Sepsis ([facility] ) 11/23/2019     Past Surgical History  Past Surgical History:   Procedure Laterality Date    CERVICAL LAMINECTOMY      CHOLECYSTECTOMY      CHOLECYSTECTOMY LAPAROSCOPIC N/A 11/10/2018    Procedure: CHOLECYSTECTOMY LAPAROSCOPIC w/ ioc;  Surgeon: Laurinda Lundborg, MD;  Location: [facility];  Service: General    HYSTERECTOMY      JOINT REPLACEMENT Bilateral     knee    TOE AMPUTATION Right 2/2/2018    Procedure: AMPUTATION TOE, RIGHT GREAT TOE;  Surgeon: Elena Gregg DPM;  Location: [facility];  Service: Podiatry           06/29/20 1045   Note Type   Note type Eval/Treat   Restrictions/Precautions   Weight Bearing Precautions Per Order Yes   RLE Weight Bearing Per Order WBAT   Other Precautions Fall Risk;Pain;WBS   Pain Assessment   Pain Assessment Tool 0-10   Pain Score 7   Pain Location/Orientation Location: Back;Orientation: Bilateral   Effect of Pain on Daily Activities Increased pain with activity, limiting overall activity tolerance   Patient's Stated Pain Goal No pain   Hospital Pain Intervention(s) Repositioned; Ambulation/increased activity; Emotional support; Rest   Multiple Pain Sites No   Home Living   Type of Home Mobile home   Home Layout One level;Stairs to enter with rails  (4 MATTHEW)   Bathroom Shower/Tub Tub/shower unit   Bathroom Toilet Standard   Bathroom Equipment Other (Comment); Tub transfer bench  (reports her mother has tub transfer bench she can use)   Bathroom Accessibility Accessible   Home Equipment Walker;Cane   Additional Comments Pt lives with spouse and son in a one level modular home w/ 4 MATTHEW  Pt reports (-) home alone  Prior Function   Level of [place] Independent with ADLs and functional mobility; Needs assistance with IADLs   Lives With Spouse; Son   [place] Help From Family  (Spouse, son- works from home, local dtr)   ADL Assistance Independent  (Supervision for tub transfers)   IADLs Needs assistance   Falls in the last 6 months 1 to 4  (1 per pt report)   Vocational On disability   Comments At baseline, pt was I w/ ADLs and functional mobility/transfers w/ use of SPC vs RW depending on the day, required assist w/ IADLs, (+) , and reports 1 fall PTA  Lifestyle   Autonomy At baseline, pt was I w/ ADLs and functional mobility/transfers w/ use of SPC vs RW depending on the day, required assist w/ IADLs, (+) , and reports 1 fall PTA  Reciprocal Relationships Spouse, son, dtr   Service to Others On disability   Intrinsic Gratification Watching TV   Psychosocial   Psychosocial (WDL) WDL   ADL   Where Assessed Chair   Eating Assistance 5  Supervision/Setup   Grooming Assistance 5  Supervision/Setup   UB Bathing Assistance 5  Supervision/Setup   LB Bathing Assistance 4  Minimal Assistance   UB Dressing Assistance 5  Supervision/Setup   LB Dressing Assistance 4  Minimal [address]  5  Supervision/Setup   Functional Assistance 5  Supervision/Setup   Bed Mobility   Supine to Sit Unable to assess   Sit to Supine Unable to assess   Additional Comments N/A  Pt seated OOB in chair at start/end of session  Call bell and phone within reach   All needs met and pt reports no further questions for OT at this time   Transfers   Sit to Stand 5  Supervision   Additional items Armrests; Increased time required   Stand to Sit 5  Supervision   Additional items Armrests; Increased time required   Functional Mobility   Functional Mobility 5  Supervision   Additional Comments Assist x1   Additional items Rolling walker   Balance   Static Sitting Good   Dynamic Sitting Fair +   Static Standing Fair   Dynamic Standing Fair -   Ambulatory Fair -   Activity Tolerance   Activity Tolerance Patient limited by fatigue;Patient limited by pain   Medical Staff [facility], PT   Nurse Made Aware yes; Nathan Saucedo RN   RUE Assessment   RUE Assessment WFL   RUE Strength   RUE Overall Strength Within Functional Limits - able to perform ADL tasks with strength  (4/5 throughout)   LUE Assessment   LUE Assessment WFL   LUE Strength   LUE Overall Strength Within Functional Limits - able to perform ADL tasks with strength  (4/5 throughout)   Hand Function   Gross Motor Coordination Functional   Fine Motor Coordination Functional   Sensation   Light Touch No apparent deficits   Proprioception   Proprioception No apparent deficits   Vision-Basic Assessment   Current Vision Wears glasses only for reading  (however not present at time of eval)   Vision - Complex Assessment   Ocular Range of Motion [facility]   Acuity Able to read employee name badshayla without difficulty   Perception   Inattention/Neglect Appears intact   Cognition   Overall Cognitive Status [facility]   Arousal/Participation Alert; Cooperative   Attention Within functional limits   Orientation Level Oriented X4   Memory Within functional limits   Following Commands Follows one step commands without difficulty   Comments Pleasant and cooperative   Assessment   Limitation Decreased ADL status; Decreased endurance;Decreased self-care trans;Decreased high-level ADLs   Prognosis Good   Assessment Pt is a 63 y o  female seen for OT evaluation s/p adm to [facility] on 6/25/2020 w/ abdominal pain and vomiting x2 days and dx'd w/ diabetic gastroparesis  Comorbidities affecting pts functional performance include a significant PMH of Acute respiratory insufficiency, Asthma, Chronic pain, DM, Gastroparesis, and sepsis  Pt with active OT orders and activity orders for Activity as tolerated  Pt lives with spouse and son in a one level modular home w/ 4 MATTHEW  Pt reports (-) home alone  At baseline, pt was I w/ ADLs and functional mobility/transfers w/ use of SPC vs RW depending on the day, required assist w/ IADLs, (+) , and reports 1 fall PTA  Upon evaluation, pt currently requires Supervision for UB ADLs, Min A for LB ADLs, Supervision for toileting, and Supervision for functional mobility/transfers 2* the following deficits impacting occupational performance: weakness, decreased strength, decreased balance, decreased tolerance and increased pain  These impairments, as well at pts steps to enter environment, difficulty performing ADLS and difficulty performing IADLS  limit pts ability to safely engage in all baseline areas of occupation  Pt scored overall 70/100 on the Barthel Index  Based on the aforementioned OT evaluation, functional performance deficits, and assessments, pt has been identified as a Moderate complexity evaluation  Pt to continue to benefit from continued acute OT services during hospital stay to address defined deficits and to maximize level of functional independence in the following Occupational Performance areas: grooming, bathing/shower, toilet hygiene, dressing, medication management, health maintenance, functional mobility, community mobility, clothing management and social participation  From OT standpoint, recommend Home w/ family support upon D/C   OT will continue to follow pt 3-5x/wk to address the following goals to  w/in 10-14 days:   Goals   Patient Goals To go home today   LTG Time Frame 10-14   Long Term Goal Please refer to LTGs listed below   Plan   Treatment Interventions ADL retraining;UE strengthening/ROM; Functional transfer training; Endurance training;Patient/family training;Equipment evaluation/education; Compensatory technique education; Energy conservation; Activityengagement   Goal Expiration Date 07/13/20   OT Treatment Day 1   OT Frequency 3-5x/wk   Additional Treatment Session   Start Time 1030   End Time 1045   Treatment Assessment Pt seen for additional OT treatment session focusing on functional activity tolerance and ADLs  Pt seated OOB in chair after completion of initial eval  During initial eval, pt reported increased difficulty performing LB ADLs  Therefore, educated pt on available LH AE through verbal instructions and demonstration with pt verbalizing understanding of education  After education, pt able to perform LB dressing tasks w/ Supervision w/ use of sock aid and dressing stick  Pt reports interest in purchasing [facility] AE for home environment  Written consent obtained and hip kit delivered to pt's room at end of session  Pt seated OOB in chair at end of session  Call bell and phone within reach  All needs met and pt reports no further questions for OT at this time  Continue to recommend Home w/ family support upon D/C  Additional Treatment Day 1   Recommendation   OT Discharge Recommendation Return to previous environment with social support   OT - OK to Discharge Yes  (when medically cleared)   Barthel Index   Feeding 10   Bathing 0   Grooming Score 5   Dressing Score 5   Bladder Score 10   Bowels Score 10   Toilet Use Score 10   Transfers (Bed/Chair) Score 10   Mobility (Level Surface) Score 10   Stairs Score 0   Barthel Index Score 70   Modified [place] Scale   Modified [place] Scale 2       GOALS    1  Pt will improve activity tolerance to G for min 30 min txment sessions for increase engagement in functional tasks    2  Pt will complete bed mobility at a Mod I level w/ G balance/safety demonstrated to decrease caregiver assistance required     3   Pt will complete UB/LB dressing/self care w/ mod I using adaptive device and DME as needed    4  Pt will complete bathing w/ Mod I w/ use of AE and DME as needed    5  Pt will complete toileting w/ mod I w/ G hygiene/thoroughness using DME as needed    6  Pt will improve functional transfers to Mod I on/off all surfaces using DME as needed w/ G balance/safety     7  Pt will improve functional mobility during ADL/IADL/leisure tasks to Mod I using DME as needed w/ G balance/safety     8  Pt will be attentive 100% of the time during ongoing cognitive assessment w/ G participation to assist w/ safe d/c planning/recommendations    9  Pt will demonstrate G carryover of pt/caregiver education and training as appropriate w/o cues w/ good tolerance to increase safety during functional tasks    10  Pt will demonstrate 100% carryover of energy conservation techniques t/o functional I/ADL/leisure tasks w/o cues s/p skilled education to increase endurance during functional tasks    11  Pt will increase BUE strength by 1MM grade via AROM/AAROM/PROM exercises to increase independence in ADLs and transfers    12   Pt will verbalize 3 potential fall hazards and identify appropriate compensatory techniques to decrease fall risk in home environment       Lilian Gaines OTR/L

## 2020-06-29 NOTE — PLAN OF CARE
Problem: Potential for Falls  Goal: Patient will remain free of falls  Description  INTERVENTIONS:  - Assess patient frequently for physical needs  -  Identify cognitive and physical deficits and behaviors that affect risk of falls    -  [place] fall precautions as indicated by assessment   - Educate patient/family on patient safety including physical limitations  - Instruct patient to call for assistance with activity based on assessment  - Modify environment to reduce risk of injury  - Consider OT/PT consult to assist with strengthening/mobility  Outcome: Progressing     Problem: Prexisting or High Potential for Compromised Skin Integrity  Goal: Skin integrity is maintained or improved  Description  INTERVENTIONS:  - Identify patients at risk for skin breakdown  - Assess and monitor skin integrity  - Assess and monitor nutrition and hydration status  - Monitor labs   - Assess for incontinence   - Turn and reposition patient  - Assist with mobility/ambulation  - Relieve pressure over bony prominences  - Avoid friction and shearing  - Provide appropriate hygiene as needed including keeping skin clean and dry  - Evaluate need for skin moisturizer/barrier cream  - Collaborate with interdisciplinary team   - Patient/family teaching  - Consider wound care consult   Outcome: Progressing     Problem: PAIN - ADULT  Goal: Verbalizes/displays adequate comfort level or baseline comfort level  Description  Interventions:  - Encourage patient to monitor pain and request assistance  - Assess pain using appropriate pain scale  - Administer analgesics based on type and severity of pain and evaluate response  - Implement non-pharmacological measures as appropriate and evaluate response  - Consider cultural and social influences on pain and pain management  - Notify physician/advanced practitioner if interventions unsuccessful or patient reports new pain  Outcome: Progressing     Problem: INFECTION - ADULT  Goal: Absence or prevention of progression during hospitalization  Description  INTERVENTIONS:  - Assess and monitor for signs and symptoms of infection  - Monitor lab/diagnostic results  - Monitor all insertion sites, i e  indwelling lines, tubes, and drains  - Monitor endotracheal if appropriate and nasal secretions for changes in amount and color  - [place] appropriate cooling/warming therapies per order  - Administer medications as ordered  - Instruct and encourage patient and family to use good hand hygiene technique  - Identify and instruct in appropriate isolation precautions for identified infection/condition  Outcome: Progressing  Goal: Absence of fever/infection during neutropenic period  Description  INTERVENTIONS:  - Monitor WBC    Outcome: Progressing     Problem: SAFETY ADULT  Goal: Maintain or return to baseline ADL function  Description  INTERVENTIONS:  -  Assess patient's ability to carry out ADLs; assess patient's baseline for ADL function and identify physical deficits which impact ability to perform ADLs (bathing, care of mouth/teeth, toileting, grooming, dressing, etc )  - Assess/evaluate cause of self-care deficits   - Assess range of motion  - Assess patient's mobility; develop plan if impaired  - Assess patient's need for assistive devices and provide as appropriate  - Encourage maximum independence but intervene and supervise when necessary  - Involve family in performance of ADLs  - Assess for home care needs following discharge   - Consider OT consult to assist with ADL evaluation and planning for discharge  - Provide patient education as appropriate  Outcome: Progressing  Goal: Maintain or return mobility status to optimal level  Description  INTERVENTIONS:  - Assess patient's baseline mobility status (ambulation, transfers, stairs, etc )    - Identify cognitive and physical deficits and behaviors that affect mobility  - Identify mobility aids required to assist with transfers and/or ambulation (gait belt, sit-to-stand, lift, walker, cane, etc )  - [place] fall precautions as indicated by assessment  - Record patient progress and toleration of activity level on Mobility SBAR; progress patient to next Phase/Stage  - Instruct patient to call for assistance with activity based on assessment  - Consider rehabilitation consult to assist with strengthening/weightbearing, etc   Outcome: Progressing     Problem: DISCHARGE PLANNING  Goal: Discharge to home or other facility with appropriate resources  Description  INTERVENTIONS:  - Identify barriers to discharge w/patient and caregiver  - Arrange for needed discharge resources and transportation as appropriate  - Identify discharge learning needs (meds, wound care, etc )  - Arrange for interpretive services to assist at discharge as needed  - Refer to Case Management Department for coordinating discharge planning if the patient needs post-hospital services based on physician/advanced practitioner order or complex needs related to functional status, cognitive ability, or social support system  Outcome: Progressing     Problem: Knowledge Deficit  Goal: Patient/family/caregiver demonstrates understanding of disease process, treatment plan, medications, and discharge instructions  Description  Complete learning assessment and assess knowledge base  Interventions:  - Provide teaching at level of understanding  - Provide teaching via preferred learning methods  Outcome: Progressing     Problem: Nutrition/Hydration-ADULT  Goal: Nutrient/Hydration intake appropriate for improving, restoring or maintaining nutritional needs  Description  Monitor and assess patient's nutrition/hydration status for malnutrition  Collaborate with interdisciplinary team and initiate plan and interventions as ordered  Monitor patient's weight and dietary intake as ordered or per policy  Utilize nutrition screening tool and intervene as necessary   Determine patient's food preferences and provide high-protein, high-caloric foods as appropriate       INTERVENTIONS:  - Monitor oral intake, urinary output, labs, and treatment plans  - Assess nutrition and hydration status and recommend course of action  - Evaluate amount of meals eaten  - Assist patient with eating if necessary   - Allow adequate time for meals  - Recommend/ encourage appropriate diets, oral nutritional supplements, and vitamin/mineral supplements  - Order, calculate, and assess calorie counts as needed  - Recommend, monitor, and adjust tube feedings and TPN/PPN based on assessed needs  - Assess need for intravenous fluids  - Provide specific nutrition/hydration education as appropriate  - Include patient/family/caregiver in decisions related to nutrition  Outcome: Progressing     Problem: GENITOURINARY - ADULT  Goal: Maintains or returns to baseline urinary function  Description  INTERVENTIONS:  - Assess urinary function  - Encourage oral fluids to ensure adequate hydration if ordered  - Administer IV fluids as ordered to ensure adequate hydration  - Administer ordered medications as needed  - Offer frequent toileting  - Follow urinary retention protocol if ordered  Outcome: Progressing  Goal: Absence of urinary retention  Description  INTERVENTIONS:  - Assess patients ability to void and empty bladder  - Monitor I/O  - Bladder scan as needed  - Discuss with physician/AP medications to alleviate retention as needed  - Discuss catheterization for long term situations as appropriate  Outcome: Progressing  Goal: Urinary catheter remains patent  Description  INTERVENTIONS:  - Assess patency of urinary catheter  - If patient has a chronic [place], consider changing catheter if non-functioning  - Follow guidelines for intermittent irrigation of non-functioning urinary catheter  Outcome: Progressing     Problem: METABOLIC, FLUID AND ELECTROLYTES - ADULT  Goal: Electrolytes maintained within normal limits  Description  INTERVENTIONS:  - Monitor labs and assess patient for signs and symptoms of electrolyte imbalances  - Administer electrolyte replacement as ordered  - Monitor response to electrolyte replacements, including repeat lab results as appropriate  - Instruct patient on fluid and nutrition as appropriate  Outcome: Progressing  Goal: Fluid balance maintained  Description  INTERVENTIONS:  - Monitor labs   - Monitor I/O and WT  - Instruct patient on fluid and nutrition as appropriate  - Assess for signs & symptoms of volume excess or deficit  Outcome: Progressing  Goal: Glucose maintained within target range  Description  INTERVENTIONS:  - Monitor Blood Glucose as ordered  - Assess for signs and symptoms of hyperglycemia and hypoglycemia  - Administer ordered medications to maintain glucose within target range  - Assess nutritional intake and initiate nutrition service referral as needed  Outcome: Progressing

## 2020-06-29 NOTE — ASSESSMENT & PLAN NOTE
Intractable nausea with vomiting secondary to diabetic gastroparesis currently resolved  Patient is tolerating a regular diabetic diet  Continue antiemetics with Reglan 5 mg q i d  And Zofran p r n  Patient is currently hemodynamically stable for discharge to home with home physical therapy today

## 2020-06-29 NOTE — ASSESSMENT & PLAN NOTE
Patient's ALEJANDRO superimposed on CKD stage 2 is most likely secondary to prerenal causes including vomiting, diarrhea, lisinopril, contrast, urinary retention  ALEJANDRO is currently resolved  Patient passed a voiding trial this morning and Singer catheter has since been removed

## 2020-06-29 NOTE — PHYSICAL THERAPY NOTE
PHYSICAL THERAPY EVALUATION          Patient Name: Nolan Reynoso  [id number] Date: 6/29/2020   PT EVALUATION    63 y o     [id number]    Dehydration [E86 0]  Sinus tachycardia [R00 0]  Hypomagnesemia [E83 42]  Hyponatremia [E87 1]  Nausea [R11 0]  Nausea & vomiting [R11 2]  Hyperglycemia [R73 9]    Past Medical History:   Diagnosis Date    Acute respiratory insufficiency 11/23/2019    Asthma     Chronic pain     Diabetes mellitus ([facility] [address] )     Gastroparesis     Sepsis ([facility] [address] ) 11/23/2019     Past Surgical History:   Procedure Laterality Date    CERVICAL LAMINECTOMY      CHOLECYSTECTOMY      CHOLECYSTECTOMY LAPAROSCOPIC N/A 11/10/2018    Procedure: CHOLECYSTECTOMY LAPAROSCOPIC w/ ioc;  Surgeon: Rose Mary Chaney MD;  Location: [facility];  Service: General    HYSTERECTOMY      JOINT REPLACEMENT Bilateral     knee    TOE AMPUTATION Right 2/2/2018    Procedure: AMPUTATION TOE, RIGHT GREAT TOE;  Surgeon: Tonie Brito DPM;  Location: [facility];  Service: Podiatry        06/29/20 0944   Note Type   Note type Eval/Treat   Pain Assessment   Pain Assessment Tool FLACC   Pain Location/Orientation Orientation: Bilateral;Location: Knee; Location: Buttocks   Effect of Pain on Daily Activities pain post amb   Hospital Pain Intervention(s) Repositioned; Ambulation/increased activity; Emotional support; Rest   Pain Rating: FLACC (Rest) - Face 0   Pain Rating: FLACC (Rest) - Legs 0   Pain Rating: FLACC (Rest) - Activity 0   Pain Rating: FLACC (Rest) - Cry 0   Pain Rating: FLACC (Rest) - Consolability 0   Score: FLACC (Rest) 0   Pain Rating: FLACC (Activity) - Face 0   Pain Rating: FLACC (Activity) - Legs 0   Pain Rating: FLACC (Activity) - Activity 0   Pain Rating: FLACC (Activity) - Cry 1   Pain Rating: FLACC (Activity) - Consolability 0   Score: FLACC (Activity) 1   Home Living   Type of Home Mobile home  (modular home)   Home Layout One level;Stairs to enter with rails   Bathroom Shower/Tub Tub/shower unit   Bathroom Toilet Standard   Bathroom Accessibility Accessible   Home Equipment Walker;Cane   Additional Comments 4 MATTHEW w rails  Prior Function   Level of [place] Independent with ADLs and functional mobility   Lives With Spouse; Son   [place] Help From Family  (spouse, son, local dtr)   ADL Assistance Independent  (however admits to S for tub transf, occ A LBD)   IADLs Independent  (however reports help from family for groceries, cooking)   Falls in the last 6 months 1 to 4  (1 due to knee buckling)   Vocational On disability   Comments pta pt reports being indep w use of RW vs cane prn depending on bl knee pain/stability  pt does admit to occ A from family depending on knee pain for LBD and transferring from lower height surfaces  pt reports occ S for stair negotiation and tub transf  lives w spouse who is home 24/7 and can help prn however does have seizure disorder  reports son and dtr also help prn  +  Restrictions/Precautions   Weight Bearing Precautions Per Order Yes   RLE Weight Bearing Per Order WBAT   Other Precautions O2;Fall Risk;Pain  (2L NC)   General   Additional Pertinent History pt admitted 6/26/20 for diabetic gastroparesis  hx of asthma, L TKA in 2010, opioid dependence   activity as tolerated orders   Family/Caregiver Present No   Cognition   Overall Cognitive Status WFL   Arousal/Participation Cooperative   Orientation Level Oriented X4   Memory Within functional limits   Following Commands Follows one step commands without difficulty   Comments pt verbalize understanding to safety precautions   RLE Assessment   RLE Assessment X   Strength RLE   R Hip Flexion 3-/5  (due to body habitus)   R Hip ABduction 4-/5   R Hip ADduction 4-/5   R Knee Flexion 4-/5   R Knee Extension 4-/5   R Ankle Dorsiflexion 4-/5   R Ankle Plantar Flexion 4-/5   LLE Assessment   LLE Assessment X   Strength LLE   LLE Overall Strength 4-/5   L Hip Flexion 3-/5  (secondary to body habitus)   Coordination   Movements are Fluid and Coordinated 1   Sensation X  (n/t of bilateral lower legs, feet)   Bed Mobility   Supine to Sit 5  Supervision   Additional items HOB elevated; Bedrails; Increased time required   Transfers   Sit to Stand 5  Supervision   Additional items Increased time required; Other  (RW)   Stand to Sit 5  Supervision   Additional items Increased time required; Other  (RW)   Additional Comments demonstrates good technique w hand placement   Ambulation/Elevation   Gait pattern Wide SUMANTH; Decreased foot clearance; Excessively slow  (excessive pronation bl, flat footed)   Gait Assistance 5  Supervision   Additional items Assist x 1   Assistive Device Bariatric Rolling walker   Distance >200'   Stair Management Assistance Not tested   Balance   Static Standing Fair   Dynamic Standing Fair -   Ambulatory Fair -   Endurance Deficit   Endurance Deficit No  (O2 94% on RA at rest  91-93% on RA post amb  HR 110s)   Activity Tolerance   Activity Tolerance Patient tolerated treatment well;Treatment limited secondary to medical complications (Comment)  (pain, obesity)   Medical Staff Made Aware Cheryl Moe OT   Nurse Made Aware Rosario LIN   Assessment   Prognosis Fair   Problem List Decreased strength;Decreased range of motion; Impaired sensation;Obesity;Pain   Assessment Anna Marie Rice is a 63 y o  female [facility] for Diabetic gastroparesis ([facility] [address] )  Pt  has a past medical history of Acute respiratory insufficiency (11/23/2019), Asthma, Chronic pain, Diabetes mellitus ([facility] [address] ), Gastroparesis, and Sepsis ([facility] ) (11/23/2019)    PT was consulted and pt was seen on 6/29/2020 for mobility assessment and d/c planning  Pt reports MATTHEW, occ assist or supervision for mobility and LBD  Supportive family and pt is never alone  Pt presents high fall risk, PIV, supplemental oxygen  At rest on RA pt O2 sat recorded as 94%   Post amb recorded as 91-93% on RA, quick recovery w seated rest  Pt appeared in no physical distress amb community distances  Pt is currently functioning at a supervision assistance x1 level for bed mobility, supervision assistance x1 level for transfers, supervision assistance x1 level for ambulation with Rolling Walker  Pt demonstrated mild gait deviations impacting safety including wide SUMANTH, increased pronation bl, decreased foot clearance, slow gait speeds  However likely chronic due to body habitus and flat footed  However pt demonstrates no significant deficits of endurance, balance at this time  Will d/c PT at this time  Nsg updated  At this time PT recommendations for d/c are home w family support and OPPT for bl knee pain when medically stable  Pt verbalize understanding of safety/fall risk precautions     Barriers to Discharge None   Goals   Patient Goals to go home today   Plan   PT Frequency   (d/c PT)   Recommendation   PT Discharge Recommendation Return to previous environment with social support;Home with skilled therapy  (OPPT for bl knee pain, instability)   Equipment Recommended   (cont use of RW)   PT - OK to Discharge Yes   Additional Comments when medically stable   Modified Camas Scale   Modified [place] Scale 2   Barthel Index   Feeding 10   Bathing 0   Grooming Score 5   Dressing Score 5   Bladder Score 10   Bowels Score 10   Toilet Use Score 10   Transfers (Bed/Chair) Score 15   Mobility (Level Surface) Score 10   Stairs Score 0  (not tested)   Barthel Index Score 75   History: co - morbidities, age, social background (steps, never alone), fall risk, occ use of assistive device, occ assist for adl's, cognition, multiple lines  Exam: impairments in systems including musculoskeletal (ROM, strength, posture), neuromuscular (balance, gait, transfers, motor function), joint integrity (bl knee OA),  cardiopulmonary, cognition  Clinical: unstable/unpredictable  Complexity:high      Carmen Grumbling, PT     PT Progress Note  Time In: 0944 Time Out: 0190   S: "I think I have to go to the bathroom!"  O: Pt amb 10' to bathroom w tez RW  S for stand>sit on standard toilet, mod I for sit>stand  Amb 15' w bedside recliner  S for amb and transf  Pt verbalize understanding to safety/fall precautions  Updated nsg to pt void, O2 sats  A: Pt continues function at S-mod I level for mobility  Demonstrates good technique w use of RW  No significant deficits of functional strength, balance, endurance noted  Appears to be functioning at her baseline  Would benefit from OPPT given bl knee pain, LE weakness and mild instability  P: See IE for d/c recommendations

## 2020-06-29 NOTE — PLAN OF CARE
Problem: OCCUPATIONAL THERAPY ADULT  Goal: Performs self-care activities at highest level of function for planned discharge setting  See evaluation for individualized goals  Description  Treatment Interventions: ADL retraining, UE strengthening/ROM, Functional transfer training, Endurance training, Patient/family training, Equipment evaluation/education, Compensatory technique education, Energy conservation, Activityengagement          See flowsheet documentation for full assessment, interventions and recommendations  Note:   Limitation: Decreased ADL status, Decreased endurance, Decreased self-care trans, Decreased high-level ADLs  Prognosis: Good  Assessment: Pt is a 63 y o  female seen for OT evaluation s/p adm to [facility] on 6/25/2020 w/ abdominal pain and vomiting x2 days and dx'd w/ diabetic gastroparesis  Comorbidities affecting pts functional performance include a significant PMH of Acute respiratory insufficiency, Asthma, Chronic pain, DM, Gastroparesis, and sepsis  Pt with active OT orders and activity orders for Activity as tolerated  Pt lives with spouse and son in a one level modular home w/ 4 MATTHEW  Pt reports (-) home alone  At baseline, pt was I w/ ADLs and functional mobility/transfers w/ use of SPC vs RW depending on the day, required assist w/ IADLs, (+) , and reports 1 fall PTA  Upon evaluation, pt currently requires Supervision for UB ADLs, Min A for LB ADLs, Supervision for toileting, and Supervision for functional mobility/transfers 2* the following deficits impacting occupational performance: weakness, decreased strength, decreased balance, decreased tolerance and increased pain  These impairments, as well at pts steps to enter environment, difficulty performing ADLS and difficulty performing IADLS  limit pts ability to safely engage in all baseline areas of occupation  Pt scored overall 70/100 on the Barthel Index   Based on the aforementioned OT evaluation, functional performance deficits, and assessments, pt has been identified as a Moderate complexity evaluation  Pt to continue to benefit from continued acute OT services during hospital stay to address defined deficits and to maximize level of functional independence in the following Occupational Performance areas: grooming, bathing/shower, toilet hygiene, dressing, medication management, health maintenance, functional mobility, community mobility, clothing management and social participation  From OT standpoint, recommend Home w/ family support upon D/C   OT will continue to follow pt 3-5x/wk to address the following goals to  w/in 10-14 days:     OT Discharge Recommendation: Return to previous environment with social support  OT - OK to Discharge: Yes(when medically cleared)

## 2020-06-29 NOTE — ASSESSMENT & PLAN NOTE
Patient developed right knee pain after a fall  Was seen and evaluated by Orthopedic surgery and recommend against intra-articular corticosteroid injection due to patient's uncontrolled diabetes  Continue ice and NSAIDs  Physical therapy evaluated and recommended home health with physical therapy

## 2020-06-29 NOTE — ASSESSMENT & PLAN NOTE
Which has resolved was most likely due to respiratory depression from narcotics, atelectasis, and possibly pulmonary edema  Chest x-ray PA and lateral shows no evidence of pulmonary edema  Continue Lasix 40 mg daily

## 2020-06-29 NOTE — ASSESSMENT & PLAN NOTE
Was originally due to pseudohyponatremia from hyperglycemia and possibly from volume overload  Currently hyponatremia is resolved  Continue Lasix 40 mg daily

## 2020-06-29 NOTE — ASSESSMENT & PLAN NOTE
Lab Results   Component Value Date    HGBA1C 10 8 (H) 06/26/2020       Recent Labs     06/28/20  1609 06/28/20  2111 06/29/20  0701 06/29/20  1120   POCGLU 186* 209* 178* 191*         Order accuchecks with sliding scale  (P) 163 [id number]   Continue Lantus insulin 55 units subcutaneously at bedtime daily as well as lispro insulin 10 units subcutaneously 3 times daily with meals

## 2020-08-01 DIAGNOSIS — R11.2 INTRACTABLE VOMITING WITH NAUSEA, UNSPECIFIED VOMITING TYPE: ICD-10-CM

## 2020-08-01 DIAGNOSIS — K80.20 GALL STONES: ICD-10-CM

## 2020-08-01 DIAGNOSIS — K31.84 GASTROPARESIS: ICD-10-CM

## 2020-08-01 RX ORDER — DICYCLOMINE HYDROCHLORIDE 10 MG/1
CAPSULE ORAL
Qty: 360 CAPSULE | Refills: 0 | OUTPATIENT
Start: 2020-08-01

## 2020-08-03 RX ORDER — DICYCLOMINE HYDROCHLORIDE 10 MG/1
10 CAPSULE ORAL
Qty: 120 CAPSULE | Refills: 0 | Status: ON HOLD | OUTPATIENT
Start: 2020-08-03 | End: 2021-04-30

## 2020-10-17 ENCOUNTER — HOSPITAL ENCOUNTER (INPATIENT)
Facility: HOSPITAL | Age: 55
LOS: 2 days | Discharge: HOME | DRG: 074 | End: 2020-10-19
Attending: EMERGENCY MEDICINE | Admitting: INTERNAL MEDICINE
Payer: MEDICARE

## 2020-10-17 DIAGNOSIS — K31.84 GASTROPARESIS: ICD-10-CM

## 2020-10-17 DIAGNOSIS — R00.0 TACHYCARDIA: ICD-10-CM

## 2020-10-17 DIAGNOSIS — R11.2 INTRACTABLE NAUSEA AND VOMITING: Primary | ICD-10-CM

## 2020-10-17 DIAGNOSIS — Z79.4 TYPE 2 DIABETES MELLITUS WITH DIABETIC NEUROPATHY, WITH LONG-TERM CURRENT USE OF INSULIN (HCC): ICD-10-CM

## 2020-10-17 DIAGNOSIS — E11.40 TYPE 2 DIABETES MELLITUS WITH DIABETIC NEUROPATHY, WITH LONG-TERM CURRENT USE OF INSULIN (HCC): ICD-10-CM

## 2020-10-17 DIAGNOSIS — R73.9 HYPERGLYCEMIA: ICD-10-CM

## 2020-10-17 DIAGNOSIS — Z86.39 HISTORY OF DIABETIC GASTROPARESIS: ICD-10-CM

## 2020-10-17 LAB
ALBUMIN SERPL BCP-MCNC: 3.5 G/DL (ref 3.5–5)
ALP SERPL-CCNC: 125 U/L (ref 46–116)
ALT SERPL W P-5'-P-CCNC: 32 U/L (ref 12–78)
ANION GAP SERPL CALCULATED.3IONS-SCNC: 7 MMOL/L (ref 4–13)
AST SERPL W P-5'-P-CCNC: 21 U/L (ref 5–45)
BASOPHILS # BLD AUTO: 0.07 THOUSANDS/ΜL (ref 0–0.1)
BASOPHILS NFR BLD AUTO: 1 % (ref 0–1)
BILIRUB SERPL-MCNC: 0.94 MG/DL (ref 0.2–1)
BUN SERPL-MCNC: 15 MG/DL (ref 5–25)
CALCIUM SERPL-MCNC: 9.2 MG/DL (ref 8.3–10.1)
CHLORIDE SERPL-SCNC: 97 MMOL/L (ref 100–108)
CO2 SERPL-SCNC: 29 MMOL/L (ref 21–32)
CREAT SERPL-MCNC: 0.79 MG/DL (ref 0.6–1.3)
EOSINOPHIL # BLD AUTO: 0.25 THOUSAND/ΜL (ref 0–0.61)
EOSINOPHIL NFR BLD AUTO: 2 % (ref 0–6)
ERYTHROCYTE [DISTWIDTH] IN BLOOD BY AUTOMATED COUNT: 12.6 % (ref 11.6–15.1)
GFR SERPL CREATININE-BSD FRML MDRD: 84 ML/MIN/1.73SQ M
GLUCOSE SERPL-MCNC: 318 MG/DL (ref 65–140)
GLUCOSE SERPL-MCNC: 345 MG/DL (ref 65–140)
HCT VFR BLD AUTO: 38.9 % (ref 34.8–46.1)
HGB BLD-MCNC: 12.3 G/DL (ref 11.5–15.4)
IMM GRANULOCYTES # BLD AUTO: 0.05 THOUSAND/UL (ref 0–0.2)
IMM GRANULOCYTES NFR BLD AUTO: 0 % (ref 0–2)
LIPASE SERPL-CCNC: 55 U/L (ref 73–393)
LYMPHOCYTES # BLD AUTO: 1.1 THOUSANDS/ΜL (ref 0.6–4.47)
LYMPHOCYTES NFR BLD AUTO: 9 % (ref 14–44)
MCH RBC QN AUTO: 26.5 PG (ref 26.8–34.3)
MCHC RBC AUTO-ENTMCNC: 31.6 G/DL (ref 31.4–37.4)
MCV RBC AUTO: 84 FL (ref 82–98)
MONOCYTES # BLD AUTO: 0.56 THOUSAND/ΜL (ref 0.17–1.22)
MONOCYTES NFR BLD AUTO: 5 % (ref 4–12)
NEUTROPHILS # BLD AUTO: 9.93 THOUSANDS/ΜL (ref 1.85–7.62)
NEUTS SEG NFR BLD AUTO: 83 % (ref 43–75)
NRBC BLD AUTO-RTO: 0 /100 WBCS
PLATELET # BLD AUTO: 202 THOUSANDS/UL (ref 149–390)
PMV BLD AUTO: 12.4 FL (ref 8.9–12.7)
POTASSIUM SERPL-SCNC: 4 MMOL/L (ref 3.5–5.3)
PROT SERPL-MCNC: 7.9 G/DL (ref 6.4–8.2)
RBC # BLD AUTO: 4.65 MILLION/UL (ref 3.81–5.12)
SODIUM SERPL-SCNC: 133 MMOL/L (ref 136–145)
WBC # BLD AUTO: 11.96 THOUSAND/UL (ref 4.31–10.16)

## 2020-10-17 PROCEDURE — 80053 COMPREHEN METABOLIC PANEL: CPT | Performed by: PHYSICIAN ASSISTANT

## 2020-10-17 PROCEDURE — 85025 COMPLETE CBC W/AUTO DIFF WBC: CPT | Performed by: PHYSICIAN ASSISTANT

## 2020-10-17 PROCEDURE — 96374 THER/PROPH/DIAG INJ IV PUSH: CPT

## 2020-10-17 PROCEDURE — 99285 EMERGENCY DEPT VISIT HI MDM: CPT | Performed by: PHYSICIAN ASSISTANT

## 2020-10-17 PROCEDURE — 96375 TX/PRO/DX INJ NEW DRUG ADDON: CPT

## 2020-10-17 PROCEDURE — 83690 ASSAY OF LIPASE: CPT | Performed by: PHYSICIAN ASSISTANT

## 2020-10-17 PROCEDURE — 93005 ELECTROCARDIOGRAM TRACING: CPT

## 2020-10-17 PROCEDURE — 99284 EMERGENCY DEPT VISIT MOD MDM: CPT

## 2020-10-17 PROCEDURE — 96361 HYDRATE IV INFUSION ADD-ON: CPT

## 2020-10-17 PROCEDURE — 36415 COLL VENOUS BLD VENIPUNCTURE: CPT | Performed by: PHYSICIAN ASSISTANT

## 2020-10-17 PROCEDURE — 82948 REAGENT STRIP/BLOOD GLUCOSE: CPT

## 2020-10-17 RX ORDER — METOCLOPRAMIDE HYDROCHLORIDE 5 MG/ML
10 INJECTION INTRAMUSCULAR; INTRAVENOUS ONCE
Status: COMPLETED | OUTPATIENT
Start: 2020-10-17 | End: 2020-10-17

## 2020-10-17 RX ORDER — ONDANSETRON 2 MG/ML
4 INJECTION INTRAMUSCULAR; INTRAVENOUS ONCE
Status: COMPLETED | OUTPATIENT
Start: 2020-10-17 | End: 2020-10-17

## 2020-10-17 RX ADMIN — ONDANSETRON 4 MG: 2 INJECTION INTRAMUSCULAR; INTRAVENOUS at 20:45

## 2020-10-17 RX ADMIN — METOCLOPRAMIDE 10 MG: 5 INJECTION, SOLUTION INTRAMUSCULAR; INTRAVENOUS at 21:45

## 2020-10-17 RX ADMIN — SODIUM CHLORIDE 1000 ML: 0.9 INJECTION, SOLUTION INTRAVENOUS at 20:42

## 2020-10-18 PROBLEM — I16.0 HYPERTENSIVE URGENCY: Status: ACTIVE | Noted: 2020-10-18

## 2020-10-18 LAB
ANION GAP SERPL CALCULATED.3IONS-SCNC: 11 MMOL/L (ref 4–13)
BASOPHILS # BLD AUTO: 0.09 THOUSANDS/ΜL (ref 0–0.1)
BASOPHILS NFR BLD AUTO: 1 % (ref 0–1)
BUN SERPL-MCNC: 11 MG/DL (ref 5–25)
CALCIUM SERPL-MCNC: 9.3 MG/DL (ref 8.3–10.1)
CHLORIDE SERPL-SCNC: 95 MMOL/L (ref 100–108)
CO2 SERPL-SCNC: 23 MMOL/L (ref 21–32)
CREAT SERPL-MCNC: 0.69 MG/DL (ref 0.6–1.3)
EOSINOPHIL # BLD AUTO: 0.05 THOUSAND/ΜL (ref 0–0.61)
EOSINOPHIL NFR BLD AUTO: 0 % (ref 0–6)
ERYTHROCYTE [DISTWIDTH] IN BLOOD BY AUTOMATED COUNT: 12.7 % (ref 11.6–15.1)
GFR SERPL CREATININE-BSD FRML MDRD: 98 ML/MIN/1.73SQ M
GLUCOSE SERPL-MCNC: 226 MG/DL (ref 65–140)
GLUCOSE SERPL-MCNC: 298 MG/DL (ref 65–140)
GLUCOSE SERPL-MCNC: 311 MG/DL (ref 65–140)
GLUCOSE SERPL-MCNC: 320 MG/DL (ref 65–140)
GLUCOSE SERPL-MCNC: 324 MG/DL (ref 65–140)
GLUCOSE SERPL-MCNC: 332 MG/DL (ref 65–140)
HCT VFR BLD AUTO: 41.2 % (ref 34.8–46.1)
HGB BLD-MCNC: 13 G/DL (ref 11.5–15.4)
IMM GRANULOCYTES # BLD AUTO: 0.07 THOUSAND/UL (ref 0–0.2)
IMM GRANULOCYTES NFR BLD AUTO: 1 % (ref 0–2)
LYMPHOCYTES # BLD AUTO: 1.25 THOUSANDS/ΜL (ref 0.6–4.47)
LYMPHOCYTES NFR BLD AUTO: 8 % (ref 14–44)
MCH RBC QN AUTO: 26.4 PG (ref 26.8–34.3)
MCHC RBC AUTO-ENTMCNC: 31.6 G/DL (ref 31.4–37.4)
MCV RBC AUTO: 84 FL (ref 82–98)
MONOCYTES # BLD AUTO: 0.55 THOUSAND/ΜL (ref 0.17–1.22)
MONOCYTES NFR BLD AUTO: 4 % (ref 4–12)
NEUTROPHILS # BLD AUTO: 13.24 THOUSANDS/ΜL (ref 1.85–7.62)
NEUTS SEG NFR BLD AUTO: 86 % (ref 43–75)
NRBC BLD AUTO-RTO: 0 /100 WBCS
PLATELET # BLD AUTO: 210 THOUSANDS/UL (ref 149–390)
PMV BLD AUTO: 12.5 FL (ref 8.9–12.7)
POTASSIUM SERPL-SCNC: 3.8 MMOL/L (ref 3.5–5.3)
RBC # BLD AUTO: 4.93 MILLION/UL (ref 3.81–5.12)
SODIUM SERPL-SCNC: 129 MMOL/L (ref 136–145)
WBC # BLD AUTO: 15.25 THOUSAND/UL (ref 4.31–10.16)

## 2020-10-18 PROCEDURE — 80048 BASIC METABOLIC PNL TOTAL CA: CPT | Performed by: NURSE PRACTITIONER

## 2020-10-18 PROCEDURE — 85025 COMPLETE CBC W/AUTO DIFF WBC: CPT | Performed by: NURSE PRACTITIONER

## 2020-10-18 PROCEDURE — 87081 CULTURE SCREEN ONLY: CPT | Performed by: INTERNAL MEDICINE

## 2020-10-18 PROCEDURE — 99223 1ST HOSP IP/OBS HIGH 75: CPT | Performed by: INTERNAL MEDICINE

## 2020-10-18 PROCEDURE — 82948 REAGENT STRIP/BLOOD GLUCOSE: CPT

## 2020-10-18 RX ORDER — ALBUTEROL SULFATE 90 UG/1
2 AEROSOL, METERED RESPIRATORY (INHALATION) EVERY 4 HOURS PRN
Status: DISCONTINUED | OUTPATIENT
Start: 2020-10-18 | End: 2020-10-19 | Stop reason: HOSPADM

## 2020-10-18 RX ORDER — AMLODIPINE BESYLATE 5 MG/1
5 TABLET ORAL DAILY
Status: DISCONTINUED | OUTPATIENT
Start: 2020-10-18 | End: 2020-10-19 | Stop reason: HOSPADM

## 2020-10-18 RX ORDER — FLUTICASONE FUROATE AND VILANTEROL 200; 25 UG/1; UG/1
1 POWDER RESPIRATORY (INHALATION)
Status: DISCONTINUED | OUTPATIENT
Start: 2020-10-18 | End: 2020-10-19 | Stop reason: HOSPADM

## 2020-10-18 RX ORDER — ACETAMINOPHEN 325 MG/1
650 TABLET ORAL EVERY 6 HOURS PRN
Status: DISCONTINUED | OUTPATIENT
Start: 2020-10-18 | End: 2020-10-19 | Stop reason: HOSPADM

## 2020-10-18 RX ORDER — CARISOPRODOL 350 MG/1
350 TABLET ORAL 2 TIMES DAILY
Status: DISCONTINUED | OUTPATIENT
Start: 2020-10-18 | End: 2020-10-19 | Stop reason: HOSPADM

## 2020-10-18 RX ORDER — PROCHLORPERAZINE 25 MG
25 SUPPOSITORY, RECTAL RECTAL EVERY 12 HOURS PRN
Status: DISCONTINUED | OUTPATIENT
Start: 2020-10-18 | End: 2020-10-18

## 2020-10-18 RX ORDER — METOCLOPRAMIDE HYDROCHLORIDE 5 MG/ML
10 INJECTION INTRAMUSCULAR; INTRAVENOUS
Status: DISCONTINUED | OUTPATIENT
Start: 2020-10-18 | End: 2020-10-18

## 2020-10-18 RX ORDER — DIAZEPAM 5 MG/1
5 TABLET ORAL
Status: DISCONTINUED | OUTPATIENT
Start: 2020-10-18 | End: 2020-10-19 | Stop reason: HOSPADM

## 2020-10-18 RX ORDER — PRAVASTATIN SODIUM 20 MG
10 TABLET ORAL
Status: DISCONTINUED | OUTPATIENT
Start: 2020-10-18 | End: 2020-10-19 | Stop reason: HOSPADM

## 2020-10-18 RX ORDER — INSULIN GLARGINE 100 [IU]/ML
40 INJECTION, SOLUTION SUBCUTANEOUS
Status: DISCONTINUED | OUTPATIENT
Start: 2020-10-18 | End: 2020-10-19 | Stop reason: HOSPADM

## 2020-10-18 RX ORDER — PROCHLORPERAZINE 25 MG
25 SUPPOSITORY, RECTAL RECTAL EVERY 12 HOURS PRN
Status: DISCONTINUED | OUTPATIENT
Start: 2020-10-18 | End: 2020-10-19 | Stop reason: HOSPADM

## 2020-10-18 RX ORDER — DICYCLOMINE HYDROCHLORIDE 10 MG/1
10 CAPSULE ORAL
Status: DISCONTINUED | OUTPATIENT
Start: 2020-10-18 | End: 2020-10-19 | Stop reason: HOSPADM

## 2020-10-18 RX ORDER — METOCLOPRAMIDE HYDROCHLORIDE 5 MG/ML
10 INJECTION INTRAMUSCULAR; INTRAVENOUS
Status: DISCONTINUED | OUTPATIENT
Start: 2020-10-18 | End: 2020-10-19 | Stop reason: HOSPADM

## 2020-10-18 RX ORDER — PANTOPRAZOLE SODIUM 20 MG/1
20 TABLET, DELAYED RELEASE ORAL
Status: DISCONTINUED | OUTPATIENT
Start: 2020-10-18 | End: 2020-10-19 | Stop reason: HOSPADM

## 2020-10-18 RX ORDER — MONTELUKAST SODIUM 10 MG/1
10 TABLET ORAL
Status: DISCONTINUED | OUTPATIENT
Start: 2020-10-18 | End: 2020-10-19 | Stop reason: HOSPADM

## 2020-10-18 RX ORDER — MORPHINE SULFATE 15 MG/1
15 TABLET, FILM COATED, EXTENDED RELEASE ORAL 2 TIMES DAILY
Status: DISCONTINUED | OUTPATIENT
Start: 2020-10-18 | End: 2020-10-19 | Stop reason: HOSPADM

## 2020-10-18 RX ORDER — ONDANSETRON 2 MG/ML
4 INJECTION INTRAMUSCULAR; INTRAVENOUS EVERY 6 HOURS PRN
Status: DISCONTINUED | OUTPATIENT
Start: 2020-10-18 | End: 2020-10-19 | Stop reason: HOSPADM

## 2020-10-18 RX ORDER — FLUTICASONE PROPIONATE 50 MCG
1 SPRAY, SUSPENSION (ML) NASAL DAILY
Status: DISCONTINUED | OUTPATIENT
Start: 2020-10-18 | End: 2020-10-19 | Stop reason: HOSPADM

## 2020-10-18 RX ORDER — MORPHINE SULFATE 15 MG/1
15 TABLET, FILM COATED, EXTENDED RELEASE ORAL 2 TIMES DAILY
COMMUNITY
End: 2022-05-17

## 2020-10-18 RX ORDER — LABETALOL 20 MG/4 ML (5 MG/ML) INTRAVENOUS SYRINGE
10 EVERY 6 HOURS PRN
Status: DISCONTINUED | OUTPATIENT
Start: 2020-10-18 | End: 2020-10-19 | Stop reason: HOSPADM

## 2020-10-18 RX ORDER — METOCLOPRAMIDE HYDROCHLORIDE 5 MG/ML
10 INJECTION INTRAMUSCULAR; INTRAVENOUS EVERY 6 HOURS PRN
Status: DISCONTINUED | OUTPATIENT
Start: 2020-10-18 | End: 2020-10-19 | Stop reason: HOSPADM

## 2020-10-18 RX ORDER — SODIUM CHLORIDE 9 MG/ML
125 INJECTION, SOLUTION INTRAVENOUS CONTINUOUS
Status: DISCONTINUED | OUTPATIENT
Start: 2020-10-18 | End: 2020-10-19 | Stop reason: HOSPADM

## 2020-10-18 RX ORDER — MORPHINE SULFATE 15 MG/1
15 TABLET ORAL 3 TIMES DAILY PRN
Status: DISCONTINUED | OUTPATIENT
Start: 2020-10-18 | End: 2020-10-19 | Stop reason: HOSPADM

## 2020-10-18 RX ORDER — PREGABALIN 50 MG/1
150 CAPSULE ORAL 2 TIMES DAILY
Status: DISCONTINUED | OUTPATIENT
Start: 2020-10-18 | End: 2020-10-19 | Stop reason: HOSPADM

## 2020-10-18 RX ORDER — MORPHINE SULFATE 30 MG/1
60 TABLET, FILM COATED, EXTENDED RELEASE ORAL 2 TIMES DAILY
Status: DISCONTINUED | OUTPATIENT
Start: 2020-10-18 | End: 2020-10-19 | Stop reason: HOSPADM

## 2020-10-18 RX ORDER — INSULIN GLARGINE 100 [IU]/ML
25 INJECTION, SOLUTION SUBCUTANEOUS
Status: DISCONTINUED | OUTPATIENT
Start: 2020-10-18 | End: 2020-10-18

## 2020-10-18 RX ORDER — ONDANSETRON 2 MG/ML
4 INJECTION INTRAMUSCULAR; INTRAVENOUS EVERY 6 HOURS PRN
Status: DISCONTINUED | OUTPATIENT
Start: 2020-10-18 | End: 2020-10-18 | Stop reason: SDUPTHER

## 2020-10-18 RX ADMIN — LABETALOL 20 MG/4 ML (5 MG/ML) INTRAVENOUS SYRINGE 10 MG: at 02:27

## 2020-10-18 RX ADMIN — INSULIN GLARGINE 40 UNITS: 100 INJECTION, SOLUTION SUBCUTANEOUS at 21:34

## 2020-10-18 RX ADMIN — METOCLOPRAMIDE 10 MG: 5 INJECTION, SOLUTION INTRAMUSCULAR; INTRAVENOUS at 21:34

## 2020-10-18 RX ADMIN — INSULIN LISPRO 3 UNITS: 100 INJECTION, SOLUTION INTRAVENOUS; SUBCUTANEOUS at 01:54

## 2020-10-18 RX ADMIN — INSULIN LISPRO 8 UNITS: 100 INJECTION, SOLUTION INTRAVENOUS; SUBCUTANEOUS at 11:12

## 2020-10-18 RX ADMIN — METOCLOPRAMIDE 10 MG: 5 INJECTION, SOLUTION INTRAMUSCULAR; INTRAVENOUS at 11:12

## 2020-10-18 RX ADMIN — DICYCLOMINE HYDROCHLORIDE 10 MG: 10 CAPSULE ORAL at 11:11

## 2020-10-18 RX ADMIN — INSULIN LISPRO 6 UNITS: 100 INJECTION, SOLUTION INTRAVENOUS; SUBCUTANEOUS at 07:36

## 2020-10-18 RX ADMIN — MONTELUKAST SODIUM 10 MG: 10 TABLET, COATED ORAL at 01:16

## 2020-10-18 RX ADMIN — METOCLOPRAMIDE 10 MG: 5 INJECTION, SOLUTION INTRAMUSCULAR; INTRAVENOUS at 05:45

## 2020-10-18 RX ADMIN — METOCLOPRAMIDE 10 MG: 5 INJECTION, SOLUTION INTRAMUSCULAR; INTRAVENOUS at 15:35

## 2020-10-18 RX ADMIN — INSULIN LISPRO 2 UNITS: 100 INJECTION, SOLUTION INTRAVENOUS; SUBCUTANEOUS at 07:37

## 2020-10-18 RX ADMIN — LABETALOL 20 MG/4 ML (5 MG/ML) INTRAVENOUS SYRINGE 10 MG: at 07:45

## 2020-10-18 RX ADMIN — INSULIN LISPRO 2 UNITS: 100 INJECTION, SOLUTION INTRAVENOUS; SUBCUTANEOUS at 11:12

## 2020-10-18 RX ADMIN — INSULIN LISPRO 2 UNITS: 100 INJECTION, SOLUTION INTRAVENOUS; SUBCUTANEOUS at 21:34

## 2020-10-18 RX ADMIN — METOCLOPRAMIDE 10 MG: 5 INJECTION, SOLUTION INTRAMUSCULAR; INTRAVENOUS at 03:06

## 2020-10-18 RX ADMIN — PREGABALIN 150 MG: 50 CAPSULE ORAL at 09:47

## 2020-10-18 RX ADMIN — SODIUM CHLORIDE 125 ML/HR: 0.9 INJECTION, SOLUTION INTRAVENOUS at 15:40

## 2020-10-18 RX ADMIN — PANTOPRAZOLE SODIUM 20 MG: 20 TABLET, DELAYED RELEASE ORAL at 05:48

## 2020-10-18 RX ADMIN — MORPHINE SULFATE 15 MG: 15 TABLET ORAL at 01:48

## 2020-10-18 RX ADMIN — DICYCLOMINE HYDROCHLORIDE 10 MG: 10 CAPSULE ORAL at 21:34

## 2020-10-18 RX ADMIN — DICYCLOMINE HYDROCHLORIDE 10 MG: 10 CAPSULE ORAL at 01:16

## 2020-10-18 RX ADMIN — INSULIN LISPRO 8 UNITS: 100 INJECTION, SOLUTION INTRAVENOUS; SUBCUTANEOUS at 16:59

## 2020-10-18 RX ADMIN — MORPHINE SULFATE 60 MG: 30 TABLET, EXTENDED RELEASE ORAL at 20:35

## 2020-10-18 RX ADMIN — ONDANSETRON 4 MG: 2 INJECTION INTRAMUSCULAR; INTRAVENOUS at 05:50

## 2020-10-18 RX ADMIN — SODIUM CHLORIDE 80 ML/HR: 0.9 INJECTION, SOLUTION INTRAVENOUS at 01:06

## 2020-10-18 RX ADMIN — MORPHINE SULFATE 15 MG: 15 TABLET, EXTENDED RELEASE ORAL at 20:35

## 2020-10-18 RX ADMIN — FLUTICASONE FUROATE AND VILANTEROL TRIFENATATE 1 PUFF: 200; 25 POWDER RESPIRATORY (INHALATION) at 09:47

## 2020-10-18 RX ADMIN — CARISOPRODOL 350 MG: 350 TABLET ORAL at 17:00

## 2020-10-18 RX ADMIN — AMLODIPINE BESYLATE 5 MG: 5 TABLET ORAL at 15:35

## 2020-10-18 RX ADMIN — MORPHINE SULFATE 15 MG: 15 TABLET, EXTENDED RELEASE ORAL at 11:12

## 2020-10-18 RX ADMIN — DICYCLOMINE HYDROCHLORIDE 10 MG: 10 CAPSULE ORAL at 15:35

## 2020-10-18 RX ADMIN — DICYCLOMINE HYDROCHLORIDE 10 MG: 10 CAPSULE ORAL at 05:47

## 2020-10-18 RX ADMIN — MONTELUKAST SODIUM 10 MG: 10 TABLET, COATED ORAL at 21:34

## 2020-10-18 RX ADMIN — INSULIN LISPRO 5 UNITS: 100 INJECTION, SOLUTION INTRAVENOUS; SUBCUTANEOUS at 17:00

## 2020-10-18 RX ADMIN — DIAZEPAM 5 MG: 5 TABLET ORAL at 03:14

## 2020-10-18 RX ADMIN — METOCLOPRAMIDE 10 MG: 5 INJECTION, SOLUTION INTRAMUSCULAR; INTRAVENOUS at 01:17

## 2020-10-18 RX ADMIN — INSULIN GLARGINE 25 UNITS: 100 INJECTION, SOLUTION SUBCUTANEOUS at 01:16

## 2020-10-18 RX ADMIN — MORPHINE SULFATE 15 MG: 15 TABLET, EXTENDED RELEASE ORAL at 02:34

## 2020-10-18 RX ADMIN — FLUTICASONE PROPIONATE 1 SPRAY: 50 SPRAY, METERED NASAL at 09:47

## 2020-10-18 RX ADMIN — CARISOPRODOL 350 MG: 350 TABLET ORAL at 11:12

## 2020-10-18 RX ADMIN — PRAVASTATIN SODIUM 10 MG: 20 TABLET ORAL at 17:00

## 2020-10-18 RX ADMIN — MORPHINE SULFATE 60 MG: 30 TABLET, EXTENDED RELEASE ORAL at 11:11

## 2020-10-18 RX ADMIN — ENOXAPARIN SODIUM 40 MG: 40 INJECTION SUBCUTANEOUS at 09:47

## 2020-10-18 RX ADMIN — MORPHINE SULFATE 60 MG: 30 TABLET, EXTENDED RELEASE ORAL at 01:16

## 2020-10-18 RX ADMIN — PREGABALIN 150 MG: 50 CAPSULE ORAL at 17:00

## 2020-10-19 VITALS
RESPIRATION RATE: 18 BRPM | SYSTOLIC BLOOD PRESSURE: 112 MMHG | OXYGEN SATURATION: 95 % | TEMPERATURE: 97.5 F | HEART RATE: 91 BPM | WEIGHT: 293 LBS | BODY MASS INDEX: 44.41 KG/M2 | DIASTOLIC BLOOD PRESSURE: 66 MMHG | HEIGHT: 68 IN

## 2020-10-19 LAB
ANION GAP SERPL CALCULATED.3IONS-SCNC: 5 MMOL/L (ref 4–13)
ATRIAL RATE: 107 BPM
BUN SERPL-MCNC: 19 MG/DL (ref 5–25)
CALCIUM SERPL-MCNC: 8.4 MG/DL (ref 8.3–10.1)
CHLORIDE SERPL-SCNC: 101 MMOL/L (ref 100–108)
CO2 SERPL-SCNC: 26 MMOL/L (ref 21–32)
CREAT SERPL-MCNC: 0.89 MG/DL (ref 0.6–1.3)
GFR SERPL CREATININE-BSD FRML MDRD: 73 ML/MIN/1.73SQ M
GLUCOSE SERPL-MCNC: 131 MG/DL (ref 65–140)
GLUCOSE SERPL-MCNC: 140 MG/DL (ref 65–140)
GLUCOSE SERPL-MCNC: 143 MG/DL (ref 65–140)
GLUCOSE SERPL-MCNC: 181 MG/DL (ref 65–140)
MAGNESIUM SERPL-MCNC: 1.5 MG/DL (ref 1.6–2.6)
P AXIS: 75 DEGREES
PHOSPHATE SERPL-MCNC: 4.6 MG/DL (ref 2.7–4.5)
POTASSIUM SERPL-SCNC: 3.7 MMOL/L (ref 3.5–5.3)
PR INTERVAL: 150 MS
QRS AXIS: 34 DEGREES
QRSD INTERVAL: 78 MS
QT INTERVAL: 340 MS
QTC INTERVAL: 453 MS
SODIUM SERPL-SCNC: 132 MMOL/L (ref 136–145)
T WAVE AXIS: 68 DEGREES
VENTRICULAR RATE: 107 BPM

## 2020-10-19 PROCEDURE — 82948 REAGENT STRIP/BLOOD GLUCOSE: CPT

## 2020-10-19 PROCEDURE — 83735 ASSAY OF MAGNESIUM: CPT | Performed by: INTERNAL MEDICINE

## 2020-10-19 PROCEDURE — 84100 ASSAY OF PHOSPHORUS: CPT | Performed by: INTERNAL MEDICINE

## 2020-10-19 PROCEDURE — 99239 HOSP IP/OBS DSCHRG MGMT >30: CPT | Performed by: INTERNAL MEDICINE

## 2020-10-19 PROCEDURE — 93010 ELECTROCARDIOGRAM REPORT: CPT | Performed by: INTERNAL MEDICINE

## 2020-10-19 PROCEDURE — 80048 BASIC METABOLIC PNL TOTAL CA: CPT | Performed by: INTERNAL MEDICINE

## 2020-10-19 RX ORDER — METOCLOPRAMIDE 10 MG/1
TABLET ORAL
Qty: 360 TABLET | Refills: 0 | Status: SHIPPED | OUTPATIENT
Start: 2020-10-19 | End: 2021-05-15 | Stop reason: SDUPTHER

## 2020-10-19 RX ORDER — MAGNESIUM SULFATE HEPTAHYDRATE 40 MG/ML
2 INJECTION, SOLUTION INTRAVENOUS ONCE
Status: COMPLETED | OUTPATIENT
Start: 2020-10-19 | End: 2020-10-19

## 2020-10-19 RX ADMIN — PANTOPRAZOLE SODIUM 20 MG: 20 TABLET, DELAYED RELEASE ORAL at 05:44

## 2020-10-19 RX ADMIN — METOCLOPRAMIDE 10 MG: 5 INJECTION, SOLUTION INTRAMUSCULAR; INTRAVENOUS at 05:45

## 2020-10-19 RX ADMIN — INSULIN LISPRO 5 UNITS: 100 INJECTION, SOLUTION INTRAVENOUS; SUBCUTANEOUS at 11:06

## 2020-10-19 RX ADMIN — CARISOPRODOL 350 MG: 350 TABLET ORAL at 17:05

## 2020-10-19 RX ADMIN — ENOXAPARIN SODIUM 40 MG: 40 INJECTION SUBCUTANEOUS at 08:54

## 2020-10-19 RX ADMIN — INSULIN LISPRO 5 UNITS: 100 INJECTION, SOLUTION INTRAVENOUS; SUBCUTANEOUS at 16:13

## 2020-10-19 RX ADMIN — PREGABALIN 150 MG: 50 CAPSULE ORAL at 08:54

## 2020-10-19 RX ADMIN — MAGNESIUM SULFATE IN WATER 2 G: 40 INJECTION, SOLUTION INTRAVENOUS at 08:54

## 2020-10-19 RX ADMIN — DICYCLOMINE HYDROCHLORIDE 10 MG: 10 CAPSULE ORAL at 15:46

## 2020-10-19 RX ADMIN — INSULIN LISPRO 5 UNITS: 100 INJECTION, SOLUTION INTRAVENOUS; SUBCUTANEOUS at 07:44

## 2020-10-19 RX ADMIN — FLUTICASONE FUROATE AND VILANTEROL TRIFENATATE 1 PUFF: 200; 25 POWDER RESPIRATORY (INHALATION) at 08:54

## 2020-10-19 RX ADMIN — METOCLOPRAMIDE 10 MG: 5 INJECTION, SOLUTION INTRAMUSCULAR; INTRAVENOUS at 15:55

## 2020-10-19 RX ADMIN — MORPHINE SULFATE 15 MG: 15 TABLET, EXTENDED RELEASE ORAL at 08:56

## 2020-10-19 RX ADMIN — DICYCLOMINE HYDROCHLORIDE 10 MG: 10 CAPSULE ORAL at 05:44

## 2020-10-19 RX ADMIN — INSULIN LISPRO 2 UNITS: 100 INJECTION, SOLUTION INTRAVENOUS; SUBCUTANEOUS at 16:12

## 2020-10-19 RX ADMIN — MORPHINE SULFATE 60 MG: 30 TABLET, EXTENDED RELEASE ORAL at 08:55

## 2020-10-19 RX ADMIN — METOCLOPRAMIDE 10 MG: 5 INJECTION, SOLUTION INTRAMUSCULAR; INTRAVENOUS at 11:00

## 2020-10-19 RX ADMIN — CARISOPRODOL 350 MG: 350 TABLET ORAL at 08:54

## 2020-10-19 RX ADMIN — PRAVASTATIN SODIUM 10 MG: 20 TABLET ORAL at 15:46

## 2020-10-19 RX ADMIN — AMLODIPINE BESYLATE 5 MG: 5 TABLET ORAL at 08:54

## 2020-10-19 RX ADMIN — DICYCLOMINE HYDROCHLORIDE 10 MG: 10 CAPSULE ORAL at 11:00

## 2020-10-19 RX ADMIN — PREGABALIN 150 MG: 50 CAPSULE ORAL at 17:05

## 2020-10-19 RX ADMIN — FLUTICASONE PROPIONATE 1 SPRAY: 50 SPRAY, METERED NASAL at 08:54

## 2020-10-20 LAB — MRSA NOSE QL CULT: NORMAL

## 2021-04-07 ENCOUNTER — IMMUNIZATIONS (OUTPATIENT)
Dept: FAMILY MEDICINE CLINIC | Facility: HOSPITAL | Age: 56
End: 2021-04-07

## 2021-04-07 DIAGNOSIS — Z23 ENCOUNTER FOR IMMUNIZATION: Primary | ICD-10-CM

## 2021-04-07 PROCEDURE — 0011A SARS-COV-2 / COVID-19 MRNA VACCINE (MODERNA) 100 MCG: CPT

## 2021-04-07 PROCEDURE — 91301 SARS-COV-2 / COVID-19 MRNA VACCINE (MODERNA) 100 MCG: CPT

## 2021-04-19 ENCOUNTER — TRANSCRIBE ORDERS (OUTPATIENT)
Dept: PHYSICAL THERAPY | Facility: CLINIC | Age: 56
End: 2021-04-19

## 2021-04-19 ENCOUNTER — EVALUATION (OUTPATIENT)
Dept: PHYSICAL THERAPY | Facility: CLINIC | Age: 56
End: 2021-04-19
Payer: MEDICARE

## 2021-04-19 DIAGNOSIS — G89.29 CHRONIC PAIN OF BOTH KNEES: Primary | ICD-10-CM

## 2021-04-19 DIAGNOSIS — M25.561 CHRONIC PAIN OF BOTH KNEES: Primary | ICD-10-CM

## 2021-04-19 DIAGNOSIS — M25.562 CHRONIC PAIN OF BOTH KNEES: Primary | ICD-10-CM

## 2021-04-19 PROCEDURE — 97112 NEUROMUSCULAR REEDUCATION: CPT | Performed by: PHYSICAL THERAPIST

## 2021-04-19 PROCEDURE — 97162 PT EVAL MOD COMPLEX 30 MIN: CPT | Performed by: PHYSICAL THERAPIST

## 2021-04-19 NOTE — LETTER
2021    Soledad Lemos, [address] [facility]    Patient: Heri Liz   YOB: 1965   Date of Visit: 2021     Encounter Diagnosis     ICD-10-CM    1  Chronic pain of both knees  M25 561     M25 562     G89 29        Dear Dr Santos Sour: Thank you for your recent referral of Heri Liz  Please review the attached evaluation summary from Anna Marie's recent visit  Please verify that you agree with the plan of care by signing the attached order  If you have any questions or concerns, please do not hesitate to call  I sincerely appreciate the opportunity to share in the care of one of your patients and hope to have another opportunity to work with you in the near future  Sincerely,    Kristopher Sumner, PT      Referring Provider:      I certify that I have read the below Plan of Care and certify the need for these services furnished under this plan of treatment while under my care  Soledad Lemos MD  [facility]  Via Fax: [phone]          PT Evaluation     Today's date: 2021  Patient name: Heri Liz  : 1965  MRN: [id number]  Referring provider: Emma Sanchez MD  Dx:   Encounter Diagnosis     ICD-10-CM    1  Chronic pain of both knees  M25 561     M25 562     G89 29        Start Time: 0900  Stop Time: 09  Total time in clinic (min): 30 minutes    Assessment  Assessment details: 63year old female patient reports to PT with chronic B knee pain with recent flare up of L knee due to a fall off her medium height bed a couple weeks ago  Patient did not go to see MD since fall or have imaging  Patient symptoms improving slowly and patient able to put more weight through her L LE so further imaging not warranted at this time but will continue to monitor  No red flags noted and patient denies numbness/tingling   Patient has significant pain with B knee AROM, but has full B knee extension PROM and is moderately limited with B knee flexion PROM  Patient will benefit from skilled PT services to address current impairments and functional limitations to help patient return to her PLOF  Impairments: abnormal gait, abnormal or restricted ROM, abnormal movement, activity intolerance, impaired physical strength, lacks appropriate home exercise program, pain with function and weight-bearing intolerance    Symptom irritability: highUnderstanding of Dx/Px/POC: good   Prognosis: good    Goals  STG  1  Patient will be independent with completion of HEP throughout therapy  2  Patient will have at worst 7/10 pain so patient can sleep with less discomfort in 3 weeks  3  Patient will put her shoes and socks on with less assistance in 4 weeks  LTG  1  Patient will get in and out of her care without assistance in 5 weeks  2  Patient will stand for prolonged periods so patient can cook with less difficulty in 6 weeks  3  Patient will ambulate around her house without increase in symptoms in 7 weeks  Plan  Patient would benefit from: skilled physical therapy  Planned therapy interventions: manual therapy, balance/weight bearing training, balance, neuromuscular re-education, patient education, strengthening, stretching, therapeutic activities, therapeutic exercise, home exercise program, gait training, functional ROM exercises and flexibility  Frequency: 2x week  Duration in weeks: 6  Treatment plan discussed with: patient        Subjective Evaluation    History of Present Illness  Mechanism of injury: Patient reports with B knee pain  Patient notes her symptoms have been going for a long time but about 3 months ago patient started to have difficulty dressing herself  Patient is currently ambulating with rollator  Patient has difficulty putting her shoes and socks on as well as getting in and out of a car  Patient has difficulty getting in and out of her shower as well as walking   Patient has difficulty sleeping as well  Patient also has difficulty standing to cook  Patient notes she fell last  off her bed because she was sitting on the side of it and notes it aggravated her knee symptoms even more  Patient has L TKA  Patient currently doesn't work     Pain  Current pain ratin  At best pain ratin  At worst pain ratin  Quality: dull ache and sharp  Relieving factors: medications and change in position  Aggravating factors: walking, standing, sitting and stair climbing    Treatments  Current treatment: physical therapy  Patient Goals  Patient goals for therapy: increased strength, independence with ADLs/IADLs, return to sport/leisure activities, increased motion, decreased pain and decreased edema          Objective     General Comments:      Knee Comments  Knee extension PROM full and equal B with pain at end range  R knee flexion moderately limited AROM/minimally limited PROM with pain  L knee flexion significantly limited AROM/moderately limited PROM with pain    Good quad activation R LE  Fair quad activation L LE     Needs use of rollator to STS from hi/lo table at elevated height             Precautions: "3 surgeries in her low back," cervical surgery, L TKA, Type II DM, asthma, obesity      Manuals             Heel slides                                                    Neuro Re-Ed             Quad sets r            glute sets r                                                                             Ther Ex             Heel slides r                         SLR When able            Supine clamshells                                                                 Ther Activity             STS hi/lo                          Gait Training                                       Modalities

## 2021-04-19 NOTE — PROGRESS NOTES
PT Evaluation     Today's date: 2021  Patient name: Selene Bower  : 1965  MRN: [id number]  Referring provider: Jessica Rodney MD  Dx:   Encounter Diagnosis     ICD-10-CM    1  Chronic pain of both knees  M25 561     M25 562     G89 29        Start Time: 0900  Stop Time: 09  Total time in clinic (min): 30 minutes    Assessment  Assessment details: 63year old female patient reports to PT with chronic B knee pain with recent flare up of L knee due to a fall off her medium height bed a couple weeks ago  Patient did not go to see MD since fall or have imaging  Patient symptoms improving slowly and patient able to put more weight through her L LE so further imaging not warranted at this time but will continue to monitor  No red flags noted and patient denies numbness/tingling  Patient has significant pain with B knee AROM, but has full B knee extension PROM and is moderately limited with B knee flexion PROM  Patient will benefit from skilled PT services to address current impairments and functional limitations to help patient return to her PLOF  Impairments: abnormal gait, abnormal or restricted ROM, abnormal movement, activity intolerance, impaired physical strength, lacks appropriate home exercise program, pain with function and weight-bearing intolerance    Symptom irritability: highUnderstanding of Dx/Px/POC: good   Prognosis: good    Goals  STG  1  Patient will be independent with completion of HEP throughout therapy  2  Patient will have at worst 7/10 pain so patient can sleep with less discomfort in 3 weeks  3  Patient will put her shoes and socks on with less assistance in 4 weeks  LTG  1  Patient will get in and out of her care without assistance in 5 weeks  2  Patient will stand for prolonged periods so patient can cook with less difficulty in 6 weeks  3  Patient will ambulate around her house without increase in symptoms in 7 weeks       Plan  Patient would benefit from: skilled physical therapy  Planned therapy interventions: manual therapy, balance/weight bearing training, balance, neuromuscular re-education, patient education, strengthening, stretching, therapeutic activities, therapeutic exercise, home exercise program, gait training, functional ROM exercises and flexibility  Frequency: 2x week  Duration in weeks: 6  Treatment plan discussed with: patient        Subjective Evaluation    History of Present Illness  Mechanism of injury: Patient reports with B knee pain  Patient notes her symptoms have been going for a long time but about 3 months ago patient started to have difficulty dressing herself  Patient is currently ambulating with rollator  Patient has difficulty putting her shoes and socks on as well as getting in and out of a car  Patient has difficulty getting in and out of her shower as well as walking  Patient has difficulty sleeping as well  Patient also has difficulty standing to cook  Patient notes she fell last  off her bed because she was sitting on the side of it and notes it aggravated her knee symptoms even more  Patient has L TKA  Patient currently doesn't work     Pain  Current pain ratin  At best pain ratin  At worst pain ratin  Quality: dull ache and sharp  Relieving factors: medications and change in position  Aggravating factors: walking, standing, sitting and stair climbing    Treatments  Current treatment: physical therapy  Patient Goals  Patient goals for therapy: increased strength, independence with ADLs/IADLs, return to sport/leisure activities, increased motion, decreased pain and decreased edema          Objective     General Comments:      Knee Comments  Knee extension PROM full and equal B with pain at end range  R knee flexion moderately limited AROM/minimally limited PROM with pain  L knee flexion significantly limited AROM/moderately limited PROM with pain    Good quad activation R LE  Fair quad activation L LE Needs use of rollator to STS from hi/lo table at elevated height             Precautions: "3 surgeries in her low back," cervical surgery, L TKA, Type II DM, asthma, obesity      Manuals 4/19            Heel slides                                                    Neuro Re-Ed             Quad sets r            glute sets r                                                                             Ther Ex             Heel slides r                         SLR When able            Supine clamshells                                                                 Ther Activity             STS hi/lo                          Gait Training                                       Modalities

## 2021-04-27 ENCOUNTER — HOSPITAL ENCOUNTER (INPATIENT)
Facility: HOSPITAL | Age: 56
LOS: 2 days | Discharge: HOME | DRG: 074 | End: 2021-04-30
Attending: EMERGENCY MEDICINE | Admitting: INTERNAL MEDICINE
Payer: MEDICARE

## 2021-04-27 DIAGNOSIS — R11.0 NAUSEA: Primary | ICD-10-CM

## 2021-04-27 DIAGNOSIS — R00.0 TACHYCARDIA: ICD-10-CM

## 2021-04-27 DIAGNOSIS — K80.20 GALL STONES: ICD-10-CM

## 2021-04-27 DIAGNOSIS — K31.84 GASTROPARESIS: ICD-10-CM

## 2021-04-27 DIAGNOSIS — R11.2 INTRACTABLE VOMITING WITH NAUSEA, UNSPECIFIED VOMITING TYPE: ICD-10-CM

## 2021-04-27 DIAGNOSIS — E86.0 DEHYDRATION: ICD-10-CM

## 2021-04-27 DIAGNOSIS — R11.10 VOMITING: ICD-10-CM

## 2021-04-27 DIAGNOSIS — R26.2 AMBULATORY DYSFUNCTION: ICD-10-CM

## 2021-04-27 LAB
ALBUMIN SERPL BCP-MCNC: 4.1 G/DL (ref 3.5–5)
ALP SERPL-CCNC: 151 U/L (ref 46–116)
ALT SERPL W P-5'-P-CCNC: 26 U/L (ref 12–78)
ANION GAP SERPL CALCULATED.3IONS-SCNC: 16 MMOL/L (ref 4–13)
AST SERPL W P-5'-P-CCNC: 32 U/L (ref 5–45)
BASE EX.OXY STD BLDV CALC-SCNC: 72.5 % (ref 60–80)
BASE EXCESS BLDV CALC-SCNC: -0.6 MMOL/L
BASOPHILS # BLD AUTO: 0.08 THOUSANDS/ΜL (ref 0–0.1)
BASOPHILS NFR BLD AUTO: 1 % (ref 0–1)
BILIRUB DIRECT SERPL-MCNC: 0.16 MG/DL (ref 0–0.2)
BILIRUB SERPL-MCNC: 1.55 MG/DL (ref 0.2–1)
BILIRUB UR QL STRIP: ABNORMAL
BUN SERPL-MCNC: 9 MG/DL (ref 5–25)
CALCIUM SERPL-MCNC: 10.1 MG/DL (ref 8.3–10.1)
CHLORIDE SERPL-SCNC: 91 MMOL/L (ref 100–108)
CLARITY UR: CLEAR
CO2 SERPL-SCNC: 24 MMOL/L (ref 21–32)
COLOR UR: YELLOW
CREAT SERPL-MCNC: 0.84 MG/DL (ref 0.6–1.3)
EOSINOPHIL # BLD AUTO: 0.01 THOUSAND/ΜL (ref 0–0.61)
EOSINOPHIL NFR BLD AUTO: 0 % (ref 0–6)
ERYTHROCYTE [DISTWIDTH] IN BLOOD BY AUTOMATED COUNT: 13 % (ref 11.6–15.1)
GFR SERPL CREATININE-BSD FRML MDRD: 78 ML/MIN/1.73SQ M
GLUCOSE SERPL-MCNC: 333 MG/DL (ref 65–140)
GLUCOSE UR STRIP-MCNC: ABNORMAL MG/DL
HCO3 BLDV-SCNC: 23.9 MMOL/L (ref 24–30)
HCT VFR BLD AUTO: 45.5 % (ref 34.8–46.1)
HGB BLD-MCNC: 14.8 G/DL (ref 11.5–15.4)
HGB UR QL STRIP.AUTO: ABNORMAL
IMM GRANULOCYTES # BLD AUTO: 0.09 THOUSAND/UL (ref 0–0.2)
IMM GRANULOCYTES NFR BLD AUTO: 1 % (ref 0–2)
KETONES UR STRIP-MCNC: ABNORMAL MG/DL
LEUKOCYTE ESTERASE UR QL STRIP: NEGATIVE
LIPASE SERPL-CCNC: 21 U/L (ref 73–393)
LYMPHOCYTES # BLD AUTO: 1.05 THOUSANDS/ΜL (ref 0.6–4.47)
LYMPHOCYTES NFR BLD AUTO: 7 % (ref 14–44)
MAGNESIUM SERPL-MCNC: 1.4 MG/DL (ref 1.6–2.6)
MCH RBC QN AUTO: 26.6 PG (ref 26.8–34.3)
MCHC RBC AUTO-ENTMCNC: 32.5 G/DL (ref 31.4–37.4)
MCV RBC AUTO: 82 FL (ref 82–98)
MONOCYTES # BLD AUTO: 0.35 THOUSAND/ΜL (ref 0.17–1.22)
MONOCYTES NFR BLD AUTO: 2 % (ref 4–12)
NEUTROPHILS # BLD AUTO: 14.45 THOUSANDS/ΜL (ref 1.85–7.62)
NEUTS SEG NFR BLD AUTO: 89 % (ref 43–75)
NITRITE UR QL STRIP: NEGATIVE
NRBC BLD AUTO-RTO: 0 /100 WBCS
O2 CT BLDV-SCNC: 15.6 ML/DL
PCO2 BLDV: 39.1 MM HG (ref 42–50)
PH BLDV: 7.4 [PH] (ref 7.3–7.4)
PH UR STRIP.AUTO: 6 [PH] (ref 4.5–8)
PLATELET # BLD AUTO: 347 THOUSANDS/UL (ref 149–390)
PMV BLD AUTO: 12.7 FL (ref 8.9–12.7)
PO2 BLDV: 38 MM HG (ref 35–45)
POTASSIUM SERPL-SCNC: 4.8 MMOL/L (ref 3.5–5.3)
PROT SERPL-MCNC: 9.5 G/DL (ref 6.4–8.2)
PROT UR STRIP-MCNC: >=300 MG/DL
RBC # BLD AUTO: 5.57 MILLION/UL (ref 3.81–5.12)
SODIUM SERPL-SCNC: 131 MMOL/L (ref 136–145)
SP GR UR STRIP.AUTO: >=1.03 (ref 1–1.03)
TSH SERPL DL<=0.05 MIU/L-ACNC: 0.3 UIU/ML (ref 0.36–3.74)
UROBILINOGEN UR QL STRIP.AUTO: 0.2 E.U./DL
WBC # BLD AUTO: 16.03 THOUSAND/UL (ref 4.31–10.16)

## 2021-04-27 PROCEDURE — 96361 HYDRATE IV INFUSION ADD-ON: CPT

## 2021-04-27 PROCEDURE — 99285 EMERGENCY DEPT VISIT HI MDM: CPT | Performed by: EMERGENCY MEDICINE

## 2021-04-27 PROCEDURE — 83690 ASSAY OF LIPASE: CPT | Performed by: EMERGENCY MEDICINE

## 2021-04-27 PROCEDURE — 85025 COMPLETE CBC W/AUTO DIFF WBC: CPT | Performed by: EMERGENCY MEDICINE

## 2021-04-27 PROCEDURE — 80076 HEPATIC FUNCTION PANEL: CPT | Performed by: EMERGENCY MEDICINE

## 2021-04-27 PROCEDURE — 36415 COLL VENOUS BLD VENIPUNCTURE: CPT | Performed by: EMERGENCY MEDICINE

## 2021-04-27 PROCEDURE — 96375 TX/PRO/DX INJ NEW DRUG ADDON: CPT

## 2021-04-27 PROCEDURE — 82805 BLOOD GASES W/O2 SATURATION: CPT | Performed by: EMERGENCY MEDICINE

## 2021-04-27 PROCEDURE — 96374 THER/PROPH/DIAG INJ IV PUSH: CPT

## 2021-04-27 PROCEDURE — 96372 THER/PROPH/DIAG INJ SC/IM: CPT

## 2021-04-27 PROCEDURE — 83735 ASSAY OF MAGNESIUM: CPT | Performed by: EMERGENCY MEDICINE

## 2021-04-27 PROCEDURE — 99285 EMERGENCY DEPT VISIT HI MDM: CPT

## 2021-04-27 PROCEDURE — 81001 URINALYSIS AUTO W/SCOPE: CPT

## 2021-04-27 PROCEDURE — 80048 BASIC METABOLIC PNL TOTAL CA: CPT | Performed by: EMERGENCY MEDICINE

## 2021-04-27 PROCEDURE — 84443 ASSAY THYROID STIM HORMONE: CPT | Performed by: EMERGENCY MEDICINE

## 2021-04-27 PROCEDURE — 96360 HYDRATION IV INFUSION INIT: CPT

## 2021-04-27 RX ORDER — METOCLOPRAMIDE HYDROCHLORIDE 5 MG/ML
10 INJECTION INTRAMUSCULAR; INTRAVENOUS ONCE
Status: COMPLETED | OUTPATIENT
Start: 2021-04-27 | End: 2021-04-27

## 2021-04-27 RX ORDER — HALOPERIDOL 5 MG/ML
5 INJECTION INTRAMUSCULAR ONCE
Status: COMPLETED | OUTPATIENT
Start: 2021-04-27 | End: 2021-04-27

## 2021-04-27 RX ADMIN — SODIUM CHLORIDE 1000 ML: 0.9 INJECTION, SOLUTION INTRAVENOUS at 20:19

## 2021-04-27 RX ADMIN — METOCLOPRAMIDE 10 MG: 5 INJECTION, SOLUTION INTRAMUSCULAR; INTRAVENOUS at 20:20

## 2021-04-27 RX ADMIN — FAMOTIDINE 20 MG: 10 INJECTION INTRAVENOUS at 22:26

## 2021-04-27 RX ADMIN — SODIUM CHLORIDE 1000 ML: 0.9 INJECTION, SOLUTION INTRAVENOUS at 22:57

## 2021-04-27 RX ADMIN — HALOPERIDOL LACTATE 5 MG: 5 INJECTION, SOLUTION INTRAMUSCULAR at 20:55

## 2021-04-28 ENCOUNTER — APPOINTMENT (OUTPATIENT)
Dept: RADIOLOGY | Facility: HOSPITAL | Age: 56
DRG: 074 | End: 2021-04-28
Payer: MEDICARE

## 2021-04-28 ENCOUNTER — APPOINTMENT (OUTPATIENT)
Dept: ULTRASOUND IMAGING | Facility: HOSPITAL | Age: 56
DRG: 074 | End: 2021-04-28
Payer: MEDICARE

## 2021-04-28 PROBLEM — R00.0 SINUS TACHYCARDIA: Status: ACTIVE | Noted: 2021-04-28

## 2021-04-28 PROBLEM — E86.0 DEHYDRATION: Status: RESOLVED | Noted: 2020-06-25 | Resolved: 2021-04-28

## 2021-04-28 PROBLEM — R79.89 ELEVATED LFTS: Status: ACTIVE | Noted: 2021-04-28

## 2021-04-28 PROBLEM — R79.89 LOW TSH LEVEL: Status: ACTIVE | Noted: 2021-04-28

## 2021-04-28 PROBLEM — E11.10 DKA, TYPE 2 (HCC): Status: ACTIVE | Noted: 2021-04-28

## 2021-04-28 LAB
ALBUMIN SERPL BCP-MCNC: 3.6 G/DL (ref 3.5–5)
ALP SERPL-CCNC: 130 U/L (ref 46–116)
ALT SERPL W P-5'-P-CCNC: 18 U/L (ref 12–78)
ANION GAP SERPL CALCULATED.3IONS-SCNC: 14 MMOL/L (ref 4–13)
ANION GAP SERPL CALCULATED.3IONS-SCNC: 9 MMOL/L (ref 4–13)
AST SERPL W P-5'-P-CCNC: 30 U/L (ref 5–45)
ATRIAL RATE: 134 BPM
ATRIAL RATE: 146 BPM
BACTERIA UR QL AUTO: ABNORMAL /HPF
BASOPHILS # BLD AUTO: 0.07 THOUSANDS/ΜL (ref 0–0.1)
BASOPHILS NFR BLD AUTO: 0 % (ref 0–1)
BILIRUB SERPL-MCNC: 1.52 MG/DL (ref 0.2–1)
BUN SERPL-MCNC: 14 MG/DL (ref 5–25)
BUN SERPL-MCNC: 9 MG/DL (ref 5–25)
CALCIUM SERPL-MCNC: 9.1 MG/DL (ref 8.3–10.1)
CALCIUM SERPL-MCNC: 9.3 MG/DL (ref 8.3–10.1)
CHLORIDE SERPL-SCNC: 100 MMOL/L (ref 100–108)
CHLORIDE SERPL-SCNC: 95 MMOL/L (ref 100–108)
CO2 SERPL-SCNC: 23 MMOL/L (ref 21–32)
CO2 SERPL-SCNC: 27 MMOL/L (ref 21–32)
CREAT SERPL-MCNC: 0.81 MG/DL (ref 0.6–1.3)
CREAT SERPL-MCNC: 0.97 MG/DL (ref 0.6–1.3)
EOSINOPHIL # BLD AUTO: 0 THOUSAND/ΜL (ref 0–0.61)
EOSINOPHIL NFR BLD AUTO: 0 % (ref 0–6)
ERYTHROCYTE [DISTWIDTH] IN BLOOD BY AUTOMATED COUNT: 13 % (ref 11.6–15.1)
GFR SERPL CREATININE-BSD FRML MDRD: 65 ML/MIN/1.73SQ M
GFR SERPL CREATININE-BSD FRML MDRD: 81 ML/MIN/1.73SQ M
GLUCOSE P FAST SERPL-MCNC: 294 MG/DL (ref 65–99)
GLUCOSE SERPL-MCNC: 237 MG/DL (ref 65–140)
GLUCOSE SERPL-MCNC: 241 MG/DL (ref 65–140)
GLUCOSE SERPL-MCNC: 251 MG/DL (ref 65–140)
GLUCOSE SERPL-MCNC: 251 MG/DL (ref 65–140)
GLUCOSE SERPL-MCNC: 276 MG/DL (ref 65–140)
GLUCOSE SERPL-MCNC: 280 MG/DL (ref 65–140)
GLUCOSE SERPL-MCNC: 288 MG/DL (ref 65–140)
GLUCOSE SERPL-MCNC: 294 MG/DL (ref 65–140)
HCT VFR BLD AUTO: 43.4 % (ref 34.8–46.1)
HGB BLD-MCNC: 14.1 G/DL (ref 11.5–15.4)
HYALINE CASTS #/AREA URNS LPF: ABNORMAL /LPF
IMM GRANULOCYTES # BLD AUTO: 0.13 THOUSAND/UL (ref 0–0.2)
IMM GRANULOCYTES NFR BLD AUTO: 1 % (ref 0–2)
LYMPHOCYTES # BLD AUTO: 1.65 THOUSANDS/ΜL (ref 0.6–4.47)
LYMPHOCYTES NFR BLD AUTO: 10 % (ref 14–44)
MCH RBC QN AUTO: 26.9 PG (ref 26.8–34.3)
MCHC RBC AUTO-ENTMCNC: 32.5 G/DL (ref 31.4–37.4)
MCV RBC AUTO: 83 FL (ref 82–98)
MONOCYTES # BLD AUTO: 0.78 THOUSAND/ΜL (ref 0.17–1.22)
MONOCYTES NFR BLD AUTO: 5 % (ref 4–12)
NEUTROPHILS # BLD AUTO: 14.37 THOUSANDS/ΜL (ref 1.85–7.62)
NEUTS SEG NFR BLD AUTO: 84 % (ref 43–75)
NON-SQ EPI CELLS URNS QL MICRO: ABNORMAL /HPF
NRBC BLD AUTO-RTO: 0 /100 WBCS
P AXIS: 72 DEGREES
P AXIS: 75 DEGREES
PLATELET # BLD AUTO: 319 THOUSANDS/UL (ref 149–390)
PLATELET # BLD AUTO: 339 THOUSANDS/UL (ref 149–390)
PMV BLD AUTO: 13.2 FL (ref 8.9–12.7)
PMV BLD AUTO: 13.3 FL (ref 8.9–12.7)
POTASSIUM SERPL-SCNC: 3.5 MMOL/L (ref 3.5–5.3)
POTASSIUM SERPL-SCNC: 4.5 MMOL/L (ref 3.5–5.3)
PR INTERVAL: 126 MS
PR INTERVAL: 138 MS
PROT SERPL-MCNC: 8.4 G/DL (ref 6.4–8.2)
QRS AXIS: 17 DEGREES
QRS AXIS: 62 DEGREES
QRSD INTERVAL: 74 MS
QRSD INTERVAL: 80 MS
QT INTERVAL: 268 MS
QT INTERVAL: 312 MS
QTC INTERVAL: 417 MS
QTC INTERVAL: 465 MS
RBC # BLD AUTO: 5.25 MILLION/UL (ref 3.81–5.12)
RBC #/AREA URNS AUTO: ABNORMAL /HPF
SODIUM SERPL-SCNC: 132 MMOL/L (ref 136–145)
SODIUM SERPL-SCNC: 136 MMOL/L (ref 136–145)
T WAVE AXIS: 60 DEGREES
T WAVE AXIS: 71 DEGREES
T4 FREE SERPL-MCNC: 1.41 NG/DL (ref 0.76–1.46)
VENTRICULAR RATE: 134 BPM
VENTRICULAR RATE: 146 BPM
WBC # BLD AUTO: 17 THOUSAND/UL (ref 4.31–10.16)
WBC #/AREA URNS AUTO: ABNORMAL /HPF

## 2021-04-28 PROCEDURE — 82948 REAGENT STRIP/BLOOD GLUCOSE: CPT

## 2021-04-28 PROCEDURE — 71045 X-RAY EXAM CHEST 1 VIEW: CPT

## 2021-04-28 PROCEDURE — 93010 ELECTROCARDIOGRAM REPORT: CPT | Performed by: INTERNAL MEDICINE

## 2021-04-28 PROCEDURE — 85025 COMPLETE CBC W/AUTO DIFF WBC: CPT | Performed by: PHYSICIAN ASSISTANT

## 2021-04-28 PROCEDURE — 97163 PT EVAL HIGH COMPLEX 45 MIN: CPT

## 2021-04-28 PROCEDURE — 76705 ECHO EXAM OF ABDOMEN: CPT

## 2021-04-28 PROCEDURE — 99222 1ST HOSP IP/OBS MODERATE 55: CPT | Performed by: INTERNAL MEDICINE

## 2021-04-28 PROCEDURE — 93005 ELECTROCARDIOGRAM TRACING: CPT

## 2021-04-28 PROCEDURE — 36415 COLL VENOUS BLD VENIPUNCTURE: CPT | Performed by: PHYSICIAN ASSISTANT

## 2021-04-28 PROCEDURE — 80053 COMPREHEN METABOLIC PANEL: CPT | Performed by: PHYSICIAN ASSISTANT

## 2021-04-28 PROCEDURE — 80048 BASIC METABOLIC PNL TOTAL CA: CPT | Performed by: INTERNAL MEDICINE

## 2021-04-28 PROCEDURE — 99222 1ST HOSP IP/OBS MODERATE 55: CPT | Performed by: PHYSICIAN ASSISTANT

## 2021-04-28 PROCEDURE — 84439 ASSAY OF FREE THYROXINE: CPT | Performed by: INTERNAL MEDICINE

## 2021-04-28 PROCEDURE — 85049 AUTOMATED PLATELET COUNT: CPT | Performed by: PHYSICIAN ASSISTANT

## 2021-04-28 RX ORDER — MAGNESIUM HYDROXIDE/ALUMINUM HYDROXICE/SIMETHICONE 120; 1200; 1200 MG/30ML; MG/30ML; MG/30ML
30 SUSPENSION ORAL EVERY 6 HOURS PRN
Status: DISCONTINUED | OUTPATIENT
Start: 2021-04-28 | End: 2021-04-30 | Stop reason: HOSPADM

## 2021-04-28 RX ORDER — PANTOPRAZOLE SODIUM 40 MG/1
40 TABLET, DELAYED RELEASE ORAL
Status: DISCONTINUED | OUTPATIENT
Start: 2021-04-28 | End: 2021-04-30 | Stop reason: HOSPADM

## 2021-04-28 RX ORDER — INSULIN GLARGINE 100 [IU]/ML
10 INJECTION, SOLUTION SUBCUTANEOUS ONCE
Status: COMPLETED | OUTPATIENT
Start: 2021-04-28 | End: 2021-04-28

## 2021-04-28 RX ORDER — DIAZEPAM 5 MG/1
5 TABLET ORAL DAILY
Status: DISCONTINUED | OUTPATIENT
Start: 2021-04-29 | End: 2021-04-30 | Stop reason: HOSPADM

## 2021-04-28 RX ORDER — INSULIN GLARGINE 100 [IU]/ML
25 INJECTION, SOLUTION SUBCUTANEOUS
Status: DISCONTINUED | OUTPATIENT
Start: 2021-04-28 | End: 2021-04-29

## 2021-04-28 RX ORDER — CARISOPRODOL 350 MG/1
350 TABLET ORAL 2 TIMES DAILY
Status: DISCONTINUED | OUTPATIENT
Start: 2021-04-28 | End: 2021-04-30 | Stop reason: HOSPADM

## 2021-04-28 RX ORDER — SODIUM CHLORIDE 9 MG/ML
100 INJECTION, SOLUTION INTRAVENOUS CONTINUOUS
Status: DISPENSED | OUTPATIENT
Start: 2021-04-28 | End: 2021-04-28

## 2021-04-28 RX ORDER — METOCLOPRAMIDE 10 MG/1
10 TABLET ORAL
Status: DISCONTINUED | OUTPATIENT
Start: 2021-04-28 | End: 2021-04-30 | Stop reason: HOSPADM

## 2021-04-28 RX ORDER — PREGABALIN 75 MG/1
150 CAPSULE ORAL 2 TIMES DAILY
Status: DISCONTINUED | OUTPATIENT
Start: 2021-04-28 | End: 2021-04-30 | Stop reason: HOSPADM

## 2021-04-28 RX ORDER — PRAVASTATIN SODIUM 10 MG
10 TABLET ORAL
Status: DISCONTINUED | OUTPATIENT
Start: 2021-04-28 | End: 2021-04-30 | Stop reason: HOSPADM

## 2021-04-28 RX ORDER — MORPHINE SULFATE 15 MG/1
15 TABLET, FILM COATED, EXTENDED RELEASE ORAL 2 TIMES DAILY
Status: DISCONTINUED | OUTPATIENT
Start: 2021-04-28 | End: 2021-04-30 | Stop reason: HOSPADM

## 2021-04-28 RX ORDER — MORPHINE SULFATE 15 MG/1
15 TABLET ORAL 3 TIMES DAILY PRN
Status: DISCONTINUED | OUTPATIENT
Start: 2021-04-28 | End: 2021-04-30 | Stop reason: HOSPADM

## 2021-04-28 RX ORDER — ONDANSETRON 2 MG/ML
4 INJECTION INTRAMUSCULAR; INTRAVENOUS EVERY 6 HOURS PRN
Status: DISCONTINUED | OUTPATIENT
Start: 2021-04-28 | End: 2021-04-30 | Stop reason: HOSPADM

## 2021-04-28 RX ORDER — ACETAMINOPHEN 325 MG/1
650 TABLET ORAL EVERY 6 HOURS PRN
Status: DISCONTINUED | OUTPATIENT
Start: 2021-04-28 | End: 2021-04-30 | Stop reason: HOSPADM

## 2021-04-28 RX ORDER — METOPROLOL TARTRATE 5 MG/5ML
5 INJECTION INTRAVENOUS ONCE
Status: DISCONTINUED | OUTPATIENT
Start: 2021-04-28 | End: 2021-04-28

## 2021-04-28 RX ORDER — DIAZEPAM 5 MG/ML
5 INJECTION, SOLUTION INTRAMUSCULAR; INTRAVENOUS ONCE
Status: COMPLETED | OUTPATIENT
Start: 2021-04-28 | End: 2021-04-28

## 2021-04-28 RX ORDER — MORPHINE SULFATE 30 MG/1
60 TABLET, FILM COATED, EXTENDED RELEASE ORAL EVERY 12 HOURS
Status: DISCONTINUED | OUTPATIENT
Start: 2021-04-28 | End: 2021-04-30 | Stop reason: HOSPADM

## 2021-04-28 RX ORDER — INSULIN GLARGINE 100 [IU]/ML
50 INJECTION, SOLUTION SUBCUTANEOUS
Status: DISCONTINUED | OUTPATIENT
Start: 2021-04-28 | End: 2021-04-28

## 2021-04-28 RX ORDER — DIAZEPAM 5 MG/ML
2.5 INJECTION, SOLUTION INTRAMUSCULAR; INTRAVENOUS ONCE
Status: DISCONTINUED | OUTPATIENT
Start: 2021-04-28 | End: 2021-04-28

## 2021-04-28 RX ORDER — DICYCLOMINE HYDROCHLORIDE 10 MG/1
10 CAPSULE ORAL
Status: DISCONTINUED | OUTPATIENT
Start: 2021-04-28 | End: 2021-04-30 | Stop reason: HOSPADM

## 2021-04-28 RX ORDER — FLUTICASONE FUROATE AND VILANTEROL 200; 25 UG/1; UG/1
1 POWDER RESPIRATORY (INHALATION)
Status: DISCONTINUED | OUTPATIENT
Start: 2021-04-28 | End: 2021-04-30 | Stop reason: HOSPADM

## 2021-04-28 RX ORDER — MONTELUKAST SODIUM 10 MG/1
10 TABLET ORAL
Status: DISCONTINUED | OUTPATIENT
Start: 2021-04-28 | End: 2021-04-30 | Stop reason: HOSPADM

## 2021-04-28 RX ADMIN — DICYCLOMINE HYDROCHLORIDE 10 MG: 10 CAPSULE ORAL at 08:00

## 2021-04-28 RX ADMIN — INSULIN LISPRO 6 UNITS: 100 INJECTION, SOLUTION INTRAVENOUS; SUBCUTANEOUS at 22:11

## 2021-04-28 RX ADMIN — INSULIN LISPRO 4 UNITS: 100 INJECTION, SOLUTION INTRAVENOUS; SUBCUTANEOUS at 20:07

## 2021-04-28 RX ADMIN — METOCLOPRAMIDE 10 MG: 10 TABLET ORAL at 18:53

## 2021-04-28 RX ADMIN — DIAZEPAM 5 MG: 10 INJECTION, SOLUTION INTRAMUSCULAR; INTRAVENOUS at 14:14

## 2021-04-28 RX ADMIN — DICYCLOMINE HYDROCHLORIDE 10 MG: 10 CAPSULE ORAL at 22:11

## 2021-04-28 RX ADMIN — METOCLOPRAMIDE 10 MG: 10 TABLET ORAL at 12:34

## 2021-04-28 RX ADMIN — INSULIN GLARGINE 25 UNITS: 100 INJECTION, SOLUTION SUBCUTANEOUS at 22:11

## 2021-04-28 RX ADMIN — INSULIN LISPRO 6 UNITS: 100 INJECTION, SOLUTION INTRAVENOUS; SUBCUTANEOUS at 12:59

## 2021-04-28 RX ADMIN — METOCLOPRAMIDE 10 MG: 10 TABLET ORAL at 08:00

## 2021-04-28 RX ADMIN — PREGABALIN 150 MG: 75 CAPSULE ORAL at 18:53

## 2021-04-28 RX ADMIN — MONTELUKAST SODIUM 10 MG: 10 TABLET, COATED ORAL at 22:11

## 2021-04-28 RX ADMIN — CARISOPRODOL 350 MG: 350 TABLET ORAL at 08:39

## 2021-04-28 RX ADMIN — MORPHINE SULFATE 60 MG: 30 TABLET, EXTENDED RELEASE ORAL at 10:29

## 2021-04-28 RX ADMIN — DICYCLOMINE HYDROCHLORIDE 10 MG: 10 CAPSULE ORAL at 12:33

## 2021-04-28 RX ADMIN — MORPHINE SULFATE 15 MG: 15 TABLET, EXTENDED RELEASE ORAL at 20:04

## 2021-04-28 RX ADMIN — MORPHINE SULFATE 15 MG: 15 TABLET, EXTENDED RELEASE ORAL at 08:39

## 2021-04-28 RX ADMIN — SODIUM CHLORIDE 100 ML/HR: 0.9 INJECTION, SOLUTION INTRAVENOUS at 02:14

## 2021-04-28 RX ADMIN — ENOXAPARIN SODIUM 40 MG: 40 INJECTION SUBCUTANEOUS at 08:38

## 2021-04-28 RX ADMIN — PANTOPRAZOLE SODIUM 40 MG: 40 TABLET, DELAYED RELEASE ORAL at 06:27

## 2021-04-28 RX ADMIN — INSULIN LISPRO 6 UNITS: 100 INJECTION, SOLUTION INTRAVENOUS; SUBCUTANEOUS at 08:00

## 2021-04-28 RX ADMIN — ONDANSETRON 4 MG: 2 INJECTION INTRAMUSCULAR; INTRAVENOUS at 02:12

## 2021-04-28 RX ADMIN — PRAVASTATIN SODIUM 10 MG: 10 TABLET ORAL at 22:11

## 2021-04-28 RX ADMIN — PREGABALIN 150 MG: 75 CAPSULE ORAL at 08:39

## 2021-04-28 RX ADMIN — INSULIN GLARGINE 10 UNITS: 100 INJECTION, SOLUTION SUBCUTANEOUS at 10:29

## 2021-04-28 RX ADMIN — FLUTICASONE FUROATE AND VILANTEROL TRIFENATATE 1 PUFF: 200; 25 POWDER RESPIRATORY (INHALATION) at 08:39

## 2021-04-28 RX ADMIN — DICYCLOMINE HYDROCHLORIDE 10 MG: 10 CAPSULE ORAL at 18:52

## 2021-04-28 RX ADMIN — METOCLOPRAMIDE 10 MG: 10 TABLET ORAL at 22:11

## 2021-04-28 NOTE — ED NOTES
Pt provided with meal tray at this time  RN assisted pt to side of bed        Jose Eduardo Payne RN  04/28/21 [id number]

## 2021-04-28 NOTE — ASSESSMENT & PLAN NOTE
Lab Results   Component Value Date    HGBA1C 10 8 (H) 06/26/2020       Recent Labs     04/28/21  0805 04/28/21  1230 04/28/21  1754   POCGLU 288* 280* 251*       Blood Sugar Average: Last 72 hrs:  (P) 273 last A1c 10 8  Reports good blood sugar control at home with current regimen  Home regimen:  Lantus 50 units q h s , Humalog 10 units TID with meals  Will continue Lantus 25 units q h s  With sliding scale for meal coverage due to still having poor appetite  Accuchecks with meals and q h s

## 2021-04-28 NOTE — PHYSICAL THERAPY NOTE
PHYSICAL THERAPY EVALUATION          Patient Name: Shayla Muller  WFWOS'P Date: 4/28/2021   PT EVALUATION    63 y o     [id number]    Abdominal pain [R10 9]    Past Medical History:   Diagnosis Date    Acute respiratory insufficiency 11/23/2019    Asthma     Chronic pain     [facility] mellitus ([facility] [address] [facility]     Gastroparesis     Sepsis ([facility] [address] ) 11/23/2019     Past Surgical History:   Procedure Laterality Date    CERVICAL LAMINECTOMY      CHOLECYSTECTOMY      CHOLECYSTECTOMY LAPAROSCOPIC N/A 11/10/2018    Procedure: CHOLECYSTECTOMY LAPAROSCOPIC w/ ioc;  Surgeon: Patricia Cano MD;  [facility]: [facility];  Service: General    HYSTERECTOMY      JOINT REPLACEMENT Bilateral     knee    TOE AMPUTATION Right 2/2/2018    Procedure: AMPUTATION TOE, RIGHT GREAT TOE;  Surgeon: El Cabral DPM;  [facility]: [facility];  Service: Podiatry        04/28/21 1452   PT Last Visit   PT Visit Date 04/28/21   Note Type   Note type Evaluation   Pain Assessment   Pain Assessment Tool 0-10   Pain Score 5   Pain Location/Orientation Location: [facility]   Hospital Pain Intervention(s) Repositioned; Ambulation/increased activity; Emotional support; Rest  (recieved pain medication prior to session)   Home Living   Type of [address] [place] [address] One level;Stairs to enter with rails   Bathroom Shower/Tub Tub/shower unit   Home Equipment Walker;Cane;Wheelchair-manual  (hurrycane, unsure if she has a wc still)   Additional Comments 4 MATTHEW w unilateral HR   Prior Function   Level of [place] Independent with ADLs and functional mobility; Needs assistance with IADLs   Lives With Spouse; Son   Hayes Help From Family   ADL Assistance Independent  (A prn, usually for socks/shoes and S for tub transf)   IADLs Needs assistance   Falls in the last 6 months 1 to 4  (reports 1)   Comments indep w RW pta  spouse and son home w patient so she is rarely alone  +, spouse does not drive d/t seizure disorder   Restrictions/Precautions   Weight Bearing Precautions Per Order No   Other Precautions Multiple lines;Telemetry; Fall Risk;Pain   General   Additional Pertinent History pt admitted 4/27/21 for diabetic gastoparesis, pt reports abdominal sx have improved since admission  chief c/o is chronic pain  up and OOB orders   Cognition   Overall Cognitive Status WFL   Arousal/Participation Cooperative   Orientation Level Oriented X4   Memory Within functional limits   Following Commands Follows all commands and directions without difficulty   RLE Assessment   RLE Assessment WFL  (4/5; hip ROM limited by body habitus)   LLE Assessment   LLE Assessment WFL  (4/5; hip ROM limited by body habitus)   Coordination   Sensation WFL   Bed Mobility   Supine to Sit 5  Supervision   Additional items HOB elevated; Increased time required   Sit to Supine 4  Minimal assistance   Additional items Increased time required;LE management   Additional Comments unable to lift ble in without help   Transfers   Sit to Stand 5  Supervision   Additional items Increased time required; Other  (AD)   Stand to Sit 5  Supervision   Additional items Increased time required; Other  (AD)   Ambulation/Elevation   Gait pattern Short stride; Excessively slow   Gait Assistance 5  Supervision   Additional items Assist x 1   Assistive Device Rolling walker   Distance 2' side stepping  (limited by lines)   Balance   Static Standing Fair   Dynamic Standing Fair -   Ambulatory Fair -   Endurance Deficit   Endurance Deficit No  (O2 90-93% on RA  )   Activity Tolerance   Activity Tolerance Patient tolerated treatment well   Nurse Made Aware Hannah RN   Assessment   Prognosis Good   Problem List Decreased strength; Impaired balance;Decreased mobility;Obesity;Pain   Assessment Alyse Mauro Helena Lindsay is a 63 y o  female admitted to [facility] on 4/27/2021 for Diabetic gastroparesis ([address] )   PT was consulted and pt was seen on 4/28/2021 for mobility assessment and d/c planning  Pt presents multiple lines  Pt is currently functioning at a supervision assistance x1 and minimum assistance x1 level for bed mobility, supervision assistance x1 level for transfers, supervision assistance x1 level for ambulation with Rolling Walker  Pt w no significant LOB observed during side stepping w use of RW  Reports she has been using a RW since her last fall  Does demonstrate some LE weakness and ROM limitations secondary to chronic pain, body habitus  However no significant impact on mobility at this time  Pt will benefit from continued skilled IP PT to address the above mentioned impairments  in order to maximize recovery and increase functional independence when completing mobility and ADLs  At this time PT recommendations for d/c are home w OPPT as scheduled  Barriers to Discharge None   Barriers to Discharge Comments pt denies concerns w dc home   Goals   Patient Goals to go home   STG Expiration Date 05/12/21   Short Term Goal #1 1)  Pt will perform bed mobility with [place] demonstrating appropriate technique 100% of the time in order to improve function  2)  Perform all transfers with [place] demonstrating safe and appropriate technique 100% of the time in order to improve ability to negotiate safely in home environment  3) Amb with least restrictive AD > 150'x1 with mod I in order to demonstrate ability to negotiate in home environment  4)  Improve overall strength and balance 1/2 grade in order to optimize ability to perform functional tasks and reduce fall risk  5) Increase activity tolerance to 45 minutes in order to improve endurance to functional tasks  6)  Negotiate stairs using most appropriate technique and mod I in order to be able to negotiate safely in home environment  7) PT for ongoing patient and family/caregiver education, DME needs and d/c planning in order to promote highest level of function in least restrictive environment   8) Indep w HEP   PT Treatment Day 0   Plan   Treatment/Interventions Functional transfer training;LE strengthening/ROM; Elevations; Therapeutic exercise;Equipment eval/education;Patient/family training;Bed mobility;Gait training; Compensatory technique education;Spoke to nursing   PT Frequency 2-3x/wk   Recommendation   PT Discharge Recommendation Home with outpatient rehabilitation  (has OPPT scheduled)   PT - OK to Discharge Yes   Additional Comments when medically stable   AM-PAC Basic Mobility Inpatient   Turning in Bed Without Bedrails 3   Lying on Back to Sitting on Edge of Flat Bed 3   Moving Bed to Chair 3   Standing Up From Chair 3   Walk in Room 3   Climb 3-5 Stairs 3   Basic Mobility Inpatient Raw Score 18   Basic Mobility Standardized Score 41 05   History: co - morbidities, social background, fall risk, use of assistive device, assist for Iadl's, multiple lines  Exam: impairments in systems including musculoskeletal (strength), neuromuscular (balance, gait, transfers, motor function and sensation), cardiopulmonary, cognition  Clinical: unstable/unpredictable  Complexity:high      Deb Avila, PT

## 2021-04-28 NOTE — ASSESSMENT & PLAN NOTE
History of gastroparesis secondary to diabetes with numerous admissions for intractable nausea/vomiting due to gastroparesis  Patient reports severe lower quadrant abdominal pain associated with nausea and vomiting x1 day  Tried reglan without relief  Reports 1 BM yesterday  At home, on Reglan 10 mg q i d    WBC 16  Likely reactive  Lipase WNL  Bilirubin 1 55  Has been elevated in the past     Plan:  - Continue reglan qid  Zofran PRN  - Will give IVF  - clear liquid diet in am  Advance as able  - GI consult  Appreciate recs    - Follow bilirubin and WBC for resolution

## 2021-04-28 NOTE — ASSESSMENT & PLAN NOTE
Continue current regimen:  · Morphine Sulf Er 60mg BID   · Morphine Sulf Er 15mg BID  · Morphine Sulf Ir 15mg  TID PRN  · Pregabalin 150mg BID  · Carisoprodol 350mg BID

## 2021-04-28 NOTE — ED NOTES
Pt drank half of drink and no signs of Vomiting  Pt  c/o stomach burning and nausea  Provider made aware       Yahir Vergara RN  04/27/21 5620

## 2021-04-28 NOTE — ASSESSMENT & PLAN NOTE
Elevated alk-phos and T bili, chronically  Evaluated by GI, appreciate the recommendations  Right upper quadrant ultrasound pending

## 2021-04-28 NOTE — H&P
[facility]  H&P- Barbara Hughes 1965, 63 y o  female MRN: [id number]  Unit/Bed#: ED 20 Encounter: [id number]  Primary Care Provider: Tierra Chavez MD   Date and time admitted to hospital: 2021  7:19 PM    * Diabetic gastroparesis [facility])  Assessment & Plan  History of gastroparesis secondary to diabetes with numerous admissions for intractable nausea/vomiting due to gastroparesis  Patient reports severe lower quadrant abdominal pain associated with nausea and vomiting x1 day  Tried reglan without relief  Reports 1 BM yesterday  At home, on Reglan 10 mg q i d    WBC 16  Likely reactive  Lipase WNL  Bilirubin 1 55  Has been elevated in the past     Plan:  - Continue reglan qid  Zofran PRN  - Will give IVF  - clear liquid diet in am  Advance as able  - GI consult  Appreciate recs  - Follow bilirubin and WBC for resolution    DKA, type 2 (HCC)  Assessment & Plan  Early DKA  Glucose 333  Ketonuria >160  Anion gap 16  VB 4  39  38  24  Likely early DKA vs dehydration  Will give IVF overnight  Follow BMP for resolution of anion gap  Type 2 diabetes mellitus with diabetic neuropathy, with long-term current use of insulin ([facility])  Assessment & Plan  Lab Results   Component Value Date    HGBA1C 10 8 (H) 2020       No results for input(s): POCGLU in the last 72 hours  Blood Sugar Average: Last 72 hrs:   last A1c 10 8  Current blood glucose 333  Reports good blood sugar control at home with current regimen  Home regimen:  Lantus 50 units q h s , Humalog 10 units TID with meals  Will continue Lantus 50 units q h s  With sliding scale for meal coverage  Accuchecks with meals and q h s  Clear liquid diet tomorrow morning    Will advance as able    Essential hypertension  Assessment & Plan  Holding lasix today as patient is dehydrated and needs fluids    Opioid dependence with other opioid-induced disorder [facility])  Assessment & Plan  Continue current regimen:  · Morphine Sulf Er 60mg BID   · Morphine Sulf Er 15mg BID  · Morphine Sulf Ir 15mg  TID PRN  · Pregabalin 150mg BID  · Carisoprodol 350mg BID    Asthma  Assessment & Plan  Not asthma exacerbation  No wheezing  Continue home inhalers and montelukast      VTE Prophylaxis: Enoxaparin (Lovenox)  / sequential compression device   Code Status:  Level 1 full code  POLST: There is no POLST form on file for this patient (pre-hospital)  Discussion with family:  Patient    Anticipated Length of Stay:  Patient will be admitted on an Observation basis with an anticipated length of stay of  less than 2 midnights  Justification for Hospital Stay:  Intractable nausea vomiting    Total Time for Visit, including Counseling / Coordination of Care: 30 minutes  Greater than 50% of this total time spent on direct patient counseling and coordination of care  Chief Complaint:   Intractable nausea and vomiting    History of Present Illness:    Trista Anderson is a 63 y o  female with PMH T2DM, gastroparesis, HTN, asthma, chronic pain on opioids who presents with intractable nausea and vomiting x1 day  Patient states that she developed acute onset severe abdominal pain with intractable nausea and vomiting  Pain is located in epigastrium without radiation  She has not been able to eat today due to her nausea and vomiting  She attempted to take Reglan at home without relief  In the ED, lab work was significant for leukocytosis, anion gap elevation, elevated bilirubin, and ketone urea  Lipase was normal     Review of Systems:    Review of Systems   Constitutional: Negative for appetite change, chills, fatigue and fever  HENT: Negative for ear pain, sore throat and trouble swallowing  Eyes: Negative for visual disturbance  Respiratory: Negative for cough, chest tightness, shortness of breath and wheezing  Cardiovascular: Negative for chest pain, palpitations and leg swelling     Gastrointestinal: Positive for abdominal pain, nausea and vomiting  Negative for abdominal distention and diarrhea  Endocrine: Negative  Genitourinary: Negative for dysuria  Musculoskeletal: Negative for gait problem and myalgias  Skin: Negative for pallor  Allergic/Immunologic: Negative for immunocompromised state  Neurological: Negative for dizziness, syncope, light-headedness, numbness and headaches  Past Medical and Surgical History:     Past Medical History:   Diagnosis Date    Acute respiratory insufficiency 11/23/2019    Asthma     Chronic pain     Diabetes mellitus ([facility] [address] )     Gastroparesis     Sepsis ([facility] [address] ) 11/23/2019       Past Surgical History:   Procedure Laterality Date    CERVICAL LAMINECTOMY      CHOLECYSTECTOMY      CHOLECYSTECTOMY LAPAROSCOPIC N/A 11/10/2018    Procedure: CHOLECYSTECTOMY LAPAROSCOPIC w/ ioc;  Surgeon: Erika Meier MD;  [facility]: [facility];  Service: General    HYSTERECTOMY      JOINT REPLACEMENT Bilateral     knee    TOE AMPUTATION Right 2/2/2018    Procedure: AMPUTATION TOE, RIGHT GREAT TOE;  Surgeon: Jessica Liz DPM;  [facility]: [facility];  Service: Podiatry       Meds/Allergies:    Prior to Admission medications    Medication Sig Start Date End Date Taking?  Authorizing Provider   albuterol (PROVENTIL HFA,VENTOLIN HFA) 90 mcg/act inhaler Inhale 2 puffs every 4 (four) hours as needed 10/14/19  Yes Historical Provider, MD   carisoprodol (SOMA) 350 mg tablet Take 350 mg by mouth 2 (two) times a day   Yes Historical Provider, MD   diazepam (VALIUM) 5 mg tablet Take 5 mg by mouth daily at bedtime as needed for anxiety   Yes Historical Provider, MD   dicyclomine (BENTYL) 10 mg capsule Take 1 capsule (10 mg total) by mouth 4 (four) times a day (before meals and at bedtime) 8/3/20 4/28/21 Yes Javier Elizabeth MD   fluticasone [facility]) 50 mcg/act nasal spray 1 spray into each nostril daily   Yes Historical Provider, MD   furosemide (LASIX) 20 mg tablet Take 40 mg by mouth daily 8/16/19  Yes Historical Provider, MD   insulin glargine (LANTUS) 100 units/mL subcutaneous injection Inject 50 Units under the skin daily at bedtime     Yes Historical Provider, MD   insulin lispro (HumaLOG) 100 units/mL injection INJECT 10 UNITS UNDER THE SKIN 3 (THREE) TIMES A DAY WITH MEALS 6/30/20  Yes Rae Tyler MD   Insulin Pen Needle 29G X 5MM MISC by Does not apply route 4 (four) times a day 9/20/19  Yes George Baez MD   LYRICA 150 MG capsule Take 150 mg by mouth 2 (two) times a day 6/12/19  Yes Historical Provider, MD   metoclopramide (REGLAN) 10 mg tablet TAKE 1 TABLET PO QID BEFORE MEALS AND HS 10/19/20  Yes Gerry Adamson,    montelukast (SINGULAIR) 10 mg tablet Take 10 mg by mouth daily at bedtime   Yes Historical Provider, MD   morphine (MS CONTIN) 15 mg 12 hr tablet Take 15 mg by mouth 2 (two) times a day   Yes Historical Provider, MD   morphine (MS CONTIN) 60 mg 12 hr tablet Take 60 mg by mouth 2 (two) times a day 9/13/19  Yes Historical Provider, MD   morphine (MSIR) 15 mg tablet Take 15 mg by mouth 3 (three) times a day as needed for moderate pain or severe pain     Yes Historical Provider, MD   nystatin (MYCOSTATIN) powder Apply topically 2 (two) times a day 7/23/19  Yes Jason Latham MD   ondansetron (ZOFRAN) 4 mg tablet Take 1 tablet (4 mg total) by mouth every 6 (six) hours 12/24/19  Yes Maryanne Salazar PA-C   pravastatin (PRAVACHOL) 10 mg tablet Take 10 mg by mouth daily at bedtime 8/16/19  Yes Historical Provider, MD Abbie Krause INHUB 500-50 MCG/DOSE inhaler Take 1 puff by mouth 2 (two) times a day 2/28/19  Yes Historical Provider, MD   BD PEN NEEDLE HERNAN U/F 32G X 4 MM [address] [facility]  8/23/19   Historical Provider, MD   omeprazole (PriLOSEC) 20 mg delayed release capsule Take 20 mg by mouth daily    Historical Provider, MD     I have reviewed home medications with patient personally  Allergies:    Allergies   Allergen Reactions    Nsaids GI Intolerance       Social History:     Marital Status: /Civil Union   Occupation:  Noncontributory  Patient Pre-hospital Living Situation:  Home  Patient Pre-hospital Level of Mobility:  Unknown  Patient Pre-hospital Diet Restrictions:  Diabetic  Substance Use History:   Social History     Substance and Sexual Activity   Alcohol Use Not Currently    Frequency: Never    Drinks per session: 1 or 2    Binge frequency: Never    Comment: rarely     Social History     Tobacco Use   Smoking Status Never Smoker   Smokeless Tobacco Never Used     Social History     Substance and Sexual Activity   Drug Use No       Family History:    non-contributory    Physical Exam:     Vitals:   Blood Pressure: (!) 183/88 (04/27/21 2209)  Pulse: (!) 127 (04/27/21 2209)  Temperature: 99 4 °F (37 4 °C) (04/27/21 1657)  Temp Source: Oral (04/27/21 1657)  Respirations: 18 (04/27/21 2209)  SpO2: 96 % (04/27/21 2209)    Physical Exam  Vitals signs and nursing note reviewed  Constitutional:       Appearance: She is obese  HENT:      Head: Normocephalic and atraumatic  Mouth/Throat:      Mouth: Mucous membranes are moist       Pharynx: Oropharynx is clear  No oropharyngeal exudate  Eyes:      Extraocular Movements: Extraocular movements intact  Cardiovascular:      Rate and Rhythm: Normal rate and regular rhythm  Pulses: Normal pulses  Heart sounds: Normal heart sounds  No murmur  No friction rub  No gallop  Pulmonary:      Effort: Pulmonary effort is normal  No respiratory distress  Breath sounds: Normal breath sounds  No stridor  No wheezing or rales  Abdominal:      General: Bowel sounds are normal  There is distension  Palpations: Abdomen is soft  Tenderness: There is abdominal tenderness  Musculoskeletal:      Right lower leg: No edema  Left lower leg: No edema  Skin:     General: Skin is warm and dry  Neurological:      General: No focal deficit present  Mental Status: She is alert and oriented to person, place, and time         Additional Data:     Lab Results: I have personally reviewed pertinent reports  Results from last 7 days   Lab Units 04/27/21 1943   WBC Thousand/uL 16 03*   HEMOGLOBIN g/dL 14 8   HEMATOCRIT % 45 5   PLATELETS Thousands/uL 347   NEUTROS PCT % 89*   LYMPHS PCT % 7*   MONOS PCT % 2*   EOS PCT % 0     Results from last 7 days   Lab Units 04/27/21 1943   SODIUM mmol/L 131*   POTASSIUM mmol/L 4 8   CHLORIDE mmol/L 91*   CO2 mmol/L 24   BUN mg/dL 9   CREATININE mg/dL 0 84   ANION GAP mmol/L 16*   CALCIUM mg/dL 10 1   ALBUMIN g/dL 4 1   TOTAL BILIRUBIN mg/dL 1 55*   ALK PHOS U/L 151*   ALT U/L 26   AST U/L 32   GLUCOSE RANDOM mg/dL 333*                       Imaging: I have personally reviewed pertinent reports  No orders to display       EKG, Pathology, and Other Studies Reviewed on Admission:   · EKG    Allscripts / Epic Records Reviewed: Yes     ** Please Note: This note has been constructed using a voice recognition system   **

## 2021-04-28 NOTE — PROGRESS NOTES
[address]  Progress Note - Ladarius Few 1965, 63 y o  female MRN: [id number]  Unit/Bed#: ED 20 Encounter: [id number]  Primary Care Provider: Ramya Cohen MD   Date and time admitted to hospital: 2021  7:19 PM    * Diabetic gastroparesis [facility])  Assessment & Plan  History of gastroparesis secondary to diabetes with numerous admissions for intractable nausea/vomiting due to gastroparesis  She has poorly-controlled diabetes and chronic opioid use  Patient reports severe lower quadrant abdominal pain associated with nausea and vomiting x1 day  Continue home Reglan 10 mg 4 times a day before meals and at bedtime  Type again  Advance diet to full liquid with toast and crackers  Tolerated clear liquid diet  Nausea and vomiting resolved    Elevated LFTs  Assessment & Plan  Elevated alk-phos and T bili, chronically  Evaluated by GI, appreciate the recommendations  Right upper quadrant ultrasound pending    DKA, type 2 (HCC)  Assessment & Plan  Early DKA  Anion gap 16-->14 repeat labs pending  VB 4  39  38  24  IV fluids given, patient tolerating diet well  She is on Lantus 50 units at bedtime and 10 units t i d  With meal  Will continue with 25 units at bedtime and sliding scale due to poor appetite still    Sinus tachycardia  Assessment & Plan  Suspect secondary to opioid and benzo withdrawal   Patient was asymptomatic    She missed her doses of morphine and Valium yesterday  She received Valium 5 mg IV 1 time dose  Heart rate currently in the 90s, patient asymptomatic    Low TSH level  Assessment & Plan  TSH 0 3  Free T4 normal  Repeat thyroid panel in 4-6 weeks    Essential hypertension  Assessment & Plan  Holding lasix today as patient is dehydrated and needs fluids    Opioid dependence with other opioid-induced disorder [facility])  Assessment & Plan  Continue current regimen:  · Morphine Sulf Er 60mg BID   · Morphine Sulf Er 15mg BID  · Morphine Sulf Ir 15mg  TID PRN  · Pregabalin 150mg BID  · Carisoprodol 350mg BID  · Valium 5 mg daily    Asthma  Assessment & Plan  Not asthma exacerbation  No wheezing  Continue home inhalers and montelukast    Type 2 diabetes mellitus with diabetic neuropathy, with long-term current use of insulin [facility])  Assessment & Plan  Lab Results   Component Value Date    HGBA1C 10 8 (H) 2020       Recent Labs     21  0805 21  1230 21  1754   POCGLU 288* 280* 251*       Blood Sugar Average: Last 72 hrs:  (P) 273 last A1c 10 8  Reports good blood sugar control at home with current regimen  Home regimen:  Lantus 50 units q h s , Humalog 10 units TID with meals  Will continue Lantus 25 units q h s  With sliding scale for meal coverage due to still having poor appetite  Accuchecks with meals and q h s  VTE Pharmacologic Prophylaxis:   Pharmacologic: Enoxaparin (Lovenox)  Mechanical VTE Prophylaxis in Place: Yes    Patient Centered Rounds: I have performed bedside rounds with nursing staff today  Discussions with Specialists or Other Care Team Provider:  GI    Education and Discussions with Family / Patient: daughter    Time Spent for Care: 30 minutes  More than 50% of total time spent on counseling and coordination of care as described above  Current Length of Stay: 0 day(s)    Current Patient Status: Observation   Certification Statement: The patient will continue to require additional inpatient hospital stay due to Gastroparesis, DKA, elevated LFTs    Discharge Plan: To be determined    Code Status: Level 1 - Full Code      Subjective:   Patient was seen evaluated bedside  She is feeling better, tolerating diet  Objective:     Vitals:   Temp (24hrs), Av 1 °F (37 3 °C), Min:98 9 °F (37 2 °C), Max:99 2 °F (37 3 °C)    Temp:  [98 9 °F (37 2 °C)-99 2 °F (37 3 °C)] 99 2 °F (37 3 °C)  HR:  [118-143] 143  Resp:  [16-20] 16  BP: (114-183)/(62-89) 114/62  SpO2:  [90 %-96 %] 90 %  Body mass index is 48 66 kg/m²  Input and Output Summary (last 24 hours): Intake/Output Summary (Last 24 hours) at 4/28/2021 1815  Last data filed at 4/28/2021 0058  Gross per 24 hour   Intake 2000 ml   Output --   Net 2000 ml       Physical Exam:     Physical Exam  Constitutional:       General: She is not in acute distress  Appearance: She is obese  HENT:      Head: Atraumatic  Neck:      Musculoskeletal: Neck supple  Cardiovascular:      Rate and Rhythm: Normal rate and regular rhythm  Heart sounds: No murmur  No friction rub  No gallop  Pulmonary:      Effort: Pulmonary effort is normal  No respiratory distress  Breath sounds: Normal breath sounds  No wheezing  Abdominal:      General: Bowel sounds are normal  There is no distension  Palpations: Abdomen is soft  Tenderness: There is no abdominal tenderness  Musculoskeletal:         General: No swelling  Skin:     General: Skin is warm and dry  Neurological:      General: No focal deficit present  Mental Status: She is alert  Psychiatric:         Mood and Affect: Mood normal          Additional Data:     Labs:    Results from last 7 days   Lab Units 04/28/21  0422   WBC Thousand/uL 17 00*   HEMOGLOBIN g/dL 14 1   HEMATOCRIT % 43 4   PLATELETS Thousands/uL 319   NEUTROS PCT % 84*   LYMPHS PCT % 10*   MONOS PCT % 5   EOS PCT % 0     Results from last 7 days   Lab Units 04/28/21  0422   SODIUM mmol/L 132*   POTASSIUM mmol/L 4 5   CHLORIDE mmol/L 95*   CO2 mmol/L 23   BUN mg/dL 9   CREATININE mg/dL 0 81   ANION GAP mmol/L 14*   CALCIUM mg/dL 9 3   ALBUMIN g/dL 3 6   TOTAL BILIRUBIN mg/dL 1 52*   ALK PHOS U/L 130*   ALT U/L 18   AST U/L 30   GLUCOSE RANDOM mg/dL 294*         Results from last 7 days   Lab Units 04/28/21  1754 04/28/21  1230 04/28/21  0805   POC GLUCOSE mg/dl 251* 280* 288*                   * I Have Reviewed All Lab Data Listed Above  * Additional Pertinent Lab Tests Reviewed:  Rohini 65 Admission Reviewed    Imaging:    Imaging Reports Reviewed Today Include: all  Imaging Personally Reviewed by Myself Includes:      Recent Cultures (last 7 days):           Last 24 Hours Medication List:   Current Facility-Administered Medications   Medication Dose Route Frequency Provider Last Rate    acetaminophen  650 mg Oral Q6H PRN Mary Marcelino PA-C      aluminum-magnesium hydroxide-simethicone  30 mL Oral Q6H PRN Mary Marcelino PA-C      carisoprodol  350 mg Oral BID [place], [place]      [START ON 4/29/2021] diazepam  5 mg Oral Daily Laverne Jain MD      dicyclomine  10 mg Oral 4x Daily (AC & HS) Mary Marcelino PA-C      enoxaparin  40 mg Subcutaneous Daily [facility] ESTHER Marcelino      fluticasone-vilanterol  1 puff Inhalation Daily [place], [place]      insulin glargine  25 Units Subcutaneous HS Laverne Jain MD      insulin lispro  2-12 Units Subcutaneous 4x Daily (AC & HS) Marychasity Marcelino PA-C      metoclopramide  10 mg Oral 4x Daily (AC & HS) [facility] ESTHER Marcelino      montelukast  10 mg Oral HS [place], [place]      [facility]  [facility]ID [facility]      morphine  60 mg Oral [place]      morphine  15 mg Oral TID PRN Marychasity Marcelino PA-C      ondansetron  4 mg Intravenous Q6H PRN Amrychasity Marcelino PA-C      pantoprazole  40 mg Oral Early Morning [place], [place]      pravastatin  10 mg Oral HS [facility]      pregabalin  150 mg Oral BID Mary Marcelino PA-C          Today, Patient Was Seen By: Laverne Jain MD    ** Please Note: Dictation voice to text software may have been used in the creation of this document   **

## 2021-04-28 NOTE — ED NOTES
RN informed that pt was sating in mid 80s while sleeping and had HR of 145  Neil, RN took EKG    Morro Todd RN contacted Dr Lord Hidalgo in regards to new VS       Masood Palma RN  04/28/21 3833

## 2021-04-28 NOTE — ED PROVIDER NOTES
History  Chief Complaint   Patient presents with    Abdominal Pain     hx of gastroparesis and states feels like an episode  pain and vomiting since this morning  taking reglan at home with no relief  denies sick contacts  62 YO female with Hx of DM presents with nausea, vomiting and abdominal pain  Pt has Hx of recurrent gastroparesis and states this is typical for her recurrent episodes  She states she has been vomiting throughout the day today, has tried her Reglan but has been unable to keep this down  Pt denies diarrhea or constipation, she denies known sick contacts or suspicious food intake  She denies new or different symptoms  Pt denies CP/SOB/F/C/D/C, no dysuria, burning on urination or blood in urine  History provided by:  Patient   used: No    Abdominal Pain  Pain location:  Generalized  Pain quality: aching    Pain severity:  Moderate  Onset quality:  Gradual  Duration:  1 day  Timing:  Constant  Progression:  Unchanged  Chronicity:  Recurrent  Relieved by:  Nothing  Worsened by:  Nothing  Ineffective treatments: Reglan  Associated symptoms: nausea and vomiting    Associated symptoms: no chest pain, no chills, no diarrhea, no dysuria, no fatigue, no fever and no shortness of breath        Prior to Admission Medications   Prescriptions Last Dose Informant Patient Reported? Taking?    BD PEN NEEDLE HERNAN U/F 32G X 4 MM MISC   Yes No   Insulin Pen Needle 29G X 5MM MISC   No No   Sig: by Does not apply route 4 (four) times a day   LYRICA 150 MG capsule   Yes No   Sig: Take 150 mg by mouth 2 (two) times a day   WIXELA INHUB 500-50 MCG/DOSE inhaler   Yes No   Sig: Take 1 puff by mouth 2 (two) times a day   albuterol (PROVENTIL HFA,VENTOLIN HFA) 90 mcg/act inhaler   Yes No   Sig: Inhale 2 puffs every 4 (four) hours as needed   carisoprodol (SOMA) 350 mg tablet  Self Yes No   Sig: Take 350 mg by mouth 2 (two) times a day   diazepam (VALIUM) 5 mg tablet  Self Yes No   Sig: Take 5 mg by mouth daily at bedtime as needed for anxiety   dicyclomine (BENTYL) 10 mg capsule   No No   Sig: Take 1 capsule (10 mg total) by mouth 4 (four) times a day (before meals and at bedtime)   fluticasone (FLONASE) 50 mcg/act nasal spray  Self Yes No   Si spray into each nostril daily   furosemide (LASIX) 20 mg tablet   Yes No   Sig: Take 40 mg by mouth daily   insulin glargine (LANTUS) 100 units/mL subcutaneous injection  Self Yes No   Sig: Inject 50 Units under the skin daily at bedtime     insulin lispro (HumaLOG) 100 units/mL injection   No No   Sig: INJECT 10 UNITS UNDER THE SKIN 3 (THREE) TIMES A DAY WITH MEALS   metoclopramide (REGLAN) 10 mg tablet   No No   Sig: TAKE 1 TABLET PO QID BEFORE MEALS AND HS   montelukast (SINGULAIR) 10 mg tablet  Self Yes No   Sig: Take 10 mg by mouth daily at bedtime   morphine (MS CONTIN) 15 mg 12 hr tablet   Yes No   Sig: Take 15 mg by mouth 2 (two) times a day   morphine (MS CONTIN) 60 mg 12 hr tablet   Yes No   Sig: Take 60 mg by mouth 2 (two) times a day   morphine (MSIR) 15 mg tablet  Self Yes No   Sig: Take 15 mg by mouth 3 (three) times a day as needed for moderate pain or severe pain     nystatin (MYCOSTATIN) powder   No No   Sig: Apply topically 2 (two) times a day   omeprazole (PriLOSEC) 20 mg delayed release capsule  Self Yes No   Sig: Take 20 mg by mouth daily   ondansetron (ZOFRAN) 4 mg tablet   No No   Sig: Take 1 tablet (4 mg total) by mouth every 6 (six) hours   pravastatin (PRAVACHOL) 10 mg tablet   Yes No   Sig: Take 10 mg by mouth daily at bedtime      Facility-Administered Medications: None       Past Medical History:   Diagnosis Date    Acute respiratory insufficiency 2019    Asthma     Chronic pain     [facility]     Gastroparesis     Sepsis [facility] ) 2019       Past Surgical History:   Procedure Laterality Date    CERVICAL LAMINECTOMY      CHOLECYSTECTOMY      CHOLECYSTECTOMY LAPAROSCOPIC N/A 11/10/2018    Procedure: CHOLECYSTECTOMY LAPAROSCOPIC w/ ioc;  Surgeon: Erma Merino MD;  Location: [facility];  Service: General    HYSTERECTOMY      JOINT REPLACEMENT Bilateral     knee    TOE AMPUTATION Right 2/2/2018    Procedure: AMPUTATION TOE, RIGHT GREAT TOE;  Surgeon: Genaro Del Rosario DPM;  Location: [facility];  Service: Podiatry       Family History   Problem Relation Age of Onset    Diabetes Mother     Diabetes Maternal Grandmother     No Known Problems Father      I have reviewed and agree with the history as documented  E-Cigarette/Vaping     E-Cigarette/Vaping Substances     Social History     Tobacco Use    Smoking status: Never Smoker    Smokeless tobacco: Never Used   Substance Use Topics    Alcohol use: Not Currently     Frequency: Never     Drinks per session: 1 or 2     Binge frequency: Never     Comment: rarely    Drug use: No       Review of Systems   Constitutional: Negative for chills, fatigue and fever  HENT: Negative for dental problem  Eyes: Negative for visual disturbance  Respiratory: Negative for shortness of breath  Cardiovascular: Negative for chest pain  Gastrointestinal: Positive for abdominal pain, nausea and vomiting  Negative for diarrhea  Genitourinary: Negative for dysuria and frequency  Musculoskeletal: Negative for arthralgias  Skin: Negative for rash  Neurological: Negative for dizziness, weakness and light-headedness  Psychiatric/Behavioral: Negative for agitation, behavioral problems and confusion  All other systems reviewed and are negative  Physical Exam  Physical Exam  Vitals signs and nursing note reviewed  Constitutional:       Appearance: Normal appearance  Comments: Uncomfortable   HENT:      Head: Normocephalic and atraumatic  Eyes:      Extraocular Movements: Extraocular movements intact  Conjunctiva/sclera: Conjunctivae normal    Neck:      Musculoskeletal: Normal range of motion     Cardiovascular:      Rate and Rhythm: Normal rate    Pulmonary:      Effort: Pulmonary effort is normal    Abdominal:      General: There is no distension  Musculoskeletal: Normal range of motion  Skin:     Findings: No rash  Neurological:      General: No focal deficit present  Mental Status: She is alert  Cranial Nerves: No cranial nerve deficit  Psychiatric:         Mood and Affect: Mood normal          Vital Signs  ED Triage Vitals   Temperature Pulse Respirations Blood Pressure SpO2   04/27/21 1657 04/27/21 1657 04/27/21 1657 04/27/21 1657 04/27/21 1657   99 4 °F (37 4 °C) (!) 129 18 (!) 190/93 93 %      Temp Source Heart Rate Source Patient Position - Orthostatic VS BP Location FiO2 (%)   04/27/21 1657 04/27/21 2025 -- 04/27/21 2025 --   Oral Monitor  Right arm       Pain Score       04/27/21 1657       8           Vitals:    04/27/21 1657 04/27/21 2025 04/27/21 2209   BP: (!) 190/93 (!) 180/89 (!) 183/88   Pulse: (!) 129 (!) 125 (!) 127         Visual Acuity      ED Medications  Medications   sodium chloride 0 9 % bolus 1,000 mL (1,000 mL Intravenous New Bag 4/27/21 2257)   sodium chloride 0 9 % bolus 1,000 mL (0 mL Intravenous Stopped 4/27/21 2226)   metoclopramide (REGLAN) injection 10 mg (10 mg Intravenous Given 4/27/21 2020)   haloperidol lactate (HALDOL) injection 5 mg (5 mg Intramuscular Given 4/27/21 2055)   famotidine (PEPCID) injection 20 mg (20 mg Intravenous Given 4/27/21 2226)       Diagnostic Studies  Results Reviewed     Procedure Component Value Units Date/Time    Urine Microscopic [[id number]] Collected: 04/27/21 2314    Lab Status:  In process Specimen: Urine, Clean Catch Updated: 04/27/21 2321    Urine Macroscopic, POC [[id number]]  (Abnormal) Collected: 04/27/21 2314    Lab Status: Final result Specimen: Urine Updated: 04/27/21 2316     Color, UA Yellow     Clarity, UA Clear     pH, UA 6 0     Leukocytes, UA Negative     Nitrite, UA Negative     Protein, UA >=300 mg/dl      Glucose,  (1/2%) mg/dl      Ketones, UA >=160 (4+) mg/dl      Urobilinogen, UA 0 2 E U /dl      Bilirubin, UA Interference- unable to analyze     Blood, UA Moderate     Specific Gravity, UA >=1 030    Narrative:      CLINITEK RESULT    Basic metabolic panel [[id number]]  (Abnormal) Collected: 04/27/21 1943    Lab Status: Final result Specimen: Blood from Arm, Left Updated: 04/27/21 2029     Sodium 131 mmol/L      Potassium 4 8 mmol/L      Chloride 91 mmol/L      CO2 24 mmol/L      ANION GAP 16 mmol/L      BUN 9 mg/dL      Creatinine 0 84 mg/dL      Glucose 333 mg/dL      Calcium 10 1 mg/dL      eGFR 78 ml/min/1 73sq m     Narrative:      [place] guidelines for Chronic Kidney Disease (CKD):     Stage 1 with normal or high GFR (GFR > 90 mL/min/1 73 square meters)    Stage 2 Mild CKD (GFR = 60-89 mL/min/1 73 square meters)    Stage 3A Moderate CKD (GFR = 45-59 mL/min/1 73 square meters)    Stage 3B Moderate CKD (GFR = 30-44 mL/min/1 73 square meters)    Stage 4 Severe CKD (GFR = 15-29 mL/min/1 73 square meters)    Stage 5 End Stage CKD (GFR <15 mL/min/1 73 square meters)  Note: GFR calculation is accurate only with a steady state creatinine    Hepatic function panel [[id number]]  (Abnormal) Collected: 04/27/21 1943    Lab Status: Final result Specimen: Blood from Arm, Left Updated: 04/27/21 2029     Total Bilirubin 1 55 mg/dL      Bilirubin, Direct 0 16 mg/dL      Alkaline Phosphatase 151 U/L      AST 32 U/L      ALT 26 U/L      Total Protein 9 5 g/dL      Albumin 4 1 g/dL     TSH [[id number]]  (Abnormal) Collected: 04/27/21 1943    Lab Status: Final result Specimen: Blood from Arm, Left Updated: 04/27/21 2029     TSH 3RD GENERATON 0 300 uIU/mL     Narrative:      Patients undergoing fluorescein dye angiography may retain small amounts of fluorescein in the body for 48-72 hours post procedure  Samples containing fluorescein can produce falsely depressed TSH values   If the patient had this procedure,a specimen should be resubmitted post fluorescein clearance  Lipase [[id number]]  (Abnormal) Collected: 04/27/21 1943    Lab Status: Final result Specimen: Blood from Arm, Left Updated: 04/27/21 2029     Lipase 21 u/L     Magnesium [[id number]]  (Abnormal) Collected: 04/27/21 1943    Lab Status: Final result Specimen: Blood from Arm, Left Updated: 04/27/21 2029     Magnesium 1 4 mg/dL     Blood gas, venous [[id number]]  (Abnormal) Collected: 04/27/21 1943    Lab Status: Final result Specimen: Blood from Arm, Left Updated: 04/27/21 2000     pH, [place] 7 404     pCO2, [place] 39 1 mm Hg      pO2, [place] 38 0 mm Hg      HCO3, [place] 23 9 mmol/L      Base Excess, [place] -0 6 mmol/L      O2 Content, [place] 15 6 ml/dL      O2 HGB, VENOUS 72 5 %     CBC and differential [[id number]]  (Abnormal) Collected: 04/27/21 1943    Lab Status: Final result Specimen: Blood from Arm, Left Updated: 04/27/21 1959     WBC 16 03 Thousand/uL      RBC 5 57 Million/uL      Hemoglobin 14 8 g/dL      Hematocrit 45 5 %      MCV 82 fL      MCH 26 6 pg      MCHC 32 5 g/dL      RDW 13 0 %      MPV 12 7 fL      Platelets 415 Thousands/uL      nRBC 0 /100 WBCs      Neutrophils Relative 89 %      Immat GRANS % 1 %      Lymphocytes Relative 7 %      Monocytes Relative 2 %      Eosinophils Relative 0 %      Basophils Relative 1 %      Neutrophils Absolute 14 45 Thousands/µL      Immature Grans Absolute 0 09 Thousand/uL      Lymphocytes Absolute 1 05 Thousands/µL      Monocytes Absolute 0 35 Thousand/µL      Eosinophils Absolute 0 01 Thousand/µL      Basophils Absolute 0 08 Thousands/µL                  No orders to display              Procedures  Procedures         ED Course                                           MDM  Number of Diagnoses or Management Options  Dehydration: new and requires workup  Gastroparesis: new and requires workup  Nausea: new and requires workup  Tachycardia: new and requires workup  Vomiting: new and requires workup  Diagnosis management comments: 1   Abdominal pain - Pt with similar in the past associated with gastroparesis  Will check metabolic panel for electrolyte abnormalities and dehydration, VBG to rule out acidosis, CBC for anemia, leukocytosis,  LFT's to assess GB dysfunction, lipase for pancreatitis, urine for infection and concentration  Will give fluids, antiemetics, reassess  After antiemetics, fluids, pt has been persistently tachycardic, she has 4+ ketones in her urine though this is likely dehydration and not DKA as she is not acidotic and has a HCO3 of 23 9  Will keep overnight for observation for continued management  Amount and/or Complexity of Data Reviewed  Clinical lab tests: ordered and reviewed  Obtain history from someone other than the patient: yes  Review and summarize past medical records: yes    Patient Progress  Patient progress: stable      Disposition  Final diagnoses:   Nausea   Vomiting   Gastroparesis   Tachycardia   Dehydration     Time reflects when diagnosis was documented in both MDM as applicable and the Disposition within this note     Time User Action Codes Description Comment    4/27/2021 11:43 PM Mariam Branch E Add [R11 0] Nausea     4/27/2021 11:43 PM Mariam Branch E Add [R11 10] Vomiting     4/27/2021 11:43 PM Mariam Branch E Add [K31 84] Gastroparesis     4/27/2021 11:43 PM Nicholette Chava Espino E Add [R00 0] Tachycardia     4/27/2021 11:44 PM Mariam Branch E Add [E86 0] Dehydration       ED Disposition     ED Disposition Condition Date/Time Comment    Admit Stable Tue Apr 27, 2021 11:43 PM Case was discussed with LILIANA and the patient's admission status was agreed to be Admission Status: observation status to the service of Dr Minh Rao   Follow-up Information    None         Patient's Medications   Discharge Prescriptions    No medications on file     No discharge procedures on file      PDMP Review       Value Time User    PDMP Reviewed  Yes 10/18/2020 12:08 AM Lalitha Fagan, [address] [place]          ED Provider  Electronically Signed by           Erna Jay MD  04/27/21 1669

## 2021-04-28 NOTE — ASSESSMENT & PLAN NOTE
Suspect secondary to opioid and benzo withdrawal   Patient was asymptomatic    She missed her doses of morphine and Valium yesterday  She received Valium 5 mg IV 1 time dose  Heart rate currently in the 90s, patient asymptomatic

## 2021-04-28 NOTE — PLAN OF CARE
Problem: PHYSICAL THERAPY ADULT  Goal: Performs mobility at highest level of function for planned discharge setting  See evaluation for individualized goals  Description: Treatment/Interventions: Functional transfer training, LE strengthening/ROM, Elevations, Therapeutic exercise, Equipment eval/education, Patient/family training, Bed mobility, Gait training, Compensatory technique education, Spoke to nursing          See flowsheet documentation for full assessment, interventions and recommendations  Note: Prognosis: Good  Problem List: Decreased strength, Impaired balance, Decreased mobility, Obesity, Pain  Assessment: Janice Meza is a 63 y o  [facility]  PT was consulted and pt was seen on 4/28/2021 for mobility assessment and d/c planning  Pt presents multiple lines  Pt is currently functioning at a supervision assistance x1 and minimum assistance x1 level for bed mobility, supervision assistance x1 level for transfers, supervision assistance x1 level for ambulation with Rolling Walker  Pt w no significant LOB observed during side stepping w use of RW  Reports she has been using a RW since her last fall  Does demonstrate some LE weakness and ROM limitations secondary to chronic pain, body habitus  However no significant impact on mobility at this time  Pt will benefit from continued skilled IP PT to address the above mentioned impairments  in order to maximize recovery and increase functional independence when completing mobility and ADLs  At this time PT recommendations for d/c are home w OPPT as scheduled  Barriers to Discharge: None  Barriers to Discharge Comments: pt denies concerns w dc home     PT Discharge Recommendation: Home with outpatient rehabilitation(has OPPT scheduled)     PT - OK to Discharge: Yes    See flowsheet documentation for full assessment

## 2021-04-28 NOTE — ED NOTES
Per verbal order of provider, 350mg of Soma due at 1800p and 60mg of morphine due at 2100p should be held   The patient should be given her 15mg Morphine ER to make sure the patient doesn't go into withdrawal      Aura Morales RN  04/28/21 6366

## 2021-04-28 NOTE — ASSESSMENT & PLAN NOTE
Early DKA  Anion gap 16-->14 repeat labs pending  VB 4  39  38  24  IV fluids given, patient tolerating diet well  She is on Lantus 50 units at bedtime and 10 units t i d   With meal  Will continue with 25 units at bedtime and sliding scale due to poor appetite still

## 2021-04-28 NOTE — CONSULTS
Consultation -  [facility]  Blaire Sherman 63 y o  female MRN: [id number]  Unit/Bed#: ED 20 Encounter: [id number]        Inpatient consult to gastroenterology  Consult performed by: Anurag Valdez PA-C  Consult ordered by: Jeanne Salinas PA-C          Reason for Consult / Principal Problem:  Diabetic gastroparesis    HPI:  16-year-old female with history of poorly controlled type 2 diabetes mellitus (HGB A1C 10 8) with diabetic neuropathy and long-term use of insulin, hypertension, asthma, and opioid dependence admitted with intractable nausea and vomiting  She reports onset of these symptoms yesterday  She complains of acute epigastric pain, nausea and nonbloody vomiting  She was unable to keep down any food or liquids  She tried to take Reglan at home without relief  She has been admitted multiple times due to intractable nausea and vomiting  There is concern for diabetic gastroparesis given her poorly controlled diabetes and chronic use of narcotics  REVIEW OF SYSTEMS:    CONSTITUTIONAL: Denies any fever, chills, or rigors  Poor appetite  HEENT: No earache or tinnitus  Denies hearing loss or visual disturbances  CARDIOVASCULAR: No chest pain or palpitations  RESPIRATORY: Denies any cough, hemoptysis, shortness of breath or dyspnea on exertion  GASTROINTESTINAL: As noted in the History of Present Illness  GENITOURINARY: No problems with urination  Denies any hematuria or dysuria  NEUROLOGIC: No dizziness or vertigo, denies headaches  MUSCULOSKELETAL: Denies any muscle or joint pain  SKIN: Denies skin rashes or itching  ENDOCRINE: Denies excessive thirst  Denies intolerance to heat or cold  PSYCHOSOCIAL: Denies depression or anxiety  Denies any recent memory loss         Historical Information   Past Medical History:   Diagnosis Date    Acute respiratory insufficiency 11/23/2019    Asthma     Chronic pain     Diabetes mellitus ([facility] [address]     Gastroparesis     Sepsis ([address] ) 11/23/2019     Past Surgical History:   Procedure Laterality Date    CERVICAL LAMINECTOMY      CHOLECYSTECTOMY      CHOLECYSTECTOMY LAPAROSCOPIC N/A 11/10/2018    Procedure: CHOLECYSTECTOMY LAPAROSCOPIC w/ ioc;  Surgeon: Merline Dunnings, MD;  Location: [facility];  Service: General    HYSTERECTOMY      JOINT REPLACEMENT Bilateral     knee    TOE AMPUTATION Right 2/2/2018    Procedure: AMPUTATION TOE, RIGHT GREAT TOE;  Surgeon: Case Romero DPM;  Location: [facility];  Service: Podiatry     Social History   Social History     Substance and Sexual Activity   Alcohol Use Not Currently    Frequency: Never    Drinks per session: 1 or 2    Binge frequency: Never    Comment: rarely     Social History     Substance and Sexual Activity   Drug Use No     Social History     Tobacco Use   Smoking Status Never Smoker   Smokeless Tobacco Never Used     Family History   Problem Relation Age of Onset    Diabetes Mother     Diabetes Maternal Grandmother     No Known Problems Father        Meds/Allergies     (Not in a hospital admission)    Current Facility-Administered Medications   Medication Dose Route Frequency    acetaminophen (TYLENOL) tablet 650 mg  650 mg Oral Q6H PRN    aluminum-magnesium hydroxide-simethicone (MYLANTA) oral suspension 30 mL  30 mL Oral Q6H PRN    carisoprodol (SOMA) tablet 350 mg  350 mg Oral BID    dicyclomine (BENTYL) capsule 10 mg  10 mg Oral 4x Daily (AC & HS)    enoxaparin (LOVENOX) subcutaneous injection 40 mg  40 mg Subcutaneous Daily    fluticasone-vilanterol (BREO ELLIPTA) 200-25 MCG/INH inhaler 1 puff  1 puff Inhalation Daily    insulin glargine (LANTUS) subcutaneous injection 50 Units 0 5 mL  50 Units Subcutaneous HS    insulin lispro (HumaLOG) 100 units/mL subcutaneous injection 2-12 Units  2-12 Units Subcutaneous 4x Daily (AC & HS)    metoclopramide (REGLAN) tablet 10 mg  10 mg Oral 4x Daily (AC & HS)    montelukast (SINGULAIR) tablet 10 mg  10 mg Oral HS    morphine (MS CONTIN) ER tablet 15 mg  15 mg Oral BID    morphine (MS CONTIN) ER tablet 60 mg  60 mg Oral Q12H    morphine (MSIR) IR tablet 15 mg  15 mg Oral TID PRN    ondansetron (ZOFRAN) injection 4 mg  4 mg Intravenous Q6H PRN    pantoprazole (PROTONIX) EC tablet 40 mg  40 mg Oral Early Morning    pravastatin (PRAVACHOL) tablet 10 mg  10 mg Oral HS    pregabalin (LYRICA) capsule 150 mg  150 mg Oral BID    sodium chloride 0 9 % infusion  100 mL/hr Intravenous Continuous       Allergies   Allergen Reactions    Nsaids GI Intolerance           Objective     Blood pressure 114/62, pulse (!) 143, temperature 99 2 °F (37 3 °C), temperature source Oral, resp  rate 16, height 5' 8" (1 727 m), weight (!) 145 kg (320 lb), SpO2 90 %, not currently breastfeeding        Intake/Output Summary (Last 24 hours) at 4/28/2021 1517  Last data filed at 4/28/2021 0058  Gross per 24 hour   Intake 2000 ml   Output --   Net 2000 ml         PHYSICAL EXAM:      General Appearance:   Lethargic, cooperative, no distress, appears stated age    HEENT:   Normocephalic, atraumatic, anicteric      Neck:  Supple, symmetrical, trachea midline, no adenopathy   Lungs:   Clear to auscultation bilaterally   Heart[de-identified]   S1 and S2 normal; regular rate and rhythm   Abdomen:   Soft, non-tender, non-distended; normal bowel sounds   Genitalia:   Deferred    Rectal:   Deferred    Extremities:  No cyanosis, clubbing or edema    Pulses:  2+ and symmetric all extremities    Skin:  No jaundice    Lymph nodes:  No palpable cervical lymphadenopathy        Lab Results:   Results from last 7 days   Lab Units 04/28/21  0422   WBC Thousand/uL 17 00*   HEMOGLOBIN g/dL 14 1   HEMATOCRIT % 43 4   PLATELETS Thousands/uL 319   NEUTROS PCT % 84*   LYMPHS PCT % 10*   MONOS PCT % 5   EOS PCT % 0     Results from last 7 days   Lab Units 04/28/21  0422   POTASSIUM mmol/L 4 5   CHLORIDE mmol/L 95*   CO2 mmol/L 23   BUN mg/dL 9   CREATININE mg/dL 0 81   CALCIUM mg/dL 9 3   ALK PHOS U/L 130*   ALT U/L 18   AST U/L 30         Results from last 7 days   Lab Units 04/27/21  1943   LIPASE u/L 21*       Imaging Studies: I have personally reviewed pertinent imaging studies  No results found  ASSESSMENT and PLAN:      62-year-old female with history of poorly controlled type 2 diabetes mellitus (HGB A1C 10 8) with diabetic neuropathy and long-term use of insulin, hypertension, asthma, and opioid dependence admitted with intractable nausea and vomiting  1) Intractable N/V epigastric pain:  She reports acute on chronic epigastric pain and nausea and vomiting which is likely secondary to diabetic gastroparesis given her poorly controlled diabetes and narcotic dependence all of which slow gastric motility  She did not have abdominal imaging this admission, but previous CT scans show distended stomach also consistent with gastroparesis  -her QTC is 465, so we must be cautious with use of anti-emetics  -continue home dose of Reglan 10 mg 4 times daily before meals and at bedtime  -continue Zofran 4 mg every 6 hours as needed for nausea and vomiting  -she would benefit from outpatient gastric emptying study  -continue clear liquids, advance diet as tolerated  -she needs tight glycemic control and follow-up with endocrinology  -would ideally limit narcotics, although she is on home regimen  -she will need close outpatient GI follow-up for continued management    2) Elevated liver enzymes: She has chronically elevated liver enzymes, mostly bilirubin and alkaline phosphatase  Total bilirubin 1 52 and alkaline phosphatase 130  She does have history of cholecystectomy although cannot rule out CBD stone, stricture, mass although not visualized on CT scan     -check right upper quadrant ultrasound  -monitor liver enzymes    Patient was seen and examined by Dr Pati Jama  All zhang medical decisions were made by Dr Pati Jama  Thank you for allowing us to participate in the care of this present patient  We will follow-up with you closely

## 2021-04-28 NOTE — ASSESSMENT & PLAN NOTE
Continue current regimen:  · Morphine Sulf Er 60mg BID   · Morphine Sulf Er 15mg BID  · Morphine Sulf Ir 15mg  TID PRN  · Pregabalin 150mg BID  · Carisoprodol 350mg BID  · Valium 5 mg daily

## 2021-04-28 NOTE — ED NOTES
RN tiger-texted provider in regards to medication administration with the patient        Francoise Meyer RN  04/28/21 9647

## 2021-04-28 NOTE — ASSESSMENT & PLAN NOTE
History of gastroparesis secondary to diabetes with numerous admissions for intractable nausea/vomiting due to gastroparesis  She has poorly-controlled diabetes and chronic opioid use  Patient reports severe lower quadrant abdominal pain associated with nausea and vomiting x1 day  Continue home Reglan 10 mg 4 times a day before meals and at bedtime  Type again  Advance diet to full liquid with toast and crackers    Tolerated clear liquid diet  Nausea and vomiting resolved

## 2021-04-28 NOTE — ASSESSMENT & PLAN NOTE
Early DKA  Glucose 333  Ketonuria >160  Anion gap 16  VB 4  39  38  24  Likely early DKA vs dehydration  Will give IVF overnight  Follow BMP for resolution of anion gap

## 2021-04-28 NOTE — ASSESSMENT & PLAN NOTE
Lab Results   Component Value Date    HGBA1C 10 8 (H) 06/26/2020       No results for input(s): POCGLU in the last 72 hours  Blood Sugar Average: Last 72 hrs:   last A1c 10 8  Current blood glucose 333  Reports good blood sugar control at home with current regimen  Home regimen:  Lantus 50 units q h s , Humalog 10 units TID with meals  Will continue Lantus 50 units q h s  With sliding scale for meal coverage  Accuchecks with meals and q h s  Clear liquid diet tomorrow morning    Will advance as able

## 2021-04-28 NOTE — ED NOTES
RN dimmed light for pt and made sure pt has call bell in case of need       Vignesh Tillman RN  04/28/21 7189

## 2021-04-29 LAB
ALBUMIN SERPL BCP-MCNC: 3.1 G/DL (ref 3.5–5)
ALP SERPL-CCNC: 102 U/L (ref 46–116)
ALT SERPL W P-5'-P-CCNC: 19 U/L (ref 12–78)
ANION GAP SERPL CALCULATED.3IONS-SCNC: 9 MMOL/L (ref 4–13)
AST SERPL W P-5'-P-CCNC: 16 U/L (ref 5–45)
BASOPHILS # BLD AUTO: 0.13 THOUSANDS/ΜL (ref 0–0.1)
BASOPHILS NFR BLD AUTO: 1 % (ref 0–1)
BILIRUB SERPL-MCNC: 1.15 MG/DL (ref 0.2–1)
BUN SERPL-MCNC: 21 MG/DL (ref 5–25)
CALCIUM ALBUM COR SERPL-MCNC: 9.7 MG/DL (ref 8.3–10.1)
CALCIUM SERPL-MCNC: 9 MG/DL (ref 8.3–10.1)
CHLORIDE SERPL-SCNC: 101 MMOL/L (ref 100–108)
CO2 SERPL-SCNC: 26 MMOL/L (ref 21–32)
CREAT SERPL-MCNC: 1.29 MG/DL (ref 0.6–1.3)
EOSINOPHIL # BLD AUTO: 0.22 THOUSAND/ΜL (ref 0–0.61)
EOSINOPHIL NFR BLD AUTO: 2 % (ref 0–6)
ERYTHROCYTE [DISTWIDTH] IN BLOOD BY AUTOMATED COUNT: 13.4 % (ref 11.6–15.1)
GFR SERPL CREATININE-BSD FRML MDRD: 46 ML/MIN/1.73SQ M
GLUCOSE SERPL-MCNC: 153 MG/DL (ref 65–140)
GLUCOSE SERPL-MCNC: 158 MG/DL (ref 65–140)
GLUCOSE SERPL-MCNC: 189 MG/DL (ref 65–140)
GLUCOSE SERPL-MCNC: 240 MG/DL (ref 65–140)
GLUCOSE SERPL-MCNC: 259 MG/DL (ref 65–140)
HCT VFR BLD AUTO: 40.3 % (ref 34.8–46.1)
HGB BLD-MCNC: 12.9 G/DL (ref 11.5–15.4)
IMM GRANULOCYTES # BLD AUTO: 0.06 THOUSAND/UL (ref 0–0.2)
IMM GRANULOCYTES NFR BLD AUTO: 1 % (ref 0–2)
LYMPHOCYTES # BLD AUTO: 3.72 THOUSANDS/ΜL (ref 0.6–4.47)
LYMPHOCYTES NFR BLD AUTO: 33 % (ref 14–44)
MCH RBC QN AUTO: 26.3 PG (ref 26.8–34.3)
MCHC RBC AUTO-ENTMCNC: 32 G/DL (ref 31.4–37.4)
MCV RBC AUTO: 82 FL (ref 82–98)
MONOCYTES # BLD AUTO: 1.15 THOUSAND/ΜL (ref 0.17–1.22)
MONOCYTES NFR BLD AUTO: 10 % (ref 4–12)
NEUTROPHILS # BLD AUTO: 5.97 THOUSANDS/ΜL (ref 1.85–7.62)
NEUTS SEG NFR BLD AUTO: 53 % (ref 43–75)
NRBC BLD AUTO-RTO: 0 /100 WBCS
PLATELET # BLD AUTO: 324 THOUSANDS/UL (ref 149–390)
PMV BLD AUTO: 12.7 FL (ref 8.9–12.7)
POTASSIUM SERPL-SCNC: 3.5 MMOL/L (ref 3.5–5.3)
PROT SERPL-MCNC: 6.9 G/DL (ref 6.4–8.2)
RBC # BLD AUTO: 4.91 MILLION/UL (ref 3.81–5.12)
SODIUM SERPL-SCNC: 136 MMOL/L (ref 136–145)
WBC # BLD AUTO: 11.25 THOUSAND/UL (ref 4.31–10.16)

## 2021-04-29 PROCEDURE — 82948 REAGENT STRIP/BLOOD GLUCOSE: CPT

## 2021-04-29 PROCEDURE — 97110 THERAPEUTIC EXERCISES: CPT

## 2021-04-29 PROCEDURE — 85025 COMPLETE CBC W/AUTO DIFF WBC: CPT | Performed by: INTERNAL MEDICINE

## 2021-04-29 PROCEDURE — 99232 SBSQ HOSP IP/OBS MODERATE 35: CPT | Performed by: INTERNAL MEDICINE

## 2021-04-29 PROCEDURE — 80053 COMPREHEN METABOLIC PANEL: CPT | Performed by: INTERNAL MEDICINE

## 2021-04-29 PROCEDURE — 97116 GAIT TRAINING THERAPY: CPT

## 2021-04-29 RX ORDER — INSULIN GLARGINE 100 [IU]/ML
35 INJECTION, SOLUTION SUBCUTANEOUS
Status: DISCONTINUED | OUTPATIENT
Start: 2021-04-29 | End: 2021-04-30 | Stop reason: HOSPADM

## 2021-04-29 RX ORDER — SODIUM CHLORIDE 9 MG/ML
75 INJECTION, SOLUTION INTRAVENOUS CONTINUOUS
Status: DISPENSED | OUTPATIENT
Start: 2021-04-29 | End: 2021-04-30

## 2021-04-29 RX ORDER — NYSTATIN 100000 [USP'U]/G
POWDER TOPICAL 2 TIMES DAILY
Status: DISCONTINUED | OUTPATIENT
Start: 2021-04-29 | End: 2021-04-30 | Stop reason: HOSPADM

## 2021-04-29 RX ADMIN — PREGABALIN 150 MG: 75 CAPSULE ORAL at 08:15

## 2021-04-29 RX ADMIN — PANTOPRAZOLE SODIUM 40 MG: 40 TABLET, DELAYED RELEASE ORAL at 05:57

## 2021-04-29 RX ADMIN — INSULIN LISPRO 6 UNITS: 100 INJECTION, SOLUTION INTRAVENOUS; SUBCUTANEOUS at 12:36

## 2021-04-29 RX ADMIN — METOCLOPRAMIDE 10 MG: 10 TABLET ORAL at 10:42

## 2021-04-29 RX ADMIN — FLUTICASONE FUROATE AND VILANTEROL TRIFENATATE 1 PUFF: 200; 25 POWDER RESPIRATORY (INHALATION) at 08:15

## 2021-04-29 RX ADMIN — DICYCLOMINE HYDROCHLORIDE 10 MG: 10 CAPSULE ORAL at 15:48

## 2021-04-29 RX ADMIN — MONTELUKAST SODIUM 10 MG: 10 TABLET, COATED ORAL at 21:19

## 2021-04-29 RX ADMIN — MORPHINE SULFATE 60 MG: 30 TABLET, EXTENDED RELEASE ORAL at 08:14

## 2021-04-29 RX ADMIN — NYSTATIN: 100000 POWDER TOPICAL at 08:25

## 2021-04-29 RX ADMIN — DICYCLOMINE HYDROCHLORIDE 10 MG: 10 CAPSULE ORAL at 06:01

## 2021-04-29 RX ADMIN — MORPHINE SULFATE 15 MG: 15 TABLET, EXTENDED RELEASE ORAL at 17:11

## 2021-04-29 RX ADMIN — DIAZEPAM 5 MG: 5 TABLET ORAL at 08:14

## 2021-04-29 RX ADMIN — CARISOPRODOL 350 MG: 350 TABLET ORAL at 10:42

## 2021-04-29 RX ADMIN — MORPHINE SULFATE 15 MG: 15 TABLET, EXTENDED RELEASE ORAL at 10:42

## 2021-04-29 RX ADMIN — INSULIN GLARGINE 35 UNITS: 100 INJECTION, SOLUTION SUBCUTANEOUS at 21:19

## 2021-04-29 RX ADMIN — METOCLOPRAMIDE 10 MG: 10 TABLET ORAL at 06:01

## 2021-04-29 RX ADMIN — SODIUM CHLORIDE 75 ML/HR: 0.9 INJECTION, SOLUTION INTRAVENOUS at 18:07

## 2021-04-29 RX ADMIN — METOCLOPRAMIDE 10 MG: 10 TABLET ORAL at 15:48

## 2021-04-29 RX ADMIN — DICYCLOMINE HYDROCHLORIDE 10 MG: 10 CAPSULE ORAL at 10:42

## 2021-04-29 RX ADMIN — ENOXAPARIN SODIUM 40 MG: 40 INJECTION SUBCUTANEOUS at 08:15

## 2021-04-29 RX ADMIN — INSULIN LISPRO 4 UNITS: 100 INJECTION, SOLUTION INTRAVENOUS; SUBCUTANEOUS at 17:08

## 2021-04-29 RX ADMIN — METOCLOPRAMIDE 10 MG: 10 TABLET ORAL at 21:20

## 2021-04-29 RX ADMIN — MORPHINE SULFATE 60 MG: 30 TABLET, EXTENDED RELEASE ORAL at 21:18

## 2021-04-29 RX ADMIN — CARISOPRODOL 350 MG: 350 TABLET ORAL at 17:11

## 2021-04-29 RX ADMIN — DICYCLOMINE HYDROCHLORIDE 10 MG: 10 CAPSULE ORAL at 21:20

## 2021-04-29 RX ADMIN — PREGABALIN 150 MG: 75 CAPSULE ORAL at 17:11

## 2021-04-29 RX ADMIN — INSULIN LISPRO 2 UNITS: 100 INJECTION, SOLUTION INTRAVENOUS; SUBCUTANEOUS at 08:15

## 2021-04-29 RX ADMIN — INSULIN LISPRO 5 UNITS: 100 INJECTION, SOLUTION INTRAVENOUS; SUBCUTANEOUS at 17:08

## 2021-04-29 RX ADMIN — PRAVASTATIN SODIUM 10 MG: 10 TABLET ORAL at 21:19

## 2021-04-29 RX ADMIN — NYSTATIN: 100000 POWDER TOPICAL at 17:12

## 2021-04-29 RX ADMIN — INSULIN LISPRO 2 UNITS: 100 INJECTION, SOLUTION INTRAVENOUS; SUBCUTANEOUS at 21:21

## 2021-04-29 NOTE — ASSESSMENT & PLAN NOTE
Lab Results   Component Value Date    HGBA1C 10 8 (H) 06/26/2020       Recent Labs     04/28/21  2129 04/29/21  0731 04/29/21  1058 04/29/21  1645   POCGLU 276* 153* 259* 240*       Blood Sugar Average: Last 72 hrs:  (P) 789 [id number] last A1c 10 8  Reports good blood sugar control at home with current regimen  Home regimen:  Lantus 50 units q h s , Humalog 10 units TID with meals  Will increased Lantus to 35 units q h s  Humalog 5 units with meals, With sliding scale for meal coverage, adjust when appetite improved  Accuchecks with meals and q h s

## 2021-04-29 NOTE — ED NOTES
Report called to [facility] DELIA, Sabiha, at this time  She reports the patient's bed is ready       Anibal Gonsalez RN  04/28/21 2107

## 2021-04-29 NOTE — PROGRESS NOTES
[address] [facility]  Progress Note - Larnapoleon Frederick 1965, 63 y o  female MRN: [id number]  Unit/Bed#: [address] 227-01 Encounter: [id number]  Primary Care Provider: Ramya Cohen MD   Date and time admitted to hospital: 4/27/2021  7:19 PM    * Diabetic gastroparesis [facility])  Assessment & Plan  History of gastroparesis secondary to diabetes with numerous admissions for intractable nausea/vomiting due to gastroparesis  She has poorly-controlled diabetes and chronic opioid use  Patient reports severe lower quadrant abdominal pain associated with nausea and vomiting x1 day  Now improved, her diet was advanced today, still complaining of some nausea and GERD   Continue home Reglan 10 mg 4 times a day before meals and at bedtime  Tigan, last QTc qas wnl    Elevated LFTs  Assessment & Plan  Elevated alk-phos and T bili, chronically  Evaluated by GI, appreciate the recommendations  Right upper quadrant ultrasound showed enlarged mildly fatty liver, biliary duct normal caliber, CBD 5 mm, no intrahepatic biliary dilatation, no choledocholithiasis  DKA, type 2 ([address] )  Assessment & Plan  Early DKA, resolved  Anion gap closed  Home regiment Lantus 50 units t i d , Humalog 10 units t i d  Appetite improving, increase Lantus to 35 units HS      Sinus tachycardia  Assessment & Plan  Suspect secondary to opioid and benzo withdrawal   Patient was asymptomatic with heart rate in 150s on 04/28    She missed several doses of morphine and Valium due to nausea   Medications were given, heart rate improved  TSH 0 3, free T4 normal    Low TSH level  Assessment & Plan  TSH 0 3  Free T4 normal  Repeat thyroid panel in 4-6 weeks    Essential hypertension  Assessment & Plan  Continue to hold Lasix    Opioid dependence with other opioid-induced disorder [facility])  Assessment & Plan  Continue current regimen:  · Morphine Sulf Er 60mg BID   · Morphine Sulf Er 15mg BID  · Morphine Sulf Ir 15mg  TID PRN  · Pregabalin 150mg BID  · Carisoprodol 350mg BID  · Valium 5 mg daily    Asthma  Assessment & Plan  Not asthma exacerbation  No wheezing  Continue home inhalers and montelukast    Type 2 diabetes mellitus with diabetic neuropathy, with long-term current use of insulin [facility])  Assessment & Plan  Lab Results   Component Value Date    HGBA1C 10 8 (H) 2020       Recent Labs     21  2129 21  0731 21  1058 21  1645   POCGLU 276* 153* 259* 240*       Blood Sugar Average: Last 72 hrs:  (P) 855 [id number] last A1c 10 8  Reports good blood sugar control at home with current regimen  Home regimen:  Lantus 50 units q h s , Humalog 10 units TID with meals  Will increased Lantus to 35 units q h s  Humalog 5 units with meals, With sliding scale for meal coverage, adjust when appetite improved  Accuchecks with meals and q h s  VTE Pharmacologic Prophylaxis:   Pharmacologic: Enoxaparin (Lovenox)  Mechanical VTE Prophylaxis in Place: Yes    Patient Centered Rounds: I have performed bedside rounds with nursing staff today  Discussions with Specialists or Other Care Team Provider:  Nursing, case management    Education and Discussions with Family / Patient:  Patient, daughter Ca Johnson     Time Spent for Care: 30 minutes  More than 50% of total time spent on counseling and coordination of care as described above  Current Length of Stay: 1 day(s)    Current Patient Status: Inpatient   Certification Statement: The patient will continue to require additional inpatient hospital stay due to Still with nausea and GERD symptoms with food    Discharge Plan:  Tomorrow    Code Status: Level 1 - Full Code      Subjective:   Patient was seen evaluated bedside  She is feeling well, abdominal pain, nausea improved    Agreed to advance her diet    Objective:     Vitals:   Temp (24hrs), Av 3 °F (36 8 °C), Min:98 °F (36 7 °C), Max:98 7 °F (37 1 °C)    Temp:  [98 °F (36 7 °C)-98 7 °F (37 1 °C)] 98 °F (36 7 °C)  HR: [] 110  Resp:  [13-20] 20  BP: ()/(59-69) 95/59  SpO2:  [90 %-94 %] 90 %  Body mass index is 48 66 kg/m²  Input and Output Summary (last 24 hours): Intake/Output Summary (Last 24 hours) at 4/29/2021 1703  Last data filed at 4/29/2021 1301  Gross per 24 hour   Intake 2376 67 ml   Output --   Net 2376 67 ml       Physical Exam:     Physical Exam  Constitutional:       General: She is not in acute distress  Appearance: She is obese  HENT:      Head: Atraumatic  Neck:      Musculoskeletal: Neck supple  Cardiovascular:      Rate and Rhythm: Normal rate and regular rhythm  Heart sounds: No murmur  No friction rub  No gallop  Pulmonary:      Effort: Pulmonary effort is normal  No respiratory distress  Breath sounds: Normal breath sounds  No wheezing  Abdominal:      General: Bowel sounds are normal  There is no distension  Palpations: Abdomen is soft  Musculoskeletal:         General: No swelling  Skin:     General: Skin is warm and dry  Neurological:      General: No focal deficit present  Mental Status: She is alert     Psychiatric:         Mood and Affect: Mood normal          Additional Data:     Labs:    Results from last 7 days   Lab Units 04/29/21  0444   WBC Thousand/uL 11 25*   HEMOGLOBIN g/dL 12 9   HEMATOCRIT % 40 3   PLATELETS Thousands/uL 324   NEUTROS PCT % 53   LYMPHS PCT % 33   MONOS PCT % 10   EOS PCT % 2     Results from last 7 days   Lab Units 04/29/21  0444   SODIUM mmol/L 136   POTASSIUM mmol/L 3 5   CHLORIDE mmol/L 101   CO2 mmol/L 26   BUN mg/dL 21   CREATININE mg/dL 1 29   ANION GAP mmol/L 9   CALCIUM mg/dL 9 0   ALBUMIN g/dL 3 1*   TOTAL BILIRUBIN mg/dL 1 15*   ALK PHOS U/L 102   ALT U/L 19   AST U/L 16   GLUCOSE RANDOM mg/dL 158*         Results from last 7 days   Lab Units 04/29/21  1645 04/29/21  1058 04/29/21  0731 04/28/21  2129 04/28/21  2006 04/28/21  1841 04/28/21  1754 04/28/21  1230 04/28/21  0805   POC GLUCOSE mg/dl 240* 259* 153* 276* 237* 251* 251* 280* 288*                   * I Have Reviewed All Lab Data Listed Above  * Additional Pertinent Lab Tests Reviewed:  [facility] Admission Reviewed    Imaging:    Imaging Reports Reviewed Today Include: all  Imaging Personally Reviewed by Myself Includes:      Recent Cultures (last 7 days):           Last 24 Hours Medication List:   Current Facility-Administered Medications   Medication Dose Route Frequency Provider Last Rate    acetaminophen  650 mg Oral Q6H PRN CASTILLO Thompson-LAURA      aluminum-magnesium hydroxide-simethicone  30 mL Oral Q6H PRN Jeanne Salinas PA-C      carisoprodol  350 mg Oral BID Jeanne Salinas PA-C      diazepam  5 mg Oral Daily Hay Alonso MD      dicyclomine  10 mg Oral 4x Daily (AC & HS) Jeanne Salinas PA-C      enoxaparin  40 mg Subcutaneous Daily Jeanne Salinas PA-C      fluticasone-vilanterol  1 puff Inhalation Daily Jeanne Rhein [place]      insulin glargine  35 Units Subcutaneous HS Hay Alonso MD      insulin lispro  2-12 Units Subcutaneous 4x Daily (AC & HS) Jeanne Salinas PA-C      insulin lispro  5 Units Subcutaneous TID With Meals Hay Alonso MD      metoclopramide  10 mg Oral 4x Daily (AC & HS) Jeanne Salinas PA-C      montelukast  10 mg Oral HS [place]      morphine  15 mg Oral BID Jeanne Salinas PA-C      morphine  60 mg Oral 140 e [place], [place]      morphine  15 mg Oral TID PRN Jeanne Salinas PA-C      nystatin   Topical BID Hay Alonso MD      ondansetron  4 mg Intravenous Q6H PRN Jeanne Salinas PA-C      pantoprazole  40 mg Oral Early Morning [place], [place]      pravastatin  10 mg Oral HS Jeanne Salinas PA-C      pregabalin  150 mg Oral BID Jeanne Salinas PA-C          Today, Patient Was Seen By: Hay Alonso MD    ** Please Note: Dictation voice to text software may have been used in the creation of this document   **

## 2021-04-29 NOTE — PLAN OF CARE
Problem: PHYSICAL THERAPY ADULT  Goal: Performs mobility at highest level of function for planned discharge setting  See evaluation for individualized goals  Description: Treatment/Interventions: Functional transfer training, LE strengthening/ROM, Elevations, Therapeutic exercise, Equipment eval/education, Patient/family training, Bed mobility, Gait training, Compensatory technique education, Spoke to nursing          See flowsheet documentation for full assessment, interventions and recommendations  Outcome: Progressing  Note: Prognosis: Good  Problem List: Decreased strength, Impaired balance, Decreased mobility, Obesity, Pain  Assessment: Pt seen for PT treatment session this date with interventions consisting of gait training w/ emphasis on improving pt's ability to ambulate level surfaces x 100 feet with close S provided by therapist with RW and Therapeutic exercise consisting of: AROM 20 reps B LE in sitting position  Pt agreeable to PT treatment session upon arrival, pt found seated at EOB, in no apparent distress  In comparison to previous session, pt with improvements in amb distance, ex tolerance  Post session: pt returned BTB and all needs in reach  Continue to recommend home with outpatient rehabilitation at time of d/c in order to maximize pt's functional independence and safety w/ mobility  Pt continues to be functioning below baseline level, and remains limited 2* factors listed above and including decreased strength, endurance & safe functional mobility  PT will continue to see pt during current hospitalization in order to address the deficits listed above and provide interventions consistent w/ POC in effort to achieve STGs  Barriers to Discharge: None  Barriers to Discharge Comments: pt denies concerns w dc home     PT Discharge Recommendation: Home with outpatient rehabilitation     PT - OK to Discharge: Yes(when med cleared)    See flowsheet documentation for full assessment

## 2021-04-29 NOTE — ASSESSMENT & PLAN NOTE
Suspect secondary to opioid and benzo withdrawal   Patient was asymptomatic with heart rate in 150s on 04/28    She missed several doses of morphine and Valium due to nausea   Medications were given, heart rate improved  TSH 0 3, free T4 normal

## 2021-04-29 NOTE — ASSESSMENT & PLAN NOTE
Elevated alk-phos and T bili, chronically  Evaluated by GI, appreciate the recommendations  Right upper quadrant ultrasound showed enlarged mildly fatty liver, biliary duct normal caliber, CBD 5 mm, no intrahepatic biliary dilatation, no choledocholithiasis

## 2021-04-29 NOTE — PLAN OF CARE
Problem: Potential for Falls  Goal: Patient will remain free of falls  Description: INTERVENTIONS:  - Assess patient frequently for physical needs  -  Identify cognitive and physical deficits and behaviors that affect risk of falls    -  [place] fall precautions as indicated by assessment   - Educate patient/family on patient safety including physical limitations  - Instruct patient to call for assistance with activity based on assessment  - Modify environment to reduce risk of injury  - Consider OT/PT consult to assist with strengthening/mobility  Outcome: Progressing     Problem: PAIN - ADULT  Goal: Verbalizes/displays adequate comfort level or baseline comfort level  Description: Interventions:  - Encourage patient to monitor pain and request assistance  - Assess pain using appropriate pain scale  - Administer analgesics based on type and severity of pain and evaluate response  - Implement non-pharmacological measures as appropriate and evaluate response  - Consider cultural and social influences on pain and pain management  - Notify physician/advanced practitioner if interventions unsuccessful or patient reports new pain  Outcome: Progressing     Problem: INFECTION - ADULT  Goal: Absence or prevention of progression during hospitalization  Description: INTERVENTIONS:  - Assess and monitor for signs and symptoms of infection  - Monitor lab/diagnostic results  - Monitor all insertion sites, i e  indwelling lines, tubes, and drains  - Monitor endotracheal if appropriate and nasal secretions for changes in amount and color  - [place] appropriate cooling/warming therapies per order  - Administer medications as ordered  - Instruct and encourage patient and family to use good hand hygiene technique  - Identify and instruct in appropriate isolation precautions for identified infection/condition  Outcome: Progressing     Problem: SAFETY ADULT  Goal: Patient will remain free of falls  Description: INTERVENTIONS:  - Assess patient frequently for physical needs  -  Identify cognitive and physical deficits and behaviors that affect risk of falls    -  [place] fall precautions as indicated by assessment   - Educate patient/family on patient safety including physical limitations  - Instruct patient to call for assistance with activity based on assessment  - Modify environment to reduce risk of injury  - Consider OT/PT consult to assist with strengthening/mobility  Outcome: Progressing     Problem: GASTROINTESTINAL - ADULT  Goal: Minimal or absence of nausea and/or vomiting  Description: INTERVENTIONS:  - Administer IV fluids if ordered to ensure adequate hydration  - Maintain NPO status until nausea and vomiting are resolved  - Nasogastric tube if ordered  - Administer ordered antiemetic medications as needed  - Provide nonpharmacologic comfort measures as appropriate  - Advance diet as tolerated, if ordered  - Consider nutrition services referral to assist patient with adequate nutrition and appropriate food choices  Outcome: Progressing     Problem: METABOLIC, FLUID AND ELECTROLYTES - ADULT  Goal: Glucose maintained within target range  Description: INTERVENTIONS:  - Monitor Blood Glucose as ordered  - Assess for signs and symptoms of hyperglycemia and hypoglycemia  - Administer ordered medications to maintain glucose within target range  - Assess nutritional intake and initiate nutrition service referral as needed  Outcome: Progressing

## 2021-04-29 NOTE — PHYSICAL THERAPY NOTE
04/29/21 1115   PT Last Visit   PT Visit Date 04/29/21   Note Type   Note Type Treatment   Pain Assessment   Pain Assessment Tool 0-10   Pain Score 8   Pain Location/Orientation Orientation: Left; Location: Knee   Pain Onset/Description Onset: Ongoing;Frequency: Constant/Continuous   Effect of Pain on Daily Activities limits activity   Patient's Stated Pain Goal No pain   Hospital Pain Intervention(s) Repositioned; Ambulation/increased activity; Rest   Restrictions/Precautions   Weight Bearing Precautions Per Order No   Other Precautions Telemetry; Fall Risk;Pain   General   Chart Reviewed Yes   Family/Caregiver Present No   Cognition   Overall Cognitive Status WFL   Arousal/Participation Alert; Cooperative   Attention Within functional limits   Orientation Level Oriented X4   Memory Within functional limits   Following Commands Follows all commands and directions without difficulty   Comments pt agreed to PT session   Subjective   Subjective "My L knee hurts 8/10 "   Bed Mobility   Additional Comments pt seated at EOB to begin & end session, all needs in reach   Transfers   Sit to Stand 5  Supervision   Additional items Increased time required;Verbal cues   Stand to Sit 5  Supervision   Additional items Increased time required;Verbal cues   Ambulation/Elevation   Gait pattern Short stride; Excessively slow   Gait Assistance 5  Supervision   Additional items Assist x 1   Assistive Device Rolling walker   Distance 100 feet   Stair Management Assistance Not tested   Balance   Static Sitting Good   Dynamic Sitting Fair +   Static Standing Fair   Dynamic Standing Fair -   Ambulatory Fair -   Endurance Deficit   Endurance Deficit No   Activity Tolerance   Activity Tolerance Patient tolerated treatment well   Nurse Made Aware yes   Exercises   Hip Flexion Sitting;20 reps;AROM; Bilateral   Hip Abduction Sitting;20 reps;AROM; Bilateral   Hip Adduction Sitting;20 reps;AROM; Bilateral  (isometric)   Knee AROM [employer] Sitting;20 reps;AROM; Bilateral   Ankle Pumps Sitting;20 reps;AROM; Bilateral   Assessment   Prognosis Good   Problem List Decreased strength; Impaired balance;Decreased mobility;Obesity;Pain   Assessment Pt seen for PT treatment session this date with interventions consisting of gait training w/ emphasis on improving pt's ability to ambulate level surfaces x 100 feet with close S provided by therapist with RW and Therapeutic exercise consisting of: AROM 20 reps B LE in sitting position  Pt agreeable to PT treatment session upon arrival, pt found seated at EOB, in no apparent distress  In comparison to previous session, pt with improvements in amb distance, ex tolerance  Post session: pt returned BTB and all needs in reach  Continue to recommend home with outpatient rehabilitation at time of d/c in order to maximize pt's functional independence and safety w/ mobility  Pt continues to be functioning below baseline level, and remains limited 2* factors listed above and including decreased strength, endurance & safe functional mobility  PT will continue to see pt during current hospitalization in order to address the deficits listed above and provide interventions consistent w/ POC in effort to achieve STGs  Barriers to Discharge None   Goals   Patient Goals to go home   PT Treatment Day 1   Plan   Treatment/Interventions Functional transfer training;LE strengthening/ROM; Elevations; Therapeutic exercise; Endurance training;Patient/family training;Equipment eval/education; Bed mobility;Gait training;Spoke to nursing   Progress Progressing toward goals   PT Frequency 2-3x/wk   Recommendation   PT Discharge Recommendation Home with outpatient rehabilitation   PT - OK to Discharge Yes  (when med cleared)   AM-PAC Basic Mobility Inpatient   Turning in Bed Without Bedrails 3   Lying on Back to Sitting on Edge of Flat Bed 3   Moving Bed to Chair 3   Standing Up From Chair 3   Walk in Room 3   Climb 3-5 Stairs 3   Basic Mobility Inpatient Raw Score 18   Basic Mobility Standardized Score 41 05   The patient's AM-PAC Basic Mobility Inpatient Short Form Raw Score is 18, Standardized Score is 41 05  A standardized score less than 42 9 suggests the patient may benefit from discharge to home   Please also refer to the recommendation of the Physical Therapist for safe discharge planning as discussed with Physical Therapist   Chandra Ramires PTA

## 2021-04-29 NOTE — PLAN OF CARE
Problem: Potential for Falls  Goal: Patient will remain free of falls  Description: INTERVENTIONS:  - Assess patient frequently for physical needs  -  Identify cognitive and physical deficits and behaviors that affect risk of falls    -  [place] fall precautions as indicated by assessment   - Educate patient/family on patient safety including physical limitations  - Instruct patient to call for assistance with activity based on assessment  - Modify environment to reduce risk of injury  - Consider OT/PT consult to assist with strengthening/mobility  Outcome: Progressing     Problem: PAIN - ADULT  Goal: Verbalizes/displays adequate comfort level or baseline comfort level  Description: Interventions:  - Encourage patient to monitor pain and request assistance  - Assess pain using appropriate pain scale  - Administer analgesics based on type and severity of pain and evaluate response  - Implement non-pharmacological measures as appropriate and evaluate response  - Consider cultural and social influences on pain and pain management  - Notify physician/advanced practitioner if interventions unsuccessful or patient reports new pain  Outcome: Progressing     Problem: INFECTION - ADULT  Goal: Absence or prevention of progression during hospitalization  Description: INTERVENTIONS:  - Assess and monitor for signs and symptoms of infection  - Monitor lab/diagnostic results  - Monitor all insertion sites, i e  indwelling lines, tubes, and drains  - Monitor endotracheal if appropriate and nasal secretions for changes in amount and color  - [place] appropriate cooling/warming therapies per order  - Administer medications as ordered  - Instruct and encourage patient and family to use good hand hygiene technique  - Identify and instruct in appropriate isolation precautions for identified infection/condition  Outcome: Progressing     Problem: SAFETY ADULT  Goal: Patient will remain free of falls  Description: INTERVENTIONS:  - Assess patient frequently for physical needs  -  Identify cognitive and physical deficits and behaviors that affect risk of falls    -  [place] fall precautions as indicated by assessment   - Educate patient/family on patient safety including physical limitations  - Instruct patient to call for assistance with activity based on assessment  - Modify environment to reduce risk of injury  - Consider OT/PT consult to assist with strengthening/mobility  Outcome: Progressing     Problem: GASTROINTESTINAL - ADULT  Goal: Minimal or absence of nausea and/or vomiting  Description: INTERVENTIONS:  - Administer IV fluids if ordered to ensure adequate hydration  - Maintain NPO status until nausea and vomiting are resolved  - Nasogastric tube if ordered  - Administer ordered antiemetic medications as needed  - Provide nonpharmacologic comfort measures as appropriate  - Advance diet as tolerated, if ordered  - Consider nutrition services referral to assist patient with adequate nutrition and appropriate food choices  Outcome: Progressing     Problem: METABOLIC, FLUID AND ELECTROLYTES - ADULT  Goal: Glucose maintained within target range  Description: INTERVENTIONS:  - Monitor Blood Glucose as ordered  - Assess for signs and symptoms of hyperglycemia and hypoglycemia  - Administer ordered medications to maintain glucose within target range  - Assess nutritional intake and initiate nutrition service referral as needed  Outcome: Progressing

## 2021-04-29 NOTE — ASSESSMENT & PLAN NOTE
Early DKA, resolved  Anion gap closed  Home regiment Lantus 50 units t i d , Humalog 10 units t i d    Appetite improving, increase Lantus to 35 units HS

## 2021-04-29 NOTE — ASSESSMENT & PLAN NOTE
History of gastroparesis secondary to diabetes with numerous admissions for intractable nausea/vomiting due to gastroparesis  She has poorly-controlled diabetes and chronic opioid use  Patient reports severe lower quadrant abdominal pain associated with nausea and vomiting x1 day    Now improved, her diet was advanced today, still complaining of some nausea and GERD   Continue home Reglan 10 mg 4 times a day before meals and at bedtime  Roberto, last QTc qas wnl

## 2021-04-30 VITALS
TEMPERATURE: 98.5 F | SYSTOLIC BLOOD PRESSURE: 109 MMHG | RESPIRATION RATE: 16 BRPM | DIASTOLIC BLOOD PRESSURE: 66 MMHG | BODY MASS INDEX: 44.41 KG/M2 | OXYGEN SATURATION: 91 % | HEIGHT: 68 IN | HEART RATE: 95 BPM | WEIGHT: 293 LBS

## 2021-04-30 LAB
ANION GAP SERPL CALCULATED.3IONS-SCNC: 8 MMOL/L (ref 4–13)
BUN SERPL-MCNC: 31 MG/DL (ref 5–25)
CALCIUM SERPL-MCNC: 8.4 MG/DL (ref 8.3–10.1)
CHLORIDE SERPL-SCNC: 98 MMOL/L (ref 100–108)
CO2 SERPL-SCNC: 25 MMOL/L (ref 21–32)
CREAT SERPL-MCNC: 1.25 MG/DL (ref 0.6–1.3)
GFR SERPL CREATININE-BSD FRML MDRD: 48 ML/MIN/1.73SQ M
GLUCOSE SERPL-MCNC: 189 MG/DL (ref 65–140)
GLUCOSE SERPL-MCNC: 222 MG/DL (ref 65–140)
POTASSIUM SERPL-SCNC: 3.7 MMOL/L (ref 3.5–5.3)
SODIUM SERPL-SCNC: 131 MMOL/L (ref 136–145)

## 2021-04-30 PROCEDURE — 82948 REAGENT STRIP/BLOOD GLUCOSE: CPT

## 2021-04-30 PROCEDURE — 80048 BASIC METABOLIC PNL TOTAL CA: CPT | Performed by: INTERNAL MEDICINE

## 2021-04-30 PROCEDURE — 99239 HOSP IP/OBS DSCHRG MGMT >30: CPT | Performed by: INTERNAL MEDICINE

## 2021-04-30 RX ORDER — DICYCLOMINE HYDROCHLORIDE 10 MG/1
10 CAPSULE ORAL
Qty: 28 CAPSULE | Refills: 0 | Status: SHIPPED | OUTPATIENT
Start: 2021-04-30 | End: 2021-08-18 | Stop reason: HOSPADM

## 2021-04-30 RX ADMIN — ENOXAPARIN SODIUM 40 MG: 40 INJECTION SUBCUTANEOUS at 09:05

## 2021-04-30 RX ADMIN — MORPHINE SULFATE 60 MG: 30 TABLET, EXTENDED RELEASE ORAL at 09:02

## 2021-04-30 RX ADMIN — INSULIN LISPRO 5 UNITS: 100 INJECTION, SOLUTION INTRAVENOUS; SUBCUTANEOUS at 09:00

## 2021-04-30 RX ADMIN — METOCLOPRAMIDE 10 MG: 10 TABLET ORAL at 06:08

## 2021-04-30 RX ADMIN — NYSTATIN: 100000 POWDER TOPICAL at 09:15

## 2021-04-30 RX ADMIN — PANTOPRAZOLE SODIUM 40 MG: 40 TABLET, DELAYED RELEASE ORAL at 05:10

## 2021-04-30 RX ADMIN — DICYCLOMINE HYDROCHLORIDE 10 MG: 10 CAPSULE ORAL at 06:07

## 2021-04-30 RX ADMIN — MORPHINE SULFATE 15 MG: 15 TABLET, EXTENDED RELEASE ORAL at 09:05

## 2021-04-30 RX ADMIN — CARISOPRODOL 350 MG: 350 TABLET ORAL at 09:02

## 2021-04-30 RX ADMIN — PREGABALIN 150 MG: 75 CAPSULE ORAL at 09:03

## 2021-04-30 RX ADMIN — DIAZEPAM 5 MG: 5 TABLET ORAL at 09:02

## 2021-04-30 RX ADMIN — INSULIN LISPRO 2 UNITS: 100 INJECTION, SOLUTION INTRAVENOUS; SUBCUTANEOUS at 09:00

## 2021-04-30 RX ADMIN — FLUTICASONE FUROATE AND VILANTEROL TRIFENATATE 1 PUFF: 200; 25 POWDER RESPIRATORY (INHALATION) at 09:00

## 2021-04-30 NOTE — ASSESSMENT & PLAN NOTE
Early DKA, resolved  Home regiment Lantus 50 units t i d , Humalog 10 units t i d    Patient instructed to check her blood sugar 4 times a day

## 2021-04-30 NOTE — PLAN OF CARE
Problem: Potential for Falls  Goal: Patient will remain free of falls  Description: INTERVENTIONS:  - Assess patient frequently for physical needs  -  Identify cognitive and physical deficits and behaviors that affect risk of falls    -  [place] fall precautions as indicated by assessment   - Educate patient/family on patient safety including physical limitations  - Instruct patient to call for assistance with activity based on assessment  - Modify environment to reduce risk of injury  - Consider OT/PT consult to assist with strengthening/mobility  Outcome: Progressing     Problem: PAIN - ADULT  Goal: Verbalizes/displays adequate comfort level or baseline comfort level  Description: Interventions:  - Encourage patient to monitor pain and request assistance  - Assess pain using appropriate pain scale  - Administer analgesics based on type and severity of pain and evaluate response  - Implement non-pharmacological measures as appropriate and evaluate response  - Consider cultural and social influences on pain and pain management  - Notify physician/advanced practitioner if interventions unsuccessful or patient reports new pain  Outcome: Progressing     Problem: INFECTION - ADULT  Goal: Absence or prevention of progression during hospitalization  Description: INTERVENTIONS:  - Assess and monitor for signs and symptoms of infection  - Monitor lab/diagnostic results  - Monitor all insertion sites, i e  indwelling lines, tubes, and drains  - Monitor endotracheal if appropriate and nasal secretions for changes in amount and color  - [place] appropriate cooling/warming therapies per order  - Administer medications as ordered  - Instruct and encourage patient and family to use good hand hygiene technique  - Identify and instruct in appropriate isolation precautions for identified infection/condition  Outcome: Progressing     Problem: SAFETY ADULT  Goal: Patient will remain free of falls  Description: INTERVENTIONS:  - Assess patient frequently for physical needs  -  Identify cognitive and physical deficits and behaviors that affect risk of falls    -  [place] fall precautions as indicated by assessment   - Educate patient/family on patient safety including physical limitations  - Instruct patient to call for assistance with activity based on assessment  - Modify environment to reduce risk of injury  - Consider OT/PT consult to assist with strengthening/mobility  Outcome: Progressing     Problem: GASTROINTESTINAL - ADULT  Goal: Minimal or absence of nausea and/or vomiting  Description: INTERVENTIONS:  - Administer IV fluids if ordered to ensure adequate hydration  - Maintain NPO status until nausea and vomiting are resolved  - Nasogastric tube if ordered  - Administer ordered antiemetic medications as needed  - Provide nonpharmacologic comfort measures as appropriate  - Advance diet as tolerated, if ordered  - Consider nutrition services referral to assist patient with adequate nutrition and appropriate food choices  Outcome: Progressing     Problem: METABOLIC, FLUID AND ELECTROLYTES - ADULT  Goal: Glucose maintained within target range  Description: INTERVENTIONS:  - Monitor Blood Glucose as ordered  - Assess for signs and symptoms of hyperglycemia and hypoglycemia  - Administer ordered medications to maintain glucose within target range  - Assess nutritional intake and initiate nutrition service referral as needed  Outcome: Progressing

## 2021-04-30 NOTE — ASSESSMENT & PLAN NOTE
Suspect secondary to opioid and benzo withdrawal   Patient was asymptomatic with heart rate in 150s on 04/28    She missed several doses of morphine and Valium due to nausea   Medications were given, heart rate improved  TSH 0 3, free T4 normal, repeat labs in 4-6 week

## 2021-04-30 NOTE — ASSESSMENT & PLAN NOTE
History of gastroparesis secondary to diabetes with numerous admissions for intractable nausea/vomiting due to gastroparesis  She has poorly-controlled diabetes and chronic opioid use    Patient reports severe lower quadrant abdominal pain associated with nausea and vomiting prior to admission  Now resolved, tolerating diet well  Continue home Reglan 10 mg 4 times a day before meals and at bedtime  Follow-up with GI as outpatient

## 2021-04-30 NOTE — ASSESSMENT & PLAN NOTE
Lab Results   Component Value Date    HGBA1C 10 8 (H) 06/26/2020       Recent Labs     04/29/21  1058 04/29/21  1645 04/29/21 2034 04/30/21  0805   POCGLU 259* 240* 189* 189*       Blood Sugar Average: Last 72 hrs:  (P) 431 [id number]   Reports good blood sugar control at home with current regimen  Home regimen:  Lantus 50 units q h s , Humalog 10 units TID with meals  Advised the patient to check her blood sugar 4 times daily and follow-up with PCP/endocrinology for tighter blood sugar control

## 2021-04-30 NOTE — ASSESSMENT & PLAN NOTE
Elevated alk-phos and T bili, chronically  Right upper quadrant ultrasound showed enlarged mildly fatty liver, biliary duct normal caliber, CBD 5 mm, no intrahepatic biliary dilatation, no choledocholithiasis

## 2021-04-30 NOTE — DISCHARGE SUMMARY
[address]  Discharge- Deyanne Meo 1965, 63 y o  female MRN: [id number]  [facility] [address] 227-01 Encounter: [id number]  Primary Care Provider: Jai Garcia MD   Date and time admitted to hospital: 4/27/2021  7:19 PM    * Diabetic gastroparesis [facility])  Assessment & Plan  History of gastroparesis secondary to diabetes with numerous admissions for intractable nausea/vomiting due to gastroparesis  She has poorly-controlled diabetes and chronic opioid use  Patient reports severe lower quadrant abdominal pain associated with nausea and vomiting prior to admission  Now resolved, tolerating diet well  Continue home Reglan 10 mg 4 times a day before meals and at bedtime  Follow-up with GI as outpatient    Elevated LFTs  Assessment & Plan  Elevated alk-phos and T bili, chronically  Right upper quadrant ultrasound showed enlarged mildly fatty liver, biliary duct normal caliber, CBD 5 mm, no intrahepatic biliary dilatation, no choledocholithiasis  DKA, type 2 ([address] )  Assessment & Plan  Early DKA, resolved  Home regiment Lantus 50 units t i d , Humalog 10 units t i d  Patient instructed to check her blood sugar 4 times a day      Sinus tachycardia  Assessment & Plan  Suspect secondary to opioid and benzo withdrawal   Patient was asymptomatic with heart rate in 150s on 04/28    She missed several doses of morphine and Valium due to nausea   Medications were given, heart rate improved  TSH 0 3, free T4 normal, repeat labs in 4-6 week    Low TSH level  Assessment & Plan  TSH 0 3  Free T4 normal  Repeat thyroid panel in 4-6 weeks    Acute kidney injury superimposed on chronic kidney disease [facility])  Assessment & Plan  Lab Results   Component Value Date    EGFR 48 04/30/2021    EGFR 46 04/29/2021    EGFR 65 04/28/2021    CREATININE 1 25 04/30/2021    CREATININE 1 29 04/29/2021    CREATININE 0 97 04/28/2021   Mild ALEJANDRO secondary to dehydration, poor p o  intake, borderline hypotension  Improving  Recommend repeating BMP in 1 week    Essential hypertension  Assessment & Plan  Accelerated hypertension on admission, suspect secondary to nausea and vomiting  Now resolved    Opioid dependence with other opioid-induced disorder (HCC)  Assessment & Plan  Continue current regimen:  · Morphine Sulf Er 60mg BID   · Morphine Sulf Er 15mg BID  · Morphine Sulf Ir 15mg  TID PRN  · Pregabalin 150mg BID  · Carisoprodol 350mg BID  · Valium 5 mg daily    Asthma  Assessment & Plan  Not asthma exacerbation  No wheezing  Continue home inhalers and montelukast    Type 2 diabetes mellitus with diabetic neuropathy, with long-term current use of insulin [facility])  Assessment & Plan  Lab Results   Component Value Date    HGBA1C 10 8 (H) 06/26/2020       Recent Labs     04/29/21  1058 04/29/21  1645 04/29/21  2034 04/30/21  0805   POCGLU 259* 240* 189* 189*       Blood Sugar Average: Last 72 hrs:  (P) 730 [id number]   Reports good blood sugar control at home with current regimen  Home regimen:  Lantus 50 units q h s , Humalog 10 units TID with meals  Advised the patient to check her blood sugar 4 times daily and follow-up with PCP/endocrinology for tighter blood sugar control            Resolved Problems  Date Reviewed: 4/30/2021          Resolved    Dehydration 4/28/2021     Resolved by  ESTHER Luis          Admission Date:   Admission Orders (From admission, onward)     Ordered        04/28/21 1841  Inpatient Admission  Once         04/27/21 2344  Place in Observation  Once                     Admitting Diagnosis: Gastroparesis [K31 84]  Dehydration [E86 0]  Nausea [R11 0]  Vomiting [R11 10]  Abdominal pain [R10 9]  Tachycardia [R00 0]    HPI: Trista Anderson is a 63 y o  female with PMH T2DM, gastroparesis, HTN, asthma, chronic pain on opioids who presents with intractable nausea and vomiting x1 day    Patient states that she developed acute onset severe abdominal pain with intractable nausea and vomiting  Pain is located in epigastrium without radiation  She has not been able to eat today due to her nausea and vomiting  She attempted to take Reglan at home without relief  In the ED, lab work was significant for leukocytosis, anion gap elevation, elevated bilirubin, and ketone urea  Lipase was normal     Procedures Performed: No orders of the defined types were placed in this encounter  Summary of Hospital Course:  Patient presented with nausea and vomiting secondary to gastroparesis  Resolved with Reglan and Zofran  Patient tolerating diet  Recommend tight blood sugar control, follow-up with PCP/endocrinology  Also patient was in early DKA  Treated with subcu insulin and IV fluids  Resolved  Elevated alk-phos and T bili, chronically elevated  Right upper quadrant ultrasound showed fatty liver and normal biliary ducts  Sinus tachycardia secondary to opioid and benzo withdrawal, patient missed multiple doses of Valium and morphine  Once medications were given, tachycardia resolved  Abnormal thyroid panel, recommend repeating in 4-6 weeks  Significant Findings, Care, Treatment and Services Provided:   Chest x-ray 04/28/2021  IMPRESSION:  Bibasilar opacities representing atelectasis or pneumonia      Right upper quadrant ultrasound 04/28/2021  FINDINGS:  PANCREAS: Pancreatic tail obscured by overlying bowel gas  Visualized portions of the pancreas are within normal limits      AORTA AND IVC:  Visualized portions are normal for patient age      LIVER:  Size:  Enlarged  The liver measures 20 cm in the midclavicular line  Contour:  Surface contour is smooth  Parenchyma: There is mild diffuse increased echogenicity with smooth echotexture, without significant beam attenuation or loss of periportal echogenicity  Most consistent with mild hepatic steatosis  No evidence of suspicious mass    Limited imaging of the main portal vein shows it to be patent and hepatopetal  BILIARY:  Gallbladder has been removed  No intrahepatic biliary dilatation  CBD measures 5 mm  No choledocholithiasis      KIDNEY:   Right kidney measures 11 4 cm  Within normal limits      ASCITES:   None         IMPRESSION:  Enlarged mildly fatty liver  Pancreatic tail obscured by overlying bowel gas  Gallbladder has been removed  Biliary ducts appear normal caliber  Unremarkable right kidney  Complications: none    Condition at Discharge: good         Discharge instructions/Information to patient and family:   See after visit summary for information provided to patient and family  Provisions for Follow-Up Care:  See after visit summary for information related to follow-up care and any pertinent home health orders  PCP: Adela Mray MD    Disposition: Home with outpatient PT    Planned Readmission: No    Discharge Statement   I spent 35 minutes discharging the patient  This time was spent on the day of discharge  I had direct contact with the patient on the day of discharge  Additional documentation is required if more than 30 minutes were spent on discharge  Discharge Medications:  See after visit summary for reconciled discharge medications provided to patient and family

## 2021-04-30 NOTE — ASSESSMENT & PLAN NOTE
Lab Results   Component Value Date    EGFR 48 04/30/2021    EGFR 46 04/29/2021    EGFR 65 04/28/2021    CREATININE 1 25 04/30/2021    CREATININE 1 29 04/29/2021    CREATININE 0 97 04/28/2021   Mild ALEJANDRO secondary to dehydration, poor p o  intake, borderline hypotension  Improving  Recommend repeating BMP in 1 week

## 2021-04-30 NOTE — ASSESSMENT & PLAN NOTE
Lab Results   Component Value Date    HGBA1C 10 8 (H) 06/26/2020       Recent Labs     04/29/21  0731 04/29/21  1058 04/29/21  1645 04/29/21 2034   POCGLU 153* 259* 240* 189*       Blood Sugar Average: Last 72 hrs:  (P) 242 4   Reports good blood sugar control at home with current regimen  Home regimen:  Lantus 50 units q h s , Humalog 10 units TID with meals  Advised the patient to check her blood sugar 4 times daily and follow-up with PCP/endocrinology for tighter blood sugar control

## 2021-05-06 ENCOUNTER — TELEPHONE (OUTPATIENT)
Dept: GASTROENTEROLOGY | Facility: MEDICAL CENTER | Age: 56
End: 2021-05-06

## 2021-05-06 NOTE — TELEPHONE ENCOUNTER
----- Message from Sarah Galvez PA-C sent at 5/3/2021  2:09 PM EDT -----  Please call pt to schedule hospital f/up of N/V in 2-4 weeks

## 2021-05-10 ENCOUNTER — IMMUNIZATIONS (OUTPATIENT)
Dept: FAMILY MEDICINE CLINIC | Facility: HOSPITAL | Age: 56
End: 2021-05-10

## 2021-05-10 DIAGNOSIS — Z23 ENCOUNTER FOR IMMUNIZATION: Primary | ICD-10-CM

## 2021-05-10 PROCEDURE — 91301 SARS-COV-2 / COVID-19 MRNA VACCINE (MODERNA) 100 MCG: CPT

## 2021-05-10 PROCEDURE — 0012A SARS-COV-2 / COVID-19 MRNA VACCINE (MODERNA) 100 MCG: CPT

## 2021-05-15 ENCOUNTER — DOCUMENTATION (OUTPATIENT)
Dept: OTHER | Facility: HOSPITAL | Age: 56
End: 2021-05-15

## 2021-05-15 ENCOUNTER — TELEPHONE (OUTPATIENT)
Dept: OTHER | Facility: OTHER | Age: 56
End: 2021-05-15

## 2021-05-15 DIAGNOSIS — K31.84 GASTROPARESIS: ICD-10-CM

## 2021-05-15 RX ORDER — METOCLOPRAMIDE 10 MG/1
TABLET ORAL
Qty: 360 TABLET | Refills: 0 | Status: SHIPPED | OUTPATIENT
Start: 2021-05-15 | End: 2022-01-03

## 2021-05-15 NOTE — TELEPHONE ENCOUNTER
Patient is calling because she is completely out of her reglan and needs it for today  She needs it sent [facility] in [place]  Paged Dr Sarah Collier and he said that it looks like it was already called in  Called patient back and advised her to check her pharmacy and if it is not there, then to call us back for a refill  She said that she has moved from [place] and will now be seen in [facility]

## 2021-07-21 NOTE — ASSESSMENT & PLAN NOTE
Patient required intubation for worsening hypercapnic respiratory failure  Polypharmacy in the setting of ALEJANDRO is likely contributing, although multifocal pneumonia is present  Follow ABGs, CXR as needed  Wean ventilator as tolerated  Bronchodilators as needed  VAP prophylaxis  Island Pedicle Flap Text: The defect edges were debeveled with a #15c scalpel blade.  Given the location of the defect, shape of the defect and the proximity to free margins an island pedicle advancement flap was deemed most appropriate.  Using a sterile surgical marker, an appropriate advancement flap was drawn incorporating the defect, outlining the appropriate donor tissue and placing the expected incisions within the relaxed skin tension lines where possible.    The area thus outlined was incised deep to adipose tissue with a #15 scalpel blade.  The skin margins were undermined to an appropriate distance in all directions around the primary defect and laterally outward around the island pedicle utilizing iris scissors.  There was minimal undermining beneath the pedicle flap.

## 2021-08-14 ENCOUNTER — HOSPITAL ENCOUNTER (INPATIENT)
Facility: HOSPITAL | Age: 56
LOS: 4 days | Discharge: HOME | DRG: 074 | End: 2021-08-18
Attending: EMERGENCY MEDICINE | Admitting: STUDENT IN AN ORGANIZED HEALTH CARE EDUCATION/TRAINING PROGRAM
Payer: MEDICARE

## 2021-08-14 ENCOUNTER — APPOINTMENT (EMERGENCY)
Dept: CT IMAGING | Facility: HOSPITAL | Age: 56
DRG: 074 | End: 2021-08-14
Payer: MEDICARE

## 2021-08-14 DIAGNOSIS — E11.65 HYPERGLYCEMIA DUE TO TYPE 2 DIABETES MELLITUS (HCC): ICD-10-CM

## 2021-08-14 DIAGNOSIS — R11.2 INTRACTABLE NAUSEA AND VOMITING: Primary | ICD-10-CM

## 2021-08-14 DIAGNOSIS — I10 ESSENTIAL HYPERTENSION: ICD-10-CM

## 2021-08-14 PROBLEM — R65.10 SIRS (SYSTEMIC INFLAMMATORY RESPONSE SYNDROME) (HCC): Status: ACTIVE | Noted: 2021-08-14

## 2021-08-14 LAB
ALBUMIN SERPL BCP-MCNC: 4.3 G/DL (ref 3.5–5)
ALP SERPL-CCNC: 176 U/L (ref 46–116)
ALT SERPL W P-5'-P-CCNC: 24 U/L (ref 12–78)
ANION GAP SERPL CALCULATED.3IONS-SCNC: 14 MMOL/L (ref 4–13)
AST SERPL W P-5'-P-CCNC: 17 U/L (ref 5–45)
BASE EX.OXY STD BLDV CALC-SCNC: 70.1 % (ref 60–80)
BASE EXCESS BLDV CALC-SCNC: 0 MMOL/L
BASOPHILS # BLD AUTO: 0.04 THOUSANDS/ΜL (ref 0–0.1)
BASOPHILS NFR BLD AUTO: 0 % (ref 0–1)
BETA-HYDROXYBUTYRATE: 1.4 MMOL/L
BILIRUB SERPL-MCNC: 1.04 MG/DL (ref 0.2–1)
BUN SERPL-MCNC: 11 MG/DL (ref 5–25)
CALCIUM SERPL-MCNC: 9.5 MG/DL (ref 8.3–10.1)
CHLORIDE SERPL-SCNC: 92 MMOL/L (ref 100–108)
CO2 SERPL-SCNC: 27 MMOL/L (ref 21–32)
CREAT SERPL-MCNC: 0.78 MG/DL (ref 0.6–1.3)
EOSINOPHIL # BLD AUTO: 0.02 THOUSAND/ΜL (ref 0–0.61)
EOSINOPHIL NFR BLD AUTO: 0 % (ref 0–6)
ERYTHROCYTE [DISTWIDTH] IN BLOOD BY AUTOMATED COUNT: 12.9 % (ref 11.6–15.1)
GFR SERPL CREATININE-BSD FRML MDRD: 85 ML/MIN/1.73SQ M
GLUCOSE SERPL-MCNC: 310 MG/DL (ref 65–140)
GLUCOSE SERPL-MCNC: 328 MG/DL (ref 65–140)
GLUCOSE SERPL-MCNC: 341 MG/DL (ref 65–140)
HCO3 BLDV-SCNC: 26.5 MMOL/L (ref 24–30)
HCT VFR BLD AUTO: 43.8 % (ref 34.8–46.1)
HGB BLD-MCNC: 14.4 G/DL (ref 11.5–15.4)
IMM GRANULOCYTES # BLD AUTO: 0.08 THOUSAND/UL (ref 0–0.2)
IMM GRANULOCYTES NFR BLD AUTO: 1 % (ref 0–2)
LIPASE SERPL-CCNC: 45 U/L (ref 73–393)
LYMPHOCYTES # BLD AUTO: 0.98 THOUSANDS/ΜL (ref 0.6–4.47)
LYMPHOCYTES NFR BLD AUTO: 8 % (ref 14–44)
MAGNESIUM SERPL-MCNC: 1.5 MG/DL (ref 1.6–2.6)
MCH RBC QN AUTO: 27 PG (ref 26.8–34.3)
MCHC RBC AUTO-ENTMCNC: 32.9 G/DL (ref 31.4–37.4)
MCV RBC AUTO: 82 FL (ref 82–98)
MONOCYTES # BLD AUTO: 0.31 THOUSAND/ΜL (ref 0.17–1.22)
MONOCYTES NFR BLD AUTO: 2 % (ref 4–12)
NEUTROPHILS # BLD AUTO: 11.31 THOUSANDS/ΜL (ref 1.85–7.62)
NEUTS SEG NFR BLD AUTO: 89 % (ref 43–75)
NRBC BLD AUTO-RTO: 0 /100 WBCS
O2 CT BLDV-SCNC: 14.8 ML/DL
PCO2 BLDV: 50.3 MM HG (ref 42–50)
PH BLDV: 7.34 [PH] (ref 7.3–7.4)
PLATELET # BLD AUTO: 213 THOUSANDS/UL (ref 149–390)
PMV BLD AUTO: 12.7 FL (ref 8.9–12.7)
PO2 BLDV: 37.2 MM HG (ref 35–45)
POTASSIUM SERPL-SCNC: 4.7 MMOL/L (ref 3.5–5.3)
PROT SERPL-MCNC: 9 G/DL (ref 6.4–8.2)
RBC # BLD AUTO: 5.34 MILLION/UL (ref 3.81–5.12)
SODIUM SERPL-SCNC: 133 MMOL/L (ref 136–145)
TROPONIN I SERPL-MCNC: <0.02 NG/ML
TROPONIN I SERPL-MCNC: <0.02 NG/ML
WBC # BLD AUTO: 12.74 THOUSAND/UL (ref 4.31–10.16)

## 2021-08-14 PROCEDURE — 99285 EMERGENCY DEPT VISIT HI MDM: CPT | Performed by: PHYSICIAN ASSISTANT

## 2021-08-14 PROCEDURE — 96361 HYDRATE IV INFUSION ADD-ON: CPT

## 2021-08-14 PROCEDURE — 36415 COLL VENOUS BLD VENIPUNCTURE: CPT | Performed by: PHYSICIAN ASSISTANT

## 2021-08-14 PROCEDURE — 82948 REAGENT STRIP/BLOOD GLUCOSE: CPT

## 2021-08-14 PROCEDURE — 96374 THER/PROPH/DIAG INJ IV PUSH: CPT

## 2021-08-14 PROCEDURE — 82805 BLOOD GASES W/O2 SATURATION: CPT | Performed by: PHYSICIAN ASSISTANT

## 2021-08-14 PROCEDURE — 83735 ASSAY OF MAGNESIUM: CPT | Performed by: PHYSICIAN ASSISTANT

## 2021-08-14 PROCEDURE — G1004 CDSM NDSC: HCPCS

## 2021-08-14 PROCEDURE — 84484 ASSAY OF TROPONIN QUANT: CPT | Performed by: PHYSICIAN ASSISTANT

## 2021-08-14 PROCEDURE — 93005 ELECTROCARDIOGRAM TRACING: CPT

## 2021-08-14 PROCEDURE — 83690 ASSAY OF LIPASE: CPT | Performed by: PHYSICIAN ASSISTANT

## 2021-08-14 PROCEDURE — 82010 KETONE BODYS QUAN: CPT | Performed by: PHYSICIAN ASSISTANT

## 2021-08-14 PROCEDURE — 96372 THER/PROPH/DIAG INJ SC/IM: CPT

## 2021-08-14 PROCEDURE — 80053 COMPREHEN METABOLIC PANEL: CPT | Performed by: PHYSICIAN ASSISTANT

## 2021-08-14 PROCEDURE — 85025 COMPLETE CBC W/AUTO DIFF WBC: CPT | Performed by: PHYSICIAN ASSISTANT

## 2021-08-14 PROCEDURE — 96375 TX/PRO/DX INJ NEW DRUG ADDON: CPT

## 2021-08-14 PROCEDURE — 99285 EMERGENCY DEPT VISIT HI MDM: CPT

## 2021-08-14 PROCEDURE — 74177 CT ABD & PELVIS W/CONTRAST: CPT

## 2021-08-14 RX ORDER — SODIUM CHLORIDE 9 MG/ML
500 INJECTION, SOLUTION INTRAVENOUS CONTINUOUS
Status: DISPENSED | OUTPATIENT
Start: 2021-08-14 | End: 2021-08-15

## 2021-08-14 RX ORDER — ONDANSETRON 2 MG/ML
4 INJECTION INTRAMUSCULAR; INTRAVENOUS ONCE
Status: COMPLETED | OUTPATIENT
Start: 2021-08-14 | End: 2021-08-14

## 2021-08-14 RX ORDER — MAGNESIUM SULFATE HEPTAHYDRATE 40 MG/ML
2 INJECTION, SOLUTION INTRAVENOUS ONCE
Status: COMPLETED | OUTPATIENT
Start: 2021-08-14 | End: 2021-08-15

## 2021-08-14 RX ORDER — HALOPERIDOL 5 MG/ML
1 INJECTION INTRAMUSCULAR ONCE
Status: COMPLETED | OUTPATIENT
Start: 2021-08-14 | End: 2021-08-14

## 2021-08-14 RX ORDER — METOCLOPRAMIDE HYDROCHLORIDE 5 MG/ML
10 INJECTION INTRAMUSCULAR; INTRAVENOUS ONCE
Status: COMPLETED | OUTPATIENT
Start: 2021-08-14 | End: 2021-08-14

## 2021-08-14 RX ORDER — SODIUM CHLORIDE 9 MG/ML
1000 INJECTION, SOLUTION INTRAVENOUS ONCE
Status: COMPLETED | OUTPATIENT
Start: 2021-08-14 | End: 2021-08-14

## 2021-08-14 RX ORDER — DILTIAZEM HYDROCHLORIDE 5 MG/ML
15 INJECTION INTRAVENOUS ONCE
Status: DISCONTINUED | OUTPATIENT
Start: 2021-08-14 | End: 2021-08-14

## 2021-08-14 RX ORDER — DILTIAZEM HYDROCHLORIDE 5 MG/ML
25 INJECTION INTRAVENOUS ONCE
Status: DISCONTINUED | OUTPATIENT
Start: 2021-08-14 | End: 2021-08-14

## 2021-08-14 RX ADMIN — SODIUM CHLORIDE 10 UNITS/HR: 9 INJECTION, SOLUTION INTRAVENOUS at 22:43

## 2021-08-14 RX ADMIN — SODIUM CHLORIDE 500 ML/HR: 0.9 INJECTION, SOLUTION INTRAVENOUS at 22:00

## 2021-08-14 RX ADMIN — HALOPERIDOL LACTATE 1 MG: 5 INJECTION, SOLUTION INTRAMUSCULAR at 20:08

## 2021-08-14 RX ADMIN — ONDANSETRON 4 MG: 2 INJECTION INTRAMUSCULAR; INTRAVENOUS at 20:10

## 2021-08-14 RX ADMIN — SODIUM CHLORIDE 1000 ML: 0.9 INJECTION, SOLUTION INTRAVENOUS at 20:07

## 2021-08-14 RX ADMIN — METOCLOPRAMIDE 10 MG: 5 INJECTION, SOLUTION INTRAMUSCULAR; INTRAVENOUS at 21:48

## 2021-08-14 RX ADMIN — FAMOTIDINE 20 MG: 10 INJECTION INTRAVENOUS at 20:11

## 2021-08-14 RX ADMIN — IOHEXOL 100 ML: 350 INJECTION, SOLUTION INTRAVENOUS at 21:01

## 2021-08-14 RX ADMIN — SODIUM CHLORIDE 1000 ML/HR: 0.9 INJECTION, SOLUTION INTRAVENOUS at 21:58

## 2021-08-15 LAB
ALBUMIN SERPL BCP-MCNC: 3.2 G/DL (ref 3.5–5)
ALP SERPL-CCNC: 131 U/L (ref 46–116)
ALT SERPL W P-5'-P-CCNC: 16 U/L (ref 12–78)
ANION GAP SERPL CALCULATED.3IONS-SCNC: 12 MMOL/L (ref 4–13)
ANION GAP SERPL CALCULATED.3IONS-SCNC: 9 MMOL/L (ref 4–13)
AST SERPL W P-5'-P-CCNC: 13 U/L (ref 5–45)
ATRIAL RATE: 119 BPM
ATRIAL RATE: 122 BPM
ATRIAL RATE: 123 BPM
BACTERIA UR QL AUTO: ABNORMAL /HPF
BASOPHILS # BLD AUTO: 0.07 THOUSANDS/ΜL (ref 0–0.1)
BASOPHILS NFR BLD AUTO: 1 % (ref 0–1)
BILIRUB SERPL-MCNC: 0.93 MG/DL (ref 0.2–1)
BILIRUB UR QL STRIP: ABNORMAL
BUN SERPL-MCNC: 10 MG/DL (ref 5–25)
BUN SERPL-MCNC: 11 MG/DL (ref 5–25)
CALCIUM ALBUM COR SERPL-MCNC: 9.5 MG/DL (ref 8.3–10.1)
CALCIUM SERPL-MCNC: 8.9 MG/DL (ref 8.3–10.1)
CALCIUM SERPL-MCNC: 9.1 MG/DL (ref 8.3–10.1)
CHLORIDE SERPL-SCNC: 95 MMOL/L (ref 100–108)
CHLORIDE SERPL-SCNC: 97 MMOL/L (ref 100–108)
CLARITY UR: CLEAR
CO2 SERPL-SCNC: 25 MMOL/L (ref 21–32)
CO2 SERPL-SCNC: 28 MMOL/L (ref 21–32)
COLOR UR: YELLOW
CREAT SERPL-MCNC: 0.72 MG/DL (ref 0.6–1.3)
CREAT SERPL-MCNC: 0.82 MG/DL (ref 0.6–1.3)
EOSINOPHIL # BLD AUTO: 0.02 THOUSAND/ΜL (ref 0–0.61)
EOSINOPHIL NFR BLD AUTO: 0 % (ref 0–6)
ERYTHROCYTE [DISTWIDTH] IN BLOOD BY AUTOMATED COUNT: 12.9 % (ref 11.6–15.1)
GFR SERPL CREATININE-BSD FRML MDRD: 80 ML/MIN/1.73SQ M
GFR SERPL CREATININE-BSD FRML MDRD: 93 ML/MIN/1.73SQ M
GLUCOSE SERPL-MCNC: 192 MG/DL (ref 65–140)
GLUCOSE SERPL-MCNC: 208 MG/DL (ref 65–140)
GLUCOSE SERPL-MCNC: 224 MG/DL (ref 65–140)
GLUCOSE SERPL-MCNC: 247 MG/DL (ref 65–140)
GLUCOSE SERPL-MCNC: 250 MG/DL (ref 65–140)
GLUCOSE SERPL-MCNC: 262 MG/DL (ref 65–140)
GLUCOSE SERPL-MCNC: 265 MG/DL (ref 65–140)
GLUCOSE SERPL-MCNC: 269 MG/DL (ref 65–140)
GLUCOSE UR STRIP-MCNC: ABNORMAL MG/DL
HCT VFR BLD AUTO: 37.5 % (ref 34.8–46.1)
HGB BLD-MCNC: 12.1 G/DL (ref 11.5–15.4)
HGB UR QL STRIP.AUTO: ABNORMAL
IMM GRANULOCYTES # BLD AUTO: 0.07 THOUSAND/UL (ref 0–0.2)
IMM GRANULOCYTES NFR BLD AUTO: 1 % (ref 0–2)
KETONES UR STRIP-MCNC: ABNORMAL MG/DL
LACTATE SERPL-SCNC: 1.5 MMOL/L (ref 0.5–2)
LEUKOCYTE ESTERASE UR QL STRIP: NEGATIVE
LIPASE SERPL-CCNC: 36 U/L (ref 73–393)
LYMPHOCYTES # BLD AUTO: 1.53 THOUSANDS/ΜL (ref 0.6–4.47)
LYMPHOCYTES NFR BLD AUTO: 11 % (ref 14–44)
MCH RBC QN AUTO: 26.1 PG (ref 26.8–34.3)
MCHC RBC AUTO-ENTMCNC: 32.3 G/DL (ref 31.4–37.4)
MCV RBC AUTO: 81 FL (ref 82–98)
MONOCYTES # BLD AUTO: 0.82 THOUSAND/ΜL (ref 0.17–1.22)
MONOCYTES NFR BLD AUTO: 6 % (ref 4–12)
NEUTROPHILS # BLD AUTO: 11.84 THOUSANDS/ΜL (ref 1.85–7.62)
NEUTS SEG NFR BLD AUTO: 81 % (ref 43–75)
NITRITE UR QL STRIP: NEGATIVE
NON-SQ EPI CELLS URNS QL MICRO: ABNORMAL /HPF
NRBC BLD AUTO-RTO: 0 /100 WBCS
P AXIS: 42 DEGREES
P AXIS: 70 DEGREES
P AXIS: 70 DEGREES
PH UR STRIP.AUTO: 6 [PH]
PLATELET # BLD AUTO: 209 THOUSANDS/UL (ref 149–390)
PLATELET # BLD AUTO: 230 THOUSANDS/UL (ref 149–390)
PMV BLD AUTO: 12.8 FL (ref 8.9–12.7)
PMV BLD AUTO: 13.1 FL (ref 8.9–12.7)
POTASSIUM SERPL-SCNC: 3.7 MMOL/L (ref 3.5–5.3)
POTASSIUM SERPL-SCNC: 3.9 MMOL/L (ref 3.5–5.3)
PR INTERVAL: 140 MS
PR INTERVAL: 142 MS
PROT SERPL-MCNC: 7.5 G/DL (ref 6.4–8.2)
PROT UR STRIP-MCNC: ABNORMAL MG/DL
QRS AXIS: 17 DEGREES
QRS AXIS: 34 DEGREES
QRS AXIS: 43 DEGREES
QRSD INTERVAL: 118 MS
QRSD INTERVAL: 74 MS
QRSD INTERVAL: 74 MS
QT INTERVAL: 114 MS
QT INTERVAL: 312 MS
QT INTERVAL: 320 MS
QTC INTERVAL: 163 MS
QTC INTERVAL: 444 MS
QTC INTERVAL: 531 MS
RBC # BLD AUTO: 4.64 MILLION/UL (ref 3.81–5.12)
RBC #/AREA URNS AUTO: ABNORMAL /HPF
SODIUM SERPL-SCNC: 132 MMOL/L (ref 136–145)
SODIUM SERPL-SCNC: 134 MMOL/L (ref 136–145)
SP GR UR STRIP.AUTO: >=1.03 (ref 1–1.03)
T WAVE AXIS: 0 DEGREES
T WAVE AXIS: 22 DEGREES
T WAVE AXIS: 23 DEGREES
TSH SERPL DL<=0.05 MIU/L-ACNC: 0.53 UIU/ML (ref 0.36–3.74)
UROBILINOGEN UR QL STRIP.AUTO: 0.2 E.U./DL
VENTRICULAR RATE: 122 BPM
VENTRICULAR RATE: 123 BPM
VENTRICULAR RATE: 166 BPM
WBC # BLD AUTO: 14.35 THOUSAND/UL (ref 4.31–10.16)
WBC #/AREA URNS AUTO: ABNORMAL /HPF

## 2021-08-15 PROCEDURE — 87040 BLOOD CULTURE FOR BACTERIA: CPT | Performed by: PHYSICIAN ASSISTANT

## 2021-08-15 PROCEDURE — 81001 URINALYSIS AUTO W/SCOPE: CPT | Performed by: STUDENT IN AN ORGANIZED HEALTH CARE EDUCATION/TRAINING PROGRAM

## 2021-08-15 PROCEDURE — 84443 ASSAY THYROID STIM HORMONE: CPT | Performed by: PHYSICIAN ASSISTANT

## 2021-08-15 PROCEDURE — 85025 COMPLETE CBC W/AUTO DIFF WBC: CPT | Performed by: PHYSICIAN ASSISTANT

## 2021-08-15 PROCEDURE — 93010 ELECTROCARDIOGRAM REPORT: CPT | Performed by: INTERNAL MEDICINE

## 2021-08-15 PROCEDURE — 85049 AUTOMATED PLATELET COUNT: CPT | Performed by: PHYSICIAN ASSISTANT

## 2021-08-15 PROCEDURE — 82948 REAGENT STRIP/BLOOD GLUCOSE: CPT

## 2021-08-15 PROCEDURE — 83605 ASSAY OF LACTIC ACID: CPT | Performed by: PHYSICIAN ASSISTANT

## 2021-08-15 PROCEDURE — 99223 1ST HOSP IP/OBS HIGH 75: CPT | Performed by: STUDENT IN AN ORGANIZED HEALTH CARE EDUCATION/TRAINING PROGRAM

## 2021-08-15 PROCEDURE — 80053 COMPREHEN METABOLIC PANEL: CPT | Performed by: PHYSICIAN ASSISTANT

## 2021-08-15 PROCEDURE — 80048 BASIC METABOLIC PNL TOTAL CA: CPT | Performed by: PHYSICIAN ASSISTANT

## 2021-08-15 PROCEDURE — 83690 ASSAY OF LIPASE: CPT | Performed by: PHYSICIAN ASSISTANT

## 2021-08-15 RX ORDER — DIAZEPAM 5 MG/1
5 TABLET ORAL
Status: DISCONTINUED | OUTPATIENT
Start: 2021-08-15 | End: 2021-08-18 | Stop reason: HOSPADM

## 2021-08-15 RX ORDER — MONTELUKAST SODIUM 10 MG/1
10 TABLET ORAL
Status: DISCONTINUED | OUTPATIENT
Start: 2021-08-15 | End: 2021-08-18 | Stop reason: HOSPADM

## 2021-08-15 RX ORDER — INSULIN GLARGINE 100 [IU]/ML
50 INJECTION, SOLUTION SUBCUTANEOUS
Status: DISCONTINUED | OUTPATIENT
Start: 2021-08-15 | End: 2021-08-18 | Stop reason: HOSPADM

## 2021-08-15 RX ORDER — INSULIN GLARGINE 100 [IU]/ML
35 INJECTION, SOLUTION SUBCUTANEOUS
Status: DISCONTINUED | OUTPATIENT
Start: 2021-08-15 | End: 2021-08-15

## 2021-08-15 RX ORDER — HEPARIN SODIUM 5000 [USP'U]/ML
5000 INJECTION, SOLUTION INTRAVENOUS; SUBCUTANEOUS EVERY 8 HOURS SCHEDULED
Status: DISCONTINUED | OUTPATIENT
Start: 2021-08-15 | End: 2021-08-18 | Stop reason: HOSPADM

## 2021-08-15 RX ORDER — INSULIN GLARGINE 100 [IU]/ML
10 INJECTION, SOLUTION SUBCUTANEOUS ONCE
Status: COMPLETED | OUTPATIENT
Start: 2021-08-15 | End: 2021-08-15

## 2021-08-15 RX ORDER — PANTOPRAZOLE SODIUM 20 MG/1
20 TABLET, DELAYED RELEASE ORAL
Status: DISCONTINUED | OUTPATIENT
Start: 2021-08-15 | End: 2021-08-18 | Stop reason: HOSPADM

## 2021-08-15 RX ORDER — INSULIN GLARGINE 100 [IU]/ML
50 INJECTION, SOLUTION SUBCUTANEOUS
Status: DISCONTINUED | OUTPATIENT
Start: 2021-08-15 | End: 2021-08-15

## 2021-08-15 RX ORDER — METOCLOPRAMIDE 10 MG/1
10 TABLET ORAL
Status: DISCONTINUED | OUTPATIENT
Start: 2021-08-15 | End: 2021-08-18 | Stop reason: HOSPADM

## 2021-08-15 RX ORDER — SODIUM CHLORIDE 9 MG/ML
125 INJECTION, SOLUTION INTRAVENOUS CONTINUOUS
Status: DISCONTINUED | OUTPATIENT
Start: 2021-08-15 | End: 2021-08-17

## 2021-08-15 RX ORDER — MORPHINE SULFATE 15 MG/1
15 TABLET, FILM COATED, EXTENDED RELEASE ORAL 2 TIMES DAILY
Status: DISCONTINUED | OUTPATIENT
Start: 2021-08-15 | End: 2021-08-16

## 2021-08-15 RX ORDER — HYDRALAZINE HYDROCHLORIDE 20 MG/ML
10 INJECTION INTRAMUSCULAR; INTRAVENOUS EVERY 6 HOURS PRN
Status: DISCONTINUED | OUTPATIENT
Start: 2021-08-15 | End: 2021-08-15

## 2021-08-15 RX ORDER — LABETALOL 20 MG/4 ML (5 MG/ML) INTRAVENOUS SYRINGE
10 EVERY 6 HOURS PRN
Status: DISCONTINUED | OUTPATIENT
Start: 2021-08-15 | End: 2021-08-17

## 2021-08-15 RX ORDER — ONDANSETRON 2 MG/ML
4 INJECTION INTRAMUSCULAR; INTRAVENOUS EVERY 6 HOURS PRN
Status: DISCONTINUED | OUTPATIENT
Start: 2021-08-15 | End: 2021-08-18 | Stop reason: HOSPADM

## 2021-08-15 RX ADMIN — METOCLOPRAMIDE 10 MG: 10 TABLET ORAL at 12:14

## 2021-08-15 RX ADMIN — SODIUM CHLORIDE 125 ML/HR: 0.9 INJECTION, SOLUTION INTRAVENOUS at 15:06

## 2021-08-15 RX ADMIN — METOCLOPRAMIDE 10 MG: 10 TABLET ORAL at 01:22

## 2021-08-15 RX ADMIN — MORPHINE SULFATE 15 MG: 15 TABLET, EXTENDED RELEASE ORAL at 08:49

## 2021-08-15 RX ADMIN — SODIUM CHLORIDE 125 ML/HR: 0.9 INJECTION, SOLUTION INTRAVENOUS at 01:23

## 2021-08-15 RX ADMIN — METOPROLOL TARTRATE 25 MG: 25 TABLET, FILM COATED ORAL at 13:59

## 2021-08-15 RX ADMIN — METOCLOPRAMIDE 10 MG: 10 TABLET ORAL at 21:40

## 2021-08-15 RX ADMIN — METOCLOPRAMIDE 10 MG: 10 TABLET ORAL at 17:16

## 2021-08-15 RX ADMIN — INSULIN GLARGINE 50 UNITS: 100 INJECTION, SOLUTION SUBCUTANEOUS at 21:39

## 2021-08-15 RX ADMIN — INSULIN GLARGINE 35 UNITS: 100 INJECTION, SOLUTION SUBCUTANEOUS at 01:21

## 2021-08-15 RX ADMIN — MORPHINE SULFATE 15 MG: 15 TABLET, EXTENDED RELEASE ORAL at 17:16

## 2021-08-15 RX ADMIN — INSULIN LISPRO 3 UNITS: 100 INJECTION, SOLUTION INTRAVENOUS; SUBCUTANEOUS at 21:39

## 2021-08-15 RX ADMIN — INSULIN LISPRO 3 UNITS: 100 INJECTION, SOLUTION INTRAVENOUS; SUBCUTANEOUS at 17:16

## 2021-08-15 RX ADMIN — HEPARIN SODIUM 5000 UNITS: 5000 INJECTION INTRAVENOUS; SUBCUTANEOUS at 06:14

## 2021-08-15 RX ADMIN — INSULIN GLARGINE 10 UNITS: 100 INJECTION, SOLUTION SUBCUTANEOUS at 12:50

## 2021-08-15 RX ADMIN — MAGNESIUM SULFATE HEPTAHYDRATE 2 G: 40 INJECTION, SOLUTION INTRAVENOUS at 00:25

## 2021-08-15 RX ADMIN — HEPARIN SODIUM 5000 UNITS: 5000 INJECTION INTRAVENOUS; SUBCUTANEOUS at 01:22

## 2021-08-15 RX ADMIN — METOPROLOL TARTRATE 25 MG: 25 TABLET, FILM COATED ORAL at 21:40

## 2021-08-15 RX ADMIN — ONDANSETRON 4 MG: 2 INJECTION INTRAMUSCULAR; INTRAVENOUS at 08:49

## 2021-08-15 RX ADMIN — HEPARIN SODIUM 5000 UNITS: 5000 INJECTION INTRAVENOUS; SUBCUTANEOUS at 21:40

## 2021-08-15 RX ADMIN — PANTOPRAZOLE SODIUM 20 MG: 20 TABLET, DELAYED RELEASE ORAL at 06:14

## 2021-08-15 RX ADMIN — INSULIN LISPRO 2 UNITS: 100 INJECTION, SOLUTION INTRAVENOUS; SUBCUTANEOUS at 08:49

## 2021-08-15 RX ADMIN — MONTELUKAST SODIUM 10 MG: 10 TABLET, COATED ORAL at 01:22

## 2021-08-15 RX ADMIN — METOCLOPRAMIDE 10 MG: 10 TABLET ORAL at 06:14

## 2021-08-15 RX ADMIN — HEPARIN SODIUM 5000 UNITS: 5000 INJECTION INTRAVENOUS; SUBCUTANEOUS at 13:59

## 2021-08-15 RX ADMIN — MONTELUKAST SODIUM 10 MG: 10 TABLET, COATED ORAL at 21:40

## 2021-08-15 RX ADMIN — INSULIN LISPRO 3 UNITS: 100 INJECTION, SOLUTION INTRAVENOUS; SUBCUTANEOUS at 12:51

## 2021-08-15 NOTE — ASSESSMENT & PLAN NOTE
Sinus tachycardia and leukocytosis  -Possibly sepsis 2* gastroenteritis vs sirs from dehydration  -ekg demonstrates sinus tach  Questionable a flutter on ekg but appears more consistent w/sinus tach F/u formal interpretation  -ct a/p w/prominent appendic but no periappendiceal fat stranding  No URI s/sx    ua negative for uti  -check lactic acid, bcx, monitor off abx  -ivf hydration, monitor on tele

## 2021-08-15 NOTE — ASSESSMENT & PLAN NOTE
Lab Results   Component Value Date    HGBA1C 10 8 (H) 06/26/2020       Recent Labs     08/14/21 2022 08/14/21  2243   POCGLU 328* 310*       Blood Sugar Average: Last 72 hrs:  (P) 319   Continue reglan

## 2021-08-15 NOTE — H&P
[address]  H&P- Frank uD 1965, 61 y o  female MRN: [id number]  Unit/Bed#: E4 -01 Encounter: [id number]  Primary Care Provider: Ellen Chambers MD   Date and time admitted to hospital: 8/14/2021  7:14 PM    * Intractable nausea and vomiting  Assessment & Plan  Suspect 2* diabetic gastroparesis vs gastroenteritis from fish sandwich  Does have sirs vs sepsis criteria  Mild ag of 14 but beta hydroxybutyrate only 1  Ct a/p concerning for prominent appendix at 9mm but no periappendiceal fat stranding or wall thickening  Initially w/ RLQ pain now in epigastric  Case was d/w surgery per ED provider and cleared for admission to [facility]  Npo supportive care ivf hydration  Monitor abd exam        SIRS (systemic inflammatory response syndrome) (HCC)  Assessment & Plan  Sinus tachycardia and leukocytosis  -Possibly sepsis 2* gastroenteritis vs sirs from dehydration  -ekg demonstrates sinus tach  Questionable a flutter on ekg but appears more consistent w/sinus tach F/u formal interpretation  -ct a/p w/prominent appendic but no periappendiceal fat stranding  No URI s/sx  ua negative for uti  -check lactic acid, bcx, monitor off abx  -ivf hydration, monitor on tele    Morbid obesity [address] [address] )  Assessment & Plan  Encourage weight loss    Essential hypertension  Assessment & Plan  Hold lasix    Diabetic gastroparesis [facility])  Assessment & Plan  Lab Results   Component Value Date    HGBA1C 10 8 (H) 06/26/2020       Recent Labs     08/14/21 2022 08/14/21  2243   POCGLU 328* 310*       Blood Sugar Average: Last 72 hrs:  (P) 319   Continue reglan        Type 2 diabetes mellitus with diabetic neuropathy, with long-term current use of insulin [facility])  Assessment & Plan  Lab Results   Component Value Date    HGBA1C 10 8 (H) 06/26/2020       Recent Labs     08/14/21 2022 08/14/21  2243   POCGLU 328* 310*       Blood Sugar Average: Last 72 hrs:  (P) 319     W/hyperglycemia and and gastroparesis    Pt notes she is on lantus 50 iu qhs and sometimes ssi at home w/meals  No longer on metformin  Presently on insulin gtt  W/bs still in 300s  Will repeat bmp  If worsening gap will need elevated level of care  If not will transition to long acting and ssi/poc bs      VTE Prophylaxis: Heparin  / sequential compression device   Code Status: fc  POLST: There is no POLST form on file for this patient (pre-hospital)  Discussion with family:   Anticipated Length of Stay:  Patient will be admitted on an Observation basis with an anticipated length of stay of  lesser than 2 midnights  Justification for Hospital Stay: intractable n/v    Total Time for Visit, including Counseling / Coordination of Care: 45 minutes  Greater than 50% of this total time spent on direct patient counseling and coordination of care  Chief Complaint:   abd pain n/v    History of Present Illness:    Travis Daley is a 61 y o  female who presents with pmh of IDDM, gastroparesis, chronic pain asthma, morbid obesity coming to hospital for abd pain n/v x2d  Pt somewhat poor historian  She notes day pta developing n/v  This was yellow/foamy  She did not sleep well  She had persistent n/v and then developing of abdominal pain  This was intiially in suprapubic region per her report  It did not radiate  Chills but no fevers  No diarrhea  She notes day prior she ate a fish sandwich and nothing else that day  She notes her appetite is usually poor  No one else ate this and no sick contacts  She came to ed for evaluation and had sirs criteria  Gap was only mildly elevated  Beta hydroxybutyrate was minimally elevated  Ct a/p had demonstrated prominent appendix but no periappendiceal fat stranding  Case was d/w surgery team and admission [facility] to [facility]  Review of Systems:    Review of Systems   Constitutional: Positive for chills  Negative for fever  Respiratory: Negative for cough and shortness of breath      Cardiovascular: Negative for chest pain  Gastrointestinal: Positive for abdominal pain, nausea and vomiting  Negative for diarrhea  All other systems reviewed and are negative  Past Medical and Surgical History:     Past Medical History:   Diagnosis Date    Acute respiratory insufficiency 11/23/2019    Asthma     Chronic pain     Diabetes mellitus ([facility] [address] )     Gastroparesis     Sepsis ([facility] [address] ) 11/23/2019       Past Surgical History:   Procedure Laterality Date    CERVICAL LAMINECTOMY      CHOLECYSTECTOMY      CHOLECYSTECTOMY LAPAROSCOPIC N/A 11/10/2018    Procedure: CHOLECYSTECTOMY LAPAROSCOPIC w/ ioc;  Surgeon: Heydi Cruz MD;  Location: [facility];  Service: General    HYSTERECTOMY      JOINT REPLACEMENT Bilateral     knee    TOE AMPUTATION Right 2/2/2018    Procedure: AMPUTATION TOE, RIGHT GREAT TOE;  Surgeon: Tiffanie Adrian DPM;  Location: [facility];  Service: Podiatry       Meds/Allergies:    Prior to Admission medications    Medication Sig Start Date End Date Taking?  Authorizing Provider   albuterol (PROVENTIL HFA,VENTOLIN HFA) 90 mcg/act inhaler Inhale 2 puffs every 4 (four) hours as needed 10/14/19  Yes Historical Provider, MD   BD PEN NEEDLE HERNAN U/F 32G X 4 MM MISC  8/23/19  Yes Historical Provider, MD   carisoprodol (SOMA) 350 mg tablet Take 350 mg by mouth 2 (two) times a day   Yes Historical Provider, MD   diazepam (VALIUM) 5 mg tablet Take 5 mg by mouth daily at bedtime as needed for anxiety   Yes Historical Provider, MD   dicyclomine (BENTYL) 10 mg capsule Take 1 capsule (10 mg total) by mouth 4 (four) times a day (before meals and at bedtime) for 7 days 4/30/21 8/14/21 Yes Vangie Maurer MD   fluticasone (FLONASE) 50 mcg/act nasal spray 1 spray into each nostril daily   Yes Historical Provider, MD   furosemide (LASIX) 20 mg tablet Take 40 mg by mouth as needed  8/16/19  Yes Historical Provider, MD   insulin glargine (LANTUS) 100 units/mL subcutaneous injection Inject 50 Units under the skin daily at bedtime     Yes Historical Provider, MD   insulin lispro (HumaLOG) 100 units/mL injection INJECT 10 UNITS UNDER THE SKIN 3 (THREE) TIMES A DAY WITH MEALS 6/30/20  Yes Flavia Atkinson MD   LYRICA 150 MG capsule Take 150 mg by mouth 2 (two) times a day 6/12/19  Yes Historical Provider, MD   metoclopramide (REGLAN) 10 mg tablet TAKE 1 TABLET PO QID BEFORE MEALS AND HS 5/15/21  Yes Zayda Person PA-C   montelukast (SINGULAIR) 10 mg tablet Take 10 mg by mouth daily at bedtime   Yes Historical Provider, MD   morphine (MS CONTIN) 15 mg 12 hr tablet Take 15 mg by mouth 2 (two) times a day   Yes Historical Provider, MD   morphine (MS CONTIN) 60 mg 12 hr tablet Take 60 mg by mouth 2 (two) times a day 9/13/19  Yes Historical Provider, MD   nystatin (MYCOSTATIN) powder Apply topically 2 (two) times a day 7/23/19  Yes Miles Lutz MD   omeprazole (PriLOSEC) 20 mg delayed release capsule Take 20 mg by mouth daily   Yes Historical Provider, MD Joanne Samuel 500-50 MCG/DOSE inhaler Take 1 puff by mouth 2 (two) times a day 2/28/19  Yes Historical Provider, MD   Insulin Pen Needle 29G X 5MM MISC by Does not apply route 4 (four) times a day 9/20/19   Rula Fischer MD   morphine (MSIR) 15 mg tablet Take 15 mg by mouth 3 (three) times a day as needed for moderate pain or severe pain      Historical Provider, MD   ondansetron (ZOFRAN) 4 mg tablet Take 1 tablet (4 mg total) by mouth every 6 (six) hours 12/24/19   Maryanne Salazar PA-C   pravastatin (PRAVACHOL) 10 mg tablet Take 10 mg by mouth daily at bedtime 8/16/19   Historical Provider, MD BERGER have reviewed home medications with patient personally  Allergies:    Allergies   Allergen Reactions    Nsaids GI Intolerance       Social History:     Marital Status: /Civil Union   Occupation:   Patient Pre-hospital Living Situation:   Patient Pre-hospital Level of Mobility:   Patient Pre-hospital Diet Restrictions:   Substance Use History:   Social History     Substance and Sexual Activity   Alcohol Use Not Currently    Comment: rarely     Social History     Tobacco Use   Smoking Status Never Smoker   Smokeless Tobacco Never Used     Social History     Substance and Sexual Activity   Drug Use No       Family History:    Family History   Problem Relation Age of Onset    Diabetes Mother     Diabetes Maternal Grandmother     No Known Problems Father        Physical Exam:     Vitals:   Blood Pressure: (!) 189/111 (08/14/21 2332)  Pulse: (!) 124 (08/14/21 2332)  Temperature: (!) 97 4 °F (36 3 °C) (08/14/21 2332)  Temp Source: Temporal (08/14/21 2332)  Respirations: 20 (08/14/21 2332)  Height: 5' 8" (172 7 cm) (08/14/21 2332)  Weight - Scale: 135 kg (296 lb 15 4 oz) (08/14/21 2332)  SpO2: 94 % (08/14/21 2332)    Physical Exam  Vitals reviewed  Constitutional:       General: She is not in acute distress  Appearance: She is obese  She is ill-appearing  She is not toxic-appearing or diaphoretic  HENT:      Head: Normocephalic and atraumatic  Right Ear: External ear normal       Left Ear: External ear normal       Nose: Nose normal    Eyes:      Extraocular Movements: Extraocular movements intact  Cardiovascular:      Rate and Rhythm: Regular rhythm  Tachycardia present  Heart sounds: No murmur heard  No friction rub  No gallop  Pulmonary:      Effort: Pulmonary effort is normal  No respiratory distress  Breath sounds: No wheezing, rhonchi or rales  Abdominal:      General: There is no distension  Palpations: Abdomen is soft  There is no mass  Tenderness: There is abdominal tenderness  There is no guarding or rebound  Hernia: No hernia is present  Musculoskeletal:      Cervical back: Normal range of motion  Right lower leg: No edema  Left lower leg: No edema  Skin:     General: Skin is warm and dry  Neurological:      Mental Status: She is alert  Mental status is at baseline     Psychiatric:         Mood and Affect: Mood normal  Additional Data:     Lab Results: I have personally reviewed pertinent reports  Results from last 7 days   Lab Units 08/14/21 2007   WBC Thousand/uL 12 74*   HEMOGLOBIN g/dL 14 4   HEMATOCRIT % 43 8   PLATELETS Thousands/uL 213   NEUTROS PCT % 89*   LYMPHS PCT % 8*   MONOS PCT % 2*   EOS PCT % 0     Results from last 7 days   Lab Units 08/14/21 2007   SODIUM mmol/L 133*   POTASSIUM mmol/L 4 7   CHLORIDE mmol/L 92*   CO2 mmol/L 27   BUN mg/dL 11   CREATININE mg/dL 0 78   ANION GAP mmol/L 14*   CALCIUM mg/dL 9 5   ALBUMIN g/dL 4 3   TOTAL BILIRUBIN mg/dL 1 04*   ALK PHOS U/L 176*   ALT U/L 24   AST U/L 17   GLUCOSE RANDOM mg/dL 341*         Results from last 7 days   Lab Units 08/14/21 2243 08/14/21 2022   POC GLUCOSE mg/dl 310* 328*               Imaging: I have personally reviewed pertinent reports  CT abdomen pelvis with contrast   Final Result by Kati Corral DO (08/14 2224)      Minimal left-sided perinephric stranding which may represent infection  Prominent appendix with no significant surrounding inflammatory changes  Findings are equivocal for acute appendicitis  I personally discussed this study with Katlyn Shah on 8/14/2021 at 10:15 PM                      Workstation performed: [id number]             EKG, Pathology, and Other Studies Reviewed on Admission:   · EKG: sinus tach    Allscripts / Epic Records Reviewed: Yes     ** Please Note: This note has been constructed using a voice recognition system   **

## 2021-08-15 NOTE — PLAN OF CARE
Problem: Potential for Falls  Goal: Patient will remain free of falls  Description: INTERVENTIONS:  - Educate patient/family on patient safety including physical limitations  - Instruct patient to call for assistance with activity   - Consult OT/PT to assist with strengthening/mobility   - Keep Call bell within reach  - Keep bed low and locked with side rails adjusted as appropriate  - Keep care items and personal belongings within reach  - Initiate and maintain comfort rounds  - Make Fall Risk Sign visible to staff  Problem: MOBILITY - ADULT  Goal: Maintain or return to baseline ADL function  Description: INTERVENTIONS:  -  Assess patient's ability to carry out ADLs; assess patient's baseline for ADL function and identify physical deficits which impact ability to perform ADLs (bathing, care of mouth/teeth, toileting, grooming, dressing, etc )  - Assess/evaluate cause of self-care deficits   - Assess range of motion  - Assess patient's mobility; develop plan if impaired  - Assess patient's need for assistive devices and provide as appropriate  - Encourage maximum independence but intervene and supervise when necessary  - Involve family in performance of ADLs  - Assess for home care needs following discharge   - Consider OT consult to assist with ADL evaluation and planning for discharge  - Provide patient education as appropriate  8/15/2021 0051 by Moise Souza RN  Outcome: Progressing  8/15/2021 0050 by Moise Souza RN  Outcome: Progressing  Goal: Maintains/Returns to pre admission functional level  Description: INTERVENTIONS:  - Perform BMAT or MOVE assessment daily    - Set and communicate daily mobility goal to care team and patient/family/caregiver     - Collaborate with rehabilitation services on mobility goals if consulted  Problem: PAIN - ADULT  Goal: Verbalizes/displays adequate comfort level or baseline comfort level  Description: Interventions:  - Encourage patient to monitor pain and request assistance  - Assess pain using appropriate pain scale  - Administer analgesics based on type and severity of pain and evaluate response  - Implement non-pharmacological measures as appropriate and evaluate response  - Consider cultural and social influences on pain and pain management  - Notify physician/advanced practitioner if interventions unsuccessful or patient reports new pain  Outcome: Progressing     - Out of bed for toileting  - Record patient progress and toleration of activity level   8/15/2021 0051 by Sawyer Ruiz RN  Outcome: Progressing  8/15/2021 0050 by Sawyer Ruiz RN  Outcome: Progressing     - Initiate/Maintain bed alarm  - Obtain necessary fall risk management equipment:  Problem: PAIN - ADULT  Goal: Verbalizes/displays adequate comfort level or baseline comfort level  Description: Interventions:  - Encourage patient to monitor pain and request assistance  - Assess pain using appropriate pain scale  - Administer analgesics based on type and severity of pain and evaluate response  - Implement non-pharmacological measures as appropriate and evaluate response  - Consider cultural and social influences on pain and pain management  - Notify physician/advanced practitioner if interventions unsuccessful or patient reports new pain  Outcome: Progressing     Problem: INFECTION - ADULT  Goal: Absence or prevention of progression during hospitalization  Description: INTERVENTIONS:  - Assess and monitor for signs and symptoms of infection  - Monitor lab/diagnostic results  - Monitor all insertion sites, i e  indwelling lines, tubes, and drains  - Monitor endotracheal if appropriate and nasal secretions for changes in amount and color  - [place] appropriate cooling/warming therapies per order  - Administer medications as ordered  - Instruct and encourage patient and family to use good hand hygiene technique  - Identify and instruct in appropriate isolation precautions for identified infection/condition  Outcome: Progressing     Problem: SAFETY ADULT  Goal: Patient will remain free of falls  Description: INTERVENTIONS:  - Educate patient/family on patient safety including physical limitations  - Instruct patient to call for assistance with activity   - Consult OT/PT to assist with strengthening/mobility   - Keep Call bell within reach  - Keep bed low and locked with side rails adjusted as appropriate  - Keep care items and personal belongings within reach  - Initiate and maintain comfort rounds  - Make Fall Risk Sign visible to staff  - Obtain necessary fall risk management equipment:   Problem: SAFETY ADULT  Goal: Maintain or return to baseline ADL function  Description: INTERVENTIONS:  -  Assess patient's ability to carry out ADLs; assess patient's baseline for ADL function and identify physical deficits which impact ability to perform ADLs (bathing, care of mouth/teeth, toileting, grooming, dressing, etc )  - Assess/evaluate cause of self-care deficits   - Assess range of motion  - Assess patient's mobility; develop plan if impaired  - Assess patient's need for assistive devices and provide as appropriate  - Encourage maximum independence but intervene and supervise when necessary  - Involve family in performance of ADLs  - Assess for home care needs following discharge   - Consider OT consult to assist with ADL evaluation and planning for discharge  - Provide patient education as appropriate  8/15/2021 0051 by Kya Maldonado, RN  Outcome: Progressing  8/15/2021 0050 by Kya Maldonado, RN  Outcome: Progressing    Problem: DISCHARGE PLANNING  Goal: Discharge to home or other facility with appropriate resources  Description: INTERVENTIONS:  - Identify barriers to discharge w/patient and caregiver  - Arrange for needed discharge resources and transportation as appropriate  - Identify discharge learning needs (meds, wound care, etc )  - Arrange for interpretive services to assist at discharge as needed  - Refer to Case Management Department for coordinating discharge planning if the patient needs post-hospital services based on physician/advanced practitioner order or complex needs related to functional status, cognitive ability, or social support system  Outcome: Progressing     Problem: Knowledge Deficit  Goal: Patient/family/caregiver demonstrates understanding of disease process, treatment plan, medications, and discharge instructions  Description: Complete learning assessment and assess knowledge base    Interventions:  - Provide teaching at level of understanding  - Provide teaching via preferred learning methods  Outcome: Progressing     Problem: METABOLIC, FLUID AND ELECTROLYTES - ADULT  Goal: Electrolytes maintained within normal limits  Description: INTERVENTIONS:  - Monitor labs and assess patient for signs and symptoms of electrolyte imbalances  - Administer electrolyte replacement as ordered  - Monitor response to electrolyte replacements, including repeat lab results as appropriate  - Instruct patient on fluid and nutrition as appropriate  Outcome: Progressing  Goal: Fluid balance maintained  Description: INTERVENTIONS:  - Monitor labs   - Monitor I/O and WT  - Instruct patient on fluid and nutrition as appropriate  - Assess for signs & symptoms of volume excess or deficit  Outcome: Progressing  Goal: Glucose maintained within target range  Description: INTERVENTIONS:  - Monitor Blood Glucose as ordered  - Assess for signs and symptoms of hyperglycemia and hypoglycemia  - Administer ordered medications to maintain glucose within target range  - Assess nutritional intake and initiate nutrition service referral as needed  Outcome: Progressing     8/15/2021 0050 by Zoe Tripathi RN  Outcome: Progressing     - Apply yellow socks and bracelet for high fall risk patients  - Consider moving patient to room near nurses station  8/15/2021 0051 by Zoe Tripathi, RN  Outcome: Progressing  8/15/2021 0050 by Moise Souza RN  Outcome: Progressing     - Apply yellow socks and bracelet for high fall risk patients  - Consider moving patient to room near nurses station  8/15/2021 0051 by Moise Souza RN  Outcome: Progressing  8/15/2021 0050 by Moise Souza RN  Outcome: Progressing

## 2021-08-15 NOTE — ED PROVIDER NOTES
History  Chief Complaint   Patient presents with    Vomiting     patient states, "I have gastroparesis and have been vomiting since last night"  denies fevers  50-year-old female with past medical history of diabetes mellitus, gastroparesis, and chronic pain presents to the ED for evaluation of nausea and right lower quadrant abdominal pain  Patient has been admitted for other times in the past 2 years for intractable nausea and vomiting secondary to gastroparesis  Patient has a history of poor compliance with home diabetes medication  Patient states she has not use insulin the past 2 days  Blood sugar on arrival 361  Patient states that nausea and pain is very similar to which she chronically feels whenever her gastroparesis is "acting up"  Patient states that she has been unable to hold anything down p o  Due to symptoms  States she had 5 episodes of vomiting today  NBNB  At any fever, cough, chest pain, shortness of breath, dyspnea, dysuria, and flank pain  History provided by:  Patient   used: No    Vomiting  Severity:  Moderate  Duration:  1 day  Timing:  Constant  Associated symptoms: abdominal pain    Associated symptoms: no arthralgias, no chills, no cough, no diarrhea, no fever, no headaches, no myalgias, no sore throat and no URI    Risk factors: diabetes        Prior to Admission Medications   Prescriptions Last Dose Informant Patient Reported? Taking?    BD PEN NEEDLE HERNAN U/F 32G X 4 MM MISC   Yes Yes   Insulin Pen Needle 29G X 5MM MISC   No No   Sig: by Does not apply route 4 (four) times a day   LYRICA 150 MG capsule   Yes Yes   Sig: Take 150 mg by mouth 2 (two) times a day   WIXELA INHUB 500-50 MCG/DOSE inhaler   Yes Yes   Sig: Take 1 puff by mouth 2 (two) times a day   albuterol (PROVENTIL HFA,VENTOLIN HFA) 90 mcg/act inhaler   Yes Yes   Sig: Inhale 2 puffs every 4 (four) hours as needed   carisoprodol (SOMA) 350 mg tablet  Self Yes Yes   Sig: Take 350 mg by mouth 2 (two) times a day   diazepam (VALIUM) 5 mg tablet  Self Yes Yes   Sig: Take 5 mg by mouth daily at bedtime as needed for anxiety   dicyclomine (BENTYL) 10 mg capsule   No Yes   Sig: Take 1 capsule (10 mg total) by mouth 4 (four) times a day (before meals and at bedtime) for 7 days   fluticasone (FLONASE) 50 mcg/act nasal spray  Self Yes Yes   Si spray into each nostril daily   furosemide (LASIX) 20 mg tablet   Yes Yes   Sig: Take 40 mg by mouth as needed    insulin glargine (LANTUS) 100 units/mL subcutaneous injection  Self Yes Yes   Sig: Inject 50 Units under the skin daily at bedtime     insulin lispro (HumaLOG) 100 units/mL injection   No Yes   Sig: INJECT 10 UNITS UNDER THE SKIN 3 (THREE) TIMES A DAY WITH MEALS   metoclopramide (REGLAN) 10 mg tablet   No Yes   Sig: TAKE 1 TABLET PO QID BEFORE MEALS AND HS   montelukast (SINGULAIR) 10 mg tablet  Self Yes Yes   Sig: Take 10 mg by mouth daily at bedtime   morphine (MS CONTIN) 15 mg 12 hr tablet   Yes Yes   Sig: Take 15 mg by mouth 2 (two) times a day   morphine (MS CONTIN) 60 mg 12 hr tablet   Yes Yes   Sig: Take 60 mg by mouth 2 (two) times a day   nystatin (MYCOSTATIN) powder   No Yes   Sig: Apply topically 2 (two) times a day   omeprazole (PriLOSEC) 20 mg delayed release capsule  Self Yes Yes   Sig: Take 20 mg by mouth daily   ondansetron (ZOFRAN) 4 mg tablet   No No   Sig: Take 1 tablet (4 mg total) by mouth every 6 (six) hours   pravastatin (PRAVACHOL) 10 mg tablet   Yes No   Sig: Take 10 mg by mouth daily at bedtime      Facility-Administered Medications: None       Past Medical History:   Diagnosis Date    Acute respiratory insufficiency 2019    Asthma     Chronic pain     Sepsis ([address] ) 2019       Past Surgical History:   Procedure Laterality Date    CERVICAL LAMINECTOMY      CHOLECYSTECTOMY      CHOLECYSTECTOMY LAPAROSCOPIC N/A 11/10/2018    Procedure: CHOLECYSTECTOMY LAPAROSCOPIC w/ ioc;  Surgeon: Serigo Farnsworth MD; Location: [facility];  Service: General    HYSTERECTOMY      JOINT REPLACEMENT Bilateral     knee    TOE AMPUTATION Right 2/2/2018    Procedure: AMPUTATION TOE, RIGHT GREAT TOE;  Surgeon: Chelsy Vazquez DPM;  Location: [facility];  Service: Podiatry       Family History   Problem Relation Age of Onset    Diabetes Mother     Diabetes Maternal Grandmother     No Known Problems Father      I have reviewed and agree with the history as documented  E-Cigarette/Vaping     E-Cigarette/Vaping Substances     Social History     Tobacco Use    Smoking status: Never Smoker    Smokeless tobacco: Never Used   Substance Use Topics    Alcohol use: Not Currently     Comment: rarely    Drug use: No       Review of Systems   Constitutional: Positive for appetite change  Negative for chills, diaphoresis, fatigue and fever  HENT: Negative for congestion, rhinorrhea and sore throat  Respiratory: Negative for cough and shortness of breath  Cardiovascular: Negative for chest pain and palpitations  Gastrointestinal: Positive for abdominal pain and vomiting  Negative for abdominal distention, blood in stool, constipation, diarrhea and nausea  Genitourinary: Negative for difficulty urinating, dysuria and hematuria  Musculoskeletal: Negative for arthralgias, back pain, gait problem, myalgias and neck pain  Skin: Negative for color change, pallor and rash  Neurological: Negative for dizziness, weakness, light-headedness, numbness and headaches  Hematological: Negative for adenopathy  Psychiatric/Behavioral: Negative for behavioral problems  Physical Exam  Physical Exam  Vitals and nursing note reviewed  Constitutional:       General: She is not in acute distress  Appearance: Normal appearance  She is well-developed  She is obese  She is not ill-appearing, toxic-appearing or diaphoretic  HENT:      Head: Normocephalic and atraumatic        Right Ear: Tympanic membrane, ear canal and external ear normal       Left Ear: Tympanic membrane, ear canal and external ear normal       Nose: Nose normal  No congestion  Mouth/Throat:      Mouth: Mucous membranes are moist       Pharynx: Oropharynx is clear  No oropharyngeal exudate or posterior oropharyngeal erythema  Eyes:      General: No scleral icterus  Right eye: No discharge  Left eye: No discharge  Extraocular Movements: Extraocular movements intact  Conjunctiva/sclera: Conjunctivae normal       Pupils: Pupils are equal, round, and reactive to light  Cardiovascular:      Rate and Rhythm: Normal rate and regular rhythm  Pulses: Normal pulses  Heart sounds: Normal heart sounds  No murmur heard  Pulmonary:      Effort: Pulmonary effort is normal  No respiratory distress  Breath sounds: Normal breath sounds  No wheezing  Abdominal:      General: Bowel sounds are normal  There is no distension  Palpations: Abdomen is soft  There is no mass  Tenderness: There is abdominal tenderness (RRQ)  There is no guarding or rebound  Comments: Abdomen soft  No masses  Mild right lower quadrant tenderness  Patient states this is the typical region where she feels pain with gastroparesis  Musculoskeletal:         General: No deformity or signs of injury  Normal range of motion  Cervical back: Normal range of motion  No rigidity  No muscular tenderness  Lymphadenopathy:      Cervical: No cervical adenopathy  Skin:     General: Skin is warm and dry  Capillary Refill: Capillary refill takes less than 2 seconds  Coloration: Skin is not jaundiced or pale  Findings: No erythema  Neurological:      Mental Status: She is alert and oriented to person, place, and time  Motor: No weakness        Gait: Gait normal    Psychiatric:         Behavior: Behavior normal          Vital Signs  ED Triage Vitals   Temperature Pulse Respirations Blood Pressure SpO2   08/14/21 1912 08/14/21 1912 08/14/21 1912 08/14/21 1913 08/14/21 1912   98 4 °F (36 9 °C) (!) 117 18 (!) 186/93 95 %      Temp Source Heart Rate Source Patient Position - Orthostatic VS BP Location FiO2 (%)   08/14/21 1912 08/14/21 2142 08/14/21 1912 08/14/21 1912 --   Oral Monitor Sitting Left arm       Pain Score       08/15/21 0000       5           Vitals:    08/16/21 2147 08/16/21 2314 08/17/21 0700 08/17/21 1500   BP: 132/82 143/84 152/80 112/70   Pulse: 82 79 80 62   Patient Position - Orthostatic VS:  Lying Lying Lying         Visual Acuity      ED Medications  Medications   sodium chloride 0 9 % infusion (1,000 mL/hr Intravenous New Bag 8/14/21 2158)     Followed by   sodium chloride 0 9 % infusion (0 mL/hr Intravenous Stopped 8/15/21 0200)   montelukast (SINGULAIR) tablet 10 mg (10 mg Oral Given 8/16/21 2148)   pantoprazole (PROTONIX) EC tablet 20 mg (20 mg Oral Given 8/17/21 0521)   diazepam (VALIUM) tablet 5 mg (has no administration in time range)   metoclopramide (REGLAN) tablet 10 mg (10 mg Oral Given 8/17/21 1715)   ondansetron (ZOFRAN) injection 4 mg (4 mg Intravenous Given 8/17/21 1712)   heparin (porcine) subcutaneous injection 5,000 Units (5,000 Units Subcutaneous Given 8/17/21 1720)   insulin regular (HumuLIN R,NovoLIN R) 1 Units/mL in sodium chloride 0 9 % 100 mL infusion (0 Units/hr Intravenous Stopped 8/15/21 0219)   insulin lispro (HumaLOG) 100 units/mL subcutaneous injection 1-6 Units (1 Units Subcutaneous Not Given 8/17/21 1720)   insulin glargine (LANTUS) subcutaneous injection 50 Units 0 5 mL (50 Units Subcutaneous Given 8/16/21 2300)   metoprolol tartrate (LOPRESSOR) tablet 25 mg (25 mg Oral Given 8/17/21 0849)   morphine (MS CONTIN) ER tablet 75 mg (75 mg Oral Given 8/17/21 1715)   lisinopril (ZESTRIL) tablet 10 mg (10 mg Oral Given 8/17/21 0849)   ondansetron (ZOFRAN) injection 4 mg (4 mg Intravenous Given 8/14/21 2010)   famotidine (PEPCID) injection 20 mg (20 mg Intravenous Given 8/14/21 2011)   haloperidol lactate (HALDOL) injection 1 mg (1 mg Intramuscular Given 8/14/21 2008)   sodium chloride 0 9 % bolus 1,000 mL (0 mL Intravenous Stopped 8/15/21 0046)   iohexol (OMNIPAQUE) 350 MG/ML injection (SINGLE-DOSE) 100 mL (100 mL Intravenous Given 8/14/21 2101)   metoclopramide (REGLAN) injection 10 mg (10 mg Intravenous Given 8/14/21 2148)   magnesium sulfate 2 g/50 mL IVPB (premix) 2 g (0 g Intravenous Stopped 8/15/21 0300)   insulin glargine (LANTUS) subcutaneous injection 10 Units 0 1 mL (10 Units Subcutaneous Given 8/15/21 1250)   acetaminophen (TYLENOL) tablet 650 mg (650 mg Oral Given 8/17/21 1149)       Diagnostic Studies  Results Reviewed     Procedure Component Value Units Date/Time    Troponin I [[id number]]  (Normal) Collected: 08/14/21 2258    Lab Status: Final result Specimen: Blood from Arm, Right Updated: 08/14/21 2319     Troponin I <0 02 ng/mL     Fingerstick Glucose (POCT) [[id number]]  (Abnormal) Collected: 08/14/21 2243    Lab Status: Final result Updated: 08/14/21 2244     POC Glucose 310 mg/dl     Beta Hydroxybutyrate [[id number]]  (Abnormal) Collected: 08/14/21 2139    Lab Status: Final result Specimen: Blood from Arm, Left Updated: 08/14/21 2157     BETA-HYDROXYBUTYRATE 1 4 mmol/L     Blood gas, venous [[id number]]  (Abnormal) Collected: 08/14/21 2139    Lab Status: Final result Specimen: Blood from Arm, Left Updated: 08/14/21 2154     pH, Keaton 7 340     pCO2, Keaton 50 3 mm Hg      pO2, Ke[place] 37 2 mm Hg      HCO3, Keaton 26 5 mmol/L      Base Excess, Keaton 0 0 mmol/L      O2 Content, Keaton 14 8 ml/dL      O2 HGB, VENOUS 70 1 %     Troponin I [[id number]]  (Normal) Collected: 08/14/21 2007    Lab Status: Final result Specimen: Blood from Arm, Right Updated: 08/14/21 2043     Troponin I <0 02 ng/mL     Comprehensive metabolic panel [[id number]]  (Abnormal) Collected: 08/14/21 2007    Lab Status: Final result Specimen: Blood from Arm, Right Updated: 08/14/21 2041     Sodium 133 mmol/L      Potassium 4 7 mmol/L Chloride 92 mmol/L      CO2 27 mmol/L      ANION GAP 14 mmol/L      BUN 11 mg/dL      Creatinine 0 78 mg/dL      Glucose 341 mg/dL      Calcium 9 5 mg/dL      AST 17 U/L      ALT 24 U/L      Alkaline Phosphatase 176 U/L      Total Protein 9 0 g/dL      Albumin 4 3 g/dL      Total Bilirubin 1 04 mg/dL      eGFR 85 ml/min/1 73sq m     Narrative:      National Kidney Disease Foundation guidelines for Chronic Kidney Disease (CKD):     Stage 1 with normal or high GFR (GFR > 90 mL/min/1 73 square meters)    Stage 2 Mild CKD (GFR = 60-89 mL/min/1 73 square meters)    Stage 3A Moderate CKD (GFR = 45-59 mL/min/1 73 square meters)    Stage 3B Moderate CKD (GFR = 30-44 mL/min/1 73 square meters)    Stage 4 Severe CKD (GFR = 15-29 mL/min/1 73 square meters)    Stage 5 End Stage CKD (GFR <15 mL/min/1 73 square meters)  Note: GFR calculation is accurate only with a steady state creatinine    Lipase [[id number]]  (Abnormal) Collected: 08/14/21 2007    Lab Status: Final result Specimen: Blood from Arm, Right Updated: 08/14/21 2041     Lipase 45 u/L     Magnesium [[id number]]  (Abnormal) Collected: 08/14/21 2007    Lab Status: Final result Specimen: Blood from Arm, Right Updated: 08/14/21 2034     Magnesium 1 5 mg/dL     CBC and differential [[id number]]  (Abnormal) Collected: 08/14/21 2007    Lab Status: Final result Specimen: Blood from Arm, Right Updated: 08/14/21 2024     WBC 12 74 Thousand/uL      RBC 5 34 Million/uL      Hemoglobin 14 4 g/dL      Hematocrit 43 8 %      MCV 82 fL      MCH 27 0 pg      MCHC 32 9 g/dL      RDW 12 9 %      MPV 12 7 fL      Platelets 161 Thousands/uL      nRBC 0 /100 WBCs      Neutrophils Relative 89 %      Immat GRANS % 1 %      Lymphocytes Relative 8 %      Monocytes Relative 2 %      Eosinophils Relative 0 %      Basophils Relative 0 %      Neutrophils Absolute 11 31 Thousands/µL      Immature Grans Absolute 0 08 Thousand/uL      Lymphocytes Absolute 0 98 Thousands/µL      Monocytes Absolute 0 31 Thousand/µL      Eosinophils Absolute 0 02 Thousand/µL      Basophils Absolute 0 04 Thousands/µL     Fingerstick Glucose (POCT) [[id number]]  (Abnormal) Collected: 08/14/21 2022    Lab Status: Final result Updated: 08/14/21 2024     POC Glucose 328 mg/dl                  CT abdomen pelvis with contrast   Final Result by Daniela Velásquez DO (08/14 2224)      Minimal left-sided perinephric stranding which may represent infection  Prominent appendix with no significant surrounding inflammatory changes  Findings are equivocal for acute appendicitis  I personally discussed this study with Mackenzie Solorzano on 8/14/2021 at 10:15 PM                      Workstation performed: [id number]                    Procedures  Procedures         ED Course                                           MDM  Number of Diagnoses or Management Options  Hyperglycemia due to type 2 diabetes mellitus ([address] ): new and requires workup  Intractable nausea and vomiting: new and requires workup  Diagnosis management comments: 58-year-old female with past medical history of  DM, gastroparesis, and chronic pain presents to the ED for evaluation of nausea and right lower quadrant abdominal pain  RLQ pain consistent with previous gastroparesis pain  Patient has been admitted multiple times in the past 2 years for intractable N/V secondary to gastroparesis  Patient has a history of poor compliance with home DM medication  Patient states she has not used insulin in the past 2 days  Blood sugar on arrival 361  Patient states that nausea and pain is very similar to which she chronically feels whenever her gastroparesis is "acting up"  Unable to tolerate PO due to symptoms    At one point pt was  with atrial flutter that was transient when she got up to use restroom  Resolved several minutes with rest back to sinus tachy 110  BP normal   Blood sugar 361  K+  4  7 pH normal   No lab evidence of DKA at this time   Gave usually dose of 10 units Humalog she has not taken in two days  On second bolus IVF  So far have given IV reglan, Zofran, and IM Halidol for N/V  No more vomit but continued nausea with stomach pain  Still pending CT results but looks like no acute pathology  Request to admit for intractable NV and hyperglycemia control  Amount and/or Complexity of Data Reviewed  Clinical lab tests: ordered and reviewed  Tests in the radiology section of CPT®: ordered and reviewed  Review and summarize past medical records: yes  Discuss the patient with other providers: yes  Independent visualization of images, tracings, or specimens: yes    Risk of Complications, Morbidity, and/or Mortality  Presenting problems: moderate  Diagnostic procedures: moderate  Management options: moderate    Patient Progress  Patient progress: stable      Disposition  Final diagnoses:   Intractable nausea and vomiting   Hyperglycemia due to type 2 diabetes mellitus ([facility] [address] )     Time reflects when diagnosis was documented in both MDM as applicable and the Disposition within this note     Time User Action Codes Description Comment    8/14/2021 10:09 PM Alejandra Valencia Add [R11 2] Intractable nausea and vomiting     8/14/2021 10:09 PM Alejandra Valencia Add [E11 65] Hyperglycemia due to type 2 diabetes mellitus [facility])       ED Disposition     ED Disposition Condition Date/Time Comment    Admit Stable Sat Aug 14, 2021 10:36 PM Case was discussed with Candido Davis "Brita Carrel" and the patient's admission status was agreed to be Admission Status: inpatient status to the service of Dr Turner Dials "Brita Carrel"            Follow-up Information    None         Current Discharge Medication List      CONTINUE these medications which have NOT CHANGED    Details   albuterol (PROVENTIL HFA,VENTOLIN HFA) 90 mcg/act inhaler Inhale 2 puffs every 4 (four) hours as needed    Comments: Substitution to a formulary equivalent within the same pharmaceutical class is authorized  !! BD PEN NEEDLE HERNAN U/F 32G X 4 MM MISC Refills: 0      carisoprodol (SOMA) 350 mg tablet Take 350 mg by mouth 2 (two) times a day      diazepam (VALIUM) 5 mg tablet Take 5 mg by mouth daily at bedtime as needed for anxiety      dicyclomine (BENTYL) 10 mg capsule Take 1 capsule (10 mg total) by mouth 4 (four) times a day (before meals and at bedtime) for 7 days  Qty: 28 capsule, Refills: 0    Associated Diagnoses: Gastroparesis;  Intractable vomiting with nausea, unspecified vomiting type; Gall stones      fluticasone (FLONASE) 50 mcg/act nasal spray 1 spray into each nostril daily      furosemide (LASIX) 20 mg tablet Take 40 mg by mouth as needed       insulin glargine (LANTUS) 100 units/mL subcutaneous injection Inject 50 Units under the skin daily at bedtime        insulin lispro (HumaLOG) 100 units/mL injection INJECT 10 UNITS UNDER THE SKIN 3 (THREE) TIMES A DAY WITH MEALS  Qty: 10 mL, Refills: 3    Associated Diagnoses: Type 2 diabetes mellitus with diabetic neuropathy, with long-term current use of insulin (HCC)      LYRICA 150 MG capsule Take 150 mg by mouth 2 (two) times a day  Refills: 5      metoclopramide (REGLAN) 10 mg tablet TAKE 1 TABLET PO QID BEFORE MEALS AND HS  Qty: 360 tablet, Refills: 0    Associated Diagnoses: Gastroparesis      montelukast (SINGULAIR) 10 mg tablet Take 10 mg by mouth daily at bedtime      !! morphine (MS CONTIN) 15 mg 12 hr tablet Take 15 mg by mouth 2 (two) times a day      !! morphine (MS CONTIN) 60 mg 12 hr tablet Take 60 mg by mouth 2 (two) times a day  Refills: 0      nystatin (MYCOSTATIN) powder Apply topically 2 (two) times a day  Qty: 45 g, Refills: 0    Associated Diagnoses: Candidiasis      omeprazole (PriLOSEC) 20 mg delayed release capsule Take 20 mg by mouth daily      WIXELA INHUB 500-50 MCG/DOSE inhaler Take 1 puff by mouth 2 (two) times a day    Comments: Substitution to a formulary equivalent within the same pharmaceutical class is authorized  !! Insulin Pen Needle 29G X 5MM MISC by Does not apply route 4 (four) times a day  Qty: 100 each, Refills: 0    Associated Diagnoses: Type 2 diabetes mellitus with diabetic neuropathy, with long-term current use of insulin (HCC)      ondansetron (ZOFRAN) 4 mg tablet Take 1 tablet (4 mg total) by mouth every 6 (six) hours  Qty: 12 tablet, Refills: 0    Associated Diagnoses: Nausea and vomiting      pravastatin (PRAVACHOL) 10 mg tablet Take 10 mg by mouth daily at bedtime       !! - Potential duplicate medications found  Please discuss with provider  No discharge procedures on file      PDMP Review       Value Time User    PDMP Reviewed  Yes 8/15/2021 12:31 AM Jun Glaser PA-C          ED Provider  Electronically Signed by           Jo Kate PA-C  08/17/21 1950

## 2021-08-15 NOTE — ASSESSMENT & PLAN NOTE
Lab Results   Component Value Date    HGBA1C 10 8 (H) 06/26/2020       Recent Labs     08/14/21  2354 08/15/21  0120 08/15/21  0751 08/15/21  1208   POCGLU 265* 208* 224* 250*       Blood Sugar Average: Last 72 hrs:  (P) 714 [id number]     W/hyperglycemia and and gastroparesis  Pt notes she is on lantus 50 iu qhs and sometimes ssi at home w/meals  No longer on metformin  Presently on insulin gtt  W/bs still in 300s  Will repeat bmp  If worsening gap will need elevated level of care    If not will transition to long acting and ssi/poc bs

## 2021-08-15 NOTE — ASSESSMENT & PLAN NOTE
Lab Results   Component Value Date    HGBA1C 10 8 (H) 06/26/2020       Recent Labs     08/14/21 2022 08/14/21  2243   POCGLU 328* 310*       Blood Sugar Average: Last 72 hrs:  (P) 319     W/hyperglycemia and and gastroparesis  Pt notes she is on lantus 50 iu qhs and sometimes ssi at home w/meals  No longer on metformin  Presently on insulin gtt  W/bs still in 300s  Will repeat bmp  If worsening gap will need elevated level of care    If not will transition to long acting and ssi/poc bs

## 2021-08-15 NOTE — ED NOTES
Pt returns from CT with 1 episode of vomiting  Pending orders        Kalyn Sepulveda RN  08/14/21 5036

## 2021-08-15 NOTE — ASSESSMENT & PLAN NOTE
Suspect 2* diabetic gastroparesis vs gastroenteritis from fish sandwich  Does have sirs vs sepsis criteria  Mild ag of 14 but beta hydroxybutyrate only 1  Ct a/p concerning for prominent appendix at 9mm but no periappendiceal fat stranding or wall thickening  Initially w/ RLQ pain now in epigastric    Case was d/w surgery per ED provider and cleared for admission to [facility]  Npo supportive care ivf hydration  Monitor abd exam

## 2021-08-16 LAB
ANION GAP SERPL CALCULATED.3IONS-SCNC: 10 MMOL/L (ref 4–13)
BASOPHILS # BLD AUTO: 0.04 THOUSANDS/ΜL (ref 0–0.1)
BASOPHILS NFR BLD AUTO: 0 % (ref 0–1)
BUN SERPL-MCNC: 13 MG/DL (ref 5–25)
CALCIUM SERPL-MCNC: 9.3 MG/DL (ref 8.3–10.1)
CHLORIDE SERPL-SCNC: 99 MMOL/L (ref 100–108)
CO2 SERPL-SCNC: 28 MMOL/L (ref 21–32)
CREAT SERPL-MCNC: 0.77 MG/DL (ref 0.6–1.3)
EOSINOPHIL # BLD AUTO: 0.03 THOUSAND/ΜL (ref 0–0.61)
EOSINOPHIL NFR BLD AUTO: 0 % (ref 0–6)
ERYTHROCYTE [DISTWIDTH] IN BLOOD BY AUTOMATED COUNT: 12.8 % (ref 11.6–15.1)
GFR SERPL CREATININE-BSD FRML MDRD: 86 ML/MIN/1.73SQ M
GLUCOSE SERPL-MCNC: 175 MG/DL (ref 65–140)
GLUCOSE SERPL-MCNC: 183 MG/DL (ref 65–140)
GLUCOSE SERPL-MCNC: 209 MG/DL (ref 65–140)
GLUCOSE SERPL-MCNC: 215 MG/DL (ref 65–140)
GLUCOSE SERPL-MCNC: 231 MG/DL (ref 65–140)
HCT VFR BLD AUTO: 41.1 % (ref 34.8–46.1)
HGB BLD-MCNC: 13.6 G/DL (ref 11.5–15.4)
IMM GRANULOCYTES # BLD AUTO: 0.08 THOUSAND/UL (ref 0–0.2)
IMM GRANULOCYTES NFR BLD AUTO: 1 % (ref 0–2)
LYMPHOCYTES # BLD AUTO: 2.09 THOUSANDS/ΜL (ref 0.6–4.47)
LYMPHOCYTES NFR BLD AUTO: 15 % (ref 14–44)
MCH RBC QN AUTO: 26.4 PG (ref 26.8–34.3)
MCHC RBC AUTO-ENTMCNC: 33.1 G/DL (ref 31.4–37.4)
MCV RBC AUTO: 80 FL (ref 82–98)
MONOCYTES # BLD AUTO: 0.81 THOUSAND/ΜL (ref 0.17–1.22)
MONOCYTES NFR BLD AUTO: 6 % (ref 4–12)
NEUTROPHILS # BLD AUTO: 11.16 THOUSANDS/ΜL (ref 1.85–7.62)
NEUTS SEG NFR BLD AUTO: 78 % (ref 43–75)
NRBC BLD AUTO-RTO: 0 /100 WBCS
PLATELET # BLD AUTO: 241 THOUSANDS/UL (ref 149–390)
PMV BLD AUTO: 12.2 FL (ref 8.9–12.7)
POTASSIUM SERPL-SCNC: 3.3 MMOL/L (ref 3.5–5.3)
PROCALCITONIN SERPL-MCNC: <0.05 NG/ML
RBC # BLD AUTO: 5.15 MILLION/UL (ref 3.81–5.12)
SODIUM SERPL-SCNC: 137 MMOL/L (ref 136–145)
WBC # BLD AUTO: 14.21 THOUSAND/UL (ref 4.31–10.16)

## 2021-08-16 PROCEDURE — 84145 PROCALCITONIN (PCT): CPT | Performed by: STUDENT IN AN ORGANIZED HEALTH CARE EDUCATION/TRAINING PROGRAM

## 2021-08-16 PROCEDURE — 85025 COMPLETE CBC W/AUTO DIFF WBC: CPT | Performed by: STUDENT IN AN ORGANIZED HEALTH CARE EDUCATION/TRAINING PROGRAM

## 2021-08-16 PROCEDURE — 99232 SBSQ HOSP IP/OBS MODERATE 35: CPT | Performed by: INTERNAL MEDICINE

## 2021-08-16 PROCEDURE — 82948 REAGENT STRIP/BLOOD GLUCOSE: CPT

## 2021-08-16 PROCEDURE — 80048 BASIC METABOLIC PNL TOTAL CA: CPT | Performed by: STUDENT IN AN ORGANIZED HEALTH CARE EDUCATION/TRAINING PROGRAM

## 2021-08-16 RX ORDER — LISINOPRIL 10 MG/1
10 TABLET ORAL DAILY
Status: DISCONTINUED | OUTPATIENT
Start: 2021-08-16 | End: 2021-08-18 | Stop reason: HOSPADM

## 2021-08-16 RX ADMIN — METOCLOPRAMIDE 10 MG: 10 TABLET ORAL at 21:49

## 2021-08-16 RX ADMIN — MORPHINE SULFATE 75 MG: 30 TABLET, EXTENDED RELEASE ORAL at 09:24

## 2021-08-16 RX ADMIN — SODIUM CHLORIDE 125 ML/HR: 0.9 INJECTION, SOLUTION INTRAVENOUS at 16:09

## 2021-08-16 RX ADMIN — MORPHINE SULFATE 75 MG: 30 TABLET, EXTENDED RELEASE ORAL at 18:22

## 2021-08-16 RX ADMIN — METOCLOPRAMIDE 10 MG: 10 TABLET ORAL at 09:24

## 2021-08-16 RX ADMIN — PANTOPRAZOLE SODIUM 20 MG: 20 TABLET, DELAYED RELEASE ORAL at 06:27

## 2021-08-16 RX ADMIN — METOCLOPRAMIDE 10 MG: 10 TABLET ORAL at 12:43

## 2021-08-16 RX ADMIN — SODIUM CHLORIDE 125 ML/HR: 0.9 INJECTION, SOLUTION INTRAVENOUS at 00:07

## 2021-08-16 RX ADMIN — LABETALOL 20 MG/4 ML (5 MG/ML) INTRAVENOUS SYRINGE 10 MG: at 00:02

## 2021-08-16 RX ADMIN — INSULIN LISPRO 1 UNITS: 100 INJECTION, SOLUTION INTRAVENOUS; SUBCUTANEOUS at 21:48

## 2021-08-16 RX ADMIN — METOCLOPRAMIDE 10 MG: 10 TABLET ORAL at 18:22

## 2021-08-16 RX ADMIN — INSULIN LISPRO 2 UNITS: 100 INJECTION, SOLUTION INTRAVENOUS; SUBCUTANEOUS at 09:26

## 2021-08-16 RX ADMIN — MONTELUKAST SODIUM 10 MG: 10 TABLET, COATED ORAL at 21:48

## 2021-08-16 RX ADMIN — HEPARIN SODIUM 5000 UNITS: 5000 INJECTION INTRAVENOUS; SUBCUTANEOUS at 14:08

## 2021-08-16 RX ADMIN — INSULIN LISPRO 3 UNITS: 100 INJECTION, SOLUTION INTRAVENOUS; SUBCUTANEOUS at 12:43

## 2021-08-16 RX ADMIN — METOPROLOL TARTRATE 25 MG: 25 TABLET, FILM COATED ORAL at 09:24

## 2021-08-16 RX ADMIN — METOPROLOL TARTRATE 25 MG: 25 TABLET, FILM COATED ORAL at 21:47

## 2021-08-16 RX ADMIN — ONDANSETRON 4 MG: 2 INJECTION INTRAMUSCULAR; INTRAVENOUS at 14:08

## 2021-08-16 RX ADMIN — METOCLOPRAMIDE 10 MG: 10 TABLET ORAL at 06:27

## 2021-08-16 RX ADMIN — HEPARIN SODIUM 5000 UNITS: 5000 INJECTION INTRAVENOUS; SUBCUTANEOUS at 21:48

## 2021-08-16 RX ADMIN — HEPARIN SODIUM 5000 UNITS: 5000 INJECTION INTRAVENOUS; SUBCUTANEOUS at 06:27

## 2021-08-16 RX ADMIN — INSULIN LISPRO 1 UNITS: 100 INJECTION, SOLUTION INTRAVENOUS; SUBCUTANEOUS at 18:22

## 2021-08-16 RX ADMIN — SODIUM CHLORIDE 125 ML/HR: 0.9 INJECTION, SOLUTION INTRAVENOUS at 08:07

## 2021-08-16 RX ADMIN — INSULIN GLARGINE 50 UNITS: 100 INJECTION, SOLUTION SUBCUTANEOUS at 23:00

## 2021-08-16 RX ADMIN — LISINOPRIL 10 MG: 10 TABLET ORAL at 09:24

## 2021-08-16 NOTE — PROGRESS NOTES
Progress Note - Sherry Bhatti 61 y o  female MRN: [id number]    Unit/Bed#: E4 -01 Encounter: [id number]      Subjective:  Patient continues to have nausea but has had no further vomiting  She denies abdominal pain  She has no chest pain or shortness of breath  She has not been able to eat much  Physical Exam:   Temp:  [97 9 °F (36 6 °C)-98 3 °F (36 8 °C)] 98 °F (36 7 °C)  HR:  [] 79  Resp:  [16-20] 18  BP: (122-177)/(66-92) 170/80    Gen:  Well-developed, severely obese, in no acute distress  Neck:  Supple  No lymphadenopathy, goiter, or bruit  Heart:  Regular rhythm  No murmur, gallop, or rub  Lungs:  Clear to auscultation and percussion  No wheezing, rales, or rhonchi    Abd:  Soft with active bowel sounds  No mass, tenderness, organomegaly  Extremities:  No clubbing, cyanosis, or edema  No calf tenderness  Neuro:  Alert and oriented    No focal sign  Skin:  Warm and dry      LABS:   CBC: No results found for: WBC, HGB, HCT, MCV, PLT, ADJUSTEDWBC, MCH, MCHC, RDW, MPV, NRBC, CMP:   Lab Results   Component Value Date    SODIUM 137 08/16/2021    K 3 3 (L) 08/16/2021    CL 99 (L) 08/16/2021    CO2 28 08/16/2021    BUN 13 08/16/2021    CREATININE 0 77 08/16/2021    CALCIUM 9 3 08/16/2021    EGFR 86 08/16/2021           Patient Active Problem List   Diagnosis    Type 2 diabetes mellitus with diabetic neuropathy, with long-term current use of insulin (HCC)    Asthma    Opioid dependence with other opioid-induced disorder ([address] )    Diabetic gastroparesis ([address] )    Essential hypertension    Morbid obesity (HCC)    Intractable nausea and vomiting    Sepsis ([address] )    Thyroid nodule    Polypharmacy    Acute respiratory failure with hypoxia (HCC)    Idiopathic hypotension    Acute kidney injury superimposed on chronic kidney disease (HCC)    Hyponatremia    Diarrhea of presumed infectious origin    Acute encephalopathy    Acute pain of right knee    Hypertensive urgency    DKA, type 2 ([facility] [address] [facility]    Elevated LFTs    Sinus tachycardia    Low TSH level    SIRS (systemic inflammatory response syndrome) (HCC)       Assessment/Plan:  1  Intractable nausea and vomiting, most likely were related to diabetic gastroparesis  2  Poorly controlled type 2 diabetes  3  Uncontrolled hypertension  4  Systemic inflammatory response syndrome, present on admission, likely related to 1   5  Morbid obesity  6  Chronic back pain  7  Chronic opioid dependence   8  Hypokalemia    The patient will continue on IV fluid and IV metoclopramide  Her potassium will be repleted  Her blood pressure has improved somewhat  Lisinopril will be added  Her insulin will be adjusted as needed      VTE Pharmacologic Prophylaxis: Heparin  VTE Mechanical Prophylaxis: sequential compression device

## 2021-08-16 NOTE — PLAN OF CARE
Problem: Potential for Falls  Goal: Patient will remain free of falls  Description: INTERVENTIONS:  - Educate patient/family on patient safety including physical limitations  - Instruct patient to call for assistance with activity   - Consult OT/PT to assist with strengthening/mobility   - Keep Call bell within reach  - Keep bed low and locked with side rails adjusted as appropriate  - Keep care items and personal belongings within reach  - Initiate and maintain comfort rounds  - Make Fall Risk Sign visible to staff  - Offer Toileting every 2 Hours, in advance of need  - Initiate/Maintain bed alarm  - Apply yellow socks and bracelet for high fall risk patients  - Consider moving patient to room near nurses station  Outcome: Progressing     Problem: MOBILITY - ADULT  Goal: Maintain or return to baseline ADL function  Description: INTERVENTIONS:  - Educate patient/family on patient safety including physical limitations  - Instruct patient to call for assistance with activity   - Consult OT/PT to assist with strengthening/mobility   - Keep Call bell within reach  - Keep bed low and locked with side rails adjusted as appropriate  - Keep care items and personal belongings within reach  - Initiate and maintain comfort rounds  - Make Fall Risk Sign visible to staff  - Offer Toileting every 2 Hours, in advance of need  - Initiate/Maintain bed alarm  - Apply yellow socks and bracelet for high fall risk patients  - Consider moving patient to room near nurses station  Outcome: Progressing  Goal: Maintains/Returns to pre admission functional level  Description: INTERVENTIONS:  - Perform BMAT or MOVE assessment daily    - Set and communicate daily mobility goal to care team and patient/family/caregiver  - Collaborate with rehabilitation services on mobility goals if consulted  - Perform Range of Motion  times a day  - Reposition patient every  hours    - Dangle patient  times a day  - Stand patient  times a day  - Ambulate patient  times a day  - Out of bed to chair  times a day   - Out of bed for meals  times a day  - Out of bed for toileting  - Record patient progress and toleration of activity level   Outcome: Progressing     Problem: PAIN - ADULT  Goal: Verbalizes/displays adequate comfort level or baseline comfort level  Description: Interventions:  - Encourage patient to monitor pain and request assistance  - Assess pain using appropriate pain scale  - Administer analgesics based on type and severity of pain and evaluate response  - Implement non-pharmacological measures as appropriate and evaluate response  - Consider cultural and social influences on pain and pain management  - Notify physician/advanced practitioner if interventions unsuccessful or patient reports new pain  Outcome: Progressing     Problem: INFECTION - ADULT  Goal: Absence or prevention of progression during hospitalization  Description: INTERVENTIONS:  - Assess and monitor for signs and symptoms of infection  - Monitor lab/diagnostic results  - Monitor all insertion sites, i e  indwelling lines, tubes, and drains  - [place] appropriate cooling/warming therapies per order  - Administer medications as ordered  - Instruct and encourage patient and family to use good hand hygiene technique    Outcome: Progressing     Problem: SAFETY ADULT  Goal: Patient will remain free of falls  Description: INTERVENTIONS:  - Educate patient/family on patient safety including physical limitations  - Instruct patient to call for assistance with activity   - Consult OT/PT to assist with strengthening/mobility   - Keep Call bell within reach  - Keep bed low and locked with side rails adjusted as appropriate  - Keep care items and personal belongings within reach  - Initiate and maintain comfort rounds  - Make Fall Risk Sign visible to staff  - Offer Toileting every 2 Hours, in advance of need  - Initiate/Maintain bed alarm  - Apply yellow socks and bracelet for high fall risk patients  - Consider moving patient to room near nurses station  Outcome: Progressing  Goal: Maintain or return to baseline ADL function  Description: INTERVENTIONS:  - Educate patient/family on patient safety including physical limitations  - Instruct patient to call for assistance with activity   - Consult OT/PT to assist with strengthening/mobility   - Keep Call bell within reach  - Keep bed low and locked with side rails adjusted as appropriate  - Keep care items and personal belongings within reach  - Initiate and maintain comfort rounds  - Make Fall Risk Sign visible to staff  - Offer Toileting every 2 Hours, in advance of need  - Initiate/Maintain bed alarm  - Apply yellow socks and bracelet for high fall risk patients  - Consider moving patient to room near nurses station  Outcome: Progressing  Goal: Maintains/Returns to pre admission functional level  Description: INTERVENTIONS:  - Perform BMAT or MOVE assessment daily    - Set and communicate daily mobility goal to care team and patient/family/caregiver  - Collaborate with rehabilitation services on mobility goals if consulted  - Perform Range of Motion  times a day  - Reposition patient every  hours    - Dangle patient  times a day  - Stand patient  times a day  - Ambulate patient  times a day  - Out of bed to chair  times a day   - Out of bed for meals times a day  - Out of bed for toileting  - Record patient progress and toleration of activity level   Outcome: Progressing     Problem: DISCHARGE PLANNING  Goal: Discharge to home or other facility with appropriate resources  Description: INTERVENTIONS:  - Identify barriers to discharge w/patient and caregiver  - Arrange for needed discharge resources and transportation as appropriate  - Identify discharge learning needs (meds, wound care, etc )  - Arrange for interpretive services to assist at discharge as needed  - Refer to Case Management Department for coordinating discharge planning if the patient needs post-hospital services based on physician/advanced practitioner order or complex needs related to functional status, cognitive ability, or social support system  Outcome: Progressing     Problem: Knowledge Deficit  Goal: Patient/family/caregiver demonstrates understanding of disease process, treatment plan, medications, and discharge instructions  Description: Complete learning assessment and assess knowledge base    Interventions:  - Provide teaching at level of understanding  - Provide teaching via preferred learning methods  Outcome: Progressing     Problem: METABOLIC, FLUID AND ELECTROLYTES - ADULT  Goal: Electrolytes maintained within normal limits  Description: INTERVENTIONS:  - Monitor labs and assess patient for signs and symptoms of electrolyte imbalances  - Administer electrolyte replacement as ordered  - Monitor response to electrolyte replacements, including repeat lab results as appropriate  - Instruct patient on fluid and nutrition as appropriate  Outcome: Progressing  Goal: Fluid balance maintained  Description: INTERVENTIONS:  - Monitor labs   - Monitor I/O and WT  - Instruct patient on fluid and nutrition as appropriate  - Assess for signs & symptoms of volume excess or deficit  Outcome: Progressing  Goal: Glucose maintained within target range  Description: INTERVENTIONS:  - Monitor Blood Glucose as ordered  - Assess for signs and symptoms of hyperglycemia and hypoglycemia  - Administer ordered medications to maintain glucose within target range  - Assess nutritional intake and initiate nutrition service referral as needed  Outcome: Progressing     Problem: Prexisting or High Potential for Compromised Skin Integrity  Goal: Skin integrity is maintained or improved  Description: INTERVENTIONS:  - Identify patients at risk for skin breakdown  - Assess and monitor skin integrity  - Assess and monitor nutrition and hydration status  - Monitor labs   - Assess for incontinence   - Turn and reposition patient  - Assist with mobility/ambulation  - Relieve pressure over bony prominences  - Avoid friction and shearing  - Provide appropriate hygiene as needed including keeping skin clean and dry  - Evaluate need for skin moisturizer/barrier cream  - Collaborate with interdisciplinary team   - Patient/family teaching  - Consider wound care consult   Outcome: Progressing

## 2021-08-16 NOTE — PLAN OF CARE
Problem: Potential for Falls  Goal: Patient will remain free of falls  Description: INTERVENTIONS:  - Educate patient/family on patient safety including physical limitations  - Instruct patient to call for assistance with activity   - Consult OT/PT to assist with strengthening/mobility   - Keep Call bell within reach  - Keep bed low and locked with side rails adjusted as appropriate  - Keep care items and personal belongings within reach  - Initiate and maintain comfort rounds  - Make Fall Risk Sign visible to staff  - Offer Toileting every 2 Hours, in advance of need  - Initiate/Maintain bed alarm  - Apply yellow socks and bracelet for high fall risk patients  - Consider moving patient to room near nurses station  Outcome: Progressing     Problem: MOBILITY - ADULT  Goal: Maintain or return to baseline ADL function  Description: INTERVENTIONS:  - Educate patient/family on patient safety including physical limitations  - Instruct patient to call for assistance with activity   - Consult OT/PT to assist with strengthening/mobility   - Keep Call bell within reach  - Keep bed low and locked with side rails adjusted as appropriate  - Keep care items and personal belongings within reach  - Initiate and maintain comfort rounds  - Make Fall Risk Sign visible to staff  - Offer Toileting every 2 Hours, in advance of need  - Initiate/Maintain bed alarm  - Apply yellow socks and bracelet for high fall risk patients  - Consider moving patient to room near nurses station  Outcome: Progressing  Goal: Maintains/Returns to pre admission functional level  Description: INTERVENTIONS:  - Perform BMAT or MOVE assessment daily    - Set and communicate daily mobility goal to care team and patient/family/caregiver     - Collaborate with rehabilitation services on mobility goals if consulted  -- Out of bed for toileting  - Record patient progress and toleration of activity level   Outcome: Progressing     Problem: PAIN - ADULT  Goal: Verbalizes/displays adequate comfort level or baseline comfort level  Description: Interventions:  - Encourage patient to monitor pain and request assistance  - Assess pain using appropriate pain scale  - Administer analgesics based on type and severity of pain and evaluate response  - Implement non-pharmacological measures as appropriate and evaluate response  - Consider cultural and social influences on pain and pain management  - Notify physician/advanced practitioner if interventions unsuccessful or patient reports new pain  Outcome: Progressing     Problem: INFECTION - ADULT  Goal: Absence or prevention of progression during hospitalization  Description: INTERVENTIONS:  - Assess and monitor for signs and symptoms of infection  - Monitor lab/diagnostic results  - Monitor all insertion sites, i e  indwelling lines, tubes, and drains  - [place] appropriate cooling/warming therapies per order  - Administer medications as ordered  - Instruct and encourage patient and family to use good hand hygiene technique    Outcome: Progressing     Problem: SAFETY ADULT  Goal: Patient will remain free of falls  Description: INTERVENTIONS:  - Educate patient/family on patient safety including physical limitations  - Instruct patient to call for assistance with activity   - Consult OT/PT to assist with strengthening/mobility   - Keep Call bell within reach  - Keep bed low and locked with side rails adjusted as appropriate  - Keep care items and personal belongings within reach  - Initiate and maintain comfort rounds  - Make Fall Risk Sign visible to staff  - Offer Toileting every 2 Hours, in advance of need  - Initiate/Maintain bed alarm  - Apply yellow socks and bracelet for high fall risk patients  - Consider moving patient to room near nurses station  Outcome: Progressing  Goal: Maintain or return to baseline ADL function  Description: INTERVENTIONS:  - Educate patient/family on patient safety including physical limitations  - Instruct patient to call for assistance with activity   - Consult OT/PT to assist with strengthening/mobility   - Keep Call bell within reach  - Keep bed low and locked with side rails adjusted as appropriate  - Keep care items and personal belongings within reach  - Initiate and maintain comfort rounds  - Make Fall Risk Sign visible to staff  - Offer Toileting every 2 Hours, in advance of need  - Initiate/Maintain bed alarm  - Apply yellow socks and bracelet for high fall risk patients  - Consider moving patient to room near nurses station  Outcome: Progressing  Goal: Maintains/Returns to pre admission functional level  Description: INTERVENTIONS:  - Perform BMAT or MOVE assessment daily    - Set and communicate daily mobility goal to care team and patient/family/caregiver     - Collaborate with rehabilitation services on mobility goals if consulted  -- Out of bed for meals 3 times a day  - Out of bed for toileting  - Record patient progress and toleration of activity level   Outcome: Progressing     Problem: DISCHARGE PLANNING  Goal: Discharge to home or other facility with appropriate resources  Description: INTERVENTIONS:  - Identify barriers to discharge w/patient and caregiver  - Arrange for needed discharge resources and transportation as appropriate  - Identify discharge learning needs (meds, wound care, etc )  - Arrange for interpretive services to assist at discharge as needed  - Refer to Case Management Department for coordinating discharge planning if the patient needs post-hospital services based on physician/advanced practitioner order or complex needs related to functional status, cognitive ability, or social support system  Outcome: Progressing     Problem: Knowledge Deficit  Goal: Patient/family/caregiver demonstrates understanding of disease process, treatment plan, medications, and discharge instructions  Description: Complete learning assessment and assess knowledge base   Interventions:  - Provide teaching at level of understanding  - Provide teaching via preferred learning methods  Outcome: Progressing     Problem: METABOLIC, FLUID AND ELECTROLYTES - ADULT  Goal: Electrolytes maintained within normal limits  Description: INTERVENTIONS:  - Monitor labs and assess patient for signs and symptoms of electrolyte imbalances  - Administer electrolyte replacement as ordered  - Monitor response to electrolyte replacements, including repeat lab results as appropriate  - Instruct patient on fluid and nutrition as appropriate  Outcome: Progressing  Goal: Fluid balance maintained  Description: INTERVENTIONS:  - Monitor labs   - Monitor I/O and WT  - Instruct patient on fluid and nutrition as appropriate  - Assess for signs & symptoms of volume excess or deficit  Outcome: Progressing  Goal: Glucose maintained within target range  Description: INTERVENTIONS:  - Monitor Blood Glucose as ordered  - Assess for signs and symptoms of hyperglycemia and hypoglycemia  - Administer ordered medications to maintain glucose within target range  - Assess nutritional intake and initiate nutrition service referral as needed  Outcome: Progressing     Problem: Prexisting or High Potential for Compromised Skin Integrity  Goal: Skin integrity is maintained or improved  Description: INTERVENTIONS:  - Identify patients at risk for skin breakdown  - Assess and monitor skin integrity  - Assess and monitor nutrition and hydration status  - Monitor labs   - Assess for incontinence   - Turn and reposition patient  - Assist with mobility/ambulation  - Relieve pressure over bony prominences  - Avoid friction and shearing  - Provide appropriate hygiene as needed including keeping skin clean and dry  - Evaluate need for skin moisturizer/barrier cream  - Collaborate with interdisciplinary team   - Patient/family teaching  - Consider wound care consult   Outcome: Progressing

## 2021-08-17 LAB
GLUCOSE SERPL-MCNC: 106 MG/DL (ref 65–140)
GLUCOSE SERPL-MCNC: 146 MG/DL (ref 65–140)
GLUCOSE SERPL-MCNC: 177 MG/DL (ref 65–140)
GLUCOSE SERPL-MCNC: 184 MG/DL (ref 65–140)
PROCALCITONIN SERPL-MCNC: <0.05 NG/ML

## 2021-08-17 PROCEDURE — 82948 REAGENT STRIP/BLOOD GLUCOSE: CPT

## 2021-08-17 PROCEDURE — 84145 PROCALCITONIN (PCT): CPT | Performed by: STUDENT IN AN ORGANIZED HEALTH CARE EDUCATION/TRAINING PROGRAM

## 2021-08-17 PROCEDURE — 99232 SBSQ HOSP IP/OBS MODERATE 35: CPT | Performed by: INTERNAL MEDICINE

## 2021-08-17 RX ORDER — ACETAMINOPHEN 325 MG/1
650 TABLET ORAL ONCE
Status: COMPLETED | OUTPATIENT
Start: 2021-08-17 | End: 2021-08-17

## 2021-08-17 RX ADMIN — LISINOPRIL 10 MG: 10 TABLET ORAL at 08:49

## 2021-08-17 RX ADMIN — HEPARIN SODIUM 5000 UNITS: 5000 INJECTION INTRAVENOUS; SUBCUTANEOUS at 17:20

## 2021-08-17 RX ADMIN — SODIUM CHLORIDE 125 ML/HR: 0.9 INJECTION, SOLUTION INTRAVENOUS at 00:24

## 2021-08-17 RX ADMIN — DIAZEPAM 5 MG: 5 TABLET ORAL at 21:20

## 2021-08-17 RX ADMIN — PANTOPRAZOLE SODIUM 20 MG: 20 TABLET, DELAYED RELEASE ORAL at 05:21

## 2021-08-17 RX ADMIN — MONTELUKAST SODIUM 10 MG: 10 TABLET, COATED ORAL at 21:19

## 2021-08-17 RX ADMIN — METOCLOPRAMIDE 10 MG: 10 TABLET ORAL at 06:31

## 2021-08-17 RX ADMIN — METOCLOPRAMIDE 10 MG: 10 TABLET ORAL at 11:53

## 2021-08-17 RX ADMIN — METOCLOPRAMIDE 10 MG: 10 TABLET ORAL at 17:15

## 2021-08-17 RX ADMIN — ACETAMINOPHEN 650 MG: 325 TABLET, FILM COATED ORAL at 11:49

## 2021-08-17 RX ADMIN — INSULIN LISPRO 1 UNITS: 100 INJECTION, SOLUTION INTRAVENOUS; SUBCUTANEOUS at 11:54

## 2021-08-17 RX ADMIN — METOCLOPRAMIDE 10 MG: 10 TABLET ORAL at 08:49

## 2021-08-17 RX ADMIN — SODIUM CHLORIDE 125 ML/HR: 0.9 INJECTION, SOLUTION INTRAVENOUS at 08:51

## 2021-08-17 RX ADMIN — ONDANSETRON 4 MG: 2 INJECTION INTRAMUSCULAR; INTRAVENOUS at 17:12

## 2021-08-17 RX ADMIN — MORPHINE SULFATE 75 MG: 30 TABLET, EXTENDED RELEASE ORAL at 08:49

## 2021-08-17 RX ADMIN — INSULIN GLARGINE 50 UNITS: 100 INJECTION, SOLUTION SUBCUTANEOUS at 21:21

## 2021-08-17 RX ADMIN — MORPHINE SULFATE 75 MG: 30 TABLET, EXTENDED RELEASE ORAL at 17:15

## 2021-08-17 RX ADMIN — INSULIN LISPRO 1 UNITS: 100 INJECTION, SOLUTION INTRAVENOUS; SUBCUTANEOUS at 21:22

## 2021-08-17 RX ADMIN — HEPARIN SODIUM 5000 UNITS: 5000 INJECTION INTRAVENOUS; SUBCUTANEOUS at 21:20

## 2021-08-17 RX ADMIN — HEPARIN SODIUM 5000 UNITS: 5000 INJECTION INTRAVENOUS; SUBCUTANEOUS at 05:21

## 2021-08-17 RX ADMIN — SODIUM CHLORIDE 125 ML/HR: 0.9 INJECTION, SOLUTION INTRAVENOUS at 17:14

## 2021-08-17 RX ADMIN — METOPROLOL TARTRATE 25 MG: 25 TABLET, FILM COATED ORAL at 21:19

## 2021-08-17 RX ADMIN — METOCLOPRAMIDE 10 MG: 10 TABLET ORAL at 21:20

## 2021-08-17 RX ADMIN — METOPROLOL TARTRATE 25 MG: 25 TABLET, FILM COATED ORAL at 08:49

## 2021-08-17 NOTE — PROGRESS NOTES
Progress Note - Vivien Narvaez 61 y o  female MRN: [id number]    Unit/Bed#: E4 -01 Encounter: [id number]      Subjective: The patient feels significantly better  She has less nausea  She has had no vomiting  She has been able to eat some  She has no chest pain or shortness of breath  Physical Exam:   Temp:  [96 8 °F (36 °C)-98 °F (36 7 °C)] 98 °F (36 7 °C)  HR:  [62-82] 62  Resp:  [18] 18  BP: (112-152)/(70-84) 112/70    Gen:  Well-developed, severely obese, in no distress  Neck:  Supple  No lymphadenopathy, goiter, bruit  Heart:  Regular rhythm  No murmur, gallop, or rub  Lungs:  Clear to auscultation and percussion  No wheezing, rales, or rhonchi    Abd:  Soft with active bowel sounds  No mass, tenderness, or organomegaly  Extremities:  No clubbing, cyanosis, or edema  No calf tenderness  Neuro:  Alert and oriented  No focal sign  Skin:  Warm and dry      LABS:  No new labs        Patient Active Problem List   Diagnosis    Type 2 diabetes mellitus with diabetic neuropathy, with long-term current use of insulin (HCC)    Asthma    Opioid dependence with other opioid-induced disorder ([facility] [address] )    Diabetic gastroparesis (HCC)    Essential hypertension    Morbid obesity (HCC)    Intractable nausea and vomiting    Sepsis (HCC)    Thyroid nodule    Polypharmacy    Acute respiratory failure with hypoxia (HCC)    Hyponatremia    Acute encephalopathy    Acute pain of right knee    Hypertensive urgency    Elevated LFTs    Sinus tachycardia    Low TSH level    SIRS (systemic inflammatory response syndrome) (HCC)       Assessment/Plan:  1  Nausea and vomiting related to diabetic gastroparesis, improved  2  Poorly controlled type 2 diabetes, improved  3  Hypertension, improved  4  Systemic inflammatory response syndrome, present on admission, no evidence of infection  5  Morbid obesity  6  Chronic back pain  7  Chronic opioid dependence  8  Hypokalemia    The patient has improved    IV fluid will be stopped  I reviewed the patient's current pain management regimen  Unfortunately, she has been intolerant to nonsteroidals including celecoxib  She did not find these very helpful in any case  We will continue her current regimen for now      VTE Pharmacologic Prophylaxis: Heparin  VTE Mechanical Prophylaxis: sequential compression device

## 2021-08-17 NOTE — PLAN OF CARE
Problem: Potential for Falls  Goal: Patient will remain free of falls  Description: INTERVENTIONS:  - Educate patient/family on patient safety including physical limitations  - Instruct patient to call for assistance with activity   - Consult OT/PT to assist with strengthening/mobility   - Keep Call bell within reach  - Keep bed low and locked with side rails adjusted as appropriate  - Keep care items and personal belongings within reach  - Initiate and maintain comfort rounds  - Make Fall Risk Sign visible to staff  - Offer Toileting every 2 Hours, in advance of need  - Initiate/Maintain bed alarm  - Apply yellow socks and bracelet for high fall risk patients  - Consider moving patient to room near nurses station  Outcome: Progressing     Problem: MOBILITY - ADULT  Goal: Maintain or return to baseline ADL function  Description: INTERVENTIONS:  - Educate patient/family on patient safety including physical limitations  - Instruct patient to call for assistance with activity   - Consult OT/PT to assist with strengthening/mobility   - Keep Call bell within reach  - Keep bed low and locked with side rails adjusted as appropriate  - Keep care items and personal belongings within reach  - Initiate and maintain comfort rounds  - Make Fall Risk Sign visible to staff  - Offer Toileting every 2 Hours, in advance of need  - Initiate/Maintain bed alarm  - Apply yellow socks and bracelet for high fall risk patients  - Consider moving patient to room near nurses station  Outcome: Progressing  Goal: Maintains/Returns to pre admission functional level  Description: INTERVENTIONS:  - Perform BMAT or MOVE assessment daily    - Set and communicate daily mobility goal to care team and patient/family/caregiver  - Collaborate with rehabilitation services on mobility goals if consulted  - Perform Range of Motion  times a day  - Reposition patient every  hours    - Dangle patient  times a day  - Stand patient  times a day  - Ambulate patient  times a day  - Out of bed to chair  times a day   - Out of bed for meals  times a day  - Out of bed for toileting  - Record patient progress and toleration of activity level   Outcome: Progressing     Problem: PAIN - ADULT  Goal: Verbalizes/displays adequate comfort level or baseline comfort level  Description: Interventions:  - Encourage patient to monitor pain and request assistance  - Assess pain using appropriate pain scale  - Administer analgesics based on type and severity of pain and evaluate response  - Implement non-pharmacological measures as appropriate and evaluate response  - Consider cultural and social influences on pain and pain management  - Notify physician/advanced practitioner if interventions unsuccessful or patient reports new pain  Outcome: Progressing     Problem: INFECTION - ADULT  Goal: Absence or prevention of progression during hospitalization  Description: INTERVENTIONS:  - Assess and monitor for signs and symptoms of infection  - Monitor lab/diagnostic results  - Monitor all insertion sites, i e  indwelling lines, tubes, and drains  - [place] appropriate cooling/warming therapies per order  - Administer medications as ordered  - Instruct and encourage patient and family to use good hand hygiene technique    Outcome: Progressing     Problem: SAFETY ADULT  Goal: Patient will remain free of falls  Description: INTERVENTIONS:  - Educate patient/family on patient safety including physical limitations  - Instruct patient to call for assistance with activity   - Consult OT/PT to assist with strengthening/mobility   - Keep Call bell within reach  - Keep bed low and locked with side rails adjusted as appropriate  - Keep care items and personal belongings within reach  - Initiate and maintain comfort rounds  - Make Fall Risk Sign visible to staff  - Offer Toileting every 2 Hours, in advance of need  - Initiate/Maintain bed alarm  - Apply yellow socks and bracelet for high fall risk patients  - Consider moving patient to room near nurses station  Outcome: Progressing  Goal: Maintain or return to baseline ADL function  Description: INTERVENTIONS:  - Educate patient/family on patient safety including physical limitations  - Instruct patient to call for assistance with activity   - Consult OT/PT to assist with strengthening/mobility   - Keep Call bell within reach  - Keep bed low and locked with side rails adjusted as appropriate  - Keep care items and personal belongings within reach  - Initiate and maintain comfort rounds  - Make Fall Risk Sign visible to staff  - Offer Toileting every 2 Hours, in advance of need  - Initiate/Maintain bed alarm  - Apply yellow socks and bracelet for high fall risk patients  - Consider moving patient to room near nurses station  Outcome: Progressing  Goal: Maintains/Returns to pre admission functional level  Description: INTERVENTIONS:  - Perform BMAT or MOVE assessment daily    - Set and communicate daily mobility goal to care team and patient/family/caregiver  - Collaborate with rehabilitation services on mobility goals if consulted  - Perform Range of Motion times a day  - Reposition patient every  hours    - Dangle patient  times a day  - Stand patient  times a day  - Ambulate patient  times a day  - Out of bed to chair  times a day   - Out of bed for meals  times a day  - Out of bed for toileting  - Record patient progress and toleration of activity level   Outcome: Progressing     Problem: DISCHARGE PLANNING  Goal: Discharge to home or other facility with appropriate resources  Description: INTERVENTIONS:  - Identify barriers to discharge w/patient and caregiver  - Arrange for needed discharge resources and transportation as appropriate  - Identify discharge learning needs (meds, wound care, etc )  - Arrange for interpretive services to assist at discharge as needed  - Refer to Case Management Department for coordinating discharge planning if the patient needs post-hospital services based on physician/advanced practitioner order or complex needs related to functional status, cognitive ability, or social support system  Outcome: Progressing     Problem: Knowledge Deficit  Goal: Patient/family/caregiver demonstrates understanding of disease process, treatment plan, medications, and discharge instructions  Description: Complete learning assessment and assess knowledge base    Interventions:  - Provide teaching at level of understanding  - Provide teaching via preferred learning methods  Outcome: Progressing     Problem: METABOLIC, FLUID AND ELECTROLYTES - ADULT  Goal: Electrolytes maintained within normal limits  Description: INTERVENTIONS:  - Monitor labs and assess patient for signs and symptoms of electrolyte imbalances  - Administer electrolyte replacement as ordered  - Monitor response to electrolyte replacements, including repeat lab results as appropriate  - Instruct patient on fluid and nutrition as appropriate  Outcome: Progressing  Goal: Fluid balance maintained  Description: INTERVENTIONS:  - Monitor labs   - Monitor I/O and WT  - Instruct patient on fluid and nutrition as appropriate  - Assess for signs & symptoms of volume excess or deficit  Outcome: Progressing  Goal: Glucose maintained within target range  Description: INTERVENTIONS:  - Monitor Blood Glucose as ordered  - Assess for signs and symptoms of hyperglycemia and hypoglycemia  - Administer ordered medications to maintain glucose within target range  - Assess nutritional intake and initiate nutrition service referral as needed  Outcome: Progressing     Problem: Prexisting or High Potential for Compromised Skin Integrity  Goal: Skin integrity is maintained or improved  Description: INTERVENTIONS:  - Identify patients at risk for skin breakdown  - Assess and monitor skin integrity  - Assess and monitor nutrition and hydration status  - Monitor labs   - Assess for incontinence   - Turn and reposition patient  - Assist with mobility/ambulation  - Relieve pressure over bony prominences  - Avoid friction and shearing  - Provide appropriate hygiene as needed including keeping skin clean and dry  - Evaluate need for skin moisturizer/barrier cream  - Collaborate with interdisciplinary team   - Patient/family teaching  - Consider wound care consult   Outcome: Progressing     Problem: Nutrition/Hydration-ADULT  Goal: Nutrient/Hydration intake appropriate for improving, restoring or maintaining nutritional needs  Description: Monitor and assess patient's nutrition/hydration status for malnutrition  Collaborate with interdisciplinary team and initiate plan and interventions as ordered  Monitor patient's weight and dietary intake as ordered or per policy  Utilize nutrition screening tool and intervene as necessary  Determine patient's food preferences and provide high-protein, high-caloric foods as appropriate       INTERVENTIONS:  - Monitor oral intake, urinary output, labs, and treatment plans  - Assess nutrition and hydration status and recommend course of action  - Evaluate amount of meals eaten  - Assist patient with eating if necessary   - Allow adequate time for meals  - Recommend/ encourage appropriate diets, oral nutritional supplements, and vitamin/mineral supplements  - Order, calculate, and assess calorie counts as needed  - Recommend, monitor, and adjust tube feedings and TPN/PPN based on assessed needs  - Assess need for intravenous fluids  - Provide specific nutrition/hydration education as appropriate  - Include patient/family/caregiver in decisions related to nutrition  Outcome: Progressing

## 2021-08-18 VITALS
RESPIRATION RATE: 18 BRPM | DIASTOLIC BLOOD PRESSURE: 59 MMHG | SYSTOLIC BLOOD PRESSURE: 125 MMHG | OXYGEN SATURATION: 98 % | HEIGHT: 68 IN | BODY MASS INDEX: 44.41 KG/M2 | TEMPERATURE: 97 F | HEART RATE: 77 BPM | WEIGHT: 293 LBS

## 2021-08-18 DIAGNOSIS — E11.43 DIABETIC GASTROPARESIS (HCC): Primary | ICD-10-CM

## 2021-08-18 DIAGNOSIS — K31.84 DIABETIC GASTROPARESIS (HCC): Primary | ICD-10-CM

## 2021-08-18 LAB
GLUCOSE SERPL-MCNC: 122 MG/DL (ref 65–140)
GLUCOSE SERPL-MCNC: 174 MG/DL (ref 65–140)

## 2021-08-18 PROCEDURE — 99239 HOSP IP/OBS DSCHRG MGMT >30: CPT | Performed by: INTERNAL MEDICINE

## 2021-08-18 PROCEDURE — 82948 REAGENT STRIP/BLOOD GLUCOSE: CPT

## 2021-08-18 RX ORDER — METOCLOPRAMIDE 10 MG/1
10 TABLET ORAL 4 TIMES DAILY
Qty: 120 TABLET | Refills: 0 | Status: SHIPPED | OUTPATIENT
Start: 2021-08-18 | End: 2022-05-17

## 2021-08-18 RX ORDER — LISINOPRIL 10 MG/1
10 TABLET ORAL DAILY
Qty: 30 TABLET | Refills: 0 | Status: SHIPPED | OUTPATIENT
Start: 2021-08-19 | End: 2022-05-17

## 2021-08-18 RX ADMIN — METOPROLOL TARTRATE 25 MG: 25 TABLET, FILM COATED ORAL at 08:23

## 2021-08-18 RX ADMIN — METOCLOPRAMIDE 10 MG: 10 TABLET ORAL at 08:23

## 2021-08-18 RX ADMIN — MORPHINE SULFATE 75 MG: 30 TABLET, EXTENDED RELEASE ORAL at 08:23

## 2021-08-18 RX ADMIN — HEPARIN SODIUM 5000 UNITS: 5000 INJECTION INTRAVENOUS; SUBCUTANEOUS at 06:51

## 2021-08-18 RX ADMIN — LISINOPRIL 10 MG: 10 TABLET ORAL at 08:23

## 2021-08-18 RX ADMIN — PANTOPRAZOLE SODIUM 20 MG: 20 TABLET, DELAYED RELEASE ORAL at 06:51

## 2021-08-18 NOTE — CASE MANAGEMENT
Discharge planning:     CM spoke with pt and discussed discharge planning  CM updated Follow up provider with resources of Endocrinologist, PCP and pain management  CM requested Pt to call to book an appointment upon discharge  Pt stated that she will be visiting all the recommended Out Patient health care professional after her discharge  Transportation: Pt stated her dtr will be giving her ride home  Pt will be returning to her previous environment    CM department will continue to follow through pt's D/C

## 2021-08-18 NOTE — PLAN OF CARE
Problem: Potential for Falls  Goal: Patient will remain free of falls  Description: INTERVENTIONS:  - Educate patient/family on patient safety including physical limitations  - Instruct patient to call for assistance with activity   - Consult OT/PT to assist with strengthening/mobility   - Keep Call bell within reach  - Keep bed low and locked with side rails adjusted as appropriate  - Keep care items and personal belongings within reach  - Initiate and maintain comfort rounds  - Make Fall Risk Sign visible to staff  - Offer Toileting every 2 Hours, in advance of need  - Initiate/Maintain bed alarm  - Apply yellow socks and bracelet for high fall risk patients  - Consider moving patient to room near nurses station  Outcome: Adequate for Discharge     Problem: MOBILITY - ADULT  Goal: Maintain or return to baseline ADL function  Description: INTERVENTIONS:  - Educate patient/family on patient safety including physical limitations  - Instruct patient to call for assistance with activity   - Consult OT/PT to assist with strengthening/mobility   - Keep Call bell within reach  - Keep bed low and locked with side rails adjusted as appropriate  - Keep care items and personal belongings within reach  - Initiate and maintain comfort rounds  - Make Fall Risk Sign visible to staff  - Offer Toileting every 2 Hours, in advance of need  - Initiate/Maintain bed alarm  - Apply yellow socks and bracelet for high fall risk patients  - Consider moving patient to room near nurses station  Outcome: Adequate for Discharge  Goal: Maintains/Returns to pre admission functional level  Description: INTERVENTIONS:  - Perform BMAT or MOVE assessment daily    - Set and communicate daily mobility goal to care team and patient/family/caregiver  - Collaborate with rehabilitation services on mobility goals if consulted  - Perform Range of Motion times a day  - Reposition patient every hours    - Dangle patient  times a day  - Stand patient  times a day  - Ambulate patient  times a day  - Out of bed to chair  times a day   - Out of bed for meals  times a day  - Out of bed for toileting  - Record patient progress and toleration of activity level   Outcome: Adequate for Discharge     Problem: PAIN - ADULT  Goal: Verbalizes/displays adequate comfort level or baseline comfort level  Description: Interventions:  - Encourage patient to monitor pain and request assistance  - Assess pain using appropriate pain scale  - Administer analgesics based on type and severity of pain and evaluate response  - Implement non-pharmacological measures as appropriate and evaluate response  - Consider cultural and social influences on pain and pain management  - Notify physician/advanced practitioner if interventions unsuccessful or patient reports new pain  Outcome: Adequate for Discharge     Problem: INFECTION - ADULT  Goal: Absence or prevention of progression during hospitalization  Description: INTERVENTIONS:  - Assess and monitor for signs and symptoms of infection  - Monitor lab/diagnostic results  - Monitor all insertion sites, i e  indwelling lines, tubes, and drains  - [place] appropriate cooling/warming therapies per order  - Administer medications as ordered  - Instruct and encourage patient and family to use good hand hygiene technique    Outcome: Adequate for Discharge     Problem: SAFETY ADULT  Goal: Patient will remain free of falls  Description: INTERVENTIONS:  - Educate patient/family on patient safety including physical limitations  - Instruct patient to call for assistance with activity   - Consult OT/PT to assist with strengthening/mobility   - Keep Call bell within reach  - Keep bed low and locked with side rails adjusted as appropriate  - Keep care items and personal belongings within reach  - Initiate and maintain comfort rounds  - Make Fall Risk Sign visible to staff  - Offer Toileting every 2 Hours, in advance of need  - Initiate/Maintain bed alarm  - Apply yellow socks and bracelet for high fall risk patients  - Consider moving patient to room near nurses station  Outcome: Adequate for Discharge  Goal: Maintain or return to baseline ADL function  Description: INTERVENTIONS:  - Educate patient/family on patient safety including physical limitations  - Instruct patient to call for assistance with activity   - Consult OT/PT to assist with strengthening/mobility   - Keep Call bell within reach  - Keep bed low and locked with side rails adjusted as appropriate  - Keep care items and personal belongings within reach  - Initiate and maintain comfort rounds  - Make Fall Risk Sign visible to staff  - Offer Toileting every 2 Hours, in advance of need  - Initiate/Maintain bed alarm  - Apply yellow socks and bracelet for high fall risk patients  - Consider moving patient to room near nurses station  Outcome: Adequate for Discharge  Goal: Maintains/Returns to pre admission functional level  Description: INTERVENTIONS:  - Perform BMAT or MOVE assessment daily    - Set and communicate daily mobility goal to care team and patient/family/caregiver  - Collaborate with rehabilitation services on mobility goals if consulted  - Perform Range of Motion  times a day  - Reposition patient every  hours    - Dangle patient  times a day  - Stand patient  times a day  - Ambulate patient  times a day  - Out of bed to chair  times a day   - Out of bed for meals  times a day  - Out of bed for toileting  - Record patient progress and toleration of activity level   Outcome: Adequate for Discharge     Problem: DISCHARGE PLANNING  Goal: Discharge to home or other facility with appropriate resources  Description: INTERVENTIONS:  - Identify barriers to discharge w/patient and caregiver  - Arrange for needed discharge resources and transportation as appropriate  - Identify discharge learning needs (meds, wound care, etc )  - Arrange for interpretive services to assist at discharge as needed  - Refer to Case Management Department for coordinating discharge planning if the patient needs post-hospital services based on physician/advanced practitioner order or complex needs related to functional status, cognitive ability, or social support system  Outcome: Completed     Problem: METABOLIC, FLUID AND ELECTROLYTES - ADULT  Goal: Electrolytes maintained within normal limits  Description: INTERVENTIONS:  - Monitor labs and assess patient for signs and symptoms of electrolyte imbalances  - Administer electrolyte replacement as ordered  - Monitor response to electrolyte replacements, including repeat lab results as appropriate  - Instruct patient on fluid and nutrition as appropriate  Outcome: Adequate for Discharge  Goal: Fluid balance maintained  Description: INTERVENTIONS:  - Monitor labs   - Monitor I/O and WT  - Instruct patient on fluid and nutrition as appropriate  - Assess for signs & symptoms of volume excess or deficit  Outcome: Adequate for Discharge  Goal: Glucose maintained within target range  Description: INTERVENTIONS:  - Monitor Blood Glucose as ordered  - Assess for signs and symptoms of hyperglycemia and hypoglycemia  - Administer ordered medications to maintain glucose within target range  - Assess nutritional intake and initiate nutrition service referral as needed  Outcome: Adequate for Discharge     Problem: Prexisting or High Potential for Compromised Skin Integrity  Goal: Skin integrity is maintained or improved  Description: INTERVENTIONS:  - Identify patients at risk for skin breakdown  - Assess and monitor skin integrity  - Assess and monitor nutrition and hydration status  - Monitor labs   - Assess for incontinence   - Turn and reposition patient  - Assist with mobility/ambulation  - Relieve pressure over bony prominences  - Avoid friction and shearing  - Provide appropriate hygiene as needed including keeping skin clean and dry  - Evaluate need for skin moisturizer/barrier cream  - Collaborate with interdisciplinary team   - Patient/family teaching  - Consider wound care consult   Outcome: Adequate for Discharge     Problem: Nutrition/Hydration-ADULT  Goal: Nutrient/Hydration intake appropriate for improving, restoring or maintaining nutritional needs  Description: Monitor and assess patient's nutrition/hydration status for malnutrition  Collaborate with interdisciplinary team and initiate plan and interventions as ordered  Monitor patient's weight and dietary intake as ordered or per policy  Utilize nutrition screening tool and intervene as necessary  Determine patient's food preferences and provide high-protein, high-caloric foods as appropriate       INTERVENTIONS:  - Monitor oral intake, urinary output, labs, and treatment plans  - Assess nutrition and hydration status and recommend course of action  - Evaluate amount of meals eaten  - Assist patient with eating if necessary   - Allow adequate time for meals  - Recommend/ encourage appropriate diets, oral nutritional supplements, and vitamin/mineral supplements  - Order, calculate, and assess calorie counts as needed  - Recommend, monitor, and adjust tube feedings and TPN/PPN based on assessed needs  - Assess need for intravenous fluids  - Provide specific nutrition/hydration education as appropriate  - Include patient/family/caregiver in decisions related to nutrition  Outcome: Adequate for Discharge

## 2021-08-20 LAB
BACTERIA BLD CULT: NORMAL
BACTERIA BLD CULT: NORMAL

## 2021-09-07 NOTE — DISCHARGE SUMMARY
Discharge Summary - Lizett Lee 1965, [id number]        Admission Date: 8/14/2021  Discharge Date: 8/18/2021      Discharge Diagnosis:   1  Intractable nausea and vomiting secondary to diabetic gastroparesis  2  Type 2 diabetes, poorly controlled  3  Hypertension  4  Systemic inflammatory response syndrome, present on admission, no evidence of infection  5  Morbid obesity  6  Chronic back pain   7  Chronic opioid dependence   8  Hypokalemia    Consulting Physicians:   none    Procedures Performed:    none    HPI:  The patient is a 13-year-old woman with a history of diabetes and diabetic gastric paresis as well as chronic back pain requiring ongoing opioid therapy  She presented to the emergency room with persistent nausea and vomiting and some suprapubic pain  Treatment in the emergency room was futile  She was admitted for further evaluation and care  Hospital Course: The patient was admitted to the hospital and hydrated vigorously with intravenous fluid  She was treated with antiemetics and pantoprazole  The patient's diabetes was out of control  Adjustments were made in her regimen and her sugar improved  The patient's symptoms gradually improved over the next several days  As her vomiting resolved, her diet was increased and she continued to improve  By the time of discharge she was able to tolerate a regular diet without difficulty  As mentioned, the patient's diabetic control was poor  With adjustment of her therapy, her glucose improved markedly during her hospitalization  She was encouraged to be more compliant with her diet, particularly in light of her severe obesity  At the time of admission the patient's blood pressure was elevated  This improved during her hospitalization  No ongoing change in her medication was made  The patient has chronic back pain and is on a substantial dose of opiates  I reviewed prior attempts at pain management with her  Ultimately, no changes were made  She is under the care of a pain management physician in [place]  On the day of discharge the patient was feeling pretty well  Vital signs were stable  Lungs were clear  Cardiac exam revealed regular rhythm  The abdomen was soft and nontender  Disposition:  The patient was discharged home on August 18th  She will continue a diabetic diet  Activity will be as tolerated  She was asked to arrange follow-up with a primary care physician within 1 week  She has been under the care of Dr Pamela Navas  She has now moved to [facility] this hoping to arrange follow-up closer to home  She was given contact information for Dr uJancho Chase  Endocrinology follow-up was also suggested  Discharge instructions/Information to patient and family:   See after visit summary for information provided to patient and family  Provisions for Follow-Up Care:  See after visit summary for information related to follow-up care and any pertinent home health orders  Planned Readmission: No    Discharge Statement   I spent 40 minutes discharging the patient  This time was spent on the day of discharge  I had direct contact with the patient on the day of discharge  Discharge Medications:  See after visit summary for reconciled discharge medications provided to patient and family

## 2021-12-30 DIAGNOSIS — K31.84 GASTROPARESIS: ICD-10-CM

## 2022-01-03 RX ORDER — METOCLOPRAMIDE 10 MG/1
TABLET ORAL
Qty: 60 TABLET | Refills: 0 | Status: SHIPPED | OUTPATIENT
Start: 2022-01-03 | End: 2022-05-17

## 2022-01-03 NOTE — TELEPHONE ENCOUNTER
Needs appointment, not sure who her doctor is primarily  She was hospitalized in [place] recently and saw Dr Neda Garnica  Please ask her and schedule with corresponding doc/PA  Thanks!

## 2022-03-05 NOTE — DISCHARGE SUMMARY
Discharge- Gaston Box 1965, 53 y o  female MRN: [id number]    Unit/Bed#: [facility] [address] 220-01 Encounter: [id number]    Primary Care Provider: Negrita Deluna MD   Date and time admitted to hospital: 9/16/2019  8:13 PM        * Diabetic gastroparesis (HCC)  Assessment & Plan  · Resolved abdominal pain nausea and vomiting  · Advanced diet to diabetic level 2  · Continue blood sugar control and Reglan before meals and at bedtime    ALEJANDRO (acute kidney injury) (HCC)resolved as of 9/20/2019  Assessment & Plan  · Secondary to hypotension,  gastroparesis with poor oral intake, ACE-inhibitor use  · Status post IV fluid hydration  · Creatinine back to normal/baseline    Hypotension (arterial)resolved as of 9/20/2019  Assessment & Plan  · Suspect medication induced from narcotics, soma  · TSH normal  · Random cortisol within normal  · Hypotension resolved  · Keep off long-acting morphine for now    Uncomplicated opioid dependence [facility])  Assessment & Plan  PDMP website reviewed  She last filled her prescription for Oxy IR 15 mg on 09/13 and long-acting morphine 60 and 15 mg on 09/13  Continue to hold long-acting morphine due to arterial hypotension    Type 2 diabetes mellitus with diabetic neuropathy, with long-term current use of insulin [facility])  Assessment & Plan  Lab Results   Component Value Date    HGBA1C 12 5 (H) 07/21/2019       Recent Labs     09/19/19  1621 09/19/19  2043 09/20/19  0742 09/20/19  1123   POCGLU 119 218* 161* 135       Blood Sugar Average: Last 72 hrs:  (P) 218 [id number] glucose elevated to 359 at time of presentation    Uncontrolled with A1c of 12 5%  Continue Lantus 50 units at bedtime  Continue NovoLog 12 units t i d   With meals  Needs tighter blood sugar control due to gastroparesis, neuropathy, wound healing    Morbid obesity ([address] )  Assessment & Plan  Would benefit from diet and lifestyle modifications    Essential hypertension  Assessment & Plan  Hypotension resolved  Restart lisinopril on discharge    Asthma  Assessment & Plan  Without exacerbation  Resume Haresh Egan on discharge        Discharging Physician / Practitioner: Silvia Turcios MD  PCP: Lani Briceno MD  Admission Date:   Admission Orders (From admission, onward)     Ordered        09/16/19 2308  Inpatient Admission  Once                   Discharge Date: 09/20/19    Resolved Problems  Date Reviewed: 9/20/2019          Resolved    Hypotension (arterial) 9/20/2019     Resolved by  Silvia Turcios MD    ALEJANDRO (acute kidney injury) [facility]) 9/20/2019     Resolved by  Silvia Turcios MD          Consultations During Hospital Stay:  · None    Procedures Performed:   · None    Significant Findings / Test Results:   · A1c 12 5    Incidental Findings:   · None     Test Results Pending at Discharge (will require follow up): · None     Outpatient Tests Requested:  · None    Complications:  None    Reason for Admission:  Abdominal pain, nausea and vomiting    Hospital Course:     Travis Daley is a 53 y o  female patient who originally presented to the hospital on 9/16/2019 due to abdominal pain, nausea and vomiting  Has known history of diabetes complicated by gastroparesis and neuropathy  She is also on chronic opioids  Admitted due to nausea vomiting and abdominal pain related to an acute exacerbation of her gastroparesis  She was treated with bowel rest, IV fluid hydration and IV Reglan  Her diet was slowly advanced from clears to level 2 diabetic  Reglan was changed to 10 mg before meals and at bedtime  Her hospitalization was highlighted by development of hypotension and acute kidney injury  Hypotension was felt to be due to chronic opioid use  So her long-acting morphine was discontinued  Hypotension resolved with IV fluid hydration and withholding of her long-acting morphine  The patient will be discharged on Lantus 50 units at bedtime and NovoLog 12 units with meals for blood sugar control    She was previously on just Lantus and glimepiride  The patient was off her long-acting morphine but was still in able to continue with short-acting Oxy IR for pain  Please see above list of diagnoses and related plan for additional information  Condition at Discharge: good     Discharge Day Visit / Exam:     Subjective:  Resolved hypotension  No dizziness or lightheaded  Ambulating several times along the hallway  No nausea or vomiting  Vitals: Blood Pressure: 150/87 (09/20/19 1453)  Pulse: 104 (09/20/19 1453)  Temperature: 99 7 °F (37 6 °C) (09/20/19 1453)  Temp Source: Oral (09/16/19 2005)  Respirations: 18 (09/20/19 1453)  Height: 5' 8" (172 7 cm) (09/17/19 1204)  SpO2: 93 % (09/20/19 1453)  Exam:   Physical Exam   Constitutional: She is oriented to person, place, and time  She appears well-developed and well-nourished  Morbidly obese   HENT:   Head: Normocephalic and atraumatic  Eyes: No scleral icterus  Neck: No JVD present  Cardiovascular: Regular rhythm  Exam reveals no gallop and no friction rub  No murmur heard  Pulmonary/Chest: Effort normal  No stridor  No respiratory distress  Abdominal: Soft  She exhibits no distension  There is no tenderness  There is no guarding  Musculoskeletal: She exhibits deformity  Neurological: She is alert and oriented to person, place, and time  Skin: Skin is warm and dry  Psychiatric: She has a normal mood and affect  Discharge instructions/Information to patient and family:   See after visit summary for information provided to patient and family  Provisions for Follow-Up Care:  See after visit summary for information related to follow-up care and any pertinent home health orders  Disposition:     Home      Planned Readmission: no     Discharge Statement:  I spent >30 minutes discharging the patient  This time was spent on the day of discharge  I had direct contact with the patient on the day of discharge   Greater than 50% of the total time was spent examining patient, answering all patient questions, arranging and discussing plan of care with patient as well as directly providing post-discharge instructions  Additional time then spent on discharge activities  Discharge Medications:  See after visit summary for reconciled discharge medications provided to patient and family        ** Please Note: This note has been constructed using a voice recognition system ** 06-Mar-2022 03:34

## 2022-05-09 ENCOUNTER — APPOINTMENT (EMERGENCY)
Dept: CT IMAGING | Facility: HOSPITAL | Age: 57
DRG: 637 | End: 2022-05-09
Payer: MEDICARE

## 2022-05-09 ENCOUNTER — HOSPITAL ENCOUNTER (INPATIENT)
Facility: HOSPITAL | Age: 57
LOS: 8 days | Discharge: HOME HEALTH | DRG: 637 | End: 2022-05-17
Attending: EMERGENCY MEDICINE | Admitting: INTERNAL MEDICINE
Payer: MEDICARE

## 2022-05-09 ENCOUNTER — APPOINTMENT (INPATIENT)
Dept: CT IMAGING | Facility: HOSPITAL | Age: 57
DRG: 637 | End: 2022-05-09
Payer: MEDICARE

## 2022-05-09 DIAGNOSIS — R73.9 HYPERGLYCEMIA: ICD-10-CM

## 2022-05-09 DIAGNOSIS — I16.0 HYPERTENSIVE URGENCY: ICD-10-CM

## 2022-05-09 DIAGNOSIS — K31.84 DIABETIC GASTROPARESIS (HCC): ICD-10-CM

## 2022-05-09 DIAGNOSIS — R26.2 AMBULATORY DYSFUNCTION: ICD-10-CM

## 2022-05-09 DIAGNOSIS — E11.43 DIABETIC GASTROPARESIS (HCC): ICD-10-CM

## 2022-05-09 DIAGNOSIS — D72.829 LEUKOCYTOSIS, UNSPECIFIED TYPE: ICD-10-CM

## 2022-05-09 DIAGNOSIS — F11.288 OPIOID DEPENDENCE WITH OTHER OPIOID-INDUCED DISORDER (HCC): ICD-10-CM

## 2022-05-09 DIAGNOSIS — R19.5 HEME POSITIVE STOOL: ICD-10-CM

## 2022-05-09 DIAGNOSIS — G93.40 ACUTE ENCEPHALOPATHY: ICD-10-CM

## 2022-05-09 DIAGNOSIS — Z79.4 TYPE 2 DIABETES MELLITUS WITH HYPERGLYCEMIA, WITH LONG-TERM CURRENT USE OF INSULIN (HCC): ICD-10-CM

## 2022-05-09 DIAGNOSIS — R00.0 SINUS TACHYCARDIA: ICD-10-CM

## 2022-05-09 DIAGNOSIS — J96.01 ACUTE RESPIRATORY FAILURE WITH HYPOXIA (HCC): ICD-10-CM

## 2022-05-09 DIAGNOSIS — R41.82 ALTERED MENTAL STATUS: Primary | ICD-10-CM

## 2022-05-09 DIAGNOSIS — E11.65 TYPE 2 DIABETES MELLITUS WITH HYPERGLYCEMIA, WITH LONG-TERM CURRENT USE OF INSULIN (HCC): ICD-10-CM

## 2022-05-09 LAB
ALBUMIN SERPL BCP-MCNC: 3.8 G/DL (ref 3.5–5)
ALP SERPL-CCNC: 174 U/L (ref 46–116)
ALT SERPL W P-5'-P-CCNC: 25 U/L (ref 12–78)
AMMONIA PLAS-SCNC: <10 UMOL/L (ref 11–35)
ANION GAP SERPL CALCULATED.3IONS-SCNC: 13 MMOL/L (ref 4–13)
APAP SERPL-MCNC: <2 UG/ML (ref 10–20)
AST SERPL W P-5'-P-CCNC: 26 U/L (ref 5–45)
ATRIAL RATE: 103 BPM
BACTERIA UR QL AUTO: ABNORMAL /HPF
BASOPHILS # BLD AUTO: 0.05 THOUSANDS/ΜL (ref 0–0.1)
BASOPHILS NFR BLD AUTO: 0 % (ref 0–1)
BETA-HYDROXYBUTYRATE: 2.9 MMOL/L
BILIRUB SERPL-MCNC: 1.51 MG/DL (ref 0.2–1)
BILIRUB UR QL STRIP: NEGATIVE
BUN SERPL-MCNC: 18 MG/DL (ref 5–25)
CALCIUM SERPL-MCNC: 9.3 MG/DL (ref 8.3–10.1)
CARDIAC TROPONIN I PNL SERPL HS: 4 NG/L
CHLORIDE SERPL-SCNC: 98 MMOL/L (ref 100–108)
CLARITY UR: CLEAR
CO2 SERPL-SCNC: 26 MMOL/L (ref 21–32)
COLOR UR: YELLOW
CREAT SERPL-MCNC: 0.76 MG/DL (ref 0.6–1.3)
EOSINOPHIL # BLD AUTO: 0.04 THOUSAND/ΜL (ref 0–0.61)
EOSINOPHIL NFR BLD AUTO: 0 % (ref 0–6)
ERYTHROCYTE [DISTWIDTH] IN BLOOD BY AUTOMATED COUNT: 13.2 % (ref 11.6–15.1)
ETHANOL SERPL-MCNC: <3 MG/DL (ref 0–3)
GFR SERPL CREATININE-BSD FRML MDRD: 87 ML/MIN/1.73SQ M
GLUCOSE SERPL-MCNC: 307 MG/DL (ref 65–140)
GLUCOSE SERPL-MCNC: 361 MG/DL (ref 65–140)
GLUCOSE SERPL-MCNC: 368 MG/DL (ref 65–140)
GLUCOSE SERPL-MCNC: 390 MG/DL (ref 65–140)
GLUCOSE UR STRIP-MCNC: ABNORMAL MG/DL
HCT VFR BLD AUTO: 42.4 % (ref 34.8–46.1)
HGB BLD-MCNC: 13.6 G/DL (ref 11.5–15.4)
HGB UR QL STRIP.AUTO: ABNORMAL
IMM GRANULOCYTES # BLD AUTO: 0.04 THOUSAND/UL (ref 0–0.2)
IMM GRANULOCYTES NFR BLD AUTO: 0 % (ref 0–2)
KETONES UR STRIP-MCNC: ABNORMAL MG/DL
LEUKOCYTE ESTERASE UR QL STRIP: NEGATIVE
LIPASE SERPL-CCNC: 44 U/L (ref 73–393)
LYMPHOCYTES # BLD AUTO: 1.05 THOUSANDS/ΜL (ref 0.6–4.47)
LYMPHOCYTES NFR BLD AUTO: 9 % (ref 14–44)
MCH RBC QN AUTO: 26.9 PG (ref 26.8–34.3)
MCHC RBC AUTO-ENTMCNC: 32.1 G/DL (ref 31.4–37.4)
MCV RBC AUTO: 84 FL (ref 82–98)
MONOCYTES # BLD AUTO: 0.43 THOUSAND/ΜL (ref 0.17–1.22)
MONOCYTES NFR BLD AUTO: 4 % (ref 4–12)
NEUTROPHILS # BLD AUTO: 10.22 THOUSANDS/ΜL (ref 1.85–7.62)
NEUTS SEG NFR BLD AUTO: 87 % (ref 43–75)
NITRITE UR QL STRIP: NEGATIVE
NON-SQ EPI CELLS URNS QL MICRO: ABNORMAL /HPF
NRBC BLD AUTO-RTO: 0 /100 WBCS
P AXIS: 88 DEGREES
PH UR STRIP.AUTO: 5.5 [PH] (ref 4.5–8)
PLATELET # BLD AUTO: 202 THOUSANDS/UL (ref 149–390)
PMV BLD AUTO: 12.8 FL (ref 8.9–12.7)
POTASSIUM SERPL-SCNC: 4.8 MMOL/L (ref 3.5–5.3)
PR INTERVAL: 152 MS
PROT SERPL-MCNC: 8.4 G/DL (ref 6.4–8.2)
PROT UR STRIP-MCNC: >=300 MG/DL
QRS AXIS: 7 DEGREES
QRSD INTERVAL: 72 MS
QT INTERVAL: 328 MS
QTC INTERVAL: 429 MS
RBC # BLD AUTO: 5.06 MILLION/UL (ref 3.81–5.12)
RBC #/AREA URNS AUTO: ABNORMAL /HPF
SALICYLATES SERPL-MCNC: <3 MG/DL (ref 3–20)
SODIUM SERPL-SCNC: 137 MMOL/L (ref 136–145)
SP GR UR STRIP.AUTO: >=1.03 (ref 1–1.03)
T WAVE AXIS: 64 DEGREES
UROBILINOGEN UR QL STRIP.AUTO: 0.2 E.U./DL
VENTRICULAR RATE: 103 BPM
WBC # BLD AUTO: 11.83 THOUSAND/UL (ref 4.31–10.16)
WBC #/AREA URNS AUTO: ABNORMAL /HPF

## 2022-05-09 PROCEDURE — 93005 ELECTROCARDIOGRAM TRACING: CPT

## 2022-05-09 PROCEDURE — 82140 ASSAY OF AMMONIA: CPT

## 2022-05-09 PROCEDURE — 70450 CT HEAD/BRAIN W/O DYE: CPT

## 2022-05-09 PROCEDURE — 74176 CT ABD & PELVIS W/O CONTRAST: CPT

## 2022-05-09 PROCEDURE — 80179 DRUG ASSAY SALICYLATE: CPT

## 2022-05-09 PROCEDURE — 82010 KETONE BODYS QUAN: CPT

## 2022-05-09 PROCEDURE — 82077 ASSAY SPEC XCP UR&BREATH IA: CPT

## 2022-05-09 PROCEDURE — 93010 ELECTROCARDIOGRAM REPORT: CPT | Performed by: INTERNAL MEDICINE

## 2022-05-09 PROCEDURE — G1004 CDSM NDSC: HCPCS

## 2022-05-09 PROCEDURE — 84484 ASSAY OF TROPONIN QUANT: CPT | Performed by: EMERGENCY MEDICINE

## 2022-05-09 PROCEDURE — 96374 THER/PROPH/DIAG INJ IV PUSH: CPT

## 2022-05-09 PROCEDURE — 99223 1ST HOSP IP/OBS HIGH 75: CPT | Performed by: INTERNAL MEDICINE

## 2022-05-09 PROCEDURE — 82948 REAGENT STRIP/BLOOD GLUCOSE: CPT

## 2022-05-09 PROCEDURE — 80053 COMPREHEN METABOLIC PANEL: CPT

## 2022-05-09 PROCEDURE — 80143 DRUG ASSAY ACETAMINOPHEN: CPT

## 2022-05-09 PROCEDURE — 96361 HYDRATE IV INFUSION ADD-ON: CPT

## 2022-05-09 PROCEDURE — 81001 URINALYSIS AUTO W/SCOPE: CPT

## 2022-05-09 PROCEDURE — 36415 COLL VENOUS BLD VENIPUNCTURE: CPT

## 2022-05-09 PROCEDURE — 99285 EMERGENCY DEPT VISIT HI MDM: CPT

## 2022-05-09 PROCEDURE — 83690 ASSAY OF LIPASE: CPT

## 2022-05-09 PROCEDURE — 85025 COMPLETE CBC W/AUTO DIFF WBC: CPT

## 2022-05-09 PROCEDURE — 99285 EMERGENCY DEPT VISIT HI MDM: CPT | Performed by: EMERGENCY MEDICINE

## 2022-05-09 RX ORDER — SODIUM CHLORIDE 9 MG/ML
75 INJECTION, SOLUTION INTRAVENOUS CONTINUOUS
Status: DISCONTINUED | OUTPATIENT
Start: 2022-05-09 | End: 2022-05-10

## 2022-05-09 RX ORDER — METOCLOPRAMIDE HYDROCHLORIDE 5 MG/ML
10 INJECTION INTRAMUSCULAR; INTRAVENOUS ONCE
Status: COMPLETED | OUTPATIENT
Start: 2022-05-09 | End: 2022-05-09

## 2022-05-09 RX ADMIN — SODIUM CHLORIDE 1000 ML: 0.9 INJECTION, SOLUTION INTRAVENOUS at 18:44

## 2022-05-09 RX ADMIN — SODIUM CHLORIDE 8 UNITS/HR: 9 INJECTION, SOLUTION INTRAVENOUS at 21:57

## 2022-05-09 RX ADMIN — METOCLOPRAMIDE 10 MG: 5 INJECTION, SOLUTION INTRAMUSCULAR; INTRAVENOUS at 18:44

## 2022-05-09 RX ADMIN — SODIUM CHLORIDE 75 ML/HR: 0.9 INJECTION, SOLUTION INTRAVENOUS at 22:04

## 2022-05-09 NOTE — ED ATTENDING ATTESTATION
5/9/2022  Noemi BERGER DO, saw and evaluated the patient  I have discussed the patient with the resident/non-physician practitioner and agree with the resident's/non-physician practitioner's findings, Plan of Care, and MDM as documented in the resident's/non-physician practitioner's note, except where noted  All available labs and Radiology studies were reviewed  I was present for key portions of any procedure(s) performed by the resident/non-physician practitioner and I was immediately available to provide assistance  At this point I agree with the current assessment done in the Emergency Department  I have conducted an independent evaluation of this patient a history and physical is as follows:    62 yo F h/o IDDM presenting for evaluation of fatigue, weakness, abdominal pain, N/V, AMS  Son called EMS- states past few days pt has been generally weak  Today, confused and this prompted calling EMS  States similar episode years ago when admitted for diabetic gastroparesis  Accucheck for EMS 300s  Pt vomiting on arrival   No head trauma       MDM: 62 yo F with fatigue/abdominal pain/N/V/AMS- r/o DKA, abdominal labs/CT, ammonia, coma panel, IVF, dispo accordingly      ED Course         Critical Care Time  Procedures

## 2022-05-09 NOTE — ED PROVIDER NOTES
History  Chief Complaint   Patient presents with    Altered Mental Status     Pt arrives via EMS - per son states is confused  Answers appropriately with yes or no but unable to go into detail  Currently c/o abdominal pain  61 F w/ T2DM, HTN, diabetic gastroparesis presents with acute mental status change  Patient unable to provide significant history, will nod yes/no to questions  Discussed case with Shayla Ibarra who called EMS  He notes that she has been weaker for 3-4 days and complained of chronic leg problems but otherwise didn't note anything else  Today, she wasn't making sense with family members  She would answer yes/no, but was unable to hold conversation  She has acted like this in the past with them at the end of  when she was hospitalized for diabetic gastroparesis and stayed in the hospital during that time  He denies any reported SI, potential medication ingestions, alcohol or drug use  Prior to Admission Medications   Prescriptions Last Dose Informant Patient Reported? Taking?    BD PEN NEEDLE HERNAN U/F 32G X 4 MM MISC   Yes No   Insulin Pen Needle 29G X 5MM MISC   No No   Sig: by Does not apply route 4 (four) times a day   LYRICA 150 MG capsule   Yes No   Sig: Take 150 mg by mouth 2 (two) times a day   WIXELA INHUB 500-50 MCG/DOSE inhaler   Yes No   Sig: Take 1 puff by mouth 2 (two) times a day   albuterol (PROVENTIL HFA,VENTOLIN HFA) 90 mcg/act inhaler   Yes No   Sig: Inhale 2 puffs every 4 (four) hours as needed   carisoprodol (SOMA) 350 mg tablet  Self Yes No   Sig: Take 350 mg by mouth 2 (two) times a day   diazepam (VALIUM) 5 mg tablet  Self Yes No   Sig: Take 5 mg by mouth daily at bedtime as needed for anxiety   fluticasone (FLONASE) 50 mcg/act nasal spray  Self Yes No   Si spray into each nostril daily   furosemide (LASIX) 20 mg tablet   Yes No   Sig: Take 40 mg by mouth as needed    insulin glargine (LANTUS) 100 units/mL subcutaneous injection  Self Yes No   Sig: Inject 50 Units under the skin daily at bedtime     insulin lispro (HumaLOG) 100 units/mL injection   No No   Sig: INJECT 10 UNITS UNDER THE SKIN 3 (THREE) TIMES A DAY WITH MEALS   lisinopril (ZESTRIL) 10 mg tablet   No No   Sig: Take 1 tablet (10 mg total) by mouth daily   metoclopramide (REGLAN) 10 mg tablet   No No   Sig: Take 1 tablet (10 mg total) by mouth 4 (four) times a day   metoclopramide (REGLAN) 10 mg tablet   No No   Sig: TAKE 1 TABLET BY MOUTH FOUR TIMES A DAY BEFORE MEALS AND AT BEDTIME   montelukast (SINGULAIR) 10 mg tablet  Self Yes No   Sig: Take 10 mg by mouth daily at bedtime   morphine (MS CONTIN) 15 mg 12 hr tablet   Yes No   Sig: Take 15 mg by mouth 2 (two) times a day   morphine (MS CONTIN) 60 mg 12 hr tablet   Yes No   Sig: Take 60 mg by mouth 2 (two) times a day   nystatin (MYCOSTATIN) powder   No No   Sig: Apply topically 2 (two) times a day   omeprazole (PriLOSEC) 20 mg delayed release capsule  Self Yes No   Sig: Take 20 mg by mouth daily   ondansetron (ZOFRAN) 4 mg tablet   No No   Sig: Take 1 tablet (4 mg total) by mouth every 6 (six) hours   pravastatin (PRAVACHOL) 10 mg tablet   Yes No   Sig: Take 10 mg by mouth daily at bedtime      Facility-Administered Medications: None       Past Medical History:   Diagnosis Date    Acute respiratory insufficiency 11/23/2019    Asthma     Chronic pain     Sepsis ([address] ) 11/23/2019       Past Surgical History:   Procedure Laterality Date    CERVICAL LAMINECTOMY      CHOLECYSTECTOMY      CHOLECYSTECTOMY LAPAROSCOPIC N/A 11/10/2018    Procedure: CHOLECYSTECTOMY LAPAROSCOPIC w/ ioc;  Surgeon: Balbina Evans MD;  Location: [facility];  Service: General    HYSTERECTOMY      JOINT REPLACEMENT Bilateral     knee    TOE AMPUTATION Right 2/2/2018    Procedure: AMPUTATION TOE, RIGHT GREAT TOE;  Surgeon: Reba Dye DPM;  Location: [facility];  Service: Podiatry       Family History   Problem Relation Age of Onset    Diabetes Mother     Diabetes Maternal Grandmother     No Known Problems Father      I have reviewed and agree with the history as documented  E-Cigarette/Vaping     E-Cigarette/Vaping Substances     Social History     Tobacco Use    Smoking status: Never Smoker    Smokeless tobacco: Never Used   Substance Use Topics    Alcohol use: Not Currently     Comment: rarely    Drug use: No        Review of Systems   Unable to perform ROS: Mental status change       Physical Exam  ED Triage Vitals   Temperature Pulse Respirations Blood Pressure SpO2   05/09/22 1738 05/09/22 1737 05/09/22 1737 05/09/22 1737 05/09/22 1737   98 6 °F (37 °C) 104 18 (!) 206/92 97 %      Temp Source Heart Rate Source Patient Position - Orthostatic VS BP Location FiO2 (%)   05/09/22 2237 05/09/22 1737 05/09/22 1737 05/09/22 1737 --   Axillary Monitor Lying Right arm       Pain Score       05/09/22 1943       No Pain             Orthostatic Vital Signs  Vitals:    05/09/22 1737 05/09/22 1943 05/09/22 2205 05/09/22 2237   BP: (!) 206/92 162/98 141/86 146/87   Pulse: 104 103 (!) 110    Patient Position - Orthostatic VS: Lying   Lying       Physical Exam  Vitals and nursing note reviewed  Constitutional:       Appearance: She is well-developed  She is obese  She is ill-appearing  Comments: Yellow emesis across chest    HENT:      Head: Normocephalic and atraumatic  Right Ear: External ear normal       Left Ear: External ear normal       Nose: Nose normal       Mouth/Throat:      Mouth: Mucous membranes are moist    Eyes:      Conjunctiva/sclera: Conjunctivae normal       Pupils: Pupils are equal, round, and reactive to light  Comments: Pupils 4mm and reactive   Cardiovascular:      Rate and Rhythm: Normal rate and regular rhythm  Heart sounds: No murmur heard  Pulmonary:      Effort: Pulmonary effort is normal  No respiratory distress  Breath sounds: Normal breath sounds  Abdominal:      Palpations: Abdomen is soft  Tenderness:  There is no abdominal tenderness  Musculoskeletal:      Cervical back: Neck supple  Right lower leg: No edema  Left lower leg: No edema  Skin:     General: Skin is warm and dry  Findings: No rash  Neurological:      Mental Status: She is disoriented  Sensory: No sensory deficit  Motor: No weakness  Comments:  Following commands   Psychiatric:      Comments: Unable to assess         ED Medications  Medications   insulin regular (HumuLIN R,NovoLIN R) 1 Units/mL in sodium chloride 0 9 % 100 mL infusion (8 Units/hr Intravenous Rate/Dose Change 5/10/22 0009)   sodium chloride 0 9 % infusion (75 mL/hr Intravenous New Bag 5/9/22 2204)   metoclopramide (REGLAN) injection 10 mg (10 mg Intravenous Given 5/10/22 0036)   sodium chloride 0 9 % bolus 1,000 mL (0 mL Intravenous Stopped 5/9/22 2103)   metoclopramide (REGLAN) injection 10 mg (10 mg Intravenous Given 5/9/22 1844)       Diagnostic Studies  Results Reviewed     Procedure Component Value Units Date/Time    Urine Microscopic [[id number]]  (Abnormal) Collected: 05/09/22 2159    Lab Status: Final result Specimen: Urine, Other Updated: 05/09/22 2222     RBC, UA 0-1 /hpf      WBC, UA 1-2 /hpf      Epithelial Cells Occasional /hpf      Bacteria, UA Occasional /hpf     Urine Macroscopic, POC [[id number]]  (Abnormal) Collected: 05/09/22 2159    Lab Status: Final result Specimen: Urine Updated: 05/09/22 2201     Color, UA Yellow     Clarity, UA Clear     pH, UA 5 5     Leukocytes, UA Negative     Nitrite, UA Negative     Protein, UA >=300 mg/dl      Glucose,  (1/2%) mg/dl      Ketones, UA 80 (3+) mg/dl      Urobilinogen, UA 0 2 E U /dl      Bilirubin, UA Negative     Blood, UA Trace     Specific Gravity, UA >=1 030    Narrative:      CLINITEK RESULT    Fingerstick Glucose (POCT) [[id number]]  (Abnormal) Collected: 05/09/22 2158    Lab Status: Final result Updated: 05/09/22 2159     POC Glucose 368 mg/dl     HS Troponin 0hr (reflex protocol) [[id number]] (Normal) Collected: 05/09/22 1954    Lab Status: Final result Specimen: Blood from Arm, Right Updated: 05/09/22 2033     hs TnI 0hr 4 ng/L     Ammonia [[id number]]  (Abnormal) Collected: 05/09/22 1843    Lab Status: Final result Specimen: Blood from Arm, Right Updated: 05/09/22 1933     Ammonia <10 umol/L     Acetaminophen level-If concentration is detectable, please discuss with medical  on call   [[id number]]  (Abnormal) Collected: 05/09/22 1843    Lab Status: Final result Specimen: Blood from Arm, Right Updated: 05/09/22 1933     Acetaminophen Level <2 ug/mL     Comprehensive metabolic panel [[id number]]  (Abnormal) Collected: 05/09/22 1843    Lab Status: Final result Specimen: Blood from Arm, Right Updated: 05/09/22 1922     Sodium 137 mmol/L      Potassium 4 8 mmol/L      Chloride 98 mmol/L      CO2 26 mmol/L      ANION GAP 13 mmol/L      BUN 18 mg/dL      Creatinine 0 76 mg/dL      Glucose 361 mg/dL      Calcium 9 3 mg/dL      AST 26 U/L      ALT 25 U/L      Alkaline Phosphatase 174 U/L      Total Protein 8 4 g/dL      Albumin 3 8 g/dL      Total Bilirubin 1 51 mg/dL      eGFR 87 ml/min/1 73sq m     Narrative:      [place] guidelines for Chronic Kidney Disease (CKD):     Stage 1 with normal or high GFR (GFR > 90 mL/min/1 73 square meters)    Stage 2 Mild CKD (GFR = 60-89 mL/min/1 73 square meters)    Stage 3A Moderate CKD (GFR = 45-59 mL/min/1 73 square meters)    Stage 3B Moderate CKD (GFR = 30-44 mL/min/1 73 square meters)    Stage 4 Severe CKD (GFR = 15-29 mL/min/1 73 square meters)    Stage 5 End Stage CKD (GFR <15 mL/min/1 73 square meters)  Note: GFR calculation is accurate only with a steady state creatinine    Lipase [[id number]]  (Abnormal) Collected: 05/09/22 1843    Lab Status: Final result Specimen: Blood from Arm, Right Updated: 05/09/22 1922     Lipase 44 u/L     Salicylate level [[id number]]  (Abnormal) Collected: 05/09/22 1843    Lab Status: Final result Specimen: Blood from Arm, Right Updated: 98/79/52 9446     Salicylate Lvl <3 mg/dL     Ethanol [[id number]]  (Normal) Collected: 05/09/22 1843    Lab Status: Final result Specimen: Blood from Arm, Right Updated: 05/09/22 1918     Ethanol Lvl <3 mg/dL     Beta Hydroxybutyrate [[id number]]  (Abnormal) Collected: 05/09/22 1843    Lab Status: Final result Specimen: Blood from Arm, Right Updated: 05/09/22 1913     BETA-HYDROXYBUTYRATE 2 9 mmol/L     CBC and differential [[id number]]  (Abnormal) Collected: 05/09/22 1843    Lab Status: Final result Specimen: Blood from Arm, Right Updated: 05/09/22 1859     WBC 11 83 Thousand/uL      RBC 5 06 Million/uL      Hemoglobin 13 6 g/dL      Hematocrit 42 4 %      MCV 84 fL      MCH 26 9 pg      MCHC 32 1 g/dL      RDW 13 2 %      MPV 12 8 fL      Platelets 280 Thousands/uL      nRBC 0 /100 WBCs      Neutrophils Relative 87 %      Immat GRANS % 0 %      Lymphocytes Relative 9 %      Monocytes Relative 4 %      Eosinophils Relative 0 %      Basophils Relative 0 %      Neutrophils Absolute 10 22 Thousands/µL      Immature Grans Absolute 0 04 Thousand/uL      Lymphocytes Absolute 1 05 Thousands/µL      Monocytes Absolute 0 43 Thousand/µL      Eosinophils Absolute 0 04 Thousand/µL      Basophils Absolute 0 05 Thousands/µL     Fingerstick Glucose (POCT) [[id number]]  (Abnormal) Collected: 05/09/22 1853    Lab Status: Final result Updated: 05/09/22 1855     POC Glucose 390 mg/dl                  CT head wo contrast   Final Result by Ivana Kumar MD (05/09 2204)      No acute intracranial abnormality                    Workstation performed: [id number]         CT abdomen pelvis wo contrast   ED Interpretation by Magan Gunn DO (05/09 1947)     FINDINGS:     ABDOMEN     LOWER CHEST:  No clinically significant abnormality identified in the visualized lower chest      LIVER/BILIARY TREE:  Unremarkable      GALLBLADDER:  Gallbladder is surgically absent      SPLEEN:  Unremarkable      PANCREAS: Unremarkable      ADRENAL GLANDS:  Unremarkable      KIDNEYS/URETERS:  Unremarkable  No hydronephrosis      STOMACH AND BOWEL:  Unremarkable      APPENDIX:  No findings to suggest appendicitis      ABDOMINOPELVIC CAVITY:  No ascites  No pneumoperitoneum  No lymphadenopathy      VESSELS:  Unremarkable for patient's age      PELVIS     REPRODUCTIVE ORGANS:  Unremarkable for patient's age      URINARY BLADDER:  Unremarkable      ABDOMINAL WALL/INGUINAL REGIONS:  Fat-containing umbilical hernia      Small hiatal hernia      OSSEOUS STRUCTURES:  No acute fracture or destructive osseous lesion      IMPRESSION:     No acute CT findings          Final Result by Katie Jara MD (05/09 1933)      No acute CT findings  Workstation performed: [id number]               Procedures  ECG 12 Lead Documentation Only    Date/Time: 5/10/2022 12:50 AM  Performed by: Abdoulaye Blackwood MD  Authorized by: Abdoulaye Blackwood MD     Patient location:  ED  Previous ECG:     Previous ECG:  Compared to current    Similarity:  No change  Interpretation:     Interpretation: abnormal    Rate:     ECG rate assessment: tachycardic    Rhythm:     Rhythm: sinus rhythm    QRS:     QRS axis:  Normal    QRS intervals:  Normal  Comments:      Age indeterminant inferior infarct noted on previous ekg          ED Course                             SBIRT 20yo+      Most Recent Value   SBIRT (23 yo +)    In order to provide better care to our patients, we are screening all of our patients for alcohol and drug use  Would it be okay to ask you these screening questions? Unable to answer at this time Filed at: 05/09/2022 1738                MDM  Number of Diagnoses or Management Options  Altered mental status  Hyperglycemia  Diagnosis management comments: 61 y o F w/ altered mental status and hyperglycemia  Per discussion with family member, this is similar presentation to previous hospitalization in 2020 when she was diagnosed with diabetic gastroparesis  Although patient is chronic opioid user, low concern for overdose  Will obtain CBC, belly labs, ammonia, coma panel, ketones, UA, CTAP and treat with reglan and fluids to start  Labs without signs of acidosis  No other tox findings  Patient with mild improvement in mental status after receiving partial bolus of fluids  This may be isolated AMS 2/2 hyperglycemia with mild electrolyte changes  Will admit for further evaluation and management  Will defer insulin administration to admitting team as patient not currently w/ DKA  Disposition  Final diagnoses: Altered mental status   Hyperglycemia     Time reflects when diagnosis was documented in both MDM as applicable and the Disposition within this note     Time User Action Codes Description Comment    5/9/2022  8:29 PM Mahad Leigh Add [R41 [id number]] Altered mental status     5/9/2022  8:29 PM Mahad Leigh Add [R73 [id number]] Hyperglycemia       ED Disposition     ED Disposition Condition Date/Time Comment    Admit Stable Mon May 9, 2022  8:29 PM Case was discussed with Dr Mary Rivas and the patient's admission status was agreed to be Admission Status: inpatient status to the service of Dr Mary Rivas   Follow-up Information    None         Current Discharge Medication List      CONTINUE these medications which have NOT CHANGED    Details   albuterol (PROVENTIL HFA,VENTOLIN HFA) 90 mcg/act inhaler Inhale 2 puffs every 4 (four) hours as needed    Comments: Substitution to a formulary equivalent within the same pharmaceutical class is authorized        !! BD PEN NEEDLE HERNAN U/F 32G X 4 MM MISC Refills: 0      carisoprodol (SOMA) 350 mg tablet Take 350 mg by mouth 2 (two) times a day      diazepam (VALIUM) 5 mg tablet Take 5 mg by mouth daily at bedtime as needed for anxiety      fluticasone (FLONASE) 50 mcg/act nasal spray 1 spray into each nostril daily      furosemide (LASIX) 20 mg tablet Take 40 mg by mouth as needed       insulin glargine (LANTUS) 100 units/mL subcutaneous injection Inject 50 Units under the skin daily at bedtime        insulin lispro (HumaLOG) 100 units/mL injection INJECT 10 UNITS UNDER THE SKIN 3 (THREE) TIMES A DAY WITH MEALS  Qty: 10 mL, Refills: 3    Associated Diagnoses: Type 2 diabetes mellitus with diabetic neuropathy, with long-term current use of insulin ([facility] )      ! ! Insulin Pen Needle 29G X 5MM MISC by Does not apply route 4 (four) times a day  Qty: 100 each, Refills: 0    Associated Diagnoses: Type 2 diabetes mellitus with diabetic neuropathy, with long-term current use of insulin ([facility])      lisinopril (ZESTRIL) 10 mg tablet Take 1 tablet (10 mg total) by mouth daily  Qty: 30 tablet, Refills: 0    Associated Diagnoses: Essential hypertension      LYRICA 150 MG capsule Take 150 mg by mouth 2 (two) times a day  Refills: 5      !! metoclopramide (REGLAN) 10 mg tablet Take 1 tablet (10 mg total) by mouth 4 (four) times a day  Qty: 120 tablet, Refills: 0    Associated Diagnoses: Diabetic gastroparesis ([facility] )      ! ! metoclopramide (REGLAN) 10 mg tablet TAKE 1 TABLET BY MOUTH FOUR TIMES A DAY BEFORE MEALS AND AT BEDTIME  Qty: 60 tablet, Refills: 0    Associated Diagnoses: Gastroparesis      montelukast (SINGULAIR) 10 mg tablet Take 10 mg by mouth daily at bedtime      !! morphine (MS CONTIN) 15 mg 12 hr tablet Take 15 mg by mouth 2 (two) times a day      !! morphine (MS CONTIN) 60 mg 12 hr tablet Take 60 mg by mouth 2 (two) times a day  Refills: 0      nystatin (MYCOSTATIN) powder Apply topically 2 (two) times a day  Qty: 45 g, Refills: 0    Associated Diagnoses: Candidiasis      omeprazole (PriLOSEC) 20 mg delayed release capsule Take 20 mg by mouth daily      ondansetron (ZOFRAN) 4 mg tablet Take 1 tablet (4 mg total) by mouth every 6 (six) hours  Qty: 12 tablet, Refills: 0    Associated Diagnoses: Nausea and vomiting      pravastatin (PRAVACHOL) 10 mg tablet Take 10 mg by mouth daily at bedtime      WIXELA INHUB 500-50 MCG/DOSE inhaler Take 1 puff by mouth 2 (two) times a day    Comments: Substitution to a formulary equivalent within the same pharmaceutical class is authorized  !! - Potential duplicate medications found  Please discuss with provider  No discharge procedures on file  PDMP Review       Value Time User    PDMP Reviewed  Yes 5/9/2022  8:33 PM Klever Corbin PA-C           ED Provider  Attending physically available and evaluated Awa Hearn  CELINE managed the patient along with the ED Attending      Electronically Signed by         Sage Cain MD  05/10/22 2379

## 2022-05-10 ENCOUNTER — APPOINTMENT (INPATIENT)
Dept: RADIOLOGY | Facility: HOSPITAL | Age: 57
DRG: 637 | End: 2022-05-10
Payer: MEDICARE

## 2022-05-10 LAB
ALBUMIN SERPL BCP-MCNC: 3.7 G/DL (ref 3.5–5)
ALP SERPL-CCNC: 165 U/L (ref 46–116)
ALT SERPL W P-5'-P-CCNC: 17 U/L (ref 12–78)
ANION GAP SERPL CALCULATED.3IONS-SCNC: 10 MMOL/L (ref 4–13)
ANION GAP SERPL CALCULATED.3IONS-SCNC: 11 MMOL/L (ref 4–13)
ANION GAP SERPL CALCULATED.3IONS-SCNC: 15 MMOL/L (ref 4–13)
AST SERPL W P-5'-P-CCNC: 14 U/L (ref 5–45)
BASOPHILS # BLD AUTO: 0.07 THOUSANDS/ΜL (ref 0–0.1)
BASOPHILS NFR BLD AUTO: 0 % (ref 0–1)
BILIRUB SERPL-MCNC: 1.24 MG/DL (ref 0.2–1)
BUN SERPL-MCNC: 18 MG/DL (ref 5–25)
BUN SERPL-MCNC: 18 MG/DL (ref 5–25)
BUN SERPL-MCNC: 21 MG/DL (ref 5–25)
CALCIUM SERPL-MCNC: 10.1 MG/DL (ref 8.3–10.1)
CALCIUM SERPL-MCNC: 9.7 MG/DL (ref 8.3–10.1)
CALCIUM SERPL-MCNC: 9.8 MG/DL (ref 8.3–10.1)
CHLORIDE SERPL-SCNC: 100 MMOL/L (ref 100–108)
CHLORIDE SERPL-SCNC: 102 MMOL/L (ref 100–108)
CHLORIDE SERPL-SCNC: 104 MMOL/L (ref 100–108)
CO2 SERPL-SCNC: 25 MMOL/L (ref 21–32)
CO2 SERPL-SCNC: 26 MMOL/L (ref 21–32)
CO2 SERPL-SCNC: 28 MMOL/L (ref 21–32)
CREAT SERPL-MCNC: 0.82 MG/DL (ref 0.6–1.3)
CREAT SERPL-MCNC: 0.87 MG/DL (ref 0.6–1.3)
CREAT SERPL-MCNC: 0.87 MG/DL (ref 0.6–1.3)
EOSINOPHIL # BLD AUTO: 0.01 THOUSAND/ΜL (ref 0–0.61)
EOSINOPHIL NFR BLD AUTO: 0 % (ref 0–6)
ERYTHROCYTE [DISTWIDTH] IN BLOOD BY AUTOMATED COUNT: 13.4 % (ref 11.6–15.1)
EST. AVERAGE GLUCOSE BLD GHB EST-MCNC: 280 MG/DL
GFR SERPL CREATININE-BSD FRML MDRD: 74 ML/MIN/1.73SQ M
GFR SERPL CREATININE-BSD FRML MDRD: 74 ML/MIN/1.73SQ M
GFR SERPL CREATININE-BSD FRML MDRD: 79 ML/MIN/1.73SQ M
GLUCOSE SERPL-MCNC: 117 MG/DL (ref 65–140)
GLUCOSE SERPL-MCNC: 146 MG/DL (ref 65–140)
GLUCOSE SERPL-MCNC: 160 MG/DL (ref 65–140)
GLUCOSE SERPL-MCNC: 162 MG/DL (ref 65–140)
GLUCOSE SERPL-MCNC: 169 MG/DL (ref 65–140)
GLUCOSE SERPL-MCNC: 176 MG/DL (ref 65–140)
GLUCOSE SERPL-MCNC: 180 MG/DL (ref 65–140)
GLUCOSE SERPL-MCNC: 184 MG/DL (ref 65–140)
GLUCOSE SERPL-MCNC: 193 MG/DL (ref 65–140)
GLUCOSE SERPL-MCNC: 193 MG/DL (ref 65–140)
GLUCOSE SERPL-MCNC: 196 MG/DL (ref 65–140)
GLUCOSE SERPL-MCNC: 214 MG/DL (ref 65–140)
GLUCOSE SERPL-MCNC: 247 MG/DL (ref 65–140)
GLUCOSE SERPL-MCNC: 271 MG/DL (ref 65–140)
GLUCOSE SERPL-MCNC: 305 MG/DL (ref 65–140)
HBA1C MFR BLD: 11.4 %
HCT VFR BLD AUTO: 45.3 % (ref 34.8–46.1)
HGB BLD-MCNC: 14.6 G/DL (ref 11.5–15.4)
IMM GRANULOCYTES # BLD AUTO: 0.14 THOUSAND/UL (ref 0–0.2)
IMM GRANULOCYTES NFR BLD AUTO: 1 % (ref 0–2)
LYMPHOCYTES # BLD AUTO: 1.37 THOUSANDS/ΜL (ref 0.6–4.47)
LYMPHOCYTES NFR BLD AUTO: 8 % (ref 14–44)
MAGNESIUM SERPL-MCNC: 1.5 MG/DL (ref 1.6–2.6)
MCH RBC QN AUTO: 26.3 PG (ref 26.8–34.3)
MCHC RBC AUTO-ENTMCNC: 32.2 G/DL (ref 31.4–37.4)
MCV RBC AUTO: 82 FL (ref 82–98)
MONOCYTES # BLD AUTO: 0.77 THOUSAND/ΜL (ref 0.17–1.22)
MONOCYTES NFR BLD AUTO: 5 % (ref 4–12)
NEUTROPHILS # BLD AUTO: 14.11 THOUSANDS/ΜL (ref 1.85–7.62)
NEUTS SEG NFR BLD AUTO: 86 % (ref 43–75)
NRBC BLD AUTO-RTO: 0 /100 WBCS
PLATELET # BLD AUTO: 265 THOUSANDS/UL (ref 149–390)
PMV BLD AUTO: 12.6 FL (ref 8.9–12.7)
POTASSIUM SERPL-SCNC: 3.3 MMOL/L (ref 3.5–5.3)
POTASSIUM SERPL-SCNC: 3.6 MMOL/L (ref 3.5–5.3)
POTASSIUM SERPL-SCNC: 3.7 MMOL/L (ref 3.5–5.3)
PROT SERPL-MCNC: 9 G/DL (ref 6.4–8.2)
RBC # BLD AUTO: 5.56 MILLION/UL (ref 3.81–5.12)
SODIUM SERPL-SCNC: 138 MMOL/L (ref 136–145)
SODIUM SERPL-SCNC: 141 MMOL/L (ref 136–145)
SODIUM SERPL-SCNC: 142 MMOL/L (ref 136–145)
WBC # BLD AUTO: 16.47 THOUSAND/UL (ref 4.31–10.16)

## 2022-05-10 PROCEDURE — 71045 X-RAY EXAM CHEST 1 VIEW: CPT

## 2022-05-10 PROCEDURE — 82948 REAGENT STRIP/BLOOD GLUCOSE: CPT

## 2022-05-10 PROCEDURE — 83735 ASSAY OF MAGNESIUM: CPT | Performed by: INTERNAL MEDICINE

## 2022-05-10 PROCEDURE — 82272 OCCULT BLD FECES 1-3 TESTS: CPT | Performed by: PHYSICIAN ASSISTANT

## 2022-05-10 PROCEDURE — 92610 EVALUATE SWALLOWING FUNCTION: CPT

## 2022-05-10 PROCEDURE — 80053 COMPREHEN METABOLIC PANEL: CPT | Performed by: INTERNAL MEDICINE

## 2022-05-10 PROCEDURE — 83036 HEMOGLOBIN GLYCOSYLATED A1C: CPT | Performed by: INTERNAL MEDICINE

## 2022-05-10 PROCEDURE — 80048 BASIC METABOLIC PNL TOTAL CA: CPT | Performed by: PHYSICIAN ASSISTANT

## 2022-05-10 PROCEDURE — 99232 SBSQ HOSP IP/OBS MODERATE 35: CPT | Performed by: PHYSICIAN ASSISTANT

## 2022-05-10 PROCEDURE — 85025 COMPLETE CBC W/AUTO DIFF WBC: CPT | Performed by: INTERNAL MEDICINE

## 2022-05-10 RX ORDER — INSULIN LISPRO 100 [IU]/ML
2-12 INJECTION, SOLUTION INTRAVENOUS; SUBCUTANEOUS
Status: DISCONTINUED | OUTPATIENT
Start: 2022-05-10 | End: 2022-05-10

## 2022-05-10 RX ORDER — PRAVASTATIN SODIUM 10 MG
10 TABLET ORAL
Status: DISCONTINUED | OUTPATIENT
Start: 2022-05-10 | End: 2022-05-11

## 2022-05-10 RX ORDER — CALCIUM CARBONATE 200(500)MG
1000 TABLET,CHEWABLE ORAL DAILY PRN
Status: DISCONTINUED | OUTPATIENT
Start: 2022-05-10 | End: 2022-05-12

## 2022-05-10 RX ORDER — FLUTICASONE PROPIONATE 50 MCG
1 SPRAY, SUSPENSION (ML) NASAL DAILY
Status: DISCONTINUED | OUTPATIENT
Start: 2022-05-10 | End: 2022-05-11

## 2022-05-10 RX ORDER — METOCLOPRAMIDE HYDROCHLORIDE 5 MG/ML
10 INJECTION INTRAMUSCULAR; INTRAVENOUS EVERY 6 HOURS PRN
Status: DISCONTINUED | OUTPATIENT
Start: 2022-05-10 | End: 2022-05-10

## 2022-05-10 RX ORDER — ACETAMINOPHEN 325 MG/1
650 TABLET ORAL EVERY 6 HOURS PRN
Status: DISCONTINUED | OUTPATIENT
Start: 2022-05-10 | End: 2022-05-12

## 2022-05-10 RX ORDER — INSULIN GLARGINE 100 [IU]/ML
10 INJECTION, SOLUTION SUBCUTANEOUS ONCE
Status: DISCONTINUED | OUTPATIENT
Start: 2022-05-10 | End: 2022-05-10

## 2022-05-10 RX ORDER — LISINOPRIL 10 MG/1
10 TABLET ORAL DAILY
Status: DISCONTINUED | OUTPATIENT
Start: 2022-05-10 | End: 2022-05-11

## 2022-05-10 RX ORDER — HEPARIN SODIUM 5000 [USP'U]/ML
5000 INJECTION, SOLUTION INTRAVENOUS; SUBCUTANEOUS EVERY 8 HOURS SCHEDULED
Status: DISCONTINUED | OUTPATIENT
Start: 2022-05-10 | End: 2022-05-11

## 2022-05-10 RX ORDER — ALBUTEROL SULFATE 90 UG/1
2 AEROSOL, METERED RESPIRATORY (INHALATION) EVERY 4 HOURS PRN
Status: DISCONTINUED | OUTPATIENT
Start: 2022-05-10 | End: 2022-05-17 | Stop reason: HOSPADM

## 2022-05-10 RX ORDER — PANTOPRAZOLE SODIUM 20 MG/1
20 TABLET, DELAYED RELEASE ORAL
Status: DISCONTINUED | OUTPATIENT
Start: 2022-05-10 | End: 2022-05-11

## 2022-05-10 RX ORDER — MONTELUKAST SODIUM 10 MG/1
10 TABLET ORAL
Status: DISCONTINUED | OUTPATIENT
Start: 2022-05-10 | End: 2022-05-11

## 2022-05-10 RX ORDER — SENNOSIDES 8.6 MG
1 TABLET ORAL DAILY
Status: DISCONTINUED | OUTPATIENT
Start: 2022-05-10 | End: 2022-05-11

## 2022-05-10 RX ORDER — METOCLOPRAMIDE 10 MG/1
10 TABLET ORAL 4 TIMES DAILY
Status: DISCONTINUED | OUTPATIENT
Start: 2022-05-10 | End: 2022-05-11

## 2022-05-10 RX ORDER — NYSTATIN 100000 [USP'U]/G
POWDER TOPICAL 2 TIMES DAILY
Status: DISCONTINUED | OUTPATIENT
Start: 2022-05-10 | End: 2022-05-17 | Stop reason: HOSPADM

## 2022-05-10 RX ORDER — FLUTICASONE FUROATE AND VILANTEROL 200; 25 UG/1; UG/1
1 POWDER RESPIRATORY (INHALATION)
Status: DISCONTINUED | OUTPATIENT
Start: 2022-05-10 | End: 2022-05-17 | Stop reason: HOSPADM

## 2022-05-10 RX ORDER — DEXTROSE AND SODIUM CHLORIDE 5; .9 G/100ML; G/100ML
75 INJECTION, SOLUTION INTRAVENOUS CONTINUOUS
Status: DISCONTINUED | OUTPATIENT
Start: 2022-05-10 | End: 2022-05-11

## 2022-05-10 RX ORDER — METOPROLOL TARTRATE 5 MG/5ML
10 INJECTION INTRAVENOUS ONCE AS NEEDED
Status: COMPLETED | OUTPATIENT
Start: 2022-05-10 | End: 2022-05-10

## 2022-05-10 RX ADMIN — METOPROLOL TARTRATE 10 MG: 5 INJECTION, SOLUTION INTRAVENOUS at 16:50

## 2022-05-10 RX ADMIN — NYSTATIN 1 APPLICATION: 100000 POWDER TOPICAL at 17:24

## 2022-05-10 RX ADMIN — FLUTICASONE PROPIONATE 1 SPRAY: 50 SPRAY, METERED NASAL at 09:39

## 2022-05-10 RX ADMIN — METOCLOPRAMIDE 10 MG: 10 TABLET ORAL at 22:23

## 2022-05-10 RX ADMIN — DEXTROSE AND SODIUM CHLORIDE 75 ML/HR: 5; .9 INJECTION, SOLUTION INTRAVENOUS at 09:52

## 2022-05-10 RX ADMIN — FLUTICASONE FUROATE AND VILANTEROL TRIFENATATE 1 PUFF: 200; 25 POWDER RESPIRATORY (INHALATION) at 09:39

## 2022-05-10 RX ADMIN — DEXTROSE AND SODIUM CHLORIDE 75 ML/HR: 5; .9 INJECTION, SOLUTION INTRAVENOUS at 20:20

## 2022-05-10 RX ADMIN — SODIUM CHLORIDE 4 UNITS/HR: 9 INJECTION, SOLUTION INTRAVENOUS at 18:00

## 2022-05-10 RX ADMIN — SODIUM CHLORIDE 9 UNITS/HR: 9 INJECTION, SOLUTION INTRAVENOUS at 09:50

## 2022-05-10 RX ADMIN — HEPARIN SODIUM 5000 UNITS: 5000 INJECTION INTRAVENOUS; SUBCUTANEOUS at 09:39

## 2022-05-10 RX ADMIN — NYSTATIN: 100000 POWDER TOPICAL at 09:39

## 2022-05-10 RX ADMIN — METOCLOPRAMIDE 10 MG: 5 INJECTION, SOLUTION INTRAMUSCULAR; INTRAVENOUS at 00:36

## 2022-05-10 NOTE — ASSESSMENT & PLAN NOTE
Will hold all opioids/sedating medication giving acute encephalopathy      No acute signs of opiate overdose at this time  · Morphine Sulf Er 60mg BID   · Morphine Sulf Er 15mg BID  · Morphine Sulf Ir 15mg TID PRN  · Pregabalin 150mg BID  · Carisoprodol 350mg BID  · Valium 5 mg daily  · PDMP reviewed

## 2022-05-10 NOTE — NURSING NOTE
Pt arrived from ED via stretcher at approximately 2230  Pt alert but confused, unable to elicit a verbal response but nods and gestures, pt a little agitated  Pt on Insulin drip at 8 units/hr, and NS at 75  Pt transferred to bed, connected to monitors, vitals obtained, assessment completed, unable to get verbal responses for admission questions  Explained to pt where she was and safety precautions  POCT obtained  Will continue to monitor and assess  No further needs noted at this time       Jack Harrell RN  5/9/2022 2795

## 2022-05-10 NOTE — PLAN OF CARE
Problem: MOBILITY - ADULT  Goal: Maintain or return to baseline ADL function  Description: INTERVENTIONS:  -  Assess patient's ability to carry out ADLs; assess patient's baseline for ADL function and identify physical deficits which impact ability to perform ADLs (bathing, care of mouth/teeth, toileting, grooming, dressing, etc )  - Assess/evaluate cause of self-care deficits   - Assess range of motion  - Assess patient's mobility; develop plan if impaired  - Assess patient's need for assistive devices and provide as appropriate  - Encourage maximum independence but intervene and supervise when necessary  - Involve family in performance of ADLs  - Assess for home care needs following discharge   - Consider OT consult to assist with ADL evaluation and planning for discharge  - Provide patient education as appropriate  5/10/2022 1020 by Pretty Brewer RN  Outcome: Progressing  5/10/2022 1019 by Pretty Brewer RN  Outcome: Progressing  Goal: Maintains/Returns to pre admission functional level  Description: INTERVENTIONS:  - Perform BMAT or MOVE assessment daily    - Set and communicate daily mobility goal to care team and patient/family/caregiver     - Collaborate with rehabilitation services on mobility goals if consulted  - Out of bed for toileting  - Record patient progress and toleration of activity level   5/10/2022 1020 by Pretty Brewer RN  Outcome: Progressing  5/10/2022 1019 by Pretty Brewer RN  Outcome: Progressing     Problem: Prexisting or High Potential for Compromised Skin Integrity  Goal: Skin integrity is maintained or improved  Description: INTERVENTIONS:  - Identify patients at risk for skin breakdown  - Assess and monitor skin integrity  - Assess and monitor nutrition and hydration status  - Monitor labs   - Assess for incontinence   - Turn and reposition patient  - Assist with mobility/ambulation  - Relieve pressure over bony prominences  - Avoid friction and shearing  - Provide appropriate hygiene as needed including keeping skin clean and dry  - Evaluate need for skin moisturizer/barrier cream  - Collaborate with interdisciplinary team   - Patient/family teaching  - Consider wound care consult   5/10/2022 1020 by Lilian Coburn RN  Outcome: Progressing  5/10/2022 1019 by Lilian Coburn RN  Outcome: Progressing     Problem: Nutrition/Hydration-ADULT  Goal: Nutrient/Hydration intake appropriate for improving, restoring or maintaining nutritional needs  Description: Monitor and assess patient's nutrition/hydration status for malnutrition  Collaborate with interdisciplinary team and initiate plan and interventions as ordered  Monitor patient's weight and dietary intake as ordered or per policy  Utilize nutrition screening tool and intervene as necessary  Determine patient's food preferences and provide high-protein, high-caloric foods as appropriate       INTERVENTIONS:  - Monitor oral intake, urinary output, labs, and treatment plans  - Assess nutrition and hydration status and recommend course of action  - Evaluate amount of meals eaten  - Assist patient with eating if necessary   - Allow adequate time for meals  - Recommend/ encourage appropriate diets, oral nutritional supplements, and vitamin/mineral supplements  - Order, calculate, and assess calorie counts as needed  - Recommend, monitor, and adjust tube feedings and TPN/PPN based on assessed needs  - Assess need for intravenous fluids  - Provide specific nutrition/hydration education as appropriate  - Include patient/family/caregiver in decisions related to nutrition  5/10/2022 1020 by Lilian Coburn RN  Outcome: Progressing  5/10/2022 1019 by Lilian Coburn RN  Outcome: Progressing     Problem: GASTROINTESTINAL - ADULT  Goal: Minimal or absence of nausea and/or vomiting  Description: INTERVENTIONS:  - Administer IV fluids if ordered to ensure adequate hydration  - Maintain NPO status until nausea and vomiting are resolved  - Nasogastric tube if ordered  - Administer ordered antiemetic medications as needed  - Provide nonpharmacologic comfort measures as appropriate  - Advance diet as tolerated, if ordered  - Consider nutrition services referral to assist patient with adequate nutrition and appropriate food choices  5/10/2022 1020 by Wanda Leiva RN  Outcome: Progressing  5/10/2022 1019 by Wanda Leiva RN  Outcome: Progressing  Goal: Maintains or returns to baseline bowel function  Description: INTERVENTIONS:  - Assess bowel function  - Encourage oral fluids to ensure adequate hydration  - Administer IV fluids if ordered to ensure adequate hydration  - Administer ordered medications as needed  - Encourage mobilization and activity  - Consider nutritional services referral to assist patient with adequate nutrition and appropriate food choices  5/10/2022 1020 by Wanda Leiva RN  Outcome: Progressing  5/10/2022 1019 by Wanda Leiva RN  Outcome: Progressing  Goal: Maintains adequate nutritional intake  Description: INTERVENTIONS:  - Monitor percentage of each meal consumed  - Identify factors contributing to decreased intake, treat as appropriate  - Assist with meals as needed  - Monitor I&O, weight, and lab values if indicated  - Obtain nutrition services referral as needed  5/10/2022 1020 by Wanda Leiva RN  Outcome: Progressing  5/10/2022 1019 by Wanda Leiva RN  Outcome: Progressing  Goal: Establish and maintain optimal ostomy function  Description: INTERVENTIONS:  - Assess bowel function  - Encourage oral fluids to ensure adequate hydration  - Administer IV fluids if ordered to ensure adequate hydration   - Administer ordered medications as needed  - Encourage mobilization and activity  - Nutrition services referral to assist patient with appropriate food choices  - Assess stoma site  - Consider wound care consult   5/10/2022 1020 by Wanda Leiva RN  Outcome: Progressing  5/10/2022 1019 by Wanda Leiva RN  Outcome: Progressing  Goal: Oral mucous membranes remain intact  Description: INTERVENTIONS  - Assess oral mucosa and hygiene practices  - Implement preventative oral hygiene regimen  - Implement oral medicated treatments as ordered  - Initiate Nutrition services referral as needed  5/10/2022 1020 by Pretty Brewer RN  Outcome: Progressing  5/10/2022 1019 by Pretty Brewer RN  Outcome: Progressing     Problem: METABOLIC, FLUID AND ELECTROLYTES - ADULT  Goal: Electrolytes maintained within normal limits  Description: INTERVENTIONS:  - Monitor labs and assess patient for signs and symptoms of electrolyte imbalances  - Administer electrolyte replacement as ordered  - Monitor response to electrolyte replacements, including repeat lab results as appropriate  - Instruct patient on fluid and nutrition as appropriate  5/10/2022 1020 by Pretty Brewer RN  Outcome: Progressing  5/10/2022 1019 by Pretty Brewer RN  Outcome: Progressing  Goal: Fluid balance maintained  Description: INTERVENTIONS:  - Monitor labs   - Monitor I/O and WT  - Instruct patient on fluid and nutrition as appropriate  - Assess for signs & symptoms of volume excess or deficit  5/10/2022 1020 by Pretty Brewer RN  Outcome: Progressing  5/10/2022 1019 by Pretty Brewer RN  Outcome: Progressing  Goal: Glucose maintained within target range  Description: INTERVENTIONS:  - Monitor Blood Glucose as ordered  - Assess for signs and symptoms of hyperglycemia and hypoglycemia  - Administer ordered medications to maintain glucose within target range  - Assess nutritional intake and initiate nutrition service referral as needed  5/10/2022 1020 by Pretty Brewer RN  Outcome: Progressing  5/10/2022 1019 by Pretty Brewer RN  Outcome: Progressing

## 2022-05-10 NOTE — ASSESSMENT & PLAN NOTE
Lab Results   Component Value Date    HGBA1C 10 8 (H) 06/26/2020       Recent Labs     05/09/22  1853   POCGLU 390*       Blood Sugar Average: Last 72 hrs:  (P) 390   Continue Reglan p r n

## 2022-05-10 NOTE — PLAN OF CARE
Problem: MOBILITY - ADULT  Goal: Maintain or return to baseline ADL function  Description: INTERVENTIONS:  -  Assess patient's ability to carry out ADLs; assess patient's baseline for ADL function and identify physical deficits which impact ability to perform ADLs (bathing, care of mouth/teeth, toileting, grooming, dressing, etc )  - Assess/evaluate cause of self-care deficits   - Assess range of motion  - Assess patient's mobility; develop plan if impaired  - Assess patient's need for assistive devices and provide as appropriate  - Encourage maximum independence but intervene and supervise when necessary  - Involve family in performance of ADLs  - Assess for home care needs following discharge   - Consider OT consult to assist with ADL evaluation and planning for discharge  - Provide patient education as appropriate  Outcome: Progressing  Goal: Maintains/Returns to pre admission functional level  Description: INTERVENTIONS:  - Perform BMAT or MOVE assessment daily    - Set and communicate daily mobility goal to care team and patient/family/caregiver     - Collaborate with rehabilitation services on mobility goals if consulted  - Out of bed for toileting  - Record patient progress and toleration of activity level   Outcome: Progressing     Problem: Prexisting or High Potential for Compromised Skin Integrity  Goal: Skin integrity is maintained or improved  Description: INTERVENTIONS:  - Identify patients at risk for skin breakdown  - Assess and monitor skin integrity  - Assess and monitor nutrition and hydration status  - Monitor labs   - Assess for incontinence   - Turn and reposition patient  - Assist with mobility/ambulation  - Relieve pressure over bony prominences  - Avoid friction and shearing  - Provide appropriate hygiene as needed including keeping skin clean and dry  - Evaluate need for skin moisturizer/barrier cream  - Collaborate with interdisciplinary team   - Patient/family teaching  - Consider wound care consult   Outcome: Progressing     Problem: Nutrition/Hydration-ADULT  Goal: Nutrient/Hydration intake appropriate for improving, restoring or maintaining nutritional needs  Description: Monitor and assess patient's nutrition/hydration status for malnutrition  Collaborate with interdisciplinary team and initiate plan and interventions as ordered  Monitor patient's weight and dietary intake as ordered or per policy  Utilize nutrition screening tool and intervene as necessary  Determine patient's food preferences and provide high-protein, high-caloric foods as appropriate       INTERVENTIONS:  - Monitor oral intake, urinary output, labs, and treatment plans  - Assess nutrition and hydration status and recommend course of action  - Evaluate amount of meals eaten  - Assist patient with eating if necessary   - Allow adequate time for meals  - Recommend/ encourage appropriate diets, oral nutritional supplements, and vitamin/mineral supplements  - Order, calculate, and assess calorie counts as needed  - Recommend, monitor, and adjust tube feedings and TPN/PPN based on assessed needs  - Assess need for intravenous fluids  - Provide specific nutrition/hydration education as appropriate  - Include patient/family/caregiver in decisions related to nutrition  Outcome: Progressing     Problem: GASTROINTESTINAL - ADULT  Goal: Minimal or absence of nausea and/or vomiting  Description: INTERVENTIONS:  - Administer IV fluids if ordered to ensure adequate hydration  - Maintain NPO status until nausea and vomiting are resolved  - Nasogastric tube if ordered  - Administer ordered antiemetic medications as needed  - Provide nonpharmacologic comfort measures as appropriate  - Advance diet as tolerated, if ordered  - Consider nutrition services referral to assist patient with adequate nutrition and appropriate food choices  Outcome: Progressing  Goal: Maintains or returns to baseline bowel function  Description: INTERVENTIONS:  - Assess bowel function  - Encourage oral fluids to ensure adequate hydration  - Administer IV fluids if ordered to ensure adequate hydration  - Administer ordered medications as needed  - Encourage mobilization and activity  - Consider nutritional services referral to assist patient with adequate nutrition and appropriate food choices  Outcome: Progressing  Goal: Maintains adequate nutritional intake  Description: INTERVENTIONS:  - Monitor percentage of each meal consumed  - Identify factors contributing to decreased intake, treat as appropriate  - Assist with meals as needed  - Monitor I&O, weight, and lab values if indicated  - Obtain nutrition services referral as needed  Outcome: Progressing  Goal: Establish and maintain optimal ostomy function  Description: INTERVENTIONS:  - Assess bowel function  - Encourage oral fluids to ensure adequate hydration  - Administer IV fluids if ordered to ensure adequate hydration   - Administer ordered medications as needed  - Encourage mobilization and activity  - Nutrition services referral to assist patient with appropriate food choices  - Assess stoma site  - Consider wound care consult   Outcome: Progressing  Goal: Oral mucous membranes remain intact  Description: INTERVENTIONS  - Assess oral mucosa and hygiene practices  - Implement preventative oral hygiene regimen  - Implement oral medicated treatments as ordered  - Initiate Nutrition services referral as needed  Outcome: Progressing     Problem: METABOLIC, FLUID AND ELECTROLYTES - ADULT  Goal: Electrolytes maintained within normal limits  Description: INTERVENTIONS:  - Monitor labs and assess patient for signs and symptoms of electrolyte imbalances  - Administer electrolyte replacement as ordered  - Monitor response to electrolyte replacements, including repeat lab results as appropriate  - Instruct patient on fluid and nutrition as appropriate  Outcome: Progressing  Goal: Fluid balance maintained  Description: INTERVENTIONS:  - Monitor labs   - Monitor I/O and WT  - Instruct patient on fluid and nutrition as appropriate  - Assess for signs & symptoms of volume excess or deficit  Outcome: Progressing  Goal: Glucose maintained within target range  Description: INTERVENTIONS:  - Monitor Blood Glucose as ordered  - Assess for signs and symptoms of hyperglycemia and hypoglycemia  - Administer ordered medications to maintain glucose within target range  - Assess nutritional intake and initiate nutrition service referral as needed  Outcome: Progressing

## 2022-05-10 NOTE — ASSESSMENT & PLAN NOTE
Patient presenting with weakness for multiple days, now with altered mental status  Per EMS, son called [id number] the patient be brought to the ED for further evaluation    · Has been previously admitted for the same, typically secondary to hyperglycemia  · See assessment and plan under type 2 diabetes with hyperglycemia

## 2022-05-10 NOTE — H&P
[address] [facility]  H&P- Delta Face 1965, 61 y o  female MRN: [id number]  Unit/Bed#: ED 08 Encounter: [id number]  Primary Care Provider: Geri Warner MD   Date and time admitted to hospital: 5/9/2022  5:33 PM    * Type 2 diabetes mellitus with hyperglycemia, with long-term current use of insulin [facility])  Assessment & Plan    Lab Results   Component Value Date    HGBA1C 10 8 (H) 06/26/2020   Patient maintained on 50 units Lantus HS and 10 units t i d  With meals  · Presenting with altered mental status, according to son, this is how she gets when she has significantly hyperglycemia  · Has been previously admitted for the same  · Blood sugars significantly elevated, with elevated beta hydroxybutyrate, no anion gap or acidosis noted   · Not in DKA  · Initiated on insulin drip  · Step-down 2  · Once blood sugars normalize, transition to long-acting and sliding scale insulin    Acute encephalopathy  Assessment & Plan  Patient presenting with weakness for multiple days, now with altered mental status  Per EMS, son called 46 the patient be brought to the ED for further evaluation  · Has been previously admitted for the same, typically secondary to hyperglycemia  · See assessment and plan under type 2 diabetes with hyperglycemia    Essential hypertension  Assessment & Plan  Continue lisinopril 10 mg daily, Lasix 40 mg p r n  Diabetic gastroparesis [facility])  Assessment & Plan  Lab Results   Component Value Date    HGBA1C 10 8 (H) 06/26/2020       Recent Labs     05/09/22  1853   POCGLU 390*       Blood Sugar Average: Last 72 hrs:  (P) 390   Continue Reglan p r n  Opioid dependence with other opioid-induced disorder [facility])  Assessment & Plan  Will hold all opioids/sedating medication giving acute encephalopathy    No acute signs of opiate overdose at this time  · Morphine Sulf Er 60mg BID   · Morphine Sulf Er 15mg BID  · Morphine Sulf Ir 15mg TID PRN  · Pregabalin 150mg BID  · Carisoprodol 350mg BID  · Valium 5 mg daily  · PDMP reviewed      VTE Pharmacologic Prophylaxis:   High Risk (Score >/= 5) - Pharmacological DVT Prophylaxis Ordered: heparin  Sequential Compression Devices Ordered  Code Status: Prior full code  Discussion with family: Attempted to update  (son) via phone  Unable to contact  Anticipated Length of Stay: Patient will be admitted on an inpatient basis with an anticipated length of stay of greater than 2 midnights secondary to Hyperglycemia  Total Time for Visit, including Counseling / Coordination of Care: 60 minutes Greater than 50% of this total time spent on direct patient counseling and coordination of care  Chief Complaint:  Altered mental status      History of Present Illness:  Meron Wills is a 61 y o  female with a PMH of type 2 diabetes, opioid dependence, hypertension who presents with altered mental status  Patient is unable to provide history secondary to altered mental status  Upon chart review, it appears that the patient's son called EMS due to worsening confusion  Reported fatigue/weakness over the last 2 days    States that this is how she acts when she has elevated blood sugar, has been admitted for similar in the past     Review of Systems:  Review of Systems   Unable to perform ROS: Mental status change       Past Medical and Surgical History:   Past Medical History:   Diagnosis Date    Acute respiratory insufficiency 11/23/2019    Asthma     Chronic pain     Sepsis ([facility] [address] ) 11/23/2019       Past Surgical History:   Procedure Laterality Date    CERVICAL LAMINECTOMY      CHOLECYSTECTOMY      CHOLECYSTECTOMY LAPAROSCOPIC N/A 11/10/2018    Procedure: CHOLECYSTECTOMY LAPAROSCOPIC w/ ioc;  Surgeon: Lashanda Leal MD;  Location: [facility];  Service: General    HYSTERECTOMY      JOINT REPLACEMENT Bilateral     knee    TOE AMPUTATION Right 2/2/2018    Procedure: AMPUTATION TOE, RIGHT GREAT TOE;  Surgeon: Alida Hassan DPM;  Location: [facility];  Service: Podiatry       Meds/Allergies:  Prior to Admission medications    Medication Sig Start Date End Date Taking?  Authorizing Provider   albuterol (PROVENTIL HFA,VENTOLIN HFA) 90 mcg/act inhaler Inhale 2 puffs every 4 (four) hours as needed 10/14/19   Historical Provider, MD   BD PEN NEEDLE HERNAN U/F 32G X 4 MM MISC  8/23/19   Historical Provider, MD   carisoprodol (SOMA) 350 mg tablet Take 350 mg by mouth 2 (two) times a day    Historical Provider, MD   diazepam (VALIUM) 5 mg tablet Take 5 mg by mouth daily at bedtime as needed for anxiety    Historical Provider, MD   fluticasone (FLONASE) 50 mcg/act nasal spray 1 spray into each nostril daily    Historical Provider, MD   furosemide (LASIX) 20 mg tablet Take 40 mg by mouth as needed  8/16/19   Historical Provider, MD   insulin glargine (LANTUS) 100 units/mL subcutaneous injection Inject 50 Units under the skin daily at bedtime      Historical Provider, MD   insulin lispro (HumaLOG) 100 units/mL injection INJECT 10 UNITS UNDER THE SKIN 3 (THREE) TIMES A DAY WITH MEALS 6/30/20   Rafaela Lombardi MD   Insulin Pen Needle 29G X 5MM MISC by Does not apply route 4 (four) times a day 9/20/19   Marbella Castillo MD   lisinopril (ZESTRIL) 10 mg tablet Take 1 tablet (10 mg total) by mouth daily 8/19/21   Lang Walters MD   LYRICA 150 MG capsule Take 150 mg by mouth 2 (two) times a day 6/12/19   Historical Provider, MD   metoclopramide (REGLAN) 10 mg tablet Take 1 tablet (10 mg total) by mouth 4 (four) times a day 8/18/21   Lang Walters MD   metoclopramide (REGLAN) 10 mg tablet TAKE 1 TABLET BY MOUTH FOUR TIMES A DAY BEFORE MEALS AND AT BEDTIME 1/3/22   Henry Edouard PA-C   montelukast (SINGULAIR) 10 mg tablet Take 10 mg by mouth daily at bedtime    Historical Provider, MD   morphine (MS CONTIN) 15 mg 12 hr tablet Take 15 mg by mouth 2 (two) times a day    Historical Provider, MD   morphine (MS CONTIN) 60 mg 12 hr tablet Take 60 mg by mouth 2 (two) times a day 9/13/19   Historical Provider, MD   nystatin (MYCOSTATIN) powder Apply topically 2 (two) times a day 7/23/19   Jeremiah Gunn MD   omeprazole (PriLOSEC) 20 mg delayed release capsule Take 20 mg by mouth daily    Historical Provider, MD   ondansetron (ZOFRAN) 4 mg tablet Take 1 tablet (4 mg total) by mouth every 6 (six) hours 12/24/19   Maryanne Salazar PA-C   pravastatin (PRAVACHOL) 10 mg tablet Take 10 mg by mouth daily at bedtime 8/16/19   Historical Provider, MD Foy Edu 500-50 MCG/DOSE inhaler Take 1 puff by mouth 2 (two) times a day 2/28/19   Historical Provider, MD     I have reviewed home medications using recent Epic encounter  Allergies: Allergies   Allergen Reactions    Nsaids GI Intolerance       Social History:  Marital Status: /Civil Union   Occupation: unknown  Patient Pre-hospital Living Situation: Home  Patient Pre-hospital Level of Mobility: walks  Patient Pre-hospital Diet Restrictions: none  Substance Use History:   Social History     Substance and Sexual Activity   Alcohol Use Not Currently    Comment: rarely     Social History     Tobacco Use   Smoking Status Never Smoker   Smokeless Tobacco Never Used     Social History     Substance and Sexual Activity   Drug Use No       Family History:  Family History   Problem Relation Age of Onset    Diabetes Mother     Diabetes Maternal Grandmother     No Known Problems Father        Physical Exam:     Vitals:   Blood Pressure: 162/98 (05/09/22 1943)  Pulse: 103 (05/09/22 1943)  Temperature: 98 6 °F (37 °C) (05/09/22 1738)  Respirations: 18 (05/09/22 1943)  Weight - Scale: 133 kg (293 lb 14 oz) (05/09/22 1738)  SpO2: 98 % (05/09/22 1943)    Physical Exam  Vitals and nursing note reviewed  Constitutional:       General: She is not in acute distress  Appearance: Normal appearance  She is not ill-appearing, toxic-appearing or diaphoretic  Eyes:      General: No scleral icterus       Conjunctiva/sclera: Conjunctivae normal    Cardiovascular:      Rate and Rhythm: Normal rate and regular rhythm  Heart sounds: No murmur heard  No friction rub  No gallop  Pulmonary:      Effort: Pulmonary effort is normal  No respiratory distress  Breath sounds: Normal breath sounds  No stridor  No wheezing, rhonchi or rales  Chest:      Chest wall: No tenderness  Abdominal:      General: Abdomen is flat  Bowel sounds are normal  There is no distension  Palpations: Abdomen is soft  Tenderness: There is no abdominal tenderness  There is no guarding or rebound  Musculoskeletal:      Right lower leg: No edema  Left lower leg: No edema  Skin:     General: Skin is warm and dry  Coloration: Skin is not jaundiced or pale  Neurological:      Mental Status: She is alert  She is disoriented  Comments:  Follows simple commands          Additional Data:     Lab Results:  Results from last 7 days   Lab Units 05/09/22  1843   WBC Thousand/uL 11 83*   HEMOGLOBIN g/dL 13 6   HEMATOCRIT % 42 4   PLATELETS Thousands/uL 202   NEUTROS PCT % 87*   LYMPHS PCT % 9*   MONOS PCT % 4   EOS PCT % 0     Results from last 7 days   Lab Units 05/09/22  1843   SODIUM mmol/L 137   POTASSIUM mmol/L 4 8   CHLORIDE mmol/L 98*   CO2 mmol/L 26   BUN mg/dL 18   CREATININE mg/dL 0 76   ANION GAP mmol/L 13   CALCIUM mg/dL 9 3   ALBUMIN g/dL 3 8   TOTAL BILIRUBIN mg/dL 1 51*   ALK PHOS U/L 174*   ALT U/L 25   AST U/L 26   GLUCOSE RANDOM mg/dL 361*         Results from last 7 days   Lab Units 05/09/22  1853   POC GLUCOSE mg/dl 390*               Imaging: Reviewed radiology reports from this admission including: abdominal/pelvic CT  CT abdomen pelvis wo contrast   ED Interpretation by Jesusita Linda DO (05/09 1947)     FINDINGS:     ABDOMEN     LOWER CHEST:  No clinically significant abnormality identified in the visualized lower chest      LIVER/BILIARY TREE:  Unremarkable      GALLBLADDER:  Gallbladder is surgically absent      SPLEEN:  Unremarkable      PANCREAS:  Unremarkable      ADRENAL GLANDS:  Unremarkable      KIDNEYS/URETERS:  Unremarkable  No hydronephrosis      STOMACH AND BOWEL:  Unremarkable      APPENDIX:  No findings to suggest appendicitis      ABDOMINOPELVIC CAVITY:  No ascites  No pneumoperitoneum  No lymphadenopathy      VESSELS:  Unremarkable for patient's age      PELVIS     REPRODUCTIVE ORGANS:  Unremarkable for patient's age      URINARY BLADDER:  Unremarkable      ABDOMINAL WALL/INGUINAL REGIONS:  Fat-containing umbilical hernia      Small hiatal hernia      OSSEOUS STRUCTURES:  No acute fracture or destructive osseous lesion      IMPRESSION:     No acute CT findings          Final Result by Kristy Caro MD (05/09 1933)      No acute CT findings  Workstation performed: [id number]               ** Please Note: This note has been constructed using a voice recognition system   **

## 2022-05-10 NOTE — NURSING NOTE
Patient ordered urine drug screen, patient has been on/of incontinence  N Georgiana Castleman made aware and stated it is OK that urine may not be obtained until later time  Will inform oncoming shift to obtain urine if possible      Luis Manuel Kang RN 5/10/2022 5:44 PM

## 2022-05-10 NOTE — ASSESSMENT & PLAN NOTE
Lab Results   Component Value Date    HGBA1C 11 4 (H) 05/10/2022     Recent Labs     05/10/22  0728 05/10/22  0945 05/10/22  1121 05/10/22  1401   POCGLU 180* 169* 160* 193*   Continue Reglan p r n

## 2022-05-10 NOTE — ASSESSMENT & PLAN NOTE
Presenting with altered mental status, according to son, this is how she gets when she has significantly hyperglycemia  Lab Results   Component Value Date    HGBA1C 11 4 (H) 05/10/2022   Patient maintained on 50 units Lantus HS and 10 units t i d   With meals  Suppose to take the above but does not - noncompliant according to son  Has been previously admitted for the same  Blood sugars significantly elevated, with elevated beta hydroxybutyrate  Now with elevated anion gap here - need to continue drip until gap is closed  Initiated on insulin drip  Requiring saline with dextrose to support sugars until gap is closed

## 2022-05-10 NOTE — PROGRESS NOTES
[address] [facility]  Progress Note - Delta Face 1965, 61 y o  female MRN: [id number]  Unit/Bed#: [place] 2 -01 Encounter: [id number]  Primary Care Provider: Geri Warner MD   Date and time admitted to hospital: 5/9/2022  5:33 PM    * Type 2 diabetes mellitus with hyperglycemia, with long-term current use of insulin [facility])  Assessment & Plan  Presenting with altered mental status, according to son, this is how she gets when she has significantly hyperglycemia  Lab Results   Component Value Date    HGBA1C 11 4 (H) 05/10/2022   Patient maintained on 50 units Lantus HS and 10 units t i d  With meals  Suppose to take the above but does not - noncompliant according to son  Has been previously admitted for the same  Blood sugars significantly elevated, with elevated beta hydroxybutyrate  Now with elevated anion gap here - need to continue drip until gap is closed  Initiated on insulin drip  Requiring saline with dextrose to support sugars until gap is closed    Acute encephalopathy  Assessment & Plan  Patient presenting with weakness for multiple days, now with altered mental status  Per EMS, son called [phone] the patient be brought to the ED for further evaluation  Has been previously admitted for the same, typically secondary to hyperglycemia  See assessment and plan under type 2 diabetes with hyperglycemia  Pt still drowsy and has difficulty answering questions   Check urine drug screen   Opoids/sedated medications remain on hold  Continue gentle IV fluid hydration  Seen and evaluated by speech with the following recommendation:  Keep NPO with ice chips until more awake and can tolerate regular diet    Essential hypertension  Assessment & Plan  Continue lisinopril 10 mg daily, Lasix 40 mg p r n      Diabetic gastroparesis [facility])  Assessment & Plan  Lab Results   Component Value Date    HGBA1C 11 4 (H) 05/10/2022     Recent Labs     05/10/22  0728 05/10/22  0945 05/10/22  1121 05/10/22  1401 POCGLU 180* 169* 160* 193*   Continue Reglan p r n  Opioid dependence with other opioid-induced disorder [facility])  Assessment & Plan  Will hold all opioids/sedating medication giving acute encephalopathy  No acute signs of opiate overdose at this time  · Morphine Sulf Er 60mg BID   · Morphine Sulf Er 15mg BID  · Morphine Sulf Ir 15mg TID PRN  · Pregabalin 150mg BID  · Carisoprodol 350mg BID  · Valium 5 mg daily  · PDMP reviewed      VTE Pharmacologic Prophylaxis:   Pharmacologic: Heparin  Mechanical VTE Prophylaxis in Place: Yes    Discharge Plan: With need for continued inpatient stay for acute encephalopathy, anion gap and management of blood sugars  Discussions with Specialists or Other Care Team Provider: nursing, CM    Education and Discussions with Family / Patient: patient, son called via telephone    Time Spent for Care: 30 minutes  More than 50% of total time spent on counseling and coordination of care as described above  Current Length of Stay: 1 day(s)  Current Patient Status: Inpatient   Code Status: Level 1 - Full Code    Subjective:   Patient resting in bed  She opens her eyes to voice but does not answer questions when asked  Spoke with son on the phone  He states that she looks at you but typically does not respond  This has happened in the past when her blood sugars are very high  He reports that she is suppose to be taking insulin at home but does not  Objective:     Vitals:   Temp (24hrs), Av 9 °F (36 6 °C), Min:97 6 °F (36 4 °C), Max:98 6 °F (37 °C)    Temp:  [97 6 °F (36 4 °C)-98 6 °F (37 °C)] 97 6 °F (36 4 °C)  HR:  [100-117] 117  Resp:  [16-20] 16  BP: (141-206)/() 177/109  SpO2:  [92 %-98 %] 92 %  Body mass index is 45 06 kg/m²  Input and Output Summary (last 24 hours):        Intake/Output Summary (Last 24 hours) at 5/10/2022 1556  Last data filed at 2022 2103  Gross per 24 hour   Intake 1000 ml   Output --   Net 1000 ml       Physical Exam: Physical Exam  Vitals and nursing note reviewed  Constitutional:       General: She is not in acute distress  Appearance: Normal appearance  She is obese  She is not ill-appearing, toxic-appearing or diaphoretic  HENT:      Head: Normocephalic and atraumatic  Eyes:      General: No scleral icterus  Cardiovascular:      Rate and Rhythm: Normal rate and regular rhythm  Pulmonary:      Effort: Pulmonary effort is normal  No respiratory distress  Breath sounds: Normal breath sounds  No stridor  No wheezing or rhonchi  Abdominal:      General: Bowel sounds are normal  There is no distension  Palpations: Abdomen is soft  There is no mass  Tenderness: There is no abdominal tenderness  Hernia: No hernia is present  Musculoskeletal:         General: No swelling  Cervical back: Neck supple  Skin:     General: Skin is warm and dry  Neurological:      Mental Status: She is oriented to person, place, and time  Mental status is at baseline  Psychiatric:         Attention and Perception: Attention and perception normal          Behavior: Behavior is slowed and withdrawn  Additional Data:     Labs:    Results from last 7 days   Lab Units 05/10/22  0631   WBC Thousand/uL 16 47*   HEMOGLOBIN g/dL 14 6   HEMATOCRIT % 45 3   PLATELETS Thousands/uL 265   NEUTROS PCT % 86*   LYMPHS PCT % 8*   MONOS PCT % 5   EOS PCT % 0     Results from last 7 days   Lab Units 05/10/22  0631   POTASSIUM mmol/L 3 6   CHLORIDE mmol/L 102   CO2 mmol/L 25   BUN mg/dL 18   CREATININE mg/dL 0 87   CALCIUM mg/dL 10 1   ALK PHOS U/L 165*   ALT U/L 17   AST U/L 14           * I Have Reviewed All Lab Data Listed Above  * Additional Pertinent Lab Tests Reviewed:  [facility] 65 Admission Reviewed    Imaging:    Imaging Reports Reviewed Today Include:   Imaging Personally Reviewed by Myself Includes:      Recent Cultures (last 7 days):           Last 24 Hours Medication List:   Current Facility-Administered Medications   Medication Dose Route Frequency Provider Last Rate    acetaminophen  650 mg Oral Q6H PRN Kiko WarrenESTHER russell      albuterol  2 puff Inhalation Q4H PRN Ala WarrenESTHER russell      calcium carbonate  1,000 mg Oral Daily PRN Kiko Rios PA-C      dextrose 5 % and sodium chloride 0 9 %  75 mL/hr Intravenous Continuous Bonita Buck PA-C 75 mL/hr (05/10/22 0952)    fluticasone  1 spray Nasal Daily Ala Warren, PA-C      fluticasone-vilanterol  1 puff Inhalation Daily [facility] WarrenNestor russell      heparin (porcine)  5,000 Units Subcutaneous [address] [place]      insulin regular (HumuLIN R,NovoLIN R) infusion  0 3-21 Units/hr Intravenous Titrated Marina Martin PA-C 9 Units/hr (05/10/22 1400)    lisinopril  10 mg Oral Daily Ala Warren, PA-C      metoclopramide  10 mg Oral 4x Daily Ala Warren, PA-C      montelukast  10 mg Oral HS Ala WarrenESTHER      nystatin   Topical BID Bonita Buck PA-C      pantoprazole  20 mg Oral Early Morning Kiko Warren, [place]      pravastatin  10 mg Oral HS Ala WarrenESTHER      senna  1 tablet Oral Daily Kiko Rios PA-C          Today, Patient Was Seen By: Bonita Buck PA-C    ** Please Note: This note has been constructed using a voice recognition system   **

## 2022-05-10 NOTE — SPEECH THERAPY NOTE
Speech Language/Pathology  Speech/Language Pathology  Assessment    Patient Name: Dominik López  FIVGK'U Date: 5/10/2022     Problem List  Principal Problem:    Type 2 diabetes mellitus with hyperglycemia, with long-term current use of insulin ([facility] [address] )  Active Problems:    Opioid dependence with other opioid-induced disorder ([facility] [address] )    Diabetic gastroparesis ([facility] [address] )    Essential hypertension    Acute encephalopathy    Past Medical History  Past Medical History:   Diagnosis Date    Acute respiratory insufficiency 11/23/2019    Asthma     Chronic pain     Sepsis ([facility] [address] ) 11/23/2019     Past Surgical History  Past Surgical History:   Procedure Laterality Date    CERVICAL LAMINECTOMY      CHOLECYSTECTOMY      CHOLECYSTECTOMY LAPAROSCOPIC N/A 11/10/2018    Procedure: CHOLECYSTECTOMY LAPAROSCOPIC w/ ioc;  Surgeon: Gallo Singleton MD;  Location: [facility];  Service: General    HYSTERECTOMY      JOINT REPLACEMENT Bilateral     knee    TOE AMPUTATION Right 2/2/2018    Procedure: AMPUTATION TOE, RIGHT GREAT TOE;  Surgeon: Celena Rene DPM;  Location: [facility];  Service: Podiatry        Bedside Swallow Evaluation:    Summary:  Pt presents w/ reduced alertness  Eyes open but did not follow commands and shook her head "no" instead  No verbalizations  Agreeable to ice chips  Prolonged manipulation  Tongue thrust transfer  Prompt to min delayed swallow  Laryngeal rise appeared wfl  Unable to suck thin liquids from straw  Only able to get the liquid half way up  Piped small amts in x 3  Reduced bolus formation and control w/ probable spill to the pharynx  Min swallow delay but no cough  Pt then shook her head that she did not want any more  Mental status/alertness affecting swallow function  Noted pt vomited last night  She also has a h/o food impaction 2015  Unable to locate a f/u egd (see below)  May need GI consult  Recommendations:  Diet: ice chips only for now until more alert     Meds: iv  Positioning:Upright  Oral care: every 4 hours  Aspiration precautions  Reflux precautions  Therapy Prognosis: unknown until mental status clears, but no h/o oral/pharyngeal dysphagia  Frequency: 2-5x/wk    Consider consult w/:  GI ? Nutrition    H&P/Admit info/ pertinent provider notes: (PMH noted above)  Chief Complaint:  Altered mental status  History of Present Illness:  Chintan Jeter is a 61 y o  female with a PMH of type 2 diabetes, opioid dependence, hypertension who presents with altered mental status  Patient is unable to provide history secondary to altered mental status  Upon chart review, it appears that the patient's son called EMS due to worsening confusion  Reported fatigue/weakness over the last 2 days  States that this is how she acts when she has elevated blood sugar, has been admitted for similar in the past  ([facility])    Pt of note with two large emesis green in color requiring complete bed changes, one witnessed as projectile  Hospitalist contacted, Reglan 10 mg ordered and given  Pt cleaned and changed, repositioned  BMP sent per order  Will continue to monitor and assess  Zina Pedro RN  5/10/2022 0124      Special Studies:  Procedure Notes [facility]  - documented in this encounter  Doris Madrid MD - 02/13/2015 1702 EST  EGD performed  Findings- food impaction  The impaction was cleared  Mild esophageal narrowing noted  Recommend- the patient should follow up with me as an outpatient for an elective EGD with biopsies     Electronically signed by Kalen Leija MD at 02/13/2015 17:03 EST    · HISTORY OF PRESENT ILLNESS: We are asked to see this 79-year-old woman  with a history of diabetes and asthma, now for possible food impaction  She ate a pork chop yesterday evening at about 7:30 p m  and after the  second bite felt it was stuck in her upper esophagus  Since then she has  not been able to eat or drink anything and is retching and vomiting phlegm    She has a history of intermittent solid food dysphagia and states an upper  endoscopy in [place] 3 years ago was negative  Then 6 months ago she  had similar symptoms to what she has today, which lasted for about 3 hours  and then subsided  She has occasional heartburn  She has noted recent  hiccoughing with eating in the last 3-4 months  She has no blood in the  stool  She also mentions 3-4 years ago she was hospitalized with severe left-sided  abdominal pain and was diagnosed with gastroparesis on a gastric emptying  study  She was treated with Reglan which she stopped after a few months  She has not had this pain again  ST unable to locate any f/u studies  CT head neg acute  CT abd/pelvis neg acute  Small hiatal hernia  CXR pending    Previous VBS:  none    Goal(s):  Dysphagia LTG  -Patient will demonstrate safe and effective oral intake (without overt s/s significant oral/pharyngeal dysphagia including s/s penetration or aspiration) for the highest appropriate diet level  1 Pt will tolerate least restrictive diet w/out s/s aspiration or oral/pharyngeal difficulties  2 Pt will will effectively masticate and transfer solids w/out s/s dysphagia/aspiration  3 Pt will tolerate thin liquids w/out s/s aspiration  Patient's goal:none stated    Did the pt report pain?nodded no  If yes, was nursing notified/was it addressed? na    Reason for consult:  R/o aspiration  Determine safest and least restrictive diet  Change in mental status  h/o dysphagia: esophageal    Food allergies:  none  Current diet:  npo  Premorbid diet[de-identified]  Presume regular  O2 requirement:  none  Voice/Speech:  Nonverbal/nonvocal  Social:  Home w/ spouse  Follows commands:  no                       Cognitive Status:  Eyes open but drowsy  Oral mech exam:  Dentition:natural  Labial strength and ROM:did not follow commands   No asymmetry at rest    Lingual strength and ROM:? wfl  Mandibular strength and ROM:unableto assess  Secretion management:wnl  Volitional cough: did not follow request  Volitional swallow:same    Esophageal stage:  H/o hiatal hernia  H/o food impaction  H/o EGD    Results d/w:  Pt, nursing, PA

## 2022-05-10 NOTE — ASSESSMENT & PLAN NOTE
Patient presenting with weakness for multiple days, now with altered mental status  Per EMS, son called 46 the patient be brought to the ED for further evaluation    Has been previously admitted for the same, typically secondary to hyperglycemia  See assessment and plan under type 2 diabetes with hyperglycemia  Pt still drowsy and has difficulty answering questions   Check urine drug screen   Opoids/sedated medications remain on hold  Continue gentle IV fluid hydration  Seen and evaluated by speech with the following recommendation:  Keep NPO with ice chips until more awake and can tolerate regular diet

## 2022-05-10 NOTE — NURSING NOTE
Patient had elevated BP  [facility] made aware  One time dose of PRN ordered and administered      Rd Thorne RN 5/10/2022 6:05 PM

## 2022-05-10 NOTE — NURSING NOTE
Pt of note with two large emesis green in color requiring complete bed changes, one witnessed as projectile  Hospitalist contacted, Reglan 10 mg ordered and given  Pt cleaned and changed, repositioned  BMP sent per order  Will continue to monitor and assess      Sebas Amin RN  5/10/2022 5533

## 2022-05-10 NOTE — ASSESSMENT & PLAN NOTE
Lab Results   Component Value Date    HGBA1C 10 8 (H) 06/26/2020   Patient maintained on 50 units Lantus HS and 10 units t i d   With meals  · Presenting with altered mental status, according to son, this is how she gets when she has significantly hyperglycemia  · Has been previously admitted for the same  · Blood sugars significantly elevated, with elevated beta hydroxybutyrate, no anion gap or acidosis noted   · Not in DKA  · Initiated on insulin drip  · Step-down 2  · Once blood sugars normalize, transition to long-acting and sliding scale insulin

## 2022-05-11 ENCOUNTER — APPOINTMENT (INPATIENT)
Dept: ULTRASOUND IMAGING | Facility: HOSPITAL | Age: 57
DRG: 637 | End: 2022-05-11
Payer: MEDICARE

## 2022-05-11 PROBLEM — R19.5 HEME POSITIVE STOOL: Status: ACTIVE | Noted: 2022-05-11

## 2022-05-11 PROBLEM — D72.829 LEUKOCYTOSIS: Status: ACTIVE | Noted: 2022-05-11

## 2022-05-11 LAB
ALBUMIN SERPL BCP-MCNC: 3.1 G/DL (ref 3.5–5)
ALBUMIN SERPL BCP-MCNC: 3.2 G/DL (ref 3.5–5)
ALP SERPL-CCNC: 141 U/L (ref 46–116)
ALP SERPL-CCNC: 143 U/L (ref 46–116)
ALT SERPL W P-5'-P-CCNC: 15 U/L (ref 12–78)
ALT SERPL W P-5'-P-CCNC: 17 U/L (ref 12–78)
AMMONIA PLAS-SCNC: <10 UMOL/L (ref 11–35)
AMPHETAMINES SERPL QL SCN: NEGATIVE
ANION GAP SERPL CALCULATED.3IONS-SCNC: 10 MMOL/L (ref 4–13)
ANION GAP SERPL CALCULATED.3IONS-SCNC: 16 MMOL/L (ref 4–13)
AST SERPL W P-5'-P-CCNC: 13 U/L (ref 5–45)
AST SERPL W P-5'-P-CCNC: 14 U/L (ref 5–45)
BARBITURATES UR QL: POSITIVE
BASE EX.OXY STD BLDV CALC-SCNC: 95.2 % (ref 60–80)
BASE EX.OXY STD BLDV CALC-SCNC: 97.1 % (ref 60–80)
BASE EXCESS BLDV CALC-SCNC: -1.3 MMOL/L
BASE EXCESS BLDV CALC-SCNC: 3.5 MMOL/L
BASOPHILS # BLD AUTO: 0.05 THOUSANDS/ΜL (ref 0–0.1)
BASOPHILS NFR BLD AUTO: 0 % (ref 0–1)
BENZODIAZ UR QL: POSITIVE
BILIRUB SERPL-MCNC: 1.2 MG/DL (ref 0.2–1)
BILIRUB SERPL-MCNC: 1.32 MG/DL (ref 0.2–1)
BUN SERPL-MCNC: 22 MG/DL (ref 5–25)
BUN SERPL-MCNC: 26 MG/DL (ref 5–25)
CALCIUM ALBUM COR SERPL-MCNC: 9.7 MG/DL (ref 8.3–10.1)
CALCIUM ALBUM COR SERPL-MCNC: 9.7 MG/DL (ref 8.3–10.1)
CALCIUM SERPL-MCNC: 9 MG/DL (ref 8.3–10.1)
CALCIUM SERPL-MCNC: 9.1 MG/DL (ref 8.3–10.1)
CHLORIDE SERPL-SCNC: 106 MMOL/L (ref 100–108)
CHLORIDE SERPL-SCNC: 107 MMOL/L (ref 100–108)
CO2 SERPL-SCNC: 21 MMOL/L (ref 21–32)
CO2 SERPL-SCNC: 26 MMOL/L (ref 21–32)
COCAINE UR QL: NEGATIVE
CREAT SERPL-MCNC: 0.81 MG/DL (ref 0.6–1.3)
CREAT SERPL-MCNC: 0.89 MG/DL (ref 0.6–1.3)
EOSINOPHIL # BLD AUTO: 0.01 THOUSAND/ΜL (ref 0–0.61)
EOSINOPHIL NFR BLD AUTO: 0 % (ref 0–6)
ERYTHROCYTE [DISTWIDTH] IN BLOOD BY AUTOMATED COUNT: 13.6 % (ref 11.6–15.1)
ERYTHROCYTE [DISTWIDTH] IN BLOOD BY AUTOMATED COUNT: 13.6 % (ref 11.6–15.1)
GFR SERPL CREATININE-BSD FRML MDRD: 72 ML/MIN/1.73SQ M
GFR SERPL CREATININE-BSD FRML MDRD: 80 ML/MIN/1.73SQ M
GLUCOSE SERPL-MCNC: 130 MG/DL (ref 65–140)
GLUCOSE SERPL-MCNC: 132 MG/DL (ref 65–140)
GLUCOSE SERPL-MCNC: 133 MG/DL (ref 65–140)
GLUCOSE SERPL-MCNC: 142 MG/DL (ref 65–140)
GLUCOSE SERPL-MCNC: 156 MG/DL (ref 65–140)
GLUCOSE SERPL-MCNC: 166 MG/DL (ref 65–140)
GLUCOSE SERPL-MCNC: 172 MG/DL (ref 65–140)
GLUCOSE SERPL-MCNC: 172 MG/DL (ref 65–140)
GLUCOSE SERPL-MCNC: 175 MG/DL (ref 65–140)
GLUCOSE SERPL-MCNC: 222 MG/DL (ref 65–140)
GLUCOSE SERPL-MCNC: 246 MG/DL (ref 65–140)
GLUCOSE SERPL-MCNC: 267 MG/DL (ref 65–140)
HCO3 BLDV-SCNC: 21.1 MMOL/L (ref 24–30)
HCO3 BLDV-SCNC: 27.1 MMOL/L (ref 24–30)
HCT VFR BLD AUTO: 45.3 % (ref 34.8–46.1)
HCT VFR BLD AUTO: 45.9 % (ref 34.8–46.1)
HEMOCCULT STL QL: POSITIVE
HGB BLD-MCNC: 14.6 G/DL (ref 11.5–15.4)
HGB BLD-MCNC: 14.6 G/DL (ref 11.5–15.4)
IMM GRANULOCYTES # BLD AUTO: 0.07 THOUSAND/UL (ref 0–0.2)
IMM GRANULOCYTES NFR BLD AUTO: 0 % (ref 0–2)
LYMPHOCYTES # BLD AUTO: 2.27 THOUSANDS/ΜL (ref 0.6–4.47)
LYMPHOCYTES NFR BLD AUTO: 13 % (ref 14–44)
MAGNESIUM SERPL-MCNC: 1.5 MG/DL (ref 1.6–2.6)
MCH RBC QN AUTO: 26.9 PG (ref 26.8–34.3)
MCH RBC QN AUTO: 26.9 PG (ref 26.8–34.3)
MCHC RBC AUTO-ENTMCNC: 31.8 G/DL (ref 31.4–37.4)
MCHC RBC AUTO-ENTMCNC: 32.2 G/DL (ref 31.4–37.4)
MCV RBC AUTO: 83 FL (ref 82–98)
MCV RBC AUTO: 85 FL (ref 82–98)
METHADONE UR QL: NEGATIVE
MONOCYTES # BLD AUTO: 1.03 THOUSAND/ΜL (ref 0.17–1.22)
MONOCYTES NFR BLD AUTO: 6 % (ref 4–12)
NEUTROPHILS # BLD AUTO: 13.91 THOUSANDS/ΜL (ref 1.85–7.62)
NEUTS SEG NFR BLD AUTO: 81 % (ref 43–75)
NRBC BLD AUTO-RTO: 0 /100 WBCS
O2 CT BLDV-SCNC: 19.7 ML/DL
O2 CT BLDV-SCNC: 20.8 ML/DL
OPIATES UR QL SCN: POSITIVE
OXYCODONE+OXYMORPHONE UR QL SCN: NEGATIVE
PCO2 BLDV: 30 MM HG (ref 42–50)
PCO2 BLDV: 37.5 MM HG (ref 42–50)
PCP UR QL: NEGATIVE
PH BLDV: 7.47 [PH] (ref 7.3–7.4)
PH BLDV: 7.48 [PH] (ref 7.3–7.4)
PHOSPHATE SERPL-MCNC: 4.1 MG/DL (ref 2.7–4.5)
PLATELET # BLD AUTO: 240 THOUSANDS/UL (ref 149–390)
PLATELET # BLD AUTO: 241 THOUSANDS/UL (ref 149–390)
PMV BLD AUTO: 12.9 FL (ref 8.9–12.7)
PMV BLD AUTO: 13.2 FL (ref 8.9–12.7)
PO2 BLDV: 100.5 MM HG (ref 35–45)
PO2 BLDV: 82.1 MM HG (ref 35–45)
POTASSIUM SERPL-SCNC: 3 MMOL/L (ref 3.5–5.3)
POTASSIUM SERPL-SCNC: 3 MMOL/L (ref 3.5–5.3)
PROCALCITONIN SERPL-MCNC: 0.08 NG/ML
PROT SERPL-MCNC: 7.8 G/DL (ref 6.4–8.2)
PROT SERPL-MCNC: 8 G/DL (ref 6.4–8.2)
RBC # BLD AUTO: 5.43 MILLION/UL (ref 3.81–5.12)
RBC # BLD AUTO: 5.43 MILLION/UL (ref 3.81–5.12)
SODIUM SERPL-SCNC: 143 MMOL/L (ref 136–145)
SODIUM SERPL-SCNC: 143 MMOL/L (ref 136–145)
THC UR QL: NEGATIVE
WBC # BLD AUTO: 17.34 THOUSAND/UL (ref 4.31–10.16)
WBC # BLD AUTO: 18.04 THOUSAND/UL (ref 4.31–10.16)

## 2022-05-11 PROCEDURE — 82140 ASSAY OF AMMONIA: CPT | Performed by: PHYSICIAN ASSISTANT

## 2022-05-11 PROCEDURE — 80053 COMPREHEN METABOLIC PANEL: CPT | Performed by: PHYSICIAN ASSISTANT

## 2022-05-11 PROCEDURE — 82805 BLOOD GASES W/O2 SATURATION: CPT | Performed by: PHYSICIAN ASSISTANT

## 2022-05-11 PROCEDURE — 76705 ECHO EXAM OF ABDOMEN: CPT

## 2022-05-11 PROCEDURE — 93005 ELECTROCARDIOGRAM TRACING: CPT

## 2022-05-11 PROCEDURE — 85025 COMPLETE CBC W/AUTO DIFF WBC: CPT | Performed by: NURSE PRACTITIONER

## 2022-05-11 PROCEDURE — 83735 ASSAY OF MAGNESIUM: CPT | Performed by: PHYSICIAN ASSISTANT

## 2022-05-11 PROCEDURE — 82948 REAGENT STRIP/BLOOD GLUCOSE: CPT

## 2022-05-11 PROCEDURE — 80307 DRUG TEST PRSMV CHEM ANLYZR: CPT | Performed by: PHYSICIAN ASSISTANT

## 2022-05-11 PROCEDURE — 80053 COMPREHEN METABOLIC PANEL: CPT | Performed by: NURSE PRACTITIONER

## 2022-05-11 PROCEDURE — 99232 SBSQ HOSP IP/OBS MODERATE 35: CPT | Performed by: STUDENT IN AN ORGANIZED HEALTH CARE EDUCATION/TRAINING PROGRAM

## 2022-05-11 PROCEDURE — 85027 COMPLETE CBC AUTOMATED: CPT | Performed by: PHYSICIAN ASSISTANT

## 2022-05-11 PROCEDURE — 92526 ORAL FUNCTION THERAPY: CPT

## 2022-05-11 PROCEDURE — 82805 BLOOD GASES W/O2 SATURATION: CPT | Performed by: NURSE PRACTITIONER

## 2022-05-11 PROCEDURE — 99223 1ST HOSP IP/OBS HIGH 75: CPT | Performed by: EMERGENCY MEDICINE

## 2022-05-11 PROCEDURE — 99233 SBSQ HOSP IP/OBS HIGH 50: CPT | Performed by: INTERNAL MEDICINE

## 2022-05-11 PROCEDURE — 84100 ASSAY OF PHOSPHORUS: CPT | Performed by: PHYSICIAN ASSISTANT

## 2022-05-11 PROCEDURE — 87040 BLOOD CULTURE FOR BACTERIA: CPT | Performed by: NURSE PRACTITIONER

## 2022-05-11 PROCEDURE — 84145 PROCALCITONIN (PCT): CPT | Performed by: PHYSICIAN ASSISTANT

## 2022-05-11 RX ORDER — SODIUM CHLORIDE 9 MG/ML
100 INJECTION, SOLUTION INTRAVENOUS CONTINUOUS
Status: DISPENSED | OUTPATIENT
Start: 2022-05-11 | End: 2022-05-12

## 2022-05-11 RX ORDER — MAGNESIUM SULFATE HEPTAHYDRATE 40 MG/ML
2 INJECTION, SOLUTION INTRAVENOUS ONCE
Status: COMPLETED | OUTPATIENT
Start: 2022-05-11 | End: 2022-05-11

## 2022-05-11 RX ORDER — CARISOPRODOL 350 MG/1
175 TABLET ORAL 2 TIMES DAILY
Status: DISCONTINUED | OUTPATIENT
Start: 2022-05-11 | End: 2022-05-12

## 2022-05-11 RX ORDER — DIAZEPAM 5 MG/ML
2.5 INJECTION, SOLUTION INTRAMUSCULAR; INTRAVENOUS
Status: DISCONTINUED | OUTPATIENT
Start: 2022-05-11 | End: 2022-05-12

## 2022-05-11 RX ORDER — PANTOPRAZOLE SODIUM 40 MG/1
40 TABLET, DELAYED RELEASE ORAL
Status: DISCONTINUED | OUTPATIENT
Start: 2022-05-12 | End: 2022-05-12

## 2022-05-11 RX ORDER — SUCRALFATE 1 G/1
1 TABLET ORAL EVERY 6 HOURS SCHEDULED
Status: DISCONTINUED | OUTPATIENT
Start: 2022-05-11 | End: 2022-05-12

## 2022-05-11 RX ORDER — INSULIN LISPRO 100 [IU]/ML
2-12 INJECTION, SOLUTION INTRAVENOUS; SUBCUTANEOUS EVERY 6 HOURS SCHEDULED
Status: DISCONTINUED | OUTPATIENT
Start: 2022-05-11 | End: 2022-05-13

## 2022-05-11 RX ORDER — LORAZEPAM 2 MG/ML
0.5 INJECTION INTRAMUSCULAR ONCE
Status: COMPLETED | OUTPATIENT
Start: 2022-05-11 | End: 2022-05-11

## 2022-05-11 RX ORDER — HYDRALAZINE HYDROCHLORIDE 20 MG/ML
5 INJECTION INTRAMUSCULAR; INTRAVENOUS ONCE
Status: COMPLETED | OUTPATIENT
Start: 2022-05-11 | End: 2022-05-11

## 2022-05-11 RX ORDER — FUROSEMIDE 10 MG/ML
20 INJECTION INTRAMUSCULAR; INTRAVENOUS ONCE
Status: COMPLETED | OUTPATIENT
Start: 2022-05-11 | End: 2022-05-11

## 2022-05-11 RX ORDER — POTASSIUM CHLORIDE 14.9 MG/ML
20 INJECTION INTRAVENOUS EVERY 4 HOURS
Status: COMPLETED | OUTPATIENT
Start: 2022-05-11 | End: 2022-05-12

## 2022-05-11 RX ORDER — INSULIN LISPRO 100 [IU]/ML
1-5 INJECTION, SOLUTION INTRAVENOUS; SUBCUTANEOUS
Status: DISCONTINUED | OUTPATIENT
Start: 2022-05-11 | End: 2022-05-13

## 2022-05-11 RX ADMIN — POTASSIUM CHLORIDE 20 MEQ: 14.9 INJECTION, SOLUTION INTRAVENOUS at 20:04

## 2022-05-11 RX ADMIN — MORPHINE SULFATE 2 MG: 2 INJECTION, SOLUTION INTRAMUSCULAR; INTRAVENOUS at 22:07

## 2022-05-11 RX ADMIN — DIAZEPAM 2.5 MG: 10 INJECTION, SOLUTION INTRAMUSCULAR; INTRAVENOUS at 21:39

## 2022-05-11 RX ADMIN — FUROSEMIDE 20 MG: 10 INJECTION, SOLUTION INTRAVENOUS at 14:47

## 2022-05-11 RX ADMIN — LORAZEPAM 0.5 MG: 2 INJECTION INTRAMUSCULAR; INTRAVENOUS at 03:26

## 2022-05-11 RX ADMIN — POTASSIUM CHLORIDE 20 MEQ: 14.9 INJECTION, SOLUTION INTRAVENOUS at 14:45

## 2022-05-11 RX ADMIN — SODIUM CHLORIDE 4 UNITS/HR: 9 INJECTION, SOLUTION INTRAVENOUS at 09:57

## 2022-05-11 RX ADMIN — CEFTRIAXONE SODIUM 2000 MG: 10 INJECTION, POWDER, FOR SOLUTION INTRAVENOUS at 23:00

## 2022-05-11 RX ADMIN — MAGNESIUM SULFATE HEPTAHYDRATE 2 G: 40 INJECTION, SOLUTION INTRAVENOUS at 14:46

## 2022-05-11 RX ADMIN — POTASSIUM CHLORIDE 20 MEQ: 14.9 INJECTION, SOLUTION INTRAVENOUS at 23:16

## 2022-05-11 RX ADMIN — DEXTROSE AND SODIUM CHLORIDE 75 ML/HR: 5; .9 INJECTION, SOLUTION INTRAVENOUS at 12:09

## 2022-05-11 RX ADMIN — FLUTICASONE FUROATE AND VILANTEROL TRIFENATATE 1 PUFF: 200; 25 POWDER RESPIRATORY (INHALATION) at 10:00

## 2022-05-11 RX ADMIN — INSULIN LISPRO 2 UNITS: 100 INJECTION, SOLUTION INTRAVENOUS; SUBCUTANEOUS at 15:45

## 2022-05-11 RX ADMIN — HYDRALAZINE HYDROCHLORIDE 5 MG: 20 INJECTION, SOLUTION INTRAMUSCULAR; INTRAVENOUS at 19:58

## 2022-05-11 RX ADMIN — FLUTICASONE PROPIONATE 1 SPRAY: 50 SPRAY, METERED NASAL at 10:00

## 2022-05-11 RX ADMIN — NYSTATIN: 100000 POWDER TOPICAL at 10:02

## 2022-05-11 RX ADMIN — INSULIN LISPRO 2 UNITS: 100 INJECTION, SOLUTION INTRAVENOUS; SUBCUTANEOUS at 23:00

## 2022-05-11 RX ADMIN — NYSTATIN: 100000 POWDER TOPICAL at 17:36

## 2022-05-11 NOTE — ASSESSMENT & PLAN NOTE
Lab Results   Component Value Date    HGBA1C 11 4 (H) 05/10/2022     Recent Labs     05/11/22  0610 05/11/22  0802 05/11/22  0956 05/11/22  1206   POCGLU 133 172* 166* 156*   Continue Reglan p r n

## 2022-05-11 NOTE — CONSULTS
Consultation - [facility]) [facility]  Chandni Amor 61 y o  female MRN: [id number]  Unit/Bed#: [address] 201[address]01 Encounter: [id number]        Consults    Reason for Consult / Principal Problem:     1  Heme positive stool  2  Leukocytosis   3  Diabetic gastroparesis       ASSESSMENT AND PLAN:      Leyla Abel is a 62 y/o female who is s/p cholecystectomy with diabetic gastroparesis and DM2 on insulin, HTN and opioid-dependence who presents to the ED with weakness and now, with AMS  HPI is very limited as pt is refusing to speak at this time, however I spoke to pt's son, Bette Looney, over the phone  1  Acute encephalopathy  2  Heme + stool  3  Elevated LFTs  4  Diabetic gastroparesis   5  Poor PO intake  6  Hx of food impaction  Pt presents with weakness and AMS; pt was found to be + for barbiturates, benzos and opioids on UDS in the setting of hyperglycemia  Pt's son notes that she has episodes of AMS, usually due to her poorly controlled sugars  Pt was following with [facility] for gastroparesis and was controlled on reglan but has not followed up since 2019; pt does have hx of food impaction noted on EGD in 2015, which also noted mild esophageal narrowing  Speech therapy is following the pt who is recommending ice chips only until pt is more alert as she was unable to follow commands during initial evaluation ( unable to suck liquids with a straw)  Pt was also found to have heme + stool but no overt GI bleeding, which was confirmed with nursing (and rectal puch did show brown stool)and Hgb is stable at 14   Pt's LFTs elevated in cholestatic pattern but have been elevated mildly for many yrs:  and T Bili 1 20     -in terms of her gastroparesis and hx of food impaction: I do not think pt is a proper candidate for procedural sedation at this time but she does warrant EGD once she is more stable and/or as an OP   -in the meantime: if she is able to tolerate, I am ordering barium swallow to further evaluate for any stricturing or narrowing that could be a component in her refusal to eat    -start PPI daily    -start carafate scheduled (suspension only)    -diet per speech   -anti-emetics per [facility]; recommend holding off on reglan in the setting of AMS   -pt will need OP follow-up for further control of gastroparesis: medication VS procedural     -in terms of her encephalopathy and elevated LFTs: due to chronicity of LFT elevation, normal ammonia level and her clinical history, I do not believe her encephalopathy is from hepatic etiology     -agree with med tox consult    -pt has never had GGT in the past: GGT ordered and direct bili ordered to fractionate    -RUQ u/s also ordered to further analyze liver    -if GGT is elevated, pt warrants AMA to rule out PBC and further autoimmune work-up    -in terms of her Heme + stool: in the setting of normal Hgb without overt GI bleeding, my suspicion for active GI bleed is low as + hemoccult could have been from hemorrhoids or diet/medication causing false positive   -monitor H&H and transfuse as necessary   -monitor stool output and color    -pt does warrant endoscopic evaluation once she is stable      ______________________________________________________________________    HPI:  Katelyn Zapata is a 60 y/o female who is s/p cholecystectomy with diabetic gastroparesis and DM2 on insulin, HTN and opioid-dependence who presents to the ED with weakness and now, with AMS  HPI is very limited as pt is refusing to speak at this time, however I spoke to pt's son, Nani Phoenix, over the phone  Pt's son notes that his mother has cyclical episodes of AMS and it "usually always" is because of her uncontrolled sugars  Pt's son denies alcohol use and illicit drug use however she does have dependence on opioids  Pt's son says that prior to admission, his mother was not complaining of any abdominal pain, n/v, diarrhea, constipation, bloody or black BMs   Pt's son explains that the "only complaint" she had prior to her AMS was her chronic leg pain  Pt was following with [facility] for her gastroparesis and was controlled on reglan however pt has not followed up with GI since 2019  Pt's son is unsure if she has ever had EGD however according to her chart, she does have hx of food impaction in 2015 that required EGD at [facility]  The EGD did show some mild esophageal narrowing and the impaction was cleared  Pt was to undergo repeat EGD for esophageal bx after this but it does not appear she has  Pt's notes he does not believe she has ever had colonoscopy in the past as she always refused  Pt's son denies family hx of colon cancer and hearing of any unintentional weight loss in his mother  Pt's son says his mother was never dx with liver disease in the past, to his knowledge  REVIEW OF SYSTEMS:    CONSTITUTIONAL: Denies any fever, chills, rigors, and weight loss  HEENT: No earache or tinnitus  Denies hearing loss or visual disturbances  CARDIOVASCULAR: No chest pain or palpitations  RESPIRATORY: Denies any cough, hemoptysis, shortness of breath or dyspnea on exertion  GASTROINTESTINAL: As noted in the History of Present Illness  GENITOURINARY: No problems with urination  Denies any hematuria or dysuria  NEUROLOGIC: No dizziness or vertigo, denies headaches  MUSCULOSKELETAL: Denies any muscle or joint pain  SKIN: Denies skin rashes or itching  ENDOCRINE: Denies excessive thirst  Denies intolerance to heat or cold  PSYCHOSOCIAL: Denies depression or anxiety  Denies any recent memory loss         Historical Information   Past Medical History:   Diagnosis Date    Acute respiratory insufficiency 11/23/2019    Asthma     Chronic pain     Sepsis ([facility] [address] ) 11/23/2019     Past Surgical History:   Procedure Laterality Date    CERVICAL LAMINECTOMY      CHOLECYSTECTOMY      CHOLECYSTECTOMY LAPAROSCOPIC N/A 11/10/2018    Procedure: CHOLECYSTECTOMY LAPAROSCOPIC w/ ioc;  Surgeon: Jaki Gamez Sylvia Burkett MD;  [facility]: [facility];  Service: General    HYSTERECTOMY      JOINT REPLACEMENT Bilateral     knee    TOE AMPUTATION Right 2/2/2018    Procedure: AMPUTATION TOE, RIGHT GREAT TOE;  Surgeon: Jacky Reed DPM;  [facility]: [facility];  Service: Podiatry     Social History   Social History     Substance and Sexual Activity   Alcohol Use Not Currently    Comment: rarely     Social History     Substance and Sexual Activity   Drug Use No     Social History     Tobacco Use   Smoking Status Never Smoker   Smokeless Tobacco Never Used     Family History   Problem Relation Age of Onset    Diabetes Mother     Diabetes Maternal Grandmother     No Known Problems Father        Meds/Allergies     Medications Prior to Admission   Medication    albuterol (PROVENTIL HFA,VENTOLIN HFA) 90 mcg/act inhaler    BD PEN NEEDLE HERNAN U/F 32G X 4 MM MISC    carisoprodol (SOMA) 350 mg tablet    diazepam (VALIUM) 5 mg tablet    fluticasone (FLONASE) 50 mcg/act nasal spray    furosemide (LASIX) 20 mg tablet    insulin glargine (LANTUS) 100 units/mL subcutaneous injection    insulin lispro (HumaLOG) 100 units/mL injection    Insulin Pen Needle 29G X 5MM MISC    lisinopril (ZESTRIL) 10 mg tablet    LYRICA 150 MG capsule    metoclopramide (REGLAN) 10 mg tablet    metoclopramide (REGLAN) 10 mg tablet    montelukast (SINGULAIR) 10 mg tablet    morphine (MS CONTIN) 15 mg 12 hr tablet    morphine (MS CONTIN) 60 mg 12 hr tablet    nystatin (MYCOSTATIN) powder    omeprazole (PriLOSEC) 20 mg delayed release capsule    ondansetron (ZOFRAN) 4 mg tablet    pravastatin (PRAVACHOL) 10 mg tablet    WIXELA INHUB 500-50 MCG/DOSE inhaler     Current Facility-Administered Medications   Medication Dose Route Frequency    acetaminophen (TYLENOL) tablet 650 mg  650 mg Oral Q6H PRN    albuterol (PROVENTIL HFA,VENTOLIN HFA) inhaler 2 puff  2 puff Inhalation Q4H PRN    calcium carbonate (TUMS) chewable tablet 1,000 mg  1,000 mg Oral Daily PRN    carisoprodol (SOMA) tablet 175 mg  175 mg Oral BID    diazepam (VALIUM) injection 2 5 mg  2 5 mg Intravenous HS    fluticasone-vilanterol (BREO ELLIPTA) 200-25 MCG/INH inhaler 1 puff  1 puff Inhalation Daily    insulin lispro (HumaLOG) 100 units/mL subcutaneous injection 1-5 Units  1-5 Units Subcutaneous HS    insulin lispro (HumaLOG) 100 units/mL subcutaneous injection 2-12 Units  2-12 Units Subcutaneous Q6H [address]    magnesium sulfate 2 g/50 mL IVPB (premix) 2 g  2 g Intravenous Once    nystatin (MYCOSTATIN) powder   Topical BID    potassium chloride 20 mEq IVPB (premix)  20 mEq Intravenous Q4H       Allergies   Allergen Reactions    Nsaids GI Intolerance           Objective     Blood pressure (!) 177/100, pulse (!) 108, temperature 100 °F (37 8 °C), temperature source Oral, resp  rate 20, height 5' 7 99" (1 727 m), weight 134 kg (296 lb 4 8 oz), SpO2 94 %, not currently breastfeeding  Body mass index is 45 06 kg/m²  Intake/Output Summary (Last 24 hours) at 5/11/2022 1536  Last data filed at 5/11/2022 1101  Gross per 24 hour   Intake --   Output 1000 ml   Net -1000 ml         PHYSICAL EXAM:      General Appearance:   Alert, cooperative, no distress   HEENT:   Normocephalic, atraumatic, anicteric      Neck:  Supple, symmetrical, trachea midline   Lungs:   Clear to auscultation bilaterally; no rales, rhonchi or wheezing; respirations unlabored    Heart[de-identified]   Regular rate and rhythm; no murmur, rub, or gallop  Abdomen:   Soft, non-tender, non-distended; normal bowel sounds; no masses, no organomegaly    Genitalia:   Deferred    Rectal:   Deferred    Extremities:  No cyanosis, clubbing or edema    Pulses:  2+ and symmetric all extremities    Skin:  No jaundice, rashes, or lesions    Lymph nodes:  No palpable cervical lymphadenopathy        Lab Results:   No results displayed because visit has over 200 results            Imaging Studies: I have personally reviewed pertinent imaging studies

## 2022-05-11 NOTE — WOUND OSTOMY CARE
Consult Note - Wound   Anna Marie Saravia 61 y o  female MRN: [id number]  Unit/Bed#: [place] [address]01 Encounter: [id number]      History and Present Illness:  61year old female presented to the hospital with altered mental status  Patient's history significant for DM, HTN, gastroparesis  Assessment Findings:   Assessed along with primary RN  Patient is non-verbal and dependent during assessment for turning/repositioning  Incontinent of liquid/loose stool--nursing using fecal pouch to manage  Pouch changed at this time due to leakage  Sheila-anal care provided  Requiring straight cath for urination  Bilateral buttocks, sacrum, and heels intact, pink, and blanchable  Right breast fold pink, intact  Left breast fold intact  1  MASD/intertriginous dermatitis with candidiasis to abdominal fold--fold erythematous with slight foul odor  Moist desquamation to right lateral fold with partial thickness erosion  Sheila-wound skin intact  See flowsheet for wound details  Wound Care Plan:   1-Hydraguard lotion to bilateral buttocks, sacrum, and heels three times daily and as needed  2-Elevate heels off of bed/chair surface to offload pressure  3-Bariatric offloading air cushion in chair when out of bed  4-Moisturize skin daily with skin nourishing cream   5-Turn/reposition every 2 hours while in bed, using positioning wedges; and weight shift frequently while in chair for pressure re-distribution on skin  6-Accumax pump to bed mattress  7-Abdominal fold--cleanse with soap and water, pat dry well  Dust lightly with nystatin powder  Place ABDs along skin fold  Change twice daily and as needed with soilage  Wound care team to follow  Plan of care reviewed with primary RN  Wound 05/11/22 MASD Dermatologic/Rash Abdomen Lower;Left;Mid;Right (Active)   Wound Image     05/11/22 1355   Wound Description Pink; Beefy red 05/11/22 1355   Sheila-wound Assessment Maceration 05/11/22 1355   Wound Length (cm) 5 cm 05/11/22 1355   Wound Width (cm) 26 cm 05/11/22 1355   Wound Depth (cm) 0 1 cm 05/11/22 1355   Wound Surface Area (cm^2) 130 cm^2 05/11/22 1355   Wound Volume (cm^3) 13 cm^3 05/11/22 1355   Calculated Wound Volume (cm^3) 13 cm^3 05/11/22 1355   Drainage Amount None 05/11/22 1355   Non-staged Wound Description Partial thickness 05/11/22 1355   Treatments Cleansed 05/11/22 1355   Dressing Open to air 05/11/22 4697 [facility] BSN, RN, [employer]

## 2022-05-11 NOTE — ASSESSMENT & PLAN NOTE
Presenting with altered mental status, according to son, this is how she gets when she has significantly hyperglycemia  Lab Results   Component Value Date    HGBA1C 11 4 (H) 05/10/2022   Patient maintained on 50 units Lantus HS and 10 units t i d   With meals  Suppose to take the above but does not - noncompliant according to son  Has been previously admitted for the same  Blood sugars significantly elevated on admission, with elevated beta hydroxybutyrate  Continued on insulin drip and d5 until gap was closed   Will stop drip today   Place on Accu-Cheks and sliding scale q 6 given NPO status

## 2022-05-11 NOTE — ASSESSMENT & PLAN NOTE
Will hold all opioids/sedating medication giving acute encephalopathy      No acute signs of opiate overdose at this time  · Morphine Sulf Er 60mg BID   · Morphine Sulf Er 15mg BID  · Morphine Sulf IR 15 mg TID PRN  · Pregabalin 150 mg BID  · Carisoprodol 350 mg BID  · Valium 5 mg daily  · PDMP reviewed    Spoke with toxicology - will resume soma and valium at decreased doses today  Want to avoid withdrawal  Await official tox consult

## 2022-05-11 NOTE — ASSESSMENT & PLAN NOTE
Results from last 7 days   Lab Units 05/11/22  0912 05/10/22  1606 05/10/22  0631 05/10/22  0048   POTASSIUM mmol/L 3 0* 3 3* 3 6 3 7   Likely from recent episode of vomiting  Will replete continue to monitor  Magnesium noted to be low at 1 5  Replete magnesium

## 2022-05-11 NOTE — DISCHARGE INSTR - OTHER ORDERS
Wound Care Plan:   1-Hydraguard lotion to bilateral buttocks, sacrum, and heels three times daily and as needed for skin protection/hydration  2-Elevate heels off of bed/chair surface to offload pressure  3-Offloading air cushion in chair when out of bed  4-Moisturize skin daily with lotion  5-Turn/reposition every 2 hours while in bed, using positioning wedges; and weight shift frequently while in chair for pressure re-distribution on skin  6-Abdominal fold--cleanse with soap and water, pat dry well  Dust lightly with nystatin powder  Cleanse skin folds and apply medicated powder twice daily

## 2022-05-11 NOTE — SPEECH THERAPY NOTE
Speech Language/Pathology    Speech/Language Pathology Progress Note    Patient Name: Estefani Palafox  QQLEV'H Date: 5/11/2022     Problem List  Principal Problem:    Type 2 diabetes mellitus with hyperglycemia, with long-term current use of insulin ([facility] )  Active Problems:    Opioid dependence with other opioid-induced disorder ([facility] )    Diabetic gastroparesis ([facility] )    Essential hypertension    Acute encephalopathy       Past Medical History  Past Medical History:   Diagnosis Date    Acute respiratory insufficiency 11/23/2019    Asthma     Chronic pain     Sepsis ([facility] ) 11/23/2019        Past Surgical History  Past Surgical History:   Procedure Laterality Date    CERVICAL LAMINECTOMY      CHOLECYSTECTOMY      CHOLECYSTECTOMY LAPAROSCOPIC N/A 11/10/2018    Procedure: CHOLECYSTECTOMY LAPAROSCOPIC w/ ioc;  Surgeon: Yonas Shepherd MD;  Location: [facility];  Service: General    HYSTERECTOMY      JOINT REPLACEMENT Bilateral     knee    TOE AMPUTATION Right 2/2/2018    Procedure: AMPUTATION TOE, RIGHT GREAT TOE;  Surgeon: Alvarez Stone DPM;  Location: [facility];  Service: Podiatry         Subjective:  Slightly more alert today  Nodded  Attempted to cough when I asked but not able  Objective:  Noted heme +  Oral care completed  Not sticking out tongue when asked  Dentures removed  (not sure when last cleaned as they had some sort of material throughout)  Mild gag when dentures removed  + gag when dentures were placed back in, followed by vomiting  Pt more lethargic after that  Assessment:  Not appropriate for PO  Reduced mental status  ? Gi issues  H/o gastroparesis, h/o food impaction 2015  Now w/ heme + stool  Plan/Recommendations:  NPO  GI consult  ST will check peripherally for now  F/u if appropriate  May need short term alternate nutrition

## 2022-05-11 NOTE — ASSESSMENT & PLAN NOTE
Acute metabolic encephalopathy  Patient presenting with weakness for multiple days, now with altered mental status  Per EMS, son called [phone] the patient be brought to the ED for further evaluation    Has been previously admitted for the same, typically secondary to hyperglycemia  See assessment and plan under type 2 diabetes with hyperglycemia  Likely secondary to hyperglycemia complicated by opioid dependence/sedating medications  Pt still drowsy and has difficulty answering questions   Urine drug screen positive for barbiturates, benzos, opioids  Opoids/sedated medications held on admission  Resume soma and benzo today per tox - want to avoid withdrawal  Resume at 1/2 of regular dose   Seen and evaluated by speech with the following recommendation:  Keep NPO with ice chips until more awake and can tolerate regular diet  Will have to switch meds to IV given inability to swallow pills  Consulted tox for additional recommendations

## 2022-05-11 NOTE — CONSULTS
Consultation - Medical Toxicology  Shira Adams 61 y o  female MRN: [id number]  Unit/Bed#: [facility] [address] [id number] Encounter: [id number]      Reason for Consult / Principal Problem: Encephalopathy  Inpatient consult to Toxicology  Consult performed by: Jes Nieves DO  Consult ordered by: Paola Marquez PA-C        05/11/22      ASSESSMENT:  1  Acute encephalopathy  2  Opioid dependence  3  DESIREE agonist dependence  4  Hyperglycemia  5  Diabetes type II  6  Chronic pain syndrome    RECOMMENDATIONS:  The etiology of her encephalopathy is unclear at this point  However, itis reasonable to maintain an index of suspicion around her polypharmacy regimen  Upon review of her PDMP, she is prescribed the following every month by Dr Dot Rodriguez, an neurologist in [place], [place]: Morphine SO4 ER 15mg 60 pills, hydromorphone 4mg 90 pills, carisoprodol 350mg 60 pills, diazepam 5mg 30 pills, pregabalin 150mg 60 pills, and morphine SO4 ER 60mg 60 pills  This equates to about 200MME per day on top of which she is prescribed the carisoproldol, diazepam, and pregabalin  She gets this medication regimen filled every month through Dr Jhon Ribeiro  Given the combination of high dose opioids with multiple DESIREE agonists, encephalopathy from polypharmacy remains high on the differential      It is unclear if this medication regimen is resulting in a net benefit to the patient and strong consideration should be made for slow tapering over time by her outpatient prescriber to a regimen that is safer and more reasonable  It is not practical or feasible to accomplish that in a short period of time in the acute care setting  The medications cannot simply be stopped as she will develop both opioid withdrawal and DESIREE agonist withdrawal (diazepam and Soma)  Therefore, I agree with restarting the Soma at half dose as has been done   This will be adequate to prevent significant DESIREE agonist withdrawal while simultaneously allowing any prior excessive dosing to wash out in hopes her mental status will slowly improve if indeed polypharmacy is responsible  I agree with prn diazepam rather than scheduled  As for her opioids, I am OK with holding for now and simply restarting if/when evidence of w/d manifests  I reviewed her UDS  Certainly the +opiate and benzo is expected based on her prescriptions  However, the +barbiturates is more difficulty to explain as I do not see any barbiturate Rx's (Fioricet being the most common)  I agree with continued investigation into other possible non-medication related causes of her encephalopathy  For further questions, please call [facility]  Service or Patient Access Center to reach the medical  on call  Please see additional teaching note below (if available)    Hx and PE limited by: altered mental status    HPI: Umair Ferreira is a 61y o  year old female who presents with altered mental status  The patient was brought to the hospital yesterday with altered metnal status described as depressed and slower to respond  She has a h/o DM and has had episodes like this previously with hyperglycemia  She was fond to have hyperglycemia, although not severe  She was admitted to the hospital, hydrated, placed on insulin  Her mental status seems relatively unchanged from yesterday  Given her outpatient medication regimen, med tox opinion was sought on whether or not either overmedication vs withdrawal could be contributing  The patient is very slow to respond, will track and answer simple questions  However, more detailed history is not obtainable       Review of Systems   Unable to perform ROS: Mental status change       Historical Information   Past Medical History:   Diagnosis Date    Acute respiratory insufficiency 11/23/2019    Asthma     Chronic pain     Sepsis ([facility] ) 11/23/2019     Past Surgical History:   Procedure Laterality Date    CERVICAL LAMINECTOMY      CHOLECYSTECTOMY  CHOLECYSTECTOMY LAPAROSCOPIC N/A 11/10/2018    Procedure: CHOLECYSTECTOMY LAPAROSCOPIC w/ ioc;  Surgeon: Vince Orantes MD;  Location: [facility];  Service: General    HYSTERECTOMY      JOINT REPLACEMENT Bilateral     knee    TOE AMPUTATION Right 2/2/2018    Procedure: AMPUTATION TOE, RIGHT GREAT TOE;  Surgeon: Jacky Reed DPM;  [facility]: [facility];  Service: Podiatry     Social History   Social History     Substance and Sexual Activity   Alcohol Use Not Currently    Comment: rarely     Social History     Substance and Sexual Activity   Drug Use No     Social History     Tobacco Use   Smoking Status Never Smoker   Smokeless Tobacco Never Used     Family History   Problem Relation Age of Onset    Diabetes Mother     Diabetes Maternal Grandmother     No Known Problems Father         Prior to Admission medications    Medication Sig Start Date End Date Taking?  Authorizing Provider   albuterol (PROVENTIL HFA,VENTOLIN HFA) 90 mcg/act inhaler Inhale 2 puffs every 4 (four) hours as needed 10/14/19   Historical Provider, MD   BD PEN NEEDLE HERNAN U/F 32G X 4 MM MISC  8/23/19   Historical Provider, MD   carisoprodol (SOMA) 350 mg tablet Take 350 mg by mouth 2 (two) times a day    Historical Provider, MD   diazepam (VALIUM) 5 mg tablet Take 5 mg by mouth daily at bedtime as needed for anxiety    Historical Provider, MD   fluticasone (FLONASE) 50 mcg/act nasal spray 1 spray into each nostril daily    Historical Provider, MD   furosemide (LASIX) 20 mg tablet Take 40 mg by mouth as needed  8/16/19   Historical Provider, MD   insulin glargine (LANTUS) 100 units/mL subcutaneous injection Inject 50 Units under the skin daily at bedtime      Historical Provider, MD   insulin lispro (HumaLOG) 100 units/mL injection INJECT 10 UNITS UNDER THE SKIN 3 (THREE) TIMES A DAY WITH MEALS 6/30/20   Rajeev Mullins MD   Insulin Pen Needle 29G X 5MM MISC by Does not apply route 4 (four) times a day 9/20/19   Elpidio Glez Dirk Mena MD   lisinopril (ZESTRIL) 10 mg tablet Take 1 tablet (10 mg total) by mouth daily 8/19/21   Agustina Silva MD   LYRICA 150 MG capsule Take 150 mg by mouth 2 (two) times a day 6/12/19   Historical Provider, MD   metoclopramide (REGLAN) 10 mg tablet Take 1 tablet (10 mg total) by mouth 4 (four) times a day 8/18/21   Agustina Silva MD   metoclopramide (REGLAN) 10 mg tablet TAKE 1 TABLET BY MOUTH FOUR TIMES A DAY BEFORE MEALS AND AT BEDTIME 1/3/22   Fiordaliza Renee PA-C   montelukast (SINGULAIR) 10 mg tablet Take 10 mg by mouth daily at bedtime    Historical Provider, MD   morphine (MS CONTIN) 15 mg 12 hr tablet Take 15 mg by mouth 2 (two) times a day    Historical Provider, MD   morphine (MS CONTIN) 60 mg 12 hr tablet Take 60 mg by mouth 2 (two) times a day 9/13/19   Historical Provider, MD   nystatin (MYCOSTATIN) powder Apply topically 2 (two) times a day 7/23/19   Gay Quesada MD   omeprazole (PriLOSEC) 20 mg delayed release capsule Take 20 mg by mouth daily    Historical Provider, MD   ondansetron (ZOFRAN) 4 mg tablet Take 1 tablet (4 mg total) by mouth every 6 (six) hours 12/24/19   Maryanne Salazar PA-C   pravastatin (PRAVACHOL) 10 mg tablet Take 10 mg by mouth daily at bedtime 8/16/19   Historical Provider, MD   Rhperry Saner 500-50 MCG/DOSE inhaler Take 1 puff by mouth 2 (two) times a day 2/28/19   Historical Provider, MD       Current Facility-Administered Medications   Medication Dose Route Frequency    acetaminophen (TYLENOL) tablet 650 mg  650 mg Oral Q6H PRN    albuterol (PROVENTIL HFA,VENTOLIN HFA) inhaler 2 puff  2 puff Inhalation Q4H PRN    calcium carbonate (TUMS) chewable tablet 1,000 mg  1,000 mg Oral Daily PRN    carisoprodol (SOMA) tablet 175 mg  175 mg Oral BID    diazepam (VALIUM) injection 2 5 mg  2 5 mg Intravenous HS    fluticasone-vilanterol (BREO ELLIPTA) 200-25 MCG/INH inhaler 1 puff  1 puff Inhalation Daily    insulin lispro (HumaLOG) 100 units/mL subcutaneous injection 1-5 Units  1-5 Units Subcutaneous HS    insulin lispro (HumaLOG) 100 units/mL subcutaneous injection 2-12 Units  2-12 Units Subcutaneous Q6H [facility]    nystatin (MYCOSTATIN) powder   Topical BID    [START ON 5/12/2022] pantoprazole (PROTONIX) EC tablet 40 mg  40 mg Oral Early Morning    potassium chloride 20 mEq IVPB (premix)  20 mEq Intravenous Q4H    sucralfate (CARAFATE) tablet 1 g  1 g Oral Q6H [facility]       Allergies   Allergen Reactions    Nsaids GI Intolerance       Objective       Intake/Output Summary (Last 24 hours) at 5/11/2022 1846  Last data filed at 5/11/2022 1101  Gross per 24 hour   Intake --   Output 1000 ml   Net -1000 ml       Invasive Devices:   Peripheral IV 05/09/22 Left Antecubital (Active)   Site Assessment WDL; Clean;Dry; Intact 05/10/22 1930   Dressing Type Transparent 05/10/22 1930   Line Status Flushed;Saline locked 05/10/22 1930   Dressing Status Clean;Dry; Intact 05/10/22 1930   Dressing Intervention Dressing reinforced 05/09/22 2300   Dressing Change Due 05/13/22 05/10/22 1930   Reason Not Rotated Not due 05/10/22 1930       Peripheral IV 05/11/22 Left;Ventral (anterior) Forearm (Active)       Vitals   Vitals:    05/11/22 0756 05/11/22 1100 05/11/22 1102 05/11/22 1537   BP: (!) 179/101 (!) 177/100 (!) 177/100 143/87   TempSrc: Oral  Oral Oral   Pulse: (!) 109 (!) 111 (!) 108 (!) 108   Resp: 19  20 20   Patient Position - Orthostatic VS: Lying  Lying Lying   Temp: 98 5 °F (36 9 °C) 100 °F (37 8 °C) 100 °F (37 8 °C) 98 1 °F (36 7 °C)       Physical Exam  Constitutional:       Comments: Awake and alert, but slow to respond  HENT:      Mouth/Throat:      Mouth: Mucous membranes are dry  Eyes:      Extraocular Movements: Extraocular movements intact  Pupils: Pupils are equal, round, and reactive to light  Cardiovascular:      Rate and Rhythm: Tachycardia present  Heart sounds: Normal heart sounds  Pulmonary:      Breath sounds: Normal breath sounds     Abdominal: Palpations: Abdomen is soft  Tenderness: There is no abdominal tenderness  Musculoskeletal:      Right lower leg: No edema  Left lower leg: No edema  Skin:     General: Skin is warm  Findings: No rash  Neurological:      General: No focal deficit present  Comments: Pt is oriented to place and person  She answered 2020 when asked the year  There is no tremor, clonus, or hyperreflexia  One normal             EKG, Pathology, and Other Studies: I have personally reviewed pertinent reports  Lab Results: I have personally reviewed pertinent reports        Labs:  Results from last 7 days   Lab Units 05/11/22  0912 05/10/22  0631   WBC Thousand/uL 18 04* 16 47*   HEMOGLOBIN g/dL 14 6 14 6   HEMATOCRIT % 45 3 45 3   PLATELETS Thousands/uL 240 265   NEUTROS PCT %  --  86*   LYMPHS PCT %  --  8*   MONOS PCT %  --  5      Results from last 7 days   Lab Units 05/11/22  0912   SODIUM mmol/L 143   POTASSIUM mmol/L 3 0*   CHLORIDE mmol/L 107   CO2 mmol/L 26   BUN mg/dL 22   CREATININE mg/dL 0 81   CALCIUM mg/dL 9 1   ALK PHOS U/L 143*   ALT U/L 17   AST U/L 13   MAGNESIUM mg/dL 1 5*   PHOSPHORUS mg/dL 4 1              0   Lab Value Date/Time    TROPONINI <0 02 08/14/2021 2258    TROPONINI <0 02 08/14/2021 2007    TROPONINI <0 02 06/25/2020 1917    TROPONINI <0 02 01/12/2020 1625    TROPONINI <0 02 12/24/2019 1728    TROPONINI <0 02 11/22/2019 2218    TROPONINI <0 02 11/22/2019 1921    TROPONINI <0 02 11/22/2019 1741    TROPONINI <0 02 11/22/2019 1327    TROPONINI <0 02 05/22/2018 1856     Results from last 7 days   Lab Units 05/11/22  0912   PH GAUTAM  7 476*   PCO2 GAUTAM mm Hg 37 5*   PO2 GAUTAM mm Hg 82 1*   HCO3 GAUTAM mmol/L 27 1   O2 CONTENT GAUTAM ml/dL 19 7   O2 HGB, VENOUS % 95 2*     Results from last 7 days   Lab Units 05/09/22  1843   ACETAMINOPHEN LVL ug/mL <2*   ETHANOL LVL mg/dL <3   SALICYLATE LVL mg/dL <3*     Invalid input(s): EXTPREGUR    Imaging Studies: I have personally reviewed pertinent reports  Counseling / Coordination of Care  Total floor / unit time spent today 28 minutes  Greater than 50% of total time was spent with the patient and / or family counseling and / or coordination of care

## 2022-05-11 NOTE — PHYSICAL THERAPY NOTE
Physical Therapy Cancellation Note             05/11/22 1030   PT Last Visit   PT Visit Date 05/11/22   Note Type   Note type Evaluation; Cancelled Session   Cancel Reasons Medical status   Additional Comments pt w nausea, vomiting and abn vitals  will defer evaluation at this time and continue to follow as medically appropriate         Mich Calvin, PT

## 2022-05-11 NOTE — ASSESSMENT & PLAN NOTE
Home regimen lisinopril 10 mg daily  Pt not able to tolerate po   Here with elevated pressures in the 170s  Will see if BP improves with resuming soma and valium at low doses   If it does not, consider adding scheduled IV metoprolol

## 2022-05-11 NOTE — ASSESSMENT & PLAN NOTE
Results from last 7 days   Lab Units 05/11/22  0912 05/10/22  0631 05/09/22  1843   WBC Thousand/uL 18 04* 16 47* 11 83*   Noted rising white count - unclear etiology  Chest x-ray with increased interstitial edema but no infiltrate  Hold further fluids and give 1 time dose of IV Lasix  Procalcitonin negative at this time -repeat in a m    No evidence of infection on CT head or abdomen  Urine collected on admission without evidence of infection  Follow white count

## 2022-05-11 NOTE — ASSESSMENT & PLAN NOTE
Results from last 7 days   Lab Units 05/11/22  0912 05/10/22  0631 05/09/22  1843   HEMOGLOBIN g/dL 14 6 14 6 13 6   Noted to have heme positive stool x 3   Hgb stable though  Will consult GI

## 2022-05-11 NOTE — PROGRESS NOTES
[address] [facility]  Progress Note - Petra Hannah 1965, 61 y o  female MRN: [id number]  Unit/Bed#: [place] [address] -01 Encounter: [id number]  Primary Care Provider: Jewel Berumen MD   Date and time admitted to hospital: 5/9/2022  5:33 PM    * Type 2 diabetes mellitus with hyperglycemia, with long-term current use of insulin [facility])  Assessment & Plan  Presenting with altered mental status, according to son, this is how she gets when she has significantly hyperglycemia  Lab Results   Component Value Date    HGBA1C 11 4 (H) 05/10/2022   Patient maintained on 50 units Lantus HS and 10 units t i d  With meals  Suppose to take the above but does not - noncompliant according to son  Has been previously admitted for the same  Blood sugars significantly elevated on admission, with elevated beta hydroxybutyrate  Continued on insulin drip and d5 until gap was closed   Will stop drip today   Place on Accu-Cheks and sliding scale q 6 given NPO status    Acute encephalopathy  Assessment & Plan  Acute metabolic encephalopathy  Patient presenting with weakness for multiple days, now with altered mental status  Per EMS, son called 46 the patient be brought to the ED for further evaluation    Has been previously admitted for the same, typically secondary to hyperglycemia  See assessment and plan under type 2 diabetes with hyperglycemia  Likely secondary to hyperglycemia complicated by opioid dependence/sedating medications  Pt still drowsy and has difficulty answering questions   Urine drug screen positive for barbiturates, benzos, opioids  Opoids/sedated medications held on admission  Resume soma and benzo today per tox - want to avoid withdrawal  Resume at 1/2 of regular dose   Seen and evaluated by speech with the following recommendation:  Keep NPO with ice chips until more awake and can tolerate regular diet  Will have to switch meds to IV given inability to swallow pills  Consulted tox for additional recommendations    Heme positive stool  Assessment & Plan  Results from last 7 days   Lab Units 05/11/22  0912 05/10/22  0631 05/09/22  1843   HEMOGLOBIN g/dL 14 6 14 6 13 6   Noted to have heme positive stool x 3   Hgb stable though  Will consult GI    Leukocytosis  Assessment & Plan  Results from last 7 days   Lab Units 05/11/22  0912 05/10/22  0631 05/09/22  1843   WBC Thousand/uL 18 04* 16 47* 11 83*   Noted rising white count - unclear etiology  Chest x-ray with increased interstitial edema but no infiltrate  Hold further fluids and give 1 time dose of IV Lasix  Procalcitonin negative at this time -repeat in a m  No evidence of infection on CT head or abdomen  Urine collected on admission without evidence of infection  Follow white count    Essential hypertension  Assessment & Plan  Home regimen lisinopril 10 mg daily  Pt not able to tolerate po   Here with elevated pressures in the 170s  Will see if BP improves with resuming soma and valium at low doses   If it does not, consider adding scheduled IV metoprolol    Diabetic gastroparesis [facility])  Assessment & Plan  Lab Results   Component Value Date    HGBA1C 11 4 (H) 05/10/2022     Recent Labs     05/11/22  0610 05/11/22  0802 05/11/22  0956 05/11/22  1206   POCGLU 133 172* 166* 156*   Continue Reglan p r n  Hypokalemia  Assessment & Plan  Results from last 7 days   Lab Units 05/11/22  0912 05/10/22  1606 05/10/22  0631 05/10/22  0048   POTASSIUM mmol/L 3 0* 3 3* 3 6 3 7   Likely from recent episode of vomiting  Will replete continue to monitor  Magnesium noted to be low at 1 5  Replete magnesium    Opioid dependence with other opioid-induced disorder [facility])  Assessment & Plan  Will hold all opioids/sedating medication giving acute encephalopathy      No acute signs of opiate overdose at this time  · Morphine Sulf Er 60mg BID   · Morphine Sulf Er 15mg BID  · Morphine Sulf IR 15 mg TID PRN  · Pregabalin 150 mg BID  · Carisoprodol 350 mg BID  · Valium 5 mg daily  · PDMP reviewed    Spoke with toxicology - will resume soma and valium at decreased doses today  Want to avoid withdrawal  Await official tox consult      VTE Pharmacologic Prophylaxis:   Pharmacologic: Pharmacologic VTE Prophylaxis contraindicated due to heme positive stool  Mechanical VTE Prophylaxis in Place: Yes    Discharge Plan: With need for continued inpatient stay for acute metabolic encephalopathy  Discussions with Specialists or Other Care Team Provider:  Nursing, Dr Rupali Rios, Toxicology    Education and Discussions with Family / Patient:  Patient, son via telephone-updated    Time Spent for Care: 1 hour  More than 50% of total time spent on counseling and coordination of care as described above  Current Length of Stay: 2 day(s)  Current Patient Status: Inpatient   Code Status: Level 1 - Full Code    Subjective:   Seen and evaluated earlier during rounds  Slightly more alert today and follows some commands  Still not answering questions  Objective:     Vitals:   Temp (24hrs), Av 7 °F (37 1 °C), Min:97 6 °F (36 4 °C), Max:100 °F (37 8 °C)    Temp:  [97 6 °F (36 4 °C)-100 °F (37 8 °C)] 100 °F (37 8 °C)  HR:  [] 108  Resp:  [16-40] 20  BP: (156-180)/() 177/100  SpO2:  [89 %-94 %] 92 %  Body mass index is 45 06 kg/m²  Input and Output Summary (last 24 hours): Intake/Output Summary (Last 24 hours) at 2022 1412  Last data filed at 2022 1101  Gross per 24 hour   Intake --   Output 1000 ml   Net -1000 ml       Physical Exam:     Physical Exam  Vitals and nursing note reviewed  Constitutional:       General: She is not in acute distress  Appearance: Normal appearance  She is obese  She is not ill-appearing, toxic-appearing or diaphoretic  HENT:      Head: Normocephalic and atraumatic  Eyes:      General: No scleral icterus  Cardiovascular:      Rate and Rhythm: Normal rate and regular rhythm     Pulmonary:      Effort: Pulmonary effort is normal  No respiratory distress  Breath sounds: Normal breath sounds  No stridor  No wheezing or rhonchi  Abdominal:      General: Bowel sounds are normal  There is no distension  Palpations: Abdomen is soft  There is no mass  Tenderness: There is no abdominal tenderness  Hernia: No hernia is present  Musculoskeletal:         General: No swelling  Cervical back: Neck supple  Skin:     General: Skin is warm and dry  Neurological:      Comments: Makes eye contact but does not speak  Shakes head yes and no to some questions   Psychiatric:         Mood and Affect: Mood normal          Behavior: Behavior normal          Additional Data:     Labs:    Results from last 7 days   Lab Units 05/11/22  0912 05/10/22  0631   WBC Thousand/uL 18 04* 16 47*   HEMOGLOBIN g/dL 14 6 14 6   HEMATOCRIT % 45 3 45 3   PLATELETS Thousands/uL 240 265   NEUTROS PCT %  --  86*   LYMPHS PCT %  --  8*   MONOS PCT %  --  5   EOS PCT %  --  0     Results from last 7 days   Lab Units 05/11/22  0912   POTASSIUM mmol/L 3 0*   CHLORIDE mmol/L 107   CO2 mmol/L 26   BUN mg/dL 22   CREATININE mg/dL 0 81   CALCIUM mg/dL 9 1   ALK PHOS U/L 143*   ALT U/L 17   AST U/L 13           * I Have Reviewed All Lab Data Listed Above  * Additional Pertinent Lab Tests Reviewed:  R[facility] 65 Admission Reviewed    Imaging:    Imaging Reports Reviewed Today Include: cxr, ct head, ct abdomen  Imaging Personally Reviewed by Myself Includes:      Recent Cultures (last 7 days):           Last 24 Hours Medication List:   Current Facility-Administered Medications   Medication Dose Route Frequency Provider Last Rate    acetaminophen  650 mg Oral Q6H PRN Wilmer Cabrera PA-C      albuterol  2 puff Inhalation Q4H PRN Wilmer Cabrera PA-C      calcium carbonate  1,000 mg Oral Daily PRN Wilmer Cabrera PA-C      carisoprodol  175 mg Oral BID Octavia Rodriguez PA-C      diazepam  2 5 mg Intravenous HS Octavia Rodriguez PA-C      fluticasone-vilanterol  1 puff Inhalation Daily Wilmer Cabrera PA-C      insulin lispro  1-5 Units Subcutaneous HS Marina Martin PA-C      insulin lispro  2-12 Units Subcutaneous Q6H [address] Marina Martin PA-C      magnesium sulfate  2 g Intravenous Once Octavia Rodriguez PA-C      nystatin   Topical BID Octavia Rodriguez PA-C      potassium chloride  20 mEq Intravenous Q4H Octavia Rodriguez PA-C          Today, Patient Was Seen By: Octavia Rodriguez PA-C    ** Please Note: This note has been constructed using a voice recognition system   **

## 2022-05-11 NOTE — OCCUPATIONAL THERAPY NOTE
Occupational Therapy Note:    Patient Name: Dominik López  DRZVF'V Date: 5/11/2022    OT consult received  Chart reviewed  Pt w/ nausea, vomiting, and abnormal vitals  Not appropriate for OT eval at this time  Will cx and complete OT eval at later time as schedule permits      MYRNA Ying/LISA

## 2022-05-11 NOTE — PLAN OF CARE
Problem: MOBILITY - ADULT  Goal: Maintain or return to baseline ADL function  Description: INTERVENTIONS:  -  Assess patient's ability to carry out ADLs; assess patient's baseline for ADL function and identify physical deficits which impact ability to perform ADLs (bathing, care of mouth/teeth, toileting, grooming, dressing, etc )  - Assess/evaluate cause of self-care deficits   - Assess range of motion  - Assess patient's mobility; develop plan if impaired  - Assess patient's need for assistive devices and provide as appropriate  - Encourage maximum independence but intervene and supervise when necessary  - Involve family in performance of ADLs  - Assess for home care needs following discharge   - Consider OT consult to assist with ADL evaluation and planning for discharge  - Provide patient education as appropriate  Outcome: Progressing  Goal: Maintains/Returns to pre admission functional level  Description: INTERVENTIONS:  - Perform BMAT or MOVE assessment daily    - Set and communicate daily mobility goal to care team and patient/family/caregiver     - Collaborate with rehabilitation services on mobility goals if consulted  - Out of bed for toileting  - Record patient progress and toleration of activity level   Outcome: Progressing     Problem: Prexisting or High Potential for Compromised Skin Integrity  Goal: Skin integrity is maintained or improved  Description: INTERVENTIONS:  - Identify patients at risk for skin breakdown  - Assess and monitor skin integrity  - Assess and monitor nutrition and hydration status  - Monitor labs   - Assess for incontinence   - Turn and reposition patient  - Assist with mobility/ambulation  - Relieve pressure over bony prominences  - Avoid friction and shearing  - Provide appropriate hygiene as needed including keeping skin clean and dry  - Evaluate need for skin moisturizer/barrier cream  - Collaborate with interdisciplinary team   - Patient/family teaching  - Consider wound care consult   Outcome: Progressing     Problem: Nutrition/Hydration-ADULT  Goal: Nutrient/Hydration intake appropriate for improving, restoring or maintaining nutritional needs  Description: Monitor and assess patient's nutrition/hydration status for malnutrition  Collaborate with interdisciplinary team and initiate plan and interventions as ordered  Monitor patient's weight and dietary intake as ordered or per policy  Utilize nutrition screening tool and intervene as necessary  Determine patient's food preferences and provide high-protein, high-caloric foods as appropriate       INTERVENTIONS:  - Monitor oral intake, urinary output, labs, and treatment plans  - Assess nutrition and hydration status and recommend course of action  - Evaluate amount of meals eaten  - Assist patient with eating if necessary   - Allow adequate time for meals  - Recommend/ encourage appropriate diets, oral nutritional supplements, and vitamin/mineral supplements  - Order, calculate, and assess calorie counts as needed  - Recommend, monitor, and adjust tube feedings and TPN/PPN based on assessed needs  - Assess need for intravenous fluids  - Provide specific nutrition/hydration education as appropriate  - Include patient/family/caregiver in decisions related to nutrition  Outcome: Progressing     Problem: GASTROINTESTINAL - ADULT  Goal: Minimal or absence of nausea and/or vomiting  Description: INTERVENTIONS:  - Administer IV fluids if ordered to ensure adequate hydration  - Maintain NPO status until nausea and vomiting are resolved  - Nasogastric tube if ordered  - Administer ordered antiemetic medications as needed  - Provide nonpharmacologic comfort measures as appropriate  - Advance diet as tolerated, if ordered  - Consider nutrition services referral to assist patient with adequate nutrition and appropriate food choices  Outcome: Progressing  Goal: Maintains or returns to baseline bowel function  Description: INTERVENTIONS:  - Assess bowel function  - Encourage oral fluids to ensure adequate hydration  - Administer IV fluids if ordered to ensure adequate hydration  - Administer ordered medications as needed  - Encourage mobilization and activity  - Consider nutritional services referral to assist patient with adequate nutrition and appropriate food choices  Outcome: Progressing  Goal: Maintains adequate nutritional intake  Description: INTERVENTIONS:  - Monitor percentage of each meal consumed  - Identify factors contributing to decreased intake, treat as appropriate  - Assist with meals as needed  - Monitor I&O, weight, and lab values if indicated  - Obtain nutrition services referral as needed  Outcome: Progressing  Goal: Establish and maintain optimal ostomy function  Description: INTERVENTIONS:  - Assess bowel function  - Encourage oral fluids to ensure adequate hydration  - Administer IV fluids if ordered to ensure adequate hydration   - Administer ordered medications as needed  - Encourage mobilization and activity  - Nutrition services referral to assist patient with appropriate food choices  - Assess stoma site  - Consider wound care consult   Outcome: Progressing  Goal: Oral mucous membranes remain intact  Description: INTERVENTIONS  - Assess oral mucosa and hygiene practices  - Implement preventative oral hygiene regimen  - Implement oral medicated treatments as ordered  - Initiate Nutrition services referral as needed  Outcome: Progressing     Problem: METABOLIC, FLUID AND ELECTROLYTES - ADULT  Goal: Electrolytes maintained within normal limits  Description: INTERVENTIONS:  - Monitor labs and assess patient for signs and symptoms of electrolyte imbalances  - Administer electrolyte replacement as ordered  - Monitor response to electrolyte replacements, including repeat lab results as appropriate  - Instruct patient on fluid and nutrition as appropriate  Outcome: Progressing  Goal: Fluid balance maintained  Description: INTERVENTIONS:  - Monitor labs   - Monitor I/O and WT  - Instruct patient on fluid and nutrition as appropriate  - Assess for signs & symptoms of volume excess or deficit  Outcome: Progressing  Goal: Glucose maintained within target range  Description: INTERVENTIONS:  - Monitor Blood Glucose as ordered  - Assess for signs and symptoms of hyperglycemia and hypoglycemia  - Administer ordered medications to maintain glucose within target range  - Assess nutritional intake and initiate nutrition service referral as needed  Outcome: Progressing     Problem: PAIN - ADULT  Goal: Verbalizes/displays adequate comfort level or baseline comfort level  Description: Interventions:  - Encourage patient to monitor pain and request assistance  - Assess pain using appropriate pain scale  - Administer analgesics based on type and severity of pain and evaluate response  - Implement non-pharmacological measures as appropriate and evaluate response  - Consider cultural and social influences on pain and pain management  - Notify physician/advanced practitioner if interventions unsuccessful or patient reports new pain  Outcome: Progressing     Problem: INFECTION - ADULT  Goal: Absence or prevention of progression during hospitalization  Description: INTERVENTIONS:  - Assess and monitor for signs and symptoms of infection  - Monitor lab/diagnostic results  - Monitor all insertion sites, i e  indwelling lines, tubes, and drains  - Monitor endotracheal if appropriate and nasal secretions for changes in amount and color  - [place] appropriate cooling/warming therapies per order  - Administer medications as ordered  - Instruct and encourage patient and family to use good hand hygiene technique  - Identify and instruct in appropriate isolation precautions for identified infection/condition  Outcome: Progressing     Problem: SAFETY ADULT  Goal: Patient will remain free of falls  Description: INTERVENTIONS:  - Educate patient/family on patient safety including physical limitations  - Instruct patient to call for assistance with activity   - Consult OT/PT to assist with strengthening/mobility   - Keep Call bell within reach  - Keep bed low and locked with side rails adjusted as appropriate  - Keep care items and personal belongings within reach  - Initiate and maintain comfort rounds  - Make Fall Risk Sign visible to staff  - Offer Toileting every 2 Hours, in advance of need  - Initiate/Maintain bed alarm  - Obtain necessary fall risk management equipment: alarms  - Apply yellow socks and bracelet for high fall risk patients  - Consider moving patient to room near nurses station  Outcome: Progressing

## 2022-05-12 ENCOUNTER — APPOINTMENT (INPATIENT)
Dept: RADIOLOGY | Facility: HOSPITAL | Age: 57
DRG: 637 | End: 2022-05-12
Payer: MEDICARE

## 2022-05-12 PROBLEM — R33.9 URINE RETENTION: Status: ACTIVE | Noted: 2022-05-12

## 2022-05-12 LAB
ALBUMIN SERPL BCP-MCNC: 3.2 G/DL (ref 3.5–5)
ALP SERPL-CCNC: 135 U/L (ref 46–116)
ALT SERPL W P-5'-P-CCNC: 13 U/L (ref 12–78)
ANION GAP SERPL CALCULATED.3IONS-SCNC: 16 MMOL/L (ref 4–13)
ARTERIAL PATENCY WRIST A: YES
AST SERPL W P-5'-P-CCNC: 17 U/L (ref 5–45)
ATRIAL RATE: 123 BPM
ATRIAL RATE: 154 BPM
ATRIAL RATE: 159 BPM
BACTERIA UR QL AUTO: ABNORMAL /HPF
BASE EXCESS BLDA CALC-SCNC: -4.6 MMOL/L
BILIRUB DIRECT SERPL-MCNC: 0.25 MG/DL (ref 0–0.2)
BILIRUB SERPL-MCNC: 1.09 MG/DL (ref 0.2–1)
BILIRUB UR QL STRIP: ABNORMAL
BUN SERPL-MCNC: 28 MG/DL (ref 5–25)
CALCIUM SERPL-MCNC: 8.8 MG/DL (ref 8.3–10.1)
CHLORIDE SERPL-SCNC: 107 MMOL/L (ref 100–108)
CLARITY UR: CLEAR
CO2 SERPL-SCNC: 20 MMOL/L (ref 21–32)
COLOR UR: ABNORMAL
CREAT SERPL-MCNC: 0.9 MG/DL (ref 0.6–1.3)
ERYTHROCYTE [DISTWIDTH] IN BLOOD BY AUTOMATED COUNT: 13.7 % (ref 11.6–15.1)
FINE GRAN CASTS URNS QL MICRO: ABNORMAL /LPF
FLUAV RNA RESP QL NAA+PROBE: NEGATIVE
FLUBV RNA RESP QL NAA+PROBE: NEGATIVE
GFR SERPL CREATININE-BSD FRML MDRD: 71 ML/MIN/1.73SQ M
GGT SERPL-CCNC: 108 U/L (ref 5–85)
GLUCOSE SERPL-MCNC: 264 MG/DL (ref 65–140)
GLUCOSE SERPL-MCNC: 268 MG/DL (ref 65–140)
GLUCOSE SERPL-MCNC: 287 MG/DL (ref 65–140)
GLUCOSE SERPL-MCNC: 292 MG/DL (ref 65–140)
GLUCOSE SERPL-MCNC: 295 MG/DL (ref 65–140)
GLUCOSE SERPL-MCNC: 303 MG/DL (ref 65–140)
GLUCOSE UR STRIP-MCNC: ABNORMAL MG/DL
HCO3 BLDA-SCNC: 19.3 MMOL/L (ref 22–28)
HCT VFR BLD AUTO: 46.5 % (ref 34.8–46.1)
HGB BLD-MCNC: 14.6 G/DL (ref 11.5–15.4)
HGB UR QL STRIP.AUTO: ABNORMAL
HYALINE CASTS #/AREA URNS LPF: ABNORMAL /LPF
KETONES UR STRIP-MCNC: ABNORMAL MG/DL
LEUKOCYTE ESTERASE UR QL STRIP: NEGATIVE
MAGNESIUM SERPL-MCNC: 1.9 MG/DL (ref 1.6–2.6)
MCH RBC QN AUTO: 27 PG (ref 26.8–34.3)
MCHC RBC AUTO-ENTMCNC: 31.4 G/DL (ref 31.4–37.4)
MCV RBC AUTO: 86 FL (ref 82–98)
MUCOUS THREADS UR QL AUTO: ABNORMAL
NASAL CANNULA: ABNORMAL
NITRITE UR QL STRIP: NEGATIVE
NON-SQ EPI CELLS URNS QL MICRO: ABNORMAL /HPF
O2 CT BLDA-SCNC: 20.7 ML/DL (ref 16–23)
OTHER CASTS: ABNORMAL
OTHER STN SPEC: ABNORMAL
OXYHGB MFR BLDA: 96.6 % (ref 94–97)
P AXIS: 67 DEGREES
P AXIS: 94 DEGREES
PCO2 BLDA: 32.5 MM HG (ref 36–44)
PH BLDA: 7.39 [PH] (ref 7.35–7.45)
PH UR STRIP.AUTO: 6 [PH]
PLATELET # BLD AUTO: 248 THOUSANDS/UL (ref 149–390)
PMV BLD AUTO: 13.7 FL (ref 8.9–12.7)
PO2 BLDA: 101.9 MM HG (ref 75–129)
POTASSIUM SERPL-SCNC: 3.6 MMOL/L (ref 3.5–5.3)
PR INTERVAL: 128 MS
PR INTERVAL: 128 MS
PR INTERVAL: 152 MS
PROCALCITONIN SERPL-MCNC: 0.09 NG/ML
PROT SERPL-MCNC: 7.8 G/DL (ref 6.4–8.2)
PROT UR STRIP-MCNC: >=300 MG/DL
QRS AXIS: -16 DEGREES
QRS AXIS: 3 DEGREES
QRS AXIS: 5 DEGREES
QRSD INTERVAL: 70 MS
QRSD INTERVAL: 70 MS
QRSD INTERVAL: 72 MS
QT INTERVAL: 278 MS
QT INTERVAL: 300 MS
QT INTERVAL: 328 MS
QTC INTERVAL: 445 MS
QTC INTERVAL: 469 MS
QTC INTERVAL: 486 MS
RBC # BLD AUTO: 5.41 MILLION/UL (ref 3.81–5.12)
RBC #/AREA URNS AUTO: ABNORMAL /HPF
RSV RNA RESP QL NAA+PROBE: NEGATIVE
SARS-COV-2 RNA RESP QL NAA+PROBE: NEGATIVE
SODIUM SERPL-SCNC: 143 MMOL/L (ref 136–145)
SP GR UR STRIP.AUTO: >=1.03 (ref 1–1.03)
SPECIMEN SOURCE: ABNORMAL
T WAVE AXIS: 36 DEGREES
T WAVE AXIS: 42 DEGREES
T WAVE AXIS: 52 DEGREES
UROBILINOGEN UR QL STRIP.AUTO: 0.2 E.U./DL
VENTRICULAR RATE: 123 BPM
VENTRICULAR RATE: 154 BPM
VENTRICULAR RATE: 158 BPM
WBC # BLD AUTO: 16 THOUSAND/UL (ref 4.31–10.16)
WBC #/AREA URNS AUTO: ABNORMAL /HPF

## 2022-05-12 PROCEDURE — 84145 PROCALCITONIN (PCT): CPT | Performed by: PHYSICIAN ASSISTANT

## 2022-05-12 PROCEDURE — 71045 X-RAY EXAM CHEST 1 VIEW: CPT

## 2022-05-12 PROCEDURE — 82948 REAGENT STRIP/BLOOD GLUCOSE: CPT

## 2022-05-12 PROCEDURE — C9113 INJ PANTOPRAZOLE SODIUM, VIA: HCPCS | Performed by: NURSE PRACTITIONER

## 2022-05-12 PROCEDURE — 82550 ASSAY OF CK (CPK): CPT | Performed by: NURSE PRACTITIONER

## 2022-05-12 PROCEDURE — 82977 ASSAY OF GGT: CPT | Performed by: PHYSICIAN ASSISTANT

## 2022-05-12 PROCEDURE — 94640 AIRWAY INHALATION TREATMENT: CPT

## 2022-05-12 PROCEDURE — 82805 BLOOD GASES W/O2 SATURATION: CPT | Performed by: STUDENT IN AN ORGANIZED HEALTH CARE EDUCATION/TRAINING PROGRAM

## 2022-05-12 PROCEDURE — 93010 ELECTROCARDIOGRAM REPORT: CPT | Performed by: INTERNAL MEDICINE

## 2022-05-12 PROCEDURE — 81001 URINALYSIS AUTO W/SCOPE: CPT | Performed by: NURSE PRACTITIONER

## 2022-05-12 PROCEDURE — 80076 HEPATIC FUNCTION PANEL: CPT | Performed by: PHYSICIAN ASSISTANT

## 2022-05-12 PROCEDURE — 83735 ASSAY OF MAGNESIUM: CPT | Performed by: PHYSICIAN ASSISTANT

## 2022-05-12 PROCEDURE — 85027 COMPLETE CBC AUTOMATED: CPT | Performed by: PHYSICIAN ASSISTANT

## 2022-05-12 PROCEDURE — 0241U HB NFCT DS VIR RESP RNA 4 TRGT: CPT | Performed by: NURSE PRACTITIONER

## 2022-05-12 PROCEDURE — 80048 BASIC METABOLIC PNL TOTAL CA: CPT | Performed by: PHYSICIAN ASSISTANT

## 2022-05-12 PROCEDURE — 99223 1ST HOSP IP/OBS HIGH 75: CPT | Performed by: PHYSICIAN ASSISTANT

## 2022-05-12 PROCEDURE — 99233 SBSQ HOSP IP/OBS HIGH 50: CPT | Performed by: STUDENT IN AN ORGANIZED HEALTH CARE EDUCATION/TRAINING PROGRAM

## 2022-05-12 PROCEDURE — 99223 1ST HOSP IP/OBS HIGH 75: CPT | Performed by: PSYCHIATRY & NEUROLOGY

## 2022-05-12 RX ORDER — MORPHINE SULFATE 4 MG/ML
4 INJECTION, SOLUTION INTRAMUSCULAR; INTRAVENOUS EVERY 4 HOURS PRN
Status: DISCONTINUED | OUTPATIENT
Start: 2022-05-12 | End: 2022-05-17 | Stop reason: HOSPADM

## 2022-05-12 RX ORDER — PANTOPRAZOLE SODIUM 40 MG/1
40 INJECTION, POWDER, FOR SOLUTION INTRAVENOUS
Status: DISCONTINUED | OUTPATIENT
Start: 2022-05-12 | End: 2022-05-15

## 2022-05-12 RX ORDER — SODIUM CHLORIDE FOR INHALATION 0.9 %
3 VIAL, NEBULIZER (ML) INHALATION
Status: DISCONTINUED | OUTPATIENT
Start: 2022-05-12 | End: 2022-05-14

## 2022-05-12 RX ORDER — IBUPROFEN 400 MG/1
400 TABLET ORAL ONCE
Status: COMPLETED | OUTPATIENT
Start: 2022-05-12 | End: 2022-05-12

## 2022-05-12 RX ORDER — HEPARIN SODIUM 5000 [USP'U]/ML
7500 INJECTION, SOLUTION INTRAVENOUS; SUBCUTANEOUS EVERY 8 HOURS SCHEDULED
Status: DISCONTINUED | OUTPATIENT
Start: 2022-05-12 | End: 2022-05-17 | Stop reason: HOSPADM

## 2022-05-12 RX ORDER — CLONIDINE HYDROCHLORIDE 0.1 MG/1
0.1 TABLET ORAL EVERY 12 HOURS SCHEDULED
Status: DISCONTINUED | OUTPATIENT
Start: 2022-05-12 | End: 2022-05-17 | Stop reason: HOSPADM

## 2022-05-12 RX ORDER — SODIUM CHLORIDE 450 MG/100ML
100 INJECTION, SOLUTION INTRAVENOUS CONTINUOUS
Status: DISCONTINUED | OUTPATIENT
Start: 2022-05-12 | End: 2022-05-13

## 2022-05-12 RX ORDER — ONDANSETRON 2 MG/ML
4 INJECTION INTRAMUSCULAR; INTRAVENOUS EVERY 6 HOURS PRN
Status: DISCONTINUED | OUTPATIENT
Start: 2022-05-12 | End: 2022-05-17 | Stop reason: HOSPADM

## 2022-05-12 RX ORDER — ACETAMINOPHEN 325 MG/1
650 TABLET ORAL EVERY 6 HOURS PRN
Status: DISCONTINUED | OUTPATIENT
Start: 2022-05-12 | End: 2022-05-14

## 2022-05-12 RX ORDER — DIAZEPAM 5 MG/1
5 TABLET ORAL
Status: DISCONTINUED | OUTPATIENT
Start: 2022-05-12 | End: 2022-05-14

## 2022-05-12 RX ORDER — CARISOPRODOL 350 MG/1
175 TABLET ORAL 2 TIMES DAILY
Status: DISCONTINUED | OUTPATIENT
Start: 2022-05-12 | End: 2022-05-14

## 2022-05-12 RX ORDER — LEVALBUTEROL 1.25 MG/.5ML
1.25 SOLUTION, CONCENTRATE RESPIRATORY (INHALATION)
Status: DISCONTINUED | OUTPATIENT
Start: 2022-05-12 | End: 2022-05-14

## 2022-05-12 RX ORDER — SUCRALFATE 1 G/1
1 TABLET ORAL EVERY 6 HOURS SCHEDULED
Status: DISCONTINUED | OUTPATIENT
Start: 2022-05-12 | End: 2022-05-14

## 2022-05-12 RX ORDER — INSULIN GLARGINE 100 [IU]/ML
15 INJECTION, SOLUTION SUBCUTANEOUS
Status: DISCONTINUED | OUTPATIENT
Start: 2022-05-12 | End: 2022-05-14

## 2022-05-12 RX ORDER — SODIUM CHLORIDE 9 MG/ML
100 INJECTION, SOLUTION INTRAVENOUS CONTINUOUS
Status: DISCONTINUED | OUTPATIENT
Start: 2022-05-12 | End: 2022-05-12

## 2022-05-12 RX ORDER — HYDRALAZINE HYDROCHLORIDE 20 MG/ML
10 INJECTION INTRAMUSCULAR; INTRAVENOUS EVERY 6 HOURS PRN
Status: DISCONTINUED | OUTPATIENT
Start: 2022-05-12 | End: 2022-05-17 | Stop reason: HOSPADM

## 2022-05-12 RX ORDER — INSULIN GLARGINE 100 [IU]/ML
20 INJECTION, SOLUTION SUBCUTANEOUS
Status: DISCONTINUED | OUTPATIENT
Start: 2022-05-12 | End: 2022-05-12

## 2022-05-12 RX ORDER — PREGABALIN 50 MG/1
50 CAPSULE ORAL 2 TIMES DAILY
Status: DISCONTINUED | OUTPATIENT
Start: 2022-05-12 | End: 2022-05-14

## 2022-05-12 RX ADMIN — SODIUM CHLORIDE 100 ML/HR: 0.9 INJECTION, SOLUTION INTRAVENOUS at 02:07

## 2022-05-12 RX ADMIN — INSULIN GLARGINE 15 UNITS: 100 INJECTION, SOLUTION SUBCUTANEOUS at 22:24

## 2022-05-12 RX ADMIN — PREGABALIN 50 MG: 50 CAPSULE ORAL at 17:51

## 2022-05-12 RX ADMIN — CARISOPRODOL 175 MG: 350 TABLET ORAL at 09:51

## 2022-05-12 RX ADMIN — FLUTICASONE FUROATE AND VILANTEROL TRIFENATATE 1 PUFF: 200; 25 POWDER RESPIRATORY (INHALATION) at 08:46

## 2022-05-12 RX ADMIN — HEPARIN SODIUM 7500 UNITS: 5000 INJECTION INTRAVENOUS; SUBCUTANEOUS at 22:25

## 2022-05-12 RX ADMIN — SODIUM CHLORIDE 100 ML/HR: 0.45 INJECTION, SOLUTION INTRAVENOUS at 18:42

## 2022-05-12 RX ADMIN — IBUPROFEN 400 MG: 400 TABLET, FILM COATED ORAL at 16:45

## 2022-05-12 RX ADMIN — HEPARIN SODIUM 7500 UNITS: 5000 INJECTION INTRAVENOUS; SUBCUTANEOUS at 16:12

## 2022-05-12 RX ADMIN — CEFTRIAXONE 2000 MG: 2 INJECTION, POWDER, FOR SOLUTION INTRAMUSCULAR; INTRAVENOUS at 22:24

## 2022-05-12 RX ADMIN — DIAZEPAM 5 MG: 5 TABLET ORAL at 21:37

## 2022-05-12 RX ADMIN — CARISOPRODOL 175 MG: 350 TABLET ORAL at 21:37

## 2022-05-12 RX ADMIN — SUCRALFATE 1 G: 1 TABLET ORAL at 17:52

## 2022-05-12 RX ADMIN — INSULIN LISPRO 6 UNITS: 100 INJECTION, SOLUTION INTRAVENOUS; SUBCUTANEOUS at 12:21

## 2022-05-12 RX ADMIN — SUCRALFATE 1 G: 1 TABLET ORAL at 12:20

## 2022-05-12 RX ADMIN — CLONIDINE HYDROCHLORIDE 0.1 MG: 0.1 TABLET ORAL at 21:37

## 2022-05-12 RX ADMIN — NYSTATIN: 100000 POWDER TOPICAL at 17:51

## 2022-05-12 RX ADMIN — ISODIUM CHLORIDE 3 ML: 0.03 SOLUTION RESPIRATORY (INHALATION) at 19:40

## 2022-05-12 RX ADMIN — LEVALBUTEROL 1.25 MG: 1.25 SOLUTION, CONCENTRATE RESPIRATORY (INHALATION) at 19:40

## 2022-05-12 RX ADMIN — NYSTATIN: 100000 POWDER TOPICAL at 08:46

## 2022-05-12 RX ADMIN — INSULIN LISPRO 6 UNITS: 100 INJECTION, SOLUTION INTRAVENOUS; SUBCUTANEOUS at 06:02

## 2022-05-12 RX ADMIN — ACETAMINOPHEN 325MG 650 MG: 325 TABLET ORAL at 12:44

## 2022-05-12 RX ADMIN — PANTOPRAZOLE SODIUM 40 MG: 40 INJECTION, POWDER, FOR SOLUTION INTRAVENOUS at 06:02

## 2022-05-12 RX ADMIN — INSULIN LISPRO 6 UNITS: 100 INJECTION, SOLUTION INTRAVENOUS; SUBCUTANEOUS at 17:54

## 2022-05-12 NOTE — ASSESSMENT & PLAN NOTE
Results from last 7 days   Lab Units 05/12/22  0430 05/11/22  2150 05/11/22  0912 05/10/22  0631   HEMOGLOBIN g/dL 14 6 14 6 14 6 14 6   Noted to have heme positive stool x 3   Hgb stable though  Seen and evaluated by GI - no need for scope given hgb stability

## 2022-05-12 NOTE — ASSESSMENT & PLAN NOTE
Lab Results   Component Value Date    HGBA1C 11 4 (H) 05/10/2022       Recent Labs     05/11/22  2113 05/12/22  0032 05/12/22  0547 05/12/22  1137   POCGLU 246* 268* 295* 287*

## 2022-05-12 NOTE — ASSESSMENT & PLAN NOTE
Results from last 7 days   Lab Units 05/12/22  0430 05/11/22  2150 05/11/22  0912 05/10/22  0631   WBC Thousand/uL 16 00* 17 34* 18 04* 16 47*   Noted rising white count - unclear etiology  Chest x-ray with increased interstitial edema but no infiltrate  Held fluids and given 1 time dose of IV Lasix 5/11  Procalcitonin negative x2  No evidence of infection on CT head or abdomen  Urine collected on admission without evidence of infection   Pt was evaluated by night team last night for tachycardia -this was sinus tach   With white count and tachycardia, a dose of ceftriaxone was provided   [facility] were collected prior to abx administration on 5/11/22   Now with fever up to 101 5 - continue abx and repeat CXR tomorrow

## 2022-05-12 NOTE — OCCUPATIONAL THERAPY NOTE
Occupational Therapy Note:    Patient Name: Delta Face  TAMMIENG'B Date: 5/12/2022    OT consult received  Chart reviewed  Attempted to see pt for OT eval  Pt noted w/ resting HR within 130s  Not medically appropriate for therapy eval at this time  Will cx and complete OT eval at later time as medically appropriate      Milda Apley, OTR/L

## 2022-05-12 NOTE — CASE MANAGEMENT
Case Management Assessment & Discharge Planning Note    Patient name Estefani Palafox  Location [facility] /[facility] 2 Katie Pillai* MRN [id number]  : 1965 Date 2022       Current Admission Date: 2022  Current Admission Diagnosis:Type 2 diabetes mellitus with hyperglycemia, with long-term current use of insulin [facility])   Patient Active Problem List    Diagnosis Date Noted    Leukocytosis 2022    Heme positive stool 2022    SIRS (systemic inflammatory response syndrome) ([facility] [facility] ) 2021    Elevated LFTs 2021    Sinus tachycardia 2021    Low TSH level 2021    Hypertensive urgency 10/18/2020    Acute respiratory failure with hypoxia ([address] ) 2020    Hyponatremia 2020    Acute encephalopathy 2020    Acute pain of right knee 2020    Polypharmacy 2020    Sepsis ([facility] [address] [facility] ) 2019    Thyroid nodule 2019    Intractable nausea and vomiting 2019    Morbid obesity ([address] ) 2018    Essential hypertension 11/10/2018    Diabetic gastroparesis ([facility] [facility] ) 2018    Type 2 diabetes mellitus with hyperglycemia, with long-term current use of insulin ([facility] [address] [facility] ) 2018    Asthma 2018    Opioid dependence with other opioid-induced disorder ([facility] [address] [facility] ) 2018    Hypokalemia 2018      LOS (days): 3  Geometric Mean LOS (GMLOS) (days): 3 80  Days to GMLOS:1 1     OBJECTIVE:    Risk of Unplanned Readmission Score: 22 39         Current admission status: Inpatient       Preferred Pharmacy:   [address], [address] [place] [place] [address]  Phone: [phone] Fax: [phone]    Primary Care Provider: Jonathan Marinelli MD    Primary Insurance: MEDICARE  Secondary Insurance: BLUE CROSS    ASSESSMENT:  [address], [address] [place] Representative - Spouse   Primary Phone: [phone] (Mobile)               Advance Directives  Does patient have a Health Care POA?: No  Was patient offered paperwork?: Yes (spouse delined paperwork)  Does patient currently have a Health Care decision maker?: Yes, please see Health Care Proxy section  Does patient have Advance Directives?: No  Was patient offered paperwork?: Yes (spouse delined paperwork)  Primary Contact: Mele Smoker (spouse) [phone]         Readmission Root Cause  30 Day Readmission: No    Patient Information  Admitted from[de-identified] Home  Mental Status: Confused  During Assessment patient was accompanied by: Not accompanied during assessment  Assessment information provided by[de-identified] Spouse  Primary Caregiver: Spouse  Caregiver's Name[de-identified] Ryland Petersen (spouse)  Caregiver's Relationship to Patient[de-identified] Significant Other  Caregiver's Telephone Number[de-identified] [phone]  Support Systems: Spouse/significant other, Ritchie Buckley Dr of Residence: [address] [place] [address] do you live in?: [address] [place] [address] entry access options   Select all that apply : Stairs  Number of steps to enter home : 5  Do the steps have railings?: Yes  Type of Current Residence: Ranch  In the last 12 months, was there a time when you were not able to pay the mortgage or rent on time?: No  In the last 12 months, how many places have you lived?: 1  In the last 12 months, was there a time when you did not have a steady place to sleep or slept in a shelter (including now)?: No  Homeless/housing insecurity resource given?: N/A  Living Arrangements: Lives w/ Spouse/significant other, Lives w/ Son  Is patient a ?: No    Activities of Daily Living Prior to Admission  Functional Status: Assistance  Completes ADLs independently?: No  Level of ADL dependence: Assistance  Ambulates independently?: No  Level of ambulatory dependence: Assistance  Does patient use assisted devices?: Yes  Assisted Devices (DME) used: Straight [employer] Shower Chair  Does patient currently own DME?: Yes  What DME does the patient currently own?: [facility] Shower Chair  Does patient have a history of Outpatient Therapy (PT/OT)?: No  Does the patient have a history of Short-Term Rehab?: No  Does patient have a history of HHC?: No  Does patient currently have [facility]?: No         Patient Information Continued  Income Source: SSI/SSD  Does patient have prescription coverage?: Yes  Within the past 12 months, you worried that your food would run out before you got the money to buy more : Never true  Within the past 12 months, the food you bought just didn't last and you didn't have money to get more : Never true  Food insecurity resource given?: N/A  Does patient receive dialysis treatments?: No  Does patient have a history of substance abuse?: No  Does patient have a history of Mental Health Diagnosis?: No         Means of Transportation  Means of Transport to Appts[de-identified] Drives Self  In the past 12 months, has lack of transportation kept you from medical appointments or from getting medications?: No  In the past 12 months, has lack of transportation kept you from meetings, work, or from getting things needed for daily living?: No  Was application for public transport provided?: N/A        DISCHARGE DETAILS:    Discharge planning discussed with[de-identified] spouse        CM contacted family/caregiver?: Yes             Contacts  Patient Contacts: Dipika Waters    Relationship to Patient[de-identified] Family  Contact Method: Phone  Phone Number: [phone]  Reason/Outcome: Continuity of Care, Discharge Planning, Emergency Contact                                                                     Additional Comments: CM contacted pt spouse for assessment due to pt confusion at this time  Pt lives in a ranch style home w/ her spouse Milo Mcdonnell and their youngest son  There are 5 steps to enter the home w/ railings and no steps inside home  Pt does not complete all her ADL's independently, spouse stated he has been helping her more over the last few months  Pt currently uses a cane, walker & shower chair   Pt drives herself & her  and currently has SSI  Pt has no living will or AD  Pt has no hx of VNA, HHC, SNF or STR  CM will continue to follow patient care as well as d/c planning needs

## 2022-05-12 NOTE — ASSESSMENT & PLAN NOTE
- Pain control regimen per chart includes extensive amount of morphine, pregabalin, Soma, and Valium    - Toxicology assisting with restarting some of the medications at lower doses   - Supportive care

## 2022-05-12 NOTE — PLAN OF CARE
Problem: MOBILITY - ADULT  Goal: Maintain or return to baseline ADL function  Description: INTERVENTIONS:  -  Assess patient's ability to carry out ADLs; assess patient's baseline for ADL function and identify physical deficits which impact ability to perform ADLs (bathing, care of mouth/teeth, toileting, grooming, dressing, etc )  - Assess/evaluate cause of self-care deficits   - Assess range of motion  - Assess patient's mobility; develop plan if impaired  - Assess patient's need for assistive devices and provide as appropriate  - Encourage maximum independence but intervene and supervise when necessary  - Involve family in performance of ADLs  - Assess for home care needs following discharge   - Consider OT consult to assist with ADL evaluation and planning for discharge  - Provide patient education as appropriate  Outcome: Progressing  Goal: Maintains/Returns to pre admission functional level  Description: INTERVENTIONS:  - Perform BMAT or MOVE assessment daily    - Set and communicate daily mobility goal to care team and patient/family/caregiver     - Collaborate with rehabilitation services on mobility goals if consulted  - Out of bed for toileting  - Record patient progress and toleration of activity level   Outcome: Progressing     Problem: Prexisting or High Potential for Compromised Skin Integrity  Goal: Skin integrity is maintained or improved  Description: INTERVENTIONS:  - Identify patients at risk for skin breakdown  - Assess and monitor skin integrity  - Assess and monitor nutrition and hydration status  - Monitor labs   - Assess for incontinence   - Turn and reposition patient  - Assist with mobility/ambulation  - Relieve pressure over bony prominences  - Avoid friction and shearing  - Provide appropriate hygiene as needed including keeping skin clean and dry  - Evaluate need for skin moisturizer/barrier cream  - Collaborate with interdisciplinary team   - Patient/family teaching  - Consider wound care consult   Outcome: Progressing     Problem: Nutrition/Hydration-ADULT  Goal: Nutrient/Hydration intake appropriate for improving, restoring or maintaining nutritional needs  Description: Monitor and assess patient's nutrition/hydration status for malnutrition  Collaborate with interdisciplinary team and initiate plan and interventions as ordered  Monitor patient's weight and dietary intake as ordered or per policy  Utilize nutrition screening tool and intervene as necessary  Determine patient's food preferences and provide high-protein, high-caloric foods as appropriate       INTERVENTIONS:  - Monitor oral intake, urinary output, labs, and treatment plans  - Assess nutrition and hydration status and recommend course of action  - Evaluate amount of meals eaten  - Assist patient with eating if necessary   - Allow adequate time for meals  - Recommend/ encourage appropriate diets, oral nutritional supplements, and vitamin/mineral supplements  - Order, calculate, and assess calorie counts as needed  - Recommend, monitor, and adjust tube feedings and TPN/PPN based on assessed needs  - Assess need for intravenous fluids  - Provide specific nutrition/hydration education as appropriate  - Include patient/family/caregiver in decisions related to nutrition  Outcome: Progressing     Problem: GASTROINTESTINAL - ADULT  Goal: Minimal or absence of nausea and/or vomiting  Description: INTERVENTIONS:  - Administer IV fluids if ordered to ensure adequate hydration  - Maintain NPO status until nausea and vomiting are resolved  - Nasogastric tube if ordered  - Administer ordered antiemetic medications as needed  - Provide nonpharmacologic comfort measures as appropriate  - Advance diet as tolerated, if ordered  - Consider nutrition services referral to assist patient with adequate nutrition and appropriate food choices  Outcome: Progressing  Goal: Maintains or returns to baseline bowel function  Description: INTERVENTIONS:  - Assess bowel function  - Encourage oral fluids to ensure adequate hydration  - Administer IV fluids if ordered to ensure adequate hydration  - Administer ordered medications as needed  - Encourage mobilization and activity  - Consider nutritional services referral to assist patient with adequate nutrition and appropriate food choices  Outcome: Progressing  Goal: Maintains adequate nutritional intake  Description: INTERVENTIONS:  - Monitor percentage of each meal consumed  - Identify factors contributing to decreased intake, treat as appropriate  - Assist with meals as needed  - Monitor I&O, weight, and lab values if indicated  - Obtain nutrition services referral as needed  Outcome: Progressing  Goal: Establish and maintain optimal ostomy function  Description: INTERVENTIONS:  - Assess bowel function  - Encourage oral fluids to ensure adequate hydration  - Administer IV fluids if ordered to ensure adequate hydration   - Administer ordered medications as needed  - Encourage mobilization and activity  - Nutrition services referral to assist patient with appropriate food choices  - Assess stoma site  - Consider wound care consult   Outcome: Progressing  Goal: Oral mucous membranes remain intact  Description: INTERVENTIONS  - Assess oral mucosa and hygiene practices  - Implement preventative oral hygiene regimen  - Implement oral medicated treatments as ordered  - Initiate Nutrition services referral as needed  Outcome: Progressing     Problem: METABOLIC, FLUID AND ELECTROLYTES - ADULT  Goal: Electrolytes maintained within normal limits  Description: INTERVENTIONS:  - Monitor labs and assess patient for signs and symptoms of electrolyte imbalances  - Administer electrolyte replacement as ordered  - Monitor response to electrolyte replacements, including repeat lab results as appropriate  - Instruct patient on fluid and nutrition as appropriate  Outcome: Progressing  Goal: Fluid balance maintained  Description: INTERVENTIONS:  - Monitor labs   - Monitor I/O and WT  - Instruct patient on fluid and nutrition as appropriate  - Assess for signs & symptoms of volume excess or deficit  Outcome: Progressing  Goal: Glucose maintained within target range  Description: INTERVENTIONS:  - Monitor Blood Glucose as ordered  - Assess for signs and symptoms of hyperglycemia and hypoglycemia  - Administer ordered medications to maintain glucose within target range  - Assess nutritional intake and initiate nutrition service referral as needed  Outcome: Progressing     Problem: PAIN - ADULT  Goal: Verbalizes/displays adequate comfort level or baseline comfort level  Description: Interventions:  - Encourage patient to monitor pain and request assistance  - Assess pain using appropriate pain scale  - Administer analgesics based on type and severity of pain and evaluate response  - Implement non-pharmacological measures as appropriate and evaluate response  - Consider cultural and social influences on pain and pain management  - Notify physician/advanced practitioner if interventions unsuccessful or patient reports new pain  Outcome: Progressing     Problem: INFECTION - ADULT  Goal: Absence or prevention of progression during hospitalization  Description: INTERVENTIONS:  - Assess and monitor for signs and symptoms of infection  - Monitor lab/diagnostic results  - Monitor all insertion sites, i e  indwelling lines, tubes, and drains  - Monitor endotracheal if appropriate and nasal secretions for changes in amount and color  - Ambrose appropriate cooling/warming therapies per order  - Administer medications as ordered  - Instruct and encourage patient and family to use good hand hygiene technique  - Identify and instruct in appropriate isolation precautions for identified infection/condition  Outcome: Progressing     Problem: SAFETY ADULT  Goal: Patient will remain free of falls  Description: INTERVENTIONS:  - Educate patient/family on patient safety including physical limitations  - Instruct patient to call for assistance with activity   - Consult OT/PT to assist with strengthening/mobility   - Keep Call bell within reach  - Keep bed low and locked with side rails adjusted as appropriate  - Keep care items and personal belongings within reach  - Initiate and maintain comfort rounds  - Make Fall Risk Sign visible to staff  - Offer Toileting every 2 Hours, in advance of need  - Initiate/Maintain bed alarm  - Obtain necessary fall risk management equipment: alarms  - Apply yellow socks and bracelet for high fall risk patients  - Consider moving patient to room near nurses station  Outcome: Progressing

## 2022-05-12 NOTE — ASSESSMENT & PLAN NOTE
61year old female with chronic pain on a significant amount of pain medications, HTN, and DM presented to the ED for evaluation of altered mental status  There is concern for polypharmacy induced encephalopathy and now drug withdrawal  However, due to low grade temperatures, leukocytosis, and unclear source, neurology was consulted for further evaluation  Her encephalopathy is improving  Suspect withdrawal related symptoms, however on exam she demonstrates some very subtle facial asymmetry  Low suspicion for meningitis/encephalitis at this time  We will obtain MRI brain with and without contrast to rule out any intracranial pathology  The patient was transferred to the ICU, she developed SVT, she was given adenosine  Plan:  - MRI brain with and without contrast pending  - Hold off on stroke pathway at this time  - Can defer LP- low suspicion for CNS infection  - Supportive care for suspected drug withdrawal   - Monitor vitals closely  - Medical management and supportive care per primary team  Correction of any metabolic or infectious disturbances

## 2022-05-12 NOTE — PHYSICAL THERAPY NOTE
Physical Therapy Cancellation Note               05/12/22 0933   PT Last Visit   PT Visit Date 05/12/22   Note Type   Note type Evaluation; Cancelled Session   Cancel Reasons Medical status   Additional Comments currently w resting HR in 130s  remains medically not appropriate for PT evaluation at this time  Will continue to follow during hospital stay         Rin Naqvi, PT

## 2022-05-12 NOTE — PROGRESS NOTES
RN spoke with Dr John Chairez  Made aware of temp 101 1  Instructed by Dr John Chairez to call RRT, if pt declines  Critical care consulted  Pt stable/unchanged at this time  Passed information to oncoming shift  Will continue to monitor

## 2022-05-12 NOTE — PROGRESS NOTES
Temp remains same at 101 5  Pt also remains tachy in the 120's  Additional ice packs provided  Unable to give Tylenol at this time  Temperature in room turned down  Evette Nix with [facility] made aware  Will continue to monitor

## 2022-05-12 NOTE — PLAN OF CARE
Problem: MOBILITY - ADULT  Goal: Maintain or return to baseline ADL function  Description: INTERVENTIONS:  -  Assess patient's ability to carry out ADLs; assess patient's baseline for ADL function and identify physical deficits which impact ability to perform ADLs (bathing, care of mouth/teeth, toileting, grooming, dressing, etc )  - Assess/evaluate cause of self-care deficits   - Assess range of motion  - Assess patient's mobility; develop plan if impaired  - Assess patient's need for assistive devices and provide as appropriate  - Encourage maximum independence but intervene and supervise when necessary  - Involve family in performance of ADLs  - Assess for home care needs following discharge   - Consider OT consult to assist with ADL evaluation and planning for discharge  - Provide patient education as appropriate  Outcome: Progressing  Goal: Maintains/Returns to pre admission functional level  Description: INTERVENTIONS:  - Perform BMAT or MOVE assessment daily    - Set and communicate daily mobility goal to care team and patient/family/caregiver     - Collaborate with rehabilitation services on mobility goals if consulted  - Out of bed for toileting  - Record patient progress and toleration of activity level   Outcome: Progressing     Problem: Prexisting or High Potential for Compromised Skin Integrity  Goal: Skin integrity is maintained or improved  Description: INTERVENTIONS:  - Identify patients at risk for skin breakdown  - Assess and monitor skin integrity  - Assess and monitor nutrition and hydration status  - Monitor labs   - Assess for incontinence   - Turn and reposition patient  - Assist with mobility/ambulation  - Relieve pressure over bony prominences  - Avoid friction and shearing  - Provide appropriate hygiene as needed including keeping skin clean and dry  - Evaluate need for skin moisturizer/barrier cream  - Collaborate with interdisciplinary team   - Patient/family teaching  - Consider wound care consult   Outcome: Progressing     Problem: Nutrition/Hydration-ADULT  Goal: Nutrient/Hydration intake appropriate for improving, restoring or maintaining nutritional needs  Description: Monitor and assess patient's nutrition/hydration status for malnutrition  Collaborate with interdisciplinary team and initiate plan and interventions as ordered  Monitor patient's weight and dietary intake as ordered or per policy  Utilize nutrition screening tool and intervene as necessary  Determine patient's food preferences and provide high-protein, high-caloric foods as appropriate       INTERVENTIONS:  - Monitor oral intake, urinary output, labs, and treatment plans  - Assess nutrition and hydration status and recommend course of action  - Evaluate amount of meals eaten  - Assist patient with eating if necessary   - Allow adequate time for meals  - Recommend/ encourage appropriate diets, oral nutritional supplements, and vitamin/mineral supplements  - Order, calculate, and assess calorie counts as needed  - Recommend, monitor, and adjust tube feedings and TPN/PPN based on assessed needs  - Assess need for intravenous fluids  - Provide specific nutrition/hydration education as appropriate  - Include patient/family/caregiver in decisions related to nutrition  Outcome: Progressing     Problem: GASTROINTESTINAL - ADULT  Goal: Minimal or absence of nausea and/or vomiting  Description: INTERVENTIONS:  - Administer IV fluids if ordered to ensure adequate hydration  - Maintain NPO status until nausea and vomiting are resolved  - Nasogastric tube if ordered  - Administer ordered antiemetic medications as needed  - Provide nonpharmacologic comfort measures as appropriate  - Advance diet as tolerated, if ordered  - Consider nutrition services referral to assist patient with adequate nutrition and appropriate food choices  Outcome: Progressing  Goal: Maintains or returns to baseline bowel function  Description: INTERVENTIONS:  - Assess bowel function  - Encourage oral fluids to ensure adequate hydration  - Administer IV fluids if ordered to ensure adequate hydration  - Administer ordered medications as needed  - Encourage mobilization and activity  - Consider nutritional services referral to assist patient with adequate nutrition and appropriate food choices  Outcome: Progressing  Goal: Maintains adequate nutritional intake  Description: INTERVENTIONS:  - Monitor percentage of each meal consumed  - Identify factors contributing to decreased intake, treat as appropriate  - Assist with meals as needed  - Monitor I&O, weight, and lab values if indicated  - Obtain nutrition services referral as needed  Outcome: Progressing  Goal: Establish and maintain optimal ostomy function  Description: INTERVENTIONS:  - Assess bowel function  - Encourage oral fluids to ensure adequate hydration  - Administer IV fluids if ordered to ensure adequate hydration   - Administer ordered medications as needed  - Encourage mobilization and activity  - Nutrition services referral to assist patient with appropriate food choices  - Assess stoma site  - Consider wound care consult   Outcome: Progressing  Goal: Oral mucous membranes remain intact  Description: INTERVENTIONS  - Assess oral mucosa and hygiene practices  - Implement preventative oral hygiene regimen  - Implement oral medicated treatments as ordered  - Initiate Nutrition services referral as needed  Outcome: Progressing     Problem: METABOLIC, FLUID AND ELECTROLYTES - ADULT  Goal: Electrolytes maintained within normal limits  Description: INTERVENTIONS:  - Monitor labs and assess patient for signs and symptoms of electrolyte imbalances  - Administer electrolyte replacement as ordered  - Monitor response to electrolyte replacements, including repeat lab results as appropriate  - Instruct patient on fluid and nutrition as appropriate  Outcome: Progressing  Goal: Fluid balance maintained  Description: INTERVENTIONS:  - Monitor labs   - Monitor I/O and WT  - Instruct patient on fluid and nutrition as appropriate  - Assess for signs & symptoms of volume excess or deficit  Outcome: Progressing  Goal: Glucose maintained within target range  Description: INTERVENTIONS:  - Monitor Blood Glucose as ordered  - Assess for signs and symptoms of hyperglycemia and hypoglycemia  - Administer ordered medications to maintain glucose within target range  - Assess nutritional intake and initiate nutrition service referral as needed  Outcome: Progressing     Problem: PAIN - ADULT  Goal: Verbalizes/displays adequate comfort level or baseline comfort level  Description: Interventions:  - Encourage patient to monitor pain and request assistance  - Assess pain using appropriate pain scale  - Administer analgesics based on type and severity of pain and evaluate response  - Implement non-pharmacological measures as appropriate and evaluate response  - Consider cultural and social influences on pain and pain management  - Notify physician/advanced practitioner if interventions unsuccessful or patient reports new pain  Outcome: Progressing     Problem: INFECTION - ADULT  Goal: Absence or prevention of progression during hospitalization  Description: INTERVENTIONS:  - Assess and monitor for signs and symptoms of infection  - Monitor lab/diagnostic results  - Monitor all insertion sites, i e  indwelling lines, tubes, and drains  - Monitor endotracheal if appropriate and nasal secretions for changes in amount and color  - [place] appropriate cooling/warming therapies per order  - Administer medications as ordered  - Instruct and encourage patient and family to use good hand hygiene technique  - Identify and instruct in appropriate isolation precautions for identified infection/condition  Outcome: Progressing     Problem: SAFETY ADULT  Goal: Patient will remain free of falls  Description: INTERVENTIONS:  - Educate patient/family on patient safety including physical limitations  - Instruct patient to call for assistance with activity   - Consult OT/PT to assist with strengthening/mobility   - Keep Call bell within reach  - Keep bed low and locked with side rails adjusted as appropriate  - Keep care items and personal belongings within reach  - Initiate and maintain comfort rounds  - Make Fall Risk Sign visible to staff  - Offer Toileting every 2 Hours, in advance of need  - Initiate/Maintain bed alarm  - Obtain necessary fall risk management equipment: alarms  - Apply yellow socks and bracelet for high fall risk patients  - Consider moving patient to room near nurses station  Outcome: Progressing

## 2022-05-12 NOTE — CONSULTS
Consultation - Neurology   Nithya Neumann 61 y o  female MRN: [id number]  Unit/Bed#: [place] [address] -01 Encounter: [id number]      Assessment/Plan     Acute encephalopathy  Assessment & Plan  61year old female with chronic pain on a significant amount of pain medications, HTN, and DM presented to the ED for evaluation of altered mental status  There is concern for polypharmacy induced encephalopathy and now drug withdrawal  However, due to low grade temperatures, leukocytosis, and unclear source, neurology was consulted for further evaluation  At this time, continue to suspect withdrawal related symptoms, however on exam she demonstrates some very subtle facial asymmetry and also less vigorously moves the left side  Low suspicion for meningitis/encephalitis at this time  We will obtain MRI brain with and without contrast to rule out any intracranial pathology such as stroke that may be contributing to findings on her exam, although the strength may be effort dependent  Plan:  - MRI brain with and without contrast pending  - Hold off on stroke pathway at this time  - Can defer LP- low suspicion for CNS infection  - Supportive care for suspected drug withdrawal   - Monitor vitals closely  - Would consider nebulizer treatment given history of asthma and tachypnea during exam   - Medical management and supportive care per primary team  Correction of any metabolic or infectious disturbances  Of note, she did have an improvement in WBC after a dose of ceftriaxone  Leukocytosis  Assessment & Plan  - As high as 18 04 yesterday, now 16       Opioid dependence with other opioid-induced disorder (HCC)  Assessment & Plan  - Pain control regimen per chart includes extensive amount of morphine, pregabalin, Soma, and Valium    - Toxicology assisting with restarting some of the medications at lower doses   - Supportive care    * Type 2 diabetes mellitus with hyperglycemia, with long-term current use of insulin [facility])  Assessment & Plan  Lab Results   Component Value Date    HGBA1C 11 4 (H) 05/10/2022       Recent Labs     05/11/22  2113 05/12/22  0032 05/12/22  0547 05/12/22  1137   POCGLU 246* 268* 295* 287*       Recommendations for outpatient neurological follow up have yet to be determined  History of Present Illness     Reason for Consult / Principal Problem: Encephalopathy   Hx and PE limited by: Encephalopathy   HPI: Nick Tello is a 61 y o  female with chronic pain, on a significant amount of opioids and muscle relaxants, hypertension, diabetes mellitus, and asthma presented to the ED on 5/9 for evaluation altered mental status  Per patient's son, she had been weaker than normal for approximately 3 or 4 days  She was not making sense when speaking to family  She was having trouble holding a conversation  Her son reported that she had a similar presentation in 2020 when she was hospitalized for hyperglycemia, which did improve with correction of metabolic issues  Upon arrival to the ED, patient's blood pressure was 206/92  She was noted to be vomiting  She was tachycardic  Other vital signs were stable  Labs were notable for leukocytosis (11 83), hyperglycemia, and hypermagnesemia  Procalcitonin was negative  CT head unremarkable for acute pathology  Chest x-ray notable for increased interstitial opacities suggesting some edema, but no focal infiltrate  CT abdomen pelvis unremarkable  She was placed on insulin drip on admission  At that time, she was not providing verbal responses to questions, but would nod her head appropriately  She was noted to be agitated and alert  She was given Reglan for continued vomiting  She had a speech evaluation due to decreased responsiveness  It was recommended that she only be given ice chips until she was more alert  She had been refusing to eat, so GI was asked to evaluate the patient and is going to undergo a barium swallow   She also had heme positive stool although was not thought to be clinically significant as her hemoglobin was stable  GI is considering elevated colonoscopy eventually  She had a UDS on 5/11 which was positive for barbituates, benzodiazepines, and opiates  Toxicology was asked to assist in management of the patient  Barbituates cannot be explained by her current medication regimen, but opiates and benzodiazepines are prescribed  Patient's home regimen for pain control is as follows: - Morphine Sulf ER 60 mg BID  - Morphine Sulf ER 15 mg BID  - Morphine Sulf IR 15 mg TID PRN   - Pregabalin 150 mg BID  - Carisoprodol 350 mg BID  - Valium 5 mg daily  Patient receives the prescriptions from a neurologist in [address] [place] [address] according to [address] (Dr Zaynab Pineda)  The suspicion was high for polypharmacy induced encephalopathy  There is also concern for withdrawal  Initially she was unable to receive these medications as she was NPO and toxicology assisted with restarting some of them  As of 5/12, she had an NG tube placed  Since being in the hospital, patient has been persistently tachycardic and demonstrating low grade fevers  She had rising WBC and was given 1 dose of ceftriaxone  As of today, her WBC is starting to decrease       Inpatient consult to Neurology  Consult performed by: Demaris Rinne, PA-C  Consult ordered by: Sasha Krause MD          Review of Systems   Unable to perform ROS: Mental status change       Historical Information   Past Medical History:   Diagnosis Date    Acute respiratory insufficiency 11/23/2019    Asthma     Chronic pain     Diabetes ([address] )     HTN (hypertension)     Sepsis ([address] ) 11/23/2019     Past Surgical History:   Procedure Laterality Date    CERVICAL LAMINECTOMY      CHOLECYSTECTOMY      CHOLECYSTECTOMY LAPAROSCOPIC N/A 11/10/2018    Procedure: CHOLECYSTECTOMY LAPAROSCOPIC w/ ioc;  Surgeon: Kvng Terry MD;  Location: [facility];  Service: General    HYSTERECTOMY      JOINT REPLACEMENT Bilateral     knee    TOE AMPUTATION Right 2/2/2018    Procedure: AMPUTATION TOE, RIGHT GREAT TOE;  Surgeon: Paul Stratton DPM;  Location: [facility];  Service: Podiatry     Social History   Social History     Substance and Sexual Activity   Alcohol Use Not Currently    Comment: rarely     Social History     Substance and Sexual Activity   Drug Use No     E-Cigarette/Vaping     E-Cigarette/Vaping Substances     Social History     Tobacco Use   Smoking Status Never Smoker   Smokeless Tobacco Never Used     Family History:   Family History   Problem Relation Age of Onset    Diabetes Mother     Diabetes Maternal Grandmother     No Known Problems Father        Review of previous medical records was completed  Meds/Allergies   all current active meds have been reviewed, current meds:   Current Facility-Administered Medications   Medication Dose Route Frequency    acetaminophen (TYLENOL) tablet 650 mg  650 mg Per NG Tube Q6H PRN    albuterol (PROVENTIL HFA,VENTOLIN HFA) inhaler 2 puff  2 puff Inhalation Q4H PRN    carisoprodol (SOMA) tablet 175 mg  175 mg Per NG Tube BID    diazepam (VALIUM) tablet 5 mg  5 mg Per NG Tube HS    fluticasone-vilanterol (BREO ELLIPTA) 200-25 MCG/INH inhaler 1 puff  1 puff Inhalation Daily    insulin lispro (HumaLOG) 100 units/mL subcutaneous injection 1-5 Units  1-5 Units Subcutaneous HS    insulin lispro (HumaLOG) 100 units/mL subcutaneous injection 2-12 Units  2-12 Units Subcutaneous Q6H BOLIVAR    nystatin (MYCOSTATIN) powder   Topical BID    pantoprazole (PROTONIX) injection 40 mg  40 mg Intravenous Q24H BOLIVAR    sucralfate (CARAFATE) tablet 1 g  1 g Per NG Tube Q6H [address]    and PTA meds:   Prior to Admission Medications   Prescriptions Last Dose Informant Patient Reported? Taking?    BD PEN NEEDLE HERNAN U/F 32G X 4 MM MISC   Yes No   Insulin Pen Needle 29G X 5MM MISC   No No   Sig: by Does not apply route 4 (four) times a day   LYRICA 150 MG capsule   Yes No   Sig: Take 150 mg by mouth 2 (two) times a day   WIXELA INHUB 500-50 MCG/DOSE inhaler   Yes No   Sig: Take 1 puff by mouth 2 (two) times a day   albuterol (PROVENTIL HFA,VENTOLIN HFA) 90 mcg/act inhaler   Yes No   Sig: Inhale 2 puffs every 4 (four) hours as needed   carisoprodol (SOMA) 350 mg tablet  Self Yes No   Sig: Take 350 mg by mouth 2 (two) times a day   diazepam (VALIUM) 5 mg tablet  Self Yes No   Sig: Take 5 mg by mouth daily at bedtime as needed for anxiety   fluticasone (FLONASE) 50 mcg/act nasal spray  Self Yes No   Si spray into each nostril daily   furosemide (LASIX) 20 mg tablet   Yes No   Sig: Take 40 mg by mouth as needed    insulin glargine (LANTUS) 100 units/mL subcutaneous injection  Self Yes No   Sig: Inject 50 Units under the skin daily at bedtime     insulin lispro (HumaLOG) 100 units/mL injection   No No   Sig: INJECT 10 UNITS UNDER THE SKIN 3 (THREE) TIMES A DAY WITH MEALS   lisinopril (ZESTRIL) 10 mg tablet   No No   Sig: Take 1 tablet (10 mg total) by mouth daily   metoclopramide (REGLAN) 10 mg tablet   No No   Sig: Take 1 tablet (10 mg total) by mouth 4 (four) times a day   metoclopramide (REGLAN) 10 mg tablet   No No   Sig: TAKE 1 TABLET BY MOUTH FOUR TIMES A DAY BEFORE MEALS AND AT BEDTIME   montelukast (SINGULAIR) 10 mg tablet  Self Yes No   Sig: Take 10 mg by mouth daily at bedtime   morphine (MS CONTIN) 15 mg 12 hr tablet   Yes No   Sig: Take 15 mg by mouth 2 (two) times a day   morphine (MS CONTIN) 60 mg 12 hr tablet   Yes No   Sig: Take 60 mg by mouth 2 (two) times a day   nystatin (MYCOSTATIN) powder   No No   Sig: Apply topically 2 (two) times a day   omeprazole (PriLOSEC) 20 mg delayed release capsule  Self Yes No   Sig: Take 20 mg by mouth daily   ondansetron (ZOFRAN) 4 mg tablet   No No   Sig: Take 1 tablet (4 mg total) by mouth every 6 (six) hours   pravastatin (PRAVACHOL) 10 mg tablet   Yes No   Sig: Take 10 mg by mouth daily at bedtime      Facility-Administered Medications: None Allergies   Allergen Reactions    Nsaids GI Intolerance       Objective   Vitals:Blood pressure 159/97, pulse (!) 125, temperature 100 4 °F (38 °C), resp  rate (!) 24, height 5' 7 99" (1 727 m), weight 134 kg (296 lb 4 8 oz), SpO2 91 %, not currently breastfeeding  ,Body mass index is 45 06 kg/m²  Intake/Output Summary (Last 24 hours) at 5/12/2022 1315  Last data filed at 5/12/2022 1001  Gross per 24 hour   Intake 50 ml   Output 1550 ml   Net -1500 ml       Invasive Devices: Invasive Devices  Report    Peripheral Intravenous Line  Duration           Peripheral IV 05/09/22 Left Antecubital 2 days    Peripheral IV 05/11/22 Left;Ventral (anterior) Forearm 1 day          Drain  Duration           NG/OG/Enteral Tube Nasogastric 16 Fr Left nare <1 day    Urethral Catheter Double-lumen 16 Fr  <1 day                Physical Exam  Vitals and nursing note reviewed  Constitutional:       Appearance: She is obese  She is ill-appearing  Comments: Eyes open, but level of attentiveness fluctuates  Mitts in place   Drowsy    HENT:      Head: Normocephalic and atraumatic  Right Ear: External ear normal       Left Ear: External ear normal       Nose:      Comments: NG tube in place     Mouth/Throat:      Comments: Mouth is significantly dry  Does have some blood around lip, which appear cracked  Eyes:      General: No scleral icterus  Right eye: No discharge  Left eye: No discharge  Extraocular Movements: Extraocular movements intact  Conjunctiva/sclera: Conjunctivae normal    Neck:      Comments: Able to perform neck flexion/extension- does not appear to have nuchal rigidity  Can turn head laterally  Cardiovascular:      Rate and Rhythm: Tachycardia present  Pulmonary:      Breath sounds: No stridor  Comments: Tachypnea noted  Shallow breaths  Nasal cannula in place  Genitourinary:     Comments: Singer in place  Musculoskeletal:         General: Deformity present   No signs of injury  Right lower leg: No edema  Left lower leg: No edema  Comments: Right great toe amputation  Hammer toe on R  Skin:     General: Skin is warm  Coloration: Skin is not jaundiced or pale  Findings: No rash  Comments: Scar on left knee from prior knee replacement  Neurological:      Coordination: Finger-nose-finger test: Does not follow command  Comments: Detailed below  Psychiatric:      Comments: Difficult to assess at this time, no agitation or hallucinations  Neurologic Exam     Mental Status   Eyes are open but attentiveness to examiner fluctuates  Does nod appropriately at times, but other times is unreliable  Able to state "Hello" and her name, but otherwise does not answer questions  Follows some commands intermittently  Can protrude tongue, smile, and open and close eyes on command  Able to give thumbs up on command on R, requires pantomiming to show two fingers on the right  Requires pantomiming for both giving thumbs up and 2 fingers on the left  Motor Exam   At least 4/5 bilateral deltoids  5/5 bilateral triceps  4+/5 R biceps, 4+/5 L biceps    4-/5     Able to wiggle feet symmetrically  Full dorsiflexion/plantar flexion  Cannot elevate legs off bed  Effort dependent exam, limited by encephalopathy  Sensory Exam   Sensory exam is unreliable  Gait, Coordination, and Reflexes     Gait  Gait: (Deferred due to mental status)    Coordination   Finger-nose-finger test: Does not follow command  Tremulous throughout  Reflexes:   - 2+ B/L UE  - 0 B/L patellar  - 0 B/L achilles   - Mute toe on L, unable to assess on right due to amputation  Lab Results: I have personally reviewed pertinent reports  Imaging Studies: I have personally reviewed pertinent reports  and I have personally reviewed pertinent films in PACS  EKG, Pathology, and Other Studies: I have personally reviewed pertinent reports  [facility])  VTE Prophylaxis: Sequential compression device (Venodyne)     Code Status: Level 1 - Full Code      History and physical examination obtained by attending neurologist  AP in room as witness to history and physical examination and acted solely as a scribe for attending neurologist for this encounter  All medical decision making per attending

## 2022-05-12 NOTE — QUICK NOTE
QUICK NOTE - Deterioration Index  John Khan 61 y o  female MRN: [id number]  Unit/Bed#: [place] [address] -01 Encounter: [id number]    Date Paged: 22  Time Paged: [phone]  Room #: 12  Arrival Time: 0320  Deterioration index score at time of page: 73 21  %  Spoke with RN and primary  from primary team  Need to escalate level of care: no     PROBLEMS resulting in high DI score:   24% Respiratory rate 30   15% Supplemental oxygen Nasal cannula   14% Pulse 134   15 Age 61years old   9% Chloe coma scale 14          PLAN:     Aware of pt o chronic pain meds and lyrica not getting due to dysphagia possible esophageal stricture   NG tube that she gets her medication   Continue to monitor closely   Head CT of the head which was negative    Please contact critical care via [employer] with any questions or concerns       Vitals:   Vitals:    22 2119 22 2136 22 2353 22 0432   BP: 160/95  158/95 158/99   BP Location: Right arm  Right arm Right arm   Pulse: (!) 117  (!) 121 (!) 121   Resp:   (!) 32 (!) 30   Temp:  99 5 °F (37 5 °C)     TempSrc:  Axillary     SpO2:  93% (!) 89% 91%   Weight:       Height:           Respiratory:  SpO2: SpO2: 91 %, SpO2 Activity: SpO2 Activity: At Rest, SpO2 Device: O2 Device: Nasal cannula  Nasal Cannula O2 Flow Rate (L/min): 2 L/min    Temperature: Temp (24hrs), Av °F (37 2 °C), Min:97 6 °F (36 4 °C), Max:100 °F (37 8 °C)  Current: Temperature: 99 5 °F (37 5 °C)    Labs:   Results from last 7 days   Lab Units 22  0430 22  2150 22  0912 05/10/22  0631 22  1843   WBC Thousand/uL 16 00* 17 34* 18 04* 16 47* 11 83*   HEMOGLOBIN g/dL 14 6 14 6 14 6 14 6 13 6   HEMATOCRIT % 46 5* 45 9 45 3 45 3 42 4   PLATELETS Thousands/uL 248 241 240 265 202   NEUTROS PCT %  --  81*  --  86* 87*   MONOS PCT %  --  6  --  5 4     Results from last 7 days   Lab Units 22  2150 22  0912 05/10/22  1606 05/10/22  0631   SODIUM mmol/L 143 143 141 142   POTASSIUM mmol/L 3 0* 3 0* 3 3* 3 6   CHLORIDE mmol/L 106 107 104 102   CO2 mmol/L 21 26 26 25   BUN mg/dL 26* 22 21 18   CREATININE mg/dL 0 89 0 81 0 87 0 87   CALCIUM mg/dL 9 0 9 1 9 7 10 1   ALK PHOS U/L 141* 143*  --  165*   ALT U/L 15 17  --  17   AST U/L 14 13  --  14     Results from last 7 days   Lab Units 05/11/22  0912 05/10/22  0631   MAGNESIUM mg/dL 1 5* 1 5*             Results from last 7 days   Lab Units 05/11/22  0503   PROCALCITONIN ng/ml 0 08       Code Status: Level 1 - Full Code

## 2022-05-12 NOTE — PROGRESS NOTES
[facility]  Progress Note - John Khan 1965, 61 y o  female MRN: [id number]  Unit/Bed#: [place] [address] -01 Encounter: [id number]  Primary Care Provider: Julian Arevalo MD   Date and time admitted to hospital: 5/9/2022  5:33 PM    * Type 2 diabetes mellitus with hyperglycemia, with long-term current use of insulin [facility])  Assessment & Plan  Presenting with altered mental status, according to son, this is how she gets when she has significantly hyperglycemia  Lab Results   Component Value Date    HGBA1C 11 4 (H) 05/10/2022   Patient maintained on 50 units Lantus HS and 10 units t i d  With meals  Suppose to take the above but does not - noncompliant according to son  Has been previously admitted for the same  Blood sugars significantly elevated on admission, with elevated beta hydroxybutyrate  Continued on insulin drip and d5 until gap was closed   Stopped drip and d5 5/11/22  Place on Accu-Cheks and sliding scale q 6 given NPO status  Still not awake enough to tolerate regular diet   GI evaluated and would like barium swallow but pt is not alert enough to have this performed    Acute encephalopathy  Assessment & Plan  Acute metabolic encephalopathy  Patient presenting with weakness for multiple days, now with altered mental status  Per EMS, son called 46 the patient be brought to the ED for further evaluation    Has been previously admitted for the same, typically secondary to hyperglycemia  Likely secondary to hyperglycemia complicated by opioid dependence/sedating medications  Urine drug screen positive for barbiturates, benzos, opioids  Opoids/sedated medications held on admission  Resumed soma and benzo 5/11 per tox - want to avoid withdrawal  Resumed at 1/2 of regular dose   Seen and evaluated by speech with the following recommendation:  Keep NPO with ice chips until more awake and can tolerate regular diet  Switch meds to IV given inability to swallow pills  Consulted tox for additional recommendations - largely contributing is her chronic pain meds  Still has difficulty answering questions and slow to respond / follow commands  NG tube placed in order to get certain oral meds through tube   Had neurology evaluate today    MRI brain pending   Defer LP - low suspicion for CNS infection at this time   Supportive care for suspected drug withdrawal   Trial neb treatment to see if this helps with breathing    Urine retention  Assessment & Plan  Per nursing, patient failed urinary retention protocol  Singer catheter was inserted 5/11/22    Heme positive stool  Assessment & Plan  Results from last 7 days   Lab Units 05/12/22  0430 05/11/22 2150 05/11/22  0912 05/10/22  0631   HEMOGLOBIN g/dL 14 6 14 6 14 6 14 6   Noted to have heme positive stool x 3   Hgb stable though  Seen and evaluated by GI - no need for scope given hgb stability    Leukocytosis  Assessment & Plan  Results from last 7 days   Lab Units 05/12/22 0430 05/11/22 2150 05/11/22  0912 05/10/22  0631   WBC Thousand/uL 16 00* 17 34* 18 04* 16 47*   Noted rising white count - unclear etiology  Chest x-ray with increased interstitial edema but no infiltrate  Held fluids and given 1 time dose of IV Lasix 5/11  Procalcitonin negative x2  No evidence of infection on CT head or abdomen  Urine collected on admission without evidence of infection   Pt was evaluated by night team last night for tachycardia -this was sinus tach   With white count and tachycardia, a dose of ceftriaxone was provided   [facility] were collected prior to abx administration on 5/11/22   Now with fever up to 101 5 - continue abx and repeat CXR tomorrow    Essential hypertension  Assessment & Plan  Home regimen lisinopril 10 mg daily  Pt not able to tolerate po   Here with elevated pressures in the 170s  BP started to improve with resuming soma and valium at low doses   Consider adding scheduled IV metoprolol if remains elevated    Diabetic gastroparesis (HCC)  Assessment & Plan  Lab Results   Component Value Date    HGBA1C 11 4 (H) 05/10/2022     Recent Labs     05/11/22  2113 05/12/22  0032 05/12/22  0547 05/12/22  1137   POCGLU 246* 268* 295* 287*   Continue Reglan p r n  Hypokalemia  Assessment & Plan  Results from last 7 days   Lab Units 05/12/22  0430 05/11/22  2150 05/11/22  0912 05/10/22  1606   POTASSIUM mmol/L 3 6 3 0* 3 0* 3 3*   Likely from recent episode of vomiting  Repleted potassium  Magnesium noted to be low at 1 5  Repleted magnesium    Opioid dependence with other opioid-induced disorder [facility])  Assessment & Plan  Will hold all opioids/sedating medication giving acute encephalopathy  No acute signs of opiate overdose at this time  · Morphine Sulf Er 60mg BID   · Morphine Sulf Er 15mg BID  · Morphine Sulf IR 15 mg TID PRN  · Pregabalin 150 mg BID  · Carisoprodol 350 mg BID  · Valium 5 mg daily  · PDMP reviewed    Had tox evaluate  Will likely need to be tapered off going forward given likely causing more harm than benefit  Spoke with toxicology - Want to avoid withdrawal  Resumed soma and valium at decreased doses 5/11  Will resume lyrica today a decreased dose as well      VTE Pharmacologic Prophylaxis:   Pharmacologic: Heparin  Mechanical VTE Prophylaxis in Place: Yes    Discharge Plan: With need for continued inpatient stay for acute encephalopathy    Discussions with Specialists or Other Care Team Provider:  Nursing, Dr Claudia Escalante    Education and Discussions with Family / Patient: patient, attempted to call son - unable to reach    Time Spent for Care: 1 hour  More than 50% of total time spent on counseling and coordination of care as described above  Current Length of Stay: 3 day(s)  Current Patient Status: Inpatient   Code Status: Level 1 - Full Code    Subjective:   Seen and evaluated earlier during rounds  Patient states her name today and follows some commands  She is still somewhat lethargic and is unable to stand on her own    Her nursing she failed urinary retention protocol and a Singer catheter was inserted yesterday  Objective:     Vitals:   Temp (24hrs), Av 8 °F (37 7 °C), Min:98 1 °F (36 7 °C), Max:101 5 °F (38 6 °C)    Temp:  [98 1 °F (36 7 °C)-101 5 °F (38 6 °C)] 101 5 °F (38 6 °C)  HR:  [108-157] 126  Resp:  [20-32] 28  BP: (143-182)/() 157/96  SpO2:  [89 %-93 %] 89 %  Body mass index is 45 06 kg/m²  Input and Output Summary (last 24 hours): Intake/Output Summary (Last 24 hours) at 2022 1457  Last data filed at 2022 1001  Gross per 24 hour   Intake 50 ml   Output 1550 ml   Net -1500 ml       Physical Exam:     Physical Exam  Vitals and nursing note reviewed  Constitutional:       General: She is not in acute distress  Appearance: She is obese  She is not toxic-appearing  HENT:      Head: Normocephalic and atraumatic  Eyes:      General: No scleral icterus  Pulmonary:      Effort: Tachypnea present  No respiratory distress  Breath sounds: Normal breath sounds  No stridor  No rhonchi  Comments: On 2 L oxygen nasal cannula  With NG tube in place  Abdominal:      General: Bowel sounds are normal  There is no distension  Palpations: Abdomen is soft  There is no mass  Tenderness: There is no abdominal tenderness  Hernia: No hernia is present  Genitourinary:     Comments: Singer catheter in place draining yellow urine  Neurological:      Comments: Makes eye contact and looks around   Psychiatric:         Behavior: Behavior is slowed and withdrawn           Additional Data:     Labs:    Results from last 7 days   Lab Units 22  0430 22  2150   WBC Thousand/uL 16 00* 17 34*   HEMOGLOBIN g/dL 14 6 14 6   HEMATOCRIT % 46 5* 45 9   PLATELETS Thousands/uL 248 241   NEUTROS PCT %  --  81*   LYMPHS PCT %  --  13*   MONOS PCT %  --  6   EOS PCT %  --  0     Results from last 7 days   Lab Units 22  0430   POTASSIUM mmol/L 3 6   CHLORIDE mmol/L 107   CO2 mmol/L 20*   BUN mg/dL 28*   CREATININE mg/dL 0 90   CALCIUM mg/dL 8 8   ALK PHOS U/L 135*   ALT U/L 13   AST U/L 17           * I Have Reviewed All Lab Data Listed Above  * Additional Pertinent Lab Tests Reviewed: Rohini Mccarthy Admission Reviewed    Imaging:    Imaging Reports Reviewed Today Include:   Imaging Personally Reviewed by Myself Includes:      Recent Cultures (last 7 days):     Results from last 7 days   Lab Units 05/11/22 2158 05/11/22 2157   BLOOD CULTURE  Received in Microbiology Lab  Culture in Progress  Received in Microbiology Lab  Culture in Progress  Last 24 Hours Medication List:   Current Facility-Administered Medications   Medication Dose Route Frequency Provider Last Rate    acetaminophen  650 mg Per NG Tube Q6H PRN Carlee Siemens, PA-C      albuterol  2 puff Inhalation Q4H PRN Geri Galdamez PA-C      carisoprodol  175 mg Per NG Tube BID Magalyee Siemens, PA-C      diazepam  5 mg Per NG Tube HS Carlee Siemens, PA-C      fluticasone-vilanterol  1 puff Inhalation Daily Geri Galdamez PA-C      heparin (porcine)  5,000 Units Subcutaneous Q8H [address] Marina Martin PA-C      insulin lispro  1-5 Units Subcutaneous HS Marina Martin PA-C      insulin lispro  2-12 Units Subcutaneous Q6H [address] Marina Martin PA-C      levalbuterol  1 25 mg Nebulization BID Magalyee Siemens, PA-C      nystatin   Topical BID Carlee Siemens, PA-C      pantoprazole  40 mg Intravenous Q24H [address] VIVI Mckeon      pregabalin  50 mg Per NG Tube BID Magalyee Siemens, PA-C      sodium chloride  3 mL Nebulization BID Marina Martin PA-C      sucralfate  1 g Per NG Tube Q6H [address] Carlee Siemens, PA-C          Today, Patient Was Seen By: Carlee Siemens, PA-C    ** Please Note: This note has been constructed using a voice recognition system   **

## 2022-05-12 NOTE — ASSESSMENT & PLAN NOTE
Lab Results   Component Value Date    HGBA1C 11 4 (H) 05/10/2022     Recent Labs     05/11/22  2113 05/12/22  0032 05/12/22  0547 05/12/22  1137   POCGLU 246* 268* 295* 287*   Continue Reglan p r n

## 2022-05-12 NOTE — ASSESSMENT & PLAN NOTE
Acute metabolic encephalopathy  Patient presenting with weakness for multiple days, now with altered mental status  Per EMS, son called 46 the patient be brought to the ED for further evaluation    Has been previously admitted for the same, typically secondary to hyperglycemia  Likely secondary to hyperglycemia complicated by opioid dependence/sedating medications  Urine drug screen positive for barbiturates, benzos, opioids  Opoids/sedated medications held on admission  Resumed soma and benzo 5/11 per tox - want to avoid withdrawal  Resumed at 1/2 of regular dose   Seen and evaluated by speech with the following recommendation:  Keep NPO with ice chips until more awake and can tolerate regular diet  Switch meds to IV given inability to swallow pills  Consulted tox for additional recommendations - largely contributing is her chronic pain meds  Still has difficulty answering questions and slow to respond / follow commands  NG tube placed in order to get certain oral meds through tube   Had neurology evaluate today    MRI brain pending   Defer LP - low suspicion for CNS infection at this time   Supportive care for suspected drug withdrawal   Trial neb treatment to see if this helps with breathing

## 2022-05-12 NOTE — ASSESSMENT & PLAN NOTE
Results from last 7 days   Lab Units 05/12/22  0430 05/11/22  2150 05/11/22  0912 05/10/22  1606   POTASSIUM mmol/L 3 6 3 0* 3 0* 3 3*   Likely from recent episode of vomiting  Repleted potassium  Magnesium noted to be low at 1 5  Repleted magnesium

## 2022-05-12 NOTE — ASSESSMENT & PLAN NOTE
Presenting with altered mental status, according to son, this is how she gets when she has significantly hyperglycemia  Lab Results   Component Value Date    HGBA1C 11 4 (H) 05/10/2022   Patient maintained on 50 units Lantus HS and 10 units t i d   With meals  Suppose to take the above but does not - noncompliant according to son  Has been previously admitted for the same  Blood sugars significantly elevated on admission, with elevated beta hydroxybutyrate  Continued on insulin drip and d5 until gap was closed   Stopped drip and d5 5/11/22  Place on Accu-Cheks and sliding scale q 6 given NPO status  Still not awake enough to tolerate regular diet   GI evaluated and would like barium swallow but pt is not alert enough to have this performed

## 2022-05-12 NOTE — PROGRESS NOTES
Patient was scheduled for a barium swallow study today  However d/t patients current condition and lethargy, this study has been delayed  Provider notified and was advised to keep patient NPO until this can be completed  Will reassess patient status tomorrow regarding the swallow study

## 2022-05-12 NOTE — ASSESSMENT & PLAN NOTE
Home regimen lisinopril 10 mg daily  Pt not able to tolerate po   Here with elevated pressures in the 170s  BP started to improve with resuming soma and valium at low doses   Consider adding scheduled IV metoprolol if remains elevated

## 2022-05-12 NOTE — PROGRESS NOTES
Received a BPA sepsis alert for this patient  Notified the provider Corrie Arora) and was advised that patient is currently on antibiotics and we are not able to begin fluids d/t patients current volume status  Patient already had blood cultures drawn and provider did not want another set at this time  Patients temp was 100 4 and this nurse administered Tylenol  Temp recheck was 101 4 and ice packs were applied since I was unable to administer Tylenol since the time was too close from last admin  Will continue to monitor closely

## 2022-05-12 NOTE — ASSESSMENT & PLAN NOTE
Will hold all opioids/sedating medication giving acute encephalopathy      No acute signs of opiate overdose at this time  · Morphine Sulf Er 60mg BID   · Morphine Sulf Er 15mg BID  · Morphine Sulf IR 15 mg TID PRN  · Pregabalin 150 mg BID  · Carisoprodol 350 mg BID  · Valium 5 mg daily  · PDMP reviewed    Had tox evaluate  Will likely need to be tapered off going forward given likely causing more harm than benefit  Spoke with toxicology - Want to avoid withdrawal  Resumed soma and valium at decreased doses 5/11  Will resume lyrica today a decreased dose as well

## 2022-05-12 NOTE — QUICK NOTE
Asked to see patient due to tachycardia 160's  Patient calm, alert and minimally verbal, oriented to person and place  No diaphoresis or chills noted  As per RN, she took care of patient yesterday and her mentation has improved today  Patient denies any complaints  HR currently 117, S tach; no ischemic EKG changes  Lungs CTA, +murmur, abd soft, NTND      · Suspect symptoms 2/2 opioid and benzo withdrawal  · Patient on heavy opioid regimen at home: Morphine 75mg BID, Hydromorphone 4mg TID, Lyrica 150mg BID, Soma 350mg BID, Valium 5mg daily  · Failed speech and swallow is NPO and has not received any of her oral medications since hospitalization    · Will order stat labs, CMP, CBC, VBG  · Failed urinary retention protocol  UA on May 9 normal  · Insert F/C for close I/O monitoring  · Leukocytosis, will order blood cultures and give 1 dose of IV ceftriaxone  · IVF x1 L over 10 hours   Careful IV hydration in setting of "Increased interstitial opacities suggest some mild edema" seen on CXR

## 2022-05-13 ENCOUNTER — APPOINTMENT (INPATIENT)
Dept: RADIOLOGY | Facility: HOSPITAL | Age: 57
DRG: 637 | End: 2022-05-13
Payer: MEDICARE

## 2022-05-13 ENCOUNTER — TELEPHONE (OUTPATIENT)
Dept: RADIOLOGY | Facility: HOSPITAL | Age: 57
End: 2022-05-13

## 2022-05-13 PROBLEM — I47.10 SVT (SUPRAVENTRICULAR TACHYCARDIA): Status: ACTIVE | Noted: 2022-05-13

## 2022-05-13 PROBLEM — I47.1 SVT (SUPRAVENTRICULAR TACHYCARDIA) (HCC): Status: ACTIVE | Noted: 2022-05-13

## 2022-05-13 LAB
ALBUMIN SERPL BCP-MCNC: 2.6 G/DL (ref 3.5–5)
ALP SERPL-CCNC: 106 U/L (ref 46–116)
ALT SERPL W P-5'-P-CCNC: 11 U/L (ref 12–78)
ANION GAP SERPL CALCULATED.3IONS-SCNC: 10 MMOL/L (ref 4–13)
AST SERPL W P-5'-P-CCNC: 8 U/L (ref 5–45)
ATRIAL RATE: 202 BPM
BASE EXCESS BLDA CALC-SCNC: -2 MMOL/L (ref -2–3)
BASOPHILS # BLD AUTO: 0.11 THOUSANDS/ΜL (ref 0–0.1)
BASOPHILS NFR BLD AUTO: 1 % (ref 0–1)
BILIRUB SERPL-MCNC: 0.76 MG/DL (ref 0.2–1)
BUN SERPL-MCNC: 26 MG/DL (ref 5–25)
CA-I BLD-SCNC: 1.26 MMOL/L (ref 1.12–1.32)
CALCIUM ALBUM COR SERPL-MCNC: 9.5 MG/DL (ref 8.3–10.1)
CALCIUM SERPL-MCNC: 8.4 MG/DL (ref 8.3–10.1)
CHLORIDE SERPL-SCNC: 111 MMOL/L (ref 100–108)
CK SERPL-CCNC: 13 U/L (ref 26–192)
CO2 SERPL-SCNC: 22 MMOL/L (ref 21–32)
CREAT SERPL-MCNC: 0.81 MG/DL (ref 0.6–1.3)
EOSINOPHIL # BLD AUTO: 0.06 THOUSAND/ΜL (ref 0–0.61)
EOSINOPHIL NFR BLD AUTO: 0 % (ref 0–6)
ERYTHROCYTE [DISTWIDTH] IN BLOOD BY AUTOMATED COUNT: 13.8 % (ref 11.6–15.1)
GFR SERPL CREATININE-BSD FRML MDRD: 80 ML/MIN/1.73SQ M
GLUCOSE SERPL-MCNC: 250 MG/DL (ref 65–140)
GLUCOSE SERPL-MCNC: 279 MG/DL (ref 65–140)
GLUCOSE SERPL-MCNC: 281 MG/DL (ref 65–140)
GLUCOSE SERPL-MCNC: 284 MG/DL (ref 65–140)
GLUCOSE SERPL-MCNC: 300 MG/DL (ref 65–140)
GLUCOSE SERPL-MCNC: 302 MG/DL (ref 65–140)
GLUCOSE SERPL-MCNC: 349 MG/DL (ref 65–140)
HCO3 BLDA-SCNC: 20.8 MMOL/L (ref 22–28)
HCT VFR BLD AUTO: 41.4 % (ref 34.8–46.1)
HCT VFR BLD CALC: 38 % (ref 34.8–46.1)
HGB BLD-MCNC: 12.8 G/DL (ref 11.5–15.4)
HGB BLDA-MCNC: 12.9 G/DL (ref 11.5–15.4)
IMM GRANULOCYTES # BLD AUTO: 0.09 THOUSAND/UL (ref 0–0.2)
IMM GRANULOCYTES NFR BLD AUTO: 1 % (ref 0–2)
LACTATE SERPL-SCNC: 1.7 MMOL/L (ref 0.5–2)
LYMPHOCYTES # BLD AUTO: 3.24 THOUSANDS/ΜL (ref 0.6–4.47)
LYMPHOCYTES NFR BLD AUTO: 17 % (ref 14–44)
MAGNESIUM SERPL-MCNC: 1.7 MG/DL (ref 1.6–2.6)
MCH RBC QN AUTO: 26.1 PG (ref 26.8–34.3)
MCHC RBC AUTO-ENTMCNC: 30.9 G/DL (ref 31.4–37.4)
MCV RBC AUTO: 84 FL (ref 82–98)
MONOCYTES # BLD AUTO: 1.44 THOUSAND/ΜL (ref 0.17–1.22)
MONOCYTES NFR BLD AUTO: 8 % (ref 4–12)
NEUTROPHILS # BLD AUTO: 13.68 THOUSANDS/ΜL (ref 1.85–7.62)
NEUTS SEG NFR BLD AUTO: 73 % (ref 43–75)
NRBC BLD AUTO-RTO: 0 /100 WBCS
PCO2 BLD: 22 MMOL/L (ref 21–32)
PCO2 BLD: 30 MM HG (ref 36–44)
PH BLD: 7.45 [PH] (ref 7.35–7.45)
PHOSPHATE SERPL-MCNC: 2.3 MG/DL (ref 2.7–4.5)
PLATELET # BLD AUTO: 246 THOUSANDS/UL (ref 149–390)
PMV BLD AUTO: 13.3 FL (ref 8.9–12.7)
PO2 BLD: 69 MM HG (ref 75–129)
POTASSIUM BLD-SCNC: 3.6 MMOL/L (ref 3.5–5.3)
POTASSIUM SERPL-SCNC: 3.7 MMOL/L (ref 3.5–5.3)
PROT SERPL-MCNC: 6.7 G/DL (ref 6.4–8.2)
QRS AXIS: 23 DEGREES
QRSD INTERVAL: 132 MS
QT INTERVAL: 252 MS
QTC INTERVAL: 446 MS
RBC # BLD AUTO: 4.91 MILLION/UL (ref 3.81–5.12)
SAO2 % BLD FROM PO2: 95 % (ref 60–85)
SODIUM BLD-SCNC: 145 MMOL/L (ref 136–145)
SODIUM SERPL-SCNC: 143 MMOL/L (ref 136–145)
SPECIMEN SOURCE: ABNORMAL
T WAVE AXIS: 28 DEGREES
TSH SERPL DL<=0.05 MIU/L-ACNC: 0.22 UIU/ML (ref 0.45–4.5)
VENTRICULAR RATE: 188 BPM
WBC # BLD AUTO: 18.62 THOUSAND/UL (ref 4.31–10.16)

## 2022-05-13 PROCEDURE — 83605 ASSAY OF LACTIC ACID: CPT | Performed by: PHYSICIAN ASSISTANT

## 2022-05-13 PROCEDURE — 85014 HEMATOCRIT: CPT

## 2022-05-13 PROCEDURE — 92526 ORAL FUNCTION THERAPY: CPT

## 2022-05-13 PROCEDURE — 84100 ASSAY OF PHOSPHORUS: CPT | Performed by: PHYSICIAN ASSISTANT

## 2022-05-13 PROCEDURE — 97167 OT EVAL HIGH COMPLEX 60 MIN: CPT

## 2022-05-13 PROCEDURE — 97163 PT EVAL HIGH COMPLEX 45 MIN: CPT

## 2022-05-13 PROCEDURE — 97530 THERAPEUTIC ACTIVITIES: CPT

## 2022-05-13 PROCEDURE — 99232 SBSQ HOSP IP/OBS MODERATE 35: CPT | Performed by: PSYCHIATRY & NEUROLOGY

## 2022-05-13 PROCEDURE — 84295 ASSAY OF SERUM SODIUM: CPT

## 2022-05-13 PROCEDURE — 93010 ELECTROCARDIOGRAM REPORT: CPT | Performed by: INTERNAL MEDICINE

## 2022-05-13 PROCEDURE — NC001 PR NO CHARGE: Performed by: PHYSICIAN ASSISTANT

## 2022-05-13 PROCEDURE — 80053 COMPREHEN METABOLIC PANEL: CPT | Performed by: PHYSICIAN ASSISTANT

## 2022-05-13 PROCEDURE — 85025 COMPLETE CBC W/AUTO DIFF WBC: CPT | Performed by: PHYSICIAN ASSISTANT

## 2022-05-13 PROCEDURE — 84132 ASSAY OF SERUM POTASSIUM: CPT

## 2022-05-13 PROCEDURE — 82948 REAGENT STRIP/BLOOD GLUCOSE: CPT

## 2022-05-13 PROCEDURE — 83735 ASSAY OF MAGNESIUM: CPT | Performed by: PHYSICIAN ASSISTANT

## 2022-05-13 PROCEDURE — 99233 SBSQ HOSP IP/OBS HIGH 50: CPT | Performed by: INTERNAL MEDICINE

## 2022-05-13 PROCEDURE — 82947 ASSAY GLUCOSE BLOOD QUANT: CPT

## 2022-05-13 PROCEDURE — 82803 BLOOD GASES ANY COMBINATION: CPT

## 2022-05-13 PROCEDURE — 93005 ELECTROCARDIOGRAM TRACING: CPT

## 2022-05-13 PROCEDURE — 94640 AIRWAY INHALATION TREATMENT: CPT

## 2022-05-13 PROCEDURE — 82330 ASSAY OF CALCIUM: CPT

## 2022-05-13 PROCEDURE — 84443 ASSAY THYROID STIM HORMONE: CPT | Performed by: INTERNAL MEDICINE

## 2022-05-13 PROCEDURE — C9113 INJ PANTOPRAZOLE SODIUM, VIA: HCPCS | Performed by: PHYSICIAN ASSISTANT

## 2022-05-13 RX ORDER — POTASSIUM CHLORIDE 20MEQ/15ML
20 LIQUID (ML) ORAL ONCE
Status: COMPLETED | OUTPATIENT
Start: 2022-05-13 | End: 2022-05-13

## 2022-05-13 RX ORDER — MAGNESIUM SULFATE HEPTAHYDRATE 40 MG/ML
2 INJECTION, SOLUTION INTRAVENOUS ONCE
Status: COMPLETED | OUTPATIENT
Start: 2022-05-13 | End: 2022-05-13

## 2022-05-13 RX ORDER — INSULIN LISPRO 100 [IU]/ML
1-5 INJECTION, SOLUTION INTRAVENOUS; SUBCUTANEOUS
Status: DISCONTINUED | OUTPATIENT
Start: 2022-05-13 | End: 2022-05-17 | Stop reason: HOSPADM

## 2022-05-13 RX ORDER — INSULIN LISPRO 100 [IU]/ML
2-12 INJECTION, SOLUTION INTRAVENOUS; SUBCUTANEOUS
Status: DISCONTINUED | OUTPATIENT
Start: 2022-05-13 | End: 2022-05-17 | Stop reason: HOSPADM

## 2022-05-13 RX ADMIN — DIAZEPAM 5 MG: 5 TABLET ORAL at 21:54

## 2022-05-13 RX ADMIN — INSULIN LISPRO 6 UNITS: 100 INJECTION, SOLUTION INTRAVENOUS; SUBCUTANEOUS at 00:14

## 2022-05-13 RX ADMIN — LEVALBUTEROL 1.25 MG: 1.25 SOLUTION, CONCENTRATE RESPIRATORY (INHALATION) at 20:37

## 2022-05-13 RX ADMIN — PREGABALIN 50 MG: 50 CAPSULE ORAL at 08:52

## 2022-05-13 RX ADMIN — HEPARIN SODIUM 7500 UNITS: 5000 INJECTION INTRAVENOUS; SUBCUTANEOUS at 05:39

## 2022-05-13 RX ADMIN — ISODIUM CHLORIDE 3 ML: 0.03 SOLUTION RESPIRATORY (INHALATION) at 20:36

## 2022-05-13 RX ADMIN — INSULIN LISPRO 8 UNITS: 100 INJECTION, SOLUTION INTRAVENOUS; SUBCUTANEOUS at 12:17

## 2022-05-13 RX ADMIN — PREGABALIN 50 MG: 50 CAPSULE ORAL at 18:41

## 2022-05-13 RX ADMIN — CEFTRIAXONE 2000 MG: 2 INJECTION, POWDER, FOR SOLUTION INTRAMUSCULAR; INTRAVENOUS at 23:45

## 2022-05-13 RX ADMIN — FLUTICASONE FUROATE AND VILANTEROL TRIFENATATE 1 PUFF: 200; 25 POWDER RESPIRATORY (INHALATION) at 07:52

## 2022-05-13 RX ADMIN — CLONIDINE HYDROCHLORIDE 0.1 MG: 0.1 TABLET ORAL at 21:54

## 2022-05-13 RX ADMIN — CARISOPRODOL 175 MG: 350 TABLET ORAL at 21:54

## 2022-05-13 RX ADMIN — NYSTATIN 1 APPLICATION: 100000 POWDER TOPICAL at 08:53

## 2022-05-13 RX ADMIN — POTASSIUM CHLORIDE 20 MEQ: 20 SOLUTION ORAL at 07:51

## 2022-05-13 RX ADMIN — SUCRALFATE 1 G: 1 TABLET ORAL at 05:49

## 2022-05-13 RX ADMIN — LEVALBUTEROL 1.25 MG: 1.25 SOLUTION, CONCENTRATE RESPIRATORY (INHALATION) at 07:57

## 2022-05-13 RX ADMIN — MAGNESIUM SULFATE HEPTAHYDRATE 2 G: 40 INJECTION, SOLUTION INTRAVENOUS at 07:51

## 2022-05-13 RX ADMIN — HEPARIN SODIUM 7500 UNITS: 5000 INJECTION INTRAVENOUS; SUBCUTANEOUS at 21:54

## 2022-05-13 RX ADMIN — PANTOPRAZOLE SODIUM 40 MG: 40 INJECTION, POWDER, FOR SOLUTION INTRAVENOUS at 08:52

## 2022-05-13 RX ADMIN — HEPARIN SODIUM 7500 UNITS: 5000 INJECTION INTRAVENOUS; SUBCUTANEOUS at 14:32

## 2022-05-13 RX ADMIN — POTASSIUM PHOSPHATE, MONOBASIC POTASSIUM PHOSPHATE, DIBASIC 12 MMOL: 224; 236 INJECTION, SOLUTION, CONCENTRATE INTRAVENOUS at 08:10

## 2022-05-13 RX ADMIN — INSULIN LISPRO 6 UNITS: 100 INJECTION, SOLUTION INTRAVENOUS; SUBCUTANEOUS at 05:37

## 2022-05-13 RX ADMIN — HYDRALAZINE HYDROCHLORIDE 10 MG: 20 INJECTION INTRAMUSCULAR; INTRAVENOUS at 00:14

## 2022-05-13 RX ADMIN — INSULIN GLARGINE 15 UNITS: 100 INJECTION, SOLUTION SUBCUTANEOUS at 21:58

## 2022-05-13 RX ADMIN — SUCRALFATE 1 G: 1 TABLET ORAL at 12:24

## 2022-05-13 RX ADMIN — NYSTATIN 1 APPLICATION: 100000 POWDER TOPICAL at 18:45

## 2022-05-13 RX ADMIN — MORPHINE SULFATE 4 MG: 4 INJECTION INTRAVENOUS at 01:53

## 2022-05-13 RX ADMIN — CARISOPRODOL 175 MG: 350 TABLET ORAL at 08:52

## 2022-05-13 RX ADMIN — SUCRALFATE 1 G: 1 TABLET ORAL at 00:14

## 2022-05-13 RX ADMIN — SUCRALFATE 1 G: 1 TABLET ORAL at 18:41

## 2022-05-13 RX ADMIN — INSULIN LISPRO 6 UNITS: 100 INJECTION, SOLUTION INTRAVENOUS; SUBCUTANEOUS at 17:28

## 2022-05-13 RX ADMIN — INSULIN LISPRO 2 UNITS: 100 INJECTION, SOLUTION INTRAVENOUS; SUBCUTANEOUS at 22:02

## 2022-05-13 RX ADMIN — ISODIUM CHLORIDE 3 ML: 0.03 SOLUTION RESPIRATORY (INHALATION) at 07:57

## 2022-05-13 RX ADMIN — SODIUM CHLORIDE 100 ML/HR: 0.45 INJECTION, SOLUTION INTRAVENOUS at 05:41

## 2022-05-13 NOTE — PHYSICAL THERAPY NOTE
PT EVALUATION 11:35-11:55  Treat: 11:55-12:07    61 y o     [id number]    Altered mental status [R41 82]  Hyperglycemia [R73 9]    Past Medical History:   Diagnosis Date    Acute respiratory insufficiency 11/23/2019    Asthma     Chronic pain     Diabetes ([facility] )     HTN (hypertension)     Sepsis ([facility] ) 11/23/2019         Past Surgical History:   Procedure Laterality Date    CERVICAL LAMINECTOMY      CHOLECYSTECTOMY      CHOLECYSTECTOMY LAPAROSCOPIC N/A 11/10/2018    Procedure: CHOLECYSTECTOMY LAPAROSCOPIC w/ ioc;  Surgeon: Giovany Phillips MD;  [facility]: [facility];  Service: General    HYSTERECTOMY      JOINT REPLACEMENT Bilateral     knee    TOE AMPUTATION Right 2/2/2018    Procedure: AMPUTATION TOE, RIGHT GREAT TOE;  Surgeon: Anastacio Workman DPM;  [facility]: [facility];  Service: Podiatry        05/13/22 1135   PT Last Visit   PT Visit Date 05/13/22   Note Type   Note type Evaluation  (and treat)   Pain Assessment   Pain Score 9   Pain Location/Orientation Location: Back   Restrictions/Precautions   Weight Bearing Precautions Per Order No   Other Precautions Fall Risk;Pain;Telemetry;Multiple lines  (NGT)   Home Living   Type of Home Mobile home   Home Layout One level;Stairs to enter with rails  (4 MTATHEW)   Bathroom Shower/Tub Tub/shower unit   Bathroom Toilet Standard   Bathroom Accessibility Accessible   Home Equipment Walker;Cane   Additional Comments resides in mobile home with son and spouse  4 MATTHEW  Prior Function   Level of [place] Independent with ADLs and functional mobility   Lives With Spouse; Maulik Koo Help From Family   ADL Assistance Independent   IADLs Needs assistance   Falls in the last 6 months 1 to 4  (1)   Comments Ambulates with RW primarily  Ocassional without on a good day  General   Additional Pertinent History Pt is 60 y/o female admitted with encephalopathy, hyperglycemia, weakness  PT consulted  Up and OOB as tolerated     Family/Caregiver Present No   Cognition   Overall Cognitive Status Impaired   Arousal/Participation Cooperative   Attention Attends with cues to redirect   Orientation Level Oriented to place;Oriented to person  (+ month)   Following Commands Follows one step commands with increased time or repetition   Comments flat affect  Delayed motor planning  Subjective   Subjective OOB in chair  Wanted to get OOB today  "I always have pain"   RUE Assessment   RUE Assessment   (groslsy observed <3/5)   LUE Assessment   LUE Assessment   (grossly observed <3/5)   RLE Assessment   RLE Assessment X  (grossly 4-/5, hip flexion 3-/5)   LLE Assessment   LLE Assessment X  (grossly <3/5)   Transfers   Sit to Stand 3  Moderate assistance   Additional items Assist x 2; Increased time required;Verbal cues   Stand to Sit 3  Moderate assistance   Additional items Assist x 2; Increased time required;Verbal cues;Armrests   Additional Comments cues for hand placement  Increased time to complete  Limited standing tolerance  Ambulation/Elevation   Assistive Device Rolling walker   Distance 0 ft, unable to complete  Sits quiclkly with fatigue  Poor upright posture  Balance   Static Sitting Fair +   Dynamic Sitting Fair   Static Standing Poor +   Dynamic Standing Poor   Ambulatory Poor -   Endurance Deficit   Endurance Deficit Yes   Endurance Deficit Description weakness, fatigue, deconditioning  Activity Tolerance   Activity Tolerance Patient limited by fatigue;Treatment limited secondary to medical complications (Comment); Patient limited by pain   Medical Staff Made Aware NurseThiago: Pt seen for co-evaluation/treatment with skilled Occupational Therapist 2* clinically unstable/unpredictable presentation, medical complexity, fall risk, cognitive impairments, functional/physical limitations, impaired functional balance, decreased safety awareness, limited activity tolerance which is decline from PLOF and may impact overall functional mobility/mobility safety     Nurse Made Aware yes   Assessment   Prognosis Fair   Problem List Decreased strength;Decreased range of motion;Decreased endurance;Decreased mobility; Impaired balance;Decreased cognition; Impaired judgement;Decreased safety awareness; Obesity;Pain   Assessment Gilberto Quinonez is a 61 y o  female  h/o poorly controlled insulin-dependent DM2, Chronic opioid/DESIREE dependence 2/2 chronic pain, HTN presented to ED by EMS on 5/9 when son reported worsening fatique/weakness and worsening confusion for 2 days  + hyperglycemia and started on insulin drip, + fever, encephalopathy  Rapid response 5/13 3:30 with SVT and acute hypoxic respiratory insufficiency  Transferred to [address] [place] [address] ICU for closer monitoring and high risk for decompensation  PT consulted  Up and OOB as tolerated orders  Prior to admission resides with spouse and son  Not home alone per her report  Ambulates primarily with RW support, noting on a good day able to go without in home   + hx of fall  Currently presents with functional limitations related to cognition, delayed motor response, decreased activity tolerance, impaired posture, balance, general strength and locomotion  Requires modA of 2 for transfers  Difficulty achieving upright posture with RW support and unable to ambulate on evaluation  -128 with activity  See treatment note for progression of mobility with RW support  Given impairments will require skilled PT in order to progress and optimize outcomes  The patient's AM-PAC Basic Mobility Inpatient Short Form Raw Score is 7  A Raw score of less than or equal to 16 suggests the patient may benefit from discharge to post-acute rehabilitation services  Please also refer to the recommendation of the Physical Therapist for safe discharge planning  Will require rehab at d/c to address impairments and facilitate return to PLOF  Goals   Patient Goals go home   STG Expiration Date 05/27/22   Short Term Goal #1 14 days: 1)    Pt will perform bed mobility with Pilo demonstrating appropriate technique 100% of the time in order to improve function  2)  Perform all transfers with Pilo demonstrating safe and appropriate technique 100% of the time in order to improve ability to negotiate safely in home environment  3) Amb with least restrictive AD > 100'x1 with mod I in order to demonstrate ability to negotiate in home environment  4)  Improve overall strength and balance 1/2 grade in order to optimize ability to perform functional tasks and reduce fall risk  5) Increase activity tolerance to 30 minutes in order to improve endurance to functional tasks  6)  Negotiate stairs using most appropriate technique and Jules in order to be able to negotiate safely into home environment  7) PT for ongoing patient and family/caregiver education, DME needs and d/c planning in order to promote highest level of function in least restrictive environment  PT Treatment Day 1   Plan   Treatment/Interventions Functional transfer training;LE strengthening/ROM; Elevations; Therapeutic exercise; Endurance training;Patient/family training;Equipment eval/education; Bed mobility;Gait training; Compensatory technique education;Continued evaluation;Spoke to nursing;OT   PT Frequency 3-5x/wk   Recommendation   PT Discharge Recommendation Post acute rehabilitation services   Equipment Recommended   (pt has walker)   AM-PAC Basic Mobility Inpatient   Turning in Bed Without Bedrails 2   Lying on Back to Sitting on Edge of Flat Bed 1   Moving Bed to Chair 1   Standing Up From Chair 1   Walk in Room 1   Climb 3-5 Stairs 1   Basic Mobility Inpatient Raw Score 7   Highest Level Of Mobility   JH-HLM Goal 2: Bed activities/Dependent transfer   JH-HLM Achieved 4: Move to chair/commode   Additional Treatment Session   Start Time 1155   End Time 1207   Treatment Assessment 12 minutes  Worked on mobility progression to facilitate progression with RW support  ModA of 2 for transfers  Amb with RW 3'x1 with modA of 2    Sit to supine with max A of 2  Gait slowed, decreased foot clearance, increased time required with cues and RW management  Rehab rec at d/c  Equipment Use RW   Additional Treatment Day 1   End of Consult   Patient Position at End of Consult Supine; All needs within reach     Hx/personal factors: co-morbidities, inaccessible home, mutliple lines, telemetry, use of AD, dec cognition, pain, h/o of falls, fall risk, assist w/ ADL's, and obesity  Examination: dec mobility, dec balance, dec endurance, dec amb, risk for falls, pain, dec cognition, assessed body system, balance, endurance, amb, D/C disposition & fall risk, impairements in locomotion, musculoskeletal, balance, endurance, posture, coordination  Clinical: unpredictable (ongoing medical status, abnormal lab values, and risk for falls), Ax2  ICU SD monitoring     Complexity: high           Jones Grigsby, PT

## 2022-05-13 NOTE — PLAN OF CARE
Problem: PHYSICAL THERAPY ADULT  Goal: Performs mobility at highest level of function for planned discharge setting  See evaluation for individualized goals  Description: Treatment/Interventions: Functional transfer training, LE strengthening/ROM, Elevations, Therapeutic exercise, Endurance training, Patient/family training, Equipment eval/education, Bed mobility, Gait training, Compensatory technique education, Continued evaluation, Spoke to nursing, OT  Equipment Recommended:  (pt has walker)       See flowsheet documentation for full assessment, interventions and recommendations  Note: Prognosis: Fair  Problem List: Decreased strength, Decreased range of motion, Decreased endurance, Decreased mobility, Impaired balance, Decreased cognition, Impaired judgement, Decreased safety awareness, Obesity, Pain  Assessment: Severa Laming is a 61 y o  female  h/o poorly controlled insulin-dependent DM2, Chronic opioid/DESIREE dependence 2/2 chronic pain, HTN presented to ED by EMS on 5/9 when son reported worsening fatique/weakness and worsening confusion for 2 days  + hyperglycemia and started on insulin drip, + fever, encephalopathy  Rapid response 5/13 3:30 with SVT and acute hypoxic respiratory insufficiency  Transferred to [place] ICU for closer monitoring and high risk for decompensation  PT consulted  Up and OOB as tolerated orders  Prior to admission resides with spouse and son  Not home alone per her report  Ambulates primarily with RW support, noting on a good day able to go without in home   + hx of fall  Currently presents with functional limitations related to cognition, delayed motor response, decreased activity tolerance, impaired posture, balance, general strength and locomotion  Requires modA of 2 for transfers  Difficulty achieving upright posture with RW support and unable to ambulate on evaluation  -128 with activity  See treatment note for progression of mobility with RW support    Given impairments will require skilled PT in order to progress and optimize outcomes  The patient's AM-PAC Basic Mobility Inpatient Short Form Raw Score is 7  A Raw score of less than or equal to 16 suggests the patient may benefit from discharge to post-acute rehabilitation services  Please also refer to the recommendation of the Physical Therapist for safe discharge planning  Will require rehab at d/c to address impairments and facilitate return to PLOF  PT Discharge Recommendation: Post acute rehabilitation services          See flowsheet documentation for full assessment

## 2022-05-13 NOTE — CODE DOCUMENTATION
0330: RR called for SVT  0330:   0332: pt placed on cardiac monitoring   0335: Labs ordered  0337: 6mg of adenosine given converted to   0342: ABG drawn by resp   Therapy  0344: RR ended; transfer to ICU

## 2022-05-13 NOTE — RAPID RESPONSE
Rapid Response Note  Drea Black 61 y o  female MRN: [id number]  Unit/Bed#: ICU 09 Encounter: [id number]    Rapid Response Notification(s):   Response called date/time:  5/13/2022 3:30 AM  Response team arrival date/time:  5/13/2022 3:30 AM  Response end date/time:  5/13/2022 3:44 AM  Level of care:  Stepdown 1  Rapid response location:  Stepdown unit  Primary reason for rapid response call:  Acute change in heart rhythm and acute change in heart rate    Rapid Response Intervention(s):   Airway:  None  Breathing:  None  Circulation:  ACLS protocol and electrocardiogram  Medications administered:  Adenosine       Assessment:   · Supraventricular Tachycardia  · Toxic metabolic encephalopathy  · IDDM type II  · Leukocytosis/Fever  · Acute hypoxic respiratory insufficiency    Plan:   · SVT rate 180 broke with 6mg adenosine IVP  HD stable  · Due to ongoing encephalopathy/and concern for high risk of decompensation, will transfer to [facility] for closer monitoring  Attending notified  Attempted to reach -no answer  Left message     Rapid Response Outcome:   Transfer:  Transfer to [facility] 1  Primary service notified of transfer: Yes    Code Status: Level 1 (Full Code)      Family notified of transfer: attempted call  No answer  Left message to call icu  Family member contacted: spouse Hailey Tuttle (attempted)     Background/Situation:   Drea Black is a 61 y o  female  h/o poorly controlled insulin-dependent DM2, Chronic opioid/DESIREE dependence 2/2 chronic pain, HTN presented to ED by EMS on 5/9 when son reported worsening fatique/weakness and worsening confusion for 2 days  She was found to hyperglycemic and started on an insulin infusion and admitted to [facility]  She was witnessed to have several episodes of vomiting  Due to her persistent encephalopathy, neurology and toxicology were consulted and head CT was negative/MRI is pending  Patient developed a fever the morning of 5/12 of 101 5   Blood cultures and urine were sent, procalcitonin 0 09, ceftriaxone initiated and CXR did not show any obvious consolidation  At toxicology's recommendation, some of her long standing pharmacologic agents to treat chronic pain have been restarted at reduced doses  A rapid response was called this morning when patient went into a narrow complex tachycardia rate 180's  Review of Systems   Unable to perform ROS: Unstable vital signs   Respiratory: Positive for shortness of breath  Cardiovascular: Positive for palpitations  Negative for chest pain  All other systems reviewed and are negative  Objective:   Vitals:    05/13/22 0407 05/13/22 0428 05/13/22 0435 05/13/22 0500   BP: 97/68  113/77    BP Location: Right arm      Pulse: (!) 128  (!) 126 (!) 128   Resp: (!) 34  (!) 34 (!) 35   Temp:  97 9 °F (36 6 °C)     TempSrc:  Temporal     SpO2: 95%  95% 96%   Weight:    125 kg (275 lb 2 2 oz)   Height:         Physical Exam  Vitals reviewed  HENT:      Head: Normocephalic  Comments: NG tube in place  Cardiovascular:      Rate and Rhythm: Regular rhythm  Tachycardia present  Pulses: Normal pulses  Pulmonary:      Effort: Tachypnea and accessory muscle usage present  Skin:     General: Skin is warm and dry  Capillary Refill: Capillary refill takes less than 2 seconds  Neurological:      General: No focal deficit present  Mental Status: She is alert  GCS: GCS eye subscore is 4  GCS verbal subscore is 4  GCS motor subscore is 6  Portions of the record may have been created with voice recognition software  Occasional wrong word or "sound a like" substitutions may have occurred due to the inherent limitations of voice recognition software  Read the chart carefully and recognize, using context, where substitutions have occurred      [place], [place]

## 2022-05-13 NOTE — SPEECH THERAPY NOTE
Speech Language/Pathology    Speech/Language Pathology Progress Note    Patient Name: Elza Tyler  QBMUC'K Date: 5/13/2022     Problem List  Principal Problem:    Type 2 diabetes mellitus with hyperglycemia, with long-term current use of insulin ([facility] )  Active Problems:    Opioid dependence with other opioid-induced disorder ([facility] )    Hypokalemia    Diabetic gastroparesis ([facility] )    Essential hypertension    Acute encephalopathy    Leukocytosis    Heme positive stool    Urine retention    SVT (supraventricular tachycardia) (HCC)       Past Medical History  Past Medical History:   Diagnosis Date    Acute respiratory insufficiency 11/23/2019    Asthma     Chronic pain     Diabetes ([facility] )     HTN (hypertension)     Sepsis ([facility] ) 11/23/2019        Past Surgical History  Past Surgical History:   Procedure Laterality Date    CERVICAL LAMINECTOMY      CHOLECYSTECTOMY      CHOLECYSTECTOMY LAPAROSCOPIC N/A 11/10/2018    Procedure: CHOLECYSTECTOMY LAPAROSCOPIC w/ ioc;  Surgeon: Analia Natarajan MD;  Location: [facility];  Service: General    HYSTERECTOMY      JOINT REPLACEMENT Bilateral     knee    TOE AMPUTATION Right 2/2/2018    Procedure: AMPUTATION TOE, RIGHT GREAT TOE;  Surgeon: Vera Selby DPM;  Location: [facility];  Service: Podiatry         Subjective:  Much more alert, Transferred to ICU  Seated in chair  NG in  TF running at 20  Did not recall me  Speech was slower but appropriate  Noted pt has a regular barium swallow ordered  I suspect she would not be able to maneuver herself in the positions required of her for the test, until she is more alert  Objective:  Seen for po tolerance/diet initiation  Pt was given ice chips, small sips of thin liquid, apple sauce, and one pice of sonia cracker  Pt needed cues to pull the cracker in her mouth  She did not chew it  Eventually had her spit it out  Lip seal was adequate w/ straw sips and spoon presentations  Bolus control wnl   Transfer mildly delayed but functinal  No visible oral residue  Swallows prompt  Fair/adequate laryngeal rise  No cough or wet vocal quality  "it tastes like edgardo last"  Assessment:  More alert than previous  Not back to baseline  Tolerated puree and thin  Not ready for solids yet  Plan/Recommendations:  Puree w/ thin liquids  Consider d/cing  tube feed if maintaining alertness and taking PO  Will f/u for upgrade

## 2022-05-13 NOTE — TELEPHONE ENCOUNTER
I left Ellis Hernandez a message, via [employer], lettering her know that we will not be able to perform the Barium Swallow exam on the patient due to her condition today

## 2022-05-13 NOTE — PLAN OF CARE
Problem: MOBILITY - ADULT  Goal: Maintain or return to baseline ADL function  Description: INTERVENTIONS:  -  Assess patient's ability to carry out ADLs; assess patient's baseline for ADL function and identify physical deficits which impact ability to perform ADLs (bathing, care of mouth/teeth, toileting, grooming, dressing, etc )  - Assess/evaluate cause of self-care deficits   - Assess range of motion  - Assess patient's mobility; develop plan if impaired  - Assess patient's need for assistive devices and provide as appropriate  - Encourage maximum independence but intervene and supervise when necessary  - Involve family in performance of ADLs  - Assess for home care needs following discharge   - Consider OT consult to assist with ADL evaluation and planning for discharge  - Provide patient education as appropriate  Outcome: Progressing  Goal: Maintains/Returns to pre admission functional level  Description: INTERVENTIONS:  - Perform BMAT or MOVE assessment daily    - Set and communicate daily mobility goal to care team and patient/family/caregiver     - Collaborate with rehabilitation services on mobility goals if consulted  - Out of bed for toileting  - Record patient progress and toleration of activity level   Outcome: Progressing     Problem: Prexisting or High Potential for Compromised Skin Integrity  Goal: Skin integrity is maintained or improved  Description: INTERVENTIONS:  - Identify patients at risk for skin breakdown  - Assess and monitor skin integrity  - Assess and monitor nutrition and hydration status  - Monitor labs   - Assess for incontinence   - Turn and reposition patient  - Assist with mobility/ambulation  - Relieve pressure over bony prominences  - Avoid friction and shearing  - Provide appropriate hygiene as needed including keeping skin clean and dry  - Evaluate need for skin moisturizer/barrier cream  - Collaborate with interdisciplinary team   - Patient/family teaching  - Consider wound care consult   Outcome: Progressing     Problem: Nutrition/Hydration-ADULT  Goal: Nutrient/Hydration intake appropriate for improving, restoring or maintaining nutritional needs  Description: Monitor and assess patient's nutrition/hydration status for malnutrition  Collaborate with interdisciplinary team and initiate plan and interventions as ordered  Monitor patient's weight and dietary intake as ordered or per policy  Utilize nutrition screening tool and intervene as necessary  Determine patient's food preferences and provide high-protein, high-caloric foods as appropriate       INTERVENTIONS:  - Monitor oral intake, urinary output, labs, and treatment plans  - Assess nutrition and hydration status and recommend course of action  - Evaluate amount of meals eaten  - Assist patient with eating if necessary   - Allow adequate time for meals  - Recommend/ encourage appropriate diets, oral nutritional supplements, and vitamin/mineral supplements  - Order, calculate, and assess calorie counts as needed  - Recommend, monitor, and adjust tube feedings and TPN/PPN based on assessed needs  - Assess need for intravenous fluids  - Provide specific nutrition/hydration education as appropriate  - Include patient/family/caregiver in decisions related to nutrition  Outcome: Progressing     Problem: GASTROINTESTINAL - ADULT  Goal: Minimal or absence of nausea and/or vomiting  Description: INTERVENTIONS:  - Administer IV fluids if ordered to ensure adequate hydration  - Maintain NPO status until nausea and vomiting are resolved  - Nasogastric tube if ordered  - Administer ordered antiemetic medications as needed  - Provide nonpharmacologic comfort measures as appropriate  - Advance diet as tolerated, if ordered  - Consider nutrition services referral to assist patient with adequate nutrition and appropriate food choices  Outcome: Progressing  Goal: Maintains or returns to baseline bowel function  Description: INTERVENTIONS:  - Assess bowel function  - Encourage oral fluids to ensure adequate hydration  - Administer IV fluids if ordered to ensure adequate hydration  - Administer ordered medications as needed  - Encourage mobilization and activity  - Consider nutritional services referral to assist patient with adequate nutrition and appropriate food choices  Outcome: Progressing  Goal: Maintains adequate nutritional intake  Description: INTERVENTIONS:  - Monitor percentage of each meal consumed  - Identify factors contributing to decreased intake, treat as appropriate  - Assist with meals as needed  - Monitor I&O, weight, and lab values if indicated  - Obtain nutrition services referral as needed  Outcome: Progressing  Goal: Establish and maintain optimal ostomy function  Description: INTERVENTIONS:  - Assess bowel function  - Encourage oral fluids to ensure adequate hydration  - Administer IV fluids if ordered to ensure adequate hydration   - Administer ordered medications as needed  - Encourage mobilization and activity  - Nutrition services referral to assist patient with appropriate food choices  - Assess stoma site  - Consider wound care consult   Outcome: Progressing  Goal: Oral mucous membranes remain intact  Description: INTERVENTIONS  - Assess oral mucosa and hygiene practices  - Implement preventative oral hygiene regimen  - Implement oral medicated treatments as ordered  - Initiate Nutrition services referral as needed  Outcome: Progressing     Problem: METABOLIC, FLUID AND ELECTROLYTES - ADULT  Goal: Electrolytes maintained within normal limits  Description: INTERVENTIONS:  - Monitor labs and assess patient for signs and symptoms of electrolyte imbalances  - Administer electrolyte replacement as ordered  - Monitor response to electrolyte replacements, including repeat lab results as appropriate  - Instruct patient on fluid and nutrition as appropriate  Outcome: Progressing  Goal: Fluid balance maintained  Description: INTERVENTIONS:  - Monitor labs   - Monitor I/O and WT  - Instruct patient on fluid and nutrition as appropriate  - Assess for signs & symptoms of volume excess or deficit  Outcome: Progressing  Goal: Glucose maintained within target range  Description: INTERVENTIONS:  - Monitor Blood Glucose as ordered  - Assess for signs and symptoms of hyperglycemia and hypoglycemia  - Administer ordered medications to maintain glucose within target range  - Assess nutritional intake and initiate nutrition service referral as needed  Outcome: Progressing     Problem: PAIN - ADULT  Goal: Verbalizes/displays adequate comfort level or baseline comfort level  Description: Interventions:  - Encourage patient to monitor pain and request assistance  - Assess pain using appropriate pain scale  - Administer analgesics based on type and severity of pain and evaluate response  - Implement non-pharmacological measures as appropriate and evaluate response  - Consider cultural and social influences on pain and pain management  - Notify physician/advanced practitioner if interventions unsuccessful or patient reports new pain  Outcome: Progressing     Problem: INFECTION - ADULT  Goal: Absence or prevention of progression during hospitalization  Description: INTERVENTIONS:  - Assess and monitor for signs and symptoms of infection  - Monitor lab/diagnostic results  - Monitor all insertion sites, i e  indwelling lines, tubes, and drains  - Monitor endotracheal if appropriate and nasal secretions for changes in amount and color  - [place] appropriate cooling/warming therapies per order  - Administer medications as ordered  - Instruct and encourage patient and family to use good hand hygiene technique  - Identify and instruct in appropriate isolation precautions for identified infection/condition  Outcome: Progressing     Problem: SAFETY ADULT  Goal: Patient will remain free of falls  Description: INTERVENTIONS:  - Educate patient/family on patient safety including physical limitations  - Instruct patient to call for assistance with activity   - Consult OT/PT to assist with strengthening/mobility   - Keep Call bell within reach  - Keep bed low and locked with side rails adjusted as appropriate  - Keep care items and personal belongings within reach  - Initiate and maintain comfort rounds  - Make Fall Risk Sign visible to staff  - Offer Toileting every 2 Hours, in advance of need  - Initiate/Maintain bed alarm  - Obtain necessary fall risk management equipment: alarms  - Apply yellow socks and bracelet for high fall risk patients  - Consider moving patient to room near nurses station  Outcome: Progressing     Problem: Potential for Falls  Goal: Patient will remain free of falls  Description: INTERVENTIONS:  - Educate patient/family on patient safety including physical limitations  - Instruct patient to call for assistance with activity   - Consult OT/PT to assist with strengthening/mobility   - Keep Call bell within reach  - Keep bed low and locked with side rails adjusted as appropriate  - Keep care items and personal belongings within reach  - Initiate and maintain comfort rounds  - Make Fall Risk Sign visible to staff  - Offer Toileting every 2 Hours, in advance of need  - Initiate/Maintain bed alarm  - Obtain necessary fall risk management equipment: alarms  - Apply yellow socks and bracelet for high fall risk patients  - Consider moving patient to room near nurses station  Outcome: Progressing

## 2022-05-13 NOTE — PLAN OF CARE
Problem: OCCUPATIONAL THERAPY ADULT  Goal: Performs self-care activities at highest level of function for planned discharge setting  See evaluation for individualized goals  Description: Treatment Interventions: ADL retraining, Functional transfer training, UE strengthening/ROM, Endurance training, Cognitive reorientation, Patient/family training, Continued evaluation, Compensatory technique education, Equipment evaluation/education          See flowsheet documentation for full assessment, interventions and recommendations  Note: Limitation: Decreased ADL status, Decreased UE ROM, Decreased UE strength, Decreased Safe judgement during ADL, Decreased cognition, Decreased endurance, Decreased high-level ADLs  Prognosis: Fair  Assessment: Pt is a 58y/o female admitted to the hospital 2* symptoms of worsening confusion, weakness, and fatigue  Pt noted with toxic metabolic encephalopathy, hyperglycemia, AGMA, SVT, and acute respiratory insufficiency  Pt with PMH DM, chronic opioid/chronic pain, HTN, sepsis, asthma, b/l TKR, C-spine sx, and toe amputation  PTA pt states independence with all aspects of her ADLs, transfers, ambulation--with RW  During initial eval, pt demonstrated deficits with her functional balance, functional mobility, ADL status, transfer safety, b/l UE strength, activity tolerance(currently fair=15-20mins), and cognition(i e memory, problem-solving)  Pt would benefit from continue OT tx for the above deficits  3-5xwk/1-2wks       OT Discharge Recommendation: Post acute rehabilitation services

## 2022-05-13 NOTE — PROGRESS NOTES
Progress Note - Neurology   Daniel Soto 61 y o  female MRN: [id number]  Unit/Bed#: ICU 09 Encounter: [id number]    Assessment/Plan   Acute encephalopathy  Assessment & Plan  61year old female with chronic pain on a significant amount of pain medications, HTN, and DM presented to the ED for evaluation of altered mental status  There is concern for polypharmacy induced encephalopathy and now drug withdrawal  However, due to low grade temperatures, leukocytosis, and unclear source, neurology was consulted for further evaluation  Her encephalopathy is improving  Suspect withdrawal related symptoms, however on exam she demonstrates some very subtle facial asymmetry  Low suspicion for meningitis/encephalitis at this time  We will obtain MRI brain with and without contrast to rule out any intracranial pathology  The patient was transferred to the ICU, she developed SVT, she was given adenosine  Plan:  - MRI brain with and without contrast pending  - Hold off on stroke pathway at this time  - Can defer LP- low suspicion for CNS infection  - Supportive care for suspected drug withdrawal   - Monitor vitals closely  - Medical management and supportive care per primary team  Correction of any metabolic or infectious disturbances  Leukocytosis  Assessment & Plan  Monitor closely, ICU team is following      Opioid dependence with other opioid-induced disorder ([facility] [address] )  Assessment & Plan  - Pain control regimen per chart includes extensive amount of morphine, pregabalin, Soma, and Valium    - Toxicology assisting with restarting some of the medications at lower doses   - Supportive care    * Type 2 diabetes mellitus with hyperglycemia, with long-term current use of insulin [facility])  Assessment & Plan  Lab Results   Component Value Date    HGBA1C 11 4 (H) 05/10/2022       Recent Labs     05/11/22  2113 05/12/22  0032 05/12/22  0547 05/12/22  1137   POCGLU 246* 268* 295* 287*       Recommendations for outpatient neurological follow up have yet to be determined  Subjective:   61year old with h/o of poorly controlled IDDM, chronic opiod/DESIREE dependence and chronic pain  The patient presents to the hospital on 5/9 encephalopathy and vomiting, she was found to be hyperglycemic  MRI is pending  The patient was transferred to the ICU, as she had a rapid response when she developed SVT which broke with 6 mg of adenosine  Per nursing the patient is more awake today, and less encephalopathic  No reported neurological events  The patient reports that she is tired, and weak  She denies headache, problems with vision, speech, one sided weakness or numbness  She denies ataxia  She is slightly encephalopathic and slow to respond  ROS:  12 point ROS as per HPI, otherwise negative  Vitals: Blood pressure 103/55, pulse (!) 112, temperature 99 2 °F (37 3 °C), temperature source Temporal, resp  rate (!) 28, height 5' 7 99" (1 727 m), weight 125 kg (275 lb 2 2 oz), SpO2 95 %, not currently breastfeeding  ,Body mass index is 41 84 kg/m²       Current Facility-Administered Medications   Medication Dose Route Frequency Provider Last Rate    acetaminophen  650 mg Per NG Tube Q6H PRN Dicky Counter, PA-C      albuterol  2 puff Inhalation Q4H PRN Dicky Counter, PA-C      carisoprodol  175 mg Per NG Tube BID Dicky Counter, PA-LAURA      cefTRIAXone  2,000 mg Intravenous Q24H Dicky Counter, PA-C 2,000 mg (05/12/22 2224)    cloNIDine  0 1 mg Oral Q12H [facility] Ana Cristina Gonzalez PA-C      diazepam  5 mg Per NG Tube HS Ana Cristina Gonzalez PA-C      fluticasone-vilanterol  1 puff Inhalation Daily Ana Cristina Gonzalez PA-C      heparin (porcine)  7,500 Units Subcutaneous [facility] Nestor Cevallos      hydrALAZINE  10 mg Intravenous Q6H PRN Ana Cristina Gonzalez PA-C      insulin glargine  15 Units Subcutaneous HS Ana Cristina Gonzalez PA-C      insulin lispro  1-5 Units Subcutaneous HS Ana Cristina Conrad PA-C      insulin lispro  2-12 Units Subcutaneous Q6H [facility] Ana Cristina Gonzalez PA-C      levalbuterol  1 25 mg Nebulization BID [facility], [facility]-LAURA      morphine injection  4 mg Intravenous Q4H PRN [facility], PA-C      nystatin   Topical BID TROMSØ, PA-C      ondansetron  4 mg Intravenous Q6H [facility]      pantoprazole  40 mg Intravenous Q24H [facility] Ana Cristina Gonzalez, ESTHER      potassium phosphate  12 mmol Intravenous Once TROMSØ, PA-C 12 mmol (05/13/22 0810)    pregabalin  50 mg Per NG Tube BID [facility]      sodium chloride  3 mL Nebulization BID [facility], PA-C      sodium chloride  100 mL/hr Intravenous Continuous TROMSØ, PA-C 100 mL/hr (05/13/22 0541)    sucralfate  1 g Per NG Tube Q6H [facility] EMMANUEL, PA-C         Neurological    Mental status - the patient is awake and alert, there is evidence of decreased attention and concentration, as well as cognitive slowing with slow responses  She is oriented to person and place, not exact time  She is able to tell me the president  She was able to recognize objects, she was able to repeat without any difficulty, she was able to complete simple calculations not complex  There was no evidence of dysarthria, there is no aphasia  However there was slow to respond and cognitive slowing noted  Limited insight  Cranial nerves 2 through 12 are intact - except there is a decrease in nasal labial fold on the right, VF intact  Motor - she was able to lift her upper extremities off the bed without any drift, she had limited range of motion of the shoulders  She was able to move her legs equally on the lowers, but limited secondary to position  No focal weakness noted  No tremor noted with outstretched hands  Sensation - nonfocal to touch, no gross neglect  Coordination -  no ataxia noted on finger-to-nose     No evidence of seizure activity, observed       Lab, Imaging and other studies:   I have personally reviewed pertinent reports    , CBC:   Results from last 7 days   Lab Units 05/13/22  0347 05/13/22  0346 05/12/22  0430 05/11/22  2150   WBC Thousand/uL  --  18 62* 16 00* 17 34*   RBC Million/uL  --  4 91 5 41* 5 43*   HEMOGLOBIN g/dL  --  12 8 14 6 14 6   I STAT HEMOGLOBIN g/dl 12 9  --   --   --    HEMATOCRIT %  --  41 4 46 5* 45 9   HEMATOCRIT, ISTAT % 38  --   --   --    MCV fL  --  84 86 85   PLATELETS Thousands/uL  --  246 248 241   , BMP/CMP:   Results from last 7 days   Lab Units 05/13/22 0347 05/13/22  0346 05/12/22  0430 05/11/22  2150   SODIUM mmol/L  --  143 143 143   POTASSIUM mmol/L  --  3 7 3 6 3 0*   CHLORIDE mmol/L  --  111* 107 106   CO2 mmol/L  --  22 20* 21   CO2, I-STAT mmol/L 22  --   --   --    BUN mg/dL  --  26* 28* 26*   CREATININE mg/dL  --  0 81 0 90 0 89   GLUCOSE, ISTAT mg/dl 300*  --   --   --    CALCIUM mg/dL  --  8 4 8 8 9 0   AST U/L  --  8 17 14   ALT U/L  --  11* 13 15   ALK PHOS U/L  --  106 135* 141*   EGFR ml/min/1 73sq m  --  80 71 72   , Vitamin B12:   , HgBA1C:   Results from last 7 days   Lab Units 05/10/22  0631   HEMOGLOBIN A1C % 11 4*   , TSH:   , Coagulation:   , Lipid Profile:   , Ammonia:   Results from last 7 days   Lab Units 05/11/22  0912 05/09/22  1843   AMMONIA umol/L <10* <10*   , Urinalysis:   Results from last 7 days   Lab Units 05/12/22  0801 05/09/22  2159   COLOR UA  Katie Yellow   CLARITY UA  Clear Clear   SPEC GRAV UA  >=1 030 >=1 030   PH UA  6 0 5 5   LEUKOCYTES UA  Negative Negative   NITRITE UA  Negative Negative   GLUCOSE UA mg/dl 250 (1/4%)* 500 (1/2%)*   KETONES UA mg/dl 40 (2+)* 80 (3+)*   BILIRUBIN UA  Interference- unable to analyze* Negative   BLOOD UA  Moderate* Trace*   , Drug Screen:   Results from last 7 days   Lab Units 05/11/22  1135   BARBITURATE UR  Positive*   BENZODIAZEPINE UR  Positive*   THC UR  Negative   COCAINE UR  Negative   METHADONE URINE  Negative   OPIATE UR  Positive*   PCP UR  Negative   , Medication Drug Levels:       Invalid input(s): CARBAMAZEPINE,  PHENOBARB, LACOSAMIDE, OXCARBAZEPINE     Procedure: XR chest portable    Result Date: 5/12/2022  Narrative: CHEST INDICATION:   NGT placement  COMPARISON:  05/10/2022 EXAM PERFORMED/VIEWS:  XR CHEST PORTABLE Images: 2 FINDINGS: Cardiomediastinal silhouette appears unremarkable  An enteric tube is present  The tip of the tube lies in the proximal stomach  A sidehole lies just proximal to the EG junction  Bibasilar atelectasis  No pneumothorax or pleural effusion  Osseous structures appear within normal limits for patient age  Postoperative changes in the lower cervical spine  Impression: 1  Bibasilar atelectasis  2   An enteric tube is present  The tip of the tube lies in the proximal stomach Sidehole lies just proximal to the EG junction  The tube should be advanced at least 8 cm  Workstation performed: [id number]     Procedure: XR chest portable    Result Date: 5/11/2022  Narrative: CHEST INDICATION:   pneumonia  COMPARISON:  4/28/2021 EXAM PERFORMED/VIEWS:  XR CHEST PORTABLE FINDINGS:  Orthopedic hardware projects over the cervical spine  The heart is borderline in size on this portable examination  Increase interstitial opacities are noted as well as slight pulmonary vascular congestion slightly worse than the study of 2021  No focal infiltrate is identified, pneumothorax or pleural effusion  Osseous structures appear within normal limits for patient age  Impression: Increased interstitial opacities suggest some mild edema  No focal infiltrate is identified  Workstation performed: [id number]     Procedure: US right upper quadrant    Result Date: 5/11/2022  Narrative: RIGHT UPPER QUADRANT ULTRASOUND INDICATION:     elevated LFTS  COMPARISON:  CT abdomen pelvis 5/9/2022  TECHNIQUE:   Real-time ultrasound of the right upper quadrant was performed with a curvilinear transducer with both volumetric sweeps and still imaging techniques   FINDINGS: Study is limited secondary to overlying bowel gas, patient's body habitus, and limited patient mobility  PANCREAS:  Visualized portions of the pancreas are grossly unremarkable  AORTA AND IVC:  Visualized portions are normal for patient age  LIVER: Size:  Within normal range  The liver measures 15 5 cm in the midclavicular line  Contour:  Surface contour is smooth  Parenchyma:  Echogenicity and echotexture are within normal limits  No liver mass identified  Limited imaging of the main portal vein shows it to be patent and hepatopetal   BILIARY: Patient has undergone cholecystectomy  No intrahepatic biliary dilatation  CBD measures 4 0 mm  No evidence of choledocholithiasis within the visualized portions of the common bile duct  KIDNEY: Right kidney measures 10 8 x 5 0 x 5 4 cm  Volume 154 3 mL Kidney within normal limits  ASCITES:   None  Impression: 1  Limited exam demonstrating no sonographic evidence of acute process within the right upper quadrant  2   Status post cholecystectomy  Workstation performed: [id number]     Counseling / Coordination of Care  Reviewed with attending, plan of care per attending physician

## 2022-05-13 NOTE — PROGRESS NOTES
Nutrition    Increase as able to tube feed goal:    Jevity 1 2 @ 70 mL/hr x 24 hr,  flush 110 mL water q 4 hrs  Goal tube feed will provide: 2016 Kcal, 93 g protein, 2016 mL free water

## 2022-05-13 NOTE — CONSULTS
Consult - Critical Care   Severa Laming 61 y o  female MRN: [id number]  Unit/Bed#: [address] 201-01 Encounter: [id number]      -------------------------------------------------------------------------------------------------------------  Chief Complaint: Encephalopathy/Fever    History of Present Illness   HX and PE limited by: encephalopthy  Severa Laming is a 61 y o  female h/o poorly controlled insulin-dependent DM2, Chronic opioid/DESIREE dependence 2/2 chronic pain, HTN presented to ED by EMS on 5/9 when son reported worsening fatique/weakness and worsening confusion for 2 days  She was found to hyperglycemic and started on an insulin infusion and admitted to [facility]  She was witnessed to have several episodes of vomiting  Due to her persistent encephalopathy, neurology and toxicology were consulted and head CT was negative/MRI is pending  Patient developed a fever the morning of 5/12 of 101 5  Blood cultures and urine were sent, procalcitonin 0 09, ceftriaxone initiated and CXR did not show any obvious consolidation  At toxicology's recommendation, some of her long standing pharmacologic agents to treat chronic pain have been restarted at reduced doses  An NG tube was placed last night for medication administration since she had failed her speech evaluation  Critical care has been consulted to evaluate the patient due to high risk of decompensation  Currently patient is awake and oriented to person and place  She tells me she is at [address] and followed simple commands (raise hands)  She is currently HD stable  Current temp 99 8, Pulse 118, Resp 30, /97 (117), SpO2 94% 3 liters O2  History obtained from chart review and unobtainable from patient due to mental status   -------------------------------------------------------------------------------------------------------------  Assessment and Plan:  1  Acute Encephalopathy, suspect Toxic/Metabolic  2  AGMA  3   IDDM type II (A1C 11  4)  4  Leukocytosis (infectious vs reactive)  5  Fever: Infectious vs medication withdrawal?  6  Acute hypoxic respiratory insufficiency  7  Chronic Opioid/DESIREE dependence  8  Chronic pain syndrome  9  Diabetic gastoparesis    Neuro:    Diagnosis: Encephalopathy  o Plan:  o Neuro checks, f/u MRI (pending) Ammonia <10 5/11    o Notify critical care if any focal deficits are identified, GCS change >2 pts  o Neuro/toxicology following    o T/c checking TSH  Consider supplementing folate/thiamine    CV:   o Currently HD stable  Notify CC of Hemodynamic changes  o DVT ppx: heparin SQ [id number]  o Access: peripherals    Pulm:  o Titrate O2 to maintain SpO2 >92%  o Given h/o multiple episodes of vomiting, patient may be developing an aspiration pneumonitis that is not yet present on imaging    o T/c obtaining COVID PCR test given fever and having oxygen requirements    GI:   o Currently NPO due to failing formal speech eval 2/2 encephalopathy w/ known h/o dysphagia/gastroparesis  o Given her Insulin Dependent Diabetes and NPO status w/ current hyperglycemia, recommend initiating trickle tube feeds and advance as tolerated  o PPI: yes     :   o Monitor UO, [place] catheter maintenance]  o Trend creatinine    F/E/N:    Currently on 0 45% NacL @ 100/hr    Heme/Onc:   o Stable  Endo:   o Given the development of AGMA, patient may be developing DKA given NPO status  Lantus was ordered for tonight however patient is NPO  Recommend initiating trickle tube feeds  Consider rechecking BMP and if AG is still present, consider checking beta-hydroxy  o T/c check TSH      ID:   o Continue ceftriaxone for now pending cultures  o Trend WBC/temp/cultures/procalcitonin  o T/c checking COVID      MSK/Skin:   o Although unlikely, given blood in urine (likely just traumatic cath given RBCs) and limited mobility, consider checking CPK  o Frequent repositioning  Assess daily for skin breakdown       The patient does not currently meet criteria for transfer to the ICU however this patient is at high risk of decompensation  I have personally spoken to the patient's nurse and advised her to notify me of any clinical change  I have spoken to the [facility] provider, Aydee Portillo and advised to notify if anything changes  Case Reviewed w/ Attending Dr Gracie Roth      Disposition: Continue Stepdown Level 2 level of care   Code Status: Level 1 - Full Code  --------------------------------------------------------------------------------------------------------------  Review of Systems   Unable to perform ROS: Mental status change       A 12-point, complete review of systems was reviewed and negative except as stated above     Physical Exam  Vitals and nursing note reviewed  Constitutional:       Appearance: She is ill-appearing  HENT:      Head: Normocephalic  Nose:      Comments: NG tube in place     Mouth/Throat:      Mouth: Mucous membranes are dry  Eyes:      Pupils: Pupils are equal, round, and reactive to light  Cardiovascular:      Rate and Rhythm: Normal rate and regular rhythm  Pulmonary:      Effort: Tachypnea present  Abdominal:      General: Bowel sounds are normal       Palpations: Abdomen is soft  Tenderness: There is no abdominal tenderness  Genitourinary:     Comments: Singer in place  Skin:     General: Skin is warm and dry  Capillary Refill: Capillary refill takes less than 2 seconds  Neurological:      General: No focal deficit present  GCS: GCS eye subscore is 4  GCS verbal subscore is 4   GCS motor subscore is 6        --------------------------------------------------------------------------------------------------------------  Vitals:   Vitals:    05/12/22 1800 05/12/22 1907 05/12/22 1945 05/12/22 1946   BP:  158/97     BP Location:  Right arm     Pulse:  (!) 118     Resp:  (!) 30     Temp:  99 8 °F (37 7 °C)     TempSrc:  Axillary     SpO2: 92% 94% 94% 96%   Weight:       Height:         Temp Min: 97 6 °F (36 4 °C)  Max: 101 5 °F (38 6 °C)     Height: 5' 7 99" (172 7 cm)  Body mass index is 45 06 kg/m²    N/A    Laboratory and Diagnostics:  Results from last 7 days   Lab Units 05/12/22 0430 05/11/22 2150 05/11/22  0912 05/10/22  0631 05/09/22  1843   WBC Thousand/uL 16 00* 17 34* 18 04* 16 47* 11 83*   HEMOGLOBIN g/dL 14 6 14 6 14 6 14 6 13 6   HEMATOCRIT % 46 5* 45 9 45 3 45 3 42 4   PLATELETS Thousands/uL 248 241 240 265 202   NEUTROS PCT %  --  81*  --  86* 87*   MONOS PCT %  --  6  --  5 4     Results from last 7 days   Lab Units 05/12/22  0430 05/11/22  2150 05/11/22  0912 05/10/22  1606 05/10/22  0631 05/10/22  0048 05/09/22  1843   SODIUM mmol/L 143 143 143 141 142 138 137   POTASSIUM mmol/L 3 6 3 0* 3 0* 3 3* 3 6 3 7 4 8   CHLORIDE mmol/L 107 106 107 104 102 100 98*   CO2 mmol/L 20* 21 26 26 25 28 26   ANION GAP mmol/L 16* 16* 10 11 15* 10 13   BUN mg/dL 28* 26* 22 21 18 18 18   CREATININE mg/dL 0 90 0 89 0 81 0 87 0 87 0 82 0 76   CALCIUM mg/dL 8 8 9 0 9 1 9 7 10 1 9 8 9 3   GLUCOSE RANDOM mg/dL 303* 267* 175* 196* 176* 271* 361*   ALT U/L 13 15 17  --  17  --  25   AST U/L 17 14 13  --  14  --  26   ALK PHOS U/L 135* 141* 143*  --  165*  --  174*   ALBUMIN g/dL 3 2* 3 1* 3 2*  --  3 7  --  3 8   TOTAL BILIRUBIN mg/dL 1 09* 1 32* 1 20*  --  1 24*  --  1 51*     Results from last 7 days   Lab Units 05/12/22 0430 05/11/22  0912 05/10/22  0631   MAGNESIUM mg/dL 1 9 1 5* 1 5*   PHOSPHORUS mg/dL  --  4 1  --                    ABG:  Results from last 7 days   Lab Units 05/12/22 1953   PH ART  7 391   PCO2 ART mm Hg 32 5*   PO2 ART mm Hg 101 9   HCO3 ART mmol/L 19 3*   BASE EXC ART mmol/L -4 6   ABG SOURCE  Radial, Right     VBG:  Results from last 7 days   Lab Units 05/12/22 1953 05/11/22  2219   PH GAUTAM   --  7 466*   PCO2 GAUTAM mm Hg  --  30 0*   PO2 GAUTAM mm Hg  --  100 5*   HCO3 GAUTAM mmol/L  --  21 1*   BASE EXC GAUTAM mmol/L  --  -1 3   ABG SOURCE  Radial, Right  --      Results from last 7 days Lab Units 05/12/22  0430 05/11/22  0503   PROCALCITONIN ng/ml 0 09 0 08       Micro:  Results from last 7 days   Lab Units 05/11/22  2158 05/11/22  2157   BLOOD CULTURE  Received in Microbiology Lab  Culture in Progress  Received in Microbiology Lab  Culture in Progress  EKG: Sinus Tachycardia  Imaging: I have personally reviewed pertinent reports        Historical Information   Past Medical History:   Diagnosis Date    Acute respiratory insufficiency 11/23/2019    Asthma     Chronic pain     Diabetes ([facility] [address] )     HTN (hypertension)     [facility]epsis ([facility] ) 11/23/2019     Past Surgical History:   Procedure Laterality Date    CERVICAL LAMINECTOMY      CHOLECYSTECTOMY      CHOLECYSTECTOMY LAPAROSCOPIC N/A 11/10/2018    Procedure: CHOLECYSTECTOMY LAPAROSCOPIC w/ ioc;  Surgeon: Analia Natarajan MD;  Location: [facility];  Service: General    HYSTERECTOMY      JOINT REPLACEMENT Bilateral     knee    TOE AMPUTATION Right 2/2/2018    Procedure: AMPUTATION TOE, RIGHT GREAT TOE;  Surgeon: Vera Selby DPM;  Location: [facility];  Service: Podiatry     Social History   Social History     Substance and Sexual Activity   Alcohol Use Not Currently    Comment: rarely     Social History     Substance and Sexual Activity   Drug Use No     Social History     Tobacco Use   Smoking Status Never Smoker   Smokeless Tobacco Never Used     Exercise History: n/a  Family History:   Family History   Problem Relation Age of Onset    Diabetes Mother     Diabetes Maternal Grandmother     No Known Problems Father      I have reviewed this patient's family history and commented on sigificant items within the HPI      Medications:  Current Facility-Administered Medications   Medication Dose Route Frequency    acetaminophen (TYLENOL) tablet 650 mg  650 mg Per NG Tube Q6H PRN    albuterol (PROVENTIL HFA,VENTOLIN HFA) inhaler 2 puff  2 puff Inhalation Q4H PRN    carisoprodol (SOMA) tablet 175 mg  175 mg Per NG Tube BID    cefTRIAXone (ROCEPHIN) 2,000 mg in dextrose 5 % 50 mL IVPB  2,000 mg Intravenous Q24H    cloNIDine (CATAPRES) tablet 0 1 mg  0 1 mg Oral Q12H [address]    diazepam (VALIUM) tablet 5 mg  5 mg Per NG Tube HS    fluticasone-vilanterol (BREO ELLIPTA) 200-25 MCG/INH inhaler 1 puff  1 puff Inhalation Daily    heparin (porcine) subcutaneous injection 7,500 Units  7,500 Units Subcutaneous Q8H [address]    insulin glargine (LANTUS) subcutaneous injection 15 Units 0 15 mL  15 Units Subcutaneous HS    insulin lispro (HumaLOG) 100 units/mL subcutaneous injection 1-5 Units  1-5 Units Subcutaneous HS    insulin lispro (HumaLOG) 100 units/mL subcutaneous injection 2-12 Units  2-12 Units Subcutaneous Q6H [address]    levalbuterol (XOPENEX) inhalation solution 1 25 mg  1 25 mg Nebulization BID    morphine (PF) 4 mg/mL injection 4 mg  4 mg Intravenous Q4H PRN    nystatin (MYCOSTATIN) powder   Topical BID    ondansetron (ZOFRAN) injection 4 mg  4 mg Intravenous Q6H PRN    pantoprazole (PROTONIX) injection 40 mg  40 mg Intravenous Q24H BOLIVAR    pregabalin (LYRICA) capsule 50 mg  50 mg Per NG Tube BID    sodium chloride 0 9 % inhalation solution 3 mL  3 mL Nebulization BID    sodium chloride infusion 0 45 %  100 mL/hr Intravenous Continuous    sucralfate (CARAFATE) tablet 1 g  1 g Per NG Tube Q6H [address]     Home medications:  Prior to Admission Medications   Prescriptions Last Dose Informant Patient Reported? Taking?    BD PEN NEEDLE HERNAN U/F 32G X 4 MM MISC   Yes No   Insulin Pen Needle 29G X 5MM MISC   No No   Sig: by Does not apply route 4 (four) times a day   LYRICA 150 MG capsule   Yes No   Sig: Take 150 mg by mouth 2 (two) times a day   WIXELA INHUB 500-50 MCG/DOSE inhaler   Yes No   Sig: Take 1 puff by mouth 2 (two) times a day   albuterol (PROVENTIL HFA,VENTOLIN HFA) 90 mcg/act inhaler   Yes No   Sig: Inhale 2 puffs every 4 (four) hours as needed   carisoprodol (SOMA) 350 mg tablet  Self Yes No   Sig: Take 350 mg by mouth 2 (two) times a day diazepam (VALIUM) 5 mg tablet  Self Yes No   Sig: Take 5 mg by mouth daily at bedtime as needed for anxiety   fluticasone (FLONASE) 50 mcg/act nasal spray  Self Yes No   Si spray into each nostril daily   furosemide (LASIX) 20 mg tablet   Yes No   Sig: Take 40 mg by mouth as needed    insulin glargine (LANTUS) 100 units/mL subcutaneous injection  Self Yes No   Sig: Inject 50 Units under the skin daily at bedtime     insulin lispro (HumaLOG) 100 units/mL injection   No No   Sig: INJECT 10 UNITS UNDER THE SKIN 3 (THREE) TIMES A DAY WITH MEALS   lisinopril (ZESTRIL) 10 mg tablet   No No   Sig: Take 1 tablet (10 mg total) by mouth daily   metoclopramide (REGLAN) 10 mg tablet   No No   Sig: Take 1 tablet (10 mg total) by mouth 4 (four) times a day   metoclopramide (REGLAN) 10 mg tablet   No No   Sig: TAKE 1 TABLET BY MOUTH FOUR TIMES A DAY BEFORE MEALS AND AT BEDTIME   montelukast (SINGULAIR) 10 mg tablet  Self Yes No   Sig: Take 10 mg by mouth daily at bedtime   morphine (MS CONTIN) 15 mg 12 hr tablet   Yes No   Sig: Take 15 mg by mouth 2 (two) times a day   morphine (MS CONTIN) 60 mg 12 hr tablet   Yes No   Sig: Take 60 mg by mouth 2 (two) times a day   nystatin (MYCOSTATIN) powder   No No   Sig: Apply topically 2 (two) times a day   omeprazole (PriLOSEC) 20 mg delayed release capsule  Self Yes No   Sig: Take 20 mg by mouth daily   ondansetron (ZOFRAN) 4 mg tablet   No No   Sig: Take 1 tablet (4 mg total) by mouth every 6 (six) hours   pravastatin (PRAVACHOL) 10 mg tablet   Yes No   Sig: Take 10 mg by mouth daily at bedtime      Facility-Administered Medications: None     Allergies:   Allergies   Allergen Reactions    Nsaids GI Intolerance     ------------------------------------------------------------------------------------------------------------  Advance Directive and Living Will:      Power of :    POLST: ------------------------------------------------------------------------------------------------------------  Anticipated Length of Stay is > 2 midnights    Care Time Delivered:   No Critical Care time spent       [facility] PAElba        Portions of the record may have been created with voice recognition software  Occasional wrong word or "sound a like" substitutions may have occurred due to the inherent limitations of voice recognition software    Read the chart carefully and recognize, using context, where substitutions have occurred

## 2022-05-13 NOTE — PROGRESS NOTES
Daily Progress Note - Critical Care   Boneta Channel 61 y o  female MRN: [id number]  Unit/Bed#: ICU 09 Encounter: [id number]        ----------------------------------------------------------------------------------------  HPI/24hr events: 62 y/o female h/o poorly controlled IDDM, Chronic opio/DESIREE dependence 2/2 chronic pain, HTN admitted 5/9 2/2 encephalopathy/vomiting  Found to be hyperglycemic  NG tube placed for medication administration and neurology/toxicology has been consulted for encephalopathy  MRI ordered (pending)  Infectious workup initiated after developing fever  This am, had rapid response when developed SVT which broke w/ 6mg adenosine IV  HD stable  Due to high risk decompensation, was transferred to to [facility] under CC service        ---------------------------------------------------------------------------------------  SUBJECTIVE  weakness    Review of Systems   All other systems reviewed and are negative  Review of systems was reviewed and negative unless stated above in HPI/24-hour events   ---------------------------------------------------------------------------------------  Assessment and Plan:  1  Toxic Metabolic Encephalopathy  2  AGMA  3  Supraventicular Tachycardia  4  IDDM type II (A1C 11 4) w/ persistent hyperglycemia  5  Leukocytosis (infectious vs reactive)  6  Fever: infectious versis medication withdrawal  7  Acute hypoxic respiratory insufficiency  8  Chronic Opiooid/DESIREE dependence  9  Diabetic gastroparesis      Neuro:   o Neuro checks, [facility]ICU  o F/u MRI (pending), Ammonia <10  o Current regimen: SOMA 175 mg daily, valium 5mg daily hs, lyrica 50mg bid, morphine 4mg IV q4 prn  o Home regimen: SOMA 350mg bid, valium 5mg daily, lyrica 150mg bid, morphine 15 bid/morphine 60mg bid    CV:   o Continue telemetry   Maintain K >4, Mg>2  o Access: Peripheral x3  o DVT ppx: heparin SQ    Pulm:  o Titrate O2 to maintain SPO2 >92%  o tcheck am procalcitonin  o Continue breo-ellipta, xopenex bid w/ prn albuterol    GI:   o Currently NPO  Continue enteral feeds for now  F/u nutrition eval for tube feed recs and speech eval    o Continue sucralfate  o Outpatient f/u w/ GI for gastroparesis/hepatic workup for abnormal LFTS/GGT  :   o Monitor UO  Singer catheter maintenence    F/E/N:    Replete lytes prn      Heme/Onc:   o Trend  Endo:   o Consider transitioning to insulin drip given persistant hyperglycemia and inability to take oral intake  If tolerates enteral feeds, will instead increase lantus  ID:   o Trend wbc/temp/cultures  o Continue ceftriaxone for now  Urine: moderate bacteria/4-10 WBC/2+ ketones      MSK/Skin:   o Frequent repositioning  Assess daily for skin breakdown  Patient appropriate for transfer out of the ICU today?: No  Disposition: Continue Stepdown Level 1 level of care   Code Status: Level 1 - Full Code  ---------------------------------------------------------------------------------------  ICU CORE MEASURES    Prophylaxis   VTE Pharmacologic Prophylaxis: Heparin  VTE Mechanical Prophylaxis: sequential compression device  Stress Ulcer Prophylaxis: Omeprazole PO    ABCDE Protocol (if indicated)  Plan to perform spontaneous awakening trial today? No  Plan to perform spontaneous breathing trial today? Not applicable  Obvious barriers to extubation? Not applicable  CAM-ICU: Positive    Invasive Devices Review  Invasive Devices  Report    Peripheral Intravenous Line  Duration           Peripheral IV 05/09/22 Left Antecubital 3 days    Peripheral IV 05/11/22 Left;Ventral (anterior) Forearm 2 days    Peripheral IV 05/13/22 Right Antecubital <1 day          Drain  Duration           NG/OG/Enteral Tube Nasogastric 16 Fr Left nare 1 day    Urethral Catheter Double-lumen 16 Fr  1 day              Can any invasive devices be discontinued today?  Not applicable  ---------------------------------------------------------------------------------------  OBJECTIVE    Vitals Vitals:    22 0407 22 0428 22 0435 22 0500   BP: 97/68  113/77    BP Location: Right arm      Pulse: (!) 128  (!) 126 (!) 128   Resp: (!) 34  (!) 34 (!) 35   Temp:  97 9 °F (36 6 °C)     TempSrc:  Temporal     SpO2: 95%  95% 96%   Weight:    125 kg (275 lb 2 2 oz)   Height:         Temp (24hrs), Av 1 °F (37 8 °C), Min:97 9 °F (36 6 °C), Max:101 5 °F (38 6 °C)  Current: Temperature: 97 9 °F (36 6 °C)    Respiratory:  SpO2: SpO2: 96 %  Nasal Cannula O2 Flow Rate (L/min): 3 L/min    Invasive/non-invasive ventilation settings   Respiratory  Report   Lab Data (Last 4 hours)    None         O2/Vent Data (Last 4 hours)    None                Physical Exam  Vitals reviewed  Constitutional:       General: She is not in acute distress  Appearance: She is ill-appearing  HENT:      Head: Normocephalic  Nose:      Comments: NG tube in place     Mouth/Throat:      Mouth: Mucous membranes are dry  Eyes:      Pupils: Pupils are equal, round, and reactive to light  Cardiovascular:      Rate and Rhythm: Regular rhythm  Tachycardia present  Pulses: Normal pulses  Pulmonary:      Effort: Tachypnea present  Abdominal:      General: Abdomen is protuberant  Palpations: Abdomen is soft  Genitourinary:     Comments: Singer in place  Skin:     General: Skin is warm and dry  Capillary Refill: Capillary refill takes less than 2 seconds  Neurological:      General: No focal deficit present  GCS: GCS eye subscore is 3  GCS verbal subscore is 5  GCS motor subscore is 6  Comments: Aware she is at [address]   Not aware of time             Laboratory and Diagnostics:  Results from last 7 days   Lab Units 22  0347 22  0346 22  0430 22  2150 22  0912 05/10/22  0631 22  1843   WBC Thousand/uL  --  18 62* 16 00* 17 34* 18 04* 16 47* 11 83*   HEMOGLOBIN g/dL  --  12 8 14 6 14 6 14 6 14 6 13 6   I STAT HEMOGLOBIN g/dl 12 9  --   --   --   -- --   --    HEMATOCRIT %  --  41 4 46 5* 45 9 45 3 45 3 42 4   HEMATOCRIT, ISTAT % 38  --   --   --   --   --   --    PLATELETS Thousands/uL  --  246 248 241 240 265 202   NEUTROS PCT %  --  73  --  81*  --  86* 87*   MONOS PCT %  --  8  --  6  --  5 4     Results from last 7 days   Lab Units 05/13/22  0347 05/13/22  0346 05/12/22  0430 05/11/22  2150 05/11/22  0912 05/10/22  1606 05/10/22  0631 05/10/22  0048 05/09/22  1843   SODIUM mmol/L  --  143 143 143 143 141 142 138 137   POTASSIUM mmol/L  --  3 7 3 6 3 0* 3 0* 3 3* 3 6 3 7 4 8   CHLORIDE mmol/L  --  111* 107 106 107 104 102 100 98*   CO2 mmol/L  --  22 20* 21 26 26 25 28 26   CO2, I-STAT mmol/L 22  --   --   --   --   --   --   --   --    ANION GAP mmol/L  --  10 16* 16* 10 11 15* 10 13   BUN mg/dL  --  26* 28* 26* 22 21 18 18 18   CREATININE mg/dL  --  0 81 0 90 0 89 0 81 0 87 0 87 0 82 0 76   CALCIUM mg/dL  --  8 4 8 8 9 0 9 1 9 7 10 1 9 8 9 3   GLUCOSE RANDOM mg/dL  --  302* 303* 267* 175* 196* 176* 271* 361*   ALT U/L  --  11* 13 15 17  --  17  --  25   AST U/L  --  8 17 14 13  --  14  --  26   ALK PHOS U/L  --  106 135* 141* 143*  --  165*  --  174*   ALBUMIN g/dL  --  2 6* 3 2* 3 1* 3 2*  --  3 7  --  3 8   TOTAL BILIRUBIN mg/dL  --  0 76 1 09* 1 32* 1 20*  --  1 24*  --  1 51*     Results from last 7 days   Lab Units 05/13/22 0346 05/12/22  0430 05/11/22  0912 05/10/22  0631   MAGNESIUM mg/dL 1 7 1 9 1 5* 1 5*   PHOSPHORUS mg/dL 2 3*  --  4 1  --                Results from last 7 days   Lab Units 05/13/22  0346   LACTIC ACID mmol/L 1 7     ABG:  Results from last 7 days   Lab Units 05/12/22 1953   PH ART  7 391   PCO2 ART mm Hg 32 5*   PO2 ART mm Hg 101 9   HCO3 ART mmol/L 19 3*   BASE EXC ART mmol/L -4 6   ABG SOURCE  Radial, Right     VBG:  Results from last 7 days   Lab Units 05/12/22 1953 05/11/22  2219   PH GAUTAM   --  7 466*   PCO2 GAUTAM mm Hg  --  30 0*   PO2 GAUTAM mm Hg  --  100 5*   HCO3 GAUTAM mmol/L  --  21 1*   BASE EXC GAUTAM mmol/L  --  -1 3 ABG SOURCE  Radial, Right  --      Results from last 7 days   Lab Units 05/12/22  0430 05/11/22  0503   PROCALCITONIN ng/ml 0 09 0 08       Micro  Results from last 7 days   Lab Units 05/11/22  2158 05/11/22  2157   BLOOD CULTURE  Received in Microbiology Lab  Culture in Progress  Received in Microbiology Lab  Culture in Progress  EKG: Sinus tachycardia  Imaging:  I have personally reviewed pertinent reports  Intake and Output  I/O       05/11 0701 05/12 0700 05/12 0701 05/13 0700 05/13 0701 05/14 0700    NG/GT  140     Feedings  63     Total Intake(mL/kg)  203 (1 6)     Urine (mL/kg/hr) 2350 (0 7) 950 (0 3)     Emesis/NG output 0      Stool  40     Total Output 2350 990     Net -2350 -787            Unmeasured Emesis Occurrence 1 x            Height and Weights   Height: 5' 7 99" (172 7 cm)     Body mass index is 41 84 kg/m²  Weight (last 2 days)     Date/Time Weight    05/13/22 0500 125 (275 13)            Nutrition       Diet Orders   (From admission, onward)             Start     Ordered    05/12/22 2304  Diet Enteral/Parenteral; Tube Feeding No Oral Diet; Jevity 1 2 Rosas; Continuous; 20  Diet effective now        Comments: Start at [address]  Advance to 20/hr  References:    Nutrtion Support Algorithm Enteral vs  Parenteral   Question Answer Comment   Diet Type Enteral/Parenteral    Enteral/Parenteral Tube Feeding No Oral Diet    Tube Feeding Formula: Jevity 1 2 Rosas    Bolus/Cyclic/Continuous Continuous    Tube Feeding Goal Rate (mL/hr): 20    RD to adjust diet per protocol?  Yes        05/12/22 2305                  Active Medications  Scheduled Meds:  Current Facility-Administered Medications   Medication Dose Route Frequency Provider Last Rate    acetaminophen  650 mg Per NG Tube Q6H PRN Angelalla ESTHER Castaneda      albuterol  2 puff Inhalation Q4H PRN Cama ESTHER Castaneda      carisoprodol  175 mg Per NG Tube BID Apolonia Castaneda PA-C      cefTRIAXone  2,000 mg Intravenous Q24H Apolonia Castaneda ESTHER 2,000 mg (05/12/22 2224)    cloNIDine  0 1 mg Oral Q12H [address] Ana Cristina Gonzalez, ESTHER      diazepam  5 mg Per NG Tube HS Ana Cristina Gonzalez, ESTHER      fluticasone-vilanterol  1 puff Inhalation Daily [facility], ESTHER      heparin (porcine)  7,500 Units Subcutaneous [facility] Ana Cristina Gonzalez, [place]      hydrALAZINE  10 mg Intravenous Q6H PRN [facility], ESTHER      insulin glargine  15 Units Subcutaneous HS Ana Cristina Gonzalez, ESTHER      insulin lispro  1-5 Units Subcutaneous HS Ana Cristina Lisa, ESTHER      insulin lispro  2-12 Units Subcutaneous Q6H [address] Ana Cristina Gonzalez, ESTHER      levalbuterol  1 25 mg Nebulization BID [facility], ESTHER      morphine injection  4 mg Intravenous Q4H PRN [facility], ESTHER      nystatin   Topical BID [facility], ESTHER      ondansetron  4 mg Intravenous Q6H PRN [facility], ESTHER      pantoprazole  40 mg Intravenous Q24H [address] [facility] Lisa, ESTHER      pregabalin  50 mg Per NG Tube BID [facility], ESTHER      sodium chloride  3 mL Nebulization BID [facility], ESTHER      sodium chloride  100 mL/hr Intravenous Continuous [facility], ESTHER 100 mL/hr (05/13/22 0541)    sucralfate  1 g Per NG Tube Q6H [address]  Ana Cristina Conrad PA-C       Continuous Infusions:  sodium chloride, 100 mL/hr, Last Rate: 100 mL/hr (05/13/22 0541)      PRN Meds:   acetaminophen, 650 mg, Q6H PRN  albuterol, 2 puff, Q4H PRN  hydrALAZINE, 10 mg, Q6H PRN  morphine injection, 4 mg, Q4H PRN  ondansetron, 4 mg, Q6H PRN        Allergies   Allergies   Allergen Reactions    Nsaids GI Intolerance     ---------------------------------------------------------------------------------------  Advance Directive and Living Will:      Power of :    POLST:    ---------------------------------------------------------------------------------------  Care Time Delivered:   see attending attestation    [facility], ESTHER      Portions of the record may have been created with voice recognition software    Occasional wrong word or "sound a like" substitutions may have occurred due to the inherent limitations of voice recognition software    Read the chart carefully and recognize, using context, where substitutions have occurred

## 2022-05-13 NOTE — OCCUPATIONAL THERAPY NOTE
Occupational Therapy Evaluation([id number]=[id number])     Patient Name: Dominik López  QOCMD'A Date: 5/13/2022  Problem List  Principal Problem:    Type 2 diabetes mellitus with hyperglycemia, with long-term current use of insulin ([facility] )  Active Problems:    Opioid dependence with other opioid-induced disorder ([facility] )    Hypokalemia    Diabetic gastroparesis (HCC)    Essential hypertension    Acute encephalopathy    Leukocytosis    Heme positive stool    Urine retention    SVT (supraventricular tachycardia) (HCC)    Past Medical History  Past Medical History:   Diagnosis Date    Acute respiratory insufficiency 11/23/2019    Asthma     Chronic pain     Diabetes ([facility] )     HTN (hypertension)     Sepsis ([facility] ) 11/23/2019     Past Surgical History  Past Surgical History:   Procedure Laterality Date    CERVICAL LAMINECTOMY      CHOLECYSTECTOMY      CHOLECYSTECTOMY LAPAROSCOPIC N/A 11/10/2018    Procedure: CHOLECYSTECTOMY LAPAROSCOPIC w/ ioc;  Surgeon: Gallo Singleton MD;  Location: [facility];  Service: General    HYSTERECTOMY      JOINT REPLACEMENT Bilateral     knee    TOE AMPUTATION Right 2/2/2018    Procedure: AMPUTATION TOE, RIGHT GREAT TOE;  Surgeon: Celena Rene DPM;  Location: [facility];  Service: Podiatry             05/13/22 1200   Note Type   Note type Evaluation   Restrictions/Precautions   Weight Bearing Precautions Per Order No   Pain Assessment   Pain Assessment Tool 0-10   Pain Score 9   Pain Location/Orientation Location: Back   Pain Rating: FLACC (Rest) - Face 0   Pain Rating: FLACC (Rest) - Legs 0   Pain Rating: FLACC (Rest) - Activity 0   Pain Rating: FLACC (Rest) - Cry 1   Pain Rating: FLACC (Rest) - Consolability 0   Score: FLACC (Rest) 1   Home Living   Type of Home Mobile home   Home Layout One level   Bathroom Shower/Tub Tub/shower unit   Bathroom Toilet Standard   Home Equipment Walker;Cane   Prior Function   Lives With Spouse; Son   ADL Assistance Independent   Falls in the last 6 months 1 to 4  (1)   Lifestyle   Autonomy PTA pt states independence with all aspects of her ADLs, transfers, ambulation--with RW   Reciprocal Relationships 2 children   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   Intrinsic Gratification watching TV   Psychosocial   Psychosocial (WDL) X   Patient Behaviors/Mood Flat affect; Cooperative   Subjective   Subjective "I have been sitting in the chair since this morning "   ADL   Where Assessed Edge of bed   Eating Assistance 5  Supervision/Setup   Grooming Assistance 5  Supervision/Setup   UB Bathing Assistance 3  Moderate Assistance   LB Bathing Assistance 2  Maximal [address] 3  Moderate Assistance   LB Dressing Assistance 2  Maximal Assistance   Bed Mobility   Sit to Supine 2  Maximal assistance   Additional items Increased time required;Verbal cues;LE management   Transfers   Sit to Stand 3  Moderate assistance   Additional items Assist x 2; Increased time required;Verbal cues   Stand to Sit 3  Moderate assistance   Additional items Assist x 2; Increased time required;Verbal cues   Functional Mobility   Functional Mobility 3  Moderate assistance   Additional Comments x2   Additional items Rolling walker   Balance   Static Sitting Fair +   Dynamic Sitting Fair   Static Standing Poor +   Dynamic Standing Poor   Activity Tolerance   Activity Tolerance Patient limited by fatigue;Patient limited by pain   Medical Staff Made Aware nsg, P T    RUE Assessment   RUE Assessment WFL  (limited shr flex(i e 90*), PROM=WFLs)   RUE Strength   RUE Overall Strength Within Functional Limits - able to perform ADL tasks with strength  (3+/5 throughout)   LUE Assessment   LUE Assessment WFL  (limited shr flex(i e 90*), PROM=WFLs)   LUE Strength   LUE Overall Strength Within Functional Limits - able to perform ADL tasks with strength  (3+/5 throughout)   Hand Function   Gross Motor Coordination Functional   Fine Motor Coordination Functional   Sensation   Light Touch No apparent deficits   Proprioception   Proprioception No apparent deficits   Vision-Basic Assessment   Current Vision   (glasses)   Vision - Complex Assessment   Acuity Able to read clock/calendar on wall without difficulty   Perception   Inattention/Neglect Appears intact   Cognition   Overall Cognitive Status Impaired   Arousal/Participation Alert   Attention Attends with cues to redirect   Orientation Level Oriented to person;Oriented to place; Disoriented to time;Disoriented to situation   Memory Decreased short term memory;Decreased recall of precautions   Following Commands Follows one step commands with increased time or repetition   Assessment   Limitation Decreased ADL status; Decreased UE ROM; Decreased UE strength;Decreased Safe judgement during ADL;Decreased cognition;Decreased endurance;Decreased high-level ADLs   Prognosis Fair   Assessment Pt is a 56y/o female admitted to the hospital 2* symptoms of worsening confusion, weakness, and fatigue  Pt noted with toxic metabolic encephalopathy, hyperglycemia, AGMA, SVT, and acute respiratory insufficiency  Pt with PMH DM, chronic opioid/chronic pain, HTN, sepsis, asthma, b/l TKR, C-spine sx, and toe amputation  PTA pt states independence with all aspects of her ADLs, transfers, ambulation--with RW  During initial eval, pt demonstrated deficits with her functional balance, functional mobility, ADL status, transfer safety, b/l UE strength, activity tolerance(currently fair=15-20mins), and cognition(i e memory, problem-solving)  Pt would benefit from continue OT tx for the above deficits  3-5xwk/1-2wks  Goals   Patient Goals "to be able to go home"   STG Time Frame   (1-7 days)   Short Term Goal #1 Pt will demonstrate improved activity tolerance to good(20-30mins) and standing tolerance to 3-5mins to assist with ADLs  Short Term Goal #2 Pt will tolerate continued cognitive/home-safety assessment and appropriate d/c recommendations will be provided     Short Term Goal  Pt will demonstrate Jules with their sit-stand transfers to assist with completion of their LE dressing  LTG Time Frame   (7-14 days)   Long Term Goal #1 Pt will demonstrate proper walker/transfer safety 100% of the time  Long Term Goal #2 Pt will demonstrate g/g- balance with all functional activities  Long Term Goal Pt will demonstrate mod I with their UE and LE bathing/dresssing  Plan   Treatment Interventions ADL retraining;Functional transfer training;UE strengthening/ROM; Endurance training;Cognitive reorientation;Patient/family training;Continued evaluation; Compensatory technique education;Equipment evaluation/education   Goal Expiration Date 05/27/22   OT Treatment Day 0   OT Frequency 3-5x/wk   Recommendation   OT Discharge Recommendation Post acute rehabilitation services   AM-PAC Daily Activity Inpatient   Lower Body Dressing 2   Bathing 2   Toileting 2   Upper Body Dressing 2   Grooming 3   Eating 4   Daily Activity Raw Score 15   Daily Activity Standardized Score (Calc for Raw Score >=11) 34 69   AM-PAC Applied Cognition Inpatient   Following a Speech/Presentation 3   Understanding Ordinary Conversation 3   Taking Medications 3   Remembering Where Things Are Placed or Put Away 2   Remembering List of 4-5 Errands 2   Taking Care of Complicated Tasks 2   Applied Cognition Raw Score 15   Applied Cognition Standardized Score 33 54   Debi Fagan, OT

## 2022-05-14 PROBLEM — E87.6 HYPOKALEMIA: Status: RESOLVED | Noted: 2018-02-02 | Resolved: 2022-05-14

## 2022-05-14 PROBLEM — E11.43 DIABETIC GASTROPARESIS (HCC): Chronic | Status: ACTIVE | Noted: 2018-05-22

## 2022-05-14 PROBLEM — I10 ESSENTIAL HYPERTENSION: Chronic | Status: ACTIVE | Noted: 2018-11-10

## 2022-05-14 PROBLEM — E11.65 TYPE 2 DIABETES MELLITUS WITH HYPERGLYCEMIA, WITH LONG-TERM CURRENT USE OF INSULIN (HCC): Chronic | Status: ACTIVE | Noted: 2018-02-02

## 2022-05-14 PROBLEM — K31.84 DIABETIC GASTROPARESIS (HCC): Chronic | Status: ACTIVE | Noted: 2018-05-22

## 2022-05-14 PROBLEM — Z79.4 TYPE 2 DIABETES MELLITUS WITH HYPERGLYCEMIA, WITH LONG-TERM CURRENT USE OF INSULIN (HCC): Chronic | Status: ACTIVE | Noted: 2018-02-02

## 2022-05-14 LAB
ALBUMIN SERPL BCP-MCNC: 2.7 G/DL (ref 3.5–5)
ALP SERPL-CCNC: 110 U/L (ref 46–116)
ALT SERPL W P-5'-P-CCNC: 12 U/L (ref 12–78)
ANION GAP SERPL CALCULATED.3IONS-SCNC: 9 MMOL/L (ref 4–13)
AST SERPL W P-5'-P-CCNC: 10 U/L (ref 5–45)
BILIRUB SERPL-MCNC: 0.73 MG/DL (ref 0.2–1)
BUN SERPL-MCNC: 16 MG/DL (ref 5–25)
CALCIUM ALBUM COR SERPL-MCNC: 9.7 MG/DL (ref 8.3–10.1)
CALCIUM SERPL-MCNC: 8.7 MG/DL (ref 8.3–10.1)
CHLORIDE SERPL-SCNC: 107 MMOL/L (ref 100–108)
CO2 SERPL-SCNC: 24 MMOL/L (ref 21–32)
CREAT SERPL-MCNC: 0.73 MG/DL (ref 0.6–1.3)
ERYTHROCYTE [DISTWIDTH] IN BLOOD BY AUTOMATED COUNT: 13.4 % (ref 11.6–15.1)
GFR SERPL CREATININE-BSD FRML MDRD: 91 ML/MIN/1.73SQ M
GLUCOSE SERPL-MCNC: 208 MG/DL (ref 65–140)
GLUCOSE SERPL-MCNC: 231 MG/DL (ref 65–140)
GLUCOSE SERPL-MCNC: 253 MG/DL (ref 65–140)
GLUCOSE SERPL-MCNC: 290 MG/DL (ref 65–140)
GLUCOSE SERPL-MCNC: 314 MG/DL (ref 65–140)
GLUCOSE SERPL-MCNC: 327 MG/DL (ref 65–140)
HCT VFR BLD AUTO: 40.4 % (ref 34.8–46.1)
HGB BLD-MCNC: 12.8 G/DL (ref 11.5–15.4)
MAGNESIUM SERPL-MCNC: 1.7 MG/DL (ref 1.6–2.6)
MCH RBC QN AUTO: 27.1 PG (ref 26.8–34.3)
MCHC RBC AUTO-ENTMCNC: 31.7 G/DL (ref 31.4–37.4)
MCV RBC AUTO: 86 FL (ref 82–98)
PLATELET # BLD AUTO: 205 THOUSANDS/UL (ref 149–390)
PMV BLD AUTO: 13.4 FL (ref 8.9–12.7)
POTASSIUM SERPL-SCNC: 3.6 MMOL/L (ref 3.5–5.3)
PROT SERPL-MCNC: 6.7 G/DL (ref 6.4–8.2)
RBC # BLD AUTO: 4.72 MILLION/UL (ref 3.81–5.12)
SODIUM SERPL-SCNC: 140 MMOL/L (ref 136–145)
WBC # BLD AUTO: 11.78 THOUSAND/UL (ref 4.31–10.16)

## 2022-05-14 PROCEDURE — 82948 REAGENT STRIP/BLOOD GLUCOSE: CPT

## 2022-05-14 PROCEDURE — 99233 SBSQ HOSP IP/OBS HIGH 50: CPT | Performed by: INTERNAL MEDICINE

## 2022-05-14 PROCEDURE — 85027 COMPLETE CBC AUTOMATED: CPT | Performed by: INTERNAL MEDICINE

## 2022-05-14 PROCEDURE — 80053 COMPREHEN METABOLIC PANEL: CPT | Performed by: INTERNAL MEDICINE

## 2022-05-14 PROCEDURE — 94640 AIRWAY INHALATION TREATMENT: CPT

## 2022-05-14 PROCEDURE — 83735 ASSAY OF MAGNESIUM: CPT | Performed by: INTERNAL MEDICINE

## 2022-05-14 PROCEDURE — C9113 INJ PANTOPRAZOLE SODIUM, VIA: HCPCS | Performed by: PHYSICIAN ASSISTANT

## 2022-05-14 RX ORDER — LEVALBUTEROL 1.25 MG/.5ML
1.25 SOLUTION, CONCENTRATE RESPIRATORY (INHALATION) EVERY 6 HOURS PRN
Status: DISCONTINUED | OUTPATIENT
Start: 2022-05-14 | End: 2022-05-17 | Stop reason: HOSPADM

## 2022-05-14 RX ORDER — SODIUM CHLORIDE FOR INHALATION 0.9 %
3 VIAL, NEBULIZER (ML) INHALATION EVERY 6 HOURS PRN
Status: DISCONTINUED | OUTPATIENT
Start: 2022-05-14 | End: 2022-05-17 | Stop reason: HOSPADM

## 2022-05-14 RX ORDER — DIAZEPAM 5 MG/1
5 TABLET ORAL
Status: DISCONTINUED | OUTPATIENT
Start: 2022-05-14 | End: 2022-05-17 | Stop reason: HOSPADM

## 2022-05-14 RX ORDER — MAGNESIUM SULFATE HEPTAHYDRATE 40 MG/ML
2 INJECTION, SOLUTION INTRAVENOUS ONCE
Status: COMPLETED | OUTPATIENT
Start: 2022-05-14 | End: 2022-05-14

## 2022-05-14 RX ORDER — INSULIN GLARGINE 100 [IU]/ML
15 INJECTION, SOLUTION SUBCUTANEOUS 2 TIMES DAILY
Status: DISCONTINUED | OUTPATIENT
Start: 2022-05-14 | End: 2022-05-14

## 2022-05-14 RX ORDER — POTASSIUM CHLORIDE 20 MEQ/1
40 TABLET, EXTENDED RELEASE ORAL ONCE
Status: COMPLETED | OUTPATIENT
Start: 2022-05-14 | End: 2022-05-14

## 2022-05-14 RX ORDER — INSULIN GLARGINE 100 [IU]/ML
15 INJECTION, SOLUTION SUBCUTANEOUS 2 TIMES DAILY
Status: DISCONTINUED | OUTPATIENT
Start: 2022-05-14 | End: 2022-05-17 | Stop reason: HOSPADM

## 2022-05-14 RX ORDER — ACETAMINOPHEN 325 MG/1
650 TABLET ORAL EVERY 6 HOURS PRN
Status: DISCONTINUED | OUTPATIENT
Start: 2022-05-14 | End: 2022-05-17 | Stop reason: HOSPADM

## 2022-05-14 RX ORDER — SUCRALFATE 1 G/1
1 TABLET ORAL EVERY 6 HOURS SCHEDULED
Status: DISCONTINUED | OUTPATIENT
Start: 2022-05-14 | End: 2022-05-17 | Stop reason: HOSPADM

## 2022-05-14 RX ORDER — CARISOPRODOL 350 MG/1
175 TABLET ORAL 2 TIMES DAILY
Status: DISCONTINUED | OUTPATIENT
Start: 2022-05-14 | End: 2022-05-17 | Stop reason: HOSPADM

## 2022-05-14 RX ORDER — PREGABALIN 50 MG/1
50 CAPSULE ORAL 2 TIMES DAILY
Status: DISCONTINUED | OUTPATIENT
Start: 2022-05-14 | End: 2022-05-17 | Stop reason: HOSPADM

## 2022-05-14 RX ADMIN — NYSTATIN 1 APPLICATION: 100000 POWDER TOPICAL at 08:13

## 2022-05-14 RX ADMIN — HEPARIN SODIUM 7500 UNITS: 5000 INJECTION INTRAVENOUS; SUBCUTANEOUS at 22:42

## 2022-05-14 RX ADMIN — POTASSIUM CHLORIDE 40 MEQ: 1500 TABLET, EXTENDED RELEASE ORAL at 09:46

## 2022-05-14 RX ADMIN — CARISOPRODOL 175 MG: 350 TABLET ORAL at 22:42

## 2022-05-14 RX ADMIN — MORPHINE SULFATE 4 MG: 4 INJECTION INTRAVENOUS at 10:03

## 2022-05-14 RX ADMIN — SUCRALFATE 1 G: 1 TABLET ORAL at 01:06

## 2022-05-14 RX ADMIN — SUCRALFATE 1 G: 1 TABLET ORAL at 11:35

## 2022-05-14 RX ADMIN — HEPARIN SODIUM 7500 UNITS: 5000 INJECTION INTRAVENOUS; SUBCUTANEOUS at 13:28

## 2022-05-14 RX ADMIN — CLONIDINE HYDROCHLORIDE 0.1 MG: 0.1 TABLET ORAL at 22:42

## 2022-05-14 RX ADMIN — NYSTATIN: 100000 POWDER TOPICAL at 17:25

## 2022-05-14 RX ADMIN — MORPHINE SULFATE 4 MG: 4 INJECTION INTRAVENOUS at 22:48

## 2022-05-14 RX ADMIN — INSULIN LISPRO 3 UNITS: 100 INJECTION, SOLUTION INTRAVENOUS; SUBCUTANEOUS at 22:42

## 2022-05-14 RX ADMIN — CARISOPRODOL 175 MG: 350 TABLET ORAL at 08:14

## 2022-05-14 RX ADMIN — PREGABALIN 50 MG: 50 CAPSULE ORAL at 08:13

## 2022-05-14 RX ADMIN — FLUTICASONE FUROATE AND VILANTEROL TRIFENATATE 1 PUFF: 200; 25 POWDER RESPIRATORY (INHALATION) at 08:13

## 2022-05-14 RX ADMIN — INSULIN LISPRO 4 UNITS: 100 INJECTION, SOLUTION INTRAVENOUS; SUBCUTANEOUS at 06:02

## 2022-05-14 RX ADMIN — DIAZEPAM 5 MG: 5 TABLET ORAL at 22:42

## 2022-05-14 RX ADMIN — MAGNESIUM SULFATE 2 G: 2 INJECTION INTRAVENOUS at 09:58

## 2022-05-14 RX ADMIN — PANTOPRAZOLE SODIUM 40 MG: 40 INJECTION, POWDER, FOR SOLUTION INTRAVENOUS at 08:14

## 2022-05-14 RX ADMIN — INSULIN LISPRO 8 UNITS: 100 INJECTION, SOLUTION INTRAVENOUS; SUBCUTANEOUS at 17:24

## 2022-05-14 RX ADMIN — ISODIUM CHLORIDE 3 ML: 0.03 SOLUTION RESPIRATORY (INHALATION) at 07:00

## 2022-05-14 RX ADMIN — INSULIN GLARGINE 15 UNITS: 100 INJECTION, SOLUTION SUBCUTANEOUS at 10:45

## 2022-05-14 RX ADMIN — INSULIN LISPRO 6 UNITS: 100 INJECTION, SOLUTION INTRAVENOUS; SUBCUTANEOUS at 11:35

## 2022-05-14 RX ADMIN — CLONIDINE HYDROCHLORIDE 0.1 MG: 0.1 TABLET ORAL at 09:51

## 2022-05-14 RX ADMIN — SUCRALFATE 1 G: 1 TABLET ORAL at 05:53

## 2022-05-14 RX ADMIN — LEVALBUTEROL 1.25 MG: 1.25 SOLUTION, CONCENTRATE RESPIRATORY (INHALATION) at 07:00

## 2022-05-14 RX ADMIN — INSULIN GLARGINE 15 UNITS: 100 INJECTION, SOLUTION SUBCUTANEOUS at 22:42

## 2022-05-14 RX ADMIN — HEPARIN SODIUM 7500 UNITS: 5000 INJECTION INTRAVENOUS; SUBCUTANEOUS at 05:53

## 2022-05-14 NOTE — PLAN OF CARE
Problem: MOBILITY - ADULT  Goal: Maintain or return to baseline ADL function  Description: INTERVENTIONS:  -  Assess patient's ability to carry out ADLs; assess patient's baseline for ADL function and identify physical deficits which impact ability to perform ADLs (bathing, care of mouth/teeth, toileting, grooming, dressing, etc )  - Assess/evaluate cause of self-care deficits   - Assess range of motion  - Assess patient's mobility; develop plan if impaired  - Assess patient's need for assistive devices and provide as appropriate  - Encourage maximum independence but intervene and supervise when necessary  - Involve family in performance of ADLs  - Assess for home care needs following discharge   - Consider OT consult to assist with ADL evaluation and planning for discharge  - Provide patient education as appropriate  Outcome: Progressing  Goal: Maintains/Returns to pre admission functional level  Description: INTERVENTIONS:  - Perform BMAT or MOVE assessment daily    - Set and communicate daily mobility goal to care team and patient/family/caregiver     - Collaborate with rehabilitation services on mobility goals if consulted  - Out of bed for toileting  - Record patient progress and toleration of activity level   Outcome: Progressing     Problem: Prexisting or High Potential for Compromised Skin Integrity  Goal: Skin integrity is maintained or improved  Description: INTERVENTIONS:  - Identify patients at risk for skin breakdown  - Assess and monitor skin integrity  - Assess and monitor nutrition and hydration status  - Monitor labs   - Assess for incontinence   - Turn and reposition patient  - Assist with mobility/ambulation  - Relieve pressure over bony prominences  - Avoid friction and shearing  - Provide appropriate hygiene as needed including keeping skin clean and dry  - Evaluate need for skin moisturizer/barrier cream  - Collaborate with interdisciplinary team   - Patient/family teaching  - Consider wound care consult   Outcome: Progressing     Problem: Nutrition/Hydration-ADULT  Goal: Nutrient/Hydration intake appropriate for improving, restoring or maintaining nutritional needs  Description: Monitor and assess patient's nutrition/hydration status for malnutrition  Collaborate with interdisciplinary team and initiate plan and interventions as ordered  Monitor patient's weight and dietary intake as ordered or per policy  Utilize nutrition screening tool and intervene as necessary  Determine patient's food preferences and provide high-protein, high-caloric foods as appropriate       INTERVENTIONS:  - Monitor oral intake, urinary output, labs, and treatment plans  - Assess nutrition and hydration status and recommend course of action  - Evaluate amount of meals eaten  - Assist patient with eating if necessary   - Allow adequate time for meals  - Recommend/ encourage appropriate diets, oral nutritional supplements, and vitamin/mineral supplements  - Order, calculate, and assess calorie counts as needed  - Recommend, monitor, and adjust tube feedings and TPN/PPN based on assessed needs  - Assess need for intravenous fluids  - Provide specific nutrition/hydration education as appropriate  - Include patient/family/caregiver in decisions related to nutrition  Outcome: Progressing     Problem: GASTROINTESTINAL - ADULT  Goal: Maintains or returns to baseline bowel function  Description: INTERVENTIONS:  - Assess bowel function  - Encourage oral fluids to ensure adequate hydration  - Administer IV fluids if ordered to ensure adequate hydration  - Administer ordered medications as needed  - Encourage mobilization and activity  - Consider nutritional services referral to assist patient with adequate nutrition and appropriate food choices  Outcome: Progressing  Goal: Maintains adequate nutritional intake  Description: INTERVENTIONS:  - Monitor percentage of each meal consumed  - Identify factors contributing to decreased intake, treat as appropriate  - Assist with meals as needed  - Monitor I&O, weight, and lab values if indicated  - Obtain nutrition services referral as needed  Outcome: Progressing  Goal: Oral mucous membranes remain intact  Description: INTERVENTIONS  - Assess oral mucosa and hygiene practices  - Implement preventative oral hygiene regimen  - Implement oral medicated treatments as ordered  - Initiate Nutrition services referral as needed  Outcome: Progressing     Problem: METABOLIC, FLUID AND ELECTROLYTES - ADULT  Goal: Electrolytes maintained within normal limits  Description: INTERVENTIONS:  - Monitor labs and assess patient for signs and symptoms of electrolyte imbalances  - Administer electrolyte replacement as ordered  - Monitor response to electrolyte replacements, including repeat lab results as appropriate  - Instruct patient on fluid and nutrition as appropriate  Outcome: Progressing  Goal: Fluid balance maintained  Description: INTERVENTIONS:  - Monitor labs   - Monitor I/O and WT  - Instruct patient on fluid and nutrition as appropriate  - Assess for signs & symptoms of volume excess or deficit  Outcome: Progressing  Goal: Glucose maintained within target range  Description: INTERVENTIONS:  - Monitor Blood Glucose as ordered  - Assess for signs and symptoms of hyperglycemia and hypoglycemia  - Administer ordered medications to maintain glucose within target range  - Assess nutritional intake and initiate nutrition service referral as needed  Outcome: Progressing     Problem: PAIN - ADULT  Goal: Verbalizes/displays adequate comfort level or baseline comfort level  Description: Interventions:  - Encourage patient to monitor pain and request assistance  - Assess pain using appropriate pain scale  - Administer analgesics based on type and severity of pain and evaluate response  - Implement non-pharmacological measures as appropriate and evaluate response  - Consider cultural and social influences on pain and pain management  - Notify physician/advanced practitioner if interventions unsuccessful or patient reports new pain  Outcome: Progressing     Problem: INFECTION - ADULT  Goal: Absence or prevention of progression during hospitalization  Description: INTERVENTIONS:  - Assess and monitor for signs and symptoms of infection  - Monitor lab/diagnostic results  - Monitor all insertion sites, i e  indwelling lines, tubes, and drains  - Monitor endotracheal if appropriate and nasal secretions for changes in amount and color  - Fall Branch appropriate cooling/warming therapies per order  - Administer medications as ordered  - Instruct and encourage patient and family to use good hand hygiene technique  - Identify and instruct in appropriate isolation precautions for identified infection/condition  Outcome: Progressing     Problem: SAFETY ADULT  Goal: Patient will remain free of falls  Description: INTERVENTIONS:  - Educate patient/family on patient safety including physical limitations  - Instruct patient to call for assistance with activity   - Consult OT/PT to assist with strengthening/mobility   - Keep Call bell within reach  - Keep bed low and locked with side rails adjusted as appropriate  - Keep care items and personal belongings within reach  - Initiate and maintain comfort rounds  - Make Fall Risk Sign visible to staff  - Offer Toileting every 2 Hours, in advance of need  - Initiate/Maintain bed alarm  - Obtain necessary fall risk management equipment: alarms  - Apply yellow socks and bracelet for high fall risk patients  - Consider moving patient to room near nurses station  Outcome: Progressing     Problem: Potential for Falls  Goal: Patient will remain free of falls  Description: INTERVENTIONS:  - Educate patient/family on patient safety including physical limitations  - Instruct patient to call for assistance with activity   - Consult OT/PT to assist with strengthening/mobility   - Keep Call bell within reach  - Keep bed low and locked with side rails adjusted as appropriate  - Keep care items and personal belongings within reach  - Initiate and maintain comfort rounds  - Make Fall Risk Sign visible to staff  - Offer Toileting every 2 Hours, in advance of need  - Initiate/Maintain bed alarm  - Obtain necessary fall risk management equipment: alarms  - Apply yellow socks and bracelet for high fall risk patients  - Consider moving patient to room near nurses station  Outcome: Progressing

## 2022-05-14 NOTE — QUICK NOTE
Attempted to update patient's spouse Floridalma Méndez) via phone  However, unable to get in contact with him  Left voicemail instead

## 2022-05-14 NOTE — PROGRESS NOTES
[facility]  Progress Note - Shira Deal 1965, 61 y o  female MRN: [id number]  Unit/Bed#: ICU 11 Encounter: [id number]  Primary Care Provider: Nancy Larson MD   Date and time admitted to hospital: 5/9/2022  5:33 PM    Acute encephalopathy  Assessment & Plan  Initially presented due to AMS  It persisted and pt was transferred to ICU for close monitoring and worsening AMS  PMHx includes chronic pain and is therefore on chronic opioids  Etiology:  Likely secondary to polypharmacy and component of drug withdrawal per Neurology evaluation  · Though need to also consider infection d/t fevers and leukocytosis  · Also need to consider stroke r/o due to slight right sided facial droop and right upper extremity weakness still appreciated on my physical exam today  · Likely not due to metabolic etiology as evidenced by no electrolyte abnormalities noted  Neurology on board, appreciate recommendations  UA positive for ketones and moderate blood  AAOx4 today - appears to have resolved  Plan:  MRI brain ordered, will follow-up on results  Continue to adjust home medications  Blood cultures still pending - so far no growth at 48hrs  Follow-up on final results  Continue to monitor vitals  SVT (supraventricular tachycardia) (HCC)  Assessment & Plan  Had 1 episode of SVT during Rapid Response with HR in 180s  Received 1 dose of 6mg IV adenosine  Repleted with 40mEq KDUR  Also repleted magnesium with 2mg IV magnesium due to slight beats of Vtach  Currently denies any CP or palpitations  Plan: Will maintain K > 4  Magnesium > 2  Continue with telemetry  Opioid dependence with other opioid-induced disorder [facility])  Assessment & Plan  PMHx includes chronic pain  Still reports of back pain  Home regimen: carisoprodol 350mg BID, diazepam 5mg daily, Lyrica 50 mg BID, morphine 15 BID/morphine 60mg BID    Adjusted pain regimen now: carisoprodol 175mg BID, valium 5mg daily, lyrics 150mg BID, morphine 4mg IV Q4 PRN  * Type 2 diabetes mellitus with hyperglycemia, with long-term current use of insulin [facility])  Assessment & Plan  Lab Results   Component Value Date    HGBA1C 11 4 (H) 05/10/2022       Recent Labs     05/13/22 1717 05/13/22 2153 05/14/22  0524 05/14/22  0752   POCGLU 284* 250* 231* 208*       Blood Sugar Average: Last 72 hrs:  (P) 225 [id number]     Home regimen: insulin lispro 10 units TID, insulin glargine 50 units bedtime  Plan:  Adjusted insulin regimen while hospitalized due to elevated BGs in 230s-208s  Changed insulin glargine to 15 units BID now  Hypoglycemia protocol  Urine retention  Assessment & Plan  No longer has salinas in place  Denies any pain with urination  Made 750mL urine over last 24hrs  Leukocytosis  Assessment & Plan  Had been febrile previously and was started on ceftriaxone  Leukocytosis improving from 18 62 > now 11 78  Has been afebrile, denies any cough, chills, nor nausea/vomiting/ diarrhea  Plan:  Continue with ceftriaxone (day 4)  Continue to monitor vitals and trend for any fevers  Essential hypertension  Assessment & Plan  Home regimen: Lasix 20 mg PRN, lisinopril 10mg daily  Plan:  Continue to monitor BP  Diabetic gastroparesis [facility])  Assessment & Plan  Lab Results   Component Value Date    HGBA1C 11 4 (H) 05/10/2022       Recent Labs     05/13/22 1717 05/13/22 2153 05/14/22  0524 05/14/22  0752   POCGLU 284* 250* 231* 208*       Blood Sugar Average: Last 72 hrs:  (P) 224 [id number]     Outpatient follow-up with GI  Hepatic workup     ----------------------------------------------------------------------------------------  HPI/24hr events:  26-year-old female with PMHx including T2DM, chronic pain, and HTN was transferred to ICU due to persistent AMS and risk of decompensation  Did have an NG tube placed, which was removed yesterday  No overnight events reported      Patient appropriate for transfer out of the ICU today?: Patient does not meet criteria for ICU Follow-up Clinic; referral has not been made  Disposition: Can consider transfer to [facility] with telemetry today if pt maintains current mental status  Code Status: Level 1 - Full Code  ---------------------------------------------------------------------------------------  SUBJECTIVE  Patient was laying in bed upon entering the room  She was AAOx4 today and tells me that she does not really remember what happened previously  She states that she is feeling good overall  She continues to endorse of back pain, but states that this is something that she deals with chronically  She denies any chest pain, SOB, headache, lightheadedness, nor any dizziness  Review of Systems   Constitutional: Negative for chills, diaphoresis and fever  HENT: Negative for congestion and rhinorrhea  Eyes: Negative for photophobia and visual disturbance  Respiratory: Negative for cough, chest tightness and shortness of breath  Cardiovascular: Negative for chest pain, palpitations and leg swelling  Gastrointestinal: Negative for abdominal pain, nausea and vomiting  Genitourinary: Negative for dysuria and frequency  Musculoskeletal: Positive for back pain (Chronic)  Neurological: Negative for dizziness, light-headedness and headaches       Review of systems was reviewed and negative unless stated above in HPI/24-hour events   ---------------------------------------------------------------------------------------  OBJECTIVE    Vitals   Vitals:    22 0400 22 0600 22 0700 22 0815   BP:  145/76  103/68   Pulse: 94 86  (!) 110   Resp: (!) 30 22  (!) 23   Temp: 97 6 °F (36 4 °C)  (!) 97 4 °F (36 3 °C)    TempSrc: Temporal  Temporal    SpO2: 96% 96% 96% 95%   Weight:  122 kg (269 lb 6 4 oz)     Height:         Temp (24hrs), Av 9 °F (36 6 °C), Min:97 4 °F (36 3 °C), Max:99 2 °F (37 3 °C)  Current: Temperature: (!) 97 4 °F (36 3 °C) Respiratory:  SpO2 Device: O2 Device: None (Room air)  Nasal Cannula O2 Flow Rate (L/min): 3 L/min   Pt is now on room air saturating 96%  Invasive/non-invasive ventilation settings   Respiratory  Report   Lab Data (Last 4 hours)    None         O2/Vent Data (Last 4 hours)    None                Physical Exam  Vitals and nursing note reviewed  Constitutional:       Appearance: Normal appearance  She is obese  She is not ill-appearing or diaphoretic  HENT:      Head: Normocephalic and atraumatic  Nose: No congestion or rhinorrhea  Mouth/Throat:      Mouth: Mucous membranes are moist    Eyes:      General: No scleral icterus  Extraocular Movements: Extraocular movements intact  Conjunctiva/sclera: Conjunctivae normal    Cardiovascular:      Rate and Rhythm: Normal rate  Pulses: Normal pulses  Pulmonary:      Effort: Pulmonary effort is normal       Breath sounds: Normal breath sounds  Abdominal:      General: Bowel sounds are normal       Palpations: Abdomen is soft  Musculoskeletal:         General: No tenderness  Right lower leg: No edema  Left lower leg: No edema  Skin:     General: Skin is warm  Neurological:      Mental Status: She is alert and oriented to person, place, and time  Sensory: No sensory deficit  Motor: Weakness (right side upper extremity weakness) present  Comments: Slight right sided facial droop and right arm weakness     Psychiatric:         Mood and Affect: Mood normal          Behavior: Behavior normal               Laboratory and Diagnostics:  Results from last 7 days   Lab Units 05/14/22  0518 05/13/22  0347 05/13/22  0346 05/12/22  0430 05/11/22  2150 05/11/22  0912 05/10/22  0631 05/09/22  1843   WBC Thousand/uL 11 78*  --  18 62* 16 00* 17 34* 18 04* 16 47* 11 83*   HEMOGLOBIN g/dL 12 8  --  12 8 14 6 14 6 14 6 14 6 13 6   I STAT HEMOGLOBIN g/dl  --  12 9  --   --   --   --   --   --    HEMATOCRIT % 40 4  --  41 4 46 5* 45 9 45 3 45 3 42 4   HEMATOCRIT, ISTAT %  --  38  --   --   --   --   --   --    PLATELETS Thousands/uL 205  --  246 248 241 240 265 202   NEUTROS PCT %  --   --  73  --  81*  --  86* 87*   MONOS PCT %  --   --  8  --  6  --  5 4     Results from last 7 days   Lab Units 05/14/22  0518 05/13/22  0347 05/13/22  0346 05/12/22  0430 05/11/22  2150 05/11/22  0912 05/10/22  1606 05/10/22  0631 05/10/22  0048 05/09/22  1843   SODIUM mmol/L 140  --  143 143 143 143 141 142   < > 137   POTASSIUM mmol/L 3 6  --  3 7 3 6 3 0* 3 0* 3 3* 3 6   < > 4 8   CHLORIDE mmol/L 107  --  111* 107 106 107 104 102   < > 98*   CO2 mmol/L 24  --  22 20* 21 26 26 25   < > 26   CO2, I-STAT mmol/L  --  22  --   --   --   --   --   --   --   --    ANION GAP mmol/L 9  --  10 16* 16* 10 11 15*   < > 13   BUN mg/dL 16  --  26* 28* 26* 22 21 18   < > 18   CREATININE mg/dL 0 73  --  0 81 0 90 0 89 0 81 0 87 0 87   < > 0 76   CALCIUM mg/dL 8 7  --  8 4 8 8 9 0 9 1 9 7 10 1   < > 9 3   GLUCOSE RANDOM mg/dL 253*  --  302* 303* 267* 175* 196* 176*   < > 361*   ALT U/L 12  --  11* 13 15 17  --  17  --  25   AST U/L 10  --  8 17 14 13  --  14  --  26   ALK PHOS U/L 110  --  106 135* 141* 143*  --  165*  --  174*   ALBUMIN g/dL 2 7*  --  2 6* 3 2* 3 1* 3 2*  --  3 7  --  3 8   TOTAL BILIRUBIN mg/dL 0 73  --  0 76 1 09* 1 32* 1 20*  --  1 24*  --  1 51*    < > = values in this interval not displayed       Results from last 7 days   Lab Units 05/14/22  0518 05/13/22  0346 05/12/22  0430 05/11/22  0912 05/10/22  0631   MAGNESIUM mg/dL 1 7 1 7 1 9 1 5* 1 5*   PHOSPHORUS mg/dL  --  2 3*  --  4 1  --                Results from last 7 days   Lab Units 05/13/22  0346   LACTIC ACID mmol/L 1 7     ABG:  Results from last 7 days   Lab Units 05/12/22 1953   PH ART  7 391   PCO2 ART mm Hg 32 5*   PO2 ART mm Hg 101 9   HCO3 ART mmol/L 19 3*   BASE EXC ART mmol/L -4 6   ABG SOURCE  Radial, Right     VBG:  Results from last 7 days   Lab Units 05/12/22 1953 05/11/22  2219   PH GAUTAM   --  7 466*   PCO2 GAUTAM mm Hg  --  30 0*   PO2 GAUTAM mm Hg  --  100 5*   HCO3 GAUTAM mmol/L  --  21 1*   BASE EXC GAUTAM mmol/L  --  -1 3   ABG SOURCE  Radial, Right  --      Results from last 7 days   Lab Units 05/12/22  0430 05/11/22  0503   PROCALCITONIN ng/ml 0 09 0 08       Micro  Results from last 7 days   Lab Units 05/11/22  2158 05/11/22  2157   BLOOD CULTURE  No Growth at 48 hrs  No Growth at 48 hrs  EKG: Sinus bradycardia with rate of 56bpm   Imaging: I have personally reviewed pertinent reports  Intake and Output  I/O       05/12 0701 05/13 0700 05/13 0701 05/14 0700 05/14 0701  05/15 0700    P  O   60     I V  (mL/kg)  1000 (8 2)     NG/ 120     Feedings 63      Total Intake(mL/kg) 203 (1 6) 1180 (9 7)     Urine (mL/kg/hr) 950 (0 3) 750 (0 3)     Emesis/NG output       Stool 40 0     Total Output 990 750     Net -787 +430            Unmeasured Stool Occurrence  1 x           Height and Weights   Height: 5' 8" (172 7 cm)     Body mass index is 40 96 kg/m²  Weight (last 2 days)     Date/Time Weight    05/14/22 0600 122 (269 4)    05/14/22 0212 122 (269 4)    05/13/22 0500 125 (275 13)            Nutrition       Diet Orders   (From admission, onward)             Start     Ordered    05/13/22 1227  Diet Dysphagia/Modified Consistency; Dysphagia 1-Pureed; Thin Liquid  Diet effective now        References:    Nutrtion Support Algorithm Enteral vs  Parenteral   Question Answer Comment   Diet Type Dysphagia/Modified Consistency    Dysphagia/Modified Consistency Dysphagia 1-Pureed    Liquid Modifier Thin Liquid    RD to adjust diet per protocol?  Yes        05/13/22 1226                  Active Medications  Scheduled Meds:  Current Facility-Administered Medications   Medication Dose Route Frequency Provider Last Rate    acetaminophen  650 mg Per NG Tube Q6H PRN Kirstin William PA-C      albuterol  2 puff Inhalation Q4H PRN Kirstin William PA-C      carisoprodol  175 mg Per NG Tube BID [facility], EST[facility]      cefTRIAXone  2,000 mg Intravenous Q24H [facility], ESTHER Stopped (05/14/22 0035)    cloNIDine  0 1 mg Oral Q12H [address] Ana Cristina Lisa [place]      diazepam  5 mg Per NG Tube HS Ana Cristina Gonzalez PA-C      fluticasone-vilanterol  1 puff Inhalation Daily [place], [place]      heparin (porcine)  7,500 Units Subcutaneous [facility] [place]      hydrALAZINE  10 mg Intravenous Q6H PRN [facility], ESTHER      insulin glargine  15 Units Subcutaneous BID Amanda Fech, DO      insulin lispro  1-5 Units Subcutaneous HS Koki VIVI Kwon      insulin lispro  2-12 Units Subcutaneous TID AC VIVI Honeycutt      levalbuterol  1 25 mg Nebulization BID [facility], ESTHER      magnesium sulfate  2 g Intravenous [facility]      morphine injection  4 mg Intravenous Q4H PRN [facility], EST[facility]      nystatin   Topical BID [facility], ESTHER      ondansetron  4 mg Intravenous Q6H PRN [facility], ESTHER      pantoprazole  40 mg Intravenous Q24H [address] Ana Cristina Gonzalez PA-C      pregabalin  50 mg Per NG Tube BID [facility], ESTHER      sodium chloride  3 mL Nebulization BID Ana Cristina Conrad PA-C      sucralfate  1 g Per NG Tube Q6H [address] Ana Cristina Conrad PA-C       Continuous Infusions:     PRN Meds:   acetaminophen, 650 mg, Q6H PRN  albuterol, 2 puff, Q4H PRN  hydrALAZINE, 10 mg, Q6H PRN  morphine injection, 4 mg, Q4H PRN  ondansetron, 4 mg, Q6H PRN        Invasive Devices Review  Invasive Devices  Report    Peripheral Intravenous Line  Duration           Peripheral IV 05/14/22 Left Antecubital <1 day                Rationale for remaining devices: Medical necessity  ---------------------------------------------------------------------------------------  Advance Directive and Living Will:      Power of :    POLST:    ---------------------------------------------------------------------------------------  Care Time Delivered:   Please refer to attending attestation  Sosa Collier DO      Portions of the record may have been created with voice recognition software  Occasional wrong word or "sound a like" substitutions may have occurred due to the inherent limitations of voice recognition software    Read the chart carefully and recognize, using context, where substitutions have occurred

## 2022-05-14 NOTE — ASSESSMENT & PLAN NOTE
PMHx includes chronic pain  Still reports of back pain  Home regimen: carisoprodol 350mg BID, diazepam 5mg daily, Lyrica 50 mg BID, morphine 15 BID/morphine 60mg BID  Adjusted pain regimen now: carisoprodol 175mg BID, valium 5mg daily, lyrics 150mg BID, morphine 4mg IV Q4 PRN

## 2022-05-14 NOTE — ASSESSMENT & PLAN NOTE
Had been febrile previously and was started on ceftriaxone  Leukocytosis improving from 18 62 > now 11 78  Has been afebrile, denies any cough, chills, nor nausea/vomiting/ diarrhea  Plan:  Continue with ceftriaxone (day 4)  Continue to monitor vitals and trend for any fevers

## 2022-05-14 NOTE — ASSESSMENT & PLAN NOTE
Had 1 episode of SVT during Rapid Response with HR in 180s  Received 1 dose of 6mg IV adenosine  Repleted with 40mEq KDUR  Also repleted magnesium with 2mg IV magnesium due to slight beats of Vtach  Currently denies any CP or palpitations  Plan: Will maintain K > 4  Magnesium > 2  Continue with telemetry

## 2022-05-14 NOTE — ASSESSMENT & PLAN NOTE
Initially presented due to AMS  It persisted and pt was transferred to ICU for close monitoring and worsening AMS  PMHx includes chronic pain and is therefore on chronic opioids  Etiology:  Likely secondary to polypharmacy and component of drug withdrawal per Neurology evaluation  · Though need to also consider infection d/t fevers and leukocytosis  · Also need to consider stroke r/o due to slight right sided facial droop and right upper extremity weakness still appreciated on my physical exam today  · Likely not due to metabolic etiology as evidenced by no electrolyte abnormalities noted  Neurology on board, appreciate recommendations  UA positive for ketones and moderate blood  AAOx4 today - appears to have resolved  Plan:  MRI brain ordered, will follow-up on results  Continue to adjust home medications  Blood cultures still pending - so far no growth at 48hrs  Follow-up on final results  Continue to monitor vitals

## 2022-05-14 NOTE — ASSESSMENT & PLAN NOTE
Lab Results   Component Value Date    HGBA1C 11 4 (H) 05/10/2022       Recent Labs     05/13/22  1717 05/13/22  2153 05/14/22  0524 05/14/22  0752   POCGLU 284* 250* 231* 208*       Blood Sugar Average: Last 72 hrs:  (P) 204 [id number]     Outpatient follow-up with GI  Hepatic workup

## 2022-05-14 NOTE — ASSESSMENT & PLAN NOTE
Lab Results   Component Value Date    HGBA1C 11 4 (H) 05/10/2022       Recent Labs     05/13/22  1717 05/13/22  2153 05/14/22  0524 05/14/22  0752   POCGLU 284* 250* 231* 208*       Blood Sugar Average: Last 72 hrs:  (P) 225 [id number]     Home regimen: insulin lispro 10 units TID, insulin glargine 50 units bedtime  Plan:  Adjusted insulin regimen while hospitalized due to elevated BGs in 230s-208s  Changed insulin glargine to 15 units BID now  Hypoglycemia protocol

## 2022-05-15 PROBLEM — R26.2 AMBULATORY DYSFUNCTION: Status: ACTIVE | Noted: 2022-05-15

## 2022-05-15 LAB
BASOPHILS # BLD AUTO: 0.04 THOUSANDS/ΜL (ref 0–0.1)
BASOPHILS NFR BLD AUTO: 0 % (ref 0–1)
EOSINOPHIL # BLD AUTO: 0.41 THOUSAND/ΜL (ref 0–0.61)
EOSINOPHIL NFR BLD AUTO: 4 % (ref 0–6)
ERYTHROCYTE [DISTWIDTH] IN BLOOD BY AUTOMATED COUNT: 13.5 % (ref 11.6–15.1)
GLUCOSE SERPL-MCNC: 237 MG/DL (ref 65–140)
GLUCOSE SERPL-MCNC: 268 MG/DL (ref 65–140)
GLUCOSE SERPL-MCNC: 274 MG/DL (ref 65–140)
GLUCOSE SERPL-MCNC: 329 MG/DL (ref 65–140)
HCT VFR BLD AUTO: 35.8 % (ref 34.8–46.1)
HGB BLD-MCNC: 11.1 G/DL (ref 11.5–15.4)
IMM GRANULOCYTES # BLD AUTO: 0.05 THOUSAND/UL (ref 0–0.2)
IMM GRANULOCYTES NFR BLD AUTO: 1 % (ref 0–2)
LYMPHOCYTES # BLD AUTO: 2.31 THOUSANDS/ΜL (ref 0.6–4.47)
LYMPHOCYTES NFR BLD AUTO: 24 % (ref 14–44)
MCH RBC QN AUTO: 26 PG (ref 26.8–34.3)
MCHC RBC AUTO-ENTMCNC: 31 G/DL (ref 31.4–37.4)
MCV RBC AUTO: 84 FL (ref 82–98)
MONOCYTES # BLD AUTO: 0.76 THOUSAND/ΜL (ref 0.17–1.22)
MONOCYTES NFR BLD AUTO: 8 % (ref 4–12)
NEUTROPHILS # BLD AUTO: 6.03 THOUSANDS/ΜL (ref 1.85–7.62)
NEUTS SEG NFR BLD AUTO: 63 % (ref 43–75)
NRBC BLD AUTO-RTO: 0 /100 WBCS
PLATELET # BLD AUTO: 168 THOUSANDS/UL (ref 149–390)
PMV BLD AUTO: 14 FL (ref 8.9–12.7)
RBC # BLD AUTO: 4.27 MILLION/UL (ref 3.81–5.12)
WBC # BLD AUTO: 9.6 THOUSAND/UL (ref 4.31–10.16)

## 2022-05-15 PROCEDURE — C9113 INJ PANTOPRAZOLE SODIUM, VIA: HCPCS

## 2022-05-15 PROCEDURE — 92526 ORAL FUNCTION THERAPY: CPT

## 2022-05-15 PROCEDURE — 99232 SBSQ HOSP IP/OBS MODERATE 35: CPT | Performed by: INTERNAL MEDICINE

## 2022-05-15 PROCEDURE — 82948 REAGENT STRIP/BLOOD GLUCOSE: CPT

## 2022-05-15 PROCEDURE — 85025 COMPLETE CBC W/AUTO DIFF WBC: CPT

## 2022-05-15 RX ORDER — PANTOPRAZOLE SODIUM 40 MG/1
40 TABLET, DELAYED RELEASE ORAL
Status: DISCONTINUED | OUTPATIENT
Start: 2022-05-16 | End: 2022-05-17 | Stop reason: HOSPADM

## 2022-05-15 RX ADMIN — INSULIN LISPRO 3 UNITS: 100 INJECTION, SOLUTION INTRAVENOUS; SUBCUTANEOUS at 23:10

## 2022-05-15 RX ADMIN — CEFTRIAXONE 2000 MG: 2 INJECTION, POWDER, FOR SOLUTION INTRAMUSCULAR; INTRAVENOUS at 23:20

## 2022-05-15 RX ADMIN — INSULIN LISPRO 6 UNITS: 100 INJECTION, SOLUTION INTRAVENOUS; SUBCUTANEOUS at 11:44

## 2022-05-15 RX ADMIN — NYSTATIN: 100000 POWDER TOPICAL at 08:11

## 2022-05-15 RX ADMIN — NYSTATIN: 100000 POWDER TOPICAL at 17:07

## 2022-05-15 RX ADMIN — HEPARIN SODIUM 7500 UNITS: 5000 INJECTION INTRAVENOUS; SUBCUTANEOUS at 07:32

## 2022-05-15 RX ADMIN — INSULIN LISPRO 4 UNITS: 100 INJECTION, SOLUTION INTRAVENOUS; SUBCUTANEOUS at 08:03

## 2022-05-15 RX ADMIN — SUCRALFATE 1 G: 1 TABLET ORAL at 00:50

## 2022-05-15 RX ADMIN — INSULIN LISPRO 6 UNITS: 100 INJECTION, SOLUTION INTRAVENOUS; SUBCUTANEOUS at 17:07

## 2022-05-15 RX ADMIN — PANTOPRAZOLE SODIUM 40 MG: 40 INJECTION, POWDER, FOR SOLUTION INTRAVENOUS at 08:02

## 2022-05-15 RX ADMIN — CEFTRIAXONE 2000 MG: 2 INJECTION, POWDER, FOR SOLUTION INTRAMUSCULAR; INTRAVENOUS at 00:50

## 2022-05-15 RX ADMIN — SUCRALFATE 1 G: 1 TABLET ORAL at 11:43

## 2022-05-15 RX ADMIN — DIAZEPAM 5 MG: 5 TABLET ORAL at 23:09

## 2022-05-15 RX ADMIN — HEPARIN SODIUM 7500 UNITS: 5000 INJECTION INTRAVENOUS; SUBCUTANEOUS at 23:10

## 2022-05-15 RX ADMIN — INSULIN GLARGINE 15 UNITS: 100 INJECTION, SOLUTION SUBCUTANEOUS at 23:11

## 2022-05-15 RX ADMIN — MORPHINE SULFATE 4 MG: 4 INJECTION INTRAVENOUS at 08:14

## 2022-05-15 RX ADMIN — PREGABALIN 50 MG: 50 CAPSULE ORAL at 17:07

## 2022-05-15 RX ADMIN — CARISOPRODOL 175 MG: 350 TABLET ORAL at 23:08

## 2022-05-15 RX ADMIN — CLONIDINE HYDROCHLORIDE 0.1 MG: 0.1 TABLET ORAL at 08:02

## 2022-05-15 RX ADMIN — INSULIN GLARGINE 15 UNITS: 100 INJECTION, SOLUTION SUBCUTANEOUS at 08:02

## 2022-05-15 RX ADMIN — FLUTICASONE FUROATE AND VILANTEROL TRIFENATATE 1 PUFF: 200; 25 POWDER RESPIRATORY (INHALATION) at 08:03

## 2022-05-15 RX ADMIN — PREGABALIN 50 MG: 50 CAPSULE ORAL at 08:02

## 2022-05-15 RX ADMIN — SUCRALFATE 1 G: 1 TABLET ORAL at 17:07

## 2022-05-15 RX ADMIN — CLONIDINE HYDROCHLORIDE 0.1 MG: 0.1 TABLET ORAL at 23:09

## 2022-05-15 RX ADMIN — HEPARIN SODIUM 7500 UNITS: 5000 INJECTION INTRAVENOUS; SUBCUTANEOUS at 14:41

## 2022-05-15 RX ADMIN — SUCRALFATE 1 G: 1 TABLET ORAL at 07:32

## 2022-05-15 NOTE — PLAN OF CARE
Problem: MOBILITY - ADULT  Goal: Maintain or return to baseline ADL function  Description: INTERVENTIONS:  -  Assess patient's ability to carry out ADLs; assess patient's baseline for ADL function and identify physical deficits which impact ability to perform ADLs (bathing, care of mouth/teeth, toileting, grooming, dressing, etc )  - Assess/evaluate cause of self-care deficits   - Assess range of motion  - Assess patient's mobility; develop plan if impaired  - Assess patient's need for assistive devices and provide as appropriate  - Encourage maximum independence but intervene and supervise when necessary  - Involve family in performance of ADLs  - Assess for home care needs following discharge   - Consider OT consult to assist with ADL evaluation and planning for discharge  - Provide patient education as appropriate  Outcome: Progressing  Goal: Maintains/Returns to pre admission functional level  Description: INTERVENTIONS:  - Perform BMAT or MOVE assessment daily    - Set and communicate daily mobility goal to care team and patient/family/caregiver     - Collaborate with rehabilitation services on mobility goals if consulted  - Out of bed for toileting  - Record patient progress and toleration of activity level   Outcome: Progressing     Problem: Prexisting or High Potential for Compromised Skin Integrity  Goal: Skin integrity is maintained or improved  Description: INTERVENTIONS:  - Identify patients at risk for skin breakdown  - Assess and monitor skin integrity  - Assess and monitor nutrition and hydration status  - Monitor labs   - Assess for incontinence   - Turn and reposition patient  - Assist with mobility/ambulation  - Relieve pressure over bony prominences  - Avoid friction and shearing  - Provide appropriate hygiene as needed including keeping skin clean and dry  - Evaluate need for skin moisturizer/barrier cream  - Collaborate with interdisciplinary team   - Patient/family teaching  - Consider wound care consult   Outcome: Progressing     Problem: Nutrition/Hydration-ADULT  Goal: Nutrient/Hydration intake appropriate for improving, restoring or maintaining nutritional needs  Description: Monitor and assess patient's nutrition/hydration status for malnutrition  Collaborate with interdisciplinary team and initiate plan and interventions as ordered  Monitor patient's weight and dietary intake as ordered or per policy  Utilize nutrition screening tool and intervene as necessary  Determine patient's food preferences and provide high-protein, high-caloric foods as appropriate       INTERVENTIONS:  - Monitor oral intake, urinary output, labs, and treatment plans  - Assess nutrition and hydration status and recommend course of action  - Evaluate amount of meals eaten  - Assist patient with eating if necessary   - Allow adequate time for meals  - Recommend/ encourage appropriate diets, oral nutritional supplements, and vitamin/mineral supplements  - Order, calculate, and assess calorie counts as needed  - Recommend, monitor, and adjust tube feedings and TPN/PPN based on assessed needs  - Assess need for intravenous fluids  - Provide specific nutrition/hydration education as appropriate  - Include patient/family/caregiver in decisions related to nutrition  Outcome: Progressing     Problem: GASTROINTESTINAL - ADULT  Goal: Maintains or returns to baseline bowel function  Description: INTERVENTIONS:  - Assess bowel function  - Encourage oral fluids to ensure adequate hydration  - Administer IV fluids if ordered to ensure adequate hydration  - Administer ordered medications as needed  - Encourage mobilization and activity  - Consider nutritional services referral to assist patient with adequate nutrition and appropriate food choices  Outcome: Progressing  Goal: Maintains adequate nutritional intake  Description: INTERVENTIONS:  - Monitor percentage of each meal consumed  - Identify factors contributing to decreased intake, treat as appropriate  - Assist with meals as needed  - Monitor I&O, weight, and lab values if indicated  - Obtain nutrition services referral as needed  Outcome: Progressing  Goal: Oral mucous membranes remain intact  Description: INTERVENTIONS  - Assess oral mucosa and hygiene practices  - Implement preventative oral hygiene regimen  - Implement oral medicated treatments as ordered  - Initiate Nutrition services referral as needed  Outcome: Progressing     Problem: METABOLIC, FLUID AND ELECTROLYTES - ADULT  Goal: Electrolytes maintained within normal limits  Description: INTERVENTIONS:  - Monitor labs and assess patient for signs and symptoms of electrolyte imbalances  - Administer electrolyte replacement as ordered  - Monitor response to electrolyte replacements, including repeat lab results as appropriate  - Instruct patient on fluid and nutrition as appropriate  Outcome: Progressing  Goal: Fluid balance maintained  Description: INTERVENTIONS:  - Monitor labs   - Monitor I/O and WT  - Instruct patient on fluid and nutrition as appropriate  - Assess for signs & symptoms of volume excess or deficit  Outcome: Progressing  Goal: Glucose maintained within target range  Description: INTERVENTIONS:  - Monitor Blood Glucose as ordered  - Assess for signs and symptoms of hyperglycemia and hypoglycemia  - Administer ordered medications to maintain glucose within target range  - Assess nutritional intake and initiate nutrition service referral as needed  Outcome: Progressing     Problem: PAIN - ADULT  Goal: Verbalizes/displays adequate comfort level or baseline comfort level  Description: Interventions:  - Encourage patient to monitor pain and request assistance  - Assess pain using appropriate pain scale  - Administer analgesics based on type and severity of pain and evaluate response  - Implement non-pharmacological measures as appropriate and evaluate response  - Consider cultural and social influences on pain and pain management  - Notify physician/advanced practitioner if interventions unsuccessful or patient reports new pain  Outcome: Progressing     Problem: INFECTION - ADULT  Goal: Absence or prevention of progression during hospitalization  Description: INTERVENTIONS:  - Assess and monitor for signs and symptoms of infection  - Monitor lab/diagnostic results  - Monitor all insertion sites, i e  indwelling lines, tubes, and drains  - Monitor endotracheal if appropriate and nasal secretions for changes in amount and color  - [place] appropriate cooling/warming therapies per order  - Administer medications as ordered  - Instruct and encourage patient and family to use good hand hygiene technique  - Identify and instruct in appropriate isolation precautions for identified infection/condition  Outcome: Progressing     Problem: SAFETY ADULT  Goal: Patient will remain free of falls  Description: INTERVENTIONS:  - Educate patient/family on patient safety including physical limitations  - Instruct patient to call for assistance with activity   - Consult OT/PT to assist with strengthening/mobility   - Keep Call bell within reach  - Keep bed low and locked with side rails adjusted as appropriate  - Keep care items and personal belongings within reach  - Initiate and maintain comfort rounds  - Make Fall Risk Sign visible to staff  - Offer Toileting every 2 Hours, in advance of need  - Initiate/Maintain bed alarm  - Obtain necessary fall risk management equipment: alarms  - Apply yellow socks and bracelet for high fall risk patients  - Consider moving patient to room near nurses station  Outcome: Progressing     Problem: Potential for Falls  Goal: Patient will remain free of falls  Description: INTERVENTIONS:  - Educate patient/family on patient safety including physical limitations  - Instruct patient to call for assistance with activity   - Consult OT/PT to assist with strengthening/mobility   - Keep Call bell within reach  - Keep bed low and locked with side rails adjusted as appropriate  - Keep care items and personal belongings within reach  - Initiate and maintain comfort rounds  - Make Fall Risk Sign visible to staff  - Offer Toileting every 2 Hours, in advance of need  - Initiate/Maintain bed alarm  - Obtain necessary fall risk management equipment: alarms  - Apply yellow socks and bracelet for high fall risk patients  - Consider moving patient to room near nurses station  Outcome: Progressing

## 2022-05-15 NOTE — ASSESSMENT & PLAN NOTE
Lab Results   Component Value Date    HGBA1C 11 4 (H) 05/10/2022     Recent Labs     05/14/22  1619 05/14/22  2053 05/15/22  0721 05/15/22  1105   POCGLU 314* 327* 237* 268*   Continue Reglan p r n    Blood sugars - improved control today

## 2022-05-15 NOTE — PROGRESS NOTES
[address]  Progress Note - Chandni Amor 1965, 61 y o  female MRN: [id number]  Unit/Bed#: [facility] 68 2 -01 Encounter: [id number]  Primary Care Provider: Joaquín Sierra MD   Date and time admitted to hospital: 5/9/2022  5:33 PM    Ambulatory dysfunction  Assessment & Plan  PT/OT recommending rehab  Will discuss with CM       Urine retention  Assessment & Plan  Per nursing, patient failed urinary retention protocol  Singer catheter was inserted 5/11/22    Heme positive stool  Assessment & Plan  Results from last 7 days   Lab Units 05/15/22  0447 05/14/22 0518 05/13/22  0347 05/13/22  0346   HEMOGLOBIN g/dL 11 1* 12 8  --  12 8   I STAT HEMOGLOBIN g/dl  --   --  12 9  --    Noted to have heme positive stool x 3   Hgb stable though  Seen and evaluated by GI - no need for scope given hgb stability    Leukocytosis  Assessment & Plan  Results from last 7 days   Lab Units 05/15/22  0447 05/14/22 0518 05/13/22  0346 05/12/22  0430   WBC Thousand/uL 9 60 11 78* 18 62* 16 00*   Noted rising white count - unclear etiology  Chest x-ray with increased interstitial edema but no infiltrate  Held fluids and given 1 time dose of IV Lasix 5/11  Procalcitonin negative x2  No evidence of infection on CT head or abdomen  Urine collected on admission without evidence of infection  Pt was evaluated by night team last night for tachycardia -this was sinus tach  With white count and tachycardia, a dose of ceftriaxone was provided  [facility] were collected prior to abx administration on 5/11/22- negative  [facility] 9 6      Acute encephalopathy  Assessment & Plan  Acute metabolic encephalopathy  Patient presenting with weakness for multiple days, now with altered mental status  Per EMS, son called 46 the patient be brought to the ED for further evaluation    Has been previously admitted for the same, typically secondary to hyperglycemia  Likely secondary to hyperglycemia complicated by opioid dependence/sedating medications  Urine drug screen positive for barbiturates, benzos, opioids  Opoids/sedated medications held on admission  Resumed soma and benzo 5/11 per tox - want to avoid withdrawal  Resumed at 1/2 of regular dose   Seen and evaluated by speech with the following recommendation:  Keep NPO with ice chips until more awake and can tolerate regular diet  Switch meds to IV given inability to swallow pills  Consulted tox for additional recommendations - largely contributing is her chronic pain meds  Still has difficulty answering questions and slow to respond / follow commands  NG tube placed in order to get certain oral meds through tube   Had neurology evaluate today    MRI brain pending   Defer LP - low suspicion for CNS infection at this time   Supportive care for suspected drug withdrawal   Trial neb treatment to see if this helps with breathing    Essential hypertension  Assessment & Plan  Home regimen lisinopril 10 mg daily  Pt not able to tolerate po   BP is 126/72    Diabetic gastroparesis [facility])  Assessment & Plan  Lab Results   Component Value Date    HGBA1C 11 4 (H) 05/10/2022     Recent Labs     05/14/22  1619 05/14/22  2053 05/15/22  0721 05/15/22  1105   POCGLU 314* 327* 237* 268*   Continue Reglan p r n  Blood sugars - improved control today     Opioid dependence with other opioid-induced disorder [facility])  Assessment & Plan  Will hold all opioids/sedating medication giving acute encephalopathy      No acute signs of opiate overdose at this time  Morphine Sulf Er 60mg BID   Morphine Sulf Er 15mg BID  Morphine Sulf IR 15 mg TID PRN  Pregabalin 150 mg BID  Carisoprodol 350 mg BID  Valium 5 mg daily  PDMP reviewed by ICU team     Had tox evaluate  Will likely need to be tapered off going forward given likely causing more harm than benefit  Spoke with toxicology - Want to avoid withdrawal  Resumed soma and valium at decreased doses 5/11  Will resume lyrica today a decreased dose as well    * Type 2 diabetes mellitus with hyperglycemia, with long-term current use of insulin [facility])  Assessment & Plan  Presenting with altered mental status, according to son, this is how she gets when she has significantly hyperglycemia  Lab Results   Component Value Date    HGBA1C 11 4 (H) 05/10/2022   Patient maintained on 50 units Lantus HS and 10 units t i d  With meals  Suppose to take the above but does not - noncompliant according to son  Has been previously admitted for the same  Blood sugars significantly elevated on admission, with elevated beta hydroxybutyrate  Continued on insulin drip and d5 until gap was closed   Stopped drip and d5 22  Place on Accu-Cheks and sliding scale q 6 given NPO status  Still not awake enough to tolerate regular diet   GI evaluated and would like barium swallow but pt is not alert enough to have this performed          VTE Pharmacologic Prophylaxis: VTE Score: 3 Moderate Risk (Score 3-4) - Pharmacological DVT Prophylaxis Ordered: heparin  Patient Centered Rounds: I performed bedside rounds with nursing staff today  Discussions with Specialists or Other Care Team Provider: discussed with patient and with nursiing  Education and Discussions with Family / Patient: called son   no answer  Called daughter - left message  Time Spent for Care: 30 minutes  More than 50% of total time spent on counseling and coordination of care as described above  Current Length of Stay: 6 day(s)  Current Patient Status: Inpatient   Certification Statement: The patient will continue to require additional inpatient hospital stay due to PT OT recommending rehab     Discharge Plan: Anticipate discharge in 24-48 hrs to rehab     Code Status: Level 1 - Full Code    Subjective:   Patient seen and examined  Feeling "good"  No new complaints     Objective:     Vitals:   Temp (24hrs), Av 6 °F (37 °C), Min:97 8 °F (36 6 °C), Max:99 °F (37 2 °C)    Temp:  [97 8 °F (36 6 °C)-99 °F (37 2 °C)] 98 7 °F (37 1 °C)  HR:  [] 92  Resp:  [16-20] 16  BP: ()/(61-77) 126/72  SpO2:  [93 %-96 %] 95 %  Body mass index is 40 96 kg/m²  Input and Output Summary (last 24 hours): Intake/Output Summary (Last 24 hours) at 5/15/2022 1302  Last data filed at 5/15/2022 0237  Gross per 24 hour   Intake --   Output 750 ml   Net -750 ml       Physical Exam:   Physical Exam  Constitutional:       General: She is not in acute distress  Appearance: She is not ill-appearing, toxic-appearing or diaphoretic  HENT:      Head: Normocephalic  Nose: Nose normal       Mouth/Throat:      Mouth: Mucous membranes are moist    Eyes:      General: No scleral icterus  Right eye: No discharge  Left eye: No discharge  Pupils: Pupils are equal, round, and reactive to light  Cardiovascular:      Rate and Rhythm: Normal rate  Heart sounds: No murmur heard  No gallop  Abdominal:      General: There is no distension  Palpations: There is no mass  Tenderness: There is no abdominal tenderness  There is no right CVA tenderness, left CVA tenderness or guarding  Musculoskeletal:         General: No swelling, tenderness, deformity or signs of injury  Right lower leg: No edema  Left lower leg: No edema  Skin:     Capillary Refill: Capillary refill takes less than 2 seconds  Coloration: Skin is pale  Skin is not jaundiced  Findings: No erythema, lesion or rash  Neurological:      General: No focal deficit present  Mental Status: She is alert  Cranial Nerves: No cranial nerve deficit  Sensory: No sensory deficit  Motor: No weakness        Coordination: Coordination normal       Gait: Gait normal       Deep Tendon Reflexes: Reflexes normal    Psychiatric:         Mood and Affect: Mood normal           Additional Data:     Labs:  Results from last 7 days   Lab Units 05/15/22  0447   WBC Thousand/uL 9 60   HEMOGLOBIN g/dL 11 1*   HEMATOCRIT % 35 8   PLATELETS Thousands/uL 168   NEUTROS PCT % 63   LYMPHS PCT % 24   MONOS PCT % 8   EOS PCT % 4     Results from last 7 days   Lab Units 05/14/22  0518   SODIUM mmol/L 140   POTASSIUM mmol/L 3 6   CHLORIDE mmol/L 107   CO2 mmol/L 24   BUN mg/dL 16   CREATININE mg/dL 0 73   ANION GAP mmol/L 9   CALCIUM mg/dL 8 7   ALBUMIN g/dL 2 7*   TOTAL BILIRUBIN mg/dL 0 73   ALK PHOS U/L 110   ALT U/L 12   AST U/L 10   GLUCOSE RANDOM mg/dL 253*         Results from last 7 days   Lab Units 05/15/22  1105 05/15/22  0721 05/14/22  2053 05/14/22  1619 05/14/22  1112 05/14/22  0752 05/14/22  0524 05/13/22  2153 05/13/22  1717 05/13/22  1212 05/13/22  0537 05/13/22  0333   POC GLUCOSE mg/dl 268* 237* 327* 314* 290* 208* 231* 250* 284* 349* 281* 279*     Results from last 7 days   Lab Units 05/10/22  0631   HEMOGLOBIN A1C % 11 4*     Results from last 7 days   Lab Units 05/13/22  0346 05/12/22  0430 05/11/22  0503   LACTIC ACID mmol/L 1 7  --   --    PROCALCITONIN ng/ml  --  0 09 0 08       Lines/Drains:  Invasive Devices  Report    Peripheral Intravenous Line  Duration           Peripheral IV 05/15/22 Dorsal (posterior); Left Hand <1 day                  Telemetry:  Telemetry Orders (From admission, onward)             48 Hour Telemetry Monitoring  Continuous x 48 hours        References:    Telemetry Guidelines   Question:  Reason for 48 Hour Telemetry  Answer:  Arrhythmias Requiring Medical Therapy (eg  SVT, Vtach/fib, Bradycardia, Uncontrolled A-fib)                 Telemetry Reviewed: Normal Sinus Rhythm  Indication for Continued Telemetry Use: No indication for continued use  Will discontinue  Imaging: No pertinent imaging reviewed  Recent Cultures (last 7 days):   Results from last 7 days   Lab Units 05/11/22 2158 05/11/22 2157   BLOOD CULTURE  No Growth at 72 hrs  No Growth at 72 hrs         Last 24 Hours Medication List:   Current Facility-Administered Medications   Medication Dose Route Frequency Provider Last Rate    acetaminophen  650 mg Oral Q6H PRN Luis Bandar, DO      albuterol  2 puff Inhalation Q4H PRN Luis Bandar, DO      carisoprodol  175 mg Oral BID Amanda Fech, DO      cefTRIAXone  2,000 mg Intravenous Q24H Amanda Fech, DO 2,000 mg (05/15/22 0050)    cloNIDine  0 1 mg Oral Q12H [address] Amanda Fech, DO      diazepam  5 mg Oral HS Amanda Fech, DO      fluticasone-vilanterol  1 puff Inhalation Daily Amanda Fech, DO      heparin (porcine)  7,500 Units Subcutaneous Q8H [address] Amanda Fech, DO      hydrALAZINE  10 mg Intravenous Q6H PRN Amanda Fech, DO      insulin glargine  15 Units Subcutaneous BID Amanda Fech, DO      insulin lispro  1-5 Units Subcutaneous HS Amanda Fech, DO      insulin lispro  2-12 Units Subcutaneous TID AC Amanda Fech, DO      levalbuterol  1 25 mg Nebulization Q6H PRN Memory Smoke, DO      morphine injection  4 mg Intravenous Q4H PRN Luis Bandar, DO      nystatin   Topical BID Amanda Fech, DO      ondansetron  4 mg Intravenous Q6H PRN Amanda Fech, DO      [START ON 5/16/2022] pantoprazole  40 mg Oral Early Morning Amanda Fech, DO      pregabalin  50 mg Oral BID Amanda Fech, DO      sodium chloride  3 mL Nebulization Q6H PRN Memory Smoke, DO      sucralfate  1 g Oral Q6H [address] Luis Bandar, DO          Today, Patient Was Seen By: Ivonne Alva MD    **Please Note: This note may have been constructed using a voice recognition system  **

## 2022-05-15 NOTE — PROGRESS NOTES
The pantoprazole has been converted to Oral per [facility] IV-to-PO Auto-Conversion Protocol for Adults as approved by the Pharmacy and Therapeutics Committee  The patient met all eligible criteria:  3 Age = 24years old   2) Received at least one dose of the IV form   3) Receiving at least one other scheduled oral/enteral medication   4) Tolerating an oral/enteral diet   and did not have any exclusions:   1) Critical care patient   2) Active GI bleed (IF assessing H2RAs or PPIs)   3) Continuous tube feeding (IF assessing cipro, doxycycline, levofloxacin, minocycline, rifampin, or voriconazole)   4) Receiving PO vancomycin (IF assessing metronidazole)   5) Persistent nausea and/or vomiting   6) Ileus or gastrointestinal obstruction   7) Mary Beth/nasogastric tube set for continuous suction   8) Specific order not to automatically convert to PO (in the order's comments or if discussed in the most recent Infectious Disease or primary team's progress notes)

## 2022-05-15 NOTE — ASSESSMENT & PLAN NOTE
Results from last 7 days   Lab Units 05/15/22  0447 05/14/22  0518 05/13/22  0346 05/12/22  0430   WBC Thousand/uL 9 60 11 78* 18 62* 16 00*   Noted rising white count - unclear etiology  Chest x-ray with increased interstitial edema but no infiltrate  Held fluids and given 1 time dose of IV Lasix 5/11  Procalcitonin negative x2  No evidence of infection on CT head or abdomen  Urine collected on admission without evidence of infection  Pt was evaluated by night team last night for tachycardia -this was sinus tach  With white count and tachycardia, a dose of ceftriaxone was provided  [facility] were collected prior to abx administration on 5/11/22- negative     [address] [facility] is 9 6

## 2022-05-15 NOTE — ASSESSMENT & PLAN NOTE
Results from last 7 days   Lab Units 05/15/22  0447 05/14/22  0518 05/13/22  0347 05/13/22  0346   HEMOGLOBIN g/dL 11 1* 12 8  --  12 8   I STAT HEMOGLOBIN g/dl  --   --  12 9  --    Noted to have heme positive stool x 3   Hgb stable though  Seen and evaluated by GI - no need for scope given hgb stability

## 2022-05-15 NOTE — SPEECH THERAPY NOTE
Speech Language/Pathology    Speech/Language Pathology Progress Note    Patient Name: Awa Hearn  [id number] Date: 5/15/2022     Problem List  Principal Problem:    Type 2 diabetes mellitus with hyperglycemia, with long-term current use of insulin ([facility] [address] )  Active Problems:    Opioid dependence with other opioid-induced disorder ([facility] [address] )    Diabetic gastroparesis ([facility] [address] )    Essential hypertension    Acute encephalopathy    Leukocytosis    Heme positive stool    Urine retention    SVT (supraventricular tachycardia) (HCC)    Ambulatory dysfunction      Subjective:  Nursing reached out to speech to assess patient for possible upgrade  Pt continues to maintain alertness  Pt resting comfortably in her chair with her family at her bedside  Pt was able to converse appropriately  Objective:  Current diet: puree and thin     Pt seen for ongoing dysphagia therapy  Pt maintaining her alertness and tolerating current diet  Pt agreeable to ST and trials for potential diet upgrade  SLP presented patient with trials of solids consisting of: sonia crackers, turkey sandwich, and pretzels  Pt with functional mastication for the sonia crackers  Mildly prolonged mastication with the turkey sandwich, but functional for the patient  Good control with the cracker and sandwich  Attempted to assess with the pretzel  Pt attempted to chew, but reported that it was too hard for her and took it out of her oral cavity  Pt currently only has dentures on the top and reports that she has a new set at home, but they are fitted properly yet  No overt s/s of aspiration with the solids or thin liquids  SLP provided education and discussed diet upgrade to dysphagia level 3, pt and family agreeable  Assessment:  Maintaining alertness  Pt requesting diet upgrade  Patient tolerated trials of soft solids and some that required increased chewing/control  Ended up spitting out a pretzel       Plan/Recommendations:  Dysphagia level 3-dental soft and thin liquids   ST to follow-up 1-2x to ensure tolerance of diet

## 2022-05-15 NOTE — NURSING NOTE
Pt stated that she was just "very tired" and needed to sleep  Pt A &O x 4 rest of the night  RN reported at shift change that pt was awake and alert earlier and then became lethargic  Oncoming RN found pt to be sleeping  Approx 1930 pt woke up, asked to eat and proceeded to make a phone call  RN will continue to observe pt's mental status t/o the night

## 2022-05-15 NOTE — ASSESSMENT & PLAN NOTE
Will hold all opioids/sedating medication giving acute encephalopathy      No acute signs of opiate overdose at this time  Morphine Sulf Er 60mg BID   Morphine Sulf Er 15mg BID  Morphine Sulf IR 15 mg TID PRN  Pregabalin 150 mg BID  Carisoprodol 350 mg BID  Valium 5 mg daily  PDMP reviewed by ICU team     Had tox evaluate  Will likely need to be tapered off going forward given likely causing more harm than benefit  Spoke with toxicology - Want to avoid withdrawal  Resumed soma and valium at decreased doses 5/11  Will resume lyrica today a decreased dose as well

## 2022-05-15 NOTE — PLAN OF CARE
Problem: MOBILITY - ADULT  Goal: Maintain or return to baseline ADL function  Description: INTERVENTIONS:  -  Assess patient's ability to carry out ADLs; assess patient's baseline for ADL function and identify physical deficits which impact ability to perform ADLs (bathing, care of mouth/teeth, toileting, grooming, dressing, etc )  - Assess/evaluate cause of self-care deficits   - Assess range of motion  - Assess patient's mobility; develop plan if impaired  - Assess patient's need for assistive devices and provide as appropriate  - Encourage maximum independence but intervene and supervise when necessary  - Involve family in performance of ADLs  - Assess for home care needs following discharge   - Consider OT consult to assist with ADL evaluation and planning for discharge  - Provide patient education as appropriate  Outcome: Progressing  Goal: Maintains/Returns to pre admission functional level  Description: INTERVENTIONS:  - Perform BMAT or MOVE assessment daily    - Set and communicate daily mobility goal to care team and patient/family/caregiver     - Collaborate with rehabilitation services on mobility goals if consulted  - Out of bed for toileting  - Record patient progress and toleration of activity level   Outcome: Progressing     Problem: Prexisting or High Potential for Compromised Skin Integrity  Goal: Skin integrity is maintained or improved  Description: INTERVENTIONS:  - Identify patients at risk for skin breakdown  - Assess and monitor skin integrity  - Assess and monitor nutrition and hydration status  - Monitor labs   - Assess for incontinence   - Turn and reposition patient  - Assist with mobility/ambulation  - Relieve pressure over bony prominences  - Avoid friction and shearing  - Provide appropriate hygiene as needed including keeping skin clean and dry  - Evaluate need for skin moisturizer/barrier cream  - Collaborate with interdisciplinary team   - Patient/family teaching  - Consider wound care consult   Outcome: Progressing     Problem: Nutrition/Hydration-ADULT  Goal: Nutrient/Hydration intake appropriate for improving, restoring or maintaining nutritional needs  Description: Monitor and assess patient's nutrition/hydration status for malnutrition  Collaborate with interdisciplinary team and initiate plan and interventions as ordered  Monitor patient's weight and dietary intake as ordered or per policy  Utilize nutrition screening tool and intervene as necessary  Determine patient's food preferences and provide high-protein, high-caloric foods as appropriate       INTERVENTIONS:  - Monitor oral intake, urinary output, labs, and treatment plans  - Assess nutrition and hydration status and recommend course of action  - Evaluate amount of meals eaten  - Assist patient with eating if necessary   - Allow adequate time for meals  - Recommend/ encourage appropriate diets, oral nutritional supplements, and vitamin/mineral supplements  - Order, calculate, and assess calorie counts as needed  - Recommend, monitor, and adjust tube feedings and TPN/PPN based on assessed needs  - Assess need for intravenous fluids  - Provide specific nutrition/hydration education as appropriate  - Include patient/family/caregiver in decisions related to nutrition  Outcome: Progressing     Problem: GASTROINTESTINAL - ADULT  Goal: Maintains or returns to baseline bowel function  Description: INTERVENTIONS:  - Assess bowel function  - Encourage oral fluids to ensure adequate hydration  - Administer IV fluids if ordered to ensure adequate hydration  - Administer ordered medications as needed  - Encourage mobilization and activity  - Consider nutritional services referral to assist patient with adequate nutrition and appropriate food choices  Outcome: Progressing  Goal: Maintains adequate nutritional intake  Description: INTERVENTIONS:  - Monitor percentage of each meal consumed  - Identify factors contributing to decreased intake, treat as appropriate  - Assist with meals as needed  - Monitor I&O, weight, and lab values if indicated  - Obtain nutrition services referral as needed  Outcome: Progressing  Goal: Oral mucous membranes remain intact  Description: INTERVENTIONS  - Assess oral mucosa and hygiene practices  - Implement preventative oral hygiene regimen  - Implement oral medicated treatments as ordered  - Initiate Nutrition services referral as needed  Outcome: Progressing     Problem: METABOLIC, FLUID AND ELECTROLYTES - ADULT  Goal: Electrolytes maintained within normal limits  Description: INTERVENTIONS:  - Monitor labs and assess patient for signs and symptoms of electrolyte imbalances  - Administer electrolyte replacement as ordered  - Monitor response to electrolyte replacements, including repeat lab results as appropriate  - Instruct patient on fluid and nutrition as appropriate  Outcome: Progressing  Goal: Fluid balance maintained  Description: INTERVENTIONS:  - Monitor labs   - Monitor I/O and WT  - Instruct patient on fluid and nutrition as appropriate  - Assess for signs & symptoms of volume excess or deficit  Outcome: Progressing  Goal: Glucose maintained within target range  Description: INTERVENTIONS:  - Monitor Blood Glucose as ordered  - Assess for signs and symptoms of hyperglycemia and hypoglycemia  - Administer ordered medications to maintain glucose within target range  - Assess nutritional intake and initiate nutrition service referral as needed  Outcome: Progressing     Problem: PAIN - ADULT  Goal: Verbalizes/displays adequate comfort level or baseline comfort level  Description: Interventions:  - Encourage patient to monitor pain and request assistance  - Assess pain using appropriate pain scale  - Administer analgesics based on type and severity of pain and evaluate response  - Implement non-pharmacological measures as appropriate and evaluate response  - Consider cultural and social influences on pain and pain management  - Notify physician/advanced practitioner if interventions unsuccessful or patient reports new pain  Outcome: Progressing     Problem: INFECTION - ADULT  Goal: Absence or prevention of progression during hospitalization  Description: INTERVENTIONS:  - Assess and monitor for signs and symptoms of infection  - Monitor lab/diagnostic results  - Monitor all insertion sites, i e  indwelling lines, tubes, and drains  - Monitor endotracheal if appropriate and nasal secretions for changes in amount and color  - [place] appropriate cooling/warming therapies per order  - Administer medications as ordered  - Instruct and encourage patient and family to use good hand hygiene technique  - Identify and instruct in appropriate isolation precautions for identified infection/condition  Outcome: Progressing     Problem: SAFETY ADULT  Goal: Patient will remain free of falls  Description: INTERVENTIONS:  - Educate patient/family on patient safety including physical limitations  - Instruct patient to call for assistance with activity   - Consult OT/PT to assist with strengthening/mobility   - Keep Call bell within reach  - Keep bed low and locked with side rails adjusted as appropriate  - Keep care items and personal belongings within reach  - Initiate and maintain comfort rounds  - Make Fall Risk Sign visible to staff  - Offer Toileting every 2 Hours, in advance of need  - Initiate/Maintain bed alarm  - Obtain necessary fall risk management equipment: alarms  - Apply yellow socks and bracelet for high fall risk patients  - Consider moving patient to room near nurses station  Outcome: Progressing     Problem: Potential for Falls  Goal: Patient will remain free of falls  Description: INTERVENTIONS:  - Educate patient/family on patient safety including physical limitations  - Instruct patient to call for assistance with activity   - Consult OT/PT to assist with strengthening/mobility   - Keep Call bell within reach  - Keep bed low and locked with side rails adjusted as appropriate  - Keep care items and personal belongings within reach  - Initiate and maintain comfort rounds  - Make Fall Risk Sign visible to staff  - Offer Toileting every 2 Hours, in advance of need  - Initiate/Maintain bed alarm  - Obtain necessary fall risk management equipment: alarms  - Apply yellow socks and bracelet for high fall risk patients  - Consider moving patient to room near nurses station  Outcome: Progressing

## 2022-05-16 ENCOUNTER — APPOINTMENT (INPATIENT)
Dept: RADIOLOGY | Facility: HOSPITAL | Age: 57
DRG: 637 | End: 2022-05-16
Payer: MEDICARE

## 2022-05-16 ENCOUNTER — APPOINTMENT (INPATIENT)
Dept: MRI IMAGING | Facility: HOSPITAL | Age: 57
DRG: 637 | End: 2022-05-16
Payer: MEDICARE

## 2022-05-16 ENCOUNTER — TELEPHONE (OUTPATIENT)
Dept: RADIOLOGY | Facility: HOSPITAL | Age: 57
End: 2022-05-16

## 2022-05-16 LAB
ATRIAL RATE: 109 BPM
GLUCOSE SERPL-MCNC: 222 MG/DL (ref 65–140)
GLUCOSE SERPL-MCNC: 278 MG/DL (ref 65–140)
GLUCOSE SERPL-MCNC: 310 MG/DL (ref 65–140)
GLUCOSE SERPL-MCNC: 351 MG/DL (ref 65–140)
P AXIS: 4 DEGREES
PR INTERVAL: 117 MS
QRS AXIS: -6 DEGREES
QRSD INTERVAL: 79 MS
QT INTERVAL: 338 MS
QTC INTERVAL: 456 MS
T WAVE AXIS: 4 DEGREES
VENTRICULAR RATE: 109 BPM

## 2022-05-16 PROCEDURE — 99232 SBSQ HOSP IP/OBS MODERATE 35: CPT | Performed by: INTERNAL MEDICINE

## 2022-05-16 PROCEDURE — G1004 CDSM NDSC: HCPCS

## 2022-05-16 PROCEDURE — 82948 REAGENT STRIP/BLOOD GLUCOSE: CPT

## 2022-05-16 PROCEDURE — 70553 MRI BRAIN STEM W/O & W/DYE: CPT

## 2022-05-16 PROCEDURE — 93010 ELECTROCARDIOGRAM REPORT: CPT | Performed by: INTERNAL MEDICINE

## 2022-05-16 PROCEDURE — A9585 GADOBUTROL INJECTION: HCPCS | Performed by: PHYSICIAN ASSISTANT

## 2022-05-16 RX ORDER — PANTOPRAZOLE SODIUM 40 MG/1
40 TABLET, DELAYED RELEASE ORAL
Qty: 30 TABLET | Refills: 0 | Status: SHIPPED | OUTPATIENT
Start: 2022-05-17

## 2022-05-16 RX ORDER — BLOOD SUGAR DIAGNOSTIC
STRIP MISCELLANEOUS
Qty: 100 EACH | Refills: 0 | Status: SHIPPED | OUTPATIENT
Start: 2022-05-16

## 2022-05-16 RX ORDER — GLUCOSAMINE HCL/CHONDROITIN SU 500-400 MG
CAPSULE ORAL
Qty: 200 EACH | Refills: 0 | Status: SHIPPED | OUTPATIENT
Start: 2022-05-16

## 2022-05-16 RX ORDER — INSULIN GLARGINE 100 [IU]/ML
15 INJECTION, SOLUTION SUBCUTANEOUS
Qty: 4.5 ML | Refills: 0 | Status: SHIPPED | OUTPATIENT
Start: 2022-05-16 | End: 2022-05-16

## 2022-05-16 RX ORDER — GLUCOSAMINE HCL/CHONDROITIN SU 500-400 MG
CAPSULE ORAL
Qty: 100 EACH | Refills: 0 | Status: SHIPPED | OUTPATIENT
Start: 2022-05-16

## 2022-05-16 RX ORDER — LANCETS 33 GAUGE
EACH MISCELLANEOUS
Qty: 200 EACH | Refills: 0 | Status: SHIPPED | OUTPATIENT
Start: 2022-05-16

## 2022-05-16 RX ORDER — LANCETS 33 GAUGE
EACH MISCELLANEOUS
Qty: 100 EACH | Refills: 0 | Status: SHIPPED | OUTPATIENT
Start: 2022-05-16

## 2022-05-16 RX ORDER — SUCRALFATE 1 G/1
1 TABLET ORAL EVERY 6 HOURS SCHEDULED
Qty: 30 TABLET | Refills: 0 | Status: SHIPPED | OUTPATIENT
Start: 2022-05-16

## 2022-05-16 RX ORDER — INSULIN DETEMIR 100 [IU]/ML
15 INJECTION, SOLUTION SUBCUTANEOUS
Qty: 10 ML | Refills: 3 | Status: SHIPPED | OUTPATIENT
Start: 2022-05-16 | End: 2022-05-17 | Stop reason: SDUPTHER

## 2022-05-16 RX ORDER — INSULIN GLARGINE 100 [IU]/ML
15 INJECTION, SOLUTION SUBCUTANEOUS
Qty: 10 ML | Refills: 0 | Status: SHIPPED | OUTPATIENT
Start: 2022-05-16 | End: 2022-05-17

## 2022-05-16 RX ORDER — CLONIDINE HYDROCHLORIDE 0.1 MG/1
0.1 TABLET ORAL EVERY 12 HOURS SCHEDULED
Qty: 60 TABLET | Refills: 0 | Status: SHIPPED | OUTPATIENT
Start: 2022-05-16

## 2022-05-16 RX ORDER — ALBUTEROL SULFATE 90 UG/1
2 AEROSOL, METERED RESPIRATORY (INHALATION) EVERY 4 HOURS PRN
Qty: 1 G | Refills: 3 | Status: SHIPPED | OUTPATIENT
Start: 2022-05-16 | End: 2022-05-17 | Stop reason: SDUPTHER

## 2022-05-16 RX ORDER — BLOOD-GLUCOSE METER
KIT MISCELLANEOUS
Qty: 1 KIT | Refills: 0 | Status: SHIPPED | OUTPATIENT
Start: 2022-05-16

## 2022-05-16 RX ORDER — BLOOD SUGAR DIAGNOSTIC
STRIP MISCELLANEOUS
Qty: 200 EACH | Refills: 0 | Status: SHIPPED | OUTPATIENT
Start: 2022-05-16

## 2022-05-16 RX ADMIN — DIAZEPAM 5 MG: 5 TABLET ORAL at 21:57

## 2022-05-16 RX ADMIN — GADOBUTROL 9 ML: 604.72 INJECTION INTRAVENOUS at 13:02

## 2022-05-16 RX ADMIN — SUCRALFATE 1 G: 1 TABLET ORAL at 00:26

## 2022-05-16 RX ADMIN — SUCRALFATE 1 G: 1 TABLET ORAL at 07:01

## 2022-05-16 RX ADMIN — NYSTATIN: 100000 POWDER TOPICAL at 08:54

## 2022-05-16 RX ADMIN — MORPHINE SULFATE 4 MG: 4 INJECTION INTRAVENOUS at 00:33

## 2022-05-16 RX ADMIN — PREGABALIN 50 MG: 50 CAPSULE ORAL at 17:01

## 2022-05-16 RX ADMIN — NYSTATIN: 100000 POWDER TOPICAL at 17:01

## 2022-05-16 RX ADMIN — INSULIN LISPRO 4 UNITS: 100 INJECTION, SOLUTION INTRAVENOUS; SUBCUTANEOUS at 08:51

## 2022-05-16 RX ADMIN — PANTOPRAZOLE SODIUM 40 MG: 40 TABLET, DELAYED RELEASE ORAL at 07:01

## 2022-05-16 RX ADMIN — INSULIN LISPRO 6 UNITS: 100 INJECTION, SOLUTION INTRAVENOUS; SUBCUTANEOUS at 13:07

## 2022-05-16 RX ADMIN — INSULIN LISPRO 10 UNITS: 100 INJECTION, SOLUTION INTRAVENOUS; SUBCUTANEOUS at 16:58

## 2022-05-16 RX ADMIN — FLUTICASONE FUROATE AND VILANTEROL TRIFENATATE 1 PUFF: 200; 25 POWDER RESPIRATORY (INHALATION) at 08:53

## 2022-05-16 RX ADMIN — HEPARIN SODIUM 7500 UNITS: 5000 INJECTION INTRAVENOUS; SUBCUTANEOUS at 13:07

## 2022-05-16 RX ADMIN — INSULIN GLARGINE 15 UNITS: 100 INJECTION, SOLUTION SUBCUTANEOUS at 21:56

## 2022-05-16 RX ADMIN — CLONIDINE HYDROCHLORIDE 0.1 MG: 0.1 TABLET ORAL at 08:51

## 2022-05-16 RX ADMIN — HEPARIN SODIUM 7500 UNITS: 5000 INJECTION INTRAVENOUS; SUBCUTANEOUS at 07:01

## 2022-05-16 RX ADMIN — INSULIN GLARGINE 15 UNITS: 100 INJECTION, SOLUTION SUBCUTANEOUS at 08:51

## 2022-05-16 RX ADMIN — CEFTRIAXONE 2000 MG: 2 INJECTION, POWDER, FOR SOLUTION INTRAMUSCULAR; INTRAVENOUS at 23:18

## 2022-05-16 RX ADMIN — SUCRALFATE 1 G: 1 TABLET ORAL at 13:07

## 2022-05-16 RX ADMIN — SUCRALFATE 1 G: 1 TABLET ORAL at 23:17

## 2022-05-16 RX ADMIN — CARISOPRODOL 175 MG: 350 TABLET ORAL at 08:51

## 2022-05-16 RX ADMIN — INSULIN LISPRO 3 UNITS: 100 INJECTION, SOLUTION INTRAVENOUS; SUBCUTANEOUS at 21:55

## 2022-05-16 RX ADMIN — CARISOPRODOL 175 MG: 350 TABLET ORAL at 21:56

## 2022-05-16 RX ADMIN — PREGABALIN 50 MG: 50 CAPSULE ORAL at 08:51

## 2022-05-16 RX ADMIN — MORPHINE SULFATE 4 MG: 4 INJECTION INTRAVENOUS at 23:23

## 2022-05-16 RX ADMIN — SUCRALFATE 1 G: 1 TABLET ORAL at 17:01

## 2022-05-16 RX ADMIN — HEPARIN SODIUM 7500 UNITS: 5000 INJECTION INTRAVENOUS; SUBCUTANEOUS at 21:56

## 2022-05-16 NOTE — ASSESSMENT & PLAN NOTE
Lab Results   Component Value Date    HGBA1C 11 4 (H) 05/10/2022     Recent Labs     05/15/22  1105 05/15/22  1619 05/15/22  2058 05/16/22  0740   POCGLU 268* 274* 329* 222*   Continue Reglan p r n    Blood sugars - improved control today

## 2022-05-16 NOTE — PHYSICAL THERAPY NOTE
Physical Therapy Cancellation Note           05/16/22 4997   PT Last Visit   PT Visit Date 05/16/22   Note Type   Note Type Cancelled Session   Cancel Reasons Other  (pt refusing to participate w therapy  fixated on reaching out to CM and going home  offered to reach out to SW for patient however decline assistance   spouse present )       Lucinda Moreira, PT

## 2022-05-16 NOTE — PROGRESS NOTES
[address] [place] [address]  Progress Note - Charisse Moritz 1965, 61 y o  female MRN: [id number]  Unit/Bed#: [facility] -01 Encounter: [id number]  Primary Care Provider: Nadia Clement MD   Date and time admitted to hospital: 5/9/2022  5:33 PM    Ambulatory dysfunction  Assessment & Plan  PT/OT recommending rehab- patient refused rehab   Wants home with home health - CM has this in place  Will discuss with CM       Urine retention  Assessment & Plan  Per nursing, patient failed urinary retention protocol  Singer catheter was inserted 5/11/22- removed successfully prior to transfer to floor  Heme positive stool  Assessment & Plan  Results from last 7 days   Lab Units 05/15/22  0447 05/14/22  0518 05/13/22  0347 05/13/22  0346   HEMOGLOBIN g/dL 11 1* 12 8  --  12 8   I STAT HEMOGLOBIN g/dl  --   --  12 9  --    hemoglobin is trending down - will re-discuss with GI       Leukocytosis  Assessment & Plan  Results from last 7 days   Lab Units 05/15/22  0447 05/14/22  0518 05/13/22  0346 05/12/22  0430   WBC Thousand/uL 9 60 11 78* 18 62* 16 00*   resolved yesterday       Acute encephalopathy  Assessment & Plan  Acute metabolic encephalopathy  Patient presenting with weakness for multiple days, now with altered mental status  Per EMS, son called 46 the patient be brought to the ED for further evaluation    Has been previously admitted for the same, typically secondary to hyperglycemia  Likely secondary to hyperglycemia complicated by opioid dependence/sedating medications  Urine drug screen positive for barbiturates, benzos, opioids  Opoids/sedated medications held on admission  Resumed soma and benzo 5/11 per tox - want to avoid withdrawal  Resumed at 1/2 of regular dose   Seen and evaluated by speech with the following recommendation:  Keep NPO with ice chips until more awake and can tolerate regular diet  Switch meds to IV given inability to swallow pills  Consulted tox for additional recommendations - largely contributing is her chronic pain meds  Still has difficulty answering questions and slow to respond / follow commands  NG tube placed in order to get certain oral meds through tube   Had neurology evaluate today    MRI brain pending- probably done today    Defer LP - low suspicion for CNS infection at this time   Supportive care for suspected drug withdrawal   Trial neb treatment to see if this helps with breathing    Currently neurologically no deficits but will await MRI which is scheduled for this PM       Essential hypertension  Assessment & Plan  Home regimen lisinopril 10 mg daily  Pt not able to tolerate po    /70    Diabetic gastroparesis [facility])  Assessment & Plan  Lab Results   Component Value Date    HGBA1C 11 4 (H) 05/10/2022     Recent Labs     05/15/22  1105 05/15/22  1619 05/15/22  2058 05/16/22  0740   POCGLU 268* 274* 329* 222*   Continue Reglan p r n  Blood sugars - improved control today     Opioid dependence with other opioid-induced disorder [facility])  Assessment & Plan  Will hold all opioids/sedating medication giving acute encephalopathy  No acute signs of opiate overdose at this time  Morphine Sulf Er 60mg BID   Morphine Sulf Er 15mg BID  Morphine Sulf IR 15 mg TID PRN  Pregabalin 150 mg BID  Carisoprodol 350 mg BID  Valium 5 mg daily  PDMP reviewed by ICU team     Had tox evaluate  Will likely need to be tapered off going forward given likely causing more harm than benefit  Spoke with toxicology - Want to avoid withdrawal  Resumed soma and valium at decreased doses 5/11  Lyrica resumed as well       * Type 2 diabetes mellitus with hyperglycemia, with long-term current use of insulin ([facility] [address] )  Assessment & Plan  Presenting with altered mental status, according to son, this is how she gets when she has significantly hyperglycemia  Lab Results   Component Value Date    HGBA1C 11 4 (H) 05/10/2022   Patient maintained on 50 units Lantus HS and 10 units t i d   With meals  Suppose to take the above but does not - noncompliant according to son      Patient says she does not have any insulin at home  Cost checked lantus with CM - not covered  Will trial option and recheck             VTE Pharmacologic Prophylaxis: VTE Score: 3 Moderate Risk (Score 3-4) - Pharmacological DVT Prophylaxis Ordered: heparin  Patient Centered Rounds: I performed bedside rounds with nursing staff today  Discussions with Specialists or Other Care Team Provider: Discussed with GI regarding canceling esophagogram   - cancelled  Will need to re-discuss regarding hemoglobin drop  Tiger texted neurology to remind them of MRI order  Education and Discussions with Family / Patient: Updated  () at bedside  Time Spent for Care: 30 minutes  More than 50% of total time spent on counseling and coordination of care as described above  Current Length of Stay: 7 day(s)  Current Patient Status: Inpatient   Certification Statement: The patient will continue to require additional inpatient hospital stay due to awaiting MRI, May need GI re-eval for dropping hemoglobin  Discharge Plan: Anticipate discharge in 24-48 hrs to depending on clinical course    Code Status: Level 1 - Full Code    Subjective:    Patient seen and examined  Feeling "good"    Objective:     Vitals:   Temp (24hrs), Av 2 °F (36 8 °C), Min:97 7 °F (36 5 °C), Max:98 7 °F (37 1 °C)    Temp:  [97 7 °F (36 5 °C)-98 7 °F (37 1 °C)] 97 7 °F (36 5 °C)  HR:  [85-95] 91  Resp:  [18-20] 19  BP: (114-139)/(68-74) 114/68  SpO2:  [93 %-95 %] 93 %  Body mass index is 40 96 kg/m²  Input and Output Summary (last 24 hours):   No intake or output data in the 24 hours ending 22 1308    Physical Exam:   Physical Exam  Constitutional:       General: She is not in acute distress  Appearance: She is not ill-appearing, toxic-appearing or diaphoretic  HENT:      Head: Normocephalic     Eyes:      Pupils: Pupils are equal, round, and reactive to light  Cardiovascular:      Rate and Rhythm: Normal rate  Heart sounds: No murmur heard  No friction rub  No gallop  Pulmonary:      Effort: Pulmonary effort is normal  No respiratory distress  Breath sounds: No stridor  No wheezing, rhonchi or rales  Chest:      Chest wall: No tenderness  Abdominal:      General: Abdomen is flat  There is no distension  Palpations: There is no mass  Tenderness: There is no abdominal tenderness  There is no guarding or rebound  Hernia: No hernia is present  Skin:     Coloration: Skin is pale  Skin is not jaundiced  Findings: No bruising, erythema, lesion or rash  Neurological:      General: No focal deficit present  Cranial Nerves: No cranial nerve deficit  Sensory: No sensory deficit  Motor: No weakness        Coordination: Coordination normal       Gait: Gait normal       Deep Tendon Reflexes: Reflexes normal    Psychiatric:         Mood and Affect: Mood normal           Additional Data:     Labs:  Results from last 7 days   Lab Units 05/15/22  0447   WBC Thousand/uL 9 60   HEMOGLOBIN g/dL 11 1*   HEMATOCRIT % 35 8   PLATELETS Thousands/uL 168   NEUTROS PCT % 63   LYMPHS PCT % 24   MONOS PCT % 8   EOS PCT % 4     Results from last 7 days   Lab Units 05/14/22  0518   SODIUM mmol/L 140   POTASSIUM mmol/L 3 6   CHLORIDE mmol/L 107   CO2 mmol/L 24   BUN mg/dL 16   CREATININE mg/dL 0 73   ANION GAP mmol/L 9   CALCIUM mg/dL 8 7   ALBUMIN g/dL 2 7*   TOTAL BILIRUBIN mg/dL 0 73   ALK PHOS U/L 110   ALT U/L 12   AST U/L 10   GLUCOSE RANDOM mg/dL 253*         Results from last 7 days   Lab Units 05/16/22  0740 05/15/22  2058 05/15/22  1619 05/15/22  1105 05/15/22  0721 05/14/22  2053 05/14/22  1619 05/14/22  1112 05/14/22  0752 05/14/22  0524 05/13/22  2153 05/13/22  1717   POC GLUCOSE mg/dl 222* 329* 274* 268* 237* 327* 314* 290* 208* 231* 250* 284*     Results from last 7 days   Lab Units 05/10/22  0631   HEMOGLOBIN A1C % 11 4*     Results from last 7 days   Lab Units 05/13/22  0346 05/12/22  0430 05/11/22  0503   LACTIC ACID mmol/L 1 7  --   --    PROCALCITONIN ng/ml  --  0 09 0 08       Lines/Drains:  Invasive Devices  Report    Peripheral Intravenous Line  Duration           Peripheral IV 05/15/22 Dorsal (posterior); Left Hand 1 day                  Telemetry:  Telemetry Orders (From admission, onward)             48 Hour Telemetry Monitoring  Continuous x 48 hours        Expiring   References:    Telemetry Guidelines   Question:  Reason for 48 Hour Telemetry  Answer:  Arrhythmias Requiring Medical Therapy (eg  SVT, Vtach/fib, Bradycardia, Uncontrolled A-fib)                 Telemetry Reviewed: Normal Sinus Rhythm  Indication for Continued Telemetry Use: Acute CVA             Imaging: No pertinent imaging reviewed  Recent Cultures (last 7 days):   Results from last 7 days   Lab Units 05/11/22 2158 05/11/22 2157   BLOOD CULTURE  No Growth After 4 Days  No Growth After 4 Days         Last 24 Hours Medication List:   Current Facility-Administered Medications   Medication Dose Route Frequency Provider Last Rate    acetaminophen  650 mg Oral Q6H PRN Tomma Scarce, DO      albuterol  2 puff Inhalation Q4H PRN Tomma Scarce, DO      carisoprodol  175 mg Oral BID Amanda Fech, DO      cefTRIAXone  2,000 mg Intravenous Q24H Amanda Fech, DO 2,000 mg (05/15/22 2320)    cloNIDine  0 1 mg Oral Q12H [address] Amanda Fech, DO      diazepam  5 mg Oral HS Amanda Fech, DO      fluticasone-vilanterol  1 puff Inhalation Daily Amanda Fech, DO      heparin (porcine)  7,500 Units Subcutaneous [facility] Amanda Fech, DO      hydrALAZINE  10 mg Intravenous Q6H PRN Amanda Fech, DO      insulin glargine  15 Units Subcutaneous BID Amanda Fech, DO      insulin lispro  1-5 Units Subcutaneous HS Amanda Fech, DO      insulin lispro  2-12 Units Subcutaneous TID AC Amanda Fech, DO      levalbuterol  1 25 mg Nebulization Q6H PRN Christa Apple, DO      morphine injection  4 mg Intravenous Q4H PRN Didier Alejo, DO      nystatin   Topical BID Amanda Fech, DO      ondansetron  4 mg Intravenous Q6H PRN Didier Alejo, DO      pantoprazole  40 mg Oral Early Morning Amanda Fech, DO      pregabalin  50 mg Oral BID Amanda Fech, DO      sodium chloride  3 mL Nebulization Q6H PRN Christa Apple, DO      sucralfate  1 g Oral Q6H [address] Didier Alejo, DO          Today, Patient Was Seen By: Geri Daily MD    **Please Note: This note may have been constructed using a voice recognition system  **

## 2022-05-16 NOTE — ASSESSMENT & PLAN NOTE
Results from last 7 days   Lab Units 05/15/22  0447 05/14/22  0518 05/13/22  0346 05/12/22  0430   WBC Thousand/uL 9 60 11 78* 18 62* 16 00*   resolved yesterday

## 2022-05-16 NOTE — ASSESSMENT & PLAN NOTE
Will hold all opioids/sedating medication giving acute encephalopathy      No acute signs of opiate overdose at this time  Morphine Sulf Er 60mg BID   Morphine Sulf Er 15mg BID  Morphine Sulf IR 15 mg TID PRN  Pregabalin 150 mg BID  Carisoprodol 350 mg BID  Valium 5 mg daily  PDMP reviewed by ICU team     Had tox evaluate  Will likely need to be tapered off going forward given likely causing more harm than benefit  Spoke with toxicology - Want to avoid withdrawal  Resumed soma and valium at decreased doses 5/11  Lyrica resumed as well

## 2022-05-16 NOTE — ASSESSMENT & PLAN NOTE
Acute metabolic encephalopathy  Patient presenting with weakness for multiple days, now with altered mental status  Per EMS, son called 46 the patient be brought to the ED for further evaluation    Has been previously admitted for the same, typically secondary to hyperglycemia  Likely secondary to hyperglycemia complicated by opioid dependence/sedating medications  Urine drug screen positive for barbiturates, benzos, opioids  Opoids/sedated medications held on admission  Resumed soma and benzo 5/11 per tox - want to avoid withdrawal  Resumed at 1/2 of regular dose   Seen and evaluated by speech with the following recommendation:  Keep NPO with ice chips until more awake and can tolerate regular diet  Switch meds to IV given inability to swallow pills  Consulted tox for additional recommendations - largely contributing is her chronic pain meds  Still has difficulty answering questions and slow to respond / follow commands  NG tube placed in order to get certain oral meds through tube   Had neurology evaluate today    MRI brain pending- probably done today    Defer LP - low suspicion for CNS infection at this time   Supportive care for suspected drug withdrawal   Trial neb treatment to see if this helps with breathing    Currently neurologically no deficits but will await MRI which is scheduled for this PM

## 2022-05-16 NOTE — ASSESSMENT & PLAN NOTE
Results from last 7 days   Lab Units 05/15/22  0447 05/14/22  0518 05/13/22  0347 05/13/22  0346   HEMOGLOBIN g/dL 11 1* 12 8  --  12 8   I STAT HEMOGLOBIN g/dl  --   --  12 9  --    hemoglobin is trending down - will re-discuss with GI

## 2022-05-16 NOTE — ASSESSMENT & PLAN NOTE
Presenting with altered mental status, according to son, this is how she gets when she has significantly hyperglycemia  Lab Results   Component Value Date    HGBA1C 11 4 (H) 05/10/2022   Patient maintained on 50 units Lantus HS and 10 units t i d  With meals  Suppose to take the above but does not - noncompliant according to son      Patient says she does not have any insulin at home  Cost checked lantus with CM - not covered     Will trial option and recheck

## 2022-05-16 NOTE — QUICK NOTE
Pt was initially seen on 5/11  A barium swallow was ordered because of a hx of esophageal stricture & pt complaining of difficultly swallowing  Pt was to undergo the test today but was not NPO  I was asked if this was necessary  Per pt since being placed on a dysphagia diet she has had no further difficulty  I therefore would recommend canceling & she can follow with us as an outpt

## 2022-05-16 NOTE — ASSESSMENT & PLAN NOTE
PT/OT recommending rehab- patient refused rehab   Wants home with home health - CM has this in place     Will discuss with CM

## 2022-05-16 NOTE — CASE MANAGEMENT
Case Management Assessment & Discharge Planning Note    Patient name Nithya Neumann  Location [facility] 2 /South 2 Deric Sloan* MRN [id number]  : 1965 Date 2022       Current Admission Date: 2022  Current Admission Diagnosis:Type 2 diabetes mellitus with hyperglycemia, with long-term current use of insulin [facility])   Patient Active Problem List    Diagnosis Date Noted    Ambulatory dysfunction 05/15/2022    SVT [facility]supraventricular tachycardia) [facility] ) 2022    Urine retention 2022    Leukocytosis 2022    Heme positive stool 2022    SIRS (systemic inflammatory response syndrome) ([facility] ) 2021    Elevated LFTs 2021    Sinus tachycardia 2021    Low TSH level 2021    Hypertensive urgency 10/18/2020    Acute respiratory failure with hypoxia ([facility] ) 2020    Hyponatremia 2020    Acute encephalopathy 2020    Acute pain of right knee 2020    Polypharmacy 2020    Sepsis ([facility] ) 2019    Thyroid nodule 2019    Intractable nausea and vomiting 2019    Morbid obesity ([facility] ) 2018    Essential hypertension 11/10/2018    Diabetic gastroparesis ([facility] ) 2018    Type 2 diabetes mellitus with hyperglycemia, with long-term current use of insulin ([facility] ) 2018    Asthma 2018    Opioid dependence with other opioid-induced disorder ([facility] ) 2018      LOS (days): 7  Geometric Mean LOS (GMLOS) (days): 3 80  Days to GMLOS:-3     OBJECTIVE:    Risk of Unplanned Readmission Score: 27 42         Current admission status: Inpatient       Preferred Pharmacy:   [facility], [address] [address]  Phone: [phone] Fax: [phone]    Primary Care Provider: Jesus Arrieta MD    Primary Insurance: MEDICARE  Secondary Insurance: BLUE CROSS    ASSESSMENT:  [address] [facility] Representative - Spouse   Primary Phone: [phone] (Mobile)                                                                DISCHARGE DETAILS:    Discharge planning discussed with[de-identified] Cm met with pt and spouse at bed side reviewed PT recommendation for Inpt rehab, Pt declined rehab rather home with [facility] services  Pt made aware [facility] following pending medical clearance  Pt made aware of levemir and insuling suppliers confirmed with no copay and  medical team made aware via TT    Freedom of Choice: Yes                   Contacts  Reason/Outcome: Discharge [address]         Is the patient interested in [facility] at discharge?: Yes  [address] requested[de-identified] Nursing, Physical Therapy, Other (comment) (diabetic management)  Home Health Agency Name[de-identified]  ([facility])  [address] Provider[de-identified] PCP  Homebound Criteria Met[de-identified] Uses an Assist Device (i e  cane, walker, etc)  Supporting Clincal Findings[de-identified] Limited Endurance                      Discharge Destination Plan[de-identified] Home with [address] [employer]

## 2022-05-16 NOTE — PLAN OF CARE
Problem: MOBILITY - ADULT  Goal: Maintain or return to baseline ADL function  Description: INTERVENTIONS:  -  Assess patient's ability to carry out ADLs; assess patient's baseline for ADL function and identify physical deficits which impact ability to perform ADLs (bathing, care of mouth/teeth, toileting, grooming, dressing, etc )  - Assess/evaluate cause of self-care deficits   - Assess range of motion  - Assess patient's mobility; develop plan if impaired  - Assess patient's need for assistive devices and provide as appropriate  - Encourage maximum independence but intervene and supervise when necessary  - Involve family in performance of ADLs  - Assess for home care needs following discharge   - Consider OT consult to assist with ADL evaluation and planning for discharge  - Provide patient education as appropriate  Outcome: Progressing  Goal: Maintains/Returns to pre admission functional level  Description: INTERVENTIONS:  - Perform BMAT or MOVE assessment daily    - Set and communicate daily mobility goal to care team and patient/family/caregiver     - Collaborate with rehabilitation services on mobility goals if consulted  - Out of bed for toileting  - Record patient progress and toleration of activity level   Outcome: Progressing     Problem: Prexisting or High Potential for Compromised Skin Integrity  Goal: Skin integrity is maintained or improved  Description: INTERVENTIONS:  - Identify patients at risk for skin breakdown  - Assess and monitor skin integrity  - Assess and monitor nutrition and hydration status  - Monitor labs   - Assess for incontinence   - Turn and reposition patient  - Assist with mobility/ambulation  - Relieve pressure over bony prominences  - Avoid friction and shearing  - Provide appropriate hygiene as needed including keeping skin clean and dry  - Evaluate need for skin moisturizer/barrier cream  - Collaborate with interdisciplinary team   - Patient/family teaching  - Consider wound care consult   Outcome: Progressing     Problem: Nutrition/Hydration-ADULT  Goal: Nutrient/Hydration intake appropriate for improving, restoring or maintaining nutritional needs  Description: Monitor and assess patient's nutrition/hydration status for malnutrition  Collaborate with interdisciplinary team and initiate plan and interventions as ordered  Monitor patient's weight and dietary intake as ordered or per policy  Utilize nutrition screening tool and intervene as necessary  Determine patient's food preferences and provide high-protein, high-caloric foods as appropriate       INTERVENTIONS:  - Monitor oral intake, urinary output, labs, and treatment plans  - Assess nutrition and hydration status and recommend course of action  - Evaluate amount of meals eaten  - Assist patient with eating if necessary   - Allow adequate time for meals  - Recommend/ encourage appropriate diets, oral nutritional supplements, and vitamin/mineral supplements  - Order, calculate, and assess calorie counts as needed  - Recommend, monitor, and adjust tube feedings and TPN/PPN based on assessed needs  - Assess need for intravenous fluids  - Provide specific nutrition/hydration education as appropriate  - Include patient/family/caregiver in decisions related to nutrition  Outcome: Progressing     Problem: GASTROINTESTINAL - ADULT  Goal: Maintains or returns to baseline bowel function  Description: INTERVENTIONS:  - Assess bowel function  - Encourage oral fluids to ensure adequate hydration  - Administer IV fluids if ordered to ensure adequate hydration  - Administer ordered medications as needed  - Encourage mobilization and activity  - Consider nutritional services referral to assist patient with adequate nutrition and appropriate food choices  Outcome: Progressing  Goal: Maintains adequate nutritional intake  Description: INTERVENTIONS:  - Monitor percentage of each meal consumed  - Identify factors contributing to decreased intake, treat as appropriate  - Assist with meals as needed  - Monitor I&O, weight, and lab values if indicated  - Obtain nutrition services referral as needed  Outcome: Progressing  Goal: Oral mucous membranes remain intact  Description: INTERVENTIONS  - Assess oral mucosa and hygiene practices  - Implement preventative oral hygiene regimen  - Implement oral medicated treatments as ordered  - Initiate Nutrition services referral as needed  Outcome: Progressing     Problem: METABOLIC, FLUID AND ELECTROLYTES - ADULT  Goal: Electrolytes maintained within normal limits  Description: INTERVENTIONS:  - Monitor labs and assess patient for signs and symptoms of electrolyte imbalances  - Administer electrolyte replacement as ordered  - Monitor response to electrolyte replacements, including repeat lab results as appropriate  - Instruct patient on fluid and nutrition as appropriate  Outcome: Progressing  Goal: Fluid balance maintained  Description: INTERVENTIONS:  - Monitor labs   - Monitor I/O and WT  - Instruct patient on fluid and nutrition as appropriate  - Assess for signs & symptoms of volume excess or deficit  Outcome: Progressing  Goal: Glucose maintained within target range  Description: INTERVENTIONS:  - Monitor Blood Glucose as ordered  - Assess for signs and symptoms of hyperglycemia and hypoglycemia  - Administer ordered medications to maintain glucose within target range  - Assess nutritional intake and initiate nutrition service referral as needed  Outcome: Progressing     Problem: PAIN - ADULT  Goal: Verbalizes/displays adequate comfort level or baseline comfort level  Description: Interventions:  - Encourage patient to monitor pain and request assistance  - Assess pain using appropriate pain scale  - Administer analgesics based on type and severity of pain and evaluate response  - Implement non-pharmacological measures as appropriate and evaluate response  - Consider cultural and social influences on pain and pain management  - Notify physician/advanced practitioner if interventions unsuccessful or patient reports new pain  Outcome: Progressing     Problem: INFECTION - ADULT  Goal: Absence or prevention of progression during hospitalization  Description: INTERVENTIONS:  - Assess and monitor for signs and symptoms of infection  - Monitor lab/diagnostic results  - Monitor all insertion sites, i e  indwelling lines, tubes, and drains  - Monitor endotracheal if appropriate and nasal secretions for changes in amount and color  - [place] appropriate cooling/warming therapies per order  - Administer medications as ordered  - Instruct and encourage patient and family to use good hand hygiene technique  - Identify and instruct in appropriate isolation precautions for identified infection/condition  Outcome: Progressing     Problem: SAFETY ADULT  Goal: Patient will remain free of falls  Description: INTERVENTIONS:  - Educate patient/family on patient safety including physical limitations  - Instruct patient to call for assistance with activity   - Consult OT/PT to assist with strengthening/mobility   - Keep Call bell within reach  - Keep bed low and locked with side rails adjusted as appropriate  - Keep care items and personal belongings within reach  - Initiate and maintain comfort rounds  - Make Fall Risk Sign visible to staff  - Offer Toileting every 2 Hours, in advance of need  - Initiate/Maintain bed alarm  - Obtain necessary fall risk management equipment: alarms  - Apply yellow socks and bracelet for high fall risk patients  - Consider moving patient to room near nurses station  Outcome: Progressing     Problem: Potential for Falls  Goal: Patient will remain free of falls  Description: INTERVENTIONS:  - Educate patient/family on patient safety including physical limitations  - Instruct patient to call for assistance with activity   - Consult OT/PT to assist with strengthening/mobility   - Keep Call bell within reach  - Keep bed low and locked with side rails adjusted as appropriate  - Keep care items and personal belongings within reach  - Initiate and maintain comfort rounds  - Make Fall Risk Sign visible to staff  - Offer Toileting every 2 Hours, in advance of need  - Initiate/Maintain bed alarm  - Obtain necessary fall risk management equipment: alarms  - Apply yellow socks and bracelet for high fall risk patients  - Consider moving patient to room near nurses station  Outcome: Progressing

## 2022-05-16 NOTE — CASE MANAGEMENT
Case Management Assessment & Discharge Planning Note    Patient name Michael Sun  Location [facility] 2 /South 2 Uday Nathan* MRN [id number]  : 1965 Date 2022       Current Admission Date: 2022  Current Admission Diagnosis:Type 2 diabetes mellitus with hyperglycemia, with long-term current use of insulin [facility])   Patient Active Problem List    Diagnosis Date Noted    Ambulatory dysfunction 05/15/2022    SVT (supraventricular tachycardia) ([facility] ) 2022    Urine retention 2022    Leukocytosis 2022    Heme positive stool 2022    SIRS (systemic inflammatory response syndrome) ([facility] ) 2021    Elevated LFTs 2021    Sinus tachycardia 2021    Low TSH level 2021    Hypertensive urgency 10/18/2020    Acute respiratory failure with hypoxia ([facility] ) 2020    Hyponatremia 2020    Acute encephalopathy 2020    Acute pain of right knee 2020    Polypharmacy 2020    Sepsis ([facility] ) 2019    Thyroid nodule 2019    Intractable nausea and vomiting 2019    Morbid obesity ([facility] ) 2018    Essential hypertension 11/10/2018    Diabetic gastroparesis ([facility] ) 2018    Type 2 diabetes mellitus with hyperglycemia, with long-term current use of insulin ([facility] ) 2018    Asthma 2018    Opioid dependence with other [facility] 2018      LOS (days): 7  Geometric Mean LOS (GMLOS) (days): 3 80  Days to GMLOS:-2 9     OBJECTIVE:    Risk of Unplanned Readmission Score: 27 42         Current admission status: Inpatient       Preferred Pharmacy:   [address] [address]  Phone: [phone] Fax: [phone]    Primary Care Provider: Renetta Acosta MD    Primary Insurance: MEDICARE  Secondary Insurance: BLUE CROSS    ASSESSMENT:  [address], [address] [place] Representative - Spouse   Primary Phone: [phone] (Mobile)                                                                DISCHARGE DETAILS:    Discharge planning discussed with[de-identified] Pt reviewed with medical team, per medical team, pt is not medically stable for discharged  Pt to discharged home when medically cleared home with no need  Cm to price check for insulin  Cm contact [facility], per pharmacy, Lantus is not covered and suggested Levemir   medical team updated on same via TT

## 2022-05-16 NOTE — ASSESSMENT & PLAN NOTE
Per nursing, patient failed urinary retention protocol  Singer catheter was inserted 5/11/22- removed successfully prior to transfer to floor

## 2022-05-16 NOTE — TELEPHONE ENCOUNTER
Patient had food and drinks this morning  Radiologist suggests that exam be performed tomorrow with patient NPO after midnight  That message was relayed to the nurse

## 2022-05-17 ENCOUNTER — APPOINTMENT (INPATIENT)
Dept: RADIOLOGY | Facility: HOSPITAL | Age: 57
DRG: 637 | End: 2022-05-17
Payer: MEDICARE

## 2022-05-17 ENCOUNTER — TELEPHONE (OUTPATIENT)
Dept: RADIOLOGY | Facility: HOSPITAL | Age: 57
End: 2022-05-17

## 2022-05-17 VITALS
SYSTOLIC BLOOD PRESSURE: 133 MMHG | HEART RATE: 109 BPM | DIASTOLIC BLOOD PRESSURE: 72 MMHG | HEIGHT: 68 IN | OXYGEN SATURATION: 95 % | BODY MASS INDEX: 40.83 KG/M2 | WEIGHT: 269.4 LBS | RESPIRATION RATE: 18 BRPM | TEMPERATURE: 97.7 F

## 2022-05-17 LAB
ALBUMIN SERPL BCP-MCNC: 2.6 G/DL (ref 3.5–5)
ALP SERPL-CCNC: 99 U/L (ref 46–116)
ALT SERPL W P-5'-P-CCNC: 16 U/L (ref 12–78)
ANION GAP SERPL CALCULATED.3IONS-SCNC: 9 MMOL/L (ref 4–13)
AST SERPL W P-5'-P-CCNC: 18 U/L (ref 5–45)
BACTERIA BLD CULT: NORMAL
BACTERIA BLD CULT: NORMAL
BASOPHILS # BLD AUTO: 0.09 THOUSANDS/ΜL (ref 0–0.1)
BASOPHILS NFR BLD AUTO: 1 % (ref 0–1)
BILIRUB SERPL-MCNC: 0.38 MG/DL (ref 0.2–1)
BUN SERPL-MCNC: 14 MG/DL (ref 5–25)
CALCIUM ALBUM COR SERPL-MCNC: 9.3 MG/DL (ref 8.3–10.1)
CALCIUM SERPL-MCNC: 8.2 MG/DL (ref 8.3–10.1)
CHLORIDE SERPL-SCNC: 103 MMOL/L (ref 100–108)
CO2 SERPL-SCNC: 25 MMOL/L (ref 21–32)
CREAT SERPL-MCNC: 0.72 MG/DL (ref 0.6–1.3)
EOSINOPHIL # BLD AUTO: 0.37 THOUSAND/ΜL (ref 0–0.61)
EOSINOPHIL NFR BLD AUTO: 4 % (ref 0–6)
ERYTHROCYTE [DISTWIDTH] IN BLOOD BY AUTOMATED COUNT: 13.5 % (ref 11.6–15.1)
GFR SERPL CREATININE-BSD FRML MDRD: 93 ML/MIN/1.73SQ M
GLUCOSE SERPL-MCNC: 222 MG/DL (ref 65–140)
GLUCOSE SERPL-MCNC: 246 MG/DL (ref 65–140)
GLUCOSE SERPL-MCNC: 284 MG/DL (ref 65–140)
HCT VFR BLD AUTO: 37.3 % (ref 34.8–46.1)
HGB BLD-MCNC: 11.8 G/DL (ref 11.5–15.4)
IMM GRANULOCYTES # BLD AUTO: 0.06 THOUSAND/UL (ref 0–0.2)
IMM GRANULOCYTES NFR BLD AUTO: 1 % (ref 0–2)
LYMPHOCYTES # BLD AUTO: 2.87 THOUSANDS/ΜL (ref 0.6–4.47)
LYMPHOCYTES NFR BLD AUTO: 28 % (ref 14–44)
MCH RBC QN AUTO: 27.1 PG (ref 26.8–34.3)
MCHC RBC AUTO-ENTMCNC: 31.6 G/DL (ref 31.4–37.4)
MCV RBC AUTO: 86 FL (ref 82–98)
MONOCYTES # BLD AUTO: 0.77 THOUSAND/ΜL (ref 0.17–1.22)
MONOCYTES NFR BLD AUTO: 8 % (ref 4–12)
NEUTROPHILS # BLD AUTO: 6.05 THOUSANDS/ΜL (ref 1.85–7.62)
NEUTS SEG NFR BLD AUTO: 58 % (ref 43–75)
NRBC BLD AUTO-RTO: 0 /100 WBCS
PLATELET # BLD AUTO: 158 THOUSANDS/UL (ref 149–390)
POTASSIUM SERPL-SCNC: 3.6 MMOL/L (ref 3.5–5.3)
PROT SERPL-MCNC: 6.2 G/DL (ref 6.4–8.2)
RBC # BLD AUTO: 4.36 MILLION/UL (ref 3.81–5.12)
SODIUM SERPL-SCNC: 137 MMOL/L (ref 136–145)
WBC # BLD AUTO: 10.21 THOUSAND/UL (ref 4.31–10.16)

## 2022-05-17 PROCEDURE — 99239 HOSP IP/OBS DSCHRG MGMT >30: CPT | Performed by: HOSPITALIST

## 2022-05-17 PROCEDURE — 85025 COMPLETE CBC W/AUTO DIFF WBC: CPT | Performed by: INTERNAL MEDICINE

## 2022-05-17 PROCEDURE — 82948 REAGENT STRIP/BLOOD GLUCOSE: CPT

## 2022-05-17 PROCEDURE — 80053 COMPREHEN METABOLIC PANEL: CPT | Performed by: INTERNAL MEDICINE

## 2022-05-17 RX ORDER — INSULIN DETEMIR 100 [IU]/ML
18 INJECTION, SOLUTION SUBCUTANEOUS EVERY 12 HOURS SCHEDULED
Qty: 10 ML | Refills: 0 | Status: SHIPPED | OUTPATIENT
Start: 2022-05-17 | End: 2022-06-16

## 2022-05-17 RX ORDER — ALBUTEROL SULFATE 90 UG/1
2 AEROSOL, METERED RESPIRATORY (INHALATION) EVERY 4 HOURS PRN
Qty: 1 G | Refills: 0 | Status: SHIPPED | OUTPATIENT
Start: 2022-05-17

## 2022-05-17 RX ORDER — INSULIN DETEMIR 100 [IU]/ML
15 INJECTION, SOLUTION SUBCUTANEOUS EVERY 12 HOURS SCHEDULED
Qty: 10 ML | Refills: 0 | Status: SHIPPED | OUTPATIENT
Start: 2022-05-17 | End: 2022-05-17 | Stop reason: SDUPTHER

## 2022-05-17 RX ADMIN — CARISOPRODOL 175 MG: 350 TABLET ORAL at 08:28

## 2022-05-17 RX ADMIN — SUCRALFATE 1 G: 1 TABLET ORAL at 05:00

## 2022-05-17 RX ADMIN — FLUTICASONE FUROATE AND VILANTEROL TRIFENATATE 1 PUFF: 200; 25 POWDER RESPIRATORY (INHALATION) at 08:28

## 2022-05-17 RX ADMIN — HEPARIN SODIUM 7500 UNITS: 5000 INJECTION INTRAVENOUS; SUBCUTANEOUS at 05:10

## 2022-05-17 RX ADMIN — NYSTATIN: 100000 POWDER TOPICAL at 08:31

## 2022-05-17 RX ADMIN — PREGABALIN 50 MG: 50 CAPSULE ORAL at 08:28

## 2022-05-17 RX ADMIN — PANTOPRAZOLE SODIUM 40 MG: 40 TABLET, DELAYED RELEASE ORAL at 05:01

## 2022-05-17 RX ADMIN — INSULIN GLARGINE 15 UNITS: 100 INJECTION, SOLUTION SUBCUTANEOUS at 08:29

## 2022-05-17 RX ADMIN — CLONIDINE HYDROCHLORIDE 0.1 MG: 0.1 TABLET ORAL at 08:28

## 2022-05-17 RX ADMIN — SUCRALFATE 1 G: 1 TABLET ORAL at 12:25

## 2022-05-17 RX ADMIN — INSULIN LISPRO 6 UNITS: 100 INJECTION, SOLUTION INTRAVENOUS; SUBCUTANEOUS at 12:25

## 2022-05-17 RX ADMIN — INSULIN LISPRO 4 UNITS: 100 INJECTION, SOLUTION INTRAVENOUS; SUBCUTANEOUS at 08:29

## 2022-05-17 NOTE — ASSESSMENT & PLAN NOTE
Results from last 7 days   Lab Units 05/17/22  0514 05/15/22  0447 05/14/22  0518 05/13/22  0346   WBC Thousand/uL 10 21* 9 60 11 78* 18 62*   largely resolved  No infectious sx

## 2022-05-17 NOTE — DISCHARGE SUMMARY
[facility]  Discharge- Gilford Alice 1965, 61 y o  female MRN: [id number]  Unit/Bed#: [facility] 2 -01 Encounter: [id number]  Primary Care Provider: Sophy Coles MD   Date and time admitted to hospital: 5/9/2022  5:33 PM    Ambulatory dysfunction  Assessment & Plan  PT/OT recommending rehab- patient refused rehab   Wants home with home health - CM has this in place  Urine retention  Assessment & Plan  Per nursing, patient failed urinary retention protocol  Singer catheter was inserted 5/11/22- removed successfully prior to transfer to floor  Heme positive stool  Assessment & Plan  Results from last 7 days   Lab Units 05/17/22  0514 05/15/22  0447 05/14/22  0518 05/13/22  0347   HEMOGLOBIN g/dL 11 8 11 1* 12 8  --    I STAT HEMOGLOBIN g/dl  --   --   --  12 9   hemoglobin normal outpt gi f/u    Leukocytosis  Assessment & Plan  Results from last 7 days   Lab Units 05/17/22  0514 05/15/22  0447 05/14/22  0518 05/13/22  0346   WBC Thousand/uL 10 21* 9 60 11 78* 18 62*   largely resolved  No infectious sx    Acute encephalopathy  Assessment & Plan  Acute metabolic encephalopathy  Patient presenting with weakness for multiple days, now with altered mental status  Per EMS, son called 46 the patient be brought to the ED for further evaluation  Has been previously admitted for the same, typically secondary to hyperglycemia  Likely secondary to hyperglycemia complicated by opioid dependence/sedating medications and non compliance  Urine drug screen positive for barbiturates, benzos, opioids  Opoids/sedated medications held on admission  Resumed soma and benzo 5/11 per tox - want to avoid withdrawal  Resumed at 1/2 of regular dose     Resolved  Neurologically intact  MRI negative  Pt should wean down on controlled substances      Essential hypertension  Assessment & Plan  Home regimen lisinopril 10 mg daily  BP stable    Diabetic gastroparesis [facility])  Assessment & Plan  Lab Results   Component Value Date    HGBA1C 11 4 (H) 05/10/2022     Recent Labs     05/16/22  1301 05/16/22  1614 05/16/22  2109 05/17/22  0723   POCGLU 278* 351* 310* 222*   Continue Reglan p r n  Blood sugars - dc on levemir BID which is covered by pt insurance  Opioid dependence with other opioid-induced disorder [facility])  Assessment & Plan  Will hold all opioids/sedating medication giving acute encephalopathy  No acute signs of opiate overdose at this time  Morphine Sulf Er 60mg BID   Morphine Sulf Er 15mg BID  Morphine Sulf IR 15 mg TID PRN  Pregabalin 150 mg BID  Carisoprodol 350 mg BID  Valium 5 mg daily  PDMP reviewed by ICU team     Had tox evaluate  Will likely need to be tapered off going forward given likely causing more harm than benefit  Spoke with toxicology - Want to avoid withdrawal  Resumed soma and valium at decreased doses 5/11  Lyrica resumed as well       * Type 2 diabetes mellitus with hyperglycemia, with long-term current use of insulin [facility]  Assessment & Plan  Presenting with altered mental status, according to son, this is how she gets when she has significantly hyperglycemia  Lab Results   Component Value Date    HGBA1C 11 4 (H) 05/10/2022   Patient maintained on 50 units Lantus HS and 10 units t i d  With meals  Suppose to take the above but does not - noncompliant according to son      Patient says she does not have any insulin at home  Levemir covered  Dc on levemir  Hospital Course:     Gilford Alice is a 61 y o  female patient who originally presented to the hospital on   Admission Orders (From admission, onward)     Ordered        05/09/22 2127  Inpatient Admission  Once                     due to  encephalopathy from poly pharmacy  Patient is on multiple sedating medications and pain medications etc   His medications were held, fortunately her mentation returned to baseline  She has no focal neurological deficits or complaints at the time of discharge    Her sedating meds were truncated as noted above, she is instructed to come of narcotics as she is able to  Toxicology was involved defer this discussion to occur as an outpatient as nir to avoid significant withdrawal for the patient  She is also transitioned to Levemir which has zero copay and she is noncompliant with Lantus due to financial reasons/non coverage from insurance  Please see above list of diagnoses and related plan for additional information  Physical Exam:    GEN: No acute distress, comfortable, morbidly obese  HEEENT: No JVD, PERRLA, no scleral icterus  RESP: Lungs clear to auscultation bilaterally  CV: RRR, +s1/s2   ABD: SOFT NON TENDER, POSITIVE BOWEL SOUNDS, NO DISTENTION  PSYCH: CALM  NEURO: A X O X 3, NO FOCAL DEFICITS  SKIN: NO RASH  EXTREM: NO EDEMA      Condition at Discharge:  good      Discharge instructions/Information to patient and family:   See after visit summary for information provided to patient and family  Provisions for Follow-Up Care:  See after visit summary for information related to follow-up care and any pertinent home health orders  Disposition:     Home       Discharge Statement:  I spent 37 minutes discharging the patient  This time was spent on the day of discharge  I had direct contact with the patient on the day of discharge  Greater than 50% of the total time was spent examining patient, answering all patient questions, arranging and discussing plan of care with patient as well as directly providing post-discharge instructions  Additional time then spent on discharge activities  Discharge Medications:  See after visit summary for reconciled discharge medications provided to patient and family        ** Please Note: This note has been constructed using a voice recognition system **

## 2022-05-17 NOTE — ASSESSMENT & PLAN NOTE
PT/OT recommending rehab- patient refused rehab   Wants home with home health - CM has this in place

## 2022-05-17 NOTE — ASSESSMENT & PLAN NOTE
Results from last 7 days   Lab Units 05/17/22  0514 05/15/22  0447 05/14/22  0518 05/13/22  0347   HEMOGLOBIN g/dL 11 8 11 1* 12 8  --    I STAT HEMOGLOBIN g/dl  --   --   --  12 9   hemoglobin normal outpt gi f/u

## 2022-05-17 NOTE — PLAN OF CARE
Problem: MOBILITY - ADULT  Goal: Maintain or return to baseline ADL function  Description: INTERVENTIONS:  -  Assess patient's ability to carry out ADLs; assess patient's baseline for ADL function and identify physical deficits which impact ability to perform ADLs (bathing, care of mouth/teeth, toileting, grooming, dressing, etc )  - Assess/evaluate cause of self-care deficits   - Assess range of motion  - Assess patient's mobility; develop plan if impaired  - Assess patient's need for assistive devices and provide as appropriate  - Encourage maximum independence but intervene and supervise when necessary  - Involve family in performance of ADLs  - Assess for home care needs following discharge   - Consider OT consult to assist with ADL evaluation and planning for discharge  - Provide patient education as appropriate  Outcome: Progressing  Goal: Maintains/Returns to pre admission functional level  Description: INTERVENTIONS:  - Perform BMAT or MOVE assessment daily    - Set and communicate daily mobility goal to care team and patient/family/caregiver     - Collaborate with rehabilitation services on mobility goals if consulted  - Out of bed for toileting  - Record patient progress and toleration of activity level   Outcome: Progressing     Problem: Prexisting or High Potential for Compromised Skin Integrity  Goal: Skin integrity is maintained or improved  Description: INTERVENTIONS:  - Identify patients at risk for skin breakdown  - Assess and monitor skin integrity  - Assess and monitor nutrition and hydration status  - Monitor labs   - Assess for incontinence   - Turn and reposition patient  - Assist with mobility/ambulation  - Relieve pressure over bony prominences  - Avoid friction and shearing  - Provide appropriate hygiene as needed including keeping skin clean and dry  - Evaluate need for skin moisturizer/barrier cream  - Collaborate with interdisciplinary team   - Patient/family teaching  - Consider wound care consult   Outcome: Progressing     Problem: Nutrition/Hydration-ADULT  Goal: Nutrient/Hydration intake appropriate for improving, restoring or maintaining nutritional needs  Description: Monitor and assess patient's nutrition/hydration status for malnutrition  Collaborate with interdisciplinary team and initiate plan and interventions as ordered  Monitor patient's weight and dietary intake as ordered or per policy  Utilize nutrition screening tool and intervene as necessary  Determine patient's food preferences and provide high-protein, high-caloric foods as appropriate       INTERVENTIONS:  - Monitor oral intake, urinary output, labs, and treatment plans  - Assess nutrition and hydration status and recommend course of action  - Evaluate amount of meals eaten  - Assist patient with eating if necessary   - Allow adequate time for meals  - Recommend/ encourage appropriate diets, oral nutritional supplements, and vitamin/mineral supplements  - Order, calculate, and assess calorie counts as needed  - Recommend, monitor, and adjust tube feedings and TPN/PPN based on assessed needs  - Assess need for intravenous fluids  - Provide specific nutrition/hydration education as appropriate  - Include patient/family/caregiver in decisions related to nutrition  Outcome: Progressing     Problem: GASTROINTESTINAL - ADULT  Goal: Maintains or returns to baseline bowel function  Description: INTERVENTIONS:  - Assess bowel function  - Encourage oral fluids to ensure adequate hydration  - Administer IV fluids if ordered to ensure adequate hydration  - Administer ordered medications as needed  - Encourage mobilization and activity  - Consider nutritional services referral to assist patient with adequate nutrition and appropriate food choices  Outcome: Progressing  Goal: Maintains adequate nutritional intake  Description: INTERVENTIONS:  - Monitor percentage of each meal consumed  - Identify factors contributing to decreased intake, treat as appropriate  - Assist with meals as needed  - Monitor I&O, weight, and lab values if indicated  - Obtain nutrition services referral as needed  Outcome: Progressing  Goal: Oral mucous membranes remain intact  Description: INTERVENTIONS  - Assess oral mucosa and hygiene practices  - Implement preventative oral hygiene regimen  - Implement oral medicated treatments as ordered  - Initiate Nutrition services referral as needed  Outcome: Progressing     Problem: METABOLIC, FLUID AND ELECTROLYTES - ADULT  Goal: Electrolytes maintained within normal limits  Description: INTERVENTIONS:  - Monitor labs and assess patient for signs and symptoms of electrolyte imbalances  - Administer electrolyte replacement as ordered  - Monitor response to electrolyte replacements, including repeat lab results as appropriate  - Instruct patient on fluid and nutrition as appropriate  Outcome: Progressing  Goal: Fluid balance maintained  Description: INTERVENTIONS:  - Monitor labs   - Monitor I/O and WT  - Instruct patient on fluid and nutrition as appropriate  - Assess for signs & symptoms of volume excess or deficit  Outcome: Progressing  Goal: Glucose maintained within target range  Description: INTERVENTIONS:  - Monitor Blood Glucose as ordered  - Assess for signs and symptoms of hyperglycemia and hypoglycemia  - Administer ordered medications to maintain glucose within target range  - Assess nutritional intake and initiate nutrition service referral as needed  Outcome: Progressing     Problem: PAIN - ADULT  Goal: Verbalizes/displays adequate comfort level or baseline comfort level  Description: Interventions:  - Encourage patient to monitor pain and request assistance  - Assess pain using appropriate pain scale  - Administer analgesics based on type and severity of pain and evaluate response  - Implement non-pharmacological measures as appropriate and evaluate response  - Consider cultural and social influences on pain and pain management  - Notify physician/advanced practitioner if interventions unsuccessful or patient reports new pain  Outcome: Progressing     Problem: INFECTION - ADULT  Goal: Absence or prevention of progression during hospitalization  Description: INTERVENTIONS:  - Assess and monitor for signs and symptoms of infection  - Monitor lab/diagnostic results  - Monitor all insertion sites, i e  indwelling lines, tubes, and drains  - Monitor endotracheal if appropriate and nasal secretions for changes in amount and color  - Woody appropriate cooling/warming therapies per order  - Administer medications as ordered  - Instruct and encourage patient and family to use good hand hygiene technique  - Identify and instruct in appropriate isolation precautions for identified infection/condition  Outcome: Progressing     Problem: SAFETY ADULT  Goal: Patient will remain free of falls  Description: INTERVENTIONS:  - Educate patient/family on patient safety including physical limitations  - Instruct patient to call for assistance with activity   - Consult OT/PT to assist with strengthening/mobility   - Keep Call bell within reach  - Keep bed low and locked with side rails adjusted as appropriate  - Keep care items and personal belongings within reach  - Initiate and maintain comfort rounds  - Make Fall Risk Sign visible to staff  - Offer Toileting every 2 Hours, in advance of need  - Initiate/Maintain bed alarm  - Obtain necessary fall risk management equipment: alarms  - Apply yellow socks and bracelet for high fall risk patients  - Consider moving patient to room near nurses station  Outcome: Progressing     Problem: Potential for Falls  Goal: Patient will remain free of falls  Description: INTERVENTIONS:  - Educate patient/family on patient safety including physical limitations  - Instruct patient to call for assistance with activity   - Consult OT/PT to assist with strengthening/mobility   - Keep Call bell within reach  - Keep bed low and locked with side rails adjusted as appropriate  - Keep care items and personal belongings within reach  - Initiate and maintain comfort rounds  - Make Fall Risk Sign visible to staff  - Offer Toileting every 2 Hours, in advance of need  - Initiate/Maintain bed alarm  - Obtain necessary fall risk management equipment: alarms  - Apply yellow socks and bracelet for high fall risk patients  - Consider moving patient to room near nurses station  Outcome: Progressing

## 2022-05-17 NOTE — QUICK NOTE
Pt had hemocult testing that was positive  No overt bleeding  Hbg stable  She has never had a colonoscopy  I will arrange her outpt f/u to have this done  No inpt testing necessary

## 2022-05-17 NOTE — TELEPHONE ENCOUNTER
I spoke with the nurse Elise Younger) this morning and she said the Barium Swallow will be cancelled for today  The patient will be getting the exam as an outpatient at another time

## 2022-05-17 NOTE — ASSESSMENT & PLAN NOTE
Lab Results   Component Value Date    HGBA1C 11 4 (H) 05/10/2022     Recent Labs     05/16/22  1301 05/16/22  1614 05/16/22  2109 05/17/22  0723   POCGLU 278* 351* 310* 222*   Continue Reglan p r n  Blood sugars - dc on levemir BID which is covered by pt insurance

## 2022-05-17 NOTE — ASSESSMENT & PLAN NOTE
Presenting with altered mental status, according to son, this is how she gets when she has significantly hyperglycemia  Lab Results   Component Value Date    HGBA1C 11 4 (H) 05/10/2022   Patient maintained on 50 units Lantus HS and 10 units t i d  With meals  Suppose to take the above but does not - noncompliant according to son      Patient says she does not have any insulin at home  Levemir covered  Dc on levemir

## 2022-05-17 NOTE — CASE MANAGEMENT
Case Management Progress Note    Patient name Michael Snu  [facility]* MRN [id number]  : 1965 Date 2022       LOS (days): 8  Geometric Mean LOS (GMLOS) (days): 3 80  Days to GMLOS:-3 8        OBJECTIVE:        Current admission status: Inpatient  Preferred Pharmacy:   [place] [id number]  Phone: [phone] Fax: [phone]    Primary Care Provider: Reentta Acosta MD    Primary Insurance: MEDICARE  Secondary Insurance: BLUE CROSS    PROGRESS NOTE:  Pt accepted by [facility], will send AVS when completed  CM reviewed AVS, MD changed Lantus to Levemir, escribed to Pt's pharmacy  CM sent AVS to [facility]

## 2022-05-17 NOTE — ASSESSMENT & PLAN NOTE
Acute metabolic encephalopathy  Patient presenting with weakness for multiple days, now with altered mental status  Per EMS, son called 46 the patient be brought to the ED for further evaluation  Has been previously admitted for the same, typically secondary to hyperglycemia  Likely secondary to hyperglycemia complicated by opioid dependence/sedating medications and non compliance  Urine drug screen positive for barbiturates, benzos, opioids  Opoids/sedated medications held on admission  Resumed soma and benzo 5/11 per tox - want to avoid withdrawal  Resumed at 1/2 of regular dose     Resolved  Neurologically intact  MRI negative  Pt should wean down on controlled substances

## 2022-05-18 ENCOUNTER — TELEPHONE (OUTPATIENT)
Dept: GASTROENTEROLOGY | Facility: MEDICAL CENTER | Age: 57
End: 2022-05-18

## 2022-05-18 NOTE — TELEPHONE ENCOUNTER
----- Message from Senthil Ramires PA-C sent at 5/17/2022 11:32 AM EDT -----  Hospital f/u in 2-3 weeks    Fabián Curtis

## 2022-05-19 ENCOUNTER — TELEPHONE (OUTPATIENT)
Dept: GASTROENTEROLOGY | Facility: MEDICAL CENTER | Age: 57
End: 2022-05-19

## 2022-05-19 NOTE — TELEPHONE ENCOUNTER
----- Message from Joya Phillips PA-C sent at 5/16/2022 12:29 PM EDT -----  Hospital f/u in 2-3 weeks  Previously seen in [facility], not sure where she wants to f/u    Luis Etienne

## 2023-01-01 NOTE — NURSING NOTE
Patient able to eat regular meal without any nausea and/or vomiting  Will continue to monitor for gastrointestinal disturbances       Jolene Jordan RN
(V5) coos and babbles

## 2023-01-02 ENCOUNTER — APPOINTMENT (EMERGENCY)
Dept: RADIOLOGY | Facility: HOSPITAL | Age: 58
End: 2023-01-02

## 2023-01-02 ENCOUNTER — HOSPITAL ENCOUNTER (INPATIENT)
Facility: HOSPITAL | Age: 58
LOS: 2 days | Discharge: HOME | End: 2023-01-05
Attending: EMERGENCY MEDICINE | Admitting: INTERNAL MEDICINE

## 2023-01-02 ENCOUNTER — APPOINTMENT (EMERGENCY)
Dept: CT IMAGING | Facility: HOSPITAL | Age: 58
End: 2023-01-02

## 2023-01-02 DIAGNOSIS — G93.40 ACUTE ENCEPHALOPATHY: ICD-10-CM

## 2023-01-02 DIAGNOSIS — R26.2 AMBULATORY DYSFUNCTION: ICD-10-CM

## 2023-01-02 DIAGNOSIS — Z79.4 TYPE 2 DIABETES MELLITUS WITH HYPERGLYCEMIA, WITH LONG-TERM CURRENT USE OF INSULIN (HCC): ICD-10-CM

## 2023-01-02 DIAGNOSIS — R41.82 ALTERED MENTAL STATUS: Primary | ICD-10-CM
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
## 2023-01-02 DIAGNOSIS — E11.65 TYPE 2 DIABETES MELLITUS WITH HYPERGLYCEMIA, WITH LONG-TERM CURRENT USE OF INSULIN (HCC): ICD-10-CM

## 2023-01-02 DIAGNOSIS — L03.115 CELLULITIS OF RIGHT LOWER EXTREMITY: ICD-10-CM

## 2023-01-02 DIAGNOSIS — R73.9 HYPERGLYCEMIA: ICD-10-CM

## 2023-01-02 PROBLEM — I47.1 SVT (SUPRAVENTRICULAR TACHYCARDIA) (HCC): Status: RESOLVED | Noted: 2022-05-13 | Resolved: 2023-01-02

## 2023-01-02 PROBLEM — A41.9 SEPSIS (HCC): Status: RESOLVED | Noted: 2019-11-23 | Resolved: 2023-01-02

## 2023-01-02 PROBLEM — D72.829 LEUKOCYTOSIS: Status: RESOLVED | Noted: 2022-05-11 | Resolved: 2023-01-02

## 2023-01-02 PROBLEM — M25.561 ACUTE PAIN OF RIGHT KNEE: Status: RESOLVED | Noted: 2020-06-29 | Resolved: 2023-01-02

## 2023-01-02 PROBLEM — J96.01 ACUTE RESPIRATORY FAILURE WITH HYPOXIA (HCC): Status: RESOLVED | Noted: 2020-06-29 | Resolved: 2023-01-02

## 2023-01-02 PROBLEM — I16.0 HYPERTENSIVE URGENCY: Status: RESOLVED | Noted: 2020-10-18 | Resolved: 2023-01-02

## 2023-01-02 PROBLEM — E87.1 HYPONATREMIA: Status: RESOLVED | Noted: 2020-06-29 | Resolved: 2023-01-02

## 2023-01-02 PROBLEM — R65.10 SIRS (SYSTEMIC INFLAMMATORY RESPONSE SYNDROME) (HCC): Status: RESOLVED | Noted: 2021-08-14 | Resolved: 2023-01-02

## 2023-01-02 PROBLEM — I47.10 SVT (SUPRAVENTRICULAR TACHYCARDIA): Status: RESOLVED | Noted: 2022-05-13 | Resolved: 2023-01-02

## 2023-01-02 PROBLEM — R11.2 INTRACTABLE NAUSEA AND VOMITING: Status: RESOLVED | Noted: 2019-01-17 | Resolved: 2023-01-02

## 2023-01-02 PROBLEM — R79.89 ELEVATED LFTS: Status: RESOLVED | Noted: 2021-04-28 | Resolved: 2023-01-02

## 2023-01-02 PROBLEM — L03.116 CELLULITIS OF LEFT LOWER EXTREMITY: Status: ACTIVE | Noted: 2023-01-02

## 2023-01-02 PROBLEM — G93.41 ACUTE METABOLIC ENCEPHALOPATHY: Status: ACTIVE | Noted: 2023-01-02

## 2023-01-02 LAB
ALBUMIN SERPL BCP-MCNC: 3.4 G/DL (ref 3.5–5)
ALP SERPL-CCNC: 126 U/L (ref 46–116)
ALT SERPL W P-5'-P-CCNC: 26 U/L (ref 12–78)
AMMONIA PLAS-SCNC: <10 UMOL/L (ref 11–35)
AMPHETAMINES SERPL QL SCN: NEGATIVE
ANION GAP SERPL CALCULATED.3IONS-SCNC: 13 MMOL/L (ref 4–13)
AST SERPL W P-5'-P-CCNC: 22 U/L (ref 5–45)
BACTERIA UR QL AUTO: ABNORMAL /HPF
BARBITURATES UR QL: POSITIVE
BASE EX.OXY STD BLDV CALC-SCNC: 83.7 % (ref 60–80)
BASE EXCESS BLDV CALC-SCNC: 0.5 MMOL/L
BASOPHILS # BLD AUTO: 0.07 THOUSANDS/ÂΜL (ref 0–0.1)
BASOPHILS NFR BLD AUTO: 1 % (ref 0–1)
BENZODIAZ UR QL: POSITIVE
BILIRUB SERPL-MCNC: 1.44 MG/DL (ref 0.2–1)
BILIRUB UR QL STRIP: NEGATIVE
BUN SERPL-MCNC: 24 MG/DL (ref 5–25)
CALCIUM ALBUM COR SERPL-MCNC: 9.6 MG/DL (ref 8.3–10.1)
CALCIUM SERPL-MCNC: 9.1 MG/DL (ref 8.3–10.1)
CARDIAC TROPONIN I PNL SERPL HS: 4 NG/L
CHLORIDE SERPL-SCNC: 99 MMOL/L (ref 96–108)
CLARITY UR: CLEAR
CO2 SERPL-SCNC: 24 MMOL/L (ref 21–32)
COCAINE UR QL: NEGATIVE
COLOR UR: YELLOW
CREAT SERPL-MCNC: 0.62 MG/DL (ref 0.6–1.3)
EOSINOPHIL # BLD AUTO: 0.31 THOUSAND/ÂΜL (ref 0–0.61)
EOSINOPHIL NFR BLD AUTO: 3 % (ref 0–6)
ERYTHROCYTE [DISTWIDTH] IN BLOOD BY AUTOMATED COUNT: 13.9 % (ref 11.6–15.1)
GFR SERPL CREATININE-BSD FRML MDRD: 99 ML/MIN/1.73SQ M
GLUCOSE SERPL-MCNC: 290 MG/DL (ref 65–140)
GLUCOSE SERPL-MCNC: 323 MG/DL (ref 65–140)
GLUCOSE UR STRIP-MCNC: ABNORMAL MG/DL
HCO3 BLDV-SCNC: 25.7 MMOL/L (ref 24–30)
HCT VFR BLD AUTO: 38.3 % (ref 34.8–46.1)
HGB BLD-MCNC: 12.3 G/DL (ref 11.5–15.4)
HGB UR QL STRIP.AUTO: NEGATIVE
IMM GRANULOCYTES # BLD AUTO: 0.03 THOUSAND/UL (ref 0–0.2)
IMM GRANULOCYTES NFR BLD AUTO: 0 % (ref 0–2)
KETONES UR STRIP-MCNC: ABNORMAL MG/DL
LACTATE SERPL-SCNC: 1.2 MMOL/L (ref 0.5–2)
LEUKOCYTE ESTERASE UR QL STRIP: ABNORMAL
LYMPHOCYTES # BLD AUTO: 2.26 THOUSANDS/ÂΜL (ref 0.6–4.47)
LYMPHOCYTES NFR BLD AUTO: 22 % (ref 14–44)
MAGNESIUM SERPL-MCNC: 1.7 MG/DL (ref 1.6–2.6)
MCH RBC QN AUTO: 26.4 PG (ref 26.8–34.3)
MCHC RBC AUTO-ENTMCNC: 32.1 G/DL (ref 31.4–37.4)
MCV RBC AUTO: 82 FL (ref 82–98)
METHADONE UR QL: NEGATIVE
MONOCYTES # BLD AUTO: 0.78 THOUSAND/ÂΜL (ref 0.17–1.22)
MONOCYTES NFR BLD AUTO: 8 % (ref 4–12)
NEUTROPHILS # BLD AUTO: 6.64 THOUSANDS/ÂΜL (ref 1.85–7.62)
NEUTS SEG NFR BLD AUTO: 66 % (ref 43–75)
NITRITE UR QL STRIP: NEGATIVE
NON-SQ EPI CELLS URNS QL MICRO: ABNORMAL /HPF
NRBC BLD AUTO-RTO: 0 /100 WBCS
O2 CT BLDV-SCNC: 14.4 ML/DL
OPIATES UR QL SCN: POSITIVE
OXYCODONE+OXYMORPHONE UR QL SCN: NEGATIVE
PCO2 BLDV: 43.4 MM HG (ref 42–50)
PCP UR QL: NEGATIVE
PH BLDV: 7.39 [PH] (ref 7.3–7.4)
PH UR STRIP.AUTO: 6 [PH]
PLATELET # BLD AUTO: 231 THOUSANDS/UL (ref 149–390)
PMV BLD AUTO: 13.7 FL (ref 8.9–12.7)
PO2 BLDV: 51 MM HG (ref 35–45)
POTASSIUM SERPL-SCNC: 4.7 MMOL/L (ref 3.5–5.3)
PROT SERPL-MCNC: 8.5 G/DL (ref 6.4–8.4)
PROT UR STRIP-MCNC: ABNORMAL MG/DL
RBC # BLD AUTO: 4.66 MILLION/UL (ref 3.81–5.12)
RBC #/AREA URNS AUTO: ABNORMAL /HPF
SODIUM SERPL-SCNC: 136 MMOL/L (ref 135–147)
SP GR UR STRIP.AUTO: 1.02 (ref 1–1.03)
THC UR QL: NEGATIVE
UROBILINOGEN UR QL STRIP.AUTO: 1 E.U./DL
WBC # BLD AUTO: 10.09 THOUSAND/UL (ref 4.31–10.16)
WBC #/AREA URNS AUTO: ABNORMAL /HPF

## 2023-01-02 RX ORDER — PREGABALIN 75 MG/1
150 CAPSULE ORAL 2 TIMES DAILY
Status: DISCONTINUED | OUTPATIENT
Start: 2023-01-02 | End: 2023-01-05 | Stop reason: HOSPADM

## 2023-01-02 RX ORDER — ENOXAPARIN SODIUM 100 MG/ML
40 INJECTION SUBCUTANEOUS DAILY
Status: DISCONTINUED | OUTPATIENT
Start: 2023-01-03 | End: 2023-01-05 | Stop reason: HOSPADM

## 2023-01-02 RX ORDER — CEFAZOLIN SODIUM 2 G/50ML
2000 SOLUTION INTRAVENOUS EVERY 8 HOURS
Status: DISCONTINUED | OUTPATIENT
Start: 2023-01-02 | End: 2023-01-05 | Stop reason: HOSPADM

## 2023-01-02 RX ORDER — INSULIN LISPRO 100 [IU]/ML
1-5 INJECTION, SOLUTION INTRAVENOUS; SUBCUTANEOUS
Status: DISCONTINUED | OUTPATIENT
Start: 2023-01-03 | End: 2023-01-05 | Stop reason: HOSPADM

## 2023-01-02 RX ORDER — CLONIDINE HYDROCHLORIDE 0.1 MG/1
0.1 TABLET ORAL EVERY 12 HOURS SCHEDULED
Status: DISCONTINUED | OUTPATIENT
Start: 2023-01-02 | End: 2023-01-05 | Stop reason: HOSPADM

## 2023-01-02 RX ORDER — SODIUM CHLORIDE 9 MG/ML
100 INJECTION, SOLUTION INTRAVENOUS CONTINUOUS
Status: DISCONTINUED | OUTPATIENT
Start: 2023-01-02 | End: 2023-01-04

## 2023-01-02 RX ORDER — ALBUTEROL SULFATE 90 UG/1
2 AEROSOL, METERED RESPIRATORY (INHALATION) EVERY 4 HOURS PRN
Status: DISCONTINUED | OUTPATIENT
Start: 2023-01-02 | End: 2023-01-05 | Stop reason: HOSPADM

## 2023-01-02 RX ORDER — SUCRALFATE 1 G/1
1 TABLET ORAL EVERY 6 HOURS SCHEDULED
Status: DISCONTINUED | OUTPATIENT
Start: 2023-01-03 | End: 2023-01-05 | Stop reason: HOSPADM

## 2023-01-02 RX ORDER — INSULIN LISPRO 100 [IU]/ML
5 INJECTION, SOLUTION INTRAVENOUS; SUBCUTANEOUS
Status: DISCONTINUED | OUTPATIENT
Start: 2023-01-03 | End: 2023-01-05 | Stop reason: HOSPADM

## 2023-01-02 RX ORDER — PANTOPRAZOLE SODIUM 40 MG/1
40 TABLET, DELAYED RELEASE ORAL
Status: DISCONTINUED | OUTPATIENT
Start: 2023-01-03 | End: 2023-01-05 | Stop reason: HOSPADM

## 2023-01-02 RX ORDER — PRAVASTATIN SODIUM 10 MG
10 TABLET ORAL
Status: DISCONTINUED | OUTPATIENT
Start: 2023-01-02 | End: 2023-01-05 | Stop reason: HOSPADM

## 2023-01-02 RX ORDER — DIAZEPAM 5 MG/1
5 TABLET ORAL
Status: DISCONTINUED | OUTPATIENT
Start: 2023-01-02 | End: 2023-01-05 | Stop reason: HOSPADM

## 2023-01-02 RX ORDER — ONDANSETRON 2 MG/ML
4 INJECTION INTRAMUSCULAR; INTRAVENOUS EVERY 6 HOURS PRN
Status: DISCONTINUED | OUTPATIENT
Start: 2023-01-02 | End: 2023-01-05 | Stop reason: HOSPADM

## 2023-01-02 RX ORDER — FLUTICASONE FUROATE AND VILANTEROL 200; 25 UG/1; UG/1
1 POWDER RESPIRATORY (INHALATION)
Status: DISCONTINUED | OUTPATIENT
Start: 2023-01-03 | End: 2023-01-05 | Stop reason: HOSPADM

## 2023-01-02 RX ADMIN — CLONIDINE HYDROCHLORIDE 0.1 MG: 0.1 TABLET ORAL at 22:20

## 2023-01-02 RX ADMIN — CEFAZOLIN SODIUM 2000 MG: 2 SOLUTION INTRAVENOUS at 22:13

## 2023-01-02 RX ADMIN — SODIUM CHLORIDE 1000 ML: 0.9 INJECTION, SOLUTION INTRAVENOUS at 19:27

## 2023-01-02 RX ADMIN — SODIUM CHLORIDE 100 ML/HR: 0.9 INJECTION, SOLUTION INTRAVENOUS at 22:13

## 2023-01-02 RX ADMIN — PRAVASTATIN SODIUM 10 MG: 10 TABLET ORAL at 22:20

## 2023-01-02 RX ADMIN — PREGABALIN 150 MG: 75 CAPSULE ORAL at 22:20

## 2023-01-02 RX ADMIN — INSULIN DETEMIR 18 UNITS: 100 INJECTION, SOLUTION SUBCUTANEOUS at 22:25

## 2023-01-02 NOTE — ED PROVIDER NOTES
nHistory  Chief Complaint   Patient presents with   • Altered Mental Status     Son reports over the past few days patient has been getting worse with confusion  Report she has Hx of same and gets admitted  Son reports it is typically related to her Glucose or gastroparesis     Patient is a 70-year-old female brought in by EMS with son at bedside  History is primarily per son  Patient has a history of hypertension, diabetes, chronic pain syndrome on chronic multiple narcotic medications, diabetic gastroparesis  Son states that she lives at home with him  He is care of her narcotic medication and is in a locked safe  He gives her medications as prescribed only  Ports that he does not tend to follow her glucose  Over the past several days he has noticed that patient is becoming more confused and ataxic  He reports that this happened several times where she needs to be admitted  She did have a fall several days ago but he found her on the floor and could not tell me much more  Patient does not remember this  She states that she does have diffuse pain but this is not new  He denies any headache, vision changes, tinnitus  She denies any focal weakness throughout the bilateral lower extremities or lower extremities        As noted on chart review the patient is on morphine ER 60 mg, hydromorphone 4 mg, morphine ER 15 mg, pregabalin 150 mg, Valium 5 mg and Carisoprodol 350 mg - per  review      History provided by:  Patient and relative  Altered Mental Status  Presenting symptoms: confusion and disorientation    Presenting symptoms: no behavior changes, no combativeness, no lethargy, no memory loss, no partial responsiveness and no unresponsiveness    Severity:  Moderate  Most recent episode:  2 days ago  Episode history:  Continuous  Timing:  Constant  Progression:  Worsening  Chronicity:  Recurrent  Context: not alcohol use, not dementia, not drug use, not head injury, not homeless, taking medications as prescribed, not nursing home resident, not recent change in medication, not recent illness and not recent infection    Associated symptoms: no abdominal pain, normal movement, no agitation, no bladder incontinence, no decreased appetite, no depression, no difficulty breathing, no eye deviation, no fever, no hallucinations, no headaches, no light-headedness, no nausea, no palpitations, no rash, no seizures, no slurred speech, no suicidal behavior, no visual change, no vomiting and no weakness        Prior to Admission Medications   Prescriptions Last Dose Informant Patient Reported? Taking? Alcohol Swabs 70 % PADS   No Yes   Sig: May substitute brand based on insurance coverage  Check glucose TID  Alcohol Swabs 70 % PADS   No Yes   Sig: May substitute brand based on insurance coverage  Check glucose ACHS  Blood Glucose Monitoring Suppl (OneTouch Verio Reflect) w/Device KIT   No Yes   Sig: May substitute brand based on insurance coverage  Check glucose TID  Insulin Syringe-Needle U-100 (BD Insulin Syringe U/F) 31G X 5/16" 0 3 ML MISC   No Yes   Sig: For use with insulin  Pharmacy may dispense brand covered by insurance  Insulin Syringe-Needle U-100 (BD Insulin Syringe U/F) 31G X 5/16" 0 3 ML MISC   No Yes   Sig: For use with insulin  Pharmacy may dispense brand covered by insurance  LYRICA 150 MG capsule   Yes Yes   Sig: Take 150 mg by mouth 2 (two) times a day   OneTouch Delica Lancets 31J MISC   No Yes   Sig: May substitute brand based on insurance coverage  Check glucose TID  OneTouch Delica Lancets 29Z MISC   No Yes   Sig: May substitute brand based on insurance coverage  Check glucose ACHS     WIXELA INHUB 500-50 MCG/DOSE inhaler   Yes Yes   Sig: Take 1 puff by mouth 2 (two) times a day   albuterol (PROVENTIL HFA,VENTOLIN HFA) 90 mcg/act inhaler   No Yes   Sig: Inhale 2 puffs every 4 (four) hours as needed for wheezing or shortness of breath   carisoprodol (SOMA) 350 mg tablet   Yes Yes   Sig: Take 350 mg by mouth 2 (two) times a day   cloNIDine (CATAPRES) 0 1 mg tablet   No Yes   Sig: Take 1 tablet (0 1 mg total) by mouth every 12 (twelve) hours   diazepam (VALIUM) 5 mg tablet   Yes Yes   Sig: Take 5 mg by mouth daily at bedtime as needed for anxiety   fluticasone (FLONASE) 50 mcg/act nasal spray   Yes Yes   Si spray into each nostril daily   glucose blood (OneTouch Verio) test strip   No Yes   Sig: May substitute brand based on insurance coverage  Check glucose TID  glucose blood (OneTouch Verio) test strip   No Yes   Sig: May substitute brand based on insurance coverage  Check glucose ACHS     insulin detemir (Levemir) 100 units/mL subcutaneous injection   No No   Sig: Inject 18 Units under the skin every 12 (twelve) hours   nystatin (MYCOSTATIN) powder   No Yes   Sig: Apply topically 2 (two) times a day   ondansetron (ZOFRAN) 4 mg tablet   No Yes   Sig: Take 1 tablet (4 mg total) by mouth every 6 (six) hours   pantoprazole (PROTONIX) 40 mg tablet   No Yes   Sig: Take 1 tablet (40 mg total) by mouth daily in the early morning   pravastatin (PRAVACHOL) 10 mg tablet   Yes Yes   Sig: Take 10 mg by mouth daily at bedtime   sucralfate (CARAFATE) 1 g tablet   No Yes   Sig: Take 1 tablet (1 g total) by mouth every 6 (six) hours      Facility-Administered Medications: None       Past Medical History:   Diagnosis Date   • Acute respiratory insufficiency 2019   • Asthma    • Chronic pain    • Diabetes ([facility] [address] )    • HTN (hypertension)    • Sepsis ([facility] [address] ) 2019       Past Surgical History:   Procedure Laterality Date   • CERVICAL LAMINECTOMY     • CHOLECYSTECTOMY     • CHOLECYSTECTOMY LAPAROSCOPIC N/A 11/10/2018    Procedure: CHOLECYSTECTOMY LAPAROSCOPIC w/ ioc;  Surgeon: Lucas Cisneros MD;  Location: [facility];  Service: General   • HYSTERECTOMY     • JOINT REPLACEMENT Bilateral     knee   • TOE AMPUTATION Right 2018    Procedure: AMPUTATION TOE, RIGHT GREAT TOE;  Surgeon: Mercedez Irizarry DPM; Location: [facility];  Service: Podiatry       Family History   Problem Relation Age of Onset   • Diabetes Mother    • Diabetes Maternal Grandmother    • No Known Problems Father      I have reviewed and agree with the history as documented  E-Cigarette/Vaping     E-Cigarette/Vaping Substances     Social History     Tobacco Use   • Smoking status: Never   • Smokeless tobacco: Never   Substance Use Topics   • Alcohol use: Not Currently     Comment: rarely   • Drug use: No       Review of Systems   Constitutional: Negative  Negative for chills, decreased appetite and fever  HENT: Negative  Negative for ear pain and sore throat  Eyes: Negative for pain and visual disturbance  Respiratory: Negative  Negative for cough and shortness of breath  Cardiovascular: Negative  Negative for chest pain and palpitations  Gastrointestinal: Negative  Negative for abdominal pain, nausea and vomiting  Genitourinary: Negative  Negative for bladder incontinence, dysuria and hematuria  Musculoskeletal: Negative  Negative for arthralgias and back pain  Skin: Negative  Negative for color change and rash  Neurological: Negative for seizures, syncope, weakness, light-headedness and headaches  Hematological: Negative  Psychiatric/Behavioral: Positive for confusion  Negative for agitation, hallucinations and memory loss  All other systems reviewed and are negative  Physical Exam  Physical Exam  Vitals and nursing note reviewed  Constitutional:       General: She is not in acute distress  Appearance: She is well-developed  Comments: Appears older then stated age  dishevel appearing   HENT:      Head: Normocephalic and atraumatic  Comments: Patient is maintaining airway and maintaining secretions  Dry mucous membranes  No stridor  No brawniness under the tongue  Eyes:      Extraocular Movements: Extraocular movements intact        Conjunctiva/sclera: Conjunctivae normal       Pupils: Pupils are equal, round, and reactive to light  Cardiovascular:      Rate and Rhythm: Normal rate and regular rhythm  Heart sounds: No murmur heard  Pulmonary:      Effort: Pulmonary effort is normal  No respiratory distress  Breath sounds: Normal breath sounds  Abdominal:      Palpations: Abdomen is soft  Tenderness: There is no abdominal tenderness  Musculoskeletal:         General: No swelling  Cervical back: Neck supple  Skin:     General: Skin is warm and dry  Capillary Refill: Capillary refill takes less than 2 seconds  Neurological:      General: No focal deficit present  Mental Status: She is alert  GCS: GCS eye subscore is 4  GCS verbal subscore is 4  GCS motor subscore is 6  Cranial Nerves: Cranial nerves 2-12 are intact  Sensory: Sensation is intact  Motor: Motor function is intact  Coordination: Coordination is intact  Comments: Patient has no slurred speech  No facial asymmetry  Negative pronator drift  Patient can move bilateral upper extremities and lower extremities independently of each other and pain-free functional active range of motion    Patient have a flat affect and is withdrawn however is confused and intermittently unable to follow commands  She is redirectable  Is confused to place and time     Psychiatric:         Mood and Affect: Mood normal          Speech: Speech normal          Behavior: Behavior normal          Vital Signs  ED Triage Vitals   Temperature Pulse Respirations Blood Pressure SpO2   01/02/23 1643 01/02/23 1643 01/02/23 1643 01/02/23 1643 01/02/23 1643   97 9 °F (36 6 °C) 103 18 150/67 98 %      Temp Source Heart Rate Source Patient Position - Orthostatic VS BP Location FiO2 (%)   01/02/23 1643 01/02/23 1643 01/02/23 1643 01/02/23 1643 --   Oral Monitor Sitting Right arm       Pain Score       01/02/23 1715       7           Vitals:    01/02/23 1643 01/02/23 1715   BP: 150/67 133/70   Pulse: 103 94 Patient Position - Orthostatic VS: Sitting Sitting         Visual Acuity  Visual Acuity    Flowsheet Row Most Recent Value   L Pupil Size (mm) 2   R Pupil Size (mm) 2          ED Medications  Medications   sodium chloride 0 9 % bolus 1,000 mL (1,000 mL Intravenous New Bag 1/2/23 1927)       Diagnostic Studies  Results Reviewed     Procedure Component Value Units Date/Time    Magnesium [[id number]]  (Normal) Collected: 01/02/23 1837    Lab Status: Final result Specimen: Blood from Arm, Right Updated: 01/02/23 2056     Magnesium 1 7 mg/dL     Blood gas, venous [[id number]]  (Abnormal) Collected: 01/02/23 2034    Lab Status: Final result Specimen: Blood from Arm, Left Updated: 01/02/23 2044     pH, Keaton 7 390     pCO2, Keaton 43 4 mm Hg      pO2, Keaton 51 0 mm Hg      HCO3, Keaton 25 7 mmol/L      Base Excess, Keaton 0 5 mmol/L      O2 Content, Keaton 14 4 ml/dL      O2 HGB, VENOUS 83 7 %     HS Troponin I 2hr [[id number]] Collected: 01/02/23 2034    Lab Status: In process Specimen: Blood from Arm, Left Updated: 01/02/23 2041    HS Troponin I 4hr [[id number]]     Lab Status: No result Specimen: Blood     Ammonia [[id number]]  (Abnormal) Collected: 01/02/23 1927    Lab Status: Final result Specimen: Blood from Arm, Right Updated: 01/02/23 2004     Ammonia <10 umol/L     Lactic acid [[id number]]  (Normal) Collected: 01/02/23 1904    Lab Status: Final result Specimen: Blood from Arm, Right Updated: 01/02/23 1937     LACTIC ACID 1 2 mmol/L     Narrative:      Result may be elevated if tourniquet was used during collection      Comprehensive metabolic panel [[id number]]  (Abnormal) Collected: 01/02/23 1837    Lab Status: Final result Specimen: Blood from Arm, Right Updated: 01/02/23 1909     Sodium 136 mmol/L      Potassium 4 7 mmol/L      Chloride 99 mmol/L      CO2 24 mmol/L      ANION GAP 13 mmol/L      BUN 24 mg/dL      Creatinine 0 62 mg/dL      Glucose 323 mg/dL      Calcium 9 1 mg/dL      Corrected Calcium 9 6 mg/dL      AST 22 U/L ALT 26 U/L      Alkaline Phosphatase 126 U/L      Total Protein 8 5 g/dL      Albumin 3 4 g/dL      Total Bilirubin 1 44 mg/dL      eGFR 99 ml/min/1 73sq m     Narrative:      [place] guidelines for Chronic Kidney Disease (CKD):   •  Stage 1 with normal or high GFR (GFR > 90 mL/min/1 73 square meters)  •  Stage 2 Mild CKD (GFR = 60-89 mL/min/1 73 square meters)  •  Stage 3A Moderate CKD (GFR = 45-59 mL/min/1 73 square meters)  •  Stage 3B Moderate CKD (GFR = 30-44 mL/min/1 73 square meters)  •  Stage 4 Severe CKD (GFR = 15-29 mL/min/1 73 square meters)  •  Stage 5 End Stage CKD (GFR <15 mL/min/1 73 square meters)  Note: GFR calculation is accurate only with a steady state creatinine    HS Troponin 0hr (reflex protocol) [[id number]]  (Normal) Collected: 01/02/23 1837    Lab Status: Final result Specimen: Blood from Arm, Right Updated: 01/02/23 1906     hs TnI 0hr 4 ng/L     Rapid drug screen, urine [[id number]]     Lab Status: No result Specimen: Urine     UA w Reflex to Microscopic w Reflex to Culture [[id number]]     Lab Status: No result Specimen: Urine     CBC and differential [[id number]]  (Abnormal) Collected: 01/02/23 1837    Lab Status: Final result Specimen: Blood from Arm, Right Updated: 01/02/23 1843     WBC 10 09 Thousand/uL      RBC 4 66 Million/uL      Hemoglobin 12 3 g/dL      Hematocrit 38 3 %      MCV 82 fL      MCH 26 4 pg      MCHC 32 1 g/dL      RDW 13 9 %      MPV 13 7 fL      Platelets 586 Thousands/uL      nRBC 0 /100 WBCs      Neutrophils Relative 66 %      Immat GRANS % 0 %      Lymphocytes Relative 22 %      Monocytes Relative 8 %      Eosinophils Relative 3 %      Basophils Relative 1 %      Neutrophils Absolute 6 64 Thousands/µL      Immature Grans Absolute 0 03 Thousand/uL      Lymphocytes Absolute 2 26 Thousands/µL      Monocytes Absolute 0 78 Thousand/µL      Eosinophils Absolute 0 31 Thousand/µL      Basophils Absolute 0 07 Thousands/µL     Fingerstick Glucose (POCT) [[id number]]  (Abnormal) Collected: 01/02/23 1650    Lab Status: Final result Updated: 01/02/23 1651     POC Glucose 290 mg/dl                  CT head without contrast   Final Result by Geoff Acuña MD (01/02 2022)      No acute intracranial hemorrhage, midline shift, or mass effect  Workstation performed: [id number]         CT spine cervical without contrast   Final Result by Geoff Acuña MD (01/02 2021)      No cervical spine fracture or traumatic malalignment  Workstation performed: [id number]         XR chest 1 view portable   ED Interpretation by Zeke Madrid DO (01/02 1949)   No free air  No focal consolidation                 Procedures  Procedures         ED Course  ED Course as of 01/02/23 2058   Nataliya Hester Jan 02, 2023   237 Is a 70-year-old female here with son who provides history  On exam patient does have a flat affect and is confused  She is maintaining airway and secretions and hemodynamically stable  Medical work-up for altered mental status including CT head and neck as patient did have a fall a days ago as well      Disclosure: Voice to text software was used in the preparation of this document and could have resulted in translational errors       Occasional wrong word or "sound a like" substitutions may have occurred due to the inherent limitations of voice recognition software   Read the chart carefully and recognize, using context, where substitutions have occurred         1941 Patient's labs reveal no evidence of septicemia or endorgan damage  glucose 323 without anion gap acidosis  Pending CAT scans  Chest x-ray clear  Pending labs  EKG without ischemia                               SBIRT 20yo+    Flowsheet Row Most Recent Value   SBIRT (25 yo +)    In order to provide better care to our patients, we are screening all of our patients for alcohol and drug use  Would it be okay to ask you these screening questions?  Unable to answer at this time Filed at: 01/02/2023 1847                    Medical Decision Making      DDx including but not limited to: metabolic abnormality, intracranial etiology, cardiac etiology, substance abuse, infectious etiology including UTI, thyroid disease, hyperammonemia  Differential diagnosis includes but not limited to: Intracranial hemorrhage, intracranial tumor, seizures, hypertensive encephalopathy, hypoglycemia, DKA, HHNK, dementia, Carl's paralysis, hypoglycemia/hyperglycemia, hypocalcemia/hypercalcemia, hypothermia, hyperthermia, hypoxia, hypercarbia, overdose (opioids, barbiturates, alcohol, benzodiazepines, Etc), meningitis, abscess, encephalitis, complicated migraine, shock, uremia    EKG INTERPRETATION @ 1654  RHYTHM: Sinus tachycardia 100 bpm  AXIS: Normal axis  INTERVALS: IN interval measured at 146 ms  QRS COMPLEX: QRS measured at 82 ms  ST SEGMENT: Nonspecific ST segment changes  Diffuse artifact  QT INTERVAL: QTC measured at 430 ms  COMPARED WITH PRIOR compared to old EKG May 2020 no acute change  Interpretation by Garcia Millard DO    EKG INTERPRETATION @ 2030  RHYTHM: Tachycardia at 100 bpm  AXIS: Normal axis  INTERVALS: IN interval measured at 112 ms  QRS COMPLEX: QRS measured at 80 ms  ST SEGMENT: Nonspecific ST segment changes  Diffuse artifact  QT INTERVAL: QTC measured at 433 ms  COMPARED WITH PRIOR no acute change from  Interpretation by Garcia Millard DO        Amount and/or Complexity of Data Reviewed  Independent Historian: guardian  Labs: ordered  Decision-making details documented in ED Course  Radiology: ordered  Decision-making details documented in ED Course  ECG/medicine tests: ordered and independent interpretation performed  Decision-making details documented in ED Course  Disposition  Final diagnoses:    Altered mental status   Acute encephalopathy   Hyperglycemia     Time reflects when diagnosis was documented in both MDM as applicable and the Disposition within this note Time User Action Codes Description Comment    1/2/2023  8:57 PM Ankit Price Add [R41 82] Altered mental status     1/2/2023  8:57 PM Alexandria Clayton Add [G93 40] Acute encephalopathy     1/2/2023  8:58 PM Efrain Elylori GONZALEZ Add [R73 9] Hyperglycemia       ED Disposition     ED Disposition   Admit    Condition   Stable    Date/Time   Mon Jan 2, 2023  8:57 PM    Comment   Case was discussed with Dr Beverley Ayala and the patient's admission status was agreed to be Admission Status: observation status to the service of Dr Beverley Ayala   Follow-up Information    None         Patient's Medications   Discharge Prescriptions    No medications on file       No discharge procedures on file      PDMP Review       Value Time User    PDMP Reviewed  Yes 5/11/2022 10:20 PM Claudette Heller, [address]          ED Provider  Electronically Signed by           Sylvia Encinas DO  01/02/23 5684

## 2023-01-03 LAB
ALBUMIN SERPL BCP-MCNC: 2.8 G/DL (ref 3.5–5)
ALP SERPL-CCNC: 108 U/L (ref 46–116)
ALT SERPL W P-5'-P-CCNC: 20 U/L (ref 12–78)
ANION GAP SERPL CALCULATED.3IONS-SCNC: 11 MMOL/L (ref 4–13)
AST SERPL W P-5'-P-CCNC: 20 U/L (ref 5–45)
ATRIAL RATE: 100 BPM
ATRIAL RATE: 101 BPM
BASOPHILS # BLD AUTO: 0.08 THOUSANDS/ÂΜL (ref 0–0.1)
BASOPHILS NFR BLD AUTO: 1 % (ref 0–1)
BILIRUB SERPL-MCNC: 1.19 MG/DL (ref 0.2–1)
BUN SERPL-MCNC: 19 MG/DL (ref 5–25)
CALCIUM ALBUM COR SERPL-MCNC: 9.8 MG/DL (ref 8.3–10.1)
CALCIUM SERPL-MCNC: 8.8 MG/DL (ref 8.3–10.1)
CHLORIDE SERPL-SCNC: 101 MMOL/L (ref 96–108)
CO2 SERPL-SCNC: 24 MMOL/L (ref 21–32)
CREAT SERPL-MCNC: 0.65 MG/DL (ref 0.6–1.3)
EOSINOPHIL # BLD AUTO: 0.53 THOUSAND/ÂΜL (ref 0–0.61)
EOSINOPHIL NFR BLD AUTO: 7 % (ref 0–6)
ERYTHROCYTE [DISTWIDTH] IN BLOOD BY AUTOMATED COUNT: 13.9 % (ref 11.6–15.1)
EST. AVERAGE GLUCOSE BLD GHB EST-MCNC: 355 MG/DL
GFR SERPL CREATININE-BSD FRML MDRD: 98 ML/MIN/1.73SQ M
GLUCOSE SERPL-MCNC: 249 MG/DL (ref 65–140)
GLUCOSE SERPL-MCNC: 278 MG/DL (ref 65–140)
GLUCOSE SERPL-MCNC: 288 MG/DL (ref 65–140)
GLUCOSE SERPL-MCNC: 290 MG/DL (ref 65–140)
GLUCOSE SERPL-MCNC: 292 MG/DL (ref 65–140)
GLUCOSE SERPL-MCNC: 340 MG/DL (ref 65–140)
HBA1C MFR BLD: 14 %
HCT VFR BLD AUTO: 34.1 % (ref 34.8–46.1)
HGB BLD-MCNC: 10.8 G/DL (ref 11.5–15.4)
IMM GRANULOCYTES # BLD AUTO: 0.03 THOUSAND/UL (ref 0–0.2)
IMM GRANULOCYTES NFR BLD AUTO: 0 % (ref 0–2)
LYMPHOCYTES # BLD AUTO: 2.26 THOUSANDS/ÂΜL (ref 0.6–4.47)
LYMPHOCYTES NFR BLD AUTO: 28 % (ref 14–44)
MAGNESIUM SERPL-MCNC: 1.5 MG/DL (ref 1.6–2.6)
MCH RBC QN AUTO: 26.2 PG (ref 26.8–34.3)
MCHC RBC AUTO-ENTMCNC: 31.7 G/DL (ref 31.4–37.4)
MCV RBC AUTO: 83 FL (ref 82–98)
MONOCYTES # BLD AUTO: 0.61 THOUSAND/ÂΜL (ref 0.17–1.22)
MONOCYTES NFR BLD AUTO: 8 % (ref 4–12)
NEUTROPHILS # BLD AUTO: 4.59 THOUSANDS/ÂΜL (ref 1.85–7.62)
NEUTS SEG NFR BLD AUTO: 56 % (ref 43–75)
NRBC BLD AUTO-RTO: 0 /100 WBCS
P AXIS: 76 DEGREES
P AXIS: 77 DEGREES
PHOSPHATE SERPL-MCNC: 3.2 MG/DL (ref 2.7–4.5)
PLATELET # BLD AUTO: 195 THOUSANDS/UL (ref 149–390)
PMV BLD AUTO: 13.1 FL (ref 8.9–12.7)
POTASSIUM SERPL-SCNC: 4 MMOL/L (ref 3.5–5.3)
PR INTERVAL: 112 MS
PR INTERVAL: 146 MS
PROT SERPL-MCNC: 6.7 G/DL (ref 6.4–8.4)
QRS AXIS: 54 DEGREES
QRS AXIS: 57 DEGREES
QRSD INTERVAL: 80 MS
QRSD INTERVAL: 82 MS
QT INTERVAL: 334 MS
QT INTERVAL: 334 MS
QTC INTERVAL: 430 MS
QTC INTERVAL: 433 MS
RBC # BLD AUTO: 4.12 MILLION/UL (ref 3.81–5.12)
SODIUM SERPL-SCNC: 136 MMOL/L (ref 135–147)
T WAVE AXIS: 44 DEGREES
T WAVE AXIS: 56 DEGREES
TSH SERPL DL<=0.05 MIU/L-ACNC: 0.35 UIU/ML (ref 0.45–4.5)
VENTRICULAR RATE: 100 BPM
VENTRICULAR RATE: 101 BPM
WBC # BLD AUTO: 8.1 THOUSAND/UL (ref 4.31–10.16)

## 2023-01-03 RX ORDER — CARISOPRODOL 350 MG/1
350 TABLET ORAL 2 TIMES DAILY
Status: DISCONTINUED | OUTPATIENT
Start: 2023-01-03 | End: 2023-01-05 | Stop reason: HOSPADM

## 2023-01-03 RX ORDER — HYDROMORPHONE HYDROCHLORIDE 2 MG/1
2 TABLET ORAL EVERY 8 HOURS PRN
Status: DISCONTINUED | OUTPATIENT
Start: 2023-01-03 | End: 2023-01-04

## 2023-01-03 RX ORDER — MAGNESIUM SULFATE HEPTAHYDRATE 40 MG/ML
2 INJECTION, SOLUTION INTRAVENOUS ONCE
Status: COMPLETED | OUTPATIENT
Start: 2023-01-03 | End: 2023-01-03

## 2023-01-03 RX ORDER — LIDOCAINE 50 MG/G
1 PATCH TOPICAL DAILY
Status: DISCONTINUED | OUTPATIENT
Start: 2023-01-03 | End: 2023-01-05 | Stop reason: HOSPADM

## 2023-01-03 RX ORDER — MORPHINE SULFATE 30 MG/1
60 TABLET, FILM COATED, EXTENDED RELEASE ORAL EVERY 12 HOURS SCHEDULED
Status: DISCONTINUED | OUTPATIENT
Start: 2023-01-03 | End: 2023-01-05 | Stop reason: HOSPADM

## 2023-01-03 RX ADMIN — PREGABALIN 150 MG: 75 CAPSULE ORAL at 09:36

## 2023-01-03 RX ADMIN — MAGNESIUM SULFATE HEPTAHYDRATE 2 G: 40 INJECTION, SOLUTION INTRAVENOUS at 09:39

## 2023-01-03 RX ADMIN — SUCRALFATE 1 G: 1 TABLET ORAL at 12:16

## 2023-01-03 RX ADMIN — SODIUM CHLORIDE 100 ML/HR: 0.9 INJECTION, SOLUTION INTRAVENOUS at 23:43

## 2023-01-03 RX ADMIN — INSULIN LISPRO 3 UNITS: 100 INJECTION, SOLUTION INTRAVENOUS; SUBCUTANEOUS at 08:00

## 2023-01-03 RX ADMIN — PANTOPRAZOLE SODIUM 40 MG: 40 TABLET, DELAYED RELEASE ORAL at 06:17

## 2023-01-03 RX ADMIN — INSULIN DETEMIR 18 UNITS: 100 INJECTION, SOLUTION SUBCUTANEOUS at 09:30

## 2023-01-03 RX ADMIN — SUCRALFATE 1 G: 1 TABLET ORAL at 18:04

## 2023-01-03 RX ADMIN — INSULIN LISPRO 2 UNITS: 100 INJECTION, SOLUTION INTRAVENOUS; SUBCUTANEOUS at 16:50

## 2023-01-03 RX ADMIN — SUCRALFATE 1 G: 1 TABLET ORAL at 00:17

## 2023-01-03 RX ADMIN — CLONIDINE HYDROCHLORIDE 0.1 MG: 0.1 TABLET ORAL at 21:11

## 2023-01-03 RX ADMIN — PRAVASTATIN SODIUM 10 MG: 10 TABLET ORAL at 21:12

## 2023-01-03 RX ADMIN — SUCRALFATE 1 G: 1 TABLET ORAL at 06:17

## 2023-01-03 RX ADMIN — CEFAZOLIN SODIUM 2000 MG: 2 SOLUTION INTRAVENOUS at 13:21

## 2023-01-03 RX ADMIN — CLONIDINE HYDROCHLORIDE 0.1 MG: 0.1 TABLET ORAL at 09:37

## 2023-01-03 RX ADMIN — FLUTICASONE FUROATE AND VILANTEROL TRIFENATATE 1 PUFF: 200; 25 POWDER RESPIRATORY (INHALATION) at 09:00

## 2023-01-03 RX ADMIN — SUCRALFATE 1 G: 1 TABLET ORAL at 23:43

## 2023-01-03 RX ADMIN — CEFAZOLIN SODIUM 2000 MG: 2 SOLUTION INTRAVENOUS at 06:15

## 2023-01-03 RX ADMIN — INSULIN LISPRO 3 UNITS: 100 INJECTION, SOLUTION INTRAVENOUS; SUBCUTANEOUS at 12:15

## 2023-01-03 RX ADMIN — SODIUM CHLORIDE 100 ML/HR: 0.9 INJECTION, SOLUTION INTRAVENOUS at 13:20

## 2023-01-03 RX ADMIN — INSULIN DETEMIR 18 UNITS: 100 INJECTION, SOLUTION SUBCUTANEOUS at 21:13

## 2023-01-03 RX ADMIN — INSULIN LISPRO 5 UNITS: 100 INJECTION, SOLUTION INTRAVENOUS; SUBCUTANEOUS at 12:16

## 2023-01-03 RX ADMIN — LIDOCAINE 1 PATCH: 50 PATCH CUTANEOUS at 09:32

## 2023-01-03 RX ADMIN — HYDROMORPHONE HYDROCHLORIDE 2 MG: 2 TABLET ORAL at 16:25

## 2023-01-03 RX ADMIN — PREGABALIN 150 MG: 75 CAPSULE ORAL at 18:04

## 2023-01-03 RX ADMIN — INSULIN LISPRO 5 UNITS: 100 INJECTION, SOLUTION INTRAVENOUS; SUBCUTANEOUS at 08:30

## 2023-01-03 RX ADMIN — ENOXAPARIN SODIUM 40 MG: 40 INJECTION SUBCUTANEOUS at 09:38

## 2023-01-03 RX ADMIN — CARISOPRODOL 350 MG: 350 TABLET ORAL at 18:05

## 2023-01-03 RX ADMIN — INSULIN LISPRO 5 UNITS: 100 INJECTION, SOLUTION INTRAVENOUS; SUBCUTANEOUS at 16:50

## 2023-01-03 RX ADMIN — MORPHINE SULFATE 60 MG: 30 TABLET, EXTENDED RELEASE ORAL at 21:11

## 2023-01-03 RX ADMIN — CEFAZOLIN SODIUM 2000 MG: 2 SOLUTION INTRAVENOUS at 21:12

## 2023-01-03 NOTE — PROGRESS NOTES
[address] [facility]  Progress Note - Mendy Gupta 1965, 61 y o  female MRN: [id number]  Unit/Bed#: E5 -01 Encounter: [id number]  Primary Care Provider: Elvis Ag MD   Date and time admitted to hospital: 1/2/2023  4:43 PM    * Acute metabolic encephalopathy  Assessment & Plan  · Possible polypharmacy, left lower extremity cellulitis, hyperglycemia  · Patient had similar admission in May 2022  · Narcotics held on admission  · Today patient is awake alert and oriented  · Restart narcotic at lower dose to avoid withdrawal      Ambulatory dysfunction  Assessment & Plan  · PT OT recommended home with home health    Sinus tachycardia  Assessment & Plan  · Patient appears volume depleted on admission and received IV fluids  · Tachycardia resolved    [address]  · patient did sustain 2 unwitnessed falls, imaging negative for any acute trauma, patient has no specific pain or complaints other than generalized chronic pain    · No neurologic deficit  · PT OT recommended home with home health      Cellulitis of left lower extremity  Assessment & Plan  · Erythema left lower extremity, small superficial abrasion with surrounding erythema, increased warmth  · Continue Ancef for now    Urine retention  Assessment & Plan  · History of urinary retention  · Bladder scan    Morbid obesity ([facility] [address] )  Assessment & Plan  · Morbidly obese, patient would benefit from increased activity and weight loss    Opioid dependence with other opioid-induced disorder (HCC)  Assessment & Plan  · Patient is on morphine ER 75 milligrams twice daily  · Dilaudid 4 mg 3 times daily  · Soma 350 mg twice daily  · Pregabalin 150 mg twice daily  · Valium 5 mg at bedtime    · Restart morphine 60 mg twice daily, Dilaudid 2 mg 3 times daily as needed, Soma    Asthma  Assessment & Plan  · History of asthma, no evidence of acute exacerbation    Type 2 diabetes mellitus with hyperglycemia, with long-term current use of insulin [facility])  Assessment & Plan  Lab Results   Component Value Date    HGBA1C 14 0 (H) 2023       Recent Labs     23  2224 23  0730 23  1109 23  1623   POCGLU 340* 278* 290* 249*       Blood Sugar Average: Last 72 hrs:  (P) 289 4   · Increased Lantus to 18 units every 12 hours  · Humalog 5 units 3 times daily with meals and sliding scale  · Not checking blood sugar at home  · Hemoglobin A1c 14            VTE Pharmacologic Prophylaxis: VTE Score: 5 High Risk (Score >/= 5) - Pharmacological DVT Prophylaxis Ordered: enoxaparin (Lovenox)  Sequential Compression Devices Ordered  Patient Centered Rounds: I performed bedside rounds with nursing staff today  Discussions with Specialists or Other Care Team Provider: Nursing, case management    Education and Discussions with Family / Patient: Updated helen Ivonne Ordaz over the phone    Time Spent for Care: 30 minutes  More than 50% of total time spent on counseling and coordination of care as described above  Current Length of Stay: 0 day(s)  Current Patient Status: Inpatient   Certification Statement: The patient will continue to require additional inpatient hospital stay due to Hyperglycemia, cellulitis  Discharge Plan: Anticipate discharge in 24-48 hrs to home with home services  Code Status: Level 1 - Full Code    Subjective:   Patient was seen and evaluated bedside  He is awake alert and oriented  Complaining of generalized pain    Objective:     Vitals:   Temp (24hrs), Av 8 °F (36 6 °C), Min:97 6 °F (36 4 °C), Max:98 °F (36 7 °C)    Temp:  [97 6 °F (36 4 °C)-98 °F (36 7 °C)] 97 6 °F (36 4 °C)  HR:  [] 80  Resp:  [12-16] 16  BP: (112-160)/() 114/74  SpO2:  [90 %-97 %] 96 %  There is no height or weight on file to calculate BMI  Input and Output Summary (last 24 hours):      Intake/Output Summary (Last 24 hours) at 1/3/2023 1832  Last data filed at 1/3/2023 1801  Gross per 24 hour   Intake 1000 ml   Output 1575 ml   Net -575 ml       Physical Exam:   Physical Exam  Constitutional:       General: She is not in acute distress  Appearance: She is obese  HENT:      Head: Atraumatic  Cardiovascular:      Rate and Rhythm: Normal rate and regular rhythm  Heart sounds: No murmur heard  No friction rub  No gallop  Pulmonary:      Effort: Pulmonary effort is normal  No respiratory distress  Breath sounds: Normal breath sounds  No wheezing  Abdominal:      General: Bowel sounds are normal  There is no distension  Palpations: Abdomen is soft  Tenderness: There is no abdominal tenderness  Musculoskeletal:         General: No swelling  Cervical back: Neck supple  Skin:     General: Skin is warm and dry  Neurological:      General: No focal deficit present  Mental Status: She is alert     Psychiatric:         Mood and Affect: Mood normal           Additional Data:     Labs:  Results from last 7 days   Lab Units 01/03/23  0604   WBC Thousand/uL 8 10   HEMOGLOBIN g/dL 10 8*   HEMATOCRIT % 34 1*   PLATELETS Thousands/uL 195   NEUTROS PCT % 56   LYMPHS PCT % 28   MONOS PCT % 8   EOS PCT % 7*     Results from last 7 days   Lab Units 01/03/23  0604   SODIUM mmol/L 136   POTASSIUM mmol/L 4 0   CHLORIDE mmol/L 101   CO2 mmol/L 24   BUN mg/dL 19   CREATININE mg/dL 0 65   ANION GAP mmol/L 11   CALCIUM mg/dL 8 8   ALBUMIN g/dL 2 8*   TOTAL BILIRUBIN mg/dL 1 19*   ALK PHOS U/L 108   ALT U/L 20   AST U/L 20   GLUCOSE RANDOM mg/dL 288*         Results from last 7 days   Lab Units 01/03/23  1623 01/03/23  1109 01/03/23  0730 01/02/23  2224 01/02/23  1650   POC GLUCOSE mg/dl 249* 290* 278* 340* 290*     Results from last 7 days   Lab Units 01/03/23  0604   HEMOGLOBIN A1C % 14 0*     Results from last 7 days   Lab Units 01/02/23  1904   LACTIC ACID mmol/L 1 2       Lines/Drains:  Invasive Devices     Peripheral Intravenous Line  Duration           Peripheral IV 01/02/23 Left Antecubital <1 day Imaging: Reviewed radiology reports from this admission including: CT head    Recent Cultures (last 7 days):   Results from last 7 days   Lab Units 01/02/23 2121   BLOOD CULTURE  Received in Microbiology Lab  Culture in Progress  Received in Microbiology Lab  Culture in Progress  Last 24 Hours Medication List:   Current Facility-Administered Medications   Medication Dose Route Frequency Provider Last Rate   • albuterol  2 puff Inhalation Q4H PRN [facility] Leeann, DO     • carisoprodol  350 mg Oral BID Mark Bee MD     • cefazolin  2,000 mg Intravenous Clora Worship Darrius harry, DO 2,000 mg (01/03/23 1321)   • cloNIDine  0 1 mg Oral Q12H [facility] Darrius harry, DO     • diazepam  5 mg Oral HS PRN [facility] Leeann, DO     • enoxaparin  40 mg Subcutaneous Daily [facility] Darrius harry, DO     • fluticasone-vilanterol  1 puff Inhalation Daily [facility] Leeann, DO     • HYDROmorphone  2 mg Oral Q8H PRN Mark Bee MD     • insulin detemir  18 Units Subcutaneous Q12H [facility] Darrius harry, DO     • insulin lispro  1-5 Units Subcutaneous TID [facility] Darrius harry, DO     • insulin lispro  5 Units Subcutaneous TID With Meals [facility] Leeann, DO     • lidocaine  1 patch Topical Daily Taisha Conti PA-C     • morphine  60 mg Oral Q12H [facility] Mark Bee MD     • ondansetron  4 mg Intravenous Q6H PRN Stann Canal Leeann, DO     • pantoprazole  40 mg Oral Early Morning [facility] Darrius harry, DO     • pravastatin  10 mg Oral HS [facility] Darrius harry, DO     • pregabalin  150 mg Oral BID [facility] Leeann, DO     • sodium chloride  100 mL/hr Intravenous Continuous Stann Canal Leeann,  mL/hr (01/03/23 1320)   • sucralfate  1 g Oral Q6H [facility] Asa Alfaro DO          Today, Patient Was Seen By: Mark Bee MD    **Please Note: This note may have been constructed using a voice recognition system  **

## 2023-01-03 NOTE — ASSESSMENT & PLAN NOTE
Patient did sustain 2 unwitnessed falls, imaging negative for any acute trauma, patient has no specific pain or complaints other than generalized chronic pain  Continue to monitor, neurochecks every shift  Follow-up exam tomorrow  PT OT consult    Possible mild head trauma contributing to encephalopathy

## 2023-01-03 NOTE — ASSESSMENT & PLAN NOTE
· Patient is on morphine ER 75 milligrams twice daily  · Dilaudid 4 mg 3 times daily  · Soma 350 mg twice daily  · Pregabalin 150 mg twice daily  · Valium 5 mg at bedtime    · Restart morphine 60 mg twice daily, Dilaudid 2 mg 3 times daily as needed, [facility]

## 2023-01-03 NOTE — ASSESSMENT & PLAN NOTE
Lab Results   Component Value Date    HGBA1C 11 4 (H) 05/10/2022       Recent Labs     01/02/23  1650   POCGLU 290*       Blood Sugar Average: Last 72 hrs:  (P) 290     Will order basal bolus insulin therapy, Lantus every 12 hours as an outpatient, update hemoglobin A1c    Add Humalog 5 units with each meal plus scale

## 2023-01-03 NOTE — ASSESSMENT & PLAN NOTE
Erythema left lower extremity, small superficial abrasion with surrounding erythema, increased warmth, start IV Ancef

## 2023-01-03 NOTE — ASSESSMENT & PLAN NOTE
Lab Results   Component Value Date    HGBA1C 14 0 (H) 01/03/2023       Recent Labs     01/02/23  2224 01/03/23  0730 01/03/23  1109 01/03/23  1623   POCGLU 340* 278* 290* 249*       Blood Sugar Average: Last 72 hrs:  (P) 289 4   · Increased Lantus to 18 units every 12 hours  · Humalog 5 units 3 times daily with meals and sliding scale  · Not checking blood sugar at home  · Hemoglobin A1c 14

## 2023-01-03 NOTE — ASSESSMENT & PLAN NOTE
· patient did sustain 2 unwitnessed falls, imaging negative for any acute trauma, patient has no specific pain or complaints other than generalized chronic pain    · No neurologic deficit  · PT OT recommended home with home health

## 2023-01-03 NOTE — ED NOTES
Patient's son provided contact information   [address] [phone] & [address] Ely Epps, RN  01/02/23 3176

## 2023-01-03 NOTE — ASSESSMENT & PLAN NOTE
· Erythema left lower extremity, small superficial abrasion with surrounding erythema, increased warmth  · Continue Ancef for now

## 2023-01-03 NOTE — ASSESSMENT & PLAN NOTE
Possible polypharmacy, patient appears volume depleted, will give IV fluid  Hold narcotics    Patient appears to have evidence of left lower extremity cellulitis, start IV antibiotics, IV Ancef  Acute infection may be contributing to altered mental status, UA pending as well, patient however denies dysuria, question if she is reliable currently

## 2023-01-03 NOTE — OCCUPATIONAL THERAPY NOTE
Occupational Therapy Evaluation     Patient Name: Niurka Thompson  PWNJN'C Date: 1/3/2023  Problem List  Principal Problem:    Acute metabolic encephalopathy  Active Problems:    Type 2 diabetes mellitus with hyperglycemia, with long-term current use of insulin ([place] [address] )    Asthma    Opioid dependence with other opioid-induced disorder ([place] [address] )    Morbid obesity ([place] [address] )    Sinus tachycardia    Ambulatory dysfunction    Cellulitis of left lower extremity    Fall    Past Medical History  Past Medical History:   Diagnosis Date    Acute respiratory insufficiency 11/23/2019    Asthma     Chronic pain     Diabetes ([place] [address] )     HTN (hypertension)     Sepsis ([place] [address] ) 11/23/2019     Past Surgical History  Past Surgical History:   Procedure Laterality Date    CERVICAL LAMINECTOMY      CHOLECYSTECTOMY      CHOLECYSTECTOMY LAPAROSCOPIC N/A 11/10/2018    Procedure: CHOLECYSTECTOMY LAPAROSCOPIC w/ ioc;  Surgeon: Eda Rosenberg MD;  Location: [facility];  Service: General    HYSTERECTOMY      JOINT REPLACEMENT Bilateral     knee    TOE AMPUTATION Right 2/2/2018    Procedure: AMPUTATION TOE, RIGHT GREAT TOE;  Surgeon: Krishna Beasley DPM;  Location: [facility];  Service: Podiatry           01/03/23 1010   OT Last Visit   OT Visit Date 01/03/23   Note Type   Note type Evaluation   Pain Assessment   Pain Assessment Tool 0-10   Pain Score 10 - Worst Possible Pain   Pain Location/Orientation Orientation: Bilateral;Location: Back; Location: Knee   Hospital Pain Intervention(s) Repositioned; Ambulation/increased activity; Rest   Restrictions/Precautions   Weight Bearing Precautions Per Order No   Other Precautions Cognitive; Chair Alarm; Bed Alarm; Fall Risk;Pain;Multiple lines   Home Living   Type of Home Mobile home   Home Layout One level;Stairs to enter with rails  (5 [address])   Bathroom Shower/Tub Tub/shower unit   Bathroom Toilet Standard   Bathroom Equipment   (Denies DME)   Home Equipment [place]; Other (Comment)  (Rollator)   Additional Comments Pt lives with spouse and son in a one level mobile home with 5 MAT[address]W  (-) home alone  Prior Function   Level of [place] Independent with ADLs; Independent with functional mobility; Needs assistance with IADLS   Lives With Spouse;Son  (Son works 12 hr shifts,  is always home with pt)   Receives Help From Family   IADLs Independent with driving;Family/Friend/Other provides meals; Family/Friend/Other provides medication management   Falls in the last 6 months 1 to 4   Vocational On disability   Lifestyle   Autonomy At baseline, pt was I w/ ADLs and functional transfers/mobility w/ use of SPC vs Rollator  Family performs IADLs  (+)   (+) falls PTA  Reciprocal Relationships Spouse, son   Service to Others On disability   Intrinsic Gratification Watching TV   ADL   Where Assessed Chair   Eating Assistance 7  Independent   Grooming Assistance 7  Independent   UB Bathing Assistance 5  Supervision/Setup   LB Bathing Assistance 4  Minimal Assistance   UB Dressing Assistance 5  Supervision/Setup   LB Dressing Assistance 4  [address] 4  Minimal Assistance   Bed Mobility   Supine to Sit 5  Supervision   Additional items HOB elevated; Bedrails; Increased time required;Verbal cues   Sit to Supine Unable to assess   Additional Comments Pt seated OOB in chair with chair alarm activated at end of session  Call bell and phone within reach  All needs met and pt reports no further questions for OT at this time  Transfers   Sit to Stand 5  Supervision   Additional items Assist x 1;Bedrails; Increased time required;Verbal cues   Stand to Sit 5  Supervision   Additional items Armrests; Increased time required;Verbal cues   Additional Comments Cues for safe technique and hand placement   Functional Mobility   Functional Mobility 5  Supervision   Additional Comments Assist x1 w/ assist for IV line management   Additional items Rolling walker   Balance Static Sitting Fair +   Dynamic Sitting Fair   Static Standing Fair   Dynamic Standing Fair -   Ambulatory Fair -   Activity Tolerance   Activity Tolerance Patient limited by pain   Medical Staff [facility], PT   Nurse Made Aware yes; Philip Nickerson RN   RUE Assessment   RUE Assessment WFL  (4-/5 throughout)   LUE Assessment   LUE Assessment WFL  (4-/5 throughout)   Hand Function   Gross Motor Coordination Functional   Fine Motor Coordination Functional   Sensation   Light Touch No apparent deficits   Proprioception   Proprioception No apparent deficits   Vision-Basic Assessment   Current Vision Wears glasses only for reading   Vision - Complex Assessment   Ocular Range of Motion Intact   Acuity Able to read employee name badge without difficulty   Perception   Inattention/Neglect Appears intact   Cognition   Overall Cognitive Status Impaired   Arousal/Participation Alert; Cooperative   Attention Attends with cues to redirect   Orientation Level Oriented to person;Oriented to place;Oriented to time   Memory Decreased recall of precautions;Decreased recall of recent events   Following Commands Follows one step commands with increased time or repetition   Comments Pleasant and cooperative  Increased processing time, limited insight into deficits   Assessment   Limitation Decreased ADL status; Decreased UE strength;Decreased Safe judgement during ADL;Decreased cognition;Decreased endurance;Decreased high-level ADLs; Decreased self-care trans   Prognosis Good   Assessment Pt is a 61 y o  female seen for OT evaluation s/p adm to [address] on 1/2/2023 w/ altered mental status and fall and dx'd w/ Acute metabolic encephalopathy   CT head (-) for acute intracranial hemorrhage, midline shift, or mass effect  Imaging negative for acute trauma  Comorbidities affecting pt’s functional performance include a significant PMH of Asthma, Chronic pain, Diabetes, HTN   Pt with active OT orders and activity orders for Up with assistance  Pt lives with spouse and son in a one level mobile home with 5 [address]W  (-) home alone  At baseline, pt was I w/ ADLs and functional transfers/mobility w/ use of SPC vs Rollator  Family performs IADLs  (+)   (+) falls PTA  Upon evaluation, pt currently requires Supervision for UB ADLs, Min A for LB ADLs, Min A for toileting, Supervision for bed mobility, and Supervision for functional mobility/transfers 2* the following deficits impacting occupational performance: decreased strength, decreased balance, decreased tolerance, impaired problem solving, decreased safety awareness and increased pain  These impairments, as well at pt’s fall risk, multiple lines, steps to enter environment, difficulty performing ADLS and limited insight into deficits limit pt’s ability to safely engage in all baseline areas of occupation  Pt to continue to benefit from continued acute OT services during hospital stay to address defined deficits and to maximize level of functional independence in the following Occupational Performance areas: bathing/shower, toilet hygiene, dressing, health maintenance, functional mobility, community mobility and clothing management  From OT standpoint, recommend outpatient therapy upon D/C  OT will continue to follow pt 2-3x/wk to address the following goals to  w/in 10-14 days:   Goals   Patient Goals To get pain medication   LTG Time Frame 10-14   Long Term Goal Please refer to LTGs listed below   Plan   Treatment Interventions ADL retraining;Functional transfer training;UE strengthening/ROM; Endurance training;Patient/family training;Equipment evaluation/education; Compensatory technique education;Continued evaluation; Activityengagement   Goal Expiration Date 23   OT Treatment Day 0   OT Frequency 2-3x/wk   Recommendation   OT Discharge Recommendation Home with outpatient rehabilitation   Additional Comments  The patient's raw score on the AM-PAC Daily Activity inpatient short form is 20, standardized score is 42 03, greater than 39 4  Patients at this level are likely to benefit from discharge to home  Please refer to the recommendation of the Occupational Therapist for safe discharge planning     AM-PAC Daily Activity Inpatient   Lower Body Dressing 3   Bathing 3   Toileting 3   Upper Body Dressing 3   Grooming 4   Eating 4   Daily Activity Raw Score 20   Daily Activity Standardized Score (Calc for Raw Score >=11) 42 03   AM-PAC Applied Cognition Inpatient   Following a Speech/Presentation 4   Understanding Ordinary Conversation 4   Taking Medications 2   Remembering Where Things Are Placed or Put Away 3   Remembering List of 4-5 Errands 2   Taking Care of Complicated Tasks 2   Applied Cognition Raw Score 17   Applied Cognition Standardized Score 36 52        GOALS    Pt will improve activity tolerance to G for min 30 min txment sessions for increase engagement in functional tasks    Pt will complete bed mobility at a Mod I level w/ G balance/safety demonstrated to decrease caregiver assistance required     Pt will complete UB dressing/self care w/ mod I using adaptive device and DME as needed     Pt will complete LB dressing/self care w/ mod I using adaptive device and DME as needed    Pt will complete toileting w/ mod I w/ G hygiene/thoroughness using DME as needed    Pt will improve functional transfers to Mod I on/off all surfaces using DME as needed w/ G balance/safety     Pt will improve functional mobility during ADL/IADL/leisure tasks to Mod I using DME as needed w/ G balance/safety     Pt will be attentive 100% of the time during ongoing cognitive assessment w/ G participation to assist w/ safe d/c planning/recommendations    Pt will demonstrate G carryover of pt/caregiver education and training as appropriate w/o cues w/ good tolerance to increase safety during functional tasks    Pt will increase BUE strength by 1MM grade via AROM exercises to increase independence in ADLs and transfers    Pt will verbalize 3 potential fall hazards and identify appropriate compensatory techniques to decrease fall risk in home environment     Pt will increase standing tolerance to 8-10 mins with Fair+ dynamic standing balance to increase safety during participation in ADLs       Sagar Golden OTR/L

## 2023-01-03 NOTE — PLAN OF CARE
Problem: PHYSICAL THERAPY ADULT  Goal: Performs mobility at highest level of function for planned discharge setting  See evaluation for individualized goals  Description: Treatment/Interventions: Functional transfer training, LE strengthening/ROM, Elevations, Therapeutic exercise, Patient/family training, Equipment eval/education, Bed mobility, Gait training, Continued evaluation, Compensatory technique education, Spoke to nursing, OT          See flowsheet documentation for full assessment, interventions and recommendations  Note: Prognosis: Good  Problem List: Decreased mobility, Decreased cognition, Obesity, Pain  Assessment: Rodriguez Smoker is a 61 y o  female admitted to [facility] on 1/2/2023 for Acute metabolic encephalopathy  PT was consulted and pt was seen on 1/3/2023 for mobility assessment and d/c planning  Pt presents w chronic pain, multiple lines  At baseline is indep for ADLs and ambulation w AD  Spouse does IADLs and is home and able to assist  Pt is currently functioning at a supervision assistance x1 level for bed mobility, transfers and ambulation w RW  Pt demonstrated increased time to complete tasks due to pain, decreased effort  Requires occ encouragement  Decline further ambulation due to pain  No gross LOB noted w use of walker however maintain S given unpredictable behaviors  Pt will benefit from continued skilled IP PT to address the above mentioned impairments  in order to maximize recovery and increase functional independence when completing mobility and ADLs  At this time PT recommendations for d/c are home w continued family support and HHPT  Barriers to Discharge: None     PT Discharge Recommendation: Home with home health rehabilitation    See flowsheet documentation for full assessment

## 2023-01-03 NOTE — PLAN OF CARE
Problem: Potential for Falls  Goal: Patient will remain free of falls  Description: INTERVENTIONS:  - Educate patient/family on patient safety including physical limitations  - Instruct patient to call for assistance with activity   - Consult OT/PT to assist with strengthening/mobility   - Keep Call bell within reach  - Keep bed low and locked with side rails adjusted as appropriate  - Keep care items and personal belongings within reach  - Initiate and maintain comfort rounds  - Make Fall Risk Sign visible to staff  - Offer Toileting every 2 Hours, in advance of need  - Initiate/Maintain bed alarm  - Apply yellow socks and bracelet for high fall risk patients  - Consider moving patient to room near nurses station  Outcome: Progressing     Problem: PAIN - ADULT  Goal: Verbalizes/displays adequate comfort level or baseline comfort level  Description: Interventions:  - Encourage patient to monitor pain and request assistance  - Assess pain using appropriate pain scale  - Administer analgesics based on type and severity of pain and evaluate response  - Implement non-pharmacological measures as appropriate and evaluate response  - Consider cultural and social influences on pain and pain management  - Notify physician/advanced practitioner if interventions unsuccessful or patient reports new pain  Outcome: Progressing     Problem: INFECTION - ADULT  Goal: Absence or prevention of progression during hospitalization  Description: INTERVENTIONS:  - Assess and monitor for signs and symptoms of infection  - Monitor lab/diagnostic results  - Monitor all insertion sites, i e  indwelling lines, tubes, and drains  - Monitor endotracheal if appropriate and nasal secretions for changes in amount and color  - [place] appropriate cooling/warming therapies per order  - Administer medications as ordered  - Instruct and encourage patient and family to use good hand hygiene technique  - Identify and instruct in appropriate isolation precautions for identified infection/condition  Outcome: Progressing  Goal: Absence of fever/infection during neutropenic period  Description: INTERVENTIONS:  - Monitor WBC    Outcome: Progressing     Problem: SAFETY ADULT  Goal: Patient will remain free of falls  Description: INTERVENTIONS:  - Educate patient/family on patient safety including physical limitations  - Instruct patient to call for assistance with activity   - Consult OT/PT to assist with strengthening/mobility   - Keep Call bell within reach  - Keep bed low and locked with side rails adjusted as appropriate  - Keep care items and personal belongings within reach  - Initiate and maintain comfort rounds  - Make Fall Risk Sign visible to staff  - Offer Toileting every 2 Hours, in advance of need  - Apply yellow socks and bracelet for high fall risk patients  - Consider moving patient to room near nurses station  Outcome: Progressing  Goal: Maintain or return to baseline ADL function  Description: INTERVENTIONS:  -  Assess patient's ability to carry out ADLs; assess patient's baseline for ADL function and identify physical deficits which impact ability to perform ADLs (bathing, care of mouth/teeth, toileting, grooming, dressing, etc )  - Assess/evaluate cause of self-care deficits   - Assess range of motion  - Assess patient's mobility; develop plan if impaired  - Assess patient's need for assistive devices and provide as appropriate  - Encourage maximum independence but intervene and supervise when necessary  - Involve family in performance of ADLs  - Assess for home care needs following discharge   - Consider OT consult to assist with ADL evaluation and planning for discharge  - Provide patient education as appropriate  Outcome: Progressing  Goal: Maintains/Returns to pre admission functional level  Description: INTERVENTIONS:  - Perform BMAT or MOVE assessment daily    - Set and communicate daily mobility goal to care team and patient/family/caregiver  - Collaborate with rehabilitation services on mobility goals if consulted  - Stand patient 4 times a day  - Ambulate patient 4 times a day  - Out of bed to chair 3 times a day   - Out of bed for meals 3 times a day  - Out of bed for toileting  - Record patient progress and toleration of activity level   Outcome: Progressing     Problem: Knowledge Deficit  Goal: Patient/family/caregiver demonstrates understanding of disease process, treatment plan, medications, and discharge instructions  Description: Complete learning assessment and assess knowledge base    Interventions:  - Provide teaching at level of understanding  - Provide teaching via preferred learning methods  Outcome: Progressing     Problem: MUSCULOSKELETAL - ADULT  Goal: Maintain or return mobility to safest level of function  Description: INTERVENTIONS:  - Assess patient's ability to carry out ADLs; assess patient's baseline for ADL function and identify physical deficits which impact ability to perform ADLs (bathing, care of mouth/teeth, toileting, grooming, dressing, etc )  - Assess/evaluate cause of self-care deficits   - Assess range of motion  - Assess patient's mobility  - Assess patient's need for assistive devices and provide as appropriate  - Encourage maximum independence but intervene and supervise when necessary  - Involve family in performance of ADLs  - Assess for home care needs following discharge   - Consider OT consult to assist with ADL evaluation and planning for discharge  - Provide patient education as appropriate  Outcome: Progressing  Goal: Maintain proper alignment of affected body part  Description: INTERVENTIONS:  - Support, maintain and protect limb and body alignment  - Provide patient/ family with appropriate education  Outcome: Progressing

## 2023-01-03 NOTE — PHYSICAL THERAPY NOTE
PHYSICAL THERAPY EVALUATION          Patient Name: Phu Black  BDYCD'I Date: 1/3/2023  PT EVALUATION    61 y o     [id number]    Altered mental status [R41 82]  Hyperglycemia [R73 9]  Acute encephalopathy [G93 40]    Past Medical History:   Diagnosis Date    Acute respiratory insufficiency 11/23/2019    Asthma     Chronic pain     Diabetes ([facility] [address] )     HTN (hypertension)     Sepsis ([facility] [address] ) 11/23/2019     Past Surgical History:   Procedure Laterality Date    CERVICAL LAMINECTOMY      CHOLECYSTECTOMY      CHOLECYSTECTOMY LAPAROSCOPIC N/A 11/10/2018    Procedure: CHOLECYSTECTOMY LAPAROSCOPIC w/ ioc;  Surgeon: Erika Meier MD;  Location: [facility];  Service: General    HYSTERECTOMY      JOINT REPLACEMENT Bilateral     knee    TOE AMPUTATION Right 2/2/2018    Procedure: AMPUTATION TOE, RIGHT GREAT TOE;  Surgeon: Jessica Liz DPM;  Location: [facility];  Service: Podiatry        01/03/23 1009   PT Last Visit   PT Visit Date 01/03/23   Note Type   Note type Evaluation   Pain Assessment   Pain Assessment Tool 0-10   Pain Score 8   Pain Location/Orientation Orientation: Bilateral;Location: Knee; Location: Back   Hospital Pain Intervention(s) Repositioned; Ambulation/increased activity; Emotional support; Rest   Restrictions/Precautions   Other Precautions Chair Alarm; Bed Alarm;Multiple lines; Fall Risk;Pain   Home Living   Type of [facility] [address] One level;Stairs to enter with rails   Bathroom Shower/Tub Tub/shower unit   [facility]; Other (Comment)  (Rollator)   Additional Comments 5 MATTHEW   Prior Function   Level of [place] Independent with ADLs; Independent with functional mobility; Needs assistance with IADLS   Lives With Spouse; Son   Aravind Scott Help From Family   IADLs Family/Friend/Other provides transportation; Family/Friend/Other provides meals; Family/Friend/Other provides medication management   Falls in the last 6 months 1 to 4   Vocational On disability   Comments ambulates w cane vs Rollator at baseline  son works 12 hr shifts, spouse home and able to assist  son manages pain medications   General   Additional Pertinent History pt admitted 1/2/23 for acute metabolic encephalopathy  PMHx significant for chronic pain, DM   Cognition   Overall Cognitive Status WFL   Arousal/Participation Cooperative   Attention Within functional limits   Orientation Level Oriented to person;Oriented to place;Oriented to time   Memory Decreased recall of recent events   Following Commands Follows one step commands with increased time or repetition   Comments slow to respond and mobilize, likely some avoidance/ self limiting behaviors   Subjective   Subjective "why arent they giving me my regular pain meds"   RLE Assessment   RLE Assessment WFL   LLE Assessment   LLE Assessment WFL   Coordination   Sensation WFL   Bed Mobility   Supine to Sit 5  Supervision   Additional items HOB elevated; Increased time required   Transfers   Sit to Stand 5  Supervision   Additional items Increased time required; Other;Verbal cues  (RW)   Stand to Sit 5  Supervision   Additional items Increased time required; Other;Verbal cues;Armrests  (RW)   Ambulation/Elevation   Gait pattern Wide SUMANTH; Antalgic; Excessively slow   Gait Assistance 5  Supervision   Additional items Assist x 1   Assistive Device Rolling walker   Distance 10'   Balance   Static Standing Fair   Dynamic Standing Fair -   Ambulatory Fair -   Endurance Deficit   Endurance Deficit No   Activity Tolerance   Activity Tolerance Patient tolerated treatment well;Patient limited by pain; Other (Comment)  (effort)   Medical Staff Made Aware Reid Dye OT   Nurse Made Aware Caren Caballero RN   Assessment   Prognosis Good   Problem List Decreased mobility; Decreased cognition;Obesity;Pain   Assessment Anna Marie Sheets is a 61 y o  female admitted to [facility] on 1/2/2023 for Acute metabolic encephalopathy   PT was consulted and pt was seen on 1/3/2023 for mobility assessment and d/c planning  Pt presents w chronic pain, multiple lines  At baseline is indep for ADLs and ambulation w AD  Spouse does IADLs and is home and able to assist  Pt is currently functioning at a supervision assistance x1 level for bed mobility, transfers and ambulation w RW  Pt demonstrated increased time to complete tasks due to pain, decreased effort  Requires occ encouragement  Decline further ambulation due to pain  No gross LOB noted w use of walker however maintain S given unpredictable behaviors  Pt will benefit from continued skilled IP PT to address the above mentioned impairments  in order to maximize recovery and increase functional independence when completing mobility and ADLs  At this time PT recommendations for d/c are home w continued family support and HHPT  Barriers to Discharge None   Goals   Patient Goals decrease pain   STG Expiration Date 01/13/23   Short Term Goal #1 1)  Pt will perform bed mobility with [place] demonstrating appropriate technique 100% of the time in order to improve function  2)  Perform all transfers with [place] demonstrating safe and appropriate technique 100% of the time in order to improve ability to negotiate safely in home environment  3) Amb with least restrictive AD > 50'x1 with mod I in order to demonstrate ability to negotiate in home environment  4)  Improve overall strength and balance 1/2 grade in order to optimize ability to perform functional tasks and reduce fall risk  5) Increase activity tolerance to 45 minutes in order to improve endurance to functional tasks  6)  Negotiate stairs using most appropriate technique and S in order to be able to negotiate safely in home environment  7) PT for ongoing patient and family/caregiver education, DME needs and d/c planning in order to promote highest level of function in least restrictive environment     Plan   Treatment/Interventions Functional transfer training;DANIKA strengthening/ROM; Elevations; Therapeutic exercise;Patient/family training;Equipment eval/education; Bed mobility;Gait training;Continued evaluation; Compensatory technique education;Spoke to nursing;OT   PT Frequency 2-3x/wk   Recommendation   PT Discharge Recommendation Home with home health rehabilitation   [address] [place] [address] Mobility Inpatient   Turning in Bed Without Bedrails 4   Lying on Back to Sitting on Edge of Flat Bed 3   Moving Bed to Chair 3   Standing Up From Chair 3   Walk in Room 3   Climb 3-5 Stairs 3   Basic Mobility Inpatient Raw Score 19   Basic Mobility Standardized Score 42 48   Highest Level Of Mobility   -HLM Goal 6: Walk 10 steps or more   -HLM Achieved 6: Walk 10 steps or more   End of Consult   Patient Position at End of Consult Bedside chair;Bed/Chair alarm activated; All needs within reach   History: co - morbidities, coping styles, fall risk, use of assistive device, assist for iadl's, multiple lines  Exam: impairments in systems including musculoskeletal (strength), neuromuscular (balance, gait, transfers, motor function and sensation), am-pac, cognition  Clinical: unstable/unpredictable  Complexity:high      Jacqueline Buchanan, PT

## 2023-01-03 NOTE — ASSESSMENT & PLAN NOTE
· Possible polypharmacy, left lower extremity cellulitis, hyperglycemia  · Patient had similar admission in May 2022  · Narcotics held on admission  · Today patient is awake alert and oriented  · Restart narcotic at lower dose to avoid withdrawal

## 2023-01-03 NOTE — PLAN OF CARE
Problem: OCCUPATIONAL THERAPY ADULT  Goal: Performs self-care activities at highest level of function for planned discharge setting  See evaluation for individualized goals  Description: Treatment Interventions: ADL retraining, Functional transfer training, UE strengthening/ROM, Endurance training, Patient/family training, Equipment evaluation/education, Compensatory technique education, Continued evaluation, Activityengagement          See flowsheet documentation for full assessment, interventions and recommendations  Note: Limitation: Decreased ADL status, Decreased UE strength, Decreased Safe judgement during ADL, Decreased cognition, Decreased endurance, Decreased high-level ADLs, Decreased self-care trans  Prognosis: Good  Assessment: Pt is a 61 y o  female seen for OT evaluation s/p adm to [address] on 1/2/2023 w/ altered mental status and fall and dx'd w/ Acute metabolic encephalopathy   CT head (-) for acute intracranial hemorrhage, midline shift, or mass effect  Imaging negative for acute trauma  Comorbidities affecting pt’s functional performance include a significant PMH of Asthma, Chronic pain, Diabetes, HTN  Pt with active OT orders and activity orders for Up with assistance  Pt lives with spouse and son in a one level mobile home with 5 MATTHEW  (-) home alone  At baseline, pt was I w/ ADLs and functional transfers/mobility w/ use of SPC vs Rollator  Family performs IADLs  (+)   (+) falls PTA  Upon evaluation, pt currently requires Supervision for UB ADLs, Min A for LB ADLs, Min A for toileting, Supervision for bed mobility, and Supervision for functional mobility/transfers 2* the following deficits impacting occupational performance: decreased strength, decreased balance, decreased tolerance, impaired problem solving, decreased safety awareness and increased pain   These impairments, as well at pt’s fall risk, multiple lines, steps to enter environment, difficulty performing ADLS and limited insight into deficits limit pt’s ability to safely engage in all baseline areas of occupation  Pt to continue to benefit from continued acute OT services during hospital stay to address defined deficits and to maximize level of functional independence in the following Occupational Performance areas: bathing/shower, toilet hygiene, dressing, health maintenance, functional mobility, community mobility and clothing management  From OT standpoint, recommend outpatient therapy upon D/C   OT will continue to follow pt 2-3x/wk to address the following goals to  w/in 10-14 days:     OT Discharge Recommendation: Home with outpatient rehabilitation

## 2023-01-03 NOTE — H&P
[address] [facility]  H&P- Blaire Sherman 1965, 61 y o  female MRN: [id number]  Unit/Bed#: ED 16 Encounter: [id number]  Primary Care Provider: Lilia Padilla MD   Date and time admitted to hospital: 1/2/2023  4:43 PM    * Acute metabolic encephalopathy  Assessment & Plan  Possible polypharmacy, patient appears volume depleted, will give IV fluid  Hold narcotics    Patient appears to have evidence of left lower extremity cellulitis, start IV antibiotics, IV Ancef  Acute infection may be contributing to altered mental status, UA pending as well, patient however denies dysuria, question if she is reliable currently  Cellulitis of left lower extremity  Assessment & Plan  Erythema left lower extremity, small superficial abrasion with surrounding erythema, increased warmth, start IV Ancef  Type 2 diabetes mellitus with hyperglycemia, with long-term current use of insulin ([facility])  Assessment & Plan  Lab Results   Component Value Date    HGBA1C 11 4 (H) 05/10/2022       Recent Labs     01/02/23  1650   POCGLU 290*       Blood Sugar Average: Last 72 hrs:  (P) 290     Will order basal bolus insulin therapy, Lantus every 12 hours as an outpatient, update hemoglobin A1c    Add Humalog 5 units with each meal plus scale    Fall  Assessment & Plan  Patient did sustain 2 unwitnessed falls, imaging negative for any acute trauma, patient has no specific pain or complaints other than generalized chronic pain  Continue to monitor, neurochecks every shift  Follow-up exam tomorrow  PT OT consult    Possible mild head trauma contributing to encephalopathy      Ambulatory dysfunction  Assessment & Plan  PT OT consult    Sinus tachycardia  Assessment & Plan  Give IV fluid, patient appears volume depleted    Opioid dependence with other opioid-induced disorder [facility])  Assessment & Plan  Hold narcotics    Asthma  Assessment & Plan  History of asthma, no evidence of acute exacerbation    Morbid obesity [facility])  Assessment & Plan  Morbidly obese, patient would benefit from increased activity and weight loss      VTE Pharmacologic Prophylaxis: VTE Score: 5 High Risk (Score >/= 5) - Pharmacological DVT Prophylaxis Ordered: enoxaparin (Lovenox)  Sequential Compression Devices Ordered  Code Status: Full code  Discussion with family: Updated  (son) via phone  Anticipated Length of Stay: Patient will be admitted on an observation basis with an anticipated length of stay of less than 2 midnights secondary to Mild lower extremity cellulitis, increased confusion-acute metabolic encephalopathy  Total Time for Visit, including Counseling / Coordination of Care: 60 minutes Greater than 50% of this total time spent on direct patient counseling and coordination of care  Chief Complaint: Altered mental status    History of Present Illness:  Luiz Meek is a 61 y o  female with a PMH as noted below who presents with altered mental status per the son, recurrent falls  Based on my exam in the emergency room patient is confused, she does respond to questions appropriately, she is a poor historian  Patient with evidence of left lower extremity cellulitis  See ER documentation below    "  Altered Mental Status        Son reports over the past few days patient has been getting worse with confusion  Report she has Hx of same and gets admitted  Son reports it is typically related to her Glucose or gastroparesis      Patient is a 62-year-old female brought in by EMS with son at bedside  History is primarily per son  Patient has a history of hypertension, diabetes, chronic pain syndrome on chronic multiple narcotic medications, diabetic gastroparesis  Son states that she lives at home with him  He is care of her narcotic medication and is in a locked safe  He gives her medications as prescribed only  Ports that he does not tend to follow her glucose    Over the past several days he has noticed that patient is becoming more confused and ataxic  He reports that this happened several times where she needs to be admitted  She did have a fall several days ago but he found her on the floor and could not tell me much more  Patient does not remember this  She states that she does have diffuse pain but this is not new  He denies any headache, vision changes, tinnitus  She denies any focal weakness throughout the bilateral lower extremities or lower extremities         As noted on chart review the patient is on morphine ER 60 mg, hydromorphone 4 mg, morphine ER 15 mg, pregabalin 150 mg, Valium 5 mg and Carisoprodol 350 mg - per PDMP review"            Review of Systems:  Review of Systems   Unable to perform ROS: Mental status change       Past Medical and Surgical History:   Past Medical History:   Diagnosis Date   • Acute respiratory insufficiency 11/23/2019   • Asthma    • Chronic pain    • Diabetes ([facility] [address] )    • HTN (hypertension)    • [facility] 11/23/2019       Past Surgical History:   Procedure Laterality Date   • CERVICAL LAMINECTOMY     • CHOLECYSTECTOMY     • CHOLECYSTECTOMY LAPAROSCOPIC N/A 11/10/2018    Procedure: CHOLECYSTECTOMY LAPAROSCOPIC w/ ioc;  Surgeon: Ray Fink MD;  Location: [facility];  Service: General   • HYSTERECTOMY     • JOINT REPLACEMENT Bilateral     knee   • TOE AMPUTATION Right 2/2/2018    Procedure: AMPUTATION TOE, RIGHT GREAT TOE;  Surgeon: Mundo Leonard DPM;  Location: [facility];  Service: Podiatry       Meds/Allergies:  Prior to Admission medications    Medication Sig Start Date End Date Taking? Authorizing Provider   albuterol (PROVENTIL HFA,VENTOLIN HFA) 90 mcg/act inhaler Inhale 2 puffs every 4 (four) hours as needed for wheezing or shortness of breath 5/17/22  Yes Niko Kuhn MD   Alcohol Swabs 70 % PADS May substitute brand based on insurance coverage   Check glucose TID  5/16/22  Yes Prince Le MD   Alcohol Swabs 70 % PADS May substitute brand based on insurance coverage  Check glucose ACHS  5/16/22  Yes Delano Castellanos MD   Blood Glucose Monitoring Suppl (OneTouch Verio Reflect) w/Device KIT May substitute brand based on insurance coverage  Check glucose TID  5/16/22  Yes Delano Castellanos MD   carisoprodol (SOMA) 350 mg tablet Take 350 mg by mouth 2 (two) times a day   Yes Historical Provider, MD   cloNIDine (CATAPRES) 0 1 mg tablet Take 1 tablet (0 1 mg total) by mouth every 12 (twelve) hours 5/16/22  Yes Delano Castellanos MD   diazepam (VALIUM) 5 mg tablet Take 5 mg by mouth daily at bedtime as needed for anxiety   Yes Historical Provider, MD   fluticasone (FLONASE) 50 mcg/act nasal spray 1 spray into each nostril daily   Yes Historical Provider, MD   glucose blood (OneTouch Verio) test strip May substitute brand based on insurance coverage  Check glucose TID  5/16/22  Yes Delano Castellanos MD   glucose blood (OneTouch Verio) test strip May substitute brand based on insurance coverage  Check glucose ACHS  5/16/22  Yes Delano Castellanos MD   Insulin Syringe-Needle U-100 (BD Insulin Syringe U/F) 31G X 5/16" 0 3 ML MISC For use with insulin  Pharmacy may dispense brand covered by insurance  5/16/22  Yes Delano Castellanos MD   Insulin Syringe-Needle U-100 (BD Insulin Syringe U/F) 31G X 5/16" 0 3 ML MISC For use with insulin  Pharmacy may dispense brand covered by insurance  5/16/22  Yes Delano Castellanos MD   LYRICA 150 MG capsule Take 150 mg by mouth 2 (two) times a day 6/12/19  Yes Historical Provider, MD   nystatin (MYCOSTATIN) powder Apply topically 2 (two) times a day 7/23/19  Yes Marek Bradley MD   ondansetron [facility]) 4 mg tablet Take 1 tablet (4 mg total) by mouth every 6 (six) hours 12/24/19  Yes Maryanne Salazar PA-C   OneTouch Delica Lancets 82K MISC May substitute brand based on insurance coverage  Check glucose TID  5/16/22  Yes Delano Castellanos MD   OneTouch Delica Lancets 69B MISC May substitute brand based on insurance coverage  Check glucose ACHS  5/16/22  Yes Festus Wright MD   pantoprazole (PROTONIX) 40 mg tablet Take 1 tablet (40 mg total) by mouth daily in the early morning 5/17/22  Yes Festus Wright MD   pravastatin (PRAVACHOL) 10 mg tablet Take 10 mg by mouth daily at bedtime 8/16/19  Yes Historical Provider, MD   sucralfate (CARAFATE) 1 g tablet Take 1 tablet (1 g total) by mouth every 6 (six) hours 5/16/22  Yes MD Charles Vale INHUB 500-50 MCG/DOSE inhaler Take 1 puff by mouth 2 (two) times a day 2/28/19  Yes Historical Provider, MD   insulin detemir (Levemir) 100 units/mL subcutaneous injection Inject 18 Units under the skin every 12 (twelve) hours 5/17/22 6/16/22  Rupert Grijalva MD   insulin glargine (LANTUS) 100 units/mL subcutaneous injection Inject 15 Units under the skin daily at bedtime 5/16/22 5/17/22  Festus Wright MD     I have reviewed home medications with a medical source (PCP, Pharmacy, other)  Allergies: Allergies   Allergen Reactions   • Nsaids GI Intolerance       Social History:  Marital Status: /Civil Union   Substance Use History:   Social History     Substance and Sexual Activity   Alcohol Use Not Currently    Comment: rarely     Social History     Tobacco Use   Smoking Status Never   Smokeless Tobacco Never     Social History     Substance and Sexual Activity   Drug Use No       Family History:  Family History   Problem Relation Age of Onset   • Diabetes Mother    • Diabetes Maternal Grandmother    • No Known Problems Father        Physical Exam:     Vitals:   Blood Pressure: 134/64 (01/02/23 2103)  Pulse: 103 (01/02/23 2103)  Temperature: 97 9 °F (36 6 °C) (01/02/23 1643)  Temp Source: Oral (01/02/23 1643)  Respirations: 16 (01/02/23 2103)  SpO2: 97 % (01/02/23 2103)    Physical Exam  Vitals and nursing note reviewed  Constitutional:       General: She is not in acute distress  Appearance: She is obese  She is ill-appearing  She is not toxic-appearing or diaphoretic     HENT:      Head: Normocephalic and atraumatic  Comments: No significant head trauma on exam, no signs of ecchymosis, no swelling     Right Ear: External ear normal       Left Ear: External ear normal    Cardiovascular:      Rate and Rhythm: Tachycardia present  Comments: Limited exam due to body habitus  Pulmonary:      Effort: Pulmonary effort is normal  No respiratory distress  Breath sounds: Normal breath sounds  No wheezing  Abdominal:      General: Abdomen is flat  Bowel sounds are normal  There is no distension  Palpations: Abdomen is soft  Tenderness: There is no abdominal tenderness  Musculoskeletal:      Cervical back: Normal range of motion  Skin:     Coloration: Skin is pale  Findings: Erythema present  Comments: Left lower extremity with cellulitis, small superficial abrasion   Neurological:      General: No focal deficit present  Mental Status: She is alert  Cranial Nerves: No cranial nerve deficit  Motor: No weakness     Psychiatric:      Comments: Lethargic, flat affect          Additional Data:     Lab Results:  Results from last 7 days   Lab Units 01/02/23  1837   WBC Thousand/uL 10 09   HEMOGLOBIN g/dL 12 3   HEMATOCRIT % 38 3   PLATELETS Thousands/uL 231   NEUTROS PCT % 66   LYMPHS PCT % 22   MONOS PCT % 8   EOS PCT % 3     Results from last 7 days   Lab Units 01/02/23  1837   SODIUM mmol/L 136   POTASSIUM mmol/L 4 7   CHLORIDE mmol/L 99   CO2 mmol/L 24   BUN mg/dL 24   CREATININE mg/dL 0 62   ANION GAP mmol/L 13   CALCIUM mg/dL 9 1   ALBUMIN g/dL 3 4*   TOTAL BILIRUBIN mg/dL 1 44*   ALK PHOS U/L 126*   ALT U/L 26   AST U/L 22   GLUCOSE RANDOM mg/dL 323*         Results from last 7 days   Lab Units 01/02/23  1650   POC GLUCOSE mg/dl 290*         Results from last 7 days   Lab Units 01/02/23  1904   LACTIC ACID mmol/L 1 2       Lines/Drains:  Invasive Devices     Peripheral Intravenous Line  Duration           Peripheral IV 01/02/23 Left Antecubital <1 day Imaging: Reviewed radiology reports from this admission including: CT head  CT head without contrast   Final Result by Rocío Mir MD (01/02 2022)      No acute intracranial hemorrhage, midline shift, or mass effect  Workstation performed: [id number]         CT spine cervical without contrast   Final Result by Rocío Mir MD (01/02 2021)      No cervical spine fracture or traumatic malalignment  Workstation performed: [id number]         XR chest 1 view portable   ED Interpretation by Remigio Orozco DO (01/02 1949)   No free air  No focal consolidation          ** Please Note: This note has been constructed using a voice recognition system   **

## 2023-01-03 NOTE — PLAN OF CARE
Problem: Potential for Falls  Goal: Patient will remain free of falls  Description: INTERVENTIONS:  - Educate patient/family on patient safety including physical limitations  - Instruct patient to call for assistance with activity   - Consult OT/PT to assist with strengthening/mobility   - Keep Call bell within reach  - Keep bed low and locked with side rails adjusted as appropriate  - Keep care items and personal belongings within reach  - Initiate and maintain comfort rounds  - Make Fall Risk Sign visible to staff  - Offer Toileting every  Hours, in advance of need  - Initiate/Maintain alarm  - Obtain necessary fall risk management equipment: - Apply yellow socks and bracelet for high fall risk patients  - Consider moving patient to room near nurses station  Outcome: Progressing     Problem: PAIN - ADULT  Goal: Verbalizes/displays adequate comfort level or baseline comfort level  Description: Interventions:  - Encourage patient to monitor pain and request assistance  - Assess pain using appropriate pain scale  - Administer analgesics based on type and severity of pain and evaluate response  - Implement non-pharmacological measures as appropriate and evaluate response  - Consider cultural and social influences on pain and pain management  - Notify physician/advanced practitioner if interventions unsuccessful or patient reports new pain  Outcome: Progressing     Problem: INFECTION - ADULT  Goal: Absence or prevention of progression during hospitalization  Description: INTERVENTIONS:  - Assess and monitor for signs and symptoms of infection  - Monitor lab/diagnostic results  - Monitor all insertion sites, i e  indwelling lines, tubes, and drains  - Monitor endotracheal if appropriate and nasal secretions for changes in amount and color  - [place] appropriate cooling/warming therapies per order  - Administer medications as ordered  - Instruct and encourage patient and family to use good hand hygiene technique  - Identify and instruct in appropriate isolation precautions for identified infection/condition  Outcome: Progressing  Goal: Absence of fever/infection during neutropenic period  Description: INTERVENTIONS:  - Monitor WBC    Outcome: Progressing     Problem: SAFETY ADULT  Goal: Patient will remain free of falls  Description: INTERVENTIONS:  - Educate patient/family on patient safety including physical limitations  - Instruct patient to call for assistance with activity   - Consult OT/PT to assist with strengthening/mobility   - Keep Call bell within reach  - Keep bed low and locked with side rails adjusted as appropriate  - Keep care items and personal belongings within reach  - Initiate and maintain comfort rounds  - Make Fall Risk Sign visible to staff  - Offer Toileting every Hours, in advance of need  - Initiate/Maintain alarm  - Obtain necessary fall risk management equipment:   - Apply yellow socks and bracelet for high fall risk patients  - Consider moving patient to room near nurses station  Outcome: Progressing  Goal: Maintain or return to baseline ADL function  Description: INTERVENTIONS:  -  Assess patient's ability to carry out ADLs; assess patient's baseline for ADL function and identify physical deficits which impact ability to perform ADLs (bathing, care of mouth/teeth, toileting, grooming, dressing, etc )  - Assess/evaluate cause of self-care deficits   - Assess range of motion  - Assess patient's mobility; develop plan if impaired  - Assess patient's need for assistive devices and provide as appropriate  - Encourage maximum independence but intervene and supervise when necessary  - Involve family in performance of ADLs  - Assess for home care needs following discharge   - Consider OT consult to assist with ADL evaluation and planning for discharge  - Provide patient education as appropriate  Outcome: Progressing  Goal: Maintains/Returns to pre admission functional level  Description: INTERVENTIONS:  - Perform BMAT or MOVE assessment daily    - Set and communicate daily mobility goal to care team and patient/family/caregiver  - Collaborate with rehabilitation services on mobility goals if consulted  - Perform Range of Motion  times a day  - Reposition patient every  hours  - Dangle patient  times a day  - Stand patient times a day  - Ambulate patient  times a day  - Out of bed to chair  times a day   - Out of bed for meals  times a day  - Out of bed for toileting  - Record patient progress and toleration of activity level   Outcome: Progressing     Problem: DISCHARGE PLANNING  Goal: Discharge to home or other facility with appropriate resources  Description: INTERVENTIONS:  - Identify barriers to discharge w/patient and caregiver  - Arrange for needed discharge resources and transportation as appropriate  - Identify discharge learning needs (meds, wound care, etc )  - Arrange for interpretive services to assist at discharge as needed  - Refer to Case Management Department for coordinating discharge planning if the patient needs post-hospital services based on physician/advanced practitioner order or complex needs related to functional status, cognitive ability, or social support system  Outcome: Progressing     Problem: Knowledge Deficit  Goal: Patient/family/caregiver demonstrates understanding of disease process, treatment plan, medications, and discharge instructions  Description: Complete learning assessment and assess knowledge base    Interventions:  - Provide teaching at level of understanding  - Provide teaching via preferred learning methods  Outcome: Progressing     Problem: MUSCULOSKELETAL - ADULT  Goal: Maintain or return mobility to safest level of function  Description: INTERVENTIONS:  - Assess patient's ability to carry out ADLs; assess patient's baseline for ADL function and identify physical deficits which impact ability to perform ADLs (bathing, care of mouth/teeth, toileting, grooming, dressing, etc )  - Assess/evaluate cause of self-care deficits   - Assess range of motion  - Assess patient's mobility  - Assess patient's need for assistive devices and provide as appropriate  - Encourage maximum independence but intervene and supervise when necessary  - Involve family in performance of ADLs  - Assess for home care needs following discharge   - Consider OT consult to assist with ADL evaluation and planning for discharge  - Provide patient education as appropriate  Outcome: Progressing  Goal: Maintain proper alignment of affected body part  Description: INTERVENTIONS:  - Support, maintain and protect limb and body alignment  - Provide patient/ family with appropriate education  Outcome: Progressing

## 2023-01-03 NOTE — UTILIZATION REVIEW
Initial Clinical Review    Admission: Date/Time/Statement:   Admission Orders (From admission, onward)     Ordered        01/02/23 2057  Place in Observation  Once                      01/03/23 1600  Inpatient Admission  Once        Transfer Service: Hospitalist       Question Answer Comment   Level of Care Med Surg    Estimated length of stay More than 2 Midnights    Certification I certify that inpatient services are medically necessary for this patient for a duration of greater than two midnights  See H&P and MD Progress Notes for additional information about the patient's course of treatment  01/03/23 1559   OBSERVATION  1/2  @  2058 CHANGED TO IP ADMISSIOn  1/3  @  1600    ED Arrival Information     Expected   -    Arrival   1/2/2023 16:33    Acuity   Urgent            Means of arrival   Wheelchair    Escorted by   Family Member    Service   Hospitalist    Admission type   Emergency            Arrival complaint   slurred speech, confusion           Chief Complaint   Patient presents with   • Altered Mental Status     Son reports over the past few days patient has been getting worse with confusion  Report she has Hx of same and gets admitted  Son reports it is typically related to her Glucose or gastroparesis       Initial Presentation: 61 y o  female presents to ED from home with increased confusion for past few days and recurrent falls  Confused on  ED exam, responds appropriately  LLE  Cellulitis noted on  Exam   PMH is  Asthma, DM2, opioid dependence  Admit  Observation with Acute metabolic encephalopathy, Cellulitis  LLE and plan is  Monitor labs, hold narcotics, TONY, IVF and  PT/OT            ED Triage Vitals   Temperature Pulse Respirations Blood Pressure SpO2   01/02/23 1643 01/02/23 1643 01/02/23 1643 01/02/23 1643 01/02/23 1643   97 9 °F (36 6 °C) 103 18 150/67 98 %      Temp Source Heart Rate Source Patient Position - Orthostatic VS BP Location FiO2 (%)   01/02/23 1643 01/02/23 1643 01/02/23 25 [id number] 01/02/23 1643 --   Oral Monitor Sitting Right arm       Pain Score       01/02/23 1715       7          Wt Readings from Last 1 Encounters:   05/14/22 122 kg (269 lb 6 4 oz)     Additional Vital Signs:   98 °F (36 7 °C) 101 16 154/102 Abnormal  119 95 % -- --    01/03/23 01:31:44 -- 90 -- 112/62 79 90 % -- --   01/02/23 21:37:30 97 8 °F (36 6 °C) 96 12 160/101 Abnormal  121 92 % -- --   01/02/23 2103 -- 103 16 134/64 -- 97 % None (Room air) Lying   01/02/23 1725 -- -- -- -- -- -- None (Room air) --   01/02/23 1715 -- 94 18 133/70 -- 97 % None (Room air) Sitting   01/02/23 1643 97 9 °F (36 6 °C) 103 18 150/67 -- 98 % None (Room air) Sitting       Pertinent Labs/Diagnostic Test Results:   CT head without contrast   Final Result by Tracy Almazan MD (01/02 2022)      No acute intracranial hemorrhage, midline shift, or mass effect  Workstation performed: [id number]         CT spine cervical without contrast   Final Result by Tracy Almazan MD (01/02 2021)      No cervical spine fracture or traumatic malalignment  Workstation performed: [id number]         XR chest 1 view portable   ED Interpretation by Anika Root DO (01/02 1949)   No free air  No focal consolidation      Final Result by Dain Kelly MD (60/94 6463)      No acute cardiopulmonary disease        Findings are stable            Workstation performed: [id number]               Results from last 7 days   Lab Units 01/03/23  0604 01/02/23  1837   WBC Thousand/uL 8 10 10 09   HEMOGLOBIN g/dL 10 8* 12 3   HEMATOCRIT % 34 1* 38 3   PLATELETS Thousands/uL 195 231   NEUTROS ABS Thousands/µL 4 59 6 64         Results from last 7 days   Lab Units 01/03/23  0604 01/02/23  1837   SODIUM mmol/L 136 136   POTASSIUM mmol/L 4 0 4 7   CHLORIDE mmol/L 101 99   CO2 mmol/L 24 24   ANION GAP mmol/L 11 13   BUN mg/dL 19 24   CREATININE mg/dL 0 65 0 62   EGFR ml/min/1 73sq m 98 99   CALCIUM mg/dL 8 8 9 1   MAGNESIUM mg/dL 1 5* 1 7 PHOSPHORUS mg/dL 3 2  --      Results from last 7 days   Lab Units 01/03/23  0604 01/02/23  1927 01/02/23  1837   AST U/L 20  --  22   ALT U/L 20  --  26   ALK PHOS U/L 108  --  126*   TOTAL PROTEIN g/dL 6 7  --  8 5*   ALBUMIN g/dL 2 8*  --  3 4*   TOTAL BILIRUBIN mg/dL 1 19*  --  1 44*   AMMONIA umol/L  --  <10*  --      Results from last 7 days   Lab Units 01/03/23  0730 01/02/23  2224 01/02/23  1650   POC GLUCOSE mg/dl 278* 340* 290*     Results from last 7 days   Lab Units 01/03/23  0604 01/02/23  1837   GLUCOSE RANDOM mg/dL 288* 323*                  Results from last 7 days   Lab Units 01/02/23  2034   PH GAUTAM  7 390   PCO2 GAUTAM mm Hg 43 4   PO2 GAUTAM mm Hg 51 0*   HCO3 GAUTAM mmol/L 25 7   BASE EXC GAUTAM mmol/L 0 5   O2 CONTENT GAUTAM ml/dL 14 4   O2 HGB, VENOUS % 83 7*             Results from last 7 days   Lab Units 01/02/23  1837   HS TNI 0HR ng/L 4             Results from last 7 days   Lab Units 01/03/23  0604   TSH 3RD GENERATON uIU/mL 0 352*         Results from last 7 days   Lab Units 01/02/23  1904   LACTIC ACID mmol/L 1 2                   Results from last 7 days   Lab Units 01/02/23  2218   CLARITY UA  Clear   COLOR UA  Yellow   SPEC GRAV UA  1 025   PH UA  6 0   GLUCOSE UA mg/dl >=1000 (1%)*   KETONES UA mg/dl 40 (2+)*   BLOOD UA  Negative   PROTEIN UA mg/dl Trace*   NITRITE UA  Negative   BILIRUBIN UA  Negative   UROBILINOGEN UA E U /dl 1 0   LEUKOCYTES UA  Elevated glucose may cause decreased leukocyte values   See urine microscopic for [facility] result/*   WBC UA /hpf 1-2*   RBC UA /hpf None Seen   BACTERIA UA /hpf Occasional   EPITHELIAL CELLS WET PREP /hpf Occasional             Results from last 7 days   Lab Units 01/02/23  2215   AMPH/METH  Negative   BARBITURATE UR  Positive*   BENZODIAZEPINE UR  Positive*   COCAINE UR  Negative   METHADONE URINE  Negative   OPIATE UR  Positive*   PCP UR  Negative   THC UR  Negative                 ED Treatment:   Medication Administration from 01/02/2023 1633 to 01/02/2023 2130       Date/Time Order Dose Route Action Comments     01/02/2023 2112 EST sodium chloride 0 9 % bolus 1,000 mL 0 mL Intravenous Stopped --     01/02/2023 1927 EST sodium chloride 0 9 % bolus 1,000 mL 1,000 mL Intravenous New Bag --          Present on Admission:  • Morbid obesity ([facility] [address] )  • Opioid dependence with other opioid-induced disorder ([facility] [address] )  • Asthma  • Acute metabolic encephalopathy  • Sinus tachycardia  • Ambulatory dysfunction  • Cellulitis of left lower extremity  • Fall      Admitting Diagnosis: Altered mental status [R41 82]  Hyperglycemia [R73 9]  Acute encephalopathy [G93 40]  Age/Sex: 61 y o  female  Admission Orders:  Scheduled Medications:  cefazolin, 2,000 mg, Intravenous, Q8H  cloNIDine, 0 1 mg, Oral, Q12H BOLIVAR  enoxaparin, 40 mg, Subcutaneous, Daily  fluticasone-vilanterol, 1 puff, Inhalation, Daily  insulin detemir, 18 Units, Subcutaneous, Q12H [address]  insulin lispro, 1-5 Units, Subcutaneous, TID AC  insulin lispro, 5 Units, Subcutaneous, TID With Meals  lidocaine, 1 patch, Topical, Daily  magnesium sulfate, 2 g, Intravenous, Once  pantoprazole, 40 mg, Oral, Early Morning  pravastatin, 10 mg, Oral, HS  pregabalin, 150 mg, Oral, BID  sucralfate, 1 g, Oral, Q6H BOLIVAR      Continuous IV Infusions:  sodium chloride, 100 mL/hr, Intravenous, Continuous      PRN Meds:  albuterol, 2 puff, Inhalation, Q4H PRN  diazepam, 5 mg, Oral, HS PRN  ondansetron, 4 mg, Intravenous, Q6H PRN    Neuro checks  Q shift      Network Utilization Review Department  ATTENTION: Please call with any questions or concerns to [phone] and carefully listen to the prompts so that you are directed to the right person  All voicemails are confidential   Maximus Singleton all requests for admission clinical reviews, approved or denied determinations and any other requests to dedicated fax number below belonging to the campus where the patient is receiving treatment   List of dedicated fax numbers for the Facilities:  [address] FAX NUMBER   ADMISSION DENIALS (Administrative/Medical Necessity) [phone]   [address] (Maternity/NICU/Pediatrics) Carmen Davidson [address] Vanita [place]ton  [phone]   [address] [address] [address] [place] [address] Yanely Bullock [facility] [address] [facility] 134 815 [facility] [phone]

## 2023-01-04 PROBLEM — L03.115 CELLULITIS OF RIGHT LOWER EXTREMITY: Status: ACTIVE | Noted: 2023-01-02

## 2023-01-04 LAB
ALBUMIN SERPL BCP-MCNC: 2.7 G/DL (ref 3.5–5)
ALP SERPL-CCNC: 98 U/L (ref 46–116)
ALT SERPL W P-5'-P-CCNC: 16 U/L (ref 12–78)
ANION GAP SERPL CALCULATED.3IONS-SCNC: 9 MMOL/L (ref 4–13)
AST SERPL W P-5'-P-CCNC: 9 U/L (ref 5–45)
BASOPHILS # BLD AUTO: 0.07 THOUSANDS/ÂΜL (ref 0–0.1)
BASOPHILS NFR BLD AUTO: 1 % (ref 0–1)
BILIRUB SERPL-MCNC: 1.04 MG/DL (ref 0.2–1)
BUN SERPL-MCNC: 11 MG/DL (ref 5–25)
CALCIUM ALBUM COR SERPL-MCNC: 9.5 MG/DL (ref 8.3–10.1)
CALCIUM SERPL-MCNC: 8.5 MG/DL (ref 8.3–10.1)
CHLORIDE SERPL-SCNC: 107 MMOL/L (ref 96–108)
CO2 SERPL-SCNC: 24 MMOL/L (ref 21–32)
CREAT SERPL-MCNC: 0.5 MG/DL (ref 0.6–1.3)
EOSINOPHIL # BLD AUTO: 0.47 THOUSAND/ÂΜL (ref 0–0.61)
EOSINOPHIL NFR BLD AUTO: 5 % (ref 0–6)
ERYTHROCYTE [DISTWIDTH] IN BLOOD BY AUTOMATED COUNT: 13.8 % (ref 11.6–15.1)
GFR SERPL CREATININE-BSD FRML MDRD: 107 ML/MIN/1.73SQ M
GLUCOSE SERPL-MCNC: 195 MG/DL (ref 65–140)
GLUCOSE SERPL-MCNC: 227 MG/DL (ref 65–140)
GLUCOSE SERPL-MCNC: 230 MG/DL (ref 65–140)
GLUCOSE SERPL-MCNC: 249 MG/DL (ref 65–140)
GLUCOSE SERPL-MCNC: 301 MG/DL (ref 65–140)
GLUCOSE SERPL-MCNC: 307 MG/DL (ref 65–140)
HCT VFR BLD AUTO: 33.3 % (ref 34.8–46.1)
HGB BLD-MCNC: 10.4 G/DL (ref 11.5–15.4)
IMM GRANULOCYTES # BLD AUTO: 0.03 THOUSAND/UL (ref 0–0.2)
IMM GRANULOCYTES NFR BLD AUTO: 0 % (ref 0–2)
LYMPHOCYTES # BLD AUTO: 2.81 THOUSANDS/ÂΜL (ref 0.6–4.47)
LYMPHOCYTES NFR BLD AUTO: 32 % (ref 14–44)
MCH RBC QN AUTO: 26.1 PG (ref 26.8–34.3)
MCHC RBC AUTO-ENTMCNC: 31.2 G/DL (ref 31.4–37.4)
MCV RBC AUTO: 84 FL (ref 82–98)
MONOCYTES # BLD AUTO: 0.79 THOUSAND/ÂΜL (ref 0.17–1.22)
MONOCYTES NFR BLD AUTO: 9 % (ref 4–12)
NEUTROPHILS # BLD AUTO: 4.51 THOUSANDS/ÂΜL (ref 1.85–7.62)
NEUTS SEG NFR BLD AUTO: 53 % (ref 43–75)
NRBC BLD AUTO-RTO: 0 /100 WBCS
PLATELET # BLD AUTO: 174 THOUSANDS/UL (ref 149–390)
PMV BLD AUTO: 13.4 FL (ref 8.9–12.7)
POTASSIUM SERPL-SCNC: 3.5 MMOL/L (ref 3.5–5.3)
PROT SERPL-MCNC: 6.3 G/DL (ref 6.4–8.4)
RBC # BLD AUTO: 3.99 MILLION/UL (ref 3.81–5.12)
SODIUM SERPL-SCNC: 140 MMOL/L (ref 135–147)
WBC # BLD AUTO: 8.68 THOUSAND/UL (ref 4.31–10.16)

## 2023-01-04 RX ORDER — HYDROMORPHONE HYDROCHLORIDE 4 MG/1
4 TABLET ORAL EVERY 8 HOURS PRN
Status: DISCONTINUED | OUTPATIENT
Start: 2023-01-04 | End: 2023-01-05 | Stop reason: HOSPADM

## 2023-01-04 RX ADMIN — SODIUM CHLORIDE 100 ML/HR: 0.9 INJECTION, SOLUTION INTRAVENOUS at 10:04

## 2023-01-04 RX ADMIN — MORPHINE SULFATE 60 MG: 30 TABLET, EXTENDED RELEASE ORAL at 22:28

## 2023-01-04 RX ADMIN — SUCRALFATE 1 G: 1 TABLET ORAL at 17:35

## 2023-01-04 RX ADMIN — CLONIDINE HYDROCHLORIDE 0.1 MG: 0.1 TABLET ORAL at 22:28

## 2023-01-04 RX ADMIN — SUCRALFATE 1 G: 1 TABLET ORAL at 23:16

## 2023-01-04 RX ADMIN — CEFAZOLIN SODIUM 2000 MG: 2 SOLUTION INTRAVENOUS at 05:50

## 2023-01-04 RX ADMIN — CARISOPRODOL 350 MG: 350 TABLET ORAL at 17:35

## 2023-01-04 RX ADMIN — PREGABALIN 150 MG: 75 CAPSULE ORAL at 09:15

## 2023-01-04 RX ADMIN — HYDROMORPHONE HYDROCHLORIDE 2 MG: 2 TABLET ORAL at 12:56

## 2023-01-04 RX ADMIN — LIDOCAINE 1 PATCH: 50 PATCH CUTANEOUS at 09:21

## 2023-01-04 RX ADMIN — INSULIN LISPRO 2 UNITS: 100 INJECTION, SOLUTION INTRAVENOUS; SUBCUTANEOUS at 12:05

## 2023-01-04 RX ADMIN — CARISOPRODOL 350 MG: 350 TABLET ORAL at 09:15

## 2023-01-04 RX ADMIN — CEFAZOLIN SODIUM 2000 MG: 2 SOLUTION INTRAVENOUS at 13:01

## 2023-01-04 RX ADMIN — INSULIN LISPRO 3 UNITS: 100 INJECTION, SOLUTION INTRAVENOUS; SUBCUTANEOUS at 16:28

## 2023-01-04 RX ADMIN — INSULIN DETEMIR 25 UNITS: 100 INJECTION, SOLUTION SUBCUTANEOUS at 22:28

## 2023-01-04 RX ADMIN — PRAVASTATIN SODIUM 10 MG: 10 TABLET ORAL at 23:16

## 2023-01-04 RX ADMIN — INSULIN LISPRO 5 UNITS: 100 INJECTION, SOLUTION INTRAVENOUS; SUBCUTANEOUS at 12:06

## 2023-01-04 RX ADMIN — FLUTICASONE FUROATE AND VILANTEROL TRIFENATATE 1 PUFF: 200; 25 POWDER RESPIRATORY (INHALATION) at 09:13

## 2023-01-04 RX ADMIN — SUCRALFATE 1 G: 1 TABLET ORAL at 12:06

## 2023-01-04 RX ADMIN — INSULIN LISPRO 5 UNITS: 100 INJECTION, SOLUTION INTRAVENOUS; SUBCUTANEOUS at 07:53

## 2023-01-04 RX ADMIN — CEFAZOLIN SODIUM 2000 MG: 2 SOLUTION INTRAVENOUS at 22:33

## 2023-01-04 RX ADMIN — CLONIDINE HYDROCHLORIDE 0.1 MG: 0.1 TABLET ORAL at 09:15

## 2023-01-04 RX ADMIN — INSULIN LISPRO 5 UNITS: 100 INJECTION, SOLUTION INTRAVENOUS; SUBCUTANEOUS at 16:28

## 2023-01-04 RX ADMIN — MORPHINE SULFATE 60 MG: 30 TABLET, EXTENDED RELEASE ORAL at 09:16

## 2023-01-04 RX ADMIN — PANTOPRAZOLE SODIUM 40 MG: 40 TABLET, DELAYED RELEASE ORAL at 05:50

## 2023-01-04 RX ADMIN — INSULIN LISPRO 1 UNITS: 100 INJECTION, SOLUTION INTRAVENOUS; SUBCUTANEOUS at 07:52

## 2023-01-04 RX ADMIN — PREGABALIN 150 MG: 75 CAPSULE ORAL at 17:35

## 2023-01-04 RX ADMIN — ENOXAPARIN SODIUM 40 MG: 40 INJECTION SUBCUTANEOUS at 09:17

## 2023-01-04 RX ADMIN — SUCRALFATE 1 G: 1 TABLET ORAL at 05:50

## 2023-01-04 RX ADMIN — INSULIN DETEMIR 22 UNITS: 100 INJECTION, SOLUTION SUBCUTANEOUS at 09:17

## 2023-01-04 NOTE — PLAN OF CARE
Problem: Potential for Falls  Goal: Patient will remain free of falls  Description: INTERVENTIONS:  - Educate patient/family on patient safety including physical limitations  - Instruct patient to call for assistance with activity   - Consult OT/PT to assist with strengthening/mobility   - Keep Call bell within reach  - Keep bed low and locked with side rails adjusted as appropriate  - Keep care items and personal belongings within reach  - Initiate and maintain comfort rounds  - Make Fall Risk Sign visible to staff  - Apply yellow socks and bracelet for high fall risk patients  - Consider moving patient to room near nurses station  Outcome: Progressing     Problem: PAIN - ADULT  Goal: Verbalizes/displays adequate comfort level or baseline comfort level  Description: Interventions:  - Encourage patient to monitor pain and request assistance  - Assess pain using appropriate pain scale  - Administer analgesics based on type and severity of pain and evaluate response  - Implement non-pharmacological measures as appropriate and evaluate response  - Consider cultural and social influences on pain and pain management  - Notify physician/advanced practitioner if interventions unsuccessful or patient reports new pain  Outcome: Progressing     Problem: INFECTION - ADULT  Goal: Absence or prevention of progression during hospitalization  Description: INTERVENTIONS:  - Assess and monitor for signs and symptoms of infection  - Monitor lab/diagnostic results  - Monitor all insertion sites, i e  indwelling lines, tubes, and drains  - Monitor endotracheal if appropriate and nasal secretions for changes in amount and color  - [place] appropriate cooling/warming therapies per order  - Administer medications as ordered  - Instruct and encourage patient and family to use good hand hygiene technique  - Identify and instruct in appropriate isolation precautions for identified infection/condition  Outcome: Progressing  Goal: Absence of fever/infection during neutropenic period  Description: INTERVENTIONS:  - Monitor WBC    Outcome: Progressing     Problem: SAFETY ADULT  Goal: Patient will remain free of falls  Description: INTERVENTIONS:  - Educate patient/family on patient safety including physical limitations  - Instruct patient to call for assistance with activity   - Consult OT/PT to assist with strengthening/mobility   - Keep Call bell within reach  - Keep bed low and locked with side rails adjusted as appropriate  - Keep care items and personal belongings within reach  - Initiate and maintain comfort rounds  - Make Fall Risk Sign visible to staff  - Apply yellow socks and bracelet for high fall risk patients  - Consider moving patient to room near nurses station  Outcome: Progressing  Goal: Maintain or return to baseline ADL function  Description: INTERVENTIONS:  -  Assess patient's ability to carry out ADLs; assess patient's baseline for ADL function and identify physical deficits which impact ability to perform ADLs (bathing, care of mouth/teeth, toileting, grooming, dressing, etc )  - Assess/evaluate cause of self-care deficits   - Assess range of motion  - Assess patient's mobility; develop plan if impaired  - Assess patient's need for assistive devices and provide as appropriate  - Encourage maximum independence but intervene and supervise when necessary  - Involve family in performance of ADLs  - Assess for home care needs following discharge   - Consider OT consult to assist with ADL evaluation and planning for discharge  - Provide patient education as appropriate  Outcome: Progressing  Goal: Maintains/Returns to pre admission functional level  Description: INTERVENTIONS:  - Perform BMAT or MOVE assessment daily    - Set and communicate daily mobility goal to care team and patient/family/caregiver     - Collaborate with rehabilitation services on mobility goals if consulted  - Out of bed for toileting  - Record patient progress and toleration of activity level   Outcome: Progressing     Problem: DISCHARGE PLANNING  Goal: Discharge to home or other facility with appropriate resources  Description: INTERVENTIONS:  - Identify barriers to discharge w/patient and caregiver  - Arrange for needed discharge resources and transportation as appropriate  - Identify discharge learning needs (meds, wound care, etc )  - Arrange for interpretive services to assist at discharge as needed  - Refer to Case Management Department for coordinating discharge planning if the patient needs post-hospital services based on physician/advanced practitioner order or complex needs related to functional status, cognitive ability, or social support system  Outcome: Progressing     Problem: Knowledge Deficit  Goal: Patient/family/caregiver demonstrates understanding of disease process, treatment plan, medications, and discharge instructions  Description: Complete learning assessment and assess knowledge base    Interventions:  - Provide teaching at level of understanding  - Provide teaching via preferred learning methods  Outcome: Progressing     Problem: MUSCULOSKELETAL - ADULT  Goal: Maintain or return mobility to safest level of function  Description: INTERVENTIONS:  - Assess patient's ability to carry out ADLs; assess patient's baseline for ADL function and identify physical deficits which impact ability to perform ADLs (bathing, care of mouth/teeth, toileting, grooming, dressing, etc )  - Assess/evaluate cause of self-care deficits   - Assess range of motion  - Assess patient's mobility  - Assess patient's need for assistive devices and provide as appropriate  - Encourage maximum independence but intervene and supervise when necessary  - Involve family in performance of ADLs  - Assess for home care needs following discharge   - Consider OT consult to assist with ADL evaluation and planning for discharge  - Provide patient education as appropriate  Outcome: Progressing  Goal: Maintain proper alignment of affected body part  Description: INTERVENTIONS:  - Support, maintain and protect limb and body alignment  - Provide patient/ family with appropriate education  Outcome: Progressing

## 2023-01-04 NOTE — RESTORATIVE TECHNICIAN NOTE
Restorative Technician Note      Patient Name: Charely Brar     Restorative Tech Visit Date: 01/04/23  Note Type: Mobility  Patient Position Upon Consult: Seated edge of bed  Activity Performed: Ambulated  Assistive Device: Roller walker  Patient Position at End of Consult: Seated edge of bed;  All needs within reach; Bed/Chair alarm activated      ns

## 2023-01-04 NOTE — PLAN OF CARE
Problem: Potential for Falls  Goal: Patient will remain free of falls  Description: INTERVENTIONS:  - Educate patient/family on patient safety including physical limitations  - Instruct patient to call for assistance with activity   - Consult OT/PT to assist with strengthening/mobility   - Keep Call bell within reach  - Keep bed low and locked with side rails adjusted as appropriate  - Keep care items and personal belongings within reach  - Initiate and maintain comfort rounds  - Make Fall Risk Sign visible to staff  - Offer Toileting every  Hours, in advance of need  - Initiate/Maintain alarm  - Obtain necessary fall risk management equipment:   - Apply yellow socks and bracelet for high fall risk patients  - Consider moving patient to room near nurses station  Outcome: Progressing     Problem: PAIN - ADULT  Goal: Verbalizes/displays adequate comfort level or baseline comfort level  Description: Interventions:  - Encourage patient to monitor pain and request assistance  - Assess pain using appropriate pain scale  - Administer analgesics based on type and severity of pain and evaluate response  - Implement non-pharmacological measures as appropriate and evaluate response  - Consider cultural and social influences on pain and pain management  - Notify physician/advanced practitioner if interventions unsuccessful or patient reports new pain  Outcome: Progressing     Problem: INFECTION - ADULT  Goal: Absence or prevention of progression during hospitalization  Description: INTERVENTIONS:  - Assess and monitor for signs and symptoms of infection  - Monitor lab/diagnostic results  - Monitor all insertion sites, i e  indwelling lines, tubes, and drains  - Monitor endotracheal if appropriate and nasal secretions for changes in amount and color  - [place] appropriate cooling/warming therapies per order  - Administer medications as ordered  - Instruct and encourage patient and family to use good hand hygiene technique  - Identify and instruct in appropriate isolation precautions for identified infection/condition  Outcome: Progressing  Goal: Absence of fever/infection during neutropenic period  Description: INTERVENTIONS:  - Monitor WBC    Outcome: Progressing     Problem: SAFETY ADULT  Goal: Patient will remain free of falls  Description: INTERVENTIONS:  - Educate patient/family on patient safety including physical limitations  - Instruct patient to call for assistance with activity   - Consult OT/PT to assist with strengthening/mobility   - Keep Call bell within reach  - Keep bed low and locked with side rails adjusted as appropriate  - Keep care items and personal belongings within reach  - Initiate and maintain comfort rounds  - Make Fall Risk Sign visible to staff  - Offer Toileting every  Hours, in advance of need  - Initiate/Maintain alarm  - Obtain necessary fall risk management equipment:   - Apply yellow socks and bracelet for high fall risk patients  - Consider moving patient to room near nurses station  Outcome: Progressing  Goal: Maintain or return to baseline ADL function  Description: INTERVENTIONS:  -  Assess patient's ability to carry out ADLs; assess patient's baseline for ADL function and identify physical deficits which impact ability to perform ADLs (bathing, care of mouth/teeth, toileting, grooming, dressing, etc )  - Assess/evaluate cause of self-care deficits   - Assess range of motion  - Assess patient's mobility; develop plan if impaired  - Assess patient's need for assistive devices and provide as appropriate  - Encourage maximum independence but intervene and supervise when necessary  - Involve family in performance of ADLs  - Assess for home care needs following discharge   - Consider OT consult to assist with ADL evaluation and planning for discharge  - Provide patient education as appropriate  Outcome: Progressing  Goal: Maintains/Returns to pre admission functional level  Description: INTERVENTIONS:  - Perform BMAT or MOVE assessment daily    - Set and communicate daily mobility goal to care team and patient/family/caregiver  - Collaborate with rehabilitation services on mobility goals if consulted  - Perform Range of Motion  times a day  - Reposition patient every hours  - Dangle patient  times a day  - Stand patient  times a day  - Ambulate patient  times a day  - Out of bed to chair  times a day   - Out of bed for meals times a day  - Out of bed for toileting  - Record patient progress and toleration of activity level   Outcome: Progressing     Problem: DISCHARGE PLANNING  Goal: Discharge to home or other facility with appropriate resources  Description: INTERVENTIONS:  - Identify barriers to discharge w/patient and caregiver  - Arrange for needed discharge resources and transportation as appropriate  - Identify discharge learning needs (meds, wound care, etc )  - Arrange for interpretive services to assist at discharge as needed  - Refer to Case Management Department for coordinating discharge planning if the patient needs post-hospital services based on physician/advanced practitioner order or complex needs related to functional status, cognitive ability, or social support system  Outcome: Progressing     Problem: Knowledge Deficit  Goal: Patient/family/caregiver demonstrates understanding of disease process, treatment plan, medications, and discharge instructions  Description: Complete learning assessment and assess knowledge base    Interventions:  - Provide teaching at level of understanding  - Provide teaching via preferred learning methods  Outcome: Progressing     Problem: MUSCULOSKELETAL - ADULT  Goal: Maintain or return mobility to safest level of function  Description: INTERVENTIONS:  - Assess patient's ability to carry out ADLs; assess patient's baseline for ADL function and identify physical deficits which impact ability to perform ADLs (bathing, care of mouth/teeth, toileting, grooming, dressing, etc )  - Assess/evaluate cause of self-care deficits   - Assess range of motion  - Assess patient's mobility  - Assess patient's need for assistive devices and provide as appropriate  - Encourage maximum independence but intervene and supervise when necessary  - Involve family in performance of ADLs  - Assess for home care needs following discharge   - Consider OT consult to assist with ADL evaluation and planning for discharge  - Provide patient education as appropriate  Outcome: Progressing  Goal: Maintain proper alignment of affected body part  Description: INTERVENTIONS:  - Support, maintain and protect limb and body alignment  - Provide patient/ family with appropriate education  Outcome: Progressing

## 2023-01-04 NOTE — UTILIZATION REVIEW
NOTIFICATION OF INPATIENT ADMISSION   AUTHORIZATION REQUEST   SERVICING FACILITY:   [address] [place] [address], [facility], [address] [facility]  Tax ID: [id number]  NPI: [id number] ATTENDING PROVIDER:  Attending Name and NPI#: Nas Gonzalez Md [[id number]]  Address: [address] [place] [address] [facility], [address] [facility]  Phone: [phone]     ADMISSION INFORMATION:  Place of Service: Inpatient [address] [facility]  [address]  Place of Service Code: 21  Inpatient Admission Date/Time: 1/3/23  3:59 PM  Discharge Date/Time: No discharge date for patient encounter  Admitting Diagnosis Code/Description:  Altered mental status [R41 82]  Hyperglycemia [R73 9]  Acute encephalopathy [G93 40]     UTILIZATION REVIEW CONTACT:  Chip Bhat Utilization   Network Utilization Review Department  Phone: [phone]  Fax: [phone]  Email: [email or web address]  Contact for approvals/pending authorizations, clinical reviews, and discharge  PHYSICIAN ADVISORY SERVICES:  Medical Necessity Denial & Blrk-fg-Dtur Review  Phone: [phone]  Fax: [phone]  Email: [email or web address]

## 2023-01-04 NOTE — CASE MANAGEMENT
Case Management Assessment & Discharge Planning Note    Patient name Ladarius Frederick  Location [address]  324 [address]-* MRN [id number]  : 1965 Date 2023       Current Admission Date: 2023  Current Admission Diagnosis:Acute metabolic encephalopathy   Patient Active Problem List    Diagnosis Date Noted   • Acute metabolic encephalopathy    • Cellulitis of left lower extremity 2023   • Fall 2023   • Ambulatory dysfunction 05/15/2022   • Urine retention 2022   • Heme positive stool 2022   • Sinus tachycardia 2021   • Low TSH level 2021   • Acute encephalopathy 2020   • Polypharmacy 2020   • Thyroid nodule 2019   • Morbid obesity ([facility] ) 2018   • Essential hypertension 11/10/2018   • Diabetic gastroparesis ([facility] ) 2018   • Type 2 diabetes mellitus with hyperglycemia, with long-term current use of insulin ([facility] ) 2018   • Asthma 2018   • Opioid dependence with other opioid-induced disorder ([facility] ) 2018      LOS (days): 1  Geometric Mean LOS (GMLOS) (days):   Days to GMLOS:     OBJECTIVE:    Risk of Unplanned Readmission Score: 20 02         Current admission status: Inpatient       Preferred Pharmacy:   [place] [address]  Phone: [phone] Fax: [phone]    Primary Care Provider: Ramya Cohen MD    Primary Insurance: [employer]  Secondary Insurance: BLUE CROSS    ASSESSMENT:  [address], [address] Representative - Spouse   Primary Phone: [phone] (Mobile)               Advance Directives  Does patient have a [address] [place] [address] Avenue?: No  Does patient have Advance Directives?: No  Primary Contact: Amber Willingham [phone]    Readmission Root Cause  30 Day Readmission: No    Patient Information  Admitted from[de-identified] Home  Mental Status: Alert  During Assessment patient was accompanied by: Not accompanied during assessment  Assessment information provided by[de-identified] Patient  Primary Caregiver: Self  Support Systems: Spouse/significant other, Ritchie Buckley Dr of Residence: [address] [facility] do you live in?: [address] [facility] entry access options   Select all that apply : Stairs  Number of steps to enter home : 5  Type of Current Residence: [address] (mobile home)  Living Arrangements: Lives w/ Spouse/significant other    Activities of Daily Living Prior to Admission  Functional Status: Independent  Completes ADLs independently?: Yes  Ambulates independently?: Yes  Does patient use assisted devices?: Yes  Assisted Devices (DME) used: Straight Cane, Rollator  Does patient currently own DME?: Yes  What DME does the patient currently own?: Jose Cade  Does patient have a history of Outpatient Therapy (PT/OT)?: No  Does the patient have a history of Short-Term Rehab?: No  Does patient have a history of HHC?: Yes  Does patient currently have [address]?: No    Patient Information Continued  Does patient have prescription coverage?: Yes  Does patient have a history of substance abuse?: No  Does patient have a history of Mental Health Diagnosis?: No    Means of Transportation  Means of Transport to Appts[de-identified] Drives Self        DISCHARGE DETAILS:    Discharge planning discussed with[de-identified] patient  Freedom of Choice: Yes  Comments - Freedom of Choice: Patient agreeable for home health services and agreeable to blanket referral  CM contacted family/caregiver?: No- see comments  Were Treatment Team discharge recommendations reviewed with patient/caregiver?: Yes  Did patient/caregiver verbalize understanding of patient care needs?: Yes  Were patient/caregiver advised of the risks associated with not following Treatment Team discharge recommendations?: Yes    Contacts  Patient Contacts: Vignesh Serrano    Relationship to Patient[de-identified] Family  Contact Method: Phone  Phone Number: [phone]  Reason/Outcome: Discharge [address]         Is the patient interested in Haliey Schultz at discharge?: Yes  [address] requested[de-identified] Occupational Therapy, Physical [address] Name[de-identified] [facility] Provider[de-identified] PCP  Home Health Services Needed[de-identified] Evaluate Functional Status and Safety, Gait/ADL Training, Strengthening/Theraputic Exercises to Improve Function  Homebound Criteria Met[de-identified] Uses an Assist Device (i e  cane, walker, etc)  Supporting Clincal Findings[de-identified] Limited Endurance, Fatigues Easliy in [employer]    DME Referral Provided  Referral made for DME?: No    Other Referral/Resources/Interventions Provided:  Interventions: [facility]    Treatment Team Recommendation: Home with [address] [place] [address]  Discharge Destination Plan[de-identified] Home with Chelsey at Discharge : Family

## 2023-01-04 NOTE — UTILIZATION REVIEW
NOTIFICATION OF INPATIENT ADMISSION   AUTHORIZATION REQUEST   SERVICING FACILITY:   [address] [place] [address], [facility], [facility]  Tax ID: [id number]  NPI: [id number] ATTENDING PROVIDER:  Attending Name and NPI#: Tahira Warren Md [[id number]]  Address: [address] [place], [place] [address]jolene Del Real [facility], [facility]  Phone: [phone]     ADMISSION INFORMATION:  Place of Service: Inpatient [address] [facility]  60[address]  Place of Service Code: 21  Inpatient Admission Date/Time: 1/3/23  3:59 PM  Discharge Date/Time: No discharge date for patient encounter  Admitting Diagnosis Code/Description:  Altered mental status [R41 82]  Hyperglycemia [R73 9]  Acute encephalopathy [G93 40]     UTILIZATION REVIEW CONTACT:  Spencer Shukla Utilization   Network Utilization Review Department  Phone: [phone]  Fax: [phone]  Email: [email or web address]  Contact for approvals/pending authorizations, clinical reviews, and discharge  PHYSICIAN ADVISORY SERVICES:  Medical Necessity Denial & Uuno-ya-Vknw Review  Phone: [phone]  Fax: [phone]  Email: [email or web address]

## 2023-01-04 NOTE — UTILIZATION REVIEW
Continued Stay Review    Date: 01/04/23                         Current Patient Class: IP  Current Level of Care: Med/Surg    HPI:57 y o  female initially admitted on 1/2 as OBS, changed to IP on 1/3 for acute metabolic encephalopathy  Assessment/Plan: Reports feeling much improved  On exam, wound on R ankle w/ improving erythema  BG remains labile  Plan: increase levemir to 25 units q12h, continue humalog 5 units w/ meals and SSI w/ accuchecks ACHS, pt and son educated on blood glucose checking, continue PTA meds, supportive care       Vital Signs:   Date/Time Temp Pulse Resp BP MAP (mmHg) SpO2 O2 Device   01/04/23 09:16:53 -- 87 -- 140/85 103 95 % --   01/04/23 0915 -- -- -- 140/85 -- -- --   01/04/23 07:25:57 97 6 °F (36 4 °C) 80 16 127/72 90 97 % None (Room air)   01/03/23 23:46:03 97 9 °F (36 6 °C) 80 -- 124/83 97 95 % --   01/03/23 21:08:22 -- 89 -- 153/77 102 94 % --   01/03/23 15:21:32 97 6 °F (36 4 °C) 80 16 114/74 87 96 % None (Room air)   01/03/23 0732 98 °F (36 7 °C) 101 16 154/102 Abnormal  119 95 % --   01/03/23 01:31:44 -- 90 -- 112/62 79 90 % --       Pertinent Labs/Diagnostic Results:   Results from last 7 days   Lab Units 01/04/23 0443 01/03/23  0604 01/02/23  1837   WBC Thousand/uL 8 68 8 10 10 09   HEMOGLOBIN g/dL 10 4* 10 8* 12 3   HEMATOCRIT % 33 3* 34 1* 38 3   PLATELETS Thousands/uL 174 195 231   NEUTROS ABS Thousands/µL 4 51 4 59 6 64         Results from last 7 days   Lab Units 01/04/23 0443 01/03/23  0604 01/02/23  1837   SODIUM mmol/L 140 136 136   POTASSIUM mmol/L 3 5 4 0 4 7   CHLORIDE mmol/L 107 101 99   CO2 mmol/L 24 24 24   ANION GAP mmol/L 9 11 13   BUN mg/dL 11 19 24   CREATININE mg/dL 0 50* 0 65 0 62   EGFR ml/min/1 73sq m 107 98 99   CALCIUM mg/dL 8 5 8 8 9 1   MAGNESIUM mg/dL  --  1 5* 1 7   PHOSPHORUS mg/dL  --  3 2  --      Results from last 7 days   Lab Units 01/04/23  0443 01/03/23  0604 01/02/23  1927 01/02/23  1837   AST U/L 9 20  --  22   ALT U/L 16 20  --  26   ALK PHOS U/L 98 108  --  126*   TOTAL PROTEIN g/dL 6 3* 6 7  --  8 5*   ALBUMIN g/dL 2 7* 2 8*  --  3 4*   TOTAL BILIRUBIN mg/dL 1 04* 1 19*  --  1 44*   AMMONIA umol/L  --   --  <10*  --      Results from last 7 days   Lab Units 01/05/23  0719 01/04/23  2120 01/04/23  1626 01/04/23  1608 01/04/23  1110 01/04/23  0728 01/03/23  2058 01/03/23  1623 01/03/23  1109 01/03/23  0730 01/02/23  2224 01/02/23  1650   POC GLUCOSE mg/dl 196* 249* 301* 307* 230* 195* 292* 249* 290* 278* 340* 290*     Results from last 7 days   Lab Units 01/04/23  0443 01/03/23  0604 01/02/23  1837   GLUCOSE RANDOM mg/dL 227* 288* 323*         Results from last 7 days   Lab Units 01/03/23  0604   HEMOGLOBIN A1C % 14 0*   EAG mg/dl 355     Results from last 7 days   Lab Units 01/02/23  2034   PH GAUTAM  7 390   PCO2 GAUTAM mm Hg 43 4   PO2 GAUTAM mm Hg 51 0*   HCO3 GAUTAM mmol/L 25 7   BASE EXC GAUTAM mmol/L 0 5   O2 CONTENT GAUTAM ml/dL 14 4   O2 HGB, VENOUS % 83 7*     Results from last 7 days   Lab Units 01/02/23  1837   HS TNI 0HR ng/L 4     Results from last 7 days   Lab Units 01/03/23  0604   TSH 3RD GENERATON uIU/mL 0 352*         Results from last 7 days   Lab Units 01/02/23  1904   LACTIC ACID mmol/L 1 2       Results from last 7 days   Lab Units 01/02/23  2218   CLARITY UA  Clear   COLOR UA  Yellow   SPEC GRAV UA  1 025   PH UA  6 0   GLUCOSE UA mg/dl >=1000 (1%)*   KETONES UA mg/dl 40 (2+)*   BLOOD UA  Negative   PROTEIN UA mg/dl Trace*   NITRITE UA  Negative   BILIRUBIN UA  Negative   UROBILINOGEN UA E U /dl 1 0   LEUKOCYTES UA  Elevated glucose may cause decreased leukocyte values   See urine microscopic for [facility] result/*   WBC UA /hpf 1-2*   RBC UA /hpf None Seen   BACTERIA UA /hpf Occasional   EPITHELIAL CELLS WET PREP /hpf Occasional       Results from last 7 days   Lab Units 01/02/23  5567   AMPH/METH  Negative   BARBITURATE UR  Positive*   BENZODIAZEPINE UR  Positive*   COCAINE UR  Negative   METHADONE URINE  Negative   OPIATE UR  Positive*   PCP UR Negative   THC UR  Negative     Results from last 7 days   Lab Units 01/02/23  1843   BLOOD CULTURE  No Growth at 24 hrs  No Growth at 24 hrs  Medications:   Scheduled Medications:  carisoprodol, 350 mg, Oral, BID  cefazolin, 2,000 mg, Intravenous, Q8H  cloNIDine, 0 1 mg, Oral, Q12H BOLIVAR  enoxaparin, 40 mg, Subcutaneous, Daily  fluticasone-vilanterol, 1 puff, Inhalation, Daily  insulin detemir, 25 Units, Subcutaneous, Q12H BOLIVAR  insulin lispro, 1-5 Units, Subcutaneous, TID AC  insulin lispro, 5 Units, Subcutaneous, TID With Meals  lidocaine, 1 patch, Topical, Daily  morphine, 60 mg, Oral, Q12H BOLIVAR  pantoprazole, 40 mg, Oral, Early Morning  pravastatin, 10 mg, Oral, HS  pregabalin, 150 mg, Oral, BID  sucralfate, 1 g, Oral, Q6H BOLIVAR    Continuous IV Infusions:  sodium chloride, 100 mL/hr, Intravenous, Continuous    PRN Meds:  albuterol, 2 puff, Inhalation, Q4H PRN  diazepam, 5 mg, Oral, HS PRN  HYDROmorphone, 2 mg, Oral, Q8H PRN; 1/4 x1  ondansetron, 4 mg, Intravenous, Q6H PRN        Discharge Plan: [facility]    Network Utilization Review Department  ATTENTION: Please call with any questions or concerns to [phone] and carefully listen to the prompts so that you are directed to the right person  All voicemails are confidential   Ryland Brady all requests for admission clinical reviews, approved or denied determinations and any other requests to dedicated fax number below belonging to the campus where the patient is receiving treatment   List of dedicated fax numbers for the Facilities:  [address] DENIALS (Administrative/Medical Necessity) [phone]   [address] [place] [address] (Maternity/NICU/Pediatrics) [phone]   [address] Sirena Waldron [phone]   Sonal Templeton  [phone]   [address] - [facility] [address] [facility] [phone]   [address] [facility] [place] [facility] 134 815 [facility] [phone]

## 2023-01-05 VITALS
HEART RATE: 77 BPM | SYSTOLIC BLOOD PRESSURE: 113 MMHG | RESPIRATION RATE: 16 BRPM | DIASTOLIC BLOOD PRESSURE: 69 MMHG | OXYGEN SATURATION: 96 % | TEMPERATURE: 98 F

## 2023-01-05 LAB
GLUCOSE SERPL-MCNC: 194 MG/DL (ref 65–140)
GLUCOSE SERPL-MCNC: 196 MG/DL (ref 65–140)
GLUCOSE SERPL-MCNC: 219 MG/DL (ref 65–140)

## 2023-01-05 RX ORDER — INSULIN ASPART 100 [IU]/ML
5 INJECTION, SOLUTION INTRAVENOUS; SUBCUTANEOUS
Qty: 4.5 ML | Refills: 0 | Status: SHIPPED | OUTPATIENT
Start: 2023-01-05 | End: 2023-01-05 | Stop reason: SDUPTHER

## 2023-01-05 RX ORDER — BLOOD SUGAR DIAGNOSTIC
STRIP MISCELLANEOUS
Qty: 100 EACH | Refills: 0 | Status: SHIPPED | OUTPATIENT
Start: 2023-01-05 | End: 2023-01-05 | Stop reason: SDUPTHER

## 2023-01-05 RX ORDER — GLUCOSAMINE HCL/CHONDROITIN SU 500-400 MG
CAPSULE ORAL
Qty: 200 EACH | Refills: 0 | Status: SHIPPED | OUTPATIENT
Start: 2023-01-05

## 2023-01-05 RX ORDER — BLOOD-GLUCOSE METER
KIT MISCELLANEOUS
Qty: 1 KIT | Refills: 0 | Status: SHIPPED | OUTPATIENT
Start: 2023-01-05

## 2023-01-05 RX ORDER — LANCETS 33 GAUGE
EACH MISCELLANEOUS
Qty: 200 EACH | Refills: 0 | Status: SHIPPED | OUTPATIENT
Start: 2023-01-05

## 2023-01-05 RX ORDER — BLOOD SUGAR DIAGNOSTIC
STRIP MISCELLANEOUS
Qty: 200 EACH | Refills: 0 | Status: SHIPPED | OUTPATIENT
Start: 2023-01-05

## 2023-01-05 RX ORDER — INSULIN ASPART 100 [IU]/ML
5 INJECTION, SOLUTION INTRAVENOUS; SUBCUTANEOUS
Qty: 4.5 ML | Refills: 0 | Status: SHIPPED | OUTPATIENT
Start: 2023-01-05 | End: 2023-02-04

## 2023-01-05 RX ORDER — INSULIN LISPRO 100 [IU]/ML
5 INJECTION, SOLUTION INTRAVENOUS; SUBCUTANEOUS
Qty: 3 ML | Refills: 0 | Status: SHIPPED | OUTPATIENT
Start: 2023-01-05 | End: 2023-01-05

## 2023-01-05 RX ORDER — BLOOD SUGAR DIAGNOSTIC
STRIP MISCELLANEOUS
Qty: 100 EACH | Refills: 0 | Status: SHIPPED | OUTPATIENT
Start: 2023-01-05

## 2023-01-05 RX ORDER — GLUCOSAMINE HCL/CHONDROITIN SU 500-400 MG
CAPSULE ORAL
Qty: 200 EACH | Refills: 0 | Status: SHIPPED | OUTPATIENT
Start: 2023-01-05 | End: 2023-01-05 | Stop reason: SDUPTHER

## 2023-01-05 RX ORDER — INSULIN DETEMIR 100 [IU]/ML
25 INJECTION, SOLUTION SUBCUTANEOUS EVERY 12 HOURS SCHEDULED
Qty: 10 ML | Refills: 0 | Status: SHIPPED | OUTPATIENT
Start: 2023-01-05 | End: 2023-02-04

## 2023-01-05 RX ORDER — CEPHALEXIN 500 MG/1
500 CAPSULE ORAL EVERY 6 HOURS SCHEDULED
Qty: 16 CAPSULE | Refills: 0 | Status: SHIPPED | OUTPATIENT
Start: 2023-01-05 | End: 2023-01-09

## 2023-01-05 RX ADMIN — CEFAZOLIN SODIUM 2000 MG: 2 SOLUTION INTRAVENOUS at 05:08

## 2023-01-05 RX ADMIN — HYDROMORPHONE HYDROCHLORIDE 4 MG: 4 TABLET ORAL at 03:22

## 2023-01-05 RX ADMIN — FLUTICASONE FUROATE AND VILANTEROL TRIFENATATE 1 PUFF: 200; 25 POWDER RESPIRATORY (INHALATION) at 09:08

## 2023-01-05 RX ADMIN — CEFAZOLIN SODIUM 2000 MG: 2 SOLUTION INTRAVENOUS at 12:49

## 2023-01-05 RX ADMIN — CARISOPRODOL 350 MG: 350 TABLET ORAL at 09:09

## 2023-01-05 RX ADMIN — LIDOCAINE 1 PATCH: 50 PATCH CUTANEOUS at 09:09

## 2023-01-05 RX ADMIN — CLONIDINE HYDROCHLORIDE 0.1 MG: 0.1 TABLET ORAL at 09:09

## 2023-01-05 RX ADMIN — INSULIN DETEMIR 25 UNITS: 100 INJECTION, SOLUTION SUBCUTANEOUS at 09:08

## 2023-01-05 RX ADMIN — ENOXAPARIN SODIUM 40 MG: 40 INJECTION SUBCUTANEOUS at 09:08

## 2023-01-05 RX ADMIN — SUCRALFATE 1 G: 1 TABLET ORAL at 12:15

## 2023-01-05 RX ADMIN — INSULIN LISPRO 1 UNITS: 100 INJECTION, SOLUTION INTRAVENOUS; SUBCUTANEOUS at 09:09

## 2023-01-05 RX ADMIN — INSULIN LISPRO 1 UNITS: 100 INJECTION, SOLUTION INTRAVENOUS; SUBCUTANEOUS at 15:54

## 2023-01-05 RX ADMIN — HYDROMORPHONE HYDROCHLORIDE 4 MG: 4 TABLET ORAL at 14:38

## 2023-01-05 RX ADMIN — PANTOPRAZOLE SODIUM 40 MG: 40 TABLET, DELAYED RELEASE ORAL at 05:08

## 2023-01-05 RX ADMIN — INSULIN LISPRO 5 UNITS: 100 INJECTION, SOLUTION INTRAVENOUS; SUBCUTANEOUS at 15:54

## 2023-01-05 RX ADMIN — SUCRALFATE 1 G: 1 TABLET ORAL at 05:08

## 2023-01-05 RX ADMIN — MORPHINE SULFATE 60 MG: 30 TABLET, EXTENDED RELEASE ORAL at 09:09

## 2023-01-05 RX ADMIN — INSULIN LISPRO 5 UNITS: 100 INJECTION, SOLUTION INTRAVENOUS; SUBCUTANEOUS at 12:16

## 2023-01-05 RX ADMIN — PREGABALIN 150 MG: 75 CAPSULE ORAL at 09:09

## 2023-01-05 RX ADMIN — INSULIN LISPRO 2 UNITS: 100 INJECTION, SOLUTION INTRAVENOUS; SUBCUTANEOUS at 12:16

## 2023-01-05 RX ADMIN — INSULIN LISPRO 5 UNITS: 100 INJECTION, SOLUTION INTRAVENOUS; SUBCUTANEOUS at 09:09

## 2023-01-05 NOTE — PLAN OF CARE
Problem: PHYSICAL THERAPY ADULT  Goal: Performs mobility at highest level of function for planned discharge setting  See evaluation for individualized goals  Description: Treatment/Interventions: Functional transfer training, LE strengthening/ROM, Elevations, Therapeutic exercise, Patient/family training, Equipment eval/education, Bed mobility, Gait training, Continued evaluation, Compensatory technique education, Spoke to nursing, OT          See flowsheet documentation for full assessment, interventions and recommendations  Outcome: Adequate for Discharge  Note: Prognosis: Good  Problem List: Decreased range of motion, Decreased endurance, Decreased mobility, Impaired balance, Obesity, Pain  Assessment: Pt is showing improvements activity tolerance,  Functional endurance, cognition,  balance, gait, and overall strength and mobility  Less assistance for all mobility, progressing to mod I for transfers with use of Rw, supervision for ambulation on level surfaces with use of Rw  Pt  Progressed with ambulation distances to [de-identified]' x2 demonstrating slow reciprocal gait no lob and progressed to stair climbing performing with use of b/l rails with close supervision assist,  Pt demonstrates correct le sequencing when ascending and descending stairs  Pt  Continues to require more a supportive AD/ use of Rw from baseline due to L knee pain and generalized weakness  Pt  Tolerated seated b/l le arom and isometric exercises at eob  Pt  Issued supine b/l le arom and strengthening exercise to perform at home  Pt  verbalized understanding of all exercises  Pt has made adequate functional gains toward PT goals and is appropriate for d/c to home with family support  Recommend d/c to home with OPPT  The patient's AM-PAC Basic Mobility Inpatient Short Form Raw Score is 22  A Raw score of greater than 16 suggests the patient may benefit from discharge to home   Please also refer to the recommendation of the Physical Therapist for safe discharge planning  Barriers to Discharge: None     PT Discharge Recommendation: Home with outpatient rehabilitation    See flowsheet documentation for full assessment

## 2023-01-05 NOTE — ASSESSMENT & PLAN NOTE
· Patient is on morphine ER 75 milligrams twice daily  · Dilaudid 4 mg 3 times daily  · Soma 350 mg twice daily  · Pregabalin 150 mg twice daily  · Valium 5 mg at bedtime    · Continue home regimen

## 2023-01-05 NOTE — PLAN OF CARE
Problem: Potential for Falls  Goal: Patient will remain free of falls  Description: INTERVENTIONS:  - Educate patient/family on patient safety including physical limitations  - Instruct patient to call for assistance with activity   - Consult OT/PT to assist with strengthening/mobility   - Keep Call bell within reach  - Keep bed low and locked with side rails adjusted as appropriate  - Keep care items and personal belongings within reach  - Initiate and maintain comfort rounds  - Make Fall Risk Sign visible to staff  - Offer Toileting every 2 Hours, in advance of need  - Initiate/Maintain bed alarm  - Obtain necessary fall risk management equipment: call bell in reach of patient  - Apply yellow socks and bracelet for high fall risk patients  - Consider moving patient to room near nurses station  Outcome: Progressing     Problem: PAIN - ADULT  Goal: Verbalizes/displays adequate comfort level or baseline comfort level  Description: Interventions:  - Encourage patient to monitor pain and request assistance  - Assess pain using appropriate pain scale  - Administer analgesics based on type and severity of pain and evaluate response  - Implement non-pharmacological measures as appropriate and evaluate response  - Consider cultural and social influences on pain and pain management  - Notify physician/advanced practitioner if interventions unsuccessful or patient reports new pain  Outcome: Progressing     Problem: INFECTION - ADULT  Goal: Absence or prevention of progression during hospitalization  Description: INTERVENTIONS:  - Assess and monitor for signs and symptoms of infection  - Monitor lab/diagnostic results  - Monitor all insertion sites, i e  indwelling lines, tubes, and drains  - Monitor endotracheal if appropriate and nasal secretions for changes in amount and color  - Jasper appropriate cooling/warming therapies per order  - Administer medications as ordered  - Instruct and encourage patient and family to use good hand hygiene technique  - Identify and instruct in appropriate isolation precautions for identified infection/condition  Outcome: Progressing  Goal: Absence of fever/infection during neutropenic period  Description: INTERVENTIONS:  - Monitor WBC    Outcome: Progressing     Problem: SAFETY ADULT  Goal: Patient will remain free of falls  Description: INTERVENTIONS:  - Educate patient/family on patient safety including physical limitations  - Instruct patient to call for assistance with activity   - Consult OT/PT to assist with strengthening/mobility   - Keep Call bell within reach  - Keep bed low and locked with side rails adjusted as appropriate  - Keep care items and personal belongings within reach  - Initiate and maintain comfort rounds  - Make Fall Risk Sign visible to staff  - Offer Toileting every 2 Hours, in advance of need  - Initiate/Maintain bed alarm  - Obtain necessary fall risk management equipment: call bell in reach of patient   - Apply yellow socks and bracelet for high fall risk patients  - Consider moving patient to room near nurses station  Outcome: Progressing  Goal: Maintain or return to baseline ADL function  Description: INTERVENTIONS:  -  Assess patient's ability to carry out ADLs; assess patient's baseline for ADL function and identify physical deficits which impact ability to perform ADLs (bathing, care of mouth/teeth, toileting, grooming, dressing, etc )  - Assess/evaluate cause of self-care deficits   - Assess range of motion  - Assess patient's mobility; develop plan if impaired  - Assess patient's need for assistive devices and provide as appropriate  - Encourage maximum independence but intervene and supervise when necessary  - Involve family in performance of ADLs  - Assess for home care needs following discharge   - Consider OT consult to assist with ADL evaluation and planning for discharge  - Provide patient education as appropriate  Outcome: Progressing  Goal: Maintains/Returns to pre admission functional level  Description: INTERVENTIONS:  - Perform BMAT or MOVE assessment daily    - Set and communicate daily mobility goal to care team and patient/family/caregiver  - Collaborate with rehabilitation services on mobility goals if consulted  - Perform Range of Motion 3 times a day  - Reposition patient every 2 hours  - Dangle patient 3 times a day  - Stand patient 3 times a day  - Ambulate patient 3 times a day  - Out of bed to chair 3 times a day   - Out of bed for meals 3 times a day  - Out of bed for toileting  - Record patient progress and toleration of activity level   Outcome: Progressing     Problem: DISCHARGE PLANNING  Goal: Discharge to home or other facility with appropriate resources  Description: INTERVENTIONS:  - Identify barriers to discharge w/patient and caregiver  - Arrange for needed discharge resources and transportation as appropriate  - Identify discharge learning needs (meds, wound care, etc )  - Arrange for interpretive services to assist at discharge as needed  - Refer to Case Management Department for coordinating discharge planning if the patient needs post-hospital services based on physician/advanced practitioner order or complex needs related to functional status, cognitive ability, or social support system  Outcome: Progressing     Problem: Knowledge Deficit  Goal: Patient/family/caregiver demonstrates understanding of disease process, treatment plan, medications, and discharge instructions  Description: Complete learning assessment and assess knowledge base    Interventions:  - Provide teaching at level of understanding  - Provide teaching via preferred learning methods  Outcome: Progressing     Problem: MUSCULOSKELETAL - ADULT  Goal: Maintain or return mobility to safest level of function  Description: INTERVENTIONS:  - Assess patient's ability to carry out ADLs; assess patient's baseline for ADL function and identify physical deficits which impact ability to perform ADLs (bathing, care of mouth/teeth, toileting, grooming, dressing, etc )  - Assess/evaluate cause of self-care deficits   - Assess range of motion  - Assess patient's mobility  - Assess patient's need for assistive devices and provide as appropriate  - Encourage maximum independence but intervene and supervise when necessary  - Involve family in performance of ADLs  - Assess for home care needs following discharge   - Consider OT consult to assist with ADL evaluation and planning for discharge  - Provide patient education as appropriate  Outcome: Progressing  Goal: Maintain proper alignment of affected body part  Description: INTERVENTIONS:  - Support, maintain and protect limb and body alignment  - Provide patient/ family with appropriate education  Outcome: Progressing

## 2023-01-05 NOTE — ASSESSMENT & PLAN NOTE
· Possible polypharmacy, left lower extremity cellulitis, hyperglycemia  · Patient had similar admission in May 2022  · Narcotics held on admission  · Patient is awake alert and oriented yesterday and today  · Restarted narcotic at lower dose to avoid withdrawal

## 2023-01-05 NOTE — DISCHARGE SUMMARY
[facility]  Discharge- [facility] 1965, 61 y o  female MRN: [id number]  Unit/Bed#: E5 -01 Encounter: [id number]  Primary Care Provider: Kristi Maya MD   Date and time admitted to hospital: 1/2/2023  4:43 PM    * Acute metabolic encephalopathy  Assessment & Plan  · Most likely secondary to hyperglycemia, possible polypharmacy  · Patient had similar admission in May 2022 thought to be secondary to hyperglycemia  · Narcotics held on admission  · Day after admission patient awake alert and oriented, back at her baseline    Ambulatory dysfunction  Assessment & Plan  · PT OT recommended home with home health    Type 2 diabetes mellitus with hyperglycemia, with long-term current use of insulin [facility])  Assessment & Plan  Lab Results   Component Value Date    HGBA1C 14 0 (H) 01/03/2023       Recent Labs     01/04/23  1626 01/04/23  2120 01/05/23  0719 01/05/23  1103   POCGLU 301* 249* 196* 219*       Blood Sugar Average: Last 72 hrs:  (P) 264 [id number]   · Uncontrolled diabetes, hemoglobin A1c is 14, patient not checking her blood sugar at home  She was not sure what doses of insulin is she taking     checked her insulin bottle and she is on Levemir 20 units twice daily  · Discharge regimen: Levemir to 25 units twice daily, continue Humalog 5 units 3 times daily with meals   · Instructed the patient to take her blood sugar 4 times a day, keep a blood sugar log and follow-up with primary care doctor and endocrinologist   They are interested in insulin pump    Opioid dependence with other opioid-induced disorder ([facility] [address] )  Assessment & Plan  · Patient is on morphine ER 75 milligrams twice daily  · Dilaudid 4 mg 3 times daily  · Soma 350 mg twice daily  · Pregabalin 150 mg twice daily  · Valium 5 mg at bedtime    · Continue home regimen     Fall  Assessment & Plan  · patient did sustain 2 unwitnessed falls, imaging negative for any acute trauma, patient has no specific pain or complaints other than generalized chronic pain  · No neurologic deficit  · PT OT recommended home with home health      Cellulitis of right lower extremity  Assessment & Plan  · Erythema left lower extremity, small superficial abrasion with surrounding erythema, increased warmth  · She was on Ancef, discharged on Keflex to finish a 7-day course    Urine retention  Assessment & Plan  · History of urinary retention  · Bladder scan, not retaining    Sinus tachycardia  Assessment & Plan  · Patient appears volume depleted on admission and received IV fluids  · Tachycardia resolved    Morbid obesity ([address] )  Assessment & Plan  · Morbidly obese, patient would benefit from increased activity and weight loss    Asthma  Assessment & Plan  · History of asthma, no evidence of acute exacerbation      Medical Problems     Resolved Problems  Date Reviewed: 1/5/2023   None       Discharging Physician / Practitioner: Stefany Mora MD  PCP: Mena Mccain MD  Admission Date:   Admission Orders (From admission, onward)     Ordered        01/03/23 1559  Inpatient Admission  Once            01/02/23 2057  Place in Observation  Once                      Discharge Date: 01/05/23    Consultations During Hospital Stay:  · none    Procedures Performed:   · none    Significant Findings / Test Results:   · Chest x-ray 1/2/2023  IMPRESSION:  No acute cardiopulmonary disease      · CT head without contrast 1/2/23  IMPRESSION:  No acute intracranial hemorrhage, midline shift, or mass effect      · CT cervical spine 1/2/2023  IMPRESSION:  No cervical spine fracture or traumatic malalignment    Incidental Findings:   · none    Test Results Pending at Discharge (will require follow up):   · none     Outpatient Tests Requested:  · none    Complications:  none    Reason for Admission: Confusion suspect secondary to hypoglycemia and polypharmacy    Hospital Course:   Yanely Arellano is a 61 y o  female patient who originally presented to the hospital on 1/2/2023 due to confusion  Patient and family reports intermittent confusion  She had an unwitnessed fall  Denies any injuries  Images were negative for any injury  AMS was most likely secondary to hyperglycemia, possible polypharmacy  Patient had a similar admission in May, work-up was negative again thought to be secondary to hyperglycemia and polypharmacy  Narcotics were stopped on admission  Her hemoglobin A1c is 14  Patient is not sure how much insulin is she taking at home, possible noncompliance  Per family home regimen is Levemir 20 units twice daily  Patient is managing insulin by herself, sons are managing pain medications  On discharge patient will be on Levemir 25 units twice daily and Humalog 5 units 3 times a day with meal   Instructed the patient and family to check her blood sugar 4 times a day, keep a blood sugar log and follow-up with primary care doctor and endocrinologist, they are interested in insulin pump  The day after the admission patient mentation was back at baseline  Her narcotics were slowly restarted, her mentation remained intact  Continue home dose of opioids, she should follow-up with primary care doctor and consider decreasing opioid doses  For right leg cellulitis patient will continue 4 more days of Keflex  Patient will be discharged home with home health  Please see above list of diagnoses and related plan for additional information  Condition at Discharge: good    Discharge Day Visit / Exam:   Subjective: Patient was seen and evaluated bedside  She is feeling well, ready to go home  Vitals: Blood Pressure: 122/64 (01/05/23 0720)  Pulse: 69 (01/05/23 0720)  Temperature: (!) 97 4 °F (36 3 °C) (01/05/23 0720)  Temp Source: Oral (01/05/23 0720)  Respirations: 16 (01/05/23 0720)  SpO2: 96 % (01/05/23 0720)  Exam:   Physical Exam  Constitutional:       General: She is not in acute distress  Appearance: She is obese  HENT:      Head: Atraumatic  Cardiovascular:      Rate and Rhythm: Normal rate and regular rhythm  Heart sounds: No murmur heard  No friction rub  No gallop  Pulmonary:      Effort: Pulmonary effort is normal  No respiratory distress  Breath sounds: Normal breath sounds  No wheezing  Abdominal:      General: Bowel sounds are normal  There is no distension  Palpations: Abdomen is soft  Tenderness: There is no abdominal tenderness  Musculoskeletal:         General: No swelling  Cervical back: Neck supple  Skin:     General: Skin is warm and dry  Neurological:      General: No focal deficit present  Mental Status: She is alert  Psychiatric:         Mood and Affect: Mood normal         Discussion with Family: Updated  (son) via phone  Discharge instructions/Information to patient and family:   See after visit summary for information provided to patient and family  Provisions for Follow-Up Care:  See after visit summary for information related to follow-up care and any pertinent home health orders  Disposition:   Home with VNA Services (Reminder: Complete face to face encounter)    Planned Readmission: no     Discharge Statement:  I spent 45 minutes discharging the patient  This time was spent on the day of discharge  I had direct contact with the patient on the day of discharge  Greater than 50% of the total time was spent examining patient, answering all patient questions, arranging and discussing plan of care with patient as well as directly providing post-discharge instructions  Additional time then spent on discharge activities  Discharge Medications:  See after visit summary for reconciled discharge medications provided to patient and/or family        **Please Note: This note may have been constructed using a voice recognition system**

## 2023-01-05 NOTE — PHYSICAL THERAPY NOTE
PHYSICAL THERAPY NOTE          Patient Name: Jana Davis  NAOHI'I Date: 1/5/2023 01/05/23 0161   Note Type   Note Type Treatment   Pain Assessment   Pain Assessment Tool 0-10   Pain Score 7   Pain Location/Orientation Orientation: Left; Location: Knee   Hospital Pain Intervention(s) Ambulation/increased activity; Emotional support   Restrictions/Precautions   Other Precautions Fall Risk;Pain   General   Chart Reviewed Yes   Family/Caregiver Present No   Cognition   Overall Cognitive Status WFL   Arousal/Participation Alert; Responsive; Cooperative   Attention Within functional limits   Orientation Level Oriented X4   Memory Decreased recall of precautions   Following Commands Follows all commands and directions without difficulty   Subjective   Subjective I always have pain, but I am feeling better I took a walk down the reddy yesterday  Bed Mobility   Additional Comments pt seated eob upon arrival and remained seated at eob post PT session  Transfers   Sit to Stand 6  Modified independent   Additional items Assist x 1;Bedrails; Increased time required   Stand to Sit 6  Modified independent   Additional items Assist x 1; Armrests; Increased time required;Verbal cues   Ambulation/Elevation   Gait pattern Forward Flexion;Decreased L stance; Excessively slow; Step through pattern   Gait Assistance 5  Supervision   Additional items Assist x 1;Verbal cues   Assistive Device Rolling walker   Distance 80' x2   Stair Management Assistance 5  Supervision   Additional items Assist x 1;Verbal cues   Stair Management Technique Two rails; Foreward;Nonreciprocal   Number of Stairs 5  (5 steps x2)   Balance   Static Sitting Normal   Dynamic Sitting Fair +   Static Standing Good   Dynamic Standing Fair +   Ambulatory Fair +   Activity Tolerance   Activity Tolerance Patient limited by pain   Exercises   Hip Adduction Sitting;10 reps;Bilateral  (iosmetric hip add )   Knee AROM [employer]; Bilateral   Ankle Pumps Sitting;10 reps;Bilateral;AROM   Marching Sitting;10 reps;AROM; Bilateral   Equipment Use   Comments pt  issued written supine HEP, reveiwed with pt   Assessment   Prognosis Good   Problem List Decreased range of motion;Decreased endurance;Decreased mobility; Impaired balance;Obesity;Pain   Assessment Pt is showing improvements activity tolerance,  Functional endurance, cognition,  balance, gait, and overall strength and mobility  Less assistance for all mobility, progressing to mod I for transfers with use of Rw, supervision for ambulation on level surfaces with use of Rw  Pt  Progressed with ambulation distances to [de-identified]' x2 demonstrating slow reciprocal gait no lob and progressed to stair climbing performing with use of b/l rails with close supervision assist,  Pt demonstrates correct le sequencing when ascending and descending stairs  Pt  Continues to require more a supportive AD/ use of Rw from baseline due to L knee pain and generalized weakness  Pt  Tolerated seated b/l le arom and isometric exercises at eob  Pt  Issued supine b/l le arom and strengthening exercise to perform at home  Pt  verbalized understanding of all exercises  Pt has made adequate functional gains toward PT goals and is appropriate for d/c to home with family support  Recommend d/c to home with OPPT  The patient's AM-PAC Basic Mobility Inpatient Short Form Raw Score is 22  A Raw score of greater than 16 suggests the patient may benefit from discharge to home  Please also refer to the recommendation of the Physical Therapist for safe discharge planning  Goals   Patient Goals walk without a walker  STG Expiration Date 01/13/23   PT Treatment Day 1   Plan   Treatment/Interventions Functional transfer training;LE strengthening/ROM; Therapeutic exercise;Elevations; Endurance training;Patient/family training;Equipment eval/education; Bed mobility;Gait training;Spoke to nursing   PT Frequency 2-3x/wk   Recommendation   PT Discharge Recommendation Home with outpatient rehabilitation   AM-PAC Basic Mobility Inpatient   Turning in Flat Bed Without Bedrails 4   Lying on Back to Sitting on Edge of Flat Bed Without Bedrails 3   Moving Bed to Chair 4   Standing Up From Chair Using Arms 4   Walk in Room 4   Climb 3-5 Stairs With Railing 3   Basic Mobility Inpatient Raw Score 22   Basic Mobility Standardized Score 47 4   Highest Level Of Mobility   JH-HLM Goal 7: Walk 25 feet or more   JH-HLM Achieved 7: Walk 25 feet or more   Education   Education Provided Mobility training;Home exercise program;Assistive device   Patient Demonstrates acceptance/verbal understanding   End of Consult   Patient Position at End of Consult Seated edge of bed; All needs within reach      01/05/23 1345   Note Type   Note Type Treatment   Pain Assessment   Pain Assessment Tool 0-10   Pain Score 7   Pain Location/Orientation Orientation: Left; Location: [facility]   Hospital Pain Intervention(s) Ambulation/increased activity; Emotional support   Restrictions/Precautions   Other Precautions Fall Risk;Pain   General   Chart Reviewed Yes   Family/Caregiver Present No   Cognition   Overall Cognitive Status WFL   Arousal/Participation Alert; Responsive; Cooperative   Attention Within functional limits   Orientation Level Oriented X4   Memory Decreased recall of precautions   Following Commands Follows all commands and directions without difficulty   Subjective   Subjective I always have pain, but I am feeling better I took a walk down the redd[place] yesterday  Bed Mobility   Additional Comments pt seated eob upon arrival and remained seated at eob post PT session  Transfers   Sit to Stand 6  Modified independent   Additional items Assist x 1;Bedrails; Increased time required   Stand to Sit 6  Modified independent   Additional items Assist x 1; Armrests; Increased time required;Verbal cues Ambulation/Elevation   Gait pattern Forward Flexion;Decreased L stance; Excessively slow; Step through pattern   Gait Assistance 5  Supervision   Additional items Assist x 1;Verbal cues   Assistive Device Rolling walker   Distance 80' x2   Stair Management Assistance 5  Supervision   Additional items Assist x 1;Verbal cues   Stair Management Technique Two rails; Foreward;Nonreciprocal   Number of Stairs 5  (5 steps x2)   Balance   Static Sitting Normal   Dynamic Sitting Fair +   Static Standing Good   Dynamic Standing Fair +   Ambulatory Fair +   Activity Tolerance   Activity Tolerance Patient limited by pain   Exercises   Hip Adduction Sitting;10 reps;Bilateral  (iosmetric hip add )   Knee AROM [employer]; Bilateral   Ankle Pumps Sitting;10 reps;Bilateral;AROM   Marching Sitting;10 reps;AROM; Bilateral   Equipment Use   Comments pt  issued written supine HEP, reveiwed with pt   Assessment   Prognosis Good   Problem List Decreased range of motion;Decreased endurance;Decreased mobility; Impaired balance;Obesity;Pain   Assessment Pt is showing improvements activity tolerance,  Functional endurance, cognition,  balance, gait, and overall strength and mobility  Less assistance for all mobility, progressing to mod I for transfers with use of Rw, supervision for ambulation on level surfaces with use of Rw  Pt  Progressed with ambulation distances to [de-identified]' x2 demonstrating slow reciprocal gait no lob and progressed to stair climbing performing with use of b/l rails with close supervision assist,  Pt demonstrates correct le sequencing when ascending and descending stairs  Pt  Continues to require more a supportive AD/ use of Rw from baseline due to L knee pain and generalized weakness  Pt  Tolerated seated b/l le arom and isometric exercises at eob  Pt  Issued supine b/l le arom and strengthening exercise to perform at home  Pt  verbalized understanding of all exercises   Pt has made adequate functional gains toward PT goals and is appropriate for d/c to home with family support  Recommend d/c to home with OPPT  The patient's AM-PAC Basic Mobility Inpatient Short Form Raw Score is 22  A Raw score of greater than 16 suggests the patient may benefit from discharge to home  Please also refer to the recommendation of the Physical Therapist for safe discharge planning  Goals   Patient Goals walk without a walker  STG Expiration Date 01/13/23   PT Treatment Day 1   Plan   Treatment/Interventions Functional transfer training;LE strengthening/ROM; Therapeutic exercise;Elevations; Endurance training;Patient/family training;Equipment eval/education; Bed mobility;Gait training;Spoke to nursing   PT Frequency 2-3x/wk   Recommendation   PT Discharge Recommendation Home with outpatient rehabilitation   AM-PAC Basic Mobility Inpatient   Turning in Flat Bed Without Bedrails 4   Lying on Back to Sitting on Edge of Flat Bed Without Bedrails 3   Moving Bed to Chair 4   Standing Up From Chair Using Arms 4   Walk in Room 4   Climb 3-5 Stairs With Railing 3   Basic Mobility Inpatient Raw Score 22   Basic Mobility Standardized Score 47 4   Highest Level Of Mobility   JH-HLM Goal 7: Walk 25 feet or more   JH-HLM Achieved 7: Walk 25 feet or more   Education   Education Provided Mobility training;Home exercise program;Assistive device   Patient Demonstrates acceptance/verbal understanding   End of Consult   Patient Position at End of Consult Seated edge of bed; All needs within reach   Galindo Pimentel

## 2023-01-05 NOTE — ASSESSMENT & PLAN NOTE
· Erythema left lower extremity, small superficial abrasion with surrounding erythema, increased warmth  · She was on Ancef, discharged on Keflex to finish a 7-day course

## 2023-01-05 NOTE — DISCHARGE INSTR - AVS FIRST PAGE
You were admitted with confusion  Suspect secondary to high blood sugar levels  Your hemoglobin A1c is 14, which indicates that your diabetes is not well controlled  Continue Levemir 25 units twice a day  Start short acting Humalog 5 units 3 times a day  Check your blood sugar 3 times a day before meals and at bedtime, keep a blood sugar log and follow-up with your primary care doctor  Continue Keflex to finish 7-day antibiotic course for right leg cellulitis  Continue home pain regimen

## 2023-01-05 NOTE — PLAN OF CARE
Problem: Potential for Falls  Goal: Patient will remain free of falls  Description: INTERVENTIONS:  - Educate patient/family on patient safety including physical limitations  - Instruct patient to call for assistance with activity   - Consult OT/PT to assist with strengthening/mobility   - Keep Call bell within reach  - Keep bed low and locked with side rails adjusted as appropriate  - Keep care items and personal belongings within reach  - Initiate and maintain comfort rounds  - Make Fall Risk Sign visible to staff  - Apply yellow socks and bracelet for high fall risk patients  - Consider moving patient to room near nurses station  Outcome: Adequate for Discharge     Problem: PAIN - ADULT  Goal: Verbalizes/displays adequate comfort level or baseline comfort level  Description: Interventions:  - Encourage patient to monitor pain and request assistance  - Assess pain using appropriate pain scale  - Administer analgesics based on type and severity of pain and evaluate response  - Implement non-pharmacological measures as appropriate and evaluate response  - Consider cultural and social influences on pain and pain management  - Notify physician/advanced practitioner if interventions unsuccessful or patient reports new pain  Outcome: Adequate for Discharge     Problem: INFECTION - ADULT  Goal: Absence or prevention of progression during hospitalization  Description: INTERVENTIONS:  - Assess and monitor for signs and symptoms of infection  - Monitor lab/diagnostic results  - Monitor all insertion sites, i e  indwelling lines, tubes, and drains  - Monitor endotracheal if appropriate and nasal secretions for changes in amount and color  - [place] appropriate cooling/warming therapies per order  - Administer medications as ordered  - Instruct and encourage patient and family to use good hand hygiene technique  - Identify and instruct in appropriate isolation precautions for identified infection/condition  Outcome: Adequate for Discharge  Goal: Absence of fever/infection during neutropenic period  Description: INTERVENTIONS:  - Monitor WBC    Outcome: Adequate for Discharge     Problem: SAFETY ADULT  Goal: Patient will remain free of falls  Description: INTERVENTIONS:  - Educate patient/family on patient safety including physical limitations  - Instruct patient to call for assistance with activity   - Consult OT/PT to assist with strengthening/mobility   - Keep Call bell within reach  - Keep bed low and locked with side rails adjusted as appropriate  - Keep care items and personal belongings within reach  - Initiate and maintain comfort rounds  - Make Fall Risk Sign visible to staff  - Apply yellow socks and bracelet for high fall risk patients  - Consider moving patient to room near nurses station  Outcome: Adequate for Discharge  Goal: Maintain or return to baseline ADL function  Description: INTERVENTIONS:  -  Assess patient's ability to carry out ADLs; assess patient's baseline for ADL function and identify physical deficits which impact ability to perform ADLs (bathing, care of mouth/teeth, toileting, grooming, dressing, etc )  - Assess/evaluate cause of self-care deficits   - Assess range of motion  - Assess patient's mobility; develop plan if impaired  - Assess patient's need for assistive devices and provide as appropriate  - Encourage maximum independence but intervene and supervise when necessary  - Involve family in performance of ADLs  - Assess for home care needs following discharge   - Consider OT consult to assist with ADL evaluation and planning for discharge  - Provide patient education as appropriate  Outcome: Adequate for Discharge  Goal: Maintains/Returns to pre admission functional level  Description: INTERVENTIONS:  - Perform BMAT or MOVE assessment daily    - Set and communicate daily mobility goal to care team and patient/family/caregiver     - Collaborate with rehabilitation services on mobility goals if consulted  - Out of bed for toileting  - Record patient progress and toleration of activity level   Outcome: Adequate for Discharge     Problem: DISCHARGE PLANNING  Goal: Discharge to home or other facility with appropriate resources  Description: INTERVENTIONS:  - Identify barriers to discharge w/patient and caregiver  - Arrange for needed discharge resources and transportation as appropriate  - Identify discharge learning needs (meds, wound care, etc )  - Arrange for interpretive services to assist at discharge as needed  - Refer to Case Management Department for coordinating discharge planning if the patient needs post-hospital services based on physician/advanced practitioner order or complex needs related to functional status, cognitive ability, or social support system  Outcome: Adequate for Discharge     Problem: MUSCULOSKELETAL - ADULT  Goal: Maintain or return mobility to safest level of function  Description: INTERVENTIONS:  - Assess patient's ability to carry out ADLs; assess patient's baseline for ADL function and identify physical deficits which impact ability to perform ADLs (bathing, care of mouth/teeth, toileting, grooming, dressing, etc )  - Assess/evaluate cause of self-care deficits   - Assess range of motion  - Assess patient's mobility  - Assess patient's need for assistive devices and provide as appropriate  - Encourage maximum independence but intervene and supervise when necessary  - Involve family in performance of ADLs  - Assess for home care needs following discharge   - Consider OT consult to assist with ADL evaluation and planning for discharge  - Provide patient education as appropriate  Outcome: Adequate for Discharge  Goal: Maintain proper alignment of affected body part  Description: INTERVENTIONS:  - Support, maintain and protect limb and body alignment  - Provide patient/ family with appropriate education  Outcome: Adequate for Discharge     Problem: MOBILITY - ADULT  Goal: Maintain or return to baseline ADL function  Description: INTERVENTIONS:  -  Assess patient's ability to carry out ADLs; assess patient's baseline for ADL function and identify physical deficits which impact ability to perform ADLs (bathing, care of mouth/teeth, toileting, grooming, dressing, etc )  - Assess/evaluate cause of self-care deficits   - Assess range of motion  - Assess patient's mobility; develop plan if impaired  - Assess patient's need for assistive devices and provide as appropriate  - Encourage maximum independence but intervene and supervise when necessary  - Involve family in performance of ADLs  - Assess for home care needs following discharge   - Consider OT consult to assist with ADL evaluation and planning for discharge  - Provide patient education as appropriate  Outcome: Adequate for Discharge  Goal: Maintains/Returns to pre admission functional level  Description: INTERVENTIONS:  - Perform BMAT or MOVE assessment daily    - Set and communicate daily mobility goal to care team and patient/family/caregiver     - Collaborate with rehabilitation services on mobility goals if consulted  - Out of bed for toileting  - Record patient progress and toleration of activity level   Outcome: Adequate for Discharge

## 2023-01-05 NOTE — ASSESSMENT & PLAN NOTE
· Patient is on morphine ER 75 milligrams twice daily  · Dilaudid 4 mg 3 times daily  · Soma 350 mg twice daily  · Pregabalin 150 mg twice daily  · Valium 5 mg at bedtime    · Restarted morphine 60 mg twice daily, Dilaudid 2 mg 3 times daily as needed, Soma, pregabalin and Valium  · Increase Dilaudid to 4 mg 3 times a day

## 2023-01-05 NOTE — CASE MANAGEMENT
Case Management Discharge Planning Note    Patient name Radha Menon  Location [address]  247 [address]-* MRN [id number]  : 1965 Date 2023       Current Admission Date: 2023  Current Admission Diagnosis:Acute metabolic encephalopathy   Patient Active Problem List    Diagnosis Date Noted   • Acute metabolic encephalopathy    • Cellulitis of right lower extremity 2023   • Fall 2023   • Ambulatory dysfunction 05/15/2022   • Urine retention 2022   • Heme positive stool 2022   • Sinus tachycardia 2021   • Low TSH level 2021   • Acute encephalopathy 2020   • Polypharmacy 2020   • Thyroid nodule 2019   • Morbid obesity ([facility] ) 2018   • Essential hypertension 11/10/2018   • Diabetic gastroparesis [facility] ) 2018   • Type 2 diabetes mellitus with hyperglycemia, with long-term current use of insulin ([facility] ) 2018   • Asthma 2018   • Opioid dependence with other opioid-induced disorder ([facility] ) 2018      LOS (days): 2  Geometric Mean LOS (GMLOS) (days): 4 60  Days to GMLOS:2 6     OBJECTIVE:  Risk of Unplanned Readmission Score: 21 81         Current admission status: Inpatient   Preferred Pharmacy:   [address]  [address] [place] [address] [address]  [address] [address]  Phone: [phone] Fax: [address], [address] [place] [address] 45 , [address] [place] [address]  Phone: [phone] Fax: [phone]    Primary Care Provider: Bladimir Saavedra MD    Primary Insurance: Twyla SOUTH  Secondary Insurance: BLUE CROSS    DISCHARGE DETAILS:    Discharge planning discussed with[de-identified] patient  Freedom of Choice: Yes  Comments - Freedom of Choice: patient approved for outpatient tehrapy, a list facilities was provided to her  CM contacted family/caregiver?: No- see comments  Were Treatment Team discharge recommendations reviewed with patient/caregiver?: Yes  Did patient/caregiver verbalize understanding of patient care needs?: Yes  Were patient/caregiver advised of the risks associated with not following Treatment Team discharge recommendations?: Yes    Contacts  Patient Contacts: Melina Bowen    Relationship to Patient[de-identified] Family  Contact Method: Phone  Phone Number: [phone]  Reason/Outcome: Emergency [address]         Is the patient interested in [facility] at discharge?: No    DME Referral Provided  Referral made for DME?: No    Other Referral/Resources/Interventions Provided:  Interventions: Outpatient PT, Outpatient OT, Other (Specify) (indigent medications)    Treatment Team Recommendation: Other (outpatient therapy)  Discharge Destination Plan[de-identified] Home  Transport at Discharge : Family       ETA of Transport (Date): 01/05/23

## 2023-01-05 NOTE — PROGRESS NOTES
[facility]  Progress Note - Blaire Sherman 1965, 61 y o  female MRN: [id number]  Unit/Bed#: E5 -01 Encounter: [id number]  Primary Care Provider: Lilia Padilla MD   Date and time admitted to hospital: 1/2/2023  4:43 PM    * Acute metabolic encephalopathy  Assessment & Plan  · Possible polypharmacy, left lower extremity cellulitis, hyperglycemia  · Patient had similar admission in May 2022  · Narcotics held on admission  · Patient is awake alert and oriented yesterday and today  · Restarted narcotic at lower dose to avoid withdrawal      Ambulatory dysfunction  Assessment & Plan  · PT OT recommended home with home health    Sinus tachycardia  Assessment & Plan  · Patient appears volume depleted on admission and received IV fluids  · Tachycardia resolved    [address]  · patient did sustain 2 unwitnessed falls, imaging negative for any acute trauma, patient has no specific pain or complaints other than generalized chronic pain    · No neurologic deficit  · PT OT recommended home with home health      Cellulitis of right lower extremity  Assessment & Plan  · Erythema left lower extremity, small superficial abrasion with surrounding erythema, increased warmth  · Continue Ancef for now    Urine retention  Assessment & Plan  · History of urinary retention  · Bladder scan, not retaining    Morbid obesity ([facility] )  Assessment & Plan  · Morbidly obese, patient would benefit from increased activity and weight loss    Opioid dependence with other opioid-induced disorder (HCC)  Assessment & Plan  · Patient is on morphine ER 75 milligrams twice daily  · Dilaudid 4 mg 3 times daily  · Soma 350 mg twice daily  · Pregabalin 150 mg twice daily  · Valium 5 mg at bedtime    · Restarted morphine 60 mg twice daily, Dilaudid 2 mg 3 times daily as needed, Soma, pregabalin and Valium  · Increase Dilaudid to 4 mg 3 times a day    Asthma  Assessment & Plan  · History of asthma, no evidence of acute exacerbation    Type 2 diabetes mellitus with hyperglycemia, with long-term current use of insulin [facility])  Assessment & Plan  Lab Results   Component Value Date    HGBA1C 14 0 (H) 2023       Recent Labs     23  0728 23  1110 23  1608 23  1626   POCGLU 195* 230* 307* 301*       Blood Sugar Average: Last 72 hrs:  (P) 277 2   · Uncontrolled diabetes, hemoglobin A1c is 14, patient not checking her blood sugar at home  She was not sure what doses of insulin is she taking   checked her insulin bottle and she is on Levemir 20 units twice daily  · Increase Levemir to 25 units twice daily, continue Humalog 5 units 3 times daily with meals and sliding scale  · Continue diabetic diet  · Educated patient and son about blood glucose check            VTE Pharmacologic Prophylaxis: VTE Score: 5 High Risk (Score >/= 5) - Pharmacological DVT Prophylaxis Ordered: enoxaparin (Lovenox)  Sequential Compression Devices Ordered  Patient Centered Rounds: I performed bedside rounds with nursing staff today  Discussions with Specialists or Other Care Team Provider: Nursing,     Education and Discussions with Family / Patient: Updated  (son) via phone  Time Spent for Care: 30 minutes  More than 50% of total time spent on counseling and coordination of care as described above  Current Length of Stay: 1 day(s)  Current Patient Status: Inpatient   Certification Statement: The patient will continue to require additional inpatient hospital stay due to Uncontrolled diabetes  Discharge Plan: Anticipate discharge in 24-48 hrs to home with home services  Code Status: Level 1 - Full Code    Subjective:   Patient was seen and evaluated bedside, today she is feeling much better      Objective:     Vitals:   Temp (24hrs), Av 6 °F (36 4 °C), Min:97 5 °F (36 4 °C), Max:97 9 °F (36 6 °C)    Temp:  [97 5 °F (36 4 °C)-97 9 °F (36 6 °C)] 97 5 °F (36 4 °C)  HR:  [80-89] 84  Resp: [12-16] 12  BP: (124-153)/(72-85) 130/74  SpO2:  [94 %-97 %] 97 %  There is no height or weight on file to calculate BMI  Input and Output Summary (last 24 hours): Intake/Output Summary (Last 24 hours) at 1/4/2023 1925  Last data filed at 1/4/2023 1755  Gross per 24 hour   Intake --   Output 1600 ml   Net -1600 ml       Physical Exam:   Physical Exam  Constitutional:       General: She is not in acute distress  Appearance: She is obese  HENT:      Head: Atraumatic  Cardiovascular:      Rate and Rhythm: Normal rate and regular rhythm  Heart sounds: No murmur heard  No friction rub  No gallop  Pulmonary:      Effort: Pulmonary effort is normal  No respiratory distress  Breath sounds: Normal breath sounds  No wheezing  Abdominal:      General: Bowel sounds are normal  There is no distension  Palpations: Abdomen is soft  Tenderness: There is no abdominal tenderness  Musculoskeletal:         General: No swelling  Cervical back: Neck supple  Skin:     Comments: Small wound on the right ankle, erythema significantly improved   Neurological:      General: No focal deficit present  Mental Status: She is alert     Psychiatric:         Mood and Affect: Mood normal           Additional Data:     Labs:  Results from last 7 days   Lab Units 01/04/23  0443   WBC Thousand/uL 8 68   HEMOGLOBIN g/dL 10 4*   HEMATOCRIT % 33 3*   PLATELETS Thousands/uL 174   NEUTROS PCT % 53   LYMPHS PCT % 32   MONOS PCT % 9   EOS PCT % 5     Results from last 7 days   Lab Units 01/04/23  0443   SODIUM mmol/L 140   POTASSIUM mmol/L 3 5   CHLORIDE mmol/L 107   CO2 mmol/L 24   BUN mg/dL 11   CREATININE mg/dL 0 50*   ANION GAP mmol/L 9   CALCIUM mg/dL 8 5   ALBUMIN g/dL 2 7*   TOTAL BILIRUBIN mg/dL 1 04*   ALK PHOS U/L 98   ALT U/L 16   AST U/L 9   GLUCOSE RANDOM mg/dL 227*         Results from last 7 days   Lab Units 01/04/23  1626 01/04/23  1608 01/04/23  1110 01/04/23  0728 01/03/23 2058 01/03/23  1623 01/03/23  1109 01/03/23  0730 01/02/23  2224 01/02/23  1650   POC GLUCOSE mg/dl 301* 307* 230* 195* 292* 249* 290* 278* 340* 290*     Results from last 7 days   Lab Units 01/03/23  0604   HEMOGLOBIN A1C % 14 0*     Results from last 7 days   Lab Units 01/02/23  1904   LACTIC ACID mmol/L 1 2       Lines/Drains:  Invasive Devices     Peripheral Intravenous Line  Duration           Peripheral IV 01/04/23 Right Antecubital <1 day                      Imaging: Reviewed radiology reports from this admission including: CT head    Recent Cultures (last 7 days):   Results from last 7 days   Lab Units 01/02/23  2121   BLOOD CULTURE  No Growth at 24 hrs  No Growth at 24 hrs         Last 24 Hours Medication List:   Current Facility-Administered Medications   Medication Dose Route Frequency Provider Last Rate   • albuterol  2 puff Inhalation Q4H PRN [facility] Brad[facility], DO     • carisoprodol  350 mg Oral BID Jaclyn Bansal MD     • cefazolin  2,000 mg Intravenous [facility] DO piero 2,000 mg (01/04/23 1301)   • cloNIDine  0 1 mg Oral Q12H [address] [facility] piero, DO     • diazepam  5 mg Oral HS PRN [facility] Bradshaw DO     • enoxaparin  40 mg Subcutaneous Daily [facility],      • fluticasone-vilanterol  1 puff Inhalation Daily [facility] Leeann, DO     • HYDROmorphone  4 mg Oral Q8H PRN Jaclyn Bansal MD     • insulin detemir  25 Units Subcutaneous Q12H [address] Jaclyn Bansal MD     • insulin lispro  1-5 Units Subcutaneous TID [facility] piero, DO     • insulin lispro  5 Units Subcutaneous TID With Meals [facility] Rios Bradshaw DO     • lidocaine  1 patch Topical Daily Taisha Conti PA-C     • morphine  60 mg Oral Q12H [address] Jaclyn Bansal MD     • ondansetron  4 mg Intravenous Q6H PRN [facility] Rios Bradshaw DO     • pantoprazole  40 mg Oral Early Morning Juancho Rios harry DO     • pravastatin  10 mg Oral HS Juancho Rios harry DO     • pregabalin  150 mg Oral BID Juancho Rios DO Leeann     • sucralfate  1 g Oral Q6H [facility], DO          Today, Patient Was Seen By: Waqar Angulo MD    **Please Note: This note may have been constructed using a voice recognition system  **

## 2023-01-05 NOTE — ASSESSMENT & PLAN NOTE
Lab Results   Component Value Date    HGBA1C 14 0 (H) 01/03/2023       Recent Labs     01/04/23  1626 01/04/23  2120 01/05/23  0719 01/05/23  1103   POCGLU 301* 249* 196* 219*       Blood Sugar Average: Last 72 hrs:  (P) 264 [id number]   · Uncontrolled diabetes, hemoglobin A1c is 14, patient not checking her blood sugar at home  She was not sure what doses of insulin is she taking     checked her insulin bottle and she is on Levemir 20 units twice daily  · Discharge regimen: Levemir to 25 units twice daily, continue Humalog 5 units 3 times daily with meals   · Instructed the patient to take her blood sugar 4 times a day, keep a blood sugar log and follow-up with primary care doctor and endocrinologist   They are interested in insulin pump

## 2023-01-05 NOTE — CASE MANAGEMENT
Case Management Discharge Planning Note    Patient name Heri Liz  Location [address]  [address]-* MRN [id number]  : 1965 Date 2023       Current Admission Date: 2023  Current Admission Diagnosis:Acute metabolic encephalopathy   Patient Active Problem List    Diagnosis Date Noted   • Acute metabolic encephalopathy    • Cellulitis of right lower extremity 2023   • Fall 2023   • Ambulatory dysfunction 05/15/2022   • Urine retention 2022   • Heme positive stool 2022   • Sinus tachycardia 2021   • Low TSH level 2021   • Acute encephalopathy 2020   • Polypharmacy 2020   • Thyroid nodule 2019   • Morbid obesity ([facility] ) 2018   • Essential hypertension 11/10/2018   • Diabetic gastroparesis ([facility] ) 2018   • Type 2 diabetes mellitus with hyperglycemia, with long-term current use of insulin ([facility] ) 2018   • Asthma 2018   • Opioid dependence with other opioid-induced disorder ([facility] ) 2018      LOS (days): 2  Geometric Mean LOS (GMLOS) (days): 4 60  Days to GMLOS:2 7     OBJECTIVE:  Risk of Unplanned Readmission Score: 20 1         Current admission status: Inpatient   Preferred Pharmacy:   [place] [address]  Phone: [phone] Fax: [phone]    Primary Care Provider: Melany Mcleod MD    Primary Insurance: Elli Hawking REP  Secondary Insurance: BLUE CROSS    DISCHARGE DETAILS:    Patient has been cleared for outpatient therapy  Home health agencies were not accepting patient's insurance and patient is now appropriate for outpatient  Home health referrals are cancelled  LIS provided a list of therpy locations  Update:    Patient cannot afford her novalog vial and/or syringes  The total per [facility] pharmacy is $119 31  LIS obtained eligibility from hospital financial counselors that approves her 100% eliz   LIS obtained 's approval and the form was faxed to the pharmacy

## 2023-01-05 NOTE — ASSESSMENT & PLAN NOTE
· Most likely secondary to hyperglycemia, possible polypharmacy  · Patient had similar admission in May 2022 thought to be secondary to hyperglycemia  · Narcotics held on admission  · Day after admission patient awake alert and oriented, back at her baseline

## 2023-01-05 NOTE — NURSING NOTE
Agree with previous RN assessment  Pt resting comfortably in bed, no complaints at this time  Call bell within reach

## 2023-01-08 LAB
BACTERIA BLD CULT: NORMAL
BACTERIA BLD CULT: NORMAL

## 2023-01-09 NOTE — UTILIZATION REVIEW
NOTIFICATION OF ADMISSION DISCHARGE   This is a Notification of Discharge from [facility]  Please be advised that this patient has been discharge from our facility  Below you will find the admission and discharge date and time including the patient’s disposition  UTILIZATION REVIEW CONTACT:  Duran Carrillo  Utilization   Network Utilization Review Department  Phone: [phone] x carefully listen to the prompts  All voicemails are confidential   Email: [email or web address]     ADMISSION INFORMATION  PRESENTATION DATE: 1/2/2023  4:43 PM    INPATIENT ADMISSION DATE: 1/3/23  3:59 PM   DISCHARGE DATE: 1/5/2023  5:12 PM   DISPOSITION:Home/Self Care    IMPORTANT INFORMATION:  Send all requests for admission clinical reviews, approved or denied determinations and any other requests to dedicated fax number below belonging to the campus where the patient is receiving treatment   List of dedicated fax numbers:  [address] DENIALS (Administrative/Medical Necessity) [phone]   [address] (Maternity/NICU/Pediatrics) [phone]   400 [facility] [phone]   [facility] 87 [phone]   [address] [phone]   [address] [phone]   [address] [place] [address] 119 [phone]   [facility]  [phone]   [facility] [phone]   [address] [place] [address] [phone]   [address] [place] [address] [phone]           Hay Alonso MD  Physician  Hospitalist  Discharge Summary      Signed  Date of Service:  1/5/2023  1:20 PM     Signed        [facility]  Discharge- [facility] 1965, 61 y o  female MRN: [id number]  Unit/Bed#: E5 -01 Encounter: [id number]  Primary Care Provider: Lilia Padilla MD   Date and time admitted to hospital: 1/2/2023  4:43 PM     * Acute metabolic encephalopathy  Assessment & Plan  • Most likely secondary to hyperglycemia, possible polypharmacy  • Patient had similar admission in May 2022 thought to be secondary to hyperglycemia  • Narcotics held on admission  • Day after admission patient awake alert and oriented, back at her baseline     Ambulatory dysfunction  Assessment & Plan  • PT OT recommended home with home health     Type 2 diabetes mellitus with hyperglycemia, with long-term current use of insulin [facility])  Assessment & Plan        Lab Results   Component Value Date     HGBA1C 14 0 (H) 01/03/2023                Recent Labs     01/04/23  1626 01/04/23  2120 01/05/23  0719 01/05/23  1103   POCGLU 301* 249* 196* 219*         Blood Sugar Average: Last 72 hrs:  (P) 264 [id number]   • Uncontrolled diabetes, hemoglobin A1c is 14, patient not checking her blood sugar at home  She was not sure what doses of insulin is she taking   checked her insulin bottle and she is on Levemir 20 units twice daily  • Discharge regimen: Levemir to 25 units twice daily, continue Humalog 5 units 3 times daily with meals   • Instructed the patient to take her blood sugar 4 times a day, keep a blood sugar log and follow-up with primary care doctor and endocrinologist   They are interested in insulin pump     Opioid dependence with other opioid-induced disorder [facility])  Assessment & Plan  • Patient is on morphine ER 75 milligrams twice daily  • Dilaudid 4 mg 3 times daily  • Soma 350 mg twice daily  • Pregabalin 150 mg twice daily  • Valium 5 mg at bedtime     • Continue home regimen      Fall  Assessment & Plan  • patient did sustain 2 unwitnessed falls, imaging negative for any acute trauma, patient has no specific pain or complaints other than generalized chronic pain    • No neurologic deficit  • PT OT recommended home with home health        Cellulitis of right lower extremity  Assessment & Plan  • Erythema left lower extremity, small superficial abrasion with surrounding erythema, increased warmth  • She was on Ancef, discharged on Keflex to finish a 7-day course     Urine retention  Assessment & Plan  • History of urinary retention  • Bladder scan, not retaining     Sinus tachycardia  Assessment & Plan  • Patient appears volume depleted on admission and received IV fluids  • Tachycardia resolved     Morbid obesity ([address] )  Assessment & Plan  • Morbidly obese, patient would benefit from increased activity and weight loss     Asthma  Assessment & Plan  • History of asthma, no evidence of acute exacerbation            Medical Problems      Resolved Problems  Date Reviewed: 1/5/2023   None         Discharging Physician / Practitioner: Yifan Moe MD  PCP: Delia Peck MD  Admission Date:       Admission Orders (From admission, onward)       Ordered         01/03/23 1559   Inpatient Admission  Once             01/02/23 2057   Place in Observation  Once                         Discharge Date: 01/05/23     Consultations During Hospital Stay:  • none     Procedures Performed:   • none     Significant Findings / Test Results:   • Chest x-ray 1/2/2023  IMPRESSION:  No acute cardiopulmonary disease      • CT head without contrast 1/2/23  IMPRESSION:  No acute intracranial hemorrhage, midline shift, or mass effect      • CT cervical spine 1/2/2023  IMPRESSION:  No cervical spine fracture or traumatic malalignment     Incidental Findings:   • none     Test Results Pending at Discharge (will require follow up):   • none     Outpatient Tests Requested:  • none     Complications:  none     Reason for Admission: Confusion suspect secondary to hypoglycemia and polypharmacy     Hospital Course:   Steve Marcelino is a 61 y o  female patient who originally presented to the hospital on 1/2/2023 due to confusion  Patient and family reports intermittent confusion  She had an unwitnessed fall  Denies any injuries  Images were negative for any injury  AMS was most likely secondary to hyperglycemia, possible polypharmacy  Patient had a similar admission in May, work-up was negative again thought to be secondary to hyperglycemia and polypharmacy  Narcotics were stopped on admission  Her hemoglobin A1c is 14  Patient is not sure how much insulin is she taking at home, possible noncompliance  Per family home regimen is Levemir 20 units twice daily  Patient is managing insulin by herself, sons are managing pain medications  On discharge patient will be on Levemir 25 units twice daily and Humalog 5 units 3 times a day with meal   Instructed the patient and family to check her blood sugar 4 times a day, keep a blood sugar log and follow-up with primary care doctor and endocrinologist, they are interested in insulin pump  The day after the admission patient mentation was back at baseline  Her narcotics were slowly restarted, her mentation remained intact  Continue home dose of opioids, she should follow-up with primary care doctor and consider decreasing opioid doses  For right leg cellulitis patient will continue 4 more days of Keflex  Patient will be discharged home with home health         Please see above list of diagnoses and related plan for additional information       Condition at Discharge: good     Discharge Day Visit / Exam:   Subjective: Patient was seen and evaluated bedside  She is feeling well, ready to go home  Vitals: Blood Pressure: 122/64 (01/05/23 0720)  Pulse: 69 (01/05/23 0720)  Temperature: (!) 97 4 °F (36 3 °C) (01/05/23 0720)  Temp Source: Oral (01/05/23 0720)  Respirations: 16 (01/05/23 0720)  SpO2: 96 % (01/05/23 0720)  Exam:   Physical Exam  Constitutional:       General: She is not in acute distress  Appearance: She is obese  HENT:      Head: Atraumatic  Cardiovascular:      Rate and Rhythm: Normal rate and regular rhythm  Heart sounds: No murmur heard  No friction rub   No gallop  Pulmonary:      Effort: Pulmonary effort is normal  No respiratory distress  Breath sounds: Normal breath sounds  No wheezing  Abdominal:      General: Bowel sounds are normal  There is no distension  Palpations: Abdomen is soft  Tenderness: There is no abdominal tenderness  Musculoskeletal:         General: No swelling  Cervical back: Neck supple  Skin:     General: Skin is warm and dry  Neurological:      General: No focal deficit present  Mental Status: She is alert  Psychiatric:         Mood and Affect: Mood normal          Discussion with Family: Updated  (son) via phone      Discharge instructions/Information to patient and family:   See after visit summary for information provided to patient and family        Provisions for Follow-Up Care:  See after visit summary for information related to follow-up care and any pertinent home health orders  Disposition:   Home with VNA Services (Reminder: Complete face to face encounter)     Planned Readmission: no     Discharge Statement:  I spent 45 minutes discharging the patient  This time was spent on the day of discharge  I had direct contact with the patient on the day of discharge  Greater than 50% of the total time was spent examining patient, answering all patient questions, arranging and discussing plan of care with patient as well as directly providing post-discharge instructions    Additional time then spent on discharge activities      Discharge Medications:  See after visit summary for reconciled discharge medications provided to patient and/or family        **Please Note: This note may have been constructed using a voice recognition system**

## 2023-05-24 NOTE — UTILIZATION REVIEW
Initial Clinical Review    Admission: Date/Time/Statement: Inpatient Admission Orders (From admission, onward)     Ordered        09/16/19 2308  Inpatient Admission  Once                   Orders Placed This Encounter   Procedures    Inpatient Admission     Standing Status:   Standing     Number of Occurrences:   1     Order Specific Question:   Admitting Physician     Answer:   MAYA LUNDBERG [[id number]]     Order Specific Question:   Level of Care     Answer:   Med Surg [[id number]]     Order Specific Question:   Estimated length of stay     Answer:   More than 2 Midnights     Order Specific Question:   Certification     Answer:   I certify that inpatient services are medically necessary for this patient for a duration of greater than two midnights  See H&P and MD Progress Notes for additional information about the patient's course of treatment  ED Arrival Information     Expected Arrival Acuity Means of Arrival Escorted By Service Admission Type    - 9/16/2019 19:51 Urgent Walk-In Self Hospitalist Urgent    Arrival Complaint    vomiting        Chief Complaint   Patient presents with    Vomiting     pt reports flare up of gastroparsis, reports vomiting for four  days and generalized abdominal pain     Assessment/Plan:   53 y o  female with past medical history of diabetes, obesity, hypertension, chronic pain who presents with vomiting  Patient reports 1 day of intractable nausea with vomiting  Patient reports history of diabetic gastroparesis for which she has been hospitalized multiple times  Last hospitalization last month in [place] while visiting family  Patient reports she has been unable to take anything orally today  Patient reports mild associated upper abdominal pain  EKG is Sinus tachycardia       Intractable nausea and vomiting secondary to gastroparesis likely secondary to diabetes mellitus  Assessment & Plan  Patient presents with 1 day of intractable nausea and vomiting secondary to gastroparesis  Does report multiple hospitalizations for diabetic gastroparesis  Will keep NPO for tonight with IV fluid hydration, advance as tolerated  Scheduled Reglan and Zofran     Essential hypertension  Assessment & Plan  Slightly elevated at time of admission, likely secondary to pain  Continue home lisinopril and continue to monitor  P r n  Metoprolol for SBP greater than 180     Chronic pain  Assessment & Plan  History of chronic pain  Continue p o  Morphine with p r n  IV morphine for breakthrough     Asthma  Assessment & Plan  Does not appear to be in exacerbation at this time, no wheezing or shortness of breath  Continue singular, Breo and p r n  Albuterol     Type 2 diabetes mellitus with diabetic neuropathy, with long-term current use of insulin ([facility])  Assessment & Plan  Lab Results   Component Value Date     HGBA1C 12 5 (H) 07/21/2019      Blood Sugar Average: Last 72 hrs:   glucose elevated to 359 at time of presentation  Will continue home 50 units Lantus hs with sliding scale insulin coverage and Accu-Cheks      Morbid obesity ([address] )  Assessment & Plan  Would benefit from diet and lifestyle modifications      VTE Prophylaxis: Enoxaparin (Lovenox)  / sequential compression device   Code Status:  Full code  POLST: POLST form is not discussed and not completed at this time  Discussion with family:  None     Anticipated Length of Stay:  Patient will be admitted on an Inpatient basis with an anticipated length of stay of  more than 2 midnights     Justification for Hospital Stay:  Intractable nausea and vomiting       ED Triage Vitals   Temperature Pulse Respirations Blood Pressure SpO2   09/16/19 2005 09/16/19 2005 09/16/19 2005 09/16/19 2005 09/16/19 2005   99 2 °F (37 3 °C) (!) 122 20 (!) 210/105 100 %      Temp Source Heart Rate Source Patient Position - Orthostatic VS BP Location FiO2 (%)   09/16/19 2005 09/16/19 2005 09/16/19 2030 09/16/19 2030 --   Oral Monitor Lying Right arm       Pain Score 09/16/19 2005       9        Wt Readings from Last 1 Encounters:   07/22/19 129 kg (283 lb 8 2 oz)     Additional Vital Signs:   /17/19 0736        184/98  Abnormal        Lying   09/17/19 07:14:35  98 9 °F (37 2 °C)  119  Abnormal     176/108  Abnormal   131  94 %      09/17/19 01:59:28    123  Abnormal     178/109  Abnormal   132  96 %   Sitting   09/17/19 01:57:43    124  Abnormal     184/111  Abnormal   135  98 %      09/16/19 23:48:01  100 1 °F (37 8 °C)  124  Abnormal   18  184/111  Abnormal   135          Pertinent Labs/Diagnostic Test Results:   Results from last 7 days   Lab Units 09/17/19  0503 09/16/19 2030   WBC Thousand/uL 11 72* 11 07*   HEMOGLOBIN g/dL 13 3 14 3   HEMATOCRIT % 41 1 44 6   PLATELETS Thousands/uL 258 268   NEUTROS ABS Thousands/µL  --  9 29*         Results from last 7 days   Lab Units 09/17/19  0503 09/16/19  2030   SODIUM mmol/L 133* 133*   POTASSIUM mmol/L 3 9 4 9   CHLORIDE mmol/L 98* 97*   CO2 mmol/L 23 22   ANION GAP mmol/L 12 14*   BUN mg/dL 10 9   CREATININE mg/dL 0 71 0 75   EGFR ml/min/1 73sq m 96 90   CALCIUM mg/dL 9 0 9 8   MAGNESIUM mg/dL 1 4*  --      Results from last 7 days   Lab Units 09/16/19 2030   AST U/L 31   ALT U/L 25   ALK PHOS U/L 143*   TOTAL PROTEIN g/dL 8 3*   ALBUMIN g/dL 3 6   TOTAL BILIRUBIN mg/dL 1 89*     Results from last 7 days   Lab Units 09/17/19  1104 09/17/19  0712 09/17/19  0255 09/17/19  0019   POC GLUCOSE mg/dl 251* 288* 339* 347*     Results from last 7 days   Lab Units 09/17/19  0503 09/16/19  2030   GLUCOSE RANDOM mg/dL 304* 359*     Results from last 7 days   Lab Units 09/16/19  2030   LIPASE u/L 80     ED Treatment:   Medication Administration from 09/16/2019 1951 to 09/16/2019 2339       Date/Time Order Dose Route Action     09/16/2019 2030 ondansetron (ZOFRAN) injection 4 mg 4 mg Intravenous Given     09/16/2019 2055 sodium chloride 0 9 % bolus 1,000 mL 1,000 mL Intravenous New Bag     09/16/2019 2055 metoclopramide (REGLAN) injection 10 mg 10 mg Intravenous Given     09/16/2019 2103 morphine (PF) 4 mg/mL injection 4 mg 4 mg Intravenous Given     09/16/2019 2153 insulin regular (HumuLIN R,NovoLIN R) injection 10 Units 10 Units Intravenous Given     09/16/2019 2224 ondansetron (ZOFRAN) injection 4 mg 4 mg Intravenous Given     09/16/2019 2252 sodium chloride 0 9 % infusion 250 mL/hr Intravenous New Bag        Past Medical History:   Diagnosis Date    Asthma     Chronic pain     Diabetes mellitus ([facility] )     Gastroparesis      Present on Admission:   Intractable nausea and vomiting secondary to gastroparesis likely secondary to diabetes mellitus   Asthma   Chronic pain   Essential hypertension   Morbid obesity ([facility] )    Admitting Diagnosis: Vomiting [R11 10]  Diabetic gastroparesis ([facility] ) [E11 43, K31 84]  Intractable vomiting [R11 10]     Age/Sex: 53 y o  female     Admission Orders:  NPO; Sips with meds   IP CONSULT TO PODIATRY    Current Facility-Administered Medications:  acetaminophen 650 mg Oral Q6H PRN   albuterol 2 puff Inhalation Q6H PRN   albuterol 2 5 mg Nebulization Q6H PRN   aluminum-magnesium hydroxide-simethicone 30 mL Oral Q6H PRN   carisoprodol 350 mg Oral BID   diazepam 5 mg Oral HS PRN   dicyclomine 10 mg Oral 4x Daily (AC & HS)   enoxaparin 40 mg Subcutaneous Daily   famotidine 20 mg Oral BID   fluticasone 1 spray Nasal Daily   fluticasone-vilanterol 1 puff Inhalation Daily   gabapentin 300 mg Oral BID   gabapentin 600 mg Oral HS   insulin glargine 50 Units Subcutaneous HS   insulin lispro 2-12 Units Subcutaneous Q6H [facility]   lisinopril 10 mg Oral Daily   metoclopramide 10 mg Intravenous Q6H [facility]   metoprolol 5 mg Intravenous Q6H PRN   montelukast 10 mg Oral HS   nystatin  Topical BID   ondansetron 4 mg Intravenous Q6H   pantoprazole 40 mg Intravenous Q24H BOLIVAR   polyethylene glycol 17 g Oral Daily PRN   pregabalin 150 mg Oral BID     Network Utilization Review Department  Phone: [phone];  Fax [phone]  [email or web address]  ATTENTION: Please call with any questions or concerns to [phone]  and carefully listen to the prompts so that you are directed to the right person  Send all requests for admission clinical reviews, approved or denied determinations and any other requests to fax [phone]   All voicemails are confidential  Qbrexza Counseling:  I discussed with the patient the risks of Qbrexza including but not limited to headache, mydriasis, blurred vision, dry eyes, nasal dryness, dry mouth, dry throat, dry skin, urinary hesitation, and constipation.  Local skin reactions including erythema, burning, stinging, and itching can also occur.

## 2023-06-23 NOTE — ASSESSMENT & PLAN NOTE
· Hemoglobin A1c 8 5  · Lantus decreased this admission continue 30 units q h s  and sliding scale insulin  · Would continue reduced dose and lower blood glucose while in-patient upon discharge Upper lip residual hemangioma

## 2023-09-07 ENCOUNTER — HOSPITAL ENCOUNTER (INPATIENT)
Facility: HOSPITAL | Age: 58
LOS: 1 days | Discharge: HOME | End: 2023-09-09
Attending: EMERGENCY MEDICINE | Admitting: PODIATRIST
Payer: COMMERCIAL

## 2023-09-07 ENCOUNTER — APPOINTMENT (EMERGENCY)
Dept: RADIOLOGY | Facility: HOSPITAL | Age: 58
End: 2023-09-07
Payer: COMMERCIAL

## 2023-09-07 DIAGNOSIS — F11.288 OPIOID DEPENDENCE WITH OTHER OPIOID-INDUCED DISORDER (HCC): ICD-10-CM

## 2023-09-07 DIAGNOSIS — M25.512 LEFT SHOULDER PAIN: ICD-10-CM

## 2023-09-07 DIAGNOSIS — E11.65 TYPE 2 DIABETES MELLITUS WITH HYPERGLYCEMIA, WITH LONG-TERM CURRENT USE OF INSULIN (HCC): ICD-10-CM

## 2023-09-07 DIAGNOSIS — J45.20 INTERMITTENT ASTHMA WITHOUT COMPLICATION, UNSPECIFIED ASTHMA SEVERITY: ICD-10-CM

## 2023-09-07 DIAGNOSIS — M86.9 OSTEOMYELITIS OF TOE (HCC): Primary | ICD-10-CM

## 2023-09-07 DIAGNOSIS — L30.4 INTERTRIGO: ICD-10-CM

## 2023-09-07 DIAGNOSIS — L97.514: ICD-10-CM

## 2023-09-07 DIAGNOSIS — Z79.4 TYPE 2 DIABETES MELLITUS WITH HYPERGLYCEMIA, WITH LONG-TERM CURRENT USE OF INSULIN (HCC): ICD-10-CM

## 2023-09-07 LAB
ALBUMIN SERPL BCP-MCNC: 3.9 G/DL (ref 3.5–5)
ALP SERPL-CCNC: 107 U/L (ref 34–104)
ALT SERPL W P-5'-P-CCNC: 16 U/L (ref 7–52)
ANION GAP SERPL CALCULATED.3IONS-SCNC: 10 MMOL/L
AST SERPL W P-5'-P-CCNC: 16 U/L (ref 13–39)
BASOPHILS # BLD AUTO: 0.06 THOUSANDS/ÂΜL (ref 0–0.1)
BASOPHILS NFR BLD AUTO: 1 % (ref 0–1)
BILIRUB SERPL-MCNC: 0.84 MG/DL (ref 0.2–1)
BUN SERPL-MCNC: 13 MG/DL (ref 5–25)
CALCIUM SERPL-MCNC: 9.5 MG/DL (ref 8.4–10.2)
CHLORIDE SERPL-SCNC: 102 MMOL/L (ref 96–108)
CO2 SERPL-SCNC: 24 MMOL/L (ref 21–32)
CREAT SERPL-MCNC: 0.72 MG/DL (ref 0.6–1.3)
CRP SERPL QL: 32.7 MG/L
EOSINOPHIL # BLD AUTO: 0.21 THOUSAND/ÂΜL (ref 0–0.61)
EOSINOPHIL NFR BLD AUTO: 2 % (ref 0–6)
ERYTHROCYTE [DISTWIDTH] IN BLOOD BY AUTOMATED COUNT: 13.6 % (ref 11.6–15.1)
ERYTHROCYTE [SEDIMENTATION RATE] IN BLOOD: 75 MM/HOUR (ref 0–29)
GFR SERPL CREATININE-BSD FRML MDRD: 91 ML/MIN/1.73SQ M
GLUCOSE SERPL-MCNC: 156 MG/DL (ref 65–140)
HCT VFR BLD AUTO: 40.2 % (ref 34.8–46.1)
HGB BLD-MCNC: 12.7 G/DL (ref 11.5–15.4)
IMM GRANULOCYTES # BLD AUTO: 0.04 THOUSAND/UL (ref 0–0.2)
IMM GRANULOCYTES NFR BLD AUTO: 0 % (ref 0–2)
LYMPHOCYTES # BLD AUTO: 1.42 THOUSANDS/ÂΜL (ref 0.6–4.47)
LYMPHOCYTES NFR BLD AUTO: 13 % (ref 14–44)
MCH RBC QN AUTO: 26.8 PG (ref 26.8–34.3)
MCHC RBC AUTO-ENTMCNC: 31.6 G/DL (ref 31.4–37.4)
MCV RBC AUTO: 85 FL (ref 82–98)
MONOCYTES # BLD AUTO: 0.78 THOUSAND/ÂΜL (ref 0.17–1.22)
MONOCYTES NFR BLD AUTO: 7 % (ref 4–12)
NEUTROPHILS # BLD AUTO: 8.22 THOUSANDS/ÂΜL (ref 1.85–7.62)
NEUTS SEG NFR BLD AUTO: 77 % (ref 43–75)
NRBC BLD AUTO-RTO: 0 /100 WBCS
PLATELET # BLD AUTO: 202 THOUSANDS/UL (ref 149–390)
PMV BLD AUTO: 13.1 FL (ref 8.9–12.7)
POTASSIUM SERPL-SCNC: 4 MMOL/L (ref 3.5–5.3)
PROT SERPL-MCNC: 7.9 G/DL (ref 6.4–8.4)
RBC # BLD AUTO: 4.74 MILLION/UL (ref 3.81–5.12)
SODIUM SERPL-SCNC: 136 MMOL/L (ref 135–147)
WBC # BLD AUTO: 10.73 THOUSAND/UL (ref 4.31–10.16)

## 2023-09-07 PROCEDURE — 99284 EMERGENCY DEPT VISIT MOD MDM: CPT

## 2023-09-07 PROCEDURE — 87205 SMEAR GRAM STAIN: CPT

## 2023-09-07 PROCEDURE — 73630 X-RAY EXAM OF FOOT: CPT

## 2023-09-07 PROCEDURE — 85025 COMPLETE CBC W/AUTO DIFF WBC: CPT

## 2023-09-07 PROCEDURE — 87070 CULTURE OTHR SPECIMN AEROBIC: CPT

## 2023-09-07 PROCEDURE — 99285 EMERGENCY DEPT VISIT HI MDM: CPT

## 2023-09-07 PROCEDURE — 96365 THER/PROPH/DIAG IV INF INIT: CPT

## 2023-09-07 PROCEDURE — 86140 C-REACTIVE PROTEIN: CPT

## 2023-09-07 PROCEDURE — 96361 HYDRATE IV INFUSION ADD-ON: CPT

## 2023-09-07 PROCEDURE — 80053 COMPREHEN METABOLIC PANEL: CPT

## 2023-09-07 PROCEDURE — 85652 RBC SED RATE AUTOMATED: CPT

## 2023-09-07 PROCEDURE — 96367 TX/PROPH/DG ADDL SEQ IV INF: CPT

## 2023-09-07 PROCEDURE — 87147 CULTURE TYPE IMMUNOLOGIC: CPT

## 2023-09-07 PROCEDURE — 87040 BLOOD CULTURE FOR BACTERIA: CPT

## 2023-09-07 PROCEDURE — 87186 SC STD MICRODIL/AGAR DIL: CPT

## 2023-09-07 PROCEDURE — 73030 X-RAY EXAM OF SHOULDER: CPT

## 2023-09-07 PROCEDURE — 36415 COLL VENOUS BLD VENIPUNCTURE: CPT

## 2023-09-07 RX ORDER — CEFTRIAXONE 2 G/50ML
2000 INJECTION, SOLUTION INTRAVENOUS ONCE
Status: COMPLETED | OUTPATIENT
Start: 2023-09-07 | End: 2023-09-07

## 2023-09-07 RX ORDER — MORPHINE SULFATE 15 MG/1
75 TABLET, FILM COATED, EXTENDED RELEASE ORAL 2 TIMES DAILY
COMMUNITY

## 2023-09-07 RX ORDER — CLOTRIMAZOLE 1 %
CREAM (GRAM) TOPICAL 2 TIMES DAILY
Status: DISCONTINUED | OUTPATIENT
Start: 2023-09-07 | End: 2023-09-09 | Stop reason: HOSPADM

## 2023-09-07 RX ORDER — HYDROMORPHONE HYDROCHLORIDE 4 MG/1
4 TABLET ORAL 3 TIMES DAILY
COMMUNITY

## 2023-09-07 RX ADMIN — CLOTRIMAZOLE: 1 CREAM TOPICAL at 23:38

## 2023-09-07 RX ADMIN — VANCOMYCIN HYDROCHLORIDE 2000 MG: 1 INJECTION, POWDER, LYOPHILIZED, FOR SOLUTION INTRAVENOUS at 23:04

## 2023-09-07 RX ADMIN — SODIUM CHLORIDE 1000 ML: 0.9 INJECTION, SOLUTION INTRAVENOUS at 21:40

## 2023-09-07 RX ADMIN — CEFTRIAXONE 2000 MG: 2 INJECTION, SOLUTION INTRAVENOUS at 22:27

## 2023-09-07 NOTE — Clinical Note
Case was discussed with Podiatry and the patient's admission status was agreed to be Admission Status: inpatient status to the service of Dr. Terrell

## 2023-09-08 ENCOUNTER — APPOINTMENT (INPATIENT)
Dept: MRI IMAGING | Facility: HOSPITAL | Age: 58
End: 2023-09-08
Payer: COMMERCIAL

## 2023-09-08 PROBLEM — L97.519: Status: ACTIVE | Noted: 2023-09-08

## 2023-09-08 LAB
ANION GAP SERPL CALCULATED.3IONS-SCNC: 9 MMOL/L
BASOPHILS # BLD AUTO: 0.05 THOUSANDS/ÂΜL (ref 0–0.1)
BASOPHILS NFR BLD AUTO: 1 % (ref 0–1)
BUN SERPL-MCNC: 11 MG/DL (ref 5–25)
CALCIUM SERPL-MCNC: 8.7 MG/DL (ref 8.4–10.2)
CHLORIDE SERPL-SCNC: 105 MMOL/L (ref 96–108)
CO2 SERPL-SCNC: 22 MMOL/L (ref 21–32)
CREAT SERPL-MCNC: 0.56 MG/DL (ref 0.6–1.3)
EOSINOPHIL # BLD AUTO: 0.23 THOUSAND/ÂΜL (ref 0–0.61)
EOSINOPHIL NFR BLD AUTO: 3 % (ref 0–6)
ERYTHROCYTE [DISTWIDTH] IN BLOOD BY AUTOMATED COUNT: 13.7 % (ref 11.6–15.1)
EST. AVERAGE GLUCOSE BLD GHB EST-MCNC: 183 MG/DL
GFR SERPL CREATININE-BSD FRML MDRD: 102 ML/MIN/1.73SQ M
GLUCOSE SERPL-MCNC: 109 MG/DL (ref 65–140)
GLUCOSE SERPL-MCNC: 111 MG/DL (ref 65–140)
GLUCOSE SERPL-MCNC: 124 MG/DL (ref 65–140)
GLUCOSE SERPL-MCNC: 152 MG/DL (ref 65–140)
GLUCOSE SERPL-MCNC: 162 MG/DL (ref 65–140)
HBA1C MFR BLD: 8 %
HCT VFR BLD AUTO: 36.5 % (ref 34.8–46.1)
HGB BLD-MCNC: 11.6 G/DL (ref 11.5–15.4)
IMM GRANULOCYTES # BLD AUTO: 0.04 THOUSAND/UL (ref 0–0.2)
IMM GRANULOCYTES NFR BLD AUTO: 0 % (ref 0–2)
LYMPHOCYTES # BLD AUTO: 2.29 THOUSANDS/ÂΜL (ref 0.6–4.47)
LYMPHOCYTES NFR BLD AUTO: 26 % (ref 14–44)
MCH RBC QN AUTO: 27.4 PG (ref 26.8–34.3)
MCHC RBC AUTO-ENTMCNC: 31.8 G/DL (ref 31.4–37.4)
MCV RBC AUTO: 86 FL (ref 82–98)
MONOCYTES # BLD AUTO: 0.74 THOUSAND/ÂΜL (ref 0.17–1.22)
MONOCYTES NFR BLD AUTO: 8 % (ref 4–12)
NEUTROPHILS # BLD AUTO: 5.54 THOUSANDS/ÂΜL (ref 1.85–7.62)
NEUTS SEG NFR BLD AUTO: 62 % (ref 43–75)
NRBC BLD AUTO-RTO: 0 /100 WBCS
PLATELET # BLD AUTO: 184 THOUSANDS/UL (ref 149–390)
PLATELET # BLD AUTO: 191 THOUSANDS/UL (ref 149–390)
PMV BLD AUTO: 13.1 FL (ref 8.9–12.7)
PMV BLD AUTO: 13.2 FL (ref 8.9–12.7)
POTASSIUM SERPL-SCNC: 3.9 MMOL/L (ref 3.5–5.3)
RBC # BLD AUTO: 4.24 MILLION/UL (ref 3.81–5.12)
SODIUM SERPL-SCNC: 136 MMOL/L (ref 135–147)
WBC # BLD AUTO: 8.89 THOUSAND/UL (ref 4.31–10.16)

## 2023-09-08 PROCEDURE — 85025 COMPLETE CBC W/AUTO DIFF WBC: CPT

## 2023-09-08 PROCEDURE — 82948 REAGENT STRIP/BLOOD GLUCOSE: CPT

## 2023-09-08 PROCEDURE — 0Y6T0Z2 DETACHMENT AT RIGHT 3RD TOE, MID, OPEN APPROACH: ICD-10-PCS | Performed by: PODIATRIST

## 2023-09-08 PROCEDURE — 73718 MRI LOWER EXTREMITY W/O DYE: CPT

## 2023-09-08 PROCEDURE — 83036 HEMOGLOBIN GLYCOSYLATED A1C: CPT | Performed by: PHYSICIAN ASSISTANT

## 2023-09-08 PROCEDURE — 99223 1ST HOSP IP/OBS HIGH 75: CPT | Performed by: PODIATRIST

## 2023-09-08 PROCEDURE — 85049 AUTOMATED PLATELET COUNT: CPT

## 2023-09-08 PROCEDURE — 99253 IP/OBS CNSLTJ NEW/EST LOW 45: CPT | Performed by: PHYSICIAN ASSISTANT

## 2023-09-08 PROCEDURE — 28825 PARTIAL AMPUTATION OF TOE: CPT | Performed by: PODIATRIST

## 2023-09-08 PROCEDURE — 80048 BASIC METABOLIC PNL TOTAL CA: CPT

## 2023-09-08 PROCEDURE — NC001 PR NO CHARGE: Performed by: PODIATRIST

## 2023-09-08 RX ORDER — NYSTATIN 100000 [USP'U]/G
POWDER TOPICAL 2 TIMES DAILY
Status: DISCONTINUED | OUTPATIENT
Start: 2023-09-08 | End: 2023-09-09 | Stop reason: HOSPADM

## 2023-09-08 RX ORDER — INSULIN LISPRO 100 [IU]/ML
2-12 INJECTION, SOLUTION INTRAVENOUS; SUBCUTANEOUS
Status: DISCONTINUED | OUTPATIENT
Start: 2023-09-08 | End: 2023-09-09 | Stop reason: HOSPADM

## 2023-09-08 RX ORDER — CALCIUM CARBONATE 500 MG/1
1000 TABLET, CHEWABLE ORAL DAILY PRN
Status: DISCONTINUED | OUTPATIENT
Start: 2023-09-08 | End: 2023-09-09 | Stop reason: HOSPADM

## 2023-09-08 RX ORDER — HYDROMORPHONE HYDROCHLORIDE 4 MG/1
4 TABLET ORAL 3 TIMES DAILY
Status: CANCELLED | OUTPATIENT
Start: 2023-09-08

## 2023-09-08 RX ORDER — HYDROMORPHONE HYDROCHLORIDE 4 MG/1
4 TABLET ORAL 3 TIMES DAILY
Status: DISCONTINUED | OUTPATIENT
Start: 2023-09-08 | End: 2023-09-09 | Stop reason: HOSPADM

## 2023-09-08 RX ORDER — CARISOPRODOL 350 MG/1
350 TABLET ORAL 2 TIMES DAILY
Status: DISCONTINUED | OUTPATIENT
Start: 2023-09-08 | End: 2023-09-09 | Stop reason: HOSPADM

## 2023-09-08 RX ORDER — ALBUTEROL SULFATE 90 UG/1
2 AEROSOL, METERED RESPIRATORY (INHALATION) EVERY 4 HOURS PRN
Status: DISCONTINUED | OUTPATIENT
Start: 2023-09-08 | End: 2023-09-09 | Stop reason: HOSPADM

## 2023-09-08 RX ORDER — MORPHINE SULFATE 15 MG/1
75 TABLET, FILM COATED, EXTENDED RELEASE ORAL EVERY 12 HOURS SCHEDULED
Status: DISCONTINUED | OUTPATIENT
Start: 2023-09-08 | End: 2023-09-09 | Stop reason: HOSPADM

## 2023-09-08 RX ORDER — INSULIN LISPRO 100 [IU]/ML
10 INJECTION, SOLUTION INTRAVENOUS; SUBCUTANEOUS
Status: DISCONTINUED | OUTPATIENT
Start: 2023-09-08 | End: 2023-09-08

## 2023-09-08 RX ORDER — CEFAZOLIN SODIUM 2 G/50ML
2000 SOLUTION INTRAVENOUS EVERY 8 HOURS
Status: DISCONTINUED | OUTPATIENT
Start: 2023-09-08 | End: 2023-09-09

## 2023-09-08 RX ORDER — HEPARIN SODIUM 5000 [USP'U]/ML
5000 INJECTION, SOLUTION INTRAVENOUS; SUBCUTANEOUS EVERY 8 HOURS SCHEDULED
Status: DISCONTINUED | OUTPATIENT
Start: 2023-09-08 | End: 2023-09-09 | Stop reason: HOSPADM

## 2023-09-08 RX ORDER — ONDANSETRON 2 MG/ML
4 INJECTION INTRAMUSCULAR; INTRAVENOUS EVERY 6 HOURS PRN
Status: DISCONTINUED | OUTPATIENT
Start: 2023-09-08 | End: 2023-09-09 | Stop reason: HOSPADM

## 2023-09-08 RX ORDER — DIAZEPAM 5 MG/1
5 TABLET ORAL
Status: DISCONTINUED | OUTPATIENT
Start: 2023-09-08 | End: 2023-09-09 | Stop reason: HOSPADM

## 2023-09-08 RX ORDER — INSULIN GLARGINE 100 [IU]/ML
30 INJECTION, SOLUTION SUBCUTANEOUS
Status: DISCONTINUED | OUTPATIENT
Start: 2023-09-08 | End: 2023-09-09 | Stop reason: HOSPADM

## 2023-09-08 RX ORDER — FLUTICASONE PROPIONATE 50 MCG
1 SPRAY, SUSPENSION (ML) NASAL DAILY
Status: DISCONTINUED | OUTPATIENT
Start: 2023-09-08 | End: 2023-09-09 | Stop reason: HOSPADM

## 2023-09-08 RX ORDER — POLYETHYLENE GLYCOL 3350 17 G/17G
17 POWDER, FOR SOLUTION ORAL DAILY PRN
Status: DISCONTINUED | OUTPATIENT
Start: 2023-09-08 | End: 2023-09-09 | Stop reason: HOSPADM

## 2023-09-08 RX ORDER — LIDOCAINE HYDROCHLORIDE 10 MG/ML
10 INJECTION, SOLUTION EPIDURAL; INFILTRATION; INTRACAUDAL; PERINEURAL ONCE
Status: COMPLETED | OUTPATIENT
Start: 2023-09-08 | End: 2023-09-08

## 2023-09-08 RX ORDER — FLUTICASONE FUROATE AND VILANTEROL 100; 25 UG/1; UG/1
1 POWDER RESPIRATORY (INHALATION) DAILY
Status: DISCONTINUED | OUTPATIENT
Start: 2023-09-08 | End: 2023-09-09 | Stop reason: HOSPADM

## 2023-09-08 RX ORDER — MORPHINE SULFATE 15 MG/1
75 TABLET, FILM COATED, EXTENDED RELEASE ORAL EVERY 12 HOURS PRN
Status: DISCONTINUED | OUTPATIENT
Start: 2023-09-08 | End: 2023-09-08

## 2023-09-08 RX ORDER — HYDROMORPHONE HYDROCHLORIDE 4 MG/1
4 TABLET ORAL 3 TIMES DAILY
Status: DISCONTINUED | OUTPATIENT
Start: 2023-09-08 | End: 2023-09-08

## 2023-09-08 RX ORDER — LORAZEPAM 2 MG/ML
INJECTION INTRAMUSCULAR
Status: CANCELLED | OUTPATIENT
Start: 2023-09-08

## 2023-09-08 RX ORDER — PREGABALIN 75 MG/1
150 CAPSULE ORAL 2 TIMES DAILY
Status: DISCONTINUED | OUTPATIENT
Start: 2023-09-08 | End: 2023-09-09 | Stop reason: HOSPADM

## 2023-09-08 RX ORDER — LORAZEPAM 2 MG/ML
2 INJECTION INTRAMUSCULAR ONCE
Status: COMPLETED | OUTPATIENT
Start: 2023-09-08 | End: 2023-09-08

## 2023-09-08 RX ADMIN — HYDROMORPHONE HYDROCHLORIDE 4 MG: 4 TABLET ORAL at 21:54

## 2023-09-08 RX ADMIN — NYSTATIN 1 APPLICATION: 100000 POWDER TOPICAL at 08:32

## 2023-09-08 RX ADMIN — FLUTICASONE PROPIONATE 1 SPRAY: 50 SPRAY, METERED NASAL at 08:24

## 2023-09-08 RX ADMIN — LIDOCAINE HYDROCHLORIDE 10 ML: 10 INJECTION, SOLUTION EPIDURAL; INFILTRATION; INTRACAUDAL at 19:15

## 2023-09-08 RX ADMIN — DIAZEPAM 5 MG: 5 TABLET ORAL at 01:57

## 2023-09-08 RX ADMIN — HYDROMORPHONE HYDROCHLORIDE 4 MG: 4 TABLET ORAL at 08:29

## 2023-09-08 RX ADMIN — HEPARIN SODIUM 5000 UNITS: 5000 INJECTION INTRAVENOUS; SUBCUTANEOUS at 02:08

## 2023-09-08 RX ADMIN — CEFAZOLIN SODIUM 2000 MG: 2 SOLUTION INTRAVENOUS at 16:56

## 2023-09-08 RX ADMIN — ALBUTEROL SULFATE 2 PUFF: 90 AEROSOL, METERED RESPIRATORY (INHALATION) at 15:50

## 2023-09-08 RX ADMIN — MORPHINE SULFATE 75 MG: 15 TABLET, EXTENDED RELEASE ORAL at 21:54

## 2023-09-08 RX ADMIN — CARISOPRODOL 350 MG: 350 TABLET ORAL at 08:29

## 2023-09-08 RX ADMIN — HYDROMORPHONE HYDROCHLORIDE 4 MG: 4 TABLET ORAL at 01:57

## 2023-09-08 RX ADMIN — INSULIN LISPRO 10 UNITS: 100 INJECTION, SOLUTION INTRAVENOUS; SUBCUTANEOUS at 08:25

## 2023-09-08 RX ADMIN — INSULIN LISPRO 2 UNITS: 100 INJECTION, SOLUTION INTRAVENOUS; SUBCUTANEOUS at 11:56

## 2023-09-08 RX ADMIN — HYDROMORPHONE HYDROCHLORIDE 4 MG: 4 TABLET ORAL at 16:54

## 2023-09-08 RX ADMIN — CEFAZOLIN SODIUM 2000 MG: 2 SOLUTION INTRAVENOUS at 02:08

## 2023-09-08 RX ADMIN — PREGABALIN 150 MG: 75 CAPSULE ORAL at 08:29

## 2023-09-08 RX ADMIN — LORAZEPAM 2 MG: 2 INJECTION INTRAMUSCULAR; INTRAVENOUS at 16:05

## 2023-09-08 RX ADMIN — CEFAZOLIN SODIUM 2000 MG: 2 SOLUTION INTRAVENOUS at 08:31

## 2023-09-08 RX ADMIN — CARISOPRODOL 350 MG: 350 TABLET ORAL at 17:04

## 2023-09-08 RX ADMIN — ALBUTEROL SULFATE 2 PUFF: 90 AEROSOL, METERED RESPIRATORY (INHALATION) at 08:23

## 2023-09-08 RX ADMIN — INSULIN GLARGINE 30 UNITS: 100 INJECTION, SOLUTION SUBCUTANEOUS at 08:33

## 2023-09-08 RX ADMIN — NYSTATIN 1 APPLICATION: 100000 POWDER TOPICAL at 18:15

## 2023-09-08 RX ADMIN — PREGABALIN 150 MG: 75 CAPSULE ORAL at 17:04

## 2023-09-08 RX ADMIN — HEPARIN SODIUM 5000 UNITS: 5000 INJECTION INTRAVENOUS; SUBCUTANEOUS at 17:01

## 2023-09-08 RX ADMIN — FLUTICASONE FUROATE AND VILANTEROL TRIFENATATE 1 PUFF: 100; 25 POWDER RESPIRATORY (INHALATION) at 10:46

## 2023-09-08 RX ADMIN — MORPHINE SULFATE 75 MG: 15 TABLET, EXTENDED RELEASE ORAL at 11:55

## 2023-09-08 RX ADMIN — CALCIUM CARBONATE (ANTACID) CHEW TAB 500 MG 1000 MG: 500 CHEW TAB at 10:46

## 2023-09-08 RX ADMIN — HEPARIN SODIUM 5000 UNITS: 5000 INJECTION INTRAVENOUS; SUBCUTANEOUS at 08:27

## 2023-09-08 RX ADMIN — MORPHINE SULFATE 75 MG: 15 TABLET, EXTENDED RELEASE ORAL at 01:57

## 2023-09-08 NOTE — RESTORATIVE TECHNICIAN NOTE
Restorative Technician Note      Patient Name: Ángel Hernandez     Restorative Tech Visit Date: 09/08/23  Note Type: Bracing, Follow-up fitting  Patient Position Upon Consult: Supine  Brace Applied: Darco Wedge Shoe (Toe Unloading)  Additional Brace Ordered: No  Patient Position When Brace Applied: Supine  Bracing Recommendations: None  Patient Position at End of Consult: Supine;  All needs within reach

## 2023-09-08 NOTE — ASSESSMENT & PLAN NOTE
· Does not appear in acute exacerbation  · Maintained outpatient on Advair and albuterol  · We will place on Breo and albuterol while inpatient

## 2023-09-08 NOTE — ASSESSMENT & PLAN NOTE
· Patient admitted under podiatry service for right third toe ulceration with underlying osteomyelitis  · X-ray concerning for osteomyelitis, follow-up MRI  · Blood and wound cultures pending  · Continue IV Ancef  · Would likely require OR management with podiatry during hospital stay

## 2023-09-08 NOTE — PROGRESS NOTES
Podiatry - Progress Note  Patient: Demetrio Sifuentes 60 y.o. female   MRN: [id number]  PCP: Robin Brower MD  Unit/Bed#: E2 MS 69690 Encounter: [id number]  Date Of Visit: 09/08/23    ASSESSMENT:    Demetrio Sifuentes is a 60 y.o. female with:    1. Right third toe ulceration with underlying osteomyelitis  2. Type 2 diabetes melitis  3. Asthma  4. Diabetic gastroparesis  5. Opioid dependence  6. History of right hallux amputation      PLAN:    · Plan for partial Right 3rd digit amputation with Dr. Delonte Day. · Consent reviewed/signed at bedside. Patient amenable to planned procedure. · MRI reviewed: osteomyelitis of the third digit distal phalanx. Faint edema and faint low T1 signal in the third digit middle phalanx also suspicious for osteomyelitis. · Wound cultures positive for 3+ growth of gram positive cocci in pairs. · Patient will transition to 5 days of PO Keflex, starting tomorrow. · Follow up blood cultures  · Continue local wound care, appreciate nursing assistance with dressing changes. · Elevation and offloading on green foam wedges or pillows when non-ambulatory. · Appreciate consulting services for recommendations and management. Antibiotics started: Ancef  Pharmacologic VTE Prophylaxis: Heparin   Mechanical VTE Prophylaxis: sequential compression device   Weightbearing status: Weightbearing as tolerated in toe offloading shoe on the right. Disposition: Patient requires >2 midnight stay for IV antibiotics and surgical intervention of right third toe osteomyelitis. SUBJECTIVE:     The patient was seen, evaluated, and assessed at bedside today. The patient was awake, alert, and in no acute distress. No acute events overnight. The patient reports that she is feeling well today. Patient denies N/V/F/chills/SOB/CP.       OBJECTIVE:     Vitals:   /75 (BP Location: Right arm)   Pulse 96   Temp (!) 97.4 °F (36.3 °C) (Temporal)   Resp 18   Wt 135 kg (296 lb 15.4 oz)   SpO2 96%   BMI 45.15 kg/m²     Temp (24hrs), Av °F (36.7 °C), Min:97.4 °F (36.3 °C), Max:98.4 °F (36.9 °C)      Physical Exam:     Lungs: Non labored breathing  Abdomen: Soft, non-tender. Lower Extremity:  Cardiovascular status at baseline from admission. Neurological status at baseline from admission. Musculoskeletal status at baseline from admission. No calf tenderness noted. Wound #: 1  Location: Right distal third toe   Length 1.0 cm: Width 1.0 cm: Depth 0.8 cm:   Deepest Tissue Noted in Base: Bone   Probe to Bone: Yes  Peripheral Skin Description: Attached  Granulation: 0% Fibrotic Tissue: 90% Necrotic Tissue: 10%   Drainage Amount: minimal, serous  Signs of Infection: Yes, localized erythema and edema of the right third toe, malodor, positive probe to bone    Additional Data:     Labs:    Results from last 7 days   Lab Units 23  0625   WBC Thousand/uL 8.89   HEMOGLOBIN g/dL 11.6   HEMATOCRIT % 36.5   PLATELETS Thousands/uL 184  191   NEUTROS PCT % 62   LYMPHS PCT % 26   MONOS PCT % 8   EOS PCT % 3     Results from last 7 days   Lab Units 23  0625 23  2139   POTASSIUM mmol/L 3.9 4.0   CHLORIDE mmol/L 105 102   CO2 mmol/L 22 24   BUN mg/dL 11 13   CREATININE mg/dL 0.56* 0.72   CALCIUM mg/dL 8.7 9.5   ALK PHOS U/L  --  107*   ALT U/L  --  16   AST U/L  --  16           * I Have Reviewed All Lab Data Listed Above. Recent Cultures (last 7 days):     Results from last 7 days   Lab Units 23  2335 23  2139   BLOOD CULTURE   --  Received in Microbiology Lab. Culture in Progress. Received in Microbiology Lab. Culture in Progress. GRAM STAIN RESULT  1+ Polys*  3+ Gram positive cocci in pairs*  --            Imaging: I have personally reviewed pertinent films in PACS  EKG, Pathology, and Other Studies: I have personally reviewed pertinent reports. ** Please Note: Portions of the record may have been created with voice recognition software.  Occasional wrong word or "sound a like" substitutions may have occurred due to the inherent limitations of voice recognition software. Read the chart carefully and recognize, using context, where substitutions have occurred.  **

## 2023-09-08 NOTE — ASSESSMENT & PLAN NOTE
Lab Results   Component Value Date    HGBA1C 14.0 (H) 01/03/2023       Recent Labs     09/08/23  0618   POCGLU 124     · History of poorly controlled type 2 diabetes with most recent hemoglobin A1c 14  · Patient notes she is now maintained on Mounjaro alone, has not been utilizing insulin for the past few months  · Last took Cappuccini on Tuesday 9/5/23  · Continue Lantus at bedtime, will discontinue Humalog with meals for now given appropriate blood glucose to avoid hypoglycemia  · Continue sliding scale  · Adjust insulin regimen pending glucose trends

## 2023-09-08 NOTE — CONSULTS
[facility]  Consult  Name: Minh Olvera 60 y.o. female I MRN: [id number]  Unit/Bed#: E2 -01 I Date of Admission: 9/7/2023   Date of Service: 9/8/2023 I Hospital Day: 0    Inpatient consult to Internal Medicine  Consult performed by: Naima Zepeda PA-C  Consult ordered by: Neymar Packer DPM          Assessment/Plan   * Skin ulcer of third toe of right foot [facility])  Assessment & Plan  · Patient admitted under podiatry service for right third toe ulceration with underlying osteomyelitis  · X-ray concerning for osteomyelitis, follow-up MRI  · Blood and wound cultures pending  · Continue IV Ancef  · Would likely require OR management with podiatry during hospital stay    Type 2 diabetes mellitus with hyperglycemia, with long-term current use of insulin [facility])  Assessment & Plan  Lab Results   Component Value Date    HGBA1C 14.0 (H) 01/03/2023       Recent Labs     09/08/23  0618   POCGLU 124     · History of poorly controlled type 2 diabetes with most recent hemoglobin A1c 14  · Patient notes she is now maintained on Mounjaro alone, has not been utilizing insulin for the past few months  · Last took Cappuccini on Tuesday 9/5/23  · Continue Lantus at bedtime, will discontinue Humalog with meals for now given appropriate blood glucose to avoid hypoglycemia  · Continue sliding scale  · Adjust insulin regimen pending glucose trends    Essential hypertension  Assessment & Plan  · BP controlled today  · Continue wearing off medications    Diabetic gastroparesis ([address])  Assessment & Plan  · Patient with history of diabetic gastroparesis  · Encourage small frequent meals  · Continue glucose management    Opioid dependence with other opioid-induced disorder ([address])  Assessment & Plan  · Continue home regimen-Clarke, MS Contin, p.o.  Dilaudid, Valium, and Lyrica    Asthma  Assessment & Plan  · Does not appear in acute exacerbation  · Maintained outpatient on Advair and albuterol  · We will place on Breo and albuterol while inpatient         VTE Prophylaxis:   High Risk (Score >/= 5) - Pharmacological DVT Prophylaxis Ordered: heparin. Sequential Compression Devices Ordered. Recommendations for Discharge:  · Adjust insulin regimen pending glucose trends while inpatient    Total Time Spent on Date of Encounter in care of patient: 45 minutes This time was spent on one or more of the following: performing physical exam; counseling and coordination of care; obtaining or reviewing history; documenting in the medical record; reviewing/ordering tests, medications or procedures; communicating with other healthcare professionals and discussing with patient's family/caregivers. Collaboration of Care: Were Recommendations Directly Discussed with Primary Treatment Team? No    History of Present Illness:  Demetrio Sifuentes is a 60 y.o. female who is originally admitted to the podiatry service due to toe ulcer with osteomyelitis. We are consulted for medical management. Patient seen and examined at bedside. Notes she continues to have full body pain which she reports is chronic. Denies any changes in her typical pain. Awaiting MRI to discuss need for podiatric surgery. In regards to her chronic medical conditions, she notes she has been placed on Mounjaro and taken off her insulin as an outpatient. She notes she has been doing this regiment for a few months with success. Notes her blood glucose is typically 120s to 160s at home. She does follow outpatient with pain management and is taking multiple chronic opioids for chronic pain. She does report history of asthma, utilizes Advair and as needed albuterol. Review of Systems:  Review of Systems   Constitutional: Negative for chills and fever. HENT: Negative for trouble swallowing. Eyes: Negative for visual disturbance. Respiratory: Negative for cough and shortness of breath. Cardiovascular: Negative for chest pain and leg swelling.    Gastrointestinal: Negative for abdominal pain, nausea and vomiting. Genitourinary: Negative for difficulty urinating. Musculoskeletal: Positive for arthralgias and myalgias. Skin: Positive for wound. Neurological: Negative for dizziness and weakness. Psychiatric/Behavioral: Negative for confusion. Past Medical and Surgical History:   Past Medical History:   Diagnosis Date   • Acute respiratory insufficiency 11/23/2019   • Asthma    • Chronic pain    • Diabetes ([address])    • HTN (hypertension)    • Sepsis ([address]) 11/23/2019       Past Surgical History:   Procedure Laterality Date   • CERVICAL LAMINECTOMY     • CHOLECYSTECTOMY     • CHOLECYSTECTOMY LAPAROSCOPIC N/A 11/10/2018    Procedure: CHOLECYSTECTOMY LAPAROSCOPIC w/ ioc;  Surgeon: Osito Arrieta MD;  Location: [facility];  Service: General   • HYSTERECTOMY     • JOINT REPLACEMENT Bilateral     knee   • TOE AMPUTATION Right 2/2/2018    Procedure: AMPUTATION TOE, RIGHT GREAT TOE;  Surgeon: Neo Roland DPM;  Location: [facility];  Service: Podiatry       Meds/Allergies:  PTA meds:   Prior to Admission Medications   Prescriptions Last Dose Informant Patient Reported? Taking? Alcohol Swabs 70 % PADS Past Week  No Yes   Sig: May substitute brand based on insurance coverage. Check glucose ACHS. Blood Glucose Monitoring Suppl (OneTouch Verio Reflect) w/Device KIT 9/8/2023  No Yes   Sig: May substitute brand based on insurance coverage. Check glucose ACHS. HYDROmorphone (DILAUDID) 4 mg tablet 9/7/2023  Yes Yes   Sig: Take 4 mg by mouth 3 (three) times a day   Insulin Syringe-Needle U-100 (BD Insulin Syringe U/F) 31G X 5/16" 0.3 ML MISC Past Week  No Yes   Sig: For use with insulin. Pharmacy may dispense brand covered by insurance. LYRICA 150 MG capsule 9/7/2023  Yes Yes   Sig: Take 150 mg by mouth 2 (two) times a day   OneTouch Delica Lancets 59B MISC 9/7/2023  No Yes   Sig: May substitute brand based on insurance coverage. Check glucose ACHS.    albuterol (PROVENTIL HFA,VENTOLIN HFA) 90 mcg/act inhaler 2023  No Yes   Sig: Inhale 2 puffs every 4 (four) hours as needed for wheezing or shortness of breath   carisoprodol (SOMA) 350 mg tablet 2023 Self Yes Yes   Sig: Take 350 mg by mouth 2 (two) times a day   diazepam (VALIUM) 5 mg tablet 2023 Self Yes Yes   Sig: Take 5 mg by mouth daily at bedtime as needed for anxiety   fluticasone (FLONASE) 50 mcg/act nasal spray 2023 Self Yes Yes   Si spray into each nostril daily   glucose blood (OneTouch Verio) test strip 2023  No Yes   Sig: May substitute brand based on insurance coverage. Check glucose ACHS. morphine (MS CONTIN) 15 mg 12 hr tablet 2023  Yes Yes   Sig: Take 75 mg by mouth 2 (two) times a day   nystatin (MYCOSTATIN) powder Not Taking  No No   Sig: Apply topically 2 (two) times a day   Patient not taking: Reported on 2023   tirzepatide Seabron Jumper) 7.5 MG/0.5ML 2023  Yes Yes   Sig: Inject 7.5 mg under the skin Once a week      Facility-Administered Medications: None       Allergies: Allergies   Allergen Reactions   • Nsaids GI Intolerance       Social History:  Marital Status: /Civil Union  Substance Use History:   Social History     Substance and Sexual Activity   Alcohol Use Not Currently    Comment: rarely     Social History     Tobacco Use   Smoking Status Never   Smokeless Tobacco Never     Social History     Substance and Sexual Activity   Drug Use No       Family History:  Family History   Problem Relation Age of Onset   • Diabetes Mother    • Diabetes Maternal Grandmother    • No Known Problems Father        Physical Exam:   Vitals:   Blood Pressure: 146/75 (23)  Pulse: 96 (23)  Temperature: (!) 97.4 °F (36.3 °C) (23)  Temp Source: Temporal (23)  Respirations: 18 (23)  Weight - Scale: 135 kg (296 lb 15.4 oz) (23)  SpO2: 96 % (23)    Physical Exam  Vitals reviewed.    Constitutional:       General: She is not in acute distress. HENT:      Head: Normocephalic and atraumatic. Eyes:      General: No scleral icterus. Conjunctiva/sclera: Conjunctivae normal.   Cardiovascular:      Rate and Rhythm: Normal rate and regular rhythm. Heart sounds: No murmur heard. Pulmonary:      Effort: Pulmonary effort is normal. No respiratory distress. Breath sounds: Normal breath sounds. Abdominal:      General: Bowel sounds are normal. There is no distension. Palpations: Abdomen is soft. Tenderness: There is no abdominal tenderness. Musculoskeletal:      Cervical back: Neck supple. Right lower leg: No edema. Left lower leg: No edema. Comments: Right foot wrapped in margie-clean dry intact   Skin:     General: Skin is warm and dry. Neurological:      Mental Status: She is alert and oriented to person, place, and time.    Psychiatric:         Mood and Affect: Mood normal.         Behavior: Behavior normal.          Additional Data:   Lab Results:    Results from last 7 days   Lab Units 09/08/23  0625   WBC Thousand/uL 8.89   HEMOGLOBIN g/dL 11.6   HEMATOCRIT % 36.5   PLATELETS Thousands/uL 184  191   NEUTROS PCT % 62   LYMPHS PCT % 26   MONOS PCT % 8   EOS PCT % 3     Results from last 7 days   Lab Units 09/08/23  0625 09/07/23  2139   SODIUM mmol/L 136 136   POTASSIUM mmol/L 3.9 4.0   CHLORIDE mmol/L 105 102   CO2 mmol/L 22 24   BUN mg/dL 11 13   CREATININE mg/dL 0.56* 0.72   ANION GAP mmol/L 9 10   CALCIUM mg/dL 8.7 9.5   ALBUMIN g/dL  --  3.9   TOTAL BILIRUBIN mg/dL  --  0.84   ALK PHOS U/L  --  107*   ALT U/L  --  16   AST U/L  --  16   GLUCOSE RANDOM mg/dL 111 156*             Lab Results   Component Value Date/Time    HGBA1C 14.0 (H) 01/03/2023 06:04 AM    HGBA1C 11.4 (H) 05/10/2022 06:31 AM    HGBA1C 10.8 (H) 06/26/2020 04:47 AM     Results from last 7 days   Lab Units 09/08/23  0618   POC GLUCOSE mg/dl 124           Imaging: Reviewed radiology reports from this admission including: xray(s)  XR foot 3+ views RIGHT   ED Interpretation by Cheryl Murillo PA-C (09/07 2227)   Bony destruction distal tuft third phalanx consistent with osteomyelitis as interpreted by me       Final Result by Rick Barrientos MD (09/08 0813)      Osteomyelitis of the 3rd distal phalanx. Workstation performed: [id number]         XR shoulder 2+ views LEFT   ED Interpretation by Cheryl Murillo PA-C (09/07 2228)   No obvious fracture or dislocation as interpreted by me       Final Result by Rick Barrientos MD (09/08 9344)      No acute osseous abnormality. Workstation performed: [id number]         MRI foot/forefoot toes right wo contrast    (Results Pending)       EKG, Pathology, and Other Studies Reviewed on Admission:   · EKG: No EKG obtained. ** Please Note: This note may have been constructed using a voice recognition system.  **

## 2023-09-08 NOTE — H&P
Podiatry - H&P  Walter Carranza 60 y.o. female MRN: [id number]  Unit/Bed#: ED-23 Encounter: [id number]  Admission Date: 09/08/23    ASSESSMENT:    Walter Carranza is a 60 y.o. female with:    1. Right third toe ulceration with underlying osteomyelitis  2. Type 2 diabetes melitis  3. Asthma  4. Diabetic gastroparesis  5. Opioid dependence  6. History of right hallux amputation    PLAN:    · Patient to be admitted under podiatry service of Dr. Reggie Cleary for right third toe ulceration with underlying osteomyelitis  · Discussed with patient that due to cortical erosions seen on x-ray and positive probe to bone, malodor, edema, erythema of right third toe that amputation of the right third toe is recommended. Patient understands and is amenable to this recommendation. · X-rays of right foot taken today, final read pending. Per my personal read, there is cortical erosion and destruction of the right third distal phalanx suspicious for osteomyelitis. · Follow-up wound cultures  · Follow-up MRI results  · Continue to follow trend of CBC  · Begin IV Ancef until blood cultures and wound cultures return and antibiotic sensitivities can be determined. · Slim consulted for medical management, appreciate their recommendations. · Continuation of all home medications aside from oral hyperglycemics. · Toe offloading shoe ordered for right foot  · Local wound care consisting of Betadine, Adaptic DSD to right third toe. Podiatry to handle dressing changes at this time. · Plan to continue local wound care of right third toe ulceration until MRI results are in and podiatric surgical intervention can be planned. At this time, there are no concrete OR plans. · Plan discussed with Dr. Jackelin Gonzalez started:  Ancef  Pharmacologic VTE Prophylaxis: Heparin   Mechanical VTE Prophylaxis: sequential compression device   Weightbearing status: Weightbearing as tolerated in toe offloading shoe to the right lower extremity      Disposition:  Patient requires >2 midnight stay for IV antibiotics and surgical intervention of right third toe osteomyelitis. SUBJECTIVE:    History of Present Illness:    Eduar Aguilera is a 60 y.o. female who is being admitted 9/7/2023 due to right third toe ulceration with underlying osteomyelitis. Patient has a past medical history of type 2 diabetes mellitus, asthma, opioid dependence. Patient reports her right third toe began to become red, hot, and swollen about 5 days ago. Patient denies any trauma to the right third toe. Patient reports that part of her right third toenail did fall off this morning. Patient went to urgent care earlier today, where x-rays were taken and it was recommended that due to noted cortical erosion and bony destruction that she should come to the emergency room for further treatment. Patient states that the only injury to the toe that she can recall was stubbing it on her way into the emergency room. Patient reports that she does have neuropathy but she is still has sensation in her feet. Patient reports that she used to see a podiatrist on a regular basis however they moved to another state and she has not seen one in several years. Patient does have an appointment with a podiatrist for the first time in several years but this is not for several weeks. Patient states that she understands that she will likely need to have her right third toe amputated since she has had prior amputations before. Patient reports that she is feeling anxious because she is concerned about having a good quality of life so that she can spend time with her family and grandchildren. Patient's  was present during entire visit. Review of Systems:    Constitutional: Negative. HENT: Negative. Eyes: Negative. Respiratory: Negative. Cardiovascular: Negative. Gastrointestinal: Negative.     Musculoskeletal: Negative  Skin: Red swollen right third toe with wound  Neurological: Numbness  Psych: Negative.      Past Medical and Surgical History:     Past Medical History:   Diagnosis Date   • Acute respiratory insufficiency 11/23/2019   • Asthma    • Chronic pain    • Diabetes ([address])    • HTN (hypertension)    • Sepsis ([address]) 11/23/2019       Past Surgical History:   Procedure Laterality Date   • CERVICAL LAMINECTOMY     • CHOLECYSTECTOMY     • CHOLECYSTECTOMY LAPAROSCOPIC N/A 11/10/2018    Procedure: CHOLECYSTECTOMY LAPAROSCOPIC w/ ioc;  Surgeon: Kurt Bee MD;  Location: [facility];  Service: General   • HYSTERECTOMY     • JOINT REPLACEMENT Bilateral     knee   • TOE AMPUTATION Right 2/2/2018    Procedure: AMPUTATION TOE, RIGHT GREAT TOE;  Surgeon: Priyanka Santiago DPM;  Location: [facility];  Service: Podiatry       Meds/Allergies:    (Not in a hospital admission)      Allergies   Allergen Reactions   • Nsaids GI Intolerance       Social History:    Social History     Marital Status: /Civil Union    Substance Use History:   Social History     Substance and Sexual Activity   Alcohol Use Not Currently    Comment: rarely     Social History     Tobacco Use   Smoking Status Never   Smokeless Tobacco Never     Social History     Substance and Sexual Activity   Drug Use No       Family History:    Family History   Problem Relation Age of Onset   • Diabetes Mother    • Diabetes Maternal Grandmother    • No Known Problems Father          OBJECTIVE:    First Vitals:   Blood Pressure: 141/63 (09/07/23 2113)  Pulse: (!) 123 (09/07/23 2113)  Temperature: 98.4 °F (36.9 °C) (09/07/23 2114)  Temp Source: Oral (09/07/23 2114)  Respirations: 18 (09/07/23 2113)  Weight - Scale: 135 kg (296 lb 15.4 oz) (09/07/23 2202)  SpO2: 97 % (09/07/23 2113)    Current Vitals:   Blood Pressure: 158/84 (09/08/23 0015)  Pulse: (!) 106 (09/08/23 0015)  Temperature: 98.4 °F (36.9 °C) (09/07/23 2114)  Temp Source: Oral (09/07/23 2114)  Respirations: 18 (09/08/23 0015)  Weight - Scale: 135 kg (296 lb 15.4 oz) (09/07/23 2202)  SpO2: 98 % (09/08/23 0015)    Physical Exam    General Appearance: Alert, cooperative, no distress. HEENT: Head normocephalic, atraumatic, without obvious abnormality. Heart: Normal rate and rhythm. No murmurs or gallops noted. Lungs: Non-labored breathing. No respiratory distress. No wheezing, rhonchi, or rales. Abdomen:  Soft, nontender, without distension. Psychiatric: AAOx3  Lower Extremity:  Vascular:   Pedal pulses are present. CRT < 3 seconds at the digits. +0/4 edema noted at bilateral lower extremities. Pedal hair is absent. Skin temperature is WNL bilaterally. Musculoskeletal:  MMT is 4/5 in all muscle compartments bilaterally. ROM at the 1st MPJ and ankle joint are reduced bilaterally with the leg extended. No Pain on palpation of right third toe. S/p right hallux amputation     Dermatological:  Wound #: 1  Location: Right distal third toe   Length 1.0 cm: Width 1.0 cm: Depth 0.8 cm:   Deepest Tissue Noted in Base: Bone   Probe to Bone: Yes  Peripheral Skin Description: Attached  Granulation: 0% Fibrotic Tissue: 90% Necrotic Tissue: 10%   Drainage Amount: minimal, serous  Signs of Infection: Yes, localized erythema and edema of the right third toe, malodor, positive probe to bone    Neurological:  Gross sensation is intact. Protective sensation is diminished. Patient Reports numbness and/or paresthesias. Clinical Images 09/08/23:               Additional Data:    Lab Results:   Admission on 09/07/2023   Component Date Value   • WBC 09/07/2023 10.73 (H)    • RBC 09/07/2023 4.74    • Hemoglobin 09/07/2023 12.7    • Hematocrit 09/07/2023 40.2    • MCV 09/07/2023 85    • MCH 09/07/2023 26.8    • MCHC 09/07/2023 31.6    • RDW 09/07/2023 13.6    • MPV 09/07/2023 13.1 (H)    • Platelets 27/16/7586 202    • nRBC 09/07/2023 0    • Neutrophils Relative 09/07/2023 77 (H)    • Immat GRANS % 09/07/2023 0    • Lymphocytes Relative 09/07/2023 13 (L)    • Monocytes Relative 09/07/2023 7    • Eosinophils Relative 09/07/2023 2    • Basophils Relative 09/07/2023 1    • Neutrophils Absolute 09/07/2023 8.22 (H)    • Immature Grans Absolute 09/07/2023 0.04    • Lymphocytes Absolute 09/07/2023 1.42    • Monocytes Absolute 09/07/2023 0.78    • Eosinophils Absolute 09/07/2023 0.21    • Basophils Absolute 09/07/2023 0.06    • Sodium 09/07/2023 136    • Potassium 09/07/2023 4.0    • Chloride 09/07/2023 102    • CO2 09/07/2023 24    • ANION GAP 09/07/2023 10    • BUN 09/07/2023 13    • Creatinine 09/07/2023 0.72    • Glucose 09/07/2023 156 (H)    • Calcium 09/07/2023 9.5    • AST 09/07/2023 16    • ALT 09/07/2023 16    • Alkaline Phosphatase 09/07/2023 107 (H)    • Total Protein 09/07/2023 7.9    • Albumin 09/07/2023 3.9    • Total Bilirubin 09/07/2023 0.84    • eGFR 09/07/2023 91    • Sed Rate 09/07/2023 75 (H)    • CRP 09/07/2023 32.7 (H)        Cultures:            Imaging: I have personally reviewed pertinent reports in PACS  XR toe right great min 2 view    Result Date: 9/7/2023  Impression: IMPRESSION: Acute osteomyelitis distal tuft third digit. Workstation:[id number]      EKG, Pathology, and Other Studies: I have personally reviewed pertinent reports.       Code Status: Prior

## 2023-09-08 NOTE — ED NOTES
Pt reports being seen at urgent care earlier tonight where they took an xray and pt was told to come to the ED     Ravindra Kumar RN  09/07/23 1105

## 2023-09-08 NOTE — ED PROVIDER NOTES
History  Chief Complaint   Patient presents with   • Medical Problem     Pt reports infection in the third digit on her R foot that's started 5 days ago. The patient is a 60 YOF with hx DMII, HTN, asthma, who presents to the ED for evaluation of a wound to her right third toe that she noticed 5 days ago. She reports today, she had increased redness and pain to the toe, prompting ED evaluation. Her to be evaluated at a urgent care who referred her to the ED. An x-ray of the foot was done in urgent care, and evidence of osteomyelitis was noted in the toe. She also reports rash underneath her pannus which has been present x several days. Reports history of same. She also notes last week, she lifted something heavy and has since had left shoulder pain. When she is lying flat, she does not experience pain, but when she is lifting something, or moving the arm frequently, she does experience pain in the shoulder. She denies radiation to the chest, or associated dyspnea. She otherwise denies fever, chills, chest pain, dyspnea, diarrhea, dysuria, hematuria. Prior to Admission Medications   Prescriptions Last Dose Informant Patient Reported? Taking? Alcohol Swabs 70 % PADS Past Week  No Yes   Sig: May substitute brand based on insurance coverage. Check glucose TID. Alcohol Swabs 70 % PADS Past Week  No Yes   Sig: May substitute brand based on insurance coverage. Check glucose ACHS. Alcohol Swabs 70 % PADS Past Week  No Yes   Sig: May substitute brand based on insurance coverage. Check glucose ACHS. Blood Glucose Monitoring Suppl (OneTouch Verio Reflect) w/Device KIT Past Week  No Yes   Sig: May substitute brand based on insurance coverage. Check glucose TID. Blood Glucose Monitoring Suppl (OneTouch Verio Reflect) w/Device KIT 9/8/2023  No Yes   Sig: May substitute brand based on insurance coverage. Check glucose ACHS.    HYDROmorphone (DILAUDID) 4 mg tablet 9/7/2023  Yes Yes   Sig: Take 4 mg by mouth 3 (three) times a day   Insulin Syringe-Needle U-100 (BD Insulin Syringe U/F) 31G X 5/16" 0.3 ML MISC Past Week  No Yes   Sig: For use with insulin. Pharmacy may dispense brand covered by insurance. Insulin Syringe-Needle U-100 (BD Insulin Syringe U/F) 31G X 5/16" 0.3 ML MISC Past Week  No Yes   Sig: For use with insulin. Pharmacy may dispense brand covered by insurance. Insulin Syringe-Needle U-100 (BD Insulin Syringe U/F) 31G X 5/16" 0.3 ML MISC Past Week  No Yes   Sig: For use with insulin. Pharmacy may dispense brand covered by insurance. LYRICA 150 MG capsule 2023  Yes Yes   Sig: Take 150 mg by mouth 2 (two) times a day   OneTouch Delica Lancets 23B MISC 2023  No Yes   Sig: May substitute brand based on insurance coverage. Check glucose TID. OneTouch Delica Lancets 31K MISC 2023  No Yes   Sig: May substitute brand based on insurance coverage. Check glucose ACHS. OneTouch Delica Lancets 57F MISC 2023  No Yes   Sig: May substitute brand based on insurance coverage. Check glucose ACHS. albuterol (PROVENTIL HFA,VENTOLIN HFA) 90 mcg/act inhaler 2023  No Yes   Sig: Inhale 2 puffs every 4 (four) hours as needed for wheezing or shortness of breath   carisoprodol (SOMA) 350 mg tablet 2023 Self Yes Yes   Sig: Take 350 mg by mouth 2 (two) times a day   diazepam (VALIUM) 5 mg tablet 2023 Self Yes Yes   Sig: Take 5 mg by mouth daily at bedtime as needed for anxiety   fluticasone (FLONASE) 50 mcg/act nasal spray 2023 Self Yes Yes   Si spray into each nostril daily   glucose blood (OneTouch Verio) test strip 2023  No Yes   Sig: May substitute brand based on insurance coverage. Check glucose TID. glucose blood (OneTouch Verio) test strip 2023  No Yes   Sig: May substitute brand based on insurance coverage. Check glucose ACHS. glucose blood (OneTouch Verio) test strip 2023  No Yes   Sig: May substitute brand based on insurance coverage. Check glucose ACHS. morphine (MS CONTIN) 15 mg 12 hr tablet 9/7/2023  Yes Yes   Sig: Take 75 mg by mouth 2 (two) times a day   nystatin (MYCOSTATIN) powder Not Taking  No No   Sig: Apply topically 2 (two) times a day   Patient not taking: Reported on 9/8/2023   pantoprazole (PROTONIX) 40 mg tablet Not Taking  No No   Sig: Take 1 tablet (40 mg total) by mouth daily in the early morning   Patient not taking: Reported on 9/8/2023   sucralfate (CARAFATE) 1 g tablet Not Taking  No No   Sig: Take 1 tablet (1 g total) by mouth every 6 (six) hours   Patient not taking: Reported on 9/8/2023      Facility-Administered Medications: None       Past Medical History:   Diagnosis Date   • Acute respiratory insufficiency 11/23/2019   • Asthma    • Chronic pain    • Diabetes ([address])    • HTN (hypertension)    • Sepsis ([address]) 11/23/2019       Past Surgical History:   Procedure Laterality Date   • CERVICAL LAMINECTOMY     • CHOLECYSTECTOMY     • CHOLECYSTECTOMY LAPAROSCOPIC N/A 11/10/2018    Procedure: CHOLECYSTECTOMY LAPAROSCOPIC w/ ioc;  Surgeon: Sterling Stalney MD;  Location: [facility];  Service: General   • HYSTERECTOMY     • JOINT REPLACEMENT Bilateral     knee   • TOE AMPUTATION Right 2/2/2018    Procedure: AMPUTATION TOE, RIGHT GREAT TOE;  Surgeon: Danny Rowley DPM;  Location: [facility];  Service: Podiatry       Family History   Problem Relation Age of Onset   • Diabetes Mother    • Diabetes Maternal Grandmother    • No Known Problems Father      I have reviewed and agree with the history as documented. E-Cigarette/Vaping     E-Cigarette/Vaping Substances     Social History     Tobacco Use   • Smoking status: Never   • Smokeless tobacco: Never   Substance Use Topics   • Alcohol use: Not Currently     Comment: rarely   • Drug use: No       Review of Systems   Constitutional: Negative for chills and fever. HENT: Negative for congestion and rhinorrhea. Respiratory: Negative for cough and shortness of breath.     Cardiovascular: Negative for chest pain and leg swelling. Gastrointestinal: Negative for abdominal pain, constipation, diarrhea, nausea and vomiting. Genitourinary: Negative for dysuria and flank pain. Musculoskeletal: Positive for arthralgias. Negative for myalgias. Skin: Positive for rash and wound. Neurological: Negative for dizziness, weakness, numbness and headaches. Physical Exam  Physical Exam  Vitals and nursing note reviewed. Constitutional:       General: She is not in acute distress. Appearance: She is well-developed. She is not ill-appearing. HENT:      Head: Normocephalic and atraumatic. Eyes:      Conjunctiva/sclera: Conjunctivae normal.   Cardiovascular:      Rate and Rhythm: Normal rate and regular rhythm. Heart sounds: No murmur heard. Pulmonary:      Effort: Pulmonary effort is normal. No respiratory distress. Breath sounds: Normal breath sounds. Abdominal:      Palpations: Abdomen is soft. Tenderness: There is no abdominal tenderness. Musculoskeletal:         General: No swelling. Cervical back: Neck supple. Skin:     General: Skin is warm and dry. Capillary Refill: Capillary refill takes less than 2 seconds. Comments: Erythema, edema, and skin breakdown to third toe of right foot as pictured below. Sensation intact. Capillary refill <2 seconds. TTP. No active drainage however dried drainage on band-aid. Toenail has fallen off. DP and PT pulses intact. Erythematous macular rash below the pannus consistent with intertrigo. No sloughing, drainage. Neurological:      Mental Status: She is alert.    Psychiatric:         Mood and Affect: Mood normal.                 Vital Signs  ED Triage Vitals   Temperature Pulse Respirations Blood Pressure SpO2   09/07/23 2114 09/07/23 2113 09/07/23 2113 09/07/23 2113 09/07/23 2113   98.4 °F (36.9 °C) (!) 123 18 141/63 97 %      Temp Source Heart Rate Source Patient Position - Orthostatic VS BP Location FiO2 (%)   09/07/23 2114 09/07/23 2113 09/07/23 2113 09/07/23 2113 --   Oral Monitor Lying Right arm       Pain Score       09/08/23 0157       10 - Worst Possible Pain           Vitals:    09/07/23 2113 09/07/23 2316 09/08/23 0015 09/08/23 0136   BP: 141/63 145/65 158/84 154/84   Pulse: (!) 123 (!) 106 (!) 106 (!) 109   Patient Position - Orthostatic VS: Lying Lying Lying Lying         Visual Acuity      ED Medications  Medications   clotrimazole (LOTRIMIN) 1 % cream ( Topical Given 9/7/23 2338)   albuterol (PROVENTIL HFA,VENTOLIN HFA) inhaler 2 puff (has no administration in time range)   carisoprodol (SOMA) tablet 350 mg (has no administration in time range)   diazepam (VALIUM) tablet 5 mg (5 mg Oral Given 9/8/23 0157)   fluticasone (FLONASE) 50 mcg/act nasal spray 1 spray (has no administration in time range)   pregabalin (LYRICA) capsule 150 mg (has no administration in time range)   morphine (MS CONTIN) ER tablet 75 mg (75 mg Oral Given 9/8/23 0157)   ondansetron (ZOFRAN) injection 4 mg (has no administration in time range)   polyethylene glycol (MIRALAX) packet 17 g (has no administration in time range)   calcium carbonate (TUMS) chewable tablet 1,000 mg (has no administration in time range)   heparin (porcine) subcutaneous injection 5,000 Units (5,000 Units Subcutaneous Given 9/8/23 0208)   ceFAZolin (ANCEF) IVPB (premix in dextrose) 2,000 mg 50 mL (2,000 mg Intravenous New Bag 9/8/23 0208)   insulin glargine (LANTUS) subcutaneous injection 30 Units 0.3 mL (has no administration in time range)   insulin lispro (HumaLOG) 100 units/mL subcutaneous injection 10 Units (has no administration in time range)   insulin lispro (HumaLOG) 100 units/mL subcutaneous injection 10 Units (has no administration in time range)   insulin lispro (HumaLOG) 100 units/mL subcutaneous injection 10 Units (has no administration in time range)   insulin lispro (HumaLOG) 100 units/mL subcutaneous injection 2-12 Units (has no administration in time range) HYDROmorphone (DILAUDID) tablet 4 mg (4 mg Oral Given 9/8/23 0157)   nystatin (MYCOSTATIN) powder ( Topical Not Given 9/8/23 0253)   sodium chloride 0.9 % bolus 1,000 mL (1,000 mL Intravenous New Bag 9/7/23 2140)   vancomycin (VANCOCIN) 2,000 mg in sodium chloride 0.9 % 500 mL IVPB (2,000 mg Intravenous New Bag 9/7/23 2304)   cefTRIAXone (ROCEPHIN) IVPB (premix in dextrose) 2,000 mg 50 mL (0 mg Intravenous Stopped 9/7/23 2300)       Diagnostic Studies  Results Reviewed     Procedure Component Value Units Date/Time    Platelet count [[id number]]     Lab Status: No result Specimen: Blood     Anaerobic culture and Gram stain [[id number]] Collected: 09/07/23 2335    Lab Status: In process Specimen: Wound Updated: 09/07/23 2339    Wound culture and Gram stain [[id number]] Collected: 09/07/23 2335    Lab Status:  In process Specimen: Wound from Toe, Right Updated: 09/07/23 2339    C-reactive protein [[id number]]  (Abnormal) Collected: 09/07/23 2139    Lab Status: Final result Specimen: Blood from Arm, Left Updated: 09/07/23 2241     CRP 32.7 mg/L     Comprehensive metabolic panel [[id number]]  (Abnormal) Collected: 09/07/23 2139    Lab Status: Final result Specimen: Blood from Arm, Left Updated: 09/07/23 2219     Sodium 136 mmol/L      Potassium 4.0 mmol/L      Chloride 102 mmol/L      CO2 24 mmol/L      ANION GAP 10 mmol/L      BUN 13 mg/dL      Creatinine 0.72 mg/dL      Glucose 156 mg/dL      Calcium 9.5 mg/dL      AST 16 U/L      ALT 16 U/L      Alkaline Phosphatase 107 U/L      Total Protein 7.9 g/dL      Albumin 3.9 g/dL      Total Bilirubin 0.84 mg/dL      eGFR 91 ml/min/1.73sq m     Narrative:      [place] guidelines for Chronic Kidney Disease (CKD):   •  Stage 1 with normal or high GFR (GFR > 90 mL/min/1.73 square meters)  •  Stage 2 Mild CKD (GFR = 60-89 mL/min/1.73 square meters)  •  Stage 3A Moderate CKD (GFR = 45-59 mL/min/1.73 square meters)  •  Stage 3B Moderate CKD (GFR = 30-44 mL/min/1.73 square meters)  •  Stage 4 Severe CKD (GFR = 15-29 mL/min/1.73 square meters)  •  Stage 5 End Stage CKD (GFR <15 mL/min/1.73 square meters)  Note: GFR calculation is accurate only with a steady state creatinine    Sedimentation rate, automated [[id number]]  (Abnormal) Collected: 09/07/23 2139    Lab Status: Final result Specimen: Blood from Arm, Left Updated: 09/07/23 2151     Sed Rate 75 mm/hour     CBC and differential [[id number]]  (Abnormal) Collected: 09/07/23 2139    Lab Status: Final result Specimen: Blood from Arm, Left Updated: 09/07/23 2146     WBC 10.73 Thousand/uL      RBC 4.74 Million/uL      Hemoglobin 12.7 g/dL      Hematocrit 40.2 %      MCV 85 fL      MCH 26.8 pg      MCHC 31.6 g/dL      RDW 13.6 %      MPV 13.1 fL      Platelets 886 Thousands/uL      nRBC 0 /100 WBCs      Neutrophils Relative 77 %      Immat GRANS % 0 %      Lymphocytes Relative 13 %      Monocytes Relative 7 %      Eosinophils Relative 2 %      Basophils Relative 1 %      Neutrophils Absolute 8.22 Thousands/µL      Immature Grans Absolute 0.04 Thousand/uL      Lymphocytes Absolute 1.42 Thousands/µL      Monocytes Absolute 0.78 Thousand/µL      Eosinophils Absolute 0.21 Thousand/µL      Basophils Absolute 0.06 Thousands/µL     Blood culture [[id number]] Collected: 09/07/23 2139    Lab Status: In process Specimen: Blood from Arm, Left Updated: 09/07/23 2144    Blood culture [[id number]] Collected: 09/07/23 2139    Lab Status:  In process Specimen: Blood from Arm, Right Updated: 09/07/23 2144                 XR foot 3+ views RIGHT   ED Interpretation by Anton Barajas PA-C (09/07 2227)   Bony destruction distal tuft third phalanx consistent with osteomyelitis as interpreted by me       XR shoulder 2+ views LEFT   ED Interpretation by Anton Barajas PA-C (09/07 2228)   No obvious fracture or dislocation as interpreted by me       MRI inpatient order    (Results Pending)              Procedures  Procedures ED Course  ED Course as of 09/08/23 0336   Thu Sep 07, 2023 2209 WBC(!): 10.73   2209 Sed Rate(!): 75   2225 Case discussed with podiatry resident, Dr. Marcial Dietrich. Agrees with plan for Vancomycin, Rocephin, and will be in to see patient shortly. 2249 C-REACTIVE PROTEIN(!): 32.7       SBIRT 20yo+    Flowsheet Row Most Recent Value   Initial Alcohol Screen: US AUDIT-C     1. How often do you have a drink containing alcohol? 0 Filed at: 09/07/2023 2116   2. How many drinks containing alcohol do you have on a typical day you are drinking? 0 Filed at: 09/07/2023 2116   3b. FEMALE Any Age, or MALE 65+: How often do you have 4 or more drinks on one occassion? 0 Filed at: 09/07/2023 2116   Audit-C Score 0 Filed at: 09/07/2023 2116   CARIDAD: How many times in the past year have you. .. Used an illegal drug or used a prescription medication for non-medical reasons? Never Filed at: 09/07/2023 2116          Medical Decision Making  DDX including but not limited to:cellulitis, diabetic foot ulcer, osteomyelitis, diabetic neuropathy    Will obtain ESR, CRP. Will obtain CBC to evaluate for leukocytosis, anemia. Will obtain CMP to evaluate kidney function, for electrolyte disturbance. Will obtain XRs of left shoulder and foot as I am unable to see XR from [facility] urgent care. XR consistent with osteomyelitis. Vancomycin and Rocephin ordered. Podiatry evaluated patient at bedside and will admit. Xr left shoulder unremarkable pending official read. At the time of admission, the patient is in no acute distress. I discussed with the patient and family the diagnosis, treatment plan, and plan for admission; they were given the opportunity to ask questions and verbalized understanding. They agree with plan. Intertrigo: acute illness or injury  Left shoulder pain: acute illness or injury  Osteomyelitis of toe ([address]): acute illness or injury  Amount and/or Complexity of Data Reviewed  External Data Reviewed: notes.   Labs: ordered. Decision-making details documented in ED Course. Radiology: ordered and independent interpretation performed. Decision-making details documented in ED Course. Risk  Prescription drug management. Decision regarding hospitalization. Disposition  Final diagnoses:   Osteomyelitis of toe ([address])   Intertrigo   Left shoulder pain     Time reflects when diagnosis was documented in both MDM as applicable and the Disposition within this note     Time User Action Codes Description Comment    9/7/2023 11:13 PM [place] Add [M86.9] Osteomyelitis of toe ([address])     9/8/2023 12:09 AM [place] Add [L30.4] Intertrigo     9/8/2023 12:09 AM [place] Add [M25.512] Left shoulder pain     9/8/2023 12:39 AM [place] Add [E11.65,  Z79.4] Type 2 diabetes mellitus with hyperglycemia, with long-term current use of insulin ([address])     9/8/2023 12:39 AM [place] Add [F11.288] Opioid dependence with other opioid-induced disorder ([address])     9/8/2023 12:39 AM [place] Add [J45.20] Intermittent asthma without complication, unspecified asthma severity       ED Disposition     ED Disposition   Admit    Condition   Stable    Date/Time   Fri Sep 8, 2023 12:08 AM    Comment   Case was discussed with Podiatry and the patient's admission status was agreed to be Admission Status: inpatient status to the service of Dr. Radha Cabrera . Follow-up Information    None         Current Discharge Medication List      CONTINUE these medications which have NOT CHANGED    Details   albuterol (PROVENTIL HFA,VENTOLIN HFA) 90 mcg/act inhaler Inhale 2 puffs every 4 (four) hours as needed for wheezing or shortness of breath  Qty: 1 g, Refills: 0    Comments: Substitution to a formulary equivalent within the same pharmaceutical class is authorized. Associated Diagnoses: Acute respiratory failure with hypoxia ([address])      ! ! Alcohol Swabs 70 % PADS May substitute brand based on insurance coverage.  Check glucose TID.  [place]: 100 each, Refills: 0    Associated Diagnoses: Type 2 diabetes mellitus with hyperglycemia, with long-term current use of insulin ([address])      ! ! Alcohol Swabs 70 % PADS May substitute brand based on insurance coverage. Check glucose ACHS. Qty: 200 each, Refills: 0    Associated Diagnoses: Type 2 diabetes mellitus with hyperglycemia, with long-term current use of insulin ([address])      ! ! Alcohol Swabs 70 % PADS May substitute brand based on insurance coverage. Check glucose ACHS. Qty: 200 each, Refills: 0    Associated Diagnoses: Type 2 diabetes mellitus with hyperglycemia, with long-term current use of insulin ([address])      ! ! Blood Glucose Monitoring Suppl (OneTouch Verio Reflect) w/Device KIT May substitute brand based on insurance coverage. Check glucose TID. Qty: 1 kit, Refills: 0    Associated Diagnoses: Type 2 diabetes mellitus with hyperglycemia, with long-term current use of insulin ([address])      ! ! Blood Glucose Monitoring Suppl (OneTouch Verio Reflect) w/Device KIT May substitute brand based on insurance coverage. Check glucose ACHS. Qty: 1 kit, Refills: 0    Associated Diagnoses: Type 2 diabetes mellitus with hyperglycemia, with long-term current use of insulin ([facility])      carisoprodol (SOMA) 350 mg tablet Take 350 mg by mouth 2 (two) times a day      diazepam (VALIUM) 5 mg tablet Take 5 mg by mouth daily at bedtime as needed for anxiety      fluticasone (FLONASE) 50 mcg/act nasal spray 1 spray into each nostril daily      !! glucose blood (OneTouch Verio) test strip May substitute brand based on insurance coverage. Check glucose TID. Qty: 100 each, Refills: 0    Associated Diagnoses: Type 2 diabetes mellitus with hyperglycemia, with long-term current use of insulin ([address])      ! ! glucose blood (OneTouch Verio) test strip May substitute brand based on insurance coverage. Check glucose ACHS.   Qty: 200 each, Refills: 0    Associated Diagnoses: Type 2 diabetes mellitus with hyperglycemia, with long-term current use of insulin ([facility])      ! ! glucose blood (OneTouch Verio) test strip May substitute brand based on insurance coverage. Check glucose ACHS. Qty: 200 each, Refills: 0    Associated Diagnoses: Type 2 diabetes mellitus with hyperglycemia, with long-term current use of insulin (HCC)      HYDROmorphone (DILAUDID) 4 mg tablet Take 4 mg by mouth 3 (three) times a day      !! Insulin Syringe-Needle U-100 (BD Insulin Syringe U/F) 31G X 5/16" 0.3 ML MISC For use with insulin. Pharmacy may dispense brand covered by insurance. Qty: 100 each, Refills: 0    Associated Diagnoses: Type 2 diabetes mellitus with hyperglycemia, with long-term current use of insulin ([facility])      ! ! Insulin Syringe-Needle U-100 (BD Insulin Syringe U/F) 31G X 5/16" 0.3 ML MISC For use with insulin. Pharmacy may dispense brand covered by insurance. Qty: 100 each, Refills: 0    Associated Diagnoses: Type 2 diabetes mellitus with hyperglycemia, with long-term current use of insulin ([facility])      ! ! Insulin Syringe-Needle U-100 (BD Insulin Syringe U/F) 31G X 5/16" 0.3 ML MISC For use with insulin. Pharmacy may dispense brand covered by insurance. Qty: 100 each, Refills: 0    Associated Diagnoses: Type 2 diabetes mellitus with hyperglycemia, with long-term current use of insulin ([facility])      LYRICA 150 MG capsule Take 150 mg by mouth 2 (two) times a day  Refills: 5      morphine (MS CONTIN) 15 mg 12 hr tablet Take 75 mg by mouth 2 (two) times a day      !! OneTouch Delica Lancets 38F MISC May substitute brand based on insurance coverage. Check glucose TID. Qty: 100 each, Refills: 0    Associated Diagnoses: Type 2 diabetes mellitus with hyperglycemia, with long-term current use of insulin ([facility])      !! OneTouch Delica Lancets 15Z MISC May substitute brand based on insurance coverage. Check glucose ACHS. Qty: 200 each, Refills: 0    Associated Diagnoses: Type 2 diabetes mellitus with hyperglycemia, with long-term current use of insulin ([address]      ! ! OneTouch Delica Lancets 78F MISC May substitute brand based on insurance coverage. Check glucose ACHS. Qty: 200 each, Refills: 0    Associated Diagnoses: Type 2 diabetes mellitus with hyperglycemia, with long-term current use of insulin (HCC)      nystatin (MYCOSTATIN) powder Apply topically 2 (two) times a day  Qty: 45 g, Refills: 0    Associated Diagnoses: Candidiasis      pantoprazole (PROTONIX) 40 mg tablet Take 1 tablet (40 mg total) by mouth daily in the early morning  Qty: 30 tablet, Refills: 0    Associated Diagnoses: Ambulatory dysfunction      sucralfate (CARAFATE) 1 g tablet Take 1 tablet (1 g total) by mouth every 6 (six) hours  Qty: 30 tablet, Refills: 0    Associated Diagnoses: Type 2 diabetes mellitus with hyperglycemia, with long-term current use of insulin ([address]); Ambulatory dysfunction       !! - Potential duplicate medications found. Please discuss with provider. No discharge procedures on file.     PDMP Review       Value Time User    PDMP Reviewed  Yes 1/2/2023  9:18 PM Lyn High DO          ED Provider  Electronically Signed by           Cayla Mayers PA-C  09/08/23 [id number]

## 2023-09-08 NOTE — PROCEDURES
Procedure:  Amputation right 3rd toe  Surgeon:  Dr. 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 The toe was marked and a time-out was performed and witnessed by nursing staff. Attention was directed to the right foot. 10 cc of 1% lidocaine plain was infiltrated in a digit block fashion. The foot was then scrubbed, prepped, draped in the usual aseptic manner. A time-out was again performed with the patient and office staff in presence. Using a 15 blade the toe was disarticulated at the PIP joint and discarded. There was no sign of infection at the amputation margin. It was rinsed with normal sterile saline. The skin was repaired with 4-0 nylon in simple interrupted suture. The foot was cleaned and dressed with dry sterile dressing and compression wrap. At no point during the procedure or afterwards did the patient experience any pain or difficulty. Vitals are stable following the procedure. A surgical shoe was applied.

## 2023-09-08 NOTE — PLAN OF CARE
Problem: MOBILITY - ADULT  Goal: Maintain or return to baseline ADL function  Description: INTERVENTIONS:  -  Assess patient's ability to carry out ADLs; assess patient's baseline for ADL function and identify physical deficits which impact ability to perform ADLs (bathing, care of mouth/teeth, toileting, grooming, dressing, etc.)  - Assess/evaluate cause of self-care deficits   - Assess range of motion  - Assess patient's mobility; develop plan if impaired  - Assess patient's need for assistive devices and provide as appropriate  - Encourage maximum independence but intervene and supervise when necessary  - Involve family in performance of ADLs  - Assess for home care needs following discharge   - Consider OT consult to assist with ADL evaluation and planning for discharge  - Provide patient education as appropriate  Outcome: Progressing     Problem: PAIN - ADULT  Goal: Verbalizes/displays adequate comfort level or baseline comfort level  Description: Interventions:  - Encourage patient to monitor pain and request assistance  - Assess pain using appropriate pain scale  - Administer analgesics based on type and severity of pain and evaluate response  - Implement non-pharmacological measures as appropriate and evaluate response  - Consider cultural and social influences on pain and pain management  - Notify physician/advanced practitioner if interventions unsuccessful or patient reports new pain  Outcome: Progressing     Problem: SKIN/TISSUE INTEGRITY - ADULT  Goal: Incision(s), wounds(s) or drain site(s) healing without S/S of infection  Description: INTERVENTIONS  - Assess and document dressing, incision, wound bed, drain sites and surrounding tissue  - Provide patient and family education  - Perform skin care/dressing changes prn. Podiatry to do foot drsg changes.    Outcome: Progressing     Problem: SAFETY ADULT  Goal: Patient will remain free of falls  Description: INTERVENTIONS:  - Educate patient/family on patient safety including physical limitations  - Instruct patient to call for assistance with activity   - Consult OT/PT to assist with strengthening/mobility   - Keep Call bell within reach  - Keep bed low and locked with side rails adjusted as appropriate  - Keep care items and personal belongings within reach  - Initiate and maintain comfort rounds  - Make Fall Risk Sign visible to staff  - Offer Toileting every 2  Hours, in advance of need  - Initiate/Maintain bed alarm  - Obtain necessary fall risk management equipment: yellow bracelet and socks  - Apply yellow socks and bracelet for high fall risk patients  - Consider moving patient to room near nurses station  Outcome: Progressing

## 2023-09-08 NOTE — ASSESSMENT & PLAN NOTE
· Patient with history of diabetic gastroparesis  · Encourage small frequent meals  · Continue glucose management

## 2023-09-08 NOTE — PLAN OF CARE
Problem: MOBILITY - ADULT  Goal: Maintain or return to baseline ADL function  Description: INTERVENTIONS:  -  Assess patient's ability to carry out ADLs; assess patient's baseline for ADL function and identify physical deficits which impact ability to perform ADLs (bathing, care of mouth/teeth, toileting, grooming, dressing, etc.)  - Assess/evaluate cause of self-care deficits   - Assess range of motion  - Assess patient's mobility; develop plan if impaired  - Assess patient's need for assistive devices and provide as appropriate  - Encourage maximum independence but intervene and supervise when necessary  - Involve family in performance of ADLs  - Assess for home care needs following discharge   - Consider OT consult to assist with ADL evaluation and planning for discharge  - Provide patient education as appropriate  9/8/2023 0236 by Andrea Martini RN  Outcome: Progressing  9/8/2023 0235 by Andrea Martini RN  Outcome: Progressing  Goal: Maintains/Returns to pre admission functional level  Description: INTERVENTIONS:  - Perform BMAT or MOVE assessment daily.   - Set and communicate daily mobility goal to care team and patient/family/caregiver.    - Collaborate with rehabilitation services on mobility goals if consulted  - Record patient progress and toleration of activity level   9/8/2023 0236 by Andrea Martini RN  Outcome: Progressing  9/8/2023 0235 by Andrea Martini RN  Outcome: Progressing     Problem: PAIN - ADULT  Goal: Verbalizes/displays adequate comfort level or baseline comfort level  Description: Interventions:  - Encourage patient to monitor pain and request assistance  - Assess pain using appropriate pain scale  - Administer analgesics based on type and severity of pain and evaluate response  - Implement non-pharmacological measures as appropriate and evaluate response  - Consider cultural and social influences on pain and pain management  - Notify physician/advanced practitioner if interventions unsuccessful or patient reports new pain  9/8/2023 0236 by Patricia Johns RN  Outcome: Progressing  9/8/2023 0235 by Patricia Johns RN  Outcome: Progressing     Problem: SAFETY ADULT  Goal: Patient will remain free of falls  Description: INTERVENTIONS:  - Educate patient/family on patient safety including physical limitations  - Instruct patient to call for assistance with activity   - Consult OT/PT to assist with strengthening/mobility   - Keep Call bell within reach  - Keep bed low and locked with side rails adjusted as appropriate  - Keep care items and personal belongings within reach  - Initiate and maintain comfort rounds  - Make Fall Risk Sign visible to staff  - Offer Toileting every 2  Hours, in advance of need  - Initiate/Maintain bed alarm  - Obtain necessary fall risk management equipment: yellow bracelet and socks  - Apply yellow socks and bracelet for high fall risk patients  - Consider moving patient to room near nurses station  9/8/2023 0236 by Patricia Johns RN  Outcome: Progressing  9/8/2023 0235 by Patricia Johns RN  Outcome: Progressing     Problem: METABOLIC, FLUID AND ELECTROLYTES - ADULT  Goal: Glucose maintained within target range  Description: INTERVENTIONS:  - Monitor Blood Glucose as ordered  - Assess for signs and symptoms of hyperglycemia and hypoglycemia  - Administer ordered medications to maintain glucose within target range  - Assess nutritional intake and initiate nutrition service referral as needed  9/8/2023 0236 by Patricia Johns RN  Outcome: Progressing  9/8/2023 0235 by Patricia Johns RN  Outcome: Progressing     Problem: SKIN/TISSUE INTEGRITY - ADULT  Goal: Incision(s), wounds(s) or drain site(s) healing without S/S of infection  Description: INTERVENTIONS  - Assess and document dressing, incision, wound bed, drain sites and surrounding tissue  - Provide patient and family education  - Perform skin care/dressing changes prn.  Podiatry to do foot drsg changes.    9/8/2023 0236 by Nava Level, RN  Outcome: Progressing  9/8/2023 0235 by Nava Level, RN  Outcome: Progressing

## 2023-09-09 VITALS
HEART RATE: 94 BPM | OXYGEN SATURATION: 95 % | TEMPERATURE: 98.7 F | RESPIRATION RATE: 20 BRPM | BODY MASS INDEX: 45.15 KG/M2 | DIASTOLIC BLOOD PRESSURE: 68 MMHG | SYSTOLIC BLOOD PRESSURE: 110 MMHG | WEIGHT: 293 LBS

## 2023-09-09 PROBLEM — L97.519: Status: RESOLVED | Noted: 2023-09-08 | Resolved: 2023-09-09

## 2023-09-09 LAB
GLUCOSE SERPL-MCNC: 150 MG/DL (ref 65–140)
GLUCOSE SERPL-MCNC: 158 MG/DL (ref 65–140)

## 2023-09-09 PROCEDURE — 99232 SBSQ HOSP IP/OBS MODERATE 35: CPT | Performed by: PHYSICIAN ASSISTANT

## 2023-09-09 PROCEDURE — 90677 PCV20 VACCINE IM: CPT | Performed by: PHYSICIAN ASSISTANT

## 2023-09-09 PROCEDURE — 82948 REAGENT STRIP/BLOOD GLUCOSE: CPT

## 2023-09-09 PROCEDURE — 99238 HOSP IP/OBS DSCHRG MGMT 30/<: CPT | Performed by: PODIATRIST

## 2023-09-09 RX ORDER — CEPHALEXIN 500 MG/1
500 CAPSULE ORAL EVERY 12 HOURS SCHEDULED
Qty: 10 CAPSULE | Refills: 0 | Status: SHIPPED | OUTPATIENT
Start: 2023-09-09 | End: 2023-09-14

## 2023-09-09 RX ADMIN — FLUTICASONE FUROATE AND VILANTEROL TRIFENATATE 1 PUFF: 100; 25 POWDER RESPIRATORY (INHALATION) at 08:02

## 2023-09-09 RX ADMIN — FLUTICASONE PROPIONATE 1 SPRAY: 50 SPRAY, METERED NASAL at 08:02

## 2023-09-09 RX ADMIN — CEFAZOLIN SODIUM 2000 MG: 2 SOLUTION INTRAVENOUS at 09:49

## 2023-09-09 RX ADMIN — HYDROMORPHONE HYDROCHLORIDE 4 MG: 4 TABLET ORAL at 08:03

## 2023-09-09 RX ADMIN — NYSTATIN 1 APPLICATION: 100000 POWDER TOPICAL at 08:03

## 2023-09-09 RX ADMIN — INSULIN LISPRO 2 UNITS: 100 INJECTION, SOLUTION INTRAVENOUS; SUBCUTANEOUS at 07:31

## 2023-09-09 RX ADMIN — PNEUMOCOCCAL 20-VALENT CONJUGATE VACCINE 0.5 ML
2.2; 2.2; 2.2; 2.2; 2.2; 2.2; 2.2; 2.2; 2.2; 2.2; 2.2; 2.2; 2.2; 2.2; 2.2; 2.2; 4.4; 2.2; 2.2; 2.2 INJECTION, SUSPENSION INTRAMUSCULAR at 14:07

## 2023-09-09 RX ADMIN — CARISOPRODOL 350 MG: 350 TABLET ORAL at 08:02

## 2023-09-09 RX ADMIN — CEFAZOLIN SODIUM 2000 MG: 2 SOLUTION INTRAVENOUS at 01:22

## 2023-09-09 RX ADMIN — PREGABALIN 150 MG: 75 CAPSULE ORAL at 08:03

## 2023-09-09 RX ADMIN — INSULIN GLARGINE 30 UNITS: 100 INJECTION, SOLUTION SUBCUTANEOUS at 07:31

## 2023-09-09 RX ADMIN — INSULIN LISPRO 2 UNITS: 100 INJECTION, SOLUTION INTRAVENOUS; SUBCUTANEOUS at 11:49

## 2023-09-09 RX ADMIN — MORPHINE SULFATE 75 MG: 15 TABLET, EXTENDED RELEASE ORAL at 08:03

## 2023-09-09 RX ADMIN — HEPARIN SODIUM 5000 UNITS: 5000 INJECTION INTRAVENOUS; SUBCUTANEOUS at 06:11

## 2023-09-09 RX ADMIN — ALBUTEROL SULFATE 2 PUFF: 90 AEROSOL, METERED RESPIRATORY (INHALATION) at 08:04

## 2023-09-09 RX ADMIN — DIAZEPAM 5 MG: 5 TABLET ORAL at 01:32

## 2023-09-09 NOTE — ASSESSMENT & PLAN NOTE
· Patient admitted under podiatry service for right third toe ulceration with underlying osteomyelitis  · X-ray concerning for osteomyelitis, MRI confirming osteomyelitis   · Blood culture with growth of gram + cocci, blood cultures negative at 24 hours   · Continue IV Ancef, switch to PO Keflex at discharge   · S/p 3rd toe amputation   · Continue wedge shoe

## 2023-09-09 NOTE — PLAN OF CARE
Problem: PAIN - ADULT  Goal: Verbalizes/displays adequate comfort level or baseline comfort level  Description: Interventions:  - Encourage patient to monitor pain and request assistance  - Assess pain using appropriate pain scale  - Administer analgesics based on type and severity of pain and evaluate response  - Implement non-pharmacological measures as appropriate and evaluate response  - Consider cultural and social influences on pain and pain management  - Notify physician/advanced practitioner if interventions unsuccessful or patient reports new pain  Outcome: Progressing     Problem: SAFETY ADULT  Goal: Patient will remain free of falls  Description: INTERVENTIONS:  - Educate patient/family on patient safety including physical limitations  - Instruct patient to call for assistance with activity   - Consult OT/PT to assist with strengthening/mobility   - Keep Call bell within reach  - Keep bed low and locked with side rails adjusted as appropriate  - Keep care items and personal belongings within reach  - Initiate and maintain comfort rounds  - Make Fall Risk Sign visible to staff  - Offer Toileting every 2  Hours, in advance of need  - Initiate/Maintain bed alarm  - Obtain necessary fall risk management equipment: yellow bracelet and socks  - Apply yellow socks and bracelet for high fall risk patients  - Consider moving patient to room near nurses station  Outcome: Progressing     Problem: INFECTION - ADULT  Goal: Absence or prevention of progression during hospitalization  Description: INTERVENTIONS:  - Assess and monitor for signs and symptoms of infection  - Monitor lab/diagnostic results  - Monitor all insertion sites, i.e. indwelling lines, tubes, and drains  - Monitor endotracheal if appropriate and nasal secretions for changes in amount and color  - [place] appropriate cooling/warming therapies per order  - Administer medications as ordered  - Instruct and encourage patient and family to use good hand hygiene technique  - Identify and instruct in appropriate isolation precautions for identified infection/condition  Outcome: Progressing     Problem: METABOLIC, FLUID AND ELECTROLYTES - ADULT  Goal: Glucose maintained within target range  Description: INTERVENTIONS:  - Monitor Blood Glucose as ordered  - Assess for signs and symptoms of hyperglycemia and hypoglycemia  - Administer ordered medications to maintain glucose within target range  - Assess nutritional intake and initiate nutrition service referral as needed  Outcome: Progressing     Problem: DISCHARGE PLANNING  Goal: Discharge to home or other facility with appropriate resources  Description: INTERVENTIONS:  - Identify barriers to discharge w/patient and caregiver  - Arrange for needed discharge resources and transportation as appropriate  - Identify discharge learning needs (meds, wound care, etc.)  - Arrange for interpretive services to assist at discharge as needed  - Refer to Case Management Department for coordinating discharge planning if the patient needs post-hospital services based on physician/advanced practitioner order or complex needs related to functional status, cognitive ability, or social support system  Outcome: Progressing

## 2023-09-09 NOTE — PROGRESS NOTES
[facility]  Progress Note  Name: Sherren Brood  MRN: [id number]  Unit/Bed#: E2 -01 I Date of Admission: 9/7/2023   Date of Service: 9/9/2023 I Hospital Day: 1    Assessment/Plan   * Skin ulcer of third toe of right foot [facility])  Assessment & Plan  · Patient admitted under podiatry service for right third toe ulceration with underlying osteomyelitis  · X-ray concerning for osteomyelitis, MRI confirming osteomyelitis   · Blood culture with growth of gram + cocci, blood cultures negative at 24 hours   · Continue IV Ancef, switch to PO Keflex at discharge   · S/p 3rd toe amputation   · Continue wedge shoe    Type 2 diabetes mellitus with hyperglycemia, with long-term current use of insulin [facility])  Assessment & Plan  Lab Results   Component Value Date    HGBA1C 8.0 (H) 09/08/2023       Recent Labs     09/08/23  1134 09/08/23  1645 09/08/23  2110 09/09/23  0653   POCGLU 152* 109 162* 150*     · History of poorly controlled type 2 diabetes with most recent hemoglobin A1c 14  · Patient notes she is now maintained on Mounjaro alone, has not been utilizing insulin for the past few months  · Last took [facility] on Tuesday 9/5/23  · Continue Lantus at bedtime, will discontinue Humalog with meals for now given appropriate blood glucose to avoid hypoglycemia  · Continue sliding scale  · Adjust insulin regimen pending glucose trends  · Glucose well controlled since admission- resume home regimen at discharge    Essential hypertension  Assessment & Plan  · BP controlled today  · Continue monitoring off medications     Diabetic gastroparesis ([address])  Assessment & Plan  · Patient with history of diabetic gastroparesis  · Encourage small frequent meals  · Continue glucose management    Opioid dependence with other opioid-induced disorder ([address])  Assessment & Plan  · Continue home regimen-Soma, MS Contin, p.o.  Dilaudid, Valium, and Lyrica    Asthma  Assessment & Plan  · Does not appear in acute exacerbation  · Maintained outpatient on Advair and albuterol  · We will place on Breo and albuterol while inpatient               VTE Pharmacologic Prophylaxis:   High Risk (Score >/= 5) - Pharmacological DVT Prophylaxis Ordered: heparin. Sequential Compression Devices Ordered. Patient Centered Rounds: I performed bedside rounds with nursing staff today. Discussions with Specialists or Other Care Team Provider: none    Education and Discussions with Family / Patient: Updated  () at bedside. Total Time Spent on Date of Encounter in care of patient: 35 minutes This time was spent on one or more of the following: performing physical exam; counseling and coordination of care; obtaining or reviewing history; documenting in the medical record; reviewing/ordering tests, medications or procedures; communicating with other healthcare professionals and discussing with patient's family/caregivers. Current Length of Stay: 1 day(s)  Current Patient Status: Inpatient   Certification Statement: per primary team  Discharge Plan: [facility] is following this patient on consult. They are medically stable for discharge when deemed appropriate by primary service. Code Status: Level 1 - Full Code    Subjective:   Patient seen and examined at bedside. Notes she is doing well today. Denies any significant pain in her foot. Eating and drinking well. Happy to hear her A1C has significantly improved since January. Objective:     Vitals:   Temp (24hrs), Av.9 °F (36.6 °C), Min:97.5 °F (36.4 °C), Max:98.2 °F (36.8 °C)    Temp:  [97.5 °F (36.4 °C)-98.2 °F (36.8 °C)] 98.2 °F (36.8 °C)  HR:  [90-98] 90  Resp:  [17-20] 20  BP: (114-131)/(56-81) 119/77  SpO2:  [96 %-98 %] 96 %  Body mass index is 45.15 kg/m². Input and Output Summary (last 24 hours):   No intake or output data in the 24 hours ending 23 0820    Physical Exam:   Physical Exam  Vitals reviewed.    Constitutional:       General: She is not in acute distress. HENT:      Head: Normocephalic and atraumatic. Eyes:      General: No scleral icterus. Conjunctiva/sclera: Conjunctivae normal.   Cardiovascular:      Rate and Rhythm: Normal rate and regular rhythm. Heart sounds: No murmur heard. Pulmonary:      Effort: Pulmonary effort is normal. No respiratory distress. Breath sounds: Normal breath sounds. Abdominal:      General: Bowel sounds are normal. There is no distension. Palpations: Abdomen is soft. Tenderness: There is no abdominal tenderness. Musculoskeletal:      Cervical back: Neck supple. Right lower leg: No edema. Left lower leg: No edema. Comments: Right foot wrapped in margie   Skin:     General: Skin is warm and dry. Neurological:      Mental Status: She is alert and oriented to person, place, and time.    Psychiatric:         Mood and Affect: Mood normal.         Behavior: Behavior normal.          Additional Data:     Labs:  Results from last 7 days   Lab Units 09/08/23  0625   WBC Thousand/uL 8.89   HEMOGLOBIN g/dL 11.6   HEMATOCRIT % 36.5   PLATELETS Thousands/uL 184  191   NEUTROS PCT % 62   LYMPHS PCT % 26   MONOS PCT % 8   EOS PCT % 3     Results from last 7 days   Lab Units 09/08/23  0625 09/07/23  2139   SODIUM mmol/L 136 136   POTASSIUM mmol/L 3.9 4.0   CHLORIDE mmol/L 105 102   CO2 mmol/L 22 24   BUN mg/dL 11 13   CREATININE mg/dL 0.56* 0.72   ANION GAP mmol/L 9 10   CALCIUM mg/dL 8.7 9.5   ALBUMIN g/dL  --  3.9   TOTAL BILIRUBIN mg/dL  --  0.84   ALK PHOS U/L  --  107*   ALT U/L  --  16   AST U/L  --  16   GLUCOSE RANDOM mg/dL 111 156*         Results from last 7 days   Lab Units 09/09/23  0653 09/08/23  2110 09/08/23  1645 09/08/23  1134 09/08/23  0618   POC GLUCOSE mg/dl 150* 162* 109 152* 124     Results from last 7 days   Lab Units 09/08/23  0625   HEMOGLOBIN A1C % 8.0*           Lines/Drains:  Invasive Devices     Peripheral Intravenous Line  Duration           Peripheral IV 09/07/23 Left Antecubital 1 day                      Imaging: No pertinent imaging reviewed. Recent Cultures (last 7 days):   Results from last 7 days   Lab Units 09/07/23  2335 09/07/23  2139   BLOOD CULTURE   --  No Growth at 24 hrs. No Growth at 24 hrs. GRAM STAIN RESULT  1+ Polys*  3+ Gram positive cocci in pairs*  --        Last 24 Hours Medication List:   Current Facility-Administered Medications   Medication Dose Route Frequency Provider Last Rate   • albuterol  2 puff Inhalation Q4H PRN Gary Faster, DPM     • calcium carbonate  1,000 mg Oral Daily PRN Ming Faster, DPM     • carisoprodol  350 mg Oral BID Ming Faster, DPM     • cefazolin  2,000 mg Intravenous Q8H Gary Faster, DPM 2,000 mg (09/09/23 0122)   • clotrimazole   Topical BID Gary Faster, DPM     • diazepam  5 mg Oral HS PRN Gary Faster, DPM     • fluticasone  1 spray Nasal Daily Gary Faster, DPM     • Fluticasone Furoate-Vilanterol  1 puff Inhalation Daily Solomon Rahman PA-C     • heparin (porcine)  5,000 Units Subcutaneous Q8H [address] [facility], DPM     • HYDROmorphone  4 mg Oral TID Gary Faster, DPM     • insulin glargine  30 Units Subcutaneous Daily With Breakfast Gary Faster, DPM     • insulin lispro  2-12 Units Subcutaneous TID AC Ming Faster, DPM     • morphine  75 mg Oral Q12H [address] Solomon Rahman PA-C     • nystatin   Topical BID Ming Faster, DPM     • ondansetron  4 mg Intravenous Q6H PRN Ming Faster, DPM     • polyethylene glycol  17 g Oral Daily PRN Ming Faster, DPM     • pregabalin  150 mg Oral BID Gary Faster, DPM          Today, Patient Was Seen By: Shawn Simpson PA-C    **Please Note: This note may have been constructed using a voice recognition system. **

## 2023-09-09 NOTE — ASSESSMENT & PLAN NOTE
Lab Results   Component Value Date    HGBA1C 8.0 (H) 09/08/2023       Recent Labs     09/08/23  1134 09/08/23  1645 09/08/23  2110 09/09/23  0653   POCGLU 152* 109 162* 150*     · History of poorly controlled type 2 diabetes with most recent hemoglobin A1c 14  · Patient notes she is now maintained on Mounjaro alone, has not been utilizing insulin for the past few months  · Last took [place] on Tuesday 9/5/23  · Continue Lantus at bedtime, will discontinue Humalog with meals for now given appropriate blood glucose to avoid hypoglycemia  · Continue sliding scale  · Adjust insulin regimen pending glucose trends  · Glucose well controlled since admission- resume home regimen at discharge

## 2023-09-09 NOTE — DISCHARGE SUMMARY
PODIATRY DISCHARGE SUMMARY     Patient Name: Frederic Latham   Age & Sex: 60 y.o. female   MRN: [id number]  Unit/Bed#: E2 -01   Encounter: [id number]  Length of Stay: 1 days    ASSESSMENT:    Frederic Latham is a 60 y.o. female with:    Right third toe ulceration with underlying osteomyelitis  - s/p R 3rd digit partial amputation (DOS 9/8/23)  Type 2 diabetes melitis  Asthma  Diabetic gastroparesis  Opioid dependence  History of right hallux amputation    PLAN:    POD 1 s/p R 3rd digit partial amputation. Stable for discharge today. Transition to 5 days PO Keflex. Elevation and offloading on green foam wedges or pillows when non-ambulatory. Appreciate consulting services for recommendations and management. Antibiotics: Keflex  Pharmacologic VTE Prophylaxis: Heparin   Mechanical VTE Prophylaxis: sequential compression device   Weightbearing status: WBAT    Disposition:  Patient stable for discharge today. SUBJECTIVE     The patient was seen, evaluated, and assessed at bedside today. The patient was awake, alert, and in no acute distress. No acute events overnight. The patient reports mild pain to right foot today. Patient denies N/V/F/chills/SOB/CP. Review of Systems  Constitutional: Negative. HENT: Negative. Eyes: Negative. Respiratory: Negative. Cardiovascular: Negative. Gastrointestinal: Negative. Musculoskeletal: s/p R partial 3rd digit amputation   Skin:s/p R partial 3rd digit amputation   Neurological: Negative  Psych: Negative. OBJECTIVE     Physical Exam:     General: Alert, cooperative and no distress  Lungs: Non labored breathing  Abdomen: Soft, non-tender. Lower extremity exam:  Cardiovascular status at baseline. Neurological status at baseline. Musculoskeletal status at baseline. No calf tenderness noted bilaterally. Right foot: Incision is well coapted, all sutures intact, no dehiscence, mild localized erythema. No purulence. No fluctuance.  Small area of duskiness along dorsal skin edge. Capillary refill to digit < 3 seconds. Clinical Images 09/09/23:      [address] COURSE     Anna Marie Kidd is a 60 y.o. female who was admitted on 9/7/2023 due to right 3rd digit ulceration with underlying osteomyelitis. PMH of T2DM, asthma, opioid dependence. She had gone to urgent care earlier in the day where XR was taken, recommended that she should go to ER as images showed cortical erosion suggestive of osteomyelitis. IV Ancef started upon admision. MRI obtained, showing definitive osteomyelitis in distal 3rd phalanx and early changes in middle phalanx. Decision was made to amputate partial R 3rd digit at bedside. This was performed successfully under local anesthesia on 9/8/23 with Dr. Lexx Medina. Surgical cure of osteomyelitis assumed. First post operative dressing change on 9/9/23 showed stable incision with mild localized erythema. Decision to discharge on 5 days of Keflex. She should follow up with [facility] outpatient within 1 week of discharge. New Medications:  - Keflex    DISCHARGE INFORMATION     PCP at Discharge: Louie Jones MD    Admitting Provider: Jeramy Monterroso  Admission Date: 9/7/2023     Discharge Provider: Lexx Medina  Discharge Date: 09/09/23    Discharge Disposition: Home  Discharge Condition: Stable  Discharge with Lines: No  Activity Restrictions: WBAT  Test Results Pending at Discharge: None  Medications at Discharge: See after visit summary for reconciled discharge medications provided to patient and family.       Discharge Diagnoses:  Principal Problem:    Skin ulcer of third toe of right foot ([address])  Active Problems:    Type 2 diabetes mellitus with hyperglycemia, with long-term current use of insulin ([facility])    Asthma    Opioid dependence with other opioid-induced disorder ([address])    Diabetic gastroparesis ([address])    Essential hypertension      Consulting Providers:  - Internal Medicine    Diagnostic & Therapeutic Procedures Performed:  MRI foot/forefoot toes right wo contrast    Result Date: 9/8/2023  Impression: Findings compatible with osteomyelitis of the third digit distal phalanx. Faint edema and faint low T1 signal in the third digit middle phalanx also suspicious for osteomyelitis. Workstation performed: [id number]     XR shoulder 2+ views LEFT    Result Date: 9/8/2023  Impression: No acute osseous abnormality. Workstation performed: [id number]     XR foot 3+ views RIGHT    Result Date: 9/8/2023  Impression: Osteomyelitis of the 3rd distal phalanx. Workstation performed: [id number]     XR toe right great min 2 view    Result Date: 9/7/2023  Impression: IMPRESSION: Acute osteomyelitis distal tuft third digit. Workstation:[id number]      Code Status: Level 1 - Full Code  Advance Directive and Living Will:      Power of :    POLST:      FOLLOW-UP     PCP Outpatient Follow-up: Follow up with PCP within one week of discharge    Active Issues Requiring Follow-Up: Follow up with podiatry within one week of discharge    Discharge Statement:  I spent 25 minutes discharging the patient. This time was spent on the day of discharge. I had direct contact with the patient on the day of discharge. Additional documentation is required if more than 30 minutes were spent on discharge. Portions of the record may have been created with voice recognition software. Occasional wrong word or "sound a like" substitutions may have occurred due to the inherent limitations of voice recognition software.   Read the chart carefully and recognize, using context, where substitutions have occurred.  ==  Radhames Castillo, [address] [place] [address]  Podiatric Medicine & Surgery

## 2023-09-09 NOTE — DISCHARGE INSTR - AVS FIRST PAGE
Discharge Instructions - Podiatry    Weight Bearing Status: Weight bearing as tolerated using forefoot offloading shoe                       Pain: Continue analgesics as directed    Follow-up appointment instructions: Please make an appointment within one week of discharge with [facility]. Contact sooner if any increase in pain, or signs of infection occur. Patient Wound Care Instructions: Leave dressings clean, dry, and intact until 1st outpatient office visit. Change if soiled or wet (betadine, non adherent dressing such as adaptic, 4x4 gauze, rolled gauze)    Take Keflex for 5 days as instructed.

## 2023-09-09 NOTE — UTILIZATION REVIEW
Initial Clinical Review    Admission: Date/Time/Statement:   Admission Orders (From admission, onward)     Ordered        09/08/23 0008  INPATIENT ADMISSION  Once                      Orders Placed This Encounter   Procedures   • INPATIENT ADMISSION     Standing Status:   Standing     Number of Occurrences:   1     Order Specific Question:   Level of Care     Answer:   Med Surg [16]     Order Specific Question:   Estimated length of stay     Answer:   More than 2 Midnights     Order Specific Question:   Certification     Answer:   I certify that inpatient services are medically necessary for this patient for a duration of greater than two midnights. See H&P and MD Progress Notes for additional information about the patient's course of treatment. ED Arrival Information     Expected   -    Arrival   9/7/2023 21:02    Acuity   Urgent            Means of arrival   Walk-In    Escorted by   Family Member    Service   Podiatry    Admission type   Emergency            Arrival complaint   toe infection           Chief Complaint   Patient presents with   • Medical Problem     Pt reports infection in the third digit on her R foot that's started 5 days ago. Initial Presentation: 60 y.o. female presents to ED 9/7 from urgent care due to tight third toe became red, hot, swollen 5 days ago. X rays at urgent care show bony destruction and cortical erosion concerning for osteomyelitis. and pt referred to ED. Pt reports neuropathy , type 2 DM, asthma, opioid dependence. Exam notes red swollen right third toe with wound. Admitted as Inpatient to podiatry service, med surg for right third toe ulceration with underlying osteomyelitis. Plan Begin IV ANcef, check blood cultures, follow up wound cultures, Check MRI , follow CBC trend, Consult internal medicine, Toe offloading shoe for right foot, local wound care with betadine, Adaptic DSD to right third toe     9/8 Internal medicine  Type 2 DM with Hgb a1c 14.0 in Jan 2023.  Pt maintained on Mounjaro, last dose 9/5, , not utilizing insulin for past few months, Will monitor glucose trends and adjust insulin regimen. Hx Asthma, continue Breo, albuterol while hospitalized. Continue home pain meds. Encourage small frequent meals , hx of gastroparesis. Blood and wound cultures pending. 9/8  update MRI findings compatible with osteomyelitis   Podiatry procedure @ 8766    Anesthesia:  10 cc 1% lidocaine plain   Estimated blood loss:  5 mL Procedure:  Amputation right 3rd toe    Date: 9/9    Day 2:     POD 1 Right 3rd digit partial amputation, alevate and offload when non ambulatory, Transition to PO Keflex  Exam: Right foot: Incision is well coapted, all sutures intact, no dehiscence, mild localized erythema. No purulence. No fluctuance. Small area of duskiness along dorsal skin edge.  Capillary refill to digit < 3 seconds.    Clinical image 9/9           ED Triage Vitals   Temperature Pulse Respirations Blood Pressure SpO2   09/07/23 2114 09/07/23 2113 09/07/23 2113 09/07/23 2113 09/07/23 2113   98.4 °F (36.9 °C) (!) 123 18 141/63 97 %      Temp Source Heart Rate Source Patient Position - Orthostatic VS BP Location FiO2 (%)   09/07/23 2114 09/07/23 2113 09/07/23 2113 09/07/23 2113 --   Oral Monitor Lying Right arm       Pain Score       09/08/23 0157       10 - Worst Possible Pain          Wt Readings from Last 1 Encounters:   09/07/23 135 kg (296 lb 15.4 oz)     Additional Vital Signs:     Date/Time Temp Pulse Resp BP MAP (mmHg) SpO2 O2 Device Patient Position - Orthostatic VS   09/09/23 1046 98.7 °F (37.1 °C) 94 20 110/68 79 95 % None (Room air) Lying   09/09/23 0717 98.2 °F (36.8 °C) 90 20 119/77 88 96 % None (Room air) Lying   09/09/23 0300 98.1 °F (36.7 °C) 98 18 121/62 -- 97 % None (Room air) Lying   09/08/23 2355 97.8 °F (36.6 °C) 93 20 114/56 -- 98 % None (Room air) Lying   09/08/23 1535 97.5 °F (36.4 °C) 93 17 131/81 88 97 % None (Room air) Sitting   09/08/23 0801 97.4 °F (36.3 °C) Abnormal  96 18 146/75 -- 96 % None (Room air) Lying   09/08/23 0136 98.2 °F (36.8 °C) 109 Abnormal  18 154/84 -- 96 % None (Room air) Lying   09/08/23 0015 -- 106 Abnormal  18 158/84 115 98 % None (Room air) Lying   09/07/23 2316 -- 106 Abnormal  18 145/65 -- 97 % None (Room air) Lying   09/07/23 2114 98.4 °F (36.9 °C) -- -- -- -- -- --        Pertinent Labs/Diagnostic Test Results:   MRI foot/forefoot toes right wo contrast   Final Result by Glenna De Dios MD (09/08 1136)   Findings compatible with osteomyelitis of the third digit distal phalanx. Faint edema and faint low T1 signal in the third digit middle phalanx also suspicious for osteomyelitis. XR foot 3+ views RIGHT   ED Interpretation by Osito Velarde PA-C (09/07 2227)   Bony destruction distal tuft third phalanx consistent with osteomyelitis as interpreted by me       Final Result by Leeann Gautam MD (09/08 0813)      Osteomyelitis of the 3rd distal phalanx. XR shoulder 2+ views LEFT   ED Interpretation by Osito Velarde PA-C (09/07 2228)   No obvious fracture or dislocation as interpreted by me       Final Result by Leeann Gautam MD (09/08 6432)      No acute osseous abnormality.                   Results from last 7 days   Lab Units 09/08/23  0625 09/07/23 2139   WBC Thousand/uL 8.89 10.73*   HEMOGLOBIN g/dL 11.6 12.7   HEMATOCRIT % 36.5 40.2   PLATELETS Thousands/uL 184  191 202   NEUTROS ABS Thousands/µL 5.54 8.22*         Results from last 7 days   Lab Units 09/08/23  0625 09/07/23 2139   SODIUM mmol/L 136 136   POTASSIUM mmol/L 3.9 4.0   CHLORIDE mmol/L 105 102   CO2 mmol/L 22 24   ANION GAP mmol/L 9 10   BUN mg/dL 11 13   CREATININE mg/dL 0.56* 0.72   EGFR ml/min/1.73sq m 102 91   CALCIUM mg/dL 8.7 9.5     Results from last 7 days   Lab Units 09/07/23 2139   AST U/L 16   ALT U/L 16   ALK PHOS U/L 107*   TOTAL PROTEIN g/dL 7.9   ALBUMIN g/dL 3.9   TOTAL BILIRUBIN mg/dL 0.84     Results from last 7 days   Lab Units 09/09/23  1030 09/09/23  0653 09/08/23  2110 09/08/23  1645 09/08/23  1134 09/08/23  0618   POC GLUCOSE mg/dl 158* 150* 162* 109 152* 124     Results from last 7 days   Lab Units 09/08/23  0625 09/07/23  2139   GLUCOSE RANDOM mg/dL 111 156*         Results from last 7 days   Lab Units 09/08/23  0625   HEMOGLOBIN A1C % 8.0*   EAG mg/dl 183     BETA-HYDROXYBUTYRATE   Date Value Ref Range Status   05/09/2022 2.9 (H) <0.6 mmol/L Final   08/14/2021 1.4 (H) <0.6 mmol/L Final   06/25/2020 2.8 (H) <0.6 mmol/L Final                Results from last 7 days   Lab Units 09/07/23  2139   CRP mg/L 32.7*   SED RATE mm/hour 75*           Results from last 7 days   Lab Units 09/07/23  2335 09/07/23  2139   BLOOD CULTURE   --  No Growth at 24 hrs. No Growth at 24 hrs.    GRAM STAIN RESULT  1+ Polys*  3+ Gram positive cocci in pairs*  --    WOUND CULTURE  3+ Growth of Staphylococcus aureus*  --                    ED Treatment:   Medication Administration from 09/07/2023 2102 to 09/08/2023 0127       Date/Time Order Dose Route Action     09/07/2023 2140 EDT sodium chloride 0.9 % bolus 1,000 mL 1,000 mL Intravenous New Bag     09/07/2023 2338 EDT clotrimazole (LOTRIMIN) 1 % cream -- Topical Given     09/07/2023 2304 EDT vancomycin (VANCOCIN) 2,000 mg in sodium chloride 0.9 % 500 mL IVPB 2,000 mg Intravenous New Bag     09/07/2023 2227 EDT cefTRIAXone (ROCEPHIN) IVPB (premix in dextrose) 2,000 mg 50 mL 2,000 mg Intravenous New Bag        Past Medical History:   Diagnosis Date   • Acute respiratory insufficiency 11/23/2019   • Asthma    • Chronic pain    • Diabetes ([address])    • HTN (hypertension)    • Sepsis ([address]) 11/23/2019     Present on Admission:  • Diabetic gastroparesis ([address])  • Asthma  • Essential hypertension  • Opioid dependence with other opioid-induced disorder ([address])      Admitting Diagnosis: Intertrigo [L30.4]  Left shoulder pain [M25.512]  Osteomyelitis of toe (HCC) [M86.9]  Toe infection [L08.9]  Opioid dependence with other opioid-induced disorder ([address] ) [F11.288]  Type 2 diabetes mellitus with hyperglycemia, with long-term current use of insulin ([facility]) [E11.65, Z79.4]  Intermittent asthma without complication, unspecified asthma severity [J45.20]  Age/Sex: 60 y.o. female  Admission Orders:  Scheduled Medications:  carisoprodol, 350 mg, Oral, BID  clotrimazole, , Topical, BID  fluticasone, 1 spray, Nasal, Daily  Fluticasone Furoate-Vilanterol, 1 puff, Inhalation, Daily  heparin (porcine), 5,000 Units, Subcutaneous, Q8H BOLIVAR  HYDROmorphone, 4 mg, Oral, TID  insulin glargine, 30 Units, Subcutaneous, Daily With Breakfast  insulin lispro, 2-12 Units, Subcutaneous, TID AC  morphine, 75 mg, Oral, Q12H BOLIVAR  nystatin, , Topical, BID  pneumococcal 20-norbert conj vacc, 0.5 mL, Intramuscular, Once  pregabalin, 150 mg, Oral, BID      Continuous IV Infusions:     PRN Meds:  albuterol, 2 puff, Inhalation, Q4H PRN x 3 doses   calcium carbonate, 1,000 mg, Oral, Daily PRN x 1   diazepam, 5 mg, Oral, HS PRN x 2 doses   ondansetron, 4 mg, Intravenous, Q6H PRN  polyethylene glycol, 17 g, Oral, Daily PRN     Morphine ER 75 mg PO x 1 9/8 0157     Toe offloading shoe to right foot    POC glucose qac and HS    SCD     IP CONSULT TO INTERNAL MEDICINE    Network Utilization Review Department  ATTENTION: Please call with any questions or concerns to [phone] and carefully listen to the prompts so that you are directed to the right person. All voicemails are confidential.  [place] all requests for admission clinical reviews, approved or denied determinations and any other requests to dedicated fax number below belonging to the campus where the patient is receiving treatment.  List of dedicated fax numbers for the Facilities:  [place] DENIALS (Administrative/Medical Necessity) [phone]   [facility] [address] [place] [address] [address] [facility] [facility] [phone]   1504 [facility] [address] [place] [address] [phone]   [address] [address] [phone]

## 2023-09-09 NOTE — PLAN OF CARE
Problem: MOBILITY - ADULT  Goal: Maintain or return to baseline ADL function  Description: INTERVENTIONS:  -  Assess patient's ability to carry out ADLs; assess patient's baseline for ADL function and identify physical deficits which impact ability to perform ADLs (bathing, care of mouth/teeth, toileting, grooming, dressing, etc.)  - Assess/evaluate cause of self-care deficits   - Assess range of motion  - Assess patient's mobility; develop plan if impaired  - Assess patient's need for assistive devices and provide as appropriate  - Encourage maximum independence but intervene and supervise when necessary  - Involve family in performance of ADLs  - Assess for home care needs following discharge   - Consider OT consult to assist with ADL evaluation and planning for discharge  - Provide patient education as appropriate  Outcome: Progressing  Goal: Maintains/Returns to pre admission functional level  Description: INTERVENTIONS:  - Perform BMAT or MOVE assessment daily.   - Set and communicate daily mobility goal to care team and patient/family/caregiver.    - Collaborate with rehabilitation services on mobility goals if consulted  - Record patient progress and toleration of activity level   Outcome: Progressing     Problem: PAIN - ADULT  Goal: Verbalizes/displays adequate comfort level or baseline comfort level  Description: Interventions:  - Encourage patient to monitor pain and request assistance  - Assess pain using appropriate pain scale  - Administer analgesics based on type and severity of pain and evaluate response  - Implement non-pharmacological measures as appropriate and evaluate response  - Consider cultural and social influences on pain and pain management  - Notify physician/advanced practitioner if interventions unsuccessful or patient reports new pain  Outcome: Progressing     Problem: SAFETY ADULT  Goal: Patient will remain free of falls  Description: INTERVENTIONS:  - Educate patient/family on patient safety including physical limitations  - Instruct patient to call for assistance with activity   - Consult OT/PT to assist with strengthening/mobility   - Keep Call bell within reach  - Keep bed low and locked with side rails adjusted as appropriate  - Keep care items and personal belongings within reach  - Initiate and maintain comfort rounds  - Make Fall Risk Sign visible to staff  - Offer Toileting every 2  Hours, in advance of need  - Initiate/Maintain bed alarm  - Obtain necessary fall risk management equipment: yellow bracelet and socks  - Apply yellow socks and bracelet for high fall risk patients  - Consider moving patient to room near nurses station  Outcome: Progressing     Problem: METABOLIC, FLUID AND ELECTROLYTES - ADULT  Goal: Glucose maintained within target range  Description: INTERVENTIONS:  - Monitor Blood Glucose as ordered  - Assess for signs and symptoms of hyperglycemia and hypoglycemia  - Administer ordered medications to maintain glucose within target range  - Assess nutritional intake and initiate nutrition service referral as needed  Outcome: Progressing     Problem: SKIN/TISSUE INTEGRITY - ADULT  Goal: Incision(s), wounds(s) or drain site(s) healing without S/S of infection  Description: INTERVENTIONS  - Assess and document dressing, incision, wound bed, drain sites and surrounding tissue  - Provide patient and family education  - Perform skin care/dressing changes prn. Podiatry to do foot drsg changes.    Outcome: Progressing

## 2023-09-10 LAB
BACTERIA WND AEROBE CULT: ABNORMAL
GRAM STN SPEC: ABNORMAL
GRAM STN SPEC: ABNORMAL

## 2023-09-10 NOTE — UTILIZATION REVIEW
NOTIFICATION OF INPATIENT ADMISSION   AUTHORIZATION REQUEST   SERVICING FACILITY:   [address] [place] [address]  Tax ID: [id number]  NPI: [id number] ATTENDING PROVIDER:  Attending Name and NPI#: Marlin Eliazar [[id number]]  Address: [address]  Phone: [phone]     ADMISSION INFORMATION:  Place of Service: [address] [place] [address]  Place of Service Code: 21  Inpatient Admission Date/Time: 9/8/23 12:08 AM  Discharge Date/Time: 9/9/2023  2:42 PM  Admitting Diagnosis Code/Description:  Intertrigo [L30.4]  Left shoulder pain [M25.512]  Osteomyelitis of toe ([address]) [M86.9]  Toe infection [L08.9]  Opioid dependence with other opioid-induced disorder ([address]) [F11.288]  Type 2 diabetes mellitus with hyperglycemia, with long-term current use of insulin ([address]) [E11.65, Z79.4]  Intermittent asthma without complication, unspecified asthma severity [J45.20]     UTILIZATION REVIEW CONTACT:  Chapis Tapia, Utilization   Network Utilization Review Department  Phone: [phone]  Fax [phone]  Email: [email or web address]  Contact for approvals/pending authorizations, clinical reviews, and discharge. PHYSICIAN ADVISORY SERVICES:  Medical Necessity Denial & [employer] Review  Phone: [phone]  Fax: [phone]  Email: [email or web address]

## 2023-09-13 LAB
BACTERIA BLD CULT: NORMAL
BACTERIA BLD CULT: NORMAL

## 2023-09-13 NOTE — UTILIZATION REVIEW
NOTIFICATION OF ADMISSION DISCHARGE   This is a Notification of Discharge from [facility]. Please be advised that this patient has been discharge from our facility. Below you will find the admission and discharge date and time including the patient’s disposition. UTILIZATION REVIEW CONTACT:  Sarah Saravia  Utilization   Network Utilization Review Department  Phone: [phone] carefully listen to the prompts. All voicemails are confidential.  Email: [email or web address]     ADMISSION INFORMATION  PRESENTATION DATE: 9/7/2023  9:05 PM  OBERVATION ADMISSION DATE:   INPATIENT ADMISSION DATE: 9/8/23 12:08 AM   DISCHARGE DATE: 9/9/2023  2:42 PM   DISPOSITION:Home/Self Care    IMPORTANT INFORMATION:  Send all requests for admission clinical reviews, approved or denied determinations and any other requests to dedicated fax number below belonging to the campus where the patient is receiving treatment.  List of dedicated fax numbers:  [place] DENIALS (Administrative/Medical Necessity) [phone] 2303 [facility] (Maternity/NICU/Pediatrics) [phone]   [facility] [phone]   [facility] [phone] 1636 [facility] [phone]   [address] [place] [address] [phone]   [facility] [phone]   [facility] [phone]   [address] [place] [address] [phone]   3449 [facility] [phone]   2720 [facility] [address] [facility] [phone]

## 2023-09-15 ENCOUNTER — TELEPHONE (OUTPATIENT)
Age: 58
End: 2023-09-15

## 2023-09-15 NOTE — TELEPHONE ENCOUNTER
Caller: patient    Doctor: Angelique Majano    Reason for call: Cannot make her Monday morning appt , can she be seen in the afternoon or possibly  Tuesday? She is concerned about her stitches. Thank you.     Call [phone]

## 2023-09-18 ENCOUNTER — OFFICE VISIT (OUTPATIENT)
Dept: PODIATRY | Facility: CLINIC | Age: 58
End: 2023-09-18
Payer: COMMERCIAL

## 2023-09-18 VITALS
DIASTOLIC BLOOD PRESSURE: 70 MMHG | BODY MASS INDEX: 44.1 KG/M2 | WEIGHT: 291 LBS | SYSTOLIC BLOOD PRESSURE: 140 MMHG | HEIGHT: 68 IN

## 2023-09-18 DIAGNOSIS — L97.522 DIABETIC ULCER OF TOE OF LEFT FOOT ASSOCIATED WITH TYPE 2 DIABETES MELLITUS, WITH FAT LAYER EXPOSED (HCC): Primary | ICD-10-CM

## 2023-09-18 DIAGNOSIS — E11.621 DIABETIC ULCER OF TOE OF LEFT FOOT ASSOCIATED WITH TYPE 2 DIABETES MELLITUS, WITH FAT LAYER EXPOSED (HCC): Primary | ICD-10-CM

## 2023-09-18 DIAGNOSIS — E11.42 DIABETIC POLYNEUROPATHY ASSOCIATED WITH TYPE 2 DIABETES MELLITUS (HCC): ICD-10-CM

## 2023-09-18 DIAGNOSIS — S98.131A AMPUTATION OF TOE OF RIGHT FOOT (HCC): ICD-10-CM

## 2023-09-18 PROCEDURE — 99214 OFFICE O/P EST MOD 30 MIN: CPT | Performed by: PODIATRIST

## 2023-09-18 NOTE — PROGRESS NOTES
Assessment/Plan:      Diagnoses and all orders for this visit:    Diabetic ulcer of toe of left foot associated with type 2 diabetes mellitus, with fat layer exposed ([address])  Comments:  new issue since last visit. Herrera 1 ulcer tip left 2nd toe. Instrucitons given. Wear wedge shoe. I provided toe crest. High risk for infection. RTC 1-2 weeks  Orders:  -     Diabetic Shoe  -     Diabetic Shoe Inserts    Amputation of toe of right foot ([address])  Comments:  partial 3rd toe amputation is healed. Orders:  -     Diabetic Shoe  -     Diabetic Shoe Inserts    Diabetic polyneuropathy associated with type 2 diabetes mellitus ([address])  -     Diabetic Shoe  -     Diabetic Shoe Inserts      Reviewed recent inpatient stay/amputation    A1C 9/8/2023 8.0, significantly improved from historical values. Toe amputation is healed    NEW ISSUE TODAY: Ulcer left 2nd toe.   -Debridement today: no but I did trim nail that had been digging into the end of the toe  -Dressings: neosporin, DSD  -Offloading plan: toe crest, wedge shoe  -Signs of infection today: none  -Risk factors: neuropathy, previous amputation  -RTC: 2 weeks      Subjective:     Patient ID: Ángel Hernandez is a 60 y.o. female. DOS: 9/8/2023     Procedure: right partial 3rd toe amputation     Condition: Dressing C/D/I. Sutures on toe amp intact. Review of Systems    Constitutional: Negative. Respiratory: Negative for cough and shortness of breath. Gastrointestinal: Negative for diarrhea, nausea and vomiting. Musculoskeletal: flat feet, hammertoe  Skin: stitches in toe  Neurological: Negative for weakness, numbness and headaches. Objective:     Physical Exam  Vitals reviewed. Constitutional:       Appearance: She is obese. She is not ill-appearing or diaphoretic. Cardiovascular:      Pulses: no weak pulses          Dorsalis pedis pulses are 2+ on the right side and 2+ on the left side.         Posterior tibial pulses are 1+ on the right side and 1+ on the left side. Pulmonary:      Effort: No respiratory distress. Musculoskeletal:      Right foot: Decreased range of motion. Deformity present. Left foot: Decreased range of motion. Deformity present. Feet:      Right foot: amputated     Protective Sensation: 0 sites sensed. Skin integrity: No skin breakdown (toe amputation is healed). Toenail Condition: Right toenails are abnormally thick. Left foot:      Protective Sensation: 0 sites sensed. Skin integrity: Ulcer (tip 2nd toe ulcer 1 x 0.3 x 0.2cm. No probe to bone. No cellulitis) present. Toenail Condition: Left toenails are abnormally thick. Skin:     Capillary Refill: Capillary refill takes less than 2 seconds. Neurological:      Mental Status: She is alert. Diabetic Foot Exam    Patient's shoes and socks removed. Right Foot/Ankle   Right Foot Inspection  Skin Exam: skin intact (partial 3rd toe amputation is healed). No maceration (toe amputation is healed). Amputation: amputation right foot (Comments: hallux and 3rd toe amputation stable)    Toe Exam: swelling and right toe deformity. Sensory   Vibration: absent  Monofilament testing: absent    Vascular  Capillary refills: < 3 seconds  The right DP pulse is 2+. The right PT pulse is 1+. Right Toe  - Comprehensive Exam  Ecchymosis: none  Arch: pes planus  Hammertoes: second toe  Swelling: dorsum   Tenderness: none         Left Foot/Ankle  Left Foot Inspection  Skin Exam: ulcer (tip 2nd toe ulcer 1 x 0.3 x 0.2cm. No probe to bone. No cellulitis). Skin not intact. Toe Exam: swelling and left toe deformity. Sensory   Vibration: absent  Monofilament testing: absent    Vascular  Capillary refills: < 3 seconds  The left DP pulse is 2+. The left PT pulse is 1+.      Left Toe  - Comprehensive Exam  Ecchymosis: none  Arch: pes planus  Hammertoes: second toe and third toe  Swelling: dorsum   Tenderness: none           Assign Risk Category  Deformity present  Loss of protective sensation  No weak pulses  Risk: 3

## 2023-10-02 ENCOUNTER — TELEPHONE (OUTPATIENT)
Age: 58
End: 2023-10-02

## 2023-10-02 NOTE — TELEPHONE ENCOUNTER
Caller: Patient    Doctor: Michael Garza    Reason for call: Patient is ill and cannot maker her follow up appt for today. I made an appt for her to be seen on 10/30. Should she be seen sooner?     Call back#: [phone]

## 2023-10-19 ENCOUNTER — OFFICE VISIT (OUTPATIENT)
Dept: FAMILY MEDICINE CLINIC | Facility: CLINIC | Age: 58
End: 2023-10-19
Payer: COMMERCIAL

## 2023-10-19 VITALS
SYSTOLIC BLOOD PRESSURE: 124 MMHG | HEIGHT: 68 IN | OXYGEN SATURATION: 97 % | BODY MASS INDEX: 44.41 KG/M2 | DIASTOLIC BLOOD PRESSURE: 58 MMHG | TEMPERATURE: 97.7 F | HEART RATE: 110 BPM | WEIGHT: 293 LBS

## 2023-10-19 DIAGNOSIS — Z12.31 ENCOUNTER FOR SCREENING MAMMOGRAM FOR MALIGNANT NEOPLASM OF BREAST: ICD-10-CM

## 2023-10-19 DIAGNOSIS — Z23 ENCOUNTER FOR IMMUNIZATION: ICD-10-CM

## 2023-10-19 DIAGNOSIS — Z78.0 POST-MENOPAUSAL: ICD-10-CM

## 2023-10-19 DIAGNOSIS — Z79.4 TYPE 2 DIABETES MELLITUS WITH HYPERGLYCEMIA, WITH LONG-TERM CURRENT USE OF INSULIN (HCC): ICD-10-CM

## 2023-10-19 DIAGNOSIS — J45.40 MODERATE PERSISTENT ASTHMA WITHOUT COMPLICATION: ICD-10-CM

## 2023-10-19 DIAGNOSIS — Z12.11 SCREENING FOR COLON CANCER: ICD-10-CM

## 2023-10-19 DIAGNOSIS — F11.288 OPIOID DEPENDENCE WITH OTHER OPIOID-INDUCED DISORDER (HCC): ICD-10-CM

## 2023-10-19 DIAGNOSIS — E66.01 MORBID OBESITY (HCC): ICD-10-CM

## 2023-10-19 DIAGNOSIS — Z76.89 ENCOUNTER TO ESTABLISH CARE: Primary | ICD-10-CM

## 2023-10-19 DIAGNOSIS — E11.65 TYPE 2 DIABETES MELLITUS WITH HYPERGLYCEMIA, WITH LONG-TERM CURRENT USE OF INSULIN (HCC): ICD-10-CM

## 2023-10-19 PROBLEM — R19.5 HEME POSITIVE STOOL: Status: RESOLVED | Noted: 2022-05-11 | Resolved: 2023-10-19

## 2023-10-19 PROBLEM — Z79.899 POLYPHARMACY: Status: RESOLVED | Noted: 2020-01-12 | Resolved: 2023-10-19

## 2023-10-19 PROBLEM — G93.41 ACUTE METABOLIC ENCEPHALOPATHY: Status: RESOLVED | Noted: 2023-01-02 | Resolved: 2023-10-19

## 2023-10-19 PROBLEM — J41.0 SIMPLE CHRONIC BRONCHITIS (HCC): Status: ACTIVE | Noted: 2023-10-19

## 2023-10-19 PROBLEM — G93.40 ACUTE ENCEPHALOPATHY: Status: RESOLVED | Noted: 2020-06-29 | Resolved: 2023-10-19

## 2023-10-19 PROBLEM — L03.115 CELLULITIS OF RIGHT LOWER EXTREMITY: Status: RESOLVED | Noted: 2023-01-02 | Resolved: 2023-10-19
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
## 2023-10-19 PROBLEM — R79.89 LOW TSH LEVEL: Status: RESOLVED | Noted: 2021-04-28 | Resolved: 2023-10-19

## 2023-10-19 PROCEDURE — 99204 OFFICE O/P NEW MOD 45 MIN: CPT | Performed by: FAMILY MEDICINE

## 2023-10-19 PROCEDURE — 90686 IIV4 VACC NO PRSV 0.5 ML IM: CPT

## 2023-10-19 PROCEDURE — G0008 ADMIN INFLUENZA VIRUS VAC: HCPCS

## 2023-10-19 RX ORDER — ALBUTEROL SULFATE 90 UG/1
2 AEROSOL, METERED RESPIRATORY (INHALATION) EVERY 4 HOURS PRN
Qty: 1 G | Refills: 0 | Status: SHIPPED | OUTPATIENT
Start: 2023-10-19

## 2023-10-19 RX ORDER — TIRZEPATIDE 10 MG/.5ML
10 INJECTION, SOLUTION SUBCUTANEOUS
COMMUNITY
Start: 2023-10-12

## 2023-10-19 RX ORDER — FLUTICASONE PROPIONATE AND SALMETEROL 500; 50 UG/1; UG/1
2 POWDER RESPIRATORY (INHALATION)
COMMUNITY
Start: 2022-02-08 | End: 2023-10-19 | Stop reason: SDUPTHER

## 2023-10-19 RX ORDER — FLUTICASONE PROPIONATE AND SALMETEROL 500; 50 UG/1; UG/1
2 POWDER RESPIRATORY (INHALATION)
Qty: 60 BLISTER | Refills: 3 | Status: SHIPPED | OUTPATIENT
Start: 2023-10-19

## 2023-10-22 PROBLEM — J41.0 SIMPLE CHRONIC BRONCHITIS (HCC): Status: RESOLVED | Noted: 2023-10-19 | Resolved: 2023-10-22

## 2023-10-22 NOTE — PROGRESS NOTES
Assessment/Plan:    No problem-specific Assessment & Plan notes found for this encounter. Return in about 3 months (around 1/19/2024) for medicare wellness. There are no Patient Instructions on file for this visit. Diagnoses and all orders for this visit:    Encounter to establish care    Encounter for immunization  -     influenza vaccine, quadrivalent, 0.5 mL, preservative-free, for adult and pediatric patients 6 mos+ (AFLURIA, FLUARIX, FLULAVAL, FLUZONE)    Moderate persistent asthma without complication  -     albuterol (PROVENTIL HFA,VENTOLIN HFA) 90 mcg/act inhaler; Inhale 2 puffs every 4 (four) hours as needed for wheezing or shortness of breath  -     Fluticasone-Salmeterol (Advair Diskus) 500-50 mcg/dose inhaler; Inhale 2 puffs daily at bedtime    Encounter for screening mammogram for malignant neoplasm of breast  -     Mammo screening bilateral w 3d & cad; Future    Post-menopausal  -     DXA bone density spine hip and pelvis; Future    Screening for colon cancer  -     Cologuard    Type 2 diabetes mellitus with hyperglycemia, with long-term current use of insulin ([facility])  -     CBC and differential; Future  -     Comprehensive metabolic panel; Future  -     HEMOGLOBIN A1C W/ EAG ESTIMATION; Future  -     Lipid Panel with Direct LDL reflex; Future  -     Albumin / creatinine urine ratio; Future    Opioid dependence with other opioid-induced disorder ([address])    Morbid obesity ([address])    Other orders  -     Discontinue: Fluticasone-Salmeterol (Advair Diskus) 500-50 mcg/dose inhaler; Inhale 2 puffs daily at bedtime  -     Mounjaro 10 MG/0.5ML; Inject 10 mg under the skin every 7 days        - Most recent A1C reviewed and markedly improved compared to previous although still not at goal; continue Mounjaro 10mg Q7 days. Patient reports that she is up to date with eye exam, patient advised to make a follow visit with podiatry.    - Blood pressure well controlled; CBC, CMP and urine microalbumin/creatinine ratio ordered to assess kidney function   - Asthma well controlled with Advair daily and albuterol PRN  - The USPSTF recommends biennial screening mammography for women aged 48 to 76 years. The decision to start screening mammography in women prior to age 48 years should be an individual one. Patient has agreed to undergo testing at this time. All questions were answered. - As patient is post menopausal will order DXA scan to screen for osteoporosis   - Discussed with patient that the USPSTF recommends screening for colorectal cancer starting at age 39 years and continuing until age 76 years. Patient has agreed to undergo cologuard testing at this time. All questions were answered. - She will continue to follow with palliative care for management of her chronic pain     Subjective:         BENITA Menon is a very pleasant 60year old female with a past medical history of depression, asthma, chronic joint pain and hypertension who presents today for an encounter to establish care after her old PCP retired. She is accompanied today by her  Praful Ramirez. Apart from her chronic pain she feels well. She was recently hospitalized in September for Osteomyelitis and underwent partial amputation of the third right digit. She saw podiatry for her post op visit and was noted to have an ulcer of the left 2nd toe. At that time she was instructed to wear a toe wedge and reports that the ulcer has healed. She reports that she needs to make a follow up appointment with podiatry. She follows with Dr. Geetha Ulrich who specializes in Neurology and palliative care for her chronic pain. She reports that her diabetes is better controlled since her previous PCP placed her on Mounjaro.         Current Outpatient Medications on File Prior to Visit   Medication Sig Dispense Refill    carisoprodol (SOMA) 350 mg tablet Take 350 mg by mouth 2 (two) times a day      diazepam (VALIUM) 5 mg tablet Take 5 mg by mouth daily at bedtime as needed for anxiety      fluticasone (FLONASE) 50 mcg/act nasal spray 1 spray into each nostril daily      HYDROmorphone (DILAUDID) 4 mg tablet Take 4 mg by mouth 3 (three) times a day      LYRICA 150 MG capsule Take 150 mg by mouth 2 (two) times a day  5    morphine (MS CONTIN) 15 mg 12 hr tablet Take 75 mg by mouth 2 (two) times a day      Mounjaro 10 MG/0.5ML Inject 10 mg under the skin every 7 days      Alcohol Swabs 70 % PADS May substitute brand based on insurance coverage. Check glucose ACHS. 200 each 0    Blood Glucose Monitoring Suppl (OneTouch Verio Reflect) w/Device KIT May substitute brand based on insurance coverage. Check glucose ACHS. 1 kit 0    glucose blood (OneTouch Verio) test strip May substitute brand based on insurance coverage. Check glucose ACHS. 200 each 0    Insulin Syringe-Needle U-100 (BD Insulin Syringe U/F) 31G X 5/16" 0.3 ML MISC For use with insulin. Pharmacy may dispense brand covered by insurance. 100 each 0    nystatin (MYCOSTATIN) powder Apply topically 2 (two) times a day (Patient not taking: Reported on 9/8/2023) 45 g 0    OneTouch Delica Lancets 83A MISC May substitute brand based on insurance coverage. Check glucose ACHS. 200 each 0    [DISCONTINUED] insulin glargine (LANTUS) 100 units/mL subcutaneous injection Inject 15 Units under the skin daily at bedtime 10 mL 0     No current facility-administered medications on file prior to visit.      Past Medical History:   Diagnosis Date    Acute respiratory insufficiency 11/23/2019    Asthma     Chronic pain     Diabetes ([facility])     HTN (hypertension)     Sepsis ([address]) 11/23/2019     Past Surgical History:   Procedure Laterality Date    CERVICAL LAMINECTOMY      CHOLECYSTECTOMY      CHOLECYSTECTOMY LAPAROSCOPIC N/A 11/10/2018    Procedure: CHOLECYSTECTOMY LAPAROSCOPIC w/ ioc;  Surgeon: Aga Michele MD;  Location: [facility];  Service: General    HYSTERECTOMY      JOINT REPLACEMENT Bilateral     knee    TOE AMPUTATION Right 2/2/2018 Procedure: AMPUTATION TOE, RIGHT GREAT TOE;  Surgeon: Kirk Munoz DPM;  Location: [facility];  Service: Podiatry     Social History     Socioeconomic History    Marital status: /Civil Union     Spouse name: None    Number of children: None    Years of education: None    Highest education level: None   Occupational History    None   Tobacco Use    Smoking status: Never    Smokeless tobacco: Never   Substance and Sexual Activity    Alcohol use: Not Currently     Comment: rarely    Drug use: No    Sexual activity: Not Currently   Other Topics Concern    None   Social History Narrative    None     Social Determinants of Health     Financial Resource Strain: Not on file   Food Insecurity: No Food Insecurity (5/12/2022)    Hunger Vital Sign     Worried About Running Out of Food in the Last Year: Never true     Ran Out of Food in the Last Year: Never true   Transportation Needs: No Transportation Needs (5/12/2022)    PRAPARE - Transportation     Lack of Transportation (Medical): No     Lack of Transportation (Non-Medical): No   Physical Activity: Not on file   Stress: Not on file   Social Connections: Not on file   Intimate Partner Violence: Not on file   Housing Stability: Low Risk  (5/12/2022)    Housing Stability Vital Sign     Unable to Pay for Housing in the Last Year: No     Number of Places Lived in the Last Year: 1     Unstable Housing in the Last Year: No     Family History   Problem Relation Age of Onset    Diabetes Mother     Diabetes Maternal Grandmother     No Known Problems Father          Review of Systems   Constitutional: Negative. HENT: Negative. Eyes: Negative. Respiratory: Negative. Cardiovascular: Negative. Gastrointestinal: Negative. Genitourinary: Negative. Musculoskeletal:  Positive for arthralgias, back pain, gait problem and neck pain. Skin: Negative. Psychiatric/Behavioral: Negative.          Objective:    /58 (BP Location: Right arm, Patient Position: Sitting, Cuff Size: Large)   Pulse (!) 110   Temp 97.7 °F (36.5 °C) (Temporal)   Ht 5' 8" (1.727 m)   Wt 135 kg (297 lb 9.6 oz)   SpO2 97%   BMI 45.25 kg/m²     Physical Exam  Constitutional:       General: She is not in acute distress. Appearance: She is not ill-appearing. Comments: Patient ambulates with walker   HENT:      Head: Normocephalic and atraumatic. Eyes:      General:         Right eye: No discharge. Left eye: No discharge. Extraocular Movements: Extraocular movements intact. Pupils: Pupils are equal, round, and reactive to light. Cardiovascular:      Rate and Rhythm: Normal rate. Pulmonary:      Effort: No respiratory distress. Abdominal:      General: There is no distension. Palpations: Abdomen is soft. Tenderness: There is no abdominal tenderness. Musculoskeletal:      Cervical back: Normal range of motion. Neurological:      General: No focal deficit present. Mental Status: She is alert. Psychiatric:         Mood and Affect: Mood normal.         Behavior: Behavior normal.       BMI Counseling: Body mass index is 45.25 kg/m². The BMI is above normal. Nutrition recommendations include 3-5 servings of fruits/vegetables daily, decreasing soda and/or juice intake, moderation in carbohydrate intake, increasing intake of lean protein, and reducing intake of saturated fat and trans fat.      Eva Guthrie MD  10/22/23  6:18 PM

## 2023-10-30 ENCOUNTER — OFFICE VISIT (OUTPATIENT)
Dept: PODIATRY | Facility: CLINIC | Age: 58
End: 2023-10-30
Payer: COMMERCIAL

## 2023-10-30 VITALS
SYSTOLIC BLOOD PRESSURE: 120 MMHG | HEIGHT: 68 IN | BODY MASS INDEX: 44.41 KG/M2 | DIASTOLIC BLOOD PRESSURE: 60 MMHG | WEIGHT: 293 LBS

## 2023-10-30 DIAGNOSIS — L97.521 DIABETIC ULCER OF TOE OF LEFT FOOT ASSOCIATED WITH TYPE 2 DIABETES MELLITUS, LIMITED TO BREAKDOWN OF SKIN (HCC): Primary | ICD-10-CM

## 2023-10-30 DIAGNOSIS — E11.42 DIABETIC POLYNEUROPATHY ASSOCIATED WITH TYPE 2 DIABETES MELLITUS (HCC): ICD-10-CM

## 2023-10-30 DIAGNOSIS — E11.621 DIABETIC ULCER OF TOE OF LEFT FOOT ASSOCIATED WITH TYPE 2 DIABETES MELLITUS, LIMITED TO BREAKDOWN OF SKIN (HCC): Primary | ICD-10-CM

## 2023-10-30 PROCEDURE — 99214 OFFICE O/P EST MOD 30 MIN: CPT | Performed by: PODIATRIST

## 2023-10-30 NOTE — PROGRESS NOTES
Assessment/Plan:       Diagnoses and all orders for this visit:    Diabetic ulcer of toe of left foot associated with type 2 diabetes mellitus, limited to breakdown of skin ([address])  -     Post Op Shoe    Diabetic polyneuropathy associated with type 2 diabetes mellitus ([address])  -     Post Op Shoe      Diagnosis and options discussed with patient  Patient agreeable to the plan as stated below    -Debridement today: unnecessary. Cleaned with NSS  -Dressings: antibiotic cream, DSD  -Offloading plan: toe crest provided, surgical shoe procided  -Signs of infection today: none  -Risk factors: neuropathy  -RTC: 3-4 weeks      Subjective:      Patient ID: Logan Yee is a 60 y.o. female. Patient has a wound on her left toe. She missed her last appointment last month but it was healed. She says it has reopened. There is clear ooze. She has been putting antibiotic cream on it and covered it. The following portions of the patient's history were reviewed and updated as appropriate: allergies, current medications, past family history, past medical history, past social history, past surgical history, and problem list.    Review of Systems   Constitutional: Negative. Cardiovascular:  Negative for leg swelling. Gastrointestinal:  Negative for diarrhea, nausea and vomiting. Musculoskeletal:  Negative for arthralgias. Skin:  Positive for color change and wound. Neurological:  Positive for numbness. Objective:      /60   Ht 5' 8" (1.727 m)   Wt 135 kg (297 lb)   BMI 45.16 kg/m²          Physical Exam  Cardiovascular:      Pulses:           Dorsalis pedis pulses are 2+ on the right side and 2+ on the left side. Posterior tibial pulses are 2+ on the right side and 2+ on the left side. Musculoskeletal:      Right foot: Deformity (toe amputation stable) present. Left foot: Deformity (2nd mallet toe deformity) present. Feet:      Right foot:      Protective Sensation:   0 sites sensed. Left foot:      Protective Sensation:   0 sites sensed. Skin integrity: Ulcer (tip left 2nd toe ulcer, breakdown of skin. No pus or cellulitis) present.

## 2023-11-27 ENCOUNTER — OFFICE VISIT (OUTPATIENT)
Dept: PODIATRY | Facility: CLINIC | Age: 58
End: 2023-11-27
Payer: COMMERCIAL

## 2023-11-27 VITALS
WEIGHT: 293 LBS | BODY MASS INDEX: 44.41 KG/M2 | DIASTOLIC BLOOD PRESSURE: 60 MMHG | SYSTOLIC BLOOD PRESSURE: 120 MMHG | HEIGHT: 68 IN

## 2023-11-27 DIAGNOSIS — E11.42 DIABETIC POLYNEUROPATHY ASSOCIATED WITH TYPE 2 DIABETES MELLITUS (HCC): Primary | ICD-10-CM

## 2023-11-27 DIAGNOSIS — S98.131A AMPUTATION OF TOE OF RIGHT FOOT (HCC): ICD-10-CM

## 2023-11-27 DIAGNOSIS — B35.1 TINEA UNGUIUM: ICD-10-CM

## 2023-11-27 PROCEDURE — 11721 DEBRIDE NAIL 6 OR MORE: CPT | Performed by: PODIATRIST

## 2023-11-27 NOTE — PROGRESS NOTES
Ayesha Feldman  1965  AT RISK FOOT CARE    1. Diabetic polyneuropathy associated with type 2 diabetes mellitus ([address])        2. Amputation of toe of right foot ([address])        3. Tinea unguium            Patient presents for at-risk foot care. Patient has no acute concerns today. Patient has significant lower extremity risk due to previous amputation. On exam patient has thickened, hypertrophic, discolored, brittle toenails with subungual debris and tenderness x7   Callus: none  Amputation: right 1,3    Today's treatment includes:  Debridement of toenails. Using nail nipper, silevstre, and curette, nails were sharply debrided, reduced in thickness and length. Devitalized nail tissue and fungal debris excised and removed. Patient tolerated well. Discussed proper shoe gear, daily inspections of feet, and general foot health with patient. Patient has Q7  findings and is recommended for at risk foot care every 9-10 weeks. Patients most recent complete clinical exam was performed: 9/18/2023. Previous ulcer is healed.

## 2023-12-07 DIAGNOSIS — J45.40 MODERATE PERSISTENT ASTHMA WITHOUT COMPLICATION: ICD-10-CM

## 2023-12-08 RX ORDER — ALBUTEROL SULFATE 90 UG/1
AEROSOL, METERED RESPIRATORY (INHALATION)
Qty: 8.5 G | Refills: 2 | Status: SHIPPED | OUTPATIENT
Start: 2023-12-08

## 2023-12-22 ENCOUNTER — TELEPHONE (OUTPATIENT)
Dept: FAMILY MEDICINE CLINIC | Facility: CLINIC | Age: 58
End: 2023-12-22

## 2023-12-22 DIAGNOSIS — U07.1 COVID-19: Primary | ICD-10-CM

## 2023-12-22 RX ORDER — NIRMATRELVIR AND RITONAVIR 300-100 MG
3 KIT ORAL 2 TIMES DAILY
Qty: 30 TABLET | Refills: 0 | Status: SHIPPED | OUTPATIENT
Start: 2023-12-22 | End: 2023-12-27

## 2023-12-22 NOTE — TELEPHONE ENCOUNTER
Patient called with this FYI..     My name is Anna Marie Barbosa. I'm a patient of Doctor Husain and I'm calling just to tell her that I tested positive for COVID yesterday and there's been plenty of fluids. I have my inhaler. I have a very bad headache and I just wanted to know if that's all that I should be doing. My phone number is [phone]

## 2023-12-22 NOTE — TELEPHONE ENCOUNTER
Please let patient know that I have ordered Paxlovid to the pharmacy, it's a 5 day treatment course and main side effects are bitter taste and diarrhea. As we are going into the holiday weekend if her symptoms worsen please ask her to go and be evaluated in the Emergency department

## 2023-12-30 DIAGNOSIS — J45.40 MODERATE PERSISTENT ASTHMA WITHOUT COMPLICATION: ICD-10-CM

## 2024-01-02 DIAGNOSIS — Z79.4 TYPE 2 DIABETES MELLITUS WITH HYPERGLYCEMIA, WITH LONG-TERM CURRENT USE OF INSULIN (HCC): Primary | ICD-10-CM

## 2024-01-02 DIAGNOSIS — E11.65 TYPE 2 DIABETES MELLITUS WITH HYPERGLYCEMIA, WITH LONG-TERM CURRENT USE OF INSULIN (HCC): Primary | ICD-10-CM

## 2024-01-02 RX ORDER — FLUTICASONE PROPIONATE AND SALMETEROL 500; 50 UG/1; UG/1
2 POWDER RESPIRATORY (INHALATION)
Qty: 60 BLISTER | Refills: 3 | Status: SHIPPED | OUTPATIENT
Start: 2024-01-02

## 2024-01-03 RX ORDER — TIRZEPATIDE 10 MG/.5ML
10 INJECTION, SOLUTION SUBCUTANEOUS
Qty: 4 ML | Refills: 0 | Status: SHIPPED | OUTPATIENT
Start: 2024-01-03

## 2024-02-09 ENCOUNTER — VBI (OUTPATIENT)
Dept: ADMINISTRATIVE | Facility: OTHER | Age: 59
End: 2024-02-09

## 2024-02-26 ENCOUNTER — OFFICE VISIT (OUTPATIENT)
Dept: PODIATRY | Facility: CLINIC | Age: 59
End: 2024-02-26
Payer: COMMERCIAL

## 2024-02-26 VITALS — SYSTOLIC BLOOD PRESSURE: 140 MMHG | DIASTOLIC BLOOD PRESSURE: 72 MMHG | HEART RATE: 84 BPM

## 2024-02-26 DIAGNOSIS — E11.42 DIABETIC POLYNEUROPATHY ASSOCIATED WITH TYPE 2 DIABETES MELLITUS (HCC): Primary | ICD-10-CM

## 2024-02-26 DIAGNOSIS — S98.131A AMPUTATION OF TOE OF RIGHT FOOT (HCC): ICD-10-CM

## 2024-02-26 DIAGNOSIS — B35.1 TINEA UNGUIUM: ICD-10-CM

## 2024-02-26 PROCEDURE — 11721 DEBRIDE NAIL 6 OR MORE: CPT | Performed by: PODIATRIST

## 2024-02-26 NOTE — PROGRESS NOTES
Anna Marie Barbosa  1965  AT RISK FOOT CARE    1. Diabetic polyneuropathy associated with type 2 diabetes mellitus (HCC)        2. Amputation of toe of right foot (HCC)        3. Tinea unguium            Patient presents for at-risk foot care.  Patient has no acute concerns today.  Patient has significant lower extremity risk due to previous amputation.    On exam patient has thickened, hypertrophic, discolored, brittle toenails with subungual debris and tenderness x7   Callus: absent  Amputation: right 1,3 digit amputations stable    Today's treatment includes:  Debridement of toenails. Using nail nipper, silvestre, and curette, nails were sharply debrided, reduced in thickness and length. Devitalized nail tissue and fungal debris excised and removed. Patient tolerated well.        Discussed proper shoe gear, daily inspections of feet, and general foot health with patient. Patient has Q7  findings and is recommended for at risk foot care every 9-10 weeks.    Patients most recent complete clinical exam was performed: 09/18/2023

## 2024-02-28 DIAGNOSIS — E11.65 TYPE 2 DIABETES MELLITUS WITH HYPERGLYCEMIA, WITH LONG-TERM CURRENT USE OF INSULIN (HCC): ICD-10-CM

## 2024-02-28 DIAGNOSIS — J45.40 MODERATE PERSISTENT ASTHMA WITHOUT COMPLICATION: ICD-10-CM

## 2024-02-28 DIAGNOSIS — Z79.4 TYPE 2 DIABETES MELLITUS WITH HYPERGLYCEMIA, WITH LONG-TERM CURRENT USE OF INSULIN (HCC): ICD-10-CM

## 2024-02-28 RX ORDER — ALBUTEROL SULFATE 90 UG/1
2 AEROSOL, METERED RESPIRATORY (INHALATION) EVERY 4 HOURS PRN
Qty: 8.5 G | Refills: 2 | Status: SHIPPED | OUTPATIENT
Start: 2024-02-28

## 2024-03-01 RX ORDER — TIRZEPATIDE 10 MG/.5ML
10 INJECTION, SOLUTION SUBCUTANEOUS
Qty: 4 ML | Refills: 1 | Status: SHIPPED | OUTPATIENT
Start: 2024-03-01

## 2024-03-12 NOTE — ASSESSMENT & PLAN NOTE
"Chief Complaint   Patient presents with    Post-op     2wks post op c-sec delivered         SUBJECTIVE:   Ave Uribe is a 32 y.o.  who presents s/p  Repeat Low Transverse  Section on 24. Her pregnancy was uncomplicated. Her postpartum course has been uncomplicated as well. Bowel and bladder function have returned to normal. Bleeding is scant. She is breast and bottle feeding.   Mood changes:denies  Past Medical History:   Diagnosis Date    Anxiety 2019    Depression 2007    Kidney stone 2009    Ovarian cyst 2013    Pregnancy 2024    Urinary tract infection 2018    Varicella 1992     Past Surgical History:   Procedure Laterality Date     SECTION       SECTION N/A 2024    Procedure:  SECTION REPEAT;  Surgeon: Paul Sánchez MD;  Location: [facility] LABOR DELIVERY;  Service: Obstetrics/Gynecology;  Laterality: N/A;    WISDOM TOOTH EXTRACTION       Social History     Tobacco Use    Smoking status: Former     Types: Cigarettes    Smokeless tobacco: Never   Vaping Use    Vaping status: Every Day    Substances: Nicotine, Flavoring    Devices: Disposable   Substance Use Topics    Alcohol use: Not Currently    Drug use: Never     Family History   Problem Relation Age of Onset    Diabetes Father     Breast cancer Maternal Aunt         Both breast removed/ passed away    Diabetes Paternal Aunt        Patient Active Problem List   Diagnosis     delivery delivered        OBJECTIVE:   /73   Ht 167.6 cm (66\")   Wt 82.1 kg (181 lb)   LMP 2023   Breastfeeding Yes   BMI 29.21 kg/m²      Physical Exam  Constitutional:       General: She is not in acute distress.     Appearance: Normal appearance. She is not ill-appearing, toxic-appearing or diaphoretic.   Cardiovascular:      Rate and Rhythm: Normal rate.   Pulmonary:      Effort: Pulmonary effort is normal.   Abdominal:      General: There is no distension.      Palpations: Abdomen is " Admit to med/surg  Order IV reglan  CT scan shows no obstruction  Consider GI consult if no improvement  Continue IV fluids soft. There is no mass.      Tenderness: There is no abdominal tenderness. There is no guarding.      Hernia: No hernia is present.   Musculoskeletal:         General: Normal range of motion.      Cervical back: Normal range of motion.   Neurological:      General: No focal deficit present.      Mental Status: She is alert and oriented to person, place, and time.      Cranial Nerves: No cranial nerve deficit.   Skin:     General: Skin is warm and dry.      Comments: Low transverse incision is healing well. No erythema, no edema, no ecchymosis, no drainage noted   Psychiatric:         Mood and Affect: Mood normal.         Behavior: Behavior normal.         Thought Content: Thought content normal.         Judgment: Judgment normal.   Vitals and nursing note reviewed.         Lab Results   Component Value Date    WBC 12.77 (H) 2024    HGB 9.7 (L) 2024    HCT 29.4 (L) 2024    MCV 90.5 2024     2024        ASSESSMENT:   Diagnoses and all orders for this visit:    1. Postpartum follow-up (Primary)    2. S/P  section    PLAN:   Doing very well postpartum  Low transverse incision is healing well. No erythema, no edema, no ecchymosis, no drainage noted  Baby is doing well  Contraception: planning vasectomy for partner  At this time, continue pelvic rest and lifting precautions.  BP normal range    Return in about 4 weeks (around 2024) for Postpartum follow up, With Dr Sánchez.    Radha Martino, APRN  3/12/2024  11:37 EDT

## 2024-03-15 ENCOUNTER — TELEPHONE (OUTPATIENT)
Dept: FAMILY MEDICINE CLINIC | Facility: CLINIC | Age: 59
End: 2024-03-15

## 2024-03-16 DIAGNOSIS — E11.65 TYPE 2 DIABETES MELLITUS WITH HYPERGLYCEMIA, WITH LONG-TERM CURRENT USE OF INSULIN (HCC): ICD-10-CM

## 2024-03-16 DIAGNOSIS — Z79.4 TYPE 2 DIABETES MELLITUS WITH HYPERGLYCEMIA, WITH LONG-TERM CURRENT USE OF INSULIN (HCC): ICD-10-CM

## 2024-03-19 ENCOUNTER — TELEPHONE (OUTPATIENT)
Dept: FAMILY MEDICINE CLINIC | Facility: CLINIC | Age: 59
End: 2024-03-19

## 2024-03-19 ENCOUNTER — TELEPHONE (OUTPATIENT)
Age: 59
End: 2024-03-19

## 2024-03-19 NOTE — TELEPHONE ENCOUNTER
Pt was calling ailyn back to discuss past phonecall. Our clincal team was assiting patients and let pt know we would have ailyn call her back as soon as she is available

## 2024-03-19 NOTE — TELEPHONE ENCOUNTER
I called and spoke with the patient. She will call the pharmacy and check with another pharmacy and then call us back.

## 2024-03-19 NOTE — TELEPHONE ENCOUNTER
I spoke with Anna Marie and she called the pharmacy.  This is a statewide issue with this medication.  She said that [facility] might have the 7.5 mg, but didn't think you would go down in mg's?  You could send this over to [facility]?  Whatever you recommend?

## 2024-03-19 NOTE — TELEPHONE ENCOUNTER
----- Message from Stacey Blevins sent at 3/19/2024  9:24 AM EDT -----  Patient returning a call to Ashtyn. Please call back at  [phone]

## 2024-03-20 NOTE — TELEPHONE ENCOUNTER
I'm going to send the 7.5mg dose, can you please ask patient to get the lab work that was ordered previously so we can see where her A1C is? Thank you

## 2024-03-20 NOTE — TELEPHONE ENCOUNTER
Patient called back I advised that Dr Grove sent the brendanro 7.5 today to the pharmacy and if she needs anything else once she goes to the pharmacy to please give us a call.

## 2024-03-28 NOTE — ED NOTES
This nurse accompany patient to CT scan  Vitals stable  Will continue to monitor       Rafaela Watson RN  01/12/20 4859 Statement Selected

## 2024-04-09 ENCOUNTER — TELEPHONE (OUTPATIENT)
Age: 59
End: 2024-04-09

## 2024-04-09 NOTE — TELEPHONE ENCOUNTER
Patient is having a problem getting tirzepatide (Mounjaro) 7.5 MG/0.5ML She would like to know if she could have another rx ordered in the mean time.

## 2024-04-10 DIAGNOSIS — E11.65 TYPE 2 DIABETES MELLITUS WITH HYPERGLYCEMIA, WITH LONG-TERM CURRENT USE OF INSULIN (HCC): Primary | ICD-10-CM

## 2024-04-10 DIAGNOSIS — Z79.4 TYPE 2 DIABETES MELLITUS WITH HYPERGLYCEMIA, WITH LONG-TERM CURRENT USE OF INSULIN (HCC): Primary | ICD-10-CM

## 2024-04-10 RX ORDER — DULAGLUTIDE 0.75 MG/.5ML
0.75 INJECTION, SOLUTION SUBCUTANEOUS
Qty: 6 ML | Refills: 1 | Status: SHIPPED | OUTPATIENT
Start: 2024-04-10

## 2024-04-10 NOTE — TELEPHONE ENCOUNTER
I'm sorry she's having such a hard time getting the Mounjaro. Does she want to try the 5mg dose  or try something else like Ozempic or Trulicty

## 2024-04-10 NOTE — TELEPHONE ENCOUNTER
I called and spoke with the patient and she will try something new.  They have no Mounjaro. She will try one of the other two.

## 2024-04-12 NOTE — TELEPHONE ENCOUNTER
Patient calling stating the pharmacy doesn't have   Trulicity 0.75mg   She would like to try Ozempic. [facility].   Please call the patient and advise  [phone]

## 2024-04-15 DIAGNOSIS — Z79.4 TYPE 2 DIABETES MELLITUS WITH HYPERGLYCEMIA, WITH LONG-TERM CURRENT USE OF INSULIN (HCC): Primary | ICD-10-CM

## 2024-04-15 DIAGNOSIS — E11.65 TYPE 2 DIABETES MELLITUS WITH HYPERGLYCEMIA, WITH LONG-TERM CURRENT USE OF INSULIN (HCC): Primary | ICD-10-CM

## 2024-04-23 ENCOUNTER — TELEPHONE (OUTPATIENT)
Age: 59
End: 2024-04-23

## 2024-04-23 NOTE — TELEPHONE ENCOUNTER
FYI: Patient called asking to speak with Socorro regarding ongoing previous conversation in chart. I called the office and was transferred to her line. A message was also left voicemail.

## 2024-04-25 NOTE — TELEPHONE ENCOUNTER
Call from patient to let Socorro know that she picked up medication last night and took it this morning. She said for Socorro to call her if she has any questions for her.

## 2024-05-03 ENCOUNTER — NURSE TRIAGE (OUTPATIENT)
Age: 59
End: 2024-05-03

## 2024-05-03 NOTE — TELEPHONE ENCOUNTER
Regarding: Ozempic side effects  ----- Message from Vinita Craig sent at 5/3/2024  5:02 PM EDT -----  Patient states she started taking Ozempic on Sunday Monday morning she felt sick to her stomach and start vomiting and having diarrhea. She has felt sick all week after taking this. Has not been able to eat and drinking ginger ale. Asking for advice.

## 2024-05-06 ENCOUNTER — TELEPHONE (OUTPATIENT)
Age: 59
End: 2024-05-06

## 2024-05-06 ENCOUNTER — TELEPHONE (OUTPATIENT)
Dept: LAB | Facility: HOSPITAL | Age: 59
End: 2024-05-06

## 2024-05-06 NOTE — TELEPHONE ENCOUNTER
Patient called back and wanted to see if the PCP had gotten a chance to look at her message.  She would like a call back.  [phone].

## 2024-05-06 NOTE — TELEPHONE ENCOUNTER
Patient called stating that she took the Ozempic on Sunday 4/28.  On Monday 4/29 she was nauseous.  Beginning Tuesday 4/30 until Friday 5/3, patient was experiencing diarrhea, throwing up and an inability to eat.  Patient then skipped her dose Sun 5/5 as she did not want to experience these effects again.  Patient will need something for her diabetes.  Please advise and return patients call.

## 2024-05-07 NOTE — TELEPHONE ENCOUNTER
Called and spoke with patient yesterday; advised to stop Ozempic and get blood work ordered previously to get an updated A1C. Patient given number for mobile lab to schedule. Will discuss more at upcoming office visit next week

## 2024-05-13 ENCOUNTER — TELEPHONE (OUTPATIENT)
Dept: OBGYN CLINIC | Facility: CLINIC | Age: 59
End: 2024-05-13

## 2024-05-13 NOTE — TELEPHONE ENCOUNTER
Left message for patient to call office back regarding their missed appointment with Dr. Figueroa on 5/6, please reschedule if patient calls back.

## 2024-05-14 ENCOUNTER — APPOINTMENT (OUTPATIENT)
Dept: LAB | Facility: HOSPITAL | Age: 59
End: 2024-05-14
Attending: FAMILY MEDICINE
Payer: COMMERCIAL

## 2024-05-14 DIAGNOSIS — E11.65 TYPE 2 DIABETES MELLITUS WITH HYPERGLYCEMIA, WITH LONG-TERM CURRENT USE OF INSULIN (HCC): ICD-10-CM

## 2024-05-14 DIAGNOSIS — Z79.4 TYPE 2 DIABETES MELLITUS WITH HYPERGLYCEMIA, WITH LONG-TERM CURRENT USE OF INSULIN (HCC): ICD-10-CM

## 2024-05-14 LAB
ALBUMIN SERPL BCP-MCNC: 3.6 G/DL (ref 3.5–5)
ALP SERPL-CCNC: 77 U/L (ref 34–104)
ALT SERPL W P-5'-P-CCNC: 13 U/L (ref 7–52)
ANION GAP SERPL CALCULATED.3IONS-SCNC: 5 MMOL/L (ref 4–13)
AST SERPL W P-5'-P-CCNC: 16 U/L (ref 13–39)
BASOPHILS # BLD AUTO: 0.06 THOUSANDS/ÂΜL (ref 0–0.1)
BASOPHILS NFR BLD AUTO: 1 % (ref 0–1)
BILIRUB SERPL-MCNC: 0.44 MG/DL (ref 0.2–1)
BUN SERPL-MCNC: 15 MG/DL (ref 5–25)
CALCIUM SERPL-MCNC: 9 MG/DL (ref 8.4–10.2)
CHLORIDE SERPL-SCNC: 101 MMOL/L (ref 96–108)
CHOLEST SERPL-MCNC: 139 MG/DL
CO2 SERPL-SCNC: 32 MMOL/L (ref 21–32)
CREAT SERPL-MCNC: 0.46 MG/DL (ref 0.6–1.3)
EOSINOPHIL # BLD AUTO: 0.32 THOUSAND/ÂΜL (ref 0–0.61)
EOSINOPHIL NFR BLD AUTO: 4 % (ref 0–6)
ERYTHROCYTE [DISTWIDTH] IN BLOOD BY AUTOMATED COUNT: 13.3 % (ref 11.6–15.1)
EST. AVERAGE GLUCOSE BLD GHB EST-MCNC: 134 MG/DL
GFR SERPL CREATININE-BSD FRML MDRD: 109 ML/MIN/1.73SQ M
GLUCOSE SERPL-MCNC: 101 MG/DL (ref 65–140)
HBA1C MFR BLD: 6.3 %
HCT VFR BLD AUTO: 38.9 % (ref 34.8–46.1)
HDLC SERPL-MCNC: 31 MG/DL
HGB BLD-MCNC: 12 G/DL (ref 11.5–15.4)
IMM GRANULOCYTES # BLD AUTO: 0.03 THOUSAND/UL (ref 0–0.2)
IMM GRANULOCYTES NFR BLD AUTO: 0 % (ref 0–2)
LDLC SERPL CALC-MCNC: 92 MG/DL (ref 0–100)
LYMPHOCYTES # BLD AUTO: 3.4 THOUSANDS/ÂΜL (ref 0.6–4.47)
LYMPHOCYTES NFR BLD AUTO: 38 % (ref 14–44)
MCH RBC QN AUTO: 27.8 PG (ref 26.8–34.3)
MCHC RBC AUTO-ENTMCNC: 30.8 G/DL (ref 31.4–37.4)
MCV RBC AUTO: 90 FL (ref 82–98)
MONOCYTES # BLD AUTO: 0.71 THOUSAND/ÂΜL (ref 0.17–1.22)
MONOCYTES NFR BLD AUTO: 8 % (ref 4–12)
NEUTROPHILS # BLD AUTO: 4.52 THOUSANDS/ÂΜL (ref 1.85–7.62)
NEUTS SEG NFR BLD AUTO: 49 % (ref 43–75)
NRBC BLD AUTO-RTO: 0 /100 WBCS
PLATELET # BLD AUTO: 268 THOUSANDS/UL (ref 149–390)
PMV BLD AUTO: 13.2 FL (ref 8.9–12.7)
POTASSIUM SERPL-SCNC: 4.7 MMOL/L (ref 3.5–5.3)
PROT SERPL-MCNC: 6.7 G/DL (ref 6.4–8.4)
RBC # BLD AUTO: 4.32 MILLION/UL (ref 3.81–5.12)
SODIUM SERPL-SCNC: 138 MMOL/L (ref 135–147)
TRIGL SERPL-MCNC: 82 MG/DL
WBC # BLD AUTO: 9.04 THOUSAND/UL (ref 4.31–10.16)

## 2024-05-14 PROCEDURE — 80061 LIPID PANEL: CPT

## 2024-05-14 PROCEDURE — 83036 HEMOGLOBIN GLYCOSYLATED A1C: CPT

## 2024-05-14 PROCEDURE — 80053 COMPREHEN METABOLIC PANEL: CPT

## 2024-05-14 PROCEDURE — 36415 COLL VENOUS BLD VENIPUNCTURE: CPT

## 2024-05-14 PROCEDURE — 85025 COMPLETE CBC W/AUTO DIFF WBC: CPT

## 2024-05-15 ENCOUNTER — OFFICE VISIT (OUTPATIENT)
Dept: FAMILY MEDICINE CLINIC | Facility: CLINIC | Age: 59
End: 2024-05-15
Payer: COMMERCIAL

## 2024-05-15 VITALS
SYSTOLIC BLOOD PRESSURE: 128 MMHG | TEMPERATURE: 97.6 F | WEIGHT: 266.4 LBS | OXYGEN SATURATION: 97 % | HEART RATE: 114 BPM | HEIGHT: 68 IN | BODY MASS INDEX: 40.38 KG/M2 | DIASTOLIC BLOOD PRESSURE: 62 MMHG

## 2024-05-15 DIAGNOSIS — E11.621 DIABETIC ULCER OF TOE OF LEFT FOOT ASSOCIATED WITH TYPE 2 DIABETES MELLITUS, LIMITED TO BREAKDOWN OF SKIN (HCC): ICD-10-CM

## 2024-05-15 DIAGNOSIS — I10 ESSENTIAL HYPERTENSION: Chronic | ICD-10-CM

## 2024-05-15 DIAGNOSIS — E66.01 MORBID OBESITY (HCC): ICD-10-CM

## 2024-05-15 DIAGNOSIS — L97.521 DIABETIC ULCER OF TOE OF LEFT FOOT ASSOCIATED WITH TYPE 2 DIABETES MELLITUS, LIMITED TO BREAKDOWN OF SKIN (HCC): ICD-10-CM

## 2024-05-15 DIAGNOSIS — E11.65 TYPE 2 DIABETES MELLITUS WITH HYPERGLYCEMIA, WITH LONG-TERM CURRENT USE OF INSULIN (HCC): ICD-10-CM

## 2024-05-15 DIAGNOSIS — Z00.00 MEDICARE ANNUAL WELLNESS VISIT, SUBSEQUENT: Primary | ICD-10-CM

## 2024-05-15 DIAGNOSIS — F11.288 OPIOID DEPENDENCE WITH OTHER OPIOID-INDUCED DISORDER (HCC): ICD-10-CM

## 2024-05-15 DIAGNOSIS — J45.40 MODERATE PERSISTENT ASTHMA WITHOUT COMPLICATION: ICD-10-CM

## 2024-05-15 DIAGNOSIS — Z89.411 ACQUIRED ABSENCE OF RIGHT GREAT TOE (HCC): ICD-10-CM

## 2024-05-15 DIAGNOSIS — Z79.4 TYPE 2 DIABETES MELLITUS WITH HYPERGLYCEMIA, WITH LONG-TERM CURRENT USE OF INSULIN (HCC): ICD-10-CM

## 2024-05-15 PROBLEM — L97.522 DIABETIC ULCER OF TOE OF LEFT FOOT ASSOCIATED WITH TYPE 2 DIABETES MELLITUS, WITH FAT LAYER EXPOSED (HCC): Status: RESOLVED | Noted: 2023-09-18 | Resolved: 2024-05-15

## 2024-05-15 PROBLEM — M86.9 OSTEOMYELITIS OF TOE (HCC): Status: RESOLVED | Noted: 2024-05-15 | Resolved: 2024-05-15

## 2024-05-15 PROBLEM — L97.514 SKIN ULCER OF THIRD TOE OF RIGHT FOOT WITH NECROSIS OF BONE (HCC): Status: ACTIVE | Noted: 2023-09-08

## 2024-05-15 PROBLEM — M86.9 OSTEOMYELITIS OF TOE (HCC): Status: ACTIVE | Noted: 2024-05-15

## 2024-05-15 PROBLEM — L97.514 SKIN ULCER OF THIRD TOE OF RIGHT FOOT WITH NECROSIS OF BONE (HCC): Status: RESOLVED | Noted: 2023-09-08 | Resolved: 2024-05-15

## 2024-05-15 PROCEDURE — 99214 OFFICE O/P EST MOD 30 MIN: CPT | Performed by: FAMILY MEDICINE

## 2024-05-15 PROCEDURE — G0439 PPPS, SUBSEQ VISIT: HCPCS | Performed by: FAMILY MEDICINE

## 2024-05-15 RX ORDER — ALBUTEROL SULFATE 90 UG/1
2 AEROSOL, METERED RESPIRATORY (INHALATION) EVERY 4 HOURS PRN
Qty: 8.5 G | Refills: 2 | Status: SHIPPED | OUTPATIENT
Start: 2024-05-15

## 2024-05-15 RX ORDER — ROSUVASTATIN CALCIUM 5 MG/1
5 TABLET, COATED ORAL EVERY OTHER DAY
Qty: 90 TABLET | Refills: 1 | Status: SHIPPED | OUTPATIENT
Start: 2024-05-15

## 2024-05-15 NOTE — ASSESSMENT & PLAN NOTE
- Stable on Advair and albuterol PRN  - Avoid exposure to tobacco smoke, polluted air and other known asthma triggers. Monitor albuterol use to report back at follow up. Patient aware that increased albuterol use indicates poor control and may need further medication adjustment.

## 2024-05-15 NOTE — PATIENT INSTRUCTIONS
Medicare Preventive Visit Patient Instructions  Thank you for completing your Welcome to Medicare Visit or Medicare Annual Wellness Visit today. Your next wellness visit will be due in one year (5/16/2025).  The screening/preventive services that you may require over the next 5-10 years are detailed below. Some tests may not apply to you based off risk factors and/or age. Screening tests ordered at today's visit but not completed yet may show as past due. Also, please note that scanned in results may not display below.  Preventive Screenings:  Service Recommendations Previous Testing/Comments   Colorectal Cancer Screening  * Colonoscopy    * Fecal Occult Blood Test (FOBT)/Fecal Immunochemical Test (FIT)  * Fecal DNA/Cologuard Test  * Flexible Sigmoidoscopy Age: 45-75 years old   Colonoscopy: every 10 years (may be performed more frequently if at higher risk)  OR  FOBT/FIT: every 1 year  OR  Cologuard: every 3 years  OR  Sigmoidoscopy: every 5 years  Screening may be recommended earlier than age 45 if at higher risk for colorectal cancer. Also, an individualized decision between you and your healthcare provider will decide whether screening between the ages of 76-85 would be appropriate. Colonoscopy: Not on file  FOBT/FIT: 05/10/2022  Cologuard: Not on file  Sigmoidoscopy: Not on file          Breast Cancer Screening Age: 40+ years old  Frequency: every 1-2 years  Not required if history of left and right mastectomy Mammogram: Not on file        Cervical Cancer Screening Between the ages of 21-29, pap smear recommended once every 3 years.   Between the ages of 30-65, can perform pap smear with HPV co-testing every 5 years.   Recommendations may differ for women with a history of total hysterectomy, cervical cancer, or abnormal pap smears in past. Pap Smear: Not on file        Hepatitis C Screening Once for adults born between 1945 and 1965  More frequently in patients at high risk for Hepatitis C Hep C Antibody: Not  on file        Diabetes Screening 1-2 times per year if you're at risk for diabetes or have pre-diabetes Fasting glucose: 294 mg/dL (4/28/2021)  A1C: 6.3 % (5/14/2024)      Cholesterol Screening Once every 5 years if you don't have a lipid disorder. May order more often based on risk factors. Lipid panel: 05/14/2024          Other Preventive Screenings Covered by Medicare:  Abdominal Aortic Aneurysm (AAA) Screening: covered once if your at risk. You're considered to be at risk if you have a family history of AAA.  Lung Cancer Screening: covers low dose CT scan once per year if you meet all of the following conditions: (1) Age 55-77; (2) No signs or symptoms of lung cancer; (3) Current smoker or have quit smoking within the last 15 years; (4) You have a tobacco smoking history of at least 20 pack years (packs per day multiplied by number of years you smoked); (5) You get a written order from a healthcare provider.  Glaucoma Screening: covered annually if you're considered high risk: (1) You have diabetes OR (2) Family history of glaucoma OR (3)  aged 50 and older OR (4)  American aged 65 and older  Osteoporosis Screening: covered every 2 years if you meet one of the following conditions: (1) You're estrogen deficient and at risk for osteoporosis based off medical history and other findings; (2) Have a vertebral abnormality; (3) On glucocorticoid therapy for more than 3 months; (4) Have primary hyperparathyroidism; (5) On osteoporosis medications and need to assess response to drug therapy.   Last bone density test (DXA Scan): Not on file.  HIV Screening: covered annually if you're between the age of 15-65. Also covered annually if you are younger than 15 and older than 65 with risk factors for HIV infection. For pregnant patients, it is covered up to 3 times per pregnancy.    Immunizations:  Immunization Recommendations   Influenza Vaccine Annual influenza vaccination during flu season is  recommended for all persons aged >= 6 months who do not have contraindications   Pneumococcal Vaccine   * Pneumococcal conjugate vaccine = PCV13 (Prevnar 13), PCV15 (Vaxneuvance), PCV20 (Prevnar 20)  * Pneumococcal polysaccharide vaccine = PPSV23 (Pneumovax) Adults 19-63 yo with certain risk factors or if 65+ yo  If never received any pneumonia vaccine: recommend Prevnar 20 (PCV20)  Give PCV20 if previously received 1 dose of PCV13 or PPSV23   Hepatitis B Vaccine 3 dose series if at intermediate or high risk (ex: diabetes, end stage renal disease, liver disease)   Respiratory syncytial virus (RSV) Vaccine - COVERED BY MEDICARE PART D  * RSVPreF3 (Arexvy) CDC recommends that adults 60 years of age and older may receive a single dose of RSV vaccine using shared clinical decision-making (SCDM)   Tetanus (Td) Vaccine - COST NOT COVERED BY MEDICARE PART B Following completion of primary series, a booster dose should be given every 10 years to maintain immunity against tetanus. Td may also be given as tetanus wound prophylaxis.   Tdap Vaccine - COST NOT COVERED BY MEDICARE PART B Recommended at least once for all adults. For pregnant patients, recommended with each pregnancy.   Shingles Vaccine (Shingrix) - COST NOT COVERED BY MEDICARE PART B  2 shot series recommended in those 19 years and older who have or will have weakened immune systems or those 50 years and older     Health Maintenance Due:      Topic Date Due   • Hepatitis C Screening  Never done   • Breast Cancer Screening: Mammogram  Never done   • Colorectal Cancer Screening  05/10/2023   • HIV Screening  Completed     Immunizations Due:      Topic Date Due   • COVID-19 Vaccine (4 - 2023-24 season) 09/01/2023     Advance Directives   What are advance directives?  Advance directives are legal documents that state your wishes and plans for medical care. These plans are made ahead of time in case you lose your ability to make decisions for yourself. Advance  directives can apply to any medical decision, such as the treatments you want, and if you want to donate organs.   What are the types of advance directives?  There are many types of advance directives, and each state has rules about how to use them. You may choose a combination of any of the following:  Living will:  This is a written record of the treatment you want. You can also choose which treatments you do not want, which to limit, and which to stop at a certain time. This includes surgery, medicine, IV fluid, and tube feedings.   Durable power of  for healthcare (DPAHC):  This is a written record that states who you want to make healthcare choices for you when you are unable to make them for yourself. This person, called a proxy, is usually a family member or a friend. You may choose more than 1 proxy.  Do not resuscitate (DNR) order:  A DNR order is used in case your heart stops beating or you stop breathing. It is a request not to have certain forms of treatment, such as CPR. A DNR order may be included in other types of advance directives.  Medical directive:  This covers the care that you want if you are in a coma, near death, or unable to make decisions for yourself. You can list the treatments you want for each condition. Treatment may include pain medicine, surgery, blood transfusions, dialysis, IV or tube feedings, and a ventilator (breathing machine).  Values history:  This document has questions about your views, beliefs, and how you feel and think about life. This information can help others choose the care that you would choose.  Why are advance directives important?  An advance directive helps you control your care. Although spoken wishes may be used, it is better to have your wishes written down. Spoken wishes can be misunderstood, or not followed. Treatments may be given even if you do not want them. An advance directive may make it easier for your family to make difficult choices about  your care.   Weight Management   Why it is important to manage your weight:  Being overweight increases your risk of health conditions such as heart disease, high blood pressure, type 2 diabetes, and certain types of cancer. It can also increase your risk for osteoarthritis, sleep apnea, and other respiratory problems. Aim for a slow, steady weight loss. Even a small amount of weight loss can lower your risk of health problems.  How to lose weight safely:  A safe and healthy way to lose weight is to eat fewer calories and get regular exercise. You can lose up about 1 pound a week by decreasing the number of calories you eat by 500 calories each day.   Healthy meal plan for weight management:  A healthy meal plan includes a variety of foods, contains fewer calories, and helps you stay healthy. A healthy meal plan includes the following:  Eat whole-grain foods more often.  A healthy meal plan should contain fiber. Fiber is the part of grains, fruits, and vegetables that is not broken down by your body. Whole-grain foods are healthy and provide extra fiber in your diet. Some examples of whole-grain foods are whole-wheat breads and pastas, oatmeal, brown rice, and bulgur.  Eat a variety of vegetables every day.  Include dark, leafy greens such as spinach, kale, lowell greens, and mustard greens. Eat yellow and orange vegetables such as carrots, sweet potatoes, and winter squash.   Eat a variety of fruits every day.  Choose fresh or canned fruit (canned in its own juice or light syrup) instead of juice. Fruit juice has very little or no fiber.  Eat low-fat dairy foods.  Drink fat-free (skim) milk or 1% milk. Eat fat-free yogurt and low-fat cottage cheese. Try low-fat cheeses such as mozzarella and other reduced-fat cheeses.  Choose meat and other protein foods that are low in fat.  Choose beans or other legumes such as split peas or lentils. Choose fish, skinless poultry (chicken or turkey), or lean cuts of red meat  (beef or pork). Before you cook meat or poultry, cut off any visible fat.   Use less fat and oil.  Try baking foods instead of frying them. Add less fat, such as margarine, sour cream, regular salad dressing and mayonnaise to foods. Eat fewer high-fat foods. Some examples of high-fat foods include french fries, doughnuts, ice cream, and cakes.  Eat fewer sweets.  Limit foods and drinks that are high in sugar. This includes candy, cookies, regular soda, and sweetened drinks.  Exercise:  Exercise at least 30 minutes per day on most days of the week. Some examples of exercise include walking, biking, dancing, and swimming. You can also fit in more physical activity by taking the stairs instead of the elevator or parking farther away from stores. Ask your healthcare provider about the best exercise plan for you.      © Copyright [employer] 2018 Information is for End User's use only and may not be sold, redistributed or otherwise used for commercial purposes. All illustrations and images included in CareNotes® are the copyrighted property of [employer] Inc[employer] or [employer]

## 2024-05-15 NOTE — ASSESSMENT & PLAN NOTE
BMI Counseling: Body mass index is 40.51 kg/m². The BMI is above normal. Nutrition recommendations include 3-5 servings of fruits/vegetables daily, decreasing soda and/or juice intake, moderation in carbohydrate intake, and increasing intake of lean protein.

## 2024-05-15 NOTE — PROGRESS NOTES
Ambulatory Visit  Name: Anna Marie Barbosa      : 1965      MRN: [id number]  Encounter Provider: Magdalena Grove MD  Encounter Date: 5/15/2024   Encounter department: [facility]    Assessment & Plan   1. Medicare annual wellness visit, subsequent  2. Type 2 diabetes mellitus with hyperglycemia, with long-term current use of insulin (HCC)  Assessment & Plan:    Lab Results   Component Value Date    HGBA1C 6.3 (H) 2024   - Well controlled however patient has been having a difficult time getting Mounjaro at the higher doses due to a shortage and was unable to tolerate Ozempic. Discussed restarting Mounjaro 2.5mg since it's available and she is agreeable.   - We also dicussed starting Crestor 5mg every other day and she is agreeable as well  Orders:  -     rosuvastatin (CRESTOR) 5 mg tablet; Take 1 tablet (5 mg total) by mouth every other day  -     tirzepatide (Mounjaro) 2.5 MG/0.5ML; Inject 0.5 mL (2.5 mg total) under the skin every 7 days  3. Essential hypertension  Assessment & Plan:  - Well controlled without any medications   4. Moderate persistent asthma without complication  Assessment & Plan:  - Stable on Advair and albuterol PRN  - Avoid exposure to tobacco smoke, polluted air and other known asthma triggers. Monitor albuterol use to report back at follow up. Patient aware that increased albuterol use indicates poor control and may need further medication adjustment.   Orders:  -     albuterol (PROVENTIL HFA,VENTOLIN HFA) 90 mcg/act inhaler; Inhale 2 puffs every 4 (four) hours as needed for wheezing or shortness of breath  5. Acquired absence of right great toe (HCC)  Assessment & Plan:  - Follows with podiatry  6. Diabetic ulcer of toe of left foot associated with type 2 diabetes mellitus, limited to breakdown of skin (HCC)  7. Opioid dependence with other opioid-induced disorder (HCC)  Assessment & Plan:  - Continue management per palliative care  8. Morbid obesity (HCC)  Assessment &  Plan:  BMI Counseling: Body mass index is 40.51 kg/m². The BMI is above normal. Nutrition recommendations include 3-5 servings of fruits/vegetables daily, decreasing soda and/or juice intake, moderation in carbohydrate intake, and increasing intake of lean protein.        Preventive health issues were discussed with patient, and age appropriate screening tests were ordered as noted in patient's After Visit Summary. Personalized health advice and appropriate referrals for health education or preventive services given if needed, as noted in patient's After Visit Summary.    History of Present Illness     HPI     Anna Marie Barbosa is a very pleasant 58 year old female with a past medical history of depression, asthma, chronic joint pain and hypertension who presents today for a Medicare Wellness visit. She is accompanied today by her  Dharmesh. Apart from her chronic pain she feels well. She does report being under a lot of stress with her mother who has been in and out of hospital. She follows with Dr. Powell who specializes in Neurology and palliative care for her chronic pain. She was also seen by podiatry in February and was meant to follow up with them last week but missed the appointment and has yet to reschedule.     Patient Care Team:  Magdalena Grove MD as PCP - General (Family Medicine)    Review of Systems   Constitutional: Negative.    HENT: Negative.     Eyes: Negative.    Respiratory: Negative.     Cardiovascular: Negative.    Gastrointestinal: Negative.    Genitourinary: Negative.    Musculoskeletal:  Positive for arthralgias and gait problem.   Skin: Negative.    Psychiatric/Behavioral: Negative.       Medical History Reviewed by provider this encounter:  No text in SmartText       Annual Wellness Visit Questionnaire   Anna Marie is here for her Subsequent Wellness visit.     Health Risk Assessment:   Patient rates overall health as good. Patient feels that their physical health rating is same. Patient is  satisfied with their life. Eyesight was rated as slightly worse. Hearing was rated as same. Patient feels that their emotional and mental health rating is same. Patients states they are never, rarely angry. Patient states they are never, rarely unusually tired/fatigued. Pain experienced in the last 7 days has been a lot. Patient's pain rating has been 8/10.     Depression Screening:   PHQ-2 Score: 0      Fall Risk Screening:   In the past year, patient has experienced: history of falling in past year    Number of falls: 2 or more  Injured during fall?: Yes    Feels unsteady when standing or walking?: Yes    Worried about falling?: Yes      Urinary Incontinence Screening:   Patient has leaked urine accidently in the last six months.     Home Safety:  Patient has trouble with stairs inside or outside of their home. Patient has working smoke alarms and has working carbon monoxide detector. Home safety hazards include: none.     Nutrition:   Current diet is Regular and Limited junk food. Low sugar    Medications:   Patient is currently taking over-the-counter supplements. OTC medications include: see medication list. Patient is able to manage medications.     Activities of Daily Living (ADLs)/Instrumental Activities of Daily Living (IADLs):   Walk and transfer into and out of bed and chair?: Yes  Dress and groom yourself?: Yes    Bathe or shower yourself?: Yes    Feed yourself? Yes  Do your laundry/housekeeping?: Yes  Manage your money, pay your bills and track your expenses?: Yes  Make your own meals?: Yes    Do your own shopping?: Yes    Previous Hospitalizations:   Any hospitalizations or ED visits within the last 12 months?: No      Advance Care Planning:   Living will: No    Durable POA for healthcare: No    Advanced directive: No      PREVENTIVE SCREENINGS      Cardiovascular Screening:    General: Screening Current      Diabetes Screening:     General: Screening Not Indicated and History Diabetes      Lung Cancer  "Screening:     General: Screening Not Indicated    Screening, Brief Intervention, and Referral to Treatment (SBIRT)    Screening  Typical number of drinks in a day: 0  Typical number of drinks in a week: 0  Interpretation: Low risk drinking behavior.    Single Item Drug Screening:  How often have you used an illegal drug (including marijuana) or a prescription medication for non-medical reasons in the past year? never    Single Item Drug Screen Score: 0  Interpretation: Negative screen for possible drug use disorder    SDOH Risk Assessment  Social determinants of health (SDOH) risk assesment tool was completed. The tool at a minimum covered housing stability, food insecurity, transportation needs, and utility difficulty. Patient had at risk responses for the following SDOH domains: food insecurity and utilities.     Review of Current Opioid Use    Opioid Risk Tool (ORT) Interpretation: Complete Opioid Risk Tool (ORT)    Social Determinants of Health     Food Insecurity: Food Insecurity Present (5/15/2024)    Hunger Vital Sign     Worried About Running Out of Food in the Last Year: Sometimes true     Ran Out of Food in the Last Year: Sometimes true   Transportation Needs: No Transportation Needs (5/15/2024)    PRAPARE - Transportation     Lack of Transportation (Medical): No     Lack of Transportation (Non-Medical): No   Housing Stability: Low Risk  (5/15/2024)    Housing Stability Vital Sign     Unable to Pay for Housing in the Last Year: No     Number of Times Moved in the Last Year: 0     Homeless in the Last Year: No   Utilities: At Risk (5/15/2024)    [facility] Utilities     Threatened with loss of utilities: Yes     No results found.    Objective     /62 (BP Location: Left arm, Patient Position: Sitting, Cuff Size: Large)   Pulse (!) 114   Temp 97.6 °F (36.4 °C) (Temporal)   Ht 5' 8\" (1.727 m)   Wt 121 kg (266 lb 6.4 oz)   SpO2 97%   BMI 40.51 kg/m²     Physical Exam  Constitutional:       General: She is " not in acute distress.     Appearance: She is not ill-appearing.   HENT:      Head: Normocephalic and atraumatic.   Eyes:      General:         Right eye: No discharge.         Left eye: No discharge.      Extraocular Movements: Extraocular movements intact.   Cardiovascular:      Rate and Rhythm: Normal rate.   Pulmonary:      Effort: Pulmonary effort is normal. No respiratory distress.      Breath sounds: No wheezing.   Abdominal:      General: Bowel sounds are normal. There is no distension.      Palpations: Abdomen is soft.      Tenderness: There is no abdominal tenderness. There is no guarding.   Musculoskeletal:      Right lower leg: No edema.      Left lower leg: No edema.   Neurological:      General: No focal deficit present.      Mental Status: She is alert.   Psychiatric:         Mood and Affect: Mood normal.         Behavior: Behavior normal.       Administrative Statements

## 2024-05-15 NOTE — ASSESSMENT & PLAN NOTE
Lab Results   Component Value Date    HGBA1C 6.3 (H) 05/14/2024   - Well controlled however patient has been having a difficult time getting Mounjaro at the higher doses due to a shortage and was unable to tolerate Ozempic. Discussed restarting Mounjaro 2.5mg since it's available and she is agreeable.   - We also dicussed starting Crestor 5mg every other day and she is agreeable as well

## 2024-05-16 ENCOUNTER — TELEPHONE (OUTPATIENT)
Age: 59
End: 2024-05-16

## 2024-05-16 NOTE — TELEPHONE ENCOUNTER
Pt called in to notify she will need prior auth for Mounjaro. She stated she spoke with pharmacist at [facility] and was advised if we call it can be expedited since she has been without this medication for 2 months. Please advise.

## 2024-05-16 NOTE — TELEPHONE ENCOUNTER
Pt also stated she was working with one of the nurses in regards to Mounjaro, she couldn't remember who, and would like a call back to discuss. Please call back at [phone].  Message sent to prior auth as well.

## 2024-05-21 NOTE — TELEPHONE ENCOUNTER
Patient would like a return phone call to discuss prior auth. Please return patient call to discuss.

## 2024-05-21 NOTE — TELEPHONE ENCOUNTER
PA for Mounjaro 2.5mg    Submitted via    [x][employer] [id number]  [][employer]-Case ID #   []Faxed to plan   []Other website   []Phone call Case ID #     Office notes sent, clinical questions answered. Awaiting determination    Turnaround time for your insurance to make a decision on your Prior Authorization can take 7-21 business days.

## 2024-05-22 NOTE — TELEPHONE ENCOUNTER
PA for Mounjaro 2.5mg Approved     Date(s) approved until 12/31/2024    Case #    Patient advised by          [] Igenicahart Message  [x] Phone call   []LMOM  []L/M to call office as no active Communication consent on file  []Unable to leave detailed message as VM not approved on Communication consent       Pharmacy advised by    [x]Fax  []Phone call    Approval letter scanned into Media Yes

## 2024-05-22 NOTE — TELEPHONE ENCOUNTER
Pt called in stating that the mounjaro is approved. But also states the price of the medication is very high. She states Nea will be faxing over a form for the provider to fill out to lower the tier of the mounjaro so the cost of the medication could be lowered. Pt is asking if we received that form, she would like a call back with an update.

## 2024-05-23 NOTE — TELEPHONE ENCOUNTER
PA for MOUNJARO Approved     Date(s) approved 12/31/24 NO START DATE        Patient advised by          [] MyChart Message  [] Phone call   [x]LMOM  []L/M to call office as no active Communication consent on file  []Unable to leave detailed message as VM not approved on Communication consent       Pharmacy advised by    [x]Fax  []Phone call    Approval letter scanned into Media Yes

## 2024-06-07 ENCOUNTER — TELEPHONE (OUTPATIENT)
Age: 59
End: 2024-06-07

## 2024-06-07 NOTE — TELEPHONE ENCOUNTER
Pt called in to give heads up on a fax will be sent from her pharmacy regards to her test strips order. Please do look out for it. Thanks

## 2024-06-11 ENCOUNTER — TELEPHONE (OUTPATIENT)
Age: 59
End: 2024-06-11

## 2024-06-11 NOTE — TELEPHONE ENCOUNTER
[facility]ona[facility] [employer]/ calling to see if the office received a form for supplies that she sent. No documentation found. Advised her to fax again. Asked questions about medical history. Told her to fax a formal request for information

## 2024-06-12 DIAGNOSIS — J45.40 MODERATE PERSISTENT ASTHMA WITHOUT COMPLICATION: ICD-10-CM

## 2024-06-12 RX ORDER — FLUTICASONE PROPIONATE AND SALMETEROL 500; 50 UG/1; UG/1
2 POWDER RESPIRATORY (INHALATION)
Qty: 60 BLISTER | Refills: 3 | Status: SHIPPED | OUTPATIENT
Start: 2024-06-12

## 2024-06-12 NOTE — TELEPHONE ENCOUNTER
Calling to check on the status of a form faxed yesterday. Advised forms do not have an immediate turnaround time. She states she will call back again tomorrow.

## 2024-06-12 NOTE — TELEPHONE ENCOUNTER
I spoke with the patient.  We received the fax today and she is aware the Dr. Grove is out of office until next week.

## 2024-06-25 ENCOUNTER — TELEPHONE (OUTPATIENT)
Age: 59
End: 2024-06-25

## 2024-06-25 NOTE — TELEPHONE ENCOUNTER
Danny from [employer] called in regards to patients chart notes from May 15,2024 would need to be addendum due to chart notes missing patients testing frequently and insulin details. Then they would like chart notes to be re faxed over. Best call back number with any questions or concerns is [phone].

## 2024-06-25 NOTE — TELEPHONE ENCOUNTER
Patient called, stating she does not have the $47 copay for her Fluticasone-Salmeterol until she gets her check on 7/3.  Asking if we have samples of anything related to get her through until then.  Please advise.

## 2024-06-26 NOTE — TELEPHONE ENCOUNTER
Received call from Nandini with [employer] requesting addendum to chart note from 5/15/24 including missing details of patients testing frequently and insulin details.   Advised that patient is not on insulin, she is on Mounjaro.  Nandini states that she will update patient's request with this information and will call back tomorrow if additional documentation is needed.

## 2024-06-27 NOTE — TELEPHONE ENCOUNTER
Nandini called from [employer] in regards to provider addendum patients chart notes from 5/15/24 stating that patient is not on insulin and is taking Mounjaro. Fax number is [phone].

## 2024-07-19 ENCOUNTER — TELEPHONE (OUTPATIENT)
Dept: FAMILY MEDICINE CLINIC | Facility: CLINIC | Age: 59
End: 2024-07-19

## 2024-07-19 NOTE — TELEPHONE ENCOUNTER
A fax from [facility] came in for the patient to have signed for a 30 day I placed the form on Dr Grove's desk.

## 2024-08-12 DIAGNOSIS — J45.40 MODERATE PERSISTENT ASTHMA WITHOUT COMPLICATION: ICD-10-CM

## 2024-08-12 DIAGNOSIS — E11.65 TYPE 2 DIABETES MELLITUS WITH HYPERGLYCEMIA, WITH LONG-TERM CURRENT USE OF INSULIN (HCC): Primary | ICD-10-CM

## 2024-08-12 DIAGNOSIS — Z79.4 TYPE 2 DIABETES MELLITUS WITH HYPERGLYCEMIA, WITH LONG-TERM CURRENT USE OF INSULIN (HCC): ICD-10-CM

## 2024-08-12 DIAGNOSIS — Z79.4 TYPE 2 DIABETES MELLITUS WITH HYPERGLYCEMIA, WITH LONG-TERM CURRENT USE OF INSULIN (HCC): Primary | ICD-10-CM

## 2024-08-12 DIAGNOSIS — E11.65 TYPE 2 DIABETES MELLITUS WITH HYPERGLYCEMIA, WITH LONG-TERM CURRENT USE OF INSULIN (HCC): ICD-10-CM

## 2024-08-12 RX ORDER — ALBUTEROL SULFATE 90 UG/1
2 AEROSOL, METERED RESPIRATORY (INHALATION) EVERY 4 HOURS PRN
Qty: 8.5 G | Refills: 2 | Status: SHIPPED | OUTPATIENT
Start: 2024-08-12

## 2024-08-12 NOTE — TELEPHONE ENCOUNTER
Patient called, is kindly requesting refills for the following medications.    For the Mounjaro, the pharmacy is able to order the 10mg.  Pt would like to know if provider can change the dose from 2.5 back to 10mg.      Medication: albuterol (PROVENTIL HFA,VENTOLIN HFA) 90 mcg/act inhaler     Dose/Frequency:     Inhale 2 puffs every 4 (four) hours as needed for wheezing or shortness of breath       Quantity: 8.5g    Pharmacy: [facility]/pharmacy #[facility] [address]     Office:   [x] PCP/Provider -  Magdalena Grove MD   [] Speciality/Provider -     Does the patient have enough for 3 days?   [x] Yes   [] No - Send as HP to POD      Medication: Mounjaro  *pt request: dose be increased from 2.5MG to 10MG)    Dose/Frequency: ?    Quantity: 6ml  *pt requesting a 90 day supply*    Pharmacy:  [facility]/pharmacy #[id number]  [place] [address]     Office:   [x] PCP/Provider -  Magdalena Grove MD   [] Speciality/Provider -     Does the patient have enough for 3 days?   [x] Yes   [] No - Send as HP to POD

## 2024-08-19 ENCOUNTER — OFFICE VISIT (OUTPATIENT)
Dept: PODIATRY | Facility: CLINIC | Age: 59
End: 2024-08-19
Payer: COMMERCIAL

## 2024-08-19 DIAGNOSIS — B35.1 TINEA UNGUIUM: ICD-10-CM

## 2024-08-19 DIAGNOSIS — E11.42 DIABETIC POLYNEUROPATHY ASSOCIATED WITH TYPE 2 DIABETES MELLITUS (HCC): Primary | ICD-10-CM

## 2024-08-19 DIAGNOSIS — S98.131A AMPUTATION OF TOE OF RIGHT FOOT (HCC): ICD-10-CM

## 2024-08-19 PROCEDURE — 99213 OFFICE O/P EST LOW 20 MIN: CPT | Performed by: PODIATRIST

## 2024-08-19 NOTE — PROGRESS NOTES
Ambulatory Visit  Name: Anna Marie Barbosa      : 1965      MRN: [id number]  Encounter Provider: Henry Figueroa DPM  Encounter Date: 2024   Encounter department: [facility] PODIATRY [place]    Assessment & Plan   1. Diabetic polyneuropathy associated with type 2 diabetes mellitus (HCC)  2. Amputation of toe of right foot (HCC)  3. Tinea unguium      Diagnosis and options discussed with patient  Patient agreeable to the plan as stated below    -DM foot risk is high due to h/o amputation. Recommend at risk foot care (Q7)  -Discussed DM risk to lower extremities, proper shoe gear, and daily monitoring of feet.   -Discussed weight loss and suitable exercise regiment.   -Reviewed most recent PCP visit on 5/15/2024  -Educated on A1C and the risks of poorly controlled Diabetes. Reviewed recent A1C:  Lab Results   Component Value Date    HGBA1C 6.3 (H) 2024    HGBA1C 10.4 (H) 2018   .       History of Present Illness     Anna Marie Barbosa is a 59 y.o. female who presents for DM foot exam, no acute concerns. She has h/o amputated toes. Her A1C is now well controlled.     Review of Systems  As stated in HPI, otherwise normal    Medical History Reviewed by provider this encounter:  Tobacco  Allergies  Meds  Problems  Med Hx  Surg Hx  Fam Hx        Objective     There were no vitals taken for this visit.    Physical Exam  Vitals reviewed.   Constitutional:       Appearance: She is obese.   Cardiovascular:      Pulses: Normal pulses. no weak pulses.           Dorsalis pedis pulses are 2+ on the right side and 2+ on the left side.        Posterior tibial pulses are 2+ on the right side and 2+ on the left side.   Pulmonary:      Effort: No respiratory distress.   Musculoskeletal:         General: Deformity present.      Right foot: Decreased range of motion. Deformity, Charcot foot and prominent metatarsal heads present. No foot drop.      Left foot: Decreased range of motion. Deformity and  prominent metatarsal heads present. No Charcot foot or foot drop.   Feet:      Right foot:      Protective Sensation: 8 sites tested.  0 sites sensed.      Skin integrity: No ulcer, blister, skin breakdown or callus.      Toenail Condition: Right toenails are abnormally thick. Fungal disease present.     Left foot:      Protective Sensation: 10 sites tested.  0 sites sensed.      Skin integrity: No ulcer, blister, skin breakdown or callus.      Toenail Condition: Left toenails are abnormally thick. Fungal disease present.  Skin:     Capillary Refill: Capillary refill takes less than 2 seconds.      Findings: No lesion or rash.   Neurological:      Mental Status: She is alert and oriented to person, place, and time.      Sensory: Sensory deficit present.         Diabetic Foot Exam    Patient's shoes and socks removed.    Right Foot/Ankle   Right Foot Inspection  Skin Exam: No blister, no callus, no maceration, no ulcer and no callus.     Toe Exam: swelling and right toe deformity (hallux and 3rd toe amputation stable.).     Sensory   Vibration: absent  Monofilament testing: absent    Vascular  Capillary refills: < 3 seconds  The right DP pulse is 2+. The right PT pulse is 2+.     Left Foot/Ankle  Left Foot Inspection  Skin Exam: No maceration, no ulcer and no callus.     Toe Exam: swelling and left toe deformity (severe hammertoe deformity B/L).     Sensory   Vibration: absent  Monofilament testing: absent    Vascular  Capillary refills: < 3 seconds  The left DP pulse is 2+. The left PT pulse is 2+.     Assign Risk Category  Deformity present  Loss of protective sensation  No weak pulses  Risk: 3    Administrative Statements

## 2024-08-26 DIAGNOSIS — Z79.4 TYPE 2 DIABETES MELLITUS WITH HYPERGLYCEMIA, WITH LONG-TERM CURRENT USE OF INSULIN (HCC): ICD-10-CM

## 2024-08-26 DIAGNOSIS — J45.40 MODERATE PERSISTENT ASTHMA WITHOUT COMPLICATION: ICD-10-CM

## 2024-08-26 DIAGNOSIS — E11.65 TYPE 2 DIABETES MELLITUS WITH HYPERGLYCEMIA, WITH LONG-TERM CURRENT USE OF INSULIN (HCC): ICD-10-CM

## 2024-08-27 ENCOUNTER — APPOINTMENT (OUTPATIENT)
Dept: RADIOLOGY | Age: 59
End: 2024-08-27
Payer: COMMERCIAL

## 2024-08-27 ENCOUNTER — OFFICE VISIT (OUTPATIENT)
Dept: OBGYN CLINIC | Facility: CLINIC | Age: 59
End: 2024-08-27
Payer: COMMERCIAL

## 2024-08-27 VITALS — BODY MASS INDEX: 40.47 KG/M2 | WEIGHT: 267 LBS | HEIGHT: 68 IN

## 2024-08-27 DIAGNOSIS — Z96.652 HISTORY OF LEFT KNEE REPLACEMENT: ICD-10-CM

## 2024-08-27 DIAGNOSIS — T14.8XXA JOINT INJURY: ICD-10-CM

## 2024-08-27 DIAGNOSIS — T84.093A FAILED TOTAL LEFT KNEE REPLACEMENT, INITIAL ENCOUNTER (HCC): Primary | ICD-10-CM

## 2024-08-27 PROCEDURE — 99205 OFFICE O/P NEW HI 60 MIN: CPT | Performed by: STUDENT IN AN ORGANIZED HEALTH CARE EDUCATION/TRAINING PROGRAM

## 2024-08-27 PROCEDURE — 73564 X-RAY EXAM KNEE 4 OR MORE: CPT

## 2024-08-27 PROCEDURE — 73562 X-RAY EXAM OF KNEE 3: CPT

## 2024-08-27 RX ORDER — CHLORHEXIDINE GLUCONATE ORAL RINSE 1.2 MG/ML
15 SOLUTION DENTAL ONCE
OUTPATIENT
Start: 2024-08-27 | End: 2024-08-27

## 2024-08-27 RX ORDER — ACETAMINOPHEN 325 MG/1
975 TABLET ORAL ONCE
OUTPATIENT
Start: 2024-08-27 | End: 2024-08-27

## 2024-08-27 RX ORDER — MORPHINE SULFATE 60 MG/1
TABLET, FILM COATED, EXTENDED RELEASE ORAL
COMMUNITY
Start: 2024-08-08

## 2024-08-27 RX ORDER — MORPHINE SULFATE 15 MG/1
TABLET ORAL
COMMUNITY
Start: 2024-08-08

## 2024-08-27 RX ORDER — CHLORHEXIDINE GLUCONATE 40 MG/ML
SOLUTION TOPICAL DAILY PRN
OUTPATIENT
Start: 2024-08-27

## 2024-08-27 RX ORDER — ONDANSETRON 2 MG/ML
4 INJECTION INTRAMUSCULAR; INTRAVENOUS ONCE AS NEEDED
OUTPATIENT
Start: 2024-08-27

## 2024-08-27 NOTE — PROGRESS NOTES
Date: 24  Anna Marie Barbosa   MRN# [id number]  : 1965      Chief Complaint: Left knee pain     Assessment and Plan:    Failed total left knee replacement (HCC)  Patient has a history, physical examination and radiographic evaluation consistent with a failed left total knee arthroplasty secondary to mechanical loosening leading to medial femoral condylar collapse.    -Recommend revision knee arthroplasty with conversion to would likely rotating-hinge component  -Given patient's comorbidities, I have ordered preoperative albumin and prealbumin as part of her normal laboratory workup  -Serum ESR and CRP ordered to rule out indolent infection.  Attempted aspiration of the left knee today yielded no synovial fluid.  -Informed consent obtained in clinic today  -Patient will be seen 2 weeks postop     REVISION TOTAL KNEE ARTHROPLASTY INDICATIONS AND RISKS    I had a long discussion with the patient regarding management options. They have completely exhausted conservative measures including medications, activity modification and therapy with minimal relief. Based on their history and physical examination, I have recommended revision total knee replacement, one vs two components. While no guarantees were made, I believe that surgical intervention provides the best chance at providing relief.       Complex revision surgery risks, benefits, and alternatives were discussed with the patient regarding operative intervention. Risks include but are not limited to pain, bleeding, scarring, infection, damage to nerves, arteries, veins, failure of procedure, possible loosening, migration, or hardware failure, possible painful or prominent hardware, joint stiffness, need for further procedures, nonunion, malunion, dislocation, loss of limb, heart attack, stroke and death. Patient voiced understanding and wishes to proceed with operative interventions.        The patients current BMI is Body mass index is 40.6 kg/m².  .Patient was counseled about the importance of weight loss prior to surgery, IF BMI is >35 prior to surgery my recommendation is that the patient considers nutritional consultation and further weight loss prior to surgical management. These recommendations were discussed with the patient as increased BMI particularly >40 increases the risk of infection.    IF the patient is a smoker, I discussed the importance of quitting smoking to decrease risk of infection postoperatively and promote good wound healing.     The procedure has been explained and all presented questions have been answered at the present time. The patient may call or come with any other concerns or questions. The patient also understands the post-operative rehabilitation process and the need for their cooperation and participation, and that their results may be compromised by their lack of compliance. The patient would like to proceed.  The patient is encouraged to seek additional opinions if desired.     Subjective:     Knee Pain  Patient complains of left knee pain. This is evaluated as a personal injury. The pain began 3 years ago.  Patient does have a history of prior left total knee arthroplasty performed in 2005 at an outside facility.  The pain is located medial, lateral, anterior.  She describes the symptoms as crushing, numbing, pulsating, sharp, shooting, stabbing, and throbbing. Symptoms improve with rest, heat, ice, medication: Aleve, Ibuprofen, NSAID used but not effective. The symptoms are worse with activity, stair climbing, getting up from a chair, weight bearing. The knee has given out or felt unstable. The patient cannot bend and straighten the knee fully.  The patient is active in none. Treatment to date has been ice, Tylenol, NSAID's, compression sleeve without significant relief.    External Records Reviewed: none    Allergy:  Allergies   Allergen Reactions    Ozempic (0.25 Or 0.5 Mg-Dose) [Semaglutide(0.25 Or 0.5mg-Dos)]  "Diarrhea and GI Intolerance    Nsaids GI Intolerance     Medications:  all current active meds have been reviewed  Past Medical History:  Past Medical History:   Diagnosis Date    Acute respiratory insufficiency 11/23/2019    Asthma     Chronic pain     Diabetes (HCC)     HTN (hypertension)     Sepsis (HCC) 11/23/2019     Past Surgical History:  Past Surgical History:   Procedure Laterality Date    CERVICAL LAMINECTOMY      CHOLECYSTECTOMY      CHOLECYSTECTOMY LAPAROSCOPIC N/A 11/10/2018    Procedure: CHOLECYSTECTOMY LAPAROSCOPIC w/ ioc;  Surgeon: Benitez Cuenca MD;  Location: [facility];  Service: General    HYSTERECTOMY      JOINT REPLACEMENT Bilateral     knee    TOE AMPUTATION Right 2/2/2018    Procedure: AMPUTATION TOE, RIGHT GREAT TOE;  Surgeon: Lino Rodriguez DPM;  Location: [facility];  Service: Podiatry     Family History:  Family History   Problem Relation Age of Onset    Diabetes Mother     Diabetes Maternal Grandmother     No Known Problems Father      Social History:  Social History     Substance and Sexual Activity   Alcohol Use Not Currently    Comment: rarely     Social History     Substance and Sexual Activity   Drug Use No     Social History     Tobacco Use   Smoking Status Never   Smokeless Tobacco Never           ROS:   Review of Systems   All other systems reviewed and are negative.       Objective:   BP Readings from Last 1 Encounters:   05/15/24 128/62      Wt Readings from Last 1 Encounters:   08/27/24 121 kg (267 lb)      Pulse Readings from Last 1 Encounters:   05/15/24 (!) 114      BMI: Estimated body mass index is 40.6 kg/m² as calculated from the following:    Height as of this encounter: 5' 8\" (1.727 m).    Weight as of this encounter: 121 kg (267 lb).        Physical Exam  Constitutional:       General: She is not in acute distress.  HENT:      Head: Normocephalic and atraumatic.   Eyes:      Conjunctiva/sclera: Conjunctivae normal.   Cardiovascular:      Comments: Extremities well " perfused   No LE edema    Pulmonary:      Effort: Pulmonary effort is normal.   Abdominal:      General: Abdomen is flat. Bowel sounds are normal.   Neurological:      Mental Status: She is alert. Mental status is at baseline.          Gait and Station:   Antalgic    Left Knee:      Inspection:  edema    Overall limb alignment: valgus    Effusion: significant    ROM 0 to 90 with pain    Extensor Lag: Absent    Palpation: Medial and Lateral joint line tenderness to palpation    unstable to AP translation at 90 deg    Coronal plane stable in full extension    Coronal plane unstable in mid-flexion     Motor: 5/5 EHL/FHL/TA/GS/Qd/Hs    Vascular: Toes WWP with BCR    Sensory: SILT DP/SP/Meri/Saph/Tib        Images:    I personally reviewed relevant images in the PACS system and my interpretation is as follows:  X-rays of the left knee reveal total knee arthroplasty with evidence of longstanding mechanical loosening and failure of the medial femoral condyle with varus collapse    Scribe Attestation      I,:  Jayesh Ramos am acting as a scribe while in the presence of the attending physician.:       I,:  Joe Cabrera MD personally performed the services described in this documentation    as scribed in my presence.:                  Joe Cabrera MD  Adult Reconstruction Specialist   [facility]

## 2024-08-28 PROBLEM — T84.093A FAILED TOTAL LEFT KNEE REPLACEMENT (HCC): Status: ACTIVE | Noted: 2024-08-28

## 2024-08-28 RX ORDER — ALBUTEROL SULFATE 90 UG/1
2 AEROSOL, METERED RESPIRATORY (INHALATION) EVERY 4 HOURS PRN
Qty: 8.5 G | Refills: 0 | Status: SHIPPED | OUTPATIENT
Start: 2024-08-28

## 2024-08-29 NOTE — ASSESSMENT & PLAN NOTE
Patient has a history, physical examination and radiographic evaluation consistent with a failed left total knee arthroplasty secondary to mechanical loosening leading to medial femoral condylar collapse.    -Recommend revision knee arthroplasty with conversion to would likely rotating-hinge component  -Given patient's comorbidities, I have ordered preoperative albumin and prealbumin as part of her normal laboratory workup  -Serum ESR and CRP ordered to rule out indolent infection.  Attempted aspiration of the left knee today yielded no synovial fluid.  -Informed consent obtained in clinic today  -Patient will be seen 2 weeks postop

## 2024-09-10 ENCOUNTER — RA CDI HCC (OUTPATIENT)
Dept: OTHER | Facility: HOSPITAL | Age: 59
End: 2024-09-10

## 2024-09-10 DIAGNOSIS — Z89.421 ACQUIRED ABSENCE OF OTHER RIGHT TOE(S) (HCC): Primary | ICD-10-CM

## 2024-09-10 NOTE — PROGRESS NOTES
HCC coding opportunities          Chart Reviewed number of suggestions sent to Provider: 1  [phone]     Patients Insurance     Medicare Insurance: [employer]

## 2024-09-17 ENCOUNTER — TELEPHONE (OUTPATIENT)
Dept: OBGYN CLINIC | Facility: HOSPITAL | Age: 59
End: 2024-09-17

## 2024-09-17 DIAGNOSIS — T84.093D FAILED TOTAL LEFT KNEE REPLACEMENT, SUBSEQUENT ENCOUNTER: Primary | ICD-10-CM

## 2024-09-17 RX ORDER — FERROUS SULFATE 324(65)MG
324 TABLET, DELAYED RELEASE (ENTERIC COATED) ORAL EVERY OTHER DAY
Qty: 15 TABLET | Refills: 0 | Status: SHIPPED | OUTPATIENT
Start: 2024-09-17 | End: 2024-10-17

## 2024-09-17 RX ORDER — MULTIVITAMIN
1 TABLET ORAL DAILY
Qty: 30 TABLET | Refills: 0 | Status: SHIPPED | OUTPATIENT
Start: 2024-09-17 | End: 2024-10-17

## 2024-09-17 RX ORDER — FOLIC ACID 1 MG/1
1 TABLET ORAL DAILY
Qty: 30 TABLET | Refills: 1 | Status: SHIPPED | OUTPATIENT
Start: 2024-09-17

## 2024-09-17 NOTE — TELEPHONE ENCOUNTER
Preoperative Elective Admission Assessment    Living Situation:    Who does pt live with: spouse and son  What kind of home: single level  How do they enter the home: front  How many levels in home: 1   # of steps to enter home: 5-6  # of steps to second floor: n/a  Are there handrails: Yes  Are there landings: No  Sleeping arrangement: first/entry floor  Where is Bathroom: entry level  Where is the tub or shower: step in bath tub w/ transfer bench  Dogs or ther pets: 3 cats     First Floor Setup:   Is there a bathroom: Yes  Where would pt sleep: bed     DME: rolling walker and cane. Instructed to bring RW to DOS  We discussed clearing pathways in the home and making sure there is accessibly to use the walker, for example, removing throw rugs.      Patient's Current Level of Function: Ambulates with walker, Ambulates with cane, and ADLs: Independent    Post-op Caregiver: spouse  Caregiver Name and phone number for Inpatient discharge needs: Dharmesh  Currently receive any HHC/aides/community supports: No     Post-op Transport: child  To/from hospital: child  To/from PT 2-3x/week: child  Uses community transport now: No     Outpatient Physical Therapy Site:  Site: Not ordered by surgeon  pre and post-op appts scheduled? No     Medication Management: self and pillbox  Preferred Pharmacy for Post-op Medications: [facility]  Blood Management Vitamin Regimen:  Not ordered, sent to surgeon   Post-op anticoagulant: to be determined by surgical team postoperatively     DC Plan: Pt plans to be discharged home    Barriers to DC identified preoperatively: none identified    BMI: 40.60    Patient Education:  Pt educated on post-op pain, early mobilization (POD0), LOS goals, OP PT goals, and preoperative bathing. Patient educated that our goal is to appropriately discharge patient based off their post-op function while striving to maintain maximal independence. The goal is to discharge patient to home and for them to attend  outpatient physical therapy.    Assigned to care team? Yes

## 2024-09-26 RX ORDER — PREGABALIN 200 MG/1
200 CAPSULE ORAL 2 TIMES DAILY
COMMUNITY
Start: 2024-09-09

## 2024-09-26 NOTE — PRE-PROCEDURE INSTRUCTIONS
Pre-Surgery Instructions:   Medication Instructions    albuterol (PROVENTIL HFA,VENTOLIN HFA) 90 mcg/act inhaler Uses PRN- OK to take day of surgery    ascorbic Acid (VITAMIN C) 500 MG CPCR Hold day of surgery.    carisoprodol (SOMA) 350 mg tablet Take day of surgery.    diazepam (VALIUM) 5 mg tablet Uses PRN- OK to take day of surgery    ferrous sulfate 324 (65 Fe) mg Hold day of surgery.    fluticasone (FLONASE) 50 mcg/act nasal spray Take night before surgery    Fluticasone-Salmeterol (Advair) 500-50 mcg/dose inhaler Take day of surgery.    folic acid (FOLVITE) 1 mg tablet Hold day of surgery.    morphine (MS CONTIN) 15 mg 12 hr tablet Take day of surgery.    morphine (MS CONTIN) 60 mg 12 hr tablet Take day of surgery.    morphine (MSIR) 15 mg tablet Uses PRN- OK to take day of surgery    Multiple Vitamin (multivitamin) tablet Hold day of surgery.    pregabalin (LYRICA) 200 MG capsule Take day of surgery.    rosuvastatin (CRESTOR) 5 mg tablet Take day of surgery.    tirzepatide (Mounjaro) 5 MG/0.5ML Stop taking 7 days prior to surgery.   Ortho class completed.    Medication instructions for day surgery reviewed. Please use only a sip of water to take your instructed medications. Avoid all over the counter vitamins, supplements and NSAIDS for one week prior to surgery per anesthesia guidelines. Tylenol is ok to take as needed.     You will receive a call one business day prior to surgery with an arrival time and hospital directions. If your surgery is scheduled on a Monday, the hospital will be calling you on the Friday prior to your surgery. If you have not heard from anyone by 8pm, please call the hospital supervisor through the hospital  at [phone]. ([place] [phone] or [place] [phone]).    Do not eat or drink anything after midnight the night before your surgery, including candy, mints, lifesavers, or chewing gum. Do not drink alcohol 24hrs before your surgery. Try not to smoke at least  24hrs before your surgery.       Follow the pre surgery showering instructions as listed in the “My Surgical Experience Booklet” or otherwise provided by your surgeon's office. Do not use a blade to shave the surgical area 1 week before surgery. It is okay to use a clean electric clippers up to 24 hours before surgery. Do not apply any lotions, creams, including makeup, cologne, deodorant, or perfumes after showering on the day of your surgery. Do not use dry shampoo, hair spray, hair gel, or any type of hair products.     No contact lenses, eye make-up, or artificial eyelashes. Remove nail polish, including gel polish, and any artificial, gel, or acrylic nails if possible. Remove all jewelry including rings and body piercing jewelry.     Wear causal clothing that is easy to take on and off. Consider your type of surgery.    Keep any valuables, jewelry, piercings at home. Please bring any specially ordered equipment (sling, braces) if indicated.    Arrange for a responsible person to drive you to and from the hospital on the day of your surgery. Please confirm the visitor policy for the day of your procedure when you receive your phone call with an arrival time.     Call the surgeon's office with any new illnesses, exposures, or additional questions prior to surgery.    Please reference your “My Surgical Experience Booklet” for additional information to prepare for your upcoming surgery.

## 2024-10-07 ENCOUNTER — ANESTHESIA EVENT (OUTPATIENT)
Age: 59
DRG: 467 | End: 2024-10-07
Payer: COMMERCIAL

## 2024-10-07 PROBLEM — G89.4 CHRONIC PAIN DISORDER: Status: ACTIVE | Noted: 2022-05-25

## 2024-10-07 PROBLEM — G45.9 TIA (TRANSIENT ISCHEMIC ATTACK): Status: ACTIVE | Noted: 2022-05-25

## 2024-10-07 PROBLEM — E78.49 OTHER HYPERLIPIDEMIA: Status: ACTIVE | Noted: 2022-05-25

## 2024-10-07 PROBLEM — K21.9 GASTROESOPHAGEAL REFLUX DISEASE WITHOUT ESOPHAGITIS: Status: ACTIVE | Noted: 2022-05-25

## 2024-10-07 NOTE — ASSESSMENT & PLAN NOTE
Lab Results   Component Value Date    HGBA1C 6.1 (H) 10/12/2024     On tirzepatide- will hold 1 week before sx

## 2024-10-07 NOTE — PROGRESS NOTES
Internal Medicine Pre-Operative Evaluation:     Reason for Visit: Pre-operative Evaluation for Risk Stratification and Optimization    Patient ID: Anna Marie Barbosa is a 59 y.o. female.     Surgery: Revision of Arthroplasty of left knee  Referring Provider: Dr. Cabrera      Recommendations to Proceed withSurgery    Patient is considered to be Low risk for Medium risk procedure.     After evaluation and discussion with patient with emphasis that all surgery has some degree of inherent risk it is acknowledged by patient this risk is Acceptable.    Patient is optimized and may proceed with planned procedure.     Assessment    Pre-operative Medical Evaluation for planned surgery  Recommendations as listed in PLAN section below  Contact surgical nurse  navigator with any questions regarding preoperative plan or schedule.      Assessment & Plan  Failed total left knee replacement, subsequent encounter  Failed outpatient conservative measures  Elected to undergo total joint arthroplasty    Opioid dependence with other opioid-induced disorder (HCC)    Morbid obesity (HCC)  Meets BMI less than 40 guideline  Type 2 diabetes mellitus with hyperglycemia, with long-term current use of insulin (HCC)    Lab Results   Component Value Date    HGBA1C 6.1 (H) 10/12/2024     On tirzepatide- will hold 1 week before sx  Preoperative clearance    Failed total left knee replacement, initial encounter (HCC)  Failed outpatient conservative measures  Elected to undergo total joint arthroplasty             Plan:     1. Further preoperative workup as follows:   - none no further testing required may proceed with surgery    2. Preoperative Medication Management Review performed by PAT nursing  YES    3. Patient requires further consultation with:   No Consults Required    4. Discharge Planning / Barriers to Discharge  none identified - patients has post discharge therapy plan in place, transportation arranged for discharge day, adequate  "family support at home to assist with discharge to home.        Subjective:           History of Present Illness:     Anna Marie Barbosa is a 59 y.o. female who presents to the office today for a preoperative consultation at the request of surgeon. The patient understands this is an elective procedure and not emergent. They are electing to undergo planned procedure with an understanding that all surgery has inherent risk. They have worked with their surgeon and failed conservative treatment measures. Today they present for preoperative risk assessment and recommendations for optimization in preparation for surgery.    Pre-op Exam    Pre-operative Risk Factors:    History of cerebrovascular disease: Yes  History of ischemic heart disease: No  Pre-operative treatment with insulin: No  Pre-operative creatinine >2 mg/dL: No    History of congestive heart failure: No    Duke Activity Status Index (DASI):   DASI Total Score: 24.2  METs: 5.7        ROS: No TIA's or unusual headaches, no dysphagia.  No prolonged cough. No dyspnea or chest pain on exertion.  No abdominal pain, change in bowel habits, black or bloody stools.  No urinary tract or BPH symptoms.  Positive reported pain in arthritic joint. Positive difficulty with gait. No skin rashes or issues.      Objective:    /70   Pulse (!) 112 Comment: Pt reports feeling anxious  Ht 5' 8\" (1.727 m)   Wt 117 kg (258 lb 3.2 oz)   BMI 39.26 kg/m²       General Appearance: no distress, conversive  HEENT: PERRLA, conjuctiva normal; oropharynx clear; mucous membranes moist;   Neck:  Supple, no lymphadenopathy or thyromegaly  Lungs: breath sounds normal, normal respiratory effort, no retractions, expiratory effort normal  CV: normal heart sounds S1/S2, PMI normal   ABD: soft non tender, no masses , no hepatic or splenomegaly  EXT: DP pulses intact, no lymphadenopathy, no edema  Skin: normal turgor, normal texture, no rash  Psych: affect normal, mood normal  Neuro: AAOx3  "       The following portions of the patient's history were reviewed and updated as appropriate: allergies, current medications, past family history, past medical history, past social history, past surgical history and problem list.     Past History:       Past Medical History:   Diagnosis Date    Acute respiratory insufficiency 11/23/2019    Allergies     Ambulates with cane     Anxiety     Asthma     Chronic narcotic dependence (HCC)     Chronic pain     Chronic pain disorder     back/knee    Diabetes (HCC)     HTN (hypertension)     elevated with electronic device    Hyperlipidemia     Sepsis (HCC) 11/23/2019    Walker as ambulation aid     Past Surgical History:   Procedure Laterality Date    BACK SURGERY      x 3 with hardware    CERVICAL LAMINECTOMY      CHOLECYSTECTOMY LAPAROSCOPIC N/A 11/10/2018    Procedure: CHOLECYSTECTOMY LAPAROSCOPIC w/ ioc;  Surgeon: Benitez Cuenca MD;  Location: [facility];  Service: General    COLONOSCOPY      HYSTERECTOMY      JOINT REPLACEMENT Left     knee    TOE AMPUTATION Right 02/02/2018    Procedure: AMPUTATION TOE, RIGHT GREAT TOE;  Surgeon: Lino Rodriguez DPM;  Location: [facility];  Service: Podiatry    TOE AMPUTATION Right     3rd toe          Social History     Tobacco Use    Smoking status: Never    Smokeless tobacco: Never   Vaping Use    Vaping status: Never Used   Substance Use Topics    Alcohol use: Not Currently     Comment: special occasion    Drug use: No     Family History   Problem Relation Age of Onset    Diabetes Mother     Diabetes Maternal Grandmother     No Known Problems Father           Allergies:     Allergies   Allergen Reactions    Ozempic (0.25 Or 0.5 Mg-Dose) [Semaglutide(0.25 Or 0.5mg-Dos)] Diarrhea and GI Intolerance    Nsaids GI Intolerance        Current Medications:     Current Outpatient Medications   Medication Instructions    albuterol (PROVENTIL HFA,VENTOLIN HFA) 90 mcg/act inhaler 2 puffs, Inhalation, Every 4 hours PRN    ascorbic Acid  "(VITAMIN C) 500 mg, Oral, Daily    carisoprodol (SOMA) 350 mg, Oral, 2 times daily    diazepam (VALIUM) 5 mg, Oral, Daily at bedtime PRN    ferrous sulfate 324 mg, Oral, Every other day    fluticasone (FLONASE) 50 mcg/act nasal spray 1 spray, Nasal, As needed    Fluticasone-Salmeterol (Advair) 500-50 mcg/dose inhaler 2 puffs, Inhalation, Daily at bedtime    folic acid (FOLVITE) 1,000 mcg, Oral, Daily    HYDROmorphone (DILAUDID) 4 mg, Oral, 5 day supply for increased post op pain    Lyrica 200 mg, Oral, 2 times daily    morphine (MS CONTIN) 75 mg, Oral, 2 times daily    morphine (MS CONTIN) 60 mg, Oral, 2 times daily, Take 75 mg total BID    morphine (MSIR) 15 mg, Oral, Every 8 hours PRN    Multiple Vitamins-Minerals (One Daily Womens 50+) TABS 1 tablet, Oral, Daily    nystatin (MYCOSTATIN) powder Topical, 2 times daily    patient supplied medication Dexcom     pregabalin (LYRICA) 200 mg, Oral, 2 times daily    rosuvastatin (CRESTOR) 5 mg, Oral, Every other day    tirzepatide (MOUNJARO) 5 mg, Subcutaneous, Every 7 days           PRE-OP WORKSHEET DATA    Assessment of Pre-Operative Risks     MLJ Quality Hard Stops:    BMI (<40) : Estimated body mass index is 39.26 kg/m² as calculated from the following:    Height as of this encounter: 5' 8\" (1.727 m).    Weight as of this encounter: 117 kg (258 lb 3.2 oz).    Hgb ( >11):   Lab Results   Component Value Date    HGB 12.6 10/12/2024    HGB 12.0 05/14/2024    HGB 11.6 09/08/2023       HbA1c (<7.5) :   Lab Results   Component Value Date    HGBA1C 6.1 (H) 10/12/2024       GFR (>60) (Less then 45 = Nephrology consult):    Lab Results   Component Value Date    EGFR 99 10/12/2024    EGFR 109 05/14/2024    EGFR 102 09/08/2023            Pre-Op Data Reviewed:       Laboratory Results: I have personally reviewed the pertinent laboratory results/reports     EKG:I have personally reviewed pertinent reports.  . I personally reviewed and interpreted available tracings in the " electronic medical record    Encounter Date: 10/12/24   ECG 12 lead   Result Value    Ventricular Rate 81    Atrial Rate 81    LA Interval 152    QRSD Interval 82    QT Interval 358    QTC Interval 415    P Axis 44    QRS Axis 13    T Wave Axis 33    Narrative    Normal sinus rhythm  Normal ECG  When compared with ECG of 02-JAN-2023 20:30,  Premature supraventricular complexes are no longer Present  Confirmed by Joe John ([id number]) on 10/14/2024 6:04:04 PM       OLD RECORDS: reviewed old records in the chart review section if EHR on day of visit.    Previous cardiopulmonary studies within the past year:  Echocardiogram: no   Cardiac Catheterization: no  Stress Test: no      Time of visit including pre-visit chart review, visit and post-visit coordination of plan and care , review of pre-surgical lab work, preparation and time spent documenting note in electronic medical record, time spent face-to-face in physical examination answering patient questions by care team 35 minutes             [facility]

## 2024-10-07 NOTE — PATIENT INSTRUCTIONS
BEFORE SURGERY    Contact your surgical nurse  navigator with any questions regarding preoperative plan or schedule.  Stop all over the counter supplements, herbal, naturopathic  medications for 1 week prior to surgery UNLESS prescribed by your surgeon  Hold NSAIDS (i.e. advil, alleve, motrin, ibuprofen, celebrex) minimum 5 days prior to surgery  Follow presurgical medication instructions provided by preadmission nursing team reviewed during your presurgery phone call  Strategies for optimizing your surgery through breathing exercises, nutrition and physical activity can be found at [email or web address]/best  Call [phone] with any presurgical concerns or medications questions or use the messaging feature in your [employer] [facility] to contact your provider  Hold mounjaro 1 week prior to surgery    AFTER SURGERY    Recommend using Tylenol ( acetaminophen ) 1000 mg every eight hours during the first week post discharge along with icing the area for 20 mins every 3-4 hours while awake can be helpful in reducing your need for post operative opioid use. This opioid sparing plan can be used along side your surgeons pain plan.  Use stool softener over the counter (colace) daily after surgery during the first 1-2 weeks to avoid post operative constipation issues  If no bowel movement within 3 days after surgery then use over the counter Miralax in addition to your stool softener   If cleared by your surgical team for activity then early and often walking is encouraged and can be important in prevention of post surgical blood clots. Additionally spend as much time out of bed as possible and allowed by your surgical team  Use your incentive spirometer twice per hour in the first seven days after surgery to help prevent post surgery lung complications and infections  It is very important you follow the instructions from your surgeon regarding any medications for after surgery blood clot prevention. Compliance with these medications is  very important.  Call [phone] with any post discharge concerns or medical issues or use the messaging feature in your [facility] to contact your provider

## 2024-10-10 DIAGNOSIS — T84.093D FAILED TOTAL LEFT KNEE REPLACEMENT, SUBSEQUENT ENCOUNTER: ICD-10-CM

## 2024-10-10 RX ORDER — FOLIC ACID 1 MG/1
1000 TABLET ORAL DAILY
Qty: 90 TABLET | Refills: 1 | Status: SHIPPED | OUTPATIENT
Start: 2024-10-10

## 2024-10-10 RX ORDER — FOLIC ACID/MULTIVIT,IRON,MINER 0.4MG-18MG
1 TABLET ORAL DAILY
Qty: 30 TABLET | Refills: 0 | Status: SHIPPED | OUTPATIENT
Start: 2024-10-10 | End: 2024-11-09

## 2024-10-11 ENCOUNTER — TELEPHONE (OUTPATIENT)
Dept: OBGYN CLINIC | Facility: HOSPITAL | Age: 59
End: 2024-10-11

## 2024-10-11 NOTE — TELEPHONE ENCOUNTER
VM left for patient, no PAT labs completed as of now. DOS approaching and needs clearance 10/16 with Dr. Nice.

## 2024-10-12 ENCOUNTER — OFFICE VISIT (OUTPATIENT)
Dept: LAB | Facility: HOSPITAL | Age: 59
End: 2024-10-12
Payer: COMMERCIAL

## 2024-10-12 ENCOUNTER — APPOINTMENT (OUTPATIENT)
Dept: LAB | Facility: HOSPITAL | Age: 59
End: 2024-10-12
Payer: COMMERCIAL

## 2024-10-12 DIAGNOSIS — T84.093A FAILED TOTAL LEFT KNEE REPLACEMENT, INITIAL ENCOUNTER (HCC): ICD-10-CM

## 2024-10-12 DIAGNOSIS — T14.8XXA JOINT INJURY: ICD-10-CM

## 2024-10-12 LAB
ABO GROUP BLD: NORMAL
ALBUMIN SERPL BCG-MCNC: 4 G/DL (ref 3.5–5)
ALP SERPL-CCNC: 80 U/L (ref 34–104)
ALT SERPL W P-5'-P-CCNC: 13 U/L (ref 7–52)
ANION GAP SERPL CALCULATED.3IONS-SCNC: 5 MMOL/L (ref 4–13)
APTT PPP: 34 SECONDS (ref 23–34)
AST SERPL W P-5'-P-CCNC: 18 U/L (ref 13–39)
BASOPHILS # BLD AUTO: 0.06 THOUSANDS/ΜL (ref 0–0.1)
BASOPHILS NFR BLD AUTO: 1 % (ref 0–1)
BILIRUB SERPL-MCNC: 0.57 MG/DL (ref 0.2–1)
BLD GP AB SCN SERPL QL: NEGATIVE
BUN SERPL-MCNC: 14 MG/DL (ref 5–25)
CALCIUM SERPL-MCNC: 9.4 MG/DL (ref 8.4–10.2)
CHLORIDE SERPL-SCNC: 104 MMOL/L (ref 96–108)
CO2 SERPL-SCNC: 29 MMOL/L (ref 21–32)
CREAT SERPL-MCNC: 0.59 MG/DL (ref 0.6–1.3)
CREAT UR-MCNC: 99.1 MG/DL
EOSINOPHIL # BLD AUTO: 0.32 THOUSAND/ΜL (ref 0–0.61)
EOSINOPHIL NFR BLD AUTO: 4 % (ref 0–6)
ERYTHROCYTE [DISTWIDTH] IN BLOOD BY AUTOMATED COUNT: 13.3 % (ref 11.6–15.1)
EST. AVERAGE GLUCOSE BLD GHB EST-MCNC: 128 MG/DL
FERRITIN SERPL-MCNC: 83 NG/ML (ref 11–307)
GFR SERPL CREATININE-BSD FRML MDRD: 99 ML/MIN/1.73SQ M
GLUCOSE P FAST SERPL-MCNC: 114 MG/DL (ref 65–99)
HBA1C MFR BLD: 6.1 %
HCT VFR BLD AUTO: 39.9 % (ref 34.8–46.1)
HGB BLD-MCNC: 12.6 G/DL (ref 11.5–15.4)
IMM GRANULOCYTES # BLD AUTO: 0.03 THOUSAND/UL (ref 0–0.2)
IMM GRANULOCYTES NFR BLD AUTO: 0 % (ref 0–2)
INR PPP: 1.05 (ref 0.85–1.19)
IRON SATN MFR SERPL: 22 % (ref 15–50)
IRON SERPL-MCNC: 74 UG/DL (ref 50–212)
LYMPHOCYTES # BLD AUTO: 2.97 THOUSANDS/ΜL (ref 0.6–4.47)
LYMPHOCYTES NFR BLD AUTO: 33 % (ref 14–44)
MCH RBC QN AUTO: 27.4 PG (ref 26.8–34.3)
MCHC RBC AUTO-ENTMCNC: 31.6 G/DL (ref 31.4–37.4)
MCV RBC AUTO: 87 FL (ref 82–98)
MICROALBUMIN UR-MCNC: 64.9 MG/L
MICROALBUMIN/CREAT 24H UR: 65 MG/G CREATININE (ref 0–30)
MONOCYTES # BLD AUTO: 0.58 THOUSAND/ΜL (ref 0.17–1.22)
MONOCYTES NFR BLD AUTO: 7 % (ref 4–12)
NEUTROPHILS # BLD AUTO: 4.94 THOUSANDS/ΜL (ref 1.85–7.62)
NEUTS SEG NFR BLD AUTO: 55 % (ref 43–75)
NRBC BLD AUTO-RTO: 0 /100 WBCS
PLATELET # BLD AUTO: 220 THOUSANDS/UL (ref 149–390)
PMV BLD AUTO: 13.1 FL (ref 8.9–12.7)
POTASSIUM SERPL-SCNC: 4.3 MMOL/L (ref 3.5–5.3)
PROT SERPL-MCNC: 7.6 G/DL (ref 6.4–8.4)
PROTHROMBIN TIME: 13.9 SECONDS (ref 12.3–15)
RBC # BLD AUTO: 4.6 MILLION/UL (ref 3.81–5.12)
RETICS # AUTO: NORMAL 10*3/UL (ref 14097–95744)
RETICS # CALC: 0.97 % (ref 0.37–1.87)
RH BLD: POSITIVE
SODIUM SERPL-SCNC: 138 MMOL/L (ref 135–147)
SPECIMEN EXPIRATION DATE: NORMAL
TIBC SERPL-MCNC: 331 UG/DL (ref 250–450)
UIBC SERPL-MCNC: 257 UG/DL (ref 155–355)
WBC # BLD AUTO: 8.9 THOUSAND/UL (ref 4.31–10.16)

## 2024-10-12 PROCEDURE — 36415 COLL VENOUS BLD VENIPUNCTURE: CPT

## 2024-10-12 PROCEDURE — 85045 AUTOMATED RETICULOCYTE COUNT: CPT

## 2024-10-12 PROCEDURE — 83036 HEMOGLOBIN GLYCOSYLATED A1C: CPT

## 2024-10-12 PROCEDURE — 80053 COMPREHEN METABOLIC PANEL: CPT

## 2024-10-12 PROCEDURE — 85730 THROMBOPLASTIN TIME PARTIAL: CPT

## 2024-10-12 PROCEDURE — 86900 BLOOD TYPING SEROLOGIC ABO: CPT

## 2024-10-12 PROCEDURE — 83540 ASSAY OF IRON: CPT

## 2024-10-12 PROCEDURE — 85610 PROTHROMBIN TIME: CPT

## 2024-10-12 PROCEDURE — 86850 RBC ANTIBODY SCREEN: CPT

## 2024-10-12 PROCEDURE — 93005 ELECTROCARDIOGRAM TRACING: CPT

## 2024-10-12 PROCEDURE — 86901 BLOOD TYPING SEROLOGIC RH(D): CPT

## 2024-10-12 PROCEDURE — 85025 COMPLETE CBC W/AUTO DIFF WBC: CPT

## 2024-10-12 PROCEDURE — 83550 IRON BINDING TEST: CPT

## 2024-10-12 PROCEDURE — 82728 ASSAY OF FERRITIN: CPT

## 2024-10-13 DIAGNOSIS — Z79.4 TYPE 2 DIABETES MELLITUS WITH HYPERGLYCEMIA, WITH LONG-TERM CURRENT USE OF INSULIN (HCC): ICD-10-CM

## 2024-10-13 DIAGNOSIS — J45.40 MODERATE PERSISTENT ASTHMA WITHOUT COMPLICATION: ICD-10-CM

## 2024-10-13 DIAGNOSIS — E11.65 TYPE 2 DIABETES MELLITUS WITH HYPERGLYCEMIA, WITH LONG-TERM CURRENT USE OF INSULIN (HCC): ICD-10-CM

## 2024-10-14 LAB
ATRIAL RATE: 81 BPM
P AXIS: 44 DEGREES
PR INTERVAL: 152 MS
QRS AXIS: 13 DEGREES
QRSD INTERVAL: 82 MS
QT INTERVAL: 358 MS
QTC INTERVAL: 415 MS
T WAVE AXIS: 33 DEGREES
VENTRICULAR RATE: 81 BPM

## 2024-10-14 PROCEDURE — 93010 ELECTROCARDIOGRAM REPORT: CPT | Performed by: STUDENT IN AN ORGANIZED HEALTH CARE EDUCATION/TRAINING PROGRAM

## 2024-10-15 ENCOUNTER — OFFICE VISIT (OUTPATIENT)
Dept: PODIATRY | Facility: CLINIC | Age: 59
End: 2024-10-15
Payer: COMMERCIAL

## 2024-10-15 VITALS — HEIGHT: 68 IN | BODY MASS INDEX: 40.47 KG/M2 | WEIGHT: 267 LBS

## 2024-10-15 DIAGNOSIS — S98.131A AMPUTATION OF TOE OF RIGHT FOOT (HCC): ICD-10-CM

## 2024-10-15 DIAGNOSIS — E11.42 DIABETIC POLYNEUROPATHY ASSOCIATED WITH TYPE 2 DIABETES MELLITUS (HCC): Primary | ICD-10-CM

## 2024-10-15 DIAGNOSIS — B35.1 TINEA UNGUIUM: ICD-10-CM

## 2024-10-15 PROCEDURE — 11721 DEBRIDE NAIL 6 OR MORE: CPT | Performed by: PODIATRIST

## 2024-10-15 PROCEDURE — RECHECK: Performed by: PODIATRIST

## 2024-10-15 RX ORDER — ALBUTEROL SULFATE 90 UG/1
2 INHALANT RESPIRATORY (INHALATION) EVERY 4 HOURS PRN
Qty: 8.5 G | Refills: 0 | Status: SHIPPED | OUTPATIENT
Start: 2024-10-15

## 2024-10-15 RX ORDER — ROSUVASTATIN CALCIUM 5 MG/1
5 TABLET, COATED ORAL EVERY OTHER DAY
Qty: 90 TABLET | Refills: 0 | Status: SHIPPED | OUTPATIENT
Start: 2024-10-15

## 2024-10-15 NOTE — PROGRESS NOTES
Anna Marie Andersenan  1965  AT RISK FOOT CARE    1. Diabetic polyneuropathy associated with type 2 diabetes mellitus (HCC)        2. Amputation of toe of right foot (HCC)        3. Tinea unguium            Patient presents for at-risk foot care.  Patient has no acute concerns today.  Patient has significant lower extremity risk due to previous amputation.    On exam patient has thickened, hypertrophic, discolored, brittle toenails with subungual debris and tenderness x7   Callus: none  Amputation: right great toe, 3rd toe amputations stable    Today's treatment includes:  Debridement of toenails. Using nail nipper, silvestre, and curette, nails were sharply debrided, reduced in thickness and length. Devitalized nail tissue and fungal debris excised and removed. Patient tolerated well.        Discussed proper shoe gear, daily inspections of feet, and general foot health with patient. Patient has Q7  findings and is recommended for at risk foot care every 9-10 weeks.    Patients most recent complete clinical exam was performed: 8/19/2024

## 2024-10-16 ENCOUNTER — OFFICE VISIT (OUTPATIENT)
Age: 59
End: 2024-10-16
Payer: COMMERCIAL

## 2024-10-16 VITALS
HEIGHT: 68 IN | HEART RATE: 112 BPM | SYSTOLIC BLOOD PRESSURE: 107 MMHG | WEIGHT: 258.2 LBS | BODY MASS INDEX: 39.13 KG/M2 | DIASTOLIC BLOOD PRESSURE: 70 MMHG

## 2024-10-16 DIAGNOSIS — I10 ESSENTIAL HYPERTENSION: Chronic | ICD-10-CM

## 2024-10-16 DIAGNOSIS — T84.093D FAILED TOTAL LEFT KNEE REPLACEMENT, SUBSEQUENT ENCOUNTER: ICD-10-CM

## 2024-10-16 DIAGNOSIS — T84.093A FAILED TOTAL LEFT KNEE REPLACEMENT, INITIAL ENCOUNTER (HCC): ICD-10-CM

## 2024-10-16 DIAGNOSIS — Z01.818 PREOPERATIVE CLEARANCE: Primary | ICD-10-CM

## 2024-10-16 DIAGNOSIS — Z79.4 TYPE 2 DIABETES MELLITUS WITH HYPERGLYCEMIA, WITH LONG-TERM CURRENT USE OF INSULIN (HCC): ICD-10-CM

## 2024-10-16 DIAGNOSIS — E11.65 TYPE 2 DIABETES MELLITUS WITH HYPERGLYCEMIA, WITH LONG-TERM CURRENT USE OF INSULIN (HCC): ICD-10-CM

## 2024-10-16 DIAGNOSIS — F11.288 OPIOID DEPENDENCE WITH OTHER OPIOID-INDUCED DISORDER (HCC): ICD-10-CM

## 2024-10-16 DIAGNOSIS — E66.01 MORBID OBESITY (HCC): ICD-10-CM

## 2024-10-16 PROCEDURE — 99215 OFFICE O/P EST HI 40 MIN: CPT | Performed by: INTERNAL MEDICINE

## 2024-10-21 ENCOUNTER — APPOINTMENT (INPATIENT)
Age: 59
DRG: 467 | End: 2024-10-21
Payer: COMMERCIAL

## 2024-10-21 ENCOUNTER — HOSPITAL ENCOUNTER (INPATIENT)
Age: 59
LOS: 2 days | Discharge: HOME | DRG: 467 | End: 2024-10-23
Attending: STUDENT IN AN ORGANIZED HEALTH CARE EDUCATION/TRAINING PROGRAM | Admitting: STUDENT IN AN ORGANIZED HEALTH CARE EDUCATION/TRAINING PROGRAM
Payer: COMMERCIAL

## 2024-10-21 ENCOUNTER — ANESTHESIA (OUTPATIENT)
Age: 59
DRG: 467 | End: 2024-10-21
Payer: COMMERCIAL

## 2024-10-21 DIAGNOSIS — R19.5 POSITIVE COLORECTAL CANCER SCREENING USING COLOGUARD TEST: Primary | ICD-10-CM

## 2024-10-21 DIAGNOSIS — T84.093A FAILED TOTAL LEFT KNEE REPLACEMENT, INITIAL ENCOUNTER (HCC): Primary | ICD-10-CM

## 2024-10-21 LAB
COLOGUARD RESULT REPORTABLE: POSITIVE
GLUCOSE SERPL-MCNC: 104 MG/DL (ref 65–140)
GLUCOSE SERPL-MCNC: 110 MG/DL (ref 65–140)

## 2024-10-21 PROCEDURE — 87205 SMEAR GRAM STAIN: CPT | Performed by: STUDENT IN AN ORGANIZED HEALTH CARE EDUCATION/TRAINING PROGRAM

## 2024-10-21 PROCEDURE — 99024 POSTOP FOLLOW-UP VISIT: CPT

## 2024-10-21 PROCEDURE — 27487 REVISE/REPLACE KNEE JOINT: CPT

## 2024-10-21 PROCEDURE — C1776 JOINT DEVICE (IMPLANTABLE): HCPCS | Performed by: STUDENT IN AN ORGANIZED HEALTH CARE EDUCATION/TRAINING PROGRAM

## 2024-10-21 PROCEDURE — 87070 CULTURE OTHR SPECIMN AEROBIC: CPT | Performed by: STUDENT IN AN ORGANIZED HEALTH CARE EDUCATION/TRAINING PROGRAM

## 2024-10-21 PROCEDURE — 0SPD0JZ REMOVAL OF SYNTHETIC SUBSTITUTE FROM LEFT KNEE JOINT, OPEN APPROACH: ICD-10-PCS | Performed by: STUDENT IN AN ORGANIZED HEALTH CARE EDUCATION/TRAINING PROGRAM

## 2024-10-21 PROCEDURE — 82948 REAGENT STRIP/BLOOD GLUCOSE: CPT

## 2024-10-21 PROCEDURE — 27487 REVISE/REPLACE KNEE JOINT: CPT | Performed by: STUDENT IN AN ORGANIZED HEALTH CARE EDUCATION/TRAINING PROGRAM

## 2024-10-21 PROCEDURE — C1713 ANCHOR/SCREW BN/BN,TIS/BN: HCPCS | Performed by: STUDENT IN AN ORGANIZED HEALTH CARE EDUCATION/TRAINING PROGRAM

## 2024-10-21 PROCEDURE — 87176 TISSUE HOMOGENIZATION CULTR: CPT | Performed by: STUDENT IN AN ORGANIZED HEALTH CARE EDUCATION/TRAINING PROGRAM

## 2024-10-21 PROCEDURE — 0SRD0J9 REPLACEMENT OF LEFT KNEE JOINT WITH SYNTHETIC SUBSTITUTE, CEMENTED, OPEN APPROACH: ICD-10-PCS | Performed by: STUDENT IN AN ORGANIZED HEALTH CARE EDUCATION/TRAINING PROGRAM

## 2024-10-21 PROCEDURE — 87075 CULTR BACTERIA EXCEPT BLOOD: CPT | Performed by: STUDENT IN AN ORGANIZED HEALTH CARE EDUCATION/TRAINING PROGRAM

## 2024-10-21 PROCEDURE — C9290 INJ, BUPIVACAINE LIPOSOME: HCPCS | Performed by: ANESTHESIOLOGY

## 2024-10-21 PROCEDURE — 73560 X-RAY EXAM OF KNEE 1 OR 2: CPT

## 2024-10-21 DEVICE — HINGE TIBIAL BEARING COMPONENT
Type: IMPLANTABLE DEVICE | Site: KNEE | Status: FUNCTIONAL
Brand: TRIATHLON

## 2024-10-21 DEVICE — SMARTSET HIGH PERFORMANCE MV MEDIUM VISCOSITY BONE CEMENT 40G
Type: IMPLANTABLE DEVICE | Site: KNEE | Status: FUNCTIONAL
Brand: SMARTSET

## 2024-10-21 DEVICE — CEMENTED STEM
Type: IMPLANTABLE DEVICE | Site: KNEE | Status: FUNCTIONAL
Brand: TRIATHLON

## 2024-10-21 DEVICE — HINGE BUSHINGS AND AXLE
Type: IMPLANTABLE DEVICE | Site: KNEE | Status: FUNCTIONAL
Brand: TRIATHLON

## 2024-10-21 DEVICE — CANAL -CEMENTED
Type: IMPLANTABLE DEVICE | Site: KNEE | Status: FUNCTIONAL
Brand: BONE PLUG

## 2024-10-21 DEVICE — REVISION TIBIAL BASEPLATE
Type: IMPLANTABLE DEVICE | Site: KNEE | Status: FUNCTIONAL
Brand: TRIATHLON

## 2024-10-21 DEVICE — HINGE FEMORAL COMPONENT
Type: IMPLANTABLE DEVICE | Site: KNEE | Status: FUNCTIONAL
Brand: TRIATHLON

## 2024-10-21 DEVICE — TRITANIUM TIBIAL SYMMETRIC CONE AUGMENT
Type: IMPLANTABLE DEVICE | Site: KNEE | Status: FUNCTIONAL
Brand: TRIATHLON

## 2024-10-21 DEVICE — TRITANIUM CENTRAL FEMORAL CONE AUGMENT
Type: IMPLANTABLE DEVICE | Site: KNEE | Status: FUNCTIONAL
Brand: TRIATHLON

## 2024-10-21 DEVICE — HINGE INSERT
Type: IMPLANTABLE DEVICE | Site: KNEE | Status: FUNCTIONAL
Brand: TRIATHLON

## 2024-10-21 DEVICE — HINGE BUMPER
Type: IMPLANTABLE DEVICE | Site: KNEE | Status: FUNCTIONAL
Brand: TRIATHLON

## 2024-10-21 RX ORDER — DIAZEPAM 5 MG/1
5 TABLET ORAL
Status: DISCONTINUED | OUTPATIENT
Start: 2024-10-21 | End: 2024-10-23 | Stop reason: HOSPADM

## 2024-10-21 RX ORDER — VANCOMYCIN HYDROCHLORIDE 1 G/20ML
INJECTION, POWDER, LYOPHILIZED, FOR SOLUTION INTRAVENOUS AS NEEDED
Status: DISCONTINUED | OUTPATIENT
Start: 2024-10-21 | End: 2024-10-21 | Stop reason: HOSPADM

## 2024-10-21 RX ORDER — PROPOFOL 10 MG/ML
INJECTION, EMULSION INTRAVENOUS AS NEEDED
Status: DISCONTINUED | OUTPATIENT
Start: 2024-10-21 | End: 2024-10-21

## 2024-10-21 RX ORDER — ONDANSETRON 2 MG/ML
4 INJECTION INTRAMUSCULAR; INTRAVENOUS ONCE AS NEEDED
Status: DISCONTINUED | OUTPATIENT
Start: 2024-10-21 | End: 2024-10-21 | Stop reason: HOSPADM

## 2024-10-21 RX ORDER — ASPIRIN 81 MG/1
81 TABLET, CHEWABLE ORAL 2 TIMES DAILY
Status: DISCONTINUED | OUTPATIENT
Start: 2024-10-22 | End: 2024-10-23 | Stop reason: HOSPADM

## 2024-10-21 RX ORDER — BUPIVACAINE HYDROCHLORIDE 5 MG/ML
INJECTION, SOLUTION EPIDURAL; INTRACAUDAL
Status: COMPLETED | OUTPATIENT
Start: 2024-10-21 | End: 2024-10-21

## 2024-10-21 RX ORDER — ONDANSETRON 2 MG/ML
4 INJECTION INTRAMUSCULAR; INTRAVENOUS EVERY 6 HOURS PRN
Status: DISCONTINUED | OUTPATIENT
Start: 2024-10-21 | End: 2024-10-23 | Stop reason: HOSPADM

## 2024-10-21 RX ORDER — HYDROMORPHONE HCL/PF 1 MG/ML
SYRINGE (ML) INJECTION AS NEEDED
Status: DISCONTINUED | OUTPATIENT
Start: 2024-10-21 | End: 2024-10-21

## 2024-10-21 RX ORDER — CARISOPRODOL 350 MG/1
350 TABLET ORAL 2 TIMES DAILY
Status: DISCONTINUED | OUTPATIENT
Start: 2024-10-21 | End: 2024-10-23 | Stop reason: HOSPADM

## 2024-10-21 RX ORDER — HYDROMORPHONE HCL/PF 1 MG/ML
0.5 SYRINGE (ML) INJECTION EVERY 2 HOUR PRN
Status: DISCONTINUED | OUTPATIENT
Start: 2024-10-21 | End: 2024-10-22

## 2024-10-21 RX ORDER — CALCIUM CARBONATE 500 MG/1
1000 TABLET, CHEWABLE ORAL DAILY PRN
Status: DISCONTINUED | OUTPATIENT
Start: 2024-10-21 | End: 2024-10-23 | Stop reason: HOSPADM

## 2024-10-21 RX ORDER — CELECOXIB 200 MG/1
200 CAPSULE ORAL 2 TIMES DAILY
Qty: 60 CAPSULE | Refills: 0 | Status: SHIPPED | OUTPATIENT
Start: 2024-10-21 | End: 2024-11-20

## 2024-10-21 RX ORDER — HYDROMORPHONE HCL/PF 1 MG/ML
0.5 SYRINGE (ML) INJECTION
Status: DISCONTINUED | OUTPATIENT
Start: 2024-10-21 | End: 2024-10-21 | Stop reason: HOSPADM

## 2024-10-21 RX ORDER — DOCUSATE SODIUM 100 MG/1
100 CAPSULE, LIQUID FILLED ORAL 2 TIMES DAILY
Status: DISCONTINUED | OUTPATIENT
Start: 2024-10-21 | End: 2024-10-23 | Stop reason: HOSPADM

## 2024-10-21 RX ORDER — ACETAMINOPHEN 325 MG/1
975 TABLET ORAL ONCE
Status: COMPLETED | OUTPATIENT
Start: 2024-10-21 | End: 2024-10-21

## 2024-10-21 RX ORDER — SODIUM CHLORIDE, SODIUM LACTATE, POTASSIUM CHLORIDE, CALCIUM CHLORIDE 600; 310; 30; 20 MG/100ML; MG/100ML; MG/100ML; MG/100ML
1.5 INJECTION, SOLUTION INTRAVENOUS CONTINUOUS
Status: DISCONTINUED | OUTPATIENT
Start: 2024-10-21 | End: 2024-10-23 | Stop reason: HOSPADM

## 2024-10-21 RX ORDER — CHLORHEXIDINE GLUCONATE ORAL RINSE 1.2 MG/ML
15 SOLUTION DENTAL ONCE
Status: COMPLETED | OUTPATIENT
Start: 2024-10-21 | End: 2024-10-21

## 2024-10-21 RX ORDER — OXYCODONE HYDROCHLORIDE 5 MG/1
5 TABLET ORAL EVERY 4 HOURS PRN
Qty: 30 TABLET | Refills: 0 | Status: SHIPPED | OUTPATIENT
Start: 2024-10-21

## 2024-10-21 RX ORDER — ACETAMINOPHEN 325 MG/1
650 TABLET ORAL EVERY 4 HOURS PRN
Status: DISCONTINUED | OUTPATIENT
Start: 2024-10-21 | End: 2024-10-23 | Stop reason: HOSPADM

## 2024-10-21 RX ORDER — ACETAMINOPHEN 325 MG/1
650 TABLET ORAL EVERY 6 HOURS PRN
Status: DISCONTINUED | OUTPATIENT
Start: 2024-10-21 | End: 2024-10-21 | Stop reason: SDUPTHER

## 2024-10-21 RX ORDER — LIDOCAINE HYDROCHLORIDE 10 MG/ML
0.5 INJECTION, SOLUTION EPIDURAL; INFILTRATION; INTRACAUDAL; PERINEURAL ONCE AS NEEDED
Status: DISCONTINUED | OUTPATIENT
Start: 2024-10-21 | End: 2024-10-21 | Stop reason: HOSPADM

## 2024-10-21 RX ORDER — DIPHENHYDRAMINE HYDROCHLORIDE 50 MG/ML
12.5 INJECTION INTRAMUSCULAR; INTRAVENOUS ONCE AS NEEDED
Status: DISCONTINUED | OUTPATIENT
Start: 2024-10-21 | End: 2024-10-21 | Stop reason: HOSPADM

## 2024-10-21 RX ORDER — IPRATROPIUM BROMIDE AND ALBUTEROL SULFATE 2.5; .5 MG/3ML; MG/3ML
3 SOLUTION RESPIRATORY (INHALATION) ONCE
Status: COMPLETED | OUTPATIENT
Start: 2024-10-21 | End: 2024-10-21

## 2024-10-21 RX ORDER — METOCLOPRAMIDE HYDROCHLORIDE 5 MG/ML
10 INJECTION INTRAMUSCULAR; INTRAVENOUS ONCE AS NEEDED
Status: DISCONTINUED | OUTPATIENT
Start: 2024-10-21 | End: 2024-10-21 | Stop reason: HOSPADM

## 2024-10-21 RX ORDER — MIDAZOLAM HYDROCHLORIDE 2 MG/2ML
INJECTION, SOLUTION INTRAMUSCULAR; INTRAVENOUS
Status: COMPLETED | OUTPATIENT
Start: 2024-10-21 | End: 2024-10-21

## 2024-10-21 RX ORDER — OXYCODONE HYDROCHLORIDE 10 MG/1
10 TABLET ORAL EVERY 4 HOURS PRN
Status: DISCONTINUED | OUTPATIENT
Start: 2024-10-21 | End: 2024-10-23 | Stop reason: HOSPADM

## 2024-10-21 RX ORDER — FENTANYL CITRATE 50 UG/ML
INJECTION, SOLUTION INTRAMUSCULAR; INTRAVENOUS
Status: COMPLETED
Start: 2024-10-21 | End: 2024-10-21

## 2024-10-21 RX ORDER — GABAPENTIN 300 MG/1
300 CAPSULE ORAL
Status: DISCONTINUED | OUTPATIENT
Start: 2024-10-21 | End: 2024-10-23 | Stop reason: HOSPADM

## 2024-10-21 RX ORDER — SENNOSIDES 8.6 MG
1 TABLET ORAL DAILY
Status: DISCONTINUED | OUTPATIENT
Start: 2024-10-22 | End: 2024-10-23 | Stop reason: HOSPADM

## 2024-10-21 RX ORDER — MAGNESIUM HYDROXIDE/ALUMINUM HYDROXICE/SIMETHICONE 120; 1200; 1200 MG/30ML; MG/30ML; MG/30ML
30 SUSPENSION ORAL EVERY 6 HOURS PRN
Status: DISCONTINUED | OUTPATIENT
Start: 2024-10-21 | End: 2024-10-23 | Stop reason: HOSPADM

## 2024-10-21 RX ORDER — PROPOFOL 10 MG/ML
INJECTION, EMULSION INTRAVENOUS CONTINUOUS PRN
Status: DISCONTINUED | OUTPATIENT
Start: 2024-10-21 | End: 2024-10-21

## 2024-10-21 RX ORDER — TRANEXAMIC ACID 10 MG/ML
1000 INJECTION, SOLUTION INTRAVENOUS ONCE
Status: COMPLETED | OUTPATIENT
Start: 2024-10-21 | End: 2024-10-21

## 2024-10-21 RX ORDER — ONDANSETRON 2 MG/ML
INJECTION INTRAMUSCULAR; INTRAVENOUS AS NEEDED
Status: DISCONTINUED | OUTPATIENT
Start: 2024-10-21 | End: 2024-10-21

## 2024-10-21 RX ORDER — CEFAZOLIN SODIUM 2 G/50ML
2000 SOLUTION INTRAVENOUS ONCE
Status: COMPLETED | OUTPATIENT
Start: 2024-10-21 | End: 2024-10-21

## 2024-10-21 RX ORDER — CHLORHEXIDINE GLUCONATE 40 MG/ML
SOLUTION TOPICAL DAILY PRN
Status: DISCONTINUED | OUTPATIENT
Start: 2024-10-21 | End: 2024-10-21 | Stop reason: HOSPADM

## 2024-10-21 RX ORDER — ONDANSETRON 2 MG/ML
4 INJECTION INTRAMUSCULAR; INTRAVENOUS ONCE AS NEEDED
Status: DISCONTINUED | OUTPATIENT
Start: 2024-10-21 | End: 2024-10-21

## 2024-10-21 RX ORDER — OXYCODONE HYDROCHLORIDE 5 MG/1
5 TABLET ORAL EVERY 4 HOURS PRN
Status: DISCONTINUED | OUTPATIENT
Start: 2024-10-21 | End: 2024-10-23 | Stop reason: HOSPADM

## 2024-10-21 RX ORDER — PRAVASTATIN SODIUM 20 MG
40 TABLET ORAL
Status: DISCONTINUED | OUTPATIENT
Start: 2024-10-21 | End: 2024-10-23 | Stop reason: HOSPADM

## 2024-10-21 RX ORDER — HYDROMORPHONE HCL IN WATER/PF 6 MG/30 ML
0.2 PATIENT CONTROLLED ANALGESIA SYRINGE INTRAVENOUS
Status: DISCONTINUED | OUTPATIENT
Start: 2024-10-21 | End: 2024-10-21 | Stop reason: HOSPADM

## 2024-10-21 RX ORDER — FENTANYL CITRATE/PF 50 MCG/ML
50 SYRINGE (ML) INJECTION
Status: DISCONTINUED | OUTPATIENT
Start: 2024-10-21 | End: 2024-10-21 | Stop reason: HOSPADM

## 2024-10-21 RX ORDER — SODIUM CHLORIDE, SODIUM LACTATE, POTASSIUM CHLORIDE, CALCIUM CHLORIDE 600; 310; 30; 20 MG/100ML; MG/100ML; MG/100ML; MG/100ML
125 INJECTION, SOLUTION INTRAVENOUS CONTINUOUS
Status: DISCONTINUED | OUTPATIENT
Start: 2024-10-21 | End: 2024-10-21 | Stop reason: SDUPTHER

## 2024-10-21 RX ORDER — ACETAMINOPHEN 500 MG
1000 TABLET ORAL EVERY 8 HOURS PRN
Qty: 84 TABLET | Refills: 0 | Status: SHIPPED | OUTPATIENT
Start: 2024-10-21 | End: 2024-11-04

## 2024-10-21 RX ORDER — CEFAZOLIN SODIUM 2 G/50ML
2000 SOLUTION INTRAVENOUS EVERY 8 HOURS
Status: COMPLETED | OUTPATIENT
Start: 2024-10-21 | End: 2024-10-22

## 2024-10-21 RX ADMIN — BUPIVACAINE 20 ML: 13.3 INJECTION, SUSPENSION, LIPOSOMAL INFILTRATION at 13:20

## 2024-10-21 RX ADMIN — PROPOFOL 80 MCG/KG/MIN: 10 INJECTION, EMULSION INTRAVENOUS at 14:10

## 2024-10-21 RX ADMIN — IPRATROPIUM BROMIDE AND ALBUTEROL SULFATE 3 ML: 2.5; .5 SOLUTION RESPIRATORY (INHALATION) at 17:38

## 2024-10-21 RX ADMIN — HYDROMORPHONE HYDROCHLORIDE 0.5 MG: 1 INJECTION, SOLUTION INTRAMUSCULAR; INTRAVENOUS; SUBCUTANEOUS at 16:37

## 2024-10-21 RX ADMIN — ACETAMINOPHEN 325MG 650 MG: 325 TABLET ORAL at 20:04

## 2024-10-21 RX ADMIN — DOCUSATE SODIUM 100 MG: 100 CAPSULE, LIQUID FILLED ORAL at 20:05

## 2024-10-21 RX ADMIN — HYDROMORPHONE HYDROCHLORIDE 0.5 MG: 1 INJECTION, SOLUTION INTRAMUSCULAR; INTRAVENOUS; SUBCUTANEOUS at 16:45

## 2024-10-21 RX ADMIN — FENTANYL CITRATE 50 MCG: 50 INJECTION INTRAMUSCULAR; INTRAVENOUS at 17:02

## 2024-10-21 RX ADMIN — TRANEXAMIC ACID 1000 MG: 10 INJECTION, SOLUTION INTRAVENOUS at 16:18

## 2024-10-21 RX ADMIN — PROPOFOL 30 MG: 10 INJECTION, EMULSION INTRAVENOUS at 14:10

## 2024-10-21 RX ADMIN — BUPIVACAINE HYDROCHLORIDE 10 ML: 5 INJECTION, SOLUTION EPIDURAL; INTRACAUDAL; PERINEURAL at 13:20

## 2024-10-21 RX ADMIN — ONDANSETRON 4 MG: 2 INJECTION INTRAMUSCULAR; INTRAVENOUS at 15:16

## 2024-10-21 RX ADMIN — SODIUM CHLORIDE, SODIUM LACTATE, POTASSIUM CHLORIDE, AND CALCIUM CHLORIDE 125 ML/HR: .6; .31; .03; .02 INJECTION, SOLUTION INTRAVENOUS at 12:12

## 2024-10-21 RX ADMIN — CEFAZOLIN SODIUM 2000 MG: 2 SOLUTION INTRAVENOUS at 21:55

## 2024-10-21 RX ADMIN — MEPIVACAINE HYDROCHLORIDE 3.5 ML: 15 INJECTION, SOLUTION EPIDURAL; INFILTRATION at 14:05

## 2024-10-21 RX ADMIN — CHLORHEXIDINE GLUCONATE 15 ML: 1.2 RINSE ORAL at 12:12

## 2024-10-21 RX ADMIN — CEFAZOLIN SODIUM 2000 MG: 2 SOLUTION INTRAVENOUS at 14:12

## 2024-10-21 RX ADMIN — FENTANYL CITRATE 50 MCG: 50 INJECTION INTRAMUSCULAR; INTRAVENOUS at 17:09

## 2024-10-21 RX ADMIN — PHENYLEPHRINE HYDROCHLORIDE 40 MCG/MIN: 10 INJECTION INTRAVENOUS at 14:06

## 2024-10-21 RX ADMIN — GABAPENTIN 300 MG: 300 CAPSULE ORAL at 21:55

## 2024-10-21 RX ADMIN — TRANEXAMIC ACID 1000 MG: 10 INJECTION, SOLUTION INTRAVENOUS at 14:12

## 2024-10-21 RX ADMIN — PREGABALIN 200 MG: 75 CAPSULE ORAL at 20:04

## 2024-10-21 RX ADMIN — ACETAMINOPHEN 325MG 975 MG: 325 TABLET ORAL at 12:12

## 2024-10-21 RX ADMIN — SODIUM CHLORIDE, SODIUM LACTATE, POTASSIUM CHLORIDE, AND CALCIUM CHLORIDE: .6; .31; .03; .02 INJECTION, SOLUTION INTRAVENOUS at 15:30

## 2024-10-21 RX ADMIN — OXYCODONE HYDROCHLORIDE 10 MG: 10 TABLET ORAL at 21:54

## 2024-10-21 RX ADMIN — MIDAZOLAM 2 MG: 1 INJECTION INTRAMUSCULAR; INTRAVENOUS at 13:18

## 2024-10-21 RX ADMIN — SODIUM CHLORIDE, SODIUM LACTATE, POTASSIUM CHLORIDE, AND CALCIUM CHLORIDE 1.5 ML/KG/HR: .6; .31; .03; .02 INJECTION, SOLUTION INTRAVENOUS at 19:48

## 2024-10-21 NOTE — ANESTHESIA PROCEDURE NOTES
Peripheral Block    Patient location during procedure: holding area  Start time: 10/21/2024 1:20 PM  Reason for block: at surgeon's request and post-op pain management  Staffing  Performed by: Darinel Palafox MD  Authorized by: Darinel Palafox MD    Preanesthetic Checklist  Completed: patient identified, IV checked, site marked, risks and benefits discussed, surgical consent, monitors and equipment checked, pre-op evaluation and timeout performed  Peripheral Block  Patient position: supine  Prep: ChloraPrep  Patient monitoring: frequent blood pressure checks, continuous pulse oximetry and heart rate  Block type: Adductor Canal  Laterality: left  Injection technique: single-shot  Procedures: ultrasound guided, Ultrasound guidance required for the procedure to increase accuracy and safety of medication placement and decrease risk of complications.  Ultrasound permanent image saved  bupivacaine (PF) (MARCAINE) 0.5 % injection 20 mL - Perineural   10 mL - 10/21/2024 1:20:00 PM  bupivacaine liposomal (EXPAREL) 1.3 % injection 20 mL - Perineural   20 mL - 10/21/2024 1:20:00 PM  midazolam (VERSED) injection 0.5 mg - Intravenous   2 mg - 10/21/2024 1:18:00 PM  Needle  Needle type: Stimuplex   Needle localization: anatomical landmarks and ultrasound guidance  Assessment  Injection assessment: incremental injection, frequent aspiration, injected with ease, negative aspiration, negative for heart rate change, no paresthesia on injection, no symptoms of intraneural/intravenous injection and needle tip visualized at all times  Paresthesia pain: none  Post-procedure:  site cleaned  patient tolerated the procedure well with no immediate complications

## 2024-10-21 NOTE — PROGRESS NOTES
"Progress Note - Surgery-General   Name: Anna Marie Barbosa 59 y.o. female I MRN: [id number]  Unit/Bed#: WE 2 N -01 I Date of Admission: 10/21/2024   Date of Service: 10/21/2024 I Hospital Day: 1     Assessment & Plan  Failed total left knee replacement (HCC)  59 year old POD 0 s/p left TKA revision. Patient tolerating pain and able to ambulate with ease.  - WBAT on LLE   - PT/OT  - HV in place, can be reomoved tomorrow   - Will continue to assess for acute blood loss anemia   - Pain and nausea control   - DVT ppx with ASA 81 mg. BID starting tomorrow   - DCI and d/c orders in place   - OOB with assistance     24 Hour Events : No acute events   Subjective : Patient is comfortable in bed and describes her pain as \"achy\" but no focal pain. Patient denies N/V, fevers or chills. Patient has no other complaints at this time.    Objective :  Temp:  [98.7 °F (37.1 °C)] 98.7 °F (37.1 °C)  HR:  [86-97] 90  BP: ()/(52-84) 120/73  Resp:  [12-17] 14  SpO2:  [92 %-98 %] 92 %  O2 Device: Aerosol mask  Nasal Cannula O2 Flow Rate (L/min):  [2 L/min-6 L/min] 6 L/min    Physical Exam  Constitutional:       Appearance: Normal appearance. She is not ill-appearing or diaphoretic.   Cardiovascular:      Rate and Rhythm: Normal rate and regular rhythm.      Pulses: Normal pulses.      Heart sounds: Normal heart sounds. No murmur heard.     No gallop.   Pulmonary:      Effort: Pulmonary effort is normal.      Breath sounds: Normal breath sounds.   Abdominal:      General: Abdomen is flat.      Palpations: Abdomen is soft.   Neurological:      General: No focal deficit present.      Mental Status: She is alert and oriented to person, place, and time.     LLE: CDI. No surrounding erythema, swelling or drainage. Sensation and motor function intact. CN grossly intact. Strength WNL. Toes are warm and pink      Lab Results: I have reviewed the following results:none    Imaging Results Review: No pertinent imaging studies reviewed.  Other " Study Results Review: No additional pertinent studies reviewed.    VTE Pharmacologic Prophylaxis: VTE covered by:    None     VTE Mechanical Prophylaxis: sequential compression device

## 2024-10-21 NOTE — ASSESSMENT & PLAN NOTE
59 year old POD 0 s/p left TKA revision. Patient tolerating pain and able to ambulate with ease.  - WBAT on LLE   - PT/OT  - HV in place, can be reomoved tomorrow   - Will continue to assess for acute blood loss anemia   - Pain and nausea control   - DVT ppx with ASA 81 mg. BID starting tomorrow   - DCI and d/c orders in place   - OOB with assistance

## 2024-10-21 NOTE — ANESTHESIA PROCEDURE NOTES
Spinal Block    Patient location during procedure: OR  Start time: 10/21/2024 2:05 PM  Reason for block: primary anesthetic  Staffing  Performed by: Vasu Sinclair CRNA  Authorized by: Darinel Palafox MD    Preanesthetic Checklist  Completed: patient identified, IV checked, site marked, risks and benefits discussed, surgical consent, monitors and equipment checked, pre-op evaluation and timeout performed  Spinal Block  Patient position: sitting  Prep: ChloraPrep  Patient monitoring: cardiac monitor, heart rate, continuous pulse ox and frequent blood pressure checks  Approach: midline  Location: L3-4  Needle  Needle type: Pencan   Needle gauge: 25 G  Needle length: 3.5 in  Assessment  Sensory level: T4  Injection Assessment:  negative aspiration for heme, no paresthesia on injection and positive aspiration for clear CSF.  Post-procedure:  site cleaned

## 2024-10-21 NOTE — DISCHARGE INSTR - AVS FIRST PAGE
Joe Cabrera MD  Adult Reconstruction Specialist   [facility]  Office Phone: [phone]    Discharge Instructions - Knee Replacement    What to Expect/Activity  You are weight bearing as tolerated to your operative leg with assistive devices.  Please use crutches/walker when ambulating as needed until your follow-up  Significant swelling and discomfort in the knee is normal for several days to weeks after surgery. You may use ice around the knee to help as desired. This can be used for 20-30 minutes every 1-2 hours  To optimally control swelling, your leg should be elevated above heart level as often as necessary. This can greatly improve post operative pain levels.   For the first several weeks after surgery, it is normal to experience redness, swelling and pain to the operative knee. This is generally not a sign of concern or an abnormal finding.    Post-operative Objectives  In effort to restore your knee range of motion, it is essential to immediately begin flexing your knee. Do so several times a day with the goal of achieving at or around 90 degrees by the time we see you 2 weeks post operatively.   There are no restriction as to how often you can perform this movement of the degree of flexion. Your knee implant and incision closure are able to withstand these types of movement  You may utilize the physical therapy exercise packet provided to you by the physical therapy team that should have been given during their initial assessment.  If we feel it is appropriate, we will initiate formal physical therapy 2 weeks after surgery. After our assessment during your first post operative visit, we will decide if this is the best course of action and of benefit to you.   If you have any questions regarding the above objectives, please do not hesistate to call our office for clarification.        Dressing/Wound Care/Bathing  After surgery, your knee will be wrapped in an ace wrap,  toe to mid thigh, with a gray waterproof adhesive dressing underneath protecting the incision  You may remove your ace wrap 5 days after surgery and may be re-applied and/or tightened/loosened as necessary. I recommend to re-wrap your leg if swelling is an issue.   You may start showering 5 days after surgery. When drying off, please pat the dressing dry. If you notice the dressing appears saturated or is starting to come off, please over-wrap with dry dressing.  If you shower too early, there is a higher chance of infection and wound healing complications.  No baths, swimming or submerging until cleared by Dr. Cabrera    Pain Management/Medications  You may resume your usual medications.  Please take the following medications:  Anti-coagulation (blood clot prevention) - Aspirin 81 mg, 1 tablet twice daily for 6 weeks  Pain medication:  Narcotic Medication: Oxycodone 5 mg, 1 tablet every 4-6 hours as needed for severe pain  NSAID (Anti-inflammatory): Celebrex 200 mg, 1 tablet twice a day as needed for mild to moderate pain  Tylenol 1000mg up to three times daily as needed for mild to moderate pain. Do not exceed 3000mg daily   If you have questions or pain concerns, please contact the office. Pain medication cannot remove all post-operative pain    Follow up/Call if:  The findings of your surgery will be explained to you and your family immediately after surgery. However, in the post-operative period, during recovery from anesthesia you may not fully remember or fully understand what was said. This will be again gone over when you return for your post-op appointment.  Please contact Dr. Cabrera's office if you experience the following:  Excessive bleeding (bleeding through your dressing)  Fever greater than 101 degrees F after 48 hours (low grade fevers the day or two after surgery are normal)  Persistent nausea or vomiting  Decreased sensation or discoloration of the operative limb  Pain or swelling that is  getting worse and not better with medication      Dr. Cabrera's Office Contact: [phone]

## 2024-10-21 NOTE — INTERVAL H&P NOTE
H&P reviewed. After examining the patient I find no changes in the patients condition since the H&P had been written.    Vitals:    10/21/24 1320   BP: 97/52   Pulse: 89   Resp: 16   SpO2: 94%

## 2024-10-21 NOTE — ANESTHESIA POSTPROCEDURE EVALUATION
Post-Op Assessment Note    CV Status:  Stable  Pain Score: 0    Pain management: adequate       Mental Status:  Alert and awake   Hydration Status:  Euvolemic   PONV Controlled:  Controlled   Airway Patency:  Patent  Two or more mitigation strategies used for obstructive sleep apnea   Post Op Vitals Reviewed: Yes    No anethesia notable event occurred.    Staff: Anesthesiologist, CRNA           Last Filed PACU Vitals:  Vitals Value Taken Time   Temp 98.6    Pulse 98 10/21/24 1653   /84 10/21/24 1653   Resp 12 10/21/24 1653   SpO2 97 % 10/21/24 1653   Vitals shown include unfiled device data.    Modified Guru:  No data recorded

## 2024-10-21 NOTE — ANESTHESIA PREPROCEDURE EVALUATION
Procedure:  ARTHROPLASTY LEFT KNEE TOTAL REVISION, 1 vs 2 components (Left: Knee)    Relevant Problems   ANESTHESIA (within normal limits)      CARDIO   (+) Essential hypertension   (+) Other hyperlipidemia      ENDO   (+) Type 2 diabetes mellitus with hyperglycemia, with long-term current use of insulin (HCC)      GI/HEPATIC   (+) Gastroesophageal reflux disease without esophagitis      /RENAL (within normal limits)      GYN (within normal limits)      HEMATOLOGY (within normal limits)      MUSCULOSKELETAL (within normal limits)      NEURO/PSYCH   (+) Chronic pain disorder   (+) Diabetic gastroparesis  (HCC)   (+) Diabetic polyneuropathy associated with type 2 diabetes mellitus (HCC)   (+) TIA (transient ischemic attack)      PULMONARY   (+) Asthma      Behavioral Health   (+) Opioid dependence with other opioid-induced disorder (HCC)      Rheumatology   (+) Failed total left knee replacement (HCC)      Orthopedic/Musculoskeletal   (+) Acquired absence of other right toe(s) (HCC)   (+) Acquired absence of right great toe (HCC)        Physical Exam    Airway    Mallampati score: II  TM Distance: >3 FB  Neck ROM: full     Dental   No notable dental hx     Cardiovascular  Rhythm: regular, Rate: normal, Cardiovascular exam normal    Pulmonary  Pulmonary exam normal Breath sounds clear to auscultation    Other Findings  post-pubertal.      Anesthesia Plan  ASA Score- 3     Anesthesia Type- spinal with ASA Monitors.         Additional Monitors:     Airway Plan:     Comment: Left adductor canal block for postoperative pain control.       Plan Factors-Exercise tolerance (METS): >4 METS.    Chart reviewed. EKG reviewed. Imaging results reviewed. Existing labs reviewed. Patient summary reviewed.          Obstructive sleep apnea risk education given perioperatively.        Induction- intravenous.    Postoperative Plan- Plan for postoperative opioid use.     Perioperative Resuscitation Plan - Level 1 - Full Code.        Informed Consent- Anesthetic plan and risks discussed with patient.  I personally reviewed this patient with the CRNA. Discussed and agreed on the Anesthesia Plan with the CRNA..      Recent labs personally reviewed:  Lab Results   Component Value Date    WBC 8.90 10/12/2024    HGB 12.6 10/12/2024     10/12/2024     Lab Results   Component Value Date    K 4.3 10/12/2024    BUN 14 10/12/2024    CREATININE 0.59 (L) 10/12/2024    GLUCOSE 300 (H) 05/13/2022     Lab Results   Component Value Date    PTT 34 10/12/2024      Lab Results   Component Value Date    INR 1.05 10/12/2024       Blood type A    Lab Results   Component Value Date    HGBA1C 6.1 (H) 10/12/2024       I, Darinel Palafox MD, have personally seen and evaluated the patient prior to anesthetic care.  I have reviewed the pre-anesthetic record, and other medical records if appropriate to the anesthetic care.  If a CRNA is involved in the case, I have reviewed the CRNA assessment, if present, and agree. Risks/benefits and alternatives discussed with patient including possible PONV, sore throat, and possibility of rare anesthetic and surgical emergencies.

## 2024-10-22 LAB
ANION GAP SERPL CALCULATED.3IONS-SCNC: 7 MMOL/L (ref 4–13)
BUN SERPL-MCNC: 20 MG/DL (ref 5–25)
CALCIUM SERPL-MCNC: 8.6 MG/DL (ref 8.4–10.2)
CHLORIDE SERPL-SCNC: 101 MMOL/L (ref 96–108)
CO2 SERPL-SCNC: 26 MMOL/L (ref 21–32)
CREAT SERPL-MCNC: 0.74 MG/DL (ref 0.6–1.3)
ERYTHROCYTE [DISTWIDTH] IN BLOOD BY AUTOMATED COUNT: 13.1 % (ref 11.6–15.1)
GFR SERPL CREATININE-BSD FRML MDRD: 88 ML/MIN/1.73SQ M
GLUCOSE SERPL-MCNC: 143 MG/DL (ref 65–140)
HCT VFR BLD AUTO: 35.8 % (ref 34.8–46.1)
HGB BLD-MCNC: 11.7 G/DL (ref 11.5–15.4)
MCH RBC QN AUTO: 28.3 PG (ref 26.8–34.3)
MCHC RBC AUTO-ENTMCNC: 32.7 G/DL (ref 31.4–37.4)
MCV RBC AUTO: 87 FL (ref 82–98)
PLATELET # BLD AUTO: 158 THOUSANDS/UL (ref 149–390)
PMV BLD AUTO: 12.9 FL (ref 8.9–12.7)
POTASSIUM SERPL-SCNC: 4.8 MMOL/L (ref 3.5–5.3)
RBC # BLD AUTO: 4.14 MILLION/UL (ref 3.81–5.12)
SODIUM SERPL-SCNC: 134 MMOL/L (ref 135–147)
WBC # BLD AUTO: 9.52 THOUSAND/UL (ref 4.31–10.16)

## 2024-10-22 PROCEDURE — 85027 COMPLETE CBC AUTOMATED: CPT

## 2024-10-22 PROCEDURE — 97167 OT EVAL HIGH COMPLEX 60 MIN: CPT

## 2024-10-22 PROCEDURE — 99024 POSTOP FOLLOW-UP VISIT: CPT | Performed by: PHYSICIAN ASSISTANT

## 2024-10-22 PROCEDURE — NC001 PR NO CHARGE

## 2024-10-22 PROCEDURE — 80048 BASIC METABOLIC PNL TOTAL CA: CPT

## 2024-10-22 PROCEDURE — 97163 PT EVAL HIGH COMPLEX 45 MIN: CPT | Performed by: PHYSICAL THERAPIST

## 2024-10-22 RX ORDER — MORPHINE SULFATE 15 MG/1
15 TABLET, FILM COATED, EXTENDED RELEASE ORAL EVERY 12 HOURS SCHEDULED
Status: DISCONTINUED | OUTPATIENT
Start: 2024-10-22 | End: 2024-10-23 | Stop reason: HOSPADM

## 2024-10-22 RX ORDER — HYDROMORPHONE HCL/PF 1 MG/ML
1 SYRINGE (ML) INJECTION EVERY 2 HOUR PRN
Status: DISCONTINUED | OUTPATIENT
Start: 2024-10-22 | End: 2024-10-23 | Stop reason: HOSPADM

## 2024-10-22 RX ORDER — MORPHINE SULFATE 15 MG/1
15 TABLET ORAL EVERY 8 HOURS PRN
Status: DISCONTINUED | OUTPATIENT
Start: 2024-10-22 | End: 2024-10-23 | Stop reason: HOSPADM

## 2024-10-22 RX ORDER — MORPHINE SULFATE 15 MG/1
60 TABLET, FILM COATED, EXTENDED RELEASE ORAL EVERY 12 HOURS SCHEDULED
Status: DISCONTINUED | OUTPATIENT
Start: 2024-10-22 | End: 2024-10-23 | Stop reason: HOSPADM

## 2024-10-22 RX ADMIN — PREGABALIN 200 MG: 75 CAPSULE ORAL at 08:47

## 2024-10-22 RX ADMIN — MORPHINE SULFATE 15 MG: 15 TABLET ORAL at 08:47

## 2024-10-22 RX ADMIN — ASPIRIN 81 MG 81 MG: 81 TABLET ORAL at 18:32

## 2024-10-22 RX ADMIN — ACETAMINOPHEN 325MG 650 MG: 325 TABLET ORAL at 12:08

## 2024-10-22 RX ADMIN — CARISOPRODOL 350 MG: 350 TABLET ORAL at 20:19

## 2024-10-22 RX ADMIN — HYDROMORPHONE HYDROCHLORIDE 0.5 MG: 1 INJECTION, SOLUTION INTRAMUSCULAR; INTRAVENOUS; SUBCUTANEOUS at 00:12

## 2024-10-22 RX ADMIN — ACETAMINOPHEN 325MG 650 MG: 325 TABLET ORAL at 16:52

## 2024-10-22 RX ADMIN — PREGABALIN 200 MG: 75 CAPSULE ORAL at 20:20

## 2024-10-22 RX ADMIN — OXYCODONE HYDROCHLORIDE 10 MG: 10 TABLET ORAL at 07:54

## 2024-10-22 RX ADMIN — CEFAZOLIN SODIUM 2000 MG: 2 SOLUTION INTRAVENOUS at 05:04

## 2024-10-22 RX ADMIN — MORPHINE SULFATE 15 MG: 15 TABLET, EXTENDED RELEASE ORAL at 20:21

## 2024-10-22 RX ADMIN — OXYCODONE HYDROCHLORIDE 10 MG: 10 TABLET ORAL at 16:52

## 2024-10-22 RX ADMIN — DOCUSATE SODIUM 100 MG: 100 CAPSULE, LIQUID FILLED ORAL at 08:47

## 2024-10-22 RX ADMIN — ASPIRIN 81 MG 81 MG: 81 TABLET ORAL at 08:47

## 2024-10-22 RX ADMIN — MORPHINE SULFATE 15 MG: 15 TABLET, EXTENDED RELEASE ORAL at 09:16

## 2024-10-22 RX ADMIN — OXYCODONE HYDROCHLORIDE 10 MG: 10 TABLET ORAL at 12:08

## 2024-10-22 RX ADMIN — MORPHINE SULFATE 60 MG: 15 TABLET, EXTENDED RELEASE ORAL at 09:16

## 2024-10-22 RX ADMIN — OXYCODONE HYDROCHLORIDE 10 MG: 10 TABLET ORAL at 21:46

## 2024-10-22 RX ADMIN — ACETAMINOPHEN 325MG 650 MG: 325 TABLET ORAL at 04:42

## 2024-10-22 RX ADMIN — DOCUSATE SODIUM 100 MG: 100 CAPSULE, LIQUID FILLED ORAL at 18:32

## 2024-10-22 RX ADMIN — CARISOPRODOL 350 MG: 350 TABLET ORAL at 12:31

## 2024-10-22 RX ADMIN — HYDROMORPHONE HYDROCHLORIDE 0.5 MG: 1 INJECTION, SOLUTION INTRAMUSCULAR; INTRAVENOUS; SUBCUTANEOUS at 04:41

## 2024-10-22 RX ADMIN — PRAVASTATIN SODIUM 40 MG: 20 TABLET ORAL at 16:52

## 2024-10-22 RX ADMIN — SENNOSIDES 8.6 MG: 8.6 TABLET, FILM COATED ORAL at 08:47

## 2024-10-22 RX ADMIN — GABAPENTIN 300 MG: 300 CAPSULE ORAL at 21:46

## 2024-10-22 RX ADMIN — OXYCODONE HYDROCHLORIDE 10 MG: 10 TABLET ORAL at 02:56

## 2024-10-22 RX ADMIN — MORPHINE SULFATE 60 MG: 15 TABLET, EXTENDED RELEASE ORAL at 20:19

## 2024-10-22 NOTE — ASSESSMENT & PLAN NOTE
PT/OT as ordered.  Continue with ice and analgesics as needed for pain.  Patient was resumed on her baseline home pain medication this morning for chronic pain.  This includes MS Contin 75 mg every 12 hours along with MS IR 15 mg every 8 hours and Soma.  Aspirin 81 mg twice daily for DVT prophylaxis.  Dressing and drain intact at this time plan is for removal of drain prior to discharge.

## 2024-10-22 NOTE — PROGRESS NOTES
Progress Note - Orthopedics   Name: Anna Marie Barbosa 59 y.o. female I MRN: [id number]  Unit/Bed#: WE 2 N -01 I Date of Admission: 10/21/2024   Date of Service: 10/22/2024 I Hospital Day: 1     Assessment & Plan  Failed total left knee replacement (HCC)  PT/OT as ordered.  Continue with ice and analgesics as needed for pain.  Patient was resumed on her baseline home pain medication this morning for chronic pain.  This includes MS Contin 75 mg every 12 hours along with MS IR 15 mg every 8 hours and Soma.  Aspirin 81 mg twice daily for DVT prophylaxis.  Dressing and drain intact at this time plan is for removal of drain prior to discharge.    Ok for discharge from Orthopedics service perspective when cleared from physical therapy standpoint and pain under control.    Subjective   59 y.o.female postop day 1 status post revision left total knee replacement.  No acute events, no new complaints.  Patient complaining of pain 5 out of 10 at rest with increased when trying to move.  She has not been resumed as of yet on her normal home medications for pain, she overall feels that her pain is not well-controlled.  When she does have pain it is localized to her knee and posterior knee at this time. Denies fevers, chills, CP, SOB, N/V, numbness or tingling. Patient reports no issues with urination or bowel movements.  Patient's plan for discharge is home when ready.    Objective :  Temp:  [97.8 °F (36.6 °C)-100.6 °F (38.1 °C)] 100.6 °F (38.1 °C)  HR:  [] 115  BP: ()/(52-84) 131/63  Resp:  [12-19] 19  SpO2:  [91 %-98 %] 91 %  O2 Device: None (Room air)  Nasal Cannula O2 Flow Rate (L/min):  [2 L/min-6 L/min] 6 L/min    Physical Exam  Musculoskeletal: leftlower  No erythema or ecchymosis.  Dressing clean dry and intact at this time with drain in place.  TTP at vivian-incisional area  Motor intact anterior interosseous nerve/posterior interosseous nerve/median/radial/ulnar nerve distributions  Motor intact to +FHL/EHL,  +ankle dorsi/plantar flexion      Lab Results: I have reviewed the following results:  Recent Labs     10/22/24  0452   WBC 9.52   HGB 11.7   HCT 35.8      BUN 20   CREATININE 0.74     Blood Culture:    Lab Results   Component Value Date    BLOODCX No Growth After 5 Days. 09/07/2023    BLOODCX No Growth After 5 Days. 09/07/2023     Wound Culture:   Lab Results   Component Value Date    WOUNDCULT 3+ Growth of Staphylococcus aureus (A) 09/07/2023    WOUNDCULT 1+ Growth of Beta Hemolytic Streptococcus Group B (A) 09/07/2023    WOUNDCULT 1+ Growth of 09/07/2023

## 2024-10-22 NOTE — PLAN OF CARE
Problem: PAIN - ADULT  Goal: Verbalizes/displays adequate comfort level or baseline comfort level  Description: Interventions:  - Encourage patient to monitor pain and request assistance  - Assess pain using appropriate pain scale  - Administer analgesics based on type and severity of pain and evaluate response  - Implement non-pharmacological measures as appropriate and evaluate response  - Consider cultural and social influences on pain and pain management  - Notify physician/advanced practitioner if interventions unsuccessful or patient reports new pain  Outcome: Progressing     Problem: INFECTION - ADULT  Goal: Absence or prevention of progression during hospitalization  Description: INTERVENTIONS:  - Assess and monitor for signs and symptoms of infection  - Monitor lab/diagnostic results  - Monitor all insertion sites, i.e. indwelling lines, tubes, and drains  - Monitor endotracheal if appropriate and nasal secretions for changes in amount and color  - [place] appropriate cooling/warming therapies per order  - Administer medications as ordered  - Instruct and encourage patient and family to use good hand hygiene technique  - Identify and instruct in appropriate isolation precautions for identified infection/condition  Outcome: Progressing  Goal: Absence of fever/infection during neutropenic period  Description: INTERVENTIONS:  - Monitor WBC    Outcome: Progressing     Problem: SAFETY ADULT  Goal: Patient will remain free of falls  Description: INTERVENTIONS:  - Educate patient/family on patient safety including physical limitations  - Instruct patient to call for assistance with activity   - Consult OT/PT to assist with strengthening/mobility   - Keep Call bell within reach  - Keep bed low and locked with side rails adjusted as appropriate  - Keep care items and personal belongings within reach  - Initiate and maintain comfort rounds  - Make Fall Risk Sign visible to staff  - Offer Toileting every 3 Hours,  in advance of need  - Initiate/Maintain bed alarm  - Obtain necessary fall risk management equipment:   - Apply yellow socks and bracelet for high fall risk patients  - Consider moving patient to room near nurses station  Outcome: Progressing  Goal: Maintain or return to baseline ADL function  Description: INTERVENTIONS:  -  Assess patient's ability to carry out ADLs; assess patient's baseline for ADL function and identify physical deficits which impact ability to perform ADLs (bathing, care of mouth/teeth, toileting, grooming, dressing, etc.)  - Assess/evaluate cause of self-care deficits   - Assess range of motion  - Assess patient's mobility; develop plan if impaired  - Assess patient's need for assistive devices and provide as appropriate  - Encourage maximum independence but intervene and supervise when necessary  - Involve family in performance of ADLs  - Assess for home care needs following discharge   - Consider OT consult to assist with ADL evaluation and planning for discharge  - Provide patient education as appropriate  Outcome: Progressing  Goal: Maintains/Returns to pre admission functional level  Description: INTERVENTIONS:  - Perform AM-PAC 6 Click Basic Mobility/ Daily Activity assessment daily.  - Set and communicate daily mobility goal to care team and patient/family/caregiver.   - Collaborate with rehabilitation services on mobility goals if consulted  - Perform Range of Motion 3 times a day.  - Reposition patient every 3 hours.  - Dangle patient 3 times a day  - Stand patient 3 times a day  - Ambulate patient 3 times a day  - Out of bed to chair 3 times a day   - Out of bed for meals 3 times a day  - Out of bed for toileting  - Record patient progress and toleration of activity level   Outcome: Progressing

## 2024-10-22 NOTE — ASSESSMENT & PLAN NOTE
59 year old POD 1 s/p left TKA revision. Patient failed PT today. Pain regimen change to MS Contin 75 mg every 12 hours along with MS IR 15 mg every 8 hours and Soma which corresponds to her home chronic pain regimen.     - WBAT on LLE   - PT/OT  - HV removed today   - Will continue to assess for acute blood loss anemia   - Pain and nausea control   - DVT ppx with ASA 81 mg. BID   - DCI and d/c orders in place   - OOB with assistance  - Appreciate PT recs, Level III

## 2024-10-22 NOTE — PLAN OF CARE
Problem: PHYSICAL THERAPY ADULT  Goal: Performs mobility at highest level of function for planned discharge setting.  See evaluation for individualized goals.  Note: Prognosis: Good  Problem List: Decreased strength, Decreased range of motion, Decreased endurance, Impaired balance, Decreased mobility, Obesity, Pain  Assessment: Pt. 59 y.o.female presents for elective surgery. Past medical hx includes acute respiratory insufficiency, asthma, chronic pain disorder, chronic narcotic dependence, diabetes, HTN, HLD, hx of previous toe ampuations, hx of TIA. Pt admitted for Failed total left knee replacement ([facility]) w/ Failed total left knee replacement, initial encounter ([facility]) (T84.198Y). S/p L elective TK revision POD #0. Pt referred to PT for functional mobility evaluation & D/C planning w/ orders of up w/ assistance. WBAT LLE. PTA, pt reports being I w/ RW. Personal factors affecting pt at time of IE include: decreased independence in ADLs/IADLs, steps to enter, and use of AD . During evaluation, deficits included dec mobility, balance, ambulation. Required S for sit<>stand, and ambulation. Pt able to ambulate 10' with RW and S in room. Antalgic step to gait with no gross LOB noted. Significant pain with mobility therefore limited distance ambulated this session. RN aware. Pt demonstrated dec endurance and tolerance to activity. Denies reports of dizziness or SOB t/o session. Pt was educated on fall precautions and reinforced w/ good understanding. Pt would benefit from continued PT to address deficits as defined above and maximize level of independence with functional mobility and safety. Based on pt presentation and impaired function, pt would benefit from level III, (minimum resource intensity) at D/C. The patient's AM-PAC Basic Mobility Inpatient Short Form Raw Score is 19. A Raw score of greater than 16 suggests the patient may benefit from discharge to home. Please also refer to the recommendation of the  Physical Therapist for safe discharge planning. Nsg staff to continue to mobilized pt (OOB in chair for all meals & ambulate in room/unit) as tolerated to prevent further decline in function. Nsg notified. Co-eval performed to complete this PT evaluation for the pts best interest given pts medical complexity and functional level.        Rehab Resource Intensity Level, PT: III (Minimum Resource Intensity)    See flowsheet documentation for full assessment.

## 2024-10-22 NOTE — UTILIZATION REVIEW
Initial Clinical Review    Elective   INpatient surgical procedure    Age/Sex: 59 y.o. female    Surgery Date:   10/21/24    Procedure: Left - ARTHROPLASTY LEFT KNEE TOTAL REVISION. 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  Has  significant opioid  dependence history, significant amount of  pain this am.   Pain 10/10  LLE.  Has  chronic  numbness/tingling D/T  neuropathy.  Needs  PT/OT/WBAT   LLE.  Hemoglobin this am  11.7.      Admission Orders: Date/Time/Statement:   Admission Orders (From admission, onward)       Ordered        10/21/24 1654  Inpatient Admission  Once                          Orders Placed This Encounter   Procedures    Inpatient Admission     Standing Status:   Standing     Number of Occurrences:   1     Order Specific Question:   Level of Care     Answer:   Med Surg [16]     Order Specific Question:   Estimated length of stay     Answer:   Inpatient Only Surgery     Diet:  regular    Mobility:  PT/OT    DVT Prophylaxis:  SCD's    Medications/Pain Control:   Scheduled Medications:  aspirin, 81 mg, Oral, BID  carisoprodol, 350 mg, Oral, BID  docusate sodium, 100 mg, Oral, BID  gabapentin, 300 mg, Oral, HS  morphine, 15 mg, Oral, Q12H BOLIVAR  morphine, 60 mg, Oral, Q12H BOLIVAR  pravastatin, 40 mg, Oral, Daily With Dinner  pregabalin, 200 mg, Oral, BID  senna, 1 tablet, Oral, Daily  tirzepatide, 5 mg, Subcutaneous, Q7 Days      Continuous IV Infusions:  lactated ringers, 1.5 mL/kg/hr, Intravenous, Continuous      PRN Meds:  acetaminophen, 650 mg, Oral, Q4H PRN  aluminum-magnesium hydroxide-simethicone, 30 mL, Oral, Q6H PRN  calcium carbonate, 1,000 mg, Oral, Daily PRN  diazepam, 5 mg, Oral, HS PRN  HYDROmorphone, 1 mg, Intravenous, Q2H PRN  ( x2  10/22 thus far)    lactated ringers, 1,000 mL, Intravenous, Once PRN   And  lactated  ringers, 1,000 mL, Intravenous, Once PRN  morphine, 15 mg, Oral, Q8H PRN  ondansetron, 4 mg, Intravenous, Q6H PRN  oxyCODONE, 10 mg, Oral, Q4H PRN  oxyCODONE, 5 mg, Oral, Q4H PRN  sodium chloride, 1,000 mL, Intravenous, Once PRN   And  sodium chloride, 1,000 mL, Intravenous, Once PRN      Vital Signs (last 3 days)       Date/Time Temp Pulse Resp BP MAP (mmHg) SpO2 Calculated FIO2 (%) - Nasal Cannula O2 Flow Rate (L/min) Nasal Cannula O2 Flow Rate (L/min) O2 Device Cardiac (WDL) Patient Position - Orthostatic VS Pain    10/22/24 0754 -- -- -- -- -- -- -- -- -- -- -- -- 10 - Worst Possible Pain    10/22/24 07:11:48 100.6 °F (38.1 °C) 115 19 131/63 86 91 % -- -- -- None (Room air) -- Sitting --    10/22/24 0441 -- -- -- -- -- -- -- -- -- -- -- -- 10 - Worst Possible Pain    10/22/24 03:02:15 -- 103 18 164/78 107 93 % -- -- -- -- -- -- --    10/22/24 0256 -- -- -- -- -- -- -- -- -- -- -- -- 8    10/22/24 0012 -- -- -- -- -- -- -- -- -- -- -- -- 9    10/21/24 2200 97.9 °F (36.6 °C) 86 18 118/61 80 91 % -- -- -- None (Room air) -- Lying --    10/21/24 21:58:18 -- 82 18 118/61 80 93 % -- -- -- -- -- -- --    10/21/24 2154 -- -- -- -- -- -- -- -- -- -- -- -- 8    10/21/24 2017 97.8 °F (36.6 °C) 82 18 112/70 -- -- -- -- -- -- -- -- --    10/21/24 2008 -- -- -- -- -- -- -- -- -- -- -- -- 4    10/21/24 2004 -- -- -- -- -- -- -- -- -- -- -- -- 4    10/21/24 1915 -- -- -- -- -- -- -- -- -- -- -- -- 4    10/21/24 19:14:58 97.8 °F (36.6 °C) 82 -- 112/70 84 94 % -- -- -- -- -- -- --    10/21/24 19:13:04 97.8 °F (36.6 °C) 79 18 112/70 84 93 % -- -- -- -- -- -- --    10/21/24 18:33:15 97.8 °F (36.6 °C) 99 18 130/73 92 92 % -- -- -- None (Room air) -- Lying --    10/21/24 1740 -- 90 14 120/73 90 92 % 44 -- 6 L/min Aerosol mask WDL -- 4    10/21/24 1725 -- 90 12 136/73 97 95 % 28 -- 2 L/min Nasal cannula WDL -- 4    10/21/24 1710 -- 92 16 149/77 107 98 % 28 -- 2 L/min Nasal cannula WDL -- 4    10/21/24 1709 -- -- -- -- -- -- -- -- --  -- -- -- 6    10/21/24 1702 -- -- -- -- -- -- -- -- -- -- -- -- 6    10/21/24 1655 -- 97 13 160/84 113 97 % -- 6 L/min -- Simple mask WDL -- 6    10/21/24 1652 98.7 °F (37.1 °C) -- -- -- -- -- -- 6 L/min -- Simple mask -- -- 6    10/21/24 1320 -- 89 16 97/52 -- 94 % 28 -- 2 L/min Nasal cannula -- -- --    10/21/24 1300 -- 86 17 105/55 -- 94 % -- -- -- None (Room air) -- -- --    10/21/24 1212 -- -- -- -- -- -- -- -- -- -- -- -- 9          Weight (last 2 days)       Date/Time Weight    10/21/24 2017 117 (257)            Pertinent Labs/Diagnostic Test Results:   Radiology:  XR knee left 1 or 2 views    (Results Pending)           Results from last 7 days   Lab Units 10/22/24  0452   WBC Thousand/uL 9.52   HEMOGLOBIN g/dL 11.7   HEMATOCRIT % 35.8   PLATELETS Thousands/uL 158         Results from last 7 days   Lab Units 10/22/24  0452   SODIUM mmol/L 134*   POTASSIUM mmol/L 4.8   CHLORIDE mmol/L 101   CO2 mmol/L 26   ANION GAP mmol/L 7   BUN mg/dL 20   CREATININE mg/dL 0.74   EGFR ml/min/1.73sq m 88   CALCIUM mg/dL 8.6         Results from last 7 days   Lab Units 10/21/24  1720 10/21/24  1211   POC GLUCOSE mg/dl 104 110     Results from last 7 days   Lab Units 10/22/24  0452   GLUCOSE RANDOM mg/dL 143*                               Results from last 7 days   Lab Units 10/21/24  1431   GRAM STAIN RESULT  No Polys or Bacteria seen                   Network Utilization Review Department  ATTENTION: Please call with any questions or concerns to [phone] and carefully listen to the prompts so that you are directed to the right person. All voicemails are confidential.   For Discharge needs, contact Care Management DC Support Team at [phone] opt. 2  Send all requests for admission clinical reviews, approved or denied determinations and any other requests to dedicated fax number below belonging to the campus where the patient is receiving treatment. List of dedicated fax numbers for the Facilities:  FACILITY NAME UR  FAX NUMBER   ADMISSION DENIALS (Administrative/Medical Necessity) [phone]   DISCHARGE SUPPORT TEAM (NETWORK) [phone]   PARENT CHILD HEALTH (Maternity/NICU/Pediatrics) [phone]   [facility] [phone]   [facility] [phone]   [facility] [phone]   [facility] [phone]   [facility] [phone]   [facility] [phone]   [facility] [phone]   [facility] [phone]   [facility] [phone]   [facility] [phone]   [facility] [phone]   [facility] [phone]

## 2024-10-22 NOTE — PLAN OF CARE
Problem: OCCUPATIONAL THERAPY ADULT  Goal: Performs self-care activities at highest level of function for planned discharge setting.  See evaluation for individualized goals.  Description: Treatment Interventions: ADL retraining, Functional transfer training, Endurance training, Patient/family training, Equipment evaluation/education, Compensatory technique education, Continued evaluation, Energy conservation, Activityengagement          See flowsheet documentation for full assessment, interventions and recommendations.   Note: Limitation: Decreased ADL status, Decreased endurance, Decreased self-care trans, Decreased high-level ADLs  Prognosis: Good  Assessment: Pt is a 59 y.o. female seen for OT evaluation s/p adm to [facility] on 10/21/2024 w/ Failed total left knee replacement (HCC) . Comorbidities affecting pt’s functional performance include a significant PMH of acute respiratory insufficiency, anxiety, asthma, chronic pain disorder, HTN, hyperlipidemia, sepsis. Pt with active OT orders and activity orders for Activity beginning POD #0. Pt lives with spouse and son in a single level house with tub/shower w/ tub transfer bench. At baseline, pt was independent with all ADLs/IADLs. Pt completed sit to stand with armrests and supervision. Pt completed functional mobility short in room distance with use of RW due to increased pain. Pt educated on ADLs/IADLs, LE management and transfers for independence at home. Upon evaluation, pt currently requires S for UB ADLs, Jules for LB ADLs, S for toileting, unable to assess for bed mobility, S for functional mobility, and S for transfers 2* the following deficits impacting occupational performance: weakness, decreased strength , decreased balance, decreased activity tolerance, increased pain, and orthopedic restrictions. These impairments, as well at pt’s personal factors of: MATTHEW home environment, steps within home environment, difficulty performing ADLs, difficulty  performing IADLs, difficulty performing transfers/mobility, WBS, fall risk , and new use of AD for functional transfers/mobility limit pt’s ability to safely engage in all baseline areas of occupation. Based on the aforementioned OT evaluation, functional performance deficits, and assessments, pt has been identified as a high complexity evaluation. Pt to continue to benefit from continued acute OT services during hospital stay to address defined deficits and to maximize level of functional independence in the following Occupational Performance areas: grooming, bathing/shower, toilet hygiene, dressing, health maintenance, functional mobility, community mobility, clothing management, cleaning, household maintenance, and job performance/volunteering. From OT standpoint, recommend No post-acute rehabilitation needs upon D/C. OT will continue to follow pt 3-5x/wk.     Rehab Resource Intensity Level, OT: No post-acute rehabilitation needs

## 2024-10-22 NOTE — UTILIZATION REVIEW
NOTIFICATION OF INPATIENT ADMISSION      AUTHORIZATION REQUEST   SERVICING FACILITY:   [facility]  [address], [place]  [address]  Tax ID: [id number]  NPI: [id number]  Phone: [phone] ATTENDING PROVIDER:  Attending Name and NPI#: Joe Cabrera Md [[id number]]  Address: [address] [place]  [address]  Phone: [phone]   ADMISSION INFORMATION:  Place of Service: Inpatient [facility]  Place of Service Code: 21  Inpatient Admission Date/Time: 10/21/24  4:54 PM  Discharge Date/Time: No discharge date for patient encounter.  Admitting Diagnosis Code/Description:  Failed total left knee replacement, initial encounter ([facility]) [T84.093A]     UTILIZATION REVIEW CONTACT:  [place] [facility] Utilization   Network Utilization Review Department  Phone: [phone]  Fax: [phone]  Email: [email or web address]  Contact for approvals/pending authorizations, clinical reviews, and discharge.     PHYSICIAN ADVISORY SERVICES:  Medical Necessity Denial & Lzmn-lp-Xxbm Review  Phone: [phone]  Fax: [phone]  Email: [email or web address]     DISCHARGE SUPPORT TEAM:  For Patients Discharge Needs & Updates  Phone: [phone] opt. 2 Fax: [phone]  Email: [email or web address]

## 2024-10-22 NOTE — PHYSICAL THERAPY NOTE
PT EVALUATION    Pt. Name: Anna Marie Barbosa  Pt. Age: 59 y.o.  MRN: [id number]  LENGTH OF STAY: 1    Patient Active Problem List   Diagnosis    Type 2 diabetes mellitus with hyperglycemia, with long-term current use of insulin (HCC)    Asthma    Opioid dependence with other opioid-induced disorder (HCC)    Diabetic gastroparesis  (HCC)    Essential hypertension    Morbid obesity (HCC)    Thyroid nodule    Sinus tachycardia    Urine retention    Ambulatory dysfunction    Diabetic polyneuropathy associated with type 2 diabetes mellitus (HCC)    Diabetic ulcer of toe of left foot associated with type 2 diabetes mellitus, limited to breakdown of skin (HCC)    Acquired absence of right great toe (HCC)    Failed total left knee replacement ([facility])    Acquired absence of other right toe(s) ([facility])    Chronic pain disorder    Gastroesophageal reflux disease without esophagitis    Other hyperlipidemia    TIA (transient ischemic attack)       Admitting Diagnoses:   Failed total left knee replacement, initial encounter ([facility]) [T84.418B]    Past Medical History:   Diagnosis Date    Acute respiratory insufficiency 11/23/2019    Allergies     Ambulates with cane     Anxiety     Asthma     Chronic narcotic dependence ([facility])     Chronic pain     Chronic pain disorder     back/knee    Diabetes ([facility])     HTN (hypertension)     elevated with electronic device    Hyperlipidemia     Sepsis ([facility]) 11/23/2019    Walker as ambulation aid        Past Surgical History:   Procedure Laterality Date    BACK SURGERY      x 3 with hardware    CERVICAL LAMINECTOMY      CHOLECYSTECTOMY LAPAROSCOPIC N/A 11/10/2018    Procedure: CHOLECYSTECTOMY LAPAROSCOPIC w/ ioc;  Surgeon: Benitez Cuenca MD;  Location: [facility];  Service: General    COLONOSCOPY      HYSTERECTOMY      JOINT REPLACEMENT Left     knee    TOE AMPUTATION Right 02/02/2018    Procedure: AMPUTATION TOE, RIGHT GREAT TOE;  Surgeon: Lino Rodriguez DPM;  Location: [facility];  Service:  Podiatry    TOE AMPUTATION Right     3rd toe       Imaging Studies:  XR knee left 1 or 2 views   Final Result by Tim Pate MD (10/22 1032)      Expected postsurgical appearance of revision of total knee arthroplasty.            Resident: Fabian Rice I, the attending radiologist, have reviewed the images and agree with the final report above.      Workstation performed: [id number]              10/22/24 1031   PT Last Visit   PT Visit Date 10/22/24   Note Type   Note type Evaluation   Pain Assessment   Pain Assessment Tool 0-10   Pain Score 8   Pain Location/Orientation Orientation: Left;Location: Knee   Hospital Pain Intervention(s) Cold applied;Repositioned;Ambulation/increased activity;Elevated;Emotional support;Rest   Restrictions/Precautions   Weight Bearing Precautions Per Order Yes   LLE Weight Bearing Per Order WBAT   Other Precautions WBS;Fall Risk;Pain  (hemovac)   Home Living   Type of Home House   Home Layout One level;Stairs to enter with rails;Performs ADLs on one level;Able to live on main level with bedroom/bathroom  (5 MATTHEW L hand rail)   Bathroom Shower/Tub Tub/shower unit   Bathroom Toilet Standard   Bathroom Equipment Tub transfer bench   Home Equipment Walker;Cane   Additional Comments Above setup is pts. Pt states she will be going to her [employer] at [employer] which has a ramped enterance and first floor setup with all handicap accessible items   Prior Function   Level of [place] Independent with functional mobility;Independent with IADLS;Needs assistance with ADLs  (w/ RW)   Lives With Spouse;Son   Receives Help From Family   IADLs Independent with driving;Independent with meal prep;Independent with medication management   Falls in the last 6 months 1 to 4   Vocational Retired   Comments Pt will be staying at her [employer] at [facility]   General   Family/Caregiver Present Yes   Cognition   Overall Cognitive Status WFL   Arousal/Participation Alert   Orientation Level Oriented X4    Following Commands Follows all commands and directions without difficulty   RUE Assessment   RUE Assessment   (refer to OT)   LUE Assessment   LUE Assessment   (refer to OT)   RLE Assessment   RLE Assessment WFL  (4/5 grossly)   LLE Assessment   LLE Assessment X   Strength LLE   L Knee Flexion 3/5   L Knee Extension 3/5   L Ankle Dorsiflexion 4+/5   L Ankle Plantar Flexion 4+/5   Light Touch   RLE Light Touch Grossly intact   LLE Light Touch Grossly intact   Bed Mobility   Additional Comments Pt greeted OOB in chair.   Transfers   Sit to Stand 5  Supervision   Additional items Armrests;Increased time required;Verbal cues  (w/ RW)   Stand to Sit 5  Supervision   Additional items Armrests;Increased time required;Verbal cues  (w/ RW)   Additional Comments cues for hand placement; inc pain with mobility   Ambulation/Elevation   Gait pattern Improper Weight shift;Antalgic;Decreased foot clearance;Decreased L stance;Short stride;Step to;Excessively slow;Knees flexed   Gait Assistance 5  Supervision   Additional items Verbal cues   Assistive Device Rolling walker   Distance 10'x1   Ambulation/Elevation Additional Comments held on further ambulation 2* significant pain with mobility. RN aware.   Balance   Static Sitting Normal   Dynamic Sitting Good   Static Standing Fair +  (w/ RW)   Dynamic Standing Fair  (w/ RW)   Ambulatory Fair  (w/ RW)   Endurance Deficit   Endurance Deficit Yes   Endurance Deficit Description pain   Activity Tolerance   Activity Tolerance Patient limited by pain   Medical Staff Made Aware OT Effie   Nurse Made Aware RN Padmini   Assessment   Prognosis Good   Problem List Decreased strength;Decreased range of motion;Decreased endurance;Impaired balance;Decreased mobility;Obesity;Pain   Assessment Pt. 59 y.o.female presents for elective surgery. Past medical hx includes acute respiratory insufficiency, asthma, chronic pain disorder, chronic narcotic dependence, diabetes, HTN, HLD, hx of previous toe  ampuations, hx of TIA. Pt admitted for Failed total left knee replacement (HCC) w/ Failed total left knee replacement, initial encounter ([facility]) (T84.190K). S/p L elective TK revision POD #0. Pt referred to PT for functional mobility evaluation & D/C planning w/ orders of up w/ assistance. WBAT LLE. PTA, pt reports being I w/ RW. Personal factors affecting pt at time of IE include: decreased independence in ADLs/IADLs, steps to enter, and use of AD . During evaluation, deficits included dec mobility, balance, ambulation. Required S for sit<>stand, and ambulation. Pt able to ambulate 10' with RW and S in room. Antalgic step to gait with no gross LOB noted. Significant pain with mobility therefore limited distance ambulated this session. RN aware. Pt demonstrated dec endurance and tolerance to activity. Denies reports of dizziness or SOB t/o session. Pt was educated on fall precautions and reinforced w/ good understanding. Pt would benefit from continued PT to address deficits as defined above and maximize level of independence with functional mobility and safety. Based on pt presentation and impaired function, pt would benefit from level III, (minimum resource intensity) at D/C. The patient's AM-PAC Basic Mobility Inpatient Short Form Raw Score is 19. A Raw score of greater than 16 suggests the patient may benefit from discharge to home. Please also refer to the recommendation of the Physical Therapist for safe discharge planning. Nsg staff to continue to mobilized pt (OOB in chair for all meals & ambulate in room/unit) as tolerated to prevent further decline in function. Nsg notified. Co-eval performed to complete this PT evaluation for the pts best interest given pts medical complexity and functional level.   Goals   Patient Goals to have her pain controlled   STG Expiration Date 10/29/24   Short Term Goal #1 1) Inc overall LE strength by 1/2 MMT grade to improve functional mobility; 2) Pt will demonstrate improved  bed mobility with mod I to dec caregiver burden; 3) Pt will demonstrate improved transfers w/ mod I for inc safety; 4) Pt will be able to amb w/ mod I >150' w/ RW for household distances to inc safety and dec caregiver burden; 5) Pt will be able to navigate a ramp with mod I to dec caregiver burden and inc safety with functional mobility; 6) Improve general balance by 1 grade to inc safety; 7) PT for ongoing patient and caregiver education   PT Treatment Day 0   Plan   Treatment/Interventions Functional transfer training;LE strengthening/ROM;Elevations;Therapeutic exercise;Endurance training;Patient/family training;Bed mobility;Gait training;Spoke to nursing;OT   PT Frequency Twice a day  (PRN)   Discharge Recommendation   Rehab Resource Intensity Level, PT III (Minimum Resource Intensity)   Equipment Recommended Walker  (pt owns)   AM-PAC Basic Mobility Inpatient   Turning in Flat Bed Without Bedrails 4   Lying on Back to Sitting on Edge of Flat Bed Without Bedrails 3   Moving Bed to Chair 3   Standing Up From Chair Using Arms 3   Walk in Room 3   Climb 3-5 Stairs With Railing 3   Basic Mobility Inpatient Raw Score 19   Basic Mobility Standardized Score 42.48   [facility] Highest Level Of Mobility   -HLM Goal 6: Walk 10 steps or more   -HLM Achieved 6: Walk 10 steps or more   End of Consult   Patient Position at End of Consult Bedside chair;All needs within reach       Hx/personal factors: co-morbidities, mutliple lines, use of AD, pain, h/o of falls, fall risk, and obesity, coping styles, social background, past experience, behavior pattern  Examination: dec mobility, dec balance, dec endurance, dec amb, risk for falls, pain, assessed body system, balance, endurance, amb, D/C disposition & fall risk, impairements in locomotion, musculoskeletal, balance, endurance, posture, coordination, assessed cognition, impairments in systems including multiple body structures involved; musculoskeletal (ROM, strength,  posture, BMI), neuromuscular (balance,locomotion, gait, transfers, motor control and learning, sensation), joint integrity, integumentary (skin integrity, presence of scars or wounds), cardiopulmonary (vitals, edema); activity limitations (difficulties executing an action); participation restrictions (problems associated w involvement in life situations)  Clinical: unpredictable (ongoing medical status, risk for falls, and pain mgt)  Complexity: high    Misty Fisher, PT

## 2024-10-22 NOTE — QUICK NOTE
"S: Patient with significant opioid dependence history with significant amount of pain this morning. She states its 10 out of 10 in the LLE. Patient expresses trying to rest but not being able to. She endorses long term numbness and tingling but associated to her neuropathy.    O:  Visit Vitals  /78   Pulse 103   Temp 97.9 °F (36.6 °C) (Oral)   Resp 18   Ht 5' 8\" (1.727 m)   Wt 117 kg (257 lb)   SpO2 93%   BMI 39.08 kg/m²   OB Status Hysterectomy   Smoking Status Never   BSA 2.28 m²        Gen: NAD, comfortable   GI: Soft, NT, ND  CV: RRR  Pulm: clear to auscultation  LLE: CDI. Toes warm and pink. 2+ pulses DP and PT. Sensation and motor function intact    A/P: 59 y.o. female POD 1 s/p left TKA revision  - WBAT  - PT/OT  - Pain and nausea control PRN  - D/C HV drain   - DVT ppx  - Patient may benefit from pain management   "

## 2024-10-22 NOTE — ANESTHESIA POSTPROCEDURE EVALUATION
Post-Op Assessment Note    Last Filed PACU Vitals:  Vitals Value Taken Time   Temp 98.7 °F (37.1 °C) 10/21/24 1652   Pulse 93 10/21/24 1754   /73 10/21/24 1740   Resp 16 10/21/24 1754   SpO2 95 % 10/21/24 1754   Vitals shown include unfiled device data.    Modified Guru:  Activity: 2 (10/21/2024  5:40 PM)  Respiration: 1 (10/21/2024  5:40 PM)  Circulation: 2 (10/21/2024  5:40 PM)  Consciousness: 1 (10/21/2024  5:40 PM)  Oxygen Saturation: 1 (10/21/2024  5:40 PM)  Modified Guru Score: 7 (10/21/2024  5:40 PM)

## 2024-10-22 NOTE — OCCUPATIONAL THERAPY NOTE
Occupational Therapy Evaluation     Patient Name: Anna Marie Barbosa  Today's Date: 10/22/2024  Problem List  Principal Problem:    Failed total left knee replacement (HCC)    Past Medical History  Past Medical History:   Diagnosis Date    Acute respiratory insufficiency 11/23/2019    Allergies     Ambulates with cane     Anxiety     Asthma     Chronic narcotic dependence (HCC)     Chronic pain     Chronic pain disorder     back/knee    Diabetes (HCC)     HTN (hypertension)     elevated with electronic device    Hyperlipidemia     Sepsis (HCC) 11/23/2019    Walker as ambulation aid      Past Surgical History  Past Surgical History:   Procedure Laterality Date    BACK SURGERY      x 3 with hardware    CERVICAL LAMINECTOMY      CHOLECYSTECTOMY LAPAROSCOPIC N/A 11/10/2018    Procedure: CHOLECYSTECTOMY LAPAROSCOPIC w/ ioc;  Surgeon: Benitez Cuenca MD;  Location: [facility];  Service: General    COLONOSCOPY      HYSTERECTOMY      JOINT REPLACEMENT Left     knee    TOE AMPUTATION Right 02/02/2018    Procedure: AMPUTATION TOE, RIGHT GREAT TOE;  Surgeon: Lino Rodriguez DPM;  Location: [facility];  Service: Podiatry    TOE AMPUTATION Right     3rd toe           10/22/24 1015   OT Last Visit   OT Visit Date 10/22/24   Note Type   Note type Evaluation   Additional Comments pt greeted OOB in chair   Pain Assessment   Pain Assessment Tool 0-10   Pain Score 8   Pain Location/Orientation Orientation: Right;Location: Knee   Hospital Pain Intervention(s) Repositioned;Ambulation/increased activity;Emotional support;Rest   Restrictions/Precautions   Weight Bearing Precautions Per Order Yes   LLE Weight Bearing Per Order WBAT   Other Precautions WBS;Bed Alarm;Chair Alarm;Multiple lines;Fall Risk;Pain  (Hemovac)   Home Living   Type of Home House   Home Layout One level;Stairs to enter with rails;Performs ADLs on one level;Able to live on main level with bedroom/bathroom  (5 MATTHEW L rail)   Bathroom Shower/Tub Tub/shower unit   Bathroom  Toilet Standard   Bathroom Equipment Tub transfer bench   Bathroom Accessibility Accessible   Home Equipment Walker;Cane   Additional Comments Pt plans to stay at sons house post op with ramp entrance and handicap accessible shower/toilet with grab bars and seat. Above setup is pt's home.   Prior Function   Level of [place] Independent with functional mobility;Independent with IADLS;Needs assistance with ADLs   Lives With Spouse;Son   Receives Help From Family   IADLs Independent with driving;Independent with meal prep;Independent with medication management   Falls in the last 6 months 1 to 4   Vocational Retired   Comments (+) ,   Lifestyle   [place] independent with all ADLs/IADLs, use of RW at baseline   Reciprocal Relationships Spouse/son   Service to Others REtired   Intrinsic Gratification grandchildren, watch tv   General   Family/Caregiver Present Yes   ADL   Where Assessed Chair   Eating Assistance 5  Supervision/Setup   Grooming Assistance 5  Supervision/Setup   UB Bathing Assistance 5  Supervision/Setup   LB Bathing Assistance 4  Minimal Assistance   UB Dressing Assistance 5  Supervision/Setup   LB Dressing Assistance 4  Minimal Assistance   Toileting Assistance  5  Supervision/Setup   Bed Mobility   Supine to Sit Unable to assess   Sit to Supine Unable to assess   Additional Comments pt greeted OOB in chair   Transfers   Sit to Stand 5  Supervision   Additional items Armrests;Increased time required;Verbal cues  (RW)   Stand to Sit 5  Supervision   Additional items Armrests;Increased time required;Verbal cues  (RW)   Additional Comments verbal cues for hand placement, increased pain during functional mobility   Functional Mobility   Functional Mobility 5  Supervision   Additional Comments pt completed functional mobility functional in room distance with use of RW and supervision   Additional items Rolling walker   Balance   Static Sitting Normal   Dynamic Sitting Good   Static Standing Fair +    Dynamic Standing Fair   Ambulatory Fair   Activity Tolerance   Activity Tolerance Patient limited by pain   Medical Staff Made Aware PT Misty, Pt seated OOB in chair with chair alarm activated at end of session. Call bell and phone within reach. All needs met and pt reports no further questions for OT at this time.   Nurse Made Aware DELIA SILVA Assessment   RUE Assessment WFL   LUE Assessment   LUE Assessment WFL   Hand Function   Gross Motor Coordination Functional   Fine Motor Coordination Functional   Cognition   Overall Cognitive Status WFL   Arousal/Participation Alert;Cooperative   Attention Within functional limits   Orientation Level Oriented X4   Memory Within functional limits   Following Commands Follows all commands and directions without difficulty   Comments Cooperative, pt in increased pain, RN aware   Assessment   Limitation Decreased ADL status;Decreased endurance;Decreased self-care trans;Decreased high-level ADLs   Prognosis Good   Assessment Pt is a 59 y.o. female seen for OT evaluation s/p adm to [facility] on 10/21/2024 w/ Failed total left knee replacement (HCC) . Comorbidities affecting pt’s functional performance include a significant PMH of acute respiratory insufficiency, anxiety, asthma, chronic pain disorder, HTN, hyperlipidemia, sepsis. Pt with active OT orders and activity orders for Activity beginning POD #0. Pt lives with spouse and son in a single level house with tub/shower w/ tub transfer bench. At baseline, pt was independent with all ADLs/IADLs. Pt completed sit to stand with armrests and supervision. Pt completed functional mobility short in room distance with use of RW due to increased pain. Pt educated on ADLs/IADLs, LE management and transfers for independence at home. Upon evaluation, pt currently requires S for UB ADLs, Jules for LB ADLs, S for toileting, unable to assess for bed mobility, S for functional mobility, and S for transfers 2* the following deficits  impacting occupational performance: weakness, decreased strength , decreased balance, decreased activity tolerance, increased pain, and orthopedic restrictions. These impairments, as well at pt’s personal factors of: MATTHEW home environment, steps within home environment, difficulty performing ADLs, difficulty performing IADLs, difficulty performing transfers/mobility, WBS, fall risk , and new use of AD for functional transfers/mobility limit pt’s ability to safely engage in all baseline areas of occupation. Based on the aforementioned OT evaluation, functional performance deficits, and assessments, pt has been identified as a high complexity evaluation. Pt to continue to benefit from continued acute OT services during hospital stay to address defined deficits and to maximize level of functional independence in the following Occupational Performance areas: grooming, bathing/shower, toilet hygiene, dressing, health maintenance, functional mobility, community mobility, clothing management, cleaning, household maintenance, and job performance/volunteering. From OT standpoint, recommend No post-acute rehabilitation needs upon D/C. OT will continue to follow pt 3-5x/wk.   Goals   Patient Goals to have pain controlled   STG Time Frame 1-3   Short Term Goal #1 Pt will improve activity tolerance to G for min 30 min treatment sessions for increase engagement in functional tasks   Short Term Goal #2 Pt will complete bed mobility at a mod I level w/ G balance/safety demonstrated to decrease caregiver assistance required   Short Term Goal  Pt will complete LB dressing/self care w/ mod I using adaptive device and DME as needed   LTG Time Frame 3-7   Long Term Goal #1 Pt will complete toileting w/ mod I w/ G hygiene/thoroughness using DME as needed   Long Term Goal #2 Pt will improve functional transfers to mod I on/off all surfaces using DME as needed w/ G balance/safety   Long Term Goal Pt will improve functional mobility during  ADL/IADL/leisure tasks to mod I using DME as needed w/ G balance/safety   Plan   Treatment Interventions ADL retraining;Functional transfer training;Endurance training;Patient/family training;Equipment evaluation/education;Compensatory technique education;Continued evaluation;Energy conservation;Activityengagement   Goal Expiration Date 10/29/24   OT Treatment Day 0   OT Frequency 3-5x/wk  (BID prn)   Discharge Recommendation   Rehab Resource Intensity Level, OT No post-acute rehabilitation needs   Additional Comments  The patient's raw score on the -PAC Daily Activity Inpatient Short Form is 21 . A raw score of greater than or equal to 19 suggests the patient may benefit from discharge to home. Please refer to the recommendation of the Occupational Therapist for safe discharge planning.   AM-PAC Daily Activity Inpatient   Lower Body Dressing 3   Bathing 3   Toileting 3   Upper Body Dressing 4   Grooming 4   Eating 4   Daily Activity Raw Score 21   Daily Activity Standardized Score (Calc for Raw Score >=11) 44.27   AM-PAC Applied Cognition Inpatient   Following a Speech/Presentation 4   Understanding Ordinary Conversation 4   Taking Medications 4   Remembering Where Things Are Placed or Put Away 4   Remembering List of 4-5 Errands 4   Taking Care of Complicated Tasks 4   Applied Cognition Raw Score 24   Applied Cognition Standardized Score 62.21   End of Consult   Education Provided Yes;Family or social support of family present for education by provider   Patient Position at End of Consult Bedside chair;Bed/Chair alarm activated;All needs within reach   Nurse Communication Nurse aware of consult   End of Consult Comments Pt seated OOB in chair with chair alarm activated at end of session. Call bell and phone within reach. All needs met and pt reports no further questions for OT at this time.   Effie Marcelo, OT

## 2024-10-22 NOTE — OP NOTE
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  2 components (CPT: 90345)    Specimens:  ID Type Source Tests Collected by Time Destination   A : Left Knee Synovium #1 Tissue Joint, Left Knee ANAEROBIC CULTURE AND GRAM STAIN, CULTURE, TISSUE AND GRAM STAIN Joe Cabrera MD 10/21/2024 1431    B : Left Knee Synovium #2 Tissue Joint, Left Knee ANAEROBIC CULTURE AND GRAM STAIN, CULTURE, TISSUE AND GRAM STAIN Joe Cabrera MD 10/21/2024 1431    C : Left Knee Synovium #3 Tissue Joint, Left Knee ANAEROBIC CULTURE AND GRAM STAIN, CULTURE, TISSUE AND GRAM STAIN Joe Cabrera MD 10/21/2024 1432        Estimated Blood Loss:   Minimal    Drains:  Closed/Suction Drain Left Knee Accordion 10 Fr. 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  Patient was placed supine on the operating table and all bony prominences were padded.  A left upper thigh tourniquet was placed and the left lower extremity was prepped and draped in the usual sterile fashion.  A timeout was performed to confirm patient, laterality, instrumentation, procedure.  All were in agreement the procedure began.    The limb was elevated for exsanguination and the tourniquet was inflated to 275 mmHg.  Utilizing the previous anterior knee scar, the skin was incised with a #10 scalpel down to the level of the extensor mechanism.  The previous skin scar was excised with a #10 scalpel.  Metzenbaum adriana were then utilized to create full-thickness medial and lateral skin flaps.  A new #10 scalpel was then utilized to perform a medial parapatellar arthrotomy.  We then set about wide synovial debridement.  Electrocautery was utilized to reform the medial and lateral gutters, remove excess scar from the suprapatellar space and reform the retropatellar space.  Once were satisfied with the quality of our initial scar excision, the knee was brought to flexion and the polyethylene liner was easily removed with a straight rigid osteotome.    Attention then turned to the distal femoral component.  We attempted to disrupt the bone-implant interface with a reciprocating saw, but after introducing the reciprocating saw, the femoral component was noted to be grossly loose and was easily removed manually.  The tissue behind the femoral component was severely degraded and fibrotic.  This fibrotic tissue was removed with a combination of Bovie and rongeur down to fresh bleeding bone.  There is noted to be severe osteolysis with near complete obliteration of the distal femoral  anatomy.    Our attention then turned to the proximal tibia.  A combination of reciprocating saw and flexible osteotome was utilized to disrupt the bone-cement interface.  A wide osteotome and mallet then utilized to disimpact the tibial baseplate from the bone with minimal bone loss.  We then set about reaming both the tibial and femoral canals to except 15 mm diameter cemented stems.  The tibial metaphysis was prepared for a central metaphyseal cone.  A trial stemmed baseplate and tibial cone were then inserted.  Freshening cuts were made to the distal femur as needed to accept the trial hinged femoral component.  The femoral metaphysis was also prepared for a central cone.  A trial stemmed femoral component was then inserted.  Trial polyethylene liners were then inserted until an appropriate fit was obtained.  Satisfied with the size and positioning of our components, all trials were removed and the knee was thoroughly irrigated with a combination of dilute Betadine, Irrisept and Biosurg.     The knee was then brought into flexion and cement restrictors were placed to the appropriate depth both the tibial and femoral canals.  The central metaphyseal tibial cone was then impacted into place.  The tibial canal was thoroughly dried, cement was pressurized into the canal followed by implantation of the final tibial component.  The cement was allowed to polymerize.  The central metaphyseal femoral cone was then impacted into place, cement was pressurized into the femoral canal followed by placement of the final femoral component.  Once the cement had polymerized on the femur, a final hinged polyethylene liner was implanted.  The tourniquet was deflated and hemostasis was achieved with electrocautery.  The patella was noted to track well centrally.  A 10 French Hemovac drain was placed in the lateral gutter.    Attention then turned to closure.  The capsule closed with a combination of #1 Vicryl in figure-of-eight  fashion followed by running #1 barbed STRATAFIX suture.  Camper's fascia was approximated with a #1 Vicryl in interrupted fashion, the subdermal tissues were closed with 2-0 Vicryl in interrupted fashion and the skin was closed with 3-0 running barbed Monocryl suture.  A mesh and glue dressing was then applied followed by a sterile Mepilex bandage.  The extremity was wrapped in elastic bandage, anesthesia was discontinued and the patient was taken to the PACU in stable condition.    No qualified resident was available to assist during the case.  Colby Cunningham PA-C was necessary for safe positioning, retraction and closure.  I was present for the entirety of the case.          SIGNATURE: Joe Cabrera MD  DATE: October 21, 2024  TIME: 9:17 PM

## 2024-10-22 NOTE — PLAN OF CARE
Problem: PAIN - ADULT  Goal: Verbalizes/displays adequate comfort level or baseline comfort level  Description: Interventions:  - Encourage patient to monitor pain and request assistance  - Assess pain using appropriate pain scale  - Administer analgesics based on type and severity of pain and evaluate response  - Implement non-pharmacological measures as appropriate and evaluate response  - Consider cultural and social influences on pain and pain management  - Notify physician/advanced practitioner if interventions unsuccessful or patient reports new pain  Outcome: Progressing     Problem: INFECTION - ADULT  Goal: Absence or prevention of progression during hospitalization  Description: INTERVENTIONS:  - Assess and monitor for signs and symptoms of infection  - Monitor lab/diagnostic results  - Monitor all insertion sites, i.e. indwelling lines, tubes, and drains  - Monitor endotracheal if appropriate and nasal secretions for changes in amount and color  - [place] appropriate cooling/warming therapies per order  - Administer medications as ordered  - Instruct and encourage patient and family to use good hand hygiene technique  - Identify and instruct in appropriate isolation precautions for identified infection/condition  Outcome: Progressing  Goal: Absence of fever/infection during neutropenic period  Description: INTERVENTIONS:  - Monitor WBC    Outcome: Progressing     Problem: SAFETY ADULT  Goal: Patient will remain free of falls  Description: INTERVENTIONS:  - Educate patient/family on patient safety including physical limitations  - Instruct patient to call for assistance with activity   - Consult OT/PT to assist with strengthening/mobility   - Keep Call bell within reach  - Keep bed low and locked with side rails adjusted as appropriate  - Keep care items and personal belongings within reach  - Initiate and maintain comfort rounds  - Make Fall Risk Sign visible to staff  - Offer Toileting every 2-4  Hours, in advance of need  - Initiate/Maintain bed and chair alarm  - Obtain necessary fall risk management equipment: nonskid socks   - Apply yellow socks and bracelet for high fall risk patients  - Consider moving patient to room near nurses station  Outcome: Progressing  Goal: Maintain or return to baseline ADL function  Description: INTERVENTIONS:  -  Assess patient's ability to carry out ADLs; assess patient's baseline for ADL function and identify physical deficits which impact ability to perform ADLs (bathing, care of mouth/teeth, toileting, grooming, dressing, etc.)  - Assess/evaluate cause of self-care deficits   - Assess range of motion  - Assess patient's mobility; develop plan if impaired  - Assess patient's need for assistive devices and provide as appropriate  - Encourage maximum independence but intervene and supervise when necessary  - Involve family in performance of ADLs  - Assess for home care needs following discharge   - Consider OT consult to assist with ADL evaluation and planning for discharge  - Provide patient education as appropriate  Outcome: Progressing  Goal: Maintains/Returns to pre admission functional level  Description: INTERVENTIONS:  - Perform AM-PAC 6 Click Basic Mobility/ Daily Activity assessment daily.  - Set and communicate daily mobility goal to care team and patient/family/caregiver.   - Collaborate with rehabilitation services on mobility goals if consulted  - Perform Range of Motion 3 times a day.  - Reposition patient every 3 hours.  - Dangle patient 3 times a day  - Stand patient 3 times a day  - Ambulate patient 3 times a day  - Out of bed to chair 3 times a day   - Out of bed for meals 3 times a day  - Out of bed for toileting  - Record patient progress and toleration of activity level   Outcome: Progressing     Problem: DISCHARGE PLANNING  Goal: Discharge to home or other facility with appropriate resources  Description: INTERVENTIONS:  - Identify barriers to  discharge w/patient and caregiver  - Arrange for needed discharge resources and transportation as appropriate  - Identify discharge learning needs (meds, wound care, etc.)  - Arrange for interpretive services to assist at discharge as needed  - Refer to Case Management Department for coordinating discharge planning if the patient needs post-hospital services based on physician/advanced practitioner order or complex needs related to functional status, cognitive ability, or social support system  Outcome: Progressing     Problem: Knowledge Deficit  Goal: Patient/family/caregiver demonstrates understanding of disease process, treatment plan, medications, and discharge instructions  Description: Complete learning assessment and assess knowledge base.  Interventions:  - Provide teaching at level of understanding  - Provide teaching via preferred learning methods  Outcome: Progressing

## 2024-10-22 NOTE — PROGRESS NOTES
"Progress Note - Surgery-General   Name: Anna Marie Barbosa 59 y.o. female I MRN: [id number]  Unit/Bed#: FAY 2 N -01 I Date of Admission: 10/21/2024   Date of Service: 10/22/2024 I Hospital Day: 1     Assessment & Plan  Failed total left knee replacement (HCC)  59 year old POD 1 s/p left TKA revision. Patient failed PT today. Pain regimen change to MS Contin 75 mg every 12 hours along with MS IR 15 mg every 8 hours and Soma which corresponds to her home chronic pain regimen.     - WBAT on LLE   - PT/OT  - HV removed today   - Will continue to assess for acute blood loss anemia   - Pain and nausea control   - DVT ppx with ASA 81 mg. BID   - DCI and d/c orders in place   - OOB with assistance  - Appreciate PT recs, Level III      24 Hour Events : No acute events    Subjective : Patient states her pain level right now is a 7 out of 10 located in the distal thigh area near  incision site in LLE. She denies N/V. She is currently in bed and able to move toes. Patient with peripheral neuropathy with long term tingling. She denies numbness.       Objective :  Visit Vitals  /73 (BP Location: Left arm)   Pulse 94   Temp 98.9 °F (37.2 °C) (Oral)   Resp 18   Ht 5' 8\" (1.727 m)   Wt 117 kg (257 lb)   SpO2 94%   BMI 39.08 kg/m²   OB Status Hysterectomy   Smoking Status Never   BSA 2.28 m²        Physical Exam  Constitutional:       Appearance: Normal appearance.   Cardiovascular:      Rate and Rhythm: Normal rate and regular rhythm.      Pulses: Normal pulses.      Heart sounds: Normal heart sounds. No murmur heard.     No gallop.   Pulmonary:      Effort: Pulmonary effort is normal.      Breath sounds: Normal breath sounds.   Abdominal:      General: Abdomen is flat. There is no distension.      Palpations: Abdomen is soft. There is no mass.      Tenderness: There is no abdominal tenderness. There is no guarding or rebound.      Hernia: No hernia is present.   Neurological:      General: No focal deficit present.      " Mental Status: She is alert and oriented to person, place, and time.     LLE: Dressings CDI, no surrounding erythema, swelling or drainage. Toes warm and pink. Sensation and motor function intact proximal and distal to incision site. No thigh or calf erythema/pain. 2+ pulses in LLE.      Lab Results: I have reviewed the following results:CBC/BMP:   .     10/22/24  0452   WBC 9.52   HGB 11.7   HCT 35.8      SODIUM 134*   K 4.8      CO2 26   BUN 20   CREATININE 0.74   GLUC 143*        Imaging Results Review: No pertinent imaging studies reviewed.  Other Study Results Review: No additional pertinent studies reviewed.    VTE Pharmacologic Prophylaxis: VTE covered by:    None    ASA 81mg. BID  VTE Mechanical Prophylaxis: sequential compression device

## 2024-10-23 VITALS
SYSTOLIC BLOOD PRESSURE: 112 MMHG | OXYGEN SATURATION: 96 % | HEART RATE: 105 BPM | RESPIRATION RATE: 18 BRPM | HEIGHT: 68 IN | WEIGHT: 257 LBS | BODY MASS INDEX: 38.95 KG/M2 | TEMPERATURE: 98.9 F | DIASTOLIC BLOOD PRESSURE: 52 MMHG

## 2024-10-23 LAB
ANION GAP SERPL CALCULATED.3IONS-SCNC: 4 MMOL/L (ref 4–13)
BUN SERPL-MCNC: 11 MG/DL (ref 5–25)
CALCIUM SERPL-MCNC: 8.2 MG/DL (ref 8.4–10.2)
CHLORIDE SERPL-SCNC: 100 MMOL/L (ref 96–108)
CO2 SERPL-SCNC: 29 MMOL/L (ref 21–32)
CREAT SERPL-MCNC: 0.62 MG/DL (ref 0.6–1.3)
ERYTHROCYTE [DISTWIDTH] IN BLOOD BY AUTOMATED COUNT: 13 % (ref 11.6–15.1)
GFR SERPL CREATININE-BSD FRML MDRD: 98 ML/MIN/1.73SQ M
GLUCOSE SERPL-MCNC: 154 MG/DL (ref 65–140)
HCT VFR BLD AUTO: 32.9 % (ref 34.8–46.1)
HGB BLD-MCNC: 10.7 G/DL (ref 11.5–15.4)
MCH RBC QN AUTO: 27.9 PG (ref 26.8–34.3)
MCHC RBC AUTO-ENTMCNC: 32.5 G/DL (ref 31.4–37.4)
MCV RBC AUTO: 86 FL (ref 82–98)
PLATELET # BLD AUTO: 161 THOUSANDS/UL (ref 149–390)
PMV BLD AUTO: 13.5 FL (ref 8.9–12.7)
POTASSIUM SERPL-SCNC: 4.2 MMOL/L (ref 3.5–5.3)
RBC # BLD AUTO: 3.84 MILLION/UL (ref 3.81–5.12)
SODIUM SERPL-SCNC: 133 MMOL/L (ref 135–147)
WBC # BLD AUTO: 11.6 THOUSAND/UL (ref 4.31–10.16)

## 2024-10-23 PROCEDURE — 85027 COMPLETE CBC AUTOMATED: CPT

## 2024-10-23 PROCEDURE — 80048 BASIC METABOLIC PNL TOTAL CA: CPT

## 2024-10-23 PROCEDURE — 99024 POSTOP FOLLOW-UP VISIT: CPT

## 2024-10-23 RX ORDER — KETOROLAC TROMETHAMINE 10 MG/1
10 TABLET, FILM COATED ORAL EVERY 6 HOURS PRN
Status: DISCONTINUED | OUTPATIENT
Start: 2024-10-23 | End: 2024-10-23

## 2024-10-23 RX ORDER — ALBUTEROL SULFATE 90 UG/1
2 INHALANT RESPIRATORY (INHALATION) EVERY 4 HOURS PRN
Status: DISCONTINUED | OUTPATIENT
Start: 2024-10-23 | End: 2024-10-23 | Stop reason: HOSPADM

## 2024-10-23 RX ORDER — KETOROLAC TROMETHAMINE 30 MG/ML
30 INJECTION, SOLUTION INTRAMUSCULAR; INTRAVENOUS ONCE
Status: COMPLETED | OUTPATIENT
Start: 2024-10-23 | End: 2024-10-23

## 2024-10-23 RX ADMIN — ASPIRIN 81 MG 81 MG: 81 TABLET ORAL at 09:00

## 2024-10-23 RX ADMIN — MORPHINE SULFATE 60 MG: 15 TABLET, EXTENDED RELEASE ORAL at 09:02

## 2024-10-23 RX ADMIN — OXYCODONE HYDROCHLORIDE 10 MG: 10 TABLET ORAL at 09:59

## 2024-10-23 RX ADMIN — ACETAMINOPHEN 325MG 650 MG: 325 TABLET ORAL at 01:58

## 2024-10-23 RX ADMIN — OXYCODONE HYDROCHLORIDE 10 MG: 10 TABLET ORAL at 01:58

## 2024-10-23 RX ADMIN — SODIUM CHLORIDE 1000 ML: 0.9 INJECTION, SOLUTION INTRAVENOUS at 06:29

## 2024-10-23 RX ADMIN — ALBUTEROL SULFATE 2 PUFF: 90 AEROSOL, METERED RESPIRATORY (INHALATION) at 03:44

## 2024-10-23 RX ADMIN — SENNOSIDES 8.6 MG: 8.6 TABLET, FILM COATED ORAL at 09:03

## 2024-10-23 RX ADMIN — KETOROLAC TROMETHAMINE 30 MG: 30 INJECTION, SOLUTION INTRAMUSCULAR at 03:40

## 2024-10-23 RX ADMIN — SODIUM CHLORIDE 1000 ML: 0.9 INJECTION, SOLUTION INTRAVENOUS at 07:56

## 2024-10-23 RX ADMIN — MORPHINE SULFATE 15 MG: 15 TABLET, EXTENDED RELEASE ORAL at 09:01

## 2024-10-23 RX ADMIN — DOCUSATE SODIUM 100 MG: 100 CAPSULE, LIQUID FILLED ORAL at 09:00

## 2024-10-23 RX ADMIN — CARISOPRODOL 350 MG: 350 TABLET ORAL at 09:03

## 2024-10-23 RX ADMIN — OXYCODONE HYDROCHLORIDE 10 MG: 10 TABLET ORAL at 06:06

## 2024-10-23 RX ADMIN — MORPHINE SULFATE 15 MG: 15 TABLET ORAL at 11:54

## 2024-10-23 RX ADMIN — PREGABALIN 200 MG: 75 CAPSULE ORAL at 09:02

## 2024-10-23 RX ADMIN — ACETAMINOPHEN 325MG 650 MG: 325 TABLET ORAL at 09:58

## 2024-10-23 NOTE — QUICK NOTE
"S: Overall, patient feels better this AM. She states her pain level located in the LLE is a 7 out of 10 without any radiation. She denies any nausea, vomiting. Patient did have intermittent fevers overnight but have resolved with Tylenol.    O:  Visit Vitals  /75   Pulse (!) 120   Temp 100.4 °F (38 °C)   Resp 18   Ht 5' 8\" (1.727 m)   Wt 117 kg (257 lb)   SpO2 93%   BMI 39.08 kg/m²   OB Status Hysterectomy   Smoking Status Never   BSA 2.28 m²      Gen: NAD, uncomfortable in bed  GI: soft, NT, ND  CV: RRR  Pulm: Clear to auscultation. Cough with mucus this morning   LLE: toes warm and pink. 2+ pulses. Sensation and motor function intact. No thigh/calf erythema or edema.    Recent Labs     10/22/24  0452 10/23/24  0442   WBC 9.52 11.60*   HGB 11.7 10.7*    161   SODIUM 134* 133*   K 4.8 4.2    100   CO2 26 29   BUN 20 11   CREATININE 0.74 0.62   GLUC 143* 154*   CALCIUM 8.6 8.2*      A/P: 59 y.o. female POD 2 s/p left TKA revision. Patient hypotensive when moving to chair this AM. She is asymptomatic.     - 1L bolus of NS given, continue to recheck vitals   - WBAT on LLE  - HV removed   - Continue to assess for acute blood loss anemia   - Pain and nausea control, pain regimen increased to home dose of 75mg. Morphine.  - Continue to work with PT, if failing, consider short term rehab  - Appreciate PT recs  - DVT ppx  "

## 2024-10-23 NOTE — PLAN OF CARE
Problem: PAIN - ADULT  Goal: Verbalizes/displays adequate comfort level or baseline comfort level  Description: Interventions:  - Encourage patient to monitor pain and request assistance  - Assess pain using appropriate pain scale  - Administer analgesics based on type and severity of pain and evaluate response  - Implement non-pharmacological measures as appropriate and evaluate response  - Consider cultural and social influences on pain and pain management  - Notify physician/advanced practitioner if interventions unsuccessful or patient reports new pain  Outcome: Progressing     Problem: INFECTION - ADULT  Goal: Absence or prevention of progression during hospitalization  Description: INTERVENTIONS:  - Assess and monitor for signs and symptoms of infection  - Monitor lab/diagnostic results  - Monitor all insertion sites, i.e. indwelling lines, tubes, and drains  - Monitor endotracheal if appropriate and nasal secretions for changes in amount and color  - [place] appropriate cooling/warming therapies per order  - Administer medications as ordered  - Instruct and encourage patient and family to use good hand hygiene technique  - Identify and instruct in appropriate isolation precautions for identified infection/condition  Outcome: Progressing  Goal: Absence of fever/infection during neutropenic period  Description: INTERVENTIONS:  - Monitor WBC    Outcome: Progressing     Problem: SAFETY ADULT  Goal: Patient will remain free of falls  Description: INTERVENTIONS:  - Educate patient/family on patient safety including physical limitations  - Instruct patient to call for assistance with activity   - Consult OT/PT to assist with strengthening/mobility   - Keep Call bell within reach  - Keep bed low and locked with side rails adjusted as appropriate  - Keep care items and personal belongings within reach  - Initiate and maintain comfort rounds  - Make Fall Risk Sign visible to staff  - Offer Toileting every 3 Hours,  in advance of need  - Initiate/Maintain bed alarm  - Obtain necessary fall risk management equipment:   - Apply yellow socks and bracelet for high fall risk patients  - Consider moving patient to room near nurses station  Outcome: Progressing  Goal: Maintain or return to baseline ADL function  Description: INTERVENTIONS:  -  Assess patient's ability to carry out ADLs; assess patient's baseline for ADL function and identify physical deficits which impact ability to perform ADLs (bathing, care of mouth/teeth, toileting, grooming, dressing, etc.)  - Assess/evaluate cause of self-care deficits   - Assess range of motion  - Assess patient's mobility; develop plan if impaired  - Assess patient's need for assistive devices and provide as appropriate  - Encourage maximum independence but intervene and supervise when necessary  - Involve family in performance of ADLs  - Assess for home care needs following discharge   - Consider OT consult to assist with ADL evaluation and planning for discharge  - Provide patient education as appropriate  Outcome: Progressing  Goal: Maintains/Returns to pre admission functional level  Description: INTERVENTIONS:  - Perform AM-PAC 6 Click Basic Mobility/ Daily Activity assessment daily.  - Set and communicate daily mobility goal to care team and patient/family/caregiver.   - Collaborate with rehabilitation services on mobility goals if consulted  - Perform Range of Motion 3 times a day.  - Reposition patient every 3 hours.  - Dangle patient 3 times a day  - Stand patient 3 times a day  - Ambulate patient 3 times a day  - Out of bed to chair 3 times a day   - Out of bed for meals 3 times a day  - Out of bed for toileting  - Record patient progress and toleration of activity level   Outcome: Progressing     Problem: DISCHARGE PLANNING  Goal: Discharge to home or other facility with appropriate resources  Description: INTERVENTIONS:  - Identify barriers to discharge w/patient and caregiver  -  Arrange for needed discharge resources and transportation as appropriate  - Identify discharge learning needs (meds, wound care, etc.)  - Arrange for interpretive services to assist at discharge as needed  - Refer to Case Management Department for coordinating discharge planning if the patient needs post-hospital services based on physician/advanced practitioner order or complex needs related to functional status, cognitive ability, or social support system  Outcome: Progressing

## 2024-10-23 NOTE — PLAN OF CARE
Problem: PAIN - ADULT  Goal: Verbalizes/displays adequate comfort level or baseline comfort level  Description: Interventions:  - Encourage patient to monitor pain and request assistance  - Assess pain using appropriate pain scale  - Administer analgesics based on type and severity of pain and evaluate response  - Implement non-pharmacological measures as appropriate and evaluate response  - Consider cultural and social influences on pain and pain management  - Notify physician/advanced practitioner if interventions unsuccessful or patient reports new pain  Outcome: Not Progressing     Problem: INFECTION - ADULT  Goal: Absence or prevention of progression during hospitalization  Description: INTERVENTIONS:  - Assess and monitor for signs and symptoms of infection  - Monitor lab/diagnostic results  - Monitor all insertion sites, i.e. indwelling lines, tubes, and drains  - Monitor endotracheal if appropriate and nasal secretions for changes in amount and color  - [place] appropriate cooling/warming therapies per order  - Administer medications as ordered  - Instruct and encourage patient and family to use good hand hygiene technique  - Identify and instruct in appropriate isolation precautions for identified infection/condition  Outcome: Not Progressing  Goal: Absence of fever/infection during neutropenic period  Description: INTERVENTIONS:  - Monitor WBC    Outcome: Not Progressing     Problem: SAFETY ADULT  Goal: Patient will remain free of falls  Description: INTERVENTIONS:  - Educate patient/family on patient safety including physical limitations  - Instruct patient to call for assistance with activity   - Consult OT/PT to assist with strengthening/mobility   - Keep Call bell within reach  - Keep bed low and locked with side rails adjusted as appropriate  - Keep care items and personal belongings within reach  - Initiate and maintain comfort rounds  - Make Fall Risk Sign visible to staff  - Offer Toileting  every 2-4 Hours, in advance of need  - Initiate/Maintain bed and chair alarm  - Obtain necessary fall risk management equipment: nonskid socks   - Apply yellow socks and bracelet for high fall risk patients  - Consider moving patient to room near nurses station  Outcome: Not Progressing  Goal: Maintain or return to baseline ADL function  Description: INTERVENTIONS:  -  Assess patient's ability to carry out ADLs; assess patient's baseline for ADL function and identify physical deficits which impact ability to perform ADLs (bathing, care of mouth/teeth, toileting, grooming, dressing, etc.)  - Assess/evaluate cause of self-care deficits   - Assess range of motion  - Assess patient's mobility; develop plan if impaired  - Assess patient's need for assistive devices and provide as appropriate  - Encourage maximum independence but intervene and supervise when necessary  - Involve family in performance of ADLs  - Assess for home care needs following discharge   - Consider OT consult to assist with ADL evaluation and planning for discharge  - Provide patient education as appropriate  Outcome: Not Progressing  Goal: Maintains/Returns to pre admission functional level  Description: INTERVENTIONS:  - Perform AM-PAC 6 Click Basic Mobility/ Daily Activity assessment daily.  - Set and communicate daily mobility goal to care team and patient/family/caregiver.   - Collaborate with rehabilitation services on mobility goals if consulted  - Perform Range of Motion 3 times a day.  - Reposition patient every 3 hours.  - Dangle patient 3 times a day  - Stand patient 3 times a day  - Ambulate patient 3 times a day  - Out of bed to chair 3 times a day   - Out of bed for meals 3 times a day  - Out of bed for toileting  - Record patient progress and toleration of activity level   Outcome: Not Progressing     Problem: DISCHARGE PLANNING  Goal: Discharge to home or other facility with appropriate resources  Description: INTERVENTIONS:  -  Identify barriers to discharge w/patient and caregiver  - Arrange for needed discharge resources and transportation as appropriate  - Identify discharge learning needs (meds, wound care, etc.)  - Arrange for interpretive services to assist at discharge as needed  - Refer to Case Management Department for coordinating discharge planning if the patient needs post-hospital services based on physician/advanced practitioner order or complex needs related to functional status, cognitive ability, or social support system  Outcome: Not Progressing     Problem: Knowledge Deficit  Goal: Patient/family/caregiver demonstrates understanding of disease process, treatment plan, medications, and discharge instructions  Description: Complete learning assessment and assess knowledge base.  Interventions:  - Provide teaching at level of understanding  - Provide teaching via preferred learning methods  Outcome: Not Progressing

## 2024-10-23 NOTE — ASSESSMENT & PLAN NOTE
POD2 s/p left revision total knee arthroplasty with Dr. Cabrera  [facility]  PT/OT   Recheck vitals for improvements in hypotension  Multi modal pain regime  Continue to assess for acute blood loss anemia   Aspirin 81 mg twice daily for DVT prophylaxis.  Ok for discharge from an orthopedic standpoint

## 2024-10-23 NOTE — PROGRESS NOTES
Progress Note - Orthopedics   Name: Anna Marie Barbosa 59 y.o. female I MRN: [id number]  Unit/Bed#: WE 2 N -01 I Date of Admission: 10/21/2024   Date of Service: 10/23/2024 I Hospital Day: 2     Assessment & Plan  Failed total left knee replacement (HCC)  POD2 s/p left revision total knee arthroplasty with Dr. Cabrera  [facility]  PT/OT   Recheck vitals for improvements in hypotension  Multi modal pain regime  Continue to assess for acute blood loss anemia   Aspirin 81 mg twice daily for DVT prophylaxis.  Ok for discharge from an orthopedic standpoint      Subjective   59 y.o.female  No acute events, no new complaints. Pain well controlled. Denies fevers, chills, CP, SOB, N/V, numbness or tingling. Patient reports no issues with urination or bowel movements. Patient states she is eager to go home today and her baseline pain is improving. She plans to wean off of her morphine at home since her knee pain is doing better.     Objective :  Temp:  [98.7 °F (37.1 °C)-100.6 °F (38.1 °C)] 98.9 °F (37.2 °C)  HR:  [] 101  BP: ()/(50-78) 79/50  Resp:  [18-19] 18  SpO2:  [88 %-94 %] 91 %  O2 Device: None (Room air)    Physical Exam  Musculoskeletal: leftlower  No erythema or ecchymosis.  Dressing clean dry and intact  HV drain removed   TTP at vivian-incisional area  Motor intact anterior interosseous nerve/posterior interosseous nerve/median/radial/ulnar nerve distributions  Motor intact to +FHL/EHL, +ankle dorsi/plantar flexion        Lab Results: I have reviewed the following results:  Recent Labs     10/22/24  0452 10/23/24  0442   WBC 9.52 11.60*   HGB 11.7 10.7*   HCT 35.8 32.9*    161   BUN 20 11   CREATININE 0.74 0.62     Blood Culture:    Lab Results   Component Value Date    BLOODCX No Growth After 5 Days. 09/07/2023    BLOODCX No Growth After 5 Days. 09/07/2023     Wound Culture:   Lab Results   Component Value Date    WOUNDCULT 3+ Growth of Staphylococcus aureus (A) 09/07/2023    WOUNDCULT 1+  Growth of Beta Hemolytic Streptococcus Group B (A) 09/07/2023    WOUNDCULT 1+ Growth of 09/07/2023

## 2024-10-24 ENCOUNTER — TRANSITIONAL CARE MANAGEMENT (OUTPATIENT)
Dept: FAMILY MEDICINE CLINIC | Facility: CLINIC | Age: 59
End: 2024-10-24

## 2024-10-24 ENCOUNTER — TELEPHONE (OUTPATIENT)
Dept: OBGYN CLINIC | Facility: HOSPITAL | Age: 59
End: 2024-10-24

## 2024-10-24 ENCOUNTER — TELEPHONE (OUTPATIENT)
Dept: FAMILY MEDICINE CLINIC | Facility: CLINIC | Age: 59
End: 2024-10-24

## 2024-10-24 LAB
BACTERIA SPEC ANAEROBE CULT: NO GROWTH
BACTERIA SPEC ANAEROBE CULT: NO GROWTH
BACTERIA SPEC ANAEROBE CULT: NORMAL
BACTERIA TISS AEROBE CULT: NO GROWTH
GRAM STN SPEC: NORMAL

## 2024-10-24 NOTE — TELEPHONE ENCOUNTER
----- Message from Magdalena Grove MD sent at 10/21/2024 12:22 PM EDT -----  Positive cologuard; referral to GI placed for colonoscopy

## 2024-10-25 NOTE — TELEPHONE ENCOUNTER
Patient contacted for a postoperative follow up assessment. Patient states current pain level of a  7-8/10  and is walking with RW.  Patient denies increase in swelling and dressing is Dressing C/D/I Patient is icing the site regularly. NN educated patient on post-op bruising, swelling, icing, and constipation.    Reviewed the following AVS instructions:   You may start showering 5 days after surgery. When drying off, please pat the dressing dry. If you notice the dressing appears saturated or is starting to come off, please overwrap with dry dressing.    Confirmed upcoming appointments w/ patient:   PT: N/A, surgeon preference. Instructed on the importance of frequent ambulation and home exercises.   Postop 11/5     We reviewed patients AVS medication list. Patient is taking Tylenol 1000mg every 8 hours, Oxycodone 5mg every 4 hours, ASA 81mg BID, Celebrex 200mg BID, and resumed Morphine (taking prior to surgery). Patient has not had a BM and is not taking a stool softener. Educated on postop constipation, increasing fluids/fiber, prunes/prune juice. Instructed pt to start Colace or Miralax into regimen, pt agreeable to start.      Patient denies nausea, vomiting, abdominal pain, chest pain, shortness of breath, fever, dizziness, and calf pain. Patient does not have any other questions or concerns at this time. Pt was encouraged to call with any questions, concerns or issues.

## 2024-10-28 ENCOUNTER — TELEMEDICINE (OUTPATIENT)
Dept: FAMILY MEDICINE CLINIC | Facility: CLINIC | Age: 59
End: 2024-10-28
Payer: COMMERCIAL

## 2024-10-28 ENCOUNTER — TELEPHONE (OUTPATIENT)
Dept: FAMILY MEDICINE CLINIC | Facility: CLINIC | Age: 59
End: 2024-10-28

## 2024-10-28 DIAGNOSIS — Z09 HOSPITAL DISCHARGE FOLLOW-UP: Primary | ICD-10-CM

## 2024-10-28 PROCEDURE — 99495 TRANSJ CARE MGMT MOD F2F 14D: CPT | Performed by: FAMILY MEDICINE

## 2024-10-28 NOTE — PROGRESS NOTES
Virtual TCM Visit:    Verification of patient location:    Patient is located at Home in the following state in which I hold an active license [place]    Assessment & Plan  Hospital discharge follow-up  Patient hospitalized from 10/21 - 10/23 for left total knee arthroplasty revision after failed total knee replacement. Continue with home exercises and pain medications   Follow up with Orthopedic surgery as scheduled 11/5            Encounter provider Magdalena Grove MD     Provider located at [address]  [place] [address] [phone]    After connecting through [place], the patient was identified by name and date of birth. Anna Marie Barbosa was informed that this is a telemedicine visit and that the visit is being conducted through the Epic Embedded platform. She agrees to proceed..  My office door was closed. No one else was in the room.  She acknowledged consent and understanding of privacy and security of the video platform. The patient has agreed to participate and understands they can discontinue the visit at any time.    Patient is aware this is a billable service.    History of Present Illness     Transitional Care Management Review:   Anna Marie Barbosa is a 59 y.o. female here for TCM follow up.    During the TCM phone call patient stated:  TCM Call       Date and time call was made  10/24/2024  1:08 PM    Hospital care reviewed  Records reviewed    Patient was hospitialized at  Other (comment)    Date of Admission  10/21/24    Date of discharge  10/23/24    Diagnosis  Failed total left knee replacement (HCC)    Disposition  Home    Were the patients medications reviewed and updated  Yes    Current Symptoms  Leg pain - left side    Left side leg pain severity  Moderat    Leg pain, left side, onset  Ongoing; Progressive; Gradual          TCM Call       Post hospital issues  Reduced activity; Poor ADL (Activities of Daily Living) performance     Should patient be enrolled in anticoag monitoring?  No    Scheduled for follow up?  Yes    Did you obtain your prescribed medications  Yes    Do you need help managing your prescriptions or medications  No    Is transportation to your appointment needed  Yes    Specify why  Pt had surgery on her knee and is unable to drive at the moment    I have advised the patient to call PCP with any new or worsening symptoms  Natasha Harris MA    Living Arrangements  Family members; Children    Support System  Family; Children    The type of support provided  Emotional; Physical    Do you have social support  Yes, as much as I need    Are you recieving any outpatient services  No    Are you recieving home care services  No    Are you using any community resources  No    Current waiver services  No    Have you fallen in the last 12 months  No    Interperter language line needed  No    Counseling  Patient    Comments  TCM-Failed total left knee replacement (HCC)          BENITA Barbosa is a very pleasant 59 year old female who presents today for a transition of care management appointment after being hospitalized from 10/21/24 - 10/23/2024 left total knee arthroplasty revision after failed total knee replacement. Since being discharged patient is in a lot of pain and is also experiencing a lot of left thigh pain as well. She has been trying to do home exercises as well and will have a follow up with Orthopedics 11/5. She endorses no other complaints at this time.     Review of Systems   Constitutional: Negative.    HENT: Negative.     Eyes: Negative.    Respiratory: Negative.     Cardiovascular: Negative.    Gastrointestinal: Negative.    Genitourinary: Negative.    Musculoskeletal:  Positive for arthralgias.   Skin: Negative.    Neurological: Negative.    Psychiatric/Behavioral: Negative.       Objective     There were no vitals taken for this visit.    Physical Exam  Constitutional:       General: She is not in acute  distress.     Appearance: She is not ill-appearing.   HENT:      Head: Normocephalic and atraumatic.   Pulmonary:      Effort: No respiratory distress.   Neurological:      Mental Status: She is alert.   Psychiatric:         Mood and Affect: Mood normal.         Behavior: Behavior normal.       Medications have been reviewed by provider in current encounter  Administrative Statements   I have spent a total time of 20 minutes in caring for this patient on the day of the visit/encounter including Reviewing / ordering tests, medicine, procedures   and Obtaining or reviewing history  .    Magdalena Grove MD      VIRTUAL VISIT DISCLAIMER    Anna Marie Familia verbally agrees to participate in Virtual Care Services. Pt is aware that Virtual Care Services could be limited without vital signs or the ability to perform a full hands-on physical exam. Anna Marie Barbosa understands she or the provider may request at any time to terminate the video visit and request the patient to seek care or treatment in person.

## 2024-10-31 ENCOUNTER — TELEPHONE (OUTPATIENT)
Dept: FAMILY MEDICINE CLINIC | Facility: CLINIC | Age: 59
End: 2024-10-31

## 2024-11-05 ENCOUNTER — OFFICE VISIT (OUTPATIENT)
Dept: OBGYN CLINIC | Facility: CLINIC | Age: 59
End: 2024-11-05
Payer: COMMERCIAL

## 2024-11-05 VITALS
SYSTOLIC BLOOD PRESSURE: 106 MMHG | WEIGHT: 257 LBS | DIASTOLIC BLOOD PRESSURE: 71 MMHG | HEART RATE: 103 BPM | BODY MASS INDEX: 38.95 KG/M2 | HEIGHT: 68 IN

## 2024-11-05 DIAGNOSIS — M17.11 PRIMARY OSTEOARTHRITIS OF RIGHT KNEE: Primary | ICD-10-CM

## 2024-11-05 DIAGNOSIS — Z96.652 S/P REVISION OF TOTAL KNEE, LEFT: ICD-10-CM

## 2024-11-05 DIAGNOSIS — T14.8XXA JOINT INJURY: ICD-10-CM

## 2024-11-05 PROCEDURE — 99215 OFFICE O/P EST HI 40 MIN: CPT | Performed by: STUDENT IN AN ORGANIZED HEALTH CARE EDUCATION/TRAINING PROGRAM

## 2024-11-05 RX ORDER — CHLORHEXIDINE GLUCONATE ORAL RINSE 1.2 MG/ML
15 SOLUTION DENTAL ONCE
OUTPATIENT
Start: 2024-11-05 | End: 2024-11-05

## 2024-11-05 RX ORDER — MULTIVIT-MIN/IRON FUM/FOLIC AC 7.5 MG-4
1 TABLET ORAL DAILY
Qty: 30 TABLET | Refills: 0 | Status: SHIPPED | OUTPATIENT
Start: 2024-11-05 | End: 2024-12-05

## 2024-11-05 RX ORDER — FERROUS SULFATE 324(65)MG
324 TABLET, DELAYED RELEASE (ENTERIC COATED) ORAL EVERY OTHER DAY
Qty: 15 TABLET | Refills: 0 | Status: SHIPPED | OUTPATIENT
Start: 2024-11-05 | End: 2024-12-05

## 2024-11-05 RX ORDER — FOLIC ACID 1 MG/1
1 TABLET ORAL DAILY
Qty: 30 TABLET | Refills: 0 | Status: SHIPPED | OUTPATIENT
Start: 2024-11-05 | End: 2024-12-05

## 2024-11-05 RX ORDER — MUPIROCIN 20 MG/G
OINTMENT TOPICAL 3 TIMES DAILY
Qty: 22 G | Refills: 0 | Status: SHIPPED | OUTPATIENT
Start: 2024-11-05

## 2024-11-05 RX ORDER — CHLORHEXIDINE GLUCONATE 40 MG/ML
SOLUTION TOPICAL DAILY PRN
OUTPATIENT
Start: 2024-11-05

## 2024-11-05 RX ORDER — ACETAMINOPHEN 325 MG/1
975 TABLET ORAL ONCE
OUTPATIENT
Start: 2024-11-05 | End: 2024-11-05

## 2024-11-05 RX ORDER — ASCORBIC ACID 500 MG
500 TABLET ORAL 2 TIMES DAILY
Qty: 60 TABLET | Refills: 0 | Status: SHIPPED | OUTPATIENT
Start: 2024-11-05 | End: 2024-12-05

## 2024-11-05 NOTE — PROGRESS NOTES
Date: 24  Anna Marie Barbosa   MRN# [id number]  : 1965      Chief Complaint: Post op left revision total knee arthroplasty    Assessment and Plan:        Joe Cabrera MD  Adult Reconstruction Specialist   [facility]  Office Phone: [phone]      Surgery: Left revision total Knee Arthroplasty  DOS: 10/21/24      Weight-bearing as tolerated. Wean off of assistive devices as tolerated   Continue with HEP  To prevent postoperative knee stiffness, it is essential to to continue to work on knee flexion, with the goal of re-establishing full range of motion  Continue Aspirin for blood clot prevention. This is taken for a total of 6 weeks and refill is not necessary.   Utilize tylenol and celebrex for pain control as needed  May shower do not soak or submerge incision  Compression stocking (Thigh high, 20-30mm Hg) to be worn at all times while awake. This will assist with swelling control.  Beginning at week 4, scar massage may begin. Take a pea sized amount of lotion, any lotion you have around the house and massage into scar for 5 minutes each day. This will minimize appears, mobilize the skin to improve sensitivity and break apart scar tissue  Follow up in 4 weeks. New xrays will be obtained      Right knee osteoarthritis       Patient is a Middle-Aged (Approx 40-64yo) female with functionally limiting knee pain with short distances, no functional instability, no mechanical symptoms  and identified modifiable risk factors.  Radiographic evaluation demonstrates severe degenerative changes in all compartments. Physical exam reveals neutral alignment and full range of motion. Given these findings, I recommend:    Right Total Knee Arthroplasty     - WBAT  -  has failed conservative treatment including anti-inflammatories, activity modification, PT/HEP, CSI injections  - Consent signed. All questions and concerns addressed  - F/u in 2 weeks post op    TOTAL KNEE REPLACEMENT  INDICATIONS AND RISKS    We had a lengthy discussion with the patient regarding the potential options for treatment. The patient has had an extensive conservative management course up until this time and therefore I do not feel that any additional conservative management will provide additional relief. The patient's symptoms have progressively worsened to the point where they now limit the patient's daily activities and quality of life.     At this time, I feel that this patient would be an excellent candidate for a total knee replacement. The patient has failed non-operative care and continues to have unacceptable symptoms. We discussed the treatment options and alternatives and the risks and benefits of surgery in great detail.    While no guarantees can be made, total knee replacement has a very high success rate in terms of relieving a patient's knee pain and returning them to a more active, independent lifestyle for 10-15 years or more. All surgery carries some risk; for knee replacement, the complication rate is low but may include: death (very rare), infection, bleeding requiring transfusion, blood clots in legs traveling to lungs, nerve and/or blood vessel damage, bone fracture, persistent knee pain and/or stiffness, and repeat surgery(ies). The risk of a major complication is about 1-2 per 1000 cases. Total knee replacement should only be done if conservative treatment has failed. The revision rate for total knee replacements is about 1-2% per year; in other words, 85-95% of knee replacements may last 10 years; 75-85% last 20 years; and so on, assuming no injury to the knee. Finally we discussed anesthesia related complications which will be discussed in greater detail with the anesthesia team before surgery.     The patients current BMI is Body mass index is 39.08 kg/m². .Patient was counseled about the importance of weight loss prior to surgery, IF BMI is >35 prior to surgery my recommendation is that the  patient considers nutritional consultation and further weight loss prior to surgical management. These recommendations were discussed with the patient.    If the patient is a smoker, I discussed the importance of quitting smoking to decrease risk of infection postoperatively and promote good wound healing.     The patient was shown total knee booklets, diagrams and/or models and all of their questions have been answered at the present time. The patient may call or come in if they have any other concerns or questions. The patient also understands the post-operative rehabilitation process and the need for their cooperation and participation, and that their results may be compromised by their lack of compliance. The patient would like to proceed. Patient is encouraged to seek additional opinions if they so desire. The patient voiced their understanding of the surgical plan and potential complications and wishes to proceed with surgery.           Subjective:   Patient is 2 weeks s/p left revision total knee arthroplasty. The patient states she is doing well. She states her pain is well controlled. She has been compliant with Aspirin. She has been taking Oxycodone and Morphine. She was on Morphine prior to surgery. The patient would like to discuss right total knee arthroplasty.     Date of procedure: 10/21/24  Doing well, no new problems  Pain progressively improving  Improved swelling  Ambulating with walker    Allergy:  Allergies   Allergen Reactions    Ozempic (0.25 Or 0.5 Mg-Dose) [Semaglutide(0.25 Or 0.5mg-Dos)] Diarrhea and GI Intolerance    Nsaids GI Intolerance     Medications:  all current active meds have been reviewed    Past Medical History:  Past Medical History:   Diagnosis Date    Acute respiratory insufficiency 11/23/2019    Allergies     Ambulates with cane     Anxiety     Asthma     Chronic narcotic dependence (HCC)     Chronic pain     Chronic pain disorder     back/knee    Diabetes (HCC)     HTN  (hypertension)     elevated with electronic device    Hyperlipidemia     Sepsis (HCC) 11/23/2019    Walker as ambulation aid      Past Surgical History:  Past Surgical History:   Procedure Laterality Date    BACK SURGERY      x 3 with hardware    CERVICAL LAMINECTOMY      CHOLECYSTECTOMY LAPAROSCOPIC N/A 11/10/2018    Procedure: CHOLECYSTECTOMY LAPAROSCOPIC w/ ioc;  Surgeon: Benitez Cuenca MD;  Location: [facility];  Service: General    COLONOSCOPY      HYSTERECTOMY      JOINT REPLACEMENT Left     knee    [place] REVJ TOT KNEE ARTHRP FEM&ENTIRE TIBIAL COMPONE Left 10/21/2024    Procedure: ARTHROPLASTY LEFT KNEE TOTAL REVISION,  2 components;  Surgeon: Joe Cabrera MD;  Location:  MAIN OR;  Service: Orthopedics    TOE AMPUTATION Right 02/02/2018    Procedure: AMPUTATION TOE, RIGHT GREAT TOE;  Surgeon: Lino Rodriguez DPM;  Location: [facility];  Service: Podiatry    TOE AMPUTATION Right     3rd toe     Family History:  Family History   Problem Relation Age of Onset    Diabetes Mother     Diabetes Maternal Grandmother     No Known Problems Father      Social History:  Social History     Substance and Sexual Activity   Alcohol Use Not Currently    Comment: special occasion     Social History     Substance and Sexual Activity   Drug Use Yes    Types: Morphine    Comment: prescribed 75mg bid     Social History     Tobacco Use   Smoking Status Never   Smokeless Tobacco Never           Review of Systems:  General- denies fever/chills  HEENT- denies hearing loss or sore throat  Eyes- denies eye pain or visual disturbances, denies red eyes  Respiratory- denies cough or SOB  Cardio- denies chest pain or palpitations  GI- denies abdominal pain  Endocrine- denies urinary frequency  Urinary- denies pain with urination  Musculoskeletal- Negative except noted above  Skin- denies rashes or wounds  Neurological- denies dizziness or headache  Psychiatric- denies anxiety or difficulty concentrating      Objective:   BP Readings  "from Last 1 Encounters:   11/05/24 106/71      Wt Readings from Last 1 Encounters:   11/05/24 117 kg (257 lb)      Pulse Readings from Last 1 Encounters:   11/05/24 103        BMI: Estimated body mass index is 39.08 kg/m² as calculated from the following:    Height as of this encounter: 5' 8\" (1.727 m).    Weight as of this encounter: 117 kg (257 lb).      Physical Exam  /71   Pulse 103   Ht 5' 8\" (1.727 m)   Wt 117 kg (257 lb)   BMI 39.08 kg/m²   General/Constitutional: No apparent distress: well-nourished and well developed.  Eyes: normal ocular motion  Cardio: RRR, Normal S1S2, No m/r/g.   Lymphatic: No appreciable lymphadenopathy  Respiratory: Non-labored breathing, CTA b/l no w/c/r  Vascular: No edema, swelling or tenderness, except as noted in detailed exam. Extremities well perfused. No LE edema  Integumentary: No impressive skin lesions present, except as noted in detailed exam.  Neuro: No ataxia or tremors noted  Psych: Normal mood and affect, oriented to person, place and time. Appropriate affect.  Musculoskeletal: Normal, except as noted in detailed exam and in HPI.    Gait and Station:   antalgic    left Knee Examination  Incision: clean, dry, and intact  Effusion: mild  Alignment: normal  AP Stability: stable  Coronal Stability  Extension:stable  Mid-flexion: stable  90 degrees flexion: stable  Range of motion  Active: 0 to 90    Extensor lag: Absent  Motor: 5/5 EHL/FHL/TA/GS/QD/HS  Neurovascular exam is intact.   No evidence of calf tightness or posterior cords    Right Knee Examination  Incision: Clean, dry and intact  Effusion: mild  Alignment: Valgus  AP Stability: stable  Coronal Stability  Extension:stable  Mid-flexion: Unstable to varus stress  90 degrees flexion: stable  Range of motion  Active: 0 to 110 with pain  Extensor lag: Absent  Motor: 5/5 EHL/FHL/TA/GS/QD/HS  Neurovascular exam is intact.   No evidence of calf tightness or posterior cords    Images:    Previous radiographs of " the right knee were reviewed today and demonstrate end-stage osteoarthritis with bone-on-bone articulation, severe valgus deformity, subchondral sclerosis, osteophyte formation and subchondral cysts    Scribe Attestation      I,:  Mary Godinez MA am acting as a scribe while in the presence of the attending physician.:       I,:  Joe Cabrera MD personally performed the services described in this documentation    as scribed in my presence.:               Joe Cabrera MD  Adult Reconstruction Specialist   [facility]

## 2024-11-08 ENCOUNTER — TELEPHONE (OUTPATIENT)
Age: 59
End: 2024-11-08

## 2024-11-08 NOTE — TELEPHONE ENCOUNTER
Caller: Maxine/Neurolog    Doctor: Rick    Reason for call: Patient is in the office. Please fax clearance form to [phone] attn:Maxine    Call back#: [phone]

## 2024-11-14 ENCOUNTER — TELEPHONE (OUTPATIENT)
Age: 59
End: 2024-11-14

## 2024-11-14 DIAGNOSIS — R26.2 AMBULATORY DYSFUNCTION: ICD-10-CM

## 2024-11-14 DIAGNOSIS — Z98.890 STATUS POST LEFT KNEE SURGERY: Primary | ICD-10-CM

## 2024-11-14 DIAGNOSIS — Z96.652 STATUS POST REVISION OF TOTAL KNEE REPLACEMENT, LEFT: ICD-10-CM

## 2024-11-14 NOTE — TELEPHONE ENCOUNTER
Pt called and is waiting for a referral to Physical Therapy that was supposed to be sent on 11/7 for her knee.    Please advise and f/u with pt as necessary.

## 2024-11-14 NOTE — TELEPHONE ENCOUNTER
Spoke with patient and told her PT order will be placed and they will reach out to her to schedule.

## 2024-11-18 NOTE — TELEPHONE ENCOUNTER
Patient called, request status of call from Physical Therapy. States referred Physical Therapist has not called. Upon chart review/ messages, confirmed previous message regarding call from therapy. Patient request a callback regarding update.Please advise Patient at [phone], if any further questions.

## 2024-11-21 ENCOUNTER — EVALUATION (OUTPATIENT)
Dept: PHYSICAL THERAPY | Facility: CLINIC | Age: 59
End: 2024-11-21
Payer: COMMERCIAL

## 2024-11-21 DIAGNOSIS — G89.29 OTHER CHRONIC PAIN: ICD-10-CM

## 2024-11-21 DIAGNOSIS — G89.29 CHRONIC PAIN OF LEFT KNEE: Primary | ICD-10-CM

## 2024-11-21 DIAGNOSIS — M25.561 CHRONIC PAIN OF RIGHT KNEE: ICD-10-CM

## 2024-11-21 DIAGNOSIS — J45.40 MODERATE PERSISTENT ASTHMA WITHOUT COMPLICATION: ICD-10-CM

## 2024-11-21 DIAGNOSIS — M25.562 CHRONIC PAIN OF LEFT KNEE: Primary | ICD-10-CM

## 2024-11-21 DIAGNOSIS — G89.29 CHRONIC PAIN OF RIGHT KNEE: ICD-10-CM

## 2024-11-21 DIAGNOSIS — M25.661 KNEE STIFF, RIGHT: ICD-10-CM

## 2024-11-21 DIAGNOSIS — M25.662 KNEE STIFF, LEFT: ICD-10-CM

## 2024-11-21 PROCEDURE — 97110 THERAPEUTIC EXERCISES: CPT

## 2024-11-21 PROCEDURE — 97161 PT EVAL LOW COMPLEX 20 MIN: CPT

## 2024-11-21 NOTE — PROGRESS NOTES
PT Evaluation     Today's date: 2024  Patient name: Anna Marie Barbosa  : 1965  MRN: [id number]  Referring provider: Magdalena Grove MD  Dx:   Encounter Diagnosis     ICD-10-CM    1. Chronic pain of left knee  M25.562     G89.29       2. Knee stiff, left  M25.662       3. Chronic pain of right knee  M25.561     G89.29       4. Knee stiff, right  M25.661       5. Other chronic pain  G89.29                      Assessment  Impairments: abnormal gait, abnormal or restricted ROM, activity intolerance, impaired physical strength, lacks appropriate home exercise program, pain with function, weight-bearing intolerance, poor posture , poor body mechanics, participation limitations and activity limitations  Symptom irritability: moderate    Assessment details: Problem List:  1) hypomobility of bilateral knees - passive ROM, AAROM, AROM  2) decreased motor coordination bilateral LE - functional strengthening, quad strength, glute strength    Anna Marie Barbosa is a pleasant 59 y.o. female who presents with complaints of left knee pain following TKA revision surgery, symptoms have significantly improved since surgery. She has decreased knee flexion ROM bilaterally, weakness in left quad, weakness in bilateral hip flexors, currently ambulated with RW, and has increased times with 5xSTS and TUG resulting in fall-risk and decreased functional tolerance.  No further referral appears necessary at this time based upon examination results.  I expect she will benefit from physical therapy to improve strength and overall function, reducing fall-risk.        Comparable signs:  1) knee flex  2) quad activation  3) 5xSTS  4) TUG    Understanding of Dx/Px/POC: good     Prognosis: good    Goals  Short Term Goals: to be achieved by 4 weeks  1) Patient to be independent with basic HEP.  2) Decrease pain to 4/10 at its worst.  3) Increase R knee flex ROM to 115 deg to increase function post-op.   4) Increase R knee ext ROM to -5 deg to  "improve gait post-op.  5) Increase LE strength by 1/2 MMT grade in all deficient planes post-op.    Long Term Goals: to be achieved by discharge  1) Patient to be independent with comprehensive HEP.  2) R knee ROM WNL all planes to improve a/iadls post-op.  3) Increase LE strength to 5/5 MMT grade in all planes to improve a/iadls post-op.  4) Patient to report no sleep interruption secondary to pain post-op.  5) Increase ambulatory tolerance to 60 min post-op with SPC.   6) Perform TUG <25s with SPC.  7) Perform 5xSTS <15s.    Plan  Patient would benefit from: skilled physical therapy  Referral necessary: No    Planned therapy interventions: activity modification, flexibility, home exercise program, joint mobilization, manual therapy, massage, neuromuscular re-education, nerve gliding, patient/caregiver education, postural training, strengthening, stretching, therapeutic activities, therapeutic exercise, therapeutic training and motor coordination training    Frequency: 1x week  Duration in weeks: 8  Plan of Care beginning date: 11/21/2024  Plan of Care expiration date: 1/16/2025  Treatment plan discussed with: patient  Plan details: Address physical impairments found during IE via therapeutic exercises, neuromuscular re-education, and manual therapy to improve functional tolerance. Develop HEP for self-management of symptoms.          Subjective    Patient reports symptoms began 10/21/24 with left total knee revision surgery, original surgery was performed 2005 or 2008 and was considered \"failed\". Patient followed up with Rick on 11/05/24 and currently WBAT. Pain location is left knee described as soreness with intensity of 5/10 currently, and 8/10 at the worst since surgery. Has been feeling better since having the revision. Prior injuries include none other surgery for the knee, has had 10 knee surgeries between both knees including ACL, meniscus, and TKA. Aggravating factors include standing for prolonged " periods of time, walking for prolonged periods, bending knee, and steps in reciprocal fashion. Easing factors include ice, rest, change in position, medication - morphine 2x/day for over 12 years, muscle relaxer 2x/day, and also break through pain. Previous treatment includes L TKA 10/21/24 and physical therapy following. Patient not working, does stuff around the house throughout the day. Difficulty with daily activities because of the pain in left knee and mobility deficits. No imaging since surgery. Current constitutional symptoms are none, with red flags including history of chronic pain. Patient goals include walking without walker, has been using SPC for short distances but doesn't have confidence to walk without AD.      Objective    Range of Motion: Goniometric measurements (degrees)  Joint Motion Restriction Level Left: Restriction Level Right:    Knee flex 97 AROM  107 PROM 108 AROM  112 PROM   Knee ext 0 0   Comments: pain with OP into flex bilaterally     Strength: Manual Muscle Tests  Joint Motion Left: 24 Right: 24   Knee flex 4-/5 4/5   Knee extension 4-/5 4/5   Hip flex 4-/5 4-/5     Functional testinxSTS: 18.6s use of hands  TU.48s RW use of hands to stand          Pertinent Findings:  Tests/Measure 24   FOTO (predicted nv) nv   Knee flex 4-/5 L   Knee ext 4-/5 L   Hip flex 4-/5 bilaterally   Knee flex ROM 97* AROM L  107* PROM L   Knee flex * AROM R  112* PROM R   5xSTS 18.6s use of hands   TUG 30.48s RW use of hands to stand       Precautions: HTN, TIA, T2DM, opioid dependence       Manuals             PROM L knee                                                    Neuro Re-Ed             STS HEP            Quad set HEP            SLR HEP            Bridge             Clamshell                                       Ther Ex             Heel slide HEP            Gastroc stretch with strap HEP            Pt education CS            RB - activity tolerance                                                                   Ther Activity                                       Gait Training                                       Modalities

## 2024-11-23 RX ORDER — ALBUTEROL SULFATE 90 UG/1
INHALANT RESPIRATORY (INHALATION)
Qty: 18 G | Refills: 1 | Status: SHIPPED | OUTPATIENT
Start: 2024-11-23

## 2024-11-29 ENCOUNTER — TELEPHONE (OUTPATIENT)
Age: 59
End: 2024-11-29

## 2024-11-29 DIAGNOSIS — Z79.4 TYPE 2 DIABETES MELLITUS WITH HYPERGLYCEMIA, WITH LONG-TERM CURRENT USE OF INSULIN (HCC): ICD-10-CM

## 2024-11-29 DIAGNOSIS — M17.11 PRIMARY OSTEOARTHRITIS OF RIGHT KNEE: ICD-10-CM

## 2024-11-29 DIAGNOSIS — E11.65 TYPE 2 DIABETES MELLITUS WITH HYPERGLYCEMIA, WITH LONG-TERM CURRENT USE OF INSULIN (HCC): ICD-10-CM

## 2024-11-29 RX ORDER — TIRZEPATIDE 7.5 MG/.5ML
7.5 INJECTION, SOLUTION SUBCUTANEOUS WEEKLY
Qty: 2 ML | Refills: 0 | Status: SHIPPED | OUTPATIENT
Start: 2024-11-29

## 2024-11-29 NOTE — TELEPHONE ENCOUNTER
Spoke with pt she is agreeable with the 7.5mg. Pt stated she was originally on the 10mg dosage and that is why she brought it up. Pt wanted to thank Dr. Grove for the help.

## 2024-11-29 NOTE — TELEPHONE ENCOUNTER
Pt called for a refill on the mounjaro, but she is looking for the 10mg, not 5mg. States that the pharmacy now has it back in stock.     Please send to [facility] in [place].    Please f/u as necessary.

## 2024-11-29 NOTE — TELEPHONE ENCOUNTER
She'd have to go to the 7.5mg dose first, the going from 5 to 10mg may be too much. I sent the 7.5mg dose

## 2024-12-02 RX ORDER — ASCORBIC ACID 500 MG
500 TABLET ORAL 2 TIMES DAILY
Qty: 180 TABLET | Refills: 1 | Status: SHIPPED | OUTPATIENT
Start: 2024-12-02

## 2024-12-10 ENCOUNTER — APPOINTMENT (OUTPATIENT)
Dept: RADIOLOGY | Age: 59
End: 2024-12-10
Payer: COMMERCIAL

## 2024-12-10 ENCOUNTER — OFFICE VISIT (OUTPATIENT)
Dept: OBGYN CLINIC | Facility: CLINIC | Age: 59
End: 2024-12-10

## 2024-12-10 VITALS
DIASTOLIC BLOOD PRESSURE: 75 MMHG | BODY MASS INDEX: 38.95 KG/M2 | HEART RATE: 96 BPM | HEIGHT: 68 IN | SYSTOLIC BLOOD PRESSURE: 117 MMHG | WEIGHT: 257 LBS

## 2024-12-10 DIAGNOSIS — Z96.652 S/P REVISION OF TOTAL KNEE, LEFT: ICD-10-CM

## 2024-12-10 DIAGNOSIS — Z96.652 S/P REVISION OF TOTAL KNEE, LEFT: Primary | ICD-10-CM

## 2024-12-10 PROBLEM — T84.093A FAILED TOTAL LEFT KNEE REPLACEMENT (HCC): Status: RESOLVED | Noted: 2024-08-28 | Resolved: 2024-12-10

## 2024-12-10 PROCEDURE — 99024 POSTOP FOLLOW-UP VISIT: CPT | Performed by: STUDENT IN AN ORGANIZED HEALTH CARE EDUCATION/TRAINING PROGRAM

## 2024-12-10 PROCEDURE — 73562 X-RAY EXAM OF KNEE 3: CPT

## 2024-12-10 NOTE — PROGRESS NOTES
Date: 12/10/24  Anna Marie Barbosa   MRN# [id number]  : 1965      Chief Complaint: Post op left revision total knee arthroplasty    Assessment and Plan:    S/P revision of total knee, left  -Weightbearing as tolerated left lower extremity  -Activity as tolerated  -Continue Celebrex and Tylenol as needed  -Continue home exercise program with a focus on restoration of range of motion and normalization of gait  -May discontinue aspirin for DVT prophylaxis  -No antibiotics required prior to planned dental work  -May wean from cane as tolerated  -Patient to follow-up in 6 weeks for continued postoperative valuation treatment         Subjective:   Patient is 6 weeks s/p left revision total knee arthroplasty with rotating-hinge knee    Date of procedure: 10/21/24  Doing well, no new problems  Pain progressively improving  Improved swelling  Ambulating with cane    Allergy:  Allergies   Allergen Reactions    Ozempic (0.25 Or 0.5 Mg-Dose) [Semaglutide(0.25 Or 0.5mg-Dos)] Diarrhea and GI Intolerance    Nsaids GI Intolerance     Medications:  all current active meds have been reviewed    Past Medical History:  Past Medical History:   Diagnosis Date    Acute respiratory insufficiency 2019    Allergies     Ambulates with cane     Anxiety     Asthma     Chronic narcotic dependence (HCC)     Chronic pain     Chronic pain disorder     back/knee    Diabetes (HCC)     HTN (hypertension)     elevated with electronic device    Hyperlipidemia     Sepsis (HCC) 2019    Walker as ambulation aid      Past Surgical History:  Past Surgical History:   Procedure Laterality Date    BACK SURGERY      x 3 with hardware    CERVICAL LAMINECTOMY      CHOLECYSTECTOMY LAPAROSCOPIC N/A 11/10/2018    Procedure: CHOLECYSTECTOMY LAPAROSCOPIC w/ ioc;  Surgeon: Benitez Cuenca MD;  Location: [facility];  Service: General    COLONOSCOPY      HYSTERECTOMY      JOINT REPLACEMENT Left     knee    [place] REVJ TOT KNEE ARTHRP FEM&ENTIRE TIBIAL  "COMPONE Left 10/21/2024    Procedure: ARTHROPLASTY LEFT KNEE TOTAL REVISION,  2 components;  Surgeon: Joe Cabrera MD;  Location:  MAIN OR;  Service: Orthopedics    TOE AMPUTATION Right 02/02/2018    Procedure: AMPUTATION TOE, RIGHT GREAT TOE;  Surgeon: Lino Rodriguez DPM;  Location: [facility];  Service: Podiatry    TOE AMPUTATION Right     3rd toe     Family History:  Family History   Problem Relation Age of Onset    Diabetes Mother     Diabetes Maternal Grandmother     No Known Problems Father      Social History:  Social History     Substance and Sexual Activity   Alcohol Use Not Currently    Comment: special occasion     Social History     Substance and Sexual Activity   Drug Use Yes    Types: Morphine    Comment: prescribed 75mg bid     Social History     Tobacco Use   Smoking Status Never   Smokeless Tobacco Never           Review of Systems:  General- denies fever/chills  HEENT- denies hearing loss or sore throat  Eyes- denies eye pain or visual disturbances, denies red eyes  Respiratory- denies cough or SOB  Cardio- denies chest pain or palpitations  GI- denies abdominal pain  Endocrine- denies urinary frequency  Urinary- denies pain with urination  Musculoskeletal- Negative except noted above  Skin- denies rashes or wounds  Neurological- denies dizziness or headache  Psychiatric- denies anxiety or difficulty concentrating      Objective:   BP Readings from Last 1 Encounters:   12/10/24 117/75      Wt Readings from Last 1 Encounters:   12/10/24 117 kg (257 lb)      Pulse Readings from Last 1 Encounters:   12/10/24 96        BMI: Estimated body mass index is 39.08 kg/m² as calculated from the following:    Height as of this encounter: 5' 8\" (1.727 m).    Weight as of this encounter: 117 kg (257 lb).      Physical Exam  /75   Pulse 96   Ht 5' 8\" (1.727 m)   Wt 117 kg (257 lb)   BMI 39.08 kg/m²   General/Constitutional: No apparent distress: well-nourished and well developed.  Eyes: normal " ocular motion  Cardio: RRR, Normal S1S2, No m/r/g.   Lymphatic: No appreciable lymphadenopathy  Respiratory: Non-labored breathing, CTA b/l no w/c/r  Vascular: No edema, swelling or tenderness, except as noted in detailed exam. Extremities well perfused. No LE edema  Integumentary: No impressive skin lesions present, except as noted in detailed exam.  Neuro: No ataxia or tremors noted  Psych: Normal mood and affect, oriented to person, place and time. Appropriate affect.  Musculoskeletal: Normal, except as noted in detailed exam and in HPI.    Gait and Station:   antalgic    left Knee Examination  Incision: Well-healed  Effusion: Minimal  Alignment: Neutral  AP Stability: stable  Coronal Stability  Extension:stable  Mid-flexion: stable  90 degrees flexion: stable  Range of motion  Active: 0 to >120 degrees  Extensor lag: Absent  Motor: 5/5 EHL/FHL/TA/GS/QD/HS  Neurovascular exam is intact.   No evidence of calf tightness or posterior cords    Images:    I personally reviewed relevant images in the PACS system and my interpretation is as follows:  X-rays of the left knee reveal a revision total  knee arthroplasty in appropriate alignment without evidence of migration, wear or loosening.        Scribe Attestation      I,:  Jayesh Ramos am acting as a scribe while in the presence of the attending physician.:       I,:  Joe Cabrera MD personally performed the services described in this documentation    as scribed in my presence.:               Joe Cabrera MD  Adult Reconstruction Specialist   [facility]

## 2024-12-10 NOTE — ASSESSMENT & PLAN NOTE
-Weightbearing as tolerated left lower extremity  -Activity as tolerated  -Continue Celebrex and Tylenol as needed  -Continue home exercise program with a focus on restoration of range of motion and normalization of gait  -May discontinue aspirin for DVT prophylaxis  -No antibiotics required prior to planned dental work  -May wean from cane as tolerated  -Patient to follow-up in 6 weeks for continued postoperative valuation treatment

## 2024-12-11 ENCOUNTER — TELEPHONE (OUTPATIENT)
Age: 59
End: 2024-12-11

## 2024-12-11 NOTE — TELEPHONE ENCOUNTER
Caller: Patient     Doctor: Arlet    Reason for call: Patient will need to reschedule her appt with Dr. Nice on 1/9/24. Please call patient back to reschedule.    Call back#: [phone]

## 2024-12-12 ENCOUNTER — TELEPHONE (OUTPATIENT)
Dept: OBGYN CLINIC | Facility: HOSPITAL | Age: 59
End: 2024-12-12

## 2024-12-12 NOTE — TELEPHONE ENCOUNTER
Preoperative Elective Admission Assessment    [facility] Oct 2024- 2 day LOS     PAT LABS: by 12/30      Living Situation:    Who does pt live with: spouse and son  What kind of home: single level  How do they enter the home: front  How many levels in home: 1   # of steps to enter home: 5-6  # of steps to second floor: n/a  Are there handrails: Yes  Are there landings: No  Sleeping arrangement: first/entry floor  Where is Bathroom: entry level  Where is the tub or shower: step in bath tub w/ transfer bench  Dogs or ther pets: 3 cats     First Floor Setup:   Is there a bathroom: Yes  Where would pt sleep: bed     DME: rolling walker and cane. Instructed to bring RW to DOS  We discussed clearing pathways in the home and making sure there is accessibly to use the walker, for example, removing throw rugs.      Patient's Current Level of Function: Ambulates with cane, and ADLs: Independent     Post-op Caregiver: spouse  Caregiver Name and phone number for Inpatient discharge needs: Dharmesh  Currently receive any HHC/aides/community supports: No     Post-op Transport: child  To/from hospital: child  To/from PT 2-3x/week: child  Uses community transport now: No     Outpatient Physical Therapy Site:  Site: Not ordered by surgeon  pre and post-op appts scheduled? No     Medication Management: self and pillbox  Preferred Pharmacy for Post-op Medications: [id number] [place] - [address] [[id number]]   Blood Management Vitamin Regimen: Pt has at home to start 30 days before.  Bactroban: has at home to start use 5 days before.   Post-op anticoagulant: to be determined by surgical team postoperatively     DC Plan: Pt plans to be discharged home     Barriers to DC identified preoperatively: none identified     BMI: 39.08     Patient Education:  Pt educated on post-op pain, early mobilization (POD0), LOS goals, OP PT goals, and preoperative bathing. Patient educated that our goal is to appropriately discharge patient  based off their post-op function while striving to maintain maximal independence. The goal is to discharge patient to home and for them to attend outpatient physical therapy.     Assigned to care team? Yes

## 2024-12-16 DIAGNOSIS — J45.40 MODERATE PERSISTENT ASTHMA WITHOUT COMPLICATION: ICD-10-CM

## 2024-12-16 RX ORDER — FLUTICASONE PROPIONATE AND SALMETEROL 500; 50 UG/1; UG/1
2 POWDER RESPIRATORY (INHALATION)
Qty: 60 BLISTER | Refills: 5 | Status: SHIPPED | OUTPATIENT
Start: 2024-12-16

## 2024-12-16 NOTE — PLAN OF CARE
Problem: Potential for Falls  Goal: Patient will remain free of falls  Description: INTERVENTIONS:  - Educate patient/family on patient safety including physical limitations  - Instruct patient to call for assistance with activity   - Consult OT/PT to assist with strengthening/mobility   - Keep Call bell within reach  - Keep bed low and locked with side rails adjusted as appropriate  - Keep care items and personal belongings within reach  - Initiate and maintain comfort rounds  - Make Fall Risk Sign visible to staff  - Offer Toileting every 2 Hours, in advance of need  - Initiate/Maintain bed alarm  - Apply yellow socks and bracelet for high fall risk patients  - Consider moving patient to room near nurses station  Outcome: Progressing     Problem: MOBILITY - ADULT  Goal: Maintain or return to baseline ADL function  Description: INTERVENTIONS:  - Educate patient/family on patient safety including physical limitations  - Instruct patient to call for assistance with activity   - Consult OT/PT to assist with strengthening/mobility   - Keep Call bell within reach  - Keep bed low and locked with side rails adjusted as appropriate  - Keep care items and personal belongings within reach  - Initiate and maintain comfort rounds  - Make Fall Risk Sign visible to staff  - Offer Toileting every 2 Hours, in advance of need  - Initiate/Maintain bed alarm  - Apply yellow socks and bracelet for high fall risk patients  - Consider moving patient to room near nurses station  Outcome: Progressing  Goal: Maintains/Returns to pre admission functional level  Description: INTERVENTIONS:  - Perform BMAT or MOVE assessment daily    - Set and communicate daily mobility goal to care team and patient/family/caregiver  - Collaborate with rehabilitation services on mobility goals if consulted  - Perform Range of Motion 2 times a day  - Reposition patient every 2 hours    - Dangle patient 2 times a day  - Stand patient 2 times a day  - Ambulate patient 2 times a day  - Out of bed to chair 2 times a day   - Out of bed for meals 2 times a day  - Out of bed for toileting  - Record patient progress and toleration of activity level   Outcome: Progressing     Problem: PAIN - ADULT  Goal: Verbalizes/displays adequate comfort level or baseline comfort level  Description: Interventions:  - Encourage patient to monitor pain and request assistance  - Assess pain using appropriate pain scale  - Administer analgesics based on type and severity of pain and evaluate response  - Implement non-pharmacological measures as appropriate and evaluate response  - Consider cultural and social influences on pain and pain management  - Notify physician/advanced practitioner if interventions unsuccessful or patient reports new pain  Outcome: Progressing     Problem: INFECTION - ADULT  Goal: Absence or prevention of progression during hospitalization  Description: INTERVENTIONS:  - Assess and monitor for signs and symptoms of infection  - Monitor lab/diagnostic results  - Monitor all insertion sites, i e  indwelling lines, tubes, and drains  - Mauldin appropriate cooling/warming therapies per order  - Administer medications as ordered  - Instruct and encourage patient and family to use good hand hygiene technique    Outcome: Progressing     Problem: SAFETY ADULT  Goal: Patient will remain free of falls  Description: INTERVENTIONS:  - Educate patient/family on patient safety including physical limitations  - Instruct patient to call for assistance with activity   - Consult OT/PT to assist with strengthening/mobility   - Keep Call bell within reach  - Keep bed low and locked with side rails adjusted as appropriate  - Keep care items and personal belongings within reach  - Initiate and maintain comfort rounds  - Make Fall Risk Sign visible to staff  - Offer Toileting every 2 Hours, in advance of need  - Initiate/Maintain bed alarm  - Apply yellow socks and bracelet for high fall risk patients  - Consider moving patient to room near nurses station  Outcome: Progressing  Goal: Maintain or return to baseline ADL function  Description: INTERVENTIONS:  - Educate patient/family on patient safety including physical limitations  - Instruct patient to call for assistance with activity   - Consult OT/PT to assist with strengthening/mobility   - Keep Call bell within reach  - Keep bed low and locked with side rails adjusted as appropriate  - Keep care items and personal belongings within reach  - Initiate and maintain comfort rounds  - Make Fall Risk Sign visible to staff  - Offer Toileting every 2 Hours, in advance of need  - Initiate/Maintain bed alarm  - Apply yellow socks and bracelet for high fall risk patients  - Consider moving patient to room near nurses station  Outcome: Progressing  Goal: Maintains/Returns to pre admission functional level  Description: INTERVENTIONS:  - Perform BMAT or MOVE assessment daily    - Set and communicate daily mobility goal to care team and patient/family/caregiver  - Collaborate with rehabilitation services on mobility goals if consulted  - Perform Range of Motion 2 times a day  - Reposition patient every 2 hours    - Dangle patient 2 times a day  - Stand patient 2 times a day  - Ambulate patient 2 times a day  - Out of bed to chair 2 times a day   - Out of bed for meals 2 times a day  - Out of bed for toileting  - Record patient progress and toleration of activity level   Outcome: Progressing     Problem: DISCHARGE PLANNING  Goal: Discharge to home or other facility with appropriate resources  Description: INTERVENTIONS:  - Identify barriers to discharge w/patient and caregiver  - Arrange for needed discharge resources and transportation as appropriate  - Identify discharge learning needs (meds, wound care, etc )  - Arrange for interpretive services to assist at discharge as needed  - Refer to Case Management Department for coordinating discharge planning if the patient needs post-hospital services based on physician/advanced practitioner order or complex needs related to functional status, cognitive ability, or social support system  Outcome: Progressing     Problem: Knowledge Deficit  Goal: Patient/family/caregiver demonstrates understanding of disease process, treatment plan, medications, and discharge instructions  Description: Complete learning assessment and assess knowledge base    Interventions:  - Provide teaching at level of understanding  - Provide teaching via preferred learning methods  Outcome: Progressing     Problem: METABOLIC, FLUID AND ELECTROLYTES - ADULT  Goal: Electrolytes maintained within normal limits  Description: INTERVENTIONS:  - Monitor labs and assess patient for signs and symptoms of electrolyte imbalances  - Administer electrolyte replacement as ordered  - Monitor response to electrolyte replacements, including repeat lab results as appropriate  - Instruct patient on fluid and nutrition as appropriate  Outcome: Progressing  Goal: Fluid balance maintained  Description: INTERVENTIONS:  - Monitor labs   - Monitor I/O and WT  - Instruct patient on fluid and nutrition as appropriate  - Assess for signs & symptoms of volume excess or deficit  Outcome: Progressing  Goal: Glucose maintained within target range  Description: INTERVENTIONS:  - Monitor Blood Glucose as ordered  - Assess for signs and symptoms of hyperglycemia and hypoglycemia  - Administer ordered medications to maintain glucose within target range  - Assess nutritional intake and initiate nutrition service referral as needed  Outcome: Progressing     Problem: Prexisting or High Potential for Compromised Skin Integrity  Goal: Skin integrity is maintained or improved  Description: INTERVENTIONS:  - Identify patients at risk for skin breakdown  - Assess and monitor skin integrity  - Assess and monitor nutrition and hydration status  - Monitor labs   - Assess for incontinence - Turn and reposition patient  - Assist with mobility/ambulation  - Relieve pressure over bony prominences  - Avoid friction and shearing  - Provide appropriate hygiene as needed including keeping skin clean and dry  - Evaluate need for skin moisturizer/barrier cream  - Collaborate with interdisciplinary team   - Patient/family teaching  - Consider wound care consult   Outcome: Progressing     Problem: Nutrition/Hydration-ADULT  Goal: Nutrient/Hydration intake appropriate for improving, restoring or maintaining nutritional needs  Description: Monitor and assess patient's nutrition/hydration status for malnutrition  Collaborate with interdisciplinary team and initiate plan and interventions as ordered  Monitor patient's weight and dietary intake as ordered or per policy  Utilize nutrition screening tool and intervene as necessary  Determine patient's food preferences and provide high-protein, high-caloric foods as appropriate       INTERVENTIONS:  - Monitor oral intake, urinary output, labs, and treatment plans  - Assess nutrition and hydration status and recommend course of action  - Evaluate amount of meals eaten  - Assist patient with eating if necessary   - Allow adequate time for meals  - Recommend/ encourage appropriate diets, oral nutritional supplements, and vitamin/mineral supplements  - Order, calculate, and assess calorie counts as needed  - Recommend, monitor, and adjust tube feedings and TPN/PPN based on assessed needs  - Assess need for intravenous fluids  - Provide specific nutrition/hydration education as appropriate  - Include patient/family/caregiver in decisions related to nutrition  Outcome: Progressing no

## 2024-12-16 NOTE — TELEPHONE ENCOUNTER
Patient stated that the [facility] pharmacy stated that they do not have the inhaler available and that there is a shortage. Pharmacist asked if there is another medication that the doctor could prescribe. Patient explained that when she does not have her maintenance inhaler that she begins to cough a lot. Patient can be contacted at [phone]. Please advise.

## 2024-12-16 NOTE — TELEPHONE ENCOUNTER
Patient called in and needs a refill on her Advair - Patient stated that she has non left.    [facility]    Thank you.

## 2024-12-20 DIAGNOSIS — J45.40 MODERATE PERSISTENT ASTHMA WITHOUT COMPLICATION: Primary | ICD-10-CM

## 2024-12-20 RX ORDER — BUDESONIDE AND FORMOTEROL FUMARATE DIHYDRATE 160; 4.5 UG/1; UG/1
2 AEROSOL RESPIRATORY (INHALATION) 2 TIMES DAILY
Qty: 10.2 G | Refills: 1 | Status: CANCELLED | OUTPATIENT
Start: 2024-12-20

## 2024-12-20 NOTE — TELEPHONE ENCOUNTER
Is there another pharmacy I can send it to? When I put [employer] in the system it said it's not approved and would need a prior authorization

## 2024-12-20 NOTE — TELEPHONE ENCOUNTER
I  called and left a message for patient for the inhaler I did also suggest for her to call the [facility] to find out if they might have the advair in stock. And if this pharmacy would be convenient for her.

## 2024-12-23 ENCOUNTER — TELEPHONE (OUTPATIENT)
Age: 59
End: 2024-12-23

## 2024-12-23 ENCOUNTER — VBI (OUTPATIENT)
Dept: ADMINISTRATIVE | Facility: OTHER | Age: 59
End: 2024-12-23

## 2024-12-23 DIAGNOSIS — E11.65 TYPE 2 DIABETES MELLITUS WITH HYPERGLYCEMIA, WITH LONG-TERM CURRENT USE OF INSULIN (HCC): ICD-10-CM

## 2024-12-23 DIAGNOSIS — Z79.4 TYPE 2 DIABETES MELLITUS WITH HYPERGLYCEMIA, WITH LONG-TERM CURRENT USE OF INSULIN (HCC): ICD-10-CM

## 2024-12-23 NOTE — TELEPHONE ENCOUNTER
PA for MOUNJARO 7.5MG SUBMITTED to [employer]    via    []CMM-KEY:    []Surescripts-Case ID #    []Availity-Auth ID #  NDC #    []Faxed to plan   [x]Other website [employer] [id number]  []Phone call Case ID #      [x]PA sent as URGENT    All office notes, labs and other pertaining documents and studies sent. Clinical questions answered. Awaiting determination from insurance company.     Turnaround time for your insurance to make a decision on your Prior Authorization can take 7-21 business days.

## 2024-12-23 NOTE — TELEPHONE ENCOUNTER
Medication:  Tirzepatide (Mounjaro) 7.5 MG/0.5ML SOAJ    Dose/Frequency:     Inject 7.5 mg under the skin once a week       Quantity: 2 mL     Pharmacy: [id number] - [place] - [address] [phone]    Office:   [x] PCP/Provider - Magdalena Grove MD   [] Speciality/Provider -     Does the patient have enough for 3 days?   [x] Yes   [] No - Send as HP to POD

## 2024-12-23 NOTE — TELEPHONE ENCOUNTER
Patient was told PA's are needed for this medication going forward by her insurance.      Reason for call:   [x] Prior Auth  [] Other:     Caller:  [x] Patient  [] Pharmacy  Name:   Address:   Callback Number:     Medication: Tirzepatide (Mounjaro) 7.5 MG/0.5ML SOAJ    Dose/Frequency:     Inject 7.5 mg under the skin once a week          Quantity: 2 mL     Ordering Provider:   [x] PCP/Provider - Magdalena Grove MD   [] Speciality/Provider -     Has the patient tried other medications and failed? If failed, which medications did they fail?    [] No   [] Yes -     Is the patient's insurance updated in EPIC?   [x] Yes   [] No     Is a copy of the patient's insurance scanned in EPIC?   [x] Yes   [] No

## 2024-12-23 NOTE — TELEPHONE ENCOUNTER
12/23/24 12:11 AM     Chart reviewed for Blood pressure reading,Hemoglobin A1c and Microalbumin Creatinine Urine Ratio OR Albumin Creatinine Urine Ratio  was/were submitted to the patient's insurance.     Yue Coelho   [facility]

## 2024-12-24 RX ORDER — TIRZEPATIDE 7.5 MG/.5ML
7.5 INJECTION, SOLUTION SUBCUTANEOUS WEEKLY
Qty: 6 ML | Refills: 0 | Status: SHIPPED | OUTPATIENT
Start: 2024-12-24

## 2024-12-24 NOTE — TELEPHONE ENCOUNTER
PA for MOUNJARO 7.5MG CANCELLED     Due to     []Approval on file-dates approved   [x]Medication already on Formulary  []Brand Name Preferred  []Patient no longer covered by insurance        Message sent to office clinical pool   Yes    Scanned into Media  yes

## 2025-01-06 ENCOUNTER — ANESTHESIA EVENT (OUTPATIENT)
Age: 60
End: 2025-01-06
Payer: COMMERCIAL

## 2025-01-07 DIAGNOSIS — M17.11 PRIMARY OSTEOARTHRITIS OF RIGHT KNEE: ICD-10-CM

## 2025-01-07 NOTE — PROGRESS NOTES
Internal Medicine Pre-Operative Evaluation:     Reason for Visit: Pre-operative Evaluation for Risk Stratification and Optimization    Patient ID: Anna Marie Barbsoa is a 59 y.o. female.     Case: [id number] Date/Time: 01/20/25 1215   Procedure: ARTHROPLASTY KNEE TOTAL, RIGHT-SAME DAY (Right: Knee)   Anesthesia type: Spinal   Diagnosis: Primary osteoarthritis of right knee [M17.11]   Pre-op diagnosis: Primary osteoarthritis of right knee [M17.11]   Location: [facility] ROOM 03 / [facility]   Surgeons: Joe Cabrera MD         Recommendations to Proceed withSurgery    Patient is considered to be Medium risk for Medium risk procedure.     After evaluation and discussion with patient with emphasis that all surgery has some degree of inherent risk it is acknowledged by patient this risk is Acceptable.    Patient is optimized and may proceed with planned procedure.     Assessment    Pre-operative Medical Evaluation for planned surgery  Recommendations as listed in PLAN section below  Contact surgical nurse  navigator with any questions regarding preoperative plan or schedule.      Assessment & Plan  Primary osteoarthritis of right knee  Failed outpatient conservative measures  Elected to undergo total joint arthroplasty    Type 2 diabetes mellitus with hyperglycemia, with long-term current use of insulin (HCC)    Lab Results   Component Value Date    HGBA1C 5.7 (H) 01/09/2025     TIA (transient ischemic attack)  Continue ASA tx  S/P revision of total knee, left  uneventful  Other hyperlipidemia  Continue low cholesterol diet and statin therapy    Morbid obesity (HCC)  Hold mounjaro 1 week before surgery  Essential hypertension  Monitoring with PCP  Gastroesophageal reflux disease without esophagitis  Continue PPI    Preop exam for internal medicine             Plan:     1. Further preoperative workup as follows:   - none no further testing required may proceed with surgery    2.  Preoperative Medication Management Review performed by PAT nursing  YES    3. Patient requires further consultation with:   No Consults Required    4. Discharge Planning / Barriers to Discharge  none identified - patients has post discharge therapy plan in place, transportation arranged for discharge day, adequate family support at home to assist with discharge to home.        Subjective:           History of Present Illness:     Anna Marie Barbosa is a 59 y.o. female who presents to the office today for a preoperative consultation at the request of surgeon. The patient understands this is an elective procedure and not emergent. They are electing to undergo planned procedure with an understanding that all surgery has inherent risk. They have worked with their surgeon and failed conservative treatment measures. Today they present for preoperative risk assessment and recommendations for optimization in preparation for surgery.        Pt seen in surgical optimization center for upcoming proposed surgery. They have failed previous conservative measures and have elected surgical intervention.     Pt meets presurgical lab and BMI optimization goals.      Anna Marie Barbosa has an IN HOSPITAL cardiac risk of RCI RISK CLASS I (0 risk factors, risk of major cardiac compl. appr. 0.5%) based on RCRI calculator    Cardiac Risk Estimation: per the Revised Cardiac Risk Index (Circ. 100:1043, 1999),         Pre-op Exam    Pre-operative Risk Factors:    History of cerebrovascular disease: Yes  History of ischemic heart disease: No  Pre-operative treatment with insulin: No  Pre-operative creatinine >2 mg/dL: No    History of congestive heart failure: No        ROS: No TIA's or unusual headaches, no dysphagia.  No prolonged cough. No dyspnea or chest pain on exertion.  No abdominal pain, change in bowel habits, black or bloody stools.  No urinary tract or BPH symptoms.  Positive reported pain in arthritic joint. Positive difficulty with  "gait. No skin rashes or issues.      Objective:    /90   Pulse 91   Ht 5' 8\" (1.727 m)   Wt 118 kg (260 lb)   BMI 39.53 kg/m²       General Appearance: no distress, conversive  HEENT: PERRLA, conjuctiva normal; oropharynx clear; mucous membranes moist;   Neck:  Supple, no lymphadenopathy or thyromegaly  Lungs: breath sounds normal, normal respiratory effort, no retractions, expiratory effort normal  CV: normal heart sounds S1/S2, PMI normal   ABD: soft non tender, no masses , no hepatic or splenomegaly  EXT: DP pulses intact, no lymphadenopathy, no edema  Skin: normal turgor, normal texture, no rash  Psych: affect normal, mood normal  Neuro: AAOx3        The following portions of the patient's history were reviewed and updated as appropriate: allergies, current medications, past family history, past medical history, past social history, past surgical history and problem list.     Past History:       Past Medical History:   Diagnosis Date    Acute respiratory insufficiency 11/23/2019    Allergies     Ambulates with cane     Anxiety     Asthma     Chronic narcotic dependence (HCC)     Chronic pain     Chronic pain disorder     back/knee    Diabetes (HCC)     HTN (hypertension)     elevated with electronic device    Hyperlipidemia     PONV (postoperative nausea and vomiting)     Sepsis (HCC) 11/23/2019    Sleep apnea     no cpap    Walker as ambulation aid     Past Surgical History:   Procedure Laterality Date    BACK SURGERY      x 3 with hardware    CERVICAL LAMINECTOMY      CHOLECYSTECTOMY LAPAROSCOPIC N/A 11/10/2018    Procedure: CHOLECYSTECTOMY LAPAROSCOPIC w/ ioc;  Surgeon: Benitez Cuenca MD;  Location: [facility];  Service: General    COLONOSCOPY      HYSTERECTOMY      JOINT REPLACEMENT Left     knee    CA REVJ TOT KNEE ARTHRP FEM&ENTIRE TIBIAL COMPONE Left 10/21/2024    Procedure: ARTHROPLASTY LEFT KNEE TOTAL REVISION,  2 components;  Surgeon: Joe Cabrera MD;  Location:  MAIN OR;  Service: " Orthopedics    TOE AMPUTATION Right 02/02/2018    Procedure: AMPUTATION TOE, RIGHT GREAT TOE;  Surgeon: Lino Rodriguez DPM;  Location: [facility];  Service: Podiatry    TOE AMPUTATION Right     3rd toe          Social History     Tobacco Use    Smoking status: Never     Passive exposure: Never    Smokeless tobacco: Never   Vaping Use    Vaping status: Never Used   Substance Use Topics    Alcohol use: Not Currently     Comment: special occasion    Drug use: Yes     Types: Morphine     Comment: prescribed 75mg bid     Family History   Problem Relation Age of Onset    Diabetes Mother     Diabetes Maternal Grandmother     No Known Problems Father           Allergies:     Allergies   Allergen Reactions    Ozempic (0.25 Or 0.5 Mg-Dose) [Semaglutide(0.25 Or 0.5mg-Dos)] Diarrhea and GI Intolerance    Nsaids GI Intolerance        Current Medications:     Current Outpatient Medications   Medication Instructions    albuterol (PROVENTIL HFA,VENTOLIN HFA) 90 mcg/act inhaler INHALE 2 PUFFS EVERY 4 HOURS AS NEEDED FOR WHEEZING OR SHORTNESS OF BREATH    ascorbic Acid (VITAMIN C) 500 mg, Oral, Daily    ascorbic acid (VITAMIN C) 500 mg, Oral, 2 times daily    aspirin (ECOTRIN LOW STRENGTH) 81 mg, Oral, 2 times daily    carisoprodol (SOMA) 350 mg, 2 times daily    celecoxib (CELEBREX) 200 mg, Oral, 2 times daily    diazepam (VALIUM) 5 mg, Daily at bedtime PRN    ferrous sulfate 324 mg, Oral, Every other day    ferrous sulfate 324 mg, Oral, Every other day    fluticasone (FLONASE) 50 mcg/act nasal spray 1 spray, As needed    Fluticasone-Salmeterol (Advair) 500-50 mcg/dose inhaler 2 puffs, Inhalation, Daily at bedtime    folic acid (FOLVITE) 1,000 mcg, Oral, Daily    folic acid (FOLVITE) 1 mg, Oral, Daily    Lyrica 200 mg, Oral, 2 times daily    morphine (MS CONTIN) 75 mg, 2 times daily    Mounjaro 7.5 mg, Subcutaneous, Weekly    Multiple Vitamins-Minerals (multivitamin with minerals) tablet 1 tablet, Oral, Daily    Multiple  "Vitamins-Minerals (One Daily Womens 50+) TABS 1 tablet, Oral, Daily    mupirocin (BACTROBAN) 2 % ointment Topical, 3 times daily    nystatin (MYCOSTATIN) powder Topical, 2 times daily    oxyCODONE (ROXICODONE) 5 mg, Oral, Every 4 hours PRN    patient supplied medication Dexcom    pregabalin (LYRICA) 200 mg, 2 times daily    rosuvastatin (CRESTOR) 5 mg, Oral, Every other day           PRE-OP WORKSHEET DATA    Assessment of Pre-Operative Risks     MLJ Quality Hard Stops:    BMI (<40) : Estimated body mass index is 39.53 kg/m² as calculated from the following:    Height as of this encounter: 5' 8\" (1.727 m).    Weight as of this encounter: 118 kg (260 lb).    Hgb ( >11):   Lab Results   Component Value Date    HGB 12.9 01/09/2025    HGB 10.7 (L) 10/23/2024    HGB 11.7 10/22/2024       HbA1c (<7.5) :   Lab Results   Component Value Date    HGBA1C 5.7 (H) 01/09/2025       GFR (>60) (Less then 45 = Nephrology consult):    Lab Results   Component Value Date    EGFR 96 01/09/2025    EGFR 98 10/23/2024    EGFR 88 10/22/2024            Pre-Op Data Reviewed:       Laboratory Results: I have personally reviewed the pertinent reports    EKG: I personally reviewed and interpreted available tracings in the electronic medical record    Encounter Date: 01/09/25   ECG 12 lead   Result Value    Ventricular Rate 76    Atrial Rate 76    UT Interval 148    QRSD Interval 76    QT Interval 372    QTC Interval 419    P Axis 53    QRS Axis 6    T Wave Axis 47    Narrative    Normal sinus rhythm  Low voltage QRS  Cannot rule out Inferior infarct , age undetermined  Abnormal ECG  When compared with ECG of 09-Jan-2025 17:50,  MANUAL COMPARISON REQUIRED PREVIOUS ECG IS INCOMPATIBLE  Confirmed by Home Gaona ([id number]) on 1/10/2025 8:50:23 AM       OLD RECORDS: reviewed old records in the chart review section if EHR on day of visit.    Previous cardiopulmonary studies within the past year:  Echocardiogram: no   Cardiac Catheterization: no  Stress " Test: no      Time of visit including pre-visit chart review, visit and post-visit coordination of plan and care , review of pre-surgical lab work, preparation and time spent documenting note in electronic medical record, time spent face-to-face in physical examination answering patient questions by care team 35 minutes             [facility]

## 2025-01-07 NOTE — H&P (VIEW-ONLY)
Internal Medicine Pre-Operative Evaluation:     Reason for Visit: Pre-operative Evaluation for Risk Stratification and Optimization    Patient ID: Anna Marie Barbosa is a 59 y.o. female.     Case: [id number] Date/Time: 01/20/25 1215   Procedure: ARTHROPLASTY KNEE TOTAL, RIGHT-SAME DAY (Right: Knee)   Anesthesia type: Spinal   Diagnosis: Primary osteoarthritis of right knee [M17.11]   Pre-op diagnosis: Primary osteoarthritis of right knee [M17.11]   Location: [facility] ROOM 03 / [facility]   Surgeons: Joe Cabrera MD         Recommendations to Proceed withSurgery    Patient is considered to be Medium risk for Medium risk procedure.     After evaluation and discussion with patient with emphasis that all surgery has some degree of inherent risk it is acknowledged by patient this risk is Acceptable.    Patient is optimized and may proceed with planned procedure.     Assessment    Pre-operative Medical Evaluation for planned surgery  Recommendations as listed in PLAN section below  Contact surgical nurse  navigator with any questions regarding preoperative plan or schedule.      Assessment & Plan  Primary osteoarthritis of right knee  Failed outpatient conservative measures  Elected to undergo total joint arthroplasty    Type 2 diabetes mellitus with hyperglycemia, with long-term current use of insulin (HCC)    Lab Results   Component Value Date    HGBA1C 5.7 (H) 01/09/2025     TIA (transient ischemic attack)  Continue ASA tx  S/P revision of total knee, left  uneventful  Other hyperlipidemia  Continue low cholesterol diet and statin therapy    Morbid obesity (HCC)  Hold mounjaro 1 week before surgery  Essential hypertension  Monitoring with PCP  Gastroesophageal reflux disease without esophagitis  Continue PPI    Preop exam for internal medicine             Plan:     1. Further preoperative workup as follows:   - none no further testing required may proceed with surgery    2.  Occupational Therapy Visit     Referred by: Jose R Wade MD; Medical Diagnosis (from order):        Visit #3    Visit Type: Daily Treatment Note  Diagnosis Precautions: Date of surgery: 7/20/2022  Procedure: RUE distal radius fracture, s/p ORIF    SUBJECTIVE                                                                                                               Pt reports feeling less pain to surgical area.   Pain / Symptoms:  Pain rating (out of 10): Current: 1     OBJECTIVE                                                                                                                     Hand Dominance: right-handed;     Range of Motion (ROM)   (degrees unless noted; active unless noted; norms in ( ); negative=lacking to 0, positive=beyond 0)   Elbow/Forearm:     - Supination (80):        • Right: 74    - Pronation (80):        • Right: 70  Wrist:    - Flexion (60-80):        • Right:  26    - Extension (60-70):        • Right: 41    - Radial Deviation (20):        • Right: 16    - Ulnar Deviation (30):        • Right: 31              TREATMENT                                                                                                                  Therapeutic Exercise:  R wrist flexion/extension AROM exercises with and without using composite fist positioning x30 reps  R wrist radial/ulnar deviation AROM exercises x30 reps.  R DTM AROM exercises  x30 reps.  R supination/pronation wheel x30 reps.     -R wrist PROM in all planes.    Manual Therapy:  Retrograde massage to RUE, including digits, hand, wrist and forearm.  Scar massage to incisional area to reduce soft tissue adheshions.     Fluido therapy x10 minutes while performing AROM exercises in all wrist planes of motion.     Skilled input: verbal instruction/cues, tactile instruction/cues and posture correction    Writer verbally educated and received verbal consent for hand placement, positioning of patient, and techniques to be performed  Preoperative Medication Management Review performed by PAT nursing  YES    3. Patient requires further consultation with:   No Consults Required    4. Discharge Planning / Barriers to Discharge  none identified - patients has post discharge therapy plan in place, transportation arranged for discharge day, adequate family support at home to assist with discharge to home.        Subjective:           History of Present Illness:     Anna Marie Barbosa is a 59 y.o. female who presents to the office today for a preoperative consultation at the request of surgeon. The patient understands this is an elective procedure and not emergent. They are electing to undergo planned procedure with an understanding that all surgery has inherent risk. They have worked with their surgeon and failed conservative treatment measures. Today they present for preoperative risk assessment and recommendations for optimization in preparation for surgery.        Pt seen in surgical optimization center for upcoming proposed surgery. They have failed previous conservative measures and have elected surgical intervention.     Pt meets presurgical lab and BMI optimization goals.      Anna Marie Barbosa has an IN HOSPITAL cardiac risk of RCI RISK CLASS I (0 risk factors, risk of major cardiac compl. appr. 0.5%) based on RCRI calculator    Cardiac Risk Estimation: per the Revised Cardiac Risk Index (Circ. 100:1043, 1999),         Pre-op Exam    Pre-operative Risk Factors:    History of cerebrovascular disease: Yes  History of ischemic heart disease: No  Pre-operative treatment with insulin: No  Pre-operative creatinine >2 mg/dL: No    History of congestive heart failure: No        ROS: No TIA's or unusual headaches, no dysphagia.  No prolonged cough. No dyspnea or chest pain on exertion.  No abdominal pain, change in bowel habits, black or bloody stools.  No urinary tract or BPH symptoms.  Positive reported pain in arthritic joint. Positive difficulty with  today from patient for clothing adjustments for techniques, therapist position for techniques and hand placement and palpation for techniques as described above and how they are pertinent to the patient's plan of care.    Home Exercise Program/Education Materials: As previously issued.       ASSESSMENT                                                                                                             Pt tolerated session well. Pt reports being consistent with home program and feeling less pain to affected area.  Pt also with improvement noted with edema to area.  Pt will follow up with MD on September 8th. Will continue to see pt for OT services.   Pain/symptoms after session (out of 10): 2  Patient Education:   Results of above outlined education: Verbalizes understanding and Demonstrates understanding      PLAN                                                                                                                           Suggestions for next session as indicated: Progress per plan of care           Therapy procedure time and total treatment time can be found documented on the Time Entry flowsheet   "gait. No skin rashes or issues.      Objective:    /90   Pulse 91   Ht 5' 8\" (1.727 m)   Wt 118 kg (260 lb)   BMI 39.53 kg/m²       General Appearance: no distress, conversive  HEENT: PERRLA, conjuctiva normal; oropharynx clear; mucous membranes moist;   Neck:  Supple, no lymphadenopathy or thyromegaly  Lungs: breath sounds normal, normal respiratory effort, no retractions, expiratory effort normal  CV: normal heart sounds S1/S2, PMI normal   ABD: soft non tender, no masses , no hepatic or splenomegaly  EXT: DP pulses intact, no lymphadenopathy, no edema  Skin: normal turgor, normal texture, no rash  Psych: affect normal, mood normal  Neuro: AAOx3        The following portions of the patient's history were reviewed and updated as appropriate: allergies, current medications, past family history, past medical history, past social history, past surgical history and problem list.     Past History:       Past Medical History:   Diagnosis Date    Acute respiratory insufficiency 11/23/2019    Allergies     Ambulates with cane     Anxiety     Asthma     Chronic narcotic dependence (HCC)     Chronic pain     Chronic pain disorder     back/knee    Diabetes (HCC)     HTN (hypertension)     elevated with electronic device    Hyperlipidemia     PONV (postoperative nausea and vomiting)     Sepsis (HCC) 11/23/2019    Sleep apnea     no cpap    Walker as ambulation aid     Past Surgical History:   Procedure Laterality Date    BACK SURGERY      x 3 with hardware    CERVICAL LAMINECTOMY      CHOLECYSTECTOMY LAPAROSCOPIC N/A 11/10/2018    Procedure: CHOLECYSTECTOMY LAPAROSCOPIC w/ ioc;  Surgeon: Benitez Cuenca MD;  Location: [facility];  Service: General    COLONOSCOPY      HYSTERECTOMY      JOINT REPLACEMENT Left     knee    NH REVJ TOT KNEE ARTHRP FEM&ENTIRE TIBIAL COMPONE Left 10/21/2024    Procedure: ARTHROPLASTY LEFT KNEE TOTAL REVISION,  2 components;  Surgeon: Joe Cabrera MD;  Location:  MAIN OR;  Service: " Orthopedics    TOE AMPUTATION Right 02/02/2018    Procedure: AMPUTATION TOE, RIGHT GREAT TOE;  Surgeon: Lino Rodriguez DPM;  Location: [facility];  Service: Podiatry    TOE AMPUTATION Right     3rd toe          Social History     Tobacco Use    Smoking status: Never     Passive exposure: Never    Smokeless tobacco: Never   Vaping Use    Vaping status: Never Used   Substance Use Topics    Alcohol use: Not Currently     Comment: special occasion    Drug use: Yes     Types: Morphine     Comment: prescribed 75mg bid     Family History   Problem Relation Age of Onset    Diabetes Mother     Diabetes Maternal Grandmother     No Known Problems Father           Allergies:     Allergies   Allergen Reactions    Ozempic (0.25 Or 0.5 Mg-Dose) [Semaglutide(0.25 Or 0.5mg-Dos)] Diarrhea and GI Intolerance    Nsaids GI Intolerance        Current Medications:     Current Outpatient Medications   Medication Instructions    albuterol (PROVENTIL HFA,VENTOLIN HFA) 90 mcg/act inhaler INHALE 2 PUFFS EVERY 4 HOURS AS NEEDED FOR WHEEZING OR SHORTNESS OF BREATH    ascorbic Acid (VITAMIN C) 500 mg, Oral, Daily    ascorbic acid (VITAMIN C) 500 mg, Oral, 2 times daily    aspirin (ECOTRIN LOW STRENGTH) 81 mg, Oral, 2 times daily    carisoprodol (SOMA) 350 mg, 2 times daily    celecoxib (CELEBREX) 200 mg, Oral, 2 times daily    diazepam (VALIUM) 5 mg, Daily at bedtime PRN    ferrous sulfate 324 mg, Oral, Every other day    ferrous sulfate 324 mg, Oral, Every other day    fluticasone (FLONASE) 50 mcg/act nasal spray 1 spray, As needed    Fluticasone-Salmeterol (Advair) 500-50 mcg/dose inhaler 2 puffs, Inhalation, Daily at bedtime    folic acid (FOLVITE) 1,000 mcg, Oral, Daily    folic acid (FOLVITE) 1 mg, Oral, Daily    Lyrica 200 mg, Oral, 2 times daily    morphine (MS CONTIN) 75 mg, 2 times daily    Mounjaro 7.5 mg, Subcutaneous, Weekly    Multiple Vitamins-Minerals (multivitamin with minerals) tablet 1 tablet, Oral, Daily    Multiple  "Vitamins-Minerals (One Daily Womens 50+) TABS 1 tablet, Oral, Daily    mupirocin (BACTROBAN) 2 % ointment Topical, 3 times daily    nystatin (MYCOSTATIN) powder Topical, 2 times daily    oxyCODONE (ROXICODONE) 5 mg, Oral, Every 4 hours PRN    patient supplied medication Dexcom    pregabalin (LYRICA) 200 mg, 2 times daily    rosuvastatin (CRESTOR) 5 mg, Oral, Every other day           PRE-OP WORKSHEET DATA    Assessment of Pre-Operative Risks     MLJ Quality Hard Stops:    BMI (<40) : Estimated body mass index is 39.53 kg/m² as calculated from the following:    Height as of this encounter: 5' 8\" (1.727 m).    Weight as of this encounter: 118 kg (260 lb).    Hgb ( >11):   Lab Results   Component Value Date    HGB 12.9 01/09/2025    HGB 10.7 (L) 10/23/2024    HGB 11.7 10/22/2024       HbA1c (<7.5) :   Lab Results   Component Value Date    HGBA1C 5.7 (H) 01/09/2025       GFR (>60) (Less then 45 = Nephrology consult):    Lab Results   Component Value Date    EGFR 96 01/09/2025    EGFR 98 10/23/2024    EGFR 88 10/22/2024            Pre-Op Data Reviewed:       Laboratory Results: I have personally reviewed the pertinent reports    EKG: I personally reviewed and interpreted available tracings in the electronic medical record    Encounter Date: 01/09/25   ECG 12 lead   Result Value    Ventricular Rate 76    Atrial Rate 76    TX Interval 148    QRSD Interval 76    QT Interval 372    QTC Interval 419    P Axis 53    QRS Axis 6    T Wave Axis 47    Narrative    Normal sinus rhythm  Low voltage QRS  Cannot rule out Inferior infarct , age undetermined  Abnormal ECG  When compared with ECG of 09-Jan-2025 17:50,  MANUAL COMPARISON REQUIRED PREVIOUS ECG IS INCOMPATIBLE  Confirmed by Home Gaona ([id number]) on 1/10/2025 8:50:23 AM       OLD RECORDS: reviewed old records in the chart review section if EHR on day of visit.    Previous cardiopulmonary studies within the past year:  Echocardiogram: no   Cardiac Catheterization: no  Stress " Test: no      Time of visit including pre-visit chart review, visit and post-visit coordination of plan and care , review of pre-surgical lab work, preparation and time spent documenting note in electronic medical record, time spent face-to-face in physical examination answering patient questions by care team 35 minutes             [facility]

## 2025-01-08 ENCOUNTER — TELEPHONE (OUTPATIENT)
Dept: OBGYN CLINIC | Facility: HOSPITAL | Age: 60
End: 2025-01-08

## 2025-01-08 RX ORDER — MUPIROCIN 20 MG/G
OINTMENT TOPICAL 3 TIMES DAILY
Qty: 22 G | Refills: 0 | Status: SHIPPED | OUTPATIENT
Start: 2025-01-08

## 2025-01-09 ENCOUNTER — OFFICE VISIT (OUTPATIENT)
Dept: LAB | Facility: HOSPITAL | Age: 60
End: 2025-01-09
Payer: COMMERCIAL

## 2025-01-09 ENCOUNTER — APPOINTMENT (OUTPATIENT)
Dept: LAB | Facility: HOSPITAL | Age: 60
End: 2025-01-09
Payer: COMMERCIAL

## 2025-01-09 ENCOUNTER — TELEPHONE (OUTPATIENT)
Age: 60
End: 2025-01-09

## 2025-01-09 DIAGNOSIS — M17.11 PRIMARY OSTEOARTHRITIS OF RIGHT KNEE: ICD-10-CM

## 2025-01-09 DIAGNOSIS — T14.8XXA JOINT INJURY: ICD-10-CM

## 2025-01-09 LAB
ABO GROUP BLD: NORMAL
ALBUMIN SERPL BCG-MCNC: 4 G/DL (ref 3.5–5)
ALP SERPL-CCNC: 94 U/L (ref 34–104)
ALT SERPL W P-5'-P-CCNC: 11 U/L (ref 7–52)
ANION GAP SERPL CALCULATED.3IONS-SCNC: 4 MMOL/L (ref 4–13)
APTT PPP: 36 SECONDS (ref 23–34)
AST SERPL W P-5'-P-CCNC: 19 U/L (ref 13–39)
BASOPHILS # BLD AUTO: 0.09 THOUSANDS/ΜL (ref 0–0.1)
BASOPHILS NFR BLD AUTO: 1 % (ref 0–1)
BILIRUB SERPL-MCNC: 0.29 MG/DL (ref 0.2–1)
BLD GP AB SCN SERPL QL: NEGATIVE
BUN SERPL-MCNC: 13 MG/DL (ref 5–25)
CALCIUM SERPL-MCNC: 9 MG/DL (ref 8.4–10.2)
CHLORIDE SERPL-SCNC: 102 MMOL/L (ref 96–108)
CO2 SERPL-SCNC: 31 MMOL/L (ref 21–32)
CREAT SERPL-MCNC: 0.65 MG/DL (ref 0.6–1.3)
EOSINOPHIL # BLD AUTO: 0.55 THOUSAND/ΜL (ref 0–0.61)
EOSINOPHIL NFR BLD AUTO: 8 % (ref 0–6)
ERYTHROCYTE [DISTWIDTH] IN BLOOD BY AUTOMATED COUNT: 13.1 % (ref 11.6–15.1)
FERRITIN SERPL-MCNC: 51 NG/ML (ref 11–307)
GFR SERPL CREATININE-BSD FRML MDRD: 96 ML/MIN/1.73SQ M
GLUCOSE SERPL-MCNC: 101 MG/DL (ref 65–140)
HCT VFR BLD AUTO: 40.8 % (ref 34.8–46.1)
HGB BLD-MCNC: 12.9 G/DL (ref 11.5–15.4)
IMM GRANULOCYTES # BLD AUTO: 0.01 THOUSAND/UL (ref 0–0.2)
IMM GRANULOCYTES NFR BLD AUTO: 0 % (ref 0–2)
INR PPP: 1.05 (ref 0.85–1.19)
IRON SATN MFR SERPL: 13 % (ref 15–50)
IRON SERPL-MCNC: 39 UG/DL (ref 50–212)
LYMPHOCYTES # BLD AUTO: 2.94 THOUSANDS/ΜL (ref 0.6–4.47)
LYMPHOCYTES NFR BLD AUTO: 44 % (ref 14–44)
MCH RBC QN AUTO: 27 PG (ref 26.8–34.3)
MCHC RBC AUTO-ENTMCNC: 31.6 G/DL (ref 31.4–37.4)
MCV RBC AUTO: 86 FL (ref 82–98)
MONOCYTES # BLD AUTO: 0.5 THOUSAND/ΜL (ref 0.17–1.22)
MONOCYTES NFR BLD AUTO: 7 % (ref 4–12)
NEUTROPHILS # BLD AUTO: 2.75 THOUSANDS/ΜL (ref 1.85–7.62)
NEUTS SEG NFR BLD AUTO: 40 % (ref 43–75)
NRBC BLD AUTO-RTO: 0 /100 WBCS
PLATELET # BLD AUTO: 223 THOUSANDS/UL (ref 149–390)
PMV BLD AUTO: 13.4 FL (ref 8.9–12.7)
POTASSIUM SERPL-SCNC: 3.8 MMOL/L (ref 3.5–5.3)
PROT SERPL-MCNC: 7.6 G/DL (ref 6.4–8.4)
PROTHROMBIN TIME: 13.9 SECONDS (ref 12.3–15)
RBC # BLD AUTO: 4.77 MILLION/UL (ref 3.81–5.12)
RETICS # AUTO: NORMAL 10*3/UL (ref 14097–95744)
RETICS # CALC: 0.65 % (ref 0.37–1.87)
RH BLD: POSITIVE
SODIUM SERPL-SCNC: 137 MMOL/L (ref 135–147)
SPECIMEN EXPIRATION DATE: NORMAL
TIBC SERPL-MCNC: 303.8 UG/DL (ref 250–450)
TRANSFERRIN SERPL-MCNC: 217 MG/DL (ref 203–362)
UIBC SERPL-MCNC: 265 UG/DL (ref 155–355)
WBC # BLD AUTO: 6.84 THOUSAND/UL (ref 4.31–10.16)

## 2025-01-09 PROCEDURE — 85025 COMPLETE CBC W/AUTO DIFF WBC: CPT

## 2025-01-09 PROCEDURE — 83550 IRON BINDING TEST: CPT

## 2025-01-09 PROCEDURE — 93005 ELECTROCARDIOGRAM TRACING: CPT

## 2025-01-09 PROCEDURE — 85610 PROTHROMBIN TIME: CPT

## 2025-01-09 PROCEDURE — 85730 THROMBOPLASTIN TIME PARTIAL: CPT

## 2025-01-09 PROCEDURE — 86901 BLOOD TYPING SEROLOGIC RH(D): CPT

## 2025-01-09 PROCEDURE — 82728 ASSAY OF FERRITIN: CPT

## 2025-01-09 PROCEDURE — 80053 COMPREHEN METABOLIC PANEL: CPT

## 2025-01-09 PROCEDURE — 86900 BLOOD TYPING SEROLOGIC ABO: CPT

## 2025-01-09 PROCEDURE — 83540 ASSAY OF IRON: CPT

## 2025-01-09 PROCEDURE — 36415 COLL VENOUS BLD VENIPUNCTURE: CPT

## 2025-01-09 PROCEDURE — 86850 RBC ANTIBODY SCREEN: CPT

## 2025-01-09 PROCEDURE — 83036 HEMOGLOBIN GLYCOSYLATED A1C: CPT

## 2025-01-09 PROCEDURE — 85045 AUTOMATED RETICULOCYTE COUNT: CPT

## 2025-01-09 PROCEDURE — 87081 CULTURE SCREEN ONLY: CPT

## 2025-01-09 NOTE — TELEPHONE ENCOUNTER
Spoke with patient to inform that per Provider, stop aspirin 5 days prior to surgery. Patient understood.

## 2025-01-09 NOTE — TELEPHONE ENCOUNTER
"Called and spoke with patient.    I provided patient with [facility] phone number. Patient states \"I already spoke with them and they sad to call you\" Patient reports \"they reviewed all my medications but said they were unsure of the aspirin.\" I will reach out to PAT to inquire at this time.   "

## 2025-01-09 NOTE — PRE-PROCEDURE INSTRUCTIONS
"Pre-Surgery Instructions:   Medication Instructions    albuterol (PROVENTIL HFA,VENTOLIN HFA) 90 mcg/act inhaler Uses PRN- OK to take day of surgery    ascorbic acid (VITAMIN C) 500 MG tablet Hold day of surgery.    aspirin (ECOTRIN LOW STRENGTH) 81 mg EC tablet Msg'd pool    carisoprodol (SOMA) 350 mg tablet Take day of surgery.    diazepam (VALIUM) 5 mg tablet Take night before surgery    ferrous sulfate 324 (65 Fe) mg Hold day of surgery.    fluticasone (FLONASE) 50 mcg/act nasal spray Uses PRN- OK to take day of surgery    Fluticasone-Salmeterol (Advair) 500-50 mcg/dose inhaler Take day of surgery.    folic acid (FOLVITE) 1 mg tablet Hold day of surgery.    morphine (MS CONTIN) 15 mg 12 hr tablet Take day of surgery.    Multiple Vitamins-Minerals (multivitamin with minerals) tablet Hold day of surgery.    mupirocin (BACTROBAN) 2 % ointment Take day of surgery.    nystatin (MYCOSTATIN) powder Stop taking 1 day prior to surgery.    patient supplied medication Pt wears dexcom    pregabalin (LYRICA) 200 MG capsule Take day of surgery.    rosuvastatin (CRESTOR) 5 mg tablet Take day of surgery.    Tirzepatide (Mounjaro) 7.5 MG/0.5ML SOAJ Stop taking 7 days prior to surgery.      Reviewed with patient, in detail, instructions from \"My Surgical Experience\". Verified with patient that [facility]'s office. Advised to call ortho office/surgery coordinator with any questions and/or concerns.   Confirmed that patient has hibiclens soap and CHG wipes. Incentive spirometer received.   Patient verbalized understanding of current visitor restrictions due to Covid and will clarify with nurse DOS. Instructed to avoid all ASA and OTC Vit/Supp 1 week prior to surgery and to avoid NSAIDs 7 days prior to surgery per anesthesia guidelines.Tylenol ok to take prn.   No alcohol 24 hours prior to surgery. Patient aware Lovenox or other Blood Thinner prescribed is for POST OP ONLY. Instructed to call " surgeon's office in meantime with any new illness.  Patient verbalized an understanding of all instructions reviewed and offers no concerns at this time.

## 2025-01-09 NOTE — TELEPHONE ENCOUNTER
Caller: Patient    Doctor: Rick    Reason for call: Patient called has surgery scheduled 1/20, has questions when to discontinue aspirin prior to surgery. Please advise.    Call back#: [phone]

## 2025-01-10 DIAGNOSIS — M17.11 PRIMARY OSTEOARTHRITIS OF RIGHT KNEE: ICD-10-CM

## 2025-01-10 LAB
ATRIAL RATE: 76 BPM
EST. AVERAGE GLUCOSE BLD GHB EST-MCNC: 117 MG/DL
HBA1C MFR BLD: 5.7 %
MRSA NOSE QL CULT: NORMAL
P AXIS: 53 DEGREES
PR INTERVAL: 148 MS
QRS AXIS: 6 DEGREES
QRSD INTERVAL: 76 MS
QT INTERVAL: 372 MS
QTC INTERVAL: 419 MS
T WAVE AXIS: 47 DEGREES
VENTRICULAR RATE: 76 BPM

## 2025-01-10 PROCEDURE — 93010 ELECTROCARDIOGRAM REPORT: CPT | Performed by: INTERNAL MEDICINE

## 2025-01-13 RX ORDER — FERROUS SULFATE 324(65)MG
324 TABLET, DELAYED RELEASE (ENTERIC COATED) ORAL EVERY OTHER DAY
Qty: 15 TABLET | Refills: 1 | Status: SHIPPED | OUTPATIENT
Start: 2025-01-13 | End: 2025-03-14

## 2025-01-15 ENCOUNTER — OFFICE VISIT (OUTPATIENT)
Age: 60
End: 2025-01-15
Payer: COMMERCIAL

## 2025-01-15 VITALS
HEIGHT: 68 IN | DIASTOLIC BLOOD PRESSURE: 90 MMHG | WEIGHT: 260 LBS | SYSTOLIC BLOOD PRESSURE: 154 MMHG | BODY MASS INDEX: 39.4 KG/M2 | HEART RATE: 91 BPM

## 2025-01-15 DIAGNOSIS — G45.9 TIA (TRANSIENT ISCHEMIC ATTACK): ICD-10-CM

## 2025-01-15 DIAGNOSIS — Z96.652 S/P REVISION OF TOTAL KNEE, LEFT: ICD-10-CM

## 2025-01-15 DIAGNOSIS — K21.9 GASTROESOPHAGEAL REFLUX DISEASE WITHOUT ESOPHAGITIS: ICD-10-CM

## 2025-01-15 DIAGNOSIS — Z01.818 PREOP EXAM FOR INTERNAL MEDICINE: ICD-10-CM

## 2025-01-15 DIAGNOSIS — M17.11 PRIMARY OSTEOARTHRITIS OF RIGHT KNEE: Primary | ICD-10-CM

## 2025-01-15 DIAGNOSIS — I10 ESSENTIAL HYPERTENSION: Chronic | ICD-10-CM

## 2025-01-15 DIAGNOSIS — E11.65 TYPE 2 DIABETES MELLITUS WITH HYPERGLYCEMIA, WITH LONG-TERM CURRENT USE OF INSULIN (HCC): ICD-10-CM

## 2025-01-15 DIAGNOSIS — E66.01 MORBID OBESITY (HCC): ICD-10-CM

## 2025-01-15 DIAGNOSIS — E78.49 OTHER HYPERLIPIDEMIA: ICD-10-CM

## 2025-01-15 DIAGNOSIS — Z79.4 TYPE 2 DIABETES MELLITUS WITH HYPERGLYCEMIA, WITH LONG-TERM CURRENT USE OF INSULIN (HCC): ICD-10-CM

## 2025-01-15 PROCEDURE — 99215 OFFICE O/P EST HI 40 MIN: CPT | Performed by: INTERNAL MEDICINE

## 2025-01-15 NOTE — PATIENT INSTRUCTIONS
BEFORE SURGERY    Contact your surgical nurse navigator or surgical provider with any questions regarding preoperative plan or schedule.  Stop all over the counter supplements, herbal, naturopathic  medications for 1 week prior to surgery UNLESS prescribed by your surgeon  Hold NSAIDS (i.e. advil, alleve, motrin, ibuprofen, celebrex) minimum 5 days prior to surgery  Follow presurgical medication instructions provided by preadmission nursing team reviewed during your presurgery phone call  Strategies for optimizing your surgery through breathing exercises, nutrition and physical activity can be found at [email or web address]  Call [phone] with any presurgical concerns or medications questions or use the messaging feature in your [employer] abad to contact your provider    AFTER SURGERY    Recommend using Tylenol ( acetaminophen ) 1000 mg every eight hours during the first week post discharge along with icing the area for 20 mins every 3-4 hours while awake can be helpful in reducing your need for post operative opioid use. This opioid sparing plan can be used along side your surgeons pain plan.  Use stool softener over the counter (colace) daily after surgery during the first 1-2 weeks to avoid post operative constipation issues  If no bowel movement within 3 days after surgery then use over the counter Miralax in addition to your stool softener   If cleared by your surgical team for activity then early and often walking is encouraged and can be important in prevention of post surgical blood clots. Additionally spend as much time out of bed as possible and allowed by your surgical team  Use your incentive spirometer twice per hour in the first seven days after surgery to help prevent post surgery lung complications and infections  It is very important you follow the instructions from your surgeon regarding any medications for after surgery blood clot prevention. Compliance with these medications or interventions is very  important.  Call [phone] with any post discharge concerns or medical issues or use the messaging feature in your [employer] abad to contact your provider

## 2025-01-20 ENCOUNTER — APPOINTMENT (OUTPATIENT)
Age: 60
End: 2025-01-20
Payer: COMMERCIAL

## 2025-01-20 ENCOUNTER — HOSPITAL ENCOUNTER (OUTPATIENT)
Age: 60
Setting detail: OUTPATIENT SURGERY
Discharge: HOME | End: 2025-01-20
Attending: STUDENT IN AN ORGANIZED HEALTH CARE EDUCATION/TRAINING PROGRAM | Admitting: STUDENT IN AN ORGANIZED HEALTH CARE EDUCATION/TRAINING PROGRAM
Payer: COMMERCIAL

## 2025-01-20 ENCOUNTER — ANESTHESIA (OUTPATIENT)
Age: 60
End: 2025-01-20
Payer: COMMERCIAL

## 2025-01-20 VITALS
TEMPERATURE: 97.1 F | RESPIRATION RATE: 16 BRPM | OXYGEN SATURATION: 96 % | WEIGHT: 259.4 LBS | SYSTOLIC BLOOD PRESSURE: 124 MMHG | DIASTOLIC BLOOD PRESSURE: 73 MMHG | HEIGHT: 68 IN | HEART RATE: 72 BPM | BODY MASS INDEX: 39.31 KG/M2

## 2025-01-20 DIAGNOSIS — M17.11 PRIMARY OSTEOARTHRITIS OF RIGHT KNEE: Primary | ICD-10-CM

## 2025-01-20 LAB
GLUCOSE SERPL-MCNC: 105 MG/DL (ref 65–140)
GLUCOSE SERPL-MCNC: 94 MG/DL (ref 65–140)

## 2025-01-20 PROCEDURE — 82948 REAGENT STRIP/BLOOD GLUCOSE: CPT

## 2025-01-20 PROCEDURE — 97163 PT EVAL HIGH COMPLEX 45 MIN: CPT | Performed by: PHYSICAL THERAPIST

## 2025-01-20 PROCEDURE — C1713 ANCHOR/SCREW BN/BN,TIS/BN: HCPCS | Performed by: STUDENT IN AN ORGANIZED HEALTH CARE EDUCATION/TRAINING PROGRAM

## 2025-01-20 PROCEDURE — C1776 JOINT DEVICE (IMPLANTABLE): HCPCS | Performed by: STUDENT IN AN ORGANIZED HEALTH CARE EDUCATION/TRAINING PROGRAM

## 2025-01-20 PROCEDURE — 73560 X-RAY EXAM OF KNEE 1 OR 2: CPT

## 2025-01-20 PROCEDURE — 27447 TOTAL KNEE ARTHROPLASTY: CPT | Performed by: STUDENT IN AN ORGANIZED HEALTH CARE EDUCATION/TRAINING PROGRAM

## 2025-01-20 PROCEDURE — 27447 TOTAL KNEE ARTHROPLASTY: CPT

## 2025-01-20 PROCEDURE — 97167 OT EVAL HIGH COMPLEX 60 MIN: CPT

## 2025-01-20 PROCEDURE — A6250 SKIN SEAL PROTECT MOISTURIZR: HCPCS | Performed by: STUDENT IN AN ORGANIZED HEALTH CARE EDUCATION/TRAINING PROGRAM

## 2025-01-20 DEVICE — TIBIAL INSERT FIXED SPHERE FLEX   #3/12 MM R E-CROSS
Type: IMPLANTABLE DEVICE | Site: KNEE | Status: FUNCTIONAL
Brand: GMK SPHERE TOTAL KNEE SYSTEM

## 2025-01-20 DEVICE — FIXED TIBIAL TRAY CEMENTED RIGHT, SIZE 3
Type: IMPLANTABLE DEVICE | Site: KNEE | Status: FUNCTIONAL
Brand: GMK PRIMARY TOTAL KNEE SYSTEM

## 2025-01-20 DEVICE — SMARTSET HIGH PERFORMANCE MV MEDIUM VISCOSITY BONE CEMENT 40G
Type: IMPLANTABLE DEVICE | Status: FUNCTIONAL
Brand: SMARTSET

## 2025-01-20 DEVICE — FEMORAL COMPONENT SPHERIKA CEMENTED  S3+R
Type: IMPLANTABLE DEVICE | Site: KNEE | Status: FUNCTIONAL
Brand: GMK TOTAL KNEE SYSTEM

## 2025-01-20 RX ORDER — DOCUSATE SODIUM 100 MG/1
100 CAPSULE, LIQUID FILLED ORAL 2 TIMES DAILY
Status: CANCELLED | OUTPATIENT
Start: 2025-01-20

## 2025-01-20 RX ORDER — TRANEXAMIC ACID 10 MG/ML
1000 INJECTION, SOLUTION INTRAVENOUS ONCE
Status: COMPLETED | OUTPATIENT
Start: 2025-01-20 | End: 2025-01-20

## 2025-01-20 RX ORDER — CHLORHEXIDINE GLUCONATE 40 MG/ML
SOLUTION TOPICAL DAILY PRN
Status: DISCONTINUED | OUTPATIENT
Start: 2025-01-20 | End: 2025-01-20 | Stop reason: HOSPADM

## 2025-01-20 RX ORDER — CHLORHEXIDINE GLUCONATE ORAL RINSE 1.2 MG/ML
15 SOLUTION DENTAL ONCE
Status: COMPLETED | OUTPATIENT
Start: 2025-01-20 | End: 2025-01-20

## 2025-01-20 RX ORDER — ASPIRIN 81 MG/1
81 TABLET ORAL 2 TIMES DAILY
Qty: 84 TABLET | Refills: 0 | Status: SHIPPED | OUTPATIENT
Start: 2025-01-20 | End: 2025-03-03

## 2025-01-20 RX ORDER — ACETAMINOPHEN 325 MG/1
975 TABLET ORAL ONCE
Status: COMPLETED | OUTPATIENT
Start: 2025-01-20 | End: 2025-01-20

## 2025-01-20 RX ORDER — SODIUM CHLORIDE, SODIUM LACTATE, POTASSIUM CHLORIDE, CALCIUM CHLORIDE 600; 310; 30; 20 MG/100ML; MG/100ML; MG/100ML; MG/100ML
125 INJECTION, SOLUTION INTRAVENOUS CONTINUOUS
Status: DISCONTINUED | OUTPATIENT
Start: 2025-01-20 | End: 2025-01-20 | Stop reason: HOSPADM

## 2025-01-20 RX ORDER — VANCOMYCIN HYDROCHLORIDE 1 G/20ML
INJECTION, POWDER, LYOPHILIZED, FOR SOLUTION INTRAVENOUS AS NEEDED
Status: DISCONTINUED | OUTPATIENT
Start: 2025-01-20 | End: 2025-01-20 | Stop reason: HOSPADM

## 2025-01-20 RX ORDER — SODIUM CHLORIDE, SODIUM LACTATE, POTASSIUM CHLORIDE, CALCIUM CHLORIDE 600; 310; 30; 20 MG/100ML; MG/100ML; MG/100ML; MG/100ML
1.5 INJECTION, SOLUTION INTRAVENOUS CONTINUOUS
Status: CANCELLED | OUTPATIENT
Start: 2025-01-20

## 2025-01-20 RX ORDER — SENNOSIDES 8.6 MG
1 TABLET ORAL DAILY
Status: CANCELLED | OUTPATIENT
Start: 2025-01-20

## 2025-01-20 RX ORDER — CEFAZOLIN SODIUM 1 G/50ML
1000 SOLUTION INTRAVENOUS EVERY 8 HOURS
Status: CANCELLED | OUTPATIENT
Start: 2025-01-20 | End: 2025-01-21

## 2025-01-20 RX ORDER — ACETAMINOPHEN 500 MG
1000 TABLET ORAL EVERY 8 HOURS PRN
Qty: 84 TABLET | Refills: 0 | Status: SHIPPED | OUTPATIENT
Start: 2025-01-20 | End: 2025-02-03

## 2025-01-20 RX ORDER — CELECOXIB 200 MG/1
200 CAPSULE ORAL 2 TIMES DAILY
Qty: 60 CAPSULE | Refills: 0 | Status: SHIPPED | OUTPATIENT
Start: 2025-01-20 | End: 2025-02-19

## 2025-01-20 RX ORDER — SODIUM CHLORIDE, SODIUM LACTATE, POTASSIUM CHLORIDE, CALCIUM CHLORIDE 600; 310; 30; 20 MG/100ML; MG/100ML; MG/100ML; MG/100ML
INJECTION, SOLUTION INTRAVENOUS CONTINUOUS PRN
Status: DISCONTINUED | OUTPATIENT
Start: 2025-01-20 | End: 2025-01-20

## 2025-01-20 RX ORDER — FENTANYL CITRATE 50 UG/ML
25 INJECTION, SOLUTION INTRAMUSCULAR; INTRAVENOUS
Refills: 0 | Status: DISCONTINUED | OUTPATIENT
Start: 2025-01-20 | End: 2025-01-20 | Stop reason: HOSPADM

## 2025-01-20 RX ORDER — BUPIVACAINE HYDROCHLORIDE 2.5 MG/ML
INJECTION, SOLUTION EPIDURAL; INFILTRATION; INTRACAUDAL
Status: COMPLETED | OUTPATIENT
Start: 2025-01-20 | End: 2025-01-20

## 2025-01-20 RX ORDER — OXYCODONE HYDROCHLORIDE 5 MG/1
5 TABLET ORAL EVERY 4 HOURS PRN
Qty: 30 TABLET | Refills: 0 | Status: SHIPPED | OUTPATIENT
Start: 2025-01-20

## 2025-01-20 RX ORDER — PROPOFOL 10 MG/ML
INJECTION, EMULSION INTRAVENOUS CONTINUOUS PRN
Status: DISCONTINUED | OUTPATIENT
Start: 2025-01-20 | End: 2025-01-20

## 2025-01-20 RX ORDER — CALCIUM CARBONATE 500 MG/1
1000 TABLET, CHEWABLE ORAL DAILY PRN
Status: CANCELLED | OUTPATIENT
Start: 2025-01-20

## 2025-01-20 RX ORDER — MAGNESIUM HYDROXIDE/ALUMINUM HYDROXICE/SIMETHICONE 120; 1200; 1200 MG/30ML; MG/30ML; MG/30ML
30 SUSPENSION ORAL EVERY 6 HOURS PRN
Status: CANCELLED | OUTPATIENT
Start: 2025-01-20

## 2025-01-20 RX ORDER — ONDANSETRON 2 MG/ML
4 INJECTION INTRAMUSCULAR; INTRAVENOUS EVERY 6 HOURS PRN
Status: CANCELLED | OUTPATIENT
Start: 2025-01-20

## 2025-01-20 RX ORDER — GABAPENTIN 300 MG/1
300 CAPSULE ORAL
Status: CANCELLED | OUTPATIENT
Start: 2025-01-20

## 2025-01-20 RX ORDER — MIDAZOLAM HYDROCHLORIDE 2 MG/2ML
INJECTION, SOLUTION INTRAMUSCULAR; INTRAVENOUS AS NEEDED
Status: DISCONTINUED | OUTPATIENT
Start: 2025-01-20 | End: 2025-01-20

## 2025-01-20 RX ORDER — ACETAMINOPHEN 325 MG/1
650 TABLET ORAL EVERY 6 HOURS PRN
Status: CANCELLED | OUTPATIENT
Start: 2025-01-20

## 2025-01-20 RX ORDER — CEFAZOLIN SODIUM 2 G/50ML
2000 SOLUTION INTRAVENOUS ONCE
Status: COMPLETED | OUTPATIENT
Start: 2025-01-20 | End: 2025-01-20

## 2025-01-20 RX ORDER — OXYCODONE HYDROCHLORIDE 10 MG/1
10 TABLET ORAL EVERY 4 HOURS PRN
Refills: 0 | Status: CANCELLED | OUTPATIENT
Start: 2025-01-20

## 2025-01-20 RX ORDER — OXYCODONE HYDROCHLORIDE 5 MG/1
5 TABLET ORAL EVERY 4 HOURS PRN
Status: DISCONTINUED | OUTPATIENT
Start: 2025-01-20 | End: 2025-01-20 | Stop reason: HOSPADM

## 2025-01-20 RX ORDER — MAGNESIUM HYDROXIDE 1200 MG/15ML
LIQUID ORAL AS NEEDED
Status: DISCONTINUED | OUTPATIENT
Start: 2025-01-20 | End: 2025-01-20 | Stop reason: HOSPADM

## 2025-01-20 RX ADMIN — CEFAZOLIN SODIUM 2000 MG: 2 SOLUTION INTRAVENOUS at 07:43

## 2025-01-20 RX ADMIN — PHENYLEPHRINE HYDROCHLORIDE 20 MCG/MIN: 10 INJECTION INTRAVENOUS at 07:57

## 2025-01-20 RX ADMIN — TRANEXAMIC ACID 1000 MG: 10 INJECTION, SOLUTION INTRAVENOUS at 07:52

## 2025-01-20 RX ADMIN — PROPOFOL 40 MCG/KG/MIN: 10 INJECTION, EMULSION INTRAVENOUS at 07:47

## 2025-01-20 RX ADMIN — ACETAMINOPHEN 325MG 975 MG: 325 TABLET ORAL at 06:44

## 2025-01-20 RX ADMIN — CHLORHEXIDINE GLUCONATE 15 ML: 1.2 RINSE ORAL at 06:44

## 2025-01-20 RX ADMIN — TRANEXAMIC ACID 1000 MG: 10 INJECTION, SOLUTION INTRAVENOUS at 08:56

## 2025-01-20 RX ADMIN — OXYCODONE 5 MG: 5 TABLET ORAL at 10:53

## 2025-01-20 RX ADMIN — BUPIVACAINE 10 ML: 13.3 INJECTION, SUSPENSION, LIPOSOMAL INFILTRATION at 07:15

## 2025-01-20 RX ADMIN — FENTANYL CITRATE 25 MCG: 50 INJECTION INTRAMUSCULAR; INTRAVENOUS at 09:34

## 2025-01-20 RX ADMIN — SODIUM CHLORIDE, SODIUM LACTATE, POTASSIUM CHLORIDE, AND CALCIUM CHLORIDE: .6; .31; .03; .02 INJECTION, SOLUTION INTRAVENOUS at 07:30

## 2025-01-20 RX ADMIN — SODIUM CHLORIDE, SODIUM LACTATE, POTASSIUM CHLORIDE, AND CALCIUM CHLORIDE 125 ML/HR: .6; .31; .03; .02 INJECTION, SOLUTION INTRAVENOUS at 06:48

## 2025-01-20 RX ADMIN — BUPIVACAINE HYDROCHLORIDE 10 ML: 2.5 INJECTION, SOLUTION EPIDURAL; INFILTRATION; INTRACAUDAL; PERINEURAL at 07:15

## 2025-01-20 RX ADMIN — MEPIVACAINE HYDROCHLORIDE 3.5 ML: 15 INJECTION, SOLUTION EPIDURAL; INFILTRATION at 07:39

## 2025-01-20 RX ADMIN — PHENYLEPHRINE HYDROCHLORIDE 50 MCG/MIN: 10 INJECTION INTRAVENOUS at 07:45

## 2025-01-20 RX ADMIN — MIDAZOLAM 2 MG: 1 INJECTION INTRAMUSCULAR; INTRAVENOUS at 07:08

## 2025-01-20 NOTE — ANESTHESIA POSTPROCEDURE EVALUATION
Post-Op Assessment Note    CV Status:  Stable    Pain management: adequate       Mental Status:  Alert and awake   Hydration Status:  Euvolemic   PONV Controlled:  Controlled   Airway Patency:  Patent  Two or more mitigation strategies used for obstructive sleep apnea   Post Op Vitals Reviewed: Yes    No anethesia notable event occurred.    Staff: Anesthesiologist       Last Filed PACU Vitals:  Vitals Value Taken Time   Temp 97.1 °F (36.2 °C) 01/20/25 1000   Pulse 84 01/20/25 1000   /53 01/20/25 1000   Resp 14 01/20/25 1000   SpO2 96 % 01/20/25 1000       Modified Guru:     Vitals Value Taken Time   Activity 2 01/20/25 1000   Respiration 2 01/20/25 1000   Circulation 2 01/20/25 1000   Consciousness 1 01/20/25 1000   Oxygen Saturation 2 01/20/25 1000     Modified Guru Score: 9

## 2025-01-20 NOTE — OP NOTE
OPERATIVE REPORT  PATIENT NAME: Anna Marie Barbosa  : 1965  MRN: [id number]  Pt Location:  [facility] OR ROOM 03    Surgery Date: 2025    Surgeons and Role:     * Joe Cabrera MD - Primary     * Brad Cunningham PA-C - Assisting     Preop Diagnosis:  Primary osteoarthritis of right knee [M17.11]    Post-Op Diagnosis Codes:     * Primary osteoarthritis of right knee [M17.11]    Procedure(s):  Right - ARTHROPLASTY KNEE TOTAL. RIGHT-SAME DAY    Specimens:  * No specimens in log *    Estimated Blood Loss:   Minimal    Drains:  * No LDAs found *    Anesthesia Type:   Spinal     Operative Indications:  Primary osteoarthritis of right knee [M17.11]  See clinic note for complete list of indications    Operative Findings:  Full-thickness chondral wear to the medial and lateral compartments  Well-preserved patellar shape    Complications:   None    Knee Approach: Medial Parapatellar    Chronic Narcotic Use:  No      Procedure and Technique:  IMPLANTS:    1.  Medacta SpheriKA size 3+ femur  2.  Medacta SpheriKA size 3 tibial baseplate.   3.  Medacta SpheriKA tibial liner thickness 12mm.       DESCRIPTION OF OPERATION:   After adequate anesthesia was induced, the patient was placed on the operating table and all bony prominences were padded.  An upper thigh tourniquet was placed and the right lower extremity was prepped and draped in the usual sterile fashion. A time out was performed verifying the patient, procedure, laterality and implants.  All were in agreement and the procedure began.    The limb was elevated for exsanguinated and the tourniquet was inflated to 275 mmHg.  During the dissection, there was noted to be persistent venous bleeding prompting the deflation of the tourniquet at roughly 4 minutes.  Our planned incision was made with a 10 blade scalpel. Dissection was carried down sharply through the skin and subcutaneous fat to the level of the extensor mechanism.  Full-thickness medial and lateral flaps were  raised with Metzenbaum adriana.  A clean #10 scalpel was then used to perform a medial parapatellar arthrotomy.  Electrocautery was then used to perform a medial proximal tibial capsular peel, excision of the suprapatellar fat pad, and excision of the retropatellar fat pad.  The knee was brought to flexion allowing for excision of the ACL and anterior horn of the lateral meniscus.    The patella was then everted and inspected, peripheral osteophytes were removed with a rongeur and peripatellar neurolysis was performed. The decision was made to maintain the native patella     Our attention then turned to the distal femur.  The cartilage was inspected and was found to have full thickness chondral lesions on the Medial and Lateral condyles. Any remaining cartilage on the damaged condyle(s) was removed with a ring curette. A drill hole was placed in the distal femur and the fluted intramedullary guide román was placed down the canal.  A distal femoral cutting guide was affixed to the femur and an oscillating saw was used to perform the distal femoral osteotomy.  The distal femoral resections were measured with calipers and recuts were made as needed. The posterior femoral condyles were then inspected for chondral lesions. Again, remaining cartilage on damaged condyle(s) was removed. The femoral sizing guide was then used and the appropriate component size was selected.  The 4-in-1 cutting block was affixed to the distal femur ensuring not to notch the anterior cortex.  Posterior condyles were cut and the bony fragments were again measured with calipers.  Recuts were made as needed.  An oscillating saw was then used to complete the remaining cuts.    Our attention then turned to the proximal tibia. An extramedullary cutting guide was pinned in place with the appropriate slope, coronal angulation and depth of cut.  Being sure to protect soft tissues, the proximal tibia was resected with an oscillating saw. The tibial  resection was removed, the proximal tibia was sized and the knee was brought into extension where the menisci were removed with electrocautery. The knee was then flexed and posterior femoral osteophytes were removed with an osteotome and curette. A sizing block was inserted in both flexion and extension. The tibia was re-cut if necessary to achieve excellent balance in both planes.     A tibial trial was pinned in place followed by the trial femur and liner. The knee was taken through range of motion and found to have excellent stability throughout the arc of motion.  Trials were removed, pericapsular tissues were injected with local anesthetic and the knee was thoroughly irrigated with sterile saline.    The femoral and tibial bone ends were meticulously jet lavaged with saline and dried. Methyl methacrylate bone cement was then pressurized onto the cancellous surfaces of the tibia and the femur. The prosthetic components were inserted and the knee was placed into extension allowing for the cement to polymerize. The tourniquet was deflated and hemostasis was achieved with electrocautery.  Local anesthesia was infiltrated to the pericapsular tissues.  Dilute betadine solution, normal saline, and Irricept solution were used sequentially to irrigate the soft tissues prior to closure.     At this point, our attention turned to closure. 1g Vancomycin powder was placed into the wound. The knee joint and extensor mechanism was closed using interrupted #1 vicryl suture and running #1 barbed suture. 2-0 vicryl interrupted sutures were used to close subcutaneous tissue. 3-0 monocryl suture was then used to close the skin edges. A glue and mesh dressing was then placed over the incision. A sterile compressive dressing was applied, the patient was awakened, and sent to the recovery room in stable condition. There were no complications. The sponge and needle counts were given as correct x 2.     No qualified resident was  available to participate in this case. Colby Cunningham PA-c was necessary to assist with positioning the patient, prepping and draping, and assisting with wound closure. I was present and performed all the key portions of the procedure.       SIGNATURE: Joe Cabrera MD  DATE: January 20, 2025  TIME: 9:39 AM

## 2025-01-20 NOTE — ANESTHESIA POSTPROCEDURE EVALUATION
Post-Op Assessment Note    CV Status:  Stable  Pain Score: 0    Pain management: adequate       Mental Status:  Alert and awake   Hydration Status:  Euvolemic   PONV Controlled:  Controlled   Airway Patency:  Patent     Post Op Vitals Reviewed: Yes    No anethesia notable event occurred.    Staff: CRNA           Last Filed PACU Vitals:  Vitals Value Taken Time   Temp     Pulse 76    /62    Resp 12    SpO2 98

## 2025-01-20 NOTE — DISCHARGE INSTR - AVS FIRST PAGE
Joe Cabrera MD  Adult Reconstruction Specialist   [facility]  Office Phone: [phone]    Discharge Instructions - Knee Replacement    What to Expect/Activity  You are weight bearing as tolerated to your operative leg with assistive devices.  Please use crutches/walker when ambulating as needed until your follow-up  Significant swelling and discomfort in the knee is normal for several days to weeks after surgery. You may use ice around the knee to help as desired. This can be used for 20-30 minutes every 1-2 hours  To optimally control swelling, your leg should be elevated above heart level as often as necessary. This can greatly improve post operative pain levels, due to uncontrolled swelling.   For the first several weeks after surgery, it is normal to experience redness, swelling, brusing and pain to the operative knee. This is generally not a sign of concern or an abnormal finding.    Post-operative Objectives  In effort to restore your knee range of motion, it is essential to immediately begin flexing your knee. Do so several times a day with the goal of achieving at or around 90 degrees by the time we see you 2 weeks post operatively.   There are no restrictions as to how often you can perform this knee motion. Your knee implant and incision are able to withstand these types of movement  You may utilize the physical therapy exercise packet provided to you by the physical therapy team. This should have been given to you during their initial assessment in the hospital.  If we feel it is appropriate, we will initiate formal physical therapy 2 weeks after surgery. Once we see you at the first post operative visit, we will decide if this is the best course of action and of benefit to you.   If you have any questions regarding the above objectives, please do not hesistate to call our office for clarification.        Dressing/Wound Care/Bathing  After surgery, your knee will be  wrapped in an ace wrap, toe to mid thigh, with a gray waterproof adhesive dressing underneath protecting the incision  You may remove your ace wrap 5 days after surgery and may be re-applied and/or tightened/loosened as necessary. I recommend to re-wrap your leg if swelling is an issue.   You may start showering 5 days after surgery. When drying off, please pat the dressing dry. If you notice the dressing appears saturated or is starting to come off, please over-wrap with dry dressing.  If you shower too early, there is a higher chance of infection and wound healing complications.  No baths, swimming or submerging until cleared by Dr. Cabrera    Pain Management/Medications  You may resume your usual medications.  Please take the following medications:  Anti-coagulation (blood clot prevention) - Aspirin 81 mg, 1 tablet twice daily for 6 weeks  Antibiotics - none  Pain medication:  Narcotic Medication: Oxycodone 5 mg, 1 tablet every 4-6 hours as needed for severe pain  NSAID (Anti-inflammatory): Celebrex 200 mg, 1 tablet twice a day as needed for mild to moderate pain  Tylenol 1000mg up to three times daily as needed for mild to moderate pain. Do not exceed 3000mg daily   If you have questions or pain concerns, please contact the office. Pain medication cannot remove all post-operative pain    Follow up/Call if:  The findings of your surgery will be explained to you and your family immediately after surgery. However, in the post-operative period, during recovery from anesthesia you may not fully remember or fully understand what was said. This will be again gone over when you return for your post-op appointment.  Please contact Dr. Cabrera's office if you experience the following:  Excessive bleeding (bleeding through your dressing)  Fever greater than 101 degrees F after 48 hours (low grade fevers the day or two after surgery are normal)  Persistent nausea or vomiting  Decreased sensation or discoloration of  the operative limb  Pain or swelling that is getting worse and not better with medication      Dr. Cabrera's Office Contact: [phone]

## 2025-01-20 NOTE — INTERVAL H&P NOTE
H&P reviewed. After examining the patient I find no changes in the patients condition since the H&P had been written.    Vitals:    01/20/25 0708   BP: 106/57   Pulse: 84   Resp: 17   Temp:    SpO2: 95%     Plan for RIGHT total knee arthroplasty

## 2025-01-20 NOTE — ANESTHESIA PREPROCEDURE EVALUATION
Procedure:  ARTHROPLASTY KNEE TOTAL, RIGHT-SAME DAY (Right: Knee)    Had left knee revision arthroplasty about 4 months ago under spinal anesthesia and nerve block with no anesthesia complication   Stopped Monjauro about 10 days ago     Relevant Problems   ANESTHESIA (within normal limits)      CARDIO   (+) Essential hypertension   (+) Other hyperlipidemia      ENDO   (+) Type 2 diabetes mellitus with hyperglycemia, with long-term current use of insulin (HCC)      GI/HEPATIC   (+) Gastroesophageal reflux disease without esophagitis      /RENAL (within normal limits)      GYN (within normal limits)      HEMATOLOGY (within normal limits)      MUSCULOSKELETAL   (+) Primary osteoarthritis of right knee      NEURO/PSYCH   (+) Chronic pain disorder   (+) Diabetic gastroparesis  (HCC)   (+) Diabetic polyneuropathy associated with type 2 diabetes mellitus (HCC)   (+) TIA (transient ischemic attack)      PULMONARY   (+) Asthma        Physical Exam    Airway    Mallampati score: II  TM Distance: >3 FB  Neck ROM: full     Dental   No notable dental hx     Cardiovascular  Rhythm: regular, Rate: normal, Cardiovascular exam normal    Pulmonary  Pulmonary exam normal Breath sounds clear to auscultation    Other Findings  post-pubertal.      Anesthesia Plan  ASA Score- 3     Anesthesia Type- spinal with ASA Monitors.         Additional Monitors:     Airway Plan:     Comment: Left adductor canal block for postoperative pain control.       Plan Factors-Exercise tolerance (METS): >4 METS.    Chart reviewed. EKG reviewed.  Existing labs reviewed. Patient summary reviewed.    Patient is not a current smoker.      Obstructive sleep apnea risk education given perioperatively.        Induction- intravenous.    Postoperative Plan- Plan for postoperative opioid use.     Perioperative Resuscitation Plan - Level 1 - Full Code.       Informed Consent- Anesthetic plan and risks discussed with patient, spouse and son.  I personally reviewed  this patient with the CRNA. Discussed and agreed on the Anesthesia Plan with the CRNA..      Recent labs personally reviewed:  Lab Results   Component Value Date    WBC 6.84 01/09/2025    HGB 12.9 01/09/2025     01/09/2025     Lab Results   Component Value Date    K 3.8 01/09/2025    BUN 13 01/09/2025    CREATININE 0.65 01/09/2025    GLUCOSE 300 (H) 05/13/2022     Lab Results   Component Value Date    PTT 36 (H) 01/09/2025      Lab Results   Component Value Date    INR 1.05 01/09/2025       Blood type A    Lab Results   Component Value Date    HGBA1C 5.7 (H) 01/09/2025

## 2025-01-20 NOTE — INTERVAL H&P NOTE
H&P reviewed. After examining the patient I find no changes in the patients condition since the H&P had been written.    Vitals:    01/20/25 0708   BP: 106/57   Pulse: 84   Resp: 17   Temp:    SpO2: 95%

## 2025-01-20 NOTE — PHYSICAL THERAPY NOTE
PT EVALUATION    Pt. Name: Anna Marie Barbosa  Pt. Age: 59 y.o.  MRN: [id number]  LENGTH OF STAY: 0    Patient Active Problem List   Diagnosis    Type 2 diabetes mellitus with hyperglycemia, with long-term current use of insulin (HCC)    Asthma    Opioid dependence with other opioid-induced disorder (HCC)    Diabetic gastroparesis  (HCC)    Essential hypertension    Morbid obesity (HCC)    Thyroid nodule    Sinus tachycardia    Urine retention    Ambulatory dysfunction    Diabetic polyneuropathy associated with type 2 diabetes mellitus (HCC)    Diabetic ulcer of toe of left foot associated with type 2 diabetes mellitus, limited to breakdown of skin (HCC)    Acquired absence of right great toe (HCC)    Acquired absence of other right toe(s) (HCC)    Chronic pain disorder    Gastroesophageal reflux disease without esophagitis    Other hyperlipidemia    TIA (transient ischemic attack)    S/P revision of total knee, left    Primary osteoarthritis of right knee       Admitting Diagnoses:   Primary osteoarthritis of right knee [M17.11]    Past Medical History:   Diagnosis Date    Acute respiratory insufficiency 11/23/2019    Allergies     Ambulates with cane     Anxiety     Asthma     Chronic narcotic dependence (HCC)     Chronic pain     Chronic pain disorder     back/knee    Diabetes (HCC)     HTN (hypertension)     elevated with electronic device    Hyperlipidemia     PONV (postoperative nausea and vomiting)     Sepsis (HCC) 11/23/2019    Sleep apnea     no cpap    Walker as ambulation aid        Past Surgical History:   Procedure Laterality Date    BACK SURGERY      x 3 with hardware    CERVICAL LAMINECTOMY      CHOLECYSTECTOMY LAPAROSCOPIC N/A 11/10/2018    Procedure: CHOLECYSTECTOMY LAPAROSCOPIC w/ ioc;  Surgeon: Benitez Cuenca MD;  Location: [facility];  Service: General    COLONOSCOPY      HYSTERECTOMY      JOINT REPLACEMENT Left     knee    NV REVJ TOT KNEE ARTHRP FEM&ENTIRE TIBIAL COMPONE Left 10/21/2024     Procedure: ARTHROPLASTY LEFT KNEE TOTAL REVISION,  2 components;  Surgeon: Joe Cabrera MD;  Location:  MAIN OR;  Service: Orthopedics    TOE AMPUTATION Right 02/02/2018    Procedure: AMPUTATION TOE, RIGHT GREAT TOE;  Surgeon: Lino Rodriguez DPM;  Location: [facility];  Service: Podiatry    TOE AMPUTATION Right     3rd toe       Imaging Studies:  XR knee 1 or 2 vw right    (Results Pending)        01/20/25 1315   PT Last Visit   PT Visit Date 01/20/25   Note Type   Note type Evaluation   Pain Assessment   Pain Assessment Tool 0-10   Pain Score 5   Pain Location/Orientation Orientation: Right;Location: Knee   Hospital Pain Intervention(s) Repositioned;Ambulation/increased activity;Emotional support;Elevated;Rest   Restrictions/Precautions   Weight Bearing Precautions Per Order Yes   RLE Weight Bearing Per Order WBAT   Other Precautions WBS;Fall Risk;Pain   Home Living   Type of Home House   Home Layout One level;Performs ADLs on one level;Able to live on main level with bedroom/bathroom;Stairs to enter with rails  (5 MATTHEW w/ L hand rail)   Bathroom Shower/Tub Tub/shower unit   Bathroom Toilet Standard   Bathroom Equipment Tub transfer bench   Home Equipment Walker;Cane   Prior Function   Level of [place] Independent with ADLs;Independent with functional mobility;Independent with IADLS  (w/ SPC)   Lives With Spouse   Receives Help From Family   IADLs Independent with driving;Independent with meal prep;Independent with medication management   Falls in the last 6 months 0   Vocational Retired   General   Family/Caregiver Present Yes   Cognition   Overall Cognitive Status WFL   Arousal/Participation Alert   Orientation Level Oriented X4   Following Commands Follows all commands and directions without difficulty   Subjective   Subjective Pt agreeable to PT/OT evaluations.   RUE Assessment   RUE Assessment   (refer to OT)   LUE Assessment   LUE Assessment   (refer to OT)   RLE Assessment   RLE Assessment X    Strength RLE   R Knee Flexion 3/5   R Knee Extension 3/5   R Ankle Dorsiflexion 4+/5   R Ankle Plantar Flexion 4+/5   LLE Assessment   LLE Assessment WFL  (4/5 grossly)   Light Touch   RLE Light Touch Grossly intact   LLE Light Touch Grossly intact   Bed Mobility   Supine to Sit 5  Supervision   Additional items HOB elevated;Bedrails;Increased time required;Verbal cues   Additional Comments Pt greeted in supine.   Transfers   Sit to Stand 5  Supervision   Additional items Increased time required;Verbal cues  (w/ RW)   Stand to Sit 5  Supervision   Additional items Armrests;Increased time required;Verbal cues  (w/ RW)   Additional Comments cues for hand placement   Ambulation/Elevation   Gait pattern Improper Weight shift;Antalgic;Wide SUMANTH;Decreased foot clearance;Decreased R stance;Short stride;Step to;Excessively slow   Gait Assistance 5  Supervision   Additional items Verbal cues   Assistive Device Rolling walker   Distance 90'x1; 40'x1   Stair Management Assistance 5  Supervision   Additional items Verbal cues   Stair Management Technique One rail L;Step to pattern;Foreward;Nonreciprocal;With cane   Number of Stairs 4  (x1 stair trainers)   Balance   Static Sitting Good   Dynamic Sitting Fair +   Static Standing Fair  (w/ RW)   Dynamic Standing Fair -  (w/ RW)   Ambulatory Fair -  (w/ RW)   Activity Tolerance   Activity Tolerance Patient tolerated treatment well   Medical Staff Made Aware OT Effie   Nurse Made Aware DELIA Holguin   Assessment   Prognosis Good   Problem List Decreased strength;Decreased range of motion;Decreased endurance;Impaired balance;Decreased mobility;Obesity;Pain   Assessment Pt. 59 y.o.female presents for elective surgery. Past medical hx includes asthma, chronic narcotic dependence, diabetes, HTN, HLD, PONV, sleep apnea, hx of L TKA 2024. Pt admitted for Primary osteoarthritis of right knee w/ Primary osteoarthritis of right knee (M17.11). S/p R elective TKR POD #0. Pt referred to PT for  functional mobility evaluation & D/C planning w/ orders of  OOB to chair . PTA, pt reports being I w/ SPC. Personal factors affecting pt at time of IE include: decreased independence in ADLs/IADLs, steps to enter, and use of AD . During evaluation, deficits included dec mobility, balance, ambulation. Required S for supine to sit, sit<>stand, ambulation, stair navigation and toilet transfers. Pt able to ambulate 90' and 40' with RW in unit. Antalgic step to gait with dec gait speed. Pt able to perform nonreciprocal stair navigation with unilateral L hand rail and SPC in RUE. Pt demonstrated dec endurance and tolerance to activity. Denies reports of dizziness or SOB t/o session. Pt was educated on fall precautions and reinforced w/ good understanding. Pt would benefit from continued PT to address deficits as defined above and maximize level of independence with functional mobility and safety. Based on pt presentation and impaired function, pt would benefit from level III, (minimum resource intensity) at D/C. The patient's AM-PAC Basic Mobility Inpatient Short Form Raw Score is 23. A Raw score of greater than 16 suggests the patient may benefit from discharge to home. Please also refer to the recommendation of the Physical Therapist for safe discharge planning. Nsg staff to continue to mobilized pt (OOB in chair for all meals & ambulate in room/unit) as tolerated to prevent further decline in function. Nsg notified. Co-eval performed to complete this PT evaluation for the pts best interest given pts medical complexity and functional level. From PT stand point, pt cleared functionally for D/C when medically appropriate.   Barriers to Discharge None   Barriers to Discharge Comments From PT stand point, pt cleared functionally for D/C when medically appropriate.   Goals   Patient Goals to go home   STG Expiration Date 01/27/25   Short Term Goal #1 1) Inc overall LE strength by 1/2 MMT grade to improve functional mobility;  2) Pt will demonstrate improved bed mobility with mod I to dec caregiver burden; 3) Pt will demonstrate improved transfers w/ mod I for inc safety; 4) Pt will be able to amb w/ mod I >150' w/ RW for household distances to inc safety and dec caregiver burden; 5) Pt will be able to navigate stairs with mod I to dec caregiver burden and inc safety with functional mobility; 6) Improve general balance by 1 grade to inc safety; 7) PT for ongoing patient and caregiver education   PT Treatment Day 0   Plan   Treatment/Interventions Functional transfer training;LE strengthening/ROM;Elevations;Therapeutic exercise;Endurance training;Patient/family training;Bed mobility;Gait training;Spoke to nursing;OT   PT Frequency Twice a day  (PRN)   Discharge Recommendation   Rehab Resource Intensity Level, PT III (Minimum Resource Intensity)   Equipment Recommended Walker  (pt owns)   AM-PAC Basic Mobility Inpatient   Turning in Flat Bed Without Bedrails 4   Lying on Back to Sitting on Edge of Flat Bed Without Bedrails 4   Moving Bed to Chair 4   Standing Up From Chair Using Arms 4   Walk in Room 4   Climb 3-5 Stairs With Railing 3   Basic Mobility Inpatient Raw Score 23   Basic Mobility Standardized Score 50.88   [facility] Highest Level Of Mobility   JH-HLM Goal 7: Walk 25 feet or more   JH-HLM Achieved 7: Walk 25 feet or more   Exercises   TKR   (Reviewed HEP. addressed all questions and concerns.)   End of Consult   Patient Position at End of Consult Bedside chair;All needs within reach       Hx/personal factors: co-morbidities, inaccessible home, use of AD, pain, fall risk, and obesity, coping styles, social background, past experience, behavior pattern  Examination: dec mobility, dec balance, dec endurance, dec amb, risk for falls, pain, assessed body system, balance, endurance, amb, D/C disposition & fall risk, impairements in locomotion, musculoskeletal, balance, endurance, posture, coordination, assessed cognition,  impairments in systems including multiple body structures involved; musculoskeletal (ROM, strength, posture, BMI), neuromuscular (balance,locomotion, gait, transfers, motor control and learning, sensation), joint integrity, integumentary (skin integrity, presence of scars or wounds), cardiopulmonary (vitals, edema); activity limitations (difficulties executing an action); participation restrictions (problems associated w involvement in life situations)  Clinical: unpredictable (ongoing medical status, risk for falls, POD #0, and pain mgt)  Complexity: high      Misty Fisher, PT

## 2025-01-20 NOTE — ANESTHESIA PROCEDURE NOTES
Peripheral Block    Patient location during procedure: holding area  Start time: 1/20/2025 7:10 AM  Reason for block: at surgeon's request and post-op pain management  Staffing  Performed by: Palmer Duff MD  Authorized by: Palmer Duff MD    Preanesthetic Checklist  Completed: patient identified, IV checked, site marked, risks and benefits discussed, surgical consent, monitors and equipment checked, pre-op evaluation and timeout performed  Peripheral Block  Patient position: supine  Prep: ChloraPrep  Patient monitoring: frequent blood pressure checks, continuous pulse oximetry and heart rate  Block type: Adductor Canal  Laterality: right  Injection technique: single-shot  Procedures: ultrasound guided, Ultrasound guidance required for the procedure to increase accuracy and safety of medication placement and decrease risk of complications.  Ultrasound permanent image saved  bupivacaine (PF) (MARCAINE) 0.25 % injection 20 mL - Perineural   10 mL - 1/20/2025 7:15:00 AM  bupivacaine liposomal (EXPAREL) 1.3 % injection 20 mL - Perineural   10 mL - 1/20/2025 7:15:00 AM  Needle  Needle type: Stimuplex   Needle gauge: 20 G  Needle length: 4 in  Needle localization: anatomical landmarks and ultrasound guidance  Assessment  Injection assessment: incremental injection, frequent aspiration, injected with ease, negative aspiration, negative for heart rate change, no paresthesia on injection, no symptoms of intraneural/intravenous injection and needle tip visualized at all times  Paresthesia pain: none  Post-procedure:  site cleaned  patient tolerated the procedure well with no immediate complications

## 2025-01-20 NOTE — PLAN OF CARE
Problem: PHYSICAL THERAPY ADULT  Goal: Performs mobility at highest level of function for planned discharge setting.  See evaluation for individualized goals.  Description: Treatment/Interventions: Functional transfer training, LE strengthening/ROM, Elevations, Therapeutic exercise, Endurance training, Patient/family training, Bed mobility, Gait training, Spoke to nursing, OT  Equipment Recommended: Walker (pt owns)       See flowsheet documentation for full assessment, interventions and recommendations.  Note: Prognosis: Good  Problem List: Decreased strength, Decreased range of motion, Decreased endurance, Impaired balance, Decreased mobility, Obesity, Pain  Assessment: Pt. 59 y.o.female presents for elective surgery. Past medical hx includes asthma, chronic narcotic dependence, diabetes, HTN, HLD, PONV, sleep apnea, hx of L TKA 2024. Pt admitted for Primary osteoarthritis of right knee w/ Primary osteoarthritis of right knee (M17.11). S/p R elective TKR POD #0. Pt referred to PT for functional mobility evaluation & D/C planning w/ orders of  OOB to chair . PTA, pt reports being I w/ SPC. Personal factors affecting pt at time of IE include: decreased independence in ADLs/IADLs, steps to enter, and use of AD . During evaluation, deficits included dec mobility, balance, ambulation. Required S for supine to sit, sit<>stand, ambulation, stair navigation and toilet transfers. Pt able to ambulate 90' and 40' with RW in unit. Antalgic step to gait with dec gait speed. Pt able to perform nonreciprocal stair navigation with unilateral L hand rail and SPC in RUE. Pt demonstrated dec endurance and tolerance to activity. Denies reports of dizziness or SOB t/o session. Pt was educated on fall precautions and reinforced w/ good understanding. Pt would benefit from continued PT to address deficits as defined above and maximize level of independence with functional mobility and safety. Based on pt presentation and impaired  function, pt would benefit from level III, (minimum resource intensity) at D/C. The patient's AM-PAC Basic Mobility Inpatient Short Form Raw Score is 23. A Raw score of greater than 16 suggests the patient may benefit from discharge to home. Please also refer to the recommendation of the Physical Therapist for safe discharge planning. Nsg staff to continue to mobilized pt (OOB in chair for all meals & ambulate in room/unit) as tolerated to prevent further decline in function. Nsg notified. Co-eval performed to complete this PT evaluation for the pts best interest given pts medical complexity and functional level. From PT stand point, pt cleared functionally for D/C when medically appropriate.  Barriers to Discharge: None  Barriers to Discharge Comments: From PT stand point, pt cleared functionally for D/C when medically appropriate.  Rehab Resource Intensity Level, PT: III (Minimum Resource Intensity)    See flowsheet documentation for full assessment.

## 2025-01-21 ENCOUNTER — TELEPHONE (OUTPATIENT)
Dept: OBGYN CLINIC | Facility: HOSPITAL | Age: 60
End: 2025-01-21

## 2025-01-21 LAB
DME PARACHUTE DELIVERY DATE ACTUAL: NORMAL
DME PARACHUTE DELIVERY DATE EXPECTED: NORMAL
DME PARACHUTE DELIVERY DATE REQUESTED: NORMAL
DME PARACHUTE ITEM DESCRIPTION: NORMAL
DME PARACHUTE ORDER STATUS: NORMAL
DME PARACHUTE SUPPLIER NAME: NORMAL
DME PARACHUTE SUPPLIER PHONE: NORMAL

## 2025-01-21 NOTE — TELEPHONE ENCOUNTER
Caller: Patient     Doctor: Rick    Reason for call: Patient called back returning missed call from Ortho Nurse Navigator. Please advise.    Call back#: [phone]

## 2025-01-21 NOTE — TELEPHONE ENCOUNTER
"Patient contacted for a postoperative follow up assessment. Patient reports \"feeling like crap\" due to pain, 10/10 pain.   She states she is due for her daily morphine, but took the breakthrough med at 630am. We discussed taking her daily morphine, and at 1030 being due for oxycodone 5mg. She is ambulating with the RW.   Patient denies increase in swelling, stating \"the same\" and dressing is clean, dry and intact. Patient is icing the site we discussed postoperative swelling, continuing to ICE areas of pain/swelling, especially after increased activity over the next few weeks.      We reviewed patients AVS medication list. Patient is taking Tylenol 1000mg every 8 hours, Celebrex 200mg BID, Morphine BID (normally on), Oxycodone 5mg PRN, ASA 81mg BID, and I recommended she start an OTC stool softener as she is not taking one.     She reports her biggest issue is getting off the toilet- I will order a RTS at this time.     Patient denies nausea, vomiting, abdominal pain, chest pain, shortness of breath, fever, dizziness and calf pain. Patient does not have any other questions or concerns at this time. Pt was encouraged to call with any questions, concerns or issues.    "

## 2025-01-22 ENCOUNTER — TELEPHONE (OUTPATIENT)
Age: 60
End: 2025-01-22

## 2025-01-22 NOTE — TELEPHONE ENCOUNTER
Caller: Jaxson/[facility]    Doctor: Rick    Reason for call: Spoke w/the patient who mentioned she slid out of bed this morning. Has been on the floor for 2 hrs. Is unable to get up. The patient states knee pain 9/10. The patient is trying to reach her son for assistance. Jaxson recommended the patient call 911. The patient declined. Jaxson was unable to reach any of the emergency contacts. Jaxson will f/u w/the patient for status check    Call back#: [phone] Jaxson will be there until 2pm. Charge nurse is Vidya

## 2025-01-22 NOTE — PROGRESS NOTES
While doing a post op call, this pt stated that she slipped off of the bed and has been on the floor for about 2hrs.  She said she was waiting for her son to come home but could not get a hold of him.  I advised her to call 911 to help her off the floor.  She said she really didn't want to do that and she would try to call her son one more time and if she could not get a hold of him she would call 911.  After I hung up with her I tried calling all the contacts in the chart (which were her children).  I then call Dr Cabrera's office and spoke with Raiza to let her know what was going on.  I told her that we would call the patient back to see if she got off the floor.  In the mean time her son Carlos called me back and stated that his father had come home and they called 911.  911 was at the home and attending to the patient.  Dr Cabreras office was called back and given an up date.

## 2025-01-22 NOTE — TELEPHONE ENCOUNTER
Caller: Jaxson from [facility]    Doctor: Rick    Reason for call: patient's  is at home and patient is receiving assistance from paramedics.     Call back#: [phone]

## 2025-01-22 NOTE — TELEPHONE ENCOUNTER
Attempted patient x 2, both attempts went to VM (one seems maybe rejected).     VM left, suggesting lift assist from EMS to get off the floor.     Requested call back with update. Will try patient again shortly.

## 2025-01-27 NOTE — TELEPHONE ENCOUNTER
Caller: Patient     Call back#: [phone]    Best call back time am/pm/all day: all day    Doctor: Rick    Surgery: yes    Date of Surgery: 1/20/25    Reason for call: Patient misplaced her discharge ppwk and stated she doesn't know what exercises she should be doing. She is asking if the nurse can go over what she should be doing and she is also asking if the ppwk can be uploaded to her [employer].      Urgent matters - Please Teams message Nurse Navigator Team Chat & send phone note to Orthopedic Nurse Navigator Pool as high priority before transferring call.   Non-Urgent matters - Please send a phone note to Orthopedic Nurse Navigator Pool only. Nurse Navigators will call patients back asap.

## 2025-01-27 NOTE — TELEPHONE ENCOUNTER
1/20 DOS.     I walked patient through [place] to access AVS instructions (in past appts).     She reports pain is worse this surgery, only having 2 tablets left of surgeons Oxycodone (5mg) for breakthrough and concerned with control of pain after that. She does state the Oxycodone 5mg doesn't help much, even with her normal pain control regimen. SHe is aware I will notify the surgeon.     Also encouraged rest, ice and elevation.

## 2025-01-28 DIAGNOSIS — M17.11 PRIMARY OSTEOARTHRITIS OF RIGHT KNEE: Primary | ICD-10-CM

## 2025-01-28 RX ORDER — OXYCODONE HYDROCHLORIDE 5 MG/1
5 TABLET ORAL EVERY 4 HOURS PRN
Qty: 15 TABLET | Refills: 0 | Status: SHIPPED | OUTPATIENT
Start: 2025-01-28

## 2025-02-04 ENCOUNTER — OFFICE VISIT (OUTPATIENT)
Dept: OBGYN CLINIC | Facility: CLINIC | Age: 60
End: 2025-02-04

## 2025-02-04 VITALS — HEIGHT: 68 IN | BODY MASS INDEX: 39.25 KG/M2 | WEIGHT: 259 LBS

## 2025-02-04 DIAGNOSIS — Z96.651 STATUS POST TOTAL RIGHT KNEE REPLACEMENT: Primary | ICD-10-CM

## 2025-02-04 PROBLEM — M17.11 PRIMARY OSTEOARTHRITIS OF RIGHT KNEE: Status: RESOLVED | Noted: 2025-01-07 | Resolved: 2025-02-04

## 2025-02-04 PROCEDURE — 99024 POSTOP FOLLOW-UP VISIT: CPT | Performed by: STUDENT IN AN ORGANIZED HEALTH CARE EDUCATION/TRAINING PROGRAM

## 2025-02-04 NOTE — ASSESSMENT & PLAN NOTE
Patient is 2 weeks s/p right total knee arthroplasty  - WBAT  - wean off assistive devices as tolerated   - Tylenol and Celebrex for pain control prn  - No formal physical therapy - continue advancing activities to tolerance, daily stretching for range of motion   - ASA 81mg BID for dvt ppx   - May shower, do not soak or submerge incision  - RTC in 4 weeks. right knee xrays needed

## 2025-02-04 NOTE — PROGRESS NOTES
Date: 25  Anna Marie Barbosa   MRN# [id number]  : 1965      Chief Complaint: Post op right total knee arthroplasty    Assessment and Plan:    Status post total right knee replacement   Patient is 2 weeks s/p right total knee arthroplasty  - WBAT  - wean off assistive devices as tolerated   - Tylenol and Celebrex for pain control prn  - No formal physical therapy - continue advancing activities to tolerance, daily stretching for range of motion   - ASA 81mg BID for dvt ppx   - May shower, do not soak or submerge incision  - RTC in 4 weeks. right knee xrays needed         Subjective:   Patient is 2 weeks s/p right total knee arthroplasty    Date of procedure: 25  Doing well, no new problems  Pain progressively improving  Improved swelling  Ambulating with walker    Allergy:  Allergies   Allergen Reactions    Ozempic (0.25 Or 0.5 Mg-Dose) [Semaglutide(0.25 Or 0.5mg-Dos)] Diarrhea and GI Intolerance    Nsaids GI Intolerance     Medications:  all current active meds have been reviewed    Past Medical History:  Past Medical History:   Diagnosis Date    Acute respiratory insufficiency 2019    Allergies     Ambulates with cane     Anxiety     Asthma     Chronic narcotic dependence (HCC)     Chronic pain     Chronic pain disorder     back/knee    Diabetes (HCC)     HTN (hypertension)     elevated with electronic device    Hyperlipidemia     PONV (postoperative nausea and vomiting)     Sepsis (HCC) 2019    Sleep apnea     no cpap    Walker as ambulation aid      Past Surgical History:  Past Surgical History:   Procedure Laterality Date    BACK SURGERY      x 3 with hardware    CERVICAL LAMINECTOMY      CHOLECYSTECTOMY LAPAROSCOPIC N/A 11/10/2018    Procedure: CHOLECYSTECTOMY LAPAROSCOPIC w/ ioc;  Surgeon: Benitez Cuenca MD;  Location: [facility];  Service: General    COLONOSCOPY      HYSTERECTOMY      JOINT REPLACEMENT Left     knee    OR ARTHRP KNE CONDYLE&PLATU MEDIAL&LAT COMPARTMENTS Right  "1/20/2025    Procedure: ARTHROPLASTY KNEE TOTAL, RIGHT-SAME DAY;  Surgeon: Joe Cabrera MD;  [facility];  Service: Orthopedics    ME REVJ TOT KNEE ARTHRP FEM&ENTIRE TIBIAL COMPONE Left 10/21/2024    Procedure: ARTHROPLASTY LEFT KNEE TOTAL REVISION,  2 components;  Surgeon: Joe Cabrera MD;  [facility];  Service: Orthopedics    TOE AMPUTATION Right 02/02/2018    Procedure: AMPUTATION TOE, RIGHT GREAT TOE;  Surgeon: Lino Rodriguez DPM;  Location: [facility];  Service: Podiatry    TOE AMPUTATION Right     3rd toe     Family History:  Family History   Problem Relation Age of Onset    Diabetes Mother     Diabetes Maternal Grandmother     No Known Problems Father      Social History:  Social History     Substance and Sexual Activity   Alcohol Use Not Currently    Comment: special occasion     Social History     Substance and Sexual Activity   Drug Use Yes    Types: Morphine    Comment: prescribed 75mg bid     Social History     Tobacco Use   Smoking Status Never    Passive exposure: Never   Smokeless Tobacco Never           Review of Systems:  General- denies fever/chills  HEENT- denies hearing loss or sore throat  Eyes- denies eye pain or visual disturbances, denies red eyes  Respiratory- denies cough or SOB  Cardio- denies chest pain or palpitations  GI- denies abdominal pain  Endocrine- denies urinary frequency  Urinary- denies pain with urination  Musculoskeletal- Negative except noted above  Skin- denies rashes or wounds  Neurological- denies dizziness or headache  Psychiatric- denies anxiety or difficulty concentrating      Objective:   BP Readings from Last 1 Encounters:   01/20/25 124/73      Wt Readings from Last 1 Encounters:   02/04/25 117 kg (259 lb)      Pulse Readings from Last 1 Encounters:   01/20/25 72        BMI: Estimated body mass index is 39.38 kg/m² as calculated from the following:    Height as of this encounter: 5' 8\" (1.727 m).    Weight as of this encounter: 117 " "kg (259 lb).      Physical Exam  Ht 5' 8\" (1.727 m)   Wt 117 kg (259 lb)   BMI 39.38 kg/m²   General/Constitutional: No apparent distress: well-nourished and well developed.  Eyes: normal ocular motion  Cardio: RRR, Normal S1S2, No m/r/g.   Lymphatic: No appreciable lymphadenopathy  Respiratory: Non-labored breathing, CTA b/l no w/c/r  Vascular: No edema, swelling or tenderness, except as noted in detailed exam. Extremities well perfused. No LE edema  Integumentary: No impressive skin lesions present, except as noted in detailed exam.  Neuro: No ataxia or tremors noted  Psych: Normal mood and affect, oriented to person, place and time. Appropriate affect.  Musculoskeletal: Normal, except as noted in detailed exam and in HPI.    Gait and Station:   antalgic    right Knee Examination  Incision: clean, dry, and intact  Effusion: mild  Alignment: neutral  AP Stability: stable  Coronal Stability  Extension:stable  Mid-flexion: stable  90 degrees flexion: stable  Range of motion  Active: 0 to 100   Extensor lag: Absent  Motor: 5/5 EHL/FHL/TA/GS/QD/HS  Neurovascular exam is intact.   No evidence of calf tightness or posterior cords    Images:  No new imaging        Scribe Attestation      I,:  Jayesh Ramos am acting as a scribe while in the presence of the attending physician.:       I,:  Joe Cabrera MD personally performed the services described in this documentation    as scribed in my presence.:               Joe Cabrera MD  Adult Reconstruction Specialist   [facility]   "

## 2025-02-08 DIAGNOSIS — M17.11 PRIMARY OSTEOARTHRITIS OF RIGHT KNEE: ICD-10-CM

## 2025-02-10 RX ORDER — FERROUS SULFATE 324(65)MG
324 TABLET, DELAYED RELEASE (ENTERIC COATED) ORAL EVERY OTHER DAY
Qty: 45 TABLET | Refills: 1 | Status: SHIPPED | OUTPATIENT
Start: 2025-02-10 | End: 2025-04-11

## 2025-02-11 DIAGNOSIS — J45.40 MODERATE PERSISTENT ASTHMA WITHOUT COMPLICATION: ICD-10-CM

## 2025-02-12 RX ORDER — ALBUTEROL SULFATE 90 UG/1
2 INHALANT RESPIRATORY (INHALATION) EVERY 4 HOURS PRN
Qty: 18 G | Refills: 1 | Status: SHIPPED | OUTPATIENT
Start: 2025-02-12

## 2025-02-18 ENCOUNTER — OFFICE VISIT (OUTPATIENT)
Dept: PODIATRY | Facility: CLINIC | Age: 60
End: 2025-02-18
Payer: COMMERCIAL

## 2025-02-18 DIAGNOSIS — E11.42 DIABETIC POLYNEUROPATHY ASSOCIATED WITH TYPE 2 DIABETES MELLITUS (HCC): Primary | ICD-10-CM

## 2025-02-18 DIAGNOSIS — S98.131A AMPUTATION OF TOE OF RIGHT FOOT (HCC): ICD-10-CM

## 2025-02-18 DIAGNOSIS — B35.1 TINEA UNGUIUM: ICD-10-CM

## 2025-02-18 PROCEDURE — RECHECK: Performed by: PODIATRIST

## 2025-02-18 PROCEDURE — 11721 DEBRIDE NAIL 6 OR MORE: CPT | Performed by: PODIATRIST

## 2025-02-18 NOTE — PROGRESS NOTES
Anna Marie Barbosa  1965  AT RISK FOOT CARE    1. Diabetic polyneuropathy associated with type 2 diabetes mellitus (HCC)  Diabetic Shoe Inserts    Diabetic Shoe      2. Amputation of toe of right foot (HCC)  Diabetic Shoe Inserts    Diabetic Shoe      3. Tinea unguium  Diabetic Shoe Inserts    Diabetic Shoe          Patient presents for at-risk foot care.  Patient has no acute concerns today.  Patient has significant lower extremity risk due to previous amputation.    On exam patient has thickened, hypertrophic, discolored, brittle toenails with subungual debris and tenderness x7   Callus: none  Amputation: right great toe and 3rd toe amputations stable    Today's treatment includes:  Debridement of toenails. Using nail nipper, silvestre, and curette, nails were sharply debrided, reduced in thickness and length. Devitalized nail tissue and fungal debris excised and removed. Patient tolerated well.        Discussed proper shoe gear, daily inspections of feet, and general foot health with patient. Patient has Q7  findings and is recommended for at risk foot care every 9-10 weeks.    Patients most recent complete clinical exam was performed: 8/19/2024

## 2025-02-18 NOTE — LETTER
February 20, 2025     No Recipients    Patient: Anna Marie Barbosa   YOB: 1965   Date of Visit: 2/18/2025       Dear Dr. López Recipients:    Thank you for referring Anna Marie Barbosa to me for evaluation. Below are the relevant portions of my assessment and plan of care.         If you have questions, please do not hesitate to call me. I look forward to following Anna Marie along with you.         Sincerely,        Henry Figueroa DPM        CC: No Recipients

## 2025-03-01 ENCOUNTER — PATIENT MESSAGE (OUTPATIENT)
Dept: FAMILY MEDICINE CLINIC | Facility: CLINIC | Age: 60
End: 2025-03-01

## 2025-03-01 DIAGNOSIS — B37.9 CANDIDIASIS: ICD-10-CM

## 2025-03-03 RX ORDER — NYSTATIN 100000 [USP'U]/G
POWDER TOPICAL 2 TIMES DAILY
Qty: 45 G | Refills: 0 | Status: SHIPPED | OUTPATIENT
Start: 2025-03-03

## 2025-03-04 ENCOUNTER — OFFICE VISIT (OUTPATIENT)
Dept: OBGYN CLINIC | Facility: CLINIC | Age: 60
End: 2025-03-04

## 2025-03-04 ENCOUNTER — APPOINTMENT (OUTPATIENT)
Dept: RADIOLOGY | Age: 60
End: 2025-03-04
Payer: COMMERCIAL

## 2025-03-04 VITALS — HEIGHT: 68 IN | WEIGHT: 252 LBS | BODY MASS INDEX: 38.19 KG/M2

## 2025-03-04 DIAGNOSIS — Z96.651 STATUS POST TOTAL RIGHT KNEE REPLACEMENT: ICD-10-CM

## 2025-03-04 DIAGNOSIS — Z96.651 STATUS POST TOTAL RIGHT KNEE REPLACEMENT: Primary | ICD-10-CM

## 2025-03-04 DIAGNOSIS — Z96.652 S/P REVISION OF TOTAL KNEE, LEFT: ICD-10-CM

## 2025-03-04 PROCEDURE — 73562 X-RAY EXAM OF KNEE 3: CPT

## 2025-03-04 PROCEDURE — 99024 POSTOP FOLLOW-UP VISIT: CPT | Performed by: STUDENT IN AN ORGANIZED HEALTH CARE EDUCATION/TRAINING PROGRAM

## 2025-03-04 NOTE — ASSESSMENT & PLAN NOTE
Patient is 6 weeks s/p right total knee arthroplasty  - WBAT RLE   - Tylenol and Celebrex for pain control prn  - Continue self-directed home exercise program  - May DC ASA for dvt ppx   - May shower and soak/submerge incision  - Begin scar massage   - Compression sock for swelling control prn  - No antibiotic dental prophylaxis is necessary  - No restrictions from our standpoint. Let pain be a guide  - RTC in 6 weeks

## 2025-03-04 NOTE — PROGRESS NOTES
Date: 25  Anna Marie Barbosa   MRN# [id number]  : 1965      Chief Complaint: Post op right total knee arthroplasty    Assessment and Plan:    Status post total right knee replacement  Patient is 6 weeks s/p right total knee arthroplasty  - WBAT RLE   - Tylenol and Celebrex for pain control prn  - Continue self-directed home exercise program  - May DC ASA for dvt ppx   - May shower and soak/submerge incision  - Begin scar massage   - Compression sock for swelling control prn  - No antibiotic dental prophylaxis is necessary  - No restrictions from our standpoint. Let pain be a guide  - RTC in 6 weeks      S/P revision of total knee, left  ~4 months s/p left revision total knee arthroplasty          Subjective:   Patient is 6 weeks s/p right total knee arthroplasty    Date of procedure: 25   Date of procedure: 10/21/24- left revision tka  Doing well, no new problems  Pain progressively improving  Improved swelling  Ambulating with cane    Allergy:  Allergies   Allergen Reactions    Ozempic (0.25 Or 0.5 Mg-Dose) [Semaglutide(0.25 Or 0.5mg-Dos)] Diarrhea and GI Intolerance    Nsaids GI Intolerance     Medications:  all current active meds have been reviewed    Past Medical History:  Past Medical History:   Diagnosis Date    Acute respiratory insufficiency 2019    Allergies     Ambulates with cane     Anxiety     Asthma     Chronic narcotic dependence (HCC)     Chronic pain     Chronic pain disorder     back/knee    Diabetes (HCC)     HTN (hypertension)     elevated with electronic device    Hyperlipidemia     PONV (postoperative nausea and vomiting)     Sepsis (HCC) 2019    Sleep apnea     no cpap    Walker as ambulation aid      Past Surgical History:  Past Surgical History:   Procedure Laterality Date    BACK SURGERY      x 3 with hardware    CERVICAL LAMINECTOMY      CHOLECYSTECTOMY LAPAROSCOPIC N/A 11/10/2018    Procedure: CHOLECYSTECTOMY LAPAROSCOPIC w/ ioc;  Surgeon: Benitez Ceunca  MD;  [facility];  Service: General    COLONOSCOPY      HYSTERECTOMY      JOINT REPLACEMENT Left     knee    HI ARTHRP KNE CONDYLE&PLATU MEDIAL&LAT COMPARTMENTS Right 1/20/2025    Procedure: ARTHROPLASTY KNEE TOTAL, RIGHT-SAME DAY;  Surgeon: Joe Cabrera MD;  Location:  [facility];  Service: Orthopedics    HI REVJ TOT KNEE ARTHRP FEM&ENTIRE TIBIAL COMPONE Left 10/21/2024    Procedure: ARTHROPLASTY LEFT KNEE TOTAL REVISION,  2 components;  Surgeon: Joe Cabrera MD;  [facility];  Service: Orthopedics    TOE AMPUTATION Right 02/02/2018    Procedure: AMPUTATION TOE, RIGHT GREAT TOE;  Surgeon: Lino Rodriguez DPM;  [facility];  Service: Podiatry    TOE AMPUTATION Right     3rd toe     Family History:  Family History   Problem Relation Age of Onset    Diabetes Mother     Diabetes Maternal Grandmother     No Known Problems Father      Social History:  Social History     Substance and Sexual Activity   Alcohol Use Not Currently    Comment: special occasion     Social History     Substance and Sexual Activity   Drug Use Yes    Types: Morphine    Comment: prescribed 75mg bid     Social History     Tobacco Use   Smoking Status Never    Passive exposure: Never   Smokeless Tobacco Never           Review of Systems:  General- denies fever/chills  HEENT- denies hearing loss or sore throat  Eyes- denies eye pain or visual disturbances, denies red eyes  Respiratory- denies cough or SOB  Cardio- denies chest pain or palpitations  GI- denies abdominal pain  Endocrine- denies urinary frequency  Urinary- denies pain with urination  Musculoskeletal- Negative except noted above  Skin- denies rashes or wounds  Neurological- denies dizziness or headache  Psychiatric- denies anxiety or difficulty concentrating      Objective:   BP Readings from Last 1 Encounters:   01/20/25 124/73      Wt Readings from Last 1 Encounters:   03/04/25 114 kg (252 lb)      Pulse Readings from Last 1 Encounters:   01/20/25  "72        BMI: Estimated body mass index is 38.32 kg/m² as calculated from the following:    Height as of this encounter: 5' 8\" (1.727 m).    Weight as of this encounter: 114 kg (252 lb).      Physical Exam  Ht 5' 8\" (1.727 m)   Wt 114 kg (252 lb)   BMI 38.32 kg/m²   General/Constitutional: No apparent distress: well-nourished and well developed.  Eyes: normal ocular motion  Cardio: RRR, Normal S1S2, No m/r/g.   Lymphatic: No appreciable lymphadenopathy  Respiratory: Non-labored breathing, CTA b/l no w/c/r  Vascular: No edema, swelling or tenderness, except as noted in detailed exam. Extremities well perfused. No LE edema  Integumentary: No impressive skin lesions present, except as noted in detailed exam.  Neuro: No ataxia or tremors noted  Psych: Normal mood and affect, oriented to person, place and time. Appropriate affect.  Musculoskeletal: Normal, except as noted in detailed exam and in HPI.    Gait and Station:   Normal    Left Knee Examination  Incision: clean, dry, and intact  Effusion: mild  Alignment: neutral  AP Stability: stable  Coronal Stability  Extension:stable  Mid-flexion: stable  90 degrees flexion: stable  Range of motion  Active: 0 to 120  Extensor lag: Absent  Motor: 5/5 EHL/FHL/TA/GS/QD/HS  Neurovascular exam is intact.   No evidence of calf tightness or posterior cords    right Knee Examination  Incision: clean, dry, and intact  Effusion: mild  Alignment: neutral  AP Stability: stable  Coronal Stability  Extension:stable  Mid-flexion: stable  90 degrees flexion: stable  Range of motion  Active: 0 to 130  Extensor lag: Absent  Motor: 5/5 EHL/FHL/TA/GS/QD/HS  Neurovascular exam is intact.   No evidence of calf tightness or posterior cords    Images:  I personally reviewed relevant images in the PACS system and my interpretation is as follows:  X-rays of the right knee reveal a total  knee arthroplasty in appropriate alignment without evidence of migration, wear or loosening.          Joe" MD Rick  Adult Reconstruction Specialist   [facility]

## 2025-03-05 ENCOUNTER — PATIENT MESSAGE (OUTPATIENT)
Dept: FAMILY MEDICINE CLINIC | Facility: CLINIC | Age: 60
End: 2025-03-05

## 2025-03-06 DIAGNOSIS — E11.65 TYPE 2 DIABETES MELLITUS WITH HYPERGLYCEMIA, WITH LONG-TERM CURRENT USE OF INSULIN (HCC): Primary | ICD-10-CM

## 2025-03-06 DIAGNOSIS — Z79.4 TYPE 2 DIABETES MELLITUS WITH HYPERGLYCEMIA, WITH LONG-TERM CURRENT USE OF INSULIN (HCC): Primary | ICD-10-CM

## 2025-03-13 DIAGNOSIS — Z79.4 TYPE 2 DIABETES MELLITUS WITH HYPERGLYCEMIA, WITH LONG-TERM CURRENT USE OF INSULIN (HCC): ICD-10-CM

## 2025-03-13 DIAGNOSIS — E11.65 TYPE 2 DIABETES MELLITUS WITH HYPERGLYCEMIA, WITH LONG-TERM CURRENT USE OF INSULIN (HCC): ICD-10-CM

## 2025-03-13 RX ORDER — TIRZEPATIDE 10 MG/.5ML
10 INJECTION, SOLUTION SUBCUTANEOUS WEEKLY
Qty: 6 ML | Refills: 0 | Status: SHIPPED | OUTPATIENT
Start: 2025-03-13

## 2025-03-17 ENCOUNTER — HOSPITAL ENCOUNTER (INPATIENT)
Facility: HOSPITAL | Age: 60
LOS: 3 days | Discharge: HOME | DRG: 617 | End: 2025-03-20
Attending: EMERGENCY MEDICINE | Admitting: PODIATRIST
Payer: COMMERCIAL

## 2025-03-17 ENCOUNTER — TELEPHONE (OUTPATIENT)
Age: 60
End: 2025-03-17

## 2025-03-17 ENCOUNTER — APPOINTMENT (EMERGENCY)
Dept: RADIOLOGY | Facility: HOSPITAL | Age: 60
DRG: 617 | End: 2025-03-17
Payer: COMMERCIAL

## 2025-03-17 DIAGNOSIS — L03.032 CELLULITIS OF THIRD TOE OF LEFT FOOT: Primary | ICD-10-CM

## 2025-03-17 DIAGNOSIS — S98.131A AMPUTATION OF TOE OF RIGHT FOOT (HCC): ICD-10-CM

## 2025-03-17 DIAGNOSIS — M86.9 OSTEOMYELITIS OF LEFT FOOT, UNSPECIFIED TYPE (HCC): ICD-10-CM

## 2025-03-17 DIAGNOSIS — L03.039 CELLULITIS OF TOE, UNSPECIFIED LATERALITY: Primary | ICD-10-CM

## 2025-03-17 DIAGNOSIS — K21.9 GASTROESOPHAGEAL REFLUX DISEASE WITHOUT ESOPHAGITIS: ICD-10-CM

## 2025-03-17 LAB
ANION GAP SERPL CALCULATED.3IONS-SCNC: 5 MMOL/L (ref 4–13)
BASOPHILS # BLD AUTO: 0.06 THOUSANDS/ÂΜL (ref 0–0.1)
BASOPHILS NFR BLD AUTO: 1 % (ref 0–1)
BUN SERPL-MCNC: 13 MG/DL (ref 5–25)
CALCIUM SERPL-MCNC: 9 MG/DL (ref 8.4–10.2)
CHLORIDE SERPL-SCNC: 105 MMOL/L (ref 96–108)
CO2 SERPL-SCNC: 26 MMOL/L (ref 21–32)
CREAT SERPL-MCNC: 0.65 MG/DL (ref 0.6–1.3)
CRP SERPL QL: 14.6 MG/L
EOSINOPHIL # BLD AUTO: 0.2 THOUSAND/ÂΜL (ref 0–0.61)
EOSINOPHIL NFR BLD AUTO: 3 % (ref 0–6)
ERYTHROCYTE [DISTWIDTH] IN BLOOD BY AUTOMATED COUNT: 13.8 % (ref 11.6–15.1)
ERYTHROCYTE [SEDIMENTATION RATE] IN BLOOD: 49 MM/HOUR (ref 0–29)
GFR SERPL CREATININE-BSD FRML MDRD: 96 ML/MIN/1.73SQ M
GLUCOSE SERPL-MCNC: 88 MG/DL (ref 65–140)
HCT VFR BLD AUTO: 32.5 % (ref 34.8–46.1)
HGB BLD-MCNC: 10.4 G/DL (ref 11.5–15.4)
IMM GRANULOCYTES # BLD AUTO: 0.01 THOUSAND/UL (ref 0–0.2)
IMM GRANULOCYTES NFR BLD AUTO: 0 % (ref 0–2)
LYMPHOCYTES # BLD AUTO: 1.77 THOUSANDS/ÂΜL (ref 0.6–4.47)
LYMPHOCYTES NFR BLD AUTO: 28 % (ref 14–44)
MCH RBC QN AUTO: 26.5 PG (ref 26.8–34.3)
MCHC RBC AUTO-ENTMCNC: 32 G/DL (ref 31.4–37.4)
MCV RBC AUTO: 83 FL (ref 82–98)
MONOCYTES # BLD AUTO: 0.68 THOUSAND/ÂΜL (ref 0.17–1.22)
MONOCYTES NFR BLD AUTO: 11 % (ref 4–12)
NEUTROPHILS # BLD AUTO: 3.6 THOUSANDS/ÂΜL (ref 1.85–7.62)
NEUTS SEG NFR BLD AUTO: 57 % (ref 43–75)
NRBC BLD AUTO-RTO: 0 /100 WBCS
PLATELET # BLD AUTO: 187 THOUSANDS/UL (ref 149–390)
PLATELET # BLD AUTO: 201 THOUSANDS/UL (ref 149–390)
PMV BLD AUTO: 11.9 FL (ref 8.9–12.7)
PMV BLD AUTO: 12.3 FL (ref 8.9–12.7)
POTASSIUM SERPL-SCNC: 3.9 MMOL/L (ref 3.5–5.3)
RBC # BLD AUTO: 3.92 MILLION/UL (ref 3.81–5.12)
SODIUM SERPL-SCNC: 136 MMOL/L (ref 135–147)
WBC # BLD AUTO: 6.32 THOUSAND/UL (ref 4.31–10.16)

## 2025-03-17 PROCEDURE — 85049 AUTOMATED PLATELET COUNT: CPT

## 2025-03-17 PROCEDURE — 87040 BLOOD CULTURE FOR BACTERIA: CPT

## 2025-03-17 PROCEDURE — NC001 PR NO CHARGE: Performed by: PODIATRIST

## 2025-03-17 PROCEDURE — 99284 EMERGENCY DEPT VISIT MOD MDM: CPT

## 2025-03-17 PROCEDURE — 36415 COLL VENOUS BLD VENIPUNCTURE: CPT | Performed by: EMERGENCY MEDICINE

## 2025-03-17 PROCEDURE — 96365 THER/PROPH/DIAG IV INF INIT: CPT

## 2025-03-17 PROCEDURE — 80048 BASIC METABOLIC PNL TOTAL CA: CPT | Performed by: EMERGENCY MEDICINE

## 2025-03-17 PROCEDURE — 99285 EMERGENCY DEPT VISIT HI MDM: CPT | Performed by: EMERGENCY MEDICINE

## 2025-03-17 PROCEDURE — 85025 COMPLETE CBC W/AUTO DIFF WBC: CPT | Performed by: EMERGENCY MEDICINE

## 2025-03-17 PROCEDURE — 86140 C-REACTIVE PROTEIN: CPT | Performed by: EMERGENCY MEDICINE

## 2025-03-17 PROCEDURE — 73630 X-RAY EXAM OF FOOT: CPT

## 2025-03-17 PROCEDURE — 85652 RBC SED RATE AUTOMATED: CPT | Performed by: EMERGENCY MEDICINE

## 2025-03-17 RX ORDER — INSULIN LISPRO 100 [IU]/ML
2-12 INJECTION, SOLUTION INTRAVENOUS; SUBCUTANEOUS
Status: DISCONTINUED | OUTPATIENT
Start: 2025-03-18 | End: 2025-03-20 | Stop reason: HOSPADM

## 2025-03-17 RX ORDER — OXYCODONE HYDROCHLORIDE 5 MG/1
5 TABLET ORAL EVERY 4 HOURS PRN
Refills: 0 | Status: DISCONTINUED | OUTPATIENT
Start: 2025-03-17 | End: 2025-03-20 | Stop reason: HOSPADM

## 2025-03-17 RX ORDER — MORPHINE SULFATE 60 MG/1
60 TABLET, FILM COATED, EXTENDED RELEASE ORAL 2 TIMES DAILY
Status: DISCONTINUED | OUTPATIENT
Start: 2025-03-17 | End: 2025-03-17

## 2025-03-17 RX ORDER — ENOXAPARIN SODIUM 100 MG/ML
40 INJECTION SUBCUTANEOUS DAILY
Status: DISCONTINUED | OUTPATIENT
Start: 2025-03-18 | End: 2025-03-20 | Stop reason: HOSPADM

## 2025-03-17 RX ORDER — MORPHINE SULFATE 15 MG/1
15 TABLET, FILM COATED, EXTENDED RELEASE ORAL 2 TIMES DAILY
COMMUNITY
Start: 2025-03-06

## 2025-03-17 RX ORDER — HYDROMORPHONE HCL/PF 1 MG/ML
0.5 SYRINGE (ML) INJECTION EVERY 6 HOURS PRN
Status: DISCONTINUED | OUTPATIENT
Start: 2025-03-17 | End: 2025-03-20 | Stop reason: HOSPADM

## 2025-03-17 RX ORDER — POLYETHYLENE GLYCOL 3350 17 G/17G
17 POWDER, FOR SOLUTION ORAL DAILY
Status: DISCONTINUED | OUTPATIENT
Start: 2025-03-18 | End: 2025-03-20 | Stop reason: HOSPADM

## 2025-03-17 RX ORDER — DIAZEPAM 5 MG/1
5 TABLET ORAL
Status: DISCONTINUED | OUTPATIENT
Start: 2025-03-18 | End: 2025-03-18

## 2025-03-17 RX ORDER — FLUTICASONE FUROATE AND VILANTEROL 200; 25 UG/1; UG/1
1 POWDER RESPIRATORY (INHALATION)
Status: DISCONTINUED | OUTPATIENT
Start: 2025-03-18 | End: 2025-03-20 | Stop reason: HOSPADM

## 2025-03-17 RX ORDER — CARISOPRODOL 350 MG/1
350 TABLET ORAL 2 TIMES DAILY
Status: DISCONTINUED | OUTPATIENT
Start: 2025-03-17 | End: 2025-03-20 | Stop reason: HOSPADM

## 2025-03-17 RX ORDER — OXYCODONE HYDROCHLORIDE 10 MG/1
10 TABLET ORAL EVERY 4 HOURS PRN
Refills: 0 | Status: DISCONTINUED | OUTPATIENT
Start: 2025-03-17 | End: 2025-03-20 | Stop reason: HOSPADM

## 2025-03-17 RX ORDER — OXYCODONE HYDROCHLORIDE 5 MG/1
5 TABLET ORAL EVERY 6 HOURS
Refills: 0 | Status: DISCONTINUED | OUTPATIENT
Start: 2025-03-17 | End: 2025-03-17

## 2025-03-17 RX ORDER — MORPHINE SULFATE 60 MG/1
60 TABLET, FILM COATED, EXTENDED RELEASE ORAL 2 TIMES DAILY
COMMUNITY
Start: 2025-03-06

## 2025-03-17 RX ORDER — ALBUTEROL SULFATE 90 UG/1
2 INHALANT RESPIRATORY (INHALATION) EVERY 4 HOURS PRN
Status: DISCONTINUED | OUTPATIENT
Start: 2025-03-17 | End: 2025-03-20 | Stop reason: HOSPADM

## 2025-03-17 RX ORDER — PREGABALIN 100 MG/1
200 CAPSULE ORAL 2 TIMES DAILY
Status: DISCONTINUED | OUTPATIENT
Start: 2025-03-17 | End: 2025-03-20 | Stop reason: HOSPADM

## 2025-03-17 RX ORDER — CEFADROXIL 500 MG/1
500 CAPSULE ORAL EVERY 12 HOURS SCHEDULED
Qty: 14 CAPSULE | Refills: 0 | Status: SHIPPED | OUTPATIENT
Start: 2025-03-17 | End: 2025-03-20

## 2025-03-17 RX ORDER — ACETAMINOPHEN 325 MG/1
650 TABLET ORAL EVERY 6 HOURS PRN
Status: DISCONTINUED | OUTPATIENT
Start: 2025-03-17 | End: 2025-03-20 | Stop reason: HOSPADM

## 2025-03-17 RX ORDER — CEFAZOLIN SODIUM 2 G/50ML
2000 SOLUTION INTRAVENOUS EVERY 8 HOURS
Status: DISCONTINUED | OUTPATIENT
Start: 2025-03-17 | End: 2025-03-20 | Stop reason: HOSPADM

## 2025-03-17 RX ORDER — PRAVASTATIN SODIUM 40 MG
40 TABLET ORAL EVERY OTHER DAY
Status: DISCONTINUED | OUTPATIENT
Start: 2025-03-18 | End: 2025-03-20 | Stop reason: HOSPADM

## 2025-03-17 RX ORDER — ONDANSETRON 2 MG/ML
4 INJECTION INTRAMUSCULAR; INTRAVENOUS EVERY 6 HOURS PRN
Status: DISCONTINUED | OUTPATIENT
Start: 2025-03-17 | End: 2025-03-20 | Stop reason: HOSPADM

## 2025-03-17 RX ORDER — CALCIUM CARBONATE 500 MG/1
1000 TABLET, CHEWABLE ORAL DAILY PRN
Status: DISCONTINUED | OUTPATIENT
Start: 2025-03-17 | End: 2025-03-20 | Stop reason: HOSPADM

## 2025-03-17 RX ORDER — MORPHINE SULFATE 15 MG/1
15 TABLET, FILM COATED, EXTENDED RELEASE ORAL 2 TIMES DAILY
Status: DISCONTINUED | OUTPATIENT
Start: 2025-03-17 | End: 2025-03-17

## 2025-03-17 RX ADMIN — AMPICILLIN SODIUM AND SULBACTAM SODIUM 3 G: 10; 5 INJECTION, POWDER, FOR SOLUTION INTRAMUSCULAR; INTRAVENOUS at 17:05

## 2025-03-17 RX ADMIN — OXYCODONE HYDROCHLORIDE 10 MG: 10 TABLET ORAL at 23:12

## 2025-03-17 RX ADMIN — CEFAZOLIN SODIUM 2000 MG: 2 SOLUTION INTRAVENOUS at 23:12

## 2025-03-17 RX ADMIN — CARISOPRODOL 350 MG: 350 TABLET ORAL at 23:09

## 2025-03-17 RX ADMIN — PREGABALIN 200 MG: 100 CAPSULE ORAL at 21:17

## 2025-03-17 NOTE — TELEPHONE ENCOUNTER
Caller: Anna Marie Barbosa     Doctor and/or Office: Dr. Pike    #: [phone]    Escalation: Care Patient is diabetic and her toe is red, very swollen and has some drainage. She will upload a picture through [employer]. She would like a return call. Thank you

## 2025-03-17 NOTE — H&P
Podiatry - H&P  Anna Marie Barbosa 59 y.o. female MRN: [id number]  Unit/Bed#: ED-03 Encounter: [id number]  Admission Date: 03/17/25    ASSESSMENT:    Anna Marie Barbosa is a 59 y.o. female with:    Left diabetic foot ulcer, third toe (Herrera 2)  Left foot cellulitis  T2DM (HbA1c 5.7% on 1/9/25)  Peripheral neuropathy  Chronic pain, opioid dependent  Morbid obesity  History of prior pedal digital amputations    PLAN:    Patient to be admitted under podiatry service of Dr. Ruff for further workup of left diabetic foot ulcer of third toe with cellulitis and radiographic concern for possible osteomyelitis  Plan for close monitoring of left lower extremity cellulitis with IV antibiotics. Will follow up further imaging of left third toe, patient at risk for third toe amputation  Start IV Ancef  Labs/vitals reviewed. Patient is afebrile with no leukocytosis, will continue to monitor. XR of left foot reviewed per my peronsal read: mild cortical erosion to distal/lateral aspect of distal phalanx, concern for possible early osteomyelitis  Follow up left forefoot MRI to assess for osteomyelitis  Follow up blood cultures  Follow up consult to internal medicine, appreciate recommendations and pre operative clearance  Continuation of all home medications aside from Mounjaro. Continue outpatient chronic pain medication regimen  Plan discussed with Dr. Ruff    Antibiotics started: Ancef  Pharmacologic VTE Prophylaxis: Enoxaparin (Lovenox)   Mechanical VTE Prophylaxis: sequential compression device   Weightbearing status: WBAT to LLE in surgical shoe      Disposition:  Patient requires >2 midnight stay for IV antibiotics, advanced imaging, possible surgical intervention.      SUBJECTIVE:    History of Present Illness:    Anna Marie Barbosa is a 59 y.o. female who is being admitted 3/17/2025 due to cellulitis of left third toe, concern for possible underlying bone infection. Patient has a past medical history of T2DM, Asthma, HTN, obesity,  chronic pain disorder, opioid dependence, peripheral neuropathy, TIA, prior pedal digital amputations.     Patient reports her  checks her feet daily, this morning noticed that her left third toe was quite swollen and had a slight odor.  Her daughter also noticed that there was some drainage coming from the toe.  They were monitoring it throughout the day, message Dr. Jessica wan for advice.  He gave them an outpatient prescription for antibiotics and to closely monitor the toe.  Given patient and family concerns for the appearance after discussing with Dr. Figueroa, decision was made to have patient reports the emergency room.  She denies any nausea, vomiting, shortness of breath, chest pain, fevers, chills.  States that because of her neuropathy, she cannot feel anything in her feet.  She is concerned about the redness of her third toe that is going up the left anterior lower leg    Review of Systems:    Constitutional: Negative.    HENT: Negative.    Eyes: Negative.    Respiratory: Negative.    Cardiovascular: Negative.    Gastrointestinal: Negative.    Musculoskeletal: chronic back pain   Skin: left 3rd toe redness, swelling, ulcer   Neurological: peripheral neuropathy  Psych: Negative.     Past Medical and Surgical History:     Past Medical History:   Diagnosis Date    Acute respiratory insufficiency 11/23/2019    Allergies     Ambulates with cane     Anxiety     Asthma     Chronic narcotic dependence (HCC)     Chronic pain     Chronic pain disorder     back/knee    Diabetes (HCC)     HTN (hypertension)     elevated with electronic device    Hyperlipidemia     PONV (postoperative nausea and vomiting)     Sepsis (HCC) 11/23/2019    Sleep apnea     no cpap    Walker as ambulation aid        Past Surgical History:   Procedure Laterality Date    BACK SURGERY      x 3 with hardware    CERVICAL LAMINECTOMY      CHOLECYSTECTOMY LAPAROSCOPIC N/A 11/10/2018    Procedure: CHOLECYSTECTOMY LAPAROSCOPIC w/  "io;  Surgeon: Benitez Cuenca MD;  [facility];  Service: General    COLONOSCOPY      HYSTERECTOMY      JOINT REPLACEMENT Left     knee    UT ARTHRP KNE CONDYLE&PLATU MEDIAL&LAT COMPARTMENTS Right 1/20/2025    Procedure: ARTHROPLASTY KNEE TOTAL, RIGHT-SAME DAY;  Surgeon: Joe Cabrera MD;  Location:  MAIN OR;  Service: Orthopedics    UT REVJ TOT KNEE ARTHRP FEM&ENTIRE TIBIAL COMPONE Left 10/21/2024    Procedure: ARTHROPLASTY LEFT KNEE TOTAL REVISION,  2 components;  Surgeon: Joe Cabrera MD;  Location:  [facility] OR;  Service: Orthopedics    TOE AMPUTATION Right 02/02/2018    Procedure: AMPUTATION TOE, RIGHT GREAT TOE;  Surgeon: Lino Rodriguez DPM;  [facility];  Service: Podiatry    TOE AMPUTATION Right     3rd toe       Meds/Allergies:    Not in a hospital admission.    Allergies   Allergen Reactions    Ozempic (0.25 Or 0.5 Mg-Dose) [Semaglutide(0.25 Or 0.5mg-Dos)] Diarrhea and GI Intolerance    Nsaids GI Intolerance       Social History:    Social History     Marital Status: /Civil Union    Substance Use History:   Social History     Substance and Sexual Activity   Alcohol Use Not Currently    Comment: special occasion     Social History     Tobacco Use   Smoking Status Never    Passive exposure: Never   Smokeless Tobacco Never     Social History     Substance and Sexual Activity   Drug Use Yes    Types: Morphine    Comment: prescribed 75mg bid       Family History:    Family History   Problem Relation Age of Onset    Diabetes Mother     Diabetes Maternal Grandmother     No Known Problems Father          OBJECTIVE:    First Vitals:   Blood Pressure: 158/70 (03/17/25 1640)  Pulse: 105 (03/17/25 1640)  Temperature: 100.5 °F (38.1 °C) (03/17/25 1640)  Temp Source: Oral (03/17/25 1640)  Respirations: 18 (03/17/25 1640)  Height: 5' 8\" (172.7 cm) (03/17/25 1640)  Weight - Scale: 120 kg (265 lb 6.9 oz) (03/17/25 1640)  SpO2: 98 % (03/17/25 1640)    Current Vitals:   Blood Pressure: " "111/69 (03/17/25 1810)  Pulse: 89 (03/17/25 1810)  Temperature: 98.6 °F (37 °C) (03/17/25 1810)  Temp Source: Oral (03/17/25 1810)  Respirations: 16 (03/17/25 1810)  Height: 5' 8\" (172.7 cm) (03/17/25 1640)  Weight - Scale: 120 kg (265 lb 6.9 oz) (03/17/25 1640)  SpO2: 96 % (03/17/25 1810)    Physical Exam    General Appearance: Alert, cooperative, no distress.  HEENT: Head normocephalic, atraumatic, without obvious abnormality.  Heart: Normal rate and rhythm.  No murmurs or gallops noted.  Lungs: Non-labored breathing. No respiratory distress.  No wheezing, rhonchi, or rales.  Abdomen:  Soft, nontender, without distension.  Psychiatric: AAOx3  Lower Extremity:  Vascular:   Right DP pulse: 2+  Right PT pulse: 2+  Left DP pulse: 2+  Left PT pulse: 1+ weakly palpable secondary to edema, biphasic on doppler  CRT < 3 seconds at the digits.   +1/4 edema noted at bilateral lower extremities.   Pedal hair is absent.   Skin temperature of LLE slightly warmer compared to contralateral extremity     Musculoskeletal:  MMT is 4/5 in all muscle compartments bilaterally. ROM at the 1st MPJ and ankle joint are reduced bilaterally with the leg extended. No Pain on palpation of left lower extremity. Noted pes planus deformity bilaterally with history of digital amputations to right foot    Dermatological:  Wound #: 1  Location: left 3rd digit  Length 1cm: Width 0.8cm: Depth 0.3cm:   Deepest Tissue Noted in Base: fat layer/subcutaneous  Probe to Bone: No  Peripheral Skin Description: Attached  Granulation: 50% Fibrotic Tissue: 50% Necrotic Tissue: 0%   Drainage Amount: minimal, serosanguinous  Signs of Infection: Yes - erythema, edema    Neurological:  Gross sensation is diminished. Protective sensation is absent. Patient Reports numbness and/or paresthesias.    Clinical Images 03/17/25:            Additional Data:    Lab Results:   Admission on 03/17/2025   Component Date Value    WBC 03/17/2025 6.32     RBC 03/17/2025 3.92     " Hemoglobin 03/17/2025 10.4 (L)     Hematocrit 03/17/2025 32.5 (L)     MCV 03/17/2025 83     MCH 03/17/2025 26.5 (L)     MCHC 03/17/2025 32.0     RDW 03/17/2025 13.8     MPV 03/17/2025 11.9     Platelets 03/17/2025 201     nRBC 03/17/2025 0     Segmented % 03/17/2025 57     Immature Grans % 03/17/2025 0     Lymphocytes % 03/17/2025 28     Monocytes % 03/17/2025 11     Eosinophils Relative 03/17/2025 3     Basophils Relative 03/17/2025 1     Absolute Neutrophils 03/17/2025 3.60     Absolute Immature Grans 03/17/2025 0.01     Absolute Lymphocytes 03/17/2025 1.77     Absolute Monocytes 03/17/2025 0.68     Eosinophils Absolute 03/17/2025 0.20     Basophils Absolute 03/17/2025 0.06     Sodium 03/17/2025 136     Potassium 03/17/2025 3.9     Chloride 03/17/2025 105     CO2 03/17/2025 26     ANION GAP 03/17/2025 5     BUN 03/17/2025 13     Creatinine 03/17/2025 0.65     Glucose 03/17/2025 88     Calcium 03/17/2025 9.0     eGFR 03/17/2025 96     Sed Rate 03/17/2025 49 (H)     CRP 03/17/2025 14.6 (H)        Cultures:            Imaging: I have personally reviewed pertinent reports in PACS  No results found.    EKG, Pathology, and Other Studies: I have personally reviewed pertinent reports.      Code Status: Prior

## 2025-03-17 NOTE — ED PROVIDER NOTES
Time reflects when diagnosis was documented in both MDM as applicable and the Disposition within this note       Time User Action Codes Description Comment    3/17/2025  5:11 PM Stephanie Carroll Add [L03.032] Cellulitis of third toe of left foot           ED Disposition       None          Assessment & Plan       Medical Decision Making  59-year-old female presents to the ED with redness to the left third toe.  Patient states the toe was normal this morning.  When she arrived at her daughter's house about an hour prior to coming to the ED, she took off her sock and noticed that the toe was red.  She also noticed that there was some swelling to the left ankle.  No systemic symptoms or known trauma.  On exam she is alert she is in no acute distress.  She does have a diffusely erythematous left third toe with a small necrotic region to the medial/distal aspect of the toe.  There is mild erythema now extending to mid shin region.  Suspect cellulitis.  Will obtain x-ray to rule out possibility of fracture/osteomyelitis/gas in the tissue.  Will also obtain inflammatory biomarkers, CBC, BMP and give dose of IV antibiotics.  Will consult podiatry    Amount and/or Complexity of Data Reviewed  Labs: ordered. Decision-making details documented in ED Course.  Radiology: ordered and independent interpretation performed.        ED Course as of 03/24/25 1208   Mon Mar 17, 2025   1737 C-REACTIVE PROTEIN(!): 14.6   1737 Sed Rate(!): 49   1737 WBC: 6.32   1737 Hematocrit(!): 32.5   1737 Sodium: 136   1737 GLUCOSE: 88   1737 Potassium: 3.9   1737 GFR, Calculated: 96   1738 X-ray left foot: No osteomyelitis/gas in tissues   1757 Blood Pressure: 158/70   1758 Temperature: 100.5 °F (38.1 °C)   1758 Pulse: 105   1758 Respirations: 18   1758 SpO2: 98 %       Medications   ampicillin-sulbactam (UNASYN) 3 g in sodium chloride 0.9 % 100 mL IVPB (0 g Intravenous Stopped 3/17/25 1745)       ED Risk Strat Scores                             SBIRT 20yo+      Flowsheet Row Most Recent Value   Initial Alcohol Screen: US AUDIT-C     1. How often do you have a drink containing alcohol? 0 Filed at: 03/17/2025 1644   2. How many drinks containing alcohol do you have on a typical day you are drinking?  0 Filed at: 03/17/2025 1644   3b. FEMALE Any Age, or MALE 65+: How often do you have 4 or more drinks on one occassion? 0 Filed at: 03/17/2025 1644   Audit-C Score 0 Filed at: 03/17/2025 1644   CARIDAD: How many times in the past year have you...    Used an illegal drug or used a prescription medication for non-medical reasons? Never Filed at: 03/17/2025 1644                            History of Present Illness       Chief Complaint   Patient presents with   • Leg Swelling     Left ankle swelling and 3rd toe, noted some bloody drainage. Hx of toe amputations on R foot, has neuropathy, reports baseline numbess       Past Medical History:   Diagnosis Date   • Acute respiratory insufficiency 11/23/2019   • Allergies    • Ambulates with cane    • Anxiety    • Asthma    • Chronic narcotic dependence (HCC)    • Chronic pain    • Chronic pain disorder     back/knee   • Diabetes (HCC)    • HTN (hypertension)     elevated with electronic device   • Hyperlipidemia    • PONV (postoperative nausea and vomiting)    • Sepsis (HCC) 11/23/2019   • Sleep apnea     no cpap   • Walker as ambulation aid       Past Surgical History:   Procedure Laterality Date   • BACK SURGERY      x 3 with hardware   • CERVICAL LAMINECTOMY     • CHOLECYSTECTOMY LAPAROSCOPIC N/A 11/10/2018    Procedure: CHOLECYSTECTOMY LAPAROSCOPIC w/ ioc;  Surgeon: Benitez Cuenca MD;  Location: [facility];  Service: General   • COLONOSCOPY     • HYSTERECTOMY     • JOINT REPLACEMENT Left     knee   • MA ARTHRP KNE CONDYLE&PLATU MEDIAL&LAT COMPARTMENTS Right 1/20/2025    Procedure: ARTHROPLASTY KNEE TOTAL, RIGHT-SAME DAY;  Surgeon: Joe Cabrera MD;  Location:  MAIN OR;  Service:  Orthopedics   • OK REVJ TOT KNEE ARTHRP FEM&ENTIRE TIBIAL COMPONE Left 10/21/2024    Procedure: ARTHROPLASTY LEFT KNEE TOTAL REVISION,  2 components;  Surgeon: Joe Cabrera MD;  Location:  MAIN OR;  Service: Orthopedics   • TOE AMPUTATION Right 02/02/2018    Procedure: AMPUTATION TOE, RIGHT GREAT TOE;  Surgeon: Lino Rodriguez DPM;  Location: [facility];  Service: Podiatry   • TOE AMPUTATION Right     3rd toe      Family History   Problem Relation Age of Onset   • Diabetes Mother    • Diabetes Maternal Grandmother    • No Known Problems Father       Social History     Tobacco Use   • Smoking status: Never     Passive exposure: Never   • Smokeless tobacco: Never   Vaping Use   • Vaping status: Never Used   Substance Use Topics   • Alcohol use: Not Currently     Comment: special occasion   • Drug use: Yes     Types: Morphine     Comment: prescribed 75mg bid      E-Cigarette/Vaping   • E-Cigarette Use Never User       E-Cigarette/Vaping Substances   • Nicotine No    • THC No    • CBD No    • Flavoring No    • Other No    • Unknown No       I have reviewed and agree with the history as documented.     59-year-old female with history of diabetes with last hemoglobin A1c of 5.7, diabetic neuropathy, toe amputations presents to the emergency department with erythema and swelling to the left third toe.  Patient states this morning the toe was normal.  When she went to her daughter's house and took off her sock about an hour ago, she noticed that the toe was red.  She also noticed some swelling around her left ankle.  Patient is uncertain of any trauma secondary to her neuropathy.  She has had no systemic symptoms such as fevers or chills, nausea or vomiting.      Toe Pain  Location:  Left third toe  Duration:  2 hours  Timing:  Constant  Progression:  Unchanged  Chronicity:  New  Context:  Noticed redness to the left third toe which was not there this morning  Associated symptoms: no abdominal pain, no chest pain,  no congestion, no cough, no fatigue, no fever, no headaches, no nausea, no rash, no rhinorrhea, no shortness of breath, no sore throat, no vomiting and no wheezing        Review of Systems   Constitutional: Negative.  Negative for chills, diaphoresis, fatigue and fever.   HENT: Negative.  Negative for congestion, rhinorrhea and sore throat.    Eyes: Negative.  Negative for discharge, redness and itching.   Respiratory: Negative.  Negative for apnea, cough, chest tightness, shortness of breath and wheezing.    Cardiovascular:  Negative for chest pain, palpitations and leg swelling.   Gastrointestinal: Negative.  Negative for abdominal pain, nausea and vomiting.   Endocrine: Negative.    Genitourinary: Negative.  Negative for flank pain, frequency and urgency.   Musculoskeletal: Negative.  Negative for back pain.   Skin: Negative.  Negative for rash.   Allergic/Immunologic: Negative.    Neurological: Negative.  Negative for dizziness, syncope, weakness, light-headedness, numbness and headaches.   Hematological: Negative.    All other systems reviewed and are negative.          Objective       ED Triage Vitals [03/17/25 1640]   Temperature Pulse Blood Pressure Respirations SpO2 Patient Position - Orthostatic VS   100.5 °F (38.1 °C) 105 158/70 18 98 % Sitting      Temp Source Heart Rate Source BP Location FiO2 (%) Pain Score    Oral Monitor Right arm -- --      Vitals      Date and Time Temp Pulse SpO2 Resp BP Pain Score FACES Pain Rating User   03/17/25 1640 100.5 °F (38.1 °C) 105 98 % 18 158/70 -- -- DW            Physical Exam  Vitals and nursing note reviewed.   Constitutional:       General: She is awake. She is not in acute distress.     Appearance: Normal appearance. She is well-developed and overweight. She is not ill-appearing, toxic-appearing or diaphoretic.   HENT:      Head: Normocephalic and atraumatic.      Right Ear: External ear normal.      Left Ear: External ear normal.      Nose: Nose normal.   Eyes:       General: No scleral icterus.        Right eye: No discharge.         Left eye: No discharge.      Conjunctiva/sclera: Conjunctivae normal.   Neck:      Thyroid: No thyromegaly.      Vascular: No JVD.      Trachea: No tracheal deviation.   Cardiovascular:      Rate and Rhythm: Normal rate and regular rhythm.      Heart sounds: Normal heart sounds. No murmur heard.     No friction rub. No gallop.   Pulmonary:      Effort: Pulmonary effort is normal. No respiratory distress.      Breath sounds: Normal breath sounds. No stridor. No wheezing, rhonchi or rales.   Chest:      Chest wall: No tenderness.   Musculoskeletal:      Comments: See clinical image on chart.  Diffuse circumferential erythema left third toe with small necrotic area to the medial aspect of the toe.  There is erythema noted extending to mid shin region   Skin:     General: Skin is warm and dry.      Coloration: Skin is not jaundiced or pale.      Findings: No bruising, erythema, lesion or rash.   Neurological:      General: No focal deficit present.      Mental Status: She is alert and oriented to person, place, and time.      Motor: No weakness or abnormal muscle tone.      Deep Tendon Reflexes: Reflexes are normal and symmetric.   Psychiatric:         Mood and Affect: Mood normal.         Behavior: Behavior is cooperative.                   Results Reviewed       Procedure Component Value Units Date/Time    Basic metabolic panel [[id number]] Collected: 03/17/25 1704    Lab Status: Final result Specimen: Blood from Arm, Right Updated: 03/17/25 1728     Sodium 136 mmol/L      Potassium 3.9 mmol/L      Chloride 105 mmol/L      CO2 26 mmol/L      ANION GAP 5 mmol/L      BUN 13 mg/dL      Creatinine 0.65 mg/dL      Glucose 88 mg/dL      Calcium 9.0 mg/dL      eGFR 96 ml/min/1.73sq m     Narrative:      National Kid[employer] Disease Foundation guidelines for Chronic Kidney Disease (CKD):   •  Stage 1 with normal or high GFR (GFR > 90 mL/min/1.73 square  meters)  •  Stage 2 Mild CKD (GFR = 60-89 mL/min/1.73 square meters)  •  Stage 3A Moderate CKD (GFR = 45-59 mL/min/1.73 square meters)  •  Stage 3B Moderate CKD (GFR = 30-44 mL/min/1.73 square meters)  •  Stage 4 Severe CKD (GFR = 15-29 mL/min/1.73 square meters)  •  Stage 5 End Stage CKD (GFR <15 mL/min/1.73 square meters)  Note: GFR calculation is accurate only with a steady state creatinine    C-reactive protein [[id number]]  (Abnormal) Collected: 03/17/25 1704    Lab Status: Final result Specimen: Blood from Arm, Right Updated: 03/17/25 1728     CRP 14.6 mg/L     Sedimentation rate, automated [[id number]]  (Abnormal) Collected: 03/17/25 1704    Lab Status: Final result Specimen: Blood from Arm, Right Updated: 03/17/25 1727     Sed Rate 49 mm/hour     CBC and differential [[id number]]  (Abnormal) Collected: 03/17/25 1704    Lab Status: Final result Specimen: Blood from Arm, Right Updated: 03/17/25 1711     WBC 6.32 Thousand/uL      RBC 3.92 Million/uL      Hemoglobin 10.4 g/dL      Hematocrit 32.5 %      MCV 83 fL      MCH 26.5 pg      MCHC 32.0 g/dL      RDW 13.8 %      MPV 11.9 fL      Platelets 201 Thousands/uL      nRBC 0 /100 WBCs      Segmented % 57 %      Immature Grans % 0 %      Lymphocytes % 28 %      Monocytes % 11 %      Eosinophils Relative 3 %      Basophils Relative 1 %      Absolute Neutrophils 3.60 Thousands/µL      Absolute Immature Grans 0.01 Thousand/uL      Absolute Lymphocytes 1.77 Thousands/µL      Absolute Monocytes 0.68 Thousand/µL      Eosinophils Absolute 0.20 Thousand/µL      Basophils Absolute 0.06 Thousands/µL             XR foot 3+ views LEFT   ED Interpretation by Stephanie Carroll DO (03/17 1738)   No osteomyelitis/gas in tissue          Procedures    ED Medication and Procedure Management   Prior to Admission Medications   Prescriptions Last Dose Informant Patient Reported? Taking?   Fluticasone-Salmeterol (Advair) 500-50 mcg/dose inhaler   No No   Sig: Inhale 2 puffs daily at  bedtime   Multiple Vitamins-Minerals (One Daily Womens 50+) TABS  Self No No   Sig: Take 1 tablet by mouth daily   Multiple Vitamins-Minerals (multivitamin with minerals) tablet   No No   Sig: Take 1 tablet by mouth daily   Tirzepatide (Mounjaro) 10 MG/0.5ML SOAJ   No No   Sig: Inject 10 mg under the skin once a week   albuterol (PROVENTIL HFA,VENTOLIN HFA) 90 mcg/act inhaler   No No   Sig: INHALE 2 PUFFS BY MOUTH EVERY 4 HOURS AS NEEDED FOR WHEEZING OR SHORTNESS OF BREATH   aspirin (ECOTRIN LOW STRENGTH) 81 mg EC tablet  Self No No   Sig: Take 1 tablet (81 mg total) by mouth 2 (two) times a day   aspirin (ECOTRIN LOW STRENGTH) 81 mg EC tablet   No No   Sig: Take 1 tablet (81 mg total) by mouth 2 (two) times a day   carisoprodol (SOMA) 350 mg tablet  Self Yes No   Sig: Take 350 mg by mouth 2 (two) times a day   cefadroxil (DURICEF) 500 mg capsule   No No   Sig: Take 1 capsule (500 mg total) by mouth every 12 (twelve) hours for 7 days   diazepam (VALIUM) 5 mg tablet  Self Yes No   Sig: Take 5 mg by mouth daily at bedtime as needed for anxiety   ferrous sulfate 324 (65 Fe) mg   No No   Sig: Take 1 tablet (324 mg total) by mouth every other day   ferrous sulfate 324 (65 Fe) mg   No No   Sig: TAKE 1 TABLET (324 MG TOTAL) BY MOUTH EVERY OTHER DAY   fluticasone (FLONASE) 50 mcg/act nasal spray  Self Yes No   Si spray into each nostril if needed   folic acid (FOLVITE) 1 mg tablet  Self No No   Sig: TAKE 1 TABLET BY MOUTH EVERY DAY   folic acid (FOLVITE) 1 mg tablet   No No   Sig: Take 1 tablet (1 mg total) by mouth daily   nystatin (MYCOSTATIN) powder   No No   Sig: Apply topically 2 (two) times a day   oxyCODONE (Roxicodone) 5 immediate release tablet   No No   Sig: Take 1 tablet (5 mg total) by mouth every 4 (four) hours as needed for severe pain for up to 30 doses Max Daily Amount: 30 mg   oxyCODONE (Roxicodone) 5 immediate release tablet   No No   Sig: Take 1 tablet (5 mg total) by mouth every 4 (four) hours as  needed for severe pain for up to 15 doses Max Daily Amount: 30 mg   patient supplied medication  Self Yes No   Sig: Dexcom   pregabalin (LYRICA) 200 MG capsule  Self Yes No   Sig: Take 200 mg by mouth 2 (two) times a day   rosuvastatin (CRESTOR) 5 mg tablet  Self No No   Sig: Take 1 tablet (5 mg total) by mouth every other day      Facility-Administered Medications: None     Patient's Medications   Discharge Prescriptions    No medications on file     No discharge procedures on file.  ED SEPSIS DOCUMENTATION   Time reflects when diagnosis was documented in both MDM as applicable and the Disposition within this note       Time User Action Codes Description Comment    3/17/2025  5:11 PM Stephanie Carroll Add [L03.032] Cellulitis of third toe of left foot                  Stephanie Carroll,   03/24/25 1206

## 2025-03-17 NOTE — TELEPHONE ENCOUNTER
Please see below message.  Patient calling back again. I did read Dr. Figueroa's message to her.  She is now stating part of her toe is black and she would like to speak to Dr. Figueroa. Please return call. Thank you

## 2025-03-18 ENCOUNTER — ANESTHESIA EVENT (INPATIENT)
Dept: PERIOP | Facility: HOSPITAL | Age: 60
DRG: 617 | End: 2025-03-18
Payer: COMMERCIAL

## 2025-03-18 PROBLEM — M86.272 SUBACUTE OSTEOMYELITIS OF LEFT FOOT (HCC): Status: ACTIVE | Noted: 2025-03-18

## 2025-03-18 LAB
ANION GAP SERPL CALCULATED.3IONS-SCNC: 4 MMOL/L (ref 4–13)
BASOPHILS # BLD AUTO: 0.04 THOUSANDS/ÂΜL (ref 0–0.1)
BASOPHILS NFR BLD AUTO: 1 % (ref 0–1)
BUN SERPL-MCNC: 11 MG/DL (ref 5–25)
CALCIUM SERPL-MCNC: 8.3 MG/DL (ref 8.4–10.2)
CHLORIDE SERPL-SCNC: 105 MMOL/L (ref 96–108)
CO2 SERPL-SCNC: 28 MMOL/L (ref 21–32)
CREAT SERPL-MCNC: 0.66 MG/DL (ref 0.6–1.3)
EOSINOPHIL # BLD AUTO: 0.23 THOUSAND/ÂΜL (ref 0–0.61)
EOSINOPHIL NFR BLD AUTO: 6 % (ref 0–6)
ERYTHROCYTE [DISTWIDTH] IN BLOOD BY AUTOMATED COUNT: 14 % (ref 11.6–15.1)
GFR SERPL CREATININE-BSD FRML MDRD: 96 ML/MIN/1.73SQ M
GLUCOSE SERPL-MCNC: 118 MG/DL (ref 65–140)
GLUCOSE SERPL-MCNC: 130 MG/DL (ref 65–140)
GLUCOSE SERPL-MCNC: 96 MG/DL (ref 65–140)
HCT VFR BLD AUTO: 29.5 % (ref 34.8–46.1)
HGB BLD-MCNC: 9.2 G/DL (ref 11.5–15.4)
IMM GRANULOCYTES # BLD AUTO: 0.01 THOUSAND/UL (ref 0–0.2)
IMM GRANULOCYTES NFR BLD AUTO: 0 % (ref 0–2)
LYMPHOCYTES # BLD AUTO: 1.5 THOUSANDS/ÂΜL (ref 0.6–4.47)
LYMPHOCYTES NFR BLD AUTO: 37 % (ref 14–44)
MCH RBC QN AUTO: 26.2 PG (ref 26.8–34.3)
MCHC RBC AUTO-ENTMCNC: 31.2 G/DL (ref 31.4–37.4)
MCV RBC AUTO: 84 FL (ref 82–98)
MONOCYTES # BLD AUTO: 0.63 THOUSAND/ÂΜL (ref 0.17–1.22)
MONOCYTES NFR BLD AUTO: 15 % (ref 4–12)
NEUTROPHILS # BLD AUTO: 1.69 THOUSANDS/ÂΜL (ref 1.85–7.62)
NEUTS SEG NFR BLD AUTO: 41 % (ref 43–75)
NRBC BLD AUTO-RTO: 0 /100 WBCS
PLATELET # BLD AUTO: 172 THOUSANDS/UL (ref 149–390)
PMV BLD AUTO: 12.3 FL (ref 8.9–12.7)
POTASSIUM SERPL-SCNC: 3.7 MMOL/L (ref 3.5–5.3)
RBC # BLD AUTO: 3.51 MILLION/UL (ref 3.81–5.12)
SODIUM SERPL-SCNC: 137 MMOL/L (ref 135–147)
WBC # BLD AUTO: 4.1 THOUSAND/UL (ref 4.31–10.16)

## 2025-03-18 PROCEDURE — 82948 REAGENT STRIP/BLOOD GLUCOSE: CPT

## 2025-03-18 PROCEDURE — 80048 BASIC METABOLIC PNL TOTAL CA: CPT

## 2025-03-18 PROCEDURE — 99222 1ST HOSP IP/OBS MODERATE 55: CPT | Performed by: STUDENT IN AN ORGANIZED HEALTH CARE EDUCATION/TRAINING PROGRAM

## 2025-03-18 PROCEDURE — NC001 PR NO CHARGE: Performed by: PODIATRIST

## 2025-03-18 PROCEDURE — 85025 COMPLETE CBC W/AUTO DIFF WBC: CPT

## 2025-03-18 RX ORDER — AMOXICILLIN 250 MG
1 CAPSULE ORAL 2 TIMES DAILY
Status: DISCONTINUED | OUTPATIENT
Start: 2025-03-18 | End: 2025-03-20 | Stop reason: HOSPADM

## 2025-03-18 RX ORDER — DIAZEPAM 5 MG/1
5 TABLET ORAL EVERY 12 HOURS PRN
Status: DISCONTINUED | OUTPATIENT
Start: 2025-03-18 | End: 2025-03-20 | Stop reason: HOSPADM

## 2025-03-18 RX ADMIN — CEFAZOLIN SODIUM 2000 MG: 2 SOLUTION INTRAVENOUS at 16:00

## 2025-03-18 RX ADMIN — CARISOPRODOL 350 MG: 350 TABLET ORAL at 21:32

## 2025-03-18 RX ADMIN — SENNOSIDES AND DOCUSATE SODIUM 1 TABLET: 50; 8.6 TABLET ORAL at 12:02

## 2025-03-18 RX ADMIN — PREGABALIN 200 MG: 100 CAPSULE ORAL at 18:15

## 2025-03-18 RX ADMIN — ENOXAPARIN SODIUM 40 MG: 40 INJECTION SUBCUTANEOUS at 08:43

## 2025-03-18 RX ADMIN — PREGABALIN 200 MG: 100 CAPSULE ORAL at 08:43

## 2025-03-18 RX ADMIN — CEFAZOLIN SODIUM 2000 MG: 2 SOLUTION INTRAVENOUS at 23:37

## 2025-03-18 RX ADMIN — DIAZEPAM 5 MG: 5 TABLET ORAL at 12:02

## 2025-03-18 RX ADMIN — OXYCODONE HYDROCHLORIDE 10 MG: 10 TABLET ORAL at 06:40

## 2025-03-18 RX ADMIN — CEFAZOLIN SODIUM 2000 MG: 2 SOLUTION INTRAVENOUS at 07:57

## 2025-03-18 RX ADMIN — PRAVASTATIN SODIUM 40 MG: 40 TABLET ORAL at 08:43

## 2025-03-18 RX ADMIN — OXYCODONE HYDROCHLORIDE 10 MG: 10 TABLET ORAL at 16:05

## 2025-03-18 RX ADMIN — FLUTICASONE FUROATE AND VILANTEROL TRIFENATATE 1 PUFF: 200; 25 POWDER RESPIRATORY (INHALATION) at 08:43

## 2025-03-18 RX ADMIN — CARISOPRODOL 350 MG: 350 TABLET ORAL at 08:43

## 2025-03-18 RX ADMIN — SENNOSIDES AND DOCUSATE SODIUM 1 TABLET: 50; 8.6 TABLET ORAL at 18:15

## 2025-03-18 RX ADMIN — OXYCODONE HYDROCHLORIDE 10 MG: 10 TABLET ORAL at 23:48

## 2025-03-18 NOTE — PROGRESS NOTES
Podiatry - Progress Note  Patient: Anna Marie Barbosa 59 y.o. female   MRN: [id number]  PCP: Magdalena Grove MD  Unit/Bed#: S 3 -01 Encounter: [id number]  Date Of Visit: 03/18/25    ASSESSMENT:    Anna Marie Barbosa is a 59 y.o. female with:    Left third toe distal phalanx osteomyelitis  Left toe diabetic ulceration, Herrera grade 3  Type 2 diabetes mellitus with hyperglycemia with long-term current use of insulin  Chronic pain disorder with opioid dependence  History of right great toe amputation      PLAN:    Patient evaluated at bedside.  Patient has a left third digit wound which probes to bone with overlying cellulitis.  X-ray of the left foot was personally reviewed, there is noted cortical destruction of the distal phalanx consistent with osteomyelitis.  I discussed at bedside with the patient regarding several treatment options.  At the end of the discussion we decided the most beneficial option for treatment would be amputation of the left digit, partial versus full amputation.  Will plan for partial versus total toe amputation of the left third toe tomorrow, with Dr. Figueroa.  N.p.o. midnight, hold anticoagulation while NPO  Continue IV Ancef.  Follow-up blood cultures taken in ER  Follow-up Slim consult for restratification.  Elevation and offloading on green foam wedges or pillows when non-ambulatory.  Appreciate consulting services for recommendations and management.     Antibiotics started: Ancef  Pharmacologic VTE Prophylaxis: Enoxaparin (Lovenox)   Mechanical VTE Prophylaxis: sequential compression device   Weightbearing status: Weightbearing as tolerated left  foot in surgical shoe    Disposition: Requires continued inpatient stay for the above.    SUBJECTIVE:     The patient was seen, evaluated, and assessed at bedside today. The patient was awake, alert, and in no acute distress. No acute events overnight. The patient reports she understands with the above.. Patient denies  "N/V/F/chills/SOB/CP.      OBJECTIVE:     Vitals:   /50 (BP Location: Left arm)   Pulse 79   Temp 97.8 °F (36.6 °C) (Oral)   Resp 16   Ht 5' 8\" (1.727 m)   Wt 119 kg (261 lb 9.6 oz)   SpO2 94%   BMI 39.78 kg/m²     Temp (24hrs), Av.5 °F (36.9 °C), Min:97.8 °F (36.6 °C), Max:100.5 °F (38.1 °C)      Physical Exam:     Lungs: Non labored breathing  Abdomen: Soft, non-tender.  Lower Extremity:  Cardiovascular status at baseline from admission.  Neurological status at baseline from admission.  Musculoskeletal status at baseline from admission. No calf tenderness noted.     Wound #: 1  Location: left 3rd digit  Length 1cm: Width 0.8cm: Depth 0.3cm:   Deepest Tissue Noted in Base: fat layer/subcutaneous  Probe to Bone: No  Peripheral Skin Description: Attached  Granulation: 50% Fibrotic Tissue: 50% Necrotic Tissue: 0%   Drainage Amount: minimal, serosanguinous  Signs of Infection: Yes - erythema, edema    Clinical Images 25:      Additional Data:     Labs:    Results from last 7 days   Lab Units 25  0637   WBC Thousand/uL 4.10*   HEMOGLOBIN g/dL 9.2*   HEMATOCRIT % 29.5*   PLATELETS Thousands/uL 172   SEGS PCT % 41*   LYMPHO PCT % 37   MONO PCT % 15*   EOS PCT % 6     Results from last 7 days   Lab Units 25  0637   POTASSIUM mmol/L 3.7   CHLORIDE mmol/L 105   CO2 mmol/L 28   BUN mg/dL 11   CREATININE mg/dL 0.66   CALCIUM mg/dL 8.3*           * I Have Reviewed All Lab Data Listed Above.    Recent Cultures (last 7 days):               Imaging: I have personally reviewed pertinent films in PACS  EKG, Pathology, and Other Studies: I have personally reviewed pertinent reports.    ** Please Note: Portions of the record may have been created with voice recognition software. Occasional wrong word or \"sound a like\" substitutions may have occurred due to the inherent limitations of voice recognition software. Read the chart carefully and recognize, using context, where substitutions have occurred. " **

## 2025-03-18 NOTE — CONSULTS
Consultation - Hospitalist   Name: Anna Marie Barbosa 59 y.o. female I MRN: [id number]  Unit/Bed#: S 3 -01 I Date of Admission: 3/17/2025   Date of Service: 3/18/2025 I Hospital Day: 1   Inpatient consult to Internal Medicine  Consult performed by: Dangelo Jones MD  Consult ordered by: Rosario Niño DPM        Physician Requesting Evaluation: JEANNE Beavers   Reason for Evaluation / Principal Problem: Medicine Consult, Toe Cellulitis    Assessment & Plan  Subacute osteomyelitis of left foot (HCC)  59-year-old female with PMH of type 2 diabetes, asthma, obesity who presented for toe cellulitis and osteomyelitis  Podiatry as primary  Currently on IV Ancef  Imaging concerning for underlying osteomyelitis  Plans for toe amputation tomorrow  N.p.o. midnight  Patient is low risk for low risk procedure, with no prior history of CVA, CHF, CKD or CAD, patient is RCRI score of 0.  Type 2 diabetes mellitus with hyperglycemia, with long-term current use of insulin ([facility])  Lab Results   Component Value Date    HGBA1C 5.7 (H) 01/09/2025       Recent Labs     03/18/25  1133   POCGLU 130   A1c 5.7  On Mounjaro outpatient  Moderate sliding scale, Accu-Cheks while inpatient    Asthma  Not in exacerbation, on room air  Continue bronchodilator with albuterol as needed  Essential hypertension  Blood pressure currently controlled  Not on antihypertensives currently  Morbid obesity (HCC)  Patient reportedly currently on Mounjaro  Continue weight loss, previously 300+ pounds  Chronic pain disorder  Recently morphine p.o. 75 mg twice daily  Nonformulary, currently on oxycodone  Cellulitis of third toe of left foot  IV antibiotics per podiatry with Ancef  Blood cultures ordered and pending        VTE Prophylaxis: Enoxaparin (Lovenox)  / sequential compression device     Collaboration of Care: Were Recommendations Directly Discussed with Primary Treatment Team? - Yes     History of Present Illness:    Anna Marie Barbosa is a 59 y.o.  female who is originally admitted to the podiatry service due to left foot cellulitis. We are consulted for medical mgmt.    Review of Systems:    Review of Systems   All other systems reviewed and are negative.      Past Medical and Surgical History:     Past Medical History:   Diagnosis Date    Acute respiratory insufficiency 11/23/2019    Allergies     Ambulates with cane     Anxiety     Asthma     Chronic narcotic dependence (HCC)     Chronic pain     Chronic pain disorder     back/knee    Diabetes (HCC)     HTN (hypertension)     elevated with electronic device    Hyperlipidemia     PONV (postoperative nausea and vomiting)     Sepsis (HCC) 11/23/2019    Sleep apnea     no cpap    Walker as ambulation aid        Past Surgical History:   Procedure Laterality Date    BACK SURGERY      x 3 with hardware    CERVICAL LAMINECTOMY      CHOLECYSTECTOMY LAPAROSCOPIC N/A 11/10/2018    Procedure: CHOLECYSTECTOMY LAPAROSCOPIC w/ ioc;  Surgeon: Benitez Cuenca MD;  Location: [facility];  Service: General    COLONOSCOPY      HYSTERECTOMY      JOINT REPLACEMENT Left     knee    IN ARTHRP KNE CONDYLE&PLATU MEDIAL&LAT COMPARTMENTS Right 1/20/2025    Procedure: ARTHROPLASTY KNEE TOTAL, RIGHT-SAME DAY;  Surgeon: Joe Cabrera MD;  Location:  MAIN OR;  Service: Orthopedics    IN REVJ TOT KNEE ARTHRP FEM&ENTIRE TIBIAL COMPONE Left 10/21/2024    Procedure: ARTHROPLASTY LEFT KNEE TOTAL REVISION,  2 components;  Surgeon: Joe Cabrera MD;  Location:  MAIN OR;  Service: Orthopedics    TOE AMPUTATION Right 02/02/2018    Procedure: AMPUTATION TOE, RIGHT GREAT TOE;  Surgeon: Lino Rodriguez DPM;  Location: [facility];  Service: Podiatry    TOE AMPUTATION Right     3rd toe       Meds/Allergies:    all medications and allergies reviewed    Allergies:   Allergies   Allergen Reactions    Ozempic (0.25 Or 0.5 Mg-Dose) [Semaglutide(0.25 Or 0.5mg-Dos)] Diarrhea and GI Intolerance    Nsaids GI Intolerance       Social History:    "  Marital Status: /Civil Union    Substance Use History:   Social History     Substance and Sexual Activity   Alcohol Use Not Currently    Comment: special occasion     Social History     Tobacco Use   Smoking Status Never    Passive exposure: Never   Smokeless Tobacco Never     Social History     Substance and Sexual Activity   Drug Use Yes    Types: Morphine    Comment: prescribed 75mg bid       Family History:    Family history non-contributory    Physical Exam:     Vitals:   Blood Pressure: 104/50 (03/18/25 0757)  Pulse: 79 (03/18/25 0757)  Temperature: 97.8 °F (36.6 °C) (03/18/25 0757)  Temp Source: Oral (03/18/25 0757)  Respirations: 16 (03/18/25 0757)  Height: 5' 8\" (172.7 cm) (03/17/25 2035)  Weight - Scale: 119 kg (261 lb 9.6 oz) (03/17/25 2308)  SpO2: 94 % (03/18/25 0757)    Physical Exam  Vitals and nursing note reviewed.   Constitutional:       Appearance: Normal appearance. She is obese.   HENT:      Head: Normocephalic and atraumatic.   Eyes:      Conjunctiva/sclera: Conjunctivae normal.      Pupils: Pupils are equal, round, and reactive to light.   Cardiovascular:      Rate and Rhythm: Normal rate.   Pulmonary:      Breath sounds: Normal breath sounds. No wheezing.   Abdominal:      General: Bowel sounds are normal. There is no distension.      Palpations: Abdomen is soft.   Musculoskeletal:         General: No swelling. Normal range of motion.   Skin:     General: Skin is warm and dry.      Comments: Left foot in dressing   Neurological:      General: No focal deficit present.      Mental Status: She is alert. Mental status is at baseline.         Additional Data:     Lab Results:     Results from last 7 days   Lab Units 03/18/25  0637   WBC Thousand/uL 4.10*   HEMOGLOBIN g/dL 9.2*   HEMATOCRIT % 29.5*   PLATELETS Thousands/uL 172   SEGS PCT % 41*   LYMPHO PCT % 37   MONO PCT % 15*   EOS PCT % 6     Results from last 7 days   Lab Units 03/18/25  0637   SODIUM mmol/L 137   POTASSIUM mmol/L 3.7 " "  CHLORIDE mmol/L 105   CO2 mmol/L 28   BUN mg/dL 11   CREATININE mg/dL 0.66   ANION GAP mmol/L 4   CALCIUM mg/dL 8.3*   GLUCOSE RANDOM mg/dL 96             Lab Results   Component Value Date/Time    HGBA1C 5.7 (H) 01/09/2025 04:35 PM    HGBA1C 6.1 (H) 10/12/2024 11:08 AM    HGBA1C 6.3 (H) 05/14/2024 07:38 AM    HGBA1C 10.4 (H) 08/11/2018 07:58 PM     Results from last 7 days   Lab Units 03/18/25  1133   POC GLUCOSE mg/dl 130           Imaging: Results Review Statement: I personally reviewed the following image studies/reports in PACS and discussed pertinent findings with Radiology: xray(s). My interpretation of the radiology images/reports is: possible osteomyelitis of 3rd left toe.    XR foot 3+ views LEFT   ED Interpretation by Stephanie Carroll DO (03/17 1738)   No osteomyelitis/gas in tissue      Final Result by Brad Miller MD (03/18 0726)      Subtle apparent cortical erosion at the distal tip of the terminal tuft of the left third distal phalanx suggestive of osteomyelitis.      This examination was marked \"discrepancy\" and \"immediate notification\" in Epic in order to begin the standard process by which the radiology reading room liaison alerts the referring practitioner.            Computerized Assisted Algorithm (CAA) may have been used to analyze all applicable images.         Workstation performed: [id number]             EKG, Pathology, and Other Studies Reviewed on Admission:   EKG: none    ** Please Note: This note has been constructed using a voice recognition system. **    "

## 2025-03-18 NOTE — ASSESSMENT & PLAN NOTE
Lab Results   Component Value Date    HGBA1C 5.7 (H) 01/09/2025       Recent Labs     03/18/25  1133   POCGLU 130   A1c 5.7  On Mounjaro outpatient  Moderate sliding scale, Accu-Cheks while inpatient

## 2025-03-18 NOTE — PLAN OF CARE
Problem: PAIN - ADULT  Goal: Verbalizes/displays adequate comfort level or baseline comfort level  Description: Interventions:  - Encourage patient to monitor pain and request assistance  - Assess pain using appropriate pain scale  - Administer analgesics based on type and severity of pain and evaluate response  - Implement non-pharmacological measures as appropriate and evaluate response  - Consider cultural and social influences on pain and pain management  - Notify physician/advanced practitioner if interventions unsuccessful or patient reports new pain  Outcome: Progressing     Problem: INFECTION - ADULT  Goal: Absence or prevention of progression during hospitalization  Description: INTERVENTIONS:  - Assess and monitor for signs and symptoms of infection  - Monitor lab/diagnostic results  - Monitor all insertion sites, i.e. indwelling lines, tubes, and drains  - Monitor endotracheal if appropriate and nasal secretions for changes in amount and color  - [place] appropriate cooling/warming therapies per order  - Administer medications as ordered  - Instruct and encourage patient and family to use good hand hygiene technique  - Identify and instruct in appropriate isolation precautions for identified infection/condition  Outcome: Progressing     Problem: SAFETY ADULT  Goal: Maintain or return to baseline ADL function  Description: INTERVENTIONS:  -  Assess patient's ability to carry out ADLs; assess patient's baseline for ADL function and identify physical deficits which impact ability to perform ADLs (bathing, care of mouth/teeth, toileting, grooming, dressing, etc.)  - Assess/evaluate cause of self-care deficits   - Assess range of motion  - Assess patient's mobility; develop plan if impaired  - Assess patient's need for assistive devices and provide as appropriate  - Encourage maximum independence but intervene and supervise when necessary  - Involve family in performance of ADLs  - Assess for home care  needs following discharge   - Consider OT consult to assist with ADL evaluation and planning for discharge  - Provide patient education as appropriate  Outcome: Progressing

## 2025-03-18 NOTE — UTILIZATION REVIEW
Initial Clinical Review    Admission: Date/Time/Statement:   Admission Orders (From admission, onward)       Ordered        03/17/25 1826  INPATIENT ADMISSION  Once                          Orders Placed This Encounter   Procedures    INPATIENT ADMISSION     Standing Status:   Standing     Number of Occurrences:   1     Level of Care:   Med Surg [16]     Estimated length of stay:   More than 2 Midnights     Certification:   I certify that inpatient services are medically necessary for this patient for a duration of greater than two midnights. See H&P and MD Progress Notes for additional information about the patient's course of treatment.     ED Arrival Information       Expected   -    Arrival   3/17/2025 16:29    Acuity   Emergent              Means of arrival   Walk-In    Escorted by   Family Member    Service   Podiatry    Admission type   Emergency              Arrival complaint   Toe swelling, infection             Chief Complaint   Patient presents with    Leg Swelling     Left ankle swelling and 3rd toe, noted some bloody drainage. Hx of toe amputations on R foot, has neuropathy, reports baseline numbess       Initial Presentation: 59 y.o. female presents to the ED from home at the direction of Podiatry with c/o L diabetic foot ulcer 3rd toe with cellulitis and c/f  possible osteomyelitis.  Toe is swollen and has slight odor and drainage.  Redness on 3rd toe progressing up anterior LLE.  PMH: T2DM, Asthma, HTN, obesity, chronic pain disorder, opioid dependence, peripheral neuropathy, TIA, prior pedal digital amputations.  In the ED febrile 100.5F, tachycardic, pain rated 8/10.  Treated with IV antibiotics.  Labs - elevated CRP, sed rate, WBC WNL.  Imaging - suggestive of OM.  On exam Left PT pulse: 1+ weakly palpable secondary to edema, biphasic on doppler, +1/4 edema noted BLE, skin temp is slightly warmer than RLE.  ROM 1st MPJ and ankle  reduced bilaterally with the leg extended. No Pain on palpation LLE.   Admitted to INPATIENT status with  LLE diabetic ulcer, 3rd toe - Herrera 2, L foot cellulitis, DM, chronic pain, opioid dependent - PLAN: IV antibiotics, MRI LLE, blood cultures, Medicine consult, continue home meds, poss surgical intervention, WBAT in surgical shoe.      Anticipated Length of Stay/Certification Statement: Patient requires >2 midnight stay for IV antibiotics, advanced imaging, possible surgical intervention.     Date: 3/18   Day 2:   LLE diabetic ulcer, 3rd toe - Herrera 2, L foot cellulitis, DM, chronic pain, opioid dependent - continues on IV antibiotics.  Afebrile, + leukopenia.  MRI LLE pending.  Wound probes to the bone and noted cortical destruction of distal phalanx c/w OM.  Discussed surgical options w/ pt including ampututation full vs partial.  Pt will have partial versus total toe amputation of the left third toe 3/19.  MRI canceled.     3/18 Medicine Consult - low surgical risk for Podiatry procedure. SSI cover, no asthma exac, BP controlled.     Date: 3/19  Day 3: Has surpassed a 2nd midnight with active treatments and services.  OR today: NPO status confirmed by podiatry.      ++++++++++++++  3/19 OPERATIVE NOTE  Pre-op Diagnosis:  Osteomyelitis of left foot, unspecified type (HCC) [M86.9]     Post-Op Diagnosis Codes:     * Osteomyelitis of left foot, unspecified type (HCC) [M86.9]     Procedure(s) (LRB):  Left 3rd partial toe amputation (Left)    Anesthesia: IV Sedation    Operative Findings:  - We believe to have obtained surgical cure of bone infection with removal of left third toe distal and middle phalanges through disarticulation at proximal interphalangeal joint  - No sinus tracts or purulence appreciated  - Healthy bleeding to tissue margins  - Primary closure achieved  ++++++++++++++++       ED Treatment-Medication Administration from 03/17/2025 1629 to 03/17/2025 2033         Date/Time Order Dose Route Action     03/17/2025 1705 ampicillin-sulbactam (UNASYN) 3 g in sodium  chloride 0.9 % 100 mL IVPB 3 g Intravenous New Bag            Scheduled Medications:  carisoprodol, 350 mg, Oral, BID  cefazolin, 2,000 mg, Intravenous, Q8H  enoxaparin, 40 mg, Subcutaneous, Daily  fluticasone-vilanterol, 1 puff, Inhalation, Daily  insulin lispro, 2-12 Units, Subcutaneous, TID AC  polyethylene glycol, 17 g, Oral, Daily  pravastatin, 40 mg, Oral, Every Other Day  pregabalin, 200 mg, Oral, BID  senna-docusate sodium, 1 tablet, Oral, BID      Continuous IV Infusions:  sodium chloride, 100 mL/hr, Intravenous, Continuous      PRN Meds:  acetaminophen, 650 mg, Oral, Q6H PRN  albuterol, 2 puff, Inhalation, Q4H PRN  calcium carbonate, 1,000 mg, Oral, Daily PRN  diazepam, 5 mg, Oral, HS PRN - x 1 3/18  HYDROmorphone, 0.5 mg, Intravenous, Q6H PRN  ondansetron, 4 mg, Intravenous, Q6H PRN  oxyCODONE, 5 mg, Oral, Q4H PRN   Or  oxyCODONE, 10 mg, Oral, Q4H PRN - x 1 3/17, x1 3/18      ED Triage Vitals   Temperature Pulse Respirations Blood Pressure SpO2 Pain Score   03/17/25 1640 03/17/25 1640 03/17/25 1640 03/17/25 1640 03/17/25 1640 03/17/25 2036   100.5 °F (38.1 °C) 105 18 158/70 98 % 8     Weight (last 2 days)       Date/Time Weight    03/17/25 2308 119 (261.6)    03/17/25 1640 120 (265.43)            Vital Signs (last 3 days)       Date/Time Temp Pulse Resp BP MAP (mmHg) SpO2 O2 Device Patient Position - Orthostatic VS Chloe Coma Scale Score Pain    03/19/25 1515 -- 89 16 113/58 -- 94 % None (Room air) Lying -- --    03/19/25 1500 -- 85 16 117/69 -- 95 % None (Room air) Lying -- --    03/19/25 1445 -- 83 16 107/60 -- 99 % None (Room air) Lying -- 9    03/19/25 1430 98.1 °F (36.7 °C) 85 16 119/68 -- 96 % None (Room air) Lying -- --    03/19/25 1407 97.6 °F (36.4 °C) 86 16 108/58 80 98 % None (Room air) -- -- No Pain    03/19/25 1352 97.7 °F (36.5 °C) 84 17 124/66 89 96 % None (Room air) -- -- No Pain    03/19/25 1317 -- -- -- -- -- -- -- -- -- 8    03/19/25 0851 -- -- -- -- -- -- -- -- -- 10 - Worst Possible  "Pain    03/19/25 0700 97.5 °F (36.4 °C) 88 17 107/58 -- 94 % None (Room air) Lying -- --    03/19/25 0500 97.6 °F (36.4 °C) 83 16 115/60 81 95 % None (Room air) -- -- 9    03/19/25 0442 -- -- -- -- -- -- -- -- -- 9    03/18/25 2348 -- 81 16 102/64 -- -- -- Sitting 15 10 - Worst Possible Pain    03/18/25 1605 -- -- -- -- -- -- -- -- -- 9    03/18/25 1556 97.7 °F (36.5 °C) 80 16 120/69 88 95 % None (Room air) Sitting 15 9    03/18/25 0900 -- -- -- -- -- -- -- -- 15 5    03/18/25 0757 97.8 °F (36.6 °C) 79 16 104/50 -- 94 % -- Lying -- --    03/18/25 0640 -- -- -- -- -- -- -- -- -- 10 - Worst Possible Pain    03/18/25 0400 98 °F (36.7 °C) 82 16 102/58 77 99 % -- -- -- --    03/18/25 0000 98.2 °F (36.8 °C) 81 18 99/58 71 94 % -- -- -- --    03/17/25 2312 -- -- -- -- -- -- -- -- -- 10 - Worst Possible Pain    03/17/25 2300 -- -- -- -- -- -- -- -- 15 --    03/17/25 2036 -- -- -- -- -- -- -- -- -- 8    03/17/25 2035 97.8 °F (36.6 °C) 93 18 141/76 102 96 % -- -- -- --    03/17/25 2022 -- -- -- -- -- -- -- -- 15 --    03/17/25 1810 98.6 °F (37 °C) 89 16 111/69 -- 96 % None (Room air) Sitting -- --    03/17/25 1640 100.5 °F (38.1 °C) 105 18 158/70 -- 98 % None (Room air) Sitting -- --              Pertinent Labs/Diagnostic Test Results:   Radiology:  XR foot 3+ views LEFT   ED Interpretation by Stephanie Carroll DO (03/17 3748)   No osteomyelitis/gas in tissue      Final Interpretation by Brad Miller MD (03/18 6426)      Subtle apparent cortical erosion at the distal tip of the terminal tuft of the left third distal phalanx suggestive of osteomyelitis.      This examination was marked \"discrepancy\" and \"immediate notification\" in Epic in order to begin the standard process by which the radiology reading room liaison alerts the referring practitioner.     Cardiology:  No orders to display     GI:  No orders to display           Results from last 7 days   Lab Units 03/19/25  0449 03/18/25  0637 03/17/25  2129 " 03/17/25  1704   WBC Thousand/uL 4.58 4.10*  --  6.32   HEMOGLOBIN g/dL 10.1* 9.2*  --  10.4*   HEMATOCRIT % 32.0* 29.5*  --  32.5*   PLATELETS Thousands/uL 177 172 187 201   TOTAL NEUT ABS Thousands/µL 2.06 1.69*  --  3.60         Results from last 7 days   Lab Units 03/19/25  0449 03/18/25  0637 03/17/25  1704   SODIUM mmol/L 137 137 136   POTASSIUM mmol/L 4.0 3.7 3.9   CHLORIDE mmol/L 104 105 105   CO2 mmol/L 27 28 26   ANION GAP mmol/L 6 4 5   BUN mg/dL 12 11 13   CREATININE mg/dL 0.64 0.66 0.65   EGFR ml/min/1.73sq m 97 96 96   CALCIUM mg/dL 8.9 8.3* 9.0         Results from last 7 days   Lab Units 03/19/25  1402 03/19/25  1122 03/19/25  0730 03/18/25  1600 03/18/25  1133   POC GLUCOSE mg/dl 79 92 102 118 130     Results from last 7 days   Lab Units 03/19/25  0449 03/18/25  0637 03/17/25  1704   GLUCOSE RANDOM mg/dL 88 96 88             BETA-HYDROXYBUTYRATE   Date Value Ref Range Status   05/09/2022 2.9 (H) <0.6 mmol/L Final   08/14/2021 1.4 (H) <0.6 mmol/L Final   06/25/2020 2.8 (H) <0.6 mmol/L Final        Results from last 7 days   Lab Units 03/17/25  1704   CRP mg/L 14.6*   SED RATE mm/hour 49*           Results from last 7 days   Lab Units 03/17/25 2128 03/17/25 2124   BLOOD CULTURE  No Growth at 24 hrs. No Growth at 24 hrs.     Past Medical History:   Diagnosis Date    Acute respiratory insufficiency 11/23/2019    Allergies     Ambulates with cane     Anxiety     Asthma     Chronic narcotic dependence (HCC)     Chronic pain     Chronic pain disorder     back/knee    Diabetes (HCC)     HTN (hypertension)     elevated with electronic device    Hyperlipidemia     PONV (postoperative nausea and vomiting)     Sepsis ([facility]) 11/23/2019    Sleep apnea     no cpap    Walker as ambulation aid      Present on Admission:   Asthma   Essential hypertension   Morbid obesity (HCC)   Chronic pain disorder   Subacute osteomyelitis of left foot (HCC)      Admitting Diagnosis: Cellulitis of third toe of left foot  [L03.032]  Age/Sex: 59 y.o. female    Network Utilization Review Department  ATTENTION: Please call with any questions or concerns to [phone] and carefully listen to the prompts so that you are directed to the right person. All voicemails are confidential.   For Discharge needs, contact Care Management DC Support Team at [phone] opt. [phone]  Send all requests for admission clinical reviews, approved or denied determinations and any other requests to dedicated fax number below belonging to the campus where the patient is receiving treatment. List of dedicated fax numbers for the Facilities:  FACILITY NAME UR FAX NUMBER   ADMISSION DENIALS (Administrative/Medical Necessity) [phone]   DISCHARGE SUPPORT TEAM (NETWORK) [phone]   PARENT CHILD HEALTH (Maternity/NICU/Pediatrics) [phone]   [facility] [phone]   [facility] [phone]   [facility] [phone]   [facility] [phone]   [facility] [phone]   [facility] [phone]   [facility] [phone]   [facility] [phone]   [facility] [phone]   [facility] [phone]   [facility] [phone]   [facility] [phone]

## 2025-03-18 NOTE — ASSESSMENT & PLAN NOTE
59-year-old female with PMH of type 2 diabetes, asthma, obesity who presented for toe cellulitis and osteomyelitis  Podiatry as primary  Currently on IV Ancef  Imaging concerning for underlying osteomyelitis  Plans for toe amputation tomorrow  N.p.o. midnight  Patient is low risk for low risk procedure, with no prior history of CVA, CHF, CKD or CAD, patient is RCRI score of 0.

## 2025-03-19 ENCOUNTER — ANESTHESIA (INPATIENT)
Dept: PERIOP | Facility: HOSPITAL | Age: 60
DRG: 617 | End: 2025-03-19
Payer: COMMERCIAL

## 2025-03-19 ENCOUNTER — APPOINTMENT (INPATIENT)
Dept: RADIOLOGY | Facility: HOSPITAL | Age: 60
DRG: 617 | End: 2025-03-19
Payer: COMMERCIAL

## 2025-03-19 LAB
ANION GAP SERPL CALCULATED.3IONS-SCNC: 6 MMOL/L (ref 4–13)
BASOPHILS # BLD AUTO: 0.05 THOUSANDS/ÂΜL (ref 0–0.1)
BASOPHILS NFR BLD AUTO: 1 % (ref 0–1)
BUN SERPL-MCNC: 12 MG/DL (ref 5–25)
CALCIUM SERPL-MCNC: 8.9 MG/DL (ref 8.4–10.2)
CHLORIDE SERPL-SCNC: 104 MMOL/L (ref 96–108)
CO2 SERPL-SCNC: 27 MMOL/L (ref 21–32)
CREAT SERPL-MCNC: 0.64 MG/DL (ref 0.6–1.3)
EOSINOPHIL # BLD AUTO: 0.22 THOUSAND/ÂΜL (ref 0–0.61)
EOSINOPHIL NFR BLD AUTO: 5 % (ref 0–6)
ERYTHROCYTE [DISTWIDTH] IN BLOOD BY AUTOMATED COUNT: 13.9 % (ref 11.6–15.1)
GFR SERPL CREATININE-BSD FRML MDRD: 97 ML/MIN/1.73SQ M
GLUCOSE SERPL-MCNC: 102 MG/DL (ref 65–140)
GLUCOSE SERPL-MCNC: 104 MG/DL (ref 65–140)
GLUCOSE SERPL-MCNC: 79 MG/DL (ref 65–140)
GLUCOSE SERPL-MCNC: 88 MG/DL (ref 65–140)
GLUCOSE SERPL-MCNC: 92 MG/DL (ref 65–140)
HCT VFR BLD AUTO: 32 % (ref 34.8–46.1)
HGB BLD-MCNC: 10.1 G/DL (ref 11.5–15.4)
IMM GRANULOCYTES # BLD AUTO: 0.01 THOUSAND/UL (ref 0–0.2)
IMM GRANULOCYTES NFR BLD AUTO: 0 % (ref 0–2)
LYMPHOCYTES # BLD AUTO: 1.7 THOUSANDS/ÂΜL (ref 0.6–4.47)
LYMPHOCYTES NFR BLD AUTO: 37 % (ref 14–44)
MCH RBC QN AUTO: 26.7 PG (ref 26.8–34.3)
MCHC RBC AUTO-ENTMCNC: 31.6 G/DL (ref 31.4–37.4)
MCV RBC AUTO: 85 FL (ref 82–98)
MONOCYTES # BLD AUTO: 0.54 THOUSAND/ÂΜL (ref 0.17–1.22)
MONOCYTES NFR BLD AUTO: 12 % (ref 4–12)
NEUTROPHILS # BLD AUTO: 2.06 THOUSANDS/ÂΜL (ref 1.85–7.62)
NEUTS SEG NFR BLD AUTO: 45 % (ref 43–75)
NRBC BLD AUTO-RTO: 0 /100 WBCS
PLATELET # BLD AUTO: 177 THOUSANDS/UL (ref 149–390)
PMV BLD AUTO: 12.3 FL (ref 8.9–12.7)
POTASSIUM SERPL-SCNC: 4 MMOL/L (ref 3.5–5.3)
RBC # BLD AUTO: 3.78 MILLION/UL (ref 3.81–5.12)
SODIUM SERPL-SCNC: 137 MMOL/L (ref 135–147)
WBC # BLD AUTO: 4.58 THOUSAND/UL (ref 4.31–10.16)

## 2025-03-19 PROCEDURE — 88311 DECALCIFY TISSUE: CPT | Performed by: PATHOLOGY

## 2025-03-19 PROCEDURE — 28825 PARTIAL AMPUTATION OF TOE: CPT | Performed by: PODIATRIST

## 2025-03-19 PROCEDURE — 85025 COMPLETE CBC W/AUTO DIFF WBC: CPT

## 2025-03-19 PROCEDURE — 80048 BASIC METABOLIC PNL TOTAL CA: CPT

## 2025-03-19 PROCEDURE — 99232 SBSQ HOSP IP/OBS MODERATE 35: CPT | Performed by: STUDENT IN AN ORGANIZED HEALTH CARE EDUCATION/TRAINING PROGRAM

## 2025-03-19 PROCEDURE — 73630 X-RAY EXAM OF FOOT: CPT

## 2025-03-19 PROCEDURE — 0Y6U0Z1 DETACHMENT AT LEFT 3RD TOE, HIGH, OPEN APPROACH: ICD-10-PCS | Performed by: PODIATRIST

## 2025-03-19 PROCEDURE — 88305 TISSUE EXAM BY PATHOLOGIST: CPT | Performed by: PATHOLOGY

## 2025-03-19 PROCEDURE — NC001 PR NO CHARGE: Performed by: PODIATRIST

## 2025-03-19 PROCEDURE — 82948 REAGENT STRIP/BLOOD GLUCOSE: CPT

## 2025-03-19 RX ORDER — FENTANYL CITRATE 50 UG/ML
INJECTION, SOLUTION INTRAMUSCULAR; INTRAVENOUS AS NEEDED
Status: DISCONTINUED | OUTPATIENT
Start: 2025-03-19 | End: 2025-03-19

## 2025-03-19 RX ORDER — DOCUSATE SODIUM 100 MG/1
100 CAPSULE, LIQUID FILLED ORAL 2 TIMES DAILY
Status: DISCONTINUED | OUTPATIENT
Start: 2025-03-19 | End: 2025-03-20 | Stop reason: HOSPADM

## 2025-03-19 RX ORDER — SODIUM CHLORIDE 9 MG/ML
100 INJECTION, SOLUTION INTRAVENOUS CONTINUOUS
Status: DISCONTINUED | OUTPATIENT
Start: 2025-03-19 | End: 2025-03-19

## 2025-03-19 RX ORDER — MIDAZOLAM HYDROCHLORIDE 2 MG/2ML
INJECTION, SOLUTION INTRAMUSCULAR; INTRAVENOUS CONTINUOUS PRN
Status: DISCONTINUED | OUTPATIENT
Start: 2025-03-19 | End: 2025-03-19

## 2025-03-19 RX ORDER — MAGNESIUM HYDROXIDE 1200 MG/15ML
LIQUID ORAL AS NEEDED
Status: DISCONTINUED | OUTPATIENT
Start: 2025-03-19 | End: 2025-03-19 | Stop reason: HOSPADM

## 2025-03-19 RX ORDER — BISACODYL 10 MG
10 SUPPOSITORY, RECTAL RECTAL DAILY PRN
Status: DISCONTINUED | OUTPATIENT
Start: 2025-03-19 | End: 2025-03-20 | Stop reason: HOSPADM

## 2025-03-19 RX ADMIN — SENNOSIDES AND DOCUSATE SODIUM 1 TABLET: 50; 8.6 TABLET ORAL at 08:50

## 2025-03-19 RX ADMIN — CARISOPRODOL 350 MG: 350 TABLET ORAL at 08:50

## 2025-03-19 RX ADMIN — CEFAZOLIN SODIUM 2000 MG: 2 SOLUTION INTRAVENOUS at 07:33

## 2025-03-19 RX ADMIN — FLUTICASONE FUROATE AND VILANTEROL TRIFENATATE 1 PUFF: 200; 25 POWDER RESPIRATORY (INHALATION) at 07:40

## 2025-03-19 RX ADMIN — PREGABALIN 200 MG: 100 CAPSULE ORAL at 08:50

## 2025-03-19 RX ADMIN — SENNOSIDES AND DOCUSATE SODIUM 1 TABLET: 50; 8.6 TABLET ORAL at 17:10

## 2025-03-19 RX ADMIN — OXYCODONE HYDROCHLORIDE 10 MG: 10 TABLET ORAL at 14:45

## 2025-03-19 RX ADMIN — PREGABALIN 200 MG: 100 CAPSULE ORAL at 17:10

## 2025-03-19 RX ADMIN — OXYCODONE HYDROCHLORIDE 10 MG: 10 TABLET ORAL at 20:29

## 2025-03-19 RX ADMIN — DOCUSATE SODIUM 100 MG: 100 CAPSULE, LIQUID FILLED ORAL at 17:10

## 2025-03-19 RX ADMIN — DIAZEPAM 5 MG: 5 TABLET ORAL at 02:58

## 2025-03-19 RX ADMIN — OXYCODONE HYDROCHLORIDE 10 MG: 10 TABLET ORAL at 04:42

## 2025-03-19 RX ADMIN — CEFAZOLIN SODIUM 2000 MG: 2 SOLUTION INTRAVENOUS at 14:45

## 2025-03-19 RX ADMIN — OXYCODONE HYDROCHLORIDE 10 MG: 10 TABLET ORAL at 08:51

## 2025-03-19 RX ADMIN — CEFAZOLIN SODIUM 2000 MG: 2 SOLUTION INTRAVENOUS at 22:12

## 2025-03-19 RX ADMIN — CARISOPRODOL 350 MG: 350 TABLET ORAL at 20:20

## 2025-03-19 RX ADMIN — FENTANYL CITRATE 25 MCG: 50 INJECTION INTRAMUSCULAR; INTRAVENOUS at 13:30

## 2025-03-19 RX ADMIN — SODIUM CHLORIDE 100 ML/HR: 0.9 INJECTION, SOLUTION INTRAVENOUS at 08:57

## 2025-03-19 NOTE — OP NOTE
OPERATIVE REPORT - Podiatry  PATIENT NAME: Anna Marie Barbosa    :  1965  MRN: [id number]  Pt Location: [facility] OR ROOM 02    SURGERY DATE: 3/19/2025    Surgeons and Role:     * Henry Figueroa DPM - Primary     * Rosario Niño DPM - Assisting     * Brad Tobias DPM - Observing    Pre-op Diagnosis:  Osteomyelitis of left foot, unspecified type (HCC) [M86.9]    Post-Op Diagnosis Codes:     * Osteomyelitis of left foot, unspecified type (HCC) [M86.9]    Procedure(s) (LRB):  Left 3rd partial toe amputation (Left)    Specimen(s):  ID Type Source Tests Collected by Time Destination   1 : third toe Tissue Toe, Left TISSUE EXAM Henry Figueroa DPM 3/19/2025 1345        Estimated Blood Loss:   Minimal    Drains:  None    Anesthesia Type:   Choice with 10 ml of 1% Lidocaine and 0.25% Bupivacaine in a 1:1 mixture    Hemostasis:  Direct compression    Materials:  3-0 Nylon    Injectables:  None    Operative Findings:  - We believe to have obtained surgical cure of bone infection with removal of left third toe distal and middle phalanges through disarticulation at proximal interphalangeal joint  - No sinus tracts or purulence appreciated  - Healthy bleeding to tissue margins  - Primary closure achieved    Complications:   None    Procedure and Technique:     Under mild sedation, the patient was brought into the operating room and placed on the operating room table in the supine position. IV sedation was achieved by anesthesia team and a universal timeout was performed where all parties are in agreement of correct patient, correct procedure and correct site. A local block was performed consisting of 10 ml of 1% Lidocaine and 0.25% Bupivacaine in a 1:1 mixture. The foot was then prepped and draped in the usual aseptic manner.    Attention was directed to the left third digit where a fishmouth type of incision was made. Utilizing a sterile 15 blade, this incision was carried deep straight to bone. Soft tissue  "structures were then reflected off the middle phalanx. Utilizing a sterile 15 blade, all capsular structures were severed and the toe was then disarticulated at the level of the third proximal interphalangeal joint and then placed on the back table. It was noted that all tissue margins were bleeding and viable. No deep sinus tracts or areas of purulence were visualized. The remaining bone on the proximal aspect of the joint was noted to be of hard and viable quality. Any remaining tendinous structures were identified and severed proximally to remove any nidus of infection.     The surgical incision was irrigated with copious amounts of normal sterile saline. Skin edges were reapproximated and closure was obtained utilizing 3-0 Nylon. The foot was then cleansed and dried. The incision site was dressed with xeroform, gauze. This was then covered with a Antonino and an ACE wrap.     The patient tolerated the procedure and anesthesia well without immediate complications and transferred to PACU with vital signs stable.     As with many limb salvage procedures, we contemplate the possibility of performing further stages to this procedure. Procedures may include debridements, delayed closure, plastic surgery techniques, or more proximal amputations. This procedure may be considered part of a multi-staged limb salvage treatment plan.     Dr. Figueroa was present during the entire procedure and participated in all key aspects.    SIGNATURE: Rosario Niño DPM  DATE: March 19, 2025  TIME: 1:53 PM      Portions of the record may have been created with voice recognition software. Occasional wrong word or \"sound a like\" substitutions may have occurred due to the inherent limitations of voice recognition software. Read the chart carefully and recognize, using context, where substitutions have occurred.    "

## 2025-03-19 NOTE — PROGRESS NOTES
Progress Note - Hospitalist   Name: Anna Marie Barbosa 59 y.o. female I MRN: [id number]  Unit/Bed#: S 3 -01 I Date of Admission: 3/17/2025   Date of Service: 3/19/2025 I Hospital Day: 2     Assessment & Plan  Subacute osteomyelitis of left foot (HCC)  59-year-old female with PMH of type 2 diabetes, asthma, obesity who presented for toe cellulitis and osteomyelitis  Podiatry as primary  Currently on IV Ancef  Imaging concerning for underlying osteomyelitis  Plans for toe amputation today  N.p.o. today  Type 2 diabetes mellitus with hyperglycemia, with long-term current use of insulin ([facility])  Lab Results   Component Value Date    HGBA1C 5.7 (H) 2025       Recent Labs     25  1133 25  1600 25  0730 25  1122   POCGLU 130 118 102 92   A1c 5.7  On Mounjaro outpatient  Moderate sliding scale, Accu-Cheks while inpatient    Asthma  Not in exacerbation, on room air  Continue bronchodilator with albuterol as needed  Essential hypertension  Blood pressure currently controlled  Not on antihypertensives currently  Morbid obesity (HCC)  Patient reportedly currently on Mounjaro  Continue weight loss, previously 300+ pounds  Chronic pain disorder  Recently morphine p.o. 75 mg twice daily  Nonformulary, currently on oxycodone  Cellulitis of third toe of left foot  IV antibiotics per podiatry with Ancef  Blood cultures ordered and pending    VTE Pharmacologic Prophylaxis:   Pharmacologic: Enoxaparin (Lovenox)  Mechanical VTE Prophylaxis in Place: Yes    Current Length of Stay: 2 day(s)    Current Patient Status: Inpatient   Certification Statement: The patient will continue to require additional inpatient hospital stay due to IV abx, pending surgery today    Discharge Plan: pending    Code Status: Level 1 - Full Code      Subjective:   No events overnight. Pain controlled.    Objective:     Vitals:   Temp (24hrs), Av.6 °F (36.4 °C), Min:97.5 °F (36.4 °C), Max:97.7 °F (36.5 °C)    Temp:  [97.5 °F (36.4  °C)-97.7 °F (36.5 °C)] 97.5 °F (36.4 °C)  HR:  [80-88] 88  Resp:  [16-17] 17  BP: (102-120)/(58-69) 107/58  SpO2:  [94 %-95 %] 94 %  Body mass index is 39.78 kg/m².     Input and Output Summary (last 24 hours):       Intake/Output Summary (Last 24 hours) at 3/19/2025 1254  Last data filed at 3/19/2025 0857  Gross per 24 hour   Intake 0 ml   Output 200 ml   Net -200 ml       Physical Exam:     Physical Exam  Vitals and nursing note reviewed.   Constitutional:       Appearance: Normal appearance. She is obese.   HENT:      Head: Normocephalic and atraumatic.   Eyes:      Conjunctiva/sclera: Conjunctivae normal.   Cardiovascular:      Rate and Rhythm: Normal rate.   Pulmonary:      Breath sounds: Normal breath sounds. No wheezing.   Abdominal:      General: Bowel sounds are normal. There is no distension.      Palpations: Abdomen is soft.   Musculoskeletal:         General: No swelling. Normal range of motion.   Skin:     General: Skin is warm and dry.      Comments: Left foot in dressing   Neurological:      General: No focal deficit present.      Mental Status: She is alert. Mental status is at baseline.         Additional Data:     Labs:    Results from last 7 days   Lab Units 03/19/25  0449   WBC Thousand/uL 4.58   HEMOGLOBIN g/dL 10.1*   HEMATOCRIT % 32.0*   PLATELETS Thousands/uL 177   SEGS PCT % 45   LYMPHO PCT % 37   MONO PCT % 12   EOS PCT % 5     Results from last 7 days   Lab Units 03/19/25  0449   SODIUM mmol/L 137   POTASSIUM mmol/L 4.0   CHLORIDE mmol/L 104   CO2 mmol/L 27   BUN mg/dL 12   CREATININE mg/dL 0.64   ANION GAP mmol/L 6   CALCIUM mg/dL 8.9   GLUCOSE RANDOM mg/dL 88         Results from last 7 days   Lab Units 03/19/25  1122 03/19/25  0730 03/18/25  1600 03/18/25  1133   POC GLUCOSE mg/dl 92 102 118 130                   * I Have Reviewed All Lab Data Listed Above.  * Additional Pertinent Lab Tests Reviewed: All Labs For Current Hospital Admission Reviewed    Mobility:  Basic Mobility Inpatient  "Raw Score: 22  -[facility] Goal: 7: Walk 25 feet or more  -HL Achieved: 7: Walk 25 feet or more    Lines:     Invasive Devices       Peripheral Intravenous Line  Duration             Peripheral IV 03/17/25 Proximal;Right;Ventral (anterior) Forearm 1 day              Airway  Duration             Non-Surgical Airway Oral pharyngeal airway 100 mm 148 days                       Imaging:    Imaging Reports Reviewed Today Include:     XR foot 3+ views LEFT  Result Date: 3/18/2025  Impression: Subtle apparent cortical erosion at the distal tip of the terminal tuft of the left third distal phalanx suggestive of osteomyelitis. This examination was marked \"discrepancy\" and \"immediate notification\" in Epic in order to begin the standard process by which the radiology reading room liaison alerts the referring practitioner. Computerized Assisted Algorithm (CAA) may have been used to analyze all applicable images. Workstation performed: [id number]         Recent Cultures (last 7 days):     Results from last 7 days   Lab Units 03/17/25 2128 03/17/25 2124   BLOOD CULTURE  No Growth at 24 hrs. No Growth at 24 hrs.       Last 24 Hours Medication List:   Current Facility-Administered Medications   Medication Dose Route Frequency Provider Last Rate    acetaminophen  650 mg Oral Q6H PRN Rosario Salinas, DPM      albuterol  2 puff Inhalation Q4H PRN Rosario Salinas, DPM      calcium carbonate  1,000 mg Oral Daily PRN Rosario Salinas, DPM      carisoprodol  350 mg Oral BID Rosario Salinas, DPM      cefazolin  2,000 mg Intravenous Q8H Rosario Salinas, DPM 2,000 mg (03/19/25 0733)    diazepam  5 mg Oral Q12H PRN Dangelo Jones MD      enoxaparin  40 mg Subcutaneous Daily Rosario Salinas, DPM      fluticasone-vilanterol  1 puff Inhalation Daily Rosraio Salinas, DPM      HYDROmorphone  0.5 mg Intravenous Q6H PRN Rosario Salinas, DPM      insulin lispro  2-12 Units Subcutaneous TID AC Rosario Salinas, DPM      ondansetron  4 mg Intravenous Q6H PRN Rosario Salinas, DPM      oxyCODONE  5 mg " Oral Q4H PRN Rosario Salinas, DPM      Or    oxyCODONE  10 mg Oral Q4H PRN Rosario Salinas, DPM      polyethylene glycol  17 g Oral Daily Rosario Salinas, DPM      pravastatin  40 mg Oral Every Other Day Rosario Salinas, DPM      pregabalin  200 mg Oral BID Rosario Salinas, DPM      senna-docusate sodium  1 tablet Oral BID Dangelo Jones MD      sodium chloride  100 mL/hr Intravenous Continuous Rosarioharley Ibarrar,  mL/hr (03/19/25 0846)        Today, Patient Was Seen By: Dangelo Jones MD    ** Please Note: Dictation voice to text software may have been used in the creation of this document. **

## 2025-03-19 NOTE — ASSESSMENT & PLAN NOTE
Lab Results   Component Value Date    HGBA1C 5.7 (H) 01/09/2025       Recent Labs     03/18/25  1133 03/18/25  1600 03/19/25  0730 03/19/25  1122   POCGLU 130 118 102 92   A1c 5.7  On Mounjaro outpatient  Moderate sliding scale, Accu-Cheks while inpatient

## 2025-03-19 NOTE — PLAN OF CARE
Problem: Potential for Falls  Goal: Patient will remain free of falls  Description: INTERVENTIONS:  - Educate patient/family on patient safety including physical limitations  - Instruct patient to call for assistance with activity   - Consult OT/PT to assist with strengthening/mobility   - Keep Call bell within reach  - Keep bed low and locked with side rails adjusted as appropriate  - Keep care items and personal belongings within reach  - Initiate and maintain comfort rounds  - Make Fall Risk Sign visible to staff  - Offer Toileting every 2 Hours, in advance of need  - Initiate/Maintain bed alarm  - Obtain necessary fall risk management equipment: socks  - Apply yellow socks and bracelet for high fall risk patients  - Consider moving patient to room near nurses station  Outcome: Progressing     Problem: PAIN - ADULT  Goal: Verbalizes/displays adequate comfort level or baseline comfort level  Description: Interventions:  - Encourage patient to monitor pain and request assistance  - Assess pain using appropriate pain scale  - Administer analgesics based on type and severity of pain and evaluate response  - Implement non-pharmacological measures as appropriate and evaluate response  - Consider cultural and social influences on pain and pain management  - Notify physician/advanced practitioner if interventions unsuccessful or patient reports new pain  Outcome: Progressing     Problem: INFECTION - ADULT  Goal: Absence or prevention of progression during hospitalization  Description: INTERVENTIONS:  - Assess and monitor for signs and symptoms of infection  - Monitor lab/diagnostic results  - Monitor all insertion sites, i.e. indwelling lines, tubes, and drains  - Monitor endotracheal if appropriate and nasal secretions for changes in amount and color  - [place] appropriate cooling/warming therapies per order  - Administer medications as ordered  - Instruct and encourage patient and family to use good hand hygiene  technique  - Identify and instruct in appropriate isolation precautions for identified infection/condition  Outcome: Progressing     Problem: SAFETY ADULT  Goal: Maintain or return to baseline ADL function  Description: INTERVENTIONS:  -  Assess patient's ability to carry out ADLs; assess patient's baseline for ADL function and identify physical deficits which impact ability to perform ADLs (bathing, care of mouth/teeth, toileting, grooming, dressing, etc.)  - Assess/evaluate cause of self-care deficits   - Assess range of motion  - Assess patient's mobility; develop plan if impaired  - Assess patient's need for assistive devices and provide as appropriate  - Encourage maximum independence but intervene and supervise when necessary  - Involve family in performance of ADLs  - Assess for home care needs following discharge   - Consider OT consult to assist with ADL evaluation and planning for discharge  - Provide patient education as appropriate  Outcome: Progressing     Problem: Knowledge Deficit  Goal: Patient/family/caregiver demonstrates understanding of disease process, treatment plan, medications, and discharge instructions  Description: Complete learning assessment and assess knowledge base.  Interventions:  - Provide teaching at level of understanding  - Provide teaching via preferred learning methods  Outcome: Progressing     Problem: DISCHARGE PLANNING  Goal: Discharge to home or other facility with appropriate resources  Description: INTERVENTIONS:  - Identify barriers to discharge w/patient and caregiver  - Arrange for needed discharge resources and transportation as appropriate  - Identify discharge learning needs (meds, wound care, etc.)  - Arrange for interpretive services to assist at discharge as needed  - Refer to Case Management Department for coordinating discharge planning if the patient needs post-hospital services based on physician/advanced practitioner order or complex needs related to  functional status, cognitive ability, or social support system  Outcome: Progressing     Problem: SKIN/TISSUE INTEGRITY - ADULT  Goal: Skin Integrity remains intact(Skin Breakdown Prevention)  Description: Assess:  -Perform Imer assessment every shift  -Clean and moisturize skin every 2 hours  -Inspect skin when repositioning, toileting, and assisting with ADLS  -Assess under medical devices such as masimo every shift  -Assess extremities for adequate circulation and sensation     Bed Management:  -Have minimal linens on bed & keep smooth, unwrinkled  -Change linens as needed when moist or perspiring  -Avoid sitting or lying in one position for more than 2 hours while in bed  -Keep HOB at 20 degrees     Toileting:  -Offer bedside commode  -Assess for incontinence every 3 hours  -Use incontinent care products after each incontinent episode such as wipes    Activity:  -Mobilize patient 3 times a day  -Encourage activity and walks on unit  -Encourage or provide ROM exercises   -Turn and reposition patient every 2 Hours  -Use appropriate equipment to lift or move patient in bed  -Instruct/ Assist with weight shifting every 2 hours when out of bed in chair  -Consider limitation of chair time 2 hour intervals    Skin Care:  -Avoid use of baby powder, tape, friction and shearing, hot water or constrictive clothing  -Relieve pressure over bony prominences using pillows  -Do not massage red bony areas    Next Steps:  -Teach patient strategies to minimize risks such as t   -Consider consults to  interdisciplinary teams such as turning  Outcome: Progressing  Goal: Incision(s), wounds(s) or drain site(s) healing without S/S of infection  Description: INTERVENTIONS  - Assess and document dressing, incision, wound bed, drain sites and surrounding tissue  - Provide patient and family education  - Perform skin care/dressing changes every shift  Outcome: Progressing     Problem: HEMATOLOGIC - ADULT  Goal: Maintains hematologic  stability  Description: INTERVENTIONS  - Assess for signs and symptoms of bleeding or hemorrhage  - Monitor labs  - Administer supportive blood products/factors as ordered and appropriate  Outcome: Progressing

## 2025-03-19 NOTE — PLAN OF CARE
Problem: Potential for Falls  Goal: Patient will remain free of falls  Description: INTERVENTIONS:  - Educate patient/family on patient safety including physical limitations  - Instruct patient to call for assistance with activity   - Consult OT/PT to assist with strengthening/mobility   - Keep Call bell within reach  - Keep bed low and locked with side rails adjusted as appropriate  - Keep care items and personal belongings within reach  - Initiate and maintain comfort rounds  - Make Fall Risk Sign visible to staff  - Offer Toileting every  Hours, in advance of need  - Initiate/Maintain alarm  - Obtain necessary fall risk management equipment:   Problem: SAFETY ADULT  Goal: Maintain or return to baseline ADL function  Description: INTERVENTIONS:  -  Assess patient's ability to carry out ADLs; assess patient's baseline for ADL function and identify physical deficits which impact ability to perform ADLs (bathing, care of mouth/teeth, toileting, grooming, dressing, etc.)  - Assess/evaluate cause of self-care deficits   - Assess range of motion  - Assess patient's mobility; develop plan if impaired  - Assess patient's need for assistive devices and provide as appropriate  - Encourage maximum independence but intervene and supervise when necessary  - Involve family in performance of ADLs  - Assess for home care needs following discharge   - Consider OT consult to assist with ADL evaluation and planning for discharge  - Provide patient education as appropriate  Outcome: Progressing     - Apply yellow socks and bracelet for high fall risk patients  - Consider moving patient to room near nurses station  Outcome: Progressing     Problem: PAIN - ADULT  Goal: Verbalizes/displays adequate comfort level or baseline comfort level  Description: Interventions:  - Encourage patient to monitor pain and request assistance  - Assess pain using appropriate pain scale  - Administer analgesics based on type and severity of pain and  evaluate response  - Implement non-pharmacological measures as appropriate and evaluate response  - Consider cultural and social influences on pain and pain management  - Notify physician/advanced practitioner if interventions unsuccessful or patient reports new pain  Outcome: Progressing     Problem: INFECTION - ADULT  Goal: Absence or prevention of progression during hospitalization  Description: INTERVENTIONS:  - Assess and monitor for signs and symptoms of infection  - Monitor lab/diagnostic results  - Monitor all insertion sites, i.e. indwelling lines, tubes, and drains  - Monitor endotracheal if appropriate and nasal secretions for changes in amount and color  - [place] appropriate cooling/warming therapies per order  - Administer medications as ordered  - Instruct and encourage patient and family to use good hand hygiene technique  - Identify and instruct in appropriate isolation precautions for identified infection/condition  Outcome: Progressing

## 2025-03-19 NOTE — ASSESSMENT & PLAN NOTE
59-year-old female with PMH of type 2 diabetes, asthma, obesity who presented for toe cellulitis and osteomyelitis  Podiatry as primary  Currently on IV Ancef  Imaging concerning for underlying osteomyelitis  Plans for toe amputation today  N.p.o. today

## 2025-03-19 NOTE — ANESTHESIA POSTPROCEDURE EVALUATION
Post-Op Assessment Note    CV Status:  Stable    Pain management: adequate       Mental Status:  Alert and awake   Hydration Status:  Euvolemic   PONV Controlled:  Controlled   Airway Patency:  Patent     Post Op Vitals Reviewed: Yes    No anethesia notable event occurred.    Staff: CRNA       Last Filed PACU Vitals:  Vitals Value Taken Time   Temp     Pulse 84 03/19/25 1353   /66 03/19/25 1352   Resp 17 03/19/25 1352   SpO2 96 % 03/19/25 1353   Vitals shown include unfiled device data.

## 2025-03-19 NOTE — CASE MANAGEMENT
Case Management Assessment & Discharge Planning Note    Patient name Anna Marie Barbosa  Location S 3 /S 3 -01 MRN [id number]  : 1965 Date 3/19/2025       Current Admission Date: 3/17/2025  Current Admission Diagnosis:Subacute osteomyelitis of left foot (HCC)   Patient Active Problem List    Diagnosis Date Noted Date Diagnosed    Subacute osteomyelitis of left foot (HCC) 2025     Cellulitis of third toe of left foot 2025     Tinea unguium 2025     Amputation of toe of right foot ([facility]) 2025     Status post total right knee replacement 2025     S/P revision of total knee, left 12/10/2024     Acquired absence of right great toe ([facility]) 05/15/2024     Diabetic ulcer of toe of left foot associated with type 2 diabetes mellitus, limited to breakdown of skin ([facility]) 10/30/2023     Diabetic polyneuropathy associated with type 2 diabetes mellitus ([facility]) 2023     Acquired absence of other right toe(s) ([facility]) 2023     Chronic pain disorder 2022     Gastroesophageal reflux disease without esophagitis 2022     Other hyperlipidemia 2022     TIA (transient ischemic attack) 2022     Ambulatory dysfunction 05/15/2022     Urine retention 2022     Sinus tachycardia 2021     Thyroid nodule 2019     Morbid obesity ([facility]) 2018     Essential hypertension 11/10/2018     Diabetic gastroparesis  ([facility]) 2018     Type 2 diabetes mellitus with hyperglycemia, with long-term current use of insulin ([facility]) 2018     Asthma 2018     Opioid dependence with other opioid-induced disorder ([facility]) 2018       LOS (days): 2  Geometric Mean LOS (GMLOS) (days): 3  Days to GMLOS:1.3     OBJECTIVE:    Risk of Unplanned Readmission Score: 16.68         Current admission status: Inpatient       Preferred Pharmacy:   [id number] [place] - [address]  [address]  [facility]  [place] [address]  Phone:  [phone] Fax: [phone]    Primary Care Provider: Magdalena Grove MD    Primary Insurance: [employer]  Secondary Insurance:     ASSESSMENT:  Active Health Care Proxies       Claudia Simmons [facility] Health Care Representative - Daughter   Primary Phone: [phone] (Mobile)                           Readmission Root Cause  30 Day Readmission: No    Patient Information  Admitted from:: Home  Mental Status: Alert  During Assessment patient was accompanied by: Spouse  Assessment information provided by:: Patient  Primary Caregiver: Self  Support Systems: Spouse/significant other, Son  County of Residence: [place]  What city do you live in?: [place]  Home entry access options. Select all that apply.: Stairs  Number of steps to enter home.: 5  Type of Current Residence: [facility]  Living Arrangements: Lives w/ Spouse/significant other, Lives w/ Son  Is patient a ?: No    Activities of Daily Living Prior to Admission  Functional Status: Independent  Completes ADLs independently?: Yes  Ambulates independently?: Yes  Does patient use assisted devices?: Yes  Assisted Devices (DME) used: Straight Cane  Does patient currently own DME?: Yes  What DME does the patient currently own?: Straight Cane, Walker, Rollator  Does patient have a history of Outpatient Therapy (PT/OT)?: Yes (started PT/OT but discontinued as could not afford co-pays)  Does the patient have a history of Short-Term Rehab?: No  Does patient have a history of HHC?: No  Does patient currently have HHC?: No         Patient Information Continued  Income Source: SSI/SSD  Does patient have prescription coverage?: Yes  Can the patient afford their medications and any related supplies (such as glucometers or test strips)?: Yes  Does patient receive dialysis treatments?: No  Does patient have a history of substance abuse?: No  Does patient have a history of Mental Health Diagnosis?: No         Means of Transportation  Means of Transport to  Appts:: Drives Self          DISCHARGE DETAILS:    Discharge planning discussed with:: patient,  at bedside  Freedom of Choice: Yes     CM contacted family/caregiver?: Yes  Were Treatment Team discharge recommendations reviewed with patient/caregiver?: Yes  Did patient/caregiver verbalize understanding of patient care needs?: Yes  Were patient/caregiver advised of the risks associated with not following Treatment Team discharge recommendations?: Yes    Contacts  Patient Contacts: Claudia Simmons, daughter  Relationship to Patient:: Family  Contact Method: Phone  Phone Number: [phone]  Reason/Outcome: Emergency Contact                        Treatment Team Recommendation: Home  Discharge Destination Plan:: Home  Transport at Discharge : Self                                      Additional Comments: Introduced self and role to patient at bedside.  Patient independent at baseline, uses cane, [employer].  Has been to outpatient PT/OT in the past, however could not afford to continue due to cost of co-pays.  Patient requests that medications at discharge be sent to [facility], [facility] [place]  Assigned CM will continue to follow.

## 2025-03-19 NOTE — ANESTHESIA POSTPROCEDURE EVALUATION
Post-Op Assessment Note    CV Status:  Stable  Pain Score: 0    Pain management: adequate       Mental Status:  Alert and awake   Hydration Status:  Euvolemic   PONV Controlled:  Controlled   Airway Patency:  Patent  Two or more mitigation strategies used for obstructive sleep apnea   Post Op Vitals Reviewed: Yes    No anethesia notable event occurred.    Staff: Anesthesiologist           Last Filed PACU Vitals:  Vitals Value Taken Time   Temp 97.6 °F (36.4 °C) 03/19/25 1407   Pulse 80 03/19/25 1408   /58 03/19/25 1407   Resp 16 03/19/25 1407   SpO2 98 % 03/19/25 1408   Vitals shown include unfiled device data.    Modified Guru:     Vitals Value Taken Time   Activity 2 03/19/25 1407   Respiration 2 03/19/25 1407   Circulation 2 03/19/25 1407   Consciousness 1 03/19/25 1407   Oxygen Saturation 2 03/19/25 1407     Modified Guru Score: 9

## 2025-03-19 NOTE — ASSESSMENT & PLAN NOTE
IV antibiotics per podiatry with Ancef  Blood cultures ordered and pending   Last refill - 6/7/17 - #10   Last office visit- 7/15/16  Future Appointments  Date Time Provider Kevin Byrne   7/18/2017 8:20 AM Chau Ochoa MD [facility]

## 2025-03-19 NOTE — PROGRESS NOTES
"[facility] Podiatry - Progress Note  Patient: Anna Marie Barbosa 59 y.o. female   MRN: [id number]  PCP: Magdalena Grove MD  Unit/Bed#: S 3 -01 Encounter: [id number]  Date Of Visit: 25    ASSESSMENT:    Anna Marie Barbosa is a 59 y.o. female with:    Left diabetic foot ulcer, third toe, underlying osteomyelitis ([facility])  Left foot cellulitis  T2DM (HbA1c 5.7% on 25)  Peripheral neuropathy  Chronic pain, opioid dependent  Morbid obesity  History of prior pedal digital amputations      PLAN:    Patient to go to OR today, 25, for left third digit amputation with Dr. Figueroa.  Consent signed with surgeon prior to procedure.   Confirmed NPO status.  H&P, vitals, and current labs reviewed.  No acute changes noted.  Reviewed blood cultures, negative at 24 hours  Alternatives, risks, and complications discussed with patient.  All questions answered.  No guarantees given of outcome.  Appreciate pre operative clearance, low risk for procedure  Appreciate consulting services for recommendations and management.    Antibiotics started: Ancef  Pharmacologic VTE Prophylaxis: Lovenox - held pre op  Mechanical VTE Prophylaxis: sequential compression device   Weightbearing status: Weightbearing to heel to Left lower extremity    Disposition: Patient will require continued inpatient stay for the above     SUBJECTIVE:     The patient was seen, evaluated, and assessed at bedside today. The patient was awake, alert, and in no acute distress. Patient confirmed NPO status. All questions and concerns regarding the surgical procedure addressed. Patient understands risks vs benefits of procedure and remains amenable with plan for surgery today. Patient denies N/V/F/chills/SOB/CP      OBJECTIVE:     Vitals:   /58 (BP Location: Left arm)   Pulse 88   Temp 97.5 °F (36.4 °C) (Oral)   Resp 17   Ht 5' 8\" (1.727 m)   Wt 119 kg (261 lb 9.6 oz)   SpO2 94%   BMI 39.78 kg/m²     Temp (24hrs), Av.6 °F (36.4 °C), " "Min:97.5 °F (36.4 °C), Max:97.7 °F (36.5 °C)    Physical Exam:     General:  Alert, cooperative, and in no distress.  Lungs: Non labored breathing  Abdomen: Soft, non-tender.  Lower extremity exam:  Cardiovascular status at baseline.  Neurological status at baseline.  Musculoskeletal status at baseline. No calf tenderness noted bilaterally.     Dressing left intact to the Operating Room.     Additional Data:     Labs:    Results from last 7 days   Lab Units 03/19/25  0449   WBC Thousand/uL 4.58   HEMOGLOBIN g/dL 10.1*   HEMATOCRIT % 32.0*   PLATELETS Thousands/uL 177   SEGS PCT % 45   LYMPHO PCT % 37   MONO PCT % 12   EOS PCT % 5     Results from last 7 days   Lab Units 03/19/25  0449   POTASSIUM mmol/L 4.0   CHLORIDE mmol/L 104   CO2 mmol/L 27   BUN mg/dL 12   CREATININE mg/dL 0.64   CALCIUM mg/dL 8.9           * I Have Reviewed All Lab Data Listed Above.    Recent Cultures (last 7 days):     Results from last 7 days   Lab Units 03/17/25 2128 03/17/25 2124   BLOOD CULTURE  No Growth at 24 hrs. No Growth at 24 hrs.           Imaging: I have personally reviewed pertinent films in PACS  EKG, Pathology, and Other Studies: I have personally reviewed pertinent reports.      ** Please Note: Portions of the record may have been created with voice recognition software. Occasional wrong word or \"sound a like\" substitutions may have occurred due to the inherent limitations of voice recognition software. Read the chart carefully and recognize, using context, where substitutions have occurred. **     "

## 2025-03-19 NOTE — ANESTHESIA PREPROCEDURE EVALUATION
Procedure:  Left 3rd toe amputation (Left: Foot)    Relevant Problems   CARDIO   (+) Essential hypertension   (+) Other hyperlipidemia      ENDO   (+) Type 2 diabetes mellitus with hyperglycemia, with long-term current use of insulin (HCC)      GI/HEPATIC   (+) Gastroesophageal reflux disease without esophagitis      NEURO/PSYCH   (+) Chronic pain disorder   (+) Diabetic gastroparesis  (HCC)   (+) Diabetic polyneuropathy associated with type 2 diabetes mellitus (HCC)   (+) TIA (transient ischemic attack)      PULMONARY   (+) Asthma      Other   (+) Subacute osteomyelitis of left foot (HCC)        Physical Exam    Airway    Mallampati score: II         Dental       Cardiovascular  Rhythm: regular, Rate: normal    Pulmonary   Breath sounds clear to auscultation    Other Findings  post-pubertal.      Anesthesia Plan  ASA Score- 2     Anesthesia Type- IV sedation with anesthesia with ASA Monitors.         Additional Monitors:     Airway Plan:            Plan Factors-Exercise tolerance (METS): >4 METS.    Chart reviewed.   Existing labs reviewed. Patient summary reviewed.          Obstructive sleep apnea risk education given perioperatively.        Induction- intravenous.    Postoperative Plan-     Perioperative Resuscitation Plan - Level 1 - Full Code.       Informed Consent- Anesthetic plan and risks discussed with patient.        NPO Status:  Vitals Value Taken Time   Date of last liquid 03/19/25 03/19/25 0546   Time of last liquid 0845 03/19/25 0546   Date of last solid 03/18/25 03/19/25 0546   Time of last solid 1900 03/19/25 0546

## 2025-03-20 ENCOUNTER — TELEPHONE (OUTPATIENT)
Age: 60
End: 2025-03-20

## 2025-03-20 VITALS
OXYGEN SATURATION: 97 % | HEIGHT: 68 IN | WEIGHT: 261.6 LBS | BODY MASS INDEX: 39.65 KG/M2 | SYSTOLIC BLOOD PRESSURE: 130 MMHG | HEART RATE: 85 BPM | DIASTOLIC BLOOD PRESSURE: 72 MMHG | TEMPERATURE: 98.1 F | RESPIRATION RATE: 16 BRPM

## 2025-03-20 DIAGNOSIS — S98.131A AMPUTATION OF TOE OF RIGHT FOOT (HCC): ICD-10-CM

## 2025-03-20 DIAGNOSIS — L03.039 CELLULITIS OF TOE, UNSPECIFIED LATERALITY: Primary | ICD-10-CM

## 2025-03-20 PROBLEM — M86.272 SUBACUTE OSTEOMYELITIS OF LEFT FOOT (HCC): Status: RESOLVED | Noted: 2025-03-18 | Resolved: 2025-03-20

## 2025-03-20 LAB
ANION GAP SERPL CALCULATED.3IONS-SCNC: 5 MMOL/L (ref 4–13)
BASOPHILS # BLD AUTO: 0.04 THOUSANDS/ÂΜL (ref 0–0.1)
BASOPHILS NFR BLD AUTO: 1 % (ref 0–1)
BUN SERPL-MCNC: 12 MG/DL (ref 5–25)
CALCIUM SERPL-MCNC: 8.7 MG/DL (ref 8.4–10.2)
CHLORIDE SERPL-SCNC: 106 MMOL/L (ref 96–108)
CO2 SERPL-SCNC: 27 MMOL/L (ref 21–32)
CREAT SERPL-MCNC: 0.54 MG/DL (ref 0.6–1.3)
EOSINOPHIL # BLD AUTO: 0.28 THOUSAND/ÂΜL (ref 0–0.61)
EOSINOPHIL NFR BLD AUTO: 6 % (ref 0–6)
ERYTHROCYTE [DISTWIDTH] IN BLOOD BY AUTOMATED COUNT: 13.9 % (ref 11.6–15.1)
GFR SERPL CREATININE-BSD FRML MDRD: 102 ML/MIN/1.73SQ M
GLUCOSE SERPL-MCNC: 78 MG/DL (ref 65–140)
HCT VFR BLD AUTO: 31.1 % (ref 34.8–46.1)
HGB BLD-MCNC: 9.8 G/DL (ref 11.5–15.4)
IMM GRANULOCYTES # BLD AUTO: 0.01 THOUSAND/UL (ref 0–0.2)
IMM GRANULOCYTES NFR BLD AUTO: 0 % (ref 0–2)
LYMPHOCYTES # BLD AUTO: 1.99 THOUSANDS/ÂΜL (ref 0.6–4.47)
LYMPHOCYTES NFR BLD AUTO: 39 % (ref 14–44)
MCH RBC QN AUTO: 27.1 PG (ref 26.8–34.3)
MCHC RBC AUTO-ENTMCNC: 31.5 G/DL (ref 31.4–37.4)
MCV RBC AUTO: 86 FL (ref 82–98)
MONOCYTES # BLD AUTO: 0.51 THOUSAND/ÂΜL (ref 0.17–1.22)
MONOCYTES NFR BLD AUTO: 10 % (ref 4–12)
NEUTROPHILS # BLD AUTO: 2.29 THOUSANDS/ÂΜL (ref 1.85–7.62)
NEUTS SEG NFR BLD AUTO: 44 % (ref 43–75)
NRBC BLD AUTO-RTO: 0 /100 WBCS
PLATELET # BLD AUTO: 170 THOUSANDS/UL (ref 149–390)
PMV BLD AUTO: 12.8 FL (ref 8.9–12.7)
POTASSIUM SERPL-SCNC: 4.3 MMOL/L (ref 3.5–5.3)
RBC # BLD AUTO: 3.61 MILLION/UL (ref 3.81–5.12)
SODIUM SERPL-SCNC: 138 MMOL/L (ref 135–147)
WBC # BLD AUTO: 5.12 THOUSAND/UL (ref 4.31–10.16)

## 2025-03-20 PROCEDURE — NC001 PR NO CHARGE: Performed by: PODIATRIST

## 2025-03-20 PROCEDURE — 85025 COMPLETE CBC W/AUTO DIFF WBC: CPT

## 2025-03-20 PROCEDURE — 97163 PT EVAL HIGH COMPLEX 45 MIN: CPT

## 2025-03-20 PROCEDURE — 80048 BASIC METABOLIC PNL TOTAL CA: CPT

## 2025-03-20 RX ORDER — CEFADROXIL 500 MG/1
500 CAPSULE ORAL EVERY 12 HOURS SCHEDULED
Qty: 14 CAPSULE | Refills: 0 | Status: SHIPPED | OUTPATIENT
Start: 2025-03-20 | End: 2025-03-27

## 2025-03-20 RX ORDER — CEFADROXIL 500 MG/1
500 CAPSULE ORAL EVERY 12 HOURS SCHEDULED
Qty: 10 CAPSULE | Refills: 0 | Status: SHIPPED | OUTPATIENT
Start: 2025-03-20 | End: 2025-03-25

## 2025-03-20 RX ORDER — CALCIUM CARBONATE 500 MG/1
1000 TABLET, CHEWABLE ORAL DAILY PRN
Qty: 30 TABLET | Refills: 0 | Status: SHIPPED | OUTPATIENT
Start: 2025-03-20

## 2025-03-20 RX ADMIN — DOCUSATE SODIUM 100 MG: 100 CAPSULE, LIQUID FILLED ORAL at 08:42

## 2025-03-20 RX ADMIN — ENOXAPARIN SODIUM 40 MG: 40 INJECTION SUBCUTANEOUS at 08:42

## 2025-03-20 RX ADMIN — OXYCODONE HYDROCHLORIDE 10 MG: 10 TABLET ORAL at 04:53

## 2025-03-20 RX ADMIN — POLYETHYLENE GLYCOL 3350 17 G: 17 POWDER, FOR SOLUTION ORAL at 08:42

## 2025-03-20 RX ADMIN — FLUTICASONE FUROATE AND VILANTEROL TRIFENATATE 1 PUFF: 200; 25 POWDER RESPIRATORY (INHALATION) at 07:29

## 2025-03-20 RX ADMIN — PRAVASTATIN SODIUM 40 MG: 40 TABLET ORAL at 08:42

## 2025-03-20 RX ADMIN — PREGABALIN 200 MG: 100 CAPSULE ORAL at 08:41

## 2025-03-20 RX ADMIN — SENNOSIDES AND DOCUSATE SODIUM 1 TABLET: 50; 8.6 TABLET ORAL at 08:42

## 2025-03-20 RX ADMIN — CEFAZOLIN SODIUM 2000 MG: 2 SOLUTION INTRAVENOUS at 07:31

## 2025-03-20 RX ADMIN — CARISOPRODOL 350 MG: 350 TABLET ORAL at 08:43

## 2025-03-20 NOTE — PHYSICAL THERAPY NOTE
PT EVALUATION 08:50-09:20    59 y.o.    [id number]    Cellulitis of third toe of left foot [L03.032]    Past Medical History:   Diagnosis Date    Acute respiratory insufficiency 11/23/2019    Allergies     Ambulates with cane     Anxiety     Asthma     Chronic narcotic dependence (HCC)     Chronic pain     Chronic pain disorder     back/knee    Diabetes (HCC)     HTN (hypertension)     elevated with electronic device    Hyperlipidemia     PONV (postoperative nausea and vomiting)     Sepsis (HCC) 11/23/2019    Sleep apnea     no cpap    Walker as ambulation aid          Past Surgical History:   Procedure Laterality Date    BACK SURGERY      x 3 with hardware    CERVICAL LAMINECTOMY      CHOLECYSTECTOMY LAPAROSCOPIC N/A 11/10/2018    Procedure: CHOLECYSTECTOMY LAPAROSCOPIC w/ ioc;  Surgeon: Benitez Cuenca MD;  Location: [facility];  Service: General    COLONOSCOPY      HYSTERECTOMY      JOINT REPLACEMENT Left     knee    MS ARTHRP KNE CONDYLE&PLATU MEDIAL&LAT COMPARTMENTS Right 1/20/2025    Procedure: ARTHROPLASTY KNEE TOTAL, RIGHT-SAME DAY;  Surgeon: Joe Cabrera MD;  Location: [facility];  Service: Orthopedics    MS REVJ TOT KNEE ARTHRP FEM&ENTIRE TIBIAL COMPONE Left 10/21/2024    Procedure: ARTHROPLASTY LEFT KNEE TOTAL REVISION,  2 components;  Surgeon: Joe Cabrera MD;  [facility];  Service: Orthopedics    TOE AMPUTATION Right 02/02/2018    Procedure: AMPUTATION TOE, RIGHT GREAT TOE;  Surgeon: Lino Rodriguez DPM;  Location: [facility];  Service: Podiatry    TOE AMPUTATION Right     3rd toe    TOE AMPUTATION Left 3/19/2025    Procedure: Left 3rd partial toe amputation;  Surgeon: Henry Figueroa DPM;  Location: [facility];  Service: Podiatry      03/20/25 0920   PT Last Visit   PT Visit Date 03/20/25   Note Type   Note type Evaluation   Pain Assessment   Pain Score No Pain   Restrictions/Precautions   LLE Weight Bearing Per Order WBAT  (in surgical shoe)   Other Precautions Fall  Risk;WBS   Home Living   Type of Home House   Home Layout One level;Stairs to enter with rails  (5+MATTHEW with L HR)   Bathroom Accessibility Accessible   Home Equipment Walker;Cane   Additional Comments resides with spouse in one story home wtih 5 MATTHEW   Prior Function   Level of [place] Independent with ADLs;Independent with functional mobility;Independent with IADLS   Lives With Spouse   Receives Help From Family   IADLs Independent with driving;Independent with meal prep;Independent with medication management   Falls in the last 6 months 0   Comments I PTA. Drives.  Use of cane.  REcent TKR 1/2025.   General   Additional Pertinent History Pt is 58 y/o female s/p L 3rd partial toe amp 2* OM. PT consulted post operatively.  WBAT in surgical shoe.   Family/Caregiver Present Yes  (end of session.)   Cognition   Overall Cognitive Status WFL   Subjective   Subjective Reports doing well.  Denies concerns about going home or doing MATTHEW.   RUE Assessment   RUE Assessment WFL   LUE Assessment   LUE Assessment WFL   RLE Assessment   RLE Assessment WFL   LLE Assessment   LLE Assessment WFL   Bed Mobility   Additional Comments Seated EOB upon arrival.   Transfers   Sit to Stand 6  Modified independent   Stand to Sit 6  Modified independent   Additional Comments Slowed transitions.   Ambulation/Elevation   Gait pattern Antalgic;Decreased foot clearance;Step to  (slowed pace.  No LOB with cane.)   Gait Assistance 5  Supervision   Additional items Assist x 1;Verbal cues   Assistive Device Other (Comment)  (hurry cane)   Distance Amb with cane 60'x2.  No overt LOB with use of cane. Step to pattern.   Stair Management Assistance Not tested  (denies concerns for MATTHEW home.  Encouraged step to pattern.)   Balance   Static Sitting Normal   Dynamic Sitting Good   Static Standing Fair +   Dynamic Standing Fair   Ambulatory Fair  (with cane.)   Activity Tolerance   Activity Tolerance Patient tolerated treatment well;Patient limited by  fatigue   Medical Staff Made Aware Nurse, Shelbi   Nurse Made Aware yes   Assessment   Prognosis Good   Problem List Decreased endurance;Impaired balance;Decreased mobility;Obesity;Decreased skin integrity;Pain;Orthopedic restrictions   Assessment Anna Marie Barbosa is a 59 y.o. female who is being admitted 3/17/2025 due to cellulitis of left third toe, concern for possible underlying bone infection. Patient has a past medical history of T2DM, Asthma, HTN, obesity, chronic pain disorder, opioid dependence, peripheral neuropathy, TIA, prior pedal digital amputations. Is s/p L 3rd partial toe amputation on 3/19.  PT consulted post operatively.  WBAT in surgical shoe.  Prior to admission independent and driving. Hx of recent R TKR and L TKR revision in which use of cane.  Currently presents with minimal functional limitations related to 3rd toe amp.  WBAT in surgical shoe. Gait deviations of step to pattern, slowed pace, antalgic pattern.  Despite is S for all mobility and ambulation and without LOB. Denies concerns about stair management to discharge to home.  The patient's AM-PAC Basic Mobility Inpatient Short Form Raw Score is 23. A Raw score of greater than 16 suggests the patient may benefit from discharge to home. Please also refer to the recommendation of the Physical Therapist for safe discharge planning. At this time, does not require continued skilled PT.  Will d/c PT.   Goals   Patient Goals go home   Plan   Treatment/Interventions Patient/family training;Endurance training;Functional transfer training;Gait training;Compensatory technique education;Spoke to nursing  (PT eval and discharge.)   PT Frequency Other (Comment)  (d/c PT)   Discharge Recommendation   Rehab Resource Intensity Level, PT No post-acute rehabilitation needs   AM-PAC Basic Mobility Inpatient   Turning in Flat Bed Without Bedrails 4   Lying on Back to Sitting on Edge of Flat Bed Without Bedrails 4   Moving Bed to Chair 4   Standing Up From  Chair Using Arms 4   Walk in Room 4   Climb 3-5 Stairs With Railing 3   Basic Mobility Inpatient Raw Score 23   Basic Mobility Standardized Score 50.88   [facility] Highest Level Of Mobility   -HL Goal 7: Walk 25 feet or more   -HLM Achieved 7: Walk 25 feet or more   End of Consult   Patient Position at End of Consult Seated edge of bed     Hx/personal factors: MATTHEW home environment, WBS, fall risk , and increased reliance on DME , comorbidities    Examination:decreased balance, decreased activity tolerance, gait deviations, increased body habitus , impaired posture, decreased skin integrity , and WBS.     Clinical: unpredictable Risk for falls, Pain management, Decreased activity tolerance compared to baseline, and WBS.     Complexity: high             Joan Cohen, PT

## 2025-03-20 NOTE — NURSING NOTE
Patient is seen by Podiatry. Surgical wound reviewed and redressed.  Discharge home after PT review.  Switched to oral antibio

## 2025-03-20 NOTE — PLAN OF CARE
Problem: PAIN - ADULT  Goal: Verbalizes/displays adequate comfort level or baseline comfort level  Description: Interventions:  - Encourage patient to monitor pain and request assistance  - Assess pain using appropriate pain scale  - Administer analgesics based on type and severity of pain and evaluate response  - Implement non-pharmacological measures as appropriate and evaluate response  - Consider cultural and social influences on pain and pain management  - Notify physician/advanced practitioner if interventions unsuccessful or patient reports new pain  Outcome: Progressing     Problem: INFECTION - ADULT  Goal: Absence or prevention of progression during hospitalization  Description: INTERVENTIONS:  - Assess and monitor for signs and symptoms of infection  - Monitor lab/diagnostic results  - Monitor all insertion sites, i.e. indwelling lines, tubes, and drains  - Monitor endotracheal if appropriate and nasal secretions for changes in amount and color  - [place] appropriate cooling/warming therapies per order  - Administer medications as ordered  - Instruct and encourage patient and family to use good hand hygiene technique  - Identify and instruct in appropriate isolation precautions for identified infection/condition  Outcome: Progressing     Problem: SAFETY ADULT  Goal: Maintain or return to baseline ADL function  Description: INTERVENTIONS:  -  Assess patient's ability to carry out ADLs; assess patient's baseline for ADL function and identify physical deficits which impact ability to perform ADLs (bathing, care of mouth/teeth, toileting, grooming, dressing, etc.)  - Assess/evaluate cause of self-care deficits   - Assess range of motion  - Assess patient's mobility; develop plan if impaired  - Assess patient's need for assistive devices and provide as appropriate  - Encourage maximum independence but intervene and supervise when necessary  - Involve family in performance of ADLs  - Assess for home care  needs following discharge   - Consider OT consult to assist with ADL evaluation and planning for discharge  - Provide patient education as appropriate  Outcome: Progressing     Problem: DISCHARGE PLANNING  Goal: Discharge to home or other facility with appropriate resources  Description: INTERVENTIONS:  - Identify barriers to discharge w/patient and caregiver  - Arrange for needed discharge resources and transportation as appropriate  - Identify discharge learning needs (meds, wound care, etc.)  - Arrange for interpretive services to assist at discharge as needed  - Refer to Case Management Department for coordinating discharge planning if the patient needs post-hospital services based on physician/advanced practitioner order or complex needs related to functional status, cognitive ability, or social support system  Outcome: Progressing

## 2025-03-20 NOTE — TELEPHONE ENCOUNTER
Called and spoke with patient. She knows and will be picking up the antibiotic asap. She is also scheduled for 04/02/2025

## 2025-03-20 NOTE — DISCHARGE SUMMARY
PODIATRY DISCHARGE SUMMARY     Patient Name: Anna Marie Barbosa   Age & Sex: 59 y.o. female   MRN: [id number]  Unit/Bed#: S 3 -01   Encounter: [id number]  Length of Stay: 3 days    ASSESSMENT:    Anna Marie Barbosa is a 59 y.o. female with:    Status post left third toe amputation due to osteomyelitis.  Left foot cellulitis.  Type 2 diabetes mellitus with most recent hemoglobin A1c of 5.7  Peripheral neuropathy.  Chronic pain, opioid dependent    PLAN:    POD 1 status post left third toe amputation at the level of the PIPJ due to osteomyelitis   Patient ambulating well with surgical shoe.  Will plan for discharge to home with additional 7 days of Duricef 500 mg (noted growth of Streptococcus and wound) for any residual cellulitis or soft tissue infection.  An ambulatory referral to podiatry has been placed and she should follow-up within 1 week for an incision check.  Discussed with patient that she needs to keep her dressing intact until she follows up in the office.  Patient is amenable to discharge today.  Elevation and offloading on green foam wedges or pillows when non-ambulatory.  Appreciate consulting services for recommendations and management.     Antibiotics: Duricef.  Pharmacologic VTE Prophylaxis: Enoxaparin (Lovenox)   Mechanical VTE Prophylaxis: sequential compression device   Weightbearing status: Weightbearing as tolerated in a surgical shoe.    Disposition:  Patient stable for discharge today.    SUBJECTIVE     The patient was seen, evaluated, and assessed at bedside today. The patient was awake, alert, and in no acute distress. No acute events overnight. The patient reports she is doing well today.. Patient denies N/V/F/chills/SOB/CP.    Review of Systems  Constitutional: Negative.    HENT: Negative.    Eyes: Negative.    Respiratory: Negative.    Cardiovascular: Negative.    Gastrointestinal: Negative.    Musculoskeletal: Status post left third toe amputation  Skin: Incision, left third toe.  Minor  cellulitis.  Neurological: Neuropathy.  Psych: Negative.    OBJECTIVE     Physical Exam:     General: Alert, cooperative and no distress  Lungs: Non labored breathing  Abdomen: Soft, non-tender.  Lower extremity exam:  Cardiovascular status at baseline.  Neurological status at baseline.  Musculoskeletal status at baseline. No calf tenderness noted bilaterally.  Left lower extremity dressing left intact.  Patient reports pain is resolved to her left foot.  No calf pain, no swelling noted.    Clinical Images 03/20/25:      DETAILS OF HOSPITAL COURSE     Anna Marie Barbosa is a 59 y.o. female who was admitted on 3/17/2025 due to osteomyelitis of the left third toe.  Patient also had overlying cellulitis.  Patient was admitted due to above wound.  X-rays were taken which noted osteomyelitis of the toe.  Wound cultures were taken which noted Streptococcus.  Discussion was had at bedside with patient regarding conservative versus surgical treatment.  Surgical treatment was determined given the underlying osteomyelitis as well as the discoloration of the toe.  Patient underwent toe amputation on 3/19/2025.  At the level of the PIPJ.  Noted assume surgical cure of osteomyelitis.  Patient was made weightbearing as tolerated surgical shoe postoperatively.  Patient states that she has been ambulating well.  Discussed with nursing team who also feels that patient has been ambulating well.  During this time internal medicine was consulted for help with medical management, all recommendations were appreciated.  On 3/20 patient was stable for discharge.  Patient was discharged with a continuation of oral antibiotics.    New Medications:  -Duricef 500 mg.    DISCHARGE INFORMATION     PCP at Discharge: Magdalena Grove MD    Admitting Provider:  Speedy  Admission Date: 3/17/2025     Discharge Provider:  Speedy  Discharge Date: 03/20/25    Discharge Disposition: Home  Discharge Condition: Good  Discharge with Lines: No  Activity  "Restrictions: WBAT  Test Results Pending at Discharge: none  Medications at Discharge: See after visit summary for reconciled discharge medications provided to patient and family.      Discharge Diagnoses:  Principal Problem:    Subacute osteomyelitis of left foot (HCC)  Active Problems:    Type 2 diabetes mellitus with hyperglycemia, with long-term current use of insulin (HCC)    Asthma    Essential hypertension    Morbid obesity (HCC)    Chronic pain disorder    Cellulitis of third toe of left foot      Consulting Providers:  - Internal Medicine    Diagnostic & Therapeutic Procedures Performed:  XR foot 3+ views LEFT  Result Date: 3/18/2025  Impression: Subtle apparent cortical erosion at the distal tip of the terminal tuft of the left third distal phalanx suggestive of osteomyelitis. This examination was marked \"discrepancy\" and \"immediate notification\" in Epic in order to begin the standard process by which the radiology reading room liaison alerts the referring practitioner. Computerized Assisted Algorithm (CAA) may have been used to analyze all applicable images. Workstation performed: [id number]       Code Status: Level 1 - Full Code  Advance Directive and Living Will:      Power of :    POLST:      FOLLOW-UP     PCP Outpatient Follow-up:    Consulting Providers Follow-up:    Active Issues Requiring Follow-Up:    Discharge Statement:  I spent 25 minutes discharging the patient. This time was spent on the day of discharge. I had direct contact with the patient on the day of discharge. Additional documentation is required if more than 30 minutes were spent on discharge.       Portions of the record may have been created with voice recognition software.  Occasional wrong word or \"sound a like\" substitutions may have occurred due to the inherent limitations of voice recognition software.  Read the chart carefully and recognize, using context, where substitutions have occurred.  ==  Bipin Finney, " DPM   [facility]  Podiatric Medicine & Surgery

## 2025-03-20 NOTE — TELEPHONE ENCOUNTER
Caller: Anna Marie Barbosa    Doctor: Dr. Henry Figueroa / [facility]    Reason for call: Anna Marie was just discharged from [facility] in [place].  She needs the Cefadroxil 500 mg sent to [facility] in [place].  Can someone please take care of this?  She will need to take this tonight.  Thank you.     Call back#: [phone]

## 2025-03-20 NOTE — TELEPHONE ENCOUNTER
Caller: Anna Marie     Doctor and/or Office: Dr. Figueroa / [facility]     #: [phone]     Escalation: Appointment Patient got out of the hospital today needs post op appts

## 2025-03-20 NOTE — PLAN OF CARE
Problem: Potential for Falls  Goal: Patient will remain free of falls  Description: INTERVENTIONS:  - Educate patient/family on patient safety including physical limitations  - Instruct patient to call for assistance with activity   - Consult OT/PT to assist with strengthening/mobility   - Keep Call bell within reach  - Keep bed low and locked with side rails adjusted as appropriate  - Keep care items and personal belongings within reach  - Initiate and maintain comfort rounds  - Make Fall Risk Sign visible to staff  - Offer Toileting every 2 Hours, in advance of need  - Initiate/Maintain bed alarm  - Obtain necessary fall risk management equipment: socks  - Apply yellow socks and bracelet for high fall risk patients  - Consider moving patient to room near nurses station  3/20/2025 0120 by Maryanne Nunez RN  Outcome: Progressing  3/20/2025 0120 by Maryanne Nunez RN  Outcome: Progressing     Problem: PAIN - ADULT  Goal: Verbalizes/displays adequate comfort level or baseline comfort level  Description: Interventions:  - Encourage patient to monitor pain and request assistance  - Assess pain using appropriate pain scale  - Administer analgesics based on type and severity of pain and evaluate response  - Implement non-pharmacological measures as appropriate and evaluate response  - Consider cultural and social influences on pain and pain management  - Notify physician/advanced practitioner if interventions unsuccessful or patient reports new pain  3/20/2025 0120 by Maryanne Nunez RN  Outcome: Progressing  3/20/2025 0120 by Maryanne Nunez RN  Outcome: Progressing     Problem: INFECTION - ADULT  Goal: Absence or prevention of progression during hospitalization  Description: INTERVENTIONS:  - Assess and monitor for signs and symptoms of infection  - Monitor lab/diagnostic results  - Monitor all insertion sites, i.e. indwelling lines, tubes, and drains  - Monitor endotracheal if appropriate and nasal  secretions for changes in amount and color  - [place] appropriate cooling/warming therapies per order  - Administer medications as ordered  - Instruct and encourage patient and family to use good hand hygiene technique  - Identify and instruct in appropriate isolation precautions for identified infection/condition  3/20/2025 0120 by Maryanne Nunez RN  Outcome: Progressing  3/20/2025 0120 by Maryanne Nunez RN  Outcome: Progressing     Problem: SAFETY ADULT  Goal: Maintain or return to baseline ADL function  Description: INTERVENTIONS:  -  Assess patient's ability to carry out ADLs; assess patient's baseline for ADL function and identify physical deficits which impact ability to perform ADLs (bathing, care of mouth/teeth, toileting, grooming, dressing, etc.)  - Assess/evaluate cause of self-care deficits   - Assess range of motion  - Assess patient's mobility; develop plan if impaired  - Assess patient's need for assistive devices and provide as appropriate  - Encourage maximum independence but intervene and supervise when necessary  - Involve family in performance of ADLs  - Assess for home care needs following discharge   - Consider OT consult to assist with ADL evaluation and planning for discharge  - Provide patient education as appropriate  3/20/2025 0120 by Maryanne Nunez RN  Outcome: Progressing  3/20/2025 0120 by Maryanne Nunez RN  Outcome: Progressing     Problem: Knowledge Deficit  Goal: Patient/family/caregiver demonstrates understanding of disease process, treatment plan, medications, and discharge instructions  Description: Complete learning assessment and assess knowledge base.  Interventions:  - Provide teaching at level of understanding  - Provide teaching via preferred learning methods  3/20/2025 0120 by Maryanne Nunez RN  Outcome: Progressing  3/20/2025 0120 by Maryanne Nunez RN  Outcome: Progressing     Problem: DISCHARGE PLANNING  Goal: Discharge to home or other facility with  appropriate resources  Description: INTERVENTIONS:  - Identify barriers to discharge w/patient and caregiver  - Arrange for needed discharge resources and transportation as appropriate  - Identify discharge learning needs (meds, wound care, etc.)  - Arrange for interpretive services to assist at discharge as needed  - Refer to Case Management Department for coordinating discharge planning if the patient needs post-hospital services based on physician/advanced practitioner order or complex needs related to functional status, cognitive ability, or social support system  3/20/2025 0120 by Maryanne Nunez RN  Outcome: Progressing  3/20/2025 0120 by Maryanne Nunez RN  Outcome: Progressing     Problem: SKIN/TISSUE INTEGRITY - ADULT  Goal: Skin Integrity remains intact(Skin Breakdown Prevention)  Description: Assess:  -Perform Imer assessment every shift  -Clean and moisturize skin every 2 hours  -Inspect skin when repositioning, toileting, and assisting with ADLS  -Assess under medical devices such as masimo every shift  -Assess extremities for adequate circulation and sensation     Bed Management:  -Have minimal linens on bed & keep smooth, unwrinkled  -Change linens as needed when moist or perspiring  -Avoid sitting or lying in one position for more than 2 hours while in bed  -Keep HOB at 20 degrees     Toileting:  -Offer bedside commode  -Assess for incontinence every 3 hours  -Use incontinent care products after each incontinent episode such as wipes    Activity:  -Mobilize patient 3 times a day  -Encourage activity and walks on unit  -Encourage or provide ROM exercises   -Turn and reposition patient every 2 Hours  -Use appropriate equipment to lift or move patient in bed  -Instruct/ Assist with weight shifting every 2 hours when out of bed in chair  -Consider limitation of chair time 2 hour intervals    Skin Care:  -Avoid use of baby powder, tape, friction and shearing, hot water or constrictive  clothing  -Relieve pressure over bony prominences using pillows  -Do not massage red bony areas    Next Steps:  -Teach patient strategies to minimize risks such as t   -Consider consults to  interdisciplinary teams such as turning  Outcome: Progressing  Goal: Incision(s), wounds(s) or drain site(s) healing without S/S of infection  Description: INTERVENTIONS  - Assess and document dressing, incision, wound bed, drain sites and surrounding tissue  - Provide patient and family education  - Perform skin care/dressing changes every shift  Outcome: Progressing     Problem: HEMATOLOGIC - ADULT  Goal: Maintains hematologic stability  Description: INTERVENTIONS  - Assess for signs and symptoms of bleeding or hemorrhage  - Monitor labs  - Administer supportive blood products/factors as ordered and appropriate  Outcome: Progressing

## 2025-03-20 NOTE — DISCHARGE INSTR - AVS FIRST PAGE
Discharge Instructions - Podiatry    Weight Bearing Status: Weightbearing as tolerated while wearing your surgical shoe.  Please wear your shoe at all times when you are walking, even for small trips to the bathroom.                       Pain: Continue analgesics as directed    Follow-up appointment instructions: Please make an appointment within one week of discharge with Dr. Figueroa.  He is contact information has been placed in your discharge chart.  Contact sooner if any increase in pain, or signs of infection occur    Patient Wound Care Instructions: Leave dressings clean, dry, and intact until 1st outpatient office visit.  You may shower, however please keep your foot dry utilizing a shower bag if needed.  If your dressing does become wet, please remove immediately.  Cover the incision with clean dry gauze.  Secure with tape.    Antibiotics: You have been prescribed an antibiotic, Duricef, for an additional 7 days.  Please take as prescribed

## 2025-03-20 NOTE — OCCUPATIONAL THERAPY NOTE
Occupational Therapy Screen         Patient Name: Anna Marie Barbosa  Today's Date: 3/20/2025           03/20/25 1018   OT Last Visit   OT Visit Date 03/20/25   Note Type   Note type Screen   Additional Comments OT Consult received. Chart reviewed screen completed Pt with [facility] of [facility] and PT reports pt is functioning at her baseline with no IP OT needs warranted. DC OT - re-consult as appropriate.     Tracy Osorio, OT

## 2025-03-21 DIAGNOSIS — Z13.9 ENCOUNTER FOR SCREENING INVOLVING SOCIAL DETERMINANTS OF HEALTH (SDOH): Primary | ICD-10-CM

## 2025-03-21 NOTE — UTILIZATION REVIEW
NOTIFICATION OF ADMISSION DISCHARGE   This is a Notification of Discharge from [facility]. Please be advised that this patient has been discharge from our facility. Below you will find the admission and discharge date and time including the patient’s disposition.   UTILIZATION REVIEW CONTACT:  Shelbi Gross MA  Utilization   Network Utilization Review Department  Phone: [phone] x carefully listen to the prompts. All voicemails are confidential.  Email: [email or web address]     ADMISSION INFORMATION  PRESENTATION DATE: 3/17/2025  4:41 PM  OBERVATION ADMISSION DATE: N/A  INPATIENT ADMISSION DATE: 3/17/25  6:26 PM   DISCHARGE DATE: 3/20/2025 10:28 AM   DISPOSITION:Home/Self Care    Network Utilization Review Department  ATTENTION: Please call with any questions or concerns to [phone] and carefully listen to the prompts so that you are directed to the right person. All voicemails are confidential.   For Discharge needs, contact Care Management DC Support Team at [phone] opt. 2  Send all requests for admission clinical reviews, approved or denied determinations and any other requests to dedicated fax number below belonging to the campus where the patient is receiving treatment. List of dedicated fax numbers for the Facilities:  FACILITY NAME UR FAX NUMBER   ADMISSION DENIALS (Administrative/Medical Necessity) [phone]   DISCHARGE SUPPORT TEAM ([facility]) [phone]   PARENT CHILD HEALTH (Maternity/NICU/Pediatrics) [phone]   [facility] [phone]   [facility] [phone]   [facility] [phone]   [facility] [phone]   [facility] [phone]   General acute hospital [phone]   [facility] [phone]   [facility]  [phone]   [facility] [phone]   [facility] [phone]   [facility] [phone]   [facility] [phone]

## 2025-03-23 LAB
BACTERIA BLD CULT: NORMAL
BACTERIA BLD CULT: NORMAL

## 2025-03-24 ENCOUNTER — PATIENT OUTREACH (OUTPATIENT)
Dept: CASE MANAGEMENT | Facility: OTHER | Age: 60
End: 2025-03-24

## 2025-03-24 ENCOUNTER — TRANSITIONAL CARE MANAGEMENT (OUTPATIENT)
Dept: FAMILY MEDICINE CLINIC | Facility: CLINIC | Age: 60
End: 2025-03-24

## 2025-03-24 NOTE — PROGRESS NOTES
Referral received from [facility] report for Outpatient Social Work Care Manager (OP SWCM) to outreach patient and assist with utility resources.    Per chart review, patient has [employer] Medicare insurance and lives in [facility] [facility]).    Call placed to patient to introduce role of OP SWCM and assess needs.  No answer, voicemail left, and awaiting return call.

## 2025-03-25 PROCEDURE — 88305 TISSUE EXAM BY PATHOLOGIST: CPT | Performed by: PATHOLOGY

## 2025-03-25 PROCEDURE — 88311 DECALCIFY TISSUE: CPT | Performed by: PATHOLOGY

## 2025-03-27 ENCOUNTER — OFFICE VISIT (OUTPATIENT)
Dept: FAMILY MEDICINE CLINIC | Facility: CLINIC | Age: 60
End: 2025-03-27
Payer: COMMERCIAL

## 2025-03-27 VITALS
BODY MASS INDEX: 38.62 KG/M2 | TEMPERATURE: 97.4 F | DIASTOLIC BLOOD PRESSURE: 51 MMHG | WEIGHT: 254.8 LBS | HEIGHT: 68 IN | OXYGEN SATURATION: 95 % | HEART RATE: 90 BPM | SYSTOLIC BLOOD PRESSURE: 92 MMHG

## 2025-03-27 DIAGNOSIS — Z09 HOSPITAL DISCHARGE FOLLOW-UP: Primary | ICD-10-CM

## 2025-03-27 DIAGNOSIS — Z79.4 TYPE 2 DIABETES MELLITUS WITH HYPERGLYCEMIA, WITH LONG-TERM CURRENT USE OF INSULIN (HCC): ICD-10-CM

## 2025-03-27 DIAGNOSIS — E11.65 TYPE 2 DIABETES MELLITUS WITH HYPERGLYCEMIA, WITH LONG-TERM CURRENT USE OF INSULIN (HCC): ICD-10-CM

## 2025-03-27 DIAGNOSIS — F32.A DEPRESSIVE DISORDER: ICD-10-CM

## 2025-03-27 PROCEDURE — 99495 TRANSJ CARE MGMT MOD F2F 14D: CPT | Performed by: FAMILY MEDICINE

## 2025-03-27 RX ORDER — BUPROPION HYDROCHLORIDE 75 MG/1
75 TABLET ORAL DAILY
Qty: 30 TABLET | Refills: 2 | Status: SHIPPED | OUTPATIENT
Start: 2025-03-27

## 2025-03-27 RX ORDER — OXYCODONE HYDROCHLORIDE 10 MG/1
TABLET ORAL
COMMUNITY
Start: 2025-03-06

## 2025-03-27 NOTE — ASSESSMENT & PLAN NOTE
Lab Results   Component Value Date    HGBA1C 5.7 (H) 01/09/2025       Orders:    Diabetic Shoe

## 2025-03-27 NOTE — PROGRESS NOTES
Transition of Care Visit  Name: Anna Marie Barbosa      : 1965      MRN: [id number]  Encounter Provider: Magdalena Grove MD  Encounter Date: 3/27/2025   Encounter department: [facility]    Assessment & Plan  Hospital discharge follow-up  Patient hospitalized 3/17/2025-3/20/2025 due to osteomyelitis of the left third toe with associated cellulitis requiring amputation.  Patient has completed outpatient antibiotics and will follow-up with podiatry as scheduled 2025       Depressive disorder  Depression Screening Follow-up Plan: Patient's depression screening was positive with a PHQ-2 score of 5. Their PHQ-9 score was 13. Patient assessed for underlying major depression. They have no active suicidal ideations. Brief counseling provided and recommend additional follow-up/re-evaluation next office visit.  We discussed starting medication for depression and patient is agreeable.  She does state that she does not want to try medication that will make her gain weight.  Start trial of Wellbutrin 75 mg daily.  Patient will send a message via [employer] with a status update.  She will also inquire about starting counseling sessions.  Orders:    buPROPion (WELLBUTRIN) 75 mg tablet; Take 1 tablet (75 mg total) by mouth in the morning    Type 2 diabetes mellitus with hyperglycemia, with long-term current use of insulin ([facility])    Lab Results   Component Value Date    HGBA1C 5.7 (H) 2025       Orders:    Diabetic Shoe         History of Present Illness     Transitional Care Management Review:   Anna Marie Barbosa is a 59 y.o. female here for TCM follow up.     During the TCM phone call patient stated:  TCM Call (since 3/13/2025)       Date and time call was made  3/21/2025 11:43 AM    Hospital care reviewed  Records reviewed    Patient was hospitialized at  [facility]    Date of Admission  25    Date of discharge  25    Diagnosis  Subacute osteomyelitis of left foot    Disposition  Home     "Current Symptoms  None          TCM Call (since 3/13/2025)       Post hospital issues  None    Scheduled for follow up?  Yes    Did you obtain your prescribed medications  Yes    Do you need help managing your prescriptions or medications  No    Is transportation to your appointment needed  No    I have advised the patient to call PCP with any new or worsening symptoms  Ashtyn Reyes, KASIA    Living Arrangements  Family members          HPI    Anna Marie Barbosa is a 59 y.o. female who was hospitalized 3/17/2025-3/20/2025 due to osteomyelitis of the left third toe with associated cellulitis.  Patient subsequently underwent toe amputation on 3/19/2025 at the level of the PIPJ.  She was discharged on oral antibiotics which she completed.  She does have a follow-up visit with podiatry scheduled next week.  Given the current state of her health patient has been feeling very depressed over the last 3 weeks.  She states that she feels very unmotivated and has been crying a lot.  She thinks at this time that she would like to try a medication to help with her depression even if temporarily.  She also states that she gets 10 free counseling sessions through her insurance that she wants to take advantage of.      Review of Systems   Constitutional: Negative.    HENT: Negative.     Eyes: Negative.    Respiratory: Negative.     Cardiovascular: Negative.    Gastrointestinal: Negative.    Genitourinary: Negative.    Musculoskeletal:  Positive for gait problem.   Skin: Negative.    Psychiatric/Behavioral:  Positive for dysphoric mood.      Objective   BP 92/51 (BP Location: Left arm, Patient Position: Sitting, Cuff Size: Large)   Pulse 90   Temp (!) 97.4 °F (36.3 °C) (Temporal)   Ht 5' 8\" (1.727 m)   Wt 116 kg (254 lb 12.8 oz)   SpO2 95%   BMI 38.74 kg/m²     Physical Exam  Constitutional:       General: She is not in acute distress.     Appearance: She is not ill-appearing.   HENT:      Head: Normocephalic and atraumatic.   Eyes: "      General:         Right eye: No discharge.         Left eye: No discharge.      Extraocular Movements: Extraocular movements intact.   Cardiovascular:      Rate and Rhythm: Normal rate.   Pulmonary:      Effort: Pulmonary effort is normal. No respiratory distress.      Breath sounds: No wheezing.   Neurological:      General: No focal deficit present.      Mental Status: She is alert.   Psychiatric:         Mood and Affect: Mood is depressed. Affect is tearful.       Medications have been reviewed by provider in current encounter

## 2025-03-28 ENCOUNTER — PATIENT OUTREACH (OUTPATIENT)
Dept: CASE MANAGEMENT | Facility: OTHER | Age: 60
End: 2025-03-28

## 2025-03-28 NOTE — PROGRESS NOTES
2nd outreach attempt to patient to assist with utility resources.  Per chart review, patient has [employer] Medicare insurance and lives in [place]).     No answer, voicemail left, and awaiting return call.    Will send Unable to Reach letter to patient via [employer] and close case at this time.  Will remain available to assist with any future needs.

## 2025-03-28 NOTE — LETTER
[address] [facility]  [place] [address]  [phone]   Re: Unable to Reach   3/28/2025       Dear Anna Marie,    I am a [facility]  and wanted to be certain you had information to contact me should you desire assistance with or have questions about non-medical aspects of your care such as [but not limited to] medical insurance, housing, transportation, material needs, or emergency needs, as I was unable to reach you.  If I do not have an answer I will assist you in finding the appropriate agency or individual who can help.      Please feel free to contact me at [phone]. Thank You.    Sincerely,     Geena Das

## 2025-04-02 ENCOUNTER — OFFICE VISIT (OUTPATIENT)
Dept: PODIATRY | Facility: CLINIC | Age: 60
End: 2025-04-02
Payer: COMMERCIAL

## 2025-04-02 VITALS — HEIGHT: 68 IN | WEIGHT: 254 LBS | BODY MASS INDEX: 38.49 KG/M2

## 2025-04-02 DIAGNOSIS — E11.621 DIABETIC ULCER OF TOE OF LEFT FOOT ASSOCIATED WITH TYPE 2 DIABETES MELLITUS, WITH NECROSIS OF BONE (HCC): Primary | ICD-10-CM

## 2025-04-02 DIAGNOSIS — L97.524 DIABETIC ULCER OF TOE OF LEFT FOOT ASSOCIATED WITH TYPE 2 DIABETES MELLITUS, WITH NECROSIS OF BONE (HCC): Primary | ICD-10-CM

## 2025-04-02 PROCEDURE — 99213 OFFICE O/P EST LOW 20 MIN: CPT | Performed by: PODIATRIST

## 2025-04-02 NOTE — PROGRESS NOTES
":  Assessment & Plan  Diabetic ulcer of toe of left foot associated with type 2 diabetes mellitus, with necrosis of bone (HCC)    Lab Results   Component Value Date    HGBA1C 5.7 (H) 01/09/2025     Patient is stable postop 2 weeks    Incision: healed, sutures removed    Instructions given to patient. Rest the foot as much as possible and elevate/ice  if swollen.    Ambulatory status: light WB in surgical shoe 2 more weeks. Keep amputation covered with bandaid. Clean foot daily    Images reviewed today: none    RTC: 2-4 weeks             History of Present Illness     Anna Marie Barbosa is a 59 y.o. female   DOS: 3/20/2025     Procedure: left 3rd toe PIPJ amputation     Condition: Dressing C/D/I. She has no pain. SHe is depressed from losing another toe. Her PCP placed her on Welbutrin.,         Review of Systems  Objective   Ht 5' 8\" (1.727 m)   Wt 115 kg (254 lb)   BMI 38.62 kg/m²      Physical Exam  Vitals reviewed.   Cardiovascular:      Pulses: Normal pulses.   Musculoskeletal:         General: Deformity (left 3rd PIPJ amputation stable. Rigid 2nd hammertoe deformity) present.   Skin:     Capillary Refill: Capillary refill takes less than 2 seconds.   Neurological:      Mental Status: She is alert.      Sensory: Sensory deficit present.           "

## 2025-04-12 DIAGNOSIS — F32.A DEPRESSIVE DISORDER: Primary | ICD-10-CM

## 2025-04-12 RX ORDER — BUPROPION HYDROCHLORIDE 150 MG/1
150 TABLET ORAL EVERY MORNING
Qty: 90 TABLET | Refills: 0 | Status: SHIPPED | OUTPATIENT
Start: 2025-04-12

## 2025-04-21 ENCOUNTER — OFFICE VISIT (OUTPATIENT)
Dept: PODIATRY | Facility: CLINIC | Age: 60
End: 2025-04-21
Payer: COMMERCIAL

## 2025-04-21 VITALS — BODY MASS INDEX: 38.49 KG/M2 | HEIGHT: 68 IN | WEIGHT: 254 LBS

## 2025-04-21 DIAGNOSIS — L97.524 DIABETIC ULCER OF TOE OF LEFT FOOT ASSOCIATED WITH TYPE 2 DIABETES MELLITUS, WITH NECROSIS OF BONE (HCC): Primary | ICD-10-CM

## 2025-04-21 DIAGNOSIS — L03.039 CELLULITIS OF TOE, UNSPECIFIED LATERALITY: ICD-10-CM

## 2025-04-21 DIAGNOSIS — E11.621 DIABETIC ULCER OF TOE OF LEFT FOOT ASSOCIATED WITH TYPE 2 DIABETES MELLITUS, WITH NECROSIS OF BONE (HCC): Primary | ICD-10-CM

## 2025-04-21 PROCEDURE — 99213 OFFICE O/P EST LOW 20 MIN: CPT | Performed by: PODIATRIST

## 2025-04-21 NOTE — PROGRESS NOTES
"Name: Anna Marie Barbosa      : 1965      MRN: [id number]  Encounter Provider: Henry Figueroa DPM  Encounter Date: 2025   Encounter department: [facility] [place]  :  Assessment & Plan  Diabetic ulcer of toe of left foot associated with type 2 diabetes mellitus, with necrosis of bone (HCC)  Amputation left 3rd toe is healed. Resume at risk foot care    Lab Results   Component Value Date    HGBA1C 5.7 (H) 2025            Cellulitis of toe, unspecified laterality  resolved           History of Present Illness   HPI  Anna Marie Barbosa is a 59 y.o. female who presents following left 3rd toe amputation, she is healed.       Review of Systems  As stated in HPI, otherwise normal    Medical History Reviewed by provider this encounter:  Tobacco  Allergies  Meds  Problems  Med Hx  Surg Hx  Fam Hx           Objective   Ht 5' 8\" (1.727 m)   Wt 115 kg (254 lb)   BMI 38.62 kg/m²      Physical Exam  Vitals reviewed.   Cardiovascular:      Pulses: Normal pulses.   Musculoskeletal:         General: Deformity (left PIPJ 3rd toe amputation is healed. Severe 2nd hammertoe deformity, no lesions) present.   Skin:     Capillary Refill: Capillary refill takes less than 2 seconds.   Neurological:      Mental Status: She is alert.      Sensory: Sensory deficit present.      Coordination: Coordination abnormal.           "

## 2025-04-23 DIAGNOSIS — E11.65 TYPE 2 DIABETES MELLITUS WITH HYPERGLYCEMIA, WITH LONG-TERM CURRENT USE OF INSULIN (HCC): ICD-10-CM

## 2025-04-23 DIAGNOSIS — Z79.4 TYPE 2 DIABETES MELLITUS WITH HYPERGLYCEMIA, WITH LONG-TERM CURRENT USE OF INSULIN (HCC): ICD-10-CM

## 2025-04-23 RX ORDER — ROSUVASTATIN CALCIUM 5 MG/1
5 TABLET, COATED ORAL EVERY OTHER DAY
Qty: 90 TABLET | Refills: 0 | Status: SHIPPED | OUTPATIENT
Start: 2025-04-23

## 2025-04-24 ENCOUNTER — OFFICE VISIT (OUTPATIENT)
Dept: OBGYN CLINIC | Facility: CLINIC | Age: 60
End: 2025-04-24
Payer: COMMERCIAL

## 2025-04-24 DIAGNOSIS — Z96.651 STATUS POST TOTAL RIGHT KNEE REPLACEMENT: Primary | ICD-10-CM

## 2025-04-24 PROCEDURE — 99213 OFFICE O/P EST LOW 20 MIN: CPT | Performed by: STUDENT IN AN ORGANIZED HEALTH CARE EDUCATION/TRAINING PROGRAM

## 2025-04-24 RX ORDER — OXYCODONE HYDROCHLORIDE 10 MG/1
TABLET ORAL
COMMUNITY
Start: 2025-04-03

## 2025-04-27 NOTE — PROGRESS NOTES
Date: 25  Anna Marie Barbosa   MRN# [id number]  : 1965      Chief Complaint: Post op right total knee arthroplasty    Assessment and Plan:    Status post total right knee replacement  Patient is 12 weeks s/p right total knee arthroplasty  - WBAT RLE   - Tylenol and Celebrex for pain control prn  - Continue self-directed home exercise program  - No restrictions from our standpoint. Let pain be a guide  - RTC in 9 months with new radiographs for continued postoperative evaluation and treatment            Subjective:   Patient is 3 months status post s/p right total knee arthroplasty.  Patient is doing well and is very happy with her recovery thus far    Date of procedure: 25   Date of procedure: 10/21/24- left revision tka  Doing well, no new problems  Pain progressively improving  Improved swelling  Ambulating with no assistive device    Allergy:  Allergies   Allergen Reactions    Ozempic (0.25 Or 0.5 Mg-Dose) [Semaglutide(0.25 Or 0.5mg-Dos)] Diarrhea and GI Intolerance    Nsaids GI Intolerance     Medications:  all current active meds have been reviewed    Past Medical History:  Past Medical History:   Diagnosis Date    Acute respiratory insufficiency 2019    Allergies     Ambulates with cane     Anxiety     Asthma     Chronic narcotic dependence (HCC)     Chronic pain     Chronic pain disorder     back/knee    Diabetes (HCC)     HTN (hypertension)     elevated with electronic device    Hyperlipidemia     PONV (postoperative nausea and vomiting)     Sepsis (HCC) 2019    Sleep apnea     no cpap    Walker as ambulation aid      Past Surgical History:  Past Surgical History:   Procedure Laterality Date    BACK SURGERY      x 3 with hardware    CERVICAL LAMINECTOMY      CHOLECYSTECTOMY LAPAROSCOPIC N/A 11/10/2018    Procedure: CHOLECYSTECTOMY LAPAROSCOPIC w/ ioc;  Surgeon: Benitez Cuenca MD;  [facility];  Service: General    COLONOSCOPY      HYSTERECTOMY      JOINT REPLACEMENT  Left     knee    WA ARTHRP KNE CONDYLE&PLATU MEDIAL&LAT COMPARTMENTS Right 1/20/2025    Procedure: ARTHROPLASTY KNEE TOTAL, RIGHT-SAME DAY;  Surgeon: Joe Cabrera MD;  [facility];  Service: Orthopedics    WA REVJ TOT KNEE ARTHRP FEM&ENTIRE TIBIAL COMPONE Left 10/21/2024    Procedure: ARTHROPLASTY LEFT KNEE TOTAL REVISION,  2 components;  Surgeon: Joe Cabrera MD;  [facility];  Service: Orthopedics    TOE AMPUTATION Right 02/02/2018    Procedure: AMPUTATION TOE, RIGHT GREAT TOE;  Surgeon: Lino Rodriguez DPM;  [facility];  Service: Podiatry    TOE AMPUTATION Right     3rd toe    TOE AMPUTATION Left 3/19/2025    Procedure: Left 3rd partial toe amputation;  Surgeon: Henry Figueroa DPM;  [facility];  Service: Podiatry     Family History:  Family History   Problem Relation Age of Onset    Diabetes Mother     Diabetes Maternal Grandmother     No Known Problems Father      Social History:  Social History     Substance and Sexual Activity   Alcohol Use Not Currently    Comment: special occasion     Social History     Substance and Sexual Activity   Drug Use Yes    Types: Morphine    Comment: prescribed 75mg bid     Social History     Tobacco Use   Smoking Status Never    Passive exposure: Never   Smokeless Tobacco Never           Review of Systems:  General- denies fever/chills  HEENT- denies hearing loss or sore throat  Eyes- denies eye pain or visual disturbances, denies red eyes  Respiratory- denies cough or SOB  Cardio- denies chest pain or palpitations  GI- denies abdominal pain  Endocrine- denies urinary frequency  Urinary- denies pain with urination  Musculoskeletal- Negative except noted above  Skin- denies rashes or wounds  Neurological- denies dizziness or headache  Psychiatric- denies anxiety or difficulty concentrating      Objective:   BP Readings from Last 1 Encounters:   03/27/25 92/51      Wt Readings from Last 1 Encounters:   04/21/25 115 kg (254 lb)  "     Pulse Readings from Last 1 Encounters:   03/27/25 90        BMI: Estimated body mass index is 38.62 kg/m² as calculated from the following:    Height as of 4/21/25: 5' 8\" (1.727 m).    Weight as of 4/21/25: 115 kg (254 lb).      Physical Exam  There were no vitals taken for this visit.  General/Constitutional: No apparent distress: well-nourished and well developed.  Eyes: normal ocular motion  Cardio: RRR, Normal S1S2, No m/r/g.   Lymphatic: No appreciable lymphadenopathy  Respiratory: Non-labored breathing, CTA b/l no w/c/r  Vascular: No edema, swelling or tenderness, except as noted in detailed exam. Extremities well perfused. No LE edema  Integumentary: No impressive skin lesions present, except as noted in detailed exam.  Neuro: No ataxia or tremors noted  Psych: Normal mood and affect, oriented to person, place and time. Appropriate affect.  Musculoskeletal: Normal, except as noted in detailed exam and in HPI.    Gait and Station:   Normal      right Knee Examination  Incision: Well-healed  Effusion: None  Alignment: neutral  AP Stability: stable  Coronal Stability  Extension:stable  Mid-flexion: stable  90 degrees flexion: stable  Range of motion  Active: 0 to 130  Extensor lag: Absent  Motor: 5/5 EHL/FHL/TA/GS/QD/HS  Neurovascular exam is intact.   No evidence of calf tightness or posterior cords    Images:  No new images obtained during this visit      Joe Cabrera MD  Adult Reconstruction Specialist   [facility]   "

## 2025-04-27 NOTE — ASSESSMENT & PLAN NOTE
Patient is 12 weeks s/p right total knee arthroplasty  - WBAT RLE   - Tylenol and Celebrex for pain control prn  - Continue self-directed home exercise program  - No restrictions from our standpoint. Let pain be a guide  - RTC in 9 months with new radiographs for continued postoperative evaluation and treatment

## 2025-05-15 ENCOUNTER — RA CDI HCC (OUTPATIENT)
Dept: OTHER | Facility: HOSPITAL | Age: 60
End: 2025-05-15

## 2025-05-19 ENCOUNTER — TELEPHONE (OUTPATIENT)
Age: 60
End: 2025-05-19

## 2025-05-19 NOTE — TELEPHONE ENCOUNTER
Caller: Patient    Doctor: Dr. Cabrera    Reason for call: Patient calling stating that she took a fall on her left knee and she has a lump.  She is scheduled to come in on Thursday.  She can flex and extend the knee and is able to ambulate.   Patient asking to speak to clinical team.       Call back#: [phone]

## 2025-05-22 ENCOUNTER — OFFICE VISIT (OUTPATIENT)
Dept: OBGYN CLINIC | Facility: CLINIC | Age: 60
End: 2025-05-22
Payer: COMMERCIAL

## 2025-05-22 ENCOUNTER — APPOINTMENT (OUTPATIENT)
Dept: RADIOLOGY | Age: 60
End: 2025-05-22
Attending: STUDENT IN AN ORGANIZED HEALTH CARE EDUCATION/TRAINING PROGRAM
Payer: COMMERCIAL

## 2025-05-22 ENCOUNTER — OFFICE VISIT (OUTPATIENT)
Dept: FAMILY MEDICINE CLINIC | Facility: CLINIC | Age: 60
End: 2025-05-22
Payer: COMMERCIAL

## 2025-05-22 VITALS
HEART RATE: 86 BPM | WEIGHT: 253 LBS | DIASTOLIC BLOOD PRESSURE: 82 MMHG | OXYGEN SATURATION: 90 % | TEMPERATURE: 97.8 F | RESPIRATION RATE: 16 BRPM | SYSTOLIC BLOOD PRESSURE: 126 MMHG | HEIGHT: 68 IN | BODY MASS INDEX: 38.34 KG/M2

## 2025-05-22 VITALS — BODY MASS INDEX: 38.34 KG/M2 | WEIGHT: 253 LBS | HEIGHT: 68 IN

## 2025-05-22 DIAGNOSIS — I10 ESSENTIAL HYPERTENSION: Chronic | ICD-10-CM

## 2025-05-22 DIAGNOSIS — Z00.00 MEDICARE ANNUAL WELLNESS VISIT, SUBSEQUENT: Primary | ICD-10-CM

## 2025-05-22 DIAGNOSIS — Z96.652 S/P REVISION OF TOTAL KNEE, LEFT: ICD-10-CM

## 2025-05-22 DIAGNOSIS — Z79.4 TYPE 2 DIABETES MELLITUS WITH HYPERGLYCEMIA, WITH LONG-TERM CURRENT USE OF INSULIN (HCC): ICD-10-CM

## 2025-05-22 DIAGNOSIS — E04.1 THYROID NODULE: ICD-10-CM

## 2025-05-22 DIAGNOSIS — F32.A DEPRESSIVE DISORDER: ICD-10-CM

## 2025-05-22 DIAGNOSIS — E11.65 TYPE 2 DIABETES MELLITUS WITH HYPERGLYCEMIA, WITH LONG-TERM CURRENT USE OF INSULIN (HCC): ICD-10-CM

## 2025-05-22 DIAGNOSIS — J45.40 MODERATE PERSISTENT ASTHMA WITHOUT COMPLICATION: ICD-10-CM

## 2025-05-22 DIAGNOSIS — Z96.652 S/P REVISION OF TOTAL KNEE, LEFT: Primary | ICD-10-CM

## 2025-05-22 DIAGNOSIS — F11.288 OPIOID DEPENDENCE WITH OTHER OPIOID-INDUCED DISORDER (HCC): ICD-10-CM

## 2025-05-22 PROCEDURE — G0439 PPPS, SUBSEQ VISIT: HCPCS | Performed by: FAMILY MEDICINE

## 2025-05-22 PROCEDURE — 73562 X-RAY EXAM OF KNEE 3: CPT

## 2025-05-22 PROCEDURE — 99213 OFFICE O/P EST LOW 20 MIN: CPT | Performed by: STUDENT IN AN ORGANIZED HEALTH CARE EDUCATION/TRAINING PROGRAM

## 2025-05-22 RX ORDER — FLUTICASONE PROPIONATE AND SALMETEROL 500; 50 UG/1; UG/1
2 POWDER RESPIRATORY (INHALATION)
Qty: 180 BLISTER | Refills: 1 | Status: SHIPPED | OUTPATIENT
Start: 2025-05-22

## 2025-05-22 RX ORDER — BUPROPION HYDROCHLORIDE 300 MG/1
300 TABLET ORAL EVERY MORNING
Qty: 90 TABLET | Refills: 1 | Status: SHIPPED | OUTPATIENT
Start: 2025-05-22

## 2025-05-22 RX ORDER — ALBUTEROL SULFATE 90 UG/1
2 INHALANT RESPIRATORY (INHALATION) EVERY 4 HOURS PRN
Qty: 18 G | Refills: 1 | Status: SHIPPED | OUTPATIENT
Start: 2025-05-22

## 2025-05-22 NOTE — PROGRESS NOTES
Date: 25  Anna Marie Barbosa   MRN# [id number]  : 1965      Chief Complaint: Post op left revision total knee arthroplasty    Assessment & Plan  S/P revision of total knee, left  Patient is 7 months s/p left revision total knee arthroplasty  - WBAT    - Tylenol and Celebrex for pain control prn  - Continue PT/HEP as directed  - May shower and soak/submerge incision  - No antibiotic dental prophylaxis is necessary  - No restrictions from our standpoint. Let pain be a guide  - RTC in 5 months. left knee xrays needed          Subjective:   Patient is 7 months s/p left revision total knee arthroplasty. Patient suffered a mechanical fall on 25. Patient claims she noticed immediate swelling over the medial aspect of her knee. She denies any instability or pain while walking.    Date of procedure: 10/21/24  Doing well, no new problems  Pain progressively improving  Improved swelling  Ambulating with cane as needed    Allergy:  Allergies[1]  Medications:  all current active meds have been reviewed    Past Medical History:  Past Medical History[2]  Past Surgical History:  Past Surgical History[3]  Family History:  Family History[4]  Social History:  Social History     Substance and Sexual Activity   Alcohol Use Not Currently    Comment: special occasion     Social History     Substance and Sexual Activity   Drug Use Yes    Types: Morphine    Comment: prescribed 75mg bid     Tobacco Use History[5]        Review of Systems:  General- denies fever/chills  HEENT- denies hearing loss or sore throat  Eyes- denies eye pain or visual disturbances, denies red eyes  Respiratory- denies cough or SOB  Cardio- denies chest pain or palpitations  GI- denies abdominal pain  Endocrine- denies urinary frequency  Urinary- denies pain with urination  Musculoskeletal- Negative except noted above  Skin- denies rashes or wounds  Neurological- denies dizziness or headache  Psychiatric- denies anxiety or difficulty  "concentrating      Objective:   BP Readings from Last 1 Encounters:   03/27/25 92/51      Wt Readings from Last 1 Encounters:   05/22/25 115 kg (253 lb)      Pulse Readings from Last 1 Encounters:   03/27/25 90        BMI: Estimated body mass index is 38.47 kg/m² as calculated from the following:    Height as of this encounter: 5' 8\" (1.727 m).    Weight as of this encounter: 115 kg (253 lb).      Physical Exam  Ht 5' 8\" (1.727 m)   Wt 115 kg (253 lb)   BMI 38.47 kg/m²   General/Constitutional: No apparent distress: well-nourished and well developed.  Eyes: normal ocular motion  Cardio: RRR, Normal S1S2, No m/r/g.   Lymphatic: No appreciable lymphadenopathy  Respiratory: Non-labored breathing, CTA b/l no w/c/r  Vascular: No edema, swelling or tenderness, except as noted in detailed exam. Extremities well perfused. No LE edema  Integumentary: No impressive skin lesions present, except as noted in detailed exam.  Neuro: No ataxia or tremors noted  Psych: Normal mood and affect, oriented to person, place and time. Appropriate affect.  Musculoskeletal: Normal, except as noted in detailed exam and in HPI.    Gait and Station:   normal and antalgic    left Knee Examination  Incision: well healed   Effusion: mild  Alignment: normal  AP Stability: stable  Coronal Stability  Extension:stable  Mid-flexion: stable  90 degrees flexion: stable  Range of motion  Active: 0 to 120    Extensor lag: Absent  Motor: 5/5 EHL/FHL/TA/GS/QD/HS  Neurovascular exam is intact.   No evidence of calf tightness or posterior cords    Images:      I personally reviewed relevant images in the PACS system and my interpretation is as follows:  X-rays of the left knee reveal a total  revision knee arthroplasty in appropriate alignment without evidence of migration, wear or loosening.        Scribe Attestation      I,:  Jayesh Ramos am acting as a scribe while in the presence of the attending physician.:       I,:  Joe Cabrera MD personally " performed the services described in this documentation    as scribed in my presence.:               Joe Cabrera MD  Adult Reconstruction Specialist   [facility]          [1]   Allergies  Allergen Reactions    Ozempic (0.25 Or 0.5 Mg-Dose) [Semaglutide(0.25 Or 0.5mg-Dos)] Diarrhea and GI Intolerance    Nsaids GI Intolerance   [2]   Past Medical History:  Diagnosis Date    Acute respiratory insufficiency 11/23/2019    Allergies     Ambulates with cane     Anxiety     Asthma     Chronic narcotic dependence (HCC)     Chronic pain     Chronic pain disorder     back/knee    Diabetes (HCC)     HTN (hypertension)     elevated with electronic device    Hyperlipidemia     PONV (postoperative nausea and vomiting)     Sepsis (HCC) 11/23/2019    Sleep apnea     no cpap    Walker as ambulation aid    [3]   Past Surgical History:  Procedure Laterality Date    BACK SURGERY      x 3 with hardware    CERVICAL LAMINECTOMY      CHOLECYSTECTOMY LAPAROSCOPIC N/A 11/10/2018    Procedure: CHOLECYSTECTOMY LAPAROSCOPIC w/ ioc;  Surgeon: Benitez Cuenca MD;  [facility];  Service: General    COLONOSCOPY      HYSTERECTOMY      JOINT REPLACEMENT Left     knee    NV ARTHRP KNE CONDYLE&PLATU MEDIAL&LAT COMPARTMENTS Right 1/20/2025    Procedure: ARTHROPLASTY KNEE TOTAL, RIGHT-SAME DAY;  Surgeon: Joe Cabrera MD;  [facility];  Service: Orthopedics    NV REVJ TOT KNEE ARTHRP FEM&ENTIRE TIBIAL COMPONE Left 10/21/2024    Procedure: ARTHROPLASTY LEFT KNEE TOTAL REVISION,  2 components;  Surgeon: Joe Cabrera MD;  [facility];  Service: Orthopedics    TOE AMPUTATION Right 02/02/2018    Procedure: AMPUTATION TOE, RIGHT GREAT TOE;  Surgeon: Lino Rodriguez DPM;  [facility];  Service: Podiatry    TOE AMPUTATION Right     3rd toe    TOE AMPUTATION Left 3/19/2025    Procedure: Left 3rd partial toe amputation;  Surgeon: Henry Figueroa DPM;  [facility];  Service:  Podiatry   [4]   Family History  Problem Relation Name Age of Onset    Diabetes Mother      Diabetes Maternal Grandmother      No Known Problems Father     [5]   Social History  Tobacco Use   Smoking Status Never    Passive exposure: Never   Smokeless Tobacco Never

## 2025-05-22 NOTE — ASSESSMENT & PLAN NOTE
Orders:    Fluticasone-Salmeterol (Advair) 500-50 mcg/dose inhaler; Inhale 2 puffs daily at bedtime    albuterol (PROVENTIL HFA,VENTOLIN HFA) 90 mcg/act inhaler; Inhale 2 puffs every 4 (four) hours as needed for wheezing or shortness of breath

## 2025-05-22 NOTE — ASSESSMENT & PLAN NOTE
Orders:    CBC and differential; Future    Comprehensive metabolic panel; Future    Albumin / creatinine urine ratio; Future

## 2025-05-22 NOTE — PATIENT INSTRUCTIONS
Medicare Preventive Visit Patient Instructions  Thank you for completing your Welcome to Medicare Visit or Medicare Annual Wellness Visit today. Your next wellness visit will be due in one year (5/23/2026).  The screening/preventive services that you may require over the next 5-10 years are detailed below. Some tests may not apply to you based off risk factors and/or age. Screening tests ordered at today's visit but not completed yet may show as past due. Also, please note that scanned in results may not display below.  Preventive Screenings:  Service Recommendations Previous Testing/Comments   Colorectal Cancer Screening  * Colonoscopy    * Fecal Occult Blood Test (FOBT)/Fecal Immunochemical Test (FIT)  * Fecal DNA/Cologuard Test  * Flexible Sigmoidoscopy Age: 45-75 years old   Colonoscopy: every 10 years (may be performed more frequently if at higher risk)  OR  FOBT/FIT: every 1 year  OR  Cologuard: every 3 years  OR  Sigmoidoscopy: every 5 years  Screening may be recommended earlier than age 45 if at higher risk for colorectal cancer. Also, an individualized decision between you and your healthcare provider will decide whether screening between the ages of 76-85 would be appropriate. Colonoscopy: Not on file  FOBT/FIT: Not on file  Cologuard: 10/14/2024  Sigmoidoscopy: Not on file    Screening Current     Breast Cancer Screening Age: 40+ years old  Frequency: every 1-2 years  Not required if history of left and right mastectomy Mammogram: Not on file        Cervical Cancer Screening Between the ages of 21-29, pap smear recommended once every 3 years.   Between the ages of 30-65, can perform pap smear with HPV co-testing every 5 years.   Recommendations may differ for women with a history of total hysterectomy, cervical cancer, or abnormal pap smears in past. Pap Smear: Not on file        Hepatitis C Screening Once for adults born between 1945 and 1965  More frequently in patients at high risk for Hepatitis C Hep  C Antibody: Not on file        Diabetes Screening 1-2 times per year if you're at risk for diabetes or have pre-diabetes Fasting glucose: 114 mg/dL (10/12/2024)  A1C: 5.7 % (1/9/2025)  Screening Not Indicated  History Diabetes   Cholesterol Screening Once every 5 years if you don't have a lipid disorder. May order more often based on risk factors. Lipid panel: 05/14/2024    Screening Not Indicated  History Lipid Disorder     Other Preventive Screenings Covered by Medicare:  Abdominal Aortic Aneurysm (AAA) Screening: covered once if your at risk. You're considered to be at risk if you have a family history of AAA.  Lung Cancer Screening: covers low dose CT scan once per year if you meet all of the following conditions: (1) Age 55-77; (2) No signs or symptoms of lung cancer; (3) Current smoker or have quit smoking within the last 15 years; (4) You have a tobacco smoking history of at least 20 pack years (packs per day multiplied by number of years you smoked); (5) You get a written order from a healthcare provider.  Glaucoma Screening: covered annually if you're considered high risk: (1) You have diabetes OR (2) Family history of glaucoma OR (3)  aged 50 and older OR (4)  American aged 65 and older  Osteoporosis Screening: covered every 2 years if you meet one of the following conditions: (1) You're estrogen deficient and at risk for osteoporosis based off medical history and other findings; (2) Have a vertebral abnormality; (3) On glucocorticoid therapy for more than 3 months; (4) Have primary hyperparathyroidism; (5) On osteoporosis medications and need to assess response to drug therapy.   Last bone density test (DXA Scan): Not on file.  HIV Screening: covered annually if you're between the age of 15-65. Also covered annually if you are younger than 15 and older than 65 with risk factors for HIV infection. For pregnant patients, it is covered up to 3 times per  pregnancy.    Immunizations:  Immunization Recommendations   Influenza Vaccine Annual influenza vaccination during flu season is recommended for all persons aged >= 6 months who do not have contraindications   Pneumococcal Vaccine   * Pneumococcal conjugate vaccine = PCV13 (Prevnar 13), PCV15 (Vaxneuvance), PCV20 (Prevnar 20)  * Pneumococcal polysaccharide vaccine = PPSV23 (Pneumovax) Adults 19-63 yo with certain risk factors or if 65+ yo  If never received any pneumonia vaccine: recommend Prevnar 20 (PCV20)  Give PCV20 if previously received 1 dose of PCV13 or PPSV23   Hepatitis B Vaccine 3 dose series if at intermediate or high risk (ex: diabetes, end stage renal disease, liver disease)   Respiratory syncytial virus (RSV) Vaccine - COVERED BY MEDICARE PART D  * RSVPreF3 (Arexvy) CDC recommends that adults 60 years of age and older may receive a single dose of RSV vaccine using shared clinical decision-making (SCDM)   Tetanus (Td) Vaccine - COST NOT COVERED BY MEDICARE PART B Following completion of primary series, a booster dose should be given every 10 years to maintain immunity against tetanus. Td may also be given as tetanus wound prophylaxis.   Tdap Vaccine - COST NOT COVERED BY MEDICARE PART B Recommended at least once for all adults. For pregnant patients, recommended with each pregnancy.   Shingles Vaccine (Shingrix) - COST NOT COVERED BY MEDICARE PART B  2 shot series recommended in those 19 years and older who have or will have weakened immune systems or those 50 years and older     Health Maintenance Due:      Topic Date Due   • Hepatitis C Screening  Never done   • Breast Cancer Screening: Mammogram  Never done   • Colorectal Cancer Screening  10/14/2027   • HIV Screening  Completed     Immunizations Due:      Topic Date Due   • COVID-19 Vaccine (4 - 2024-25 season) 09/01/2024     Advance Directives   What are advance directives?  Advance directives are legal documents that state your wishes and plans  for medical care. These plans are made ahead of time in case you lose your ability to make decisions for yourself. Advance directives can apply to any medical decision, such as the treatments you want, and if you want to donate organs.   What are the types of advance directives?  There are many types of advance directives, and each state has rules about how to use them. You may choose a combination of any of the following:  Living will:  This is a written record of the treatment you want. You can also choose which treatments you do not want, which to limit, and which to stop at a certain time. This includes surgery, medicine, IV fluid, and tube feedings.   Durable power of  for healthcare (DPAHC):  This is a written record that states who you want to make healthcare choices for you when you are unable to make them for yourself. This person, called a proxy, is usually a family member or a friend. You may choose more than 1 proxy.  Do not resuscitate (DNR) order:  A DNR order is used in case your heart stops beating or you stop breathing. It is a request not to have certain forms of treatment, such as CPR. A DNR order may be included in other types of advance directives.  Medical directive:  This covers the care that you want if you are in a coma, near death, or unable to make decisions for yourself. You can list the treatments you want for each condition. Treatment may include pain medicine, surgery, blood transfusions, dialysis, IV or tube feedings, and a ventilator (breathing machine).  Values history:  This document has questions about your views, beliefs, and how you feel and think about life. This information can help others choose the care that you would choose.  Why are advance directives important?  An advance directive helps you control your care. Although spoken wishes may be used, it is better to have your wishes written down. Spoken wishes can be misunderstood, or not followed. Treatments may be  given even if you do not want them. An advance directive may make it easier for your family to make difficult choices about your care.   Weight Management   Why it is important to manage your weight:  Being overweight increases your risk of health conditions such as heart disease, high blood pressure, type 2 diabetes, and certain types of cancer. It can also increase your risk for osteoarthritis, sleep apnea, and other respiratory problems. Aim for a slow, steady weight loss. Even a small amount of weight loss can lower your risk of health problems.  How to lose weight safely:  A safe and healthy way to lose weight is to eat fewer calories and get regular exercise. You can lose up about 1 pound a week by decreasing the number of calories you eat by 500 calories each day.   Healthy meal plan for weight management:  A healthy meal plan includes a variety of foods, contains fewer calories, and helps you stay healthy. A healthy meal plan includes the following:  Eat whole-grain foods more often.  A healthy meal plan should contain fiber. Fiber is the part of grains, fruits, and vegetables that is not broken down by your body. Whole-grain foods are healthy and provide extra fiber in your diet. Some examples of whole-grain foods are whole-wheat breads and pastas, oatmeal, brown rice, and bulgur.  Eat a variety of vegetables every day.  Include dark, leafy greens such as spinach, kale, [place] greens, and mustard greens. Eat yellow and orange vegetables such as carrots, sweet potatoes, and winter squash.   Eat a variety of fruits every day.  Choose fresh or canned fruit (canned in its own juice or light syrup) instead of juice. Fruit juice has very little or no fiber.  Eat low-fat dairy foods.  Drink fat-free (skim) milk or 1% milk. Eat fat-free yogurt and low-fat cottage cheese. Try low-fat cheeses such as mozzarella and other reduced-fat cheeses.  Choose meat and other protein foods that are low in fat.  Choose beans or  other legumes such as split peas or lentils. Choose fish, skinless poultry (chicken or turkey), or lean cuts of red meat (beef or pork). Before you cook meat or poultry, cut off any visible fat.   Use less fat and oil.  Try baking foods instead of frying them. Add less fat, such as margarine, sour cream, regular salad dressing and mayonnaise to foods. Eat fewer high-fat foods. Some examples of high-fat foods include french fries, doughnuts, ice cream, and cakes.  Eat fewer sweets.  Limit foods and drinks that are high in sugar. This includes candy, cookies, regular soda, and sweetened drinks.  Exercise:  Exercise at least 30 minutes per day on most days of the week. Some examples of exercise include walking, biking, dancing, and swimming. You can also fit in more physical activity by taking the stairs instead of the elevator or parking farther away from stores. Ask your healthcare provider about the best exercise plan for you.   Narcotic (Opioid) Safety    Use narcotics safely:  Take prescribed narcotics exactly as directed  Do not give narcotics to others or take narcotics that belong to someone else  Do not mix narcotics without medicines or alcohol  Do not drive or operate heavy machinery after you take the narcotic  Monitor for side effects and notify your healthcare provider if you experienced side effects such as nausea, sleepiness, itching, or trouble thinking clearly.    Manage constipation:    Constipation is the most common side effect of narcotic medicine. Constipation is when you have hard, dry bowel movements, or you go longer than usual between bowel movements. Tell your healthcare provider about all changes in your bowel movements while you are taking narcotics. He or she may recommend laxative medicine to help you have a bowel movement. He or she may also change the kind of narcotic you are taking, or change when you take it. The following are more ways you can prevent or relieve  constipation:    Drink liquids as directed.  You may need to drink extra liquids to help soften and move your bowels. Ask how much liquid to drink each day and which liquids are best for you.  Eat high-fiber foods.  This may help decrease constipation by adding bulk to your bowel movements. High-fiber foods include fruits, vegetables, whole-grain breads and cereals, and beans. Your healthcare provider or dietitian can help you create a high-fiber meal plan. Your provider may also recommend a fiber supplement if you cannot get enough fiber from food.  Exercise regularly.  Regular physical activity can help stimulate your intestines. Walking is a good exercise to prevent or relieve constipation. Ask which exercises are best for you.  Schedule a time each day to have a bowel movement.  This may help train your body to have regular bowel movements. Bend forward while you are on the toilet to help move the bowel movement out. Sit on the toilet for at least 10 minutes, even if you do not have a bowel movement.    Store narcotics safely:   Store narcotics where others cannot easily get them.  Keep them in a locked cabinet or secure area. Do not  keep them in a purse or other bag you carry with you. A person may be looking for something else and find the narcotics.  Make sure narcotics are stored out of the reach of children.  A child can easily overdose on narcotics. Narcotics may look like candy to a small child.    The best way to dispose of narcotics:      The laws vary by country and area. In the [place], the best way is to return the narcotics through a take-back program. This program is offered by the [place] Drug Enforcement Agency ([employer]KE). The following are options for using the program:  Take the narcotics to a [employer] collection site.  The site is often a law enforcement center. Call your local law enforcement center for scheduled take-back days in your area. You will be given information on where to go if the  collection site is in a different location.  Take the narcotics to an approved pharmacy or hospital.  A pharmacy or hospital may be set up as a collection site. You will need to ask if it is a [employer] collection site if you were not directed there. A pharmacy or doctor's office may not be able to take back narcotics unless it is a [employer] site.  Use a mail-back system.  This means you are given containers to put the narcotics into. You will then mail them in the containers.  Use a take-back drop box.  This is a place to leave the narcotics at any time. People and animals will not be able to get into the box. Your local law enforcement agency can tell you where to find a drop box in your area.    Other ways to manage pain:   Ask your healthcare provider about non-narcotic medicines to control pain.  Nonprescription medicines include NSAIDs (such as ibuprofen) and acetaminophen. Prescription medicines include muscle relaxers, antidepressants, and steroids.  Pain may be managed without any medicines.  Some ways to relieve pain include massage, aromatherapy, or meditation. Physical or occupational therapy may also help.    For more information:   Drug Enforcement Administration  [address] [place] [address]  Phone: 6- 554 - 911-9025  Web Address: https://www.deadiversion.[facility].gov/drug_disposal/    US Food and Drug Administration  [address] [place] [address]  Phone: 9- 967 - 427-4798  Web Address: http://www.fda.gov   © Copyright [employer] 2018 Information is for End User's use only and may not be sold, redistributed or otherwise used for commercial purposes. All illustrations and images included in CareNotes® are the copyrighted property of [employer]D.A[employer] Inc. or [employer]

## 2025-05-22 NOTE — ASSESSMENT & PLAN NOTE
Lab Results   Component Value Date    HGBA1C 5.7 (H) 01/09/2025       Orders:    Lipid Panel with Direct LDL reflex; Future    Hemoglobin A1C; Future

## 2025-05-22 NOTE — PROGRESS NOTES
Name: Anna Marie Barbosa      : 1965      MRN: [id number]  Encounter Provider: Magdalena Grove MD  Encounter Date: 2025   Encounter department: [facility]  :  Assessment & Plan  Medicare annual wellness visit, subsequent  Preventive health issues were discussed with patient, and age appropriate screening tests were ordered as noted in patient's After Visit Summary. Personalized health advice and appropriate referrals for health education or preventive services given if needed, as noted in patient's After Visit Summary.       Depressive disorder  Improved since starting Wellbutrin, discussed staying on Wellbutrin 150 mg versus increasing dose to 300 mg patient would like to increase dose to 300 mg daily.  She will send a message via [employer] with a status update in a few weeks.    Orders:    buPROPion (Wellbutrin XL) 300 mg 24 hr tablet; Take 1 tablet (300 mg total) by mouth every morning    Opioid dependence with other opioid-induced disorder (HCC)  Follows with Dr Powell who is managing pain medication       Type 2 diabetes mellitus with hyperglycemia, with long-term current use of insulin (HCC)    Lab Results   Component Value Date    HGBA1C 5.7 (H) 2025       Orders:    Lipid Panel with Direct LDL reflex; Future    Hemoglobin A1C; Future    Essential hypertension    Orders:    CBC and differential; Future    Comprehensive metabolic panel; Future    Albumin / creatinine urine ratio; Future    Moderate persistent asthma without complication    Orders:    Fluticasone-Salmeterol (Advair) 500-50 mcg/dose inhaler; Inhale 2 puffs daily at bedtime    albuterol (PROVENTIL HFA,VENTOLIN HFA) 90 mcg/act inhaler; Inhale 2 puffs every 4 (four) hours as needed for wheezing or shortness of breath    Thyroid nodule    Orders:    TSH, 3rd generation with Free T4 reflex; Future           History of Present Illness     Anna Marie Barbosa is a very pleasant 5-year-old female who presents today for a Medicare  wellness visit.  Patient reports that since increasing the dose of Wellbutrin her depression has improved significantly.  She still has some days where she is sad but thankfully they are few and far between.  Unfortunately she did fall a few weeks ago and was concerned that maybe she damaged the hardware in her left knee.  She did make a follow-up appoint with her orthopedic surgeon and thankfully x-rays were negative for hardware malfunction.  She also continues to follow-up with podiatry.    Patient Care Team:  Magdalena Grove MD as PCP - General (Family Medicine)    Review of Systems   Constitutional: Negative.    HENT: Negative.     Eyes: Negative.    Respiratory: Negative.     Cardiovascular: Negative.    Gastrointestinal: Negative.    Genitourinary: Negative.    Musculoskeletal:  Positive for arthralgias.   Skin: Negative.    Neurological: Negative.    Psychiatric/Behavioral: Negative.       Medical History Reviewed by provider this encounter:       Annual Wellness Visit Questionnaire   Anna Marie is here for her Subsequent Wellness visit.     Health Risk Assessment:   Patient rates overall health as good. Patient feels that their physical health rating is slightly better. Patient is satisfied with their life. Eyesight was rated as same. Hearing was rated as same. Patient feels that their emotional and mental health rating is slightly better. Patients states they are never, rarely angry. Patient states they are often unusually tired/fatigued. Pain experienced in the last 7 days has been some. Patient's pain rating has been 7/10. Patient states that she has experienced no weight loss or gain in last 6 months.     Depression Screening:   PHQ-2 Score: 2      Fall Risk Screening:   In the past year, patient has experienced: history of falling in past year    Number of falls: 1  Injured during fall?: No    Feels unsteady when standing or walking?: Yes    Worried about falling?: Yes      Urinary Incontinence Screening:    Patient has not leaked urine accidently in the last six months.     Home Safety:  Patient has trouble with stairs inside or outside of their home. Patient has working smoke alarms and has working carbon monoxide detector. Home safety hazards include: none.     Nutrition:   Current diet is Regular.     Medications:   Patient is currently taking over-the-counter supplements. OTC medications include: see medication list. Patient is able to manage medications.     Activities of Daily Living (ADLs)/Instrumental Activities of Daily Living (IADLs):   Walk and transfer into and out of bed and chair?: Yes  Dress and groom yourself?: Yes    Bathe or shower yourself?: Yes    Feed yourself? Yes  Do your laundry/housekeeping?: Yes  Manage your money, pay your bills and track your expenses?: Yes  Make your own meals?: Yes    Do your own shopping?: Yes    Previous Hospitalizations:   Any hospitalizations or ED visits within the last 12 months?: Yes    How many hospitalizations have you had in the last year?: 3-4    Preventive Screenings      Cardiovascular Screening:    General: Screening Not Indicated and History Lipid Disorder      Diabetes Screening:     General: Screening Not Indicated and History Diabetes      Colorectal Cancer Screening:     General: Screening Current      Lung Cancer Screening:     General: Screening Not Indicated    Immunizations:  - Immunizations due: Zoster (Shingrix)    Screening, Brief Intervention, and Referral to Treatment (SBIRT)     Screening    Typical number of drinks in a week: 0    Single Item Drug Screening:  How often have you used an illegal drug (including marijuana) or a prescription medication for non-medical reasons in the past year? never    Single Item Drug Screen Score: 0  Interpretation: Negative screen for possible drug use disorder    Social Drivers of Health     Food Insecurity: No Food Insecurity (5/22/2025)    Nursing - Inadequate Food Risk Classification     Worried About  "Running Out of Food in the Last Year: Never true     Ran Out of Food in the Last Year: Never true     Ran Out of Food in the Last Year: Never true   Transportation Needs: No Transportation Needs (5/22/2025)    PRAPARE - Transportation     Lack of Transportation (Medical): No     Lack of Transportation (Non-Medical): No   Housing Stability: Low Risk  (5/22/2025)    Housing Stability Vital Sign     Unable to Pay for Housing in the Last Year: No     Number of Times Moved in the Last Year: 0     Homeless in the Last Year: No   Utilities: Not At Risk (5/22/2025)    [facility] Utilities     Threatened with loss of utilities: No   Recent Concern: Utilities - At Risk (3/17/2025)    Nursing - Utilities Risk Classification     Threatened with loss of utilities: Yes     No results found.    Objective   /82 (BP Location: Left arm, Patient Position: Sitting, Cuff Size: Large)   Pulse 86   Temp 97.8 °F (36.6 °C) (Tympanic)   Resp 16   Ht 5' 8\" (1.727 m)   Wt 115 kg (253 lb)   SpO2 90%   BMI 38.47 kg/m²     Physical Exam  Constitutional:       General: She is not in acute distress.     Appearance: She is not ill-appearing.   HENT:      Head: Normocephalic and atraumatic.     Eyes:      General:         Right eye: No discharge.         Left eye: No discharge.      Extraocular Movements: Extraocular movements intact.       Cardiovascular:      Rate and Rhythm: Normal rate.   Pulmonary:      Effort: Pulmonary effort is normal. No respiratory distress.      Breath sounds: No wheezing.     Neurological:      General: No focal deficit present.      Mental Status: She is alert.     Psychiatric:         Mood and Affect: Mood normal.         Behavior: Behavior normal.         "

## 2025-05-22 NOTE — ASSESSMENT & PLAN NOTE
Patient is 7 months s/p left revision total knee arthroplasty  - WBAT    - Tylenol and Celebrex for pain control prn  - Continue PT/HEP as directed  - May shower and soak/submerge incision  - No antibiotic dental prophylaxis is necessary  - No restrictions from our standpoint. Let pain be a guide  - RTC in 5 months. left knee xrays needed        
Difficulty remembering/Difficulty concentrating

## 2025-05-30 ENCOUNTER — TELEPHONE (OUTPATIENT)
Age: 60
End: 2025-05-30

## 2025-05-30 ENCOUNTER — RESULTS FOLLOW-UP (OUTPATIENT)
Dept: URGENT CARE | Facility: MEDICAL CENTER | Age: 60
End: 2025-05-30

## 2025-05-30 ENCOUNTER — APPOINTMENT (OUTPATIENT)
Dept: RADIOLOGY | Facility: MEDICAL CENTER | Age: 60
End: 2025-05-30
Payer: COMMERCIAL

## 2025-05-30 ENCOUNTER — OFFICE VISIT (OUTPATIENT)
Dept: URGENT CARE | Facility: MEDICAL CENTER | Age: 60
End: 2025-05-30
Payer: COMMERCIAL

## 2025-05-30 VITALS
DIASTOLIC BLOOD PRESSURE: 58 MMHG | RESPIRATION RATE: 18 BRPM | OXYGEN SATURATION: 96 % | TEMPERATURE: 99 F | SYSTOLIC BLOOD PRESSURE: 123 MMHG | HEART RATE: 101 BPM

## 2025-05-30 DIAGNOSIS — R22.42 LOCALIZED SWELLING OF TOE OF LEFT FOOT: ICD-10-CM

## 2025-05-30 DIAGNOSIS — L03.032 CELLULITIS OF FOURTH TOE OF LEFT FOOT: ICD-10-CM

## 2025-05-30 DIAGNOSIS — R22.42 LOCALIZED SWELLING OF TOE OF LEFT FOOT: Primary | ICD-10-CM

## 2025-05-30 PROCEDURE — G0463 HOSPITAL OUTPT CLINIC VISIT: HCPCS

## 2025-05-30 PROCEDURE — 73660 X-RAY EXAM OF TOE(S): CPT

## 2025-05-30 PROCEDURE — 99213 OFFICE O/P EST LOW 20 MIN: CPT

## 2025-05-30 RX ORDER — CEFADROXIL 500 MG/1
500 CAPSULE ORAL EVERY 12 HOURS SCHEDULED
Qty: 20 CAPSULE | Refills: 0 | Status: SHIPPED | OUTPATIENT
Start: 2025-05-30 | End: 2025-06-09

## 2025-05-30 NOTE — PROGRESS NOTES
[facility]        NAME: Anna Marie Barbosa is a 59 y.o. female  : 1965    MRN: [id number]  DATE: May 30, 2025  TIME: 2:22 PM    Assessment and Plan   Localized swelling of toe of left foot [R22.42]  1. Localized swelling of toe of left foot  XR toe left fourth min 2 views    cefadroxil (DURICEF) 500 mg capsule      2. Cellulitis of fourth toe of left foot  cefadroxil (DURICEF) 500 mg capsule          Patient Instructions   Preliminary reading of left fourth toe X-ray: no bony changes to suggest infection in the bone of the 4th toe.  Radiologist will have final reading- if that changes your plan of care, you will receive a phone call.    Take oral antibiotic as prescribed  Apply antibiotic ointment to the tip of the toe  OTC pain medication as needed    Follow up with Podiatry next week for close follow up.  If toe more swollen or painful - proceed to the Emergency Department for re-evaluation.    If tests have been performed at [facility], our office will contact you with results if changes need to be made to the care plan discussed with you at the visit.  You can review your full results on [facility] MyChart.    Chief Complaint     Chief Complaint   Patient presents with   • Foot Problem     Patient reports noticing pinkish discharge on sock of left 4th toe. Patient states is prone to infections and has neuropathy. Was advised to come get seen         History of Present Illness       Patient reports that she noticed swelling and drainage coming from her left fourth toe starting earlier this morning.  She reports that she is prone to infections that can get bad and has had similar events happen to the left middle toe which was amputated several months ago.  She reports that her  checks her foot every other day and did not notice any injury 2 days ago when he last checked.  She does not remember any specific injury to her foot.        Review of Systems   Review of Systems   Respiratory:  Negative for  shortness of breath.    Cardiovascular:  Negative for chest pain.   Gastrointestinal:  Negative for abdominal pain.   Musculoskeletal:  Positive for joint swelling (Left 4th toe along with drainage). Negative for arthralgias, gait problem and myalgias.   Skin:  Positive for color change.   Neurological:  Negative for dizziness, weakness, light-headedness and numbness.         Current Medications     Current Medications[1]    Current Allergies     Allergies as of 05/30/2025 - Reviewed 05/30/2025   Allergen Reaction Noted   • Ozempic (0.25 or 0.5 mg-dose) [semaglutide(0.25 or 0.5mg-dos)] Diarrhea and GI Intolerance 08/19/2024   • Nsaids GI Intolerance 01/16/2017            The following portions of the patient's history were reviewed and updated as appropriate: allergies, current medications, past family history, past medical history, past social history, past surgical history and problem list.     Past Medical History[2]    Past Surgical History[3]    Family History[4]      Medications have been verified.        Objective   /58   Pulse 101   Temp 99 °F (37.2 °C)   Resp 18   SpO2 96%   No LMP recorded. Patient has had a hysterectomy.      Physical Exam     Physical Exam  Vitals and nursing note reviewed.   Constitutional:       Appearance: Normal appearance.   HENT:      Head: Normocephalic and atraumatic.   Pulmonary:      Effort: Pulmonary effort is normal.     Musculoskeletal:        Feet:      Skin:     General: Skin is warm and dry.      Capillary Refill: Capillary refill takes less than 2 seconds.     Neurological:      General: No focal deficit present.      Mental Status: She is alert and oriented to person, place, and time. Mental status is at baseline.      Sensory: No sensory deficit.      Motor: No weakness.     Psychiatric:         Mood and Affect: Mood normal.         Behavior: Behavior normal.         Thought Content: Thought content normal.                          [1]    Current Outpatient  Medications:   •  albuterol (PROVENTIL HFA,VENTOLIN HFA) 90 mcg/act inhaler, Inhale 2 puffs every 4 (four) hours as needed for wheezing or shortness of breath, Disp: 18 g, Rfl: 1  •  buPROPion (Wellbutrin XL) 300 mg 24 hr tablet, Take 1 tablet (300 mg total) by mouth every morning, Disp: 90 tablet, Rfl: 1  •  calcium carbonate (TUMS) 500 mg chewable tablet, Chew 2 tablets (1,000 mg total) daily as needed for indigestion or heartburn, Disp: 30 tablet, Rfl: 0  •  carisoprodol (SOMA) 350 mg tablet, Take 350 mg by mouth in the morning and 350 mg in the evening., Disp: , Rfl:   •  cefadroxil (DURICEF) 500 mg capsule, Take 1 capsule (500 mg total) by mouth every 12 (twelve) hours for 10 days, Disp: 20 capsule, Rfl: 0  •  diazepam (VALIUM) 5 mg tablet, Take 5 mg by mouth daily at bedtime as needed for anxiety, Disp: , Rfl:   •  fluticasone (FLONASE) 50 mcg/act nasal spray, 1 spray into each nostril if needed, Disp: , Rfl:   •  Fluticasone-Salmeterol (Advair) 500-50 mcg/dose inhaler, Inhale 2 puffs daily at bedtime, Disp: 180 blister, Rfl: 1  •  folic acid (FOLVITE) 1 mg tablet, TAKE 1 TABLET BY MOUTH EVERY DAY, Disp: 90 tablet, Rfl: 1  •  morphine (MS CONTIN) 15 mg 12 hr tablet, Take 15 mg by mouth in the morning and 15 mg in the evening., Disp: , Rfl:   •  morphine (MS CONTIN) 60 mg 12 hr tablet, Take 60 mg by mouth in the morning and 60 mg in the evening., Disp: , Rfl:   •  oxyCODONE (ROXICODONE) 10 MG TABS, , Disp: , Rfl:   •  pregabalin (LYRICA) 200 MG capsule, Take 200 mg by mouth in the morning and 200 mg in the evening., Disp: , Rfl:   •  rosuvastatin (CRESTOR) 5 mg tablet, TAKE 1 TABLET BY MOUTH EVERY OTHER DAY, Disp: 90 tablet, Rfl: 0  •  Tirzepatide (Mounjaro) 10 MG/0.5ML SOAJ, Inject 10 mg under the skin once a week, Disp: 6 mL, Rfl: 0  •  ferrous sulfate 324 (65 Fe) mg, Take 1 tablet (324 mg total) by mouth every other day, Disp: 15 tablet, Rfl: 0  •  Multiple Vitamins-Minerals (multivitamin with minerals)  tablet, Take 1 tablet by mouth daily, Disp: 30 tablet, Rfl: 0  •  Multiple Vitamins-Minerals (One Daily Womens 50+) TABS, Take 1 tablet by mouth daily, Disp: 30 tablet, Rfl: 0  •  nystatin (MYCOSTATIN) powder, Apply topically 2 (two) times a day (Patient not taking: Reported on 5/30/2025), Disp: 45 g, Rfl: 0  •  patient supplied medication, Dexcom (Patient not taking: Reported on 3/17/2025), Disp: , Rfl: [2]  Past Medical History:  Diagnosis Date   • Acute respiratory insufficiency 11/23/2019   • Allergies    • Ambulates with cane    • Anxiety    • Asthma    • Chronic narcotic dependence (HCC)    • Chronic pain    • Chronic pain disorder     back/knee   • Diabetes (HCC)    • HTN (hypertension)     elevated with electronic device   • Hyperlipidemia    • PONV (postoperative nausea and vomiting)    • Sepsis (HCC) 11/23/2019   • Sleep apnea     no cpap   • Walker as ambulation aid    [3]  Past Surgical History:  Procedure Laterality Date   • BACK SURGERY      x 3 with hardware   • CERVICAL LAMINECTOMY     • CHOLECYSTECTOMY LAPAROSCOPIC N/A 11/10/2018    Procedure: CHOLECYSTECTOMY LAPAROSCOPIC w/ ioc;  Surgeon: Benitez Cuenca MD;  Location: [facility];  Service: General   • COLONOSCOPY     • HYSTERECTOMY     • JOINT REPLACEMENT Left     knee   • OR ARTHRP KNE CONDYLE&PLATU MEDIAL&LAT COMPARTMENTS Right 1/20/2025    Procedure: ARTHROPLASTY KNEE TOTAL, RIGHT-SAME DAY;  Surgeon: Joe Cabrera MD;  Location:  MAIN OR;  Service: Orthopedics   • OR REVJ TOT KNEE ARTHRP FEM&ENTIRE TIBIAL COMPONE Left 10/21/2024    Procedure: ARTHROPLASTY LEFT KNEE TOTAL REVISION,  2 components;  Surgeon: Joe Cabrera MD;  Location:  MAIN OR;  Service: Orthopedics   • TOE AMPUTATION Right 02/02/2018    Procedure: AMPUTATION TOE, RIGHT GREAT TOE;  Surgeon: Lino Rodriguez DPM;  Location: [facility];  Service: Podiatry   • TOE AMPUTATION Right     3rd toe   • TOE AMPUTATION Left 3/19/2025    Procedure: Left 3rd partial toe  amputation;  Surgeon: Henry Figueroa DPM;  Location: [facility];  Service: Podiatry   [4]  Family History  Problem Relation Name Age of Onset   • Diabetes Mother     • Diabetes Maternal Grandmother     • No Known Problems Father

## 2025-05-30 NOTE — PATIENT INSTRUCTIONS
Preliminary reading of left fourth toe X-ray: no bony changes to suggest infection in the bone of the 4th toe.  Radiologist will have final reading- if that changes your plan of care, you will receive a phone call.    Take oral antibiotic as prescribed  Apply antibiotic ointment to the tip of the toe  OTC pain medication as needed    Follow up with Podiatry next week for close follow up.  If toe more swollen or painful - proceed to the Emergency Department for re-evaluation.    If tests have been performed at [facility], our office will contact you with results if changes need to be made to the care plan discussed with you at the visit.  You can review your full results on [facility] MyChart.

## 2025-05-30 NOTE — TELEPHONE ENCOUNTER
Caller: Anna Marie Barbosa     Doctor and/or Office: Dr. Figueroa/Kwaku    #: [phone]    Escalation: Care Patient had discharge on her sock today and her toe next to her small toe is red/pink and swelling. Please return call and advise. Thank you

## 2025-05-30 NOTE — TELEPHONE ENCOUNTER
Called and spoke to patient. After talking to her she is going to urgent care. She is concerned of an infection. She thanks you greatly

## 2025-05-31 ENCOUNTER — APPOINTMENT (EMERGENCY)
Dept: RADIOLOGY | Facility: HOSPITAL | Age: 60
DRG: 617 | End: 2025-05-31
Payer: COMMERCIAL

## 2025-05-31 ENCOUNTER — HOSPITAL ENCOUNTER (INPATIENT)
Facility: HOSPITAL | Age: 60
LOS: 3 days | Discharge: HOME | DRG: 617 | End: 2025-06-03
Attending: EMERGENCY MEDICINE | Admitting: PODIATRIST
Payer: COMMERCIAL

## 2025-05-31 DIAGNOSIS — M86.9 OSTEOMYELITIS OF FOURTH TOE OF LEFT FOOT (HCC): ICD-10-CM

## 2025-05-31 DIAGNOSIS — L03.032 CELLULITIS OF FOURTH TOE OF LEFT FOOT: Primary | ICD-10-CM

## 2025-05-31 DIAGNOSIS — E11.42 DIABETIC POLYNEUROPATHY ASSOCIATED WITH TYPE 2 DIABETES MELLITUS (HCC): ICD-10-CM

## 2025-05-31 DIAGNOSIS — E11.621 DIABETIC ULCER OF TOE OF LEFT FOOT ASSOCIATED WITH TYPE 2 DIABETES MELLITUS, WITH BONE INVOLVEMENT WITHOUT EVIDENCE OF NECROSIS (HCC): ICD-10-CM

## 2025-05-31 DIAGNOSIS — L97.526 DIABETIC ULCER OF TOE OF LEFT FOOT ASSOCIATED WITH TYPE 2 DIABETES MELLITUS, WITH BONE INVOLVEMENT WITHOUT EVIDENCE OF NECROSIS (HCC): ICD-10-CM

## 2025-05-31 DIAGNOSIS — F11.288 OPIOID DEPENDENCE WITH OTHER OPIOID-INDUCED DISORDER (HCC): ICD-10-CM

## 2025-05-31 LAB
ALBUMIN SERPL BCG-MCNC: 4 G/DL (ref 3.5–5)
ALP SERPL-CCNC: 91 U/L (ref 34–104)
ALT SERPL W P-5'-P-CCNC: 11 U/L (ref 7–52)
ANION GAP SERPL CALCULATED.3IONS-SCNC: 7 MMOL/L (ref 4–13)
AST SERPL W P-5'-P-CCNC: 17 U/L (ref 13–39)
BASOPHILS # BLD AUTO: 0.06 THOUSANDS/ÂΜL (ref 0–0.1)
BASOPHILS NFR BLD AUTO: 1 % (ref 0–1)
BILIRUB SERPL-MCNC: 0.51 MG/DL (ref 0.2–1)
BUN SERPL-MCNC: 10 MG/DL (ref 5–25)
CALCIUM SERPL-MCNC: 9.3 MG/DL (ref 8.4–10.2)
CHLORIDE SERPL-SCNC: 105 MMOL/L (ref 96–108)
CO2 SERPL-SCNC: 24 MMOL/L (ref 21–32)
CREAT SERPL-MCNC: 0.62 MG/DL (ref 0.6–1.3)
CRP SERPL QL: 16.3 MG/L
EOSINOPHIL # BLD AUTO: 0.08 THOUSAND/ÂΜL (ref 0–0.61)
EOSINOPHIL NFR BLD AUTO: 1 % (ref 0–6)
ERYTHROCYTE [DISTWIDTH] IN BLOOD BY AUTOMATED COUNT: 14.8 % (ref 11.6–15.1)
GFR SERPL CREATININE-BSD FRML MDRD: 98 ML/MIN/1.73SQ M
GLUCOSE SERPL-MCNC: 103 MG/DL (ref 65–140)
HCT VFR BLD AUTO: 36.8 % (ref 34.8–46.1)
HGB BLD-MCNC: 11.7 G/DL (ref 11.5–15.4)
IMM GRANULOCYTES # BLD AUTO: 0.02 THOUSAND/UL (ref 0–0.2)
IMM GRANULOCYTES NFR BLD AUTO: 0 % (ref 0–2)
LYMPHOCYTES # BLD AUTO: 1.08 THOUSANDS/ÂΜL (ref 0.6–4.47)
LYMPHOCYTES NFR BLD AUTO: 18 % (ref 14–44)
MCH RBC QN AUTO: 26.7 PG (ref 26.8–34.3)
MCHC RBC AUTO-ENTMCNC: 31.8 G/DL (ref 31.4–37.4)
MCV RBC AUTO: 84 FL (ref 82–98)
MONOCYTES # BLD AUTO: 0.56 THOUSAND/ÂΜL (ref 0.17–1.22)
MONOCYTES NFR BLD AUTO: 9 % (ref 4–12)
NEUTROPHILS # BLD AUTO: 4.19 THOUSANDS/ÂΜL (ref 1.85–7.62)
NEUTS SEG NFR BLD AUTO: 71 % (ref 43–75)
NRBC BLD AUTO-RTO: 0 /100 WBCS
PLATELET # BLD AUTO: 201 THOUSANDS/UL (ref 149–390)
PMV BLD AUTO: 12 FL (ref 8.9–12.7)
POTASSIUM SERPL-SCNC: 4 MMOL/L (ref 3.5–5.3)
PROT SERPL-MCNC: 7.7 G/DL (ref 6.4–8.4)
RBC # BLD AUTO: 4.39 MILLION/UL (ref 3.81–5.12)
SODIUM SERPL-SCNC: 136 MMOL/L (ref 135–147)
WBC # BLD AUTO: 5.99 THOUSAND/UL (ref 4.31–10.16)

## 2025-05-31 PROCEDURE — 99284 EMERGENCY DEPT VISIT MOD MDM: CPT

## 2025-05-31 PROCEDURE — 99223 1ST HOSP IP/OBS HIGH 75: CPT | Performed by: PODIATRIST

## 2025-05-31 PROCEDURE — 36415 COLL VENOUS BLD VENIPUNCTURE: CPT

## 2025-05-31 PROCEDURE — 96361 HYDRATE IV INFUSION ADD-ON: CPT

## 2025-05-31 PROCEDURE — 86140 C-REACTIVE PROTEIN: CPT

## 2025-05-31 PROCEDURE — 87070 CULTURE OTHR SPECIMN AEROBIC: CPT

## 2025-05-31 PROCEDURE — 99221 1ST HOSP IP/OBS SF/LOW 40: CPT

## 2025-05-31 PROCEDURE — 80053 COMPREHEN METABOLIC PANEL: CPT

## 2025-05-31 PROCEDURE — 85025 COMPLETE CBC W/AUTO DIFF WBC: CPT

## 2025-05-31 PROCEDURE — 87077 CULTURE AEROBIC IDENTIFY: CPT

## 2025-05-31 PROCEDURE — 87186 SC STD MICRODIL/AGAR DIL: CPT

## 2025-05-31 PROCEDURE — 87205 SMEAR GRAM STAIN: CPT

## 2025-05-31 PROCEDURE — 73660 X-RAY EXAM OF TOE(S): CPT

## 2025-05-31 PROCEDURE — 96360 HYDRATION IV INFUSION INIT: CPT

## 2025-05-31 PROCEDURE — 87147 CULTURE TYPE IMMUNOLOGIC: CPT

## 2025-05-31 PROCEDURE — 99285 EMERGENCY DEPT VISIT HI MDM: CPT

## 2025-05-31 RX ORDER — ONDANSETRON 2 MG/ML
4 INJECTION INTRAMUSCULAR; INTRAVENOUS EVERY 6 HOURS PRN
Status: DISCONTINUED | OUTPATIENT
Start: 2025-05-31 | End: 2025-06-03 | Stop reason: HOSPADM

## 2025-05-31 RX ORDER — OXYCODONE HCL 10 MG/1
10 TABLET, FILM COATED, EXTENDED RELEASE ORAL EVERY 12 HOURS SCHEDULED
Refills: 0 | Status: DISCONTINUED | OUTPATIENT
Start: 2025-05-31 | End: 2025-05-31

## 2025-05-31 RX ORDER — FLUTICASONE FUROATE AND VILANTEROL 200; 25 UG/1; UG/1
1 POWDER RESPIRATORY (INHALATION)
Status: DISCONTINUED | OUTPATIENT
Start: 2025-05-31 | End: 2025-06-03 | Stop reason: HOSPADM

## 2025-05-31 RX ORDER — ALBUTEROL SULFATE 90 UG/1
2 INHALANT RESPIRATORY (INHALATION) EVERY 4 HOURS PRN
Status: DISCONTINUED | OUTPATIENT
Start: 2025-05-31 | End: 2025-06-03 | Stop reason: HOSPADM

## 2025-05-31 RX ORDER — CARISOPRODOL 350 MG/1
350 TABLET ORAL 2 TIMES DAILY
Status: DISCONTINUED | OUTPATIENT
Start: 2025-05-31 | End: 2025-06-03 | Stop reason: HOSPADM

## 2025-05-31 RX ORDER — DOCUSATE SODIUM 100 MG/1
100 CAPSULE, LIQUID FILLED ORAL 2 TIMES DAILY
Status: DISCONTINUED | OUTPATIENT
Start: 2025-05-31 | End: 2025-06-03 | Stop reason: HOSPADM

## 2025-05-31 RX ORDER — MORPHINE SULFATE 15 MG/1
15 TABLET, FILM COATED, EXTENDED RELEASE ORAL 2 TIMES DAILY
Status: DISCONTINUED | OUTPATIENT
Start: 2025-05-31 | End: 2025-06-02

## 2025-05-31 RX ORDER — CEFAZOLIN SODIUM 2 G/50ML
2000 SOLUTION INTRAVENOUS EVERY 8 HOURS
Status: DISCONTINUED | OUTPATIENT
Start: 2025-06-01 | End: 2025-06-02

## 2025-05-31 RX ORDER — CEFAZOLIN SODIUM 2 G/50ML
2000 SOLUTION INTRAVENOUS ONCE
Status: COMPLETED | OUTPATIENT
Start: 2025-05-31 | End: 2025-05-31

## 2025-05-31 RX ORDER — OXYCODONE HYDROCHLORIDE 10 MG/1
10 TABLET ORAL EVERY 8 HOURS PRN
Status: DISCONTINUED | OUTPATIENT
Start: 2025-05-31 | End: 2025-06-03 | Stop reason: HOSPADM

## 2025-05-31 RX ORDER — FOLIC ACID 1 MG/1
1000 TABLET ORAL DAILY
Status: DISCONTINUED | OUTPATIENT
Start: 2025-06-01 | End: 2025-06-03 | Stop reason: HOSPADM

## 2025-05-31 RX ORDER — OXYCODONE HYDROCHLORIDE 10 MG/1
10 TABLET ORAL ONCE
Refills: 0 | Status: COMPLETED | OUTPATIENT
Start: 2025-05-31 | End: 2025-05-31

## 2025-05-31 RX ORDER — DIAZEPAM 5 MG/1
5 TABLET ORAL
Status: DISCONTINUED | OUTPATIENT
Start: 2025-05-31 | End: 2025-06-03 | Stop reason: HOSPADM

## 2025-05-31 RX ORDER — CALCIUM CARBONATE 500 MG/1
1000 TABLET, CHEWABLE ORAL DAILY PRN
Status: DISCONTINUED | OUTPATIENT
Start: 2025-05-31 | End: 2025-06-03 | Stop reason: HOSPADM

## 2025-05-31 RX ORDER — HEPARIN SODIUM 5000 [USP'U]/ML
5000 INJECTION, SOLUTION INTRAVENOUS; SUBCUTANEOUS EVERY 8 HOURS SCHEDULED
Status: DISCONTINUED | OUTPATIENT
Start: 2025-05-31 | End: 2025-06-03 | Stop reason: HOSPADM

## 2025-05-31 RX ORDER — MORPHINE SULFATE 60 MG/1
60 TABLET, FILM COATED, EXTENDED RELEASE ORAL 2 TIMES DAILY
Status: DISCONTINUED | OUTPATIENT
Start: 2025-05-31 | End: 2025-06-02

## 2025-05-31 RX ORDER — FLUTICASONE PROPIONATE 50 MCG
1 SPRAY, SUSPENSION (ML) NASAL DAILY
Status: DISCONTINUED | OUTPATIENT
Start: 2025-05-31 | End: 2025-06-03 | Stop reason: HOSPADM

## 2025-05-31 RX ORDER — BUPROPION HYDROCHLORIDE 150 MG/1
300 TABLET ORAL EVERY MORNING
Status: DISCONTINUED | OUTPATIENT
Start: 2025-06-01 | End: 2025-06-03 | Stop reason: HOSPADM

## 2025-05-31 RX ORDER — OXYCODONE HCL 40 MG/1
40 TABLET, FILM COATED, EXTENDED RELEASE ORAL EVERY 12 HOURS SCHEDULED
Refills: 0 | Status: DISCONTINUED | OUTPATIENT
Start: 2025-06-01 | End: 2025-06-03 | Stop reason: HOSPADM

## 2025-05-31 RX ORDER — PRAVASTATIN SODIUM 40 MG
40 TABLET ORAL EVERY OTHER DAY
Status: DISCONTINUED | OUTPATIENT
Start: 2025-06-01 | End: 2025-06-03 | Stop reason: HOSPADM

## 2025-05-31 RX ADMIN — OXYCODONE HYDROCHLORIDE 10 MG: 10 TABLET ORAL at 20:58

## 2025-05-31 RX ADMIN — ONDANSETRON 4 MG: 2 INJECTION INTRAMUSCULAR; INTRAVENOUS at 21:42

## 2025-05-31 RX ADMIN — FLUTICASONE PROPIONATE 1 SPRAY: 50 SPRAY, METERED NASAL at 22:43

## 2025-05-31 RX ADMIN — DOCUSATE SODIUM 100 MG: 100 CAPSULE, LIQUID FILLED ORAL at 22:38

## 2025-05-31 RX ADMIN — FLUTICASONE FUROATE AND VILANTEROL TRIFENATATE 1 PUFF: 200; 25 POWDER RESPIRATORY (INHALATION) at 22:43

## 2025-05-31 RX ADMIN — OXYCODONE HYDROCHLORIDE 10 MG: 10 TABLET, FILM COATED, EXTENDED RELEASE ORAL at 22:38

## 2025-05-31 RX ADMIN — CEFAZOLIN SODIUM 2000 MG: 2 SOLUTION INTRAVENOUS at 20:55

## 2025-05-31 RX ADMIN — HEPARIN SODIUM 5000 UNITS: 5000 INJECTION INTRAVENOUS; SUBCUTANEOUS at 22:39

## 2025-05-31 RX ADMIN — PREGABALIN 200 MG: 75 CAPSULE ORAL at 22:37

## 2025-05-31 RX ADMIN — SODIUM CHLORIDE 1000 ML: 0.9 INJECTION, SOLUTION INTRAVENOUS at 18:55

## 2025-05-31 RX ADMIN — OXYCODONE HYDROCHLORIDE 60 MG: 40 TABLET, FILM COATED, EXTENDED RELEASE ORAL at 22:37

## 2025-05-31 RX ADMIN — CARISOPRODOL 350 MG: 350 TABLET ORAL at 22:39

## 2025-05-31 NOTE — Clinical Note
Case was discussed with Dr Simeon and the patient's admission status was agreed to be Admission Status: inpatient status to the service of Dr. Simeon.

## 2025-06-01 LAB
ANION GAP SERPL CALCULATED.3IONS-SCNC: 6 MMOL/L (ref 4–13)
BASOPHILS # BLD AUTO: 0.05 THOUSANDS/ÂΜL (ref 0–0.1)
BASOPHILS NFR BLD AUTO: 1 % (ref 0–1)
BUN SERPL-MCNC: 9 MG/DL (ref 5–25)
CALCIUM SERPL-MCNC: 9.1 MG/DL (ref 8.4–10.2)
CHLORIDE SERPL-SCNC: 103 MMOL/L (ref 96–108)
CO2 SERPL-SCNC: 26 MMOL/L (ref 21–32)
CREAT SERPL-MCNC: 0.65 MG/DL (ref 0.6–1.3)
EOSINOPHIL # BLD AUTO: 0.03 THOUSAND/ÂΜL (ref 0–0.61)
EOSINOPHIL NFR BLD AUTO: 1 % (ref 0–6)
ERYTHROCYTE [DISTWIDTH] IN BLOOD BY AUTOMATED COUNT: 15.1 % (ref 11.6–15.1)
GFR SERPL CREATININE-BSD FRML MDRD: 96 ML/MIN/1.73SQ M
GLUCOSE SERPL-MCNC: 96 MG/DL (ref 65–140)
HCT VFR BLD AUTO: 36.2 % (ref 34.8–46.1)
HGB BLD-MCNC: 11.3 G/DL (ref 11.5–15.4)
IMM GRANULOCYTES # BLD AUTO: 0.02 THOUSAND/UL (ref 0–0.2)
IMM GRANULOCYTES NFR BLD AUTO: 0 % (ref 0–2)
LYMPHOCYTES # BLD AUTO: 1.32 THOUSANDS/ÂΜL (ref 0.6–4.47)
LYMPHOCYTES NFR BLD AUTO: 27 % (ref 14–44)
MCH RBC QN AUTO: 26.7 PG (ref 26.8–34.3)
MCHC RBC AUTO-ENTMCNC: 31.2 G/DL (ref 31.4–37.4)
MCV RBC AUTO: 85 FL (ref 82–98)
MONOCYTES # BLD AUTO: 0.41 THOUSAND/ÂΜL (ref 0.17–1.22)
MONOCYTES NFR BLD AUTO: 9 % (ref 4–12)
NEUTROPHILS # BLD AUTO: 3.01 THOUSANDS/ÂΜL (ref 1.85–7.62)
NEUTS SEG NFR BLD AUTO: 62 % (ref 43–75)
NRBC BLD AUTO-RTO: 0 /100 WBCS
PLATELET # BLD AUTO: 207 THOUSANDS/UL (ref 149–390)
PMV BLD AUTO: 12.9 FL (ref 8.9–12.7)
POTASSIUM SERPL-SCNC: 4.1 MMOL/L (ref 3.5–5.3)
RBC # BLD AUTO: 4.24 MILLION/UL (ref 3.81–5.12)
SODIUM SERPL-SCNC: 135 MMOL/L (ref 135–147)
WBC # BLD AUTO: 4.84 THOUSAND/UL (ref 4.31–10.16)

## 2025-06-01 PROCEDURE — 80048 BASIC METABOLIC PNL TOTAL CA: CPT

## 2025-06-01 PROCEDURE — 99232 SBSQ HOSP IP/OBS MODERATE 35: CPT | Performed by: FAMILY MEDICINE

## 2025-06-01 PROCEDURE — 85025 COMPLETE CBC W/AUTO DIFF WBC: CPT

## 2025-06-01 PROCEDURE — 99233 SBSQ HOSP IP/OBS HIGH 50: CPT | Performed by: PODIATRIST

## 2025-06-01 RX ORDER — NYSTATIN 100000 [USP'U]/G
POWDER TOPICAL 2 TIMES DAILY
Status: DISCONTINUED | OUTPATIENT
Start: 2025-06-01 | End: 2025-06-03 | Stop reason: HOSPADM

## 2025-06-01 RX ADMIN — OXYCODONE HYDROCHLORIDE 40 MG: 40 TABLET, FILM COATED, EXTENDED RELEASE ORAL at 21:00

## 2025-06-01 RX ADMIN — OXYCODONE HYDROCHLORIDE 10 MG: 10 TABLET ORAL at 22:36

## 2025-06-01 RX ADMIN — DOCUSATE SODIUM 100 MG: 100 CAPSULE, LIQUID FILLED ORAL at 08:32

## 2025-06-01 RX ADMIN — PRAVASTATIN SODIUM 40 MG: 40 TABLET ORAL at 17:17

## 2025-06-01 RX ADMIN — OXYCODONE HYDROCHLORIDE 40 MG: 40 TABLET, FILM COATED, EXTENDED RELEASE ORAL at 10:09

## 2025-06-01 RX ADMIN — HEPARIN SODIUM 5000 UNITS: 5000 INJECTION INTRAVENOUS; SUBCUTANEOUS at 05:16

## 2025-06-01 RX ADMIN — CARISOPRODOL 350 MG: 350 TABLET ORAL at 08:35

## 2025-06-01 RX ADMIN — NYSTATIN: 100000 POWDER TOPICAL at 05:40

## 2025-06-01 RX ADMIN — FLUTICASONE FUROATE AND VILANTEROL TRIFENATATE 1 PUFF: 200; 25 POWDER RESPIRATORY (INHALATION) at 08:32

## 2025-06-01 RX ADMIN — HEPARIN SODIUM 5000 UNITS: 5000 INJECTION INTRAVENOUS; SUBCUTANEOUS at 13:07

## 2025-06-01 RX ADMIN — CEFAZOLIN SODIUM 2000 MG: 2 SOLUTION INTRAVENOUS at 13:07

## 2025-06-01 RX ADMIN — PREGABALIN 200 MG: 75 CAPSULE ORAL at 08:32

## 2025-06-01 RX ADMIN — CEFAZOLIN SODIUM 2000 MG: 2 SOLUTION INTRAVENOUS at 05:14

## 2025-06-01 RX ADMIN — PREGABALIN 200 MG: 75 CAPSULE ORAL at 20:59

## 2025-06-01 RX ADMIN — DOCUSATE SODIUM 100 MG: 100 CAPSULE, LIQUID FILLED ORAL at 17:17

## 2025-06-01 RX ADMIN — FLUTICASONE PROPIONATE 1 SPRAY: 50 SPRAY, METERED NASAL at 08:32

## 2025-06-01 RX ADMIN — HEPARIN SODIUM 5000 UNITS: 5000 INJECTION INTRAVENOUS; SUBCUTANEOUS at 21:00

## 2025-06-01 RX ADMIN — FOLIC ACID 1000 MCG: 1 TABLET ORAL at 08:32

## 2025-06-01 RX ADMIN — OXYCODONE HYDROCHLORIDE 10 MG: 10 TABLET ORAL at 13:09

## 2025-06-01 RX ADMIN — BUPROPION HYDROCHLORIDE 300 MG: 150 TABLET, EXTENDED RELEASE ORAL at 08:32

## 2025-06-01 RX ADMIN — NYSTATIN: 100000 POWDER TOPICAL at 17:18

## 2025-06-01 RX ADMIN — CEFAZOLIN SODIUM 2000 MG: 2 SOLUTION INTRAVENOUS at 21:00

## 2025-06-01 RX ADMIN — CALCIUM CARBONATE (ANTACID) CHEW TAB 500 MG 1000 MG: 500 CHEW TAB at 22:33

## 2025-06-01 RX ADMIN — DIAZEPAM 5 MG: 5 TABLET ORAL at 22:32

## 2025-06-01 RX ADMIN — CARISOPRODOL 350 MG: 350 TABLET ORAL at 20:59

## 2025-06-01 NOTE — PLAN OF CARE
Problem: PAIN - ADULT  Goal: Verbalizes/displays adequate comfort level or baseline comfort level  Description: Interventions:  - Encourage patient to monitor pain and request assistance  - Assess pain using appropriate pain scale  - Administer analgesics as ordered based on type and severity of pain and evaluate response  - Implement non-pharmacological measures as appropriate and evaluate response  - Consider cultural and social influences on pain and pain management  - Notify physician/advanced practitioner if interventions unsuccessful or patient reports new pain  - Educate patient/family on pain management process including their role and importance of  reporting pain   - Provide non-pharmacologic/complimentary pain relief interventions  Outcome: Progressing     Problem: INFECTION - ADULT  Goal: Absence or prevention of progression during hospitalization  Description: INTERVENTIONS:  - Assess and monitor for signs and symptoms of infection  - Monitor lab/diagnostic results  - Monitor all insertion sites, i.e. indwelling lines, tubes, and drains  - Monitor endotracheal if appropriate and nasal secretions for changes in amount and color  - [place] appropriate cooling/warming therapies per order  - Administer medications as ordered  - Instruct and encourage patient and family to use good hand hygiene technique  - Identify and instruct in appropriate isolation precautions for identified infection/condition  Outcome: Progressing  Goal: Absence of fever/infection during neutropenic period  Description: INTERVENTIONS:  - Monitor WBC  - Perform strict hand hygiene  - Limit to healthy visitors only  - No plants, dried, fresh or silk flowers with gonsales in patient room  Outcome: Progressing     Problem: SAFETY ADULT  Goal: Patient will remain free of falls  Description: INTERVENTIONS:  - Educate patient/family on patient safety including physical limitations  - Instruct patient to call for assistance with activity   -  Consider consulting OT/PT to assist with strengthening/mobility based on AM PAC & JH-HLM score  - Consult OT/PT to assist with strengthening/mobility   - Keep Call bell within reach  - Keep bed low and locked with side rails adjusted as appropriate  - Keep care items and personal belongings within reach  - Initiate and maintain comfort rounds  - Make Fall Risk Sign visible to staff  - Offer Toileting every 2 Hours, in advance of need  - Initiate/Maintain bed/chair alarm  - Obtain necessary fall risk management equipment  - Apply yellow socks and bracelet for high fall risk patients  - Consider moving patient to room near nurses station  Outcome: Progressing  Goal: Maintain or return to baseline ADL function  Description: INTERVENTIONS:  -  Assess patient's ability to carry out ADLs; assess patient's baseline for ADL function and identify physical deficits which impact ability to perform ADLs (bathing, care of mouth/teeth, toileting, grooming, dressing, etc.)  - Assess/evaluate cause of self-care deficits   - Assess range of motion  - Assess patient's mobility; develop plan if impaired  - Assess patient's need for assistive devices and provide as appropriate  - Encourage maximum independence but intervene and supervise when necessary  - Involve family in performance of ADLs  - Assess for home care needs following discharge   - Consider OT consult to assist with ADL evaluation and planning for discharge  - Provide patient education as appropriate  - Monitor functional capacity and physical performance, use of AM PAC & JH-HLM   - Monitor gait, balance and fatigue with ambulation    Outcome: Progressing  Goal: Maintains/Returns to pre admission functional level  Description: INTERVENTIONS:  - Perform AM-PAC 6 Click Basic Mobility/ Daily Activity assessment daily.  - Set and communicate daily mobility goal to care team and patient/family/caregiver.   - Collaborate with rehabilitation services on mobility goals if  consulted  - Perform Range of Motion 3 times a day.  - Reposition patient every 3 hours.  - Dangle patient 3 times a day  - Stand patient 3 times a day  - Ambulate patient 3 times a day  - Out of bed to chair 3 times a day   - Out of bed for meals 3 times a day  - Out of bed for toileting  - Record patient progress and toleration of activity level   Outcome: Progressing     Problem: DISCHARGE PLANNING  Goal: Discharge to home or other facility with appropriate resources  Description: INTERVENTIONS:  - Identify barriers to discharge w/patient and caregiver  - Arrange for needed discharge resources and transportation as appropriate  - Identify discharge learning needs (meds, wound care, etc.)  - Arrange for interpretive services to assist at discharge as needed  - Refer to Case Management Department for coordinating discharge planning if the patient needs post-hospital services based on physician/advanced practitioner order or complex needs related to functional status, cognitive ability, or social support system  Outcome: Progressing     Problem: Knowledge Deficit  Goal: Patient/family/caregiver demonstrates understanding of disease process, treatment plan, medications, and discharge instructions  Description: Complete learning assessment and assess knowledge base.  Interventions:  - Provide teaching at level of understanding  - Provide teaching via preferred learning methods  Outcome: Progressing

## 2025-06-01 NOTE — ED PROVIDER NOTES
Time reflects when diagnosis was documented in both MDM as applicable and the Disposition within this note       Time User Action Codes Description Comment    5/31/2025  8:44 PM Brad Camacho Add [L03.032] Cellulitis of fourth toe of left foot     5/31/2025  8:44 PM Brad Camacho Add [M86.9] Osteomyelitis of fourth toe of left foot (HCC)     5/31/2025  9:20 PM Blanco Simeon Add [E11.621,  L97.526] Diabetic ulcer of toe of left foot associated with type 2 diabetes mellitus, with bone involvement without evidence of necrosis (HCC)     5/31/2025  9:20 PM Blanco Simeon Add [E11.42] Diabetic polyneuropathy associated with type 2 diabetes mellitus (HCC)     5/31/2025  9:21 PM Blanco Simeon Add [F11.288] Opioid dependence with other opioid-induced disorder (HCC)           ED Disposition       ED Disposition   Admit    Condition   Stable    Date/Time   Sat May 31, 2025  8:46 PM    Comment   Case was discussed with Dr Simeon and the patient's admission status was agreed to be Admission Status: inpatient status to the service of Dr. Ruff               Assessment & Plan       Medical Decision Making  59-year-old female presents to the ED for evaluation of left fourth toe cellulitis, ongoing approximately 2 days.  Patient was seen in urgent care and started on Duricef.  Final read of x-rays at urgent care were concerning for possible osteomyelitis, patient was sent to the ED for further evaluation and management.  Patient afebrile in the ED, mild tachycardia present at 107.  No leukocytosis on labs, mildly elevated CRP at 16.  Final read of x-rays taken in the ED could not rule out osteomyelitis.  Podiatry evaluated patient at bedside, they recommended admission to their service, 2 g IV Ancef.    Amount and/or Complexity of Data Reviewed  Labs: ordered.  Radiology: ordered. Decision-making details documented in ED Course.    Risk  Prescription drug management.  Decision regarding hospitalization.        ED Course as of 05/31/25 2149   Sat  May 31, 2025   1925 XR toe fourth min 2 views LEFT  IMPRESSION:     Persistent soft tissue swelling involving the fourth digit. Assessment of the distal phalanx is limited due to the hammertoe deformity. Subtle osteomyelitis/bony destruction involving the dorsal tip cannot be entirely excluded. Further evaluation by MRI   should be considered.     2043 Dr Simeon, resident with podiatry evaluated patient at bedside.  He recommends admission to podiatry service, 2 g IV Ancef.       Medications   albuterol (PROVENTIL HFA,VENTOLIN HFA) inhaler 2 puff (has no administration in time range)   buPROPion (WELLBUTRIN XL) 24 hr tablet 300 mg (has no administration in time range)   calcium carbonate (TUMS) chewable tablet 1,000 mg (has no administration in time range)   carisoprodol (SOMA) tablet 350 mg (has no administration in time range)   diazepam (VALIUM) tablet 5 mg (has no administration in time range)   fluticasone (FLONASE) 50 mcg/act nasal spray 1 spray (has no administration in time range)   fluticasone-vilanterol 200-25 mcg/actuation 1 puff (has no administration in time range)   folic acid (FOLVITE) tablet 1,000 mcg (has no administration in time range)   morphine (MS CONTIN) ER tablet 15 mg (has no administration in time range)   morphine (MS CONTIN) ER tablet 60 mg (has no administration in time range)   oxyCODONE (ROXICODONE) immediate release tablet 10 mg (has no administration in time range)   pregabalin (LYRICA) capsule 200 mg (has no administration in time range)   pravastatin (PRAVACHOL) tablet 40 mg (has no administration in time range)   ceFAZolin (ANCEF) IVPB (premix in dextrose) 2,000 mg 50 mL (has no administration in time range)   naloxone (NARCAN) 0.04 mg/mL syringe 0.04 mg (has no administration in time range)   docusate sodium (COLACE) capsule 100 mg (has no administration in time range)   ondansetron (ZOFRAN) injection 4 mg (4 mg Intravenous Given 5/31/25 2142)   heparin (porcine) subcutaneous  injection 5,000 Units (has no administration in time range)   sodium chloride 0.9 % bolus 1,000 mL (0 mL Intravenous Stopped 5/31/25 2144)   ceFAZolin (ANCEF) IVPB (premix in dextrose) 2,000 mg 50 mL (0 mg Intravenous Stopped 5/31/25 2137)   oxyCODONE (ROXICODONE) immediate release tablet 10 mg (10 mg Oral Given 5/31/25 2058)       ED Risk Strat Scores                    No data recorded        SBIRT 22yo+      Flowsheet Row Most Recent Value   Initial Alcohol Screen: US AUDIT-C     1. How often do you have a drink containing alcohol? 0 Filed at: 05/31/2025 1855   2. How many drinks containing alcohol do you have on a typical day you are drinking?  0 Filed at: 05/31/2025 1855   3a. Male UNDER 65: How often do you have five or more drinks on one occasion? 0 Filed at: 05/31/2025 1855   3b. FEMALE Any Age, or MALE 65+: How often do you have 4 or more drinks on one occassion? 0 Filed at: 05/31/2025 1855   Audit-C Score 0 Filed at: 05/31/2025 1855   CARIDAD: How many times in the past year have you...    Used an illegal drug or used a prescription medication for non-medical reasons? Never Filed at: 05/31/2025 1855                            History of Present Illness       Chief Complaint   Patient presents with    Toe Swelling     Pt c/o left 4th toe swelling, seen yesterday at urgent care when she noticed there was some bleeding from the toe, states that she was called back and told that her xray was concerning for osteomyelitis, spoke with her podiatrist and was told to come to ED for follow up. Is taking PO cefadroxil, has had a dose last night and today.        Past Medical History[1]   Past Surgical History[2]   Family History[3]   Social History[4]   E-Cigarette/Vaping    E-Cigarette Use Never User       E-Cigarette/Vaping Substances    Nicotine No     THC No     CBD No     Flavoring No     Other No     Unknown No       I have reviewed and agree with the history as documented.     This is a 59-year-old female with  medical history significant for DM, peripheral neuropathy who presents to the ED for evaluation of toe swelling X approximately 2 days.  Patient reports left fourth toe swelling that she first noticed approximately 2 days ago.  She had called her podiatrist who advised her to go to urgent care for further evaluation and management.  Urgent care yesterday had started her on Duricef, x-rays obtained.  Final read of x-rays were concerning for possible osteomyelitis of the left fourth toe, urgent care had advised patient to come to the ED for further evaluation and management.  Patient is without any systemic complaints at this time, does report a history of osteomyelitis of her left third toe with amputation.  She denies any recent fevers, chills, headaches, fatigue, generalized weakness chest pain, SOB, abdominal pain, nausea, vomiting, diarrhea, or dysuria.          Review of Systems   Constitutional:  Negative for chills and fever.   Eyes:  Negative for photophobia and visual disturbance.   Respiratory:  Negative for cough and shortness of breath.    Cardiovascular:  Negative for chest pain and palpitations.   Gastrointestinal:  Negative for abdominal pain, nausea and vomiting.   Genitourinary:  Negative for difficulty urinating and dysuria.   Musculoskeletal:  Negative for neck pain and neck stiffness.   Skin:  Positive for rash and wound.   Neurological:  Negative for dizziness, syncope, light-headedness and headaches.           Objective       ED Triage Vitals   Temperature Pulse Blood Pressure Respirations SpO2 Patient Position - Orthostatic VS   05/31/25 1718 05/31/25 1718 05/31/25 1718 05/31/25 1718 05/31/25 1718 05/31/25 2100   98.2 °F (36.8 °C) (!) 107 139/61 18 95 % Lying      Temp Source Heart Rate Source BP Location FiO2 (%) Pain Score    05/31/25 1718 05/31/25 1718 05/31/25 2100 -- 05/31/25 1718    Oral Monitor Right arm  No Pain      Vitals      Date and Time Temp Pulse SpO2 Resp BP Pain Score FACES  Pain Rating User   05/31/25 2130 -- 105 93 % 16 130/60 -- -- SR   05/31/25 2100 -- 104 95 % 18 136/62 -- -- SR   05/31/25 2058 -- -- -- -- -- 10 - Worst Possible Pain -- SR   05/31/25 1718 98.2 °F (36.8 °C) 107 95 % 18 139/61 No Pain -- AND            Physical Exam  Vitals and nursing note reviewed.   Constitutional:       General: She is not in acute distress.     Appearance: Normal appearance. She is well-developed. She is not toxic-appearing or diaphoretic.   HENT:      Head: Normocephalic and atraumatic.     Eyes:      Conjunctiva/sclera: Conjunctivae normal.       Cardiovascular:      Rate and Rhythm: Normal rate and regular rhythm.      Pulses:           Dorsalis pedis pulses are detected w/ Doppler on the left side.        Posterior tibial pulses are detected w/ Doppler on the left side.      Heart sounds: No murmur heard.  Pulmonary:      Effort: Pulmonary effort is normal. No respiratory distress.      Breath sounds: Normal breath sounds.   Abdominal:      Palpations: Abdomen is soft.      Tenderness: There is no abdominal tenderness.     Musculoskeletal:         General: No swelling.      Cervical back: Normal range of motion and neck supple. No rigidity.   Feet:      Left foot:      Skin integrity: Ulcer, erythema and warmth present.      Comments: Swelling and erythema present of left fourth toe.  Toe is warm to touch.  Ulcer present.  Range of motion intact in left lower extremity, pulses auscultated with Doppler.  See attached media pics.    Skin:     General: Skin is warm and dry.      Capillary Refill: Capillary refill takes less than 2 seconds.     Neurological:      General: No focal deficit present.      Mental Status: She is alert and oriented to person, place, and time.     Psychiatric:         Mood and Affect: Mood normal.                       Results Reviewed       Procedure Component Value Units Date/Time    Comprehensive metabolic panel [[id number]] Collected: 05/31/25 185    Lab Status:  Final result Specimen: Blood from Arm, Right Updated: 05/31/25 1920     Sodium 136 mmol/L      Potassium 4.0 mmol/L      Chloride 105 mmol/L      CO2 24 mmol/L      ANION GAP 7 mmol/L      BUN 10 mg/dL      Creatinine 0.62 mg/dL      Glucose 103 mg/dL      Calcium 9.3 mg/dL      AST 17 U/L      ALT 11 U/L      Alkaline Phosphatase 91 U/L      Total Protein 7.7 g/dL      Albumin 4.0 g/dL      Total Bilirubin 0.51 mg/dL      eGFR 98 ml/min/1.73sq m     Narrative:      [employer] guidelines for Chronic Kidney Disease (CKD):     Stage 1 with normal or high GFR (GFR > 90 mL/min/1.73 square meters)    Stage 2 Mild CKD (GFR = 60-89 mL/min/1.73 square meters)    Stage 3A Moderate CKD (GFR = 45-59 mL/min/1.73 square meters)    Stage 3B Moderate CKD (GFR = 30-44 mL/min/1.73 square meters)    Stage 4 Severe CKD (GFR = 15-29 mL/min/1.73 square meters)    Stage 5 End Stage CKD (GFR <15 mL/min/1.73 square meters)  Note: GFR calculation is accurate only with a steady state creatinine    C-reactive protein [[id number]]  (Abnormal) Collected: 05/31/25 1853    Lab Status: Final result Specimen: Blood from Arm, Right Updated: 05/31/25 1920     CRP 16.3 mg/L     CBC and differential [[id number]]  (Abnormal) Collected: 05/31/25 1853    Lab Status: Final result Specimen: Blood from Arm, Right Updated: 05/31/25 1902     WBC 5.99 Thousand/uL      RBC 4.39 Million/uL      Hemoglobin 11.7 g/dL      Hematocrit 36.8 %      MCV 84 fL      MCH 26.7 pg      MCHC 31.8 g/dL      RDW 14.8 %      MPV 12.0 fL      Platelets 201 Thousands/uL      nRBC 0 /100 WBCs      Segmented % 71 %      Immature Grans % 0 %      Lymphocytes % 18 %      Monocytes % 9 %      Eosinophils Relative 1 %      Basophils Relative 1 %      Absolute Neutrophils 4.19 Thousands/µL      Absolute Immature Grans 0.02 Thousand/uL      Absolute Lymphocytes 1.08 Thousands/µL      Absolute Monocytes 0.56 Thousand/µL      Eosinophils Absolute 0.08 Thousand/µL       Basophils Absolute 0.06 Thousands/µL     Wound culture and Gram stain [[id number]] Collected: 05/31/25 1853    Lab Status: In process Specimen: Wound from Toe, Left Updated: 05/31/25 1858            XR toe fourth min 2 views LEFT   Final Interpretation by Sujit Clemons MD (05/31 1921)      Persistent soft tissue swelling involving the fourth digit. Assessment of the distal phalanx is limited due to the hammertoe deformity. Subtle osteomyelitis/bony destruction involving the dorsal tip cannot be entirely excluded. Further evaluation by MRI    should be considered.         Computerized Assisted Algorithm (CAA) may have been used to analyze all applicable images.            Workstation performed: [id number]             Procedures    ED Medication and Procedure Management   Prior to Admission Medications   Prescriptions Last Dose Informant Patient Reported? Taking?   Fluticasone-Salmeterol (Advair) 500-50 mcg/dose inhaler 5/30/2025 Bedtime  No Yes   Sig: Inhale 2 puffs daily at bedtime   Multiple Vitamins-Minerals (One Daily Womens 50+) TABS  Self No No   Sig: Take 1 tablet by mouth daily   Multiple Vitamins-Minerals (multivitamin with minerals) tablet   No No   Sig: Take 1 tablet by mouth daily   Tirzepatide (Mounjaro) 10 MG/0.5ML SOAJ Past Week  No Yes   Sig: Inject 10 mg under the skin once a week   albuterol (PROVENTIL HFA,VENTOLIN HFA) 90 mcg/act inhaler 5/31/2025 Evening  No Yes   Sig: Inhale 2 puffs every 4 (four) hours as needed for wheezing or shortness of breath   buPROPion (Wellbutrin XL) 300 mg 24 hr tablet 5/31/2025 Morning  No Yes   Sig: Take 1 tablet (300 mg total) by mouth every morning   calcium carbonate (TUMS) 500 mg chewable tablet 5/30/2025  No Yes   Sig: Chew 2 tablets (1,000 mg total) daily as needed for indigestion or heartburn   carisoprodol (SOMA) 350 mg tablet 5/30/2025 Bedtime Self Yes Yes   Sig: Take 350 mg by mouth in the morning and 350 mg in the evening.   cefadroxil (DURICEF) 500  mg capsule 2025 Morning  No Yes   Sig: Take 1 capsule (500 mg total) by mouth every 12 (twelve) hours for 10 days   diazepam (VALIUM) 5 mg tablet Past Week Self Yes Yes   Sig: Take 5 mg by mouth daily at bedtime as needed for anxiety   ferrous sulfate 324 (65 Fe) mg   No No   Sig: Take 1 tablet (324 mg total) by mouth every other day   fluticasone (FLONASE) 50 mcg/act nasal spray 2025 Bedtime Self Yes Yes   Si spray into each nostril if needed   folic acid (FOLVITE) 1 mg tablet 2025 Morning Self No Yes   Sig: TAKE 1 TABLET BY MOUTH EVERY DAY   morphine (MS CONTIN) 15 mg 12 hr tablet 2025 Morning  Yes Yes   Sig: Take 15 mg by mouth in the morning and 15 mg in the evening.   morphine (MS CONTIN) 60 mg 12 hr tablet 2025 Morning  Yes Yes   Sig: Take 60 mg by mouth in the morning and 60 mg in the evening.   nystatin (MYCOSTATIN) powder Not Taking  No No   Sig: Apply topically 2 (two) times a day   Patient not taking: Reported on 3/17/2025   oxyCODONE (ROXICODONE) 10 MG TABS 2025 Evening  Yes Yes   patient supplied medication Not Taking Self Yes No   Sig: Dexcom   Patient not taking: Reported on 3/17/2025   pregabalin (LYRICA) 200 MG capsule 2025 Bedtime Self Yes Yes   Sig: Take 200 mg by mouth in the morning and 200 mg in the evening.   rosuvastatin (CRESTOR) 5 mg tablet 2025 Morning  No Yes   Sig: TAKE 1 TABLET BY MOUTH EVERY OTHER DAY      Facility-Administered Medications: None     Patient's Medications   Discharge Prescriptions    No medications on file     No discharge procedures on file.  ED SEPSIS DOCUMENTATION   Time reflects when diagnosis was documented in both MDM as applicable and the Disposition within this note       Time User Action Codes Description Comment    2025  8:44 PM Brad Camacho Add [L03.032] Cellulitis of fourth toe of left foot     2025  8:44 PM Brad Camacho [M86.9] Osteomyelitis of fourth toe of left foot (HCC)     2025  9:20 PM  Blanco Simeon Add [E11.621,  L97.526] Diabetic ulcer of toe of left foot associated with type 2 diabetes mellitus, with bone involvement without evidence of necrosis (HCC)     5/31/2025  9:20 PM Blanco Simeon Add [E11.42] Diabetic polyneuropathy associated with type 2 diabetes mellitus (HCC)     5/31/2025  9:21 PM Blanco Simeon Add [F11.288] Opioid dependence with other opioid-induced disorder (HCC)                    [1]   Past Medical History:  Diagnosis Date    Acute respiratory insufficiency 11/23/2019    Allergies     Ambulates with cane     Anxiety     Asthma     Chronic narcotic dependence (HCC)     Chronic pain     Chronic pain disorder     back/knee    Diabetes (HCC)     HTN (hypertension)     elevated with electronic device    Hyperlipidemia     PONV (postoperative nausea and vomiting)     Sepsis (HCC) 11/23/2019    Sleep apnea     no cpap    Walker as ambulation aid    [2]   Past Surgical History:  Procedure Laterality Date    BACK SURGERY      x 3 with hardware    CERVICAL LAMINECTOMY      CHOLECYSTECTOMY LAPAROSCOPIC N/A 11/10/2018    Procedure: CHOLECYSTECTOMY LAPAROSCOPIC w/ ioc;  Surgeon: Benitez Cuenca MD;  Location: [facility];  Service: General    COLONOSCOPY      HYSTERECTOMY      JOINT REPLACEMENT Left     knee    MD ARTHRP KNE CONDYLE&PLATU MEDIAL&LAT COMPARTMENTS Right 1/20/2025    Procedure: ARTHROPLASTY KNEE TOTAL, RIGHT-SAME DAY;  Surgeon: Joe Cabrera MD;  Location: [facility];  Service: Orthopedics    MD REVJ TOT KNEE ARTHRP FEM&ENTIRE TIBIAL COMPONE Left 10/21/2024    Procedure: ARTHROPLASTY LEFT KNEE TOTAL REVISION,  2 components;  Surgeon: Joe Cabrera MD;  Location: [facility];  Service: Orthopedics    TOE AMPUTATION Right 02/02/2018    Procedure: AMPUTATION TOE, RIGHT GREAT TOE;  Surgeon: Lino Rodriguez DPM;  Location: [facility];  Service: Podiatry    TOE AMPUTATION Right     3rd toe    TOE AMPUTATION Left 3/19/2025    Procedure: Left 3rd partial toe amputation;  Surgeon: Henry  NYLA Figueroa;  Location: [facility];  Service: Podiatry   [3]   Family History  Problem Relation Name Age of Onset    Diabetes Mother      Diabetes Maternal Grandmother      No Known Problems Father     [4]   Social History  Tobacco Use    Smoking status: Never     Passive exposure: Never    Smokeless tobacco: Never   Vaping Use    Vaping status: Never Used   Substance Use Topics    Alcohol use: Not Currently     Comment: special occasion    Drug use: Yes     Types: Morphine     Comment: prescribed 75mg bid        Brad Camacho PA-C  05/31/25 1375

## 2025-06-01 NOTE — ASSESSMENT & PLAN NOTE
BP in the 130s on admission  Appears well-controlled  Not on antihypertensives at home  Monitor BP per unit protocol or when needed

## 2025-06-01 NOTE — ASSESSMENT & PLAN NOTE
Reports chronic back and knee pain  Follows with  for pain management  On MS Contin ER 15 mg and MS Contin ER 60 mg, medications are on hold due to medication not being available in our pharmacy as of now  Continue with OxyContin 10 mg every 12 hours, OxyContin 40 mg every 12 hours for now  Appreciate on-call pharmacy's assistance  On Lyrica and Soma, continue  Monitor respiratory status and LOC  Narcan as needed  PDMP checked

## 2025-06-01 NOTE — CONSULTS
"Consultation - Hospitalist   Name: Anna Marie Barbosa 59 y.o. female I MRN: [id number]  Unit/Bed#: [facility] POOL I Date of Admission: 5/31/2025   Date of Service: 5/31/2025 I Hospital Day: 0   Inpatient consult to Internal Medicine  Consult performed by: VIVI Ramos  Consult ordered by: Blanco Simeon DPM        Physician Requesting Evaluation: JEANNE Beavers   Reason for Evaluation / Principal Problem: Diabetes type 2, chronic pain on opioids, hypertension    Assessment & Plan  Cellulitis of fourth toe of left foot  Anna Marie Barbosa is a 67-year-old female who presents in the ER due to further evaluation of left fourth toe cellulitis ongoing approximately for the last 48 hours.  Per patient report she went to an urgent care and was initiated on antibiotics.  X-ray concerning for osteomyelitis.  On presentation, heart rate greater than 90, CRP 16, no leukocytosis noted.  X-ray at [facility] ER shows persistent soft tissue swelling involving the fourth digit, subtle osteomyelitis/bony destruction involving the dorsal tip cannot be entirely excluded  History of left third toe amputation, site is healed on assessment, the patient follows up with [facility] podiatry, last encounter 4/21/2025  Patient was seen by podiatrist in the ED, admitted under their service possible OR on 6/1/2025/6/2/2025  Start Ancef every 8 hours  Continue with multimodal pain control along with nonpharmacological interventions  Wound care  Elevate extremity  Will defer MRI to primary team  Podiatrist services his primary care    Diabetic polyneuropathy associated with type 2 diabetes mellitus (HCC)  Lab Results   Component Value Date    HGBA1C 5.7 (H) 01/09/2025       No results for input(s): \"POCGLU\" in the last 72 hours.    Blood Sugar Average: Last 72 hrs:    Appears well-controlled and hemoglobin A1c obtained last January, obtain hemoglobin A1c  BMP shows blood glucose of 103, patient at bedside reports blood glucose at home is " 100-110  On Mounjaro every Wednesday, will hold for now  Continue with Lyrica and Soma  Since appears to be well-controlled, will monitor blood glucose with daily BMP  Patient does not follow any special diet at home, continue with regular diet  Hypoglycemia protocol  Consider sliding scale if blood glucose start to increase   Opioid dependence with other opioid-induced disorder (HCC)  Reports chronic back and knee pain  Follows with  for pain management  On MS Contin ER 15 mg and MS Contin ER 60 mg, medications are on hold due to medication not being available in our pharmacy as of now  Continue with OxyContin 10 mg every 12 hours, OxyContin 40 mg every 12 hours for now  Appreciate on-call pharmacy's assistance  On Lyrica and Soma, continue  Monitor respiratory status and LOC  Narcan as needed  PDMP checked  Chronic pain disorder  Refer to the above  Essential hypertension  BP in the 130s on admission  Appears well-controlled  Not on antihypertensives at home  Monitor BP per unit protocol or when needed  I have discussed the above management plan in detail with the primary service.       VTE Pharmacologic Prophylaxis:   High Risk (Score >/= 5) - Pharmacological DVT Prophylaxis Ordered: heparin. Sequential Compression Devices Ordered.  Code Status: Level 1 - Full Code   Discussion with family: Updated  () at bedside.    Anticipated Length of Stay: Patient will be admitted on an inpatient basis with an anticipated length of stay of greater than 2 midnights secondary to cellulitis of fourth toe, left foot, possible potation's/OR, antibiotic treatment.    History of Present Illness   Chief Complaint:   Chief Complaint   Patient presents with    Toe Swelling     Pt c/o left 4th toe swelling, seen yesterday at urgent care when she noticed there was some bleeding from the toe, states that she was called back and told that her xray was concerning for osteomyelitis, spoke with her podiatrist  and was told to come to ED for follow up. Is taking PO cefadroxil, has had a dose last night and today.           Anna Marie Barbosa is a 59 y.o. female with a PMH of opiate dependence and with other opioid-induced disorder, diabetic ulcer of toe, chronic pain disorder, asthma, sinus tachycardia, urinary retention, ambulatory dysfunction, GERD, hyperlipidemia hypertension who presents with cellulitis of fourth toe of left foot.  Reports symptoms of erythema and swelling started 48 hours ago, patient went to an urgent care who prescribed an antibiotic and ordered an x-ray which was concerning for osteomyelitis, patient was instructed to follow-up with ER.  In the ER x-ray cannot exclude osteomyelitis/bony destruction, MRI was recommended.  The patient was seen by podiatrist.  The patient has been admitted due to possible OR on 6/1/2025 - 6/2/2025.  The patient has been admitted as inpatient for further management and treatment, internal medicine has been consulted for management of chronic illnesses, podiatrist is primary care.    Review of Systems   Constitutional:  Negative for chills and fever.   HENT:  Negative for ear pain and sore throat.    Eyes:  Negative for pain and visual disturbance.   Respiratory:  Negative for cough and shortness of breath.    Cardiovascular:  Negative for chest pain and palpitations.   Gastrointestinal:  Positive for nausea and vomiting. Negative for abdominal pain.   Genitourinary:  Negative for dysuria and hematuria.   Musculoskeletal:  Positive for back pain, gait problem, joint swelling and myalgias. Negative for arthralgias.   Skin:  Negative for color change and rash.   Neurological:  Negative for seizures and syncope.   All other systems reviewed and are negative.      Historical Information   Past Medical History[1]  Past Surgical History[2]  Social History[3]  E-Cigarette/Vaping    E-Cigarette Use Never User      E-Cigarette/Vaping Substances    Nicotine No     THC No     CBD No      Flavoring No     Other No     Unknown No      Family History[4]  Social History:  Marital Status: /Civil Union   Occupation: Disabled  Patient Pre-hospital Living Situation: Home  Patient Pre-hospital Level of Mobility: walks with cane  Patient Pre-hospital Diet Restrictions: None    Meds/Allergies   I have reviewed home medications with patient personally.  At bedside in ED 3  Prior to Admission medications    Medication Sig Start Date End Date Taking? Authorizing Provider   albuterol (PROVENTIL HFA,VENTOLIN HFA) 90 mcg/act inhaler Inhale 2 puffs every 4 (four) hours as needed for wheezing or shortness of breath 5/22/25  Yes Magdalena Grove MD   buPROPion (Wellbutrin XL) 300 mg 24 hr tablet Take 1 tablet (300 mg total) by mouth every morning 5/22/25  Yes Magdalena Grove MD   calcium carbonate (TUMS) 500 mg chewable tablet Chew 2 tablets (1,000 mg total) daily as needed for indigestion or heartburn 3/20/25  Yes Bipin Finney DPM   carisoprodol (SOMA) 350 mg tablet Take 350 mg by mouth in the morning and 350 mg in the evening.   Yes Historical Provider, MD   cefadroxil (DURICEF) 500 mg capsule Take 1 capsule (500 mg total) by mouth every 12 (twelve) hours for 10 days 5/30/25 6/9/25 Yes VIVI Sebastian   diazepam (VALIUM) 5 mg tablet Take 5 mg by mouth daily at bedtime as needed for anxiety   Yes Historical Provider, MD   fluticasone (FLONASE) 50 mcg/act nasal spray 1 spray into each nostril if needed   Yes Historical Provider, MD   Fluticasone-Salmeterol (Advair) 500-50 mcg/dose inhaler Inhale 2 puffs daily at bedtime 5/22/25  Yes Magdalena Grove MD   folic acid (FOLVITE) 1 mg tablet TAKE 1 TABLET BY MOUTH EVERY DAY 10/10/24  Yes Joe Cabrera MD   morphine (MS CONTIN) 15 mg 12 hr tablet Take 15 mg by mouth in the morning and 15 mg in the evening. 3/6/25  Yes Historical Provider, MD   morphine (MS CONTIN) 60 mg 12 hr tablet Take 60 mg by mouth in the morning and 60 mg in the evening. 3/6/25  Yes  Historical Provider, MD   oxyCODONE (ROXICODONE) 10 MG TABS  4/3/25  Yes Historical Provider, MD   pregabalin (LYRICA) 200 MG capsule Take 200 mg by mouth in the morning and 200 mg in the evening. 9/9/24  Yes Historical Provider, MD   rosuvastatin (CRESTOR) 5 mg tablet TAKE 1 TABLET BY MOUTH EVERY OTHER DAY 4/23/25  Yes Magdalena Grove MD   Tirzepatide (Mounjaro) 10 MG/0.5ML SOAJ Inject 10 mg under the skin once a week 3/13/25  Yes Magdalena Grove MD   ferrous sulfate 324 (65 Fe) mg Take 1 tablet (324 mg total) by mouth every other day 9/17/24 3/27/25  Joe Cabrera MD   Multiple Vitamins-Minerals (multivitamin with minerals) tablet Take 1 tablet by mouth daily 11/5/24 3/27/25  Joe Cabrera MD   Multiple Vitamins-Minerals (One Daily Womens 50+) TABS Take 1 tablet by mouth daily 10/10/24 11/9/24  Joe Cabrera MD   nystatin (MYCOSTATIN) powder Apply topically 2 (two) times a day  Patient not taking: Reported on 3/17/2025 3/3/25   Magdalena Grove MD   patient supplied medication Dexcom  Patient not taking: Reported on 3/17/2025    Historical Provider, MD   insulin glargine (LANTUS) 100 units/mL subcutaneous injection Inject 15 Units under the skin daily at bedtime 5/16/22 5/17/22  Shanna Monteiro MD     Allergies   Allergen Reactions    Ozempic (0.25 Or 0.5 Mg-Dose) [Semaglutide(0.25 Or 0.5mg-Dos)] Diarrhea and GI Intolerance    Nsaids GI Intolerance       Objective :  Temp:  [98.2 °F (36.8 °C)] 98.2 °F (36.8 °C)  HR:  [104-107] 105  BP: (130-139)/(60-62) 130/60  Resp:  [16-18] 16  SpO2:  [93 %-95 %] 93 %  O2 Device: None (Room air)    Physical Exam  Vitals and nursing note reviewed.   Constitutional:       General: She is not in acute distress.     Appearance: She is well-developed. She is obese.   HENT:      Head: Normocephalic and atraumatic.      Mouth/Throat:      Mouth: Mucous membranes are moist.      Pharynx: Oropharynx is clear.     Eyes:      Conjunctiva/sclera: Conjunctivae normal.        Cardiovascular:      Rate and Rhythm: Regular rhythm. Bradycardia present.      Heart sounds: No murmur heard.  Pulmonary:      Effort: Pulmonary effort is normal. No respiratory distress.      Breath sounds: Normal breath sounds.   Abdominal:      Palpations: Abdomen is soft.      Tenderness: There is no abdominal tenderness.     Musculoskeletal:         General: No swelling.      Cervical back: Neck supple.      Left foot: Swelling, tenderness and bony tenderness present.     Skin:     General: Skin is warm and dry.      Capillary Refill: Capillary refill takes less than 2 seconds.      Coloration: Skin is pale.     Neurological:      Mental Status: She is alert. Mental status is at baseline.     Psychiatric:         Mood and Affect: Mood normal.          Lines/Drains:            Lab Results: I have reviewed the following results:  Results from last 7 days   Lab Units 05/31/25  1853   WBC Thousand/uL 5.99   HEMOGLOBIN g/dL 11.7   HEMATOCRIT % 36.8   PLATELETS Thousands/uL 201   SEGS PCT % 71   LYMPHO PCT % 18   MONO PCT % 9   EOS PCT % 1     Results from last 7 days   Lab Units 05/31/25  1853   SODIUM mmol/L 136   POTASSIUM mmol/L 4.0   CHLORIDE mmol/L 105   CO2 mmol/L 24   BUN mg/dL 10   CREATININE mg/dL 0.62   ANION GAP mmol/L 7   CALCIUM mg/dL 9.3   ALBUMIN g/dL 4.0   TOTAL BILIRUBIN mg/dL 0.51   ALK PHOS U/L 91   ALT U/L 11   AST U/L 17   GLUCOSE RANDOM mg/dL 103             Lab Results   Component Value Date    HGBA1C 5.7 (H) 01/09/2025    HGBA1C 6.1 (H) 10/12/2024    HGBA1C 6.3 (H) 05/14/2024           Imaging Results Review: I personally reviewed the following image studies/reports in PACS and discussed pertinent findings with Radiology: X-ray of the left foot. My interpretation of the radiology images/reports is: Possible osteomyelitis.  Other Study Results Review: No additional pertinent studies reviewed.    Administrative Statements   I have spent a total time of 75 minutes in caring for this  patient on the day of the visit/encounter including Diagnostic results, Prognosis, Risks and benefits of tx options, Instructions for management, Patient and family education, Importance of tx compliance, Risk factor reductions, Impressions, Counseling / Coordination of care, Documenting in the medical record, Reviewing/placing orders in the medical record (including tests, medications, and/or procedures), Obtaining or reviewing history  , and Communicating with other healthcare professionals .    ** Please Note: This note has been constructed using a voice recognition system. **         [1]   Past Medical History:  Diagnosis Date    Acute respiratory insufficiency 11/23/2019    Allergies     Ambulates with cane     Anxiety     Asthma     Chronic narcotic dependence (HCC)     Chronic pain     Chronic pain disorder     back/knee    Diabetes (HCC)     HTN (hypertension)     elevated with electronic device    Hyperlipidemia     PONV (postoperative nausea and vomiting)     Sepsis (HCC) 11/23/2019    Sleep apnea     no cpap    Walker as ambulation aid    [2]   Past Surgical History:  Procedure Laterality Date    BACK SURGERY      x 3 with hardware    CERVICAL LAMINECTOMY      CHOLECYSTECTOMY LAPAROSCOPIC N/A 11/10/2018    Procedure: CHOLECYSTECTOMY LAPAROSCOPIC w/ ioc;  Surgeon: Benitez Cuenca MD;  Location: [facility];  Service: General    COLONOSCOPY      HYSTERECTOMY      JOINT REPLACEMENT Left     knee    MI ARTHRP KNE CONDYLE&PLATU MEDIAL&LAT COMPARTMENTS Right 1/20/2025    Procedure: ARTHROPLASTY KNEE TOTAL, RIGHT-SAME DAY;  Surgeon: Joe Cabrera MD;  Location:  MAIN OR;  Service: Orthopedics    MI REVJ TOT KNEE ARTHRP FEM&ENTIRE TIBIAL COMPONE Left 10/21/2024    Procedure: ARTHROPLASTY LEFT KNEE TOTAL REVISION,  2 components;  Surgeon: Joe Cabrera MD;  Location:  MAIN OR;  Service: Orthopedics    TOE AMPUTATION Right 02/02/2018    Procedure: AMPUTATION TOE, RIGHT GREAT TOE;  Surgeon: Lino ROBB  NYLA Rodriguez;  Location: [facility];  Service: Podiatry    TOE AMPUTATION Right     3rd toe    TOE AMPUTATION Left 3/19/2025    Procedure: Left 3rd partial toe amputation;  Surgeon: Henry Figueroa DPM;  Location: [facility];  Service: Podiatry   [3]   Social History  Tobacco Use    Smoking status: Never     Passive exposure: Never    Smokeless tobacco: Never   Vaping Use    Vaping status: Never Used   Substance and Sexual Activity    Alcohol use: Not Currently     Comment: special occasion    Drug use: Yes     Types: Morphine     Comment: prescribed 75mg bid    Sexual activity: Not Currently   [4]   Family History  Problem Relation Name Age of Onset    Diabetes Mother      Diabetes Maternal Grandmother      No Known Problems Father

## 2025-06-01 NOTE — PROGRESS NOTES
Podiatry - Progress Note  Patient: Anna Marie Barbosa 59 y.o. female   MRN: [id number]  PCP: Magdalena Grove MD  Unit/Bed#: E2 -01 Encounter: [id number]  Date Of Visit: 06/01/25    ASSESSMENT:    Anna Marie Barbosa is a 59 y.o. female with:    Left 4th toe wound probe to bone  Left 4th toe cellulitis  Type 2 diabetes with last A1c 5.7% collected on 1/9/25  Diabetes polyneuropathy  Chronic pain with opioid tolerance  History of toe amputation      PLAN:    Patient was seen and evaluated at bedside with attending presence. All questions and concerns addressed. Plan to have the left fourth toe partial/total amputation as well as left fifth toe flexor Me tomorrow afternoon with Dr. Ruff.   Patient's left 4th toe wound is deep to bone.  Cellulitis is similar compared to yesterday.  The medial dorsal blister was deroofed with a 15 blade and the serosanguineous drainage was found.  No purulence was noted..  No deep tissue necrosis/sinus tracking abscess was found.  The lateral dorsal blister has resolved.  Patients' lab and vital signs were reviewed. Patient is afebrile with no leukocytosis. Patient does not  meet the SIRS criteria. Will keep monitoring.  Continue IV Ancef   Continue local wound care, appreciate nursing assistance with dressing changes. Wound care order is in.  Follow-up with wound culture  Elevation and offloading on green foam wedges or pillows when non-ambulatory.     Antibiotics started: Ancef  Pharmacologic VTE Prophylaxis: Heparin   Mechanical VTE Prophylaxis: sequential compression device   Weightbearing status: Weightbearing to heel left  foot in surgical shoe    Disposition:  Patient will need continuous hospital stay for the reasons above.    SUBJECTIVE:     The patient was seen, evaluated, and assessed at bedside today. The patient was awake, alert, and in no acute distress. No acute events overnight. The patient reports no pain or discomfort from the left foot. Patient denies  "N/V/F/chills/SOB/CP.      OBJECTIVE:     Vitals:   /59 (BP Location: Right arm)   Pulse 96   Temp 98.2 °F (36.8 °C)   Resp 16   Ht 5' 8\" (1.727 m)   Wt 114 kg (251 lb 5.2 oz)   SpO2 94%   BMI 38.21 kg/m²     Temp (24hrs), Av.2 °F (36.8 °C), Min:98.2 °F (36.8 °C), Max:98.2 °F (36.8 °C)      Physical Exam:     Lungs: Non labored breathing  Abdomen: Soft, non-tender.  Lower Extremity:  Cardiovascular status at baseline from admission.  Neurological status at baseline from admission.  Musculoskeletal status at baseline from admission. No calf tenderness noted.     Wound #: 1  Location: Left distal fourth toe  Length 1.0cm: Width 0.6cm: Depth 0.3cm:   Deepest Tissue Noted in Base: bone  Probe to Bone: Yes  Peripheral Skin Description: Attached  Granulation: 15% Fibrotic Tissue: 85% Necrotic Tissue: 0%   Drainage Amount: Minimal serous  Signs of Infection: Yes. positive  probe to bone, negative crepitus, fluctuance, negative purulence, positive  periwound skin erythema, edema, negative proximal tracking erythema    Clinical Images 25:                Additional Data:     Labs:    Results from last 7 days   Lab Units 25  0509   WBC Thousand/uL 4.84   HEMOGLOBIN g/dL 11.3*   HEMATOCRIT % 36.2   PLATELETS Thousands/uL 207   SEGS PCT % 62   LYMPHO PCT % 27   MONO PCT % 9   EOS PCT % 1     Results from last 7 days   Lab Units 25  0509 25  1853   POTASSIUM mmol/L 4.1 4.0   CHLORIDE mmol/L 103 105   CO2 mmol/L 26 24   BUN mg/dL 9 10   CREATININE mg/dL 0.65 0.62   CALCIUM mg/dL 9.1 9.3   ALK PHOS U/L  --  91   ALT U/L  --  11   AST U/L  --  17           * I Have Reviewed All Lab Data Listed Above.    Recent Cultures (last 7 days):               Imaging: I have personally reviewed pertinent films in PACS  EKG, Pathology, and Other Studies: I have personally reviewed pertinent reports.    ** Please Note: Portions of the record may have been created with voice recognition software. Occasional " "wrong word or \"sound a like\" substitutions may have occurred due to the inherent limitations of voice recognition software. Read the chart carefully and recognize, using context, where substitutions have occurred. **      "

## 2025-06-01 NOTE — PLAN OF CARE
Problem: PAIN - ADULT  Goal: Verbalizes/displays adequate comfort level or baseline comfort level  Description: Interventions:  - Encourage patient to monitor pain and request assistance  - Assess pain using appropriate pain scale  - Administer analgesics as ordered based on type and severity of pain and evaluate response  - Implement non-pharmacological measures as appropriate and evaluate response  - Consider cultural and social influences on pain and pain management  - Notify physician/advanced practitioner if interventions unsuccessful or patient reports new pain  - Educate patient/family on pain management process including their role and importance of  reporting pain   - Provide non-pharmacologic/complimentary pain relief interventions  Outcome: Progressing     Problem: INFECTION - ADULT  Goal: Absence or prevention of progression during hospitalization  Description: INTERVENTIONS:  - Assess and monitor for signs and symptoms of infection  - Monitor lab/diagnostic results  - Monitor all insertion sites, i.e. indwelling lines, tubes, and drains  - Monitor endotracheal if appropriate and nasal secretions for changes in amount and color  - [place] appropriate cooling/warming therapies per order  - Administer medications as ordered  - Instruct and encourage patient and family to use good hand hygiene technique  - Identify and instruct in appropriate isolation precautions for identified infection/condition  Outcome: Progressing     Problem: SAFETY ADULT  Goal: Patient will remain free of falls  Description: INTERVENTIONS:  - Educate patient/family on patient safety including physical limitations  - Instruct patient to call for assistance with activity   - Consider consulting OT/PT to assist with strengthening/mobility based on AM PAC & JH-HLM score  - Consult OT/PT to assist with strengthening/mobility   - Keep Call bell within reach  - Keep bed low and locked with side rails adjusted as appropriate  - Keep  care items and personal belongings within reach  - Initiate and maintain comfort rounds  - Make Fall Risk Sign visible to staff  - Apply yellow socks and bracelet for high fall risk patients  - Consider moving patient to room near nurses station  Outcome: Progressing

## 2025-06-01 NOTE — H&P
[facility] Podiatry - H&P  Anna Marie Barbosa 59 y.o. female MRN: [id number]  Unit/Bed#: E2 -01 Encounter: [id number]  Admission Date: 05/31/25    ASSESSMENT:    Anna Marie Barbosa is a 59 y.o. female with:    Left 4th toe wound probe to bone  Left 4th toe cellulitis  Type 2 diabetes with last A1c 5.7% collected on 1/9/25  Diabetes polyneuropathy  Chronic pain with opioid tolerance  History of toe amputation    PLAN:    Patient to be admitted under podiatry service of Dr. Ruff for management of left 4th toe cellulitis and diabetic foot ulceration with possible podiatric surgical intervention, tentative on 6/2/2025.  Plan for broad spectrum antibiotics, local wound care, and advanced imaging of right foot. Patient is at high risk of requiring surgical intervention for management of left foot infection  Start Ancef.  Will follow-up with the wound culture for further tailoring of antibiotic.  Patient is currently afebrile.  Will monitor labs and vitals while in-patient  Per my personal read, the left foot x-ray shows left fourth toe distal phalanx bony erosion.  It is consistent with osteomyelitis.  Consult placed to internal medicine, appreciate recommendations and assessment surgical risk as patient is at risk for requiring OR during this admission  Patient will be weightbearing as tolerated to the left heel in surgical shoe  Continuation of all home medications aside from oral hyperglycemics.   Will discuss this plan with my attending and update as needed.      Antibiotics started: Ancef  Pharmacologic VTE Prophylaxis: Heparin   Mechanical VTE Prophylaxis: sequential compression device   Weight Bearing Status: WBAT to left heel in surgical shoe      Disposition:  Patient requires >2 midnight stay for the reasons above.  Code Status: Level 1 - Full Code      SUBJECTIVE    History of Present Illness:    Anna Marie Barbosa is a 59 y.o. female with pmh of type 2 diabetes, obesity, chronic pain with opioid tolerance, history  of toe amputation who presents with left foot fourth toe distal wound probes to bone.  Patient has diabetic polyneuropathy for years and she has experienced many distal toe wounds secondary to hammertoe deformity and partial toe amputation in the last few years.  Patient found bloody drainage in her sock a few days ago and visited urgent care yesterday.  She was given Duricef and a DSD dressing.  However, she is experiencing more of her left fourth toe erythema and edema.  There is dorsal medial and lateral hemorrhagic blister and she suspects it is because of the Coban applied to her left fourth toe in the visit of the urgent care.  She visited the ED for further evaluation.  Patient denies any other pedal concerns or any systemic signs of infection, including headache, dizziness, nausea, vomiting, chills, fever, short of breath, chest pain, diarrhea, stomachache.    Review of Systems:    Constitutional: Negative.    HENT: Negative.    Eyes: Negative.    Respiratory: Negative.    Cardiovascular: Negative.    Gastrointestinal: Negative.    Musculoskeletal: Multiple toe amputation bilaterally, hammertoe deformity  Skin: Left fourth toe distal wound and cellulitis  Neurological: Diabetic polyneuropathy  Psych: Negative.     Past Medical and Surgical History:     Past Medical History[1]    Past Surgical History[2]    Meds/Allergies:      Medications Prior to Admission:     albuterol (PROVENTIL HFA,VENTOLIN HFA) 90 mcg/act inhaler    buPROPion (Wellbutrin XL) 300 mg 24 hr tablet    calcium carbonate (TUMS) 500 mg chewable tablet    carisoprodol (SOMA) 350 mg tablet    cefadroxil (DURICEF) 500 mg capsule    diazepam (VALIUM) 5 mg tablet    fluticasone (FLONASE) 50 mcg/act nasal spray    Fluticasone-Salmeterol (Advair) 500-50 mcg/dose inhaler    folic acid (FOLVITE) 1 mg tablet    morphine (MS CONTIN) 15 mg 12 hr tablet    morphine (MS CONTIN) 60 mg 12 hr tablet    oxyCODONE (ROXICODONE) 10 MG TABS    pregabalin (LYRICA)  "200 MG capsule    rosuvastatin (CRESTOR) 5 mg tablet    Tirzepatide (Mounjaro) 10 MG/0.5ML SOAJ    ferrous sulfate 324 (65 Fe) mg    Multiple Vitamins-Minerals (multivitamin with minerals) tablet    Multiple Vitamins-Minerals (One Daily Womens 50+) TABS    nystatin (MYCOSTATIN) powder    patient supplied medication    Allergies[3]    Social History:    Social History     Marital Status: /Civil Union    Substance Use History:   Social History     Substance and Sexual Activity   Alcohol Use Not Currently    Comment: special occasion     Tobacco Use History[4]  Social History     Substance and Sexual Activity   Drug Use Yes    Types: Morphine    Comment: prescribed 75mg bid       Family History:    Family History[5]      OBJECTIVE:    First Vitals:   Blood Pressure: 139/61 (05/31/25 1718)  Pulse: (!) 107 (05/31/25 1718)  Temperature: 98.2 °F (36.8 °C) (05/31/25 1718)  Temp Source: Oral (05/31/25 1718)  Respirations: 18 (05/31/25 1718)  Height: 5' 8\" (172.7 cm) (05/31/25 2130)  Weight - Scale: 112 kg (247 lb) (05/31/25 2130)  SpO2: 95 % (05/31/25 1718)    Current Vitals:   Blood Pressure: 151/79 (05/31/25 2215)  Pulse: 105 (05/31/25 2215)  Temperature: 98.2 °F (36.8 °C) (05/31/25 2215)  Temp Source: Oral (05/31/25 1718)  Respirations: 17 (05/31/25 2215)  Height: 5' 8\" (172.7 cm) (05/31/25 2130)  Weight - Scale: 112 kg (247 lb) (05/31/25 2130)  SpO2: 96 % (05/31/25 2215)      Physical Exam :    General Appearance: Alert, cooperative, no distress.  HEENT: Head normocephalic, atraumatic, without obvious abnormality.  Heart: Normal rate and rhythm.  Lungs: Non-labored breathing. No respiratory distress.  Abdomen: Without distension.  Psychiatric: AAOx3  Lower Extremity:  Vascular:   DP/PT pedal pulses on the left are present. DP/PT pedal pulses on the right are present. CRT brisk at the digits. Pedal hair is absent. 2+ edema noted at left fourth toe. Skin temperature is within normal limits " bilaterally.    Musculoskeletal:  MMT is 5/5 in all muscle compartments bilaterally. Passive ROM at the 1st MPJ and ankle joint are Decreased bilaterally with the leg extended. Active ROM at the lesser digits is diminished. No Pain on palpation of left fourth toe wound.  Multiple toe amputation is found to bilateral feet.  All lesser toes show hammertoe deformity.    Dermatological:    LE Wound Exam:   Wound (1) located left distal fourth toe, measures approximately 1.0 cm x 0.6 cm x 0.3 cm  without sinus tracking or undermining. Wound bed appears fibrotic with no drainage at evaluation. The wound base is 25% red/granular, 75% yellow/fibrous, 0% black/necrotic. Wound depth is deep to level of bone. Malodor is not noticed. Wound edge appears Attached. Sheila-wound skin appears erythematous, calloused, and warm to touch. Probe to bone is positive. Signs of infection are present at this time.     Neurological:  Gross sensation is diminished. Protective sensation is diminished. Patient Reports numbness and/or paresthesias.    Clinical Images 05/31/25:                    Lab Results:   Admission on 05/31/2025   Component Date Value    WBC 05/31/2025 5.99     RBC 05/31/2025 4.39     Hemoglobin 05/31/2025 11.7     Hematocrit 05/31/2025 36.8     MCV 05/31/2025 84     MCH 05/31/2025 26.7 (L)     MCHC 05/31/2025 31.8     RDW 05/31/2025 14.8     MPV 05/31/2025 12.0     Platelets 05/31/2025 201     nRBC 05/31/2025 0     Segmented % 05/31/2025 71     Immature Grans % 05/31/2025 0     Lymphocytes % 05/31/2025 18     Monocytes % 05/31/2025 9     Eosinophils Relative 05/31/2025 1     Basophils Relative 05/31/2025 1     Absolute Neutrophils 05/31/2025 4.19     Absolute Immature Grans 05/31/2025 0.02     Absolute Lymphocytes 05/31/2025 1.08     Absolute Monocytes 05/31/2025 0.56     Eosinophils Absolute 05/31/2025 0.08     Basophils Absolute 05/31/2025 0.06     Sodium 05/31/2025 136     Potassium 05/31/2025 4.0     Chloride 05/31/2025  105     CO2 05/31/2025 24     ANION GAP 05/31/2025 7     BUN 05/31/2025 10     Creatinine 05/31/2025 0.62     Glucose 05/31/2025 103     Calcium 05/31/2025 9.3     AST 05/31/2025 17     ALT 05/31/2025 11     Alkaline Phosphatase 05/31/2025 91     Total Protein 05/31/2025 7.7     Albumin 05/31/2025 4.0     Total Bilirubin 05/31/2025 0.51     eGFR 05/31/2025 98     CRP 05/31/2025 16.3 (H)        Cultures:            Imaging: I have personally reviewed pertinent films in PACS  XR toe fourth min 2 views LEFT  Result Date: 5/31/2025  Impression: Persistent soft tissue swelling involving the fourth digit. Assessment of the distal phalanx is limited due to the hammertoe deformity. Subtle osteomyelitis/bony destruction involving the dorsal tip cannot be entirely excluded. Further evaluation by MRI should be considered. Computerized Assisted Algorithm (CAA) may have been used to analyze all applicable images. Workstation performed: [id number]     EKG, Pathology, and Other Studies: I have personally reviewed pertinent reports.          [1]   Past Medical History:  Diagnosis Date    Acute respiratory insufficiency 11/23/2019    Allergies     Ambulates with cane     Anxiety     Asthma     Chronic narcotic dependence (HCC)     Chronic pain     Chronic pain disorder     back/knee    Diabetes (HCC)     HTN (hypertension)     elevated with electronic device    Hyperlipidemia     PONV (postoperative nausea and vomiting)     Sepsis (HCC) 11/23/2019    Sleep apnea     no cpap    Walker as ambulation aid    [2]   Past Surgical History:  Procedure Laterality Date    BACK SURGERY      x 3 with hardware    CERVICAL LAMINECTOMY      CHOLECYSTECTOMY LAPAROSCOPIC N/A 11/10/2018    Procedure: CHOLECYSTECTOMY LAPAROSCOPIC w/ ioc;  Surgeon: Benitez Cuenca MD;  Location: [facility];  Service: General    COLONOSCOPY      HYSTERECTOMY      JOINT REPLACEMENT Left     knee    SC ARTHRP KNE CONDYLE&PLATU MEDIAL&LAT COMPARTMENTS Right 1/20/2025     Procedure: ARTHROPLASTY KNEE TOTAL, RIGHT-SAME DAY;  Surgeon: Joe Cabrera MD;  Location: [facility];  Service: Orthopedics    UT REVJ TOT KNEE ARTHRP FEM&ENTIRE TIBIAL COMPONE Left 10/21/2024    Procedure: ARTHROPLASTY LEFT KNEE TOTAL REVISION,  2 components;  Surgeon: Joe Cabrera MD;  Location: [facility];  Service: Orthopedics    TOE AMPUTATION Right 02/02/2018    Procedure: AMPUTATION TOE, RIGHT GREAT TOE;  Surgeon: Lino Rodriguez DPM;  Location: [facility];  Service: Podiatry    TOE AMPUTATION Right     3rd toe    TOE AMPUTATION Left 3/19/2025    Procedure: Left 3rd partial toe amputation;  Surgeon: Henry Figueroa DPM;  Location: [facility];  Service: Podiatry   [3]   Allergies  Allergen Reactions    Ozempic (0.25 Or 0.5 Mg-Dose) [Semaglutide(0.25 Or 0.5mg-Dos)] Diarrhea and GI Intolerance    Nsaids GI Intolerance   [4]   Social History  Tobacco Use   Smoking Status Never    Passive exposure: Never   Smokeless Tobacco Never   [5]   Family History  Problem Relation Name Age of Onset    Diabetes Mother      Diabetes Maternal Grandmother      No Known Problems Father

## 2025-06-01 NOTE — ASSESSMENT & PLAN NOTE
"Lab Results   Component Value Date    HGBA1C 5.7 (H) 01/09/2025       No results for input(s): \"POCGLU\" in the last 72 hours.    Blood Sugar Average: Last 72 hrs:    Appears well-controlled and hemoglobin A1c obtained last January, obtain hemoglobin A1c  BMP shows blood glucose of 103, patient at bedside reports blood glucose at home is 100-110  On Mounjaro every Wednesday, will hold for now  Continue with Lyrica and Soma  Since appears to be well-controlled, will monitor blood glucose with daily BMP  Patient does not follow any special diet at home, continue with regular diet  Hypoglycemia protocol  Consider sliding scale if blood glucose start to increase   "

## 2025-06-01 NOTE — PROGRESS NOTES
"Progress Note - Hospitalist   Name: Anna Marie Barbosa 59 y.o. female I MRN: [id number]  Unit/Bed#: E2 -01 I Date of Admission: 5/31/2025   Date of Service: 6/1/2025 I Hospital Day: 1    Assessment & Plan  Cellulitis of fourth toe of left foot  Anna Marie Barbosa is a 67-year-old female who presents in the ER due to further evaluation of left fourth toe cellulitis ongoing approximately for the last 48 hours.  Per patient report she went to an urgent care and was initiated on antibiotics.  X-ray concerning for osteomyelitis.  On presentation, heart rate greater than 90, CRP 16, no leukocytosis noted.  X-ray at [facility] ER shows persistent soft tissue swelling involving the fourth digit, subtle osteomyelitis/bony destruction involving the dorsal tip cannot be entirely excluded  History of left third toe amputation, site is healed on assessment, the patient follows up with [facility] podiatry, last encounter 4/21/2025  Patient was seen by podiatrist in the ED, admitted under their service possible OR on 6/1/2025/6/2/2025  Start Ancef every 8 hours  Continue with multimodal pain control along with nonpharmacological interventions  Wound care  Elevate extremity  Will defer MRI to primary team  Podiatrist services his primary care  If surgery is required for management regarding her fourth toe on the left foot which became osteomyelitic, she would be medically cleared and be able to proceed with it.    Diabetic polyneuropathy associated with type 2 diabetes mellitus (HCC)  Lab Results   Component Value Date    HGBA1C 5.7 (H) 01/09/2025       No results for input(s): \"POCGLU\" in the last 72 hours.    Blood Sugar Average: Last 72 hrs:    Appears well-controlled and hemoglobin A1c obtained last January, obtain hemoglobin A1c  BMP shows blood glucose of 103, patient at bedside reports blood glucose at home is 100-110  On Mounjaro every Wednesday, will hold for now  Continue with Lyrica and Soma  Since appears to be " well-controlled, will monitor blood glucose with daily BMP  Patient does not follow any special diet at home, continue with regular diet  Hypoglycemia protocol  Consider sliding scale if blood glucose start to increase   Opioid dependence with other opioid-induced disorder (HCC)  Reports chronic back and knee pain  Follows with  for pain management  On MS Contin ER 15 mg and MS Contin ER 60 mg, medications are on hold due to medication not being available in our pharmacy as of now  Continue with OxyContin 10 mg every 12 hours, OxyContin 40 mg every 12 hours for now  Appreciate on-call pharmacy's assistance  On Lyrica and Soma, continue  Monitor respiratory status and LOC  Narcan as needed  PDMP checked   Chronic pain disorder  Refer to the above   Essential hypertension  BP in the 130s on admission  Appears well-controlled  Not on antihypertensives at home  Monitor BP per unit protocol or when needed     VTE Pharmacologic Prophylaxis:   Moderate Risk (Score 3-4) - Pharmacological DVT Prophylaxis Ordered: heparin.    Mobility:   Basic Mobility Inpatient Raw Score: 23  JH-HLM Goal: 7: Walk 25 feet or more  JH-HLM Achieved: 6: Walk 10 steps or more  JH-HLM Goal achieved. Continue to encourage appropriate mobility.    Patient Centered Rounds: I performed bedside rounds with nursing staff today.   Discussions with Specialists or Other Care Team Provider: podiatry    Education and Discussions with Family / Patient: Updated  (daughter and significant other) at bedside.    Current Length of Stay: 1 day(s)  Current Patient Status: Inpatient   Certification Statement: The patient will continue to require additional inpatient hospital stay due to management of cellulitis  Discharge Plan: [facility] is following this patient on consult. They are not yet medically stable for discharge secondary to cellulitis, podiatry will have final say.    Code Status: Level 1 - Full Code    Subjective   Patient was seen and  examined at bedside.  Offers no complaints at this time.  He states her foot is feeling somewhat better but does have a history of neuropathy which makes it difficult to be sure 100%.  Denies any swelling in her feet.    Objective :  Temp:  [97 °F (36.1 °C)-98.2 °F (36.8 °C)] 97 °F (36.1 °C)  HR:  [] 90  BP: (102-151)/(59-79) 102/62  Resp:  [16-18] 18  SpO2:  [93 %-97 %] 97 %  O2 Device: None (Room air)    Body mass index is 38.21 kg/m².     Input and Output Summary (last 24 hours):     Intake/Output Summary (Last 24 hours) at 6/1/2025 1234  Last data filed at 6/1/2025 0540  Gross per 24 hour   Intake 1530 ml   Output --   Net 1530 ml       Physical Exam  Vitals and nursing note reviewed.   Constitutional:       General: She is not in acute distress.     Appearance: She is well-developed. She is obese.   HENT:      Head: Normocephalic and atraumatic.      Mouth/Throat:      Mouth: Mucous membranes are moist.      Pharynx: Oropharynx is clear.     Eyes:      Conjunctiva/sclera: Conjunctivae normal.       Cardiovascular:      Rate and Rhythm: Regular rhythm.      Pulses: Normal pulses.      Heart sounds: Normal heart sounds. No murmur heard.  Pulmonary:      Effort: Pulmonary effort is normal. No respiratory distress.      Breath sounds: Normal breath sounds.   Abdominal:      Palpations: Abdomen is soft.      Tenderness: There is no abdominal tenderness.     Musculoskeletal:         General: Tenderness present. No swelling.      Cervical back: Neck supple.      Left foot: Swelling, tenderness and bony tenderness present.     Skin:     General: Skin is warm and dry.      Capillary Refill: Capillary refill takes less than 2 seconds.      Coloration: Skin is pale.      Comments: Left foot wrapped at this time.  Boot worn.     Neurological:      Mental Status: She is alert. Mental status is at baseline.     Psychiatric:         Mood and Affect: Mood normal.           Lines/Drains:              Lab Results: I have  reviewed the following results:   Results from last 7 days   Lab Units 06/01/25  0509   WBC Thousand/uL 4.84   HEMOGLOBIN g/dL 11.3*   HEMATOCRIT % 36.2   PLATELETS Thousands/uL 207   SEGS PCT % 62   LYMPHO PCT % 27   MONO PCT % 9   EOS PCT % 1     Results from last 7 days   Lab Units 06/01/25  0509 05/31/25  1853   SODIUM mmol/L 135 136   POTASSIUM mmol/L 4.1 4.0   CHLORIDE mmol/L 103 105   CO2 mmol/L 26 24   BUN mg/dL 9 10   CREATININE mg/dL 0.65 0.62   ANION GAP mmol/L 6 7   CALCIUM mg/dL 9.1 9.3   ALBUMIN g/dL  --  4.0   TOTAL BILIRUBIN mg/dL  --  0.51   ALK PHOS U/L  --  91   ALT U/L  --  11   AST U/L  --  17   GLUCOSE RANDOM mg/dL 96 103                       Recent Cultures (last 7 days):         Imaging Results Review: No pertinent imaging studies reviewed.  Other Study Results Review: No additional pertinent studies reviewed.    Last 24 Hours Medication List:     Current Facility-Administered Medications:     albuterol (PROVENTIL HFA,VENTOLIN HFA) inhaler 2 puff, Q4H PRN    buPROPion (WELLBUTRIN XL) 24 hr tablet 300 mg, QAM    calcium carbonate (TUMS) chewable tablet 1,000 mg, Daily PRN    carisoprodol (SOMA) tablet 350 mg, BID    ceFAZolin (ANCEF) IVPB (premix in dextrose) 2,000 mg 50 mL, Q8H, Last Rate: 2,000 mg (06/01/25 0514)    diazepam (VALIUM) tablet 5 mg, HS PRN    docusate sodium (COLACE) capsule 100 mg, BID    fluticasone (FLONASE) 50 mcg/act nasal spray 1 spray, Daily    fluticasone-vilanterol 200-25 mcg/actuation 1 puff, Daily    folic acid (FOLVITE) tablet 1,000 mcg, Daily    heparin (porcine) subcutaneous injection 5,000 Units, Q8H BOLIVAR    [Held by provider] morphine (MS CONTIN) ER tablet 15 mg, BID    [Held by provider] morphine (MS CONTIN) ER tablet 60 mg, BID    naloxone (NARCAN) 0.04 mg/mL syringe 0.04 mg, Q1MIN PRN    nystatin (MYCOSTATIN) powder, BID    ondansetron (ZOFRAN) injection 4 mg, Q6H PRN    oxyCODONE (OxyCONTIN) 12 hr tablet 40 mg, Q12H BOLIVAR    oxyCODONE (ROXICODONE) immediate  release tablet 10 mg, Q8H PRN    pravastatin (PRAVACHOL) tablet 40 mg, Every Other Day    pregabalin (LYRICA) capsule 200 mg, BID    trimethobenzamide (TIGAN) IM injection 200 mg, Q6H PRN    Administrative Statements   Today, Patient Was Seen By: Mary Lynn, DO  I have spent a total time of 35 minutes in caring for this patient on the day of the visit/encounter including Diagnostic results, Prognosis, Risks and benefits of tx options, Instructions for management, Patient and family education, Importance of tx compliance, Risk factor reductions, Impressions, Counseling / Coordination of care, Documenting in the medical record, Reviewing/placing orders in the medical record (including tests, medications, and/or procedures), and Obtaining or reviewing history  .    **Please Note: This note may have been constructed using a voice recognition system.**

## 2025-06-01 NOTE — ASSESSMENT & PLAN NOTE
Anna Marie Barbosa is a 67-year-old female who presents in the ER due to further evaluation of left fourth toe cellulitis ongoing approximately for the last 48 hours.  Per patient report she went to an urgent care and was initiated on antibiotics.  X-ray concerning for osteomyelitis.  On presentation, heart rate greater than 90, CRP 16, no leukocytosis noted.  X-ray at [facility] ER shows persistent soft tissue swelling involving the fourth digit, subtle osteomyelitis/bony destruction involving the dorsal tip cannot be entirely excluded  History of left third toe amputation, site is healed on assessment, the patient follows up with [facility] podiatry, last encounter 4/21/2025  Patient was seen by podiatrist in the ED, admitted under their service possible OR on 6/1/2025/6/2/2025  Start Ancef every 8 hours  Continue with multimodal pain control along with nonpharmacological interventions  Wound care  Elevate extremity  Will defer MRI to primary team  Podiatrist services his primary care  If surgery is required for management regarding her fourth toe on the left foot which became osteomyelitic, she would be medically cleared and be able to proceed with it.

## 2025-06-01 NOTE — ASSESSMENT & PLAN NOTE
"Lab Results   Component Value Date    HGBA1C 5.7 (H) 01/09/2025       No results for input(s): \"POCGLU\" in the last 72 hours.    Blood Sugar Average: Last 72 hrs:      "

## 2025-06-01 NOTE — PHYSICAL THERAPY NOTE
Physical Therapy Cancellation Note:    Per podiatry note of 05/31/25, pt is tenatively scheduled for OR on 06/02/25. Cancel PT services for today and will cont to follow as able postop with updated activity orders, WB status and PT reconsult.

## 2025-06-01 NOTE — ASSESSMENT & PLAN NOTE
Anna Marie Barbosa is a 67-year-old female who presents in the ER due to further evaluation of left fourth toe cellulitis ongoing approximately for the last 48 hours.  Per patient report she went to an urgent care and was initiated on antibiotics.  X-ray concerning for osteomyelitis.  On presentation, heart rate greater than 90, CRP 16, no leukocytosis noted.  X-ray at [facility] ER shows persistent soft tissue swelling involving the fourth digit, subtle osteomyelitis/bony destruction involving the dorsal tip cannot be entirely excluded  History of left third toe amputation, site is healed on assessment, the patient follows up with [facility] podiatry, last encounter 4/21/2025  Patient was seen by podiatrist in the ED, admitted under their service possible OR on 6/1/2025/6/2/2025  Start Ancef every 8 hours  Continue with multimodal pain control along with nonpharmacological interventions  Wound care  Elevate extremity  Will defer MRI to primary team  Podiatrist services his primary care

## 2025-06-02 ENCOUNTER — APPOINTMENT (INPATIENT)
Dept: RADIOLOGY | Facility: HOSPITAL | Age: 60
DRG: 617 | End: 2025-06-02
Payer: COMMERCIAL

## 2025-06-02 PROBLEM — E11.621 DIABETIC ULCER OF TOE OF LEFT FOOT ASSOCIATED WITH TYPE 2 DIABETES MELLITUS, WITH BONE INVOLVEMENT WITHOUT EVIDENCE OF NECROSIS (HCC): Status: RESOLVED | Noted: 2023-10-30 | Resolved: 2025-06-02

## 2025-06-02 PROBLEM — L97.526 DIABETIC ULCER OF TOE OF LEFT FOOT ASSOCIATED WITH TYPE 2 DIABETES MELLITUS, WITH BONE INVOLVEMENT WITHOUT EVIDENCE OF NECROSIS (HCC): Status: RESOLVED | Noted: 2023-10-30 | Resolved: 2025-06-02

## 2025-06-02 LAB
ANION GAP SERPL CALCULATED.3IONS-SCNC: 6 MMOL/L (ref 4–13)
BASOPHILS # BLD AUTO: 0.05 THOUSANDS/ÂΜL (ref 0–0.1)
BASOPHILS NFR BLD AUTO: 1 % (ref 0–1)
BUN SERPL-MCNC: 13 MG/DL (ref 5–25)
CALCIUM SERPL-MCNC: 9.3 MG/DL (ref 8.4–10.2)
CHLORIDE SERPL-SCNC: 104 MMOL/L (ref 96–108)
CO2 SERPL-SCNC: 26 MMOL/L (ref 21–32)
CREAT SERPL-MCNC: 0.64 MG/DL (ref 0.6–1.3)
EOSINOPHIL # BLD AUTO: 0.14 THOUSAND/ÂΜL (ref 0–0.61)
EOSINOPHIL NFR BLD AUTO: 3 % (ref 0–6)
ERYTHROCYTE [DISTWIDTH] IN BLOOD BY AUTOMATED COUNT: 15.1 % (ref 11.6–15.1)
GFR SERPL CREATININE-BSD FRML MDRD: 97 ML/MIN/1.73SQ M
GLUCOSE SERPL-MCNC: 81 MG/DL (ref 65–140)
HCT VFR BLD AUTO: 36.5 % (ref 34.8–46.1)
HGB BLD-MCNC: 11.6 G/DL (ref 11.5–15.4)
IMM GRANULOCYTES # BLD AUTO: 0.02 THOUSAND/UL (ref 0–0.2)
IMM GRANULOCYTES NFR BLD AUTO: 0 % (ref 0–2)
LYMPHOCYTES # BLD AUTO: 1.94 THOUSANDS/ÂΜL (ref 0.6–4.47)
LYMPHOCYTES NFR BLD AUTO: 40 % (ref 14–44)
MCH RBC QN AUTO: 27 PG (ref 26.8–34.3)
MCHC RBC AUTO-ENTMCNC: 31.8 G/DL (ref 31.4–37.4)
MCV RBC AUTO: 85 FL (ref 82–98)
MONOCYTES # BLD AUTO: 0.62 THOUSAND/ÂΜL (ref 0.17–1.22)
MONOCYTES NFR BLD AUTO: 13 % (ref 4–12)
NEUTROPHILS # BLD AUTO: 2.13 THOUSANDS/ÂΜL (ref 1.85–7.62)
NEUTS SEG NFR BLD AUTO: 43 % (ref 43–75)
NRBC BLD AUTO-RTO: 0 /100 WBCS
PLATELET # BLD AUTO: 203 THOUSANDS/UL (ref 149–390)
PMV BLD AUTO: 12.8 FL (ref 8.9–12.7)
POTASSIUM SERPL-SCNC: 4.3 MMOL/L (ref 3.5–5.3)
RBC # BLD AUTO: 4.29 MILLION/UL (ref 3.81–5.12)
SODIUM SERPL-SCNC: 136 MMOL/L (ref 135–147)
WBC # BLD AUTO: 4.9 THOUSAND/UL (ref 4.31–10.16)

## 2025-06-02 PROCEDURE — 88311 DECALCIFY TISSUE: CPT | Performed by: PATHOLOGY

## 2025-06-02 PROCEDURE — 28010 INCISION OF TOE TENDON: CPT | Performed by: PODIATRIST

## 2025-06-02 PROCEDURE — NC001 PR NO CHARGE: Performed by: PODIATRIST

## 2025-06-02 PROCEDURE — 85025 COMPLETE CBC W/AUTO DIFF WBC: CPT | Performed by: FAMILY MEDICINE

## 2025-06-02 PROCEDURE — 28825 PARTIAL AMPUTATION OF TOE: CPT | Performed by: PODIATRIST

## 2025-06-02 PROCEDURE — 99233 SBSQ HOSP IP/OBS HIGH 50: CPT | Performed by: INTERNAL MEDICINE

## 2025-06-02 PROCEDURE — 0LNW0ZZ RELEASE LEFT FOOT TENDON, OPEN APPROACH: ICD-10-PCS | Performed by: PODIATRIST

## 2025-06-02 PROCEDURE — 80048 BASIC METABOLIC PNL TOTAL CA: CPT | Performed by: FAMILY MEDICINE

## 2025-06-02 PROCEDURE — 88305 TISSUE EXAM BY PATHOLOGIST: CPT | Performed by: PATHOLOGY

## 2025-06-02 PROCEDURE — 0Y6W0Z0 DETACHMENT AT LEFT 4TH TOE, COMPLETE, OPEN APPROACH: ICD-10-PCS | Performed by: PODIATRIST

## 2025-06-02 PROCEDURE — 73630 X-RAY EXAM OF FOOT: CPT

## 2025-06-02 RX ORDER — LIDOCAINE HYDROCHLORIDE 10 MG/ML
INJECTION, SOLUTION EPIDURAL; INFILTRATION; INTRACAUDAL; PERINEURAL AS NEEDED
Status: DISCONTINUED | OUTPATIENT
Start: 2025-06-02 | End: 2025-06-02 | Stop reason: HOSPADM

## 2025-06-02 RX ORDER — BUPIVACAINE HYDROCHLORIDE 2.5 MG/ML
INJECTION, SOLUTION EPIDURAL; INFILTRATION; INTRACAUDAL; PERINEURAL AS NEEDED
Status: DISCONTINUED | OUTPATIENT
Start: 2025-06-02 | End: 2025-06-02 | Stop reason: HOSPADM

## 2025-06-02 RX ADMIN — CEFAZOLIN SODIUM 2000 MG: 2 SOLUTION INTRAVENOUS at 13:13

## 2025-06-02 RX ADMIN — PREGABALIN 200 MG: 75 CAPSULE ORAL at 21:12

## 2025-06-02 RX ADMIN — FLUTICASONE PROPIONATE 1 SPRAY: 50 SPRAY, METERED NASAL at 08:33

## 2025-06-02 RX ADMIN — AMOXICILLIN AND CLAVULANATE POTASSIUM 1 TABLET: 875; 125 TABLET, COATED ORAL at 21:12

## 2025-06-02 RX ADMIN — CARISOPRODOL 350 MG: 350 TABLET ORAL at 21:12

## 2025-06-02 RX ADMIN — HEPARIN SODIUM 5000 UNITS: 5000 INJECTION INTRAVENOUS; SUBCUTANEOUS at 21:13

## 2025-06-02 RX ADMIN — PREGABALIN 200 MG: 75 CAPSULE ORAL at 08:31

## 2025-06-02 RX ADMIN — CEFAZOLIN SODIUM 2000 MG: 2 SOLUTION INTRAVENOUS at 05:33

## 2025-06-02 RX ADMIN — NYSTATIN: 100000 POWDER TOPICAL at 17:44

## 2025-06-02 RX ADMIN — OXYCODONE HYDROCHLORIDE 40 MG: 40 TABLET, FILM COATED, EXTENDED RELEASE ORAL at 21:12

## 2025-06-02 RX ADMIN — FLUTICASONE FUROATE AND VILANTEROL TRIFENATATE 1 PUFF: 200; 25 POWDER RESPIRATORY (INHALATION) at 08:33

## 2025-06-02 RX ADMIN — OXYCODONE HYDROCHLORIDE 40 MG: 40 TABLET, FILM COATED, EXTENDED RELEASE ORAL at 09:54

## 2025-06-02 RX ADMIN — OXYCODONE HYDROCHLORIDE 10 MG: 10 TABLET ORAL at 17:44

## 2025-06-02 RX ADMIN — DOCUSATE SODIUM 100 MG: 100 CAPSULE, LIQUID FILLED ORAL at 17:44

## 2025-06-02 RX ADMIN — FOLIC ACID 1000 MCG: 1 TABLET ORAL at 08:31

## 2025-06-02 RX ADMIN — CARISOPRODOL 350 MG: 350 TABLET ORAL at 08:32

## 2025-06-02 RX ADMIN — BUPROPION HYDROCHLORIDE 300 MG: 150 TABLET, EXTENDED RELEASE ORAL at 08:30

## 2025-06-02 RX ADMIN — OXYCODONE HYDROCHLORIDE 10 MG: 10 TABLET ORAL at 06:33

## 2025-06-02 NOTE — ASSESSMENT & PLAN NOTE
Postoperative day #0 from 4th toe partial to complete amputation. 5th toe flexor tenotomy.   Clinical osteomyelitis with probe to bone  Continue pain control  Assumed surgical cure, will confer with orthopedics on postoperative antibiotic  Discontinue Ancef and start Augmentin given wound culture with Enterococcus and Staph aureus

## 2025-06-02 NOTE — ASSESSMENT & PLAN NOTE
"Lab Results   Component Value Date    HGBA1C 5.7 (H) 01/09/2025       No results for input(s): \"POCGLU\" in the last 72 hours.    Blood Sugar Average: Last 72 hrs:    Home regimen reviewed. Hold Metformin if applicable.  Start SSI and Basal bolus protocol  "

## 2025-06-02 NOTE — PLAN OF CARE
Problem: PAIN - ADULT  Goal: Verbalizes/displays adequate comfort level or baseline comfort level  Description: Interventions:  - Encourage patient to monitor pain and request assistance  - Assess pain using appropriate pain scale  - Administer analgesics as ordered based on type and severity of pain and evaluate response  - Implement non-pharmacological measures as appropriate and evaluate response  - Consider cultural and social influences on pain and pain management  - Notify physician/advanced practitioner if interventions unsuccessful or patient reports new pain  - Educate patient/family on pain management process including their role and importance of  reporting pain   - Provide non-pharmacologic/complimentary pain relief interventions  Outcome: Progressing     Problem: INFECTION - ADULT  Goal: Absence or prevention of progression during hospitalization  Description: INTERVENTIONS:  - Assess and monitor for signs and symptoms of infection  - Monitor lab/diagnostic results  - Monitor all insertion sites, i.e. indwelling lines, tubes, and drains  - Monitor endotracheal if appropriate and nasal secretions for changes in amount and color  - [place] appropriate cooling/warming therapies per order  - Administer medications as ordered  - Instruct and encourage patient and family to use good hand hygiene technique  - Identify and instruct in appropriate isolation precautions for identified infection/condition  Outcome: Progressing  Goal: Absence of fever/infection during neutropenic period  Description: INTERVENTIONS:  - Monitor WBC  - Perform strict hand hygiene  - Limit to healthy visitors only  - No plants, dried, fresh or silk flowers with gonsales in patient room  Outcome: Progressing     Problem: SAFETY ADULT  Goal: Patient will remain free of falls  Description: INTERVENTIONS:  - Educate patient/family on patient safety including physical limitations  - Instruct patient to call for assistance with activity   -  Consider consulting OT/PT to assist with strengthening/mobility based on AM PAC & JH-HLM score  - Consult OT/PT to assist with strengthening/mobility   - Keep Call bell within reach  - Keep bed low and locked with side rails adjusted as appropriate  - Keep care items and personal belongings within reach  - Initiate and maintain comfort rounds  - Make Fall Risk Sign visible to staff  - Offer Toileting every 2 Hours, in advance of need  - Initiate/Maintain bed alarm  - Obtain necessary fall risk management equipment: call bell, bed alarm,  socks  - Apply yellow socks and bracelet for high fall risk patients  - Consider moving patient to room near nurses station  Outcome: Progressing  Goal: Maintain or return to baseline ADL function  Description: INTERVENTIONS:  -  Assess patient's ability to carry out ADLs; assess patient's baseline for ADL function and identify physical deficits which impact ability to perform ADLs (bathing, care of mouth/teeth, toileting, grooming, dressing, etc.)  - Assess/evaluate cause of self-care deficits   - Assess range of motion  - Assess patient's mobility; develop plan if impaired  - Assess patient's need for assistive devices and provide as appropriate  - Encourage maximum independence but intervene and supervise when necessary  - Involve family in performance of ADLs  - Assess for home care needs following discharge   - Consider OT consult to assist with ADL evaluation and planning for discharge  - Provide patient education as appropriate  - Monitor functional capacity and physical performance, use of AM PAC & JH-HLM   - Monitor gait, balance and fatigue with ambulation    Outcome: Progressing  Goal: Maintains/Returns to pre admission functional level  Description: INTERVENTIONS:  - Perform AM-PAC 6 Click Basic Mobility/ Daily Activity assessment daily.  - Set and communicate daily mobility goal to care team and patient/family/caregiver.   - Collaborate with rehabilitation services  on mobility goals if consulted  - Perform Range of Motion 3 times a day.  - Reposition patient every 2 hours.  - Dangle patient 3 times a day  - Stand patient 3 times a day  - Ambulate patient 3 times a day  - Out of bed to chair 3 times a day   - Out of bed for meals 3 times a day  - Out of bed for toileting  - Record patient progress and toleration of activity level   Outcome: Progressing     Problem: DISCHARGE PLANNING  Goal: Discharge to home or other facility with appropriate resources  Description: INTERVENTIONS:  - Identify barriers to discharge w/patient and caregiver  - Arrange for needed discharge resources and transportation as appropriate  - Identify discharge learning needs (meds, wound care, etc.)  - Arrange for interpretive services to assist at discharge as needed  - Refer to Case Management Department for coordinating discharge planning if the patient needs post-hospital services based on physician/advanced practitioner order or complex needs related to functional status, cognitive ability, or social support system  Outcome: Progressing     Problem: Knowledge Deficit  Goal: Patient/family/caregiver demonstrates understanding of disease process, treatment plan, medications, and discharge instructions  Description: Complete learning assessment and assess knowledge base.  Interventions:  - Provide teaching at level of understanding  - Provide teaching via preferred learning methods  Outcome: Progressing

## 2025-06-02 NOTE — PLAN OF CARE
Problem: PAIN - ADULT  Goal: Verbalizes/displays adequate comfort level or baseline comfort level  Description: Interventions:  - Encourage patient to monitor pain and request assistance  - Assess pain using appropriate pain scale  - Administer analgesics as ordered based on type and severity of pain and evaluate response  - Implement non-pharmacological measures as appropriate and evaluate response  - Consider cultural and social influences on pain and pain management  - Notify physician/advanced practitioner if interventions unsuccessful or patient reports new pain  - Educate patient/family on pain management process including their role and importance of  reporting pain   - Provide non-pharmacologic/complimentary pain relief interventions  Outcome: Progressing     Problem: INFECTION - ADULT  Goal: Absence or prevention of progression during hospitalization  Description: INTERVENTIONS:  - Assess and monitor for signs and symptoms of infection  - Monitor lab/diagnostic results  - Monitor all insertion sites, i.e. indwelling lines, tubes, and drains  - Monitor endotracheal if appropriate and nasal secretions for changes in amount and color  - [place] appropriate cooling/warming therapies per order  - Administer medications as ordered  - Instruct and encourage patient and family to use good hand hygiene technique  - Identify and instruct in appropriate isolation precautions for identified infection/condition  Outcome: Progressing  Goal: Absence of fever/infection during neutropenic period  Description: INTERVENTIONS:  - Monitor WBC  - Perform strict hand hygiene  - Limit to healthy visitors only  - No plants, dried, fresh or silk flowers with gonsales in patient room  Outcome: Progressing     Problem: SAFETY ADULT  Goal: Patient will remain free of falls  Description: INTERVENTIONS:  - Educate patient/family on patient safety including physical limitations  - Instruct patient to call for assistance with activity   -  Consider consulting OT/PT to assist with strengthening/mobility based on AM PAC & JH-HLM score  - Consult OT/PT to assist with strengthening/mobility   - Keep Call bell within reach  - Keep bed low and locked with side rails adjusted as appropriate  - Keep care items and personal belongings within reach  - Initiate and maintain comfort rounds  - Make Fall Risk Sign visible to staff  - Offer Toileting every 2 Hours, in advance of need  - Initiate/Maintain bed/chair alarm  - Obtain necessary fall risk management equipment  - Apply yellow socks and bracelet for high fall risk patients  - Consider moving patient to room near nurses station  Outcome: Progressing  Goal: Maintain or return to baseline ADL function  Description: INTERVENTIONS:  -  Assess patient's ability to carry out ADLs; assess patient's baseline for ADL function and identify physical deficits which impact ability to perform ADLs (bathing, care of mouth/teeth, toileting, grooming, dressing, etc.)  - Assess/evaluate cause of self-care deficits   - Assess range of motion  - Assess patient's mobility; develop plan if impaired  - Assess patient's need for assistive devices and provide as appropriate  - Encourage maximum independence but intervene and supervise when necessary  - Involve family in performance of ADLs  - Assess for home care needs following discharge   - Consider OT consult to assist with ADL evaluation and planning for discharge  - Provide patient education as appropriate  - Monitor functional capacity and physical performance, use of AM PAC & JH-HLM   - Monitor gait, balance and fatigue with ambulation    Outcome: Progressing  Goal: Maintains/Returns to pre admission functional level  Description: INTERVENTIONS:  - Perform AM-PAC 6 Click Basic Mobility/ Daily Activity assessment daily.  - Set and communicate daily mobility goal to care team and patient/family/caregiver.   - Collaborate with rehabilitation services on mobility goals if  consulted  - Perform Range of Motion 3 times a day.  - Reposition patient every 3 hours.  - Dangle patient 3 times a day  - Stand patient 3 times a day  - Ambulate patient 3 times a day  - Out of bed to chair 3 times a day   - Out of bed for meals 3 times a day  - Out of bed for toileting  - Record patient progress and toleration of activity level   Outcome: Progressing     Problem: DISCHARGE PLANNING  Goal: Discharge to home or other facility with appropriate resources  Description: INTERVENTIONS:  - Identify barriers to discharge w/patient and caregiver  - Arrange for needed discharge resources and transportation as appropriate  - Identify discharge learning needs (meds, wound care, etc.)  - Arrange for interpretive services to assist at discharge as needed  - Refer to Case Management Department for coordinating discharge planning if the patient needs post-hospital services based on physician/advanced practitioner order or complex needs related to functional status, cognitive ability, or social support system  Outcome: Progressing     Problem: Knowledge Deficit  Goal: Patient/family/caregiver demonstrates understanding of disease process, treatment plan, medications, and discharge instructions  Description: Complete learning assessment and assess knowledge base.  Interventions:  - Provide teaching at level of understanding  - Provide teaching via preferred learning methods  Outcome: Progressing

## 2025-06-02 NOTE — PHYSICAL THERAPY NOTE
Physical Therapy Cancellation Note:    Patient to go to OR today, 6/2/25, for Left 4th toe amputation and 5th digit flexor tenotomy with Dr. Ruff. Cancel PT services for today. Postop please updated WB status, reconsult PT services as needed and update activity orders.

## 2025-06-02 NOTE — PROGRESS NOTES
"Podiatry - Progress Note  Patient: Anna Marie Barbosa 59 y.o. female   MRN: [id number]  PCP: Magdalena Grove MD  Unit/Bed#: E2 -01 Encounter: [id number]  Date Of Visit: 25    ASSESSMENT:    Anna Marie Barbosa is a 59 y.o. female with:    Left 4th toe wound probe to bone  Left 4th toe cellulitis  Type 2 diabetes with last A1c 5.7% collected on 25  Diabetes polyneuropathy  Chronic pain with opioid tolerance  History of toe amputation      PLAN:    Patient to go to OR today, 25, for Left 4th toe amputation and 5th digit flexor tenotomy with Dr. Ruff.  Consent reviewed and signed with patient at bedside.   Confirmed NPO status.  H&P, vitals, and current labs reviewed. No acute changes noted.  Alternatives, risks, and complications discussed with patient.  All questions answered. No guarantees given to outcome of procedure.  Elevation and offloading on green foam wedges or pillows when non-ambulatory.  Appreciate consulting services for recommendations and management.     Antibiotics started: Ancef  Pharmacologic VTE Prophylaxis: Heparin   Mechanical VTE Prophylaxis: sequential compression device   Weightbearing status: Weightbearing to heel left  foot in surgical shoe     Disposition:  Patient will need continuous hospital stay for the reasons above.    SUBJECTIVE:     The patient was seen, evaluated, and assessed at bedside today. The patient was awake, alert, and in no acute distress. No acute events overnight. The patient reports feeling anxious about surgery, but otherwise well. Patient denies N/V/F/chills/SOB/CP.      OBJECTIVE:     Vitals:   /75 (BP Location: Right arm)   Pulse 75   Temp (!) 97.2 °F (36.2 °C) (Temporal)   Resp 19   Ht 5' 8\" (1.727 m)   Wt 114 kg (251 lb 5.2 oz)   SpO2 92%   BMI 38.21 kg/m²     Temp (24hrs), Av.6 °F (36.4 °C), Min:97.2 °F (36.2 °C), Max:97.9 °F (36.6 °C)      Physical Exam:     Lungs: Non labored breathing  Abdomen: Soft, non-tender.  Lower " "Extremity:  Cardiovascular status at baseline from admission.  Neurological status at baseline from admission.  Musculoskeletal status at baseline from admission. No calf tenderness noted.     LLE: Dressings clean, dry, and intact to OR.     Clinical Images 06/02/25:  None     Additional Data:     Labs:    Results from last 7 days   Lab Units 06/02/25  0524   WBC Thousand/uL 4.90   HEMOGLOBIN g/dL 11.6   HEMATOCRIT % 36.5   PLATELETS Thousands/uL 203   SEGS PCT % 43   LYMPHO PCT % 40   MONO PCT % 13*   EOS PCT % 3     Results from last 7 days   Lab Units 06/02/25  0524 06/01/25  0509 05/31/25  1853   POTASSIUM mmol/L 4.3   < > 4.0   CHLORIDE mmol/L 104   < > 105   CO2 mmol/L 26   < > 24   BUN mg/dL 13   < > 10   CREATININE mg/dL 0.64   < > 0.62   CALCIUM mg/dL 9.3   < > 9.3   ALK PHOS U/L  --   --  91   ALT U/L  --   --  11   AST U/L  --   --  17    < > = values in this interval not displayed.           * I Have Reviewed All Lab Data Listed Above.    Recent Cultures (last 7 days):     Results from last 7 days   Lab Units 05/31/25  1853   GRAM STAIN RESULT  No Polys or Bacteria seen           Imaging: I have personally reviewed pertinent films in PACS  EKG, Pathology, and Other Studies: I have personally reviewed pertinent reports.    ** Please Note: Portions of the record may have been created with voice recognition software. Occasional wrong word or \"sound a like\" substitutions may have occurred due to the inherent limitations of voice recognition software. Read the chart carefully and recognize, using context, where substitutions have occurred. **      "

## 2025-06-02 NOTE — PROGRESS NOTES
"Progress Note - Hospitalist   Name: Anna Marie Barbosa 59 y.o. female I MRN: [id number]  Unit/Bed#: E2 -01 I Date of Admission: 5/31/2025   Date of Service: 6/2/2025 I Hospital Day: 2    Assessment & Plan  Cellulitis of fourth toe of left foot  Postoperative day #0 from 4th toe partial to complete amputation. 5th toe flexor tenotomy.   Clinical osteomyelitis with probe to bone  Continue pain control  Assumed surgical cure, will confer with orthopedics on postoperative antibiotic  Discontinue Ancef and start Augmentin given wound culture with Enterococcus and Staph aureus  Diabetic polyneuropathy associated with type 2 diabetes mellitus (HCC)  Lab Results   Component Value Date    HGBA1C 5.7 (H) 01/09/2025       No results for input(s): \"POCGLU\" in the last 72 hours.    Blood Sugar Average: Last 72 hrs:    Home regimen reviewed. Hold Metformin if applicable.  Start SSI and Basal bolus protocol  Opioid dependence with other opioid-induced disorder (HCC)  Reports chronic back and knee pain  Follows with  for pain management  On MS Contin ER 15 mg and MS Contin ER 60 mg, medications are on hold due to medication not being available in our pharmacy as of now  Continue with OxyContin 10 mg every 12 hours, OxyContin 40 mg every 12 hours for now  Recommend outpatient review of patient's home medication  Chronic pain disorder  Continue, MS Contin auto substituted OxyContin by surgical service  Essential hypertension  Consider ACE inhibitor as an outpatient given history of diabetes mellitus            Hospital Course:     59-year-old female patient presenting with diabetic foot infection concerning for osteomyelitis status post operative repair.  Wound culture demonstrating Staph aureus Enterococcus    Assessment:      Principal Problem:    Cellulitis of fourth toe of left foot  Active Problems:    Opioid dependence with other opioid-induced disorder (HCC)    Essential hypertension    Diabetic polyneuropathy " associated with type 2 diabetes mellitus (HCC)    Chronic pain disorder      Plan:    Change antibiotic to Augmentin  Await wound care change       VTE Pharmacologic Prophylaxis:   Pharmacologic: Heparin  Mechanical VTE Prophylaxis in Place: Yes    AM-PAC Basic Mobility:  Basic Mobility Inpatient Raw Score: 24    JH-HLM Achieved: 8: Walk 250 feet ot more  JH-HLM Goal: 8: Walk 250 feet or more    HLM Goal listed above. Continue with multidisciplinary rounding and encourage appropriate mobility to improve upon HLM goals.         Patient Centered Rounds: Case discussed and reviewed with nursing    Discussions with Specialists or Other Care Team Provider: Case management    Education and Discussions with Family / Patient: Discussed with patient and  at bed    Time Spent for Care: 80 minutes.  More than 50% of total time spent on counseling and coordination of care as described above.    Current Length of Stay: 2 day(s)    Current Patient Status: Inpatient   Certification Statement: The patient will continue to require additional inpatient hospital stay due to wound care change, antibiotics,    Discharge Plan / Estimated Discharge Date:  discharge tomorrow    Code Status: Level 1 - Full Code      Subjective:   Seen and examined, no acute complaints.  No nausea vomiting, no abdominal pain    A complete and comprehensive 14 point organ system review has been performed and all other systems are negative other than stated above.    Objective:     Vitals:   Temp (24hrs), Av.4 °F (36.3 °C), Min:97.2 °F (36.2 °C), Max:97.6 °F (36.4 °C)    Temp:  [97.2 °F (36.2 °C)-97.6 °F (36.4 °C)] 97.2 °F (36.2 °C)  HR:  [75-84] 80  Resp:  [19-22] 20  BP: (103-137)/(72-78) 137/78  SpO2:  [92 %-97 %] 94 %  Body mass index is 38.21 kg/m².     Input and Output Summary (last 24 hours):       Intake/Output Summary (Last 24 hours) at 2025 1901  Last data filed at 2025 1814  Gross per 24 hour   Intake 240 ml   Output --   Net 240  ml       Physical Exam:     General: well appearing, no acute distress  HEENT: atraumatic, PERRLA, moist mucosa, normal pharynx, normal tonsils and adenoids, normal tongue, no fluid in sinuses  Neck: Trachea midline, no carotid bruit, no masses  Respiratory: normal chest wall expansion, CTA B, no r/r/w, no rubs  Cardiovascular: RRR, no m/r/g, Normal S1 and S2  Abdomen: Soft, non-tender, non-distended, normal bowel sounds in all quadrants, no hepatosplenomegaly, no tympany  Rectal: deferred  Musculoskeletal: normal ROM in upper and lower extremities  Integumentary: warm, dry, and pink, with no rash, purpura, or petechia  Heme/Lymph: no lymphadenopathy, no bruises  Neurological: Cranial Nerves II-XII grossly intact  Psychiatric: cooperative with normal mood, affect, and cognition      Additional Data:     Labs:    Results from last 7 days   Lab Units 06/02/25  0524   WBC Thousand/uL 4.90   HEMOGLOBIN g/dL 11.6   HEMATOCRIT % 36.5   PLATELETS Thousands/uL 203   SEGS PCT % 43   LYMPHO PCT % 40   MONO PCT % 13*   EOS PCT % 3     Results from last 7 days   Lab Units 06/02/25  0524 06/01/25  0509 05/31/25  1853   POTASSIUM mmol/L 4.3   < > 4.0   CHLORIDE mmol/L 104   < > 105   CO2 mmol/L 26   < > 24   BUN mg/dL 13   < > 10   CREATININE mg/dL 0.64   < > 0.62   CALCIUM mg/dL 9.3   < > 9.3   ALK PHOS U/L  --   --  91   ALT U/L  --   --  11   AST U/L  --   --  17    < > = values in this interval not displayed.           * I Have Reviewed All Lab Data Listed Above.  * Additional Pertinent Lab Tests Reviewed: All Labs For Current Hospital Admission Reviewed      Lines/Drains:  Invasive Devices       Peripheral Intravenous Line  Duration             Peripheral IV 05/31/25 Right;Ventral (anterior) Forearm 2 days              Airway  Duration             Non-Surgical Airway Oral pharyngeal airway 100 mm 224 days                          Imaging:  Imaging Reports Reviewed Today Include:   XR toe fourth min 2 views LEFT  Result Date:  5/31/2025  Impression: Persistent soft tissue swelling involving the fourth digit. Assessment of the distal phalanx is limited due to the hammertoe deformity. Subtle osteomyelitis/bony destruction involving the dorsal tip cannot be entirely excluded. Further evaluation by MRI should be considered. Computerized Assisted Algorithm (CAA) may have been used to analyze all applicable images. Workstation performed: [id number]       Telemetry:       Recent Cultures (last 7 days):   Results from last 7 days   Lab Units 05/31/25  4506   GRAM STAIN RESULT  No Polys or Bacteria seen   WOUND CULTURE  2+ Growth of Staphylococcus aureus*  2+ Growth of Enterococcus faecalis*  2+ Growth of       Last 24 Hours Medication List:   Current Facility-Administered Medications   Medication Dose Route Frequency Provider Last Rate    albuterol  2 puff Inhalation Q4H PRN Brad Tobias DPM      amoxicillin-clavulanate  1 tablet Oral Q12H [facility] Greg Isbell,       buPROPion  300 mg Oral QAM Brad Tobias DPM      calcium carbonate  1,000 mg Oral Daily PRN Brad Tobias DPM      carisoprodol  350 mg Oral BID Brad Tobias DPM      diazepam  5 mg Oral HS PRN Brad Tobias DPM      docusate sodium  100 mg Oral BID Brad Tobias DPM      fluticasone  1 spray Nasal Daily Brad Tobias DPM      fluticasone-vilanterol  1 puff Inhalation Daily Brad Tobias DPM      folic acid  1,000 mcg Oral Daily Brad Tobias DPM      heparin (porcine)  5,000 Units Subcutaneous Q8H [facility] Brad Tobias DPM      naloxone  0.04 mg Intravenous Q1MIN PRN Brad Tobias DPM      nystatin   Topical BID Brad Tobias DPM      ondansetron  4 mg Intravenous Q6H PRN Brad Tobias DPM      oxyCODONE  40 mg Oral Q12H [facility] Brad Tobias DPM      oxyCODONE  10 mg Oral Q8H PRN Brad Tobias DPM      pravastatin  40 mg Oral Every Other Day Brad Tobias  DPM      pregabalin  200 mg Oral BID Brad Tobias, DPM      trimethobenzamide  200 mg Intramuscular Q6H PRN Brad Tobias DPM         AM-PAC Basic Mobility:  Basic Mobility Inpatient Raw Score: 24    JH-HLM Achieved: 8: Walk 250 feet ot more  JH-HLM Goal: 8: Walk 250 feet or more    HLM Goal listed above. Continue with multidisciplinary rounding and encourage appropriate mobility to improve upon HLM goals.     Today, Patient Was Seen By: Greg Isbell DO    ** Please Note: This note was completed in part utilizing [employer] software dictation.  Grammatical errors, random word insertions, spelling mistakes, and incomplete sentences may be an occasional consequence of this system secondary to software limitations, ambient noise, and hardware issues.  If you have any questions or concerns about the content, text, or information contained within the body of this dictation, please contact the provider for clarification. **

## 2025-06-02 NOTE — OCCUPATIONAL THERAPY NOTE
Occupational Therapy         Patient Name: Anna Marie Barbosa  Today's Date: 6/2/2025 06/02/25 1412   OT Last Visit   OT Visit Date 06/02/25   Note Type   Note type Cancelled Session   Cancel Reasons Patient to operating room   Additional Comments Will continue to follow and treat post op.     Tracy Osorio, OT

## 2025-06-02 NOTE — ASSESSMENT & PLAN NOTE
Continue, MS Contin auto substituted OxyContin by surgical service   Patient was diagnosed with diabetes about 8 years ago. He currently takes metformin 1000 mg twice daily. He checks his blood sugars occasionally at various times throughout the day, and states that they are never higher than 160. He has never required insulin. He states that his last hemoglobin A1c showed his diabetes was well controlled, but he does not remember what the number was.

## 2025-06-02 NOTE — OP NOTE
OPERATIVE REPORT - Podiatry  PATIENT NAME: Anna Marie Barbosa    :  1965  MRN: [id number]  Pt Location: [facility] OR ROOM 06    SURGERY DATE: 2025    Surgeons and Role:     * Fabian Ruff DPM - Primary     * Brad Tobias DPM - Assisting    Pre-op Diagnosis:  Osteomyelitis of fourth toe of left foot (HCC) [M86.9]    Post-Op Diagnosis Codes:     * Osteomyelitis of fourth toe of left foot (HCC) [M86.9]    Procedure(s) (LRB):  AMPUTATION TOE (Left)    Specimen(s):  ID Type Source Tests Collected by Time Destination   1 : left 4th toe Tissue Foot, Left TISSUE EXAM Fabian Ruff DPM 2025 1533        Estimated Blood Loss:   Minimal    Drains:  * No LDAs found *    Anesthesia Type:   Local with 10 ml of 0.5% Bupivacaine and 10 ml of 1% Lidocaine.     Hemostasis:  - Atraumatic technique   - Manual compression     Materials:  * No implants in log *    Operative Findings:  - Consistent with diagnosis. Amputation of the left 4th digit at the PIPJ. 5th digit percutaneous flexor tenotomy.     Complications:   None    Procedure and Technique:     The patient was brought into the operating room and remained on their stretcher in the supine position. A universal timeout was performed where all parties are in agreement of correct patient, correct procedure and correct site. A local block was performed consisting of 10 ml of 0.5% Bupivacaine. The foot was then prepped and draped in the usual aseptic manner.     Amputation 4th digit:  Attention was then directed to the right foot, using a 15-blade a fish-mouth incision was made at the 4th digit distal to the PIPJ. Dissection was carried deep to the level of the PIPJ and the digit was disarticulated and passed off of the surgical field to be sent routine to pathology. No purulent drainage or sinus tracts encountered. Surgical site was then thoroughly irrigated with NSS. Proximal phalanx head appears hard and intact, without signs of infection. Skin edges  "reapproximated and closure obtained with Prolene suture in simple interrupted technique.     Flexor tenotomy 5th digit:  Attention was then directed to the 5th digit, using a 15-blade a percutaneous release of the flexor tendon was performed from the plantar aspect of the digit. Adequate release was confirmed via manual manipulation of the toe.     The foot was then cleansed and dried, dressing applied consisting of Betadine soaked Adaptic, 4x4 gauze, Kerlix, and ACE bandage.      The patient tolerated the procedure and anesthesia well without immediate complications and transferred to PACU with vital signs stable.     As with many limb salvage procedures, we contemplate the possibility of performing further stages to this procedure. Procedures may include debridements, delayed closure, plastic surgery techniques, or more proximal amputations. This procedure may be considered part of a multi-staged limb salvage treatment plan.     Dr. Ruff was present during the entire procedure and participated in all key aspects.    SIGNATURE: Brad Tobias DPM  DATE: June 2, 2025  TIME: 4:03 PM      Portions of the record may have been created with voice recognition software. Occasional wrong word or \"sound a like\" substitutions may have occurred due to the inherent limitations of voice recognition software. Read the chart carefully and recognize, using context, where substitutions have occurred.      "

## 2025-06-02 NOTE — UTILIZATION REVIEW
Initial Clinical Review    Admission: Date/Time/Statement:   Admission Orders (From admission, onward)       Ordered        05/31/25 2046  INPATIENT ADMISSION  Once                          Orders Placed This Encounter   Procedures    INPATIENT ADMISSION     Standing Status:   Standing     Number of Occurrences:   1     Level of Care:   Med Surg [16]     Estimated length of stay:   More than 2 Midnights     Certification:   I certify that inpatient services are medically necessary for this patient for a duration of greater than two midnights. See H&P and MD Progress Notes for additional information about the patient's course of treatment.     ED Arrival Information       Expected   -    Arrival   5/31/2025 17:05    Acuity   Urgent              Means of arrival   Walk-In    Escorted by   Spouse    Service   Podiatry    Admission type   Emergency              Arrival complaint   left foot problem             Chief Complaint   Patient presents with    Toe Swelling     Pt c/o left 4th toe swelling, seen yesterday at urgent care when she noticed there was some bleeding from the toe, states that she was called back and told that her xray was concerning for osteomyelitis, spoke with her podiatrist and was told to come to ED for follow up. Is taking PO cefadroxil, has had a dose last night and today.        Initial Presentation: 59 y.o. female presents to ED from home with wound left foot 4th toe, probes to bone.   Seen at Urgent Care the day prior to this after finding bloody drainage in sock.  Wound  dressed and given antibiotics.  Now with worsening erythema  and edema.   Osteo  noted on imaging.   PMH  is  DM2, chronic pain with opioid tolerance,  prior toe amputation and diabetic polyneuropathy.  Admit  Ip with Left 4th toe wound  probe to bone, Left 4th toe cellulitis and plan is  IM consult, TONY, continue home meds and  surgical intervention, timing  TBD.                  IM consult  Start  TONY and wound care.   Continue pain  control.     Anticipated Length of Stay/Certification Statement:  > 2 midnights  Patient will be admitted on an inpatient basis with an anticipated length of stay of greater than 2 midnights secondary to cellulitis of fourth toe, left foot, possible potation's/OR, antibiotic treatment.       Date:   6/1   Day 2:   Remains on  TONY and continue local wound care.  Not meeting  SIRS  criteria.  Can be  WB to heel left foot in surgical shoe.Medically cleared if surgery indicated.    Date:   6/2  Day 3: Has surpassed a 2nd midnight with active treatments and services.  SURGERY     Procedure(s) (LRB):  AMPUTATION TOE (Left)    Operative Findings:  - Consistent with diagnosis. Amputation of the left 4th digit at the PIPJ. 5th digit percutaneous flexor tenotomy.          ED Treatment-Medication Administration from 05/31/2025 1705 to 05/31/2025 2159         Date/Time Order Dose Route Action     05/31/2025 1855 sodium chloride 0.9 % bolus 1,000 mL 1,000 mL Intravenous New Bag     05/31/2025 2055 ceFAZolin (ANCEF) IVPB (premix in dextrose) 2,000 mg 50 mL 2,000 mg Intravenous New Bag     05/31/2025 2058 oxyCODONE (ROXICODONE) immediate release tablet 10 mg 10 mg Oral Given     05/31/2025 2142 ondansetron (ZOFRAN) injection 4 mg 4 mg Intravenous Given            Scheduled Medications:  buPROPion, 300 mg, Oral, QAM  carisoprodol, 350 mg, Oral, BID  cefazolin, 2,000 mg, Intravenous, Q8H  docusate sodium, 100 mg, Oral, BID  fluticasone, 1 spray, Nasal, Daily  fluticasone-vilanterol, 1 puff, Inhalation, Daily  folic acid, 1,000 mcg, Oral, Daily  heparin (porcine), 5,000 Units, Subcutaneous, Q8H BOLIVAR  [Held by provider] morphine, 15 mg, Oral, BID  [Held by provider] morphine, 60 mg, Oral, BID  nystatin, , Topical, BID  oxyCODONE, 40 mg, Oral, Q12H BOLIVAR  pravastatin, 40 mg, Oral, Every Other Day  pregabalin, 200 mg, Oral, BID      Continuous IV Infusions:     PRN Meds:  albuterol, 2 puff, Inhalation, Q4H PRN  calcium  carbonate, 1,000 mg, Oral, Daily PRN  diazepam, 5 mg, Oral, HS PRN  naloxone, 0.04 mg, Intravenous, Q1MIN PRN  ondansetron, 4 mg, Intravenous, Q6H PRN  oxyCODONE, 10 mg, Oral, Q8H PRN  trimethobenzamide, 200 mg, Intramuscular, Q6H PRN      ED Triage Vitals [05/31/25 1718]   Temperature Pulse Respirations Blood Pressure SpO2 Pain Score   98.2 °F (36.8 °C) (!) 107 18 139/61 95 % No Pain     Weight (last 2 days)       Date/Time Weight    05/31/25 2215 114 (251.32)    05/31/25 2130 112 (247)            Vital Signs (last 3 days)       Date/Time Temp Pulse Resp BP MAP (mmHg) SpO2 O2 Device Patient Position - Orthostatic VS Pain    06/02/25 0954 -- -- -- -- -- -- -- -- 8    06/02/25 0710 97.2 °F (36.2 °C) 75 19 103/75 84 92 % None (Room air) Lying --    06/02/25 0633 -- -- -- -- -- -- -- -- 10 - Worst Possible Pain    06/01/25 2236 -- -- -- -- -- -- -- -- 10 - Worst Possible Pain    06/01/25 2210 97.6 °F (36.4 °C) 84 20 114/72 86 95 % None (Room air) Lying --    06/01/25 2100 -- -- -- -- -- -- -- -- 9    06/01/25 1516 97.9 °F (36.6 °C) 79 18 109/71 77 97 % None (Room air) Sitting --    06/01/25 1309 -- -- -- -- -- -- -- -- 8    06/01/25 1009 -- -- -- -- -- -- -- -- 10 - Worst Possible Pain    06/01/25 0839 -- -- -- -- -- -- -- -- 6    06/01/25 0754 97 °F (36.1 °C) 90 18 102/62 75 97 % None (Room air) Sitting --    06/01/25 0226 -- 96 16 105/59 68 94 % -- Lying --    05/31/25 2215 98.2 °F (36.8 °C) 105 17 151/79 98 96 % -- -- --    05/31/25 2130 -- 105 16 130/60 86 93 % None (Room air) Lying 10 - Worst Possible Pain    05/31/25 2100 -- 104 18 136/62 89 95 % None (Room air) Lying --    05/31/25 2058 -- -- -- -- -- -- -- -- 10 - Worst Possible Pain    05/31/25 1718 98.2 °F (36.8 °C) 107 18 139/61 -- 95 % None (Room air) -- No Pain              Pertinent Labs/Diagnostic Test Results:   Radiology:  XR toe fourth min 2 views LEFT   Final Interpretation by Sujit Clemons MD (05/31 1921)      Persistent soft tissue swelling  involving the fourth digit. Assessment of the distal phalanx is limited due to the hammertoe deformity. Subtle osteomyelitis/bony destruction involving the dorsal tip cannot be entirely excluded. Further evaluation by MRI    should be considered.         Computerized Assisted Algorithm (CAA) may have been used to analyze all applicable images.            Workstation performed: [id number]           Cardiology:    GI:        Results from last 7 days   Lab Units 06/02/25  0524 06/01/25  0509 05/31/25  1853   WBC Thousand/uL 4.90 4.84 5.99   HEMOGLOBIN g/dL 11.6 11.3* 11.7   HEMATOCRIT % 36.5 36.2 36.8   PLATELETS Thousands/uL 203 207 201   TOTAL NEUT ABS Thousands/µL 2.13 3.01 4.19         Results from last 7 days   Lab Units 06/02/25  0524 06/01/25  0509 05/31/25  1853   SODIUM mmol/L 136 135 136   POTASSIUM mmol/L 4.3 4.1 4.0   CHLORIDE mmol/L 104 103 105   CO2 mmol/L 26 26 24   ANION GAP mmol/L 6 6 7   BUN mg/dL 13 9 10   CREATININE mg/dL 0.64 0.65 0.62   EGFR ml/min/1.73sq m 97 96 98   CALCIUM mg/dL 9.3 9.1 9.3     Results from last 7 days   Lab Units 05/31/25  1853   AST U/L 17   ALT U/L 11   ALK PHOS U/L 91   TOTAL PROTEIN g/dL 7.7   ALBUMIN g/dL 4.0   TOTAL BILIRUBIN mg/dL 0.51         Results from last 7 days   Lab Units 06/02/25  0524 06/01/25  0509 05/31/25  1853   GLUCOSE RANDOM mg/dL 81 96 103                 Results from last 7 days   Lab Units 05/31/25  1853   CRP mg/L 16.3*             Results from last 7 days   Lab Units 05/31/25  1853   GRAM STAIN RESULT  No Polys or Bacteria seen   WOUND CULTURE  2+ Growth of Staphylococcus aureus*  2+ Growth of Enterococcus species*  2+ Growth of               Admitting Diagnosis: Toe swelling [M79.89]  Cellulitis of fourth toe of left foot [L03.032]  Opioid dependence with other opioid-induced disorder (HCC) [F11.288]  Diabetic polyneuropathy associated with type 2 diabetes mellitus (HCC) [E11.42]  Diabetic ulcer of toe of left foot associated with type 2 diabetes  mellitus, with bone involvement without evidence of necrosis (HCC) [E11.621, L97.526]  Osteomyelitis of fourth toe of left foot (HCC) [M86.9]  Age/Sex: 59 y.o. female    Network Utilization Review Department  ATTENTION: Please call with any questions or concerns to [phone] and carefully listen to the prompts so that you are directed to the right person. All voicemails are confidential.   For Discharge needs, contact Care Management DC Support Team at [phone] opt. 2  Send all requests for admission clinical reviews, approved or denied determinations and any other requests to dedicated fax number below belonging to the campus where the patient is receiving treatment. List of dedicated fax numbers for the Facilities:  FACILITY NAME UR FAX NUMBER   ADMISSION DENIALS (Administrative/Medical Necessity) [phone]   DISCHARGE SUPPORT TEAM (NETWORK) [phone]   PARENT CHILD HEALTH (Maternity/NICU/Pediatrics) [phone]   [facility] [phone]   [facility] [phone]   [facility] [phone]   [facility] [phone]   [facility] [phone]   General acute hospital [phone]   [facility] [phone]   [facility] [phone]   [facility] [phone]   [facility] [phone]   [facility] [phone]   [facility] [phone]

## 2025-06-02 NOTE — ASSESSMENT & PLAN NOTE
Reports chronic back and knee pain  Follows with  for pain management  On MS Contin ER 15 mg and MS Contin ER 60 mg, medications are on hold due to medication not being available in our pharmacy as of now  Continue with OxyContin 10 mg every 12 hours, OxyContin 40 mg every 12 hours for now  Recommend outpatient review of patient's home medication

## 2025-06-03 VITALS
HEIGHT: 68 IN | DIASTOLIC BLOOD PRESSURE: 81 MMHG | OXYGEN SATURATION: 92 % | HEART RATE: 87 BPM | WEIGHT: 251.32 LBS | RESPIRATION RATE: 19 BRPM | BODY MASS INDEX: 38.09 KG/M2 | SYSTOLIC BLOOD PRESSURE: 125 MMHG | TEMPERATURE: 97.7 F

## 2025-06-03 LAB
ANION GAP SERPL CALCULATED.3IONS-SCNC: 6 MMOL/L (ref 4–13)
BACTERIA WND AEROBE CULT: ABNORMAL
BASOPHILS # BLD AUTO: 0.06 THOUSANDS/ÂΜL (ref 0–0.1)
BASOPHILS NFR BLD AUTO: 1 % (ref 0–1)
BUN SERPL-MCNC: 18 MG/DL (ref 5–25)
CALCIUM SERPL-MCNC: 8.8 MG/DL (ref 8.4–10.2)
CHLORIDE SERPL-SCNC: 105 MMOL/L (ref 96–108)
CO2 SERPL-SCNC: 25 MMOL/L (ref 21–32)
CREAT SERPL-MCNC: 0.78 MG/DL (ref 0.6–1.3)
EOSINOPHIL # BLD AUTO: 0.12 THOUSAND/ÂΜL (ref 0–0.61)
EOSINOPHIL NFR BLD AUTO: 2 % (ref 0–6)
ERYTHROCYTE [DISTWIDTH] IN BLOOD BY AUTOMATED COUNT: 14.9 % (ref 11.6–15.1)
GFR SERPL CREATININE-BSD FRML MDRD: 82 ML/MIN/1.73SQ M
GLUCOSE SERPL-MCNC: 99 MG/DL (ref 65–140)
GRAM STN SPEC: ABNORMAL
HCT VFR BLD AUTO: 36.7 % (ref 34.8–46.1)
HGB BLD-MCNC: 11.2 G/DL (ref 11.5–15.4)
IMM GRANULOCYTES # BLD AUTO: 0.02 THOUSAND/UL (ref 0–0.2)
IMM GRANULOCYTES NFR BLD AUTO: 0 % (ref 0–2)
LYMPHOCYTES # BLD AUTO: 2.04 THOUSANDS/ÂΜL (ref 0.6–4.47)
LYMPHOCYTES NFR BLD AUTO: 37 % (ref 14–44)
MCH RBC QN AUTO: 26.7 PG (ref 26.8–34.3)
MCHC RBC AUTO-ENTMCNC: 30.5 G/DL (ref 31.4–37.4)
MCV RBC AUTO: 87 FL (ref 82–98)
MONOCYTES # BLD AUTO: 0.57 THOUSAND/ÂΜL (ref 0.17–1.22)
MONOCYTES NFR BLD AUTO: 10 % (ref 4–12)
NEUTROPHILS # BLD AUTO: 2.69 THOUSANDS/ÂΜL (ref 1.85–7.62)
NEUTS SEG NFR BLD AUTO: 50 % (ref 43–75)
NRBC BLD AUTO-RTO: 0 /100 WBCS
PLATELET # BLD AUTO: 176 THOUSANDS/UL (ref 149–390)
PMV BLD AUTO: 12.9 FL (ref 8.9–12.7)
POTASSIUM SERPL-SCNC: 4.4 MMOL/L (ref 3.5–5.3)
RBC # BLD AUTO: 4.2 MILLION/UL (ref 3.81–5.12)
SODIUM SERPL-SCNC: 136 MMOL/L (ref 135–147)
WBC # BLD AUTO: 5.5 THOUSAND/UL (ref 4.31–10.16)

## 2025-06-03 PROCEDURE — NC001 PR NO CHARGE: Performed by: PODIATRIST

## 2025-06-03 PROCEDURE — 80048 BASIC METABOLIC PNL TOTAL CA: CPT

## 2025-06-03 PROCEDURE — 85025 COMPLETE CBC W/AUTO DIFF WBC: CPT

## 2025-06-03 RX ADMIN — FLUTICASONE PROPIONATE 1 SPRAY: 50 SPRAY, METERED NASAL at 08:33

## 2025-06-03 RX ADMIN — CARISOPRODOL 350 MG: 350 TABLET ORAL at 09:46

## 2025-06-03 RX ADMIN — DOCUSATE SODIUM 100 MG: 100 CAPSULE, LIQUID FILLED ORAL at 08:31

## 2025-06-03 RX ADMIN — FLUTICASONE FUROATE AND VILANTEROL TRIFENATATE 1 PUFF: 200; 25 POWDER RESPIRATORY (INHALATION) at 08:33

## 2025-06-03 RX ADMIN — PREGABALIN 200 MG: 75 CAPSULE ORAL at 08:31

## 2025-06-03 RX ADMIN — HEPARIN SODIUM 5000 UNITS: 5000 INJECTION INTRAVENOUS; SUBCUTANEOUS at 05:59

## 2025-06-03 RX ADMIN — DIAZEPAM 5 MG: 5 TABLET ORAL at 00:23

## 2025-06-03 RX ADMIN — NYSTATIN: 100000 POWDER TOPICAL at 08:31

## 2025-06-03 RX ADMIN — AMOXICILLIN AND CLAVULANATE POTASSIUM 1 TABLET: 875; 125 TABLET, COATED ORAL at 08:31

## 2025-06-03 RX ADMIN — BUPROPION HYDROCHLORIDE 300 MG: 150 TABLET, EXTENDED RELEASE ORAL at 08:31

## 2025-06-03 RX ADMIN — OXYCODONE HYDROCHLORIDE 40 MG: 40 TABLET, FILM COATED, EXTENDED RELEASE ORAL at 09:46

## 2025-06-03 RX ADMIN — OXYCODONE HYDROCHLORIDE 10 MG: 10 TABLET ORAL at 01:45

## 2025-06-03 RX ADMIN — FOLIC ACID 1000 MCG: 1 TABLET ORAL at 08:31

## 2025-06-03 NOTE — OCCUPATIONAL THERAPY NOTE
Occupational Therapy         Patient Name: Anna Marie Barbosa  Today's Date: 6/3/2025     06/03/25 1030   OT Last Visit   OT Visit Date 06/03/25   Note Type   Note type Screen   Additional Comments OT Consult received. Chart reviewed. Screen completed. Pt with AMPAC of 24. Pt observed ambulating independently with cane. Pt reports no IPOT/PT needs at this time. Pt provided with surgical shoe per MD order. CRISTI OT.       Tracy Osorio, OT

## 2025-06-03 NOTE — DISCHARGE SUMMARY
PODIATRY DISCHARGE SUMMARY     Patient Name: Anna Marie Barbosa   Age & Sex: 59 y.o. female   MRN: [id number]  Unit/Bed#: E2 -01   Encounter: [id number]  Length of Stay: 3 days    ASSESSMENT:    Anna Marie Barbosa is a 59 y.o. female with:    Left 4th toe wound probe to bone  S/p left fourth toe amputation and fifth toe flexor tenotomy  Left 4th toe cellulitis  Type 2 diabetes with last A1c 5.7% collected on 1/9/25  Diabetes polyneuropathy  Chronic pain with opioid tolerance  History of toe amputation    PLAN:    Patient seen and evaluated at bedside today.  Patient is POD 1 s/p left fourth toe amputation and left fifth toe flexor tenotomy  Incision sites are stable with intact sutures and well aligned skin edges with normal capillary refill. There is residual erythema to the left fourth toe  Reviewed labs and vitals.  Patient is afebrile with no noted leukocytosis  Patient cleared by PT/OT for discharge to home  Patient to keep dressings clean dry and intact prior to office follow-up with  Patient is stable for discharge from podiatry standpoint.  Patient to complete PO course of Duricef and follow-up outpatient for continued incision care.  Wound care instructions placed in discharge paperwork and ambulatory referral placed.  Patient to follow-up within 1 week of discharge from hospital  Elevation and offloading on green foam wedges or pillows when non-ambulatory.  Appreciate consulting services for recommendations and management.     Antibiotics: Keflex  Pharmacologic VTE Prophylaxis: Heparin   Mechanical VTE Prophylaxis: sequential compression device   Weightbearing status: WBAT in surgical shoe    Disposition:  Patient stable for discharge today.    SUBJECTIVE     The patient was seen, evaluated, and assessed at bedside today. The patient was awake, alert, and in no acute distress. No acute events overnight. The patient reports no pain in the left foot, only mild tingling. Patient denies  N/V/F/chills/SOB/CP.    Review of Systems  Constitutional: Negative.    HENT: Negative.    Eyes: Negative.    Respiratory: Negative.    Cardiovascular: Negative.    Gastrointestinal: Negative.    Musculoskeletal: S/p left fourth toe amputation   Skin: Incision site to left 4th and 5th toes  Neurological: Diabetic polyneuropathy  Psych: Negative.    OBJECTIVE     Physical Exam:     General: Alert, cooperative and no distress  Lungs: Non labored breathing  Abdomen: Soft, non-tender.  Lower extremity exam:  Cardiovascular status at baseline.  Neurological status at baseline.  Musculoskeletal status at baseline. No calf tenderness noted bilaterally.     Left lower extremity:  - S/p left fourth toe amputation.  Incision site is stable with intact sutures, well at skin edges, and normal capillary refill without signs of active infection: no purulence, no malodor, no ascending erythema, no crepitus, no fluctuance.  - S/p left fifth  toe flexor tenotomy.  Incision site is stable with well at skin edges normal capillary refill without signs of active infection: no purulence, no malodor, no ascending erythema, no crepitus, no fluctuance.    Clinical Images 06/03/25:                DETAILS OF HOSPITAL COURSE     Anna Marie Barbosa is a 59 y.o. female who was admitted on 5/31/2025 due to cellulitis of her left fourth toe.  Patient with past medical history of type 2 diabetes, obesity, chronic pain with opioid tolerance, history of toe amputation.  Patient has a history of diabetic polyneuropathy and has experienced multiple distal toe wound secondary to rigid hammertoe deformities.  Prior to admission patient had found bloody drainage in her sock and presented to urgent care where she was given a 10-day course of Duricef and dry sterile dressing.  However she continued to experience more erythema and edema to her left fourth toe and decided to present to the [facility] emergency department for further evaluation.    Patient was  admitted and started on IV antibiotics as well as x-rays of the left foot which demonstrated soft tissue swelling of the left fourth toe with erosion consistent with osteomyelitis to the distal and middle fourth phalanges.  Patient was evaluated by internal medicine and cleared for podiatric surgical intervention in the operating room.  Patient was then taken to the OR for left fourth partial toe amputation and left fifth toe flexor tenotomy on 6/2/2025.  On POD 1, the surgical dressings were removed and the left fourth toe was noted to have some residual erythema with otherwise stable incision site and intact sutures.  Capillary refill was noted to be normal.  Patient was then seen and evaluated by PT/OT who cleared her for discharge to home.  Patient was advised to complete full course of Duricef that she was given previously but she was not able to take and follow-up outpatient for continued incision care.    New Medications:  - Duricef    DISCHARGE INFORMATION     PCP at Discharge: Magdalena Grove MD    Admitting Provider: Dr. Fabian Ruff  Admission Date: 5/31/2025     Discharge Provider: Dr. Luis Hutchins  Discharge Date: 06/03/25    Discharge Disposition: Home  Discharge Condition: Good  Discharge with Lines: No  Activity Restrictions: WBAT in surgical shoe  Test Results Pending at Discharge: Tissue pathology  Medications at Discharge: See after visit summary for reconciled discharge medications provided to patient and family.      Discharge Diagnoses:  Principal Problem:    Cellulitis of fourth toe of left foot  Active Problems:    Opioid dependence with other opioid-induced disorder (HCC)    Essential hypertension    Diabetic polyneuropathy associated with type 2 diabetes mellitus (HCC)    Chronic pain disorder      Consulting Providers:  - Internal Medicine    Diagnostic & Therapeutic Procedures Performed:  XR toe fourth min 2 views LEFT  Result Date: 5/31/2025  Impression: Persistent soft tissue swelling  "involving the fourth digit. Assessment of the distal phalanx is limited due to the hammertoe deformity. Subtle osteomyelitis/bony destruction involving the dorsal tip cannot be entirely excluded. Further evaluation by MRI should be considered. Computerized Assisted Algorithm (CAA) may have been used to analyze all applicable images. Workstation performed: [id number]       Code Status: Level 1 - Full Code  Advance Directive and Living Will:      Power of :    POLST:      FOLLOW-UP     PCP Outpatient Follow-up:    Consulting Providers Follow-up:    Active Issues Requiring Follow-Up:    Discharge Statement:  I spent 25 minutes discharging the patient. This time was spent on the day of discharge. I had direct contact with the patient on the day of discharge. Additional documentation is required if more than 30 minutes were spent on discharge.       Portions of the record may have been created with voice recognition software.  Occasional wrong word or \"sound a like\" substitutions may have occurred due to the inherent limitations of voice recognition software.  Read the chart carefully and recognize, using context, where substitutions have occurred.  ==  Shawn Martin DPM   [facility]  Podiatric Medicine & Surgery    "

## 2025-06-03 NOTE — DISCHARGE INSTR - AVS FIRST PAGE
Discharge Instructions - Podiatry    Weight Bearing Status: Weight bearing as tolerated in surgical shoe to left foot                   Pain: Continue analgesics as needed     Follow-up appointment instructions: Please make an appointment within one week of discharge with Dr. Figueroa. Contact sooner if any increase in pain, or signs of infection occur    Wound Care: Leave dressings clean, dry, and intact between professional dressing changes.  Should dressings become wet or soiled, apply Betadine Adaptic, sterile gauze, and roll gauze with Ace wrap or contact podiatry office.

## 2025-06-03 NOTE — PLAN OF CARE
Problem: PAIN - ADULT  Goal: Verbalizes/displays adequate comfort level or baseline comfort level  Description: Interventions:  - Encourage patient to monitor pain and request assistance  - Assess pain using appropriate pain scale  - Administer analgesics as ordered based on type and severity of pain and evaluate response  - Implement non-pharmacological measures as appropriate and evaluate response  - Consider cultural and social influences on pain and pain management  - Notify physician/advanced practitioner if interventions unsuccessful or patient reports new pain  - Educate patient/family on pain management process including their role and importance of  reporting pain   - Provide non-pharmacologic/complimentary pain relief interventions  Outcome: Progressing     Problem: INFECTION - ADULT  Goal: Absence or prevention of progression during hospitalization  Description: INTERVENTIONS:  - Assess and monitor for signs and symptoms of infection  - Monitor lab/diagnostic results  - Monitor all insertion sites, i.e. indwelling lines, tubes, and drains  - Monitor endotracheal if appropriate and nasal secretions for changes in amount and color  - [place] appropriate cooling/warming therapies per order  - Administer medications as ordered  - Instruct and encourage patient and family to use good hand hygiene technique  - Identify and instruct in appropriate isolation precautions for identified infection/condition  Outcome: Progressing  Goal: Absence of fever/infection during neutropenic period  Description: INTERVENTIONS:  - Monitor WBC  - Perform strict hand hygiene  - Limit to healthy visitors only  - No plants, dried, fresh or silk flowers with gonsales in patient room  Outcome: Progressing     Problem: SAFETY ADULT  Goal: Patient will remain free of falls  Description: INTERVENTIONS:  - Educate patient/family on patient safety including physical limitations  - Instruct patient to call for assistance with activity   -  Consider consulting OT/PT to assist with strengthening/mobility based on AM PAC & JH-HLM score  - Consult OT/PT to assist with strengthening/mobility   - Keep Call bell within reach  - Keep bed low and locked with side rails adjusted as appropriate  - Keep care items and personal belongings within reach  - Initiate and maintain comfort rounds  - Make Fall Risk Sign visible to staff  - Offer Toileting every 2 Hours, in advance of need  - Initiate/Maintain bed alarm  - Apply yellow socks and bracelet for high fall risk patients  - Consider moving patient to room near nurses station  Outcome: Progressing  Goal: Maintain or return to baseline ADL function  Description: INTERVENTIONS:  -  Assess patient's ability to carry out ADLs; assess patient's baseline for ADL function and identify physical deficits which impact ability to perform ADLs (bathing, care of mouth/teeth, toileting, grooming, dressing, etc.)  - Assess/evaluate cause of self-care deficits   - Assess range of motion  - Assess patient's mobility; develop plan if impaired  - Assess patient's need for assistive devices and provide as appropriate  - Encourage maximum independence but intervene and supervise when necessary  - Involve family in performance of ADLs  - Assess for home care needs following discharge   - Consider OT consult to assist with ADL evaluation and planning for discharge  - Provide patient education as appropriate  - Monitor functional capacity and physical performance, use of AM PAC & JH-HLM   - Monitor gait, balance and fatigue with ambulation    Outcome: Progressing       Problem: DISCHARGE PLANNING  Goal: Discharge to home or other facility with appropriate resources  Description: INTERVENTIONS:  - Identify barriers to discharge w/patient and caregiver  - Arrange for needed discharge resources and transportation as appropriate  - Identify discharge learning needs (meds, wound care, etc.)  - Arrange for interpretive services to assist at  discharge as needed  - Refer to Case Management Department for coordinating discharge planning if the patient needs post-hospital services based on physician/advanced practitioner order or complex needs related to functional status, cognitive ability, or social support system  Outcome: Progressing     Problem: Knowledge Deficit  Goal: Patient/family/caregiver demonstrates understanding of disease process, treatment plan, medications, and discharge instructions  Description: Complete learning assessment and assess knowledge base.  Interventions:  - Provide teaching at level of understanding  - Provide teaching via preferred learning methods  Outcome: Progressing

## 2025-06-03 NOTE — PHYSICAL THERAPY NOTE
PHYSICAL THERAPY SCREEN          Patient Name: Anna Marie Barbosa  Today's Date: 6/3/2025       06/03/25 1054   PT Last Visit   PT Visit Date 06/03/25   Note Type   Note type Screen   Additional Comments Consult received. nsg am-pac 24. Pt mobilizing indep without difficulty per OT assessment. Per chart review has hx of WBAT in surgical shoe as well. pt written for dc today. No acute PT needs. will sign off.     Prachi Sen, PT

## 2025-06-03 NOTE — PLAN OF CARE
Problem: PAIN - ADULT  Goal: Verbalizes/displays adequate comfort level or baseline comfort level  Description: Interventions:  - Encourage patient to monitor pain and request assistance  - Assess pain using appropriate pain scale  - Administer analgesics as ordered based on type and severity of pain and evaluate response  - Implement non-pharmacological measures as appropriate and evaluate response  - Consider cultural and social influences on pain and pain management  - Notify physician/advanced practitioner if interventions unsuccessful or patient reports new pain  - Educate patient/family on pain management process including their role and importance of  reporting pain   - Provide non-pharmacologic/complimentary pain relief interventions  Outcome: Progressing     Problem: INFECTION - ADULT  Goal: Absence or prevention of progression during hospitalization  Description: INTERVENTIONS:  - Assess and monitor for signs and symptoms of infection  - Monitor lab/diagnostic results  - Monitor all insertion sites, i.e. indwelling lines, tubes, and drains  - Monitor endotracheal if appropriate and nasal secretions for changes in amount and color  - [place] appropriate cooling/warming therapies per order  - Administer medications as ordered  - Instruct and encourage patient and family to use good hand hygiene technique  - Identify and instruct in appropriate isolation precautions for identified infection/condition  Outcome: Progressing  Goal: Absence of fever/infection during neutropenic period  Description: INTERVENTIONS:  - Monitor WBC  - Perform strict hand hygiene  - Limit to healthy visitors only  - No plants, dried, fresh or silk flowers with gonsales in patient room  Outcome: Progressing     Problem: SAFETY ADULT  Goal: Patient will remain free of falls  Description: INTERVENTIONS:  - Educate patient/family on patient safety including physical limitations  - Instruct patient to call for assistance with activity   -  Consider consulting OT/PT to assist with strengthening/mobility based on AM PAC & JH-HLM score  - Consult OT/PT to assist with strengthening/mobility   - Keep Call bell within reach  - Keep bed low and locked with side rails adjusted as appropriate  - Keep care items and personal belongings within reach  - Initiate and maintain comfort rounds  - Make Fall Risk Sign visible to staff  - Initiate/Maintain bed alarm  - Obtain necessary fall risk management equipment: call bell, bed alarm,  socks  - Apply yellow socks and bracelet for high fall risk patients  - Consider moving patient to room near nurses station  Outcome: Progressing  Goal: Maintain or return to baseline ADL function  Description: INTERVENTIONS:  -  Assess patient's ability to carry out ADLs; assess patient's baseline for ADL function and identify physical deficits which impact ability to perform ADLs (bathing, care of mouth/teeth, toileting, grooming, dressing, etc.)  - Assess/evaluate cause of self-care deficits   - Assess range of motion  - Assess patient's mobility; develop plan if impaired  - Assess patient's need for assistive devices and provide as appropriate  - Encourage maximum independence but intervene and supervise when necessary  - Involve family in performance of ADLs  - Assess for home care needs following discharge   - Consider OT consult to assist with ADL evaluation and planning for discharge  - Provide patient education as appropriate  - Monitor functional capacity and physical performance, use of AM PAC & JH-HLM   - Monitor gait, balance and fatigue with ambulation    Outcome: Progressing  Goal: Maintains/Returns to pre admission functional level  Description: INTERVENTIONS:  - Perform AM-PAC 6 Click Basic Mobility/ Daily Activity assessment daily.  - Set and communicate daily mobility goal to care team and patient/family/caregiver.   - Collaborate with rehabilitation services on mobility goals if consulted  - Perform Range of  Motion 3 times a day.  - Reposition patient every 2 hours.  - Dangle patient 3 times a day  - Stand patient 3 times a day  - Ambulate patient 3 times a day  - Out of bed to chair 3 times a day   - Out of bed for meals 3 times a day  - Out of bed for toileting  - Record patient progress and toleration of activity level   Outcome: Progressing     Problem: DISCHARGE PLANNING  Goal: Discharge to home or other facility with appropriate resources  Description: INTERVENTIONS:  - Identify barriers to discharge w/patient and caregiver  - Arrange for needed discharge resources and transportation as appropriate  - Identify discharge learning needs (meds, wound care, etc.)  - Arrange for interpretive services to assist at discharge as needed  - Refer to Case Management Department for coordinating discharge planning if the patient needs post-hospital services based on physician/advanced practitioner order or complex needs related to functional status, cognitive ability, or social support system  Outcome: Progressing     Problem: Knowledge Deficit  Goal: Patient/family/caregiver demonstrates understanding of disease process, treatment plan, medications, and discharge instructions  Description: Complete learning assessment and assess knowledge base.  Interventions:  - Provide teaching at level of understanding  - Provide teaching via preferred learning methods  Outcome: Progressing

## 2025-06-04 NOTE — UTILIZATION REVIEW
NOTIFICATION OF ADMISSION DISCHARGE   This is a Notification of Discharge from [facility]. Please be advised that this patient has been discharge from our facility. Below you will find the admission and discharge date and time including the patient’s disposition.   UTILIZATION REVIEW CONTACT:  Utilization Review Assistants  Network Utilization Review Department  Phone: [phone] x carefully listen to the prompts. All voicemails are confidential.  Email: [email or web address]     ADMISSION INFORMATION  PRESENTATION DATE: 5/31/2025  5:19 PM  OBERVATION ADMISSION DATE: N/A  INPATIENT ADMISSION DATE: 5/31/25  8:46 PM   DISCHARGE DATE: 6/3/2025 11:20 AM   DISPOSITION:Home/Self Care    Network Utilization Review Department  ATTENTION: Please call with any questions or concerns to [phone] and carefully listen to the prompts so that you are directed to the right person. All voicemails are confidential.   For Discharge needs, contact Care Management DC Support Team at [phone] opt. 2  Send all requests for admission clinical reviews, approved or denied determinations and any other requests to dedicated fax number below belonging to the campus where the patient is receiving treatment. List of dedicated fax numbers for the Facilities:  FACILITY NAME UR FAX NUMBER   ADMISSION DENIALS (Administrative/Medical Necessity) [phone]   DISCHARGE SUPPORT TEAM ([facility]) [phone]   PARENT CHILD HEALTH (Maternity/NICU/Pediatrics) [phone]   [facility] [phone]   [facility] [phone]   [facility] [phone]   [facility] [phone]   [facility] [phone]   [facility] [phone]   [facility] [phone]   [facility] [phone]   [facility]  [facility] [phone]   [facility], [facility] [phone]   [facility] [phone]   [facility] [phone]

## 2025-06-04 NOTE — RESTORATIVE TECHNICIAN NOTE
Restorative Technician Note      Patient Name: Anna Marie Barbosa     Restorative Tech Visit Date: 06/04/25  Note Type: Mobility  Patient Position Upon Consult: Supine  Activity Performed: Ambulated  Patient Position at End of Consult: All needs within reach; Bedside chair

## 2025-06-05 ENCOUNTER — TELEPHONE (OUTPATIENT)
Age: 60
End: 2025-06-05

## 2025-06-05 NOTE — TELEPHONE ENCOUNTER
Caller: Anna Marie     Doctor and/or Office: Dr. Figueroa/[place]    #: [phone]     Escalation: Appointment Patient called in and stated per Dr Ruff she needs a post op appt with Dr Figueroa for next week   Thank you

## 2025-06-09 PROCEDURE — 88305 TISSUE EXAM BY PATHOLOGIST: CPT | Performed by: PATHOLOGY

## 2025-06-09 PROCEDURE — 88311 DECALCIFY TISSUE: CPT | Performed by: PATHOLOGY

## 2025-06-10 ENCOUNTER — OFFICE VISIT (OUTPATIENT)
Dept: PODIATRY | Facility: CLINIC | Age: 60
End: 2025-06-10
Payer: COMMERCIAL

## 2025-06-10 VITALS — BODY MASS INDEX: 38.04 KG/M2 | WEIGHT: 251 LBS | HEIGHT: 68 IN

## 2025-06-10 DIAGNOSIS — S98.132A AMPUTATION OF TOE OF LEFT FOOT (HCC): Primary | ICD-10-CM

## 2025-06-10 PROCEDURE — 99213 OFFICE O/P EST LOW 20 MIN: CPT | Performed by: PODIATRIST

## 2025-06-10 NOTE — PROGRESS NOTES
"Name: Anna Marie Barbosa      : 1965      MRN: [id number]  Encounter Provider: Henry Figueroa DPM  Encounter Date: 6/10/2025   Encounter department: [facility] [place]  :  Assessment & Plan  Amputation of toe of left foot (HCC)  Patient is stable postop 1 weeks    Incision: stable, sutures left intact    Instructions given to patient. Rest the foot as much as possible and elevate/ice  if swollen.    Ambulatory status: light WB in postop shoe    RTC: 1 week    Reviewed her receent admission and notes, OP notes, DC summary             History of Present Illness   HPI  Anna Marie Barbosa is a 60 y.o. female who presents postop. She developed another toe infection. Dr. Ruff amputated left 4th toe 2025. She is 1 week postop op.       Review of Systems       Objective   Ht 5' 8\" (1.727 m)   Wt 114 kg (251 lb)   BMI 38.16 kg/m²      Physical Exam  Vitals reviewed.     Musculoskeletal:         General: Deformity (partial 4th toe amputation left 4th foot.) present.     Skin:     Findings: No erythema.     Neurological:      Mental Status: She is alert.           "

## 2025-06-12 ENCOUNTER — TELEPHONE (OUTPATIENT)
Age: 60
End: 2025-06-12

## 2025-06-12 DIAGNOSIS — Z12.31 ENCOUNTER FOR SCREENING MAMMOGRAM FOR BREAST CANCER: Primary | ICD-10-CM

## 2025-06-12 NOTE — TELEPHONE ENCOUNTER
Pt reported that she called to schedule her mammogram and was told by [facility] that her order was , pt is scheduled for 25 but was advised to reach out to her Primary Care Provider and to bring her referral with to her appointment.    Seen a referral was placed today, so I reached out to [employer] to find out exactly what was needed from the provider.    Spoke w/Shona who confirmed that [employer] [facility] transcribed the old order but the referral still needs to be signed off on by the Primary Care Provider.    Pt requesting a confirmation call when this has been completed. [phone]

## 2025-06-13 NOTE — TELEPHONE ENCOUNTER
Spoke with pt and let her know mammo order was in her chart. Pt verbalized understanding, no further questions.

## 2025-06-17 ENCOUNTER — HOSPITAL ENCOUNTER (OUTPATIENT)
Dept: MAMMOGRAPHY | Facility: MEDICAL CENTER | Age: 60
Discharge: HOME | End: 2025-06-17
Attending: FAMILY MEDICINE
Payer: COMMERCIAL

## 2025-06-17 VITALS — HEIGHT: 68 IN | BODY MASS INDEX: 38.04 KG/M2 | WEIGHT: 251 LBS

## 2025-06-17 DIAGNOSIS — Z12.31 ENCOUNTER FOR SCREENING MAMMOGRAM FOR MALIGNANT NEOPLASM OF BREAST: ICD-10-CM

## 2025-06-17 PROCEDURE — 77063 BREAST TOMOSYNTHESIS BI: CPT

## 2025-06-17 PROCEDURE — 77067 SCR MAMMO BI INCL CAD: CPT

## 2025-06-18 ENCOUNTER — OFFICE VISIT (OUTPATIENT)
Dept: PODIATRY | Facility: CLINIC | Age: 60
End: 2025-06-18
Payer: COMMERCIAL

## 2025-06-18 VITALS — BODY MASS INDEX: 38.25 KG/M2 | WEIGHT: 252.4 LBS | HEIGHT: 68 IN

## 2025-06-18 DIAGNOSIS — S98.132A AMPUTATION OF TOE OF LEFT FOOT (HCC): Primary | ICD-10-CM

## 2025-06-18 DIAGNOSIS — M20.42 HAMMERTOE OF LEFT FOOT: ICD-10-CM

## 2025-06-18 DIAGNOSIS — E11.42 DIABETIC POLYNEUROPATHY ASSOCIATED WITH TYPE 2 DIABETES MELLITUS (HCC): ICD-10-CM

## 2025-06-18 PROCEDURE — 99213 OFFICE O/P EST LOW 20 MIN: CPT | Performed by: PODIATRIST

## 2025-06-18 NOTE — PROGRESS NOTES
"Name: Anna Marie Barbosa      : 1965      MRN: [id number]  Encounter Provider: Fabian Ruff DPM  Encounter Date: 2025   Encounter department: [facility] [place]  :  Assessment & Plan  Amputation of toe of left foot (HCC)  Sutures removed today from surgical site as incision site appears well-healing  Continue dressings for an additional week.  Continue with pad underneath the fifth toe.    Patient already has appointment scheduled with Dr. Figueroa for routine footcare.           Hammertoe of left foot         Diabetic polyneuropathy associated with type 2 diabetes mellitus (HCC)    Lab Results   Component Value Date    HGBA1C 5.7 (H) 2025                History of Present Illness   HPI  Anna Marie Barbosa is a 60 y.o. female who presents     25-   Left fourth toe amputation.  Left fifth toe percutaneous flexor tendon release.      Patient is doing well at this point denies any new acute changes.  She has been doing well since the surgical invention she is trying to get customized diabetic insoles and shoe gear.            Review of Systems       Objective   Ht 5' 8\" (1.727 m) Comment: verbal  Wt 114 kg (252 lb 6.4 oz)   BMI 38.38 kg/m²      Physical Exam      Examination shows stable appearance of the left fourth toe with no signs of infection or scabbing noted distally to the toe.  Sutures are in place.  There is stable position of the fifth digit with overall alignment maintained.  No significant change in position or alignment.  Intact pedal pulses.  Neurologic sensation is diminished distally.  There is hammertoe contracture noted to the second digit on the left foot as well.  No other areas of open ulcerations or lesions or signs of infection no signs of necrosis.      "

## 2025-07-01 ENCOUNTER — TELEPHONE (OUTPATIENT)
Age: 60
End: 2025-07-01

## 2025-07-01 DIAGNOSIS — E11.65 TYPE 2 DIABETES MELLITUS WITH HYPERGLYCEMIA, WITH LONG-TERM CURRENT USE OF INSULIN (HCC): ICD-10-CM

## 2025-07-01 DIAGNOSIS — Z79.4 TYPE 2 DIABETES MELLITUS WITH HYPERGLYCEMIA, WITH LONG-TERM CURRENT USE OF INSULIN (HCC): ICD-10-CM

## 2025-07-01 NOTE — TELEPHONE ENCOUNTER
I spoke with pt and explained a recommendation can not be given at this time as she hasn't been seen since her last visit. She is going to send a picture through for review. She stated she cancelled the appt yesterday because [address] was shut down and she couldn't get through.

## 2025-07-01 NOTE — TELEPHONE ENCOUNTER
Caller: Anna Marie Barbosa    Doctor and/or Office: Dr. Figueroa/Speedy/[place]    #: [phone]    Escalation: Care/Had Amp Sx 6/2/25-stitches are out and she is asking when can she get out of surgical shoe and when can she swim in a pool? I am sending to both Drs as Dr. Ruff did the SX, but he is OOO until tomm. Please call back and advise as to both questions. Thanks

## 2025-07-02 DIAGNOSIS — E11.65 TYPE 2 DIABETES MELLITUS WITH HYPERGLYCEMIA, WITH LONG-TERM CURRENT USE OF INSULIN (HCC): ICD-10-CM

## 2025-07-02 DIAGNOSIS — Z79.4 TYPE 2 DIABETES MELLITUS WITH HYPERGLYCEMIA, WITH LONG-TERM CURRENT USE OF INSULIN (HCC): ICD-10-CM

## 2025-07-02 RX ORDER — TIRZEPATIDE 10 MG/.5ML
INJECTION, SOLUTION SUBCUTANEOUS
Qty: 6 ML | Refills: 0 | Status: SHIPPED | OUTPATIENT
Start: 2025-07-02

## 2025-07-03 RX ORDER — TIRZEPATIDE 10 MG/.5ML
10 INJECTION, SOLUTION SUBCUTANEOUS WEEKLY
Qty: 6 ML | Refills: 0 | OUTPATIENT
Start: 2025-07-03

## 2025-07-10 DIAGNOSIS — J45.40 MODERATE PERSISTENT ASTHMA WITHOUT COMPLICATION: ICD-10-CM

## 2025-07-10 RX ORDER — ALBUTEROL SULFATE 90 UG/1
INHALANT RESPIRATORY (INHALATION)
Qty: 8.5 G | Refills: 1 | Status: SHIPPED | OUTPATIENT
Start: 2025-07-10

## 2025-07-16 DIAGNOSIS — Z79.4 TYPE 2 DIABETES MELLITUS WITH HYPERGLYCEMIA, WITH LONG-TERM CURRENT USE OF INSULIN (HCC): Primary | ICD-10-CM

## 2025-07-16 DIAGNOSIS — E11.65 TYPE 2 DIABETES MELLITUS WITH HYPERGLYCEMIA, WITH LONG-TERM CURRENT USE OF INSULIN (HCC): Primary | ICD-10-CM

## 2025-07-16 RX ORDER — TIRZEPATIDE 12.5 MG/.5ML
12.5 INJECTION, SOLUTION SUBCUTANEOUS WEEKLY
Qty: 6 ML | Refills: 0 | Status: SHIPPED | OUTPATIENT
Start: 2025-07-16

## 2025-07-23 ENCOUNTER — VBI (OUTPATIENT)
Dept: ADMINISTRATIVE | Facility: OTHER | Age: 60
End: 2025-07-23

## 2025-07-23 NOTE — TELEPHONE ENCOUNTER
07/23/25 8:10 AM     Chart reviewed for Diabetic Eye Exam ; nothing is submitted to the patient's insurance at this time.     Jeyson Marlow MA   [facility]

## 2025-07-24 DIAGNOSIS — R11.2 NAUSEA AND VOMITING, UNSPECIFIED VOMITING TYPE: Primary | ICD-10-CM

## 2025-07-24 RX ORDER — ONDANSETRON 4 MG/1
4 TABLET, FILM COATED ORAL EVERY 8 HOURS PRN
Qty: 20 TABLET | Refills: 0 | Status: SHIPPED | OUTPATIENT
Start: 2025-07-24

## 2025-07-28 ENCOUNTER — OFFICE VISIT (OUTPATIENT)
Dept: PODIATRY | Facility: CLINIC | Age: 60
End: 2025-07-28
Payer: COMMERCIAL

## 2025-07-28 VITALS — BODY MASS INDEX: 38.19 KG/M2 | WEIGHT: 252 LBS | HEIGHT: 68 IN

## 2025-07-28 DIAGNOSIS — Z89.411 ACQUIRED ABSENCE OF RIGHT GREAT TOE (HCC): ICD-10-CM

## 2025-07-28 DIAGNOSIS — E11.42 DIABETIC POLYNEUROPATHY ASSOCIATED WITH TYPE 2 DIABETES MELLITUS (HCC): Primary | ICD-10-CM

## 2025-07-28 DIAGNOSIS — M20.42 HAMMERTOE OF LEFT FOOT: ICD-10-CM

## 2025-07-28 DIAGNOSIS — B35.1 TINEA UNGUIUM: ICD-10-CM

## 2025-07-28 PROCEDURE — 11721 DEBRIDE NAIL 6 OR MORE: CPT | Performed by: PODIATRIST

## 2025-07-28 PROCEDURE — RECHECK: Performed by: PODIATRIST

## 2025-07-30 ENCOUNTER — TELEPHONE (OUTPATIENT)
Age: 60
End: 2025-07-30

## 2025-08-19 DIAGNOSIS — J45.40 MODERATE PERSISTENT ASTHMA WITHOUT COMPLICATION: ICD-10-CM

## 2025-08-22 RX ORDER — ALBUTEROL SULFATE 90 UG/1
2 INHALANT RESPIRATORY (INHALATION) EVERY 4 HOURS PRN
Qty: 18 G | Refills: 0 | Status: SHIPPED | OUTPATIENT
Start: 2025-08-22

## (undated) DEVICE — INTENDED FOR TISSUE SEPARATION, AND OTHER PROCEDURES THAT REQUIRE A SHARP SURGICAL BLADE TO PUNCTURE OR CUT.: Brand: BARD-PARKER ® CARBON RIB-BACK BLADES

## (undated) DEVICE — SWORD PIN PACK
Type: IMPLANTABLE DEVICE | Site: KNEE | Status: NON-FUNCTIONAL
Brand: KNEE INSTRUMENTS
Removed: 2025-01-20

## (undated) DEVICE — IODOFORM PACKING STRIP: Brand: CURITY

## (undated) DEVICE — GAUZE SPONGES,8 PLY: Brand: CURITY

## (undated) DEVICE — ACE WRAP 4 IN UNSTERILE

## (undated) DEVICE — PLUMEPEN PRO 10FT

## (undated) DEVICE — HOOD: Brand: T7PLUS

## (undated) DEVICE — 10FR FRAZIER SUCTION HANDLE: Brand: CARDINAL HEALTH

## (undated) DEVICE — COTTON TIP APPLICTOR 2 PK

## (undated) DEVICE — ASTOUND STANDARD SURGICAL GOWN, XL: Brand: CONVERTORS

## (undated) DEVICE — 3M™ STERI-STRIP™ REINFORCED ADHESIVE SKIN CLOSURES, R1546, 1/4 IN X 4 IN (6 MM X 100 MM), 10 STRIPS/ENVELOPE: Brand: 3M™ STERI-STRIP™

## (undated) DEVICE — IRRIG ENDO FLO TUBING

## (undated) DEVICE — NEEDLE 25G X 1 1/2

## (undated) DEVICE — CHOLE CATH KIT ARROW

## (undated) DEVICE — GLOVE SRG BIOGEL 8.5

## (undated) DEVICE — NEEDLE SPINAL18G X 3.5 IN QUINCKE

## (undated) DEVICE — ADHESIVE SKIN CLOSURE SYS EXOFIN FUSION 22CM

## (undated) DEVICE — 450 ML BOTTLE OF 0.05% CHLORHEXIDINE GLUCONATE IN 99.95% STERILE WATER FOR IRRIGATION, USP AND APPLICATOR.: Brand: IRRISEPT ANTIMICROBIAL WOUND LAVAGE

## (undated) DEVICE — SYRINGE 10ML LL

## (undated) DEVICE — SUT VICRYL PLUS 1 CTB-1 36 IN VCPB947H

## (undated) DEVICE — SPONGE GAUZE 2 X 2 4PLY STRL

## (undated) DEVICE — DRILLS AND REAMERS SPHERE INSTRUMENT SET: Brand: KNEE INSTRUMENTS

## (undated) DEVICE — BETHLEHEM UNIVERSAL  MIONR EXT: Brand: CARDINAL HEALTH

## (undated) DEVICE — 5 MM CURVED DISSECTORS WITH MONOPOLAR CAUTERY: Brand: ENDOPATH

## (undated) DEVICE — SYRINGE 50ML LL

## (undated) DEVICE — KERLIX BANDAGE ROLL: Brand: KERLIX

## (undated) DEVICE — GAUZE SPONGES,16 PLY: Brand: CURITY

## (undated) DEVICE — LAPAROTOMY SPONGE - RF AND X-RAY DETECTABLE PRE-WASHED: Brand: SITUATE

## (undated) DEVICE — GLOVE SRG BIOGEL ECLIPSE 7.5

## (undated) DEVICE — DRESSING MEPILEX AG BORDER POST-OP 4 X 12 IN

## (undated) DEVICE — ABDOMINAL PAD: Brand: DERMACEA

## (undated) DEVICE — 3M™ TEGADERM™ TRANSPARENT FILM DRESSING FRAME STYLE, 1624W, 2-3/8 IN X 2-3/4 IN (6 CM X 7 CM), 100/CT 4CT/CASE: Brand: 3M™ TEGADERM™

## (undated) DEVICE — GLOVE INDICATOR PI UNDERGLOVE SZ 8 BLUE

## (undated) DEVICE — GLOVE SRG BIOGEL 7.5

## (undated) DEVICE — SUT STRATAFIX SPIRAL PDS PLUS 1 CTX 18 IN SXPP1A400

## (undated) DEVICE — WET SKIN PREP TRAY: Brand: MEDLINE INDUSTRIES, INC.

## (undated) DEVICE — [HIGH FLOW INSUFFLATOR,  DO NOT USE IF PACKAGE IS DAMAGED,  KEEP DRY,  KEEP AWAY FROM SUNLIGHT,  PROTECT FROM HEAT AND RADIOACTIVE SOURCES.]: Brand: PNEUMOSURE

## (undated) DEVICE — 3M™ TEGADERM™ TRANSPARENT FILM DRESSING FRAME STYLE, 1626W, 4 IN X 4-3/4 IN (10 CM X 12 CM), 50/CT 4CT/CASE: Brand: 3M™ TEGADERM™

## (undated) DEVICE — CEMENT MIXING SYSTEM WITH FEMORAL BREAKWAY NOZZLE: Brand: REVOLUTION

## (undated) DEVICE — LIGHT HANDLE COVER SLEEVE DISP BLUE STELLAR

## (undated) DEVICE — CONVENTIONAL KA INSTRUMENT SET: Brand: KNEE INSTRUMENTS

## (undated) DEVICE — SUT PROLENE 3-0 PS1 18 IN 8663G

## (undated) DEVICE — IMPERVIOUS STOCKINETTE: Brand: DEROYAL

## (undated) DEVICE — SUT VICRYL 2-0 CT-1 36 IN J945H

## (undated) DEVICE — LIGAMAX 5 MM ENDOSCOPIC MULTIPLE CLIP APPLIER: Brand: LIGAMAX

## (undated) DEVICE — VEST SURGEON DISPOSABLE

## (undated) DEVICE — SCD SEQUENTIAL COMPRESSION COMFORT SLEEVE MEDIUM KNEE LENGTH: Brand: KENDALL SCD

## (undated) DEVICE — COBAN 4 IN STERILE

## (undated) DEVICE — KENDALL SCD SEQUENTIAL COMPRESSION COMFORT SLEEVES, KNEE LENGTH, MEDIUM: Brand: KENDALL SCD

## (undated) DEVICE — SPHERE INSERT INSTRUMENT SET  I3R-T3/4: Brand: KNEE INSTRUMENTS

## (undated) DEVICE — MEDI-VAC YANKAUER SUCTION HANDLE W/BULBOUS AND CONTROL VENT: Brand: CARDINAL HEALTH

## (undated) DEVICE — ACE WRAP 6 IN UNSTERILE

## (undated) DEVICE — PADDING CAST 4 IN  COTTON STRL

## (undated) DEVICE — BETHLEHEM UNIV TOTAL KNEE, KIT: Brand: CARDINAL HEALTH

## (undated) DEVICE — HOOD WITH PEEL AWAY FACE SHIELD: Brand: T7PLUS

## (undated) DEVICE — PREP PAD BNS: Brand: CONVERTORS

## (undated) DEVICE — SUT VICRYL 4-0 PS-2 27 IN J426H

## (undated) DEVICE — CUFF TOURNIQUET 30 X 4 IN QUICK CONNECT DISP 1BLA

## (undated) DEVICE — REM POLYHESIVE ADULT PATIENT RETURN ELECTRODE: Brand: VALLEYLAB

## (undated) DEVICE — DECANTER: Brand: UNBRANDED

## (undated) DEVICE — PREP SURGICAL PURPREP 26ML

## (undated) DEVICE — SAW BLADE OSCILLATING BRAZOL 167

## (undated) DEVICE — CURITY NON-ADHERENT STRIPS: Brand: CURITY

## (undated) DEVICE — CEMENT MIXING TOWER

## (undated) DEVICE — HANDPIECE SET WITH RETRACTABLE COAXIAL FAN SPRAY TIP AND SUCTION TUBE: Brand: INTERPULSE

## (undated) DEVICE — BLADE SAW RECIP 12.5 X 76MM 0.9/0.9MM THCK

## (undated) DEVICE — LIGACLIP 12 MM ENDOSCOPIC ROTATING MULTIPLE CLIP APPLIER (LARGE): Brand: LIGACLIP

## (undated) DEVICE — SHORT THREADED PINS PACK: Brand: KNEE INSTRUMENTS

## (undated) DEVICE — DRAPE EQUIPMENT RF WAND

## (undated) DEVICE — SUT ETHILON 3-0 PS-1 18 IN 1663G

## (undated) DEVICE — SUT STRATAFIX SPIRAL PLUS 3-0 PS-2 30 X 30 CM SXMP2B408

## (undated) DEVICE — GLOVE INDICATOR PI UNDERGLOVE SZ 7.5 BLUE

## (undated) DEVICE — ENDOPATH XCEL BLADELESS TROCARS WITH STABILITY SLEEVES: Brand: ENDOPATH XCEL

## (undated) DEVICE — CYSTO TUBING TUR Y IRRIGATION

## (undated) DEVICE — CHLORAPREP HI-LITE 26ML ORANGE

## (undated) DEVICE — ENDOPATH XCEL UNIVERSAL TROCAR STABLILITY SLEEVES: Brand: ENDOPATH XCEL

## (undated) DEVICE — BAG WOUND LAVAGE 1L BIASURGE ADV

## (undated) DEVICE — SPHERE FEMORAL S3+  INSTRUMENT SET: Brand: KNEE INSTRUMENTS

## (undated) DEVICE — IV CATH 14 G X 1.75

## (undated) DEVICE — SPECIMEN CONTAINER STERILE PEEL PACK

## (undated) DEVICE — DRAPE C-ARM X-RAY

## (undated) DEVICE — OCCLUSIVE GAUZE STRIP,3% BISMUTH TRIBROMOPHENATE IN PETROLATUM BLEND: Brand: XEROFORM

## (undated) DEVICE — SURGICAL GOWN, XL SMARTSLEEVE: Brand: CONVERTORS

## (undated) DEVICE — ALLENTOWN LAP CHOLE APP PACK: Brand: CARDINAL HEALTH

## (undated) DEVICE — THREADED PINS PACK: Brand: KNEE INSTRUMENTS

## (undated) DEVICE — ELECTRODE LAP L WIRE E-Z CLEAN 33CM -0100

## (undated) DEVICE — IMPACTOR HANDLE: Brand: KNEE INSTRUMENTS

## (undated) DEVICE — GLOVE INDICATOR PI UNDERGLOVE SZ 8.5 BLUE

## (undated) DEVICE — NEPTUNE E-SEP SMOKE EVACUATION PENCIL, COATED, 70MM BLADE, PUSH BUTTON SWITCH: Brand: NEPTUNE E-SEP

## (undated) DEVICE — SCREWS PACK
Type: IMPLANTABLE DEVICE | Site: KNEE | Status: NON-FUNCTIONAL
Brand: KNEE INSTRUMENTS
Removed: 2025-01-20

## (undated) DEVICE — INTENDED FOR TISSUE SEPARATION, AND OTHER PROCEDURES THAT REQUIRE A SHARP SURGICAL BLADE TO PUNCTURE OR CUT.: Brand: BARD-PARKER SAFETY BLADES SIZE 15, STERILE

## (undated) DEVICE — GENERAL MIS SPHERE INSTRUMENT SET: Brand: KNEE INSTRUMENTS

## (undated) DEVICE — ENDOPOUCH RETRIEVER SPECIMEN RETRIEVAL BAGS: Brand: ENDOPOUCH RETRIEVER

## (undated) DEVICE — 3M™ STERI-STRIP™ REINFORCED ADHESIVE SKIN CLOSURES, R1540, 1/8 IN X 3 IN (3 MM X 75 MM), 5 STRIPS/ENVELOPE: Brand: 3M™ STERI-STRIP™

## (undated) DEVICE — BLADE SAGITTAL 25.6 X 9.5MM